# Patient Record
Sex: MALE | Race: WHITE | Employment: OTHER | URBAN - METROPOLITAN AREA
[De-identification: names, ages, dates, MRNs, and addresses within clinical notes are randomized per-mention and may not be internally consistent; named-entity substitution may affect disease eponyms.]

---

## 2017-01-15 PROBLEM — J96.02 ACUTE RESPIRATORY FAILURE WITH HYPOXIA AND HYPERCAPNIA (HCC): Status: ACTIVE | Noted: 2017-01-15

## 2017-01-15 PROBLEM — I50.33 ACUTE ON CHRONIC DIASTOLIC HEART FAILURE (HCC): Status: ACTIVE | Noted: 2017-01-15

## 2017-01-15 PROBLEM — I50.1 PULMONARY EDEMA CARDIAC CAUSE (HCC): Status: ACTIVE | Noted: 2017-01-15

## 2017-01-15 PROBLEM — J96.01 ACUTE RESPIRATORY FAILURE WITH HYPOXIA AND HYPERCAPNIA (HCC): Status: ACTIVE | Noted: 2017-01-15

## 2017-01-15 PROBLEM — N18.30 CHRONIC KIDNEY DISEASE, STAGE III (MODERATE) (HCC): Status: ACTIVE | Noted: 2017-01-15

## 2017-01-25 ENCOUNTER — CARE COORDINATION (OUTPATIENT)
Dept: INTERNAL MEDICINE | Age: 72
End: 2017-01-25

## 2017-01-26 ENCOUNTER — TELEPHONE (OUTPATIENT)
Dept: INTERNAL MEDICINE | Age: 72
End: 2017-01-26

## 2017-01-27 ENCOUNTER — TELEPHONE (OUTPATIENT)
Dept: INTERNAL MEDICINE | Age: 72
End: 2017-01-27

## 2017-01-31 ENCOUNTER — TELEPHONE (OUTPATIENT)
Dept: INTERNAL MEDICINE | Age: 72
End: 2017-01-31

## 2017-01-31 ENCOUNTER — OFFICE VISIT (OUTPATIENT)
Dept: CARDIOLOGY CLINIC | Age: 72
End: 2017-01-31

## 2017-01-31 ENCOUNTER — CARE COORDINATION (OUTPATIENT)
Dept: INTERNAL MEDICINE | Age: 72
End: 2017-01-31

## 2017-01-31 ENCOUNTER — OFFICE VISIT (OUTPATIENT)
Dept: ENDOCRINOLOGY | Age: 72
End: 2017-01-31

## 2017-01-31 ENCOUNTER — OFFICE VISIT (OUTPATIENT)
Dept: INTERNAL MEDICINE | Age: 72
End: 2017-01-31

## 2017-01-31 VITALS
RESPIRATION RATE: 16 BRPM | DIASTOLIC BLOOD PRESSURE: 67 MMHG | HEIGHT: 70 IN | OXYGEN SATURATION: 96 % | WEIGHT: 224 LBS | SYSTOLIC BLOOD PRESSURE: 118 MMHG | HEART RATE: 92 BPM | BODY MASS INDEX: 32.07 KG/M2

## 2017-01-31 VITALS — BODY MASS INDEX: 31.71 KG/M2 | WEIGHT: 221 LBS | DIASTOLIC BLOOD PRESSURE: 60 MMHG | SYSTOLIC BLOOD PRESSURE: 102 MMHG

## 2017-01-31 VITALS
BODY MASS INDEX: 31.8 KG/M2 | SYSTOLIC BLOOD PRESSURE: 98 MMHG | HEART RATE: 74 BPM | DIASTOLIC BLOOD PRESSURE: 58 MMHG | WEIGHT: 221.6 LBS

## 2017-01-31 DIAGNOSIS — I50.33 ACUTE ON CHRONIC DIASTOLIC CHF (CONGESTIVE HEART FAILURE), NYHA CLASS 1 (HCC): Primary | ICD-10-CM

## 2017-01-31 DIAGNOSIS — N18.30 CHRONIC KIDNEY DISEASE, STAGE III (MODERATE) (HCC): ICD-10-CM

## 2017-01-31 DIAGNOSIS — I25.10 ATHEROSCLEROSIS OF NATIVE CORONARY ARTERY OF NATIVE HEART WITHOUT ANGINA PECTORIS: ICD-10-CM

## 2017-01-31 DIAGNOSIS — I50.33 ACUTE ON CHRONIC DIASTOLIC HEART FAILURE (HCC): ICD-10-CM

## 2017-01-31 DIAGNOSIS — I10 ESSENTIAL HYPERTENSION: ICD-10-CM

## 2017-01-31 DIAGNOSIS — E78.2 MIXED HYPERLIPIDEMIA: ICD-10-CM

## 2017-01-31 DIAGNOSIS — Z95.1 S/P CABG X 4: ICD-10-CM

## 2017-01-31 LAB
ANION GAP SERPL CALCULATED.3IONS-SCNC: 17 MMOL/L (ref 3–16)
BUN BLDV-MCNC: 67 MG/DL (ref 7–20)
CALCIUM SERPL-MCNC: 9.7 MG/DL (ref 8.3–10.6)
CHLORIDE BLD-SCNC: 97 MMOL/L (ref 99–110)
CO2: 26 MMOL/L (ref 21–32)
CREAT SERPL-MCNC: 2.9 MG/DL (ref 0.8–1.3)
GFR AFRICAN AMERICAN: 26
GFR NON-AFRICAN AMERICAN: 22
GLUCOSE BLD-MCNC: 232 MG/DL (ref 70–99)
POTASSIUM SERPL-SCNC: 6.1 MMOL/L (ref 3.5–5.1)
SODIUM BLD-SCNC: 140 MMOL/L (ref 136–145)

## 2017-01-31 PROCEDURE — 99214 OFFICE O/P EST MOD 30 MIN: CPT | Performed by: INTERNAL MEDICINE

## 2017-01-31 PROCEDURE — 99214 OFFICE O/P EST MOD 30 MIN: CPT | Performed by: NURSE PRACTITIONER

## 2017-01-31 RX ORDER — INSULIN GLARGINE 100 [IU]/ML
INJECTION, SOLUTION SUBCUTANEOUS
COMMUNITY
Start: 2017-01-25 | End: 2017-01-31 | Stop reason: SDUPTHER

## 2017-01-31 ASSESSMENT — ENCOUNTER SYMPTOMS
CHEST TIGHTNESS: 0
COUGH: 0
SINUS PRESSURE: 0
ABDOMINAL PAIN: 0
SHORTNESS OF BREATH: 0
WHEEZING: 0

## 2017-02-09 ENCOUNTER — CARE COORDINATION (OUTPATIENT)
Dept: INTERNAL MEDICINE | Age: 72
End: 2017-02-09

## 2017-02-14 ENCOUNTER — CARE COORDINATION (OUTPATIENT)
Dept: INTERNAL MEDICINE | Age: 72
End: 2017-02-14

## 2017-02-15 ENCOUNTER — CARE COORDINATION (OUTPATIENT)
Dept: INTERNAL MEDICINE | Age: 72
End: 2017-02-15

## 2017-02-21 ENCOUNTER — OFFICE VISIT (OUTPATIENT)
Dept: PULMONOLOGY | Age: 72
End: 2017-02-21

## 2017-02-21 VITALS
RESPIRATION RATE: 16 BRPM | DIASTOLIC BLOOD PRESSURE: 62 MMHG | HEIGHT: 70 IN | WEIGHT: 214 LBS | OXYGEN SATURATION: 92 % | BODY MASS INDEX: 30.64 KG/M2 | HEART RATE: 89 BPM | SYSTOLIC BLOOD PRESSURE: 122 MMHG

## 2017-02-21 DIAGNOSIS — G47.33 OSA (OBSTRUCTIVE SLEEP APNEA): Primary | ICD-10-CM

## 2017-02-21 DIAGNOSIS — I50.32 CHRONIC DIASTOLIC CONGESTIVE HEART FAILURE (HCC): ICD-10-CM

## 2017-02-21 DIAGNOSIS — J45.991 COUGH VARIANT ASTHMA: ICD-10-CM

## 2017-02-21 DIAGNOSIS — Z99.81 HYPOXEMIA REQUIRING SUPPLEMENTAL OXYGEN: ICD-10-CM

## 2017-02-21 DIAGNOSIS — R09.02 HYPOXEMIA REQUIRING SUPPLEMENTAL OXYGEN: ICD-10-CM

## 2017-02-21 PROCEDURE — 99214 OFFICE O/P EST MOD 30 MIN: CPT | Performed by: INTERNAL MEDICINE

## 2017-02-21 ASSESSMENT — SLEEP AND FATIGUE QUESTIONNAIRES
HOW LIKELY ARE YOU TO NOD OFF OR FALL ASLEEP WHILE SITTING QUIETLY AFTER LUNCH WITHOUT ALCOHOL: 2
HOW LIKELY ARE YOU TO NOD OFF OR FALL ASLEEP WHILE LYING DOWN TO REST IN THE AFTERNOON WHEN CIRCUMSTANCES PERMIT: 2
HOW LIKELY ARE YOU TO NOD OFF OR FALL ASLEEP WHEN YOU ARE A PASSENGER IN A CAR FOR AN HOUR WITHOUT A BREAK: 1
HOW LIKELY ARE YOU TO NOD OFF OR FALL ASLEEP WHILE SITTING INACTIVE IN A PUBLIC PLACE: 0
HOW LIKELY ARE YOU TO NOD OFF OR FALL ASLEEP WHILE SITTING AND READING: 2
HOW LIKELY ARE YOU TO NOD OFF OR FALL ASLEEP WHILE SITTING AND TALKING TO SOMEONE: 0
HOW LIKELY ARE YOU TO NOD OFF OR FALL ASLEEP WHILE WATCHING TV: 3
HOW LIKELY ARE YOU TO NOD OFF OR FALL ASLEEP IN A CAR, WHILE STOPPED FOR A FEW MINUTES IN TRAFFIC: 0
ESS TOTAL SCORE: 10

## 2017-03-01 ENCOUNTER — TELEPHONE (OUTPATIENT)
Dept: PULMONOLOGY | Age: 72
End: 2017-03-01

## 2017-03-01 ENCOUNTER — TELEPHONE (OUTPATIENT)
Dept: INTERNAL MEDICINE | Age: 72
End: 2017-03-01

## 2017-03-06 ENCOUNTER — OFFICE VISIT (OUTPATIENT)
Dept: CARDIOLOGY CLINIC | Age: 72
End: 2017-03-06

## 2017-03-06 VITALS
DIASTOLIC BLOOD PRESSURE: 60 MMHG | BODY MASS INDEX: 30.86 KG/M2 | HEIGHT: 70 IN | HEART RATE: 89 BPM | OXYGEN SATURATION: 90 % | SYSTOLIC BLOOD PRESSURE: 110 MMHG | WEIGHT: 215.6 LBS

## 2017-03-06 DIAGNOSIS — I25.10 ATHEROSCLEROSIS OF NATIVE CORONARY ARTERY OF NATIVE HEART WITHOUT ANGINA PECTORIS: ICD-10-CM

## 2017-03-06 DIAGNOSIS — I50.9 ACUTE CONGESTIVE HEART FAILURE, UNSPECIFIED CONGESTIVE HEART FAILURE TYPE: ICD-10-CM

## 2017-03-06 DIAGNOSIS — I50.9 ACUTE CONGESTIVE HEART FAILURE, UNSPECIFIED CONGESTIVE HEART FAILURE TYPE: Primary | ICD-10-CM

## 2017-03-06 DIAGNOSIS — I10 ESSENTIAL HYPERTENSION: ICD-10-CM

## 2017-03-06 DIAGNOSIS — N18.30 CHRONIC KIDNEY DISEASE, STAGE III (MODERATE) (HCC): ICD-10-CM

## 2017-03-06 LAB
ALBUMIN SERPL-MCNC: 4.2 G/DL (ref 3.4–5)
ANION GAP SERPL CALCULATED.3IONS-SCNC: 16 MMOL/L (ref 3–16)
BUN BLDV-MCNC: 44 MG/DL (ref 7–20)
CALCIUM SERPL-MCNC: 9.7 MG/DL (ref 8.3–10.6)
CHLORIDE BLD-SCNC: 99 MMOL/L (ref 99–110)
CO2: 27 MMOL/L (ref 21–32)
CREAT SERPL-MCNC: 2.4 MG/DL (ref 0.8–1.3)
GFR AFRICAN AMERICAN: 32
GFR NON-AFRICAN AMERICAN: 27
GLUCOSE BLD-MCNC: 239 MG/DL (ref 70–99)
PHOSPHORUS: 3.3 MG/DL (ref 2.5–4.9)
POTASSIUM SERPL-SCNC: 5.2 MMOL/L (ref 3.5–5.1)
SODIUM BLD-SCNC: 142 MMOL/L (ref 136–145)

## 2017-03-06 PROCEDURE — 99212 OFFICE O/P EST SF 10 MIN: CPT | Performed by: INTERNAL MEDICINE

## 2017-03-08 RX ORDER — METOPROLOL SUCCINATE 50 MG/1
TABLET, EXTENDED RELEASE ORAL
Qty: 90 TABLET | Refills: 3 | Status: SHIPPED | OUTPATIENT
Start: 2017-03-08 | End: 2018-01-11 | Stop reason: SDUPTHER

## 2017-03-12 ENCOUNTER — HOSPITAL ENCOUNTER (OUTPATIENT)
Dept: SLEEP MEDICINE | Age: 72
Discharge: HOME | End: 2017-03-12
Attending: INTERNAL MEDICINE | Admitting: INTERNAL MEDICINE

## 2017-03-13 ENCOUNTER — TELEPHONE (OUTPATIENT)
Dept: PULMONOLOGY | Age: 72
End: 2017-03-13

## 2017-03-13 DIAGNOSIS — G47.33 OSA (OBSTRUCTIVE SLEEP APNEA): Primary | ICD-10-CM

## 2017-03-18 RX ORDER — ROPINIROLE 1 MG/1
TABLET, FILM COATED ORAL
Qty: 90 TABLET | Refills: 0 | Status: SHIPPED | OUTPATIENT
Start: 2017-03-18 | End: 2017-06-14 | Stop reason: SDUPTHER

## 2017-03-27 ENCOUNTER — CARE COORDINATION (OUTPATIENT)
Dept: INTERNAL MEDICINE | Age: 72
End: 2017-03-27

## 2017-03-27 DIAGNOSIS — E11.42 TYPE 2 DIABETES MELLITUS WITH DIABETIC POLYNEUROPATHY, WITH LONG-TERM CURRENT USE OF INSULIN (HCC): Primary | Chronic | ICD-10-CM

## 2017-03-27 DIAGNOSIS — Z79.4 TYPE 2 DIABETES MELLITUS WITH DIABETIC POLYNEUROPATHY, WITH LONG-TERM CURRENT USE OF INSULIN (HCC): Primary | Chronic | ICD-10-CM

## 2017-03-28 ENCOUNTER — TELEPHONE (OUTPATIENT)
Dept: PULMONOLOGY | Age: 72
End: 2017-03-28

## 2017-03-29 ENCOUNTER — HOSPITAL ENCOUNTER (OUTPATIENT)
Dept: SLEEP MEDICINE | Age: 72
Discharge: HOME | End: 2017-03-29
Attending: INTERNAL MEDICINE | Admitting: INTERNAL MEDICINE

## 2017-04-12 ENCOUNTER — CARE COORDINATION (OUTPATIENT)
Dept: INTERNAL MEDICINE | Age: 72
End: 2017-04-12

## 2017-05-01 ENCOUNTER — OFFICE VISIT (OUTPATIENT)
Dept: PULMONOLOGY | Age: 72
End: 2017-05-01

## 2017-05-01 ENCOUNTER — TELEPHONE (OUTPATIENT)
Dept: PULMONOLOGY | Age: 72
End: 2017-05-01

## 2017-05-01 ENCOUNTER — OFFICE VISIT (OUTPATIENT)
Dept: INTERNAL MEDICINE | Age: 72
End: 2017-05-01

## 2017-05-01 ENCOUNTER — OFFICE VISIT (OUTPATIENT)
Dept: ENDOCRINOLOGY | Age: 72
End: 2017-05-01

## 2017-05-01 ENCOUNTER — CARE COORDINATOR VISIT (OUTPATIENT)
Dept: INTERNAL MEDICINE | Age: 72
End: 2017-05-01

## 2017-05-01 VITALS
WEIGHT: 209.4 LBS | DIASTOLIC BLOOD PRESSURE: 70 MMHG | HEIGHT: 70 IN | BODY MASS INDEX: 29.98 KG/M2 | SYSTOLIC BLOOD PRESSURE: 118 MMHG

## 2017-05-01 VITALS
WEIGHT: 209 LBS | HEART RATE: 81 BPM | SYSTOLIC BLOOD PRESSURE: 118 MMHG | HEIGHT: 70 IN | OXYGEN SATURATION: 95 % | RESPIRATION RATE: 18 BRPM | DIASTOLIC BLOOD PRESSURE: 76 MMHG | BODY MASS INDEX: 29.92 KG/M2

## 2017-05-01 VITALS
RESPIRATION RATE: 16 BRPM | HEIGHT: 70 IN | BODY MASS INDEX: 30.12 KG/M2 | SYSTOLIC BLOOD PRESSURE: 96 MMHG | HEART RATE: 80 BPM | OXYGEN SATURATION: 93 % | WEIGHT: 210.4 LBS | DIASTOLIC BLOOD PRESSURE: 66 MMHG

## 2017-05-01 DIAGNOSIS — N18.30 CHRONIC KIDNEY DISEASE, STAGE III (MODERATE) (HCC): Primary | ICD-10-CM

## 2017-05-01 DIAGNOSIS — I10 ESSENTIAL HYPERTENSION: ICD-10-CM

## 2017-05-01 DIAGNOSIS — R09.02 HYPOXEMIA REQUIRING SUPPLEMENTAL OXYGEN: ICD-10-CM

## 2017-05-01 DIAGNOSIS — Z79.4 INSULIN DEPENDENT TYPE 2 DIABETES MELLITUS, CONTROLLED (HCC): ICD-10-CM

## 2017-05-01 DIAGNOSIS — N18.4 CHRONIC KIDNEY DISEASE (CKD), STAGE IV (SEVERE) (HCC): ICD-10-CM

## 2017-05-01 DIAGNOSIS — N18.30 CHRONIC KIDNEY DISEASE, STAGE III (MODERATE) (HCC): ICD-10-CM

## 2017-05-01 DIAGNOSIS — G47.30 SLEEP APNEA, UNSPECIFIED TYPE: ICD-10-CM

## 2017-05-01 DIAGNOSIS — J84.9 ILD (INTERSTITIAL LUNG DISEASE) (HCC): ICD-10-CM

## 2017-05-01 DIAGNOSIS — I50.32 CHRONIC DIASTOLIC CONGESTIVE HEART FAILURE (HCC): ICD-10-CM

## 2017-05-01 DIAGNOSIS — G47.33 OSA (OBSTRUCTIVE SLEEP APNEA): Primary | ICD-10-CM

## 2017-05-01 DIAGNOSIS — Z99.81 HYPOXEMIA REQUIRING SUPPLEMENTAL OXYGEN: ICD-10-CM

## 2017-05-01 DIAGNOSIS — I10 ESSENTIAL HYPERTENSION: Primary | ICD-10-CM

## 2017-05-01 DIAGNOSIS — E11.9 INSULIN DEPENDENT TYPE 2 DIABETES MELLITUS, CONTROLLED (HCC): ICD-10-CM

## 2017-05-01 DIAGNOSIS — J45.991 COUGH VARIANT ASTHMA: ICD-10-CM

## 2017-05-01 DIAGNOSIS — I21.4 NON-ST ELEVATION MI (NSTEMI) (HCC): ICD-10-CM

## 2017-05-01 LAB
ANION GAP SERPL CALCULATED.3IONS-SCNC: 18 MMOL/L (ref 3–16)
BASOPHILS ABSOLUTE: 0.1 K/UL (ref 0–0.2)
BASOPHILS RELATIVE PERCENT: 0.7 %
BUN BLDV-MCNC: 60 MG/DL (ref 7–20)
CALCIUM SERPL-MCNC: 9.6 MG/DL (ref 8.3–10.6)
CHLORIDE BLD-SCNC: 97 MMOL/L (ref 99–110)
CO2: 25 MMOL/L (ref 21–32)
CREAT SERPL-MCNC: 2.6 MG/DL (ref 0.8–1.3)
EOSINOPHILS ABSOLUTE: 0.5 K/UL (ref 0–0.6)
EOSINOPHILS RELATIVE PERCENT: 5.4 %
GFR AFRICAN AMERICAN: 30
GFR NON-AFRICAN AMERICAN: 24
GLUCOSE BLD-MCNC: 110 MG/DL (ref 70–99)
HBA1C MFR BLD: 6.9 %
HCT VFR BLD CALC: 36.9 % (ref 40.5–52.5)
HEMOGLOBIN: 11.9 G/DL (ref 13.5–17.5)
LYMPHOCYTES ABSOLUTE: 1.6 K/UL (ref 1–5.1)
LYMPHOCYTES RELATIVE PERCENT: 16.2 %
MCH RBC QN AUTO: 31.1 PG (ref 26–34)
MCHC RBC AUTO-ENTMCNC: 32.2 G/DL (ref 31–36)
MCV RBC AUTO: 96.6 FL (ref 80–100)
MONOCYTES ABSOLUTE: 0.8 K/UL (ref 0–1.3)
MONOCYTES RELATIVE PERCENT: 8.5 %
NEUTROPHILS ABSOLUTE: 6.9 K/UL (ref 1.7–7.7)
NEUTROPHILS RELATIVE PERCENT: 69.2 %
PDW BLD-RTO: 14.2 % (ref 12.4–15.4)
PLATELET # BLD: 335 K/UL (ref 135–450)
PMV BLD AUTO: 8.9 FL (ref 5–10.5)
POTASSIUM SERPL-SCNC: 5.6 MMOL/L (ref 3.5–5.1)
RBC # BLD: 3.82 M/UL (ref 4.2–5.9)
SODIUM BLD-SCNC: 140 MMOL/L (ref 136–145)
WBC # BLD: 9.9 K/UL (ref 4–11)

## 2017-05-01 PROCEDURE — 99213 OFFICE O/P EST LOW 20 MIN: CPT | Performed by: INTERNAL MEDICINE

## 2017-05-01 PROCEDURE — 36415 COLL VENOUS BLD VENIPUNCTURE: CPT | Performed by: INTERNAL MEDICINE

## 2017-05-01 PROCEDURE — 83036 HEMOGLOBIN GLYCOSYLATED A1C: CPT | Performed by: INTERNAL MEDICINE

## 2017-05-01 PROCEDURE — 99214 OFFICE O/P EST MOD 30 MIN: CPT | Performed by: INTERNAL MEDICINE

## 2017-05-01 ASSESSMENT — ENCOUNTER SYMPTOMS
ABDOMINAL PAIN: 0
WHEEZING: 0
SINUS PRESSURE: 0
COUGH: 1
CHEST TIGHTNESS: 0
SHORTNESS OF BREATH: 0

## 2017-05-01 ASSESSMENT — PATIENT HEALTH QUESTIONNAIRE - PHQ9
1. LITTLE INTEREST OR PLEASURE IN DOING THINGS: 0
SUM OF ALL RESPONSES TO PHQ9 QUESTIONS 1 & 2: 0
SUM OF ALL RESPONSES TO PHQ QUESTIONS 1-9: 0
2. FEELING DOWN, DEPRESSED OR HOPELESS: 0

## 2017-05-08 ENCOUNTER — OFFICE VISIT (OUTPATIENT)
Dept: CARDIOLOGY CLINIC | Age: 72
End: 2017-05-08

## 2017-05-08 VITALS
BODY MASS INDEX: 30.13 KG/M2 | SYSTOLIC BLOOD PRESSURE: 120 MMHG | DIASTOLIC BLOOD PRESSURE: 64 MMHG | HEART RATE: 80 BPM | WEIGHT: 210 LBS

## 2017-05-08 DIAGNOSIS — I50.33 ACUTE ON CHRONIC DIASTOLIC HEART FAILURE (HCC): ICD-10-CM

## 2017-05-08 DIAGNOSIS — I25.10 ATHEROSCLEROSIS OF NATIVE CORONARY ARTERY OF NATIVE HEART WITHOUT ANGINA PECTORIS: Primary | ICD-10-CM

## 2017-05-08 PROCEDURE — 99213 OFFICE O/P EST LOW 20 MIN: CPT | Performed by: INTERNAL MEDICINE

## 2017-05-19 ENCOUNTER — TELEPHONE (OUTPATIENT)
Dept: INTERNAL MEDICINE | Age: 72
End: 2017-05-19

## 2017-05-19 ENCOUNTER — OFFICE VISIT (OUTPATIENT)
Dept: INTERNAL MEDICINE | Age: 72
End: 2017-05-19

## 2017-05-19 VITALS
WEIGHT: 203.2 LBS | SYSTOLIC BLOOD PRESSURE: 120 MMHG | DIASTOLIC BLOOD PRESSURE: 70 MMHG | HEIGHT: 70 IN | BODY MASS INDEX: 29.09 KG/M2

## 2017-05-19 DIAGNOSIS — I10 ESSENTIAL HYPERTENSION: ICD-10-CM

## 2017-05-19 DIAGNOSIS — H26.9 CATARACT, BILATERAL: ICD-10-CM

## 2017-05-19 DIAGNOSIS — Z01.818 PRE-OP EXAMINATION: Primary | ICD-10-CM

## 2017-05-19 DIAGNOSIS — I25.10 ATHEROSCLEROSIS OF NATIVE CORONARY ARTERY OF NATIVE HEART WITHOUT ANGINA PECTORIS: ICD-10-CM

## 2017-05-19 DIAGNOSIS — N18.30 CHRONIC KIDNEY DISEASE, STAGE III (MODERATE) (HCC): ICD-10-CM

## 2017-05-19 DIAGNOSIS — Z01.818 PRE-OP EXAMINATION: ICD-10-CM

## 2017-05-19 LAB
A/G RATIO: 1.7 (ref 1.1–2.2)
ALBUMIN SERPL-MCNC: 4.3 G/DL (ref 3.4–5)
ALP BLD-CCNC: 37 U/L (ref 40–129)
ALT SERPL-CCNC: 23 U/L (ref 10–40)
ANION GAP SERPL CALCULATED.3IONS-SCNC: 16 MMOL/L (ref 3–16)
AST SERPL-CCNC: 29 U/L (ref 15–37)
BASOPHILS ABSOLUTE: 0.1 K/UL (ref 0–0.2)
BASOPHILS RELATIVE PERCENT: 0.9 %
BILIRUB SERPL-MCNC: 0.4 MG/DL (ref 0–1)
BUN BLDV-MCNC: 70 MG/DL (ref 7–20)
CALCIUM SERPL-MCNC: 9.7 MG/DL (ref 8.3–10.6)
CHLORIDE BLD-SCNC: 95 MMOL/L (ref 99–110)
CO2: 27 MMOL/L (ref 21–32)
CREAT SERPL-MCNC: 2.8 MG/DL (ref 0.8–1.3)
EOSINOPHILS ABSOLUTE: 0.5 K/UL (ref 0–0.6)
EOSINOPHILS RELATIVE PERCENT: 6 %
GFR AFRICAN AMERICAN: 27
GFR NON-AFRICAN AMERICAN: 22
GLOBULIN: 2.6 G/DL
GLUCOSE BLD-MCNC: 157 MG/DL (ref 70–99)
HCT VFR BLD CALC: 37.6 % (ref 40.5–52.5)
HEMOGLOBIN: 12 G/DL (ref 13.5–17.5)
LYMPHOCYTES ABSOLUTE: 1.6 K/UL (ref 1–5.1)
LYMPHOCYTES RELATIVE PERCENT: 20.8 %
MCH RBC QN AUTO: 31.6 PG (ref 26–34)
MCHC RBC AUTO-ENTMCNC: 32 G/DL (ref 31–36)
MCV RBC AUTO: 99 FL (ref 80–100)
MONOCYTES ABSOLUTE: 0.6 K/UL (ref 0–1.3)
MONOCYTES RELATIVE PERCENT: 7.2 %
NEUTROPHILS ABSOLUTE: 5.1 K/UL (ref 1.7–7.7)
NEUTROPHILS RELATIVE PERCENT: 65.1 %
PDW BLD-RTO: 14.8 % (ref 12.4–15.4)
PLATELET # BLD: 323 K/UL (ref 135–450)
PMV BLD AUTO: 8.9 FL (ref 5–10.5)
POTASSIUM SERPL-SCNC: 5.2 MMOL/L (ref 3.5–5.1)
RBC # BLD: 3.8 M/UL (ref 4.2–5.9)
SODIUM BLD-SCNC: 138 MMOL/L (ref 136–145)
TOTAL PROTEIN: 6.9 G/DL (ref 6.4–8.2)
WBC # BLD: 7.8 K/UL (ref 4–11)

## 2017-05-19 PROCEDURE — 99214 OFFICE O/P EST MOD 30 MIN: CPT | Performed by: INTERNAL MEDICINE

## 2017-05-19 PROCEDURE — 93000 ELECTROCARDIOGRAM COMPLETE: CPT | Performed by: INTERNAL MEDICINE

## 2017-05-22 RX ORDER — ATORVASTATIN CALCIUM 40 MG/1
TABLET, FILM COATED ORAL
Qty: 90 TABLET | Refills: 0 | Status: SHIPPED | OUTPATIENT
Start: 2017-05-22 | End: 2017-08-21 | Stop reason: SDUPTHER

## 2017-05-31 RX ORDER — BLOOD SUGAR DIAGNOSTIC
STRIP MISCELLANEOUS
Qty: 150 STRIP | Refills: 3 | Status: SHIPPED | OUTPATIENT
Start: 2017-05-31 | End: 2018-03-15 | Stop reason: SDUPTHER

## 2017-06-13 ENCOUNTER — TELEPHONE (OUTPATIENT)
Dept: INTERNAL MEDICINE | Age: 72
End: 2017-06-13

## 2017-06-14 RX ORDER — ROPINIROLE 1 MG/1
TABLET, FILM COATED ORAL
Qty: 90 TABLET | Refills: 0 | Status: SHIPPED | OUTPATIENT
Start: 2017-06-14 | End: 2017-08-18 | Stop reason: SDUPTHER

## 2017-06-19 ENCOUNTER — CARE COORDINATION (OUTPATIENT)
Dept: INTERNAL MEDICINE | Age: 72
End: 2017-06-19

## 2017-07-08 RX ORDER — FENOFIBRIC ACID 135 MG/1
CAPSULE, DELAYED RELEASE ORAL
Qty: 90 CAPSULE | Refills: 0 | Status: SHIPPED | OUTPATIENT
Start: 2017-07-08 | End: 2017-07-18

## 2017-07-12 RX ORDER — FENOFIBRIC ACID 135 MG/1
CAPSULE, DELAYED RELEASE ORAL
Qty: 90 CAPSULE | Refills: 0 | Status: SHIPPED | OUTPATIENT
Start: 2017-07-12 | End: 2017-07-18

## 2017-07-17 RX ORDER — PEN NEEDLE, DIABETIC 31 GX5/16"
NEEDLE, DISPOSABLE MISCELLANEOUS
Qty: 100 EACH | Refills: 2 | Status: SHIPPED | OUTPATIENT
Start: 2017-07-17 | End: 2017-10-12 | Stop reason: SDUPTHER

## 2017-07-18 RX ORDER — FENOFIBRIC ACID 135 MG/1
CAPSULE, DELAYED RELEASE ORAL
Qty: 90 CAPSULE | Refills: 0 | Status: SHIPPED | OUTPATIENT
Start: 2017-07-18 | End: 2017-10-31

## 2017-07-21 RX ORDER — FENOFIBRIC ACID 135 MG/1
CAPSULE, DELAYED RELEASE ORAL
Qty: 90 CAPSULE | Refills: 0 | Status: SHIPPED | OUTPATIENT
Start: 2017-07-21 | End: 2017-08-01 | Stop reason: SDUPTHER

## 2017-07-25 ENCOUNTER — OFFICE VISIT (OUTPATIENT)
Dept: INTERNAL MEDICINE | Age: 72
End: 2017-07-25

## 2017-07-25 VITALS
DIASTOLIC BLOOD PRESSURE: 70 MMHG | BODY MASS INDEX: 28.92 KG/M2 | HEIGHT: 70 IN | SYSTOLIC BLOOD PRESSURE: 120 MMHG | WEIGHT: 202 LBS

## 2017-07-25 DIAGNOSIS — E11.9 INSULIN DEPENDENT TYPE 2 DIABETES MELLITUS, CONTROLLED (HCC): ICD-10-CM

## 2017-07-25 DIAGNOSIS — Z79.4 INSULIN DEPENDENT TYPE 2 DIABETES MELLITUS, CONTROLLED (HCC): ICD-10-CM

## 2017-07-25 DIAGNOSIS — L30.9 ACUTE DERMATITIS: Primary | ICD-10-CM

## 2017-07-25 DIAGNOSIS — L40.9 PSORIASIS, UNSPECIFIED: ICD-10-CM

## 2017-07-25 DIAGNOSIS — N18.30 CHRONIC KIDNEY DISEASE, STAGE III (MODERATE) (HCC): ICD-10-CM

## 2017-07-25 PROCEDURE — 99213 OFFICE O/P EST LOW 20 MIN: CPT | Performed by: INTERNAL MEDICINE

## 2017-07-25 RX ORDER — INSULIN LISPRO 100 [IU]/ML
INJECTION, SOLUTION INTRAVENOUS; SUBCUTANEOUS
Qty: 100 ML | Refills: 3 | Status: SHIPPED | OUTPATIENT
Start: 2017-07-25 | End: 2018-08-16 | Stop reason: SDUPTHER

## 2017-07-26 ENCOUNTER — CARE COORDINATION (OUTPATIENT)
Dept: INTERNAL MEDICINE | Age: 72
End: 2017-07-26

## 2017-08-01 ENCOUNTER — OFFICE VISIT (OUTPATIENT)
Dept: ENDOCRINOLOGY | Age: 72
End: 2017-08-01

## 2017-08-01 VITALS
OXYGEN SATURATION: 96 % | HEART RATE: 83 BPM | WEIGHT: 200.2 LBS | HEIGHT: 70 IN | SYSTOLIC BLOOD PRESSURE: 128 MMHG | DIASTOLIC BLOOD PRESSURE: 74 MMHG | BODY MASS INDEX: 28.66 KG/M2

## 2017-08-01 DIAGNOSIS — I10 ESSENTIAL HYPERTENSION: ICD-10-CM

## 2017-08-01 LAB — HBA1C MFR BLD: 7.2 %

## 2017-08-01 PROCEDURE — 99214 OFFICE O/P EST MOD 30 MIN: CPT | Performed by: INTERNAL MEDICINE

## 2017-08-01 PROCEDURE — 83036 HEMOGLOBIN GLYCOSYLATED A1C: CPT | Performed by: INTERNAL MEDICINE

## 2017-08-01 ASSESSMENT — PATIENT HEALTH QUESTIONNAIRE - PHQ9
SUM OF ALL RESPONSES TO PHQ9 QUESTIONS 1 & 2: 0
1. LITTLE INTEREST OR PLEASURE IN DOING THINGS: 0
SUM OF ALL RESPONSES TO PHQ QUESTIONS 1-9: 0
2. FEELING DOWN, DEPRESSED OR HOPELESS: 0

## 2017-08-04 ENCOUNTER — CARE COORDINATION (OUTPATIENT)
Dept: INTERNAL MEDICINE | Age: 72
End: 2017-08-04

## 2017-08-14 ENCOUNTER — OFFICE VISIT (OUTPATIENT)
Dept: CARDIOLOGY CLINIC | Age: 72
End: 2017-08-14

## 2017-08-14 VITALS
SYSTOLIC BLOOD PRESSURE: 136 MMHG | WEIGHT: 199 LBS | BODY MASS INDEX: 28.55 KG/M2 | DIASTOLIC BLOOD PRESSURE: 80 MMHG | HEART RATE: 75 BPM

## 2017-08-14 DIAGNOSIS — I10 ESSENTIAL HYPERTENSION: ICD-10-CM

## 2017-08-14 DIAGNOSIS — N18.30 CHRONIC KIDNEY DISEASE, STAGE III (MODERATE) (HCC): ICD-10-CM

## 2017-08-14 DIAGNOSIS — I25.10 ATHEROSCLEROSIS OF NATIVE CORONARY ARTERY OF NATIVE HEART WITHOUT ANGINA PECTORIS: Primary | ICD-10-CM

## 2017-08-14 PROCEDURE — 99213 OFFICE O/P EST LOW 20 MIN: CPT | Performed by: INTERNAL MEDICINE

## 2017-08-18 ENCOUNTER — OFFICE VISIT (OUTPATIENT)
Dept: PULMONOLOGY | Age: 72
End: 2017-08-18

## 2017-08-18 VITALS
WEIGHT: 202 LBS | SYSTOLIC BLOOD PRESSURE: 112 MMHG | BODY MASS INDEX: 28.92 KG/M2 | OXYGEN SATURATION: 94 % | HEIGHT: 70 IN | RESPIRATION RATE: 18 BRPM | DIASTOLIC BLOOD PRESSURE: 64 MMHG | HEART RATE: 84 BPM

## 2017-08-18 DIAGNOSIS — J45.991 COUGH VARIANT ASTHMA: Primary | ICD-10-CM

## 2017-08-18 DIAGNOSIS — G47.33 OSA TREATED WITH BIPAP: ICD-10-CM

## 2017-08-18 DIAGNOSIS — J84.9 ILD (INTERSTITIAL LUNG DISEASE) (HCC): ICD-10-CM

## 2017-08-18 DIAGNOSIS — G25.81 RESTLESS LEGS SYNDROME: ICD-10-CM

## 2017-08-18 PROCEDURE — 99214 OFFICE O/P EST MOD 30 MIN: CPT | Performed by: INTERNAL MEDICINE

## 2017-08-18 RX ORDER — ROPINIROLE 2 MG/1
TABLET, FILM COATED ORAL
Qty: 90 TABLET | Refills: 3 | Status: SHIPPED | OUTPATIENT
Start: 2017-08-18 | End: 2017-09-11

## 2017-08-21 ENCOUNTER — TELEPHONE (OUTPATIENT)
Dept: PULMONOLOGY | Age: 72
End: 2017-08-21

## 2017-08-22 RX ORDER — ATORVASTATIN CALCIUM 40 MG/1
TABLET, FILM COATED ORAL
Qty: 90 TABLET | Refills: 2 | Status: SHIPPED | OUTPATIENT
Start: 2017-08-22 | End: 2018-06-21 | Stop reason: SDUPTHER

## 2017-08-29 ENCOUNTER — CARE COORDINATION (OUTPATIENT)
Dept: INTERNAL MEDICINE | Age: 72
End: 2017-08-29

## 2017-09-01 ENCOUNTER — OFFICE VISIT (OUTPATIENT)
Dept: INTERNAL MEDICINE | Age: 72
End: 2017-09-01

## 2017-09-01 VITALS
DIASTOLIC BLOOD PRESSURE: 60 MMHG | SYSTOLIC BLOOD PRESSURE: 120 MMHG | HEIGHT: 70 IN | WEIGHT: 201.8 LBS | BODY MASS INDEX: 28.89 KG/M2

## 2017-09-01 DIAGNOSIS — I10 ESSENTIAL HYPERTENSION: Primary | ICD-10-CM

## 2017-09-01 DIAGNOSIS — G47.30 SLEEP APNEA, UNSPECIFIED TYPE: ICD-10-CM

## 2017-09-01 DIAGNOSIS — I25.10 ATHEROSCLEROSIS OF NATIVE CORONARY ARTERY OF NATIVE HEART WITHOUT ANGINA PECTORIS: ICD-10-CM

## 2017-09-01 DIAGNOSIS — Z23 NEED FOR INFLUENZA VACCINATION: ICD-10-CM

## 2017-09-01 DIAGNOSIS — N18.30 CHRONIC KIDNEY DISEASE, STAGE III (MODERATE) (HCC): ICD-10-CM

## 2017-09-01 PROCEDURE — 99214 OFFICE O/P EST MOD 30 MIN: CPT | Performed by: INTERNAL MEDICINE

## 2017-09-01 PROCEDURE — G0008 ADMIN INFLUENZA VIRUS VAC: HCPCS | Performed by: INTERNAL MEDICINE

## 2017-09-01 PROCEDURE — 90662 IIV NO PRSV INCREASED AG IM: CPT | Performed by: INTERNAL MEDICINE

## 2017-09-11 RX ORDER — ROPINIROLE 1 MG/1
TABLET, FILM COATED ORAL
Qty: 90 TABLET | Refills: 3 | Status: SHIPPED | OUTPATIENT
Start: 2017-09-11 | End: 2018-08-28 | Stop reason: SDUPTHER

## 2017-09-15 ENCOUNTER — CARE COORDINATION (OUTPATIENT)
Dept: INTERNAL MEDICINE | Age: 72
End: 2017-09-15

## 2017-09-22 ENCOUNTER — TELEPHONE (OUTPATIENT)
Dept: INTERNAL MEDICINE | Age: 72
End: 2017-09-22

## 2017-10-12 RX ORDER — PEN NEEDLE, DIABETIC 31 GX5/16"
1 NEEDLE, DISPOSABLE MISCELLANEOUS
Qty: 360 EACH | Refills: 1 | Status: SHIPPED | OUTPATIENT
Start: 2017-10-12 | End: 2017-11-21 | Stop reason: SDUPTHER

## 2017-10-17 ENCOUNTER — CARE COORDINATION (OUTPATIENT)
Dept: INTERNAL MEDICINE | Age: 72
End: 2017-10-17

## 2017-10-17 NOTE — CARE COORDINATION
Ambulatory Care Coordination Note  10/17/2017  CM Risk Score: 8  Trey Mortality Risk Score: 22.33    ACC: Tricia Saini RN     Hx: Asthma, CKD, DM, HTN, CAD, Psoriasis, Osteoarthritis, NSTEMI, CABG, Glaucoma, BJ, CHF, Pneumonia    Summary Note: The pt stated he still has a lot fatigue and and his energy is low. The pt stated he only sleeps about 4 hours a night with his CPAP. The pt stated he has ordered a new mask. The pt stated when he used the CPAP in the past he was taking Lunesta to help him sleep through out the night. The pt stated he does take a nap every day in his recliner for 1-2 hours. The pt stated his blood sugars have been doing pretty good. FBS's average 123-187, Before Lunch 120-185 and Before Dinner 131-207. The pt stated he takes Humalog 14 Units plus sliding scale. The pt stated he does not always add the sliding scale in. The pt stated he takes Lantus 40 Units at night. Plan:  The [facility] suggested the pt discuss the Lunesta with his PCP and Pulmonologist.  The [facility] encouraged the pt to continue to monitor his blood sugars and use his sliding scale with his base dose to help lower his blood sugars. The pt has a follow up appointment with the PCP on 11/03/17. Care Coordination Interventions    Program Enrollment:  Complex Care  Referral from Primary Care Provider:  Yes  Suggested Interventions and Community Resources  Cardiac Rehab: In Process  Diabetes Education: In Process  Fall Risk Prevention:  Not Started  Registered Dietician:  Completed  Specialty Services Referral:  In Process  Zone Management Tools: In Process  Other Services or Interventions:  Endo, Nephro, Pulm         Goals Addressed             Most Recent     Conditions and Symptoms   On track (10/17/2017)             I will follow my Zone Management tool to seek urgent or emergent care. I will notify my provider of any symptoms that indicate a worsening of my condition.     Barriers: none  Plan for overcoming my barriers: N/A  Confidence: 8/10  Anticipated Goal Completion Date: 9/1/17         Nutrition Plan   On track (10/17/2017)             I will try to follow a low sodium, low potassium, low CHO diet. Barriers: overwhelmed by complexity of regimen and stress  Plan for overcoming my barriers: Pt education, support and resources  Confidence: 7/10  Anticipated Goal Completion Date: 11/15/17            Prior to Admission medications    Medication Sig Start Date End Date Taking? Authorizing Provider   Insulin Pen Needle (PEN NEEDLES 31GX5/16\") 31G X 8 MM MISC Inject 1 Dose into the skin 4 times daily (before meals and nightly) 10/12/17 1/10/18  Radha Myers MD   rOPINIRole (REQUIP) 1 MG tablet TAKE ONE TABLET BY MOUTH EVERY EVENING 9/11/17   Radha Myers MD   atorvastatin (LIPITOR) 40 MG tablet TAKE ONE TABLET BY MOUTH ONCE NIGHTLY 8/22/17   Jossie Green MD   HUMALOG KWIKPEN 100 UNIT/ML pen INJECT 32 UNITS INTO THE SKIN 3 TIMES A DAY BEFORE MEALS 7/25/17   Gearld Schlatter, MD   insulin glargine (LANTUS SOLOSTAR) 100 UNIT/ML injection pen 40 units HS 7/25/17   Gearld Schlatter, MD   fenofibric acid (FIBRICOR) 135 MG CPDR capsule TAKE ONE CAPSULE BY MOUTH DAILY 7/18/17   Radha Myers MD   ACCU-CHEK JOHNATHAN PLUS strip TEST FOUR TIMES A DAY AS NEEDED 5/31/17   Gearld Schlatter, MD   furosemide (LASIX) 40 MG tablet Take 0.5 tablets by mouth daily 4/4/17   Awa Crook MD   metoprolol succinate (TOPROL XL) 50 MG extended release tablet TAKE ONE TABLET BY MOUTH DAILY 3/8/17   Katie Avila MD   glucose blood VI test strips (ACCU-CHEK JOHNATHAN) strip 4 times a day As needed.  11/21/16   Gearld Schlatter, MD   Blood Glucose Monitoring Suppl (ACCU-CHEK JOHNATHAN) RUMA Use as needed 11/21/16   Gearld Schlatter, MD   SOFT TOUCH LANCETS MISC 4 times a day 11/21/16   Gearld Schlatter, MD   leflunomide (ARAVA) 10 MG tablet Take 10 mg by mouth daily    Historical Provider, MD   sulfaSALAzine (AZULFIDINE) 500 MG tablet Take 500 mg by mouth 3 times daily    Historical Provider, MD   aspirin 81 MG chewable tablet Take 1 tablet by mouth daily. 3/28/12   Linda Pastrana MD   Lancets MISC 2 times daily. Lancets for a Anna meter. 7/16/10   Bakari Chen MD       Future Appointments  Date Time Provider Svitlana Royalisti   11/3/2017 10:00 AM Bakari Chen MD [facility]   11/7/2017 9:10 AM Jessi Lugo MD [facility]   11/7/2017 12:00 PM Grupo Bear MD [facility]   11/21/2017 9:00 AM MD James Lux P/CC [facility]   12/4/2017 10:00 AM Thomas Sexton MD [facility]     General Assessment    Do you have any symptoms that are causing concern?:  Yes  Progression since Onset:  Unchanged  Reported Symptoms:  Fatigue      and   Congestive Heart Failure Assessment    Are you currently restricting fluids?:  No Restriction  Do you understand a low sodium diet?:  Yes  Do you understand how to read food labels?:  Yes  Do you salt your food before tasting it?:  No         Symptoms:         ,   Diabetes Assessment    Meal Planning:  Carb counting, Plate Method   How often do you test your blood sugar?:  Meals   Do you have barriers with adherence to non-pharmacologic self-management interventions?  (Nutrition/Exercise/Self-Monitoring):  No   Have you ever had to go to the ED for symptoms of low blood sugar?:  No          ,

## 2017-10-31 RX ORDER — FENOFIBRIC ACID 135 MG/1
CAPSULE, DELAYED RELEASE ORAL
Qty: 90 CAPSULE | Refills: 0 | Status: SHIPPED | OUTPATIENT
Start: 2017-10-31 | End: 2018-01-31 | Stop reason: SDUPTHER

## 2017-11-01 ENCOUNTER — HOSPITAL ENCOUNTER (OUTPATIENT)
Dept: OTHER | Age: 72
Discharge: HOME | End: 2017-11-30
Attending: INTERNAL MEDICINE | Admitting: INTERNAL MEDICINE

## 2017-11-07 ENCOUNTER — OFFICE VISIT (OUTPATIENT)
Dept: ENDOCRINOLOGY | Age: 72
End: 2017-11-07

## 2017-11-07 VITALS
HEART RATE: 94 BPM | DIASTOLIC BLOOD PRESSURE: 74 MMHG | OXYGEN SATURATION: 96 % | WEIGHT: 204.2 LBS | HEIGHT: 70 IN | SYSTOLIC BLOOD PRESSURE: 119 MMHG | RESPIRATION RATE: 16 BRPM | BODY MASS INDEX: 29.23 KG/M2

## 2017-11-07 DIAGNOSIS — E11.9 INSULIN DEPENDENT TYPE 2 DIABETES MELLITUS, CONTROLLED (HCC): Primary | ICD-10-CM

## 2017-11-07 DIAGNOSIS — Z79.4 INSULIN DEPENDENT TYPE 2 DIABETES MELLITUS, CONTROLLED (HCC): Primary | ICD-10-CM

## 2017-11-07 DIAGNOSIS — I10 ESSENTIAL HYPERTENSION: ICD-10-CM

## 2017-11-07 LAB — HBA1C MFR BLD: 6.3 %

## 2017-11-07 PROCEDURE — G8598 ASA/ANTIPLAT THER USED: HCPCS | Performed by: INTERNAL MEDICINE

## 2017-11-07 PROCEDURE — G8427 DOCREV CUR MEDS BY ELIG CLIN: HCPCS | Performed by: INTERNAL MEDICINE

## 2017-11-07 PROCEDURE — 4040F PNEUMOC VAC/ADMIN/RCVD: CPT | Performed by: INTERNAL MEDICINE

## 2017-11-07 PROCEDURE — G8417 CALC BMI ABV UP PARAM F/U: HCPCS | Performed by: INTERNAL MEDICINE

## 2017-11-07 PROCEDURE — 83036 HEMOGLOBIN GLYCOSYLATED A1C: CPT | Performed by: INTERNAL MEDICINE

## 2017-11-07 PROCEDURE — 3045F PR MOST RECENT HEMOGLOBIN A1C LEVEL 7.0-9.0%: CPT | Performed by: INTERNAL MEDICINE

## 2017-11-07 PROCEDURE — 3017F COLORECTAL CA SCREEN DOC REV: CPT | Performed by: INTERNAL MEDICINE

## 2017-11-07 PROCEDURE — 1036F TOBACCO NON-USER: CPT | Performed by: INTERNAL MEDICINE

## 2017-11-07 PROCEDURE — G8484 FLU IMMUNIZE NO ADMIN: HCPCS | Performed by: INTERNAL MEDICINE

## 2017-11-07 PROCEDURE — 1123F ACP DISCUSS/DSCN MKR DOCD: CPT | Performed by: INTERNAL MEDICINE

## 2017-11-07 PROCEDURE — 99213 OFFICE O/P EST LOW 20 MIN: CPT | Performed by: INTERNAL MEDICINE

## 2017-11-07 NOTE — PROGRESS NOTES
Seen as f/u patient for diabetes    Interim:none     Admitted on 1/17 for MORIS on CKD, Acute exacerbation of CHF. Resp failure  Hypoglycemia on admission BG 28? Diagnosed with Type 2 diabetes mellitus in  1995  Known diabetic complications: Nephropathy, Neuropathy, mild retinopathy    Current diabetic medications   Lantus 40 units  Humalog 10 unit TID + SSI       On insulin since 1998    He has been on janumet and glipizide    Last A1c 6.6%<----- 7.2% <---- 6.9 on 5/17<-----6.6 on 1/17<----7.3 on 10/16<-----8.9 on 7/16<--- 7.4 on 4/16<------7.9 on 1/16<----  10.4 on 10/15 <---- 8.5 <--- 8.2 <--- 8.6    Prior visit with dietician: no  Current diet: on average, 3 meals per day does not count CHO  Current exercise: walking   Current monitoring regimen: home blood tests - 3 times daily     Has brought blood glucose log/meter: yes  Home blood sugar records:121-214  Any episodes of hypoglycemia?  Occ am     H/O CABG in 06    Hyperlipidemia: LDL 84 on 10/15  Last eye exam: 10/17  Last foot exam: 1/17  Last microalbumin to creatinine ratio: 2.3 <---- Cr 1.7 on 10/15<--- 1.8  1478 on 11/15  Seen Dr. Paul Rodriguez  ACE-I or ARB: Losartan 25mg ( stopped by nephrologist) Metoprolol XL 50mg     Past Medical History:   Diagnosis Date    Allergic rhinitis due to other allergen 7/12/2010    Coronary atherosclerosis of unspecified type of vessel, native or graft 7/12/2010    Diabetic eye exam ([facility] [address] 04/29/2015    Diabetic eye exam ([facility] [address] 5/5/2016    [place] eye    Generalized osteoarthrosis, involving multiple sites 7/12/2010    Other psoriasis 7/12/2010    Pneumonia     Psoriatic arthropathy ([facility] [address] 7/12/2010    Type II or unspecified type diabetes mellitus without mention of complication, not stated as uncontrolled 7/12/2010    Unspecified essential hypertension 7/12/2010    Unspecified sleep apnea 7/12/2010     Past Surgical History:   Procedure Laterality Date    CARPAL TUNNEL RELEASE      s/p    CATARACT REMOVAL      CORONARY ARTERY BYPASS GRAFT      JOINT REPLACEMENT         Review of Systems  Constitutional: - weight loss and + malaise/fatigue. Negative for fever and chills. On oxygen  Eyes: Negative for blurred vision. Negative for double vision, photophobia and pain. Respiratory: + for cough and - sputum production. seeing pulmonary  Cardiovascular: Negative for chest pain, palpitations and leg swelling. Gastrointestinal: Negative for vomiting and abdominal pain. Genitourinary: Negative for dysuria, urgency and frequency. Musculoskeletal: Negative for back pain. + joint pain  Skin: Negative for itching and +rash. Neurological: Negative for dizziness. Negative for tingling, tremors  Endo/Heme/Allergies: see HPI  Psychiatric/Behavioral: Negative for depression and substance abuse.     scanned     Objective:        /74 (Site: Right Arm, Position: Sitting, Cuff Size: Medium Adult)   Pulse 94   Resp 16   Ht 5' 10\" (1.778 m)   Wt 204 lb 3.2 oz (92.6 kg)   SpO2 96%   BMI 29.30 kg/m²   Wt Readings from Last 3 Encounters:   11/07/17 204 lb 3.2 oz (92.6 kg)   09/01/17 201 lb 12.8 oz (91.5 kg)   08/18/17 202 lb (91.6 kg)     Constitutional: Well-developed, alert, appears stated age, cooperative, in no acute distress  H/E/N/M/T:atraumatic, normocephalic, external ears, nose, lips normal without lesions  Eyes:  extraocular muscles are intact  Skeletal muscular: no kyphosis, no gross abnormalities  Skin: Xanthoma/Xanthelasmas are  not present  Psychiatric: Judgement and Insight:  judgement and insight appear normal  Neuro: Normal without focal findings, speech is spontaneous, and movements are coordinated, alert and oriented x3     1/17  Skeletal foot exam is normal, no skin lesions, toenails are normal, pulses are normal, 10 g monofilament is 10/10 on the right and 10/10 on the left , 128 Hz vibration sense is nl bilaterally.      Lab Reviewed   No components found for: CHLPL  Lab Results   Component Value Date

## 2017-11-09 ENCOUNTER — OFFICE VISIT (OUTPATIENT)
Dept: INTERNAL MEDICINE | Age: 72
End: 2017-11-09

## 2017-11-09 VITALS
SYSTOLIC BLOOD PRESSURE: 120 MMHG | HEIGHT: 70 IN | DIASTOLIC BLOOD PRESSURE: 70 MMHG | WEIGHT: 201.8 LBS | BODY MASS INDEX: 28.89 KG/M2

## 2017-11-09 DIAGNOSIS — Z00.00 MEDICARE ANNUAL WELLNESS VISIT, SUBSEQUENT: Primary | ICD-10-CM

## 2017-11-09 DIAGNOSIS — Z00.00 MEDICARE ANNUAL WELLNESS VISIT, SUBSEQUENT: ICD-10-CM

## 2017-11-09 LAB
A/G RATIO: 1.8 (ref 1.1–2.2)
ALBUMIN SERPL-MCNC: 4.2 G/DL (ref 3.4–5)
ALP BLD-CCNC: 52 U/L (ref 40–129)
ALT SERPL-CCNC: 40 U/L (ref 10–40)
ANION GAP SERPL CALCULATED.3IONS-SCNC: 16 MMOL/L (ref 3–16)
AST SERPL-CCNC: 32 U/L (ref 15–37)
BASOPHILS ABSOLUTE: 0.1 K/UL (ref 0–0.2)
BASOPHILS RELATIVE PERCENT: 0.7 %
BILIRUB SERPL-MCNC: <0.2 MG/DL (ref 0–1)
BUN BLDV-MCNC: 54 MG/DL (ref 7–20)
CALCIUM SERPL-MCNC: 9.7 MG/DL (ref 8.3–10.6)
CHLORIDE BLD-SCNC: 104 MMOL/L (ref 99–110)
CHOLESTEROL, TOTAL: 130 MG/DL (ref 0–199)
CO2: 23 MMOL/L (ref 21–32)
CREAT SERPL-MCNC: 2 MG/DL (ref 0.8–1.3)
EOSINOPHILS ABSOLUTE: 0.4 K/UL (ref 0–0.6)
EOSINOPHILS RELATIVE PERCENT: 5 %
GFR AFRICAN AMERICAN: 40
GFR NON-AFRICAN AMERICAN: 33
GLOBULIN: 2.3 G/DL
GLUCOSE BLD-MCNC: 146 MG/DL (ref 70–99)
HCT VFR BLD CALC: 37.9 % (ref 40.5–52.5)
HDLC SERPL-MCNC: 46 MG/DL (ref 40–60)
HEMOGLOBIN: 12.3 G/DL (ref 13.5–17.5)
LDL CHOLESTEROL CALCULATED: 44 MG/DL
LYMPHOCYTES ABSOLUTE: 1.8 K/UL (ref 1–5.1)
LYMPHOCYTES RELATIVE PERCENT: 22.7 %
MCH RBC QN AUTO: 31.9 PG (ref 26–34)
MCHC RBC AUTO-ENTMCNC: 32.4 G/DL (ref 31–36)
MCV RBC AUTO: 98.6 FL (ref 80–100)
MONOCYTES ABSOLUTE: 0.6 K/UL (ref 0–1.3)
MONOCYTES RELATIVE PERCENT: 7.9 %
NEUTROPHILS ABSOLUTE: 5 K/UL (ref 1.7–7.7)
NEUTROPHILS RELATIVE PERCENT: 63.7 %
PDW BLD-RTO: 14 % (ref 12.4–15.4)
PLATELET # BLD: 277 K/UL (ref 135–450)
PMV BLD AUTO: 8.4 FL (ref 5–10.5)
POTASSIUM SERPL-SCNC: 5.1 MMOL/L (ref 3.5–5.1)
RBC # BLD: 3.85 M/UL (ref 4.2–5.9)
SODIUM BLD-SCNC: 143 MMOL/L (ref 136–145)
TOTAL PROTEIN: 6.5 G/DL (ref 6.4–8.2)
TRIGL SERPL-MCNC: 198 MG/DL (ref 0–150)
VLDLC SERPL CALC-MCNC: 40 MG/DL
WBC # BLD: 7.8 K/UL (ref 4–11)

## 2017-11-09 PROCEDURE — G0439 PPPS, SUBSEQ VISIT: HCPCS | Performed by: INTERNAL MEDICINE

## 2017-11-09 PROCEDURE — G8598 ASA/ANTIPLAT THER USED: HCPCS | Performed by: INTERNAL MEDICINE

## 2017-11-09 ASSESSMENT — ENCOUNTER SYMPTOMS
WHEEZING: 0
DIARRHEA: 0
BACK PAIN: 1
CONSTIPATION: 0
ABDOMINAL PAIN: 0
SHORTNESS OF BREATH: 1
FACIAL SWELLING: 0
SINUS PRESSURE: 0
NAUSEA: 0
SHORTNESS OF BREATH: 0
VOMITING: 0
COUGH: 0

## 2017-11-09 NOTE — PROGRESS NOTES
Subjective:      Patient ID: George Veloz is a 71 y.o. male  George Veloz is a 71 y.o. patient who is here for a Medicare Annual Wellness Visit, including Personalized Prevention Plan Services (FBDJ)( - Subsequent Annual Wellness Visit). Patient Care Team:  Silvano Heart MD as PCP - Yosef Milner MD as PCP - [facility] Attributed Provider  Nya Carlin as Physician (Rheumatology)  Bibi Delgadillo MD as Consulting Physician (Pulmonology)  Franklyn Hooker RN as Care Coordinator        Vitals:    11/09/17 1032   BP: 120/70   Weight: 201 lb 12.8 oz (91.5 kg)   Height: 5' 10\" (1.778 m)           Body mass index is 28.96 kg/m².         Wt Readings from Last 5 Encounters:   11/09/17 201 lb 12.8 oz (91.5 kg)   11/07/17 201 lb 3.2 oz (91.3 kg)   11/07/17 204 lb 3.2 oz (92.6 kg)   09/01/17 201 lb 12.8 oz (91.5 kg)   08/18/17 202 lb (91.6 kg)           BP Readings from Last 5 Encounters:   11/09/17 120/70   11/07/17 (!) 142/84   11/07/17 119/74   09/01/17 120/60   08/18/17 112/64           Current Outpatient Prescriptions on File Prior to Visit   Medication Sig Dispense Refill    fenofibric acid (FIBRICOR) 135 MG CPDR capsule TAKE ONE CAPSULE BY MOUTH DAILY 90 capsule 0    Insulin Pen Needle (PEN NEEDLES 31GX5/16\") 31G X 8 MM MISC Inject 1 Dose into the skin 4 times daily (before meals and nightly) 360 each 1    rOPINIRole (REQUIP) 1 MG tablet TAKE ONE TABLET BY MOUTH EVERY EVENING 90 tablet 3    atorvastatin (LIPITOR) 40 MG tablet TAKE ONE TABLET BY MOUTH ONCE NIGHTLY 90 tablet 2    HUMALOG KWIKPEN 100 UNIT/ML pen INJECT 32 UNITS INTO THE SKIN 3 TIMES A DAY BEFORE MEALS (Patient taking differently: INJECT 14 UNITS INTO THE SKIN 3 TIMES A DAY BEFORE MEALS) 100 mL 3    insulin glargine (LANTUS SOLOSTAR) 100 UNIT/ML injection pen 40 units HS 5 Pen 3    ACCU-CHEK JOHNATHAN PLUS strip TEST FOUR TIMES A DAY AS NEEDED 150 strip 3    furosemide (LASIX) 40 MG tablet Take 0.5 tablets by mouth daily 60 tablet 3    metoprolol succinate (TOPROL XL) 50 MG extended release tablet TAKE ONE TABLET BY MOUTH DAILY 90 tablet 3    glucose blood VI test strips (ACCU-CHEK JOHNATHAN) strip 4 times a day As needed. 150 each 3    Blood Glucose Monitoring Suppl (ACCU-CHEK JOHNATHAN) RUMA Use as needed 1 Device 0    SOFT TOUCH LANCETS MISC 4 times a day 150 each 2    leflunomide (ARAVA) 10 MG tablet Take 10 mg by mouth daily      sulfaSALAzine (AZULFIDINE) 500 MG tablet Take 500 mg by mouth 3 times daily      aspirin 81 MG chewable tablet Take 1 tablet by mouth daily. 30 tablet 5    Lancets MISC 2 times daily. Lancets for a Anna meter. 50 each 11     No current facility-administered medications on file prior to visit.             No outpatient prescriptions have been marked as taking for the 11/9/17 encounter (Office Visit) with Matt Soler MD.            Allergies   Allergen Reactions    Clindamycin     Clindamycin/Lincomycin     Penicillins     Tazorac [Tazarotene]      Cream - rash           Past Medical History:   Diagnosis Date    Allergic rhinitis due to other allergen 7/12/2010    Coronary atherosclerosis of unspecified type of vessel, native or graft 7/12/2010    Diabetic eye exam ([facility] [address] 04/29/2015    Diabetic eye exam ([address] 5/5/2016    [place] eye    Generalized osteoarthrosis, involving multiple sites 7/12/2010    Other psoriasis 7/12/2010    Pneumonia     Psoriatic arthropathy ([facility] [address] 7/12/2010    Type II or unspecified type diabetes mellitus without mention of complication, not stated as uncontrolled 7/12/2010    Unspecified essential hypertension 7/12/2010    Unspecified sleep apnea 7/12/2010           Patient Active Problem List   Diagnosis    Essential hypertension    Generalized osteoarthrosis, involving multiple sites    Arthritis with psoriasis ([address]    Sleep apnea    Psoriatic arthropathy ([address]    Allergic rhinitis due to other allergen    Coronary atherosclerosis    Vitamin D deficiency    Type II or unspecified type diabetes mellitus without mention of complication, not stated as uncontrolled    Cough    Glaucoma    Diabetes mellitus, insulin dependent (IDDM), uncontrolled (HCC)    Nephropathy    S/P CABG x 4    ILD (interstitial lung disease) (HCC)    Abdominal tenderness, RLQ (right lower quadrant)    Acute renal failure (HCC)    Chronic kidney disease, stage III (moderate)    Acute respiratory failure with hypoxia and hypercapnia (HCC)    Acute on chronic diastolic heart failure (HCC)    Pulmonary edema cardiac cause ([facility])    Elevated troponin    Atherosclerosis of native coronary artery of native heart without angina pectoris    Uncontrolled type 1 diabetes mellitus without complication (HCC)    BJ (obstructive sleep apnea)    Cough variant asthma    Acute congestive heart failure (HCC)    Non-ST elevation MI (NSTEMI) (HCC)    Chronic kidney disease, stage III (moderate)    Insulin dependent type 2 diabetes mellitus, controlled ([facility] [address]           Past Surgical History:   Procedure Laterality Date    CARPAL TUNNEL RELEASE      s/p    CATARACT REMOVAL      CORONARY ARTERY BYPASS GRAFT      JOINT REPLACEMENT             Social History     Social History    Marital status:      Spouse name: N/A    Number of children: N/A    Years of education: N/A     Social History Main Topics    Smoking status: Former Smoker     Packs/day: 1.00     Years: 18.00     Start date: 6/1/1962     Quit date: 11/1/1980    Smokeless tobacco: Never Used    Alcohol use No    Drug use: No    Sexual activity: Not Asked     Other Topics Concern    None     Social History Narrative    None           Family History   Problem Relation Age of Onset    Diabetes Mother     Heart Failure Mother     Coronary Art Dis Father            Immunization History   Administered Date(s) Administered    Influenza, High Dose 10/16/2014, 10/19/2015, 10/21/2016, 09/01/2017    Pneumococcal 13-valent Conjugate Deana Mccartney) 10/19/2015    Pneumococcal Polysaccharide ([id number]) 10/16/2014    Tdap (Boostrix, Adacel) 08/12/2016               Preventive Care:  Last PAP:   Hx abnormal PAP:      Breast self-exams: advised  Last mammogram: see chart     Last PSA: see chart    Last DEXA scan: see chart  Last colonoscopy: see chart  AAA screening: No   Last eye exam:2016  Exercise: Moderate  Seatbelt use: yes          Mood Disorders Screen:  Risk factors: none  Symptoms endorsed: none  Symptoms denied: all     Safety Assessment:  Functional ability/ADLs: independent   Needs assistance with bathing - no  Needs assistance with grooming - no  Needs assistance with meals - no   Urinary incontinence - no    Fecal incontinence - no   Still driving - yes    End of Life Planning:    Advanced Directive (Living Will) - Y  Durable Power of  for [address] [place] [address] [email or web address] - Y      Visual Acuity:   Intact with access to corrective lenses                        Review of Systems:         Review of Systems   Constitutional: Negative. HENT: Negative for ear discharge and facial swelling. Eyes: Negative for visual disturbance. Respiratory: Negative for cough, shortness of breath and wheezing. Cardiovascular: Negative for chest pain, palpitations and leg swelling. Gastrointestinal: Negative for abdominal pain, constipation, nausea and vomiting. Endocrine: Negative for polyuria. Genitourinary: Negative for dysuria, frequency and urgency. Musculoskeletal: Positive for arthralgias and myalgias. Right  Leg  Pain   Skin: Negative for rash. Neurological: Negative for dizziness, weakness and light-headedness. Hematological: Negative for adenopathy. Psychiatric/Behavioral: Negative for behavioral problems and dysphoric mood. The patient is not nervous/anxious. All other systems reviewed and are negative.             Physical Examination:   Physical Exam   Constitutional: He is BY MOUTH EVERY EVENING 90 tablet 3    atorvastatin (LIPITOR) 40 MG tablet TAKE ONE TABLET BY MOUTH ONCE NIGHTLY 90 tablet 2    HUMALOG KWIKPEN 100 UNIT/ML pen INJECT 32 UNITS INTO THE SKIN 3 TIMES A DAY BEFORE MEALS (Patient taking differently: INJECT 14 UNITS INTO THE SKIN 3 TIMES A DAY BEFORE MEALS) 100 mL 3    insulin glargine (LANTUS SOLOSTAR) 100 UNIT/ML injection pen 40 units HS 5 Pen 3    ACCU-CHEK JOHNATHAN PLUS strip TEST FOUR TIMES A DAY AS NEEDED 150 strip 3    furosemide (LASIX) 40 MG tablet Take 0.5 tablets by mouth daily 60 tablet 3    metoprolol succinate (TOPROL XL) 50 MG extended release tablet TAKE ONE TABLET BY MOUTH DAILY 90 tablet 3    glucose blood VI test strips (ACCU-CHEK JOHNATHAN) strip 4 times a day As needed. 150 each 3    Blood Glucose Monitoring Suppl (ACCU-CHEK JOHNATHAN) RUMA Use as needed 1 Device 0    SOFT TOUCH LANCETS MISC 4 times a day 150 each 2    leflunomide (ARAVA) 10 MG tablet Take 10 mg by mouth daily      sulfaSALAzine (AZULFIDINE) 500 MG tablet Take 500 mg by mouth 3 times daily      aspirin 81 MG chewable tablet Take 1 tablet by mouth daily. 30 tablet 5    Lancets MISC 2 times daily. Lancets for a Anna meter. 50 each 11     No current facility-administered medications for this visit. Patient Care Team:  Axel Estrada MD as PCP - Janice Roman MD as PCP - [facility] Attributed Provider  Magaly Matthew as Physician (Rheumatology)  Roldan Mcclendon MD as Consulting Physician (Pulmonology)  Zulay Rolle RN as Care Coordinator          Return to the office as previously scheduled, as necessary, as desired or as recommended. No Follow-up on file. No diagnosis found.

## 2017-11-09 NOTE — PROGRESS NOTES
Subjective:      Patient ID: Candance Bunk is a 71 y.o. male    Candance Bunk is a 71 y.o. patient who is here for a Medicare Annual Wellness Visit, including Personalized Prevention Plan Services (PPPS)  ( - Subsequent Annual Wellness Visit). He has no new complaints but multiple ongoing medical problems      Patient Care Team:  Radha Myers MD as PCP - Lila Ludwig MD as PCP - [facility] Attributed Provider  Mustapha Guillory as Physician (Rheumatology)  Rey Palmer MD as Consulting Physician (Pulmonology)  Gricelda Massey RN as Care Coordinator        Vitals:    11/09/17 1032   BP: 120/70   Weight: 201 lb 12.8 oz (91.5 kg)   Height: 5' 10\" (1.778 m)           Body mass index is 28.96 kg/m².         Wt Readings from Last 5 Encounters:   11/09/17 201 lb 12.8 oz (91.5 kg)   11/07/17 201 lb 3.2 oz (91.3 kg)   11/07/17 204 lb 3.2 oz (92.6 kg)   09/01/17 201 lb 12.8 oz (91.5 kg)   08/18/17 202 lb (91.6 kg)           BP Readings from Last 5 Encounters:   11/09/17 120/70   11/07/17 (!) 142/84   11/07/17 119/74   09/01/17 120/60   08/18/17 112/64           Current Outpatient Prescriptions on File Prior to Visit   Medication Sig Dispense Refill    fenofibric acid (FIBRICOR) 135 MG CPDR capsule TAKE ONE CAPSULE BY MOUTH DAILY 90 capsule 0    Insulin Pen Needle (PEN NEEDLES 31GX5/16\") 31G X 8 MM MISC Inject 1 Dose into the skin 4 times daily (before meals and nightly) 360 each 1    rOPINIRole (REQUIP) 1 MG tablet TAKE ONE TABLET BY MOUTH EVERY EVENING 90 tablet 3    atorvastatin (LIPITOR) 40 MG tablet TAKE ONE TABLET BY MOUTH ONCE NIGHTLY 90 tablet 2    HUMALOG KWIKPEN 100 UNIT/ML pen INJECT 32 UNITS INTO THE SKIN 3 TIMES A DAY BEFORE MEALS (Patient taking differently: INJECT 14 UNITS INTO THE SKIN 3 TIMES A DAY BEFORE MEALS) 100 mL 3    insulin glargine (LANTUS SOLOSTAR) 100 UNIT/ML injection pen 40 units HS 5 Pen 3    ACCU-CHEK JOHNATHAN PLUS strip TEST FOUR TIMES A DAY AS NEEDED 150 strip 3    furosemide (LASIX) 40 MG tablet Take 0.5 tablets by mouth daily 60 tablet 3    metoprolol succinate (TOPROL XL) 50 MG extended release tablet TAKE ONE TABLET BY MOUTH DAILY 90 tablet 3    glucose blood VI test strips (ACCU-CHEK JOHNATHAN) strip 4 times a day As needed. 150 each 3    Blood Glucose Monitoring Suppl (ACCU-CHEK JOHNATHAN) RUMA Use as needed 1 Device 0    SOFT TOUCH LANCETS MISC 4 times a day 150 each 2    leflunomide (ARAVA) 10 MG tablet Take 10 mg by mouth daily      sulfaSALAzine (AZULFIDINE) 500 MG tablet Take 500 mg by mouth 3 times daily      aspirin 81 MG chewable tablet Take 1 tablet by mouth daily. 30 tablet 5    Lancets MISC 2 times daily. Lancets for a [place] meter. 50 each 11     No current facility-administered medications on file prior to visit.             No outpatient prescriptions have been marked as taking for the 11/9/17 encounter (Office Visit) with Denilson Calix MD.            Allergies   Allergen Reactions    Clindamycin     Clindamycin/Lincomycin     Penicillins     Tazorac [Tazarotene]      Cream - rash           Past Medical History:   Diagnosis Date    Allergic rhinitis due to other allergen 7/12/2010    Coronary atherosclerosis of unspecified type of vessel, native or graft 7/12/2010    Diabetic eye exam ([facility] [address] 04/29/2015    Diabetic eye exam ([facility] [address] 5/5/2016    [place] [facility]    Generalized osteoarthrosis, involving multiple sites 7/12/2010    Other psoriasis 7/12/2010    Pneumonia     Psoriatic arthropathy ([address] 7/12/2010    Type II or unspecified type diabetes mellitus without mention of complication, not stated as uncontrolled 7/12/2010    Unspecified essential hypertension 7/12/2010    Unspecified sleep apnea 7/12/2010           Patient Active Problem List   Diagnosis    Essential hypertension    Generalized osteoarthrosis, involving multiple sites    Arthritis with psoriasis ([address]    Sleep apnea    Psoriatic arthropathy ([address]    Allergic rhinitis due to other allergen    Coronary atherosclerosis    Vitamin D deficiency    Type II or unspecified type diabetes mellitus without mention of complication, not stated as uncontrolled    Cough    Glaucoma    Diabetes mellitus, insulin dependent (IDDM), uncontrolled (HCC)    Nephropathy    S/P CABG x 4    ILD (interstitial lung disease) (HCC)    Abdominal tenderness, RLQ (right lower quadrant)    Acute renal failure (HCC)    Chronic kidney disease, stage III (moderate)    Acute respiratory failure with hypoxia and hypercapnia (HCC)    Acute on chronic diastolic heart failure (HCC)    Pulmonary edema cardiac cause (HCC)    Elevated troponin    Atherosclerosis of native coronary artery of native heart without angina pectoris    Uncontrolled type 1 diabetes mellitus without complication (HCC)    BJ (obstructive sleep apnea)    Cough variant asthma    Acute congestive heart failure (HCC)    Non-ST elevation [facility] ([facility]STEMI) [facility]    Chronic kidney disease, stage III (moderate)    Insulin dependent type 2 diabetes mellitus, controlled ([facility] [address]           Past Surgical History:   Procedure Laterality Date    CARPAL TUNNEL RELEASE      s/p    CATARACT REMOVAL      CORONARY ARTERY BYPASS GRAFT      JOINT REPLACEMENT             Social History     Social History    Marital status:      Spouse name: N/A    Number of children: N/A    Years of education: N/A     Social History Main Topics    Smoking status: Former Smoker     Packs/day: 1.00     Years: 18.00     Start date: 6/1/1962     Quit date: 11/1/1980    Smokeless tobacco: Never Used    Alcohol use No    Drug use: No    Sexual activity: Not Asked     Other Topics Concern    None     Social History Narrative    None           Family History   Problem Relation Age of Onset    Diabetes Mother     Heart Failure Mother     Coronary Art Dis Father            Immunization History   Administered Date(s) Administered    Influenza, High Dose 10/16/2014, 10/19/2015, 10/21/2016, 09/01/2017    Pneumococcal 13-valent Conjugate ([id number]) 10/19/2015    Pneumococcal Polysaccharide ([id number]) 10/16/2014    Tdap (Boostrix, Adacel) 08/12/2016               Preventive Care:     Last PSA: see chart    Last DEXA scan: see chart  Last colonoscopy: see chart  AAA screening:     Last eye exam: 2016  Exercise:  Yes  Seatbelt use: yes          Mood Disorders Screen:  Risk factors: none  Symptoms endorsed: none  Symptoms denied: all     Safety Assessment:  Functional ability/ADLs: independent   Needs assistance with bathing - no  Needs assistance with grooming - no  Needs assistance with meals - no   Urinary incontinence - no    Fecal incontinence - no   Still driving - yes    Home safety:  Lives alone or with others -Wife   First floor bedroom - Yes  First floor bathroom -Yes   Risk factors for falls - Yes  Home environment modifications - Yes    End of Life Planning:    Advanced Directive (Living Will) -N    Durable Power of  for [address] [place] [address] of  for [employer]:   Intact with access to corrective lenses               Review of Systems:         Review of Systems   Constitutional: Negative. HENT: Negative for ear discharge, hearing loss and sinus pressure. Eyes: Negative for visual disturbance. Respiratory: Positive for shortness of breath (on exertion). Negative for wheezing. Cardiovascular: Negative for chest pain, palpitations and leg swelling. Gastrointestinal: Negative for abdominal pain, constipation and diarrhea. Endocrine: Negative for polyuria. Genitourinary: Negative for frequency and urgency. Musculoskeletal: Positive for arthralgias, back pain, gait problem and myalgias. Skin: Positive for rash (new seeing dermatologist). Neurological: Positive for numbness (right lower leg). Hematological: Negative.     Psychiatric/Behavioral: Negative for behavioral problems and decreased concentration. The patient is not nervous/anxious. Physical Examination:   Physical Exam   Constitutional: He is oriented to person, place, and time. He appears well-developed and well-nourished. HENT:   Right Ear: External ear normal.   Left Ear: External ear normal.   Mouth/Throat: Oropharynx is clear and moist.   Eyes: Conjunctivae and EOM are normal. Left eye exhibits no discharge. Neck: No JVD present. No thyromegaly present. Cardiovascular: Normal rate and regular rhythm. No murmur heard. Pulmonary/Chest: Effort normal. He has rales (mild). Abdominal: Soft. There is no tenderness. There is no guarding. Musculoskeletal: Normal range of motion. He exhibits no edema. Lymphadenopathy:     He has no cervical adenopathy. Neurological: He is alert and oriented to person, place, and time. Skin: No erythema. Psychiatric: His behavior is normal. Judgment and thought content normal.             Assessment and Plan:       Encounter Diagnoses   Name Primary?  Medicare annual wellness visit, subsequent Yes     . The conditions, diseases and symptoms above (see encounter diagnosis list) were reviewed and assessed. Chart was updated. Labs reviewed as necessary. New labs ordered as clinically indicated. Imaging studies reviewed as necessary. Medications reviewed. New medications ordered as clinically indicated. New orders written as clinically indicated (see below). Referrals made to specialists as necessary. Patient education provided verbally and in writing as necessary.         Orders Placed This Encounter   Procedures    CBC Auto Differential     Standing Status:   Future     Standing Expiration Date:   11/9/2018    Comprehensive Metabolic Panel    Lipid Panel     Order Specific Question:   Is Patient Fasting?/# of Hours     Answer:   yes    Testosterone, free, total     Standing Status:   Future     Standing Expiration Date:   11/9/2018           Current Outpatient Prescriptions on File Prior to Visit   Medication Sig Dispense Refill    fenofibric acid (FIBRICOR) 135 MG CPDR capsule TAKE ONE CAPSULE BY MOUTH DAILY 90 capsule 0    Insulin Pen Needle (PEN NEEDLES 31GX5/16\") 31G X 8 MM MISC Inject 1 Dose into the skin 4 times daily (before meals and nightly) 360 each 1    rOPINIRole (REQUIP) 1 MG tablet TAKE ONE TABLET BY MOUTH EVERY EVENING 90 tablet 3    atorvastatin (LIPITOR) 40 MG tablet TAKE ONE TABLET BY MOUTH ONCE NIGHTLY 90 tablet 2    HUMALOG KWIKPEN 100 UNIT/ML pen INJECT 32 UNITS INTO THE SKIN 3 TIMES A DAY BEFORE MEALS (Patient taking differently: INJECT 14 UNITS INTO THE SKIN 3 TIMES A DAY BEFORE MEALS) 100 mL 3    insulin glargine (LANTUS SOLOSTAR) 100 UNIT/ML injection pen 40 units HS 5 Pen 3    ACCU-CHEK JOHNATHAN PLUS strip TEST FOUR TIMES A DAY AS NEEDED 150 strip 3    furosemide (LASIX) 40 MG tablet Take 0.5 tablets by mouth daily 60 tablet 3    metoprolol succinate (TOPROL XL) 50 MG extended release tablet TAKE ONE TABLET BY MOUTH DAILY 90 tablet 3    glucose blood VI test strips (ACCU-CHEK JOHNATHAN) strip 4 times a day As needed. 150 each 3    Blood Glucose Monitoring Suppl (ACCU-CHEK JOHNATHAN) RUMA Use as needed 1 Device 0    SOFT TOUCH LANCETS MISC 4 times a day 150 each 2    leflunomide (ARAVA) 10 MG tablet Take 10 mg by mouth daily      sulfaSALAzine (AZULFIDINE) 500 MG tablet Take 500 mg by mouth 3 times daily      aspirin 81 MG chewable tablet Take 1 tablet by mouth daily. 30 tablet 5    Lancets MISC 2 times daily. Lancets for a [place] meter. 50 each 11     No current facility-administered medications on file prior to visit. No orders of the defined types were placed in this encounter. There are no discontinued medications.         Current Outpatient Prescriptions   Medication Sig Dispense Refill    fenofibric acid (FIBRICOR) 135 MG CPDR capsule TAKE ONE CAPSULE BY MOUTH DAILY 90 capsule 0    Insulin Pen Needle (PEN

## 2017-11-09 NOTE — PROGRESS NOTES
Outpatient Follow Up Note    Subjective:   CHIEF COMPLAINT / HPI:    [employer]  presents today  for Medicare care physical  He has no new problems or complaints but many ongoing medical problems  Including CAD,Diabetes,Hypertension Psoriatic arthritis  and Sleep apnea.        Past Medical History:    Past Medical History:   Diagnosis Date    Allergic rhinitis due to other allergen 7/12/2010    Coronary atherosclerosis of unspecified type of vessel, native or graft 7/12/2010    Diabetic eye exam ([facility] [address] 04/29/2015    Diabetic eye exam ([facility] [address] 5/5/2016    [place] [facility]    Generalized osteoarthrosis, involving multiple sites 7/12/2010    Other psoriasis 7/12/2010    Pneumonia     Psoriatic arthropathy ([facility] [address] 7/12/2010    Type II or unspecified type diabetes mellitus without mention of complication, not stated as uncontrolled 7/12/2010    Unspecified essential hypertension 7/12/2010    Unspecified sleep apnea 7/12/2010       Social History:    History   Smoking Status    Former Smoker    Packs/day: 1.00    Years: 18.00    Start date: 6/1/1962    Quit date: 11/1/1980   Smokeless Tobacco    Never Used         Family History   Problem Relation Age of Onset    Diabetes Mother     Heart Failure Mother     Coronary Art Dis Father        Current Medications:  Current Outpatient Prescriptions on File Prior to Visit   Medication Sig Dispense Refill    fenofibric acid (FIBRICOR) 135 MG CPDR capsule TAKE ONE CAPSULE BY MOUTH DAILY 90 capsule 0    Insulin Pen Needle (PEN NEEDLES 31GX5/16\") 31G X 8 MM MISC Inject 1 Dose into the skin 4 times daily (before meals and nightly) 360 each 1    rOPINIRole (REQUIP) 1 MG tablet TAKE ONE TABLET BY MOUTH EVERY EVENING 90 tablet 3    atorvastatin (LIPITOR) 40 MG tablet TAKE ONE TABLET BY MOUTH ONCE NIGHTLY 90 tablet 2    HUMALOG KWIKPEN 100 UNIT/ML pen INJECT 32 UNITS INTO THE SKIN 3 TIMES A DAY BEFORE MEALS (Patient taking differently: INJECT 14 UNITS INTO THE SKIN 3 TIMES A DAY BEFORE MEALS) 100 mL 3    insulin glargine (LANTUS SOLOSTAR) 100 UNIT/ML injection pen 40 units HS 5 Pen 3    ACCU-CHEK JOHNATHAN PLUS strip TEST FOUR TIMES A DAY AS NEEDED 150 strip 3    furosemide (LASIX) 40 MG tablet Take 0.5 tablets by mouth daily 60 tablet 3    metoprolol succinate (TOPROL XL) 50 MG extended release tablet TAKE ONE TABLET BY MOUTH DAILY 90 tablet 3    glucose blood VI test strips (ACCU-CHEK JOHNATHAN) strip 4 times a day As needed. 150 each 3    Blood Glucose Monitoring Suppl (ACCU-CHEK JOHNATHAN) RUMA Use as needed 1 Device 0    SOFT TOUCH LANCETS MISC 4 times a day 150 each 2    leflunomide (ARAVA) 10 MG tablet Take 10 mg by mouth daily      sulfaSALAzine (AZULFIDINE) 500 MG tablet Take 500 mg by mouth 3 times daily      aspirin 81 MG chewable tablet Take 1 tablet by mouth daily. 30 tablet 5    Lancets MISC 2 times daily. Lancets for a [place] meter. 50 each 11     No current facility-administered medications on file prior to visit. Allergies   Allergen Reactions    Clindamycin     Clindamycin/Lincomycin     Penicillins     Tazorac [Tazarotene]      Cream - rash       REVIEW OF SYSTEMS:      CONSTITUTIONAL: No major weight gain or loss, fatigue, weakness, night sweats or fever. HEENT: No new vision difficulties or ringing in the ears. No congestion or sore throat. RESPIRATORY: No new SOB, PND, orthopnea or cough. CARDIOVASCULAR: No chest pain or palpitations. GI: No nausea, vomiting, diarrhea, constipation, abdominal pain or changes in bowel habits. : No urinary frequency, urgency, incontinence hematuria or dysuria. SKIN: No cyanosis or skin lesions. MUSCULOSKELETAL: No new muscle or joint pain. NEUROLOGICAL: No syncope or TIA-like symptoms. No change in sensation or strength.   PSYCHIATRIC: No anxiety, pain, insomnia or depression    Objective:   PHYSICAL EXAM:        VITALS:  /70   Ht 5' 10\" (1.778 m)   Wt 201 lb 12.8 oz (91.5 kg)   BMI 28.96 kg/m² Coronary Artery Disease    No current complaints of angina. Remains on medical therapy. Medications reviewed and refilled. Encouraged to eat a healthy diet and exercise at least 3 times per week. Essential Hypertension    Blood pressure control has been optimal.  No complaints of headaches. No end organ damage. Encouraged to limit sodium intake and control weight. Diabetes Type 2    Blood sugar control has been optimal.  Check sugars at home  No complaints of polyuria or polydipsia. Weight control has been stable. Encouraged to check sugar at home, watch weight and exercise. Medications reviewed and refilled  Labs ordered: Renal panel, Hemoglobin A1C. Psoriatic arthritis     Arthritis under good control at this time.   No new recommendations    Thelbert Seip MD  I attest that this note was completed entirely by me

## 2017-11-11 LAB
SEX HORMONE BINDING GLOBULIN: 86 NMOL/L (ref 11–80)
TESTOSTERONE FREE-NONMALE: 44.7 PG/ML (ref 47–244)
TESTOSTERONE TOTAL: 438 NG/DL (ref 220–1000)

## 2017-11-17 ENCOUNTER — CARE COORDINATION (OUTPATIENT)
Dept: CARE COORDINATION | Age: 72
End: 2017-11-17

## 2017-11-17 NOTE — CARE COORDINATION
The Nurse Care Coordinator called and left a voice message asking the pt to return the nurse's call at the pt's convenience.

## 2017-11-21 ENCOUNTER — OFFICE VISIT (OUTPATIENT)
Dept: PULMONOLOGY | Age: 72
End: 2017-11-21

## 2017-11-21 VITALS
OXYGEN SATURATION: 95 % | SYSTOLIC BLOOD PRESSURE: 116 MMHG | WEIGHT: 197.2 LBS | HEART RATE: 95 BPM | HEIGHT: 70 IN | BODY MASS INDEX: 28.23 KG/M2 | RESPIRATION RATE: 18 BRPM | DIASTOLIC BLOOD PRESSURE: 60 MMHG

## 2017-11-21 DIAGNOSIS — J84.9 ILD (INTERSTITIAL LUNG DISEASE) (HCC): ICD-10-CM

## 2017-11-21 DIAGNOSIS — R06.09 DOE (DYSPNEA ON EXERTION): Primary | ICD-10-CM

## 2017-11-21 DIAGNOSIS — G47.33 OSA TREATED WITH BIPAP: ICD-10-CM

## 2017-11-21 DIAGNOSIS — J45.991 COUGH VARIANT ASTHMA: ICD-10-CM

## 2017-11-21 PROCEDURE — G8598 ASA/ANTIPLAT THER USED: HCPCS | Performed by: INTERNAL MEDICINE

## 2017-11-21 PROCEDURE — 1123F ACP DISCUSS/DSCN MKR DOCD: CPT | Performed by: INTERNAL MEDICINE

## 2017-11-21 PROCEDURE — 4040F PNEUMOC VAC/ADMIN/RCVD: CPT | Performed by: INTERNAL MEDICINE

## 2017-11-21 PROCEDURE — 3017F COLORECTAL CA SCREEN DOC REV: CPT | Performed by: INTERNAL MEDICINE

## 2017-11-21 PROCEDURE — G8417 CALC BMI ABV UP PARAM F/U: HCPCS | Performed by: INTERNAL MEDICINE

## 2017-11-21 PROCEDURE — 1036F TOBACCO NON-USER: CPT | Performed by: INTERNAL MEDICINE

## 2017-11-21 PROCEDURE — 99214 OFFICE O/P EST MOD 30 MIN: CPT | Performed by: INTERNAL MEDICINE

## 2017-11-21 PROCEDURE — G8427 DOCREV CUR MEDS BY ELIG CLIN: HCPCS | Performed by: INTERNAL MEDICINE

## 2017-11-21 PROCEDURE — G8484 FLU IMMUNIZE NO ADMIN: HCPCS | Performed by: INTERNAL MEDICINE

## 2017-11-21 RX ORDER — PEN NEEDLE, DIABETIC 31 GX5/16"
NEEDLE, DISPOSABLE MISCELLANEOUS
Qty: 360 EACH | Refills: 2 | Status: SHIPPED | OUTPATIENT
Start: 2017-11-21 | End: 2019-01-16 | Stop reason: SDUPTHER

## 2017-11-21 RX ORDER — LANCETS
EACH MISCELLANEOUS
Qty: 200 EACH | Refills: 3 | Status: ON HOLD | OUTPATIENT
Start: 2017-11-21 | End: 2022-09-07

## 2017-11-21 NOTE — PROGRESS NOTES
INJECT 32 UNITS INTO THE SKIN 3 TIMES A DAY BEFORE MEALS (Patient taking differently: INJECT 14 UNITS INTO THE SKIN 3 TIMES A DAY BEFORE MEALS) 100 mL 3    insulin glargine (LANTUS SOLOSTAR) 100 UNIT/ML injection pen 40 units HS 5 Pen 3    ACCU-CHEK JOHNATHAN PLUS strip TEST FOUR TIMES A DAY AS NEEDED 150 strip 3    furosemide (LASIX) 40 MG tablet Take 0.5 tablets by mouth daily 60 tablet 3    metoprolol succinate (TOPROL XL) 50 MG extended release tablet TAKE ONE TABLET BY MOUTH DAILY 90 tablet 3    glucose blood VI test strips (ACCU-CHEK JOHNATHAN) strip 4 times a day As needed. 150 each 3    Blood Glucose Monitoring Suppl (ACCU-CHEK JOHNATHAN) RUMA Use as needed 1 Device 0    SOFT TOUCH LANCETS MISC 4 times a day 150 each 2    leflunomide (ARAVA) 10 MG tablet Take 10 mg by mouth daily      sulfaSALAzine (AZULFIDINE) 500 MG tablet Take 500 mg by mouth 3 times daily      aspirin 81 MG chewable tablet Take 1 tablet by mouth daily. 30 tablet 5    Lancets MISC 2 times daily. Lancets for a [place] meter. 50 each 11     No current facility-administered medications on file prior to visit.         REVIEW OF SYSTEMS:    CONSTITUTIONAL: Negative for fevers and chills  HEENT: Negative for oropharyngeal exudate, post nasal drip, sinus pain / pressure, nasal congestion, ear pain  RESPIRATORY:  See HPI  CARDIOVASCULAR: Negative for chest pain, palpitations, edema  GASTROINTESTINAL: Negative for nausea, vomiting, diarrhea, constipation and abdominal pain  HEMATOLOGICAL: Negative for adenopathy  SKIN: Negative for clubbing, cyanosis, skin lesions  EXTREMITIES: Negative for weakness, decreased ROM  NEUROLOGICAL: Negative for unilateral weakness, speech or gait abnormalities    Objective:   PHYSICAL EXAM:        VITALS:    /60 (Site: Left Arm, Position: Sitting, Cuff Size: Medium Adult)   Pulse 95   Resp 18   Ht 5' 10\" (1.778 m)   Wt 197 lb 3.2 oz (89.4 kg)   SpO2 95%   BMI 28.30 kg/m²     CONSTITUTIONAL:  Awake, 4/2016  Impression   Impression:       Stable, mild, nonspecific fibrosis in the lung bases.       Gallstones.       No significant change since 12/16/2013. Assessment:   1. Cough Variant Asthma  2. ILD - NOS. Mild on last CT in 2016  3. BJ on Bipap  4. Chronic Diastolic CHF  5. Restless Legs Syndrome  6. EDWARDS  Plan:   1. Continue Dulera 200 mcg MDI and prn albuterol  2. CT Chest/PFTs/6 MW  3. Continue BiPAP - try chin strap with large face mask  4. Continue requip 2 mg QHS  5.  RTC after above tests

## 2017-11-29 ENCOUNTER — HOSPITAL ENCOUNTER (OUTPATIENT)
Dept: PULMONOLOGY | Age: 72
Discharge: HOME | End: 2017-11-29
Attending: INTERNAL MEDICINE | Admitting: INTERNAL MEDICINE

## 2017-11-29 DIAGNOSIS — R06.09 DOE (DYSPNEA ON EXERTION): ICD-10-CM

## 2017-11-29 DIAGNOSIS — R06.09 OTHER FORMS OF DYSPNEA: ICD-10-CM

## 2017-11-29 RX ORDER — ALBUTEROL SULFATE 2.5 MG/3ML
2.5 SOLUTION RESPIRATORY (INHALATION) ONCE
Status: COMPLETED | OUTPATIENT
Start: 2017-11-29 | End: 2017-11-29

## 2017-11-29 RX ADMIN — ALBUTEROL SULFATE 2.5 MG: 2.5 SOLUTION RESPIRATORY (INHALATION) at 14:31

## 2017-11-29 NOTE — PROGRESS NOTES
Six Minute Walk     []  Desaturation Screening [x]  Endurance Test       Name:  [address] Record Number:  [id number]  Age: 71 y.o. Gender: male  : 1945  Today's Date:  2017  Pulmonary History:No history  Home Oxygen Therapy:  room air  Home Respiratory Therapy:None                    6 MINUTE WALK DATA    Modality Time (Minutes) SPO2 RR B/P Heart Rate O2 SOB Pain Level   Rest 2 93 12 125/71 103 R/A 0 2   Walk 1 95   111 R/A 0 3   Walk 2 92   115 R/A 2 2   Walk 3 91   118 R/A 4 3   Walk 4 91   120 R/A 5 2   Walk 5 91   122 R/A 6 2   Walk 6 91 18 125/71 126 R/A 7 3   Recovery 2 95 16 126/76 109 R/A 4 2     Dinesh SOB Scale:  0=Nothing at all; 0.5=Very, very slight; 1=Very slight; 2=Slight; 3=Moderate; 4=Somewhat severe; 5=Severe; 7=Very Severe; 10=Very, very Severe    Exercise Summary:  Distance Walked (feet): 960 Lowest SpO2 During Walk:  91  (FIO2)  R/A  Walking Pace (Steps per Minute)  74  Stopped or Paused during Walk:  no    Comments / Reason:  Pox remained in the 90s during walk on Room air, Patient did state he was Short of breath, He did state he was having pain in his feet which is always there walking or not.      Electronically signed by Racheal Brown RCP on 2017 at 3:56 PM

## 2017-12-01 ENCOUNTER — HOSPITAL ENCOUNTER (OUTPATIENT)
Dept: OTHER | Age: 72
Discharge: HOME | End: 2017-12-31
Attending: INTERNAL MEDICINE | Admitting: INTERNAL MEDICINE

## 2017-12-04 ENCOUNTER — OFFICE VISIT (OUTPATIENT)
Dept: CARDIOLOGY CLINIC | Age: 72
End: 2017-12-04

## 2017-12-04 VITALS
SYSTOLIC BLOOD PRESSURE: 116 MMHG | HEART RATE: 84 BPM | WEIGHT: 195 LBS | BODY MASS INDEX: 27.98 KG/M2 | DIASTOLIC BLOOD PRESSURE: 66 MMHG

## 2017-12-04 DIAGNOSIS — I50.33 ACUTE ON CHRONIC DIASTOLIC HEART FAILURE (HCC): ICD-10-CM

## 2017-12-04 DIAGNOSIS — N18.30 CHRONIC KIDNEY DISEASE, STAGE III (MODERATE) (HCC): ICD-10-CM

## 2017-12-04 DIAGNOSIS — I25.10 ATHEROSCLEROSIS OF NATIVE CORONARY ARTERY OF NATIVE HEART WITHOUT ANGINA PECTORIS: Primary | ICD-10-CM

## 2017-12-04 PROCEDURE — 1123F ACP DISCUSS/DSCN MKR DOCD: CPT | Performed by: INTERNAL MEDICINE

## 2017-12-04 PROCEDURE — 4040F PNEUMOC VAC/ADMIN/RCVD: CPT | Performed by: INTERNAL MEDICINE

## 2017-12-04 PROCEDURE — 1036F TOBACCO NON-USER: CPT | Performed by: INTERNAL MEDICINE

## 2017-12-04 PROCEDURE — G8417 CALC BMI ABV UP PARAM F/U: HCPCS | Performed by: INTERNAL MEDICINE

## 2017-12-04 PROCEDURE — 99213 OFFICE O/P EST LOW 20 MIN: CPT | Performed by: INTERNAL MEDICINE

## 2017-12-04 PROCEDURE — G8427 DOCREV CUR MEDS BY ELIG CLIN: HCPCS | Performed by: INTERNAL MEDICINE

## 2017-12-04 PROCEDURE — G8598 ASA/ANTIPLAT THER USED: HCPCS | Performed by: INTERNAL MEDICINE

## 2017-12-04 PROCEDURE — 3017F COLORECTAL CA SCREEN DOC REV: CPT | Performed by: INTERNAL MEDICINE

## 2017-12-04 PROCEDURE — G8484 FLU IMMUNIZE NO ADMIN: HCPCS | Performed by: INTERNAL MEDICINE

## 2017-12-04 NOTE — PROCEDURES
SIX-MINUTE WALK:      A six-minute walk was performed on room air. The patientwalked a total of 960 feet. She did not stop or pause during the walk. Baseline  oxygen saturation 93%. The lowest oxygen saturation during the walk was 91% between min 3-6. Recovered to 95%  He was SOB during the walk. Borderline desaturation with walking not severe enough to require O2 therapy.

## 2017-12-04 NOTE — PROCEDURES
Pulmonary Function Test:     Indication: Shortness of breath, weakness, non productive cough    Test comment:     Spirometry data is acceptable and reproducible. Pulse ox is 91% on room air    Estimated body mass index is 28.3 kg/m² as calculated from the following:    Height as of 11/21/17: 5' 10\" (1.778 m). Weight as of 11/21/17: 197 lb 3.2 oz (89.4 kg). Spirometry data:    FEV1/FVC: 89. Predicted ratio 73    Pre-Bronchodilator FEV1 2.07L, which is 63% predicted    Post-Bronchodilator FEV1 2.02L, which is 61% predicted    There is -2% reversibility     FVC is 2.33L, which is 51% predicted    Lung Volumes:    TLC is 4.85L, which is 67% predicted    RV is 2.51L which is 99% predicted    Diffusion Capacity:    DLCO is 13.77 which is 42% predicted    Impression:    1. There is no obstruction present    2. There is no response to bronchodilator therapy         [Increase in FEV1 => 12% of control and => 200 ml]    3. There is moderate restriction     4. There is moderate reduction in diffusion capacity    Comment: Moderate restrictive disease with moderate decrease in diffusion capacity consistent with the diagnosis of interstitial lung disease.

## 2017-12-04 NOTE — PROGRESS NOTES
[address]     Outpatient Follow Up Note    Subjective:   CHIEF COMPLAINT / HPI:  Follow Up secondary to coronary artery disease CHF     Elba Harrell is 71 y.o. male who presents today for a  follow up. He continues to do well. His breathing remains stable. His weight is stable. He is still having some degree of dyspnea and has been seeing pulmonary. He denies any c/o chest pain or pressure.       He Past Medical History:    Past Medical History:   Diagnosis Date    Allergic rhinitis due to other allergen 7/12/2010    Coronary atherosclerosis of unspecified type of vessel, native or graft 7/12/2010    Diabetic eye exam ([facility] 04/29/2015    Diabetic eye exam ([facility] 5/5/2016    [place] [facility]    Generalized osteoarthrosis, involving multiple sites 7/12/2010    Other psoriasis 7/12/2010    Pneumonia     Psoriatic arthropathy ([facility] 7/12/2010    Type II or unspecified type diabetes mellitus without mention of complication, not stated as uncontrolled 7/12/2010    Unspecified essential hypertension 7/12/2010    Unspecified sleep apnea 7/12/2010     Social History:    History   Smoking Status    Former Smoker    Packs/day: 1.00    Years: 18.00    Start date: 6/1/1962    Quit date: 11/1/1980   Smokeless Tobacco    Never Used     Current Medications: Patient did not bring med list, states no changes  Current Outpatient Prescriptions on File Prior to Visit   Medication Sig Dispense Refill    Insulin Pen Needle (PEN NEEDLES 31GX5/16\") 31G X 8 MM MISC USE WITH INSULIN FOUR TIMES A  each 2    ACCU-CHEK SOFTCLIX LANCETS MISC USE FOUR TIMES A  each 3    fenofibric acid (FIBRICOR) 135 MG CPDR capsule TAKE ONE CAPSULE BY MOUTH DAILY 90 capsule 0    rOPINIRole (REQUIP) 1 MG tablet TAKE ONE TABLET BY MOUTH EVERY EVENING 90 tablet 3    atorvastatin (LIPITOR) 40 MG tablet TAKE ONE TABLET BY MOUTH ONCE NIGHTLY 90 tablet 2    HUMALOG KWIKPEN 100 UNIT/ML pen INJECT 32 UNITS INTO THE SKIN 3 TIMES A non-icteric, normocephalic, neck supple, no elevation of JVP, normal carotid pulses with no bruits and thyroid normal size. LUNGS:  No increased work of breathing . Bibasilar fine crackles  CARDIOVASCULAR:  Regular rate and rhythm with no murmurs, gallops, rubs, or abnormal heart sounds. The apical impulses not displaced  The carotid upstroke is normal in amplitude and contour without delay or bruit  JVP is not elevated  ABDOMEN:  Normal bowel sounds, non-distended and non-tender to palpation  EXT: Mild edema, no calf tenderness. Pulses arediminishedt bilaterally.  His feet are cold to touch    DATA:    Lab Results   Component Value Date    ALT 40 11/09/2017    AST 32 11/09/2017    ALKPHOS 52 11/09/2017    BILITOT <0.2 11/09/2017     Lab Results   Component Value Date    CREATININE 2.0 (H) 11/09/2017    BUN 54 (H) 11/09/2017     11/09/2017    K 5.1 11/09/2017     11/09/2017    CO2 23 11/09/2017     No results found for: TSH  Lab Results   Component Value Date    WBC 7.8 11/09/2017    HGB 12.3 (L) 11/09/2017    HCT 37.9 (L) 11/09/2017    MCV 98.6 11/09/2017     11/09/2017     No components found for: CHLPL  Lab Results   Component Value Date    TRIG 198 (H) 11/09/2017    TRIG 339 (H) 10/21/2016    TRIG 264 (H) 10/19/2015     Lab Results   Component Value Date    HDL 46 11/09/2017    HDL 47 10/21/2016    HDL 49 10/19/2015     Lab Results   Component Value Date    LDLCALC 44 11/09/2017    [address] see below 10/21/2016    LDLCALC 84 10/19/2015     Lab Results   Component Value Date    LABVLDL 40 11/09/2017    LABVLDL see below 10/21/2016    LABVLDL 53 10/19/2015         Assessment:     Patient Active Problem List   Diagnosis    Essential hypertension    Generalized osteoarthrosis, involving multiple sites    Arthritis with psoriasis ([address]    Sleep apnea    [facility]    Allergic rhinitis due to other allergen    Coronary atherosclerosis    Vitamin D deficiency    Type II or unspecified type diabetes mellitus without mention of complication, not stated as uncontrolled    Cough    Glaucoma    Diabetes mellitus, insulin dependent (IDDM), uncontrolled (HCC)    Nephropathy    S/P CABG x 4    ILD (interstitial lung disease) (HCC)    Abdominal tenderness, RLQ (right lower quadrant)    Acute renal failure (HCC)    Chronic kidney disease, stage III (moderate)    Acute respiratory failure with hypoxia and hypercapnia (HCC)    Acute on chronic diastolic heart failure (HCC)    Pulmonary edema [facility]    Elevated troponin    Atherosclerosis of native coronary artery of native heart without angina pectoris    Uncontrolled type 1 diabetes mellitus without complication (HCC)    BJ (obstructive sleep apnea)    Cough variant asthma    Acute congestive heart failure (HCC)    Non-ST elevation MI (NSTEMI) (HCC)    Chronic kidney disease, stage III (moderate)    Insulin dependent type 2 diabetes mellitus, controlled ([address]   Coronary artery disease native heart with prior coronary bypass surgery. Left internal mammary to left anterior descending radial graft to circumflex and distal right coronary. This is due to hypertension hyperlipidemia and diabetes.   Remains stable    Diabetes  Unchanged    Hypertension essential  At target on current medical therapy    Interstitial lung disease    CKD 3   Unchanged  Plan:   Meds reviewed  No changes made  All questions were answered

## 2017-12-12 ENCOUNTER — OFFICE VISIT (OUTPATIENT)
Dept: PULMONOLOGY | Age: 72
End: 2017-12-12

## 2017-12-12 ENCOUNTER — TELEPHONE (OUTPATIENT)
Dept: PULMONOLOGY | Age: 72
End: 2017-12-12

## 2017-12-12 VITALS
SYSTOLIC BLOOD PRESSURE: 116 MMHG | DIASTOLIC BLOOD PRESSURE: 66 MMHG | BODY MASS INDEX: 27.63 KG/M2 | HEART RATE: 81 BPM | HEIGHT: 70 IN | OXYGEN SATURATION: 95 % | RESPIRATION RATE: 16 BRPM | WEIGHT: 193 LBS

## 2017-12-12 DIAGNOSIS — G47.33 OSA (OBSTRUCTIVE SLEEP APNEA): ICD-10-CM

## 2017-12-12 DIAGNOSIS — R06.09 DOE (DYSPNEA ON EXERTION): Primary | ICD-10-CM

## 2017-12-12 DIAGNOSIS — J84.9 ILD (INTERSTITIAL LUNG DISEASE) (HCC): ICD-10-CM

## 2017-12-12 DIAGNOSIS — J45.991 COUGH VARIANT ASTHMA: ICD-10-CM

## 2017-12-12 PROCEDURE — 1036F TOBACCO NON-USER: CPT | Performed by: INTERNAL MEDICINE

## 2017-12-12 PROCEDURE — 99214 OFFICE O/P EST MOD 30 MIN: CPT | Performed by: INTERNAL MEDICINE

## 2017-12-12 PROCEDURE — 4040F PNEUMOC VAC/ADMIN/RCVD: CPT | Performed by: INTERNAL MEDICINE

## 2017-12-12 PROCEDURE — G8427 DOCREV CUR MEDS BY ELIG CLIN: HCPCS | Performed by: INTERNAL MEDICINE

## 2017-12-12 PROCEDURE — 3017F COLORECTAL CA SCREEN DOC REV: CPT | Performed by: INTERNAL MEDICINE

## 2017-12-12 PROCEDURE — G8417 CALC BMI ABV UP PARAM F/U: HCPCS | Performed by: INTERNAL MEDICINE

## 2017-12-12 PROCEDURE — G8484 FLU IMMUNIZE NO ADMIN: HCPCS | Performed by: INTERNAL MEDICINE

## 2017-12-12 PROCEDURE — 1123F ACP DISCUSS/DSCN MKR DOCD: CPT | Performed by: INTERNAL MEDICINE

## 2017-12-12 PROCEDURE — G8598 ASA/ANTIPLAT THER USED: HCPCS | Performed by: INTERNAL MEDICINE

## 2017-12-12 RX ORDER — PREDNISONE 20 MG/1
40 TABLET ORAL DAILY
Qty: 14 TABLET | Refills: 0 | Status: SHIPPED | OUTPATIENT
Start: 2017-12-12 | End: 2018-02-01 | Stop reason: CLARIF

## 2017-12-12 NOTE — PROGRESS NOTES
[facility]    Outpatient Follow Up Note    Subjective:   CHIEF COMPLAINT / HPI:     The patient is 71 y.o. male who presents today for follow up of Worsening dyspnea on exertion over the last 2-3 months. He has no associated chest pressure, nausea, diaphoresis, left arm pain, cough, wheezing, or peripheral edema. Since I saw him several weeks ago he had a CT chest and PFTs. PFTs do show a restrictive defect but his total lung capacity is actually better now than it was back in 2016. CT chest does show some nonspecific interstitial lung disease in the basilar region which is unchanged compared to 2016.   Patient had a CABG in 2006 and it has been a while since he's had an ischemic evaluation    Past Medical History:    Past Medical History:   Diagnosis Date    Allergic rhinitis due to other allergen 7/12/2010    Coronary atherosclerosis of unspecified type of vessel, native or graft 7/12/2010    Diabetic eye exam ([facility] [address] 04/29/2015    Diabetic eye exam ([facility] [address] 5/5/2016    [place] [facility]    Generalized osteoarthrosis, involving multiple sites 7/12/2010    Other psoriasis 7/12/2010    Pneumonia     Psoriatic arthropathy ([facility] [address] 7/12/2010    Type II or unspecified type diabetes mellitus without mention of complication, not stated as uncontrolled 7/12/2010    Unspecified essential hypertension 7/12/2010    Unspecified sleep apnea 7/12/2010       Social History:    History   Smoking Status    Former Smoker    Packs/day: 1.00    Years: 18.00    Start date: 6/1/1962    Quit date: 11/1/1980   Smokeless Tobacco    Never Used       Current Medications:  Current Outpatient Prescriptions on File Prior to Visit   Medication Sig Dispense Refill    Insulin Pen Needle (PEN NEEDLES 31GX5/16\") 31G X 8 MM MISC USE WITH INSULIN FOUR TIMES A  each 2    ACCU-CHEK SOFTCLIX LANCETS MISC USE FOUR TIMES A  each 3    fenofibric acid (FIBRICOR) 135 MG CPDR capsule TAKE ONE CAPSULE BY MOUTH DAILY 90 capsule 0    rOPINIRole (REQUIP) 1 MG tablet TAKE ONE TABLET BY MOUTH EVERY EVENING 90 tablet 3    atorvastatin (LIPITOR) 40 MG tablet TAKE ONE TABLET BY MOUTH ONCE NIGHTLY 90 tablet 2    HUMALOG KWIKPEN 100 UNIT/ML pen INJECT 32 UNITS INTO THE SKIN 3 TIMES A DAY BEFORE MEALS (Patient taking differently: INJECT 14 UNITS INTO THE SKIN 3 TIMES A DAY BEFORE MEALS) 100 mL 3    insulin glargine (LANTUS SOLOSTAR) 100 UNIT/ML injection pen 40 units HS 5 Pen 3    ACCU-CHEK JOHNATHAN PLUS strip TEST FOUR TIMES A DAY AS NEEDED 150 strip 3    furosemide (LASIX) 40 MG tablet Take 0.5 tablets by mouth daily 60 tablet 3    metoprolol succinate (TOPROL XL) 50 MG extended release tablet TAKE ONE TABLET BY MOUTH DAILY 90 tablet 3    glucose blood VI test strips (ACCU-CHEK JOHNATHAN) strip 4 times a day As needed. 150 each 3    Blood Glucose Monitoring Suppl (ACCU-CHEK JOHNATHAN) RUMA Use as needed 1 Device 0    leflunomide (ARAVA) 10 MG tablet Take 10 mg by mouth daily      sulfaSALAzine (AZULFIDINE) 500 MG tablet Take 500 mg by mouth 3 times daily      aspirin 81 MG chewable tablet Take 1 tablet by mouth daily. 30 tablet 5    Lancets MISC 2 times daily. Lancets for a [place] meter. 50 each 11     No current facility-administered medications on file prior to visit.         REVIEW OF SYSTEMS:    CONSTITUTIONAL: Negative for fevers and chills  HEENT: Negative for oropharyngeal exudate, post nasal drip, sinus pain / pressure, nasal congestion, ear pain  RESPIRATORY:  See HPI  CARDIOVASCULAR: Negative for chest pain, palpitations, edema  GASTROINTESTINAL: Negative for nausea, vomiting, diarrhea, constipation and abdominal pain  HEMATOLOGICAL: Negative for adenopathy  SKIN: Negative for clubbing, cyanosis, skin lesions  EXTREMITIES: Negative for weakness, decreased ROM  NEUROLOGICAL: Negative for unilateral weakness, speech or gait abnormalities    Objective:   PHYSICAL EXAM:        VITALS:    /66 (Site:

## 2017-12-12 NOTE — TELEPHONE ENCOUNTER
Once you have called cardiologist please call patient.   He is scheduled to see you the week of 1/22/18

## 2017-12-20 ENCOUNTER — HOSPITAL ENCOUNTER (OUTPATIENT)
Dept: NON INVASIVE DIAGNOSTICS | Age: 72
Discharge: HOME | End: 2017-12-20
Attending: INTERNAL MEDICINE | Admitting: INTERNAL MEDICINE

## 2017-12-20 DIAGNOSIS — R06.09 DOE (DYSPNEA ON EXERTION): ICD-10-CM

## 2017-12-20 DIAGNOSIS — R06.09 OTHER FORMS OF DYSPNEA: ICD-10-CM

## 2017-12-20 LAB
LV EF: 62 %
LVEF MODALITY: NORMAL

## 2017-12-20 RX ORDER — SODIUM CHLORIDE 0.9 % (FLUSH) 0.9 %
10 SYRINGE (ML) INJECTION PRN
Status: DISCONTINUED | OUTPATIENT
Start: 2017-12-20 | End: 2017-12-25 | Stop reason: HOSPADM

## 2017-12-20 RX ADMIN — Medication 10 ML: at 10:44

## 2017-12-21 ENCOUNTER — CARE COORDINATION (OUTPATIENT)
Dept: CARE COORDINATION | Age: 72
End: 2017-12-21

## 2017-12-24 DIAGNOSIS — Z79.4 INSULIN DEPENDENT TYPE 2 DIABETES MELLITUS, CONTROLLED (HCC): ICD-10-CM

## 2017-12-24 DIAGNOSIS — E11.9 INSULIN DEPENDENT TYPE 2 DIABETES MELLITUS, CONTROLLED (HCC): ICD-10-CM

## 2017-12-26 RX ORDER — INSULIN GLARGINE 100 [IU]/ML
INJECTION, SOLUTION SUBCUTANEOUS
Qty: 15 ML | Refills: 2 | Status: SHIPPED | OUTPATIENT
Start: 2017-12-26 | End: 2018-04-16 | Stop reason: SDUPTHER

## 2018-01-01 ENCOUNTER — HOSPITAL ENCOUNTER (OUTPATIENT)
Dept: OTHER | Age: 73
Discharge: HOME | End: 2018-01-31
Attending: INTERNAL MEDICINE | Admitting: INTERNAL MEDICINE

## 2018-01-11 RX ORDER — METOPROLOL SUCCINATE 50 MG/1
TABLET, EXTENDED RELEASE ORAL
Qty: 30 TABLET | Refills: 5 | Status: SHIPPED | OUTPATIENT
Start: 2018-01-11 | End: 2018-05-24 | Stop reason: SDUPTHER

## 2018-01-23 ENCOUNTER — OFFICE VISIT (OUTPATIENT)
Dept: PULMONOLOGY | Age: 73
End: 2018-01-23

## 2018-01-23 ENCOUNTER — OFFICE VISIT (OUTPATIENT)
Dept: CARDIOLOGY CLINIC | Age: 73
End: 2018-01-23

## 2018-01-23 VITALS
BODY MASS INDEX: 27.84 KG/M2 | SYSTOLIC BLOOD PRESSURE: 124 MMHG | DIASTOLIC BLOOD PRESSURE: 68 MMHG | HEART RATE: 91 BPM | WEIGHT: 194 LBS

## 2018-01-23 VITALS
HEIGHT: 70 IN | WEIGHT: 196 LBS | HEART RATE: 88 BPM | DIASTOLIC BLOOD PRESSURE: 68 MMHG | SYSTOLIC BLOOD PRESSURE: 122 MMHG | RESPIRATION RATE: 16 BRPM | BODY MASS INDEX: 28.06 KG/M2 | OXYGEN SATURATION: 94 %

## 2018-01-23 DIAGNOSIS — J84.9 ILD (INTERSTITIAL LUNG DISEASE) (HCC): ICD-10-CM

## 2018-01-23 DIAGNOSIS — R53.83 FATIGUE, UNSPECIFIED TYPE: Primary | ICD-10-CM

## 2018-01-23 DIAGNOSIS — J45.991 COUGH VARIANT ASTHMA: ICD-10-CM

## 2018-01-23 DIAGNOSIS — I10 ESSENTIAL HYPERTENSION: ICD-10-CM

## 2018-01-23 DIAGNOSIS — I50.32 CHRONIC DIASTOLIC HEART FAILURE (HCC): ICD-10-CM

## 2018-01-23 DIAGNOSIS — G47.33 OSA (OBSTRUCTIVE SLEEP APNEA): ICD-10-CM

## 2018-01-23 DIAGNOSIS — R53.82 CHRONIC FATIGUE: Primary | ICD-10-CM

## 2018-01-23 PROBLEM — J96.01 ACUTE RESPIRATORY FAILURE WITH HYPOXIA AND HYPERCAPNIA (HCC): Status: RESOLVED | Noted: 2017-01-15 | Resolved: 2018-01-23

## 2018-01-23 PROBLEM — J96.02 ACUTE RESPIRATORY FAILURE WITH HYPOXIA AND HYPERCAPNIA (HCC): Status: RESOLVED | Noted: 2017-01-15 | Resolved: 2018-01-23

## 2018-01-23 PROCEDURE — 3017F COLORECTAL CA SCREEN DOC REV: CPT | Performed by: NURSE PRACTITIONER

## 2018-01-23 PROCEDURE — 99214 OFFICE O/P EST MOD 30 MIN: CPT | Performed by: NURSE PRACTITIONER

## 2018-01-23 PROCEDURE — G8484 FLU IMMUNIZE NO ADMIN: HCPCS | Performed by: NURSE PRACTITIONER

## 2018-01-23 PROCEDURE — 1123F ACP DISCUSS/DSCN MKR DOCD: CPT | Performed by: NURSE PRACTITIONER

## 2018-01-23 PROCEDURE — G8484 FLU IMMUNIZE NO ADMIN: HCPCS | Performed by: INTERNAL MEDICINE

## 2018-01-23 PROCEDURE — G8427 DOCREV CUR MEDS BY ELIG CLIN: HCPCS | Performed by: NURSE PRACTITIONER

## 2018-01-23 PROCEDURE — 1123F ACP DISCUSS/DSCN MKR DOCD: CPT | Performed by: INTERNAL MEDICINE

## 2018-01-23 PROCEDURE — 1036F TOBACCO NON-USER: CPT | Performed by: NURSE PRACTITIONER

## 2018-01-23 PROCEDURE — 3017F COLORECTAL CA SCREEN DOC REV: CPT | Performed by: INTERNAL MEDICINE

## 2018-01-23 PROCEDURE — G8599 NO ASA/ANTIPLAT THER USE RNG: HCPCS | Performed by: INTERNAL MEDICINE

## 2018-01-23 PROCEDURE — 99214 OFFICE O/P EST MOD 30 MIN: CPT | Performed by: INTERNAL MEDICINE

## 2018-01-23 PROCEDURE — G8417 CALC BMI ABV UP PARAM F/U: HCPCS | Performed by: INTERNAL MEDICINE

## 2018-01-23 PROCEDURE — G8599 NO ASA/ANTIPLAT THER USE RNG: HCPCS | Performed by: NURSE PRACTITIONER

## 2018-01-23 PROCEDURE — 4040F PNEUMOC VAC/ADMIN/RCVD: CPT | Performed by: INTERNAL MEDICINE

## 2018-01-23 PROCEDURE — 4040F PNEUMOC VAC/ADMIN/RCVD: CPT | Performed by: NURSE PRACTITIONER

## 2018-01-23 PROCEDURE — G8427 DOCREV CUR MEDS BY ELIG CLIN: HCPCS | Performed by: INTERNAL MEDICINE

## 2018-01-23 PROCEDURE — 1036F TOBACCO NON-USER: CPT | Performed by: INTERNAL MEDICINE

## 2018-01-23 PROCEDURE — G8417 CALC BMI ABV UP PARAM F/U: HCPCS | Performed by: NURSE PRACTITIONER

## 2018-01-23 ASSESSMENT — ENCOUNTER SYMPTOMS
WHEEZING: 0
SHORTNESS OF BREATH: 0
GASTROINTESTINAL NEGATIVE: 1
COUGH: 1

## 2018-01-23 NOTE — PROGRESS NOTES
predicted    Impression:    1. There is no obstruction present    2. There is no response to bronchodilator therapy         [Increase in FEV1 => 12% of control and => 200 ml]    3. There is moderate restriction     4. There is moderate reduction in diffusion capacity    Comment: Moderate restrictive disease with moderate decrease in   diffusion capacity consistent with the diagnosis of interstitial   lung disease.      Radiology Review:  Pertinent images / reports were reviewed as a part of this visit. CT Chest reveals the following:  Impression       Pulmonary fibrosis bilateral lower lobe subpleural regions as   described similar to previous examination.       Marked coronary artery calcification which is a risk factor for   acute coronary syndrome.       No lobar consolidation.       Ascending thoracic aorta upper limits of normal.     Assessment:   1. Cough Variant Asthma  2. ILD - NOS. Unchanged from 2016  3. BJ on Bipap  4. Chronic Diastolic CHF  5. Restless Legs Syndrome  6. Fatigue  Plan:   1. Continue Dulera 200 mcg MDI and prn albuterol  2. Will Check TSH  3. Continue BiPAP - try chin strap with large face mask  4. Continue requip 2 mg QHS  5. I wonder fatigue can be a side effect of the Toprol-XL he is on - I will send a note to his cardiologist Dr. [address] [place] and the patient stated that he would go across the [place] and schedule an appointment  6.  RTC after above tests

## 2018-01-25 ENCOUNTER — CARE COORDINATION (OUTPATIENT)
Dept: CARE COORDINATION | Age: 73
End: 2018-01-25

## 2018-01-30 LAB — TSH SERPL DL<=0.05 MIU/L-ACNC: 5.2 UIU/ML (ref 0.27–4.2)

## 2018-01-31 RX ORDER — FENOFIBRIC ACID 135 MG/1
CAPSULE, DELAYED RELEASE ORAL
Qty: 90 CAPSULE | Refills: 0 | Status: SHIPPED | OUTPATIENT
Start: 2018-01-31 | End: 2018-04-28 | Stop reason: SDUPTHER

## 2018-02-01 ENCOUNTER — HOSPITAL ENCOUNTER (OUTPATIENT)
Dept: OTHER | Age: 73
Discharge: HOME | End: 2018-02-28
Attending: INTERNAL MEDICINE | Admitting: INTERNAL MEDICINE

## 2018-02-20 ENCOUNTER — OFFICE VISIT (OUTPATIENT)
Dept: ENDOCRINOLOGY | Age: 73
End: 2018-02-20

## 2018-02-20 VITALS
SYSTOLIC BLOOD PRESSURE: 115 MMHG | WEIGHT: 191.8 LBS | HEART RATE: 98 BPM | HEIGHT: 70 IN | BODY MASS INDEX: 27.46 KG/M2 | RESPIRATION RATE: 16 BRPM | DIASTOLIC BLOOD PRESSURE: 71 MMHG

## 2018-02-20 DIAGNOSIS — Z79.4 INSULIN DEPENDENT TYPE 2 DIABETES MELLITUS, CONTROLLED (HCC): Primary | ICD-10-CM

## 2018-02-20 DIAGNOSIS — I10 ESSENTIAL HYPERTENSION: ICD-10-CM

## 2018-02-20 DIAGNOSIS — E11.9 INSULIN DEPENDENT TYPE 2 DIABETES MELLITUS, CONTROLLED (HCC): Primary | ICD-10-CM

## 2018-02-20 LAB — HBA1C MFR BLD: 5.5 %

## 2018-02-20 PROCEDURE — 3044F HG A1C LEVEL LT 7.0%: CPT | Performed by: INTERNAL MEDICINE

## 2018-02-20 PROCEDURE — G8427 DOCREV CUR MEDS BY ELIG CLIN: HCPCS | Performed by: INTERNAL MEDICINE

## 2018-02-20 PROCEDURE — 99214 OFFICE O/P EST MOD 30 MIN: CPT | Performed by: INTERNAL MEDICINE

## 2018-02-20 PROCEDURE — 3017F COLORECTAL CA SCREEN DOC REV: CPT | Performed by: INTERNAL MEDICINE

## 2018-02-20 PROCEDURE — G8484 FLU IMMUNIZE NO ADMIN: HCPCS | Performed by: INTERNAL MEDICINE

## 2018-02-20 PROCEDURE — 1123F ACP DISCUSS/DSCN MKR DOCD: CPT | Performed by: INTERNAL MEDICINE

## 2018-02-20 PROCEDURE — G8599 NO ASA/ANTIPLAT THER USE RNG: HCPCS | Performed by: INTERNAL MEDICINE

## 2018-02-20 PROCEDURE — 83036 HEMOGLOBIN GLYCOSYLATED A1C: CPT | Performed by: INTERNAL MEDICINE

## 2018-02-20 PROCEDURE — 4040F PNEUMOC VAC/ADMIN/RCVD: CPT | Performed by: INTERNAL MEDICINE

## 2018-02-20 PROCEDURE — 1036F TOBACCO NON-USER: CPT | Performed by: INTERNAL MEDICINE

## 2018-02-20 PROCEDURE — G8417 CALC BMI ABV UP PARAM F/U: HCPCS | Performed by: INTERNAL MEDICINE

## 2018-02-20 RX ORDER — LEVOTHYROXINE SODIUM 0.05 MG/1
50 TABLET ORAL DAILY
Qty: 30 TABLET | Refills: 11 | Status: SHIPPED | OUTPATIENT
Start: 2018-02-20 | End: 2019-02-28 | Stop reason: SDUPTHER

## 2018-02-20 NOTE — PROGRESS NOTES
Seen as f/u patient for diabetes    Interim:  1/18 TSH 5.2     Admitted on 1/17 for MORIS on CKD, Acute exacerbation of CHF. Resp failure  Hypoglycemia on admission BG 28? Diagnosed with Type 2 diabetes mellitus in  1995  Known diabetic complications: Nephropathy, Neuropathy, mild retinopathy    Current diabetic medications   Lantus 40 units  Humalog 14 unit TID + SSI       On insulin since 1998    He has been on janumet and glipizide    Last A1c 5.5%<----6.6%<----- 7.2% <---- 6.9 on 5/17<-----6.6 on 1/17<----7.3 on 10/16<-----8.9 on 7/16<--- 7.4 on 4/16<------7.9 on 1/16<----  10.4 on 10/15 <---- 8.5 <--- 8.2 <--- 8.6    Prior visit with dietician: no  Current diet: on average, 3 meals per day does not count CHO  Current exercise: walking   Current monitoring regimen: home blood tests - 3 times daily     Has brought blood glucose log/meter: yes  Home blood sugar records:  Any episodes of hypoglycemia?  Occ am     H/O CABG in 06    Hyperlipidemia: LDL 84 on 10/15  Last eye exam: 10/17  Last foot exam: 2/18  Last microalbumin to creatinine ratio: 2.3 <---- Cr 1.7 on 10/15<--- 1.8  1478 on 11/15  Seen Dr. Carlyle Rodriguez  ACE-I or ARB: Losartan 25mg ( stopped by nephrologist) Metoprolol XL 50mg     Past Medical History:   Diagnosis Date    Allergic rhinitis due to other allergen 7/12/2010    Coronary atherosclerosis of unspecified type of vessel, native or graft 7/12/2010    Diabetic eye exam ([facility] [address] 04/29/2015    Diabetic eye exam ([facility] [address] 5/5/2016    [place] [facility]    Generalized osteoarthrosis, involving multiple sites 7/12/2010    Other psoriasis 7/12/2010    Pneumonia     Psoriatic arthropathy ([facility] [address] 7/12/2010    Type II or unspecified type diabetes mellitus without mention of complication, not stated as uncontrolled 7/12/2010    Unspecified essential hypertension 7/12/2010    Unspecified sleep apnea 7/12/2010     Past Surgical History:   Procedure Laterality Date    CARPAL TUNNEL RELEASE      s/p    CATARACT 264 (H) 10/19/2015     Lab Results   Component Value Date    HDL 46 11/09/2017    HDL 47 10/21/2016    HDL 49 10/19/2015     Lab Results   Component Value Date    LDLCALC 44 11/09/2017    LDLCALC see below 10/21/2016    LDLCALC 84 10/19/2015     Lab Results   Component Value Date    LABVLDL 40 11/09/2017    LABVLDL see below 10/21/2016    LABVLDL 53 10/19/2015     Lab Results   Component Value Date    LABA1C 6.3 11/07/2017       Assessment:     Stacy Angelo is a 71 y.o. male with :    1.T2DM: Longstanding,uncontrolled, insulin resistant, goal A1c 7-8%. He has occasional low, will decrease dose  2. HTN: BP at goal  3. HLD: LDL near goal  4. Obesity  5. CAD  6. CKD III  7. Psoriatic arthritis: Reports flare in psoriatic plaque, will see dermatology  8. Neuropathy: Has been on lyrica, prescribed topical , has helped  9. Fatigue:  TSH high with normal FT4, subclinical hypothyroid, advise to try replacement. He was inquiring about Co-Q 10 , advised can take 200mg  His total testosterone was normal with free level low normal. Advised given total is in a good range, h/o CAD, Age, recommend no replacement at this point. Plan:       Change  lantus  36 units    Humalog 12 units TID  SSI 2 for 50>150   Advise to check blood sugar 4 times a day   Patient to send blood sugar log for titration. Advise to low simple carbohydrate and protein with each  meal diet. Diabetes Care: recommend yearly eye exam, foot exam and urine microalbumin to  creatinine ratio.     -Hyperlipidemia, LDL goal is <70 mg/dl   -Hypertension: Continue same  -Daily ASA:81mg  -Smoking status: Non smoker

## 2018-03-01 ENCOUNTER — HOSPITAL ENCOUNTER (OUTPATIENT)
Dept: OTHER | Age: 73
Discharge: HOME | End: 2018-03-31
Attending: INTERNAL MEDICINE | Admitting: INTERNAL MEDICINE

## 2018-03-08 ENCOUNTER — CARE COORDINATION (OUTPATIENT)
Dept: CARE COORDINATION | Age: 73
End: 2018-03-08

## 2018-03-09 NOTE — CARE COORDINATION
Ambulatory Care Coordination Note  3/8/2018  CM Risk Score: 8  Trey Mortality Risk Score: 17.56    ACC: Mikki Marrufo RN     Hx: Asthma, CKD, DM, HTN, CAD, Psoriasis, Osteoarthritis, NSTEMI, CABG, Glaucoma, BJ, CHF, Pneumonia    Summary Note: The pt stated he is still fatigued and has a lack of energy. The pt denied any SOB, chest pain or swelling. The pt stated his thyroid was low and Dr. Pee Cisneros started him on Levothyroxine. The pt stated he still feel tired and his skin has been very dry. The pt does have sleep apnea but states he is compliant with BiPAP. The pt stated his blood sugars have been good. The pt stated his last A1C was 5.5 and FBS's are averaging 100. The pt stated this is the best his A1C has been. The pt stated he takes Humalog 12 Units 3 times a day with his meals. The pt stated he takes Lantus 36 Units at night. Plan:  The pt asked if the [address] would ask the PCP if he would complete a form for the pt to obtain a handicap placard. The [address] instructed the pt to e-mail the form to the [address]. RNCC reviewed the pt's new medication Levothyroxine and how it may help with the pt's fatigue and lack of energy. The [address] reviewed the pt's low K+, low salt and low CHO diet. Care Coordination Interventions    Program Enrollment:  Complex Care  Referral from Primary Care Provider:  Yes  Suggested Interventions and Community Resources  Cardiac Rehab: In Process  Diabetes Education: In Process  Fall Risk Prevention:  Not Started  Registered Dietician:  Completed  Specialty Services Referral:  In Process  Zone Management Tools: In Process  Other Services or Interventions:  Endo, Nephro, Pulm         Goals Addressed             Most Recent     Conditions and Symptoms   On track (3/8/2018)             I will follow my Zone Management tool to seek urgent or emergent care. I will notify my provider of any symptoms that indicate a worsening of my condition.     Barriers: none  Plan for overcoming JOHNATHAN) strip 4 times a day As needed. 11/21/16   Sabrina Gonzalez MD   Blood Glucose Monitoring Suppl (ACCU-CHEK JOHNATHAN) RUMA Use as needed 11/21/16   Sabrina Gonzalez MD   leflunomide (ARAVA) 10 MG tablet Take 10 mg by mouth daily    Historical Provider, MD   sulfaSALAzine (AZULFIDINE) 500 MG tablet Take 500 mg by mouth 3 times daily    Historical Provider, MD   aspirin 81 MG chewable tablet Take 1 tablet by mouth daily. 3/28/12   Dayo Merritt MD   Lancets MISC 2 times daily. Lancets for a [place] meter. 7/16/10   Evangelina Anders MD       Future Appointments  Date Time Provider Svitlana Burrows   4/16/2018 9:30 AM Vick Huynh MD [facility] Card MMA   5/8/2018 9:40 AM Syl Jarvis MD [facility] P/CC MMA   5/31/2018 12:45 PM Eli Menezes MD [facility] Nephrolo   6/26/2018 9:50 AM MD Leela Kemp [facility]     ,   Diabetes Assessment    Medic Alert ID:  No  Meal Planning:  Carb counting, Plate Method   How often do you test your blood sugar?:  Meals   Do you have barriers with adherence to non-pharmacologic self-management interventions?  (Nutrition/Exercise/Self-Monitoring):  No   Have you ever had to go to the ED for symptoms of low blood sugar?:  No       No patient-reported symptoms      ,   Congestive Heart Failure Assessment    Are you currently restricting fluids?:  No Restriction  Do you understand a low sodium diet?:  Yes  Do you understand how to read food labels?:  Yes  Do you salt your food before tasting it?:  No     No patient-reported symptoms      Symptoms:   CHF associated fatigue: Pos, CHF associated weakness: Pos      Symptom course:  stable  Weight trend:  stable  Salt intake watch compared to last visit:  stable      and   General Assessment    Do you have any symptoms that are causing concern?:  Yes  Progression since Onset:  Unchanged  Reported Symptoms:  Fatigue, Other (Comment: Lack of energy)

## 2018-03-15 RX ORDER — BLOOD SUGAR DIAGNOSTIC
STRIP MISCELLANEOUS
Qty: 150 STRIP | Refills: 3 | Status: SHIPPED | OUTPATIENT
Start: 2018-03-15 | End: 2018-11-07 | Stop reason: SDUPTHER

## 2018-04-01 ENCOUNTER — HOSPITAL ENCOUNTER (OUTPATIENT)
Dept: OTHER | Age: 73
Discharge: HOME | End: 2018-04-30
Attending: INTERNAL MEDICINE | Admitting: INTERNAL MEDICINE

## 2018-04-16 ENCOUNTER — CARE COORDINATION (OUTPATIENT)
Dept: CARE COORDINATION | Age: 73
End: 2018-04-16

## 2018-04-16 ENCOUNTER — TELEPHONE (OUTPATIENT)
Dept: PULMONOLOGY | Age: 73
End: 2018-04-16

## 2018-04-16 ENCOUNTER — OFFICE VISIT (OUTPATIENT)
Dept: CARDIOLOGY CLINIC | Age: 73
End: 2018-04-16

## 2018-04-16 VITALS
WEIGHT: 188.8 LBS | BODY MASS INDEX: 27.09 KG/M2 | SYSTOLIC BLOOD PRESSURE: 130 MMHG | HEART RATE: 82 BPM | DIASTOLIC BLOOD PRESSURE: 78 MMHG

## 2018-04-16 DIAGNOSIS — R55 SYNCOPE, UNSPECIFIED SYNCOPE TYPE: ICD-10-CM

## 2018-04-16 DIAGNOSIS — I25.10 CORONARY ARTERY DISEASE INVOLVING NATIVE CORONARY ARTERY OF NATIVE HEART WITHOUT ANGINA PECTORIS: Primary | ICD-10-CM

## 2018-04-16 DIAGNOSIS — Z79.4 INSULIN DEPENDENT TYPE 2 DIABETES MELLITUS, CONTROLLED (HCC): ICD-10-CM

## 2018-04-16 DIAGNOSIS — J84.9 ILD (INTERSTITIAL LUNG DISEASE) (HCC): ICD-10-CM

## 2018-04-16 DIAGNOSIS — N18.30 CHRONIC KIDNEY DISEASE, STAGE III (MODERATE) (HCC): ICD-10-CM

## 2018-04-16 DIAGNOSIS — E11.9 INSULIN DEPENDENT TYPE 2 DIABETES MELLITUS, CONTROLLED (HCC): ICD-10-CM

## 2018-04-16 DIAGNOSIS — I50.32 CHRONIC DIASTOLIC HEART FAILURE (HCC): ICD-10-CM

## 2018-04-16 DIAGNOSIS — Z95.1 S/P CABG X 4: ICD-10-CM

## 2018-04-16 PROCEDURE — 4040F PNEUMOC VAC/ADMIN/RCVD: CPT | Performed by: INTERNAL MEDICINE

## 2018-04-16 PROCEDURE — 3044F HG A1C LEVEL LT 7.0%: CPT | Performed by: INTERNAL MEDICINE

## 2018-04-16 PROCEDURE — 1036F TOBACCO NON-USER: CPT | Performed by: INTERNAL MEDICINE

## 2018-04-16 PROCEDURE — G8417 CALC BMI ABV UP PARAM F/U: HCPCS | Performed by: INTERNAL MEDICINE

## 2018-04-16 PROCEDURE — 99215 OFFICE O/P EST HI 40 MIN: CPT | Performed by: INTERNAL MEDICINE

## 2018-04-16 PROCEDURE — 2022F DILAT RTA XM EVC RTNOPTHY: CPT | Performed by: INTERNAL MEDICINE

## 2018-04-16 PROCEDURE — G8427 DOCREV CUR MEDS BY ELIG CLIN: HCPCS | Performed by: INTERNAL MEDICINE

## 2018-04-16 PROCEDURE — 1123F ACP DISCUSS/DSCN MKR DOCD: CPT | Performed by: INTERNAL MEDICINE

## 2018-04-16 PROCEDURE — 3017F COLORECTAL CA SCREEN DOC REV: CPT | Performed by: INTERNAL MEDICINE

## 2018-04-16 PROCEDURE — 93000 ELECTROCARDIOGRAM COMPLETE: CPT | Performed by: INTERNAL MEDICINE

## 2018-04-16 PROCEDURE — G8599 NO ASA/ANTIPLAT THER USE RNG: HCPCS | Performed by: INTERNAL MEDICINE

## 2018-04-16 RX ORDER — INSULIN GLARGINE 100 [IU]/ML
INJECTION, SOLUTION SUBCUTANEOUS
Qty: 15 ML | Refills: 2 | Status: SHIPPED | OUTPATIENT
Start: 2018-04-16 | End: 2018-08-09 | Stop reason: SDUPTHER

## 2018-04-19 ENCOUNTER — TELEPHONE (OUTPATIENT)
Dept: CARDIOLOGY CLINIC | Age: 73
End: 2018-04-19

## 2018-04-19 DIAGNOSIS — R07.9 CHEST PAIN, UNSPECIFIED TYPE: ICD-10-CM

## 2018-04-19 DIAGNOSIS — R55 SYNCOPE, UNSPECIFIED SYNCOPE TYPE: Primary | ICD-10-CM

## 2018-04-20 ENCOUNTER — HOSPITAL ENCOUNTER (OUTPATIENT)
Dept: NON INVASIVE DIAGNOSTICS | Age: 73
Discharge: HOME | End: 2018-04-20
Attending: INTERNAL MEDICINE | Admitting: INTERNAL MEDICINE

## 2018-04-20 DIAGNOSIS — I25.10 ATHEROSCLEROTIC HEART DISEASE OF NATIVE CORONARY ARTERY WITHOUT ANGINA PECTORIS: ICD-10-CM

## 2018-04-20 LAB
LV EF: 48 %
LVEF MODALITY: NORMAL

## 2018-04-26 ENCOUNTER — OFFICE VISIT (OUTPATIENT)
Dept: CARDIOLOGY CLINIC | Age: 73
End: 2018-04-26

## 2018-04-26 VITALS
DIASTOLIC BLOOD PRESSURE: 70 MMHG | BODY MASS INDEX: 26.83 KG/M2 | HEART RATE: 70 BPM | SYSTOLIC BLOOD PRESSURE: 130 MMHG | WEIGHT: 187 LBS

## 2018-04-26 DIAGNOSIS — R53.83 FATIGUE, UNSPECIFIED TYPE: ICD-10-CM

## 2018-04-26 DIAGNOSIS — R55 SYNCOPE, UNSPECIFIED SYNCOPE TYPE: Primary | ICD-10-CM

## 2018-04-26 DIAGNOSIS — R07.89 ATYPICAL CHEST PAIN: ICD-10-CM

## 2018-04-26 PROCEDURE — 4040F PNEUMOC VAC/ADMIN/RCVD: CPT | Performed by: INTERNAL MEDICINE

## 2018-04-26 PROCEDURE — G8427 DOCREV CUR MEDS BY ELIG CLIN: HCPCS | Performed by: INTERNAL MEDICINE

## 2018-04-26 PROCEDURE — G8417 CALC BMI ABV UP PARAM F/U: HCPCS | Performed by: INTERNAL MEDICINE

## 2018-04-26 PROCEDURE — 1123F ACP DISCUSS/DSCN MKR DOCD: CPT | Performed by: INTERNAL MEDICINE

## 2018-04-26 PROCEDURE — 99204 OFFICE O/P NEW MOD 45 MIN: CPT | Performed by: INTERNAL MEDICINE

## 2018-04-26 PROCEDURE — 1036F TOBACCO NON-USER: CPT | Performed by: INTERNAL MEDICINE

## 2018-04-26 PROCEDURE — 3017F COLORECTAL CA SCREEN DOC REV: CPT | Performed by: INTERNAL MEDICINE

## 2018-04-26 PROCEDURE — G8599 NO ASA/ANTIPLAT THER USE RNG: HCPCS | Performed by: INTERNAL MEDICINE

## 2018-04-30 RX ORDER — FENOFIBRIC ACID 135 MG/1
CAPSULE, DELAYED RELEASE ORAL
Qty: 90 CAPSULE | Refills: 0 | Status: SHIPPED | OUTPATIENT
Start: 2018-04-30 | End: 2018-07-27 | Stop reason: SDUPTHER

## 2018-05-01 ENCOUNTER — HOSPITAL ENCOUNTER (OUTPATIENT)
Dept: OTHER | Age: 73
Discharge: HOME | End: 2018-05-31
Attending: INTERNAL MEDICINE | Admitting: INTERNAL MEDICINE

## 2018-05-01 PROCEDURE — 93228 REMOTE 30 DAY ECG REV/REPORT: CPT | Performed by: INTERNAL MEDICINE

## 2018-05-02 ENCOUNTER — HOSPITAL ENCOUNTER (OUTPATIENT)
Dept: NON INVASIVE DIAGNOSTICS | Age: 73
Discharge: HOME | End: 2018-05-02
Attending: INTERNAL MEDICINE | Admitting: INTERNAL MEDICINE

## 2018-05-02 DIAGNOSIS — R55 SYNCOPE AND COLLAPSE: ICD-10-CM

## 2018-05-02 DIAGNOSIS — R53.83 FATIGUE, UNSPECIFIED TYPE: ICD-10-CM

## 2018-05-02 DIAGNOSIS — R55 SYNCOPE, UNSPECIFIED SYNCOPE TYPE: ICD-10-CM

## 2018-05-02 DIAGNOSIS — R07.89 ATYPICAL CHEST PAIN: ICD-10-CM

## 2018-05-02 RX ORDER — 0.9 % SODIUM CHLORIDE 0.9 %
10 VIAL (ML) INJECTION PRN
Status: DISCONTINUED | OUTPATIENT
Start: 2018-05-02 | End: 2018-05-03 | Stop reason: HOSPADM

## 2018-05-02 RX ORDER — SODIUM CHLORIDE 0.9 % (FLUSH) 0.9 %
10 SYRINGE (ML) INJECTION 2 TIMES DAILY
Status: DISCONTINUED | OUTPATIENT
Start: 2018-05-02 | End: 2018-05-03 | Stop reason: HOSPADM

## 2018-05-02 RX ADMIN — Medication 10 ML: at 11:12

## 2018-05-02 RX ADMIN — Medication 10 ML: at 09:54

## 2018-05-08 ENCOUNTER — OFFICE VISIT (OUTPATIENT)
Dept: PULMONOLOGY | Age: 73
End: 2018-05-08

## 2018-05-08 VITALS
RESPIRATION RATE: 16 BRPM | HEART RATE: 73 BPM | OXYGEN SATURATION: 97 % | WEIGHT: 187 LBS | HEIGHT: 70 IN | SYSTOLIC BLOOD PRESSURE: 120 MMHG | DIASTOLIC BLOOD PRESSURE: 70 MMHG | BODY MASS INDEX: 26.77 KG/M2

## 2018-05-08 DIAGNOSIS — G47.33 OSA TREATED WITH BIPAP: ICD-10-CM

## 2018-05-08 DIAGNOSIS — J84.9 ILD (INTERSTITIAL LUNG DISEASE) (HCC): ICD-10-CM

## 2018-05-08 DIAGNOSIS — G25.81 RESTLESS LEGS SYNDROME: ICD-10-CM

## 2018-05-08 DIAGNOSIS — J45.991 COUGH VARIANT ASTHMA: ICD-10-CM

## 2018-05-08 DIAGNOSIS — R53.82 CHRONIC FATIGUE: Primary | ICD-10-CM

## 2018-05-08 PROCEDURE — 1123F ACP DISCUSS/DSCN MKR DOCD: CPT | Performed by: INTERNAL MEDICINE

## 2018-05-08 PROCEDURE — 99214 OFFICE O/P EST MOD 30 MIN: CPT | Performed by: INTERNAL MEDICINE

## 2018-05-08 PROCEDURE — 3017F COLORECTAL CA SCREEN DOC REV: CPT | Performed by: INTERNAL MEDICINE

## 2018-05-08 PROCEDURE — G8427 DOCREV CUR MEDS BY ELIG CLIN: HCPCS | Performed by: INTERNAL MEDICINE

## 2018-05-08 PROCEDURE — 1036F TOBACCO NON-USER: CPT | Performed by: INTERNAL MEDICINE

## 2018-05-08 PROCEDURE — 4040F PNEUMOC VAC/ADMIN/RCVD: CPT | Performed by: INTERNAL MEDICINE

## 2018-05-08 PROCEDURE — G8599 NO ASA/ANTIPLAT THER USE RNG: HCPCS | Performed by: INTERNAL MEDICINE

## 2018-05-08 PROCEDURE — G8417 CALC BMI ABV UP PARAM F/U: HCPCS | Performed by: INTERNAL MEDICINE

## 2018-05-16 ENCOUNTER — CARE COORDINATION (OUTPATIENT)
Dept: CARE COORDINATION | Age: 73
End: 2018-05-16

## 2018-05-24 ENCOUNTER — OFFICE VISIT (OUTPATIENT)
Dept: CARDIOLOGY CLINIC | Age: 73
End: 2018-05-24

## 2018-05-24 VITALS
WEIGHT: 184.6 LBS | DIASTOLIC BLOOD PRESSURE: 64 MMHG | SYSTOLIC BLOOD PRESSURE: 126 MMHG | HEART RATE: 71 BPM | BODY MASS INDEX: 26.49 KG/M2

## 2018-05-24 DIAGNOSIS — Z95.1 S/P CABG X 4: ICD-10-CM

## 2018-05-24 DIAGNOSIS — I10 ESSENTIAL HYPERTENSION: ICD-10-CM

## 2018-05-24 DIAGNOSIS — E11.9 INSULIN DEPENDENT TYPE 2 DIABETES MELLITUS, CONTROLLED (HCC): ICD-10-CM

## 2018-05-24 DIAGNOSIS — R53.83 FATIGUE, UNSPECIFIED TYPE: ICD-10-CM

## 2018-05-24 DIAGNOSIS — R55 SYNCOPE AND COLLAPSE: Primary | ICD-10-CM

## 2018-05-24 DIAGNOSIS — I25.10 CORONARY ARTERY DISEASE INVOLVING NATIVE CORONARY ARTERY OF NATIVE HEART WITHOUT ANGINA PECTORIS: ICD-10-CM

## 2018-05-24 DIAGNOSIS — I50.32 CHRONIC DIASTOLIC HEART FAILURE (HCC): ICD-10-CM

## 2018-05-24 DIAGNOSIS — E78.2 MIXED HYPERLIPIDEMIA: ICD-10-CM

## 2018-05-24 DIAGNOSIS — Z79.4 INSULIN DEPENDENT TYPE 2 DIABETES MELLITUS, CONTROLLED (HCC): ICD-10-CM

## 2018-05-24 PROCEDURE — G8599 NO ASA/ANTIPLAT THER USE RNG: HCPCS | Performed by: INTERNAL MEDICINE

## 2018-05-24 PROCEDURE — 1123F ACP DISCUSS/DSCN MKR DOCD: CPT | Performed by: INTERNAL MEDICINE

## 2018-05-24 PROCEDURE — G8427 DOCREV CUR MEDS BY ELIG CLIN: HCPCS | Performed by: INTERNAL MEDICINE

## 2018-05-24 PROCEDURE — 99214 OFFICE O/P EST MOD 30 MIN: CPT | Performed by: INTERNAL MEDICINE

## 2018-05-24 PROCEDURE — 3017F COLORECTAL CA SCREEN DOC REV: CPT | Performed by: INTERNAL MEDICINE

## 2018-05-24 PROCEDURE — G8417 CALC BMI ABV UP PARAM F/U: HCPCS | Performed by: INTERNAL MEDICINE

## 2018-05-24 PROCEDURE — 1036F TOBACCO NON-USER: CPT | Performed by: INTERNAL MEDICINE

## 2018-05-24 PROCEDURE — 4040F PNEUMOC VAC/ADMIN/RCVD: CPT | Performed by: INTERNAL MEDICINE

## 2018-05-24 RX ORDER — METOPROLOL SUCCINATE 25 MG/1
25 TABLET, EXTENDED RELEASE ORAL DAILY
Qty: 30 TABLET | Refills: 5 | Status: SHIPPED | OUTPATIENT
Start: 2018-05-24 | End: 2018-10-01 | Stop reason: SDUPTHER

## 2018-05-25 LAB — TSH REFLEX: 3.09 UIU/ML (ref 0.27–4.2)

## 2018-06-14 ENCOUNTER — OFFICE VISIT (OUTPATIENT)
Dept: INTERNAL MEDICINE | Age: 73
End: 2018-06-14

## 2018-06-14 VITALS
SYSTOLIC BLOOD PRESSURE: 138 MMHG | BODY MASS INDEX: 26.23 KG/M2 | DIASTOLIC BLOOD PRESSURE: 64 MMHG | HEIGHT: 70 IN | WEIGHT: 183.2 LBS

## 2018-06-14 DIAGNOSIS — N17.9 ACUTE RENAL FAILURE, UNSPECIFIED ACUTE RENAL FAILURE TYPE (HCC): ICD-10-CM

## 2018-06-14 DIAGNOSIS — I50.1 PULMONARY EDEMA CARDIAC CAUSE (HCC): ICD-10-CM

## 2018-06-14 DIAGNOSIS — I50.32 CHRONIC DIASTOLIC HEART FAILURE (HCC): Primary | ICD-10-CM

## 2018-06-14 PROCEDURE — G8427 DOCREV CUR MEDS BY ELIG CLIN: HCPCS | Performed by: INTERNAL MEDICINE

## 2018-06-14 PROCEDURE — 3017F COLORECTAL CA SCREEN DOC REV: CPT | Performed by: INTERNAL MEDICINE

## 2018-06-14 PROCEDURE — G8417 CALC BMI ABV UP PARAM F/U: HCPCS | Performed by: INTERNAL MEDICINE

## 2018-06-14 PROCEDURE — 1036F TOBACCO NON-USER: CPT | Performed by: INTERNAL MEDICINE

## 2018-06-14 PROCEDURE — 3044F HG A1C LEVEL LT 7.0%: CPT | Performed by: INTERNAL MEDICINE

## 2018-06-14 PROCEDURE — G8599 NO ASA/ANTIPLAT THER USE RNG: HCPCS | Performed by: INTERNAL MEDICINE

## 2018-06-14 PROCEDURE — 1123F ACP DISCUSS/DSCN MKR DOCD: CPT | Performed by: INTERNAL MEDICINE

## 2018-06-14 PROCEDURE — 4040F PNEUMOC VAC/ADMIN/RCVD: CPT | Performed by: INTERNAL MEDICINE

## 2018-06-14 PROCEDURE — 99214 OFFICE O/P EST MOD 30 MIN: CPT | Performed by: INTERNAL MEDICINE

## 2018-06-14 PROCEDURE — 2022F DILAT RTA XM EVC RTNOPTHY: CPT | Performed by: INTERNAL MEDICINE

## 2018-06-14 ASSESSMENT — ENCOUNTER SYMPTOMS
DIARRHEA: 1
COUGH: 1
CHEST TIGHTNESS: 1
SHORTNESS OF BREATH: 1
ABDOMINAL PAIN: 1
WHEEZING: 0

## 2018-06-26 ENCOUNTER — CARE COORDINATION (OUTPATIENT)
Dept: CARE COORDINATION | Age: 73
End: 2018-06-26

## 2018-06-26 ENCOUNTER — OFFICE VISIT (OUTPATIENT)
Dept: ENDOCRINOLOGY | Age: 73
End: 2018-06-26

## 2018-06-26 VITALS
HEART RATE: 83 BPM | WEIGHT: 179.4 LBS | OXYGEN SATURATION: 97 % | HEIGHT: 70 IN | SYSTOLIC BLOOD PRESSURE: 95 MMHG | DIASTOLIC BLOOD PRESSURE: 61 MMHG | BODY MASS INDEX: 25.68 KG/M2 | RESPIRATION RATE: 16 BRPM

## 2018-06-26 DIAGNOSIS — Z79.4 INSULIN DEPENDENT TYPE 2 DIABETES MELLITUS, CONTROLLED (HCC): Primary | ICD-10-CM

## 2018-06-26 DIAGNOSIS — R55 SYNCOPE AND COLLAPSE: Primary | ICD-10-CM

## 2018-06-26 DIAGNOSIS — E03.8 SUBCLINICAL HYPOTHYROIDISM: ICD-10-CM

## 2018-06-26 DIAGNOSIS — I10 ESSENTIAL HYPERTENSION: ICD-10-CM

## 2018-06-26 DIAGNOSIS — E11.9 INSULIN DEPENDENT TYPE 2 DIABETES MELLITUS, CONTROLLED (HCC): Primary | ICD-10-CM

## 2018-06-26 LAB — HBA1C MFR BLD: 6.6 %

## 2018-06-26 PROCEDURE — G8427 DOCREV CUR MEDS BY ELIG CLIN: HCPCS | Performed by: INTERNAL MEDICINE

## 2018-06-26 PROCEDURE — 83036 HEMOGLOBIN GLYCOSYLATED A1C: CPT | Performed by: INTERNAL MEDICINE

## 2018-06-26 PROCEDURE — G8417 CALC BMI ABV UP PARAM F/U: HCPCS | Performed by: INTERNAL MEDICINE

## 2018-06-26 PROCEDURE — 3017F COLORECTAL CA SCREEN DOC REV: CPT | Performed by: INTERNAL MEDICINE

## 2018-06-26 PROCEDURE — 2022F DILAT RTA XM EVC RTNOPTHY: CPT | Performed by: INTERNAL MEDICINE

## 2018-06-26 PROCEDURE — G8599 NO ASA/ANTIPLAT THER USE RNG: HCPCS | Performed by: INTERNAL MEDICINE

## 2018-06-26 PROCEDURE — 1123F ACP DISCUSS/DSCN MKR DOCD: CPT | Performed by: INTERNAL MEDICINE

## 2018-06-26 PROCEDURE — 99214 OFFICE O/P EST MOD 30 MIN: CPT | Performed by: INTERNAL MEDICINE

## 2018-06-26 PROCEDURE — 1036F TOBACCO NON-USER: CPT | Performed by: INTERNAL MEDICINE

## 2018-06-26 PROCEDURE — 4040F PNEUMOC VAC/ADMIN/RCVD: CPT | Performed by: INTERNAL MEDICINE

## 2018-06-26 PROCEDURE — 3044F HG A1C LEVEL LT 7.0%: CPT | Performed by: INTERNAL MEDICINE

## 2018-06-26 RX ORDER — GABAPENTIN 300 MG/1
CAPSULE ORAL
Qty: 60 CAPSULE | Refills: 3 | Status: SHIPPED | OUTPATIENT
Start: 2018-06-26 | End: 2018-09-26

## 2018-06-26 RX ORDER — ATORVASTATIN CALCIUM 40 MG/1
TABLET, FILM COATED ORAL
Qty: 90 TABLET | Refills: 3 | Status: SHIPPED | OUTPATIENT
Start: 2018-06-26 | End: 2019-06-21 | Stop reason: SDUPTHER

## 2018-06-26 NOTE — CARE COORDINATION
Ambulatory Care Coordination Note  6/26/2018  CM Risk Score: 8  Trey Mortality Risk Score: 11.29    ACC: Valentin Robles RN     Hx: Asthma, CKD, DM, HTN, CAD, Psoriasis, Osteoarthritis, NSTEMI, CABG, Glaucoma, BJ, CHF, Pneumonia    Summary Note: The pt and his wife came in to meet with the [address]. The pt stated he has had 3 episodes of syncope and collapse with one most recently last week. The pt's wife stated the pt was in the shower and his eyes rolled back and he collapsed in the shower. The wife stated the pt did not hit his head and denied any injury. The wife stated it is difficult for her to get him up when he falls. The pt stated he wore a holter monitor for 4 days but it did not show anything significant but he did not have any episodes while he was wearing the holter monitor. The pt had a nuclear stress test which showed no reversible ischemia. The pt stated his pulmonologist feels the collapses may be d/t his BJ. The pt stated he does have sleep apnea but states he is compliant with BiPAP. The pulmonologist order another sleep study for BIPAP titration but the pt has not gone b/c the testing site is too far for his wife to drive and he is unable to drive. The pt stated he is easily fatigued and does not have any stamina. The pt stated he has not been able to go to Cardiac Rehab. The chart shows a 50 lb weight loss since 1/31/17. The pt stated he has not been actively trying to lose weight. The pt stated this last collapse he felt a wave in his body and his body just gave out. The pt has seen his Cardiologist, EP Specialist, Pulmonologist, Nephrologist and Endocrinologist. The pt asked if it is possible it could be neurological.    Plan:  RNCC discussed pt symptoms and when to report the symptoms to the PCP. The [address] instructed the wife to check the pt's BS if he collapses to see if he is hypoglycemic.   The pt and RNCC discussed the pt seeing a neurologist and the pt felt this might be complexity of regimen and stress  Plan for overcoming my barriers: Pt education, support and resources  Confidence: 7/10  Anticipated Goal Completion Date: 09/16/18  Pt continues to work on his diet. (5/16/18/)       Reduce Falls    No change (6/26/2018)             I will reduce my risk of falls by the following: Use walking aids like cane or walker  Follow through on orders for PT    Barriers: impairment:  physical: generalized weakness  Plan for overcoming my barriers: Discover source of weakness and PT  Confidence: 7/10  Anticipated Goal Completion Date: 10/01/18            Prior to Admission medications    Medication Sig Start Date End Date Taking? Authorizing Provider   atorvastatin (LIPITOR) 40 MG tablet TAKE ONE TABLET BY MOUTH ONCE NIGHTLY 6/26/18   Susana Salazar MD   gabapentin (NEURONTIN) 300 MG capsule One tab twice a day.  6/26/18 6/26/19  Mary Lou Ramirez MD   metoprolol succinate (TOPROL XL) 25 MG extended release tablet Take 1 tablet by mouth daily 5/24/18   Jessy Quiroga MD   mometasone-formoterol [facility]) 200-5 MCG/ACT inhaler Inhale 2 puffs into the lungs every 12 hours 5/8/18   Luciana Patrick MD   furosemide (LASIX) 40 MG tablet TAKE ONE-HALF TABLET BY MOUTH DAILY 4/30/18   Ami Merritt MD   fenofibric acid (FIBRICOR) 135 MG CPDR capsule TAKE ONE CAPSULE BY MOUTH DAILY 4/30/18   Shauna Marquez MD   Handicap Placard MISC by Does not apply route Duration: 5 years 4/18/18   Luciana Patirck MD   LANTUS SOLOSTAR 100 UNIT/ML injection pen INJECT 40 UNITS UNDER THE SKIN EVERY NIGHT AT BEDTIME  Patient taking differently: INJECT 36 UNITS UNDER THE SKIN EVERY NIGHT AT BEDTIME 4/16/18   Mary Lou Ramirez MD   ACCU-CHEK JOHNATHAN PLUS strip TEST FOUR TIMES A DAY AS NEEDED 3/15/18   Mary Lou Ramirez MD   levothyroxine (SYNTHROID) 50 MCG tablet Take 1 tablet by mouth daily 2/20/18 2/15/19  Mary Lou Ramirez MD   Insulin Pen Needle (PEN NEEDLES 31GX5/16\") 31G X 8 MM MISC USE WITH INSULIN FOUR TIMES A DAY CHF associated fatigue: Pos, CHF associated weakness: Pos      Symptom course:  no change  Patient-reported weight (lb):  171  Weight trend:  stable  Salt intake watch compared to last visit:  stable      and   General Assessment    Do you have any symptoms that are causing concern?:  Yes  Progression since Onset:  Unchanged  Reported Symptoms:  Fatigue, Weakness, Other (Comment: c/o syncope and collapse)

## 2018-07-02 ENCOUNTER — OFFICE VISIT (OUTPATIENT)
Dept: PULMONOLOGY | Age: 73
End: 2018-07-02

## 2018-07-02 VITALS
WEIGHT: 185 LBS | OXYGEN SATURATION: 98 % | HEIGHT: 70 IN | BODY MASS INDEX: 26.48 KG/M2 | RESPIRATION RATE: 18 BRPM | SYSTOLIC BLOOD PRESSURE: 102 MMHG | DIASTOLIC BLOOD PRESSURE: 60 MMHG | HEART RATE: 77 BPM

## 2018-07-02 DIAGNOSIS — R53.82 CHRONIC FATIGUE: ICD-10-CM

## 2018-07-02 DIAGNOSIS — G25.81 RESTLESS LEGS SYNDROME: ICD-10-CM

## 2018-07-02 DIAGNOSIS — J45.991 COUGH VARIANT ASTHMA: Primary | ICD-10-CM

## 2018-07-02 DIAGNOSIS — J84.9 ILD (INTERSTITIAL LUNG DISEASE) (HCC): ICD-10-CM

## 2018-07-02 DIAGNOSIS — G47.33 OSA TREATED WITH BIPAP: ICD-10-CM

## 2018-07-02 DIAGNOSIS — R53.83 FATIGUE, UNSPECIFIED TYPE: ICD-10-CM

## 2018-07-02 PROCEDURE — 4040F PNEUMOC VAC/ADMIN/RCVD: CPT | Performed by: INTERNAL MEDICINE

## 2018-07-02 PROCEDURE — 1036F TOBACCO NON-USER: CPT | Performed by: INTERNAL MEDICINE

## 2018-07-02 PROCEDURE — G8417 CALC BMI ABV UP PARAM F/U: HCPCS | Performed by: INTERNAL MEDICINE

## 2018-07-02 PROCEDURE — 1123F ACP DISCUSS/DSCN MKR DOCD: CPT | Performed by: INTERNAL MEDICINE

## 2018-07-02 PROCEDURE — G8427 DOCREV CUR MEDS BY ELIG CLIN: HCPCS | Performed by: INTERNAL MEDICINE

## 2018-07-02 PROCEDURE — G8599 NO ASA/ANTIPLAT THER USE RNG: HCPCS | Performed by: INTERNAL MEDICINE

## 2018-07-02 PROCEDURE — 3017F COLORECTAL CA SCREEN DOC REV: CPT | Performed by: INTERNAL MEDICINE

## 2018-07-02 PROCEDURE — 99214 OFFICE O/P EST MOD 30 MIN: CPT | Performed by: INTERNAL MEDICINE

## 2018-07-02 RX ORDER — ALBUTEROL SULFATE 90 UG/1
2 AEROSOL, METERED RESPIRATORY (INHALATION) EVERY 4 HOURS PRN
Qty: 1 INHALER | Refills: 5 | Status: SHIPPED | OUTPATIENT
Start: 2018-07-02 | End: 2020-08-25 | Stop reason: CLARIF

## 2018-07-02 NOTE — PROGRESS NOTES
[facility] Pulmonary and Critical Care    Outpatient Follow Up Note    Subjective:   CHIEF COMPLAINT / HPI:     The patient is 68 y.o. male who presents today for follow up of ILD, BJ, cough variant asthma and fatigue. Complaints continue to be fatigue and poor energy level and not especially shortness of breath. He has no chest pain, cough, or wheezing. Since last visit he did have a syncopal episode. He states that he felt himself getting weak prior to the fall and hit the shower. No chest pain, dyspnea, nausea prior to the fall but he did say he had some lightheadedness. He did not lose consciousness and his wife was right there and noticed no seizure activity.      He's had a pretty extensive evaluation end of 2017 including  CT chest, PFTs, CBC, CMP and above-mentioned stress test.  He does have sleep apnea but states he is compliant with BiPAP    Past Medical History:    Past Medical History:   Diagnosis Date    Allergic rhinitis due to other allergen 7/12/2010    Coronary atherosclerosis of unspecified type of vessel, native or graft 7/12/2010    Diabetic eye exam ([facility] [address] 04/29/2015    Diabetic eye exam ([facility] [address] 5/5/2016    [place] [facility]    Generalized osteoarthrosis, involving multiple sites 7/12/2010    Other psoriasis 7/12/2010    Pneumonia     Psoriatic arthropathy ([facility] [address] 7/12/2010    Type II or unspecified type diabetes mellitus without mention of complication, not stated as uncontrolled 7/12/2010    Unspecified essential hypertension 7/12/2010    Unspecified sleep apnea 7/12/2010       Social History:    History   Smoking Status    Former Smoker    Packs/day: 1.00    Years: 18.00    Start date: 6/1/1962    Quit date: 11/1/1980   Smokeless Tobacco    Never Used       Current Medications:  Current Outpatient Prescriptions on File Prior to Visit   Medication Sig Dispense Refill    atorvastatin (LIPITOR) 40 MG tablet TAKE ONE TABLET BY MOUTH ONCE NIGHTLY 90 tablet 3    gabapentin 4.85L, which is 67% predicted    RV is 2.51L which is 99% predicted    Diffusion Capacity:    DLCO is 13.77 which is 42% predicted    Impression:    1. There is no obstruction present    2. There is no response to bronchodilator therapy         [Increase in FEV1 => 12% of control and => 200 ml]    3. There is moderate restriction     4. There is moderate reduction in diffusion capacity    Comment: Moderate restrictive disease with moderate decrease in   diffusion capacity consistent with the diagnosis of interstitial   lung disease.      Radiology Review:  Pertinent images / reports were reviewed as a part of this visit. CT Chest reveals the following:  Impression       Pulmonary fibrosis bilateral lower lobe subpleural regions as   described similar to previous examination.       Marked coronary artery calcification which is a risk factor for   acute coronary syndrome.       No lobar consolidation.       Ascending thoracic aorta upper limits of normal.     Assessment:   1. Cough Variant Asthma  2. ILD - NOS. Unchanged from 2016  3. BJ on Bipap  4. Chronic Diastolic CHF  5. Restless Legs Syndrome  6. Fatigue  Plan:   1. Continue Dulera 200 mcg MDI and prn albuterol  2. TSH low earlier this spring - now on Levothyroxine. Followed by Endocrinology  3. Continue BiPAP - given fatigue will repeat Bipap titration to ensure settings optimal. He did not get this done to a syncopal episode. He will re-schedule with [facility]. Neurology eval for syncopal episode. He may need follow-up with cardiology soon   4. Continue requip 2 mg QHS  5.   RTC 8 weeks

## 2018-07-20 ENCOUNTER — CARE COORDINATION (OUTPATIENT)
Dept: CARE COORDINATION | Age: 73
End: 2018-07-20

## 2018-07-21 NOTE — CARE COORDINATION
Meals   Do you have barriers with adherence to non-pharmacologic self-management interventions?  (Nutrition/Exercise/Self-Monitoring):  No   Have you ever had to go to the ED for symptoms of low blood sugar?:  No       No patient-reported symptoms      ,   Congestive Heart Failure Assessment    Are you currently restricting fluids?:  No Restriction  Do you understand a low sodium diet?:  Yes  Do you understand how to read food labels?:  Yes  Do you salt your food before tasting it?:  No     No patient-reported symptoms      Symptoms:   None:  Yes      Symptom course:  stable  Weight trend:  stable  Salt intake watch compared to last visit:  stable      and   General Assessment    Do you have any symptoms that are causing concern?:  No

## 2018-07-24 ENCOUNTER — OFFICE VISIT (OUTPATIENT)
Dept: CARDIOLOGY CLINIC | Age: 73
End: 2018-07-24

## 2018-07-24 VITALS
DIASTOLIC BLOOD PRESSURE: 68 MMHG | OXYGEN SATURATION: 97 % | BODY MASS INDEX: 26.98 KG/M2 | WEIGHT: 188 LBS | SYSTOLIC BLOOD PRESSURE: 114 MMHG | HEART RATE: 87 BPM

## 2018-07-24 DIAGNOSIS — R55 SYNCOPE AND COLLAPSE: Primary | ICD-10-CM

## 2018-07-24 DIAGNOSIS — R53.82 CHRONIC FATIGUE: ICD-10-CM

## 2018-07-24 DIAGNOSIS — I50.32 CHRONIC DIASTOLIC HEART FAILURE (HCC): ICD-10-CM

## 2018-07-24 DIAGNOSIS — I25.10 CORONARY ARTERY DISEASE INVOLVING NATIVE CORONARY ARTERY OF NATIVE HEART WITHOUT ANGINA PECTORIS: ICD-10-CM

## 2018-07-24 PROCEDURE — 1123F ACP DISCUSS/DSCN MKR DOCD: CPT | Performed by: NURSE PRACTITIONER

## 2018-07-24 PROCEDURE — 1036F TOBACCO NON-USER: CPT | Performed by: NURSE PRACTITIONER

## 2018-07-24 PROCEDURE — G8427 DOCREV CUR MEDS BY ELIG CLIN: HCPCS | Performed by: NURSE PRACTITIONER

## 2018-07-24 PROCEDURE — 3017F COLORECTAL CA SCREEN DOC REV: CPT | Performed by: NURSE PRACTITIONER

## 2018-07-24 PROCEDURE — G8417 CALC BMI ABV UP PARAM F/U: HCPCS | Performed by: NURSE PRACTITIONER

## 2018-07-24 PROCEDURE — 1101F PT FALLS ASSESS-DOCD LE1/YR: CPT | Performed by: NURSE PRACTITIONER

## 2018-07-24 PROCEDURE — 4040F PNEUMOC VAC/ADMIN/RCVD: CPT | Performed by: NURSE PRACTITIONER

## 2018-07-24 PROCEDURE — G8599 NO ASA/ANTIPLAT THER USE RNG: HCPCS | Performed by: NURSE PRACTITIONER

## 2018-07-24 PROCEDURE — 99214 OFFICE O/P EST MOD 30 MIN: CPT | Performed by: NURSE PRACTITIONER

## 2018-07-24 NOTE — PROGRESS NOTES
CC/HPI:  68 y.o. patient of Dr. Diez Devoid with syncope, CAD, CABG, HTN, HLD, DM, and CKD who is here for follow up. He overall feels \"crappy\" but no specific complaints. He has had two episodes of LH/dizziness, weakness and then collapse. No LOC/syncope. Symptoms occur after exertion. No cp, sob, palpitations, LE edema, orthopnea or GI/ bleeding. Past Medical History:   Diagnosis Date    Allergic rhinitis due to other allergen 7/12/2010    Coronary atherosclerosis of unspecified type of vessel, native or graft 7/12/2010    Diabetic eye exam ([facility] [address] 04/29/2015    Diabetic eye exam ([facility] [address] 5/5/2016    [place] [facility]    Generalized osteoarthrosis, involving multiple sites 7/12/2010    Other psoriasis 7/12/2010    Pneumonia     Psoriatic arthropathy ([facility] [address] 7/12/2010    Type II or unspecified type diabetes mellitus without mention of complication, not stated as uncontrolled 7/12/2010    Unspecified essential hypertension 7/12/2010    Unspecified sleep apnea 7/12/2010     Past Surgical History:   Procedure Laterality Date    CARPAL TUNNEL RELEASE      s/p    CATARACT REMOVAL      CORONARY ARTERY BYPASS GRAFT      JOINT REPLACEMENT       Family History   Problem Relation Age of Onset    Diabetes Mother     Heart Failure Mother     Coronary Art Dis Father      Social History   Substance Use Topics    Smoking status: Former Smoker     Packs/day: 1.00     Years: 18.00     Start date: 6/1/1962     Quit date: 11/1/1980    Smokeless tobacco: Never Used    Alcohol use No     Allergies:Clindamycin; Clindamycin/lincomycin; Penicillins; and Tazorac [tazarotene]    Review of Systems  General: No changes in weight, or night sweats. + fatigue   HEENT: No blurry or decreased vision. No changes in hearing, nasal discharge or sore throat. Cardiovascular:  See HPI.    Respiratory: No cough, hemoptysis, or wheezing.  + BJ  Gastrointestinal:  No abdominal pain, hematochezia, melana, constipation, diarrhea, or history of GI Take 1 tablet by mouth daily 30 tablet 5    mometasone-formoterol (DULERA) 200-5 MCG/ACT inhaler Inhale 2 puffs into the lungs every 12 hours 1 Inhaler 11    furosemide (LASIX) 40 MG tablet TAKE ONE-HALF TABLET BY MOUTH DAILY 45 tablet 2    fenofibric acid (FIBRICOR) 135 MG CPDR capsule TAKE ONE CAPSULE BY MOUTH DAILY 90 capsule 0    Handicap Placard MISC by Does not apply route Duration: 5 years 1 each 0    LANTUS SOLOSTAR 100 UNIT/ML injection pen INJECT 40 UNITS UNDER THE SKIN EVERY NIGHT AT BEDTIME (Patient taking differently: INJECT 36 UNITS UNDER THE SKIN EVERY NIGHT AT BEDTIME) 15 mL 2    ACCU-CHEK JOHNATHAN PLUS strip TEST FOUR TIMES A DAY AS NEEDED 150 strip 3    levothyroxine (SYNTHROID) 50 MCG tablet Take 1 tablet by mouth daily 30 tablet 11    Insulin Pen Needle (PEN NEEDLES 31GX5/16\") 31G X 8 MM MISC USE WITH INSULIN FOUR TIMES A  each 2    ACCU-CHEK SOFTCLIX LANCETS MISC USE FOUR TIMES A  each 3    rOPINIRole (REQUIP) 1 MG tablet TAKE ONE TABLET BY MOUTH EVERY EVENING 90 tablet 3    HUMALOG KWIKPEN 100 UNIT/ML pen INJECT 32 UNITS INTO THE SKIN 3 TIMES A DAY BEFORE MEALS (Patient taking differently: INJECT 14 UNITS INTO THE SKIN 3 TIMES A DAY BEFORE MEALS) 100 mL 3    glucose blood VI test strips (ACCU-CHEK JOHNATHAN) strip 4 times a day As needed. 150 each 3    Blood Glucose Monitoring Suppl (ACCU-CHEK JOHNATHAN) RUMA Use as needed 1 Device 0    leflunomide (ARAVA) 10 MG tablet Take 10 mg by mouth daily      aspirin 81 MG chewable tablet Take 1 tablet by mouth daily. 30 tablet 5    Lancets MISC 2 times daily. Lancets for a Anna meter. 50 each 11     No current facility-administered medications for this visit. Assessment:  1. Syncope and collapse    2. Chronic fatigue    3. Coronary artery disease involving native coronary artery of native heart without angina pectoris    4.  Chronic diastolic heart failure (HCC)        Plan:  -Cont asa, statin, toprol, lasix, and

## 2018-07-27 RX ORDER — FENOFIBRIC ACID 135 MG/1
CAPSULE, DELAYED RELEASE ORAL
Qty: 30 CAPSULE | Refills: 2 | Status: SHIPPED | OUTPATIENT
Start: 2018-07-27 | End: 2018-10-22 | Stop reason: SDUPTHER

## 2018-08-09 DIAGNOSIS — Z79.4 INSULIN DEPENDENT TYPE 2 DIABETES MELLITUS, CONTROLLED (HCC): ICD-10-CM

## 2018-08-09 DIAGNOSIS — E11.9 INSULIN DEPENDENT TYPE 2 DIABETES MELLITUS, CONTROLLED (HCC): ICD-10-CM

## 2018-08-09 RX ORDER — INSULIN GLARGINE 100 [IU]/ML
INJECTION, SOLUTION SUBCUTANEOUS
Qty: 15 ML | Refills: 3 | Status: SHIPPED | OUTPATIENT
Start: 2018-08-09 | End: 2019-08-13 | Stop reason: SDUPTHER

## 2018-08-16 RX ORDER — INSULIN LISPRO 100 [IU]/ML
INJECTION, SOLUTION INTRAVENOUS; SUBCUTANEOUS
Qty: 30 ML | Refills: 2 | Status: SHIPPED | OUTPATIENT
Start: 2018-08-16 | End: 2019-05-24 | Stop reason: SDUPTHER

## 2018-08-25 DIAGNOSIS — N18.4 CKD (CHRONIC KIDNEY DISEASE) STAGE 4, GFR 15-29 ML/MIN (HCC): ICD-10-CM

## 2018-08-25 LAB
ALBUMIN SERPL-MCNC: 4.5 G/DL (ref 3.4–5)
ANION GAP SERPL CALCULATED.3IONS-SCNC: 14 MMOL/L (ref 3–16)
BASOPHILS ABSOLUTE: 0.1 K/UL (ref 0–0.2)
BASOPHILS RELATIVE PERCENT: 1.3 %
BUN BLDV-MCNC: 55 MG/DL (ref 7–20)
CALCIUM SERPL-MCNC: 9.8 MG/DL (ref 8.3–10.6)
CHLORIDE BLD-SCNC: 103 MMOL/L (ref 99–110)
CO2: 25 MMOL/L (ref 21–32)
CREAT SERPL-MCNC: 2.9 MG/DL (ref 0.8–1.3)
EOSINOPHILS ABSOLUTE: 0.5 K/UL (ref 0–0.6)
EOSINOPHILS RELATIVE PERCENT: 6.3 %
GFR AFRICAN AMERICAN: 26
GFR NON-AFRICAN AMERICAN: 21
GLUCOSE BLD-MCNC: 230 MG/DL (ref 70–99)
HCT VFR BLD CALC: 39.9 % (ref 40.5–52.5)
HEMOGLOBIN: 12.9 G/DL (ref 13.5–17.5)
LYMPHOCYTES ABSOLUTE: 1.6 K/UL (ref 1–5.1)
LYMPHOCYTES RELATIVE PERCENT: 22.7 %
MCH RBC QN AUTO: 30.7 PG (ref 26–34)
MCHC RBC AUTO-ENTMCNC: 32.3 G/DL (ref 31–36)
MCV RBC AUTO: 95.3 FL (ref 80–100)
MONOCYTES ABSOLUTE: 0.4 K/UL (ref 0–1.3)
MONOCYTES RELATIVE PERCENT: 6.1 %
NEUTROPHILS ABSOLUTE: 4.6 K/UL (ref 1.7–7.7)
NEUTROPHILS RELATIVE PERCENT: 63.6 %
PDW BLD-RTO: 15.2 % (ref 12.4–15.4)
PHOSPHORUS: 4.5 MG/DL (ref 2.5–4.9)
PLATELET # BLD: 336 K/UL (ref 135–450)
PMV BLD AUTO: 9.3 FL (ref 5–10.5)
POTASSIUM SERPL-SCNC: 5.7 MMOL/L (ref 3.5–5.1)
RBC # BLD: 4.19 M/UL (ref 4.2–5.9)
REASON FOR REJECTION: NORMAL
REJECTED TEST: NORMAL
SODIUM BLD-SCNC: 142 MMOL/L (ref 136–145)
WBC # BLD: 7.3 K/UL (ref 4–11)

## 2018-08-28 ENCOUNTER — OFFICE VISIT (OUTPATIENT)
Dept: PULMONOLOGY | Age: 73
End: 2018-08-28

## 2018-08-28 VITALS
SYSTOLIC BLOOD PRESSURE: 98 MMHG | HEART RATE: 73 BPM | DIASTOLIC BLOOD PRESSURE: 60 MMHG | HEIGHT: 70 IN | BODY MASS INDEX: 27.23 KG/M2 | WEIGHT: 190.2 LBS | OXYGEN SATURATION: 96 %

## 2018-08-28 DIAGNOSIS — G47.33 OSA TREATED WITH BIPAP: ICD-10-CM

## 2018-08-28 DIAGNOSIS — F32.A DEPRESSION, UNSPECIFIED DEPRESSION TYPE: ICD-10-CM

## 2018-08-28 DIAGNOSIS — R53.83 FATIGUE, UNSPECIFIED TYPE: ICD-10-CM

## 2018-08-28 DIAGNOSIS — G25.81 RLS (RESTLESS LEGS SYNDROME): Primary | ICD-10-CM

## 2018-08-28 DIAGNOSIS — J84.9 ILD (INTERSTITIAL LUNG DISEASE) (HCC): ICD-10-CM

## 2018-08-28 LAB — FERRITIN: 442.9 NG/ML (ref 30–400)

## 2018-08-28 PROCEDURE — 4040F PNEUMOC VAC/ADMIN/RCVD: CPT | Performed by: INTERNAL MEDICINE

## 2018-08-28 PROCEDURE — G8417 CALC BMI ABV UP PARAM F/U: HCPCS | Performed by: INTERNAL MEDICINE

## 2018-08-28 PROCEDURE — 99214 OFFICE O/P EST MOD 30 MIN: CPT | Performed by: INTERNAL MEDICINE

## 2018-08-28 PROCEDURE — 1101F PT FALLS ASSESS-DOCD LE1/YR: CPT | Performed by: INTERNAL MEDICINE

## 2018-08-28 PROCEDURE — 1036F TOBACCO NON-USER: CPT | Performed by: INTERNAL MEDICINE

## 2018-08-28 PROCEDURE — 3017F COLORECTAL CA SCREEN DOC REV: CPT | Performed by: INTERNAL MEDICINE

## 2018-08-28 PROCEDURE — G8427 DOCREV CUR MEDS BY ELIG CLIN: HCPCS | Performed by: INTERNAL MEDICINE

## 2018-08-28 PROCEDURE — G8599 NO ASA/ANTIPLAT THER USE RNG: HCPCS | Performed by: INTERNAL MEDICINE

## 2018-08-28 PROCEDURE — 1123F ACP DISCUSS/DSCN MKR DOCD: CPT | Performed by: INTERNAL MEDICINE

## 2018-08-28 RX ORDER — ESCITALOPRAM OXALATE 5 MG/1
5 TABLET ORAL DAILY
Qty: 30 TABLET | Refills: 5 | Status: SHIPPED | OUTPATIENT
Start: 2018-08-28 | End: 2018-09-25 | Stop reason: ALTCHOICE

## 2018-08-28 RX ORDER — ROPINIROLE 3 MG/1
3 TABLET, FILM COATED ORAL NIGHTLY
Qty: 30 TABLET | Refills: 11 | Status: SHIPPED | OUTPATIENT
Start: 2018-08-28 | End: 2018-09-25 | Stop reason: SDUPTHER

## 2018-08-29 NOTE — PROGRESS NOTES
[facility] Pulmonary and Critical Care    Outpatient Follow Up Note    Subjective:   CHIEF COMPLAINT / HPI:     The patient is 68 y.o. male who presents today for follow up of ILD, BJ, cough variant asthma and fatigue. Complaints continue to be fatigue and poor energy level and not especially shortness of breath. He has no chest pain, cough, or wheezing. While the patient doesn't feel depressed his wife thinks he is. It doesn't seem that he is engaged in his previous interests.       He's had a pretty extensive evaluation end of 2017 including  CT chest, PFTs, CBC, CMP and above-mentioned stress test.  He does have sleep apnea but states he is compliant with BiPAP    Past Medical History:    Past Medical History:   Diagnosis Date    Allergic rhinitis due to other allergen 7/12/2010    Coronary atherosclerosis of unspecified type of vessel, native or graft 7/12/2010    Diabetic eye exam ([facility] [address] 04/29/2015    Diabetic eye exam ([facility] [address] 5/5/2016    [place] [facility]    Generalized osteoarthrosis, involving multiple sites 7/12/2010    Other psoriasis 7/12/2010    Pneumonia     Psoriatic arthropathy ([facility] [address] 7/12/2010    Type II or unspecified type diabetes mellitus without mention of complication, not stated as uncontrolled 7/12/2010    Unspecified essential hypertension 7/12/2010    Unspecified sleep apnea 7/12/2010       Social History:    History   Smoking Status    Former Smoker    Packs/day: 1.00    Years: 18.00    Start date: 6/1/1962    Quit date: 11/1/1980   Smokeless Tobacco    Never Used       Current Medications:  Current Outpatient Prescriptions on File Prior to Visit   Medication Sig Dispense Refill    HUMALOG KWIKPEN 100 UNIT/ML pen INJECT 32 UNITS UNDER THE SKIN THREE TIMES A DAY BEFORE MEALS 30 mL 2    LANTUS SOLOSTAR 100 UNIT/ML injection pen INJECT 40 UNITS UNDER THE SKIN EVERY NIGHT AT BEDTIME 15 mL 3    fenofibric acid (FIBRICOR) 135 MG CPDR capsule TAKE ONE CAPSULE BY MOUTH DAILY 30 cyanosis, skin lesions  EXTREMITIES: Negative for weakness, decreased ROM  NEUROLOGICAL: Negative for unilateral weakness, speech or gait abnormalities    Objective:   PHYSICAL EXAM:        VITALS:    BP 98/60 (Site: Left Arm, Position: Sitting, Cuff Size: Small Adult)   Pulse 73   Ht 5' 10\" (1.778 m)   Wt 190 lb 3.2 oz (86.3 kg)   SpO2 96%   BMI 27.29 kg/m²     CONSTITUTIONAL:  Awake, alert, cooperative, no apparent distress, and appears stated age  HEENT: No oropharyngeal exudate, PERRL, no cervical adenopathy, no tracheal deviation, thyroid size normal  LUNGS:  No increased work of breathing. + crackles bilateral bases. No wheezing  CARDIOVASCULAR:  normal S1 and S2 and no JVD  ABDOMEN:  Normal bowel sounds, non-distended and non-tender to palpation  EXT: No edema, no calf tenderness. Pulses are present bilaterally. NEUROLOGIC:  Mental Status Exam:  Level of Alertness:   awake  Orientation:   person, place, time. SKIN:  normal skin color, texture, turgor, no redness, warmth, or swelling     DATA:    PFTs  Indication: Shortness of breath, weakness, non productive cough    Test comment:     Spirometry data is acceptable and reproducible.     Pulse ox is 91% on room air    Estimated body mass index is 28.3 kg/m² as calculated from the   following:    Height as of 11/21/17: 5' 10\" (1.778 m).    Weight as of 11/21/17: 197 lb 3.2 oz (89.4 kg).     Spirometry data:    FEV1/FVC: 89. Predicted ratio 73    Pre-Bronchodilator FEV1 2.07L, which is 63% predicted    Post-Bronchodilator FEV1 2.02L, which is 61% predicted    There is -2% reversibility     FVC is 2.33L, which is 51% predicted    Lung Volumes:    TLC is 4.85L, which is 67% predicted    RV is 2.51L which is 99% predicted    Diffusion Capacity:    DLCO is 13.77 which is 42% predicted    Impression:    1. There is no obstruction present    2. There is no response to bronchodilator therapy         [Increase in FEV1 => 12% of control and => 200 ml]    3. There is moderate restriction     4. There is moderate reduction in diffusion capacity    Comment: Moderate restrictive disease with moderate decrease in   diffusion capacity consistent with the diagnosis of interstitial   lung disease.      Radiology Review:  Pertinent images / reports were reviewed as a part of this visit. CT Chest reveals the following:  Impression       Pulmonary fibrosis bilateral lower lobe subpleural regions as   described similar to previous examination.       Marked coronary artery calcification which is a risk factor for   acute coronary syndrome.       No lobar consolidation.       Ascending thoracic aorta upper limits of normal.     Assessment:   1. Cough Variant Asthma  2. ILD - NOS. Unchanged from 2016  3. BJ on Bipap  4. Chronic Diastolic CHF  5. Restless Legs Syndrome  6. Fatigue  7. Possible depression  Plan:   1. Continue Dulera 200 mcg MDI and prn albuterol  2. Check ferritin level   3. Continue BiPAP - [facility] Evaluated patient and states he is on adequate settings  4. Increase requip to 3 mg QHS  5. I wonder if depression is playing into his fatigue- Will start Lexapro 5 mg daily  6.  RTC 4 weeks

## 2018-08-31 ENCOUNTER — CARE COORDINATION (OUTPATIENT)
Dept: CARE COORDINATION | Age: 73
End: 2018-08-31

## 2018-09-01 NOTE — CARE COORDINATION
Ambulatory Care Coordination Note  8/31/2018  CM Risk Score: 8  Trey Mortality Risk Score: 11.29    ACC: Eduardo Brown RN     Hx: Asthma, CKD, DM, HTN, CAD, Psoriasis, Osteoarthritis, NSTEMI, CABG, Glaucoma, BJ, CHF, Pneumonia     Summary Note: The pt stated he saw the neurologist and he was told his CT of the head just showed some decreased blood flow d/t age and his EEG only showed normal aging. The pt stated he hasn't had any recent dizziness or falls. The pt stated he still has trouble with his balance and gait. The pt stated he had his BIPAP checked and Dr Juliette Betts told the  his BIPAP was fine. The pt stated he still only gets 4 hours of sleep at night. The pt stated he also has RLS and Dr. Evelia Godfrey increased the pt's Requip to 3 mg at night. The pt stated he does not feel like he is depressed but Dr. Evelia Godfrey suggested depression may be playing into his fatigue. The pt was started on Lexapro 5 mg daily. The pt stated he had a low BS of 68 this morning and he felt a little weak. The pt stated he drank orange juice and then ate his breakfast.  The pt was told his Ferritin Level is elevated but he is not sure what that means. The pt stated he does not take an iron supplement. The pt stated he does eat a lot of red meat d/t the pt's low K+, low salt and low CHO diet. Plan:  [facility] the pt's lab results with the pt. [facility] reviewed the pt's new medication, Leapro and it's indications. The pt still has difficulty with his balance and gait. [facility] discussed [facility] with the pt and the pt has an appointment scheduled for 9/7/18. The pt is anxious to find out why he has the balance issues. The pt's pulmonologist ordered the Ferritin level so the pt should hear back from the physician in r/t the results. [facility] instructed the pt to call the nurse with any questions or concerns.       Care Coordination Interventions    Program Enrollment:  Complex Care  Referral from Symptom course:  stable  Weight trend:  stable  Salt intake watch compared to last visit:  stable      and   General Assessment    Do you have any symptoms that are causing concern?:  Yes  Progression since Onset:  Unchanged  Reported Symptoms:  Other (Comment: Gait and balance issues)

## 2018-09-25 ENCOUNTER — OFFICE VISIT (OUTPATIENT)
Dept: PULMONOLOGY | Age: 73
End: 2018-09-25
Payer: MEDICARE

## 2018-09-25 VITALS
WEIGHT: 190 LBS | BODY MASS INDEX: 27.2 KG/M2 | HEART RATE: 74 BPM | SYSTOLIC BLOOD PRESSURE: 120 MMHG | OXYGEN SATURATION: 97 % | DIASTOLIC BLOOD PRESSURE: 60 MMHG | HEIGHT: 70 IN

## 2018-09-25 DIAGNOSIS — J84.9 ILD (INTERSTITIAL LUNG DISEASE) (HCC): ICD-10-CM

## 2018-09-25 DIAGNOSIS — R53.83 FATIGUE, UNSPECIFIED TYPE: ICD-10-CM

## 2018-09-25 DIAGNOSIS — R06.09 DOE (DYSPNEA ON EXERTION): Primary | ICD-10-CM

## 2018-09-25 DIAGNOSIS — G25.81 RLS (RESTLESS LEGS SYNDROME): ICD-10-CM

## 2018-09-25 PROCEDURE — 1101F PT FALLS ASSESS-DOCD LE1/YR: CPT | Performed by: INTERNAL MEDICINE

## 2018-09-25 PROCEDURE — G8417 CALC BMI ABV UP PARAM F/U: HCPCS | Performed by: INTERNAL MEDICINE

## 2018-09-25 PROCEDURE — G8427 DOCREV CUR MEDS BY ELIG CLIN: HCPCS | Performed by: INTERNAL MEDICINE

## 2018-09-25 PROCEDURE — 4040F PNEUMOC VAC/ADMIN/RCVD: CPT | Performed by: INTERNAL MEDICINE

## 2018-09-25 PROCEDURE — 1036F TOBACCO NON-USER: CPT | Performed by: INTERNAL MEDICINE

## 2018-09-25 PROCEDURE — 99214 OFFICE O/P EST MOD 30 MIN: CPT | Performed by: INTERNAL MEDICINE

## 2018-09-25 PROCEDURE — 1123F ACP DISCUSS/DSCN MKR DOCD: CPT | Performed by: INTERNAL MEDICINE

## 2018-09-25 PROCEDURE — G8599 NO ASA/ANTIPLAT THER USE RNG: HCPCS | Performed by: INTERNAL MEDICINE

## 2018-09-25 PROCEDURE — 3017F COLORECTAL CA SCREEN DOC REV: CPT | Performed by: INTERNAL MEDICINE

## 2018-09-25 RX ORDER — ROPINIROLE 4 MG/1
4 TABLET, FILM COATED ORAL NIGHTLY
Qty: 30 TABLET | Refills: 5 | Status: SHIPPED | OUTPATIENT
Start: 2018-09-25 | End: 2018-10-04 | Stop reason: SDUPTHER

## 2018-09-25 RX ORDER — ESCITALOPRAM OXALATE 10 MG/1
10 TABLET ORAL DAILY
Qty: 30 TABLET | Refills: 3 | Status: SHIPPED | OUTPATIENT
Start: 2018-09-25 | End: 2019-01-18 | Stop reason: ALTCHOICE

## 2018-09-26 ENCOUNTER — OFFICE VISIT (OUTPATIENT)
Dept: ENDOCRINOLOGY | Age: 73
End: 2018-09-26
Payer: MEDICARE

## 2018-09-26 VITALS
WEIGHT: 190.6 LBS | RESPIRATION RATE: 16 BRPM | DIASTOLIC BLOOD PRESSURE: 70 MMHG | BODY MASS INDEX: 27.29 KG/M2 | SYSTOLIC BLOOD PRESSURE: 113 MMHG | HEIGHT: 70 IN | OXYGEN SATURATION: 97 % | HEART RATE: 84 BPM

## 2018-09-26 DIAGNOSIS — I10 ESSENTIAL HYPERTENSION: ICD-10-CM

## 2018-09-26 DIAGNOSIS — E03.9 ACQUIRED HYPOTHYROIDISM: ICD-10-CM

## 2018-09-26 LAB — HBA1C MFR BLD: 6.1 %

## 2018-09-26 PROCEDURE — 99214 OFFICE O/P EST MOD 30 MIN: CPT | Performed by: INTERNAL MEDICINE

## 2018-09-26 PROCEDURE — G8599 NO ASA/ANTIPLAT THER USE RNG: HCPCS | Performed by: INTERNAL MEDICINE

## 2018-09-26 PROCEDURE — 2022F DILAT RTA XM EVC RTNOPTHY: CPT | Performed by: INTERNAL MEDICINE

## 2018-09-26 PROCEDURE — 4040F PNEUMOC VAC/ADMIN/RCVD: CPT | Performed by: INTERNAL MEDICINE

## 2018-09-26 PROCEDURE — 1036F TOBACCO NON-USER: CPT | Performed by: INTERNAL MEDICINE

## 2018-09-26 PROCEDURE — 83036 HEMOGLOBIN GLYCOSYLATED A1C: CPT | Performed by: INTERNAL MEDICINE

## 2018-09-26 PROCEDURE — G8427 DOCREV CUR MEDS BY ELIG CLIN: HCPCS | Performed by: INTERNAL MEDICINE

## 2018-09-26 PROCEDURE — 1123F ACP DISCUSS/DSCN MKR DOCD: CPT | Performed by: INTERNAL MEDICINE

## 2018-09-26 PROCEDURE — G8417 CALC BMI ABV UP PARAM F/U: HCPCS | Performed by: INTERNAL MEDICINE

## 2018-09-26 PROCEDURE — 3044F HG A1C LEVEL LT 7.0%: CPT | Performed by: INTERNAL MEDICINE

## 2018-09-26 PROCEDURE — 1101F PT FALLS ASSESS-DOCD LE1/YR: CPT | Performed by: INTERNAL MEDICINE

## 2018-09-26 PROCEDURE — 3017F COLORECTAL CA SCREEN DOC REV: CPT | Performed by: INTERNAL MEDICINE

## 2018-09-26 RX ORDER — GABAPENTIN 600 MG/1
TABLET ORAL
Qty: 90 TABLET | Refills: 3 | Status: SHIPPED | OUTPATIENT
Start: 2018-09-26 | End: 2019-05-03

## 2018-09-26 NOTE — PROGRESS NOTES
[facility] Pulmonary and Critical Care    Outpatient Follow Up Note    Subjective:   CHIEF COMPLAINT / HPI:     The patient is 68 y.o. male who presents today for follow up of ILD, BJ, cough variant asthma and fatigue. Complaints continue to be fatigue and poor energy level and not especially shortness of breath. He has no chest pain, cough, or wheezing. She visit I started him on Lexapro 5 mg daily and increased his Requip to 3 mg nightly for poorly controlled restless leg syndrome. Neither of those interventions really helped the patient.   Ferritin level was checked which was normal    He's had a pretty extensive evaluation end of 2017 including  CT chest, PFTs, CBC, CMP and above-mentioned stress test.  He does have sleep apnea but states he is compliant with BiPAP    Past Medical History:    Past Medical History:   Diagnosis Date    Allergic rhinitis due to other allergen 7/12/2010    Coronary atherosclerosis of unspecified type of vessel, native or graft 7/12/2010    Diabetic eye exam ([facility] [address] 04/29/2015    Diabetic eye exam ([facility] [address] 5/5/2016    [place] [facility]    Generalized osteoarthrosis, involving multiple sites 7/12/2010    Other psoriasis 7/12/2010    Pneumonia     Psoriatic arthropathy ([facility] [address] 7/12/2010    Type II or unspecified type diabetes mellitus without mention of complication, not stated as uncontrolled 7/12/2010    Unspecified essential hypertension 7/12/2010    Unspecified sleep apnea 7/12/2010       Social History:    History   Smoking Status    Former Smoker    Packs/day: 1.00    Years: 18.00    Start date: 6/1/1962    Quit date: 11/1/1980   Smokeless Tobacco    Never Used       Current Medications:  Current Outpatient Prescriptions on File Prior to Visit   Medication Sig Dispense Refill    Coenzyme Q10 (CO Q 10) 100 MG CAPS Take 1 tablet by mouth daily      HUMALOG KWIKPEN 100 UNIT/ML pen INJECT 32 UNITS UNDER THE SKIN THREE TIMES A DAY BEFORE MEALS 30 mL 2    LANTUS vomiting, diarrhea, constipation and abdominal pain  HEMATOLOGICAL: Negative for adenopathy  SKIN: Negative for clubbing, cyanosis, skin lesions  EXTREMITIES: Negative for weakness, decreased ROM  NEUROLOGICAL: Negative for unilateral weakness, speech or gait abnormalities    Objective:   PHYSICAL EXAM:        VITALS:    /60 (Site: Right Upper Arm, Position: Sitting, Cuff Size: Small Adult)   Pulse 74   Ht 5' 10\" (1.778 m)   Wt 190 lb (86.2 kg)   SpO2 97%   BMI 27.26 kg/m²     CONSTITUTIONAL:  Awake, alert, cooperative, no apparent distress, and appears stated age  HEENT: No oropharyngeal exudate, PERRL, no cervical adenopathy, no tracheal deviation, thyroid size normal  LUNGS:  No increased work of breathing. + crackles bilateral bases. No wheezing  CARDIOVASCULAR:  normal S1 and S2 and no JVD  ABDOMEN:  Normal bowel sounds, non-distended and non-tender to palpation  EXT: No edema, no calf tenderness. Pulses are present bilaterally. NEUROLOGIC:  Mental Status Exam:  Level of Alertness:   awake  Orientation:   person, place, time. SKIN:  normal skin color, texture, turgor, no redness, warmth, or swelling     DATA:    PFTs  Indication: Shortness of breath, weakness, non productive cough    Test comment:     Spirometry data is acceptable and reproducible.     Pulse ox is 91% on room air    Estimated body mass index is 28.3 kg/m² as calculated from the   following:    Height as of 11/21/17: 5' 10\" (1.778 m).    Weight as of 11/21/17: 197 lb 3.2 oz (89.4 kg).     Spirometry data:    FEV1/FVC: 89. Predicted ratio 73    Pre-Bronchodilator FEV1 2.07L, which is 63% predicted    Post-Bronchodilator FEV1 2.02L, which is 61% predicted    There is -2% reversibility     FVC is 2.33L, which is 51% predicted    Lung Volumes:    TLC is 4.85L, which is 67% predicted    RV is 2.51L which is 99% predicted    Diffusion Capacity:    DLCO is 13.77 which is 42% predicted    Impression:    1. There is no obstruction

## 2018-10-01 ENCOUNTER — OFFICE VISIT (OUTPATIENT)
Dept: CARDIOLOGY CLINIC | Age: 73
End: 2018-10-01
Payer: MEDICARE

## 2018-10-01 ENCOUNTER — TELEPHONE (OUTPATIENT)
Dept: PULMONOLOGY | Age: 73
End: 2018-10-01

## 2018-10-01 VITALS
HEART RATE: 60 BPM | BODY MASS INDEX: 27.03 KG/M2 | WEIGHT: 188.4 LBS | SYSTOLIC BLOOD PRESSURE: 118 MMHG | DIASTOLIC BLOOD PRESSURE: 60 MMHG

## 2018-10-01 DIAGNOSIS — I10 ESSENTIAL HYPERTENSION: ICD-10-CM

## 2018-10-01 DIAGNOSIS — I25.10 ATHEROSCLEROSIS OF NATIVE CORONARY ARTERY OF NATIVE HEART WITHOUT ANGINA PECTORIS: Primary | ICD-10-CM

## 2018-10-01 DIAGNOSIS — J84.9 ILD (INTERSTITIAL LUNG DISEASE) (HCC): ICD-10-CM

## 2018-10-01 PROCEDURE — G8484 FLU IMMUNIZE NO ADMIN: HCPCS | Performed by: INTERNAL MEDICINE

## 2018-10-01 PROCEDURE — G8599 NO ASA/ANTIPLAT THER USE RNG: HCPCS | Performed by: INTERNAL MEDICINE

## 2018-10-01 PROCEDURE — 1123F ACP DISCUSS/DSCN MKR DOCD: CPT | Performed by: INTERNAL MEDICINE

## 2018-10-01 PROCEDURE — 4040F PNEUMOC VAC/ADMIN/RCVD: CPT | Performed by: INTERNAL MEDICINE

## 2018-10-01 PROCEDURE — 1036F TOBACCO NON-USER: CPT | Performed by: INTERNAL MEDICINE

## 2018-10-01 PROCEDURE — 99213 OFFICE O/P EST LOW 20 MIN: CPT | Performed by: INTERNAL MEDICINE

## 2018-10-01 PROCEDURE — G8427 DOCREV CUR MEDS BY ELIG CLIN: HCPCS | Performed by: INTERNAL MEDICINE

## 2018-10-01 PROCEDURE — G8417 CALC BMI ABV UP PARAM F/U: HCPCS | Performed by: INTERNAL MEDICINE

## 2018-10-01 PROCEDURE — 3017F COLORECTAL CA SCREEN DOC REV: CPT | Performed by: INTERNAL MEDICINE

## 2018-10-01 PROCEDURE — 1101F PT FALLS ASSESS-DOCD LE1/YR: CPT | Performed by: INTERNAL MEDICINE

## 2018-10-01 RX ORDER — METOPROLOL SUCCINATE 25 MG/1
25 TABLET, EXTENDED RELEASE ORAL DAILY
Qty: 90 TABLET | Refills: 3 | Status: SHIPPED | OUTPATIENT
Start: 2018-10-01 | End: 2019-12-20

## 2018-10-01 NOTE — PROGRESS NOTES
68 y.o. here to [facility]. 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 Here for f/u on CAD. Has h/o CAD and CABG 2006, HTN, hyperlipidemia, DM, and CKD ([place]). Had increased nausea/vomiting on a recently elevated dose of Lexapro. No cp. No sob. No n/v ex from after increasing the lexapro. No orthopnea or PND. Has had episodes of collapsing, couldn't move well. No blacking out. Feels these episodes are more weakness than losing conssciousness. Past Medical History:   Diagnosis Date    Allergic rhinitis due to other allergen 7/12/2010    Coronary atherosclerosis of unspecified type of vessel, native or graft 7/12/2010    Diabetic eye exam ([facility] [address] 04/29/2015    Diabetic eye exam ([facility] [address] 5/5/2016    [facility] eye    Generalized osteoarthrosis, involving multiple sites 7/12/2010    Other psoriasis 7/12/2010    Pneumonia     Psoriatic arthropathy ([facility] [address] 7/12/2010    Type II or unspecified type diabetes mellitus without mention of complication, not stated as uncontrolled 7/12/2010    Unspecified essential hypertension 7/12/2010    Unspecified sleep apnea 7/12/2010     Past Surgical History:   Procedure Laterality Date    CARPAL TUNNEL RELEASE      s/p    CATARACT REMOVAL      CORONARY ARTERY BYPASS GRAFT      JOINT REPLACEMENT       Social History   Substance Use Topics    Smoking status: Former Smoker     Packs/day: 1.00     Years: 18.00     Start date: 6/1/1962     Quit date: 11/1/1980    Smokeless tobacco: Never Used    Alcohol use No     Allergies   Allergen Reactions    Clindamycin     Clindamycin/Lincomycin     Penicillins     Tazorac [Tazarotene]      Cream - rash     Family History   Problem Relation Age of Onset    Diabetes Mother     Heart Failure Mother     Coronary Art Dis Father      Review of Systems   General: No fevers, chills, fatigue, or night sweats. No abnormal changes in weight. HEENT: No blurry or decreased vision. No changes in hearing, nasal discharge or sore throat.    Cardiovascular: See HPI. No cramping in legs or buttocks when walking. Respiratory: No cough, hemoptysis, or wheezing. No history of asthma. Gastrointestinal: No abdominal pain, hematochezia, melana, or history of GI ulcers. Genito-Urinary: No dysuria or hematuria. No urgency or polyuria. Musculoskeletal: No complaints of joint pain, joint swelling or muscular weakness/soreness. Neurological: No dizziness or headaches. No numbness/tingling, speech problems or weakness. No history of a stroke or TIA. Psychological: No anxiety or depression  Hematological and Lymphatic: No abnormal bleeding or bruising, blood clots, jaundice. Endocrine: No malaise/lethargy, palpitations, polydipsia/polyuria, temperature intolerance or unexpected weight changes. Skin: No rashes or non-healing ulcers. PE:  Blood pressure 118/60, pulse 60, weight 188 lb 6.4 oz (85.5 kg). General (appearance): Well devel. No distress  Eyes: Anicteric, eomi  Neck: Supple. Ears/Nose/Mouth/Thorat: No cyanosis  CV: RRR. Soft AMADOR   Respiratory:  Bibasilar rales, normal respiratory effort  GI: Abd soft. No peritoneal signs  Skin: Warm, dry. No rashes  Neuro/Psych: Alert and oriented x 3. Appropriate behavior  Ext:  No c/c.  No edema  Pulses:  2+ carotid and radial      Lab Results   Component Value Date    CHOL 130 11/09/2017    CHOL 182 10/21/2016    CHOL 186 10/19/2015     Lab Results   Component Value Date    TRIG 198 (H) 11/09/2017    TRIG 339 (H) 10/21/2016    TRIG 264 (H) 10/19/2015     Lab Results   Component Value Date    HDL 46 11/09/2017    HDL 47 10/21/2016    HDL 49 10/19/2015     Lab Results   Component Value Date    LDLCALC 44 11/09/2017    LDLCALC see below 10/21/2016    LDLCALC 84 10/19/2015     Lab Results   Component Value Date    LABVLDL 40 11/09/2017    LABVLDL see below 10/21/2016    LABVLDL 53 10/19/2015     No results found for: [facility]    Lab Results   Component Value Date    WBC 7.3 08/25/2018    HGB 12.9 (L) 08/25/2018    HCT 39.9 (L) 2018    MCV 95.3 2018     2018       Chemistry        Component Value Date/Time     2018 0847    K 5.7 (H) 2018 0847     2018 0847    CO2 25 2018 0847    BUN 55 (H) 2018 0847    CREATININE 2.9 (H) 2018 0847        Component Value Date/Time    CALCIUM 9.8 2018 0847    ALKPHOS 52 2017 1119    AST 32 2017 1119    ALT 40 2017 1119    BILITOT <0.2 2017 1119        Lab Results   Component Value Date    INR 0.89 2017    INR 0.91 2017    INR 0.96 2017    PROTIME 10.1 2017    PROTIME 10.4 2017    PROTIME 10.9 2017 Cath: Angiographic Findings:  Right dominant system  Left Main:  Diffuse, mild disease. ~20-30% diffuse disease. Left Anterior Descendin% proximal occlusion. After the LIMA is injected, the LAD was seen to have a mid 80-90% stenosis just distal to a significant diagonal branch. The diagonal bifurcates; one branch has a severe (~80-90%) stenosis. The other limb also has diffuse disease. There was a 90% distal LAD stenosis. Circumflex:  Proximal 70% stenosis at bifurcation of an OM and the AV LCx. The OM is subtotally occluded. The AV LCx is diffusely diseased. Right Coronary:  Proximal 30% stenosis. Mid 60-70% stenosis. Right posterolateral is occluded. Right PDA has an ostial/proximal 90% stenosis. It is diffusely diseased after the ostial lesion. Bypass Grafts:  LIMA-LAD with radial grafts to an OM and right PDA that is widely patent. Left Ventriculogram:  Not performed due to elevated Cr  Hemodynamics (mm Hg):  Left Ventricular Pressure:  131/0, 14  Central Aortic Pressure:  133/66 (93)    2017 Nuc Stress:  1. Left ventricular ejection fraction of 62 %. 2. Fixed perfusion defect in the myocardial apex suggestive of   prior infarct. No evidence of reversible ischemia.      2017 Echo:   Left ventricular size is normal . Left

## 2018-10-10 ENCOUNTER — CARE COORDINATION (OUTPATIENT)
Dept: CARE COORDINATION | Age: 73
End: 2018-10-10

## 2018-10-11 NOTE — CARE COORDINATION
Ambulatory Care Coordination Note  10/10/2018  CM Risk Score: 8  Trey Mortality Risk Score: 11.29    ACC: Garfield Sanchez RN     Hx: Asthma, CKD, DM, HTN, CAD, Psoriasis, Osteoarthritis, NSTEMI, CABG, Glaucoma, BJ, CHF, Pneumonia, Neuropathy    Summary Note: The pt stated he has seen his cardiologist, pulmonologist, endocrinologist and neurologist and no one has figured out why he has the syncope and falls. The pt stated the pulmonologist thought he was depressed and increased his Lexapro and that made him very nauseated so the pt discontinued the Lexapro. The pt stated the endocrinologist felt the pt's blood sugars were dropping too low so he decreased the pt's Lantus from 40 Units to 20 units and decreased his Humalog to 8 units with his meals. The pt stated the Gabapentin has not been helping with his neuropathy so the endocrinologist increased it to 600 mg 1 tab three times a day. The pt stated the endocrinologist told him to go off the Gabapentin for a few days and then restart the medication. The pt stated he feels he is on too much medication and that is causing his dizziness so he has decided not to restart the Gabapentin. The pt stated his EEG, MRI and MRA were all normal for his age. The pt stated he has been having liquid stools but it seems to be decreasing since he stopped the Lexapro. The pt stated he has started using his BIPAP again. The pt stated he wasn't using it for awhile. The pt stated if he rolls to his left side in bed he gets nauseated and has vertigo. Plan:  The [facility] instructed the pt to call if any increased syncope or balance issues. Pt is going to the [address] [place] [address] and feels he is making some progress. The pt was instructed to sleep on his right side. The [facility] encouraged the pt to continue to use his BIPAP at night. The [facility] reviewed the pt's current medications and the discussed the medications the pt stopped d/t side effects. The [facility] will continue to monitor the pt.   The [facility] instructed the pt to call the nurse with any questions or concerns. Handouts mailed to pt. Diabetes zones: Know your zone. Manage your disease. Heart Failure zones: Know your zone. Manage your disease. Care Coordination Interventions    Program Enrollment:  Complex Care  Referral from Primary Care Provider:  Yes  Suggested Interventions and Community Resources  Cardiac Rehab: In Process  Diabetes Education: In Process  Fall Risk Prevention:  Not Started  Home Health Services: In Process  Physical Therapy: In Process  Registered Dietician:  Completed  Specialty Services Referral:  In Process  Other Services: In Process (Comment: Sleep Study)  Zone Management Tools: In Process (Comment: Diabetes and Heart Failure zones mailed to pt)  Other Services or Interventions:  Endo, Nephro, Pulm, Cardio, Holter Monitor, Neurology         Goals Addressed             Most Recent     Conditions and Symptoms   On track (10/10/2018)             I will follow my Zone Management tool to seek urgent or emergent care. I will notify my provider of any symptoms that indicate a worsening of my condition. Barriers: none  Plan for overcoming my barriers: N/A  Confidence: 8/10  Anticipated Goal Completion Date: 9/1/18 Extended Goal Date: 01/10/18  Pt has had syncopal episodes in 4/2018 Stress Test and Holter Monitor recently. (5/16/18)  Pt still having syncope and dizziness (10/101/18       COMPLETED: Nutrition Plan   On track (10/10/2018)             I will try to follow a low sodium, low potassium, low CHO diet. Barriers: overwhelmed by complexity of regimen and stress  Plan for overcoming my barriers: Pt education, support and resources  Confidence: 7/10  Anticipated Goal Completion Date: 09/16/18  Pt continues to work on his diet.  (5/16/18/)       Reduce Falls    Improving (10/10/2018)             I will reduce my risk of falls by the following: Use walking aids like cane or walker  Follow through on orders for PT    Barriers: impairment:  physical: generalized weakness  Plan for overcoming my barriers: Discover source of weakness and PT  Confidence: 7/10  Anticipated Goal Completion Date: 10/01/18 Extended Date: 01/10/18  10/10/18: Source of Syncope still unknown. Pt going to [address] [place] [address]            Prior to Admission medications    Medication Sig Start Date End Date Taking?  Authorizing Provider   rOPINIRole (REQUIP) 4 MG tablet Take 1 tablet by mouth nightly TAKE ONE TABLET BY MOUTH EVERY EVENING 10/4/18   Johanne Razo MD   metoprolol succinate (TOPROL XL) 25 MG extended release tablet Take 1 tablet by mouth daily 10/1/18   Denis Davis MD   gabapentin (NEURONTIN) 600 MG tablet One tab PO TID. 9/26/18 9/26/19  Blanca Heard MD   escitalopram (LEXAPRO) 10 MG tablet Take 1 tablet by mouth daily 9/25/18   Johanne Razo MD   Coenzyme Q10 (CO Q 10) 100 MG CAPS Take 1 tablet by mouth daily    Historical Provider, MD   HUMALOG KWIKPEN 100 UNIT/ML pen INJECT [address] TIMES A DAY BEFORE MEALS  Patient taking differently: INJECT 12 UNITS UNDER THE SKIN THREE TIMES A DAY BEFORE MEALS 8/16/18   Blanca Heard MD   LANTUS SOLOSTAR 100 UNIT/ML injection pen INJECT 40 UNITS UNDER THE SKIN EVERY NIGHT AT BEDTIME 8/9/18   Blanca Heard MD   fenofibric acid (FIBRICOR) 135 MG CPDR capsule TAKE ONE CAPSULE BY MOUTH DAILY 7/27/18   Maria D Plummer MD   albuterol sulfate HFA (PROVENTIL HFA) 108 (90 Base) MCG/ACT inhaler Inhale 2 puffs into the lungs every 4 hours as needed for Wheezing 7/2/18 7/2/19  Johanne Razo MD   atorvastatin (LIPITOR) 40 MG tablet TAKE ONE TABLET BY MOUTH ONCE NIGHTLY 6/26/18   Hai Dobbs MD   mometasone-formoterol [facility]) 200-5 MCG/ACT inhaler Inhale 2 puffs into the lungs every 12 hours 5/8/18   Johanne Razo MD   furosemide (LASIX) 40 MG tablet TAKE ONE-HALF TABLET BY MOUTH DAILY 4/30/18   Jennifer Conway MD   Handicap Placard MISC by Does not apply route Duration: 5

## 2018-10-22 RX ORDER — FENOFIBRIC ACID 135 MG/1
CAPSULE, DELAYED RELEASE ORAL
Qty: 30 CAPSULE | Refills: 1 | Status: SHIPPED | OUTPATIENT
Start: 2018-10-22 | End: 2018-12-27 | Stop reason: SDUPTHER

## 2018-10-30 ENCOUNTER — OFFICE VISIT (OUTPATIENT)
Dept: PULMONOLOGY | Age: 73
End: 2018-10-30
Payer: MEDICARE

## 2018-10-30 VITALS
BODY MASS INDEX: 25.91 KG/M2 | HEIGHT: 70 IN | HEART RATE: 91 BPM | OXYGEN SATURATION: 96 % | DIASTOLIC BLOOD PRESSURE: 70 MMHG | SYSTOLIC BLOOD PRESSURE: 130 MMHG | WEIGHT: 181 LBS

## 2018-10-30 DIAGNOSIS — F32.A DEPRESSION, UNSPECIFIED DEPRESSION TYPE: ICD-10-CM

## 2018-10-30 DIAGNOSIS — J45.991 COUGH VARIANT ASTHMA: ICD-10-CM

## 2018-10-30 DIAGNOSIS — R06.09 DOE (DYSPNEA ON EXERTION): ICD-10-CM

## 2018-10-30 DIAGNOSIS — R53.83 FATIGUE, UNSPECIFIED TYPE: Primary | ICD-10-CM

## 2018-10-30 DIAGNOSIS — G25.81 RLS (RESTLESS LEGS SYNDROME): ICD-10-CM

## 2018-10-30 DIAGNOSIS — G47.33 OSA TREATED WITH BIPAP: ICD-10-CM

## 2018-10-30 DIAGNOSIS — J84.9 ILD (INTERSTITIAL LUNG DISEASE) (HCC): ICD-10-CM

## 2018-10-30 PROCEDURE — G8417 CALC BMI ABV UP PARAM F/U: HCPCS | Performed by: INTERNAL MEDICINE

## 2018-10-30 PROCEDURE — 4040F PNEUMOC VAC/ADMIN/RCVD: CPT | Performed by: INTERNAL MEDICINE

## 2018-10-30 PROCEDURE — G8599 NO ASA/ANTIPLAT THER USE RNG: HCPCS | Performed by: INTERNAL MEDICINE

## 2018-10-30 PROCEDURE — 1101F PT FALLS ASSESS-DOCD LE1/YR: CPT | Performed by: INTERNAL MEDICINE

## 2018-10-30 PROCEDURE — 3017F COLORECTAL CA SCREEN DOC REV: CPT | Performed by: INTERNAL MEDICINE

## 2018-10-30 PROCEDURE — 99214 OFFICE O/P EST MOD 30 MIN: CPT | Performed by: INTERNAL MEDICINE

## 2018-10-30 PROCEDURE — 1123F ACP DISCUSS/DSCN MKR DOCD: CPT | Performed by: INTERNAL MEDICINE

## 2018-10-30 PROCEDURE — G8484 FLU IMMUNIZE NO ADMIN: HCPCS | Performed by: INTERNAL MEDICINE

## 2018-10-30 PROCEDURE — 1036F TOBACCO NON-USER: CPT | Performed by: INTERNAL MEDICINE

## 2018-10-30 PROCEDURE — G8427 DOCREV CUR MEDS BY ELIG CLIN: HCPCS | Performed by: INTERNAL MEDICINE

## 2018-10-30 NOTE — PROGRESS NOTES
gabapentin (NEURONTIN) 600 MG tablet One tab PO TID. 90 tablet 3    Coenzyme Q10 (CO Q 10) 100 MG CAPS Take 1 tablet by mouth daily      HUMALOG KWIKPEN 100 UNIT/ML pen INJECT 32 UNITS UNDER THE SKIN THREE TIMES A DAY BEFORE MEALS (Patient taking differently: INJECT 12 UNITS UNDER THE SKIN THREE TIMES A DAY BEFORE MEALS) 30 mL 2    LANTUS SOLOSTAR 100 UNIT/ML injection pen INJECT 40 UNITS UNDER THE SKIN EVERY NIGHT AT BEDTIME 15 mL 3    albuterol sulfate HFA (PROVENTIL HFA) 108 (90 Base) MCG/ACT inhaler Inhale 2 puffs into the lungs every 4 hours as needed for Wheezing 1 Inhaler 5    atorvastatin (LIPITOR) 40 MG tablet TAKE ONE TABLET BY MOUTH ONCE NIGHTLY 90 tablet 3    mometasone-formoterol (DULERA) 200-5 MCG/ACT inhaler Inhale 2 puffs into the lungs every 12 hours 1 Inhaler 11    furosemide (LASIX) 40 MG tablet TAKE ONE-HALF TABLET BY MOUTH DAILY 45 tablet 2    Handicap Placard MISC by Does not apply route Duration: 5 years 1 each 0    ACCU-CHEK JOHNATHAN PLUS strip TEST FOUR TIMES A DAY AS NEEDED 150 strip 3    levothyroxine (SYNTHROID) 50 MCG tablet Take 1 tablet by mouth daily 30 tablet 11    Insulin Pen Needle (PEN NEEDLES 31GX5/16\") 31G X 8 MM MISC USE WITH INSULIN FOUR TIMES A  each 2    ACCU-CHEK SOFTCLIX LANCETS MISC USE FOUR TIMES A  each 3    Blood Glucose Monitoring Suppl (ACCU-CHEK JOHNATHAN) RUMA Use as needed 1 Device 0    aspirin 81 MG chewable tablet Take 1 tablet by mouth daily. 30 tablet 5    Lancets MISC 2 times daily. Lancets for a Anna meter. 50 each 11    escitalopram (LEXAPRO) 10 MG tablet Take 1 tablet by mouth daily 30 tablet 3    glucose blood VI test strips (ACCU-CHEK JOHNATHAN) strip 4 times a day As needed. 150 each 3     No current facility-administered medications on file prior to visit.         REVIEW OF SYSTEMS:    CONSTITUTIONAL: Negative for fevers and chills  HEENT: Negative for oropharyngeal exudate, post nasal drip, sinus pain / pressure, nasal

## 2018-11-07 RX ORDER — BLOOD SUGAR DIAGNOSTIC
STRIP MISCELLANEOUS
Qty: 150 STRIP | Refills: 3 | Status: SHIPPED | OUTPATIENT
Start: 2018-11-07 | End: 2019-06-11 | Stop reason: SDUPTHER

## 2018-11-14 ENCOUNTER — CARE COORDINATION (OUTPATIENT)
Dept: CARE COORDINATION | Age: 73
End: 2018-11-14

## 2018-11-14 NOTE — CARE COORDINATION
Ambulatory Care Coordination Note  11/14/2018  CM Risk Score: 8  Trey Mortality Risk Score: 15    ACC: Hloly Anthony RN     Hx: Asthma, CKD, DM, HTN, CAD, Psoriasis, Osteoarthritis, NSTEMI, CABG, Glaucoma, BJ, CHF, Pneumonia, Neuropathy    Summary Note: The pt stated he has graduated from the [facility] with a score 50/52. The pt denied any chest pain, no SOB but does have EDWARDS and no swelling. The pt stated his breathing sometimes gets more labored with activity but he is never gasping for air. The pt stated he sometimes has a dry cough. The pt stated he is easily fatigued, has no stamina or energy. The pt denied any falls. The pt stated he was told by his Pulmonologist that he has Interstitial Lung Disease. The pt stated he has been trying to use his BIPAP more but he still only gets 3-4 hours of sleep. The pt stated he goes to bed around 7-8:00 PM and is awake by 1:30 AM. The pt stated he does gets some sleep during the day in his recliner. The pt stated the Pulmonologist started the pt on Dulera in May and it helps but he does not use it every day. The pt stated after dinner each night he feels a little nauseated but he goes to bed and it goes away. Plan:  The [address] reviewed the pt's CT of chest with the pt. The [address] discussed  Interstitial Lung Disease with the pt. The [address] encouraged the pt to use his BIPAP nightly and to use his inhaler regularly. The pt plans to sign up for Pulm Rehab. Handout mailed to pt: Interstitial Lung Disease: Care Instructions    Care Coordination Interventions    Program Enrollment:  Complex Care  Referral from Primary Care Provider:  Yes  Suggested Interventions and Community Resources  Cardiac Rehab: In Process  Diabetes Education: In Process  Fall Risk Prevention:  Not Started  Home Health Services: In Process  Physical Therapy: In Process  Registered Dietician:  Completed  Specialty Services Referral:  In Process  Other Services:   In Process (Comment: Sleep Magdy Chapman MD   Coenzyme Q10 (CO Q 10) 100 MG CAPS Take 1 tablet by mouth daily    Historical Provider, MD   HUMALOG KWIKPEN 100 UNIT/ML pen INJECT [address] TIMES A DAY BEFORE MEALS  Patient taking differently: INJECT 12 UNITS UNDER THE SKIN THREE TIMES A DAY BEFORE MEALS 8/16/18   Dilia Cummings MD   LANTUS SOLOSTAR 100 UNIT/ML injection pen INJECT 40 UNITS UNDER THE SKIN EVERY NIGHT AT BEDTIME 8/9/18   Dilia Cummings MD   albuterol sulfate HFA (PROVENTIL HFA) 108 (90 Base) MCG/ACT inhaler Inhale 2 puffs into the lungs every 4 hours as needed for Wheezing 7/2/18 7/2/19  Magdy Chapman MD   atorvastatin (LIPITOR) 40 MG tablet TAKE ONE TABLET BY MOUTH ONCE NIGHTLY 6/26/18   Taina Doshi MD   mometasone-formoterol [facility]) 200-5 MCG/ACT inhaler Inhale 2 puffs into the lungs every 12 hours 5/8/18   Magdy Chapman MD   furosemide (LASIX) 40 MG tablet TAKE ONE-HALF TABLET BY MOUTH DAILY 4/30/18   Anuradha Xavier MD   Handicap Placard MISC by Does not apply route Duration: 5 years 4/18/18   Magdy Chapman MD   levothyroxine (SYNTHROID) 50 MCG tablet Take 1 tablet by mouth daily 2/20/18 2/15/19  Dilia Cummings MD   Insulin Pen Needle (PEN NEEDLES 31GX5/16\") 31G X 8 MM MISC USE WITH INSULIN FOUR TIMES A DAY 11/21/17   Surjit Peguero MD   ACCU-CHEK SOFTCLIX LANCETS MISC USE FOUR TIMES A DAY 11/21/17   Dilia Cummings MD   glucose blood VI test strips (ACCU-CHEK JOHNATHAN) strip 4 times a day As needed. 11/21/16   Dilia Cummings MD   Blood Glucose Monitoring Suppl (ACCU-CHEK JOHNATHAN) RUMA Use as needed 11/21/16   Dilia Cummings MD   aspirin 81 MG chewable tablet Take 1 tablet by mouth daily. 3/28/12   Jessica Patton MD   Lancets MISC 2 times daily. Lancets for a [place] meter.  7/16/10   Surjit Peguero MD       Future Appointments  Date Time Provider Svitlana Burrows   11/16/2018 8:30 AM uSrjit Peguero MD [facility]   12/20/2018 12:30 PM Anuradha Xavier MD [facility]

## 2018-11-16 ENCOUNTER — OFFICE VISIT (OUTPATIENT)
Dept: INTERNAL MEDICINE CLINIC | Age: 73
End: 2018-11-16
Payer: MEDICARE

## 2018-11-16 VITALS
HEIGHT: 70 IN | DIASTOLIC BLOOD PRESSURE: 80 MMHG | WEIGHT: 181.9 LBS | BODY MASS INDEX: 26.04 KG/M2 | SYSTOLIC BLOOD PRESSURE: 132 MMHG

## 2018-11-16 DIAGNOSIS — Z00.00 MEDICARE ANNUAL WELLNESS VISIT, SUBSEQUENT: ICD-10-CM

## 2018-11-16 DIAGNOSIS — N17.9 ACUTE RENAL FAILURE, UNSPECIFIED ACUTE RENAL FAILURE TYPE (HCC): ICD-10-CM

## 2018-11-16 DIAGNOSIS — Z00.00 ROUTINE GENERAL MEDICAL EXAMINATION AT A HEALTH CARE FACILITY: ICD-10-CM

## 2018-11-16 DIAGNOSIS — L40.50 PSORIATIC ARTHROPATHY (HCC): ICD-10-CM

## 2018-11-16 DIAGNOSIS — G47.33 OSA (OBSTRUCTIVE SLEEP APNEA): ICD-10-CM

## 2018-11-16 DIAGNOSIS — Z79.4 INSULIN DEPENDENT TYPE 2 DIABETES MELLITUS, CONTROLLED (HCC): ICD-10-CM

## 2018-11-16 DIAGNOSIS — E11.9 INSULIN DEPENDENT TYPE 2 DIABETES MELLITUS, CONTROLLED (HCC): ICD-10-CM

## 2018-11-16 DIAGNOSIS — Z00.00 MEDICARE ANNUAL WELLNESS VISIT, SUBSEQUENT: Primary | ICD-10-CM

## 2018-11-16 DIAGNOSIS — L40.50 ARTHRITIS WITH PSORIASIS (HCC): ICD-10-CM

## 2018-11-16 DIAGNOSIS — Z23 NEED FOR INFLUENZA VACCINATION: ICD-10-CM

## 2018-11-16 DIAGNOSIS — R53.82 CHRONIC FATIGUE: ICD-10-CM

## 2018-11-16 LAB
A/G RATIO: 2.1 (ref 1.1–2.2)
ALBUMIN SERPL-MCNC: 4.6 G/DL (ref 3.4–5)
ALP BLD-CCNC: 36 U/L (ref 40–129)
ALT SERPL-CCNC: 39 U/L (ref 10–40)
ANION GAP SERPL CALCULATED.3IONS-SCNC: 15 MMOL/L (ref 3–16)
AST SERPL-CCNC: 45 U/L (ref 15–37)
BASOPHILS ABSOLUTE: 0.1 K/UL (ref 0–0.2)
BASOPHILS RELATIVE PERCENT: 1.1 %
BILIRUB SERPL-MCNC: 0.4 MG/DL (ref 0–1)
BUN BLDV-MCNC: 62 MG/DL (ref 7–20)
CALCIUM SERPL-MCNC: 10 MG/DL (ref 8.3–10.6)
CHLORIDE BLD-SCNC: 100 MMOL/L (ref 99–110)
CHOLESTEROL, TOTAL: 146 MG/DL (ref 0–199)
CO2: 23 MMOL/L (ref 21–32)
CREAT SERPL-MCNC: 2.3 MG/DL (ref 0.8–1.3)
EOSINOPHILS ABSOLUTE: 0.4 K/UL (ref 0–0.6)
EOSINOPHILS RELATIVE PERCENT: 4.3 %
GFR AFRICAN AMERICAN: 34
GFR NON-AFRICAN AMERICAN: 28
GLOBULIN: 2.2 G/DL
GLUCOSE BLD-MCNC: 203 MG/DL (ref 70–99)
HCT VFR BLD CALC: 40.2 % (ref 40.5–52.5)
HDLC SERPL-MCNC: 47 MG/DL (ref 40–60)
HEMOGLOBIN: 12.9 G/DL (ref 13.5–17.5)
LDL CHOLESTEROL CALCULATED: 58 MG/DL
LYMPHOCYTES ABSOLUTE: 1.6 K/UL (ref 1–5.1)
LYMPHOCYTES RELATIVE PERCENT: 20.1 %
MCH RBC QN AUTO: 30.7 PG (ref 26–34)
MCHC RBC AUTO-ENTMCNC: 32 G/DL (ref 31–36)
MCV RBC AUTO: 95.7 FL (ref 80–100)
MONOCYTES ABSOLUTE: 0.6 K/UL (ref 0–1.3)
MONOCYTES RELATIVE PERCENT: 7 %
NEUTROPHILS ABSOLUTE: 5.5 K/UL (ref 1.7–7.7)
NEUTROPHILS RELATIVE PERCENT: 67.5 %
PDW BLD-RTO: 15 % (ref 12.4–15.4)
PLATELET # BLD: 299 K/UL (ref 135–450)
PMV BLD AUTO: 9.3 FL (ref 5–10.5)
POTASSIUM SERPL-SCNC: 4.7 MMOL/L (ref 3.5–5.1)
RBC # BLD: 4.2 M/UL (ref 4.2–5.9)
SODIUM BLD-SCNC: 138 MMOL/L (ref 136–145)
TOTAL PROTEIN: 6.8 G/DL (ref 6.4–8.2)
TRIGL SERPL-MCNC: 205 MG/DL (ref 0–150)
VLDLC SERPL CALC-MCNC: 41 MG/DL
WBC # BLD: 8.2 K/UL (ref 4–11)

## 2018-11-16 PROCEDURE — G8599 NO ASA/ANTIPLAT THER USE RNG: HCPCS | Performed by: INTERNAL MEDICINE

## 2018-11-16 PROCEDURE — G0008 ADMIN INFLUENZA VIRUS VAC: HCPCS | Performed by: INTERNAL MEDICINE

## 2018-11-16 PROCEDURE — 3044F HG A1C LEVEL LT 7.0%: CPT | Performed by: INTERNAL MEDICINE

## 2018-11-16 PROCEDURE — 90662 IIV NO PRSV INCREASED AG IM: CPT | Performed by: INTERNAL MEDICINE

## 2018-11-16 PROCEDURE — G0439 PPPS, SUBSEQ VISIT: HCPCS | Performed by: INTERNAL MEDICINE

## 2018-11-16 PROCEDURE — G8482 FLU IMMUNIZE ORDER/ADMIN: HCPCS | Performed by: INTERNAL MEDICINE

## 2018-11-16 PROCEDURE — 4040F PNEUMOC VAC/ADMIN/RCVD: CPT | Performed by: INTERNAL MEDICINE

## 2018-11-16 ASSESSMENT — PATIENT HEALTH QUESTIONNAIRE - PHQ9
2. FEELING DOWN, DEPRESSED OR HOPELESS: 0
SUM OF ALL RESPONSES TO PHQ QUESTIONS 1-9: 0
SUM OF ALL RESPONSES TO PHQ9 QUESTIONS 1 & 2: 0
SUM OF ALL RESPONSES TO PHQ QUESTIONS 1-9: 0
1. LITTLE INTEREST OR PLEASURE IN DOING THINGS: 0

## 2018-11-16 ASSESSMENT — LIFESTYLE VARIABLES: HOW OFTEN DO YOU HAVE A DRINK CONTAINING ALCOHOL: 0

## 2018-11-16 NOTE — PROGRESS NOTES
(TOPROL XL) 25 MG extended release tablet Take 1 tablet by mouth daily Yes Armando Green MD   Coenzyme Q10 (CO Q 10) 100 MG CAPS Take 1 tablet by mouth daily Yes Historical Provider, MD   HUMALOG KWIKPEN 100 UNIT/ML pen INJECT 32 UNITS UNDER THE SKIN THREE TIMES A DAY BEFORE MEALS  Patient taking differently: INJECT 12 UNITS UNDER THE SKIN THREE TIMES A DAY BEFORE MEALS Yes Mayco Velez MD   LANTUS SOLOSTAR 100 UNIT/ML injection pen INJECT 40 UNITS UNDER THE SKIN EVERY NIGHT AT BEDTIME Yes Mayco Velez MD   albuterol sulfate HFA (PROVENTIL HFA) 108 (90 Base) MCG/ACT inhaler Inhale 2 puffs into the lungs every 4 hours as needed for Wheezing Yes Carlyle Alicea MD   atorvastatin (LIPITOR) 40 MG tablet TAKE ONE TABLET BY MOUTH ONCE NIGHTLY Yes Guillermo Zazueta MD   mometasone-formoterol (DULERA) 200-5 MCG/ACT inhaler Inhale 2 puffs into the lungs every 12 hours Yes Carlyle Alicea MD   furosemide (LASIX) 40 MG tablet TAKE ONE-HALF TABLET BY MOUTH DAILY Yes Pebbles Liao MD   [facility] MISC by Does not apply route Duration: 5 years Yes Carlyle Alicea MD   levothyroxine (SYNTHROID) 50 MCG tablet Take 1 tablet by mouth daily Yes Mayco Velez MD   Insulin Pen Needle (PEN NEEDLES 31GX5/16\") 31G X 8 MM MISC USE WITH INSULIN FOUR TIMES A DAY Yes Jorge Donald MD   [facility] Yes Mayco Velez MD   glucose blood VI test strips (ACCU-CHEK JOHNATHAN) strip 4 times a day As needed. Yes Mayco Velez MD   Blood Glucose Monitoring Suppl (ACCU-CHEK JOHNATHAN) RUMA Use as needed Yes Mayco Velez MD   aspirin 81 MG chewable tablet Take 1 tablet by mouth daily. Yes Richa Traylor MD   Lancets MISC 2 times daily. Lancets for a Anna meter. Yes Jorge Donald MD   gabapentin (NEURONTIN) 600 MG tablet One tab PO TID.   Mayco Velez MD   escitalopram (LEXAPRO) 10 MG tablet Take 1 tablet by mouth daily  Carlyle Alicea MD       Past Medical History: Diagnosis Date    Allergic rhinitis due to other allergen 7/12/2010    Coronary atherosclerosis of unspecified type of vessel, native or graft 7/12/2010    Diabetic eye exam ([facility] [address] 04/29/2015    Diabetic eye exam ([facility] [address] 5/5/2016    [place] [facility]    Generalized osteoarthrosis, involving multiple sites 7/12/2010    Other psoriasis 7/12/2010    Pneumonia     Psoriatic arthropathy ([facility] [address] 7/12/2010    Type II or unspecified type diabetes mellitus without mention of complication, not stated as uncontrolled 7/12/2010    Unspecified essential hypertension 7/12/2010    Unspecified sleep apnea 7/12/2010     Past Surgical History:   Procedure Laterality Date    CARPAL TUNNEL RELEASE      s/p    CATARACT REMOVAL      CORONARY ARTERY BYPASS GRAFT      JOINT REPLACEMENT         Family History   Problem Relation Age of Onset    Diabetes Mother     Heart Failure Mother     Coronary Art Dis Father        CareTeam (Including outside providers/suppliers regularly involved in providing care):   Patient Care Team:  Olga Allen MD as PCP - Fili De Anda MD as PCP - [facility] Attributed Provider  Reyna Smart as Physician (Rheumatology)  Lady Derek MD as Consulting Physician (Pulmonology)  Cely Brown RN as Care Coordinator    Wt Readings from Last 3 Encounters:   11/16/18 181 lb 14.4 oz (82.5 kg)   10/30/18 181 lb (82.1 kg)   10/01/18 188 lb 6.4 oz (85.5 kg)     Vitals:    11/16/18 0836 11/16/18 0838   BP: 132/80 132/80   Weight: 181 lb 14.4 oz (82.5 kg)    Height: 5' 10\" (1.778 m)      Body mass index is 26.1 kg/m². In normal range    Patient's complete Health Risk Assessment and screening values have been reviewed and are found in Flowsheets. The following problems were reviewed today and where indicated follow up appointments were made and/or referrals ordered.     Positive Risk Factor Screenings with Interventions:     General Health:  General  In general, how would you say your health is?: Good  In the past 7 days, have you experienced any of the following?: None of These  Do you get the social and emotional support that you need?: Yes  Do you have a Living Will?: (!) No  General Health Risk Interventions:   {ADL Interventions:  · {patient needs no help with bathing activity           Living will recommended    Personalized Preventive Plan   Current Health Maintenance Status  Immunization History   Administered Date(s) Administered    Influenza, High Dose (Fluzone 65 yrs and older) 10/16/2014, 10/19/2015, 10/21/2016, 09/01/2017, 11/16/2018    PPD Test 02/02/2016    Pneumococcal 13-valent Conjugate ([id number]) 10/19/2015    Pneumococcal Polysaccharide ([id number]) 10/16/2014    Tdap (Boostrix, Adacel) 08/12/2016        Health Maintenance   Topic Date Due    Hepatitis C screen  1945    Shingles Vaccine (1 of 2 - 2 Dose Series) 06/06/2019 (Originally 3/19/1996)    Diabetic foot exam  02/20/2019    Diabetic retinal exam  05/09/2019    A1C test (Diabetic or Prediabetic)  09/26/2019    Lipid screen  11/16/2019    Potassium monitoring  11/16/2019    Creatinine monitoring  11/16/2019    Colon cancer screen colonoscopy  01/31/2023    DTaP/Tdap/Td vaccine (2 - Td) 08/12/2026    Flu vaccine  Completed    Pneumococcal high/highest risk  Completed    AAA screen  Completed     Recommendations for Preventive Services Due: see orders and patient instructions/AVS.  .   Recommended screening schedule for the next 5-10 years is provided to the patient in written form: see Patient Instructions/AVS.

## 2018-12-17 DIAGNOSIS — N18.4 CKD (CHRONIC KIDNEY DISEASE) STAGE 4, GFR 15-29 ML/MIN (HCC): ICD-10-CM

## 2018-12-18 LAB
ALBUMIN SERPL-MCNC: 4.1 G/DL (ref 3.4–5)
ANION GAP SERPL CALCULATED.3IONS-SCNC: 17 MMOL/L (ref 3–16)
BUN BLDV-MCNC: 59 MG/DL (ref 7–20)
CALCIUM SERPL-MCNC: 9.4 MG/DL (ref 8.3–10.6)
CHLORIDE BLD-SCNC: 98 MMOL/L (ref 99–110)
CO2: 25 MMOL/L (ref 21–32)
CREAT SERPL-MCNC: 2.5 MG/DL (ref 0.8–1.3)
CREATININE URINE: 46.3 MG/DL (ref 39–259)
GFR AFRICAN AMERICAN: 31
GFR NON-AFRICAN AMERICAN: 25
GLUCOSE BLD-MCNC: 260 MG/DL (ref 70–99)
MICROALBUMIN UR-MCNC: 65.6 MG/DL
MICROALBUMIN/CREAT UR-RTO: 1416.8 MG/G (ref 0–30)
PHOSPHORUS: 3.1 MG/DL (ref 2.5–4.9)
POTASSIUM SERPL-SCNC: 4.5 MMOL/L (ref 3.5–5.1)
SODIUM BLD-SCNC: 140 MMOL/L (ref 136–145)

## 2018-12-27 RX ORDER — FENOFIBRIC ACID 135 MG/1
CAPSULE, DELAYED RELEASE ORAL
Qty: 30 CAPSULE | Refills: 0 | Status: SHIPPED | OUTPATIENT
Start: 2018-12-27 | End: 2019-04-30 | Stop reason: SDUPTHER

## 2019-01-08 ENCOUNTER — TELEPHONE (OUTPATIENT)
Dept: PULMONOLOGY | Age: 74
End: 2019-01-08

## 2019-01-08 ENCOUNTER — CARE COORDINATION (OUTPATIENT)
Dept: CARE COORDINATION | Age: 74
End: 2019-01-08

## 2019-01-08 DIAGNOSIS — J84.9 INTERSTITIAL LUNG DISEASE (HCC): ICD-10-CM

## 2019-01-08 DIAGNOSIS — R06.09 DOE (DYSPNEA ON EXERTION): Primary | ICD-10-CM

## 2019-01-16 ENCOUNTER — HOSPITAL ENCOUNTER (OUTPATIENT)
Dept: PULMONOLOGY | Age: 74
Setting detail: THERAPIES SERIES
Discharge: HOME | End: 2019-01-16
Payer: MEDICARE

## 2019-01-16 PROCEDURE — 99211 OFF/OP EST MAY X REQ PHY/QHP: CPT

## 2019-01-16 RX ORDER — PEN NEEDLE, DIABETIC 31 GX5/16"
NEEDLE, DISPOSABLE MISCELLANEOUS
Qty: 360 EACH | Refills: 1 | Status: SHIPPED | OUTPATIENT
Start: 2019-01-16 | End: 2019-11-11 | Stop reason: SDUPTHER

## 2019-01-18 ENCOUNTER — OFFICE VISIT (OUTPATIENT)
Dept: ENDOCRINOLOGY | Age: 74
End: 2019-01-18
Payer: MEDICARE

## 2019-01-18 VITALS
HEIGHT: 70 IN | WEIGHT: 186 LBS | BODY MASS INDEX: 26.63 KG/M2 | RESPIRATION RATE: 16 BRPM | OXYGEN SATURATION: 99 % | DIASTOLIC BLOOD PRESSURE: 65 MMHG | SYSTOLIC BLOOD PRESSURE: 104 MMHG | HEART RATE: 78 BPM

## 2019-01-18 DIAGNOSIS — E11.9 INSULIN DEPENDENT TYPE 2 DIABETES MELLITUS, CONTROLLED (HCC): Primary | ICD-10-CM

## 2019-01-18 DIAGNOSIS — Z79.4 INSULIN DEPENDENT TYPE 2 DIABETES MELLITUS, CONTROLLED (HCC): Primary | ICD-10-CM

## 2019-01-18 DIAGNOSIS — I10 ESSENTIAL HYPERTENSION: ICD-10-CM

## 2019-01-18 DIAGNOSIS — E03.9 ACQUIRED HYPOTHYROIDISM: ICD-10-CM

## 2019-01-18 DIAGNOSIS — E78.5 HYPERLIPIDEMIA, UNSPECIFIED HYPERLIPIDEMIA TYPE: ICD-10-CM

## 2019-01-18 LAB — HBA1C MFR BLD: 6.9 %

## 2019-01-18 PROCEDURE — 4040F PNEUMOC VAC/ADMIN/RCVD: CPT | Performed by: INTERNAL MEDICINE

## 2019-01-18 PROCEDURE — 3044F HG A1C LEVEL LT 7.0%: CPT | Performed by: INTERNAL MEDICINE

## 2019-01-18 PROCEDURE — G8599 NO ASA/ANTIPLAT THER USE RNG: HCPCS | Performed by: INTERNAL MEDICINE

## 2019-01-18 PROCEDURE — 2022F DILAT RTA XM EVC RTNOPTHY: CPT | Performed by: INTERNAL MEDICINE

## 2019-01-18 PROCEDURE — 1036F TOBACCO NON-USER: CPT | Performed by: INTERNAL MEDICINE

## 2019-01-18 PROCEDURE — 99214 OFFICE O/P EST MOD 30 MIN: CPT | Performed by: INTERNAL MEDICINE

## 2019-01-18 PROCEDURE — G8482 FLU IMMUNIZE ORDER/ADMIN: HCPCS | Performed by: INTERNAL MEDICINE

## 2019-01-18 PROCEDURE — 3017F COLORECTAL CA SCREEN DOC REV: CPT | Performed by: INTERNAL MEDICINE

## 2019-01-18 PROCEDURE — 83036 HEMOGLOBIN GLYCOSYLATED A1C: CPT | Performed by: INTERNAL MEDICINE

## 2019-01-18 PROCEDURE — 1123F ACP DISCUSS/DSCN MKR DOCD: CPT | Performed by: INTERNAL MEDICINE

## 2019-01-18 PROCEDURE — G8427 DOCREV CUR MEDS BY ELIG CLIN: HCPCS | Performed by: INTERNAL MEDICINE

## 2019-01-18 PROCEDURE — 1101F PT FALLS ASSESS-DOCD LE1/YR: CPT | Performed by: INTERNAL MEDICINE

## 2019-01-18 PROCEDURE — G8417 CALC BMI ABV UP PARAM F/U: HCPCS | Performed by: INTERNAL MEDICINE

## 2019-01-21 ENCOUNTER — HOSPITAL ENCOUNTER (OUTPATIENT)
Dept: PULMONOLOGY | Age: 74
Setting detail: THERAPIES SERIES
Discharge: HOME | End: 2019-01-21
Payer: MEDICARE

## 2019-01-21 PROCEDURE — G0238 OTH RESP PROC, INDIV: HCPCS | Performed by: CLINICAL NURSE SPECIALIST

## 2019-01-21 PROCEDURE — 94618 PULMONARY STRESS TESTING: CPT | Performed by: CLINICAL NURSE SPECIALIST

## 2019-01-23 ENCOUNTER — HOSPITAL ENCOUNTER (OUTPATIENT)
Dept: PULMONOLOGY | Age: 74
Setting detail: THERAPIES SERIES
Discharge: HOME | End: 2019-01-23
Payer: MEDICARE

## 2019-01-23 PROCEDURE — G0237 THERAPEUTIC PROCD STRG ENDUR: HCPCS | Performed by: CLINICAL NURSE SPECIALIST

## 2019-01-25 ENCOUNTER — APPOINTMENT (OUTPATIENT)
Dept: PULMONOLOGY | Age: 74
End: 2019-01-25
Payer: MEDICARE

## 2019-01-28 ENCOUNTER — HOSPITAL ENCOUNTER (OUTPATIENT)
Dept: PULMONOLOGY | Age: 74
Setting detail: THERAPIES SERIES
Discharge: HOME | End: 2019-01-28
Payer: MEDICARE

## 2019-01-28 PROCEDURE — G0237 THERAPEUTIC PROCD STRG ENDUR: HCPCS | Performed by: CLINICAL NURSE SPECIALIST

## 2019-01-29 RX ORDER — FENOFIBRIC ACID 135 MG/1
CAPSULE, DELAYED RELEASE ORAL
Qty: 90 CAPSULE | Refills: 0 | Status: SHIPPED | OUTPATIENT
Start: 2019-01-29 | End: 2019-04-08 | Stop reason: SDUPTHER

## 2019-02-01 ENCOUNTER — CARE COORDINATION (OUTPATIENT)
Dept: INTERNAL MEDICINE CLINIC | Age: 74
End: 2019-02-01

## 2019-02-01 ENCOUNTER — APPOINTMENT (OUTPATIENT)
Dept: PULMONOLOGY | Age: 74
End: 2019-02-01
Payer: MEDICARE

## 2019-02-06 ENCOUNTER — APPOINTMENT (OUTPATIENT)
Dept: PULMONOLOGY | Age: 74
End: 2019-02-06
Payer: MEDICARE

## 2019-02-06 ENCOUNTER — OFFICE VISIT (OUTPATIENT)
Dept: PULMONOLOGY | Age: 74
End: 2019-02-06
Payer: MEDICARE

## 2019-02-06 VITALS
BODY MASS INDEX: 26.63 KG/M2 | WEIGHT: 186 LBS | OXYGEN SATURATION: 97 % | DIASTOLIC BLOOD PRESSURE: 60 MMHG | SYSTOLIC BLOOD PRESSURE: 100 MMHG | HEART RATE: 79 BPM | HEIGHT: 70 IN

## 2019-02-06 DIAGNOSIS — G47.33 OSA TREATED WITH BIPAP: ICD-10-CM

## 2019-02-06 DIAGNOSIS — B34.9 VIRAL SYNDROME: Primary | ICD-10-CM

## 2019-02-06 DIAGNOSIS — R06.09 DOE (DYSPNEA ON EXERTION): ICD-10-CM

## 2019-02-06 DIAGNOSIS — J84.9 INTERSTITIAL LUNG DISEASE (HCC): ICD-10-CM

## 2019-02-06 LAB
INFLUENZA A ANTIGEN, POC: NORMAL
INFLUENZA B ANTIGEN, POC: NORMAL

## 2019-02-06 PROCEDURE — 3017F COLORECTAL CA SCREEN DOC REV: CPT | Performed by: INTERNAL MEDICINE

## 2019-02-06 PROCEDURE — 99214 OFFICE O/P EST MOD 30 MIN: CPT | Performed by: INTERNAL MEDICINE

## 2019-02-06 PROCEDURE — 1036F TOBACCO NON-USER: CPT | Performed by: INTERNAL MEDICINE

## 2019-02-06 PROCEDURE — G8482 FLU IMMUNIZE ORDER/ADMIN: HCPCS | Performed by: INTERNAL MEDICINE

## 2019-02-06 PROCEDURE — 1123F ACP DISCUSS/DSCN MKR DOCD: CPT | Performed by: INTERNAL MEDICINE

## 2019-02-06 PROCEDURE — 4040F PNEUMOC VAC/ADMIN/RCVD: CPT | Performed by: INTERNAL MEDICINE

## 2019-02-06 PROCEDURE — G8417 CALC BMI ABV UP PARAM F/U: HCPCS | Performed by: INTERNAL MEDICINE

## 2019-02-06 PROCEDURE — 1101F PT FALLS ASSESS-DOCD LE1/YR: CPT | Performed by: INTERNAL MEDICINE

## 2019-02-06 PROCEDURE — G8599 NO ASA/ANTIPLAT THER USE RNG: HCPCS | Performed by: INTERNAL MEDICINE

## 2019-02-06 PROCEDURE — G8427 DOCREV CUR MEDS BY ELIG CLIN: HCPCS | Performed by: INTERNAL MEDICINE

## 2019-02-08 ENCOUNTER — APPOINTMENT (OUTPATIENT)
Dept: PULMONOLOGY | Age: 74
End: 2019-02-08
Payer: MEDICARE

## 2019-02-13 ENCOUNTER — HOSPITAL ENCOUNTER (OUTPATIENT)
Dept: PULMONOLOGY | Age: 74
Setting detail: THERAPIES SERIES
Discharge: HOME | End: 2019-02-13
Payer: MEDICARE

## 2019-02-13 PROCEDURE — G0237 THERAPEUTIC PROCD STRG ENDUR: HCPCS

## 2019-02-15 ENCOUNTER — HOSPITAL ENCOUNTER (OUTPATIENT)
Dept: PULMONOLOGY | Age: 74
Setting detail: THERAPIES SERIES
Discharge: HOME | End: 2019-02-15
Payer: MEDICARE

## 2019-02-15 PROCEDURE — G0239 OTH RESP PROC, GROUP: HCPCS

## 2019-02-18 ENCOUNTER — HOSPITAL ENCOUNTER (OUTPATIENT)
Dept: PULMONOLOGY | Age: 74
Setting detail: THERAPIES SERIES
Discharge: HOME | End: 2019-02-18
Payer: MEDICARE

## 2019-02-18 PROCEDURE — G0237 THERAPEUTIC PROCD STRG ENDUR: HCPCS

## 2019-02-20 ENCOUNTER — APPOINTMENT (OUTPATIENT)
Dept: PULMONOLOGY | Age: 74
End: 2019-02-20
Payer: MEDICARE

## 2019-02-22 ENCOUNTER — HOSPITAL ENCOUNTER (OUTPATIENT)
Dept: PULMONOLOGY | Age: 74
Setting detail: THERAPIES SERIES
Discharge: HOME | End: 2019-02-22
Payer: MEDICARE

## 2019-02-22 PROCEDURE — G0237 THERAPEUTIC PROCD STRG ENDUR: HCPCS

## 2019-02-25 ENCOUNTER — APPOINTMENT (OUTPATIENT)
Dept: PULMONOLOGY | Age: 74
End: 2019-02-25
Payer: MEDICARE

## 2019-02-27 ENCOUNTER — HOSPITAL ENCOUNTER (OUTPATIENT)
Dept: PULMONOLOGY | Age: 74
Setting detail: THERAPIES SERIES
Discharge: HOME | End: 2019-02-27
Payer: MEDICARE

## 2019-02-27 PROCEDURE — G0237 THERAPEUTIC PROCD STRG ENDUR: HCPCS

## 2019-02-28 RX ORDER — LEVOTHYROXINE SODIUM 0.05 MG/1
TABLET ORAL
Qty: 30 TABLET | Refills: 10 | Status: SHIPPED | OUTPATIENT
Start: 2019-02-28 | End: 2020-01-07

## 2019-03-01 ENCOUNTER — APPOINTMENT (OUTPATIENT)
Dept: PULMONOLOGY | Age: 74
End: 2019-03-01
Payer: MEDICARE

## 2019-03-04 ENCOUNTER — APPOINTMENT (OUTPATIENT)
Dept: PULMONOLOGY | Age: 74
End: 2019-03-04
Payer: MEDICARE

## 2019-03-06 ENCOUNTER — OFFICE VISIT (OUTPATIENT)
Dept: INTERNAL MEDICINE CLINIC | Age: 74
End: 2019-03-06
Payer: MEDICARE

## 2019-03-06 ENCOUNTER — HOSPITAL ENCOUNTER (OUTPATIENT)
Dept: PULMONOLOGY | Age: 74
Setting detail: THERAPIES SERIES
Discharge: HOME | End: 2019-03-06
Payer: MEDICARE

## 2019-03-06 ENCOUNTER — CARE COORDINATION (OUTPATIENT)
Dept: CARE COORDINATION | Age: 74
End: 2019-03-06

## 2019-03-06 VITALS
HEIGHT: 70 IN | WEIGHT: 188 LBS | DIASTOLIC BLOOD PRESSURE: 70 MMHG | SYSTOLIC BLOOD PRESSURE: 110 MMHG | BODY MASS INDEX: 26.92 KG/M2

## 2019-03-06 DIAGNOSIS — Z79.4 INSULIN DEPENDENT TYPE 2 DIABETES MELLITUS, CONTROLLED (HCC): ICD-10-CM

## 2019-03-06 DIAGNOSIS — L40.50 ARTHRITIS WITH PSORIASIS (HCC): ICD-10-CM

## 2019-03-06 DIAGNOSIS — J84.9 ILD (INTERSTITIAL LUNG DISEASE) (HCC): ICD-10-CM

## 2019-03-06 DIAGNOSIS — E11.9 INSULIN DEPENDENT TYPE 2 DIABETES MELLITUS, CONTROLLED (HCC): ICD-10-CM

## 2019-03-06 DIAGNOSIS — N18.30 CHRONIC KIDNEY DISEASE, STAGE III (MODERATE) (HCC): ICD-10-CM

## 2019-03-06 DIAGNOSIS — G47.00 INSOMNIA, UNSPECIFIED TYPE: Primary | ICD-10-CM

## 2019-03-06 DIAGNOSIS — G47.33 OSA (OBSTRUCTIVE SLEEP APNEA): ICD-10-CM

## 2019-03-06 PROCEDURE — 1123F ACP DISCUSS/DSCN MKR DOCD: CPT | Performed by: INTERNAL MEDICINE

## 2019-03-06 PROCEDURE — 3017F COLORECTAL CA SCREEN DOC REV: CPT | Performed by: INTERNAL MEDICINE

## 2019-03-06 PROCEDURE — 4040F PNEUMOC VAC/ADMIN/RCVD: CPT | Performed by: INTERNAL MEDICINE

## 2019-03-06 PROCEDURE — G8427 DOCREV CUR MEDS BY ELIG CLIN: HCPCS | Performed by: INTERNAL MEDICINE

## 2019-03-06 PROCEDURE — 1036F TOBACCO NON-USER: CPT | Performed by: INTERNAL MEDICINE

## 2019-03-06 PROCEDURE — G0239 OTH RESP PROC, GROUP: HCPCS

## 2019-03-06 PROCEDURE — G8482 FLU IMMUNIZE ORDER/ADMIN: HCPCS | Performed by: INTERNAL MEDICINE

## 2019-03-06 PROCEDURE — 3044F HG A1C LEVEL LT 7.0%: CPT | Performed by: INTERNAL MEDICINE

## 2019-03-06 PROCEDURE — G8599 NO ASA/ANTIPLAT THER USE RNG: HCPCS | Performed by: INTERNAL MEDICINE

## 2019-03-06 PROCEDURE — 1101F PT FALLS ASSESS-DOCD LE1/YR: CPT | Performed by: INTERNAL MEDICINE

## 2019-03-06 PROCEDURE — 2022F DILAT RTA XM EVC RTNOPTHY: CPT | Performed by: INTERNAL MEDICINE

## 2019-03-06 PROCEDURE — G8417 CALC BMI ABV UP PARAM F/U: HCPCS | Performed by: INTERNAL MEDICINE

## 2019-03-06 PROCEDURE — 99214 OFFICE O/P EST MOD 30 MIN: CPT | Performed by: INTERNAL MEDICINE

## 2019-03-06 ASSESSMENT — PATIENT HEALTH QUESTIONNAIRE - PHQ9
SUM OF ALL RESPONSES TO PHQ QUESTIONS 1-9: 0
1. LITTLE INTEREST OR PLEASURE IN DOING THINGS: 0
SUM OF ALL RESPONSES TO PHQ QUESTIONS 1-9: 0
2. FEELING DOWN, DEPRESSED OR HOPELESS: 0
SUM OF ALL RESPONSES TO PHQ9 QUESTIONS 1 & 2: 0

## 2019-03-07 PROBLEM — N18.30 CHRONIC KIDNEY DISEASE, STAGE III (MODERATE) (HCC): Status: RESOLVED | Noted: 2017-01-15 | Resolved: 2019-03-07

## 2019-03-08 ENCOUNTER — HOSPITAL ENCOUNTER (OUTPATIENT)
Dept: PULMONOLOGY | Age: 74
Setting detail: THERAPIES SERIES
Discharge: HOME | End: 2019-03-08
Payer: MEDICARE

## 2019-03-08 PROCEDURE — G0239 OTH RESP PROC, GROUP: HCPCS

## 2019-03-11 ENCOUNTER — APPOINTMENT (OUTPATIENT)
Dept: PULMONOLOGY | Age: 74
End: 2019-03-11
Payer: MEDICARE

## 2019-03-12 ENCOUNTER — CARE COORDINATION (OUTPATIENT)
Dept: CARE COORDINATION | Age: 74
End: 2019-03-12

## 2019-03-12 NOTE — CARE COORDINATION
ACC Note:  The pt called to melissa for the DM Ed Class but thought it was on 3/13/19. The [facility] informed the pt that the class is not until 3/21/19 at 2:00 PM. The pt stated he will have to check with his wife to see if they can attend. The [facility] gave the pt her office number so the pt can call her direct.

## 2019-03-13 ENCOUNTER — APPOINTMENT (OUTPATIENT)
Dept: PULMONOLOGY | Age: 74
End: 2019-03-13
Payer: MEDICARE

## 2019-03-15 ENCOUNTER — HOSPITAL ENCOUNTER (OUTPATIENT)
Dept: PULMONOLOGY | Age: 74
Setting detail: THERAPIES SERIES
Discharge: HOME | End: 2019-03-15
Payer: MEDICARE

## 2019-03-15 PROCEDURE — G0239 OTH RESP PROC, GROUP: HCPCS

## 2019-03-18 ENCOUNTER — HOSPITAL ENCOUNTER (OUTPATIENT)
Dept: PULMONOLOGY | Age: 74
Setting detail: THERAPIES SERIES
Discharge: HOME | End: 2019-03-18
Payer: MEDICARE

## 2019-03-18 PROCEDURE — G0239 OTH RESP PROC, GROUP: HCPCS

## 2019-03-20 ENCOUNTER — APPOINTMENT (OUTPATIENT)
Dept: PULMONOLOGY | Age: 74
End: 2019-03-20
Payer: MEDICARE

## 2019-03-22 ENCOUNTER — HOSPITAL ENCOUNTER (OUTPATIENT)
Dept: PULMONOLOGY | Age: 74
Setting detail: THERAPIES SERIES
Discharge: HOME | End: 2019-03-22
Payer: MEDICARE

## 2019-03-22 PROCEDURE — G0239 OTH RESP PROC, GROUP: HCPCS

## 2019-03-25 ENCOUNTER — HOSPITAL ENCOUNTER (OUTPATIENT)
Dept: PULMONOLOGY | Age: 74
Setting detail: THERAPIES SERIES
Discharge: HOME | End: 2019-03-25
Payer: MEDICARE

## 2019-03-25 PROCEDURE — G0239 OTH RESP PROC, GROUP: HCPCS

## 2019-03-27 ENCOUNTER — HOSPITAL ENCOUNTER (OUTPATIENT)
Dept: PULMONOLOGY | Age: 74
Setting detail: THERAPIES SERIES
Discharge: HOME | End: 2019-03-27
Payer: MEDICARE

## 2019-03-27 PROCEDURE — G0239 OTH RESP PROC, GROUP: HCPCS

## 2019-03-29 ENCOUNTER — HOSPITAL ENCOUNTER (OUTPATIENT)
Dept: PULMONOLOGY | Age: 74
Setting detail: THERAPIES SERIES
Discharge: HOME | End: 2019-03-29
Payer: MEDICARE

## 2019-03-29 PROCEDURE — G0239 OTH RESP PROC, GROUP: HCPCS

## 2019-04-01 ENCOUNTER — HOSPITAL ENCOUNTER (OUTPATIENT)
Dept: PULMONOLOGY | Age: 74
Setting detail: THERAPIES SERIES
Discharge: HOME | End: 2019-04-01
Payer: MEDICARE

## 2019-04-01 PROCEDURE — G0239 OTH RESP PROC, GROUP: HCPCS

## 2019-04-03 ENCOUNTER — HOSPITAL ENCOUNTER (OUTPATIENT)
Dept: PULMONOLOGY | Age: 74
Setting detail: THERAPIES SERIES
Discharge: HOME | End: 2019-04-03
Payer: MEDICARE

## 2019-04-03 PROCEDURE — G0239 OTH RESP PROC, GROUP: HCPCS

## 2019-04-05 ENCOUNTER — HOSPITAL ENCOUNTER (OUTPATIENT)
Dept: PULMONOLOGY | Age: 74
Setting detail: THERAPIES SERIES
Discharge: HOME | End: 2019-04-05
Payer: MEDICARE

## 2019-04-05 PROCEDURE — G0239 OTH RESP PROC, GROUP: HCPCS

## 2019-04-08 ENCOUNTER — APPOINTMENT (OUTPATIENT)
Dept: PULMONOLOGY | Age: 74
End: 2019-04-08
Payer: MEDICARE

## 2019-04-08 ENCOUNTER — OFFICE VISIT (OUTPATIENT)
Dept: CARDIOLOGY CLINIC | Age: 74
End: 2019-04-08
Payer: MEDICARE

## 2019-04-08 VITALS
DIASTOLIC BLOOD PRESSURE: 66 MMHG | SYSTOLIC BLOOD PRESSURE: 118 MMHG | HEART RATE: 77 BPM | BODY MASS INDEX: 27.41 KG/M2 | WEIGHT: 191 LBS

## 2019-04-08 DIAGNOSIS — I25.10 CORONARY ARTERY DISEASE INVOLVING NATIVE CORONARY ARTERY OF NATIVE HEART WITHOUT ANGINA PECTORIS: ICD-10-CM

## 2019-04-08 DIAGNOSIS — E78.2 MIXED HYPERLIPIDEMIA: ICD-10-CM

## 2019-04-08 DIAGNOSIS — J84.9 ILD (INTERSTITIAL LUNG DISEASE) (HCC): ICD-10-CM

## 2019-04-08 DIAGNOSIS — I10 ESSENTIAL HYPERTENSION: ICD-10-CM

## 2019-04-08 DIAGNOSIS — I50.32 CHRONIC DIASTOLIC HEART FAILURE (HCC): ICD-10-CM

## 2019-04-08 DIAGNOSIS — Z95.1 S/P CABG X 4: Primary | ICD-10-CM

## 2019-04-08 PROCEDURE — 1036F TOBACCO NON-USER: CPT | Performed by: INTERNAL MEDICINE

## 2019-04-08 PROCEDURE — G8599 NO ASA/ANTIPLAT THER USE RNG: HCPCS | Performed by: INTERNAL MEDICINE

## 2019-04-08 PROCEDURE — 4040F PNEUMOC VAC/ADMIN/RCVD: CPT | Performed by: INTERNAL MEDICINE

## 2019-04-08 PROCEDURE — 3017F COLORECTAL CA SCREEN DOC REV: CPT | Performed by: INTERNAL MEDICINE

## 2019-04-08 PROCEDURE — 1123F ACP DISCUSS/DSCN MKR DOCD: CPT | Performed by: INTERNAL MEDICINE

## 2019-04-08 PROCEDURE — G8427 DOCREV CUR MEDS BY ELIG CLIN: HCPCS | Performed by: INTERNAL MEDICINE

## 2019-04-08 PROCEDURE — 99213 OFFICE O/P EST LOW 20 MIN: CPT | Performed by: INTERNAL MEDICINE

## 2019-04-08 PROCEDURE — G8417 CALC BMI ABV UP PARAM F/U: HCPCS | Performed by: INTERNAL MEDICINE

## 2019-04-08 NOTE — PROGRESS NOTES
69 y.o. here to [facility]. 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 Here for f/u on CAD. Has h/o CAD and CABG 2006, HTN, hyperlipidemia, DM, and CKD ([place]). No angina. No sob. No n/v/LH/dizziness. No syncope. No orthopnea or PND. Past Medical History:   Diagnosis Date    Allergic rhinitis due to other allergen 2010    Coronary atherosclerosis of unspecified type of vessel, native or graft 2010    Diabetic eye exam [facility] [address] 2015    Diabetic eye exam ([facility] [address] 2016    [place] [facility]    Generalized osteoarthrosis, involving multiple sites 2010    Other psoriasis 2010    Pneumonia     [facility]iatic [facility]ropathy [facility] 2010    Type II or unspecified type diabetes mellitus without mention of complication, not stated as uncontrolled 2010    Unspecified essential hypertension 2010    Unspecified sleep apnea 2010     Past Surgical History:   Procedure Laterality Date    CARPAL TUNNEL RELEASE      s/p    CATARACT REMOVAL      CORONARY ARTERY BYPASS GRAFT      JOINT REPLACEMENT       Social History     Tobacco Use    Smoking status: Former Smoker     Packs/day: 1.00     Years: 18.00     Pack years: 18.00     Start date: 1962     Last attempt to quit: 1980     Years since quittin.4    Smokeless tobacco: Never Used   Substance Use Topics    Alcohol use: No    Drug use: No     Allergies   Allergen Reactions    Clindamycin     Clindamycin/Lincomycin     Penicillins     Tazorac [Tazarotene]      Cream - rash     Family History   Problem Relation Age of Onset    Diabetes Mother     Heart Failure Mother     Coronary Art Dis Father      Review of Systems   General: No fevers, chills, fatigue, or night sweats. No abnormal changes in weight. HEENT: No blurry or decreased vision. No changes in hearing, nasal discharge or sore throat. Cardiovascular: See HPI. No cramping in legs or buttocks when walking. Respiratory: No cough, hemoptysis, or wheezing.  No history of asthma. Gastrointestinal: No abdominal pain, hematochezia, melana. Genito-Urinary: No dysuria or hematuria. No urgency or polyuria. Musculoskeletal: No complaints of joint pain, joint swelling or muscular weakness/soreness. Neurological: No dizziness or headaches. No numbness/tingling, speech problems or weakness. No history of a stroke or TIA. Psychological: No anxiety or depression  Hematological and Lymphatic: No abnormal bleeding or bruising, blood clots, jaundice. Endocrine: No malaise/lethargy, palpitations, polydipsia/polyuria, temperature intolerance or unexpected weight changes. Skin: No rashes or non-healing ulcers. PE:  Blood pressure 118/66, pulse 77, weight 191 lb (86.6 kg). General (appearance): Well devel. No distress  Eyes: Anicteric, eomi  Neck: Supple. Ears/Nose/Mouth/Thorat: No cyanosis  CV: RRR. Soft AMADOR   Respiratory:  Bibasilar rales, normal respiratory effort  GI: Abd soft. No peritoneal signs  Skin: Warm, dry. No rashes  Neuro/Psych: Alert and oriented x 3. Appropriate behavior  Ext:  No c/c.  No edema  Pulses:  2+ carotid and radial      Lab Results   Component Value Date    CHOL 146 11/16/2018    CHOL 130 11/09/2017    CHOL 182 10/21/2016     Lab Results   Component Value Date    TRIG 205 (H) 11/16/2018    TRIG 198 (H) 11/09/2017    TRIG 339 (H) 10/21/2016     Lab Results   Component Value Date    HDL 47 11/16/2018    HDL 46 11/09/2017    HDL 47 10/21/2016     Lab Results   Component Value Date    LDLCALC 58 11/16/2018    LDLCALC 44 11/09/2017    LDLCALC see below 10/21/2016     Lab Results   Component Value Date    LABVLDL 41 11/16/2018    LABVLDL 40 11/09/2017    LABVLDL see below 10/21/2016     No results found for: [facility]    Lab Results   Component Value Date    WBC 8.2 11/16/2018    HGB 12.9 (L) 11/16/2018    HCT 40.2 (L) 11/16/2018    MCV 95.7 11/16/2018     11/16/2018       Chemistry        Component Value Date/Time     04/05/2019 1248    K 5.4 (H) 2019 1248     2019 1248    CO2 24 2019 1248    BUN 69 (H) 2019 1248    CREATININE 2.9 (H) 2019 1248        Component Value Date/Time    CALCIUM 9.7 2019 1248    ALKPHOS 36 (L) 2018 1006    AST 45 (H) 2018 1006    ALT 39 2018 1006    BILITOT 0.4 2018 1006        Lab Results   Component Value Date    INR 0.89 2017    INR 0.91 2017    INR 0.96 2017    PROTIME 10.1 2017    PROTIME 10.4 2017    PROTIME 10.9 2017 Cath: Angiographic Findings:  Right dominant system  Left Main:  Diffuse, mild disease. ~20-30% diffuse disease. Left Anterior Descendin% proximal occlusion. After the LIMA is injected, the LAD was seen to have a mid 80-90% stenosis just distal to a significant diagonal branch. The diagonal bifurcates; one branch has a severe (~80-90%) stenosis. The other limb also has diffuse disease. There was a 90% distal LAD stenosis. Circumflex:  Proximal 70% stenosis at bifurcation of an OM and the AV LCx. The OM is subtotally occluded. The AV LCx is diffusely diseased. Right Coronary:  Proximal 30% stenosis. Mid 60-70% stenosis. Right posterolateral is occluded. Right PDA has an ostial/proximal 90% stenosis. It is diffusely diseased after the ostial lesion. Bypass Grafts:  LIMA-LAD with radial grafts to an OM and right PDA that is widely patent. Left Ventriculogram:  Not performed due to elevated Cr  Hemodynamics (mm Hg):  Left Ventricular Pressure:  131/0, 14  Central Aortic Pressure:  133/66 (93)    2017 Nuc Stress:  1. Left ventricular ejection fraction of 62 %. 2. Fixed perfusion defect in the myocardial apex suggestive of   prior infarct. No evidence of reversible ischemia. 2017 Echo:   Left ventricular size is normal . Left ventricular wall thickness is normal.   Left ventricular function is normal with ejection fraction estimated at 55   %.  No regional wall motion abnormalities are noted. Mild mitral   regurgitation is present. Aortic valve appears thickened/calcified but opens   adequately. Mild tricuspid regurgitation.       Current Outpatient Medications:     levothyroxine (SYNTHROID) 50 MCG tablet, TAKE ONE TABLET BY MOUTH DAILY, Disp: 30 tablet, Rfl: 10    Insulin Pen Needle (PEN NEEDLES 31GX5/16\") 31G X 8 MM MISC, USE WITH INSULIN FOUR TIMES A DAY, Disp: 360 each, Rfl: 1    fenofibric acid (FIBRICOR) 135 MG CPDR capsule, TAKE ONE CAPSULE BY MOUTH DAILY, Disp: 30 capsule, Rfl: 0    ACCU-CHEK JOHNATHAN PLUS strip, USE TO TEST FOUR TIMES A DAY AS NEEDED, Disp: 150 strip, Rfl: 3    rOPINIRole (REQUIP) 4 MG tablet, Take 1 tablet by mouth nightly TAKE ONE TABLET BY MOUTH EVERY EVENING, Disp: 90 tablet, Rfl: 3    metoprolol succinate (TOPROL XL) 25 MG extended release tablet, Take 1 tablet by mouth daily, Disp: 90 tablet, Rfl: 3    gabapentin (NEURONTIN) 600 MG tablet, One tab PO TID., Disp: 90 tablet, Rfl: 3    HUMALOG KWIKPEN 100 UNIT/ML pen, INJECT 32 UNITS UNDER THE SKIN THREE TIMES A DAY BEFORE MEALS (Patient taking differently: INJECT 12 UNITS UNDER THE SKIN THREE TIMES A DAY BEFORE MEALS), Disp: 30 mL, Rfl: 2    LANTUS SOLOSTAR 100 UNIT/ML injection pen, INJECT 40 UNITS UNDER THE SKIN EVERY NIGHT AT BEDTIME (Patient taking differently: INJECT 20 UNITS UNDER THE SKIN EVERY NIGHT AT BEDTIME), Disp: 15 mL, Rfl: 3    albuterol sulfate HFA (PROVENTIL HFA) 108 (90 Base) MCG/ACT inhaler, Inhale 2 puffs into the lungs every 4 hours as needed for Wheezing, Disp: 1 Inhaler, Rfl: 5    atorvastatin (LIPITOR) 40 MG tablet, TAKE ONE TABLET BY MOUTH ONCE NIGHTLY, Disp: 90 tablet, Rfl: 3    mometasone-formoterol (DULERA) 200-5 MCG/ACT inhaler, Inhale 2 puffs into the lungs every 12 hours, Disp: 1 Inhaler, Rfl: 11    furosemide (LASIX) 40 MG tablet, TAKE ONE-HALF TABLET BY MOUTH DAILY, Disp: 45 tablet, Rfl: 2    Handicap Placard [facility], by Does not apply route Duration: 5 years, Disp: 1 each, Rfl: 0    ACCU-CHEK SOFTCLIX LANCETS MISC, USE FOUR TIMES A DAY, Disp: 200 each, Rfl: 3    glucose blood VI test strips (ACCU-CHEK JOHNATHAN) strip, 4 times a day As needed. , Disp: 150 each, Rfl: 3    Blood Glucose Monitoring Suppl (ACCU-CHEK JOHNATHAN) RUMA, Use as needed, Disp: 1 Device, Rfl: 0    aspirin 81 MG chewable tablet, Take 1 tablet by mouth daily. , Disp: 30 tablet, Rfl: 5    Lancets MISC, 2 times daily. Lancets for a [place] meter. , Disp: 50 each, Rfl: 11    Coenzyme Q10 (CO Q 10) 100 MG CAPS, Take 1 tablet by mouth daily, Disp: , Rfl:     A/P:  69 y.o. here for f/u. Has h/o cad and cabg, HTN, HL, cabg in 2006, and ILD [facility]). 1. S/P CABG x 4    2. Coronary artery disease involving native coronary artery of native heart without angina pectoris    3. Chronic diastolic heart failure ([facility] [address]    4. ILD (interstitial lung disease) ([facility] [address]    5. Essential hypertension    6. Mixed hyperlipidemia      Recs:  -Cont present meds. -F/U 6 mo    Darek Mckeon MD, [facility], [facility]

## 2019-04-10 ENCOUNTER — HOSPITAL ENCOUNTER (OUTPATIENT)
Dept: PULMONOLOGY | Age: 74
Setting detail: THERAPIES SERIES
Discharge: HOME | End: 2019-04-10
Payer: MEDICARE

## 2019-04-10 PROCEDURE — G0239 OTH RESP PROC, GROUP: HCPCS | Performed by: CLINICAL NURSE SPECIALIST

## 2019-04-12 ENCOUNTER — HOSPITAL ENCOUNTER (OUTPATIENT)
Dept: PULMONOLOGY | Age: 74
Setting detail: THERAPIES SERIES
Discharge: HOME | End: 2019-04-12
Payer: MEDICARE

## 2019-04-12 PROCEDURE — G0239 OTH RESP PROC, GROUP: HCPCS | Performed by: CLINICAL NURSE SPECIALIST

## 2019-04-15 ENCOUNTER — HOSPITAL ENCOUNTER (OUTPATIENT)
Dept: PULMONOLOGY | Age: 74
Setting detail: THERAPIES SERIES
Discharge: HOME | End: 2019-04-15
Payer: MEDICARE

## 2019-04-15 PROCEDURE — G0239 OTH RESP PROC, GROUP: HCPCS

## 2019-04-17 ENCOUNTER — HOSPITAL ENCOUNTER (OUTPATIENT)
Dept: PULMONOLOGY | Age: 74
Setting detail: THERAPIES SERIES
Discharge: HOME | End: 2019-04-17
Payer: MEDICARE

## 2019-04-17 PROCEDURE — G0239 OTH RESP PROC, GROUP: HCPCS

## 2019-04-19 ENCOUNTER — APPOINTMENT (OUTPATIENT)
Dept: PULMONOLOGY | Age: 74
End: 2019-04-19
Payer: MEDICARE

## 2019-04-22 ENCOUNTER — CARE COORDINATION (OUTPATIENT)
Dept: CARE COORDINATION | Age: 74
End: 2019-04-22

## 2019-04-22 ENCOUNTER — HOSPITAL ENCOUNTER (OUTPATIENT)
Dept: PULMONOLOGY | Age: 74
Setting detail: THERAPIES SERIES
Discharge: HOME | End: 2019-04-22
Payer: MEDICARE

## 2019-04-22 PROCEDURE — G0239 OTH RESP PROC, GROUP: HCPCS

## 2019-04-22 NOTE — CARE COORDINATION
in March         Goals Addressed                 This Visit's Progress     Self Monitoring   Improving     Self-Monitored Blood Glucose - I will check my blood sugar blood sugars ac  I will notify my provider of any trends of increasing or decreasing blood sugars over a 1 month period. I will notify my provider if I have any blood sugar readings less than 70 more than 2 times a month. Daily Weights - I will weight myself as directed - Daily and write down weights  I will notify my provider of any increase in weight by 3 or more pounds in 2 days OR 5 or more pounds in a week. Blood Pressure - I will take my blood pressure as directed - Daily  I will notify my provider of any changes in blood pressure associated with symptoms of dizziness, falls, passing out, headache, confusion/change in mental status. Barriers: stress  Plan for overcoming my barriers: Provide education and resources  Confidence: 8/10  Anticipated Goal Completion Date: 05/31/19              Prior to Admission medications    Medication Sig Start Date End Date Taking?  Authorizing Provider   levothyroxine (SYNTHROID) 50 MCG tablet TAKE ONE TABLET BY MOUTH DAILY 2/28/19   Lanre Burnham MD   Insulin Pen Needle (PEN NEEDLES 31GX5/16\") 31G X 8 MM MISC USE WITH INSULIN FOUR TIMES A DAY 1/16/19   Maria Teresa Crouch MD   fenofibric acid (FIBRICOR) 135 MG CPDR capsule TAKE ONE CAPSULE BY MOUTH DAILY 12/27/18   Maria Teresa Crouch MD   ACCU-CHEK JOHNATHAN PLUS strip USE TO TEST FOUR TIMES A DAY AS NEEDED 11/7/18   Lanre Burnham MD   rOPINIRole (REQUIP) 4 MG tablet Take 1 tablet by mouth nightly TAKE ONE TABLET BY MOUTH EVERY EVENING 10/4/18   Marvin Deras MD   metoprolol succinate (TOPROL XL) 25 MG extended release tablet Take 1 tablet by mouth daily 10/1/18   Cass Mcmahon MD   gabapentin (NEURONTIN) 600 MG tablet One tab PO TID. 9/26/18 9/26/19  Lanre Burnham MD   Coenzyme Q10 (CO Q 10) 100 MG CAPS Take 1 tablet by mouth daily    Historical Provider, MD   HUMALOG KWIKPEN 100 UNIT/ML pen INJECT 32 UNITS UNDER THE SKIN THREE TIMES A DAY BEFORE MEALS  Patient taking differently: INJECT 12 UNITS UNDER THE SKIN THREE TIMES A DAY BEFORE MEALS 8/16/18   MD MASHA CapellanOSTAR 100 UNIT/ML injection pen INJECT 40 UNITS UNDER THE SKIN EVERY NIGHT AT BEDTIME  Patient taking differently: INJECT 20 UNITS UNDER THE SKIN EVERY NIGHT AT BEDTIME 8/9/18   Rosa Baugh MD   albuterol sulfate HFA (PROVENTIL HFA) 108 (90 Base) MCG/ACT inhaler Inhale 2 puffs into the lungs every 4 hours as needed for Wheezing 7/2/18 7/2/19  Avila Gr MD   atorvastatin (LIPITOR) 40 MG tablet TAKE ONE TABLET BY MOUTH ONCE NIGHTLY 6/26/18   Katharine Hood MD   mometasone-formoterol [facility]) 200-5 MCG/ACT inhaler Inhale 2 puffs into the lungs every 12 hours 5/8/18   Avila Gr MD   furosemide (LASIX) 40 MG tablet TAKE ONE-HALF TABLET BY MOUTH DAILY 4/30/18   Dawood Hernandez MD   Handicap Placard MISC by Does not apply route Duration: 5 years 4/18/18   Avila Gr MD   ACCU-CHEK SOFTCLIX LANCETS MISC USE FOUR TIMES A DAY 11/21/17   Rosa Baugh MD   glucose blood VI test strips (ACCU-CHEK JOHNATHAN) strip 4 times a day As needed. 11/21/16   Rosa Baugh MD   Blood Glucose Monitoring Suppl (ACCU-CHEK JOHNATHAN) RUMA Use as needed 11/21/16   Rosa Baugh MD   aspirin 81 MG chewable tablet Take 1 tablet by mouth daily. 3/28/12   Juan Taylor MD   Lancets MISC 2 times daily. Lancets for a [place] meter.  7/16/10   Keenan Ren MD       Future Appointments   Date Time Provider Svitlana Burrows   4/24/2019 11:15 AM [facility] PULMONARY PHASE II ROOM [facility] PULM None   4/26/2019 11:15 AM [facility] PULMONARY PHASE II ROOM [facility] PULM None   5/2/2019  9:40 AM MD James Campo P/CC MMA   5/3/2019  9:30 AM MD Hong Capellano Endo MMA   8/1/2019  1:30 PM MD ELAINE Rajan NEPH ASHLEY AFL [facility]   11/4/2019  9:00 AM Belkis Parry MD [facility] 11/20/2019  9:00 AM Jennifer Jaramillo MD [facility] IM MMA     ,   Diabetes Assessment    Medic Alert ID:  No  Meal Planning:  Carb counting, Plate Method   How often do you test your blood sugar?:  Meals   Do you have barriers with adherence to non-pharmacologic self-management interventions?  (Nutrition/Exercise/Self-Monitoring):  No   Have you ever had to go to the ED for symptoms of low blood sugar?:  No       No patient-reported symptoms      ,   Congestive Heart Failure Assessment    Are you currently restricting fluids?:  No Restriction  Do you understand a low sodium diet?:  Yes  Do you understand how to read food labels?:  Yes  Do you salt your food before tasting it?:  No     No patient-reported symptoms      Symptoms:   None:  Yes      Symptom course:  stable  Salt intake watch compared to last visit:  stable      and   General Assessment    Do you have any symptoms that are causing concern?:  Yes  Progression since Onset:  Unchanged  Reported Symptoms:  Fatigue

## 2019-04-24 ENCOUNTER — HOSPITAL ENCOUNTER (OUTPATIENT)
Dept: PULMONOLOGY | Age: 74
Setting detail: THERAPIES SERIES
Discharge: HOME | End: 2019-04-24
Payer: MEDICARE

## 2019-04-24 PROCEDURE — G0239 OTH RESP PROC, GROUP: HCPCS | Performed by: CLINICAL NURSE SPECIALIST

## 2019-04-26 ENCOUNTER — HOSPITAL ENCOUNTER (OUTPATIENT)
Dept: PULMONOLOGY | Age: 74
Setting detail: THERAPIES SERIES
Discharge: HOME | End: 2019-04-26
Payer: MEDICARE

## 2019-04-26 PROCEDURE — G0239 OTH RESP PROC, GROUP: HCPCS | Performed by: CLINICAL NURSE SPECIALIST

## 2019-04-29 ENCOUNTER — HOSPITAL ENCOUNTER (OUTPATIENT)
Dept: PULMONOLOGY | Age: 74
Setting detail: THERAPIES SERIES
Discharge: HOME | End: 2019-04-29
Payer: MEDICARE

## 2019-04-29 PROCEDURE — G0239 OTH RESP PROC, GROUP: HCPCS | Performed by: CLINICAL NURSE SPECIALIST

## 2019-04-30 RX ORDER — FENOFIBRIC ACID 135 MG/1
CAPSULE, DELAYED RELEASE ORAL
Qty: 90 CAPSULE | Refills: 1 | Status: SHIPPED | OUTPATIENT
Start: 2019-04-30 | End: 2019-05-03

## 2019-04-30 NOTE — TELEPHONE ENCOUNTER
Requested Prescriptions     Pending Prescriptions Disp Refills    fenofibric acid (FIBRICOR) 135 MG CPDR capsule 90 capsule 1     Sig: TAKE ONE CAPSULE BY MOUTH DAILY         Last ov:4/8/19    Next ov:11/4/19    Last fill:12/27/18    Last labs:4/5/19

## 2019-05-01 ENCOUNTER — HOSPITAL ENCOUNTER (OUTPATIENT)
Dept: PULMONOLOGY | Age: 74
Setting detail: THERAPIES SERIES
Discharge: HOME | End: 2019-05-01
Payer: MEDICARE

## 2019-05-01 PROCEDURE — G0239 OTH RESP PROC, GROUP: HCPCS

## 2019-05-02 ENCOUNTER — OFFICE VISIT (OUTPATIENT)
Dept: PULMONOLOGY | Age: 74
End: 2019-05-02
Payer: MEDICARE

## 2019-05-02 VITALS
BODY MASS INDEX: 27.77 KG/M2 | OXYGEN SATURATION: 96 % | SYSTOLIC BLOOD PRESSURE: 108 MMHG | RESPIRATION RATE: 16 BRPM | HEIGHT: 70 IN | WEIGHT: 194 LBS | DIASTOLIC BLOOD PRESSURE: 60 MMHG | HEART RATE: 75 BPM

## 2019-05-02 DIAGNOSIS — G47.33 OSA TREATED WITH BIPAP: Primary | ICD-10-CM

## 2019-05-02 DIAGNOSIS — G25.81 RESTLESS LEGS SYNDROME: ICD-10-CM

## 2019-05-02 DIAGNOSIS — J84.9 INTERSTITIAL LUNG DISEASE (HCC): ICD-10-CM

## 2019-05-02 DIAGNOSIS — J45.991 COUGH VARIANT ASTHMA: ICD-10-CM

## 2019-05-02 DIAGNOSIS — R53.82 CHRONIC FATIGUE: ICD-10-CM

## 2019-05-02 PROCEDURE — G8417 CALC BMI ABV UP PARAM F/U: HCPCS | Performed by: INTERNAL MEDICINE

## 2019-05-02 PROCEDURE — G8427 DOCREV CUR MEDS BY ELIG CLIN: HCPCS | Performed by: INTERNAL MEDICINE

## 2019-05-02 PROCEDURE — 4040F PNEUMOC VAC/ADMIN/RCVD: CPT | Performed by: INTERNAL MEDICINE

## 2019-05-02 PROCEDURE — 1123F ACP DISCUSS/DSCN MKR DOCD: CPT | Performed by: INTERNAL MEDICINE

## 2019-05-02 PROCEDURE — 99214 OFFICE O/P EST MOD 30 MIN: CPT | Performed by: INTERNAL MEDICINE

## 2019-05-02 PROCEDURE — G8599 NO ASA/ANTIPLAT THER USE RNG: HCPCS | Performed by: INTERNAL MEDICINE

## 2019-05-02 PROCEDURE — 3017F COLORECTAL CA SCREEN DOC REV: CPT | Performed by: INTERNAL MEDICINE

## 2019-05-02 PROCEDURE — 1036F TOBACCO NON-USER: CPT | Performed by: INTERNAL MEDICINE

## 2019-05-02 NOTE — PROGRESS NOTES
[facility] Pulmonary and Critical Care    Outpatient Follow Up Note    Subjective:   CHIEF COMPLAINT / HPI:     The patient is 69 y.o. male who presents today for follow up of ILD, BJ, cough variant asthma and fatigue. His fatigue is unchanged. His dyspnea on exertion is a little bit better which he attributes to pulmonary rehab in learning how to breathe more effectively. He is not using BiPAP as he doesn't thinks that helps, although he's planning giving it another try. He also asked if he needs to have oxygen run through it as a friend felt much better with oxygen through his machine. He also wants to stop his Requip as he does not think that is making a difference in his restless legs.   He denies any fevers, chills, anorexia, weight loss, cough, chest pain, or peripheral edema    He's had a pretty extensive evaluation end of  including  CT chest, PFTs, CBC, CMP and above-mentioned stress test.  He does have sleep apnea but states he is compliant with BiPAP    Past Medical History:    Past Medical History:   Diagnosis Date    Allergic rhinitis due to other allergen 2010    Coronary atherosclerosis of unspecified type of vessel, native or graft 2010    Diabetic [facility] exam ([facility] [address] 2015    Diabetic eye exam ([facility] [address] 2016    [place] [facility]    Generalized osteoarthrosis, involving multiple sites 2010    Other psoriasis 2010    Pneumonia     Psoriatic arthropathy ([facility] [address] 2010    Type II or unspecified type diabetes mellitus without mention of complication, not stated as uncontrolled 2010    Unspecified essential hypertension 2010    Unspecified sleep apnea 2010       Social History:    Social History     Tobacco Use   Smoking Status Former Smoker    Packs/day: 1.00    Years: 18.00    Pack years: 18.00    Start date: 1962    Last attempt to quit: 1980    Years since quittin.5   Smokeless Tobacco Never Used       Current Medications:  Current Outpatient Medications on File Prior to Visit   Medication Sig Dispense Refill    furosemide (LASIX) 40 MG tablet TAKE ONE-HALF TABLET BY MOUTH DAILY 45 tablet 1    fenofibric acid (FIBRICOR) 135 MG CPDR capsule TAKE ONE CAPSULE BY MOUTH DAILY 90 capsule 1    levothyroxine (SYNTHROID) 50 MCG tablet TAKE ONE TABLET BY MOUTH DAILY 30 tablet 10    Insulin Pen Needle (PEN NEEDLES 31GX5/16\") 31G X 8 MM MISC USE WITH INSULIN FOUR TIMES A  each 1    ACCU-CHEK JOHNATHAN PLUS strip USE TO TEST FOUR TIMES A DAY AS NEEDED 150 strip 3    rOPINIRole (REQUIP) 4 MG tablet Take 1 tablet by mouth nightly TAKE ONE TABLET BY MOUTH EVERY EVENING 90 tablet 3    metoprolol succinate (TOPROL XL) 25 MG extended release tablet Take 1 tablet by mouth daily 90 tablet 3    gabapentin (NEURONTIN) 600 MG tablet One tab PO TID. 90 tablet 3    HUMALOG KWIKPEN 100 UNIT/ML pen INJECT 32 UNITS UNDER THE SKIN THREE TIMES A DAY BEFORE MEALS (Patient taking differently: INJECT 12 UNITS UNDER THE SKIN THREE TIMES A DAY BEFORE MEALS) 30 mL 2    LANTUS SOLOSTAR 100 UNIT/ML injection pen INJECT 40 UNITS UNDER THE SKIN EVERY NIGHT AT BEDTIME (Patient taking differently: INJECT 20 UNITS UNDER THE SKIN EVERY NIGHT AT BEDTIME) 15 mL 3    albuterol sulfate HFA (PROVENTIL HFA) 108 (90 Base) MCG/ACT inhaler Inhale 2 puffs into the lungs every 4 hours as needed for Wheezing 1 Inhaler 5    atorvastatin (LIPITOR) 40 MG tablet TAKE ONE TABLET BY MOUTH ONCE NIGHTLY 90 tablet 3    mometasone-formoterol (DULERA) 200-5 MCG/ACT inhaler Inhale 2 puffs into the lungs every 12 hours 1 Inhaler 11    Handicap Placard MISC by Does not apply route Duration: 5 years 1 each 0    ACCU-CHEK SOFTCLIX LANCETS MISC USE FOUR TIMES A  each 3    glucose blood VI test strips (ACCU-CHEK JOHNATHAN) strip 4 times a day As needed.  150 each 3    Blood Glucose Monitoring Suppl (ACCU-CHEK JOHNATHAN) RUMA Use as needed 1 Device 0    aspirin 81 MG chewable tablet Take 1 tablet by mouth daily. 30 tablet 5    Lancets MISC 2 times daily. Lancets for a [place] meter. 50 each 11    Coenzyme Q10 (CO Q 10) 100 MG CAPS Take 1 tablet by mouth daily       No current facility-administered medications on file prior to visit. REVIEW OF SYSTEMS:    CONSTITUTIONAL: Negative for fevers and chills  HEENT: Negative for oropharyngeal exudate, post nasal drip, sinus pain / pressure, nasal congestion, ear pain  RESPIRATORY:  See HPI  CARDIOVASCULAR: Negative for chest pain, palpitations, edema  GASTROINTESTINAL: Negative for nausea, vomiting, diarrhea, constipation and abdominal pain  HEMATOLOGICAL: Negative for adenopathy  SKIN: Negative for clubbing, cyanosis, skin lesions  EXTREMITIES: Negative for weakness, decreased ROM  NEUROLOGICAL: Negative for unilateral weakness, speech or gait abnormalities    Objective:   PHYSICAL EXAM:        VITALS:    /60 (Site: Left Upper Arm, Position: Sitting, Cuff Size: Medium Adult)   Pulse 75   Resp 16   Ht 5' 10\" (1.778 m)   Wt 194 lb (88 kg)   SpO2 96%   BMI 27.84 kg/m²   On RA  CONSTITUTIONAL:  Awake, alert, cooperative, no apparent distress, and appears stated age  HEENT: No oropharyngeal exudate, PERRL, no cervical adenopathy, no tracheal deviation, thyroid size normal  LUNGS:  No increased work of breathing. + crackles bilateral bases. No wheezing  CARDIOVASCULAR:  normal S1 and S2 and no JVD  ABDOMEN:  Normal bowel sounds, non-distended and non-tender to palpation  EXT: No edema, no calf tenderness. Pulses are present bilaterally. NEUROLOGIC:  Mental Status Exam:  Level of Alertness:   awake  Orientation:   person, place, time.   SKIN:  normal skin color, texture, turgor, no redness, warmth, or swelling     DATA:    PFTs  Indication: Shortness of breath, weakness, non productive cough    Test comment:     Spirometry data is acceptable and reproducible.     Pulse ox is 91% on room air    Estimated body mass index is 28.3 kg/m² as calculated from the   following:    Height as of 11/21/17: 5' 10\" (1.778 m).    Weight as of 11/21/17: 197 lb 3.2 oz (89.4 kg). Spirometry data:    FEV1/FVC: 89. Predicted ratio 73    Pre-Bronchodilator FEV1 2.07L, which is 63% predicted    Post-Bronchodilator FEV1 2.02L, which is 61% predicted    There is -2% reversibility     FVC is 2.33L, which is 51% predicted    Lung Volumes:    TLC is 4.85L, which is 67% predicted    RV is 2.51L which is 99% predicted    Diffusion Capacity:    DLCO is 13.77 which is 42% predicted    Impression:    1. There is no obstruction present    2. There is no response to bronchodilator therapy         [Increase in FEV1 => 12% of control and => 200 ml]    3. There is moderate restriction     4. There is moderate reduction in diffusion capacity    Comment: Moderate restrictive disease with moderate decrease in   diffusion capacity consistent with the diagnosis of interstitial   lung disease.      Radiology Review:  Pertinent images / reports were reviewed as a part of this visit. CT Chest reveals the following:  Impression       Pulmonary fibrosis bilateral lower lobe subpleural regions as   described similar to previous examination.       Marked coronary artery calcification which is a risk factor for   acute coronary syndrome.       No lobar consolidation.       Ascending thoracic aorta upper limits of normal.     Assessment:   1. Cough Variant Asthma  2. ILD - NOS. Unchanged from 2013  3. BJ on Bipap  4. Chronic Diastolic CHF  5. Restless Legs Syndrome    Plan:   1. Continue Dulera 200 mcg MDI and prn albuterol  2. Cont pulmonary rehab   3. Continue BiPAP - KIMBERLEY LEAHY [facility] Evaluated patient and states he is on adequate settings. Will get nocturnal pulse oximetry to assess for hypoxemia and need for oxygen bled into Bipap  4. He is stopping requip as he thinks it does not help  5.  RTC 3 months

## 2019-05-03 ENCOUNTER — OFFICE VISIT (OUTPATIENT)
Dept: ENDOCRINOLOGY | Age: 74
End: 2019-05-03
Payer: MEDICARE

## 2019-05-03 VITALS
DIASTOLIC BLOOD PRESSURE: 63 MMHG | WEIGHT: 193.4 LBS | BODY MASS INDEX: 27.69 KG/M2 | SYSTOLIC BLOOD PRESSURE: 110 MMHG | OXYGEN SATURATION: 95 % | HEIGHT: 70 IN | HEART RATE: 77 BPM | RESPIRATION RATE: 16 BRPM

## 2019-05-03 DIAGNOSIS — Z79.4 INSULIN DEPENDENT TYPE 2 DIABETES MELLITUS, CONTROLLED (HCC): Primary | ICD-10-CM

## 2019-05-03 DIAGNOSIS — E78.49 OTHER HYPERLIPIDEMIA: ICD-10-CM

## 2019-05-03 DIAGNOSIS — E11.9 INSULIN DEPENDENT TYPE 2 DIABETES MELLITUS, CONTROLLED (HCC): Primary | ICD-10-CM

## 2019-05-03 DIAGNOSIS — I10 ESSENTIAL HYPERTENSION: ICD-10-CM

## 2019-05-03 LAB — HBA1C MFR BLD: 6.4 %

## 2019-05-03 PROCEDURE — G8599 NO ASA/ANTIPLAT THER USE RNG: HCPCS | Performed by: INTERNAL MEDICINE

## 2019-05-03 PROCEDURE — 1036F TOBACCO NON-USER: CPT | Performed by: INTERNAL MEDICINE

## 2019-05-03 PROCEDURE — 2022F DILAT RTA XM EVC RTNOPTHY: CPT | Performed by: INTERNAL MEDICINE

## 2019-05-03 PROCEDURE — 83036 HEMOGLOBIN GLYCOSYLATED A1C: CPT | Performed by: INTERNAL MEDICINE

## 2019-05-03 PROCEDURE — 99214 OFFICE O/P EST MOD 30 MIN: CPT | Performed by: INTERNAL MEDICINE

## 2019-05-03 PROCEDURE — 3017F COLORECTAL CA SCREEN DOC REV: CPT | Performed by: INTERNAL MEDICINE

## 2019-05-03 PROCEDURE — 1123F ACP DISCUSS/DSCN MKR DOCD: CPT | Performed by: INTERNAL MEDICINE

## 2019-05-03 PROCEDURE — G8427 DOCREV CUR MEDS BY ELIG CLIN: HCPCS | Performed by: INTERNAL MEDICINE

## 2019-05-03 PROCEDURE — G8417 CALC BMI ABV UP PARAM F/U: HCPCS | Performed by: INTERNAL MEDICINE

## 2019-05-03 PROCEDURE — 4040F PNEUMOC VAC/ADMIN/RCVD: CPT | Performed by: INTERNAL MEDICINE

## 2019-05-03 PROCEDURE — 3044F HG A1C LEVEL LT 7.0%: CPT | Performed by: INTERNAL MEDICINE

## 2019-05-03 RX ORDER — FENOFIBRIC ACID 135 MG/1
CAPSULE, DELAYED RELEASE ORAL
Qty: 90 CAPSULE | Refills: 1 | Status: SHIPPED | OUTPATIENT
Start: 2019-05-03 | End: 2019-08-07

## 2019-05-03 NOTE — PROGRESS NOTES
uncontrolled 7/12/2010    Unspecified essential hypertension 7/12/2010    Unspecified sleep apnea 7/12/2010     Past Surgical History:   Procedure Laterality Date    CARPAL TUNNEL RELEASE      s/p    CATARACT REMOVAL      CORONARY ARTERY BYPASS GRAFT      JOINT REPLACEMENT         Review of Systems  Scanned, reviewed     Objective:        /63 (Site: Right Upper Arm, Position: Sitting, Cuff Size: Medium Adult)   Pulse 77   Resp 16   Ht 5' 10\" (1.778 m)   Wt 193 lb 6.4 oz (87.7 kg)   SpO2 95%   BMI 27.75 kg/m²   Wt Readings from Last 3 Encounters:   05/03/19 193 lb 6.4 oz (87.7 kg)   05/02/19 194 lb (88 kg)   04/11/19 192 lb 12.8 oz (87.5 kg)     Constitutional: Well-developed, alert, appears stated age, cooperative, in no acute distress  H/E/N/M/T:atraumatic, normocephalic, external ears, nose, lips normal without lesions  Eyes:  extraocular muscles are intact  Skeletal muscular: no kyphosis, no gross abnormalities  Skin: Xanthoma/Xanthelasmas are  not present  Psychiatric: Judgement and Insight:  judgement and insight appear normal  Neuro: Normal without focal findings, speech is spontaneous, and movements are coordinated, alert and oriented x3     5/19  Skeletal foot exam is normal, no skin lesions, toenails are normal, DP not palpable,  10 g monofilament is 10/10 on the right and 10/10 on the left    Lab Reviewed   No components found for: CHLPL  Lab Results   Component Value Date    TRIG 205 (H) 11/16/2018    TRIG 198 (H) 11/09/2017    TRIG 339 (H) 10/21/2016     Lab Results   Component Value Date    HDL 47 11/16/2018    HDL 46 11/09/2017    HDL 47 10/21/2016     Lab Results   Component Value Date    LDLCALC 58 11/16/2018    LDLCALC 44 11/09/2017    LDLCALC see below 10/21/2016     Lab Results   Component Value Date    LABVLDL 41 11/16/2018    LABVLDL 40 11/09/2017    LABVLDL see below 10/21/2016     Lab Results   Component Value Date    LABA1C 6.9 01/18/2019       Assessment:     Scotty Rai Anthony Duffy is a 69 y.o. male with :    1.T2DM: Longstanding,controlled, insulin resistant, goal A1c 7-8%. Reviewed log, glucose controlled, continue same  2. HTN: BP at goal. Continue same  3. HLD: LDL at goal, he is also on fenofibrate with a statin, Advised to stop it given low GFR  4.Obesity  5. CAD  6. CKD III  7. Psoriatic arthritis: Reports flare in psoriatic plaque, will see dermatology  8. Neuropathy: Has been on lyrica, prescribed topical , has helped. Started neurontin, did not help, increase dose to 600mg TID. He does have some drowsiness  9. Fatigue:  TSH high with normal FT4, subclinical hypothyroid, advise to try replacement. He was inquiring about Co-Q 10 , advised can take 200mg  His total testosterone was normal with free level low normal. Advised given total is in a good range, h/o CAD, Age, recommend no replacement at this point. 10. Subclinical hypothyroid: On replacement,  Last level normal, continue levothyroxine    Plan:       lantus  20 units    Humalog 10 units TID  SSI 2 for 50>150   Advise to check blood sugar 4 times a day   Patient to send blood sugar log for titration. Advise to low simple carbohydrate and protein with each  meal diet. Diabetes Care: recommend yearly eye exam, foot exam and urine microalbumin to  creatinine ratio.     -Hyperlipidemia, LDL goal is <70 mg/dl   -Hypertension: Continue same  -Daily ASA:81mg  -Smoking status: Non smoker    He is inquiring about CGM, check coverage with insurance

## 2019-05-06 ENCOUNTER — APPOINTMENT (OUTPATIENT)
Dept: PULMONOLOGY | Age: 74
End: 2019-05-06
Payer: MEDICARE

## 2019-05-10 ENCOUNTER — HOSPITAL ENCOUNTER (OUTPATIENT)
Dept: PULMONOLOGY | Age: 74
Setting detail: THERAPIES SERIES
Discharge: HOME | End: 2019-05-10
Payer: MEDICARE

## 2019-05-10 PROCEDURE — G0239 OTH RESP PROC, GROUP: HCPCS

## 2019-05-13 ENCOUNTER — HOSPITAL ENCOUNTER (OUTPATIENT)
Dept: PULMONOLOGY | Age: 74
Setting detail: THERAPIES SERIES
Discharge: HOME | End: 2019-05-13
Payer: MEDICARE

## 2019-05-13 PROCEDURE — G0239 OTH RESP PROC, GROUP: HCPCS

## 2019-05-13 PROCEDURE — G0238 OTH RESP PROC, INDIV: HCPCS

## 2019-05-13 PROCEDURE — 94618 PULMONARY STRESS TESTING: CPT

## 2019-05-15 ENCOUNTER — HOSPITAL ENCOUNTER (OUTPATIENT)
Dept: PULMONOLOGY | Age: 74
Setting detail: THERAPIES SERIES
Discharge: HOME | End: 2019-05-15
Payer: MEDICARE

## 2019-05-15 PROCEDURE — G0239 OTH RESP PROC, GROUP: HCPCS

## 2019-05-17 ENCOUNTER — TELEPHONE (OUTPATIENT)
Dept: PULMONOLOGY | Age: 74
End: 2019-05-17

## 2019-05-17 DIAGNOSIS — G47.33 OSA (OBSTRUCTIVE SLEEP APNEA): Primary | ICD-10-CM

## 2019-05-17 NOTE — TELEPHONE ENCOUNTER
[facility] Overnight Pulse Ox. Patient said that he was told by the office on 5/2/2019 it would be sent to [facility], I show no record of this. Adriel Olivia can set him up with testing next week if ok'd by Dr Harmeet Colin. Please advise if you want pulse ox testing on room air only or with use of Bipap on room air. I will notify Adriel Olivia.  Thank you

## 2019-05-20 ENCOUNTER — APPOINTMENT (OUTPATIENT)
Dept: PULMONOLOGY | Age: 74
End: 2019-05-20
Payer: MEDICARE

## 2019-05-21 ENCOUNTER — APPOINTMENT (OUTPATIENT)
Dept: PULMONOLOGY | Age: 74
End: 2019-05-21
Payer: MEDICARE

## 2019-05-22 ENCOUNTER — APPOINTMENT (OUTPATIENT)
Dept: PULMONOLOGY | Age: 74
End: 2019-05-22
Payer: MEDICARE

## 2019-05-23 ENCOUNTER — APPOINTMENT (OUTPATIENT)
Dept: PULMONOLOGY | Age: 74
End: 2019-05-23
Payer: MEDICARE

## 2019-05-24 RX ORDER — INSULIN LISPRO 100 [IU]/ML
INJECTION, SOLUTION INTRAVENOUS; SUBCUTANEOUS
Qty: 30 ML | Refills: 3 | Status: SHIPPED | OUTPATIENT
Start: 2019-05-24 | End: 2020-02-03

## 2019-06-05 ENCOUNTER — CARE COORDINATION (OUTPATIENT)
Dept: CARE COORDINATION | Age: 74
End: 2019-06-05

## 2019-06-05 NOTE — PATIENT INSTRUCTIONS
Instructions. \"     If you do not have an account, please click on the \"Sign Up Now\" link. Current as of: November 7, 2018  Content Version: 12.0  © 7877-2863 [employer], [employer]orporated. Care instructions adapted under license by [facility] ([facility]). If you have questions about a medical condition or this instruction, always ask your healthcare professional. [address] any warranty or liability for your use of this information.

## 2019-06-05 NOTE — CARE COORDINATION
Ambulatory Care Coordination Note  6/5/2019  CM Risk Score: 8  Trey Mortality Risk Score: 26    ACC: Jenny Knutson, MAGO     Hx: Asthma, CKD, DM, HTN, CAD, Psoriasis, Osteoarthritis, NSTEMI, CABG, Glaucoma, BJ, CHF, Pneumonia, Neuropathy, Interstitial Lung Disease, Chronic Fatigue    Summary Note:The pt stated his breathing is better with Pulmonary Rehab. The pt stated he practices breathing from his diaphragm. The pt stated he uses his BIPAP but he only sleeps 3-4 hours at night with or without the BIPAP. The pt stated his balance and weakness is better but if he turns too quickly he is unsteady. The pt stated he is steadier standing from a chair or getting up from a bed. The pt stated his stamina is better but after rehab he gets really fatigued. The pt stated he has a toe fungus but he saw Dr. Carmelita Neff this week. The [facility] reviewed a low salt and low Potassium diet with the pt. Plan:  The pt has scheduled appointments with his nephrologist, endocrinologist and pulmonologist in August.  The pt will follow up with his cardiologist in November. The pt has his physical scheduled for 11/20/19 with the PCP. The [facility] instructed the pt to call the nurse with any changes in his condition, questions or concerns. Handout mailed to the pt: Potassium Restricted Diet: Care Instructions    Care Coordination Interventions    Program Enrollment:  Complex Care  Referral from Primary Care Provider:  Yes  Suggested Interventions and Community Resources  Cardiac Rehab:  Completed  Diabetes Education: In Process  Fall Risk Prevention:  Not Started  Home Health Services:  Completed  Physical Therapy:  Completed  Pulmonary Rehab: In Process  Registered Dietician:  Completed  Specialty Services Referral:  Completed (Comment: Pulmonary Rehabe Jan 2019)  Other Services:  Completed (Comment: Sleep Study)  Zone Management Tools:   In Process (Comment: Diabetes and Heart Failure zones mailed to pt)  Other Services or Interventions:  Endo, Nephro, Pulm, Cardio, Balance Center, Monitor, Neurology, In Pulm Rehab         Goals Addressed                 This Visit's Progress     Patient Stated (pt-stated)   Improving     The pt is working on improving his stamina and breathing. Barriers: IDL and BJ  Plan for overcoming my barriers: Pulmonary Rehab, Work with Pulmonologist and ACC  Confidence: 8/10  Anticipated Goal Completion Date: 09/05/19       COMPLETED: Self Monitoring        Self-Monitored Blood Glucose - I will check my blood sugar blood sugars ac  I will notify my provider of any trends of increasing or decreasing blood sugars over a 1 month period. I will notify my provider if I have any blood sugar readings less than 70 more than 2 times a month. Daily Weights - I will weight myself as directed - Daily and write down weights  I will notify my provider of any increase in weight by 3 or more pounds in 2 days OR 5 or more pounds in a week. Blood Pressure - I will take my blood pressure as directed - Daily  I will notify my provider of any changes in blood pressure associated with symptoms of dizziness, falls, passing out, headache, confusion/change in mental status. Barriers: stress  Plan for overcoming my barriers: Provide education and resources  Confidence: 8/10  Anticipated Goal Completion Date: 05/31/19              Prior to Admission medications    Medication Sig Start Date End Date Taking?  Authorizing Provider   HUMALOG KWIKPEN 100 UNIT/ML pen INJECT 32 UNITS UNDER THE SKIN THREE TIMES A DAY BEFORE MEALS 5/24/19   Gail Cary MD   fenofibric acid (FIBRICOR) 135 MG CPDR capsule TAKE ONE CAPSULE BY MOUTH DAILY 5/3/19   Gail Cary MD   furosemide (LASIX) 40 MG tablet TAKE ONE-HALF TABLET BY MOUTH DAILY 4/30/19   Palomo Conroy MD   levothyroxine (SYNTHROID) 50 MCG tablet TAKE ONE TABLET BY MOUTH DAILY 2/28/19   Gail Cary MD   Insulin Pen Needle (PEN NEEDLES 31GX5/16\") 31G X 8 MM MISC

## 2019-06-08 ENCOUNTER — HOSPITAL ENCOUNTER (EMERGENCY)
Age: 74
Discharge: HOME | End: 2019-06-08
Attending: EMERGENCY MEDICINE
Payer: MEDICARE

## 2019-06-08 ENCOUNTER — APPOINTMENT (OUTPATIENT)
Dept: GENERAL RADIOLOGY | Age: 74
End: 2019-06-08
Payer: MEDICARE

## 2019-06-08 VITALS
TEMPERATURE: 97.8 F | RESPIRATION RATE: 20 BRPM | HEART RATE: 76 BPM | OXYGEN SATURATION: 95 % | DIASTOLIC BLOOD PRESSURE: 82 MMHG | SYSTOLIC BLOOD PRESSURE: 111 MMHG

## 2019-06-08 DIAGNOSIS — R11.0 NAUSEA: Primary | ICD-10-CM

## 2019-06-08 LAB
A/G RATIO: 1.4 (ref 1.1–2.2)
ALBUMIN SERPL-MCNC: 3.7 G/DL (ref 3.4–5)
ALP BLD-CCNC: 35 U/L (ref 40–129)
ALT SERPL-CCNC: 25 U/L (ref 10–40)
ANION GAP SERPL CALCULATED.3IONS-SCNC: 10 MMOL/L (ref 3–16)
AST SERPL-CCNC: 37 U/L (ref 15–37)
BASOPHILS ABSOLUTE: 0.2 K/UL (ref 0–0.2)
BASOPHILS RELATIVE PERCENT: 1.3 %
BILIRUB SERPL-MCNC: 0.3 MG/DL (ref 0–1)
BILIRUBIN URINE: NEGATIVE
BLOOD, URINE: NEGATIVE
BUN BLDV-MCNC: 56 MG/DL (ref 7–20)
CALCIUM SERPL-MCNC: 9.2 MG/DL (ref 8.3–10.6)
CHLORIDE BLD-SCNC: 101 MMOL/L (ref 99–110)
CLARITY: CLEAR
CO2: 24 MMOL/L (ref 21–32)
COLOR: YELLOW
CREAT SERPL-MCNC: 2.5 MG/DL (ref 0.8–1.3)
EOSINOPHILS ABSOLUTE: 0.5 K/UL (ref 0–0.6)
EOSINOPHILS RELATIVE PERCENT: 3.2 %
EPITHELIAL CELLS, UA: NORMAL /HPF
GFR AFRICAN AMERICAN: 31
GFR NON-AFRICAN AMERICAN: 25
GLOBULIN: 2.6 G/DL
GLUCOSE BLD-MCNC: 162 MG/DL (ref 70–99)
GLUCOSE URINE: 250 MG/DL
HCT VFR BLD CALC: 36.3 % (ref 40.5–52.5)
HEMOGLOBIN: 11.7 G/DL (ref 13.5–17.5)
KETONES, URINE: NEGATIVE MG/DL
LEUKOCYTE ESTERASE, URINE: NEGATIVE
LYMPHOCYTES ABSOLUTE: 1.1 K/UL (ref 1–5.1)
LYMPHOCYTES RELATIVE PERCENT: 7.4 %
MCH RBC QN AUTO: 31.3 PG (ref 26–34)
MCHC RBC AUTO-ENTMCNC: 32.3 G/DL (ref 31–36)
MCV RBC AUTO: 96.9 FL (ref 80–100)
MICROSCOPIC EXAMINATION: YES
MONOCYTES ABSOLUTE: 0.6 K/UL (ref 0–1.3)
MONOCYTES RELATIVE PERCENT: 4.3 %
NEUTROPHILS ABSOLUTE: 12.1 K/UL (ref 1.7–7.7)
NEUTROPHILS RELATIVE PERCENT: 83.8 %
NITRITE, URINE: NEGATIVE
PDW BLD-RTO: 13.8 % (ref 12.4–15.4)
PH UA: 6.5 (ref 5–8)
PLATELET # BLD: 304 K/UL (ref 135–450)
PMV BLD AUTO: 8.5 FL (ref 5–10.5)
POTASSIUM REFLEX MAGNESIUM: 6.6 MMOL/L (ref 3.5–5.1)
POTASSIUM SERPL-SCNC: 5.1 MMOL/L (ref 3.5–5.1)
PROTEIN UA: 100 MG/DL
RBC # BLD: 3.75 M/UL (ref 4.2–5.9)
RBC UA: NORMAL /HPF (ref 0–2)
SODIUM BLD-SCNC: 135 MMOL/L (ref 136–145)
SPECIFIC GRAVITY UA: 1.01 (ref 1–1.03)
TOTAL PROTEIN: 6.3 G/DL (ref 6.4–8.2)
TROPONIN: 0.01 NG/ML
TROPONIN: 0.01 NG/ML
URINE REFLEX TO CULTURE: ABNORMAL
URINE TYPE: ABNORMAL
UROBILINOGEN, URINE: 0.2 E.U./DL
WBC # BLD: 14.5 K/UL (ref 4–11)
WBC UA: NORMAL /HPF (ref 0–5)

## 2019-06-08 PROCEDURE — 81001 URINALYSIS AUTO W/SCOPE: CPT

## 2019-06-08 PROCEDURE — 93005 ELECTROCARDIOGRAM TRACING: CPT | Performed by: EMERGENCY MEDICINE

## 2019-06-08 PROCEDURE — 6370000000 HC RX 637 (ALT 250 FOR IP): Performed by: EMERGENCY MEDICINE

## 2019-06-08 PROCEDURE — 99285 EMERGENCY DEPT VISIT HI MDM: CPT

## 2019-06-08 PROCEDURE — 85025 COMPLETE CBC W/AUTO DIFF WBC: CPT

## 2019-06-08 PROCEDURE — 84132 ASSAY OF SERUM POTASSIUM: CPT

## 2019-06-08 PROCEDURE — 84484 ASSAY OF TROPONIN QUANT: CPT

## 2019-06-08 PROCEDURE — 71046 X-RAY EXAM CHEST 2 VIEWS: CPT

## 2019-06-08 PROCEDURE — 80053 COMPREHEN METABOLIC PANEL: CPT

## 2019-06-08 RX ORDER — ASPIRIN 81 MG/1
324 TABLET, CHEWABLE ORAL ONCE
Status: COMPLETED | OUTPATIENT
Start: 2019-06-08 | End: 2019-06-08

## 2019-06-08 RX ADMIN — ASPIRIN 81 MG 243 MG: 81 TABLET ORAL at 19:51

## 2019-06-08 ASSESSMENT — ENCOUNTER SYMPTOMS
NAUSEA: 1
SHORTNESS OF BREATH: 0
VOMITING: 0
ABDOMINAL PAIN: 0

## 2019-06-08 NOTE — ED NOTES
Patient reports he was out at a restaurant this evening, acutely became nauseas and diaphoretic. Reports episode lasted approx 20-30 mins. Denies chest pain, SOB or dizziness at time of episode or on arrival. Symptoms resolved on arrival to ED, EKG completed at this time.       Kathleen Vora RN  06/08/19 0115

## 2019-06-09 LAB
EKG ATRIAL RATE: 74 BPM
EKG ATRIAL RATE: 79 BPM
EKG DIAGNOSIS: NORMAL
EKG DIAGNOSIS: NORMAL
EKG P AXIS: 40 DEGREES
EKG P AXIS: 40 DEGREES
EKG P-R INTERVAL: 230 MS
EKG P-R INTERVAL: 254 MS
EKG Q-T INTERVAL: 382 MS
EKG Q-T INTERVAL: 386 MS
EKG QRS DURATION: 80 MS
EKG QRS DURATION: 80 MS
EKG QTC CALCULATION (BAZETT): 424 MS
EKG QTC CALCULATION (BAZETT): 442 MS
EKG R AXIS: 13 DEGREES
EKG R AXIS: 41 DEGREES
EKG T AXIS: 61 DEGREES
EKG T AXIS: 84 DEGREES
EKG VENTRICULAR RATE: 74 BPM
EKG VENTRICULAR RATE: 79 BPM

## 2019-06-09 NOTE — ED PROVIDER NOTES
[address] [facility]          ATTENDING PHYSICIAN NOTE       Date of evaluation: 6/8/2019    Chief Complaint     Nausea and Sweats      History of Present Illness     Jose Alberto Potts is a 69 y.o. male who presents  for evaluation of nausea and pallor or after attempting to eat food at [employer] just prior to arrival.  Patient reportedly was sitting down just about to eat dinner at [employer] when he developed significant nausea and some sweats with associated pallor or and decreased responsiveness which last for approximate 5 minutes. EMS was called to the scene bringing patient to the emergency room area reported prehospital blood sugar was in the 150s. Patient denies any chest pain, arm pain, difficult breathing, leg swelling, passing out during the episode. Patient reports a stress test across it one year ago which was unremarkable. Review of Systems     Review of Systems   Constitutional: Negative for chills and fever. Respiratory: Negative for shortness of breath. Cardiovascular: Negative for chest pain, palpitations and leg swelling. Gastrointestinal: Positive for nausea. Negative for abdominal pain and vomiting. Genitourinary: Negative for dysuria. Musculoskeletal: Negative for neck pain. Neurological: Negative for syncope. Past Medical, Surgical, Family, and Social History     He has a past medical history of Allergic rhinitis due to other allergen, Coronary atherosclerosis of unspecified type of vessel, native or graft, Diabetic eye exam ([address] Diabetic eye exam ([address] Generalized osteoarthrosis, involving multiple sites, Other psoriasis, Pneumonia, Psoriatic arthropathy ([address] Type II or unspecified type diabetes mellitus without mention of complication, not stated as uncontrolled, Unspecified essential hypertension, and Unspecified sleep apnea. He has a past surgical history that includes Coronary artery bypass graft;  Carpal tunnel release; joint replacement; and Cataract removal.  His family history includes Coronary Art Dis in his father; Diabetes in his mother; Heart Failure in his mother. He reports that he quit smoking about 38 years ago. He started smoking about 57 years ago. He has a 18.00 pack-year smoking history. He has never used smokeless tobacco. He reports that he does not drink alcohol or use drugs. Medications     Discharge Medication List as of 6/8/2019 10:29 PM      CONTINUE these medications which have NOT CHANGED    Details   aspirin 81 MG chewable tablet Take 1 tablet by mouth daily. , Disp-30 tablet, R-5      HUMALOG KWIKPEN 100 UNIT/ML pen INJECT 32 UNITS UNDER THE SKIN THREE TIMES A DAY BEFORE MEALS, Disp-30 mL, R-3Normal      fenofibric acid (FIBRICOR) 135 MG CPDR capsule TAKE ONE CAPSULE BY MOUTH DAILY, Disp-90 capsule, R-1Print      furosemide (LASIX) 40 MG tablet TAKE ONE-HALF TABLET BY MOUTH DAILY, Disp-45 tablet, R-1Normal      levothyroxine (SYNTHROID) 50 MCG tablet TAKE ONE TABLET BY MOUTH DAILY, Disp-30 tablet, R-10Normal      Insulin Pen Needle (PEN NEEDLES 31GX5/16\") 31G X 8 MM MISC Disp-360 each, R-1, Normal      !! ACCU-CHEK JOHNATHAN PLUS strip USE TO TEST FOUR TIMES A DAY AS NEEDED, Disp-150 strip, R-3Normal      rOPINIRole (REQUIP) 4 MG tablet Take 1 tablet by mouth nightly TAKE ONE TABLET BY MOUTH EVERY EVENING, Disp-90 tablet, R-3Normal      metoprolol succinate (TOPROL XL) 25 MG extended release tablet Take 1 tablet by mouth daily, Disp-90 tablet, R-3Normal      LANTUS SOLOSTAR 100 UNIT/ML injection pen INJECT 40 UNITS UNDER THE SKIN EVERY NIGHT AT BEDTIME, Disp-15 mL, R-3Normal      albuterol sulfate HFA (PROVENTIL HFA) 108 (90 Base) MCG/ACT inhaler Inhale 2 puffs into the lungs every 4 hours as needed for Wheezing, Disp-1 Inhaler, R-5Normal      atorvastatin (LIPITOR) 40 MG tablet TAKE ONE TABLET BY MOUTH ONCE NIGHTLY, Disp-90 tablet, R-3Normal      mometasone-formoterol (DULERA) 200-5 MCG/ACT Glucose, Ur 250 (A) Negative mg/dL    Bilirubin Urine Negative Negative    Ketones, Urine Negative Negative mg/dL    Specific Gravity, UA 1.015 1.005 - 1.030    Blood, Urine Negative Negative    pH, UA 6.5 5.0 - 8.0    Protein,  (A) Negative mg/dL    Urobilinogen, Urine 0.2 <2.0 E.U./dL    Nitrite, Urine Negative Negative    Leukocyte Esterase, Urine Negative Negative    Microscopic Examination YES     Urine Reflex to Culture Not Indicated     Urine Type Other    Microscopic Urinalysis   Result Value Ref Range    WBC, UA 0-2 0 - 5 /HPF    RBC, UA 0-2 0 - 2 /HPF    Epi Cells 0-2 /HPF   Potassium (Lab)   Result Value Ref Range    Potassium 5.1 3.5 - 5.1 mmol/L   Troponin   Result Value Ref Range    Troponin 0.01 <0.01 ng/mL         RECENT VITALS:  BP: 111/82, Temp: 97.8 °F (36.6 °C), Pulse: 76, Resp: 20,SpO2: 95 %       ED Course     Nursing Notes, Past Medical Hx, Past Surgical Hx, Social Hx, Allergies, and Family Hx were reviewed. The patient was given the following medications:  Orders Placed This Encounter   Medications    aspirin chewable tablet 324 mg       CONSULTS:  None    [facility] / ASSESSMENT / Nany Nunes is a 69 y.o. male who presents for evaluation of nausea, sweats, pallor or which occurred just after taking a bite of broccoli at [employer]. Patient does not report any chest pain, difficulty breathing, leg swelling, syncope. Patient does have a history of coronary artery disease with four-vessel CABG in 2006. There is somewhat of a concern for possible atypical ACS however I believe this diagnosis is somewhat unlikely. EKG without any acute ST changes. We'll evaluate with troponin. Low clinical suspicion for pulmonary embolism, pneumothorax, esophageal rupture, aortic dissection, pneumonia, or myocarditis.   We'll evaluate additionally with other laboratory studies and for possible metabolic derangement and will obtain x-ray to evaluate lung fields. Reevaluation 2235  Initial and repeat troponin stable 0.01 with no increase  This value is still with a less than previous and could be elevated due to patient's chronic kidney disease which is at baseline  Patient does have nonspecific leukocytosis however has no indication for urinary tract infection or pneumonia or skin infection and low clinical suspicion for CNS infection  Patient has been emergency room for over 4 hours remained hemodynamically stable with appropriate mentation without any symptoms  I believe it is unlikely patient is having atypical ACS at this point however patient understands to follow-up with his cardiologist Dr. Javier Ferris to discuss having a stress test as an outpatient  We will discharge patient home with strict return precautions    Clinical Impression     1. Nausea        Disposition     PATIENT REFERREDTO:  Klaus Segura MD  [address]  [address]  [phone]    Schedule an appointment as soon as possible for a visit in 2 days      [facility] Emergency Department  [address]  [phone]    Immediately for any concerning symptoms    Parminder Casiano MD  [address] [address]  [phone]    In 2 days  Discuss about obtaining a stress test      DISCHARGE MEDICATIONS:  Discharge Medication List as of 6/8/2019 10:29 PM          DISPOSITION  DISPOSITION Decision To Discharge 06/08/2019 10:28:27 PM          (Please note that portions of this note were completed with a voice recognition program.  Efforts weremade to edit the dictations but occasionally words are mis-transcribed.)       Usha Diamond MD  06/08/19 8120

## 2019-06-09 NOTE — ED NOTES
Patient prepared for and ready to be discharged. Patient discharged at this time in no acute distress after verbalizing understanding of discharge instructions. Patient left after receiving After Visit Summary instructions.       Michael Flowers, MAGO  06/08/19 [id number]

## 2019-06-10 ENCOUNTER — CARE COORDINATION (OUTPATIENT)
Dept: CARE COORDINATION | Age: 74
End: 2019-06-10

## 2019-06-10 DIAGNOSIS — D72.829 LEUKOCYTOSIS, UNSPECIFIED TYPE: ICD-10-CM

## 2019-06-10 DIAGNOSIS — N18.30 CHRONIC KIDNEY DISEASE, STAGE III (MODERATE) (HCC): Primary | ICD-10-CM

## 2019-06-10 NOTE — CARE COORDINATION
.  Ambulatory Care Coordination  ED Follow up Call    Reason for ED visit:  Nausea, Diaphoresis, Pallor, Lightheadness  Status:     significantly improved    Did you call your PCP prior to going to the ED? Not Applicable      Did you receive a discharge instructions from the Emergency Room? Yes  Review of Instructions:     Understands what to report/when to return?:  Yes   Understands discharge instructions?:  Yes   Following discharge instructions?:  Yes       Are there any new complaints of pain? No  New Pain Meds? N/A    Constipation prophylaxis needed? N/A    If you have a wound is the dressing clean, dry, and intact? N/A  Understands wound care regimen? N/A    Are there any other complaints/concerns that you wish to tell your provider? Pt stated symptoms have all subsided and he feels fine. The [facility] encouraged the pt to have the repeat lab work drawn and to call for an appointment with Dr. Rae Giles by Wednesday if symptoms return. Repeat CBC with Diff and BMP ordered by PCP. FU appts/Provider:    Future Appointments   Date Time Provider Svitlana Burrows   8/1/2019  1:30 PM Douglas Aldana MD [facility]   8/7/2019  9:20 AM Abhinav Cruz MD [place] [facility]   8/20/2019  9:40 AM MD James Silver P/CC [facility]   11/4/2019  9:00 AM MD James Tucker Card [facility]   11/20/2019  9:00 AM Roxi Weston MD [facility]           New Medications?:   No      Medication Reconciliation by phone - Unchanged per pt. Understands Medications? Yes  Taking Medications? Yes  Can you swallow your pills? Yes    Any further needs in the home i.e. Equipment?   Not Applicable    Link to services in community?:  N/A   Which services:  NA

## 2019-06-11 DIAGNOSIS — D72.829 LEUKOCYTOSIS, UNSPECIFIED TYPE: ICD-10-CM

## 2019-06-11 DIAGNOSIS — N18.30 CHRONIC KIDNEY DISEASE, STAGE III (MODERATE) (HCC): ICD-10-CM

## 2019-06-11 LAB
BASOPHILS ABSOLUTE: 0.1 K/UL (ref 0–0.2)
BASOPHILS RELATIVE PERCENT: 0.8 %
EOSINOPHILS ABSOLUTE: 0.5 K/UL (ref 0–0.6)
EOSINOPHILS RELATIVE PERCENT: 4.9 %
HCT VFR BLD CALC: 38.1 % (ref 40.5–52.5)
HEMOGLOBIN: 12.6 G/DL (ref 13.5–17.5)
LYMPHOCYTES ABSOLUTE: 1.7 K/UL (ref 1–5.1)
LYMPHOCYTES RELATIVE PERCENT: 17.9 %
MCH RBC QN AUTO: 32.8 PG (ref 26–34)
MCHC RBC AUTO-ENTMCNC: 33.1 G/DL (ref 31–36)
MCV RBC AUTO: 98.9 FL (ref 80–100)
MONOCYTES ABSOLUTE: 0.5 K/UL (ref 0–1.3)
MONOCYTES RELATIVE PERCENT: 5.1 %
NEUTROPHILS ABSOLUTE: 6.9 K/UL (ref 1.7–7.7)
NEUTROPHILS RELATIVE PERCENT: 71.3 %
PDW BLD-RTO: 13.7 % (ref 12.4–15.4)
PLATELET # BLD: 321 K/UL (ref 135–450)
PMV BLD AUTO: 8.8 FL (ref 5–10.5)
RBC # BLD: 3.85 M/UL (ref 4.2–5.9)
WBC # BLD: 9.6 K/UL (ref 4–11)

## 2019-06-12 LAB
ANION GAP SERPL CALCULATED.3IONS-SCNC: 16 MMOL/L (ref 3–16)
BUN BLDV-MCNC: 64 MG/DL (ref 7–20)
CALCIUM SERPL-MCNC: 9.4 MG/DL (ref 8.3–10.6)
CHLORIDE BLD-SCNC: 102 MMOL/L (ref 99–110)
CO2: 23 MMOL/L (ref 21–32)
CREAT SERPL-MCNC: 3.2 MG/DL (ref 0.8–1.3)
GFR AFRICAN AMERICAN: 23
GFR NON-AFRICAN AMERICAN: 19
GLUCOSE BLD-MCNC: 222 MG/DL (ref 70–99)
POTASSIUM SERPL-SCNC: 4.9 MMOL/L (ref 3.5–5.1)
SODIUM BLD-SCNC: 141 MMOL/L (ref 136–145)

## 2019-06-12 RX ORDER — BLOOD SUGAR DIAGNOSTIC
STRIP MISCELLANEOUS
Qty: 150 STRIP | Refills: 3 | Status: SHIPPED | OUTPATIENT
Start: 2019-06-12 | End: 2019-10-07 | Stop reason: SDUPTHER

## 2019-06-21 RX ORDER — ATORVASTATIN CALCIUM 40 MG/1
TABLET, FILM COATED ORAL
Qty: 90 TABLET | Refills: 1 | Status: SHIPPED | OUTPATIENT
Start: 2019-06-21 | End: 2019-12-20

## 2019-06-21 NOTE — TELEPHONE ENCOUNTER
Medication:   Requested Prescriptions     Pending Prescriptions Disp Refills    atorvastatin (LIPITOR) 40 MG tablet [Pharmacy Med Name: ATORVASTATIN 40 MG TABLET] 90 tablet 1     Sig: TAKE ONE TABLET BY MOUTH ONCE NIGHTLY     Last OV: 10/01/2018  Next OV: 11/04/2019  Last LAB:06/11/2019

## 2019-07-18 ENCOUNTER — CARE COORDINATION (OUTPATIENT)
Dept: CARE COORDINATION | Age: 74
End: 2019-07-18

## 2019-07-18 NOTE — CARE COORDINATION
Interventions and Community Resources  Cardiac Rehab:  Completed  Diabetes Education: In Process  Fall Risk Prevention:  Not Started  Home Health Services:  Completed  Physical Therapy:  Completed  Pulmonary Rehab: In Process  Registered Dietician:  Completed  Specialty Services Referral:  Completed (Comment: Pulmonary Rehabe Jan 2019)  Other Services:  Completed (Comment: Sleep Study)  Zone Management Tools: In Process (Comment: Diabetes and Heart Failure zones mailed to pt)  Other Services or Interventions:  Endo, Nephro, Pulm, Cardio. Pt still adjusting to BIPAP         Goals Addressed                 This Visit's Progress     Conditions and Symptoms   Improving     I will follow my Zone Management tool to seek urgent or emergent care. I will notify my provider of any symptoms that indicate a worsening of my condition. Barriers: none  Plan for overcoming my barriers: N/A  Confidence: 8/10  Anticipated Goal Completion Date: 9/1/18 Extended Goal Date: 09/10/19  Pt has had syncopal episodes in 4/2018 Stress Test and Holter Monitor recently. (5/16/18)  Pt still having syncope and dizziness (10/101/18  6/10/19: ED visit: Nausea, diaphoresis, lightheadedness            Prior to Admission medications    Medication Sig Start Date End Date Taking?  Authorizing Provider   atorvastatin (LIPITOR) 40 MG tablet TAKE ONE TABLET BY MOUTH ONCE NIGHTLY 6/21/19   Stuart Dopp, APRN - CNP   ACCU-CHEK JOHNATHAN PLUS strip USE TO TEST FOUR TIMES A DAY AS NEEDED 6/12/19   Romel Lagos MD   HUMALOG KWIKPEN 100 UNIT/ML pen INJECT 32 UNITS UNDER THE SKIN THREE TIMES A DAY BEFORE MEALS 5/24/19   Romel Lagos MD   fenofibric acid (FIBRICOR) 135 MG CPDR capsule TAKE ONE CAPSULE BY MOUTH DAILY 5/3/19   Romel Lagos MD   furosemide (LASIX) 40 MG tablet TAKE ONE-HALF TABLET BY MOUTH DAILY 4/30/19   Perla Campuzano MD   levothyroxine (SYNTHROID) 50 MCG tablet TAKE ONE TABLET BY MOUTH DAILY 2/28/19   Romel Lagos MD Insulin Pen Needle (PEN NEEDLES 31GX5/16\") 31G X 8 MM MISC USE WITH INSULIN FOUR TIMES A DAY 1/16/19   Angelic Ballesteros MD   rOPINIRole (REQUIP) 4 MG tablet Take 1 tablet by mouth nightly TAKE ONE TABLET BY MOUTH EVERY EVENING 10/4/18   Ezra Fermin MD   metoprolol succinate (TOPROL XL) 25 MG extended release tablet Take 1 tablet by mouth daily 10/1/18   Roslyn Morataya MD   LANTUS SOLOSTAR 100 UNIT/ML injection pen INJECT 40 UNITS UNDER THE SKIN EVERY NIGHT AT BEDTIME  Patient taking differently: INJECT 20 UNITS UNDER THE SKIN EVERY NIGHT AT BEDTIME 8/9/18   Antonette Eng MD   albuterol sulfate HFA (PROVENTIL HFA) 108 (90 Base) MCG/ACT inhaler Inhale 2 puffs into the lungs every 4 hours as needed for Wheezing 7/2/18 7/2/19  Ezra Fermin MD   mometasone-formoterol [facility]) 200-5 MCG/ACT inhaler Inhale 2 puffs into the lungs every 12 hours 5/8/18   Ezra Fermin MD   Handarlene Placard MISC by Does not apply route Duration: 5 years 4/18/18   Ezra Fermin MD   ACCU-CHEK SOFTCLIX LANCETS MISC USE FOUR TIMES A DAY 11/21/17   Antonette Eng MD   glucose blood VI test strips (ACCU-CHEK JOHNATHAN) strip 4 times a day As needed. 11/21/16   Antonette Eng MD   Blood Glucose Monitoring Suppl (ACCU-CHEK JOHNATHAN) RUMA Use as needed 11/21/16   Antonette Eng MD   aspirin 81 MG chewable tablet Take 1 tablet by mouth daily. 3/28/12   Nanda Gary MD   Lancets MISC 2 times daily. Lancets for a Anna meter.  7/16/10   Angelic Ballesteros MD       Future Appointments   Date Time Provider Svitlana Burrows   8/1/2019  1:30 PM Ruth Clark MD [facility]   8/7/2019  9:20 AM Antonette Eng MD [place] [facility]   8/20/2019  9:40 AM MD James Daniel P/CC [facility]   11/4/2019  9:00 AM MD James Villanueva [facility]   11/20/2019  9:00 AM Tameka Ordaz MD [facility] [address] [facility]     ,   Diabetes Assessment    Medic Alert ID:  No  Meal Planning:  Carb counting, Plate Method   How often do you test your

## 2019-07-26 PROCEDURE — 9900000038 HC CARDIAC REHAB PHASE 3 - MONTHLY: Performed by: CLINICAL NURSE SPECIALIST

## 2019-07-29 ENCOUNTER — HOSPITAL ENCOUNTER (OUTPATIENT)
Dept: CARDIAC REHAB | Age: 74
Discharge: HOME | End: 2019-07-29

## 2019-07-29 DIAGNOSIS — N18.4 CKD (CHRONIC KIDNEY DISEASE) STAGE 4, GFR 15-29 ML/MIN (HCC): ICD-10-CM

## 2019-07-29 LAB
ALBUMIN SERPL-MCNC: 4.5 G/DL (ref 3.4–5)
ANION GAP SERPL CALCULATED.3IONS-SCNC: 13 MMOL/L (ref 3–16)
BUN BLDV-MCNC: 58 MG/DL (ref 7–20)
CALCIUM SERPL-MCNC: 10 MG/DL (ref 8.3–10.6)
CHLORIDE BLD-SCNC: 104 MMOL/L (ref 99–110)
CO2: 27 MMOL/L (ref 21–32)
CREAT SERPL-MCNC: 2.7 MG/DL (ref 0.8–1.3)
GFR AFRICAN AMERICAN: 28
GFR NON-AFRICAN AMERICAN: 23
GLUCOSE BLD-MCNC: 144 MG/DL (ref 70–99)
PHOSPHORUS: 4 MG/DL (ref 2.5–4.9)
POTASSIUM SERPL-SCNC: 5.5 MMOL/L (ref 3.5–5.1)
SODIUM BLD-SCNC: 144 MMOL/L (ref 136–145)

## 2019-08-07 ENCOUNTER — OFFICE VISIT (OUTPATIENT)
Dept: ENDOCRINOLOGY | Age: 74
End: 2019-08-07
Payer: MEDICARE

## 2019-08-07 VITALS
HEART RATE: 75 BPM | OXYGEN SATURATION: 97 % | RESPIRATION RATE: 22 BRPM | SYSTOLIC BLOOD PRESSURE: 99 MMHG | BODY MASS INDEX: 28.35 KG/M2 | WEIGHT: 198 LBS | HEIGHT: 70 IN | DIASTOLIC BLOOD PRESSURE: 63 MMHG

## 2019-08-07 DIAGNOSIS — Z79.4 INSULIN DEPENDENT TYPE 2 DIABETES MELLITUS, CONTROLLED (HCC): Primary | ICD-10-CM

## 2019-08-07 DIAGNOSIS — I10 ESSENTIAL HYPERTENSION: ICD-10-CM

## 2019-08-07 DIAGNOSIS — E11.9 INSULIN DEPENDENT TYPE 2 DIABETES MELLITUS, CONTROLLED (HCC): Primary | ICD-10-CM

## 2019-08-07 LAB — HBA1C MFR BLD: 6.9 %

## 2019-08-07 PROCEDURE — G8417 CALC BMI ABV UP PARAM F/U: HCPCS | Performed by: INTERNAL MEDICINE

## 2019-08-07 PROCEDURE — 83036 HEMOGLOBIN GLYCOSYLATED A1C: CPT | Performed by: INTERNAL MEDICINE

## 2019-08-07 PROCEDURE — G8598 ASA/ANTIPLAT THER USED: HCPCS | Performed by: INTERNAL MEDICINE

## 2019-08-07 PROCEDURE — 1123F ACP DISCUSS/DSCN MKR DOCD: CPT | Performed by: INTERNAL MEDICINE

## 2019-08-07 PROCEDURE — 3017F COLORECTAL CA SCREEN DOC REV: CPT | Performed by: INTERNAL MEDICINE

## 2019-08-07 PROCEDURE — 2022F DILAT RTA XM EVC RTNOPTHY: CPT | Performed by: INTERNAL MEDICINE

## 2019-08-07 PROCEDURE — 3044F HG A1C LEVEL LT 7.0%: CPT | Performed by: INTERNAL MEDICINE

## 2019-08-07 PROCEDURE — 4040F PNEUMOC VAC/ADMIN/RCVD: CPT | Performed by: INTERNAL MEDICINE

## 2019-08-07 PROCEDURE — G8427 DOCREV CUR MEDS BY ELIG CLIN: HCPCS | Performed by: INTERNAL MEDICINE

## 2019-08-07 PROCEDURE — 99214 OFFICE O/P EST MOD 30 MIN: CPT | Performed by: INTERNAL MEDICINE

## 2019-08-07 PROCEDURE — 1036F TOBACCO NON-USER: CPT | Performed by: INTERNAL MEDICINE

## 2019-08-07 RX ORDER — FLASH GLUCOSE SENSOR
KIT MISCELLANEOUS
Qty: 2 EACH | Refills: 0 | Status: SHIPPED | OUTPATIENT
Start: 2019-08-07 | End: 2019-08-10 | Stop reason: SDUPTHER

## 2019-08-07 RX ORDER — FLASH GLUCOSE SCANNING READER
EACH MISCELLANEOUS
Qty: 1 DEVICE | Refills: 0 | Status: SHIPPED | OUTPATIENT
Start: 2019-08-07 | End: 2019-08-10 | Stop reason: SDUPTHER

## 2019-08-08 ENCOUNTER — CARE COORDINATION (OUTPATIENT)
Dept: CARE COORDINATION | Age: 74
End: 2019-08-08

## 2019-08-09 ENCOUNTER — TELEPHONE (OUTPATIENT)
Dept: ENDOCRINOLOGY | Age: 74
End: 2019-08-09

## 2019-08-09 DIAGNOSIS — E11.9 INSULIN DEPENDENT TYPE 2 DIABETES MELLITUS, CONTROLLED (HCC): Primary | ICD-10-CM

## 2019-08-09 DIAGNOSIS — Z79.4 INSULIN DEPENDENT TYPE 2 DIABETES MELLITUS, CONTROLLED (HCC): Primary | ICD-10-CM

## 2019-08-10 RX ORDER — FLASH GLUCOSE SENSOR
KIT MISCELLANEOUS
Qty: 2 EACH | Refills: 0 | Status: SHIPPED | OUTPATIENT
Start: 2019-08-10 | End: 2020-02-12

## 2019-08-10 RX ORDER — FLASH GLUCOSE SCANNING READER
EACH MISCELLANEOUS
Qty: 1 DEVICE | Refills: 0 | Status: SHIPPED | OUTPATIENT
Start: 2019-08-10 | End: 2020-02-12

## 2019-08-13 DIAGNOSIS — E11.9 INSULIN DEPENDENT TYPE 2 DIABETES MELLITUS, CONTROLLED (HCC): ICD-10-CM

## 2019-08-13 DIAGNOSIS — Z79.4 INSULIN DEPENDENT TYPE 2 DIABETES MELLITUS, CONTROLLED (HCC): ICD-10-CM

## 2019-08-13 RX ORDER — INSULIN GLARGINE 100 [IU]/ML
INJECTION, SOLUTION SUBCUTANEOUS
Qty: 14 ML | Refills: 3 | Status: SHIPPED | OUTPATIENT
Start: 2019-08-13 | End: 2020-04-02 | Stop reason: SDUPTHER

## 2019-08-15 ENCOUNTER — TELEPHONE (OUTPATIENT)
Dept: PHARMACY | Facility: CLINIC | Age: 74
End: 2019-08-15

## 2019-08-15 PROCEDURE — 9900000038 HC CARDIAC REHAB PHASE 3 - MONTHLY: Performed by: CLINICAL NURSE SPECIALIST

## 2019-08-26 ENCOUNTER — HOSPITAL ENCOUNTER (OUTPATIENT)
Dept: CARDIAC REHAB | Age: 74
Discharge: HOME | End: 2019-08-26

## 2019-08-28 ENCOUNTER — OFFICE VISIT (OUTPATIENT)
Dept: PULMONOLOGY | Age: 74
End: 2019-08-28
Payer: MEDICARE

## 2019-08-28 VITALS
HEART RATE: 71 BPM | WEIGHT: 198 LBS | HEIGHT: 70 IN | RESPIRATION RATE: 16 BRPM | DIASTOLIC BLOOD PRESSURE: 62 MMHG | BODY MASS INDEX: 28.35 KG/M2 | SYSTOLIC BLOOD PRESSURE: 112 MMHG | OXYGEN SATURATION: 97 %

## 2019-08-28 DIAGNOSIS — J84.9 ILD (INTERSTITIAL LUNG DISEASE) (HCC): Primary | ICD-10-CM

## 2019-08-28 DIAGNOSIS — R06.09 DOE (DYSPNEA ON EXERTION): ICD-10-CM

## 2019-08-28 DIAGNOSIS — G47.33 OSA TREATED WITH BIPAP: ICD-10-CM

## 2019-08-28 DIAGNOSIS — I50.32 CHRONIC DIASTOLIC HEART FAILURE (HCC): ICD-10-CM

## 2019-08-28 DIAGNOSIS — J45.991 COUGH VARIANT ASTHMA: ICD-10-CM

## 2019-08-28 PROCEDURE — 4040F PNEUMOC VAC/ADMIN/RCVD: CPT | Performed by: INTERNAL MEDICINE

## 2019-08-28 PROCEDURE — G8427 DOCREV CUR MEDS BY ELIG CLIN: HCPCS | Performed by: INTERNAL MEDICINE

## 2019-08-28 PROCEDURE — 1036F TOBACCO NON-USER: CPT | Performed by: INTERNAL MEDICINE

## 2019-08-28 PROCEDURE — G8417 CALC BMI ABV UP PARAM F/U: HCPCS | Performed by: INTERNAL MEDICINE

## 2019-08-28 PROCEDURE — 1123F ACP DISCUSS/DSCN MKR DOCD: CPT | Performed by: INTERNAL MEDICINE

## 2019-08-28 PROCEDURE — 3017F COLORECTAL CA SCREEN DOC REV: CPT | Performed by: INTERNAL MEDICINE

## 2019-08-28 PROCEDURE — 99214 OFFICE O/P EST MOD 30 MIN: CPT | Performed by: INTERNAL MEDICINE

## 2019-08-28 PROCEDURE — G8598 ASA/ANTIPLAT THER USED: HCPCS | Performed by: INTERNAL MEDICINE

## 2019-08-28 NOTE — PROGRESS NOTES
SKIN EVERY NIGHT AT BEDTIME 14 mL 3    Continuous Blood Gluc  (FREESTYLE MAGGIE 14 DAY READER) RUMA As needed 1 Device 0    Continuous Blood Gluc Sensor (FREESTYLE MAGGIE 14 DAY SENSOR) MISC Every 2 weeks 2 each 0    atorvastatin (LIPITOR) 40 MG tablet TAKE ONE TABLET BY MOUTH ONCE NIGHTLY 90 tablet 1    ACCU-CHEK JOHNATHAN PLUS strip USE TO TEST FOUR TIMES A DAY AS NEEDED 150 strip 3    HUMALOG KWIKPEN 100 UNIT/ML pen INJECT 32 UNITS UNDER THE SKIN THREE TIMES A DAY BEFORE MEALS 30 mL 3    furosemide (LASIX) 40 MG tablet TAKE ONE-HALF TABLET BY MOUTH DAILY 45 tablet 1    levothyroxine (SYNTHROID) 50 MCG tablet TAKE ONE TABLET BY MOUTH DAILY 30 tablet 10    Insulin Pen Needle (PEN NEEDLES 31GX5/16\") 31G X 8 MM MISC USE WITH INSULIN FOUR TIMES A  each 1    metoprolol succinate (TOPROL XL) 25 MG extended release tablet Take 1 tablet by mouth daily 90 tablet 3    mometasone-formoterol (DULERA) 200-5 MCG/ACT inhaler Inhale 2 puffs into the lungs every 12 hours 1 Inhaler 11    Handicap Placard MISC by Does not apply route Duration: 5 years 1 each 0    ACCU-CHEK SOFTCLIX LANCETS MISC USE FOUR TIMES A  each 3    glucose blood VI test strips (ACCU-CHEK JOHNATHAN) strip 4 times a day As needed. 150 each 3    Blood Glucose Monitoring Suppl (ACCU-CHEK JOHNATHAN) RUMA Use as needed 1 Device 0    aspirin 81 MG chewable tablet Take 1 tablet by mouth daily. 30 tablet 5    Lancets MISC 2 times daily. Lancets for a Anna meter. 50 each 11    albuterol sulfate HFA (PROVENTIL HFA) 108 (90 Base) MCG/ACT inhaler Inhale 2 puffs into the lungs every 4 hours as needed for Wheezing 1 Inhaler 5     No current facility-administered medications on file prior to visit.         REVIEW OF SYSTEMS:    CONSTITUTIONAL: Negative for fevers and chills  HEENT: Negative for oropharyngeal exudate, post nasal drip, sinus pain / pressure, nasal congestion, ear pain  RESPIRATORY:  See HPI  CARDIOVASCULAR: Negative for chest

## 2019-09-11 ENCOUNTER — CARE COORDINATION (OUTPATIENT)
Dept: CARE COORDINATION | Age: 74
End: 2019-09-11

## 2019-09-11 NOTE — CARE COORDINATION
Shalonda Jalloh MD   Handicap Placard MISC by Does not apply route Duration: 5 years 4/18/18   Keeley Short MD   ACCU-CHEK SOFTCLIX LANCETS MISC USE FOUR TIMES A DAY 11/21/17   Richard Mcdermott MD   glucose blood VI test strips (ACCU-CHEK JOHNATHAN) strip 4 times a day As needed. 11/21/16   Richard Mcdermott MD   Blood Glucose Monitoring Suppl (ACCU-CHEK JOHNATHAN) RUMA Use as needed 11/21/16   Richard Mcdermott MD   aspirin 81 MG chewable tablet Take 1 tablet by mouth daily. 3/28/12   Malik Beckham MD   Lancets MISC 2 times daily. Lancets for a [place] meter. 7/16/10   Sen Turk MD       Future Appointments   Date Time Provider Svitlana Burnsi   11/4/2019  9:00 AM Yaa Petit MD [address] [facility]   11/5/2019  2:00 PM Cintia Morelos MD [facility]   11/6/2019  9:20 AM Richard Mcdermott MD [facility]   11/13/2019  9:20 AM MD James Menon P/CC [facility]   11/14/2019  1:20 PM MD James Menon P/CC [facility]   11/20/2019  9:00 AM Ashley Asif MD [address] [facility]     ,   Diabetes Assessment    Medic Alert ID:  No  Meal Planning:  Carb counting, Plate Method   How often do you test your blood sugar?:  Meals   Do you have barriers with adherence to non-pharmacologic self-management interventions?  (Nutrition/Exercise/Self-Monitoring):  No   Have you ever had to go to the ED for symptoms of low blood sugar?:  No       No patient-reported symptoms      ,   Congestive Heart Failure Assessment    Are you currently restricting fluids?:  No Restriction  Do you understand a low sodium diet?:  Yes  Do you understand how to read food labels?:  Yes  Do you salt your food before tasting it?:  No     No patient-reported symptoms      Symptoms:   None:  Yes      Symptom course:  stable  Weight trend:  stable  Salt intake watch compared to last visit:  stable      and   General Assessment    Do you have any symptoms that are causing concern?:  No

## 2019-09-18 PROCEDURE — 9900000038 HC CARDIAC REHAB PHASE 3 - MONTHLY: Performed by: CLINICAL NURSE SPECIALIST

## 2019-09-30 ENCOUNTER — HOSPITAL ENCOUNTER (OUTPATIENT)
Dept: CARDIAC REHAB | Age: 74
Discharge: HOME | End: 2019-09-30

## 2019-10-08 RX ORDER — BLOOD SUGAR DIAGNOSTIC
STRIP MISCELLANEOUS
Qty: 100 STRIP | Refills: 5 | Status: SHIPPED | OUTPATIENT
Start: 2019-10-08 | End: 2020-04-28

## 2019-10-28 ENCOUNTER — HOSPITAL ENCOUNTER (OUTPATIENT)
Dept: CARDIAC REHAB | Age: 74
Discharge: HOME | End: 2019-10-28

## 2019-10-28 PROCEDURE — 9900000038 HC CARDIAC REHAB PHASE 3 - MONTHLY: Performed by: CLINICAL NURSE SPECIALIST

## 2019-10-30 RX ORDER — FENOFIBRIC ACID 135 MG/1
CAPSULE, DELAYED RELEASE ORAL
Qty: 90 CAPSULE | Refills: 0 | Status: SHIPPED | OUTPATIENT
Start: 2019-10-30 | End: 2019-11-04

## 2019-11-04 ENCOUNTER — OFFICE VISIT (OUTPATIENT)
Dept: CARDIOLOGY CLINIC | Age: 74
End: 2019-11-04
Payer: MEDICARE

## 2019-11-04 VITALS
SYSTOLIC BLOOD PRESSURE: 124 MMHG | WEIGHT: 196 LBS | BODY MASS INDEX: 28.12 KG/M2 | HEART RATE: 82 BPM | DIASTOLIC BLOOD PRESSURE: 72 MMHG | RESPIRATION RATE: 20 BRPM | OXYGEN SATURATION: 98 %

## 2019-11-04 DIAGNOSIS — I25.10 ATHEROSCLEROSIS OF NATIVE CORONARY ARTERY OF NATIVE HEART WITHOUT ANGINA PECTORIS: ICD-10-CM

## 2019-11-04 DIAGNOSIS — I10 ESSENTIAL HYPERTENSION: ICD-10-CM

## 2019-11-04 DIAGNOSIS — J84.9 ILD (INTERSTITIAL LUNG DISEASE) (HCC): ICD-10-CM

## 2019-11-04 DIAGNOSIS — I25.10 CORONARY ARTERY DISEASE INVOLVING NATIVE CORONARY ARTERY OF NATIVE HEART WITHOUT ANGINA PECTORIS: ICD-10-CM

## 2019-11-04 DIAGNOSIS — E87.0 HYPERNATREMIA: ICD-10-CM

## 2019-11-04 DIAGNOSIS — E87.5 HYPERKALEMIA: ICD-10-CM

## 2019-11-04 DIAGNOSIS — Z95.1 S/P CABG X 4: Primary | ICD-10-CM

## 2019-11-04 DIAGNOSIS — E78.2 MIXED HYPERLIPIDEMIA: ICD-10-CM

## 2019-11-04 LAB
A/G RATIO: 1.9 (ref 1.1–2.2)
ALBUMIN SERPL-MCNC: 4.3 G/DL (ref 3.4–5)
ALP BLD-CCNC: 83 U/L (ref 40–129)
ALT SERPL-CCNC: 40 U/L (ref 10–40)
ANION GAP SERPL CALCULATED.3IONS-SCNC: 13 MMOL/L (ref 3–16)
AST SERPL-CCNC: 32 U/L (ref 15–37)
BILIRUB SERPL-MCNC: 0.4 MG/DL (ref 0–1)
BUN BLDV-MCNC: 53 MG/DL (ref 7–20)
CALCIUM SERPL-MCNC: 9.5 MG/DL (ref 8.3–10.6)
CHLORIDE BLD-SCNC: 104 MMOL/L (ref 99–110)
CHOLESTEROL, TOTAL: 106 MG/DL (ref 0–199)
CO2: 23 MMOL/L (ref 21–32)
CREAT SERPL-MCNC: 2.2 MG/DL (ref 0.8–1.3)
GFR AFRICAN AMERICAN: 36
GFR NON-AFRICAN AMERICAN: 29
GLOBULIN: 2.3 G/DL
GLUCOSE BLD-MCNC: 143 MG/DL (ref 70–99)
HDLC SERPL-MCNC: 39 MG/DL (ref 40–60)
LDL CHOLESTEROL CALCULATED: 14 MG/DL
POTASSIUM SERPL-SCNC: 5.1 MMOL/L (ref 3.5–5.1)
SODIUM BLD-SCNC: 140 MMOL/L (ref 136–145)
TOTAL PROTEIN: 6.6 G/DL (ref 6.4–8.2)
TRIGL SERPL-MCNC: 265 MG/DL (ref 0–150)
VLDLC SERPL CALC-MCNC: 53 MG/DL

## 2019-11-04 PROCEDURE — 99214 OFFICE O/P EST MOD 30 MIN: CPT | Performed by: INTERNAL MEDICINE

## 2019-11-04 PROCEDURE — 3017F COLORECTAL CA SCREEN DOC REV: CPT | Performed by: INTERNAL MEDICINE

## 2019-11-04 PROCEDURE — 4040F PNEUMOC VAC/ADMIN/RCVD: CPT | Performed by: INTERNAL MEDICINE

## 2019-11-04 PROCEDURE — G8417 CALC BMI ABV UP PARAM F/U: HCPCS | Performed by: INTERNAL MEDICINE

## 2019-11-04 PROCEDURE — G8427 DOCREV CUR MEDS BY ELIG CLIN: HCPCS | Performed by: INTERNAL MEDICINE

## 2019-11-04 PROCEDURE — G8598 ASA/ANTIPLAT THER USED: HCPCS | Performed by: INTERNAL MEDICINE

## 2019-11-04 PROCEDURE — 93000 ELECTROCARDIOGRAM COMPLETE: CPT | Performed by: INTERNAL MEDICINE

## 2019-11-04 PROCEDURE — 1123F ACP DISCUSS/DSCN MKR DOCD: CPT | Performed by: INTERNAL MEDICINE

## 2019-11-04 PROCEDURE — G8484 FLU IMMUNIZE NO ADMIN: HCPCS | Performed by: INTERNAL MEDICINE

## 2019-11-04 PROCEDURE — 1036F TOBACCO NON-USER: CPT | Performed by: INTERNAL MEDICINE

## 2019-11-04 RX ORDER — M-VIT,TX,IRON,MINS/CALC/FOLIC 27MG-0.4MG
1 TABLET ORAL DAILY
COMMUNITY
End: 2021-06-28 | Stop reason: ALTCHOICE

## 2019-11-06 ENCOUNTER — OFFICE VISIT (OUTPATIENT)
Dept: ENDOCRINOLOGY | Age: 74
End: 2019-11-06
Payer: MEDICARE

## 2019-11-06 VITALS
HEART RATE: 73 BPM | SYSTOLIC BLOOD PRESSURE: 122 MMHG | WEIGHT: 197.2 LBS | DIASTOLIC BLOOD PRESSURE: 69 MMHG | OXYGEN SATURATION: 97 % | HEIGHT: 70 IN | BODY MASS INDEX: 28.23 KG/M2

## 2019-11-06 DIAGNOSIS — Z79.4 INSULIN DEPENDENT TYPE 2 DIABETES MELLITUS, CONTROLLED (HCC): Primary | ICD-10-CM

## 2019-11-06 DIAGNOSIS — I10 ESSENTIAL HYPERTENSION: ICD-10-CM

## 2019-11-06 DIAGNOSIS — E11.9 INSULIN DEPENDENT TYPE 2 DIABETES MELLITUS, CONTROLLED (HCC): Primary | ICD-10-CM

## 2019-11-06 DIAGNOSIS — E78.49 OTHER HYPERLIPIDEMIA: ICD-10-CM

## 2019-11-06 LAB — HBA1C MFR BLD: 6.1 %

## 2019-11-06 PROCEDURE — 3017F COLORECTAL CA SCREEN DOC REV: CPT | Performed by: INTERNAL MEDICINE

## 2019-11-06 PROCEDURE — 99214 OFFICE O/P EST MOD 30 MIN: CPT | Performed by: INTERNAL MEDICINE

## 2019-11-06 PROCEDURE — G8484 FLU IMMUNIZE NO ADMIN: HCPCS | Performed by: INTERNAL MEDICINE

## 2019-11-06 PROCEDURE — 3044F HG A1C LEVEL LT 7.0%: CPT | Performed by: INTERNAL MEDICINE

## 2019-11-06 PROCEDURE — 2022F DILAT RTA XM EVC RTNOPTHY: CPT | Performed by: INTERNAL MEDICINE

## 2019-11-06 PROCEDURE — 1036F TOBACCO NON-USER: CPT | Performed by: INTERNAL MEDICINE

## 2019-11-06 PROCEDURE — 83036 HEMOGLOBIN GLYCOSYLATED A1C: CPT | Performed by: INTERNAL MEDICINE

## 2019-11-06 PROCEDURE — 4040F PNEUMOC VAC/ADMIN/RCVD: CPT | Performed by: INTERNAL MEDICINE

## 2019-11-06 PROCEDURE — 1123F ACP DISCUSS/DSCN MKR DOCD: CPT | Performed by: INTERNAL MEDICINE

## 2019-11-06 PROCEDURE — G8427 DOCREV CUR MEDS BY ELIG CLIN: HCPCS | Performed by: INTERNAL MEDICINE

## 2019-11-06 PROCEDURE — G8598 ASA/ANTIPLAT THER USED: HCPCS | Performed by: INTERNAL MEDICINE

## 2019-11-06 PROCEDURE — G8417 CALC BMI ABV UP PARAM F/U: HCPCS | Performed by: INTERNAL MEDICINE

## 2019-11-06 RX ORDER — BLOOD-GLUCOSE,RECEIVER,CONT
EACH MISCELLANEOUS
Qty: 1 DEVICE | Refills: 0 | Status: SHIPPED | OUTPATIENT
Start: 2019-11-06 | End: 2019-12-04 | Stop reason: SDUPTHER

## 2019-11-06 RX ORDER — BLOOD-GLUCOSE SENSOR
EACH MISCELLANEOUS
Qty: 4 EACH | Refills: 2 | Status: SHIPPED | OUTPATIENT
Start: 2019-11-06 | End: 2019-12-04 | Stop reason: SDUPTHER

## 2019-11-06 RX ORDER — BLOOD-GLUCOSE TRANSMITTER
EACH MISCELLANEOUS
Qty: 1 EACH | Refills: 0 | Status: SHIPPED | OUTPATIENT
Start: 2019-11-06 | End: 2019-12-04 | Stop reason: SDUPTHER

## 2019-11-08 PROCEDURE — 9900000038 HC CARDIAC REHAB PHASE 3 - MONTHLY: Performed by: CLINICAL NURSE SPECIALIST

## 2019-11-12 RX ORDER — PEN NEEDLE, DIABETIC 31 GX5/16"
NEEDLE, DISPOSABLE MISCELLANEOUS
Qty: 360 EACH | Refills: 1 | Status: SHIPPED | OUTPATIENT
Start: 2019-11-12 | End: 2020-04-28

## 2019-11-13 ENCOUNTER — OFFICE VISIT (OUTPATIENT)
Dept: PULMONOLOGY | Age: 74
End: 2019-11-13
Payer: MEDICARE

## 2019-11-13 VITALS
BODY MASS INDEX: 28.35 KG/M2 | HEART RATE: 72 BPM | DIASTOLIC BLOOD PRESSURE: 72 MMHG | WEIGHT: 198 LBS | OXYGEN SATURATION: 96 % | SYSTOLIC BLOOD PRESSURE: 128 MMHG | RESPIRATION RATE: 16 BRPM | HEIGHT: 70 IN

## 2019-11-13 DIAGNOSIS — J84.9 ILD (INTERSTITIAL LUNG DISEASE) (HCC): Primary | ICD-10-CM

## 2019-11-13 DIAGNOSIS — J45.991 COUGH VARIANT ASTHMA: ICD-10-CM

## 2019-11-13 DIAGNOSIS — I50.32 CHRONIC DIASTOLIC HEART FAILURE (HCC): ICD-10-CM

## 2019-11-13 DIAGNOSIS — G47.33 OSA (OBSTRUCTIVE SLEEP APNEA): ICD-10-CM

## 2019-11-13 PROCEDURE — 99214 OFFICE O/P EST MOD 30 MIN: CPT | Performed by: INTERNAL MEDICINE

## 2019-11-13 PROCEDURE — 1123F ACP DISCUSS/DSCN MKR DOCD: CPT | Performed by: INTERNAL MEDICINE

## 2019-11-13 PROCEDURE — G8484 FLU IMMUNIZE NO ADMIN: HCPCS | Performed by: INTERNAL MEDICINE

## 2019-11-13 PROCEDURE — G8417 CALC BMI ABV UP PARAM F/U: HCPCS | Performed by: INTERNAL MEDICINE

## 2019-11-13 PROCEDURE — 1036F TOBACCO NON-USER: CPT | Performed by: INTERNAL MEDICINE

## 2019-11-13 PROCEDURE — G8427 DOCREV CUR MEDS BY ELIG CLIN: HCPCS | Performed by: INTERNAL MEDICINE

## 2019-11-13 PROCEDURE — G8598 ASA/ANTIPLAT THER USED: HCPCS | Performed by: INTERNAL MEDICINE

## 2019-11-13 PROCEDURE — 3017F COLORECTAL CA SCREEN DOC REV: CPT | Performed by: INTERNAL MEDICINE

## 2019-11-13 PROCEDURE — 4040F PNEUMOC VAC/ADMIN/RCVD: CPT | Performed by: INTERNAL MEDICINE

## 2019-11-20 ENCOUNTER — OFFICE VISIT (OUTPATIENT)
Dept: INTERNAL MEDICINE CLINIC | Age: 74
End: 2019-11-20
Payer: MEDICARE

## 2019-11-20 VITALS
WEIGHT: 196.6 LBS | DIASTOLIC BLOOD PRESSURE: 60 MMHG | SYSTOLIC BLOOD PRESSURE: 120 MMHG | HEIGHT: 70 IN | BODY MASS INDEX: 28.15 KG/M2

## 2019-11-20 DIAGNOSIS — Z23 NEED FOR INFLUENZA VACCINATION: ICD-10-CM

## 2019-11-20 DIAGNOSIS — J84.9 ILD (INTERSTITIAL LUNG DISEASE) (HCC): ICD-10-CM

## 2019-11-20 DIAGNOSIS — I50.32 CHRONIC DIASTOLIC HEART FAILURE (HCC): ICD-10-CM

## 2019-11-20 DIAGNOSIS — Z79.4 INSULIN DEPENDENT TYPE 2 DIABETES MELLITUS, CONTROLLED (HCC): ICD-10-CM

## 2019-11-20 DIAGNOSIS — E11.9 INSULIN DEPENDENT TYPE 2 DIABETES MELLITUS, CONTROLLED (HCC): ICD-10-CM

## 2019-11-20 DIAGNOSIS — N18.30 CHRONIC KIDNEY DISEASE, STAGE III (MODERATE) (HCC): ICD-10-CM

## 2019-11-20 DIAGNOSIS — I25.10 CORONARY ARTERY DISEASE INVOLVING NATIVE CORONARY ARTERY OF NATIVE HEART WITHOUT ANGINA PECTORIS: ICD-10-CM

## 2019-11-20 DIAGNOSIS — Z00.00 ROUTINE GENERAL MEDICAL EXAMINATION AT A HEALTH CARE FACILITY: Primary | ICD-10-CM

## 2019-11-20 PROCEDURE — G8482 FLU IMMUNIZE ORDER/ADMIN: HCPCS | Performed by: INTERNAL MEDICINE

## 2019-11-20 PROCEDURE — 3044F HG A1C LEVEL LT 7.0%: CPT | Performed by: INTERNAL MEDICINE

## 2019-11-20 PROCEDURE — 90653 IIV ADJUVANT VACCINE IM: CPT | Performed by: INTERNAL MEDICINE

## 2019-11-20 PROCEDURE — 1123F ACP DISCUSS/DSCN MKR DOCD: CPT | Performed by: INTERNAL MEDICINE

## 2019-11-20 PROCEDURE — G8598 ASA/ANTIPLAT THER USED: HCPCS | Performed by: INTERNAL MEDICINE

## 2019-11-20 PROCEDURE — 3017F COLORECTAL CA SCREEN DOC REV: CPT | Performed by: INTERNAL MEDICINE

## 2019-11-20 PROCEDURE — G0439 PPPS, SUBSEQ VISIT: HCPCS | Performed by: INTERNAL MEDICINE

## 2019-11-20 PROCEDURE — 4040F PNEUMOC VAC/ADMIN/RCVD: CPT | Performed by: INTERNAL MEDICINE

## 2019-11-20 PROCEDURE — G0008 ADMIN INFLUENZA VIRUS VAC: HCPCS | Performed by: INTERNAL MEDICINE

## 2019-11-20 ASSESSMENT — PATIENT HEALTH QUESTIONNAIRE - PHQ9
SUM OF ALL RESPONSES TO PHQ9 QUESTIONS 1 & 2: 0
2. FEELING DOWN, DEPRESSED OR HOPELESS: 0
SUM OF ALL RESPONSES TO PHQ QUESTIONS 1-9: 0
1. LITTLE INTEREST OR PLEASURE IN DOING THINGS: 0
SUM OF ALL RESPONSES TO PHQ QUESTIONS 1-9: 0

## 2019-11-21 ASSESSMENT — ENCOUNTER SYMPTOMS: SHORTNESS OF BREATH: 1

## 2019-11-25 ENCOUNTER — HOSPITAL ENCOUNTER (OUTPATIENT)
Dept: CARDIAC REHAB | Age: 74
Discharge: HOME | End: 2019-11-25

## 2019-12-04 RX ORDER — BLOOD-GLUCOSE SENSOR
EACH MISCELLANEOUS
Qty: 4 EACH | Refills: 2 | Status: SHIPPED | OUTPATIENT
Start: 2019-12-04 | End: 2020-02-12

## 2019-12-04 RX ORDER — BLOOD-GLUCOSE TRANSMITTER
EACH MISCELLANEOUS
Qty: 1 EACH | Refills: 0 | Status: SHIPPED | OUTPATIENT
Start: 2019-12-04 | End: 2020-02-12

## 2019-12-04 RX ORDER — BLOOD-GLUCOSE,RECEIVER,CONT
EACH MISCELLANEOUS
Qty: 1 DEVICE | Refills: 0 | Status: SHIPPED | OUTPATIENT
Start: 2019-12-04 | End: 2020-02-12

## 2019-12-16 PROCEDURE — 9900000038 HC CARDIAC REHAB PHASE 3 - MONTHLY: Performed by: CLINICAL NURSE SPECIALIST

## 2019-12-20 RX ORDER — METOPROLOL SUCCINATE 25 MG/1
TABLET, EXTENDED RELEASE ORAL
Qty: 90 TABLET | Refills: 3 | Status: ON HOLD
Start: 2019-12-20 | End: 2020-08-28 | Stop reason: HOSPADM

## 2019-12-30 ENCOUNTER — HOSPITAL ENCOUNTER (OUTPATIENT)
Dept: CARDIAC REHAB | Age: 74
Discharge: HOME | End: 2019-12-30

## 2020-01-07 RX ORDER — LEVOTHYROXINE SODIUM 0.05 MG/1
TABLET ORAL
Qty: 90 TABLET | Refills: 9 | Status: SHIPPED | OUTPATIENT
Start: 2020-01-07 | End: 2021-04-05

## 2020-01-13 PROCEDURE — 9900000065 HC CARDIAC REHAB PHASE 3 - 1 VISIT: Performed by: PHYSICAL THERAPIST

## 2020-01-15 ENCOUNTER — HOSPITAL ENCOUNTER (OUTPATIENT)
Dept: CARDIAC REHAB | Age: 75
Discharge: HOME | End: 2020-01-15

## 2020-01-30 DIAGNOSIS — E11.9 INSULIN DEPENDENT TYPE 2 DIABETES MELLITUS, CONTROLLED (HCC): ICD-10-CM

## 2020-01-30 DIAGNOSIS — Z79.4 INSULIN DEPENDENT TYPE 2 DIABETES MELLITUS, CONTROLLED (HCC): ICD-10-CM

## 2020-01-30 DIAGNOSIS — N18.4 CKD (CHRONIC KIDNEY DISEASE) STAGE 4, GFR 15-29 ML/MIN (HCC): ICD-10-CM

## 2020-01-30 LAB
ALBUMIN SERPL-MCNC: 3.9 G/DL (ref 3.4–5)
ANION GAP SERPL CALCULATED.3IONS-SCNC: 13 MMOL/L (ref 3–16)
BASOPHILS ABSOLUTE: 0.1 K/UL (ref 0–0.2)
BASOPHILS RELATIVE PERCENT: 0.8 %
BUN BLDV-MCNC: 62 MG/DL (ref 7–20)
CALCIUM SERPL-MCNC: 9.6 MG/DL (ref 8.3–10.6)
CHLORIDE BLD-SCNC: 100 MMOL/L (ref 99–110)
CO2: 27 MMOL/L (ref 21–32)
CREAT SERPL-MCNC: 3.6 MG/DL (ref 0.8–1.3)
CREATININE URINE: 112.7 MG/DL (ref 39–259)
EOSINOPHILS ABSOLUTE: 0.5 K/UL (ref 0–0.6)
EOSINOPHILS RELATIVE PERCENT: 4.2 %
GFR AFRICAN AMERICAN: 20
GFR NON-AFRICAN AMERICAN: 17
GLUCOSE BLD-MCNC: 226 MG/DL (ref 70–99)
HCT VFR BLD CALC: 37.4 % (ref 40.5–52.5)
HEMOGLOBIN: 12.4 G/DL (ref 13.5–17.5)
LYMPHOCYTES ABSOLUTE: 1.8 K/UL (ref 1–5.1)
LYMPHOCYTES RELATIVE PERCENT: 16.5 %
MCH RBC QN AUTO: 32.7 PG (ref 26–34)
MCHC RBC AUTO-ENTMCNC: 33.1 G/DL (ref 31–36)
MCV RBC AUTO: 98.9 FL (ref 80–100)
MICROALBUMIN UR-MCNC: 404.8 MG/DL
MICROALBUMIN/CREAT UR-RTO: 3591.8 MG/G (ref 0–30)
MONOCYTES ABSOLUTE: 0.6 K/UL (ref 0–1.3)
MONOCYTES RELATIVE PERCENT: 5.6 %
NEUTROPHILS ABSOLUTE: 7.9 K/UL (ref 1.7–7.7)
NEUTROPHILS RELATIVE PERCENT: 72.9 %
PDW BLD-RTO: 13.1 % (ref 12.4–15.4)
PHOSPHORUS: 4.9 MG/DL (ref 2.5–4.9)
PLATELET # BLD: 236 K/UL (ref 135–450)
PMV BLD AUTO: 9 FL (ref 5–10.5)
POTASSIUM SERPL-SCNC: 5.2 MMOL/L (ref 3.5–5.1)
RBC # BLD: 3.79 M/UL (ref 4.2–5.9)
SODIUM BLD-SCNC: 140 MMOL/L (ref 136–145)
WBC # BLD: 10.8 K/UL (ref 4–11)

## 2020-02-03 RX ORDER — INSULIN LISPRO 100 [IU]/ML
INJECTION, SOLUTION INTRAVENOUS; SUBCUTANEOUS
Qty: 15 PEN | Refills: 2 | Status: SHIPPED | OUTPATIENT
Start: 2020-02-03 | End: 2020-05-15

## 2020-02-12 ENCOUNTER — OFFICE VISIT (OUTPATIENT)
Dept: ENDOCRINOLOGY | Age: 75
End: 2020-02-12
Payer: MEDICARE

## 2020-02-12 VITALS
HEART RATE: 61 BPM | HEIGHT: 70 IN | DIASTOLIC BLOOD PRESSURE: 79 MMHG | BODY MASS INDEX: 28.23 KG/M2 | SYSTOLIC BLOOD PRESSURE: 138 MMHG | OXYGEN SATURATION: 98 % | WEIGHT: 197.2 LBS

## 2020-02-12 LAB — HBA1C MFR BLD: 7.2 %

## 2020-02-12 PROCEDURE — 4040F PNEUMOC VAC/ADMIN/RCVD: CPT | Performed by: INTERNAL MEDICINE

## 2020-02-12 PROCEDURE — 3017F COLORECTAL CA SCREEN DOC REV: CPT | Performed by: INTERNAL MEDICINE

## 2020-02-12 PROCEDURE — 83036 HEMOGLOBIN GLYCOSYLATED A1C: CPT | Performed by: INTERNAL MEDICINE

## 2020-02-12 PROCEDURE — G8417 CALC BMI ABV UP PARAM F/U: HCPCS | Performed by: INTERNAL MEDICINE

## 2020-02-12 PROCEDURE — 2022F DILAT RTA XM EVC RTNOPTHY: CPT | Performed by: INTERNAL MEDICINE

## 2020-02-12 PROCEDURE — G8427 DOCREV CUR MEDS BY ELIG CLIN: HCPCS | Performed by: INTERNAL MEDICINE

## 2020-02-12 PROCEDURE — 3051F HG A1C>EQUAL 7.0%<8.0%: CPT | Performed by: INTERNAL MEDICINE

## 2020-02-12 PROCEDURE — 99214 OFFICE O/P EST MOD 30 MIN: CPT | Performed by: INTERNAL MEDICINE

## 2020-02-12 PROCEDURE — 1123F ACP DISCUSS/DSCN MKR DOCD: CPT | Performed by: INTERNAL MEDICINE

## 2020-02-12 PROCEDURE — G8482 FLU IMMUNIZE ORDER/ADMIN: HCPCS | Performed by: INTERNAL MEDICINE

## 2020-02-12 PROCEDURE — 1036F TOBACCO NON-USER: CPT | Performed by: INTERNAL MEDICINE

## 2020-02-12 RX ORDER — BLOOD-GLUCOSE SENSOR
EACH MISCELLANEOUS
Qty: 4 EACH | Refills: 2 | Status: SHIPPED | OUTPATIENT
Start: 2020-02-12 | End: 2020-03-19

## 2020-02-12 RX ORDER — BLOOD-GLUCOSE,RECEIVER,CONT
EACH MISCELLANEOUS
Qty: 1 DEVICE | Refills: 0 | Status: SHIPPED | OUTPATIENT
Start: 2020-02-12 | End: 2020-03-19

## 2020-02-12 RX ORDER — BLOOD-GLUCOSE TRANSMITTER
EACH MISCELLANEOUS
Qty: 1 EACH | Refills: 0 | Status: SHIPPED | OUTPATIENT
Start: 2020-02-12 | End: 2020-03-19

## 2020-02-12 NOTE — PROGRESS NOTES
Seen as f/u patient for diabetes    Interim:    Did not get CGM yet     Admitted on 1/17 for MORIS on CKD, Acute exacerbation of CHF. Resp failure  Hypoglycemia on admission BG 28? Diagnosed with Type 2 diabetes mellitus in  1995  Known diabetic complications: Nephropathy, Neuropathy, mild retinopathy    Current diabetic medications   Lantus 20 units  Humalog 10 unit TID + SSI      On insulin since 1998    He has been on janumet and glipizide    Last A1c 6.1%<-----6.9%<----- 6.4%<----6.9%<---- 6.1%<-----6.6%<------5.5%<----6.6%<----- 7.2% <-----8.9 on 7/16<--- 7.4 on 4/16<------7.9 on 1/16<----  10.4 on 10/15 <---- 8.5 <--- 8.2 <--- 8.6    Prior visit with dietician: no  Current diet: on average, 3 meals per day does not count CHO  Current exercise: walking   Current monitoring regimen: home blood tests - 3-4 times daily     Has brought blood glucose log/meter: yes  Home blood sugar records:120-233  Any episodes of hypoglycemia?  Occ am     H/O CABG in 06    Hyperlipidemia:for years, stable  LDL 44 on 11/17  lipitor 40mg  LDL 58 on 11/18    LDL 14 on 11/19  Last eye exam: 4/18  Last foot exam: 5/19  Last microalbumin to creatinine ratio: 3.6<-----2.9<----2.3 <---- Cr 1.7 on 10/15<--- 1.8  1478 on 11/15  Seen Dr. Madina Mena    HTN: stable, for years, takes Losartan 25mg ( stopped by nephrologist) Metoprolol XL 25 mg     H/o subclinical hypothyroidism,stable    TSH 3.09 on 5/18  Levothyroxine 50mcg    Past Medical History:   Diagnosis Date    Allergic rhinitis due to other allergen 7/12/2010    Coronary atherosclerosis of unspecified type of vessel, native or graft 7/12/2010    [facility]    Diabetic eye exam ([facility] 5/5/2016    [place] [facility]    Generalized osteoarthrosis, involving multiple sites 7/12/2010    Other psoriasis 7/12/2010    Pneumonia     Psoriatic arthropathy ([facility] 7/12/2010    Type II or unspecified type diabetes mellitus without mention of complication, not stated as

## 2020-03-05 ENCOUNTER — TELEPHONE (OUTPATIENT)
Dept: ENDOCRINOLOGY | Age: 75
End: 2020-03-05

## 2020-03-05 NOTE — TELEPHONE ENCOUNTER
[facility] called asking if pt has received his CGM since it was ordered on Feb. 12th?

## 2020-03-11 ENCOUNTER — OFFICE VISIT (OUTPATIENT)
Dept: PULMONOLOGY | Age: 75
End: 2020-03-11
Payer: MEDICARE

## 2020-03-11 VITALS
BODY MASS INDEX: 28.49 KG/M2 | SYSTOLIC BLOOD PRESSURE: 124 MMHG | HEART RATE: 70 BPM | HEIGHT: 70 IN | WEIGHT: 199 LBS | RESPIRATION RATE: 16 BRPM | OXYGEN SATURATION: 96 % | DIASTOLIC BLOOD PRESSURE: 78 MMHG

## 2020-03-11 PROCEDURE — G8427 DOCREV CUR MEDS BY ELIG CLIN: HCPCS | Performed by: INTERNAL MEDICINE

## 2020-03-11 PROCEDURE — 4040F PNEUMOC VAC/ADMIN/RCVD: CPT | Performed by: INTERNAL MEDICINE

## 2020-03-11 PROCEDURE — 1036F TOBACCO NON-USER: CPT | Performed by: INTERNAL MEDICINE

## 2020-03-11 PROCEDURE — 99214 OFFICE O/P EST MOD 30 MIN: CPT | Performed by: INTERNAL MEDICINE

## 2020-03-11 PROCEDURE — 1123F ACP DISCUSS/DSCN MKR DOCD: CPT | Performed by: INTERNAL MEDICINE

## 2020-03-11 PROCEDURE — G8417 CALC BMI ABV UP PARAM F/U: HCPCS | Performed by: INTERNAL MEDICINE

## 2020-03-11 PROCEDURE — G8482 FLU IMMUNIZE ORDER/ADMIN: HCPCS | Performed by: INTERNAL MEDICINE

## 2020-03-11 PROCEDURE — 3017F COLORECTAL CA SCREEN DOC REV: CPT | Performed by: INTERNAL MEDICINE

## 2020-03-11 NOTE — PROGRESS NOTES
[facility] Pulmonary and Critical Care    Outpatient Follow Up Note    Subjective:   CHIEF COMPLAINT / HPI:     The patient is 69 y.o. male who presents today for follow up of ILD, BJ, cough variant asthma and fatigue. Main complaint is still of chest congestion. He did not try Mucinex after last visit . EDWARDS does not seem to be an issue. He is not using BiPAP as due to the drying effect of it. He denies any fevers, chills, anorexia, weight loss, cough, chest pain, or peripheral edema. He has not repeated his nocturnal pulse ox study yet stating no one had contacted him to deliver the equipment.  He continues Pulm rehab, now at [facility] since our phase 3 closed    He's had a pretty extensive evaluation end of 2017 including  CT chest, PFTs, CBC, CMP and above-mentioned stress test.  He does have sleep apnea but states he is compliant with BiPAP    Past Medical History:    Past Medical History:   Diagnosis Date    Allergic rhinitis due to other allergen 2010    Coronary atherosclerosis of unspecified type of vessel, native or graft 2010    [facility] 2015    Diabetic eye exam ([facility] [facility] 2016    [place] [facility]    Generalized osteoarthrosis, involving multiple sites 2010    Other psoriasis 2010    Pneumonia     Psoriatic arthropathy ([facility] [facility] 2010    Type II or unspecified type diabetes mellitus without mention of complication, not stated as uncontrolled 2010    Unspecified essential hypertension 2010    Unspecified sleep apnea 2010       Social History:    Social History     Tobacco Use   Smoking Status Former Smoker    Packs/day: 1.00    Years: 18.00    Pack years: 18.00    Start date: 1962    Last attempt to quit: 1980    Years since quittin.3   Smokeless Tobacco Never Used       Current Medications:  Current Outpatient Medications on File Prior to Visit   Medication Sig Dispense Refill    HUMALOG KWIKPEN 100 UNIT/ML SOPN INJECT 32 UNITS UNDER THE SKIN THREE TIMES A DAY BEFORE MEALS 15 pen 2    levothyroxine (SYNTHROID) 50 MCG tablet TAKE ONE TABLET BY MOUTH DAILY 90 tablet 9    atorvastatin (LIPITOR) 40 MG tablet TAKE ONE TABLET BY MOUTH ONCE NIGHTLY 90 tablet 3    metoprolol succinate (TOPROL XL) 25 MG extended release tablet TAKE ONE TABLET BY MOUTH DAILY 90 tablet 3    Insulin Pen Needle (PEN NEEDLES 31GX5/16\") 31G X 8 MM MISC USE WITH INSULIN FOUR TIMES A  each 1    Multiple Vitamins-Minerals (THERAPEUTIC MULTIVITAMIN-MINERALS) tablet Take 1 tablet by mouth daily      ACCU-CHEK JOHNATHAN PLUS strip USE TO TEST FOUR TIMES DAILY AS NEEDED 100 strip 5    LANTUS SOLOSTAR 100 UNIT/ML injection pen INJECT 40 UNITS UNDER THE SKIN EVERY NIGHT AT BEDTIME 14 mL 3    mometasone-formoterol (DULERA) 200-5 MCG/ACT inhaler Inhale 2 puffs into the lungs every 12 hours 1 Inhaler 11    Handicap Placard MISC by Does not apply route Duration: 5 years 1 each 0    ACCU-CHEK SOFTCLIX LANCETS MISC USE FOUR TIMES A  each 3    aspirin 81 MG chewable tablet Take 1 tablet by mouth daily. 30 tablet 5    Lancets MISC 2 times daily. Lancets for a Anna meter. 50 each 11    Continuous Blood Gluc Sensor (DEXCOM G6 SENSOR) MISC Every week (Patient not taking: Reported on 3/11/2020) 4 each 2    Continuous Blood Gluc  (DEXCOM G6 ) RUMA As needed (Patient not taking: Reported on 3/11/2020) 1 Device 0    Continuous Blood Gluc Transmit (DEXCOM G6 TRANSMITTER) MISC As needed (Patient not taking: Reported on 3/11/2020) 1 each 0    albuterol sulfate HFA (PROVENTIL HFA) 108 (90 Base) MCG/ACT inhaler Inhale 2 puffs into the lungs every 4 hours as needed for Wheezing 1 Inhaler 5    glucose blood VI test strips (ACCU-CHEK JOHNATHAN) strip 4 times a day As needed.  (Patient not taking: Reported on 3/11/2020) 150 each 3    Blood Glucose Monitoring Suppl (ACCU-CHEK JOHNATHAN) RUMA Use as needed (Patient not taking: Reported on 3/11/2020) 1 Device 0     No current facility-administered medications on file prior to visit. REVIEW OF SYSTEMS:    CONSTITUTIONAL: Negative for fevers and chills  HEENT: Negative for oropharyngeal exudate, post nasal drip, sinus pain / pressure, nasal congestion, ear pain  RESPIRATORY:  See HPI  CARDIOVASCULAR: Negative for chest pain, palpitations, edema  GASTROINTESTINAL: Negative for nausea, vomiting, diarrhea, constipation and abdominal pain  HEMATOLOGICAL: Negative for adenopathy  SKIN: Negative for clubbing, cyanosis, skin lesions  EXTREMITIES: Negative for weakness, decreased ROM  NEUROLOGICAL: Negative for unilateral weakness, speech or gait abnormalities    Objective:   PHYSICAL EXAM:        VITALS:    /78 (Site: Right Upper Arm, Position: Sitting, Cuff Size: Medium Adult)   Pulse 70   Resp 16   Ht 5' 10\" (1.778 m)   Wt 199 lb (90.3 kg)   SpO2 96%   BMI 28.55 kg/m²   On RA  CONSTITUTIONAL:  Awake, alert, cooperative, no apparent distress, and appears stated age  HEENT: No oropharyngeal exudate, PERRL, no cervical adenopathy, no tracheal deviation, thyroid size normal  LUNGS:  No increased work of breathing. + crackles bilateral bases. No wheezing  CARDIOVASCULAR:  normal S1 and S2 and no JVD  ABDOMEN:  Normal bowel sounds, non-distended and non-tender to palpation  EXT: No edema, no calf tenderness. Pulses are present bilaterally. NEUROLOGIC:  Mental Status Exam:  Level of Alertness:   awake  Orientation:   person, place, time. SKIN:  normal skin color, texture, turgor, no redness, warmth, or swelling     DATA:    PFTs  Indication: Shortness of breath, weakness, non productive cough    Test comment:     Spirometry data is acceptable and reproducible.     Pulse ox is 91% on room air    Estimated body mass index is 28.3 kg/m² as calculated from the   following:    Height as of 11/21/17: 5' 10\" (1.778 m).    Weight as of 11/21/17: 197 lb 3.2 oz (89.4 kg).     Spirometry data:    FEV1/FVC: 89. Predicted ratio 73    Pre-Bronchodilator FEV1 2.07L, which is 63% predicted    Post-Bronchodilator FEV1 2.02L, which is 61% predicted    There is -2% reversibility     FVC is 2.33L, which is 51% predicted    Lung Volumes:    TLC is 4.85L, which is 67% predicted    RV is 2.51L which is 99% predicted    Diffusion Capacity:    DLCO is 13.77 which is 42% predicted    Impression:    1. There is no obstruction present    2. There is no response to bronchodilator therapy         [Increase in FEV1 => 12% of control and => 200 ml]    3. There is moderate restriction     4. There is moderate reduction in diffusion capacity    Comment: Moderate restrictive disease with moderate decrease in   diffusion capacity consistent with the diagnosis of interstitial   lung disease.      Radiology Review:  Pertinent images / reports were reviewed as a part of this visit. CT Chest reveals the following:  Impression       Pulmonary fibrosis bilateral lower lobe subpleural regions as   described similar to previous examination.       Marked coronary artery calcification which is a risk factor for   acute coronary syndrome.       No lobar consolidation.       Ascending thoracic aorta upper limits of normal.     Assessment:   1. Cough Variant Asthma  2. ILD - NOS. Unchanged from 2013  3. BJ on Bipap  4. Chronic Diastolic CHF  5. Restless Legs Syndrome    Plan:   1. Continue Dulera 200 mcg MDI and prn albuterol  2. [facility] [facility]. Try mucinex prn chest congestion  3. Check nocturnal pulse ox study (again - not sure why was not done last time) to see if qualifies for oxygen QHs as not using Bipap due to drying effect despite humidification    4.   RTC 6 months

## 2020-03-19 RX ORDER — FLASH GLUCOSE SENSOR
KIT MISCELLANEOUS
Qty: 2 EACH | Refills: 3 | Status: SHIPPED | OUTPATIENT
Start: 2020-03-19 | End: 2021-02-01

## 2020-03-19 RX ORDER — FLASH GLUCOSE SCANNING READER
EACH MISCELLANEOUS
Qty: 1 DEVICE | Refills: 3 | Status: SHIPPED | OUTPATIENT
Start: 2020-03-19 | End: 2020-03-19 | Stop reason: SDUPTHER

## 2020-03-19 RX ORDER — FLASH GLUCOSE SCANNING READER
EACH MISCELLANEOUS
Qty: 1 DEVICE | Refills: 3 | Status: SHIPPED | OUTPATIENT
Start: 2020-03-19 | End: 2021-02-01 | Stop reason: ALTCHOICE

## 2020-03-19 RX ORDER — FLASH GLUCOSE SENSOR
KIT MISCELLANEOUS
Qty: 2 EACH | Refills: 3 | Status: SHIPPED | OUTPATIENT
Start: 2020-03-19 | End: 2020-03-19 | Stop reason: SDUPTHER

## 2020-03-20 NOTE — TELEPHONE ENCOUNTER
Tried to pend but it wont allow me to the [facility]

## 2020-03-23 ENCOUNTER — TELEPHONE (OUTPATIENT)
Dept: ENDOCRINOLOGY | Age: 75
End: 2020-03-23

## 2020-03-24 NOTE — TELEPHONE ENCOUNTER
Nany Going with [facility] [phone], requests a script for a CGM for the Patient to be sent to the supplier Not the pharmacy. She stated that her colleague told her that he was told by our office that the order was placed 2/12/20. The patient has not  Received the product. [facility] wants to know where was the order sent to?  You can reach Nany Forrest at [phone] [facility] [address]

## 2020-03-24 NOTE — TELEPHONE ENCOUNTER
[facility] from [facility] has made a second call to discuss patient's request for CGM please return phone call at [phone] ext.  [phone]

## 2020-04-03 RX ORDER — INSULIN GLARGINE 100 [IU]/ML
INJECTION, SOLUTION SUBCUTANEOUS
Qty: 14 ML | Refills: 3 | Status: SHIPPED | OUTPATIENT
Start: 2020-04-03 | End: 2020-05-20 | Stop reason: SDUPTHER

## 2020-04-13 ENCOUNTER — TELEPHONE (OUTPATIENT)
Dept: PULMONOLOGY | Age: 75
End: 2020-04-13

## 2020-04-28 RX ORDER — BLOOD SUGAR DIAGNOSTIC
STRIP MISCELLANEOUS
Qty: 100 STRIP | Refills: 4 | Status: SHIPPED | OUTPATIENT
Start: 2020-04-28 | End: 2021-08-02 | Stop reason: SDUPTHER

## 2020-05-04 ENCOUNTER — TELEPHONE (OUTPATIENT)
Dept: ENDOCRINOLOGY | Age: 75
End: 2020-05-04

## 2020-05-04 NOTE — TELEPHONE ENCOUNTER
[facility] called stating more additional information needs to be faxed to [facility]. The clinical notes states patient is checking 3 times a day and for the [facility] to cover needs to be 4 times daily. Please advise and fax back to [facility].

## 2020-05-15 RX ORDER — INSULIN LISPRO 100 [IU]/ML
INJECTION, SOLUTION INTRAVENOUS; SUBCUTANEOUS
Qty: 30 ML | Refills: 1 | Status: SHIPPED | OUTPATIENT
Start: 2020-05-15 | End: 2020-05-20 | Stop reason: SDUPTHER

## 2020-05-20 ENCOUNTER — VIRTUAL VISIT (OUTPATIENT)
Dept: ENDOCRINOLOGY | Age: 75
End: 2020-05-20
Payer: MEDICARE

## 2020-05-20 PROCEDURE — 3017F COLORECTAL CA SCREEN DOC REV: CPT | Performed by: INTERNAL MEDICINE

## 2020-05-20 PROCEDURE — 3051F HG A1C>EQUAL 7.0%<8.0%: CPT | Performed by: INTERNAL MEDICINE

## 2020-05-20 PROCEDURE — G8427 DOCREV CUR MEDS BY ELIG CLIN: HCPCS | Performed by: INTERNAL MEDICINE

## 2020-05-20 PROCEDURE — 1123F ACP DISCUSS/DSCN MKR DOCD: CPT | Performed by: INTERNAL MEDICINE

## 2020-05-20 PROCEDURE — 4040F PNEUMOC VAC/ADMIN/RCVD: CPT | Performed by: INTERNAL MEDICINE

## 2020-05-20 PROCEDURE — 99214 OFFICE O/P EST MOD 30 MIN: CPT | Performed by: INTERNAL MEDICINE

## 2020-05-20 PROCEDURE — 2022F DILAT RTA XM EVC RTNOPTHY: CPT | Performed by: INTERNAL MEDICINE

## 2020-05-20 RX ORDER — INSULIN LISPRO 100 [IU]/ML
INJECTION, SOLUTION INTRAVENOUS; SUBCUTANEOUS
Qty: 30 ML | Refills: 3 | Status: SHIPPED | OUTPATIENT
Start: 2020-05-20 | End: 2021-06-11

## 2020-05-20 RX ORDER — INSULIN GLARGINE 100 [IU]/ML
INJECTION, SOLUTION SUBCUTANEOUS
Qty: 15 ML | Refills: 3 | Status: SHIPPED | OUTPATIENT
Start: 2020-05-20 | End: 2021-02-01 | Stop reason: SDUPTHER

## 2020-05-20 RX ORDER — INSULIN LISPRO 100 [IU]/ML
INJECTION, SOLUTION INTRAVENOUS; SUBCUTANEOUS
Qty: 30 ML | Refills: 1 | Status: SHIPPED | OUTPATIENT
Start: 2020-05-20 | End: 2020-05-20 | Stop reason: SDUPTHER

## 2020-05-20 NOTE — PROGRESS NOTES
hypothyroidism,stable    TSH 3.09 on 5/18  Levothyroxine 50mcg    He has neuropathy,was on neurontin    Past Medical History:   Diagnosis Date    Allergic rhinitis due to other allergen 7/12/2010    Coronary atherosclerosis of unspecified type of vessel, native or graft 7/12/2010    [facility] 04/29/2015    Diabetic eye exam ([facility] 5/5/2016    [place] [facility]    Generalized osteoarthrosis, involving multiple sites 7/12/2010    Other psoriasis 7/12/2010    Pneumonia     Psoriatic arthropathy ([facility] 7/12/2010    Type II or unspecified type diabetes mellitus without mention of complication, not stated as uncontrolled 7/12/2010    Unspecified essential hypertension 7/12/2010    Unspecified sleep apnea 7/12/2010     Past Surgical History:   Procedure Laterality Date    CARPAL TUNNEL RELEASE      s/p    CATARACT REMOVAL      CORONARY ARTERY BYPASS GRAFT      JOINT REPLACEMENT         Review of Systems    Constitutional: Negative for weight loss and malaise/fatigue. Negative for fever      Eyes: Negative for blurred vision. Negative for double vision, photophobia and pain. Respiratory: Negative for cough and sputum production. Cardiovascular: Negative for chest pain, palpitations and leg swelling. Gastrointestinal: Negative for nausea, vomiting and abdominal pain. Genitourinary: Negative for dysuria, urgency and frequency. Skin: Negative for itching and rash. Neurological: Negative for dizziness. Negative for tingling, tremors, focal weakness and headaches.    Endo/Heme/Allergies: see HPI        PHYSICAL EXAMINATION:  [ INSTRUCTIONS:  \"[x]\" Indicates a positive item  \"[]\" Indicates a negative item  -- DELETE ALL ITEMS NOT EXAMINED]  Vital Signs: (As obtained by patient/caregiver or practitioner observation)    Blood pressure-  Heart rate-    Respiratory rate-    Temperature-  Pulse oximetry-     Constitutional Appears well-developed and well-nourished No apparent distress        Mental

## 2020-05-26 DIAGNOSIS — I10 ESSENTIAL HYPERTENSION: ICD-10-CM

## 2020-05-26 DIAGNOSIS — E78.49 OTHER HYPERLIPIDEMIA: ICD-10-CM

## 2020-05-26 DIAGNOSIS — E11.9 INSULIN DEPENDENT TYPE 2 DIABETES MELLITUS, CONTROLLED (HCC): ICD-10-CM

## 2020-05-26 DIAGNOSIS — G62.9 NEUROPATHY: ICD-10-CM

## 2020-05-26 DIAGNOSIS — Z79.4 INSULIN DEPENDENT TYPE 2 DIABETES MELLITUS, CONTROLLED (HCC): ICD-10-CM

## 2020-05-26 LAB — TSH REFLEX: 1.58 UIU/ML (ref 0.27–4.2)

## 2020-05-27 LAB
ESTIMATED AVERAGE GLUCOSE: 139.9 MG/DL
HBA1C MFR BLD: 6.5 %

## 2020-07-18 DIAGNOSIS — N18.30 CKD (CHRONIC KIDNEY DISEASE), STAGE III (HCC): ICD-10-CM

## 2020-07-18 DIAGNOSIS — N18.4 CKD (CHRONIC KIDNEY DISEASE) STAGE 4, GFR 15-29 ML/MIN (HCC): ICD-10-CM

## 2020-07-18 LAB
ALBUMIN SERPL-MCNC: 3.5 G/DL (ref 3.4–5)
ANION GAP SERPL CALCULATED.3IONS-SCNC: 11 MMOL/L (ref 3–16)
BUN BLDV-MCNC: 65 MG/DL (ref 7–20)
CALCIUM SERPL-MCNC: 8.9 MG/DL (ref 8.3–10.6)
CHLORIDE BLD-SCNC: 105 MMOL/L (ref 99–110)
CO2: 26 MMOL/L (ref 21–32)
CREAT SERPL-MCNC: 3.9 MG/DL (ref 0.8–1.3)
GFR AFRICAN AMERICAN: 18
GFR NON-AFRICAN AMERICAN: 15
GLUCOSE BLD-MCNC: 181 MG/DL (ref 70–99)
HCT VFR BLD CALC: 33 % (ref 40.5–52.5)
HEMOGLOBIN: 10.9 G/DL (ref 13.5–17.5)
MCH RBC QN AUTO: 32.7 PG (ref 26–34)
MCHC RBC AUTO-ENTMCNC: 33 G/DL (ref 31–36)
MCV RBC AUTO: 99.3 FL (ref 80–100)
PDW BLD-RTO: 12.9 % (ref 12.4–15.4)
PHOSPHORUS: 3.9 MG/DL (ref 2.5–4.9)
PLATELET # BLD: 212 K/UL (ref 135–450)
PMV BLD AUTO: 9.2 FL (ref 5–10.5)
POTASSIUM SERPL-SCNC: 5.2 MMOL/L (ref 3.5–5.1)
RBC # BLD: 3.32 M/UL (ref 4.2–5.9)
SODIUM BLD-SCNC: 142 MMOL/L (ref 136–145)
WBC # BLD: 9.3 K/UL (ref 4–11)

## 2020-07-22 ENCOUNTER — TELEPHONE (OUTPATIENT)
Dept: ENDOCRINOLOGY | Age: 75
End: 2020-07-22

## 2020-07-22 NOTE — TELEPHONE ENCOUNTER
carole called and needs the physician written order and out of station of the clinical notes from 5- that they faxed with dr. Moni Orourke

## 2020-07-27 ENCOUNTER — OFFICE VISIT (OUTPATIENT)
Dept: CARDIOLOGY CLINIC | Age: 75
End: 2020-07-27
Payer: MEDICARE

## 2020-07-27 VITALS
WEIGHT: 196.6 LBS | SYSTOLIC BLOOD PRESSURE: 120 MMHG | DIASTOLIC BLOOD PRESSURE: 70 MMHG | BODY MASS INDEX: 28.21 KG/M2 | TEMPERATURE: 97.3 F | HEART RATE: 72 BPM

## 2020-07-27 PROCEDURE — G8417 CALC BMI ABV UP PARAM F/U: HCPCS | Performed by: NURSE PRACTITIONER

## 2020-07-27 PROCEDURE — 1036F TOBACCO NON-USER: CPT | Performed by: NURSE PRACTITIONER

## 2020-07-27 PROCEDURE — G8427 DOCREV CUR MEDS BY ELIG CLIN: HCPCS | Performed by: NURSE PRACTITIONER

## 2020-07-27 PROCEDURE — 99214 OFFICE O/P EST MOD 30 MIN: CPT | Performed by: NURSE PRACTITIONER

## 2020-07-27 PROCEDURE — 1123F ACP DISCUSS/DSCN MKR DOCD: CPT | Performed by: NURSE PRACTITIONER

## 2020-07-27 PROCEDURE — 3017F COLORECTAL CA SCREEN DOC REV: CPT | Performed by: NURSE PRACTITIONER

## 2020-07-27 PROCEDURE — 4040F PNEUMOC VAC/ADMIN/RCVD: CPT | Performed by: NURSE PRACTITIONER

## 2020-07-27 NOTE — PROGRESS NOTES
BILITOT 0.4 2019     PT/INR:   Lab Results   Component Value Date    INR 0.89 2017    INR 0.91 2017    INR 0.96 2017    PROTIME 10.1 2017    PROTIME 10.4 2017    PROTIME 10.9 2017     Pro-BNP   Lab Results   Component Value Date    PROBNP 4,716 2017     LIPIDS:  No components found for: CHLPL  Lab Results   Component Value Date    TRIG 265 (H) 2019    TRIG 205 (H) 2018    TRIG 198 (H) 2017     Lab Results   Component Value Date    HDL 39 (L) 2019    HDL 47 2018    HDL 46 2017     Lab Results   Component Value Date    LDLCALC 14 2019    LDLCALC 58 2018    LDLCALC 44 2017     Lab Results   Component Value Date    LABVLDL 53 2019    LABVLDL 41 2018    LABVLDL 40 2017     TSH:  Lab Results   Component Value Date    TSH 5.20 (H) 2018       IMAGIN Nuc stress:     Fixed apical perfusion defect may relate to previous infarct or    apical thinning.         No reversible ischemia. 2018 Echo:   Technically difficult examination. Normal left ventricle size. There is left ventricular hypertrophy with more   prominent hypertrophy of the basal septum. Overall left ventricular systolic   function appears mildly reduced with an ejection fraction of 45-50%. The   inferoseptum appears mildly hypokinetic. Diastolic function probably normal.   Mitral annular calcification is present. Mild mitral regurgitation is present. The aortic valve appears tricuspid. Aortic valve leaflets appear thickened   but open adequately. Mild tricuspid regurgitation. Estimated pulmonary artery systolic pressure   is elevated at 33 mmHg assuming a right atrial pressure of 8 mmHg. Mild pulmonic regurgitation present.  Cath: Angiographic Findings:  Right dominant system  Left Main:  Diffuse, mild disease.  ~20-30% diffuse disease.   Left Anterior Descendin% proximal occlusion.  After the LIMA is injected, the LAD was seen to have a mid 80-90% stenosis just distal to a significant diagonal branch.  The diagonal bifurcates; one branch has a severe (~80-90%) stenosis.  The other limb also has diffuse disease. Marbella Dawson was a 90% distal LAD stenosis. Circumflex:  Proximal 70% stenosis at bifurcation of an OM and the AV LCx.  The OM is subtotally occluded.  The AV LCx is diffusely diseased. Right Coronary:  Proximal 30% stenosis.  Mid 60-70% stenosis.  Right posterolateral is occluded.  Right PDA has an ostial/proximal 90% stenosis.  It is diffusely diseased after the ostial lesion. Bypass Grafts:  LIMA-LAD with radial grafts to an OM and right PDA that is widely patent.   Left Ventriculogram:  Not performed due to elevated Cr  Hemodynamics (mm Hg):  Left Ventricular Pressure:  131/0, 14  Central Aortic Pressure:  133/66 (93)  Medications:   Current Outpatient Medications   Medication Sig Dispense Refill    cyanocobalamin 1000 MCG tablet Take 1,000 mcg by mouth daily      insulin glargine (LANTUS SOLOSTAR) 100 UNIT/ML injection pen INJECT 24 UNITS UNDER THE SKIN EVERY NIGHT AT BEDTIME 15 mL 3    insulin lispro, 1 Unit Dial, 100 UNIT/ML SOPN INJECT 12-18 UNITS UNDER THE SKIN THREE TIMES DAILY BEFORE MEALS 30 mL 3    ACCU-CHEK JOHNATHAN PLUS strip USE TO TEST FOUR TIMES DAILY AS NEEDED 100 strip 4    Insulin Pen Needle (PEN NEEDLES 31GX5/16\") 31G X 8 MM MISC USE WITH INSULIN FOUR TIMES A  each 1    Continuous Blood Gluc  (FREESTYLE MAGGIE 14 DAY READER) RUMA As needed 1 Device 3    Continuous Blood Gluc Sensor (FREESTYLE MAGGIE 14 DAY SENSOR) MISC Every 2 weeks 2 each 3    levothyroxine (SYNTHROID) 50 MCG tablet TAKE ONE TABLET BY MOUTH DAILY 90 tablet 9    atorvastatin (LIPITOR) 40 MG tablet TAKE ONE TABLET BY MOUTH ONCE NIGHTLY 90 tablet 3    metoprolol succinate (TOPROL XL) 25 MG extended release tablet TAKE ONE TABLET BY MOUTH DAILY 90 tablet 3    Multiple Vitamins-Minerals (THERAPEUTIC MULTIVITAMIN-MINERALS) tablet Take 1 tablet by mouth daily      mometasone-formoterol (DULERA) 200-5 MCG/ACT inhaler Inhale 2 puffs into the lungs every 12 hours 1 Inhaler 11    Handicap Placard MISC by Does not apply route Duration: 5 years 1 each 0    ACCU-CHEK SOFTCLIX LANCETS MISC USE FOUR TIMES A  each 3    glucose blood VI test strips (ACCU-CHEK JOHNATHAN) strip 4 times a day As needed. 150 each 3    Blood Glucose Monitoring Suppl (ACCU-CHEK JOHNATHAN) RUMA Use as needed 1 Device 0    aspirin 81 MG chewable tablet Take 1 tablet by mouth daily. 30 tablet 5    Lancets MISC 2 times daily. Lancets for a [place] meter. 50 each 11    albuterol sulfate HFA (PROVENTIL HFA) 108 (90 Base) MCG/ACT inhaler Inhale 2 puffs into the lungs every 4 hours as needed for Wheezing 1 Inhaler 5     No current facility-administered medications for this visit. Assessment:  1. SOB (shortness of breath)    2. Chronic fatigue    3. Coronary artery disease involving native coronary artery of native heart without angina pectoris    4. S/P CABG x 4    5. Essential hypertension    6.  Mixed hyperlipidemia        Plan:  SOB/fatigue   Echo  CAD/CABG   ASA, statin   toprol   No ACE d/t CKD and hyperK  HTN   /64   Cont Toprol  HLD   LDL 14   Cont lipitor     Follow up with 6 month, sooner if Echo abnl/changed

## 2020-07-31 RX ORDER — PEN NEEDLE, DIABETIC 31 GX5/16"
NEEDLE, DISPOSABLE MISCELLANEOUS
Qty: 100 EACH | Refills: 2 | Status: SHIPPED | OUTPATIENT
Start: 2020-07-31 | End: 2020-12-21

## 2020-08-03 ENCOUNTER — HOSPITAL ENCOUNTER (OUTPATIENT)
Dept: NON INVASIVE DIAGNOSTICS | Age: 75
Discharge: HOME | End: 2020-08-03
Payer: MEDICARE

## 2020-08-03 LAB
LV EF: 50 %
LVEF MODALITY: NORMAL

## 2020-08-03 PROCEDURE — 6360000004 HC RX CONTRAST MEDICATION: Performed by: INTERNAL MEDICINE

## 2020-08-03 PROCEDURE — C8929 TTE W OR WO FOL WCON,DOPPLER: HCPCS

## 2020-08-03 RX ADMIN — PERFLUTREN 2.2 MG: 6.52 INJECTION, SUSPENSION INTRAVENOUS at 09:45

## 2020-08-25 ENCOUNTER — OFFICE VISIT (OUTPATIENT)
Dept: CARDIOLOGY CLINIC | Age: 75
End: 2020-08-25
Payer: MEDICARE

## 2020-08-25 ENCOUNTER — HOSPITAL ENCOUNTER (INPATIENT)
Age: 75
LOS: 3 days | Discharge: HOME | DRG: 074 | End: 2020-08-28
Attending: EMERGENCY MEDICINE
Payer: MEDICARE

## 2020-08-25 ENCOUNTER — TELEPHONE (OUTPATIENT)
Dept: CARDIOLOGY CLINIC | Age: 75
End: 2020-08-25

## 2020-08-25 ENCOUNTER — APPOINTMENT (OUTPATIENT)
Dept: GENERAL RADIOLOGY | Age: 75
DRG: 074 | End: 2020-08-25
Payer: MEDICARE

## 2020-08-25 VITALS
DIASTOLIC BLOOD PRESSURE: 88 MMHG | HEIGHT: 70 IN | HEART RATE: 70 BPM | SYSTOLIC BLOOD PRESSURE: 150 MMHG | TEMPERATURE: 98 F | WEIGHT: 197.6 LBS | BODY MASS INDEX: 28.29 KG/M2

## 2020-08-25 LAB
ANION GAP SERPL CALCULATED.3IONS-SCNC: 12 MMOL/L (ref 3–16)
BASOPHILS ABSOLUTE: 0.1 K/UL (ref 0–0.2)
BASOPHILS RELATIVE PERCENT: 0.8 %
BUN BLDV-MCNC: 97 MG/DL (ref 7–20)
CALCIUM SERPL-MCNC: 9.3 MG/DL (ref 8.3–10.6)
CHLORIDE BLD-SCNC: 104 MMOL/L (ref 99–110)
CO2: 22 MMOL/L (ref 21–32)
CREAT SERPL-MCNC: 4.8 MG/DL (ref 0.8–1.3)
EKG ATRIAL RATE: 71 BPM
EKG ATRIAL RATE: 74 BPM
EKG DIAGNOSIS: NORMAL
EKG DIAGNOSIS: NORMAL
EKG P AXIS: 100 DEGREES
EKG P AXIS: 39 DEGREES
EKG P-R INTERVAL: 236 MS
EKG P-R INTERVAL: 262 MS
EKG Q-T INTERVAL: 384 MS
EKG Q-T INTERVAL: 400 MS
EKG QRS DURATION: 78 MS
EKG QRS DURATION: 82 MS
EKG QTC CALCULATION (BAZETT): 426 MS
EKG QTC CALCULATION (BAZETT): 434 MS
EKG R AXIS: 0 DEGREES
EKG R AXIS: 3 DEGREES
EKG T AXIS: 69 DEGREES
EKG T AXIS: 75 DEGREES
EKG VENTRICULAR RATE: 71 BPM
EKG VENTRICULAR RATE: 74 BPM
EOSINOPHILS ABSOLUTE: 0.2 K/UL (ref 0–0.6)
EOSINOPHILS RELATIVE PERCENT: 1.2 %
GFR AFRICAN AMERICAN: 14
GFR NON-AFRICAN AMERICAN: 12
GLUCOSE BLD-MCNC: 119 MG/DL (ref 70–99)
GLUCOSE BLD-MCNC: 164 MG/DL (ref 70–99)
GLUCOSE BLD-MCNC: 208 MG/DL (ref 70–99)
HCT VFR BLD CALC: 32.7 % (ref 40.5–52.5)
HEMOGLOBIN: 11.1 G/DL (ref 13.5–17.5)
LYMPHOCYTES ABSOLUTE: 1.6 K/UL (ref 1–5.1)
LYMPHOCYTES RELATIVE PERCENT: 11.8 %
MCH RBC QN AUTO: 33 PG (ref 26–34)
MCHC RBC AUTO-ENTMCNC: 34.1 G/DL (ref 31–36)
MCV RBC AUTO: 96.9 FL (ref 80–100)
MONOCYTES ABSOLUTE: 0.6 K/UL (ref 0–1.3)
MONOCYTES RELATIVE PERCENT: 4.6 %
NEUTROPHILS ABSOLUTE: 11 K/UL (ref 1.7–7.7)
NEUTROPHILS RELATIVE PERCENT: 81.6 %
PDW BLD-RTO: 12.8 % (ref 12.4–15.4)
PERFORMED ON: ABNORMAL
PERFORMED ON: ABNORMAL
PLATELET # BLD: 258 K/UL (ref 135–450)
PMV BLD AUTO: 8.2 FL (ref 5–10.5)
POTASSIUM REFLEX MAGNESIUM: 5.4 MMOL/L (ref 3.5–5.1)
PRO-BNP: 451 PG/ML (ref 0–449)
RBC # BLD: 3.38 M/UL (ref 4.2–5.9)
SODIUM BLD-SCNC: 138 MMOL/L (ref 136–145)
TROPONIN: 0.04 NG/ML
TROPONIN: 0.05 NG/ML
TROPONIN: 0.05 NG/ML
WBC # BLD: 13.4 K/UL (ref 4–11)

## 2020-08-25 PROCEDURE — 99285 EMERGENCY DEPT VISIT HI MDM: CPT

## 2020-08-25 PROCEDURE — 3017F COLORECTAL CA SCREEN DOC REV: CPT | Performed by: NURSE PRACTITIONER

## 2020-08-25 PROCEDURE — 1123F ACP DISCUSS/DSCN MKR DOCD: CPT | Performed by: NURSE PRACTITIONER

## 2020-08-25 PROCEDURE — 1200000000 HC SEMI PRIVATE

## 2020-08-25 PROCEDURE — 93005 ELECTROCARDIOGRAM TRACING: CPT | Performed by: STUDENT IN AN ORGANIZED HEALTH CARE EDUCATION/TRAINING PROGRAM

## 2020-08-25 PROCEDURE — 6370000000 HC RX 637 (ALT 250 FOR IP): Performed by: STUDENT IN AN ORGANIZED HEALTH CARE EDUCATION/TRAINING PROGRAM

## 2020-08-25 PROCEDURE — 1036F TOBACCO NON-USER: CPT | Performed by: NURSE PRACTITIONER

## 2020-08-25 PROCEDURE — 93000 ELECTROCARDIOGRAM COMPLETE: CPT | Performed by: NURSE PRACTITIONER

## 2020-08-25 PROCEDURE — G8427 DOCREV CUR MEDS BY ELIG CLIN: HCPCS | Performed by: NURSE PRACTITIONER

## 2020-08-25 PROCEDURE — 99222 1ST HOSP IP/OBS MODERATE 55: CPT | Performed by: INTERNAL MEDICINE

## 2020-08-25 PROCEDURE — 93005 ELECTROCARDIOGRAM TRACING: CPT | Performed by: PHYSICIAN ASSISTANT

## 2020-08-25 PROCEDURE — 6360000002 HC RX W HCPCS: Performed by: STUDENT IN AN ORGANIZED HEALTH CARE EDUCATION/TRAINING PROGRAM

## 2020-08-25 PROCEDURE — 71046 X-RAY EXAM CHEST 2 VIEWS: CPT

## 2020-08-25 PROCEDURE — 2580000003 HC RX 258: Performed by: STUDENT IN AN ORGANIZED HEALTH CARE EDUCATION/TRAINING PROGRAM

## 2020-08-25 PROCEDURE — 84484 ASSAY OF TROPONIN QUANT: CPT

## 2020-08-25 PROCEDURE — 4040F PNEUMOC VAC/ADMIN/RCVD: CPT | Performed by: NURSE PRACTITIONER

## 2020-08-25 PROCEDURE — 85025 COMPLETE CBC W/AUTO DIFF WBC: CPT

## 2020-08-25 PROCEDURE — 80048 BASIC METABOLIC PNL TOTAL CA: CPT

## 2020-08-25 PROCEDURE — 99214 OFFICE O/P EST MOD 30 MIN: CPT | Performed by: NURSE PRACTITIONER

## 2020-08-25 PROCEDURE — G8417 CALC BMI ABV UP PARAM F/U: HCPCS | Performed by: NURSE PRACTITIONER

## 2020-08-25 PROCEDURE — 83880 ASSAY OF NATRIURETIC PEPTIDE: CPT

## 2020-08-25 RX ORDER — LIDOCAINE 4 G/G
1 PATCH TOPICAL DAILY
Status: DISCONTINUED | OUTPATIENT
Start: 2020-08-25 | End: 2020-08-28 | Stop reason: HOSPADM

## 2020-08-25 RX ORDER — INSULIN LISPRO 100 [IU]/ML
12 INJECTION, SOLUTION INTRAVENOUS; SUBCUTANEOUS
Status: DISCONTINUED | OUTPATIENT
Start: 2020-08-25 | End: 2020-08-25

## 2020-08-25 RX ORDER — SODIUM CHLORIDE 0.9 % (FLUSH) 0.9 %
10 SYRINGE (ML) INJECTION EVERY 12 HOURS SCHEDULED
Status: DISCONTINUED | OUTPATIENT
Start: 2020-08-25 | End: 2020-08-25 | Stop reason: SDUPTHER

## 2020-08-25 RX ORDER — INSULIN LISPRO 100 [IU]/ML
0-6 INJECTION, SOLUTION INTRAVENOUS; SUBCUTANEOUS NIGHTLY
Status: DISCONTINUED | OUTPATIENT
Start: 2020-08-25 | End: 2020-08-28 | Stop reason: HOSPADM

## 2020-08-25 RX ORDER — ASPIRIN 81 MG/1
81 TABLET, CHEWABLE ORAL DAILY
Status: DISCONTINUED | OUTPATIENT
Start: 2020-08-26 | End: 2020-08-28 | Stop reason: HOSPADM

## 2020-08-25 RX ORDER — HEPARIN SODIUM 5000 [USP'U]/ML
2000 INJECTION, SOLUTION INTRAVENOUS; SUBCUTANEOUS PRN
Status: DISCONTINUED | OUTPATIENT
Start: 2020-08-25 | End: 2020-08-28 | Stop reason: HOSPADM

## 2020-08-25 RX ORDER — ONDANSETRON 2 MG/ML
4 INJECTION INTRAMUSCULAR; INTRAVENOUS EVERY 6 HOURS PRN
Status: DISCONTINUED | OUTPATIENT
Start: 2020-08-25 | End: 2020-08-25 | Stop reason: SDUPTHER

## 2020-08-25 RX ORDER — ATORVASTATIN CALCIUM 40 MG/1
80 TABLET, FILM COATED ORAL NIGHTLY
Status: DISCONTINUED | OUTPATIENT
Start: 2020-08-25 | End: 2020-08-28 | Stop reason: HOSPADM

## 2020-08-25 RX ORDER — METOPROLOL SUCCINATE 25 MG/1
25 TABLET, EXTENDED RELEASE ORAL DAILY
Status: DISCONTINUED | OUTPATIENT
Start: 2020-08-26 | End: 2020-08-26

## 2020-08-25 RX ORDER — LEVOTHYROXINE SODIUM 0.05 MG/1
50 TABLET ORAL
Status: DISCONTINUED | OUTPATIENT
Start: 2020-08-26 | End: 2020-08-28 | Stop reason: HOSPADM

## 2020-08-25 RX ORDER — POLYETHYLENE GLYCOL 3350 17 G/17G
17 POWDER, FOR SOLUTION ORAL DAILY PRN
Status: DISCONTINUED | OUTPATIENT
Start: 2020-08-25 | End: 2020-08-28 | Stop reason: HOSPADM

## 2020-08-25 RX ORDER — INSULIN LISPRO 100 [IU]/ML
0-12 INJECTION, SOLUTION INTRAVENOUS; SUBCUTANEOUS
Status: DISCONTINUED | OUTPATIENT
Start: 2020-08-25 | End: 2020-08-28 | Stop reason: HOSPADM

## 2020-08-25 RX ORDER — SODIUM CHLORIDE 0.9 % (FLUSH) 0.9 %
10 SYRINGE (ML) INJECTION PRN
Status: DISCONTINUED | OUTPATIENT
Start: 2020-08-25 | End: 2020-08-28 | Stop reason: HOSPADM

## 2020-08-25 RX ORDER — NICOTINE POLACRILEX 4 MG
15 LOZENGE BUCCAL PRN
Status: DISCONTINUED | OUTPATIENT
Start: 2020-08-25 | End: 2020-08-28 | Stop reason: HOSPADM

## 2020-08-25 RX ORDER — ATORVASTATIN CALCIUM 40 MG/1
40 TABLET, FILM COATED ORAL NIGHTLY
Status: DISCONTINUED | OUTPATIENT
Start: 2020-08-25 | End: 2020-08-25

## 2020-08-25 RX ORDER — LABETALOL HYDROCHLORIDE 5 MG/ML
5 INJECTION, SOLUTION INTRAVENOUS EVERY 4 HOURS PRN
Status: DISCONTINUED | OUTPATIENT
Start: 2020-08-25 | End: 2020-08-26

## 2020-08-25 RX ORDER — ACETAMINOPHEN 650 MG/1
650 SUPPOSITORY RECTAL EVERY 6 HOURS PRN
Status: DISCONTINUED | OUTPATIENT
Start: 2020-08-25 | End: 2020-08-25 | Stop reason: SDUPTHER

## 2020-08-25 RX ORDER — HEPARIN SODIUM 5000 [USP'U]/ML
4000 INJECTION, SOLUTION INTRAVENOUS; SUBCUTANEOUS ONCE
Status: COMPLETED | OUTPATIENT
Start: 2020-08-25 | End: 2020-08-25

## 2020-08-25 RX ORDER — ACETAMINOPHEN 650 MG/1
650 SUPPOSITORY RECTAL EVERY 6 HOURS PRN
Status: DISCONTINUED | OUTPATIENT
Start: 2020-08-25 | End: 2020-08-28 | Stop reason: HOSPADM

## 2020-08-25 RX ORDER — DEXTROSE MONOHYDRATE 25 G/50ML
12.5 INJECTION, SOLUTION INTRAVENOUS PRN
Status: DISCONTINUED | OUTPATIENT
Start: 2020-08-25 | End: 2020-08-28 | Stop reason: HOSPADM

## 2020-08-25 RX ORDER — ONDANSETRON 2 MG/ML
4 INJECTION INTRAMUSCULAR; INTRAVENOUS EVERY 6 HOURS PRN
Status: DISCONTINUED | OUTPATIENT
Start: 2020-08-25 | End: 2020-08-28 | Stop reason: HOSPADM

## 2020-08-25 RX ORDER — PROMETHAZINE HYDROCHLORIDE 25 MG/1
12.5 TABLET ORAL EVERY 6 HOURS PRN
Status: DISCONTINUED | OUTPATIENT
Start: 2020-08-25 | End: 2020-08-25 | Stop reason: SDUPTHER

## 2020-08-25 RX ORDER — SODIUM CHLORIDE 0.9 % (FLUSH) 0.9 %
10 SYRINGE (ML) INJECTION EVERY 12 HOURS SCHEDULED
Status: DISCONTINUED | OUTPATIENT
Start: 2020-08-25 | End: 2020-08-28 | Stop reason: HOSPADM

## 2020-08-25 RX ORDER — POLYETHYLENE GLYCOL 3350 17 G/17G
17 POWDER, FOR SOLUTION ORAL DAILY PRN
Status: DISCONTINUED | OUTPATIENT
Start: 2020-08-25 | End: 2020-08-25 | Stop reason: SDUPTHER

## 2020-08-25 RX ORDER — SODIUM CHLORIDE 0.9 % (FLUSH) 0.9 %
10 SYRINGE (ML) INJECTION PRN
Status: DISCONTINUED | OUTPATIENT
Start: 2020-08-25 | End: 2020-08-25 | Stop reason: SDUPTHER

## 2020-08-25 RX ORDER — ACETAMINOPHEN 325 MG/1
650 TABLET ORAL EVERY 6 HOURS PRN
Status: DISCONTINUED | OUTPATIENT
Start: 2020-08-25 | End: 2020-08-28 | Stop reason: HOSPADM

## 2020-08-25 RX ORDER — HEPARIN SODIUM 5000 [USP'U]/ML
4000 INJECTION, SOLUTION INTRAVENOUS; SUBCUTANEOUS PRN
Status: DISCONTINUED | OUTPATIENT
Start: 2020-08-25 | End: 2020-08-28 | Stop reason: HOSPADM

## 2020-08-25 RX ORDER — PROMETHAZINE HYDROCHLORIDE 25 MG/1
12.5 TABLET ORAL EVERY 6 HOURS PRN
Status: DISCONTINUED | OUTPATIENT
Start: 2020-08-25 | End: 2020-08-28 | Stop reason: HOSPADM

## 2020-08-25 RX ORDER — DEXTROSE MONOHYDRATE 50 MG/ML
100 INJECTION, SOLUTION INTRAVENOUS PRN
Status: DISCONTINUED | OUTPATIENT
Start: 2020-08-25 | End: 2020-08-28 | Stop reason: HOSPADM

## 2020-08-25 RX ORDER — M-VIT,TX,IRON,MINS/CALC/FOLIC 27MG-0.4MG
1 TABLET ORAL DAILY
Status: DISCONTINUED | OUTPATIENT
Start: 2020-08-26 | End: 2020-08-28 | Stop reason: HOSPADM

## 2020-08-25 RX ORDER — HEPARIN SODIUM 10000 [USP'U]/100ML
9 INJECTION, SOLUTION INTRAVENOUS CONTINUOUS
Status: DISCONTINUED | OUTPATIENT
Start: 2020-08-25 | End: 2020-08-27

## 2020-08-25 RX ORDER — ACETAMINOPHEN 325 MG/1
650 TABLET ORAL EVERY 6 HOURS PRN
Status: DISCONTINUED | OUTPATIENT
Start: 2020-08-25 | End: 2020-08-25 | Stop reason: SDUPTHER

## 2020-08-25 RX ORDER — ASPIRIN 81 MG/1
81 TABLET, CHEWABLE ORAL ONCE
Status: COMPLETED | OUTPATIENT
Start: 2020-08-25 | End: 2020-08-25

## 2020-08-25 RX ADMIN — ASPIRIN 81 MG: 81 TABLET, CHEWABLE ORAL at 16:56

## 2020-08-25 RX ADMIN — HEPARIN SODIUM 11 UNITS/KG/HR: 10000 INJECTION, SOLUTION INTRAVENOUS at 18:44

## 2020-08-25 RX ADMIN — INSULIN LISPRO 2 UNITS: 100 INJECTION, SOLUTION INTRAVENOUS; SUBCUTANEOUS at 22:57

## 2020-08-25 RX ADMIN — INSULIN GLARGINE 24 UNITS: 100 INJECTION, SOLUTION SUBCUTANEOUS at 22:55

## 2020-08-25 RX ADMIN — ATORVASTATIN CALCIUM 80 MG: 40 TABLET, FILM COATED ORAL at 22:54

## 2020-08-25 RX ADMIN — Medication 10 ML: at 21:15

## 2020-08-25 RX ADMIN — HEPARIN SODIUM 4000 UNITS: 5000 INJECTION INTRAVENOUS; SUBCUTANEOUS at 18:44

## 2020-08-25 ASSESSMENT — ENCOUNTER SYMPTOMS
SHORTNESS OF BREATH: 0
CHEST TIGHTNESS: 0
WHEEZING: 0
DIARRHEA: 0
RESPIRATORY NEGATIVE: 1
COUGH: 0
ALLERGIC/IMMUNOLOGIC NEGATIVE: 1
VOMITING: 0
NAUSEA: 0
ABDOMINAL PAIN: 0
EYES NEGATIVE: 1
CONSTIPATION: 1

## 2020-08-25 ASSESSMENT — PAIN DESCRIPTION - FREQUENCY: FREQUENCY: CONTINUOUS

## 2020-08-25 ASSESSMENT — PAIN DESCRIPTION - ONSET: ONSET: PROGRESSIVE

## 2020-08-25 ASSESSMENT — PAIN DESCRIPTION - PROGRESSION: CLINICAL_PROGRESSION: GRADUALLY WORSENING

## 2020-08-25 ASSESSMENT — PAIN SCALES - GENERAL: PAINLEVEL_OUTOF10: 10

## 2020-08-25 ASSESSMENT — PAIN DESCRIPTION - DESCRIPTORS: DESCRIPTORS: SHARP

## 2020-08-25 ASSESSMENT — PAIN DESCRIPTION - PAIN TYPE: TYPE: ACUTE PAIN

## 2020-08-25 ASSESSMENT — PAIN - FUNCTIONAL ASSESSMENT: PAIN_FUNCTIONAL_ASSESSMENT: PREVENTS OR INTERFERES SOME ACTIVE ACTIVITIES AND ADLS

## 2020-08-25 ASSESSMENT — PAIN DESCRIPTION - LOCATION: LOCATION: CHEST

## 2020-08-25 NOTE — PROGRESS NOTES
CC/HPI:    77 y.o. patient of Dr Portillo Bunn with hx  CAD/CABG/HTN/HLD who presented after having a syncopal episode after cardiac rehab. Earlier this morning he was standing at the desk and felt LH/dizziness/shaky and he doesn't remember losing consciousness but he found himself on the floor. Now in office he c/o left sided chest pain/tightness. Pain is worse with deep breathing. He denies sob, palpitations, LE edema, orthopnea, fever, chills n/v/d or GI/ bleeding. He sees Dr Adeola Powell for ILD (has chronic crackles)  He sees Dr Sonia Cardenas for CKD    Past Medical History:   Diagnosis Date    Allergic rhinitis due to other allergen 2010    Coronary atherosclerosis of unspecified type of vessel, native or graft 2010    Diabetic eye exam ([facility] [address] 2015    Diabetic eye exam ([facility] [address] 2016    [place] [facility]    Generalized osteoarthrosis, involving multiple sites 2010    Other psoriasis 2010    Pneumonia     Psoriatic arthropathy ([facility] [address] 2010    Type II or unspecified type diabetes mellitus without mention of complication, not stated as uncontrolled 2010    Unspecified essential hypertension 2010    Unspecified sleep apnea 2010     Past Surgical History:   Procedure Laterality Date    CARPAL TUNNEL RELEASE      s/p    CATARACT REMOVAL      CORONARY ARTERY BYPASS GRAFT      JOINT REPLACEMENT       Family History   Problem Relation Age of Onset    Diabetes Mother     Heart Failure Mother     Coronary Art Dis Father      Social History     Tobacco Use    Smoking status: Former Smoker     Packs/day: 1.00     Years: 18.00     Pack years: 18.00     Start date: 1962     Last attempt to quit: 1980     Years since quittin.8    Smokeless tobacco: Never Used   Substance Use Topics    Alcohol use: No    Drug use: No     Allergies:Clindamycin; Clindamycin/lincomycin;  Penicillins; and Tazorac [tazarotene]    Review of Systems  General: No changes in weight, fatigue, or night LIVER PROFILE:   Lab Results   Component Value Date    ALT 40 2019    AST 32 2019    ALKPHOS 83 2019    BILITOT 0.4 2019     PT/INR:   Lab Results   Component Value Date    INR 0.89 2017    INR 0.91 2017    INR 0.96 2017    PROTIME 10.1 2017    PROTIME 10.4 2017    PROTIME 10.9 2017     Pro-BNP   Lab Results   Component Value Date    PROBNP 4,716 2017     LIPIDS:  No components found for: CHLPL  Lab Results   Component Value Date    TRIG 265 (H) 2019    TRIG 205 (H) 2018    TRIG 198 (H) 2017     Lab Results   Component Value Date    HDL 39 (L) 2019    HDL 47 2018    HDL 46 2017     Lab Results   Component Value Date    LDLCALC 14 2019    LDLCALC 58 2018    LDLCALC 44 2017     Lab Results   Component Value Date    LABVLDL 53 2019    LABVLDL 41 2018    LABVLDL 40 2017     TSH:  Lab Results   Component Value Date    TSH 5.20 (H) 2018       IMAGIN2020 ECG: Sinus 1st AVB, no acute ischemic changes. 8/3/2020 Echo:   Summary   Left ventricular cavity size is normal. There is mild concentric left   ventricular hypertrophy. Overall left ventricular systolic function appears   low normal with an ejection fraction of 50%. No regional wall motion   abnormalities are noted. Normal diastolic function. Mild mitral and tricuspid regurgitation are present. 2018 Nuc stress:     Fixed apical perfusion defect may relate to previous infarct or    apical thinning.         No reversible ischemia. 2018 Echo:   Technically difficult examination. Normal left ventricle size. There is left ventricular hypertrophy with more   prominent hypertrophy of the basal septum. Overall left ventricular systolic   function appears mildly reduced with an ejection fraction of 45-50%. The   inferoseptum appears mildly hypokinetic.  Diastolic function probably normal.   Mitral annular calcification is present. Mild mitral regurgitation is present. The aortic valve appears tricuspid. Aortic valve leaflets appear thickened   but open adequately. Mild tricuspid regurgitation. Estimated pulmonary artery systolic pressure   is elevated at 33 mmHg assuming a right atrial pressure of 8 mmHg. Mild pulmonic regurgitation present.  Cath: Angiographic Findings:  Right dominant system  Left Main:  Diffuse, mild disease.  ~20-30% diffuse disease. Left Anterior Descendin% proximal occlusion.  After the LIMA is injected, the LAD was seen to have a mid 80-90% stenosis just distal to a significant diagonal branch.  The diagonal bifurcates; one branch has a severe (~80-90%) stenosis.  The other limb also has diffuse disease.  There was a 90% distal LAD stenosis. Circumflex:  Proximal 70% stenosis at bifurcation of an OM and the AV LCx.  The OM is subtotally occluded.  The AV LCx is diffusely diseased. Right Coronary:  Proximal 30% stenosis.  Mid 60-70% stenosis.  Right posterolateral is occluded.  Right PDA has an ostial/proximal 90% stenosis.  It is diffusely diseased after the ostial lesion. Bypass Grafts:  LIMA-LAD with radial grafts to an OM and right PDA that is widely patent.   Left Ventriculogram:  Not performed due to elevated Cr  Hemodynamics (mm Hg):  Left Ventricular Pressure:  131/0, 14  Central Aortic Pressure:  133/66 (93)    Medications:   Current Outpatient Medications   Medication Sig Dispense Refill    Insulin Pen Needle ([employer] PEN NEEDLES 31G) 31G X 8 MM MISC USE WITH INSULIN FOUR TIMES A  each 2    cyanocobalamin 1000 MCG tablet Take 1,000 mcg by mouth daily      insulin glargine (LANTUS SOLOSTAR) 100 UNIT/ML injection pen INJECT 24 UNITS UNDER THE SKIN EVERY NIGHT AT BEDTIME 15 mL 3    insulin lispro, 1 Unit Dial, 100 UNIT/ML SOPN INJECT 12-18 UNITS UNDER THE SKIN THREE TIMES DAILY BEFORE MEALS 30 mL 3    ACCU-CHEK JOHNATHAN PLUS strip USE TO TEST FOUR TIMES DAILY AS NEEDED 100 strip 4    Continuous Blood Gluc  (FREESTYLE MAGGIE 14 DAY READER) RUMA As needed 1 Device 3    Continuous Blood Gluc Sensor (FREESTYLE MAGGIE 14 DAY SENSOR) MISC Every 2 weeks 2 each 3    levothyroxine (SYNTHROID) 50 MCG tablet TAKE ONE TABLET BY MOUTH DAILY 90 tablet 9    atorvastatin (LIPITOR) 40 MG tablet TAKE ONE TABLET BY MOUTH ONCE NIGHTLY 90 tablet 3    metoprolol succinate (TOPROL XL) 25 MG extended release tablet TAKE ONE TABLET BY MOUTH DAILY 90 tablet 3    Multiple Vitamins-Minerals (THERAPEUTIC MULTIVITAMIN-MINERALS) tablet Take 1 tablet by mouth daily      mometasone-formoterol (DULERA) 200-5 MCG/ACT inhaler Inhale 2 puffs into the lungs every 12 hours 1 Inhaler 11    Handicap Placard MISC by Does not apply route Duration: 5 years 1 each 0    ACCU-CHEK SOFTCLIX LANCETS MISC USE FOUR TIMES A  each 3    glucose blood VI test strips (ACCU-CHEK JOHNATHAN) strip 4 times a day As needed. 150 each 3    Blood Glucose Monitoring Suppl (ACCU-CHEK JOHNATHAN) RUMA Use as needed 1 Device 0    aspirin 81 MG chewable tablet Take 1 tablet by mouth daily. 30 tablet 5    Lancets MISC 2 times daily. Lancets for a Anna meter. 50 each 11    albuterol sulfate HFA (PROVENTIL HFA) 108 (90 Base) MCG/ACT inhaler Inhale 2 puffs into the lungs every 4 hours as needed for Wheezing 1 Inhaler 5     No current facility-administered medications for this visit. Assessment:  1. Syncope, unspecified syncope type    2. Chest pain, unspecified type    3. Shortness of breath    4. S/P CABG x 4    5. Essential hypertension    6.  Mixed hyperlipidemia        Plan:  CP/sob/syncope   Will send PT to ER for further evaluation (Tn, stress test)    CAD/CABG   ASA, statin   toprol   No ACE d/t CKD and hyperK  HTN   /90 but typically 120's   Cont Toprol  HLD   LDL 14   Cont lipitor

## 2020-08-25 NOTE — PROGRESS NOTES
Pharmacy  Note  - Admission Medication History    List of [id number] medications is complete. I reviewed Rx fill history via \"Complete Dispense Report\" in Epic, and spoke to patient on phone. Please call with questions:  [phone] ([facility])    Teodoro REID   8/25/2020 2:14 PM

## 2020-08-25 NOTE — H&P
Internal Medicine  PGY 1  History & Physical      CC : syncope     History Obtained From:  patient    HISTORY OF PRESENT ILLNESS:   Minnie Low is a 77 y.o. male with past medical history ,CABG,HTN,HLD,presenting with an episode of syncope. Of note patient had CABG x4 (2006), left heart cath with stent placement (2013), stress test in 2017 with fixed defect , and echo on 8/3/2020 (EF 50%) and follows closely with Jonathan Balderas (cardiology). Patient has been regularly going to pulmonary rehab (interstitial lung disease)  and completed a session today. States that it was a normal rehab session, no significant increase in strenuous activity. He was standing at the desk and began to feel lightheaded with dizziness and shaking, next thing he remembers is being on the floor. Staff there reported a syncopal episode, did not strike his head reportedly, refused emergency department at that time and was seen by Jonathan Balderas in the office. Had EKG performed which was unremarkable, although was transferred to the emergency department for further evaluation.       At this time, patient is complaining of left-sided chest wall pain that has been increasing ever since his episode of syncope. He is unaware if this is the side that he landed on. Patient states that it hurts with movement and is reproducible upon palpation. He rates the left sided, constant ,chest pain at a 7/10. CXR does not show any rib fractures or abnormalities. Denies any headache, vision changes, weakness to the extremities, sensation changes. No neck pain.        Past Medical History:        Diagnosis Date    Allergic rhinitis due to other allergen 7/12/2010    Coronary atherosclerosis of unspecified type of vessel, native or graft 7/12/2010    Diabetic eye exam ([facility] [address] 04/29/2015    Diabetic eye exam ([facility] [address] 5/5/2016    [place] [facility]    Generalized osteoarthrosis, involving multiple sites 7/12/2010    Other psoriasis 7/12/2010    Pneumonia     Psoriatic arthropathy ([facility] [address] 7/12/2010    Type II or unspecified type diabetes mellitus without mention of complication, not stated as uncontrolled 7/12/2010    Unspecified essential hypertension 7/12/2010    Unspecified sleep apnea 7/12/2010   ·     Past Surgical History:        Procedure Laterality Date    CARPAL TUNNEL RELEASE      s/p    CATARACT REMOVAL      CORONARY ARTERY BYPASS GRAFT      JOINT REPLACEMENT     ·     Medications Priorto Admission:    · Not in a hospital admission. Allergies:  Clindamycin; Clindamycin/lincomycin; Penicillins; and Tazorac [tazarotene]    Social History:   · TOBACCO:   reports that he quit smoking about 39 years ago. He started smoking about 58 years ago. He has a 18.00 pack-year smoking history. He has never used smokeless tobacco.  · ETOH:   reports no history of alcohol use. · DRUGS : None  · Patient currently lives with wife at home  ·   Family History:       Problem Relation Age of Onset    Diabetes Mother     Heart Failure Mother     Coronary Art Dis Father    ·     Review of Systems   Constitutional: Negative. HENT: Negative. Eyes: Negative. Respiratory: Negative. Cardiovascular: Positive for chest pain. Gastrointestinal: Positive for constipation. Endocrine: Negative. Genitourinary: Negative. Musculoskeletal: Negative. Skin: Negative. Allergic/Immunologic: Negative. Neurological: Negative. Hematological: Negative. Psychiatric/Behavioral: Negative. ROS: A 10 point review of systems was conducted, significant findings as noted in HPI. Physical Exam  Constitutional:       Comments: In discomfort and pain when patient moves   HENT:      Head: Normocephalic and atraumatic. Nose: Nose normal.   Eyes:      Extraocular Movements: Extraocular movements intact. Pupils: Pupils are equal, round, and reactive to light. Neck:      Musculoskeletal: Normal range of motion and neck supple.    Cardiovascular:      Rate and Rhythm: Normal rate and regular rhythm. Pulmonary:      Effort: Pulmonary effort is normal.      Breath sounds: Normal breath sounds. Comments: Crackles at base BL  Chest:      Chest wall: Tenderness present. Abdominal:      Palpations: Abdomen is soft. Musculoskeletal: Normal range of motion. General: Tenderness present. Comments: Reproducible chest pain + CP when he moves   Skin:     General: Skin is warm and dry. Neurological:      Mental Status: He is alert and oriented to person, place, and time. Physical exam:       Vitals:    08/25/20 1255   BP: (!) 185/83   Pulse: 68   Resp: 18   Temp: 97.4 °F (36.3 °C)   SpO2: 100%       DATA:    Labs:  CBC:   Recent Labs     08/25/20  1310   WBC 13.4*   HGB 11.1*   HCT 32.7*          BMP:   Recent Labs     08/25/20  1310      K 5.4*      CO2 22   BUN 97*   CREATININE 4.8*   GLUCOSE 119*     LFT's: No results for input(s): AST, ALT, ALB, BILITOT, ALKPHOS in the last 72 hours. Troponin:   Recent Labs     08/25/20  1310   TROPONINI 0.05*     BNP:No results for input(s): BNP in the last 72 hours. ABGs: No results for input(s): PHART, ZBO7CUP, PO2ART in the last 72 hours. INR: No results for input(s): INR in the last 72 hours. U/A:No results for input(s): NITRITE, COLORU, PHUR, LABCAST, WBCUA, RBCUA, MUCUS, TRICHOMONAS, YEAST, BACTERIA, CLARITYU, SPECGRAV, LEUKOCYTESUR, UROBILINOGEN, BILIRUBINUR, BLOODU, GLUCOSEU, AMORPHOUS in the last 72 hours. Invalid input(s): KETONESU    XR CHEST (2 VW)   Final Result      No acute disease. ASSESSMENT AND PLAN:  Tj Leo is a 77 y.o. male with past medical history of CABG x4, left heart cath with stent placement,presenting with an episode of syncope.     NSTEMI  -Repeat EKG   -Low dose heparin  -Aspirin  -Statin  -metoprolol  -Repeat lexiscan myoview  -Cardiology consulted   -lidocaine patch for Chest pain  -trend troponin     Diabetes Mellitus   -home regimen (12 U 3x daily + 24 lantus U nightly)    Hypothyroidism  -Continue home levothyroxine  -TSH with reflex    HLD  -Continue home statin    HTN  -Continue home metoprolol     Renal  Patient sees  outpatient  CKD 4 , Hyperkalemia , Cr 4.8     Code Status:Full code  FEN: cardiac diet   PPX: Heparin drip  DISPO: Oneal Chew MD  8/25/2020,  3:42 PM

## 2020-08-25 NOTE — ED TRIAGE NOTES
Patient is a 77year old male who presents to the ED from outpatient cardiology with c/o chest pain that began this morning. Patient was in pulmonary rehab this am and had a possible syncopal episode. Patient is alert and oriented with even and unlabored respirations. EKG obtained and patient placed on cardiac monitor.

## 2020-08-25 NOTE — ED PROVIDER NOTES
ED Attending Attestation Note     Date of evaluation: 8/25/2020    This patient was seen by the advance practice provider. I have seen and examined the patient, agree with the workup, evaluation, management and diagnosis. The care plan has been discussed. I have reviewed the ECG and concur with the PATTIE's interpretation. My assessment reveals a 4year-old male presenting to the emergency department after a syncopal episode. On examination patient has 2+ radial pulses bilaterally has some tenderness to palpation across the left anterior chest wall. Mary Kohli MD  08/25/20 [id number]

## 2020-08-25 NOTE — CONSULTS
Provider, MD   insulin glargine (LANTUS SOLOSTAR) 100 UNIT/ML injection pen INJECT 24 UNITS UNDER THE SKIN EVERY NIGHT AT BEDTIME 5/20/20  Yes Mango Blanco MD   insulin lispro, 1 Unit Dial, 100 UNIT/ML SOPN INJECT 12-18 UNITS UNDER THE SKIN THREE TIMES DAILY BEFORE MEALS 5/20/20  Yes Mango Blanco MD   levothyroxine (SYNTHROID) 50 MCG tablet TAKE ONE TABLET BY MOUTH DAILY 1/7/20  Yes Mango Blanco MD   atorvastatin (LIPITOR) 40 MG tablet TAKE ONE TABLET BY MOUTH ONCE NIGHTLY 12/20/19  Yes TRAVON Ireland CNP   metoprolol succinate (TOPROL XL) 25 MG extended release tablet TAKE ONE TABLET BY MOUTH DAILY 12/20/19  Yes TRAVON Ireland CNP   Multiple Vitamins-Minerals (THERAPEUTIC MULTIVITAMIN-MINERALS) tablet Take 1 tablet by mouth daily   Yes Historical Provider, MD   aspirin 81 MG chewable tablet Take 1 tablet by mouth daily. 3/28/12  Yes Obi Claudio MD   Insulin Pen Needle ([employer] PEN NEEDLES 31G) 31G X 8 MM MISC USE WITH INSULIN FOUR TIMES A DAY 7/31/20   Wayne Garcia MD   ACCU-CHEK JOHNATHAN PLUS strip USE TO TEST FOUR TIMES DAILY AS NEEDED 4/28/20   Mango Blanco MD   Continuous Blood Gluc  (FREESTYLE MAGGIE 14 DAY READER) Channing Shaker As needed 3/19/20   Mango Blanco MD   Continuous Blood Gluc Sensor (FREESTYLE MAGGIE 14 DAY SENSOR) MISC Every 2 weeks 3/19/20   Mango Blanco MD   Handicap Placard MISC by Does not apply route Duration: 5 years 4/18/18   Ortiz Altman MD   ACCU-CHEK SOFTCLIX LANCETS MISC USE FOUR TIMES A DAY 11/21/17   Mango Blanco MD   glucose blood VI test strips (ACCU-CHEK JOHNATHAN) strip 4 times a day As needed. 11/21/16   Mango Blanco MD   Blood Glucose Monitoring Suppl (ACCU-CHEK JOHNATHAN) RUMA Use as needed 11/21/16   Mango Blanco MD   Lancets MISC 2 times daily. Lancets for a [place] meter.  7/16/10   Amanda Schumacher MD        [facility] Medications:  Current Facility-Administered Medications   Medication Dose Route Frequency Provider Last Rate Last Dose    aspirin chewable tablet 81 mg  81 mg Oral Once Donnella Duane, MD         Current Outpatient Medications   Medication Sig Dispense Refill    cyanocobalamin 1000 MCG tablet Take 1,000 mcg by mouth daily      insulin glargine (LANTUS SOLOSTAR) 100 UNIT/ML injection pen INJECT 24 UNITS UNDER THE SKIN EVERY NIGHT AT BEDTIME 15 mL 3    insulin lispro, 1 Unit Dial, 100 UNIT/ML SOPN INJECT 12-18 UNITS UNDER THE SKIN THREE TIMES DAILY BEFORE MEALS 30 mL 3    levothyroxine (SYNTHROID) 50 MCG tablet TAKE ONE TABLET BY MOUTH DAILY 90 tablet 9    atorvastatin (LIPITOR) 40 MG tablet TAKE ONE TABLET BY MOUTH ONCE NIGHTLY 90 tablet 3    metoprolol succinate (TOPROL XL) 25 MG extended release tablet TAKE ONE TABLET BY MOUTH DAILY 90 tablet 3    Multiple Vitamins-Minerals (THERAPEUTIC MULTIVITAMIN-MINERALS) tablet Take 1 tablet by mouth daily      aspirin 81 MG chewable tablet Take 1 tablet by mouth daily. 30 tablet 5    Insulin Pen Needle ([employer] PEN NEEDLES 31G) 31G X 8 MM MISC USE WITH INSULIN FOUR TIMES A  each 2    ACCU-CHEK JOHNATHAN PLUS strip USE TO TEST FOUR TIMES DAILY AS NEEDED 100 strip 4    Continuous Blood Gluc  (FREESTYLE MAGGIE 14 DAY READER) RUMA As needed 1 Device 3    Continuous Blood Gluc Sensor (FREESTYLE MAGGIE 14 DAY SENSOR) MISC Every 2 weeks 2 each 3    Handicap Placard MISC by Does not apply route Duration: 5 years 1 each 0    ACCU-CHEK SOFTCLIX LANCETS MISC USE FOUR TIMES A  each 3    glucose blood VI test strips (ACCU-CHEK JOHNATHAN) strip 4 times a day As needed. 150 each 3    Blood Glucose Monitoring Suppl (ACCU-CHEK JOHNATHAN) RUMA Use as needed 1 Device 0    Lancets MISC 2 times daily. Lancets for a [place] meter. 50 each 11       Allergies:  Clindamycin; Clindamycin/lincomycin; Penicillins; and Tazorac [tazarotene]     Review of Systems:   All 12 point review of symptoms completed.  Pertinent positives identified in the HPI, all other review of symptoms negative as below. · Constitutional: He endorses weakness and fatigue x 3-4 years. There has been no unanticipated weight loss. · Eyes: No visual changes or diplopia. · ENT: No Headaches, hearing loss or vertigo. · Cardiovascular: History of syncopal events described above. · Respiratory: No cough or wheezing. · Gastrointestinal: No abdominal pain,  · Genitourinary: No dysuria. · Musculoskeletal:  Uses a cane to ambulate. · Integumentary: No rash. · Neurological: No obvious FND. · Psychiatric: No anxiety  · Endocrine: No malaise  · Hematologic/Lymphatic: No abnormal bruising        Physical Examination:    Vitals:    08/25/20 1300   BP: (!) 174/92   Pulse: 73   Resp: 19   Temp:    SpO2: 100%    Weight: 192 lb (87.1 kg)         General Appearance:  Alert, cooperative, no distress, appears stated age   Head:  Normocephalic, without obvious abnormality, atraumatic   Eyes:  No scleral icteus   Nose: Nares normal,   Neck: Supple, JVP normal   Lungs:   Crackles in lung bases bilaterally, respirations unlabored   Chest Wall:  Left chest tender to palpation. No deformity   Heart:  Regular rate and rhythm, S1, S2 normal, no murmur, rub or gallop   Abdomen:   Soft, non-tender, +bowel sounds   Extremities: no cyanosis, no clubbing, no edema   Pulses: symmetric   Skin:  no gross lesions   Pysch: Normal mood and affect   Neurologic: No gross deficits.         Labs  CBC:   Lab Results   Component Value Date    WBC 13.4 08/25/2020    RBC 3.38 08/25/2020    HGB 11.1 08/25/2020    HCT 32.7 08/25/2020    MCV 96.9 08/25/2020    RDW 12.8 08/25/2020     08/25/2020     CMP:    Lab Results   Component Value Date     08/25/2020    K 5.4 08/25/2020     08/25/2020    CO2 22 08/25/2020    BUN 97 08/25/2020    CREATININE 4.8 08/25/2020    GFRAA 14 08/25/2020    GFRAA 60 05/21/2013    AGRATIO 1.9 11/04/2019    LABGLOM 12 08/25/2020    GLUCOSE 119 08/25/2020    GLUCOSE 163 07/22/2011    PROT 6.6 11/04/2019    PROT 7.0 08/15/2012    CALCIUM 9.3 08/25/2020    BILITOT 0.4 11/04/2019    ALKPHOS 83 11/04/2019    AST 32 11/04/2019    ALT 40 11/04/2019     PT/INR:  No results found for: PTINR  Recent Labs     08/25/20  1310 08/25/20  1500   TROPONINI 0.05* 0.05*       EKG 8/25 1258:  Normal Sinus. Normal Axis. Possible 1st Degree AV Block. No significant ST Changes. CXR 8/25:      No acute disease. Echo 8/2/2020   Left ventricular cavity size is normal. There is mild concentric left   ventricular hypertrophy. Overall left ventricular systolic function appears   low normal with an ejection fraction of 50%. No regional wall motion   abnormalities are noted. Normal diastolic function. Mild mitral and tricuspid regurgitation are present. NM MYOCARDIAL SPECT REST EXERCISE OR RX   Status: Final result    Order Providers     Authorizing  Encounter  Billing    MD Delroy Pulido MD            Signed by     Signed  Date/Time   Phone  Pager    Clint Lerma  5/02/2018  14:24  [phone]     Reading Providers     Read  Date  Phone  Pager    Saumya Bass 26, [place]  May 2, 2018  [phone]     All Reviewers List     Jenny Davila RN on 5/10/2018 13:45    Oscar Duggan MD on 5/2/2018 22:04    Radiation Dose Estimates     No radiation information found for this patient    Narrative    SPECT stress rest myocardial perfusion imaging and wall motion    study         HISTORY: Atypical chest pain         10 mCi technetium. 31 mCi technetium.         Chemical stress.         There is decreased activity at the apex fixed on the stress and    rest images may relate to infarct or apical thinning.         No reversible perfusion defects noted to suggest ischemia         Digital fraction 57% which is normal greater than 50%.         Summed stress score is 7. Summed rest score is 3.  Summed    difference score is 5.              Impression         Fixed apical perfusion defect may relate to previous infarct or    apical thinning.         No reversible ischemia. Assessment  Patient Active Problem List   Diagnosis    Essential hypertension    Generalized osteoarthrosis, involving multiple sites    [facility]    Sleep apnea    Psoriatic arthropathy ([facility]    Allergic rhinitis due to other allergen    Coronary atherosclerosis    Vitamin D deficiency    Glaucoma    Nephropathy    S/P CABG x 4    ILD (interstitial lung disease) ([facility]    Abdominal tenderness, RLQ (right lower quadrant)    Chronic diastolic heart failure (HCC)    Pulmonary edema cardiac cause ([facility]    Coronary artery disease involving native coronary artery of native heart without angina pectoris    BJ (obstructive sleep apnea)    Cough variant asthma    Non-ST elevation MI (NSTEMI) ([facility]    Chronic [facility] disease, stage III (moderate) [facility]    Insulin dependent type 2 diabetes mellitus, controlled ([facility])    Chronic fatigue    Syncope and collapse        Impression:   Significant chest pain s/p syncopal episode in patient with previous 4-vessel CABG. Left chest wall tender to palpation. Crackles at b/l lung bases. Elevated troponin (0.05) and BNP (451). Unremarkable EKG. CXR not read as fracture. Recent Echo without systolic, diastolic dysfunction, or significant aortic valve disesease. Cannot r/o NSTEMI. Recommendations:    I had the opportunity to review the clinical symptoms and presentation of Tj Leo. I recommend that the patient undergo further cardiac evaluation with troponin trending. I recommend that the patient be treatments including oxygen, nitroglycerin, morphine, beta blocker, statin, anticoagulation, and antiplatelet. Stress test may be considered after resolution of acute illness. Thank you for allowing to us to participate in the care or Tj Leo. All questions and concerns were addressed to the patient/family.  Alternatives to

## 2020-08-25 NOTE — ED PROVIDER NOTES
pain. Negative for palpitations and leg swelling. Gastrointestinal: Negative for abdominal pain, diarrhea, nausea and vomiting. Musculoskeletal: Negative. Skin: Negative for rash and wound. Neurological: Positive for syncope and light-headedness. Negative for dizziness, weakness and headaches. Past Medical, Surgical, Family, and Social History     He has a past medical history of Allergic rhinitis due to other allergen, Coronary atherosclerosis of unspecified type of vessel, native or graft, Diabetic eye exam ([facility] [address] Diabetic eye exam ([facility] [address] [facility] Generalized osteoarthrosis, involving multiple sites, Other psoriasis, Pneumonia, Psoriatic arthropathy ([facility] [address] Type II or unspecified type diabetes mellitus without mention of complication, not stated as uncontrolled, Unspecified essential hypertension, and Unspecified sleep apnea. He has a past surgical history that includes Coronary artery bypass graft; Carpal tunnel release; joint replacement; and Cataract removal.  His family history includes Coronary Art Dis in his father; Diabetes in his mother; Heart Failure in his mother. He reports that he quit smoking about 39 years ago. He started smoking about 58 years ago. He has a 18.00 pack-year smoking history. He has never used smokeless tobacco. He reports that he does not drink alcohol or use drugs. Medications     Previous Medications    ACCU-CHEK JOHNATHAN PLUS STRIP    USE TO TEST FOUR TIMES DAILY AS NEEDED    ACCU-CHEK SOFTCLIX LANCETS MISC    USE FOUR TIMES A DAY    ASPIRIN 81 MG CHEWABLE TABLET    Take 1 tablet by mouth daily.     ATORVASTATIN (LIPITOR) 40 MG TABLET    TAKE ONE TABLET BY MOUTH ONCE NIGHTLY    BLOOD GLUCOSE MONITORING SUPPL (ACCU-CHEK JOHNATHAN) RUMA    Use as needed    CONTINUOUS BLOOD GLUC  (FREESTYLE MAGGIE 14 DAY READER) RUMA    As needed    CONTINUOUS BLOOD GLUC SENSOR (FREESTYLE MAGGIE 14 DAY SENSOR) MISC    Every 2 weeks    CYANOCOBALAMIN 1000 MCG TABLET    Take 1,000 mcg by mouth daily    GLUCOSE BLOOD VI TEST STRIPS (ACCU-CHEK JOHNATHAN) STRIP    4 times a day As needed. HANDICAP PLACARD MISC    by Does not apply route Duration: 5 years    INSULIN GLARGINE (LANTUS SOLOSTAR) 100 UNIT/ML INJECTION PEN    INJECT 24 UNITS UNDER THE SKIN EVERY NIGHT AT BEDTIME    INSULIN LISPRO, 1 UNIT DIAL, 100 UNIT/ML SOPN    INJECT 12-18 UNITS UNDER THE SKIN THREE TIMES DAILY BEFORE MEALS    INSULIN PEN NEEDLE ([employer] PEN NEEDLES 31G) 31G X 8 MM MISC    USE WITH INSULIN FOUR TIMES A DAY    LANCETS MISC    2 times daily. Lancets for a [place] meter. LEVOTHYROXINE (SYNTHROID) 50 MCG TABLET    TAKE ONE TABLET BY MOUTH DAILY    METOPROLOL SUCCINATE (TOPROL XL) 25 MG EXTENDED RELEASE TABLET    TAKE ONE TABLET BY MOUTH DAILY    MULTIPLE VITAMINS-MINERALS (THERAPEUTIC MULTIVITAMIN-MINERALS) TABLET    Take 1 tablet by mouth daily       Allergies     He is allergic to clindamycin; clindamycin/lincomycin; penicillins; and tazorac [tazarotene]. Physical Exam     INITIAL VITALS: BP: (!) 185/83, Temp: 97.4 °F (36.3 °C), Pulse: 68, Resp: 18, SpO2: 100 %  Physical Exam  Vitals signs and nursing note reviewed. Constitutional:       General: He is not in acute distress. Appearance: He is well-developed. He is not diaphoretic. HENT:      Head: Normocephalic and atraumatic. Eyes:      Conjunctiva/sclera: Conjunctivae normal.      Pupils: Pupils are equal, round, and reactive to light. Neck:      Musculoskeletal: Normal range of motion. Cardiovascular:      Rate and Rhythm: Normal rate and regular rhythm. Heart sounds: Normal heart sounds. No murmur. No friction rub. Pulmonary:      Effort: Pulmonary effort is normal. No respiratory distress. Breath sounds: Normal breath sounds. No wheezing or rales. Comments: Crackles throughout  Chest:      Chest wall: Tenderness (Left-sided, no crepitus) present. Abdominal:      General: There is no distension. Palpations: Abdomen is soft. Tenderness: There is no abdominal tenderness. There is no guarding. Musculoskeletal: Normal range of motion. Skin:     General: Skin is warm and dry. Neurological:      Mental Status: He is alert and oriented to person, place, and time. Psychiatric:         Behavior: Behavior normal.         Thought Content: Thought content normal.         Judgment: Judgment normal.       Diagnostic Results     EKG   Interpreted in conjunction with emergency department physician No att. providers found  Rhythm: normal sinus  and 1 degree AV block  Rate: 71  Axis: normal  Conduction: 1st degree AV block -   ST Segments: no acute change  T Waves: no acute change  Q Waves:none  Clinical Impression: no acute changes  Comparison:  Earlier today    RADIOLOGY:  XR CHEST (2 VW)   Final Result      No acute disease.              LABS:   Results for orders placed or performed during the hospital encounter of 30/17/80   Basic Metabolic Panel w/ Reflex to MG   Result Value Ref Range    Sodium 138 136 - 145 mmol/L    Potassium reflex Magnesium 5.4 (H) 3.5 - 5.1 mmol/L    Chloride 104 99 - 110 mmol/L    CO2 22 21 - 32 mmol/L    Anion Gap 12 3 - 16    Glucose 119 (H) 70 - 99 mg/dL    BUN 97 (HH) 7 - 20 mg/dL    CREATININE 4.8 (H) 0.8 - 1.3 mg/dL    GFR Non- 12 (A) >60    GFR  14 (A) >60    Calcium 9.3 8.3 - 10.6 mg/dL   CBC Auto Differential   Result Value Ref Range    WBC 13.4 (H) 4.0 - 11.0 K/uL    RBC 3.38 (L) 4.20 - 5.90 M/uL    Hemoglobin 11.1 (L) 13.5 - 17.5 g/dL    Hematocrit 32.7 (L) 40.5 - 52.5 %    MCV 96.9 80.0 - 100.0 fL    MCH 33.0 26.0 - 34.0 pg    MCHC 34.1 31.0 - 36.0 g/dL    RDW 12.8 12.4 - 15.4 %    Platelets 953 489 - 771 K/uL    MPV 8.2 5.0 - 10.5 fL    Neutrophils % 81.6 %    Lymphocytes % 11.8 %    Monocytes % 4.6 %    Eosinophils % 1.2 %    Basophils % 0.8 %    Neutrophils Absolute 11.0 (H) 1.7 - 7.7 K/uL    Lymphocytes Absolute 1.6 1.0 - 5.1 K/uL    Monocytes Absolute 0.6 0.0 - 1.3 K/uL    Eosinophils Absolute 0.2 0.0 - 0.6 K/uL    Basophils Absolute 0.1 0.0 - 0.2 K/uL   Troponin   Result Value Ref Range    Troponin 0.05 (H) <0.01 ng/mL   Brain Natriuretic Peptide   Result Value Ref Range    Pro- (H) 0 - 449 pg/mL   Troponin   Result Value Ref Range    Troponin 0.05 (H) <0.01 ng/mL   EKG 12 Lead   Result Value Ref Range    Ventricular Rate 71 BPM    Atrial Rate 71 BPM    P-R Interval 236 ms    QRS Duration 82 ms    Q-T Interval 400 ms    QTc Calculation (Bazett) 434 ms    P Axis 100 degrees    R Axis 0 degrees    T Axis 75 degrees    Diagnosis       EKG performed in ER and to be interpreted by ER physician. Confirmed by MD, ER ([id number]),  Jarvis Jacobo ([address] [facility]), SAHRA ([id number]) on 8/25/2020 12:58:35 PM       ED BEDSIDE ULTRASOUND:  none    RECENT VITALS:  BP: (!) 174/92, Temp: 97.4 °F (36.3 °C), Pulse: 73, Resp: 19, SpO2: 100 %     Procedures     none    ED Course     Nursing Notes, Past Medical Hx,Past Surgical Hx, Social Hx, Allergies, and Family Hx were reviewed. The patient was given the following medications:  No orders of the defined types were placed in this encounter. CONSULTS:  [address] TO HOSPITALIST  IP CONSULT TO PRIMARY CARE Adia Conroy / FENG / Dylan Dilip is a 77 y.o. male presenting with a syncopal episode after cardiac rehab. Patient is hypertensive on arrival, vitals are otherwise stable. EKG was obtained which revealed normal sinus rhythm with a first-degree AV block, no acute ischemic abnormalities. Patient's physical examination reveals chest wall tenderness to the left side, patient reports that this is the chest pain that he is experiencing. It hurts more with deep inspiration, although he does not overtly feel short of breath. Cardiopulmonary evaluation is otherwise unremarkable. No significant peripheral edema present. IV access established and basic labs were initiated.   CBC with mild leukocytosis at 13.4, patient does not endorse any infectious type symptoms. Hemoglobin stable at 11.1. BMP does reveal a potassium of 5.4, no peak T waves present on his EKG. Also has significantly elevated BUN at 97, creatinine of 4.8, consistent with severe worsening of his chronic kidney disease. BNP mildly elevated at 451, troponin is also mildly elevated at 0.05. Repeat troponin is 0.05 as well. Patient's care was discussed and with cardiology, Dr. Tunde Seras. With only minimal elevation in troponin, hold heparinization at this time. Patient was also discussed with his primary care physician who is agreeable for admission for further cardiac monitoring and further work-up as indicated. This patient was also evaluated by the attending physician. All care plans were discussed and agreed upon. Clinical Impression     1. Syncope and collapse    2. NSTEMI (non-ST elevated myocardial infarction) ([facility] [address]        Disposition     PATIENT REFERRED TO:  Esdras Chao MD  [address] [place] [address]  [phone]            DISCHARGE MEDICATIONS:  New Prescriptions    No medications on file       [address], [place]  08/25/20 [id number]

## 2020-08-26 LAB
ANION GAP SERPL CALCULATED.3IONS-SCNC: 12 MMOL/L (ref 3–16)
ANTI-XA UNFRAC HEPARIN: 0.22 IU/ML (ref 0.3–0.7)
ANTI-XA UNFRAC HEPARIN: 0.48 IU/ML (ref 0.3–0.7)
ANTI-XA UNFRAC HEPARIN: 0.74 IU/ML (ref 0.3–0.7)
BUN BLDV-MCNC: 85 MG/DL (ref 7–20)
CALCIUM SERPL-MCNC: 8.9 MG/DL (ref 8.3–10.6)
CHLORIDE BLD-SCNC: 105 MMOL/L (ref 99–110)
CO2: 20 MMOL/L (ref 21–32)
CREAT SERPL-MCNC: 5.2 MG/DL (ref 0.8–1.3)
CREATININE URINE: 58.7 MG/DL (ref 39–259)
EKG ATRIAL RATE: 71 BPM
EKG DIAGNOSIS: NORMAL
EKG P AXIS: 49 DEGREES
EKG P-R INTERVAL: 288 MS
EKG Q-T INTERVAL: 416 MS
EKG QRS DURATION: 80 MS
EKG QTC CALCULATION (BAZETT): 452 MS
EKG R AXIS: 1 DEGREES
EKG T AXIS: 69 DEGREES
EKG VENTRICULAR RATE: 71 BPM
GFR AFRICAN AMERICAN: 13
GFR NON-AFRICAN AMERICAN: 11
GLUCOSE BLD-MCNC: 106 MG/DL (ref 70–99)
GLUCOSE BLD-MCNC: 132 MG/DL (ref 70–99)
GLUCOSE BLD-MCNC: 177 MG/DL (ref 70–99)
GLUCOSE BLD-MCNC: 215 MG/DL (ref 70–99)
HCT VFR BLD CALC: 32.9 % (ref 40.5–52.5)
HEMOGLOBIN: 11.2 G/DL (ref 13.5–17.5)
LV EF: 67 %
LVEF MODALITY: NORMAL
MCH RBC QN AUTO: 32.9 PG (ref 26–34)
MCHC RBC AUTO-ENTMCNC: 34 G/DL (ref 31–36)
MCV RBC AUTO: 96.7 FL (ref 80–100)
PDW BLD-RTO: 12.6 % (ref 12.4–15.4)
PERFORMED ON: ABNORMAL
PHOSPHORUS: 5.4 MG/DL (ref 2.5–4.9)
PLATELET # BLD: 210 K/UL (ref 135–450)
PMV BLD AUTO: 8.1 FL (ref 5–10.5)
POTASSIUM REFLEX MAGNESIUM: 5.6 MMOL/L (ref 3.5–5.1)
PROTEIN PROTEIN: 459.4 MG/DL
RBC # BLD: 3.4 M/UL (ref 4.2–5.9)
SODIUM BLD-SCNC: 137 MMOL/L (ref 136–145)
SODIUM URINE: 68 MMOL/L
TROPONIN: 0.04 NG/ML
TSH REFLEX: 2.63 UIU/ML (ref 0.27–4.2)
WBC # BLD: 12.5 K/UL (ref 4–11)

## 2020-08-26 PROCEDURE — 6370000000 HC RX 637 (ALT 250 FOR IP): Performed by: STUDENT IN AN ORGANIZED HEALTH CARE EDUCATION/TRAINING PROGRAM

## 2020-08-26 PROCEDURE — 99222 1ST HOSP IP/OBS MODERATE 55: CPT | Performed by: HOSPITALIST

## 2020-08-26 PROCEDURE — 36415 COLL VENOUS BLD VENIPUNCTURE: CPT

## 2020-08-26 PROCEDURE — 6360000002 HC RX W HCPCS: Performed by: STUDENT IN AN ORGANIZED HEALTH CARE EDUCATION/TRAINING PROGRAM

## 2020-08-26 PROCEDURE — 3430000000 HC RX DIAGNOSTIC RADIOPHARMACEUTICAL: Performed by: STUDENT IN AN ORGANIZED HEALTH CARE EDUCATION/TRAINING PROGRAM

## 2020-08-26 PROCEDURE — 99233 SBSQ HOSP IP/OBS HIGH 50: CPT | Performed by: INTERNAL MEDICINE

## 2020-08-26 PROCEDURE — 85520 HEPARIN ASSAY: CPT

## 2020-08-26 PROCEDURE — 6370000000 HC RX 637 (ALT 250 FOR IP): Performed by: INTERNAL MEDICINE

## 2020-08-26 PROCEDURE — 84156 ASSAY OF PROTEIN URINE: CPT

## 2020-08-26 PROCEDURE — 93005 ELECTROCARDIOGRAM TRACING: CPT | Performed by: STUDENT IN AN ORGANIZED HEALTH CARE EDUCATION/TRAINING PROGRAM

## 2020-08-26 PROCEDURE — 80048 BASIC METABOLIC PNL TOTAL CA: CPT

## 2020-08-26 PROCEDURE — 1200000000 HC SEMI PRIVATE

## 2020-08-26 PROCEDURE — 93017 CV STRESS TEST TRACING ONLY: CPT

## 2020-08-26 PROCEDURE — 84443 ASSAY THYROID STIM HORMONE: CPT

## 2020-08-26 PROCEDURE — 2580000003 HC RX 258: Performed by: INTERNAL MEDICINE

## 2020-08-26 PROCEDURE — 78452 HT MUSCLE IMAGE SPECT MULT: CPT

## 2020-08-26 PROCEDURE — 85027 COMPLETE CBC AUTOMATED: CPT

## 2020-08-26 PROCEDURE — 2580000003 HC RX 258: Performed by: STUDENT IN AN ORGANIZED HEALTH CARE EDUCATION/TRAINING PROGRAM

## 2020-08-26 PROCEDURE — 84100 ASSAY OF PHOSPHORUS: CPT

## 2020-08-26 PROCEDURE — 93010 ELECTROCARDIOGRAM REPORT: CPT | Performed by: INTERNAL MEDICINE

## 2020-08-26 PROCEDURE — 82570 ASSAY OF URINE CREATININE: CPT

## 2020-08-26 PROCEDURE — A9502 TC99M TETROFOSMIN: HCPCS | Performed by: STUDENT IN AN ORGANIZED HEALTH CARE EDUCATION/TRAINING PROGRAM

## 2020-08-26 PROCEDURE — 2500000003 HC RX 250 WO HCPCS: Performed by: STUDENT IN AN ORGANIZED HEALTH CARE EDUCATION/TRAINING PROGRAM

## 2020-08-26 PROCEDURE — 84484 ASSAY OF TROPONIN QUANT: CPT

## 2020-08-26 PROCEDURE — 84300 ASSAY OF URINE SODIUM: CPT

## 2020-08-26 RX ORDER — LABETALOL 20 MG/4 ML (5 MG/ML) INTRAVENOUS SYRINGE
10 EVERY 4 HOURS PRN
Status: DISCONTINUED | OUTPATIENT
Start: 2020-08-26 | End: 2020-08-26

## 2020-08-26 RX ORDER — SODIUM CHLORIDE 0.9 % (FLUSH) 0.9 %
10 SYRINGE (ML) INJECTION 2 TIMES DAILY
Status: DISCONTINUED | OUTPATIENT
Start: 2020-08-26 | End: 2020-08-28 | Stop reason: HOSPADM

## 2020-08-26 RX ORDER — NITROGLYCERIN 40 MG/1
1 PATCH TRANSDERMAL DAILY
Status: DISCONTINUED | OUTPATIENT
Start: 2020-08-26 | End: 2020-08-27

## 2020-08-26 RX ORDER — CARVEDILOL 6.25 MG/1
6.25 TABLET ORAL 2 TIMES DAILY WITH MEALS
Status: DISCONTINUED | OUTPATIENT
Start: 2020-08-26 | End: 2020-08-28 | Stop reason: HOSPADM

## 2020-08-26 RX ORDER — ACETYLCYSTEINE 200 MG/ML
600 SOLUTION ORAL; RESPIRATORY (INHALATION) 2 TIMES DAILY
Status: DISCONTINUED | OUTPATIENT
Start: 2020-08-27 | End: 2020-08-27

## 2020-08-26 RX ORDER — HYDRALAZINE HYDROCHLORIDE 25 MG/1
25 TABLET, FILM COATED ORAL EVERY 8 HOURS SCHEDULED
Status: DISCONTINUED | OUTPATIENT
Start: 2020-08-26 | End: 2020-08-28 | Stop reason: HOSPADM

## 2020-08-26 RX ORDER — CLOPIDOGREL BISULFATE 75 MG/1
75 TABLET ORAL DAILY
Status: DISCONTINUED | OUTPATIENT
Start: 2020-08-27 | End: 2020-08-28 | Stop reason: HOSPADM

## 2020-08-26 RX ORDER — HYDRALAZINE HYDROCHLORIDE 20 MG/ML
10 INJECTION INTRAMUSCULAR; INTRAVENOUS EVERY 6 HOURS PRN
Status: DISCONTINUED | OUTPATIENT
Start: 2020-08-26 | End: 2020-08-28 | Stop reason: HOSPADM

## 2020-08-26 RX ORDER — CLOPIDOGREL BISULFATE 75 MG/1
300 TABLET ORAL ONCE
Status: COMPLETED | OUTPATIENT
Start: 2020-08-26 | End: 2020-08-26

## 2020-08-26 RX ORDER — LABETALOL HYDROCHLORIDE 5 MG/ML
10 INJECTION, SOLUTION INTRAVENOUS EVERY 4 HOURS PRN
Status: DISCONTINUED | OUTPATIENT
Start: 2020-08-26 | End: 2020-08-28 | Stop reason: HOSPADM

## 2020-08-26 RX ORDER — AMLODIPINE BESYLATE 5 MG/1
5 TABLET ORAL DAILY
Status: DISCONTINUED | OUTPATIENT
Start: 2020-08-26 | End: 2020-08-28

## 2020-08-26 RX ORDER — SODIUM CHLORIDE, SODIUM LACTATE, POTASSIUM CHLORIDE, CALCIUM CHLORIDE 600; 310; 30; 20 MG/100ML; MG/100ML; MG/100ML; MG/100ML
INJECTION, SOLUTION INTRAVENOUS CONTINUOUS
Status: DISCONTINUED | OUTPATIENT
Start: 2020-08-26 | End: 2020-08-28

## 2020-08-26 RX ADMIN — REGADENOSON 0.4 MG: 0.08 INJECTION, SOLUTION INTRAVENOUS at 09:22

## 2020-08-26 RX ADMIN — CARVEDILOL 6.25 MG: 6.25 TABLET, FILM COATED ORAL at 18:32

## 2020-08-26 RX ADMIN — ATORVASTATIN CALCIUM 80 MG: 40 TABLET, FILM COATED ORAL at 21:35

## 2020-08-26 RX ADMIN — HEPARIN SODIUM 2000 UNITS: 5000 INJECTION, SOLUTION INTRAVENOUS; SUBCUTANEOUS at 18:45

## 2020-08-26 RX ADMIN — HYDRALAZINE HYDROCHLORIDE 10 MG: 20 INJECTION INTRAMUSCULAR; INTRAVENOUS at 19:26

## 2020-08-26 RX ADMIN — Medication 10 ML: at 21:36

## 2020-08-26 RX ADMIN — SODIUM CHLORIDE, POTASSIUM CHLORIDE, SODIUM LACTATE AND CALCIUM CHLORIDE: 600; 310; 30; 20 INJECTION, SOLUTION INTRAVENOUS at 23:40

## 2020-08-26 RX ADMIN — TETROFOSMIN 32.9 MILLICURIE: 1.38 INJECTION, POWDER, LYOPHILIZED, FOR SOLUTION INTRAVENOUS at 09:22

## 2020-08-26 RX ADMIN — INSULIN LISPRO 1 UNITS: 100 INJECTION, SOLUTION INTRAVENOUS; SUBCUTANEOUS at 22:57

## 2020-08-26 RX ADMIN — METOPROLOL SUCCINATE 25 MG: 25 TABLET, EXTENDED RELEASE ORAL at 16:44

## 2020-08-26 RX ADMIN — HYDRALAZINE HYDROCHLORIDE 25 MG: 25 TABLET, FILM COATED ORAL at 23:40

## 2020-08-26 RX ADMIN — Medication 10 ML: at 07:25

## 2020-08-26 RX ADMIN — LABETALOL HYDROCHLORIDE 5 MG: 5 INJECTION INTRAVENOUS at 03:21

## 2020-08-26 RX ADMIN — AMLODIPINE BESYLATE 5 MG: 5 TABLET ORAL at 18:32

## 2020-08-26 RX ADMIN — TETROFOSMIN 10.8 MILLICURIE: 1.38 INJECTION, POWDER, LYOPHILIZED, FOR SOLUTION INTRAVENOUS at 07:25

## 2020-08-26 RX ADMIN — INSULIN GLARGINE 24 UNITS: 100 INJECTION, SOLUTION SUBCUTANEOUS at 22:55

## 2020-08-26 RX ADMIN — CLOPIDOGREL BISULFATE 300 MG: 75 TABLET ORAL at 18:32

## 2020-08-26 RX ADMIN — Medication 10 ML: at 09:22

## 2020-08-26 ASSESSMENT — PAIN SCALES - GENERAL
PAINLEVEL_OUTOF10: 0
PAINLEVEL_OUTOF10: 0
PAINLEVEL_OUTOF10: 7
PAINLEVEL_OUTOF10: 0
PAINLEVEL_OUTOF10: 0

## 2020-08-26 ASSESSMENT — PAIN DESCRIPTION - FREQUENCY: FREQUENCY: CONTINUOUS

## 2020-08-26 ASSESSMENT — ENCOUNTER SYMPTOMS
COUGH: 0
SHORTNESS OF BREATH: 0
CHOKING: 0
CHEST TIGHTNESS: 0
ABDOMINAL PAIN: 0
WHEEZING: 0
VOMITING: 0
NAUSEA: 0
ABDOMINAL DISTENTION: 0

## 2020-08-26 ASSESSMENT — PAIN DESCRIPTION - ONSET: ONSET: ON-GOING

## 2020-08-26 ASSESSMENT — PAIN DESCRIPTION - ORIENTATION: ORIENTATION: LEFT

## 2020-08-26 ASSESSMENT — PAIN DESCRIPTION - PAIN TYPE: TYPE: ACUTE PAIN

## 2020-08-26 ASSESSMENT — PAIN DESCRIPTION - PROGRESSION: CLINICAL_PROGRESSION: NOT CHANGED

## 2020-08-26 ASSESSMENT — PAIN DESCRIPTION - DESCRIPTORS: DESCRIPTORS: ACHING;SHARP

## 2020-08-26 ASSESSMENT — PAIN DESCRIPTION - LOCATION: LOCATION: CHEST

## 2020-08-26 NOTE — CONSULTS
Nephrology Consult    Patient's PCP: Rupal Wilson MD     Referred by:     Referral reason:    CC: syncope    HISTORY OF PRESENT ILLNESS:    Latoya Russell a 77 y. o. male with past medical history ,CABG,HTN,HLD,presenting with an episode of syncope. Of note patient had CABG x4 (2006), left heart cath with stent placement (2013), stress test in 2017 with fixed defect , and echo on 8/3/2020 (EF 50%) and follows closely with Raz Mariscal (cardiology). Patient has been regularly going to pulmonary rehab (interstitial lung disease)  and completed a session today.  States that it was a normal rehab session, no significant increase in strenuous activity.  He was standing at the desk and began to feel lightheaded with dizziness and shaking, next thing he remembers is being on the floor.  Staff there reported a syncopal episode, did not strike his head reportedly, refused emergency department at that time and was seen by Raz Mariscal in the office. [facility] EKG performed which was unremarkable, although was transferred to the emergency department for further evaluation.       At this time, patient is complaining of left-sided chest wall pain that has been increasing ever since his episode of syncope. Valente Sha is unaware if this is the side that he landed on. Patient states that it hurts with movement and is reproducible upon palpation. He rates the left sided, constant ,chest pain at a 7/10. CXR does not show any rib fractures or abnormalities. Denies any headache, vision changes, weakness to the extremities, sensation changes.  No neck pain. Latoya butler 77 y. o. male with past medical history ,CABG,HTN,HLD,presenting with an episode of syncope. Of note patient had CABG x4 (2006), left heart cath with stent placement (2013), stress test in 2017 with fixed defect , and echo on 8/3/2020 (EF 50%) and follows closely with Raz Mariscal (cardiology).  Patient has been regularly going to pulmonary rehab (interstitial lung disease)  and completed a session today.  States that it was a normal rehab session, no significant increase in strenuous activity.  He was standing at the desk and began to feel lightheaded with dizziness and shaking, next thing he remembers is being on the floor.  Staff there reported a syncopal episode, did not strike his head reportedly, refused emergency department at that time and was seen by Rio Yu in the office. [facility] EKG performed which was unremarkable, although was transferred to the emergency department for further evaluation.       At this time, patient is complaining of left-sided chest wall pain that has been increasing ever since his episode of syncope. [facility] is unaware if this is the side that he landed on. Patient states that it hurts with movement and is reproducible upon palpation. He rates the left sided, constant ,chest pain at a 7/10. CXR does not show any rib fractures or abnormalities. Denies any headache, vision changes, weakness to the extremities, sensation changes.  No neck pain.        Past Medical / Surgical History:        CKD 4    Past Medical History:   Diagnosis Date    Allergic rhinitis due to other allergen 7/12/2010    Coronary atherosclerosis of unspecified type of vessel, native or graft 7/12/2010    Diabetic eye exam ([address] 04/29/2015    Diabetic eye exam ([address] 5/5/2016    [place] [facility]    Generalized osteoarthrosis, involving multiple sites 7/12/2010    Other psoriasis 7/12/2010    Pneumonia     Psoriatic arthropathy ([address] 7/12/2010    Type II or unspecified type diabetes mellitus without mention of complication, not stated as uncontrolled 7/12/2010    Unspecified essential hypertension 7/12/2010    Unspecified sleep apnea 7/12/2010     Past Surgical History:   Procedure Laterality Date    CARPAL TUNNEL RELEASE      s/p    CATARACT REMOVAL      CORONARY ARTERY BYPASS GRAFT      JOINT REPLACEMENT         Medications Prior to Admission:    No current Tenderness: There is no right CVA tenderness or left CVA tenderness. Musculoskeletal:         General: No swelling or tenderness. Right lower leg: No edema. Left lower leg: No edema. Skin:     General: Skin is warm. Neurological:      Mental Status: He is alert and oriented to person, place, and time. Sensory: No sensory deficit. Motor: No weakness. Psychiatric:         Mood and Affect: Mood normal.         Behavior: Behavior normal.         Thought Content: Thought content normal.         LABS:  Recent Labs     08/25/20  1310 08/26/20  0541   WBC 13.4* 12.5*   HGB 11.1* 11.2*   HCT 32.7* 32.9*    210                                                                  Recent Labs     08/25/20  1310 08/26/20  0541    137   K 5.4* 5.6*    105   CO2 22 20*   BUN 97* 85*   CREATININE 4.8* 5.2*   GLUCOSE 119* 132*         Recent Labs     08/25/20  1500 08/25/20  1817 08/26/20  0054   TROPONINI 0.05* 0.04* 0.04*     No results for input(s): BNP in the last 72 hours. Lab Results   Component Value Date    PHART 7.428 01/16/2017    MUW6RNW 47 01/16/2017    PO2ART 101 01/16/2017         Assessment &Plan:        Assessment/Plan       1. MORIS on CKD 4   Presented with Cret at 4.8 with baseline around 3. GFR 11. The pt's GFR seems to be worsening gradually with it being 15 in July. 2. Electrolyte abnormality  Hyperkalemia 5.4 on admission.       3. Chronic Anemia  Hg around 11 likely 2/2 to CKD     4. HTN  Continue home metoprolol     Plan     -Patient seems to have persistently elevated potassium. However on my assessment he does not have pericarditis, pleuritis or encephalopathy. In additon he does not seem to be volume overloaded, has no dyspnea, no cognitive impairment and no acidosis   Hence, I do not think he needs dialysis at this point.   Currently I would recommend conservative measures to lower potassium with Low potassium diet    -Check for phosphorous and serum electrolytes daily. I would also recommend to have him  follow up with nephrology as an outpatient. Due to his presenting complain of syncope and his chronic history of Diabetes the patient needs to have his orthostatic vitas checked.     Will discuss with attending Dr. Carmine Sanchez MD  PGY-1       Pt seen and examined     Pt has avd Kidney disease   Has heavy protienuria   High risk for ADELIA   Pt is at last stages of kidney disease and will need RRT soon     Pt wanted to do home dialysis - PD     ADELIA prevention   - start low rate IVF   - BP control   - will check LVEDP with cath   - minimize use of contrast     Fabby Rojas MD

## 2020-08-26 NOTE — PROGRESS NOTES
Pt received from ED. VSS, bed alarm set, Assessment per documentation. Heparin gtt verified. Telemetry box 5670 applied. Call light is within reach. Pt denies any needs at this time.

## 2020-08-26 NOTE — PROGRESS NOTES
CC: Chest pain, syncope    HPI/Update: Still has discomfort. No n/v.   States that on admission he *might* have had syncope. [place] like he might pass out, doesn't think he did, but doesn't remember everything either. PE:  Blood pressure (!) 226/93, pulse 73, temperature 96.9 °F (36.1 °C), temperature source Oral, resp. rate 18, height 5' 10\" (1.778 m), weight 192 lb (87.1 kg), SpO2 100 %. PHYSICAL EXAMINATION:  [ INSTRUCTIONS:  \"[x]\" Indicates a positive item  \"[]\" Indicates a negative item  -- DELETE ALL ITEMS NOT EXAMINED]    Constitutional: [x] Appears well-developed and well-nourished [x] No apparent distress      [] Abnormal-   Mental status  [x] Alert and awake  [] Oriented to person/place/time []Able to follow commands      Eyes:  EOM    [x]  Normal  [] Abnormal-  Sclera  [x]  Normal  [] Abnormal -         Discharge [x]  None visible  [] Abnormal -    HENT:   [x] Normocephalic, atraumatic.   [] Abnormal   [x] Mouth/Throat: Mucous membranes are moist.     External Ears [x] Normal  [] Abnormal-     Neck: [x] No visualized mass     Pulmonary/Chest: [x] Respiratory effort normal.  [x] No visualized signs of difficulty breathing or respiratory distress        [] Abnormal-      Musculoskeletal:   [x] Normal gait with no signs of ataxia         [x] Normal range of motion of neck        [] Abnormal-       Neurological:        [x] No Facial Asymmetry (Cranial nerve 7 motor function) (limited exam to video visit)          [x] No gaze palsy        [] Abnormal-         Skin:        [x] No significant exanthematous lesions or discoloration noted on facial skin         [] Abnormal-            Psychiatric:       [x] Normal Affect [] No Hallucinations        [] Abnormal-     Other pertinent observable physical exam findings:    8/2020 Nuc Stress:  Overall findings represent a high risk scan.     Normal LV size and systolic function.     There is a medium to large size reversible defect involving the apex, apical

## 2020-08-26 NOTE — ED NOTES
Report received from Tanya Burroughs, [address] [place] [address]. Patient resting in bed, no acute distress. Patient pending repeat bloodwork from lab at this time (Heparin anti-XA and troponin follow up). Lab aware and to come to draw. Patient remains on CMU, VSS, no needs verbalized at this time.  Will continue to monitor and will update patient on plan of care as information becomes available      Katerina Mullen RN  08/26/20 0031

## 2020-08-26 NOTE — ED NOTES
Spoke to lab, lab states the will be down around 0115 to draw all bloodwork      Christine Shine RN  08/26/20 9278

## 2020-08-26 NOTE — ED NOTES
Patient sleeping in bed, no acute distress. Lab called about 0115 troponin. No answer.  Will attempt to call back     Tawnya Bravo RN  08/26/20 [id number]

## 2020-08-26 NOTE — PROGRESS NOTES
Progress Note    Admit Date: 8/25/2020  Diet: Diet NPO Effective Now    CC: syncope    Interval history: No acute events overnight     HPI:  Ceci Ta a 77 y. o. male with past medical history ,CABG,HTN,HLD,presenting with an episode of syncope. Of note patient had CABG x4 (2006), left heart cath with stent placement (2013), stress test in 2017 with fixed defect , and echo on 8/3/2020 (EF 50%) and follows closely with Geovanny Cohen (cardiology). Patient has been regularly going to pulmonary rehab (interstitial lung disease)  and completed a session today.  States that it was a normal rehab session, no significant increase in strenuous activity.  He was standing at the desk and began to feel lightheaded with dizziness and shaking, next thing he remembers is being on the floor.  Staff there reported a syncopal episode, did not strike his head reportedly, refused emergency department at that time and was seen by Geovanny Cohen in the office. [facility] EKG performed which was unremarkable, although was transferred to the emergency department for further evaluation.       At this time, patient is complaining of left-sided chest wall pain that has been increasing ever since his episode of syncope. [facility] is unaware if this is the side that he landed on. Patient states that it hurts with movement and is reproducible upon palpation. He rates the left sided, constant ,chest pain at a 7/10. CXR does not show any rib fractures or abnormalities. Denies any headache, vision changes, weakness to the extremities, sensation changes.  No neck pain.      Medications:     Scheduled Meds:   sodium chloride flush  10 mL Intravenous BID    aspirin  81 mg Oral Daily    insulin glargine  24 Units Subcutaneous Nightly    levothyroxine  50 mcg Oral QAM AC    metoprolol succinate  25 mg Oral Daily    therapeutic multivitamin-minerals  1 tablet Oral Daily    sodium chloride flush  10 mL Intravenous 2 times per day    atorvastatin  80 LABS:    CBC:   Recent Labs     08/25/20  1310 08/26/20  0541   WBC 13.4* 12.5*   HGB 11.1* 11.2*   HCT 32.7* 32.9*    210   MCV 96.9 96.7     Renal:    Recent Labs     08/25/20  1310 08/26/20  0541    137   K 5.4* 5.6*    105   CO2 22 20*   BUN 97* 85*   CREATININE 4.8* 5.2*   GLUCOSE 119* 132*   CALCIUM 9.3 8.9   ANIONGAP 12 12     Hepatic: No results for input(s): AST, ALT, BILITOT, BILIDIR, PROT, LABALBU, ALKPHOS in the last 72 hours. Troponin:   Recent Labs     08/25/20  1500 08/25/20  1817 08/26/20  0054   TROPONINI 0.05* 0.04* 0.04*     BNP: No results for input(s): BNP in the last 72 hours. Lipids: No results for input(s): CHOL, HDL in the last 72 hours. Invalid input(s): LDLCALCU, TRIGLYCERIDE  ABGs:  No results for input(s): PHART, JAN2VGM, PO2ART, NYF7FIH, BEART, THGBART, G2KZMACW, ENV6YZN in the last 72 hours. INR: No results for input(s): INR in the last 72 hours. Lactate: No results for input(s): LACTATE in the last 72 hours. Cultures:  -----------------------------------------------------------------  RAD:   XR CHEST (2 VW)   Final Result      No acute disease. NM MYOCARDIAL SPECT REST EXERCISE OR RX    (Results Pending)       Assessment/Plan:   John Day a 77 y. o. male with past medical history of CABG x4, left heart cath with stent placement,presenting with an episode of syncope.     NSTEMI  -Repeat EKG show NSR w/  1st degree AV block  -Low dose heparin gtt ( will d/c once CP resolves)  -Aspirin  -Statin  -metoprolol  -Repeat lexiscan myoview pending  -Cardiology consulted   -lidocaine patch for Chest pain   -trend troponin @ 0.04 from 0.05       Diabetes Mellitus   -home regimen (12 U 3x daily + 24 lantus U nightly)     Hypothyroidism  -Continue home levothyroxine  -TSH with reflex     HLD  -Continue home statin     HTN  -Continue home metoprolol   -added hydralazine prn + labetalol prn      Renal  -Patient sees  outpatient  -CKD 4 ,

## 2020-08-26 NOTE — CARE COORDINATION
Patient denies any needs at discharge. CM will continue to follow patient until discharge.   Electronically signed by Danyel Campos RN on 8/26/2020 at 4:05 PM
Prescription(s) must be in pharmacy by 3pm to be filled same day  3. Copy of patient's insurance/ prescription drug card and patient face sheet must be sent along with the prescription(s)  4. Cost of Rx cannot be added to hospital bill. If financial assistance is needed, please contact unit  or ;  or  CANNOT provide pharmacy voucher for patients co-pays  5. Patients can then  the prescription on their way out of the hospital at discharge, or pharmacy can deliver to the bedside if staff is available. (payment due at time of pick-up or delivery - cash, check, or card accepted)     Able to afford home medications/ co-pay costs: Yes    ADLS  Support Systems:      PT AM-PAC:   /24  OT AM-PAC:   /24    [place]: home with wife  Steps:     Plans to RETURN to current housing: Yes  Barriers to RETURNING to current housing: medical complicatdions    [address]  Currently ACTIVE with [employer] [address]: No  Home Care Agency: Not Applicable    Currently ACTIVE with [place] [facility]: No      Durable Medical Equipment  DME Provider:   Equipment: cane    Home Oxygen and [address] [place] prior to admission: No  [address]: Not Applicable      DISCHARGE PLAN:  Disposition: Home- No Services Needed    Transportation PLAN for discharge: family     Factors facilitating achievement of predicted outcomes: Family support, Motivated, Cooperative and Pleasant    Barriers to discharge: Medical complications    Additional Case Management Notes: see above    The Plan for Transition of Care is related to the following treatment goals of No admission diagnoses are documented for this encounter.     The Patient and/or patient representative Eva Das and his family were provided with a choice of provider and agrees with the discharge plan Not Indicated    Freedom of choice list was provided with basic dialogue that supports the patient's

## 2020-08-27 ENCOUNTER — APPOINTMENT (OUTPATIENT)
Dept: CARDIAC CATH/INVASIVE PROCEDURES | Age: 75
DRG: 074 | End: 2020-08-27
Payer: MEDICARE

## 2020-08-27 LAB
ALBUMIN SERPL-MCNC: 3.5 G/DL (ref 3.4–5)
ANION GAP SERPL CALCULATED.3IONS-SCNC: 12 MMOL/L (ref 3–16)
ANTI-XA UNFRAC HEPARIN: 0.37 IU/ML (ref 0.3–0.7)
ANTI-XA UNFRAC HEPARIN: 0.68 IU/ML (ref 0.3–0.7)
BUN BLDV-MCNC: 79 MG/DL (ref 7–20)
CALCIUM SERPL-MCNC: 8.4 MG/DL (ref 8.3–10.6)
CHLORIDE BLD-SCNC: 104 MMOL/L (ref 99–110)
CO2: 20 MMOL/L (ref 21–32)
CREAT SERPL-MCNC: 4.9 MG/DL (ref 0.8–1.3)
GFR AFRICAN AMERICAN: 14
GFR NON-AFRICAN AMERICAN: 12
GLUCOSE BLD-MCNC: 117 MG/DL (ref 70–99)
GLUCOSE BLD-MCNC: 118 MG/DL (ref 70–99)
GLUCOSE BLD-MCNC: 190 MG/DL (ref 70–99)
GLUCOSE BLD-MCNC: 192 MG/DL (ref 70–99)
GLUCOSE BLD-MCNC: 201 MG/DL (ref 70–99)
HCT VFR BLD CALC: 30.5 % (ref 40.5–52.5)
HEMOGLOBIN: 10.4 G/DL (ref 13.5–17.5)
MCH RBC QN AUTO: 33.2 PG (ref 26–34)
MCHC RBC AUTO-ENTMCNC: 34.3 G/DL (ref 31–36)
MCV RBC AUTO: 96.9 FL (ref 80–100)
PDW BLD-RTO: 12.9 % (ref 12.4–15.4)
PERFORMED ON: ABNORMAL
PHOSPHORUS: 5.3 MG/DL (ref 2.5–4.9)
PLATELET # BLD: 212 K/UL (ref 135–450)
PMV BLD AUTO: 8.3 FL (ref 5–10.5)
POTASSIUM REFLEX MAGNESIUM: 5.5 MMOL/L (ref 3.5–5.1)
RBC # BLD: 3.14 M/UL (ref 4.2–5.9)
SODIUM BLD-SCNC: 136 MMOL/L (ref 136–145)
WBC # BLD: 11.3 K/UL (ref 4–11)

## 2020-08-27 PROCEDURE — 99232 SBSQ HOSP IP/OBS MODERATE 35: CPT | Performed by: HOSPITALIST

## 2020-08-27 PROCEDURE — 2500000003 HC RX 250 WO HCPCS: Performed by: STUDENT IN AN ORGANIZED HEALTH CARE EDUCATION/TRAINING PROGRAM

## 2020-08-27 PROCEDURE — 82040 ASSAY OF SERUM ALBUMIN: CPT

## 2020-08-27 PROCEDURE — 6370000000 HC RX 637 (ALT 250 FOR IP): Performed by: INTERNAL MEDICINE

## 2020-08-27 PROCEDURE — 2580000003 HC RX 258: Performed by: INTERNAL MEDICINE

## 2020-08-27 PROCEDURE — 6370000000 HC RX 637 (ALT 250 FOR IP): Performed by: STUDENT IN AN ORGANIZED HEALTH CARE EDUCATION/TRAINING PROGRAM

## 2020-08-27 PROCEDURE — 85520 HEPARIN ASSAY: CPT

## 2020-08-27 PROCEDURE — 99233 SBSQ HOSP IP/OBS HIGH 50: CPT | Performed by: INTERNAL MEDICINE

## 2020-08-27 PROCEDURE — 85027 COMPLETE CBC AUTOMATED: CPT

## 2020-08-27 PROCEDURE — 84100 ASSAY OF PHOSPHORUS: CPT

## 2020-08-27 PROCEDURE — 2060000000 HC ICU INTERMEDIATE R&B

## 2020-08-27 PROCEDURE — 80048 BASIC METABOLIC PNL TOTAL CA: CPT

## 2020-08-27 PROCEDURE — 36415 COLL VENOUS BLD VENIPUNCTURE: CPT

## 2020-08-27 PROCEDURE — 2580000003 HC RX 258: Performed by: STUDENT IN AN ORGANIZED HEALTH CARE EDUCATION/TRAINING PROGRAM

## 2020-08-27 RX ORDER — NITROGLYCERIN 0.4 MG/1
0.4 TABLET SUBLINGUAL EVERY 5 MIN PRN
Status: DISCONTINUED | OUTPATIENT
Start: 2020-08-27 | End: 2020-08-28 | Stop reason: HOSPADM

## 2020-08-27 RX ORDER — NITROGLYCERIN 40 MG/1
1 PATCH TRANSDERMAL DAILY PRN
Status: DISCONTINUED | OUTPATIENT
Start: 2020-08-27 | End: 2020-08-27 | Stop reason: ALTCHOICE

## 2020-08-27 RX ADMIN — INSULIN LISPRO 2 UNITS: 100 INJECTION, SOLUTION INTRAVENOUS; SUBCUTANEOUS at 13:04

## 2020-08-27 RX ADMIN — AMLODIPINE BESYLATE 5 MG: 5 TABLET ORAL at 08:59

## 2020-08-27 RX ADMIN — ACETAMINOPHEN 650 MG: 325 TABLET ORAL at 01:53

## 2020-08-27 RX ADMIN — INSULIN GLARGINE 24 UNITS: 100 INJECTION, SOLUTION SUBCUTANEOUS at 21:21

## 2020-08-27 RX ADMIN — Medication 10 ML: at 08:34

## 2020-08-27 RX ADMIN — LABETALOL HYDROCHLORIDE 10 MG: 5 INJECTION, SOLUTION INTRAVENOUS at 22:46

## 2020-08-27 RX ADMIN — SODIUM CHLORIDE, POTASSIUM CHLORIDE, SODIUM LACTATE AND CALCIUM CHLORIDE: 600; 310; 30; 20 INJECTION, SOLUTION INTRAVENOUS at 19:31

## 2020-08-27 RX ADMIN — CARVEDILOL 6.25 MG: 6.25 TABLET, FILM COATED ORAL at 08:59

## 2020-08-27 RX ADMIN — ATORVASTATIN CALCIUM 80 MG: 40 TABLET, FILM COATED ORAL at 20:22

## 2020-08-27 RX ADMIN — INSULIN LISPRO 2 UNITS: 100 INJECTION, SOLUTION INTRAVENOUS; SUBCUTANEOUS at 17:05

## 2020-08-27 RX ADMIN — ASPIRIN 81 MG: 81 TABLET, CHEWABLE ORAL at 08:33

## 2020-08-27 RX ADMIN — INSULIN LISPRO 2 UNITS: 100 INJECTION, SOLUTION INTRAVENOUS; SUBCUTANEOUS at 21:21

## 2020-08-27 RX ADMIN — HYDRALAZINE HYDROCHLORIDE 25 MG: 25 TABLET, FILM COATED ORAL at 14:01

## 2020-08-27 RX ADMIN — CLOPIDOGREL BISULFATE 75 MG: 75 TABLET ORAL at 08:33

## 2020-08-27 RX ADMIN — MULTIPLE VITAMINS W/ MINERALS TAB 1 TABLET: TAB at 08:33

## 2020-08-27 RX ADMIN — CARVEDILOL 6.25 MG: 6.25 TABLET, FILM COATED ORAL at 17:05

## 2020-08-27 RX ADMIN — HYDRALAZINE HYDROCHLORIDE 25 MG: 25 TABLET, FILM COATED ORAL at 21:20

## 2020-08-27 ASSESSMENT — ENCOUNTER SYMPTOMS
NAUSEA: 0
CHOKING: 0
VOMITING: 0
COUGH: 0
CHEST TIGHTNESS: 0
WHEEZING: 0
ABDOMINAL DISTENTION: 0
SHORTNESS OF BREATH: 0
ABDOMINAL PAIN: 0

## 2020-08-27 ASSESSMENT — PAIN DESCRIPTION - ONSET: ONSET: ON-GOING

## 2020-08-27 ASSESSMENT — PAIN DESCRIPTION - PROGRESSION: CLINICAL_PROGRESSION: GRADUALLY WORSENING

## 2020-08-27 ASSESSMENT — PAIN SCALES - GENERAL
PAINLEVEL_OUTOF10: 0
PAINLEVEL_OUTOF10: 0
PAINLEVEL_OUTOF10: 3

## 2020-08-27 ASSESSMENT — PAIN DESCRIPTION - LOCATION: LOCATION: CHEST

## 2020-08-27 ASSESSMENT — PAIN DESCRIPTION - FREQUENCY: FREQUENCY: CONTINUOUS

## 2020-08-27 ASSESSMENT — PAIN DESCRIPTION - DESCRIPTORS: DESCRIPTORS: ACHING

## 2020-08-27 ASSESSMENT — PAIN DESCRIPTION - ORIENTATION: ORIENTATION: LEFT

## 2020-08-27 ASSESSMENT — PAIN DESCRIPTION - PAIN TYPE: TYPE: ACUTE PAIN

## 2020-08-27 NOTE — PLAN OF CARE
Problem: Falls - Risk of:  Goal: Will remain free from falls  Description: Will remain free from falls  Outcome: Ongoing  Note: Pt is free from falls at this time. Bed is in lowest position, wheels are locked and bed alarm is set. Call light and belongings are within reach. Visual fall sign/blanket are posted. Will continue to monitor. Problem: Falls - Risk of:  Goal: Absence of physical injury  Description: Absence of physical injury  Outcome: Ongoing  Note: Pt is free from accidental physical injury at this time. Pt is AxOx4. Fall Risk assessed per shift. ID band in place. Bed in lowest position, wheels locked and alarm is set. Call light and belongings are within reach. Maintaining a safe environment. Will continue to assess and monitor. Problem: Pain:  Goal: Control of acute pain  Description: Control of acute pain  Outcome: Ongoing  Note: Patient complains of pain at level 3/10. Patient describes pain as aching in L Chest. Patient requests pain medication. Patient medicated with prn pain medication per MAR . Will continue to monitor.

## 2020-08-27 NOTE — PROGRESS NOTES
Progress Note    Admit Date: 8/25/2020  Diet: DIET CARDIAC; Carb Control: 4 carb choices (60 gms)/meal    CC: syncope    Interval history: No acute events overnight. HPI:  Anyi Macias a 77 y. o. male with past medical history ,CABG,HTN,HLD,presenting with an episode of syncope. Of note patient had CABG x4 (2006), left heart cath with stent placement (2013), stress test in 2017 with fixed defect , and echo on 8/3/2020 (EF 50%) and follows closely with Cecily Howard (cardiology). Patient has been regularly going to pulmonary rehab (interstitial lung disease)  and completed a session today.  States that it was a normal rehab session, no significant increase in strenuous activity.  He was standing at the desk and began to feel lightheaded with dizziness and shaking, next thing he remembers is being on the floor.  Staff there reported a syncopal episode, did not strike his head reportedly, refused emergency department at that time and was seen by Cecily Howard in the office. [facility] EKG performed which was unremarkable, although was transferred to the emergency department for further evaluation.       At this time, patient is complaining of left-sided chest wall pain that has been increasing ever since his episode of syncope. [facility] is unaware if this is the side that he landed on. Patient states that it hurts with movement and is reproducible upon palpation. He rates the left sided, constant ,chest pain at a 7/10. CXR does not show any rib fractures or abnormalities. Denies any headache, vision changes, weakness to the extremities, sensation changes.  No neck pain.      Medications:     Scheduled Meds:   sodium chloride flush  10 mL Intravenous BID    nitroGLYCERIN  1 patch Transdermal Daily    clopidogrel  75 mg Oral Daily    carvedilol  6.25 mg Oral BID WC    amLODIPine  5 mg Oral Daily    acetylcysteine  600 mg Inhalation BID    hydrALAZINE  25 mg Oral 3 times per day    aspirin  81 mg Oral Daily    insulin glargine  24 Units Subcutaneous Nightly    levothyroxine  50 mcg Oral QAM AC    therapeutic multivitamin-minerals  1 tablet Oral Daily    sodium chloride flush  10 mL Intravenous 2 times per day    atorvastatin  80 mg Oral Nightly    insulin lispro  0-12 Units Subcutaneous TID WC    insulin lispro  0-6 Units Subcutaneous Nightly    lidocaine  1 patch Transdermal Daily     Continuous Infusions:   lactated ringers 50 mL/hr at 08/26/20 2340    heparin (porcine) 9 Units/kg/hr (08/27/20 0147)    dextrose       PRN Meds:hydrALAZINE, labetalol, sodium chloride flush, acetaminophen **OR** acetaminophen, polyethylene glycol, promethazine **OR** ondansetron, heparin (porcine), heparin (porcine), glucose, dextrose, glucagon (rDNA), dextrose    Objective:   Vitals:   T-max:  Patient Vitals for the past 8 hrs:   BP Temp Temp src Pulse Resp SpO2   08/27/20 0824 -- 97.7 °F (36.5 °C) Oral -- 18 99 %   08/27/20 0628 135/64 97 °F (36.1 °C) Oral 59 18 100 %       Intake/Output Summary (Last 24 hours) at 8/27/2020 1016  Last data filed at 8/27/2020 9953  Gross per 24 hour   Intake 240 ml   Output 1450 ml   Net -1210 ml        ROS: A 10 point review of systems was conducted, significant findings as noted in HPI.         Physical Exam  Constitutional:       Comments: In discomfort and pain when patient moves   HENT:      Head: Normocephalic and atraumatic. Nose: Nose normal.   Eyes:      Extraocular Movements: Extraocular movements intact. Pupils: Pupils are equal, round, and reactive to light. Neck:      Musculoskeletal: Normal range of motion and neck supple. Cardiovascular:      Rate and Rhythm: Normal rate and regular rhythm. Pulmonary:      Effort: Pulmonary effort is normal.      Breath sounds: Normal breath sounds. Comments: Crackles at base BL  Chest:      Chest wall: Tenderness present. Abdominal:      Palpations: Abdomen is soft. Musculoskeletal: Normal range of motion. General: Tenderness present. Comments: Reproducible chest tenderness + CP when he moves   Skin:     General: Skin is warm and dry. Neurological:      Mental Status: He is alert and oriented to person, place, and time. LABS:    CBC:   Recent Labs     08/25/20  1310 08/26/20  0541 08/27/20  0824   WBC 13.4* 12.5* 11.3*   HGB 11.1* 11.2* 10.4*   HCT 32.7* 32.9* 30.5*    210 212   MCV 96.9 96.7 96.9     Renal:    Recent Labs     08/25/20  1310 08/26/20  0541 08/26/20  1116 08/27/20  0824    137  --  136   K 5.4* 5.6*  --  5.5*    105  --  104   CO2 22 20*  --  20*   BUN 97* 85*  --  79*   CREATININE 4.8* 5.2*  --  4.9*   GLUCOSE 119* 132*  --  117*   CALCIUM 9.3 8.9  --  8.4   PHOS  --   --  5.4* 5.3*   ANIONGAP 12 12  --  12     Hepatic:   Recent Labs     08/27/20  0824   LABALBU 3.5     Troponin:   Recent Labs     08/25/20  1500 08/25/20  1817 08/26/20  0054   TROPONINI 0.05* 0.04* 0.04*     BNP: No results for input(s): BNP in the last 72 hours. Lipids: No results for input(s): CHOL, HDL in the last 72 hours. Invalid input(s): LDLCALCU, TRIGLYCERIDE  ABGs:  No results for input(s): PHART, AFN5JBJ, PO2ART, TCC3GJZ, BEART, THGBART, E8HKFZQP, OGH8JYO in the last 72 hours. INR: No results for input(s): INR in the last 72 hours. Lactate: No results for input(s): LACTATE in the last 72 hours. Cultures:  -----------------------------------------------------------------  RAD:   NM MYOCARDIAL SPECT REST EXERCISE OR RX   Final Result      XR CHEST (2 VW)   Final Result      No acute disease. Assessment/Plan:   Yulia Carole a 77 y. o. male with past medical history of CABG x4, left heart cath with stent placement,presenting with an episode of syncope.     NSTEMI  -Repeat EKG show NSR w/ 1st degree AV block  -Low dose heparin gtt ( will d/c once CP resolves)  -continue Aspirin and plavix   -Statin  -amlodipine and coreg   -Pharmacologic nuclear cardiac stress test:  Overall findings represent a high risk scan  -Cardiology:  Aborting cath plan due orthostatics probably causing syncope   Chest pain seems classically muscoskeletal in nature.   -lidocaine patch for Chest pain   -trend troponin @ 0.04 from 0.05   -PTOT f/u        Diabetes Mellitus   -home regimen (12 U 3x daily + 24 lantus U nightly)     Hypothyroidism  -Continue home levothyroxine     HLD  -Continue home statin     HTN  -almodipine + coreg   -hydralazine prn      Renal  -Patient sees Radha Torres outpatient  -CKD 4 , Hyperkalemia , Cr 4.9 --> 5.2   -Potassium up to 5.5  -Cont IVF  -Nephrology follwoing, possible PD    -will revaluate orthostats + MORIS and reassess d/c tomorrow - per nephrology     Code Status:Full code   FEN: cardiac diet   PPX: Heparin drip  DISPO: Alvaro Donahue MD, PGY-1  08/27/20  10:16 AM    This patient has been staffed and discussed with Todd Duran MD.     Addendum to Resident H& P/Progress note:  I have personally seen,examined and evaluated the patient. I have reviewed the current history, physical findings, labs and assessment and plan and agree with note as documented by resident MD ( David Rodriges)  The patient was orthostatic today in the morning. Was evaluated by Dr Noman Vergara, nephrology. IV fluids were given; Diuretics on hold.     Todd Duran MD, FACP

## 2020-08-27 NOTE — PLAN OF CARE
Problem: Falls - Risk of:  Goal: Will remain free from falls  Description: Will remain free from falls  8/27/2020 0947 by Zuly Pollock RN  Outcome: Ongoing  Note: Patient at risk for falls. Patient resting quietly in bed. Side rails up x 2/4. Bed locked in lowest position. Bed alarm on. Bedside table and call light within reach. Patient instructed to call for assistance. Patient verbalized understanding. Will continue to monitor. Problem: Pain:  Goal: Pain level will decrease  Description: Pain level will decrease  Outcome: Ongoing  Note: Pt c/o L chest pain but only with movement. Lidoderm patch applied to L chest. Will monitor. Problem: Tissue Perfusion - Cardiopulmonary, Altered:  Goal: Absence of angina  Description: Absence of angina  Outcome: Ongoing  Note: Pt c/o muscular chest pain. Lidoderm patch applied. Problem: Tissue Perfusion - Cardiopulmonary, Altered:  Goal: Hemodynamic stability will improve  Description: Hemodynamic stability will improve  Outcome: Ongoing  Note: Pt has positive orthostats. Pt denies c/o dizziness or lightheadedness. IVF infusing. Nitro patch held.

## 2020-08-27 NOTE — PROGRESS NOTES
CC: Syncope    HPI/Update: Had long discussion with patient today. His syncope came within 30 seconds or so after standing up from a chair after exercising. Got LH, then fell to the ground. He DID lose memory of what happened. His left sided chest pain started after his fall. He cont to have some pain on left side/axillary area after he moves his left arm. Doesn't hurt when still, hurts when he moves it in certain ways. PE:  Blood pressure 135/64, pulse 59, temperature 97.7 °F (36.5 °C), temperature source Oral, resp. rate 18, height 5' 10\" (1.778 m), weight 192 lb (87.1 kg), SpO2 99 %. PHYSICAL EXAMINATION:  [ INSTRUCTIONS:  \"[x]\" Indicates a positive item  \"[]\" Indicates a negative item  -- DELETE ALL ITEMS NOT EXAMINED]    Constitutional: [x] Appears well-developed and well-nourished [x] No apparent distress      [] Abnormal-   Mental status  [x] Alert and awake  [] Oriented to person/place/time []Able to follow commands      Eyes:  EOM    [x]  Normal  [] Abnormal-  Sclera  [x]  Normal  [] Abnormal -         Discharge [x]  None visible  [] Abnormal -    HENT:   [x] Normocephalic, atraumatic.   [] Abnormal   [x] Mouth/Throat: Mucous membranes are moist.     External Ears [x] Normal  [] Abnormal-     Neck: [x] No visualized mass     Pulmonary/Chest: [x] Respiratory effort normal.  [x] No visualized signs of difficulty breathing or respiratory distress        [] Abnormal-      Musculoskeletal:   [x] Normal gait with no signs of ataxia         [x] Normal range of motion of neck        [] Abnormal-       Neurological:        [x] No Facial Asymmetry (Cranial nerve 7 motor function) (limited exam to video visit)          [x] No gaze palsy        [] Abnormal-         Skin:        [x] No significant exanthematous lesions or discoloration noted on facial skin         [] Abnormal-            Psychiatric:       [x] Normal Affect [] No Hallucinations        [] Abnormal-     Other pertinent observable physical exam findings:    2020 Nuc Stress:  Overall findings represent a high risk scan.     Normal LV size and systolic function.     There is a medium to large size reversible defect involving the apex, apical     anterior, mid anterior and apical lateral cardiac segments.     Finding are concerning for ischemia.     ECG: Non-diagnostic EKG response due to failure to reach target heart rate.  Cath: Angiographic Findings:  Right dominant system  Left Main:  Diffuse, mild disease.  ~20-30% diffuse disease. Left Anterior Descendin% proximal occlusion.  After the LIMA is injected, the LAD was seen to have a mid 80-90% stenosis just distal to a significant diagonal branch.  The diagonal bifurcates; one branch has a severe (~80-90%) stenosis.  The other limb also has diffuse disease.  There was a 90% distal LAD stenosis. Circumflex:  Proximal 70% stenosis at bifurcation of an OM and the AV LCx.  The OM is subtotally occluded.  The AV LCx is diffusely diseased. Right Coronary:  Proximal 30% stenosis.  Mid 60-70% stenosis.  Right posterolateral is occluded.  Right PDA has an ostial/proximal 90% stenosis.  It is diffusely diseased after the ostial lesion. Bypass Grafts:  LIMA-LAD with radial grafts to an OM and right PDA that is widely patent.   Left Ventriculogram:  Not performed due to elevated Cr  Hemodynamics (mm Hg):  Left Ventricular Pressure:  131/0, 14  Central Aortic Pressure:  133/66 (93)      Current Facility-Administered Medications:     sodium chloride flush 0.9 % injection 10 mL, 10 mL, Intravenous, BID, Dimitry Alanis MD, 10 mL at 20 7224    hydrALAZINE (APRESOLINE) injection 10 mg, 10 mg, Intravenous, Q6H PRN, Александр Ahn MD, 10 mg at 20 1926    labetalol (NORMODYNE;TRANDATE) injection 10 mg, 10 mg, Intravenous, Q4H PRN, Александр Ahn MD    nitroGLYCERIN (NITRODUR) 0.2 MG/HR 1 patch, 1 patch, Transdermal, Daily, Александр Ahn MD, 1 patch at 20 4297   clopidogrel (PLAVIX) tablet 75 mg, 75 mg, Oral, Daily, Sadia Norman MD, 75 mg at 08/27/20 5879    carvedilol (COREG) tablet 6.25 mg, 6.25 mg, Oral, BID WC, Sadia Norman MD, 6.25 mg at 08/26/20 1832    amLODIPine (NORVASC) tablet 5 mg, 5 mg, Oral, Daily, Sadia Norman MD, 5 mg at 08/26/20 1832    lactated ringers infusion, , Intravenous, Continuous, Chna Carreon MD, Last Rate: 50 mL/hr at 08/26/20 2340    acetylcysteine (MUCOMYST) 20 % solution 600 mg, 600 mg, Inhalation, BID, Chan Carreon MD    hydrALAZINE (APRESOLINE) tablet 25 mg, 25 mg, Oral, 3 times per day, Judie Sumner MD, 25 mg at 08/26/20 2340    aspirin chewable tablet 81 mg, 81 mg, Oral, Daily, Esther Heller MD, 81 mg at 08/27/20 0546    insulin glargine (LANTUS;BASAGLAR) injection pen 24 Units, 24 Units, Subcutaneous, Nightly, Esther Heller MD, 24 Units at 08/26/20 2255    levothyroxine (SYNTHROID) tablet 50 mcg, 50 mcg, Oral, QAM AC, Esther Heller MD    therapeutic multivitamin-minerals 1 tablet, 1 tablet, Oral, Daily, Esther Heller MD, 1 tablet at 08/27/20 3951    sodium chloride flush 0.9 % injection 10 mL, 10 mL, Intravenous, 2 times per day, Esther Heller MD, 10 mL at 08/27/20 0834    sodium chloride flush 0.9 % injection 10 mL, 10 mL, Intravenous, PRN, Esther Heller MD    acetaminophen (TYLENOL) tablet 650 mg, 650 mg, Oral, Q6H PRN, 650 mg at 08/27/20 0153 **OR** acetaminophen (TYLENOL) suppository 650 mg, 650 mg, Rectal, Q6H PRN, Esther Heller MD    polyethylene glycol (GLYCOLAX) packet 17 g, 17 g, Oral, Daily PRN, Esther Heller MD    promethazine (PHENERGAN) tablet 12.5 mg, 12.5 mg, Oral, Q6H PRN **OR** ondansetron (ZOFRAN) injection 4 mg, 4 mg, Intravenous, Q6H PRN, Esther Heller MD    heparin (porcine) injection 4,000 Units, 4,000 Units, Intravenous, PRN, Estehr Heller MD    heparin (porcine) injection 2,000 Units, 2,000 Units, Intravenous, PRN, Esther Heller MD, 2,000 Units at 08/26/20 1845    heparin 25,000 units in dextrose 5% 250 mL infusion, 9 Units/kg/hr, Intravenous, Continuous, Louise Rosales MD, Last Rate: 7.8 mL/hr at 08/27/20 0147, 9 Units/kg/hr at 08/27/20 0147    atorvastatin (LIPITOR) tablet 80 mg, 80 mg, Oral, Nightly, No Diehl MD, 80 mg at 08/26/20 2135    insulin lispro (1 Unit Dial) 0-12 Units, 0-12 Units, Subcutaneous, TID WC, No Diehl MD    insulin lispro (1 Unit Dial) 0-6 Units, 0-6 Units, Subcutaneous, Nightly, No Diehl MD, 1 Units at 08/26/20 2257    glucose (GLUTOSE) 40 % oral gel 15 g, 15 g, Oral, PRN, No Diehl MD    dextrose 50 % IV solution, 12.5 g, Intravenous, PRN, No Diehl MD    glucagon (rDNA) injection 1 mg, 1 mg, Intramuscular, PRN, No Diehl MD    dextrose 5 % solution, 100 mL/hr, Intravenous, PRN, No Diehl MD    lidocaine 4 % external patch 1 patch, 1 patch, Transdermal, Daily, No Diehl MD, 1 patch at 08/27/20 3340      A/P:  77 y.o. here for cp.  -Syncope  -CAD s/p CABG  -Hypertensive urgency  -ARF/CRF    Recs:  -Continue ASA and plavix  -Amlodpine  -Statin  -Coreg    Long discussion about the situation. The syncope is orthostasis by history; it is a classic history. His chest pain is related to movement and seems classically musculoskel in nature. Given that he has none of his prior anginal symtpoms, no angina now, no sob, and his syncope is orthostatic, it is hard to push for a cath with this creatinine. Additionally, his stress test is abnormal but is very consistent with what we know his coronary anatomy to be based on a cath several years ago. I discussed aborting the plan for cath, and he was agreeable to the plan. F/U Lanny next week. -D/W Dr. Suzette Patton. 35 minutes with patient/chart/encounter. Michaela Carolina MD, [facility] [place], [place]

## 2020-08-27 NOTE — FLOWSHEET NOTE
08/27/20 0824   Vitals   Blood Pressure Lying 153/76   Pulse Lying 65 PER MINUTE   Blood Pressure Sitting 108/62   Pulse Sitting 66 PER MINUTE   Blood Pressure Standing 92/56   Pulse Standing 68 PER MINUTE   Pt with positive orthostatics this morning. Messaged Dr. Lux Salazar regarding scheduled BP medications as pt has scheduled amlodipine, coreg, and nitro patch. Held nitro patch per Dr. Lux Salazar. Will monitor.

## 2020-08-27 NOTE — PROGRESS NOTES
,CABG,HTN,HLD,presenting with an episode of syncope. Of note patient had CABG x4 (2006), left heart cath with stent placement (2013), stress test in 2017 with fixed defect , and echo on 8/3/2020 (EF 50%) and follows closely with Jonathan Balderas (cardiology). Patient has been regularly going to pulmonary rehab (interstitial lung disease)  and completed a session today.  States that it was a normal rehab session, no significant increase in strenuous activity.  He was standing at the desk and began to feel lightheaded with dizziness and shaking, next thing he remembers is being on the floor.  Staff there reported a syncopal episode, did not strike his head reportedly, refused emergency department at that time and was seen by Jonathan Balderas in the office. [facility] [place] EKG performed which was unremarkable, although was transferred to the emergency department for further evaluation.       At this time, patient is complaining of left-sided chest wall pain that has been increasing ever since his episode of syncope. Joshua Ríos is unaware if this is the side that he landed on. Patient states that it hurts with movement and is reproducible upon palpation. He rates the left sided, constant ,chest pain at a 7/10. CXR does not show any rib fractures or abnormalities. Denies any headache, vision changes, weakness to the extremities, sensation changes.  No neck pain.        Past Medical / Surgical History:        CKD 4    Past Medical History:   Diagnosis Date    Allergic rhinitis due to other allergen 7/12/2010    Coronary atherosclerosis of unspecified type of vessel, native or graft 7/12/2010    Diabetic eye exam ([facility] [address] 04/29/2015    Diabetic eye exam ([facility] [address] 5/5/2016    [facility]    Generalized osteoarthrosis, involving multiple sites 7/12/2010    Other psoriasis 7/12/2010    Pneumonia     Psoriatic arthropathy ([address] 7/12/2010    Type II or unspecified type diabetes mellitus without mention of complication, not stated as uncontrolled 7/12/2010    Unspecified essential hypertension 7/12/2010    Unspecified sleep apnea 7/12/2010     Past Surgical History:   Procedure Laterality Date    CARPAL TUNNEL RELEASE      s/p    CATARACT REMOVAL      CORONARY ARTERY BYPASS GRAFT      JOINT REPLACEMENT         Medications Prior to Admission:    No current facility-administered medications on file prior to encounter. Current Outpatient Medications on File Prior to Encounter   Medication Sig Dispense Refill    cyanocobalamin 1000 MCG tablet Take 1,000 mcg by mouth daily      insulin glargine (LANTUS SOLOSTAR) 100 UNIT/ML injection pen INJECT 24 UNITS UNDER THE SKIN EVERY NIGHT AT BEDTIME 15 mL 3    insulin lispro, 1 Unit Dial, 100 UNIT/ML SOPN INJECT 12-18 UNITS UNDER THE SKIN THREE TIMES DAILY BEFORE MEALS 30 mL 3    levothyroxine (SYNTHROID) 50 MCG tablet TAKE ONE TABLET BY MOUTH DAILY 90 tablet 9    atorvastatin (LIPITOR) 40 MG tablet TAKE ONE TABLET BY MOUTH ONCE NIGHTLY 90 tablet 3    metoprolol succinate (TOPROL XL) 25 MG extended release tablet TAKE ONE TABLET BY MOUTH DAILY 90 tablet 3    Multiple Vitamins-Minerals (THERAPEUTIC MULTIVITAMIN-MINERALS) tablet Take 1 tablet by mouth daily      aspirin 81 MG chewable tablet Take 1 tablet by mouth daily. 30 tablet 5    Insulin Pen Needle ([employer] PEN NEEDLES 31G) 31G X 8 MM MISC USE WITH INSULIN FOUR TIMES A  each 2    ACCU-CHEK JOHNATHAN PLUS strip USE TO TEST FOUR TIMES DAILY AS NEEDED 100 strip 4    Continuous Blood Gluc  (FREESTYLE MAGGIE 14 DAY READER) RUMA As needed 1 Device 3    Continuous Blood Gluc Sensor (FREESTYLE MAGGIE 14 DAY SENSOR) MISC Every 2 weeks 2 each 3    Handicap Placard MISC by Does not apply route Duration: 5 years 1 each 0    ACCU-CHEK SOFTCLIX LANCETS MISC USE FOUR TIMES A  each 3    glucose blood VI test strips (ACCU-CHEK JOHNATHAN) strip 4 times a day As needed.  150 each 3    Blood Glucose Monitoring Suppl (ACCU-CHEK JOHNATHAN) RUMA Use as needed 1 Device 0    Lancets MISC 2 times daily. Lancets for a Anna meter. 50 each 11       Allergies:  Clindamycin; Clindamycin/lincomycin; Penicillins; and Tazorac [tazarotene]    Social History:   TOBACCO:   reports that he quit smoking about 39 years ago. He started smoking about 58 years ago. He has a 18.00 pack-year smoking history. He has never used smokeless tobacco.     ETOH:   reports no history of alcohol use. Family History:       Problem Relation Age of Onset    Diabetes Mother     Heart Failure Mother     Coronary Art Dis Father          Review of Systems   Constitutional: Positive for fatigue. Negative for activity change and appetite change. Respiratory: Negative for cough, choking, chest tightness, shortness of breath and wheezing. Cardiovascular: Positive for chest pain. Negative for palpitations and leg swelling. Left sided chest pain reproducible on palpation and not associated with activity   Gastrointestinal: Negative for abdominal distention, abdominal pain, nausea and vomiting. Genitourinary: Negative for flank pain. Musculoskeletal: Negative for neck pain and neck stiffness. Neurological: Negative for dizziness and light-headedness. Psychiatric/Behavioral: Negative for agitation, behavioral problems, confusion, decreased concentration and dysphoric mood. PHYSICAL EXAM:  /61   Pulse 64   Temp 96.7 °F (35.9 °C) (Oral)   Resp 16   Ht 5' 10\" (1.778 m)   Wt 192 lb (87.1 kg)   SpO2 96%   BMI 27.55 kg/m²     Physical Exam  Constitutional:       General: He is not in acute distress. Appearance: Normal appearance. He is not ill-appearing. HENT:      Head: Normocephalic and atraumatic. Mouth/Throat:      Mouth: Mucous membranes are moist.   Neck:      Musculoskeletal: No neck rigidity or muscular tenderness. Cardiovascular:      Rate and Rhythm: Normal rate and regular rhythm. Heart sounds: Normal heart sounds. No murmur.  No friction rub. No gallop. Pulmonary:      Effort: No respiratory distress. Breath sounds: No stridor. No wheezing or rales. Comments: Left side reproducible on palpation  Chest:      Chest wall: Tenderness present. Abdominal:      General: There is no distension. Palpations: Abdomen is soft. Tenderness: There is no right CVA tenderness or left CVA tenderness. Musculoskeletal:         General: No swelling or tenderness. Right lower leg: No edema. Left lower leg: No edema. Skin:     General: Skin is warm. Neurological:      Mental Status: He is alert and oriented to person, place, and time. Sensory: No sensory deficit. Motor: No weakness. Psychiatric:         Mood and Affect: Mood normal.         Behavior: Behavior normal.         Thought Content: Thought content normal.         LABS:  Recent Labs     08/25/20  1310 08/26/20  0541 08/27/20  0824   WBC 13.4* 12.5* 11.3*   HGB 11.1* 11.2* 10.4*   HCT 32.7* 32.9* 30.5*    210 212                                                                  Recent Labs     08/25/20  1310 08/26/20  0541 08/27/20  0824    137 136   K 5.4* 5.6* 5.5*    105 104   CO2 22 20* 20*   BUN 97* 85* 79*   CREATININE 4.8* 5.2* 4.9*   GLUCOSE 119* 132* 117*         Recent Labs     08/25/20  1500 08/25/20  1817 08/26/20  0054   TROPONINI 0.05* 0.04* 0.04*     No results for input(s): BNP in the last 72 hours. Lab Results   Component Value Date    PHART 7.428 01/16/2017    BTT5EBY 47 01/16/2017    PO2ART 101 01/16/2017         Assessment &Plan:        Assessment/Plan       1. MORIS on CKD 4   Presented with Cret at 4.8 with baseline around 3. GFR 11. The pt's GFR seems to be worsening gradually with it being 15 in July. 2. Electrolyte abnormality  Hyperkalemia 5.4 on admission.       3. Chronic Anemia  Hg around 11 likely 2/2 to CKD     4.  HTN  Continue home metoprolol     Plan     -Pt is at last stages of kidney disease and

## 2020-08-28 VITALS
WEIGHT: 195.55 LBS | DIASTOLIC BLOOD PRESSURE: 73 MMHG | HEART RATE: 78 BPM | TEMPERATURE: 97.2 F | OXYGEN SATURATION: 96 % | HEIGHT: 70 IN | RESPIRATION RATE: 18 BRPM | BODY MASS INDEX: 28 KG/M2 | SYSTOLIC BLOOD PRESSURE: 166 MMHG

## 2020-08-28 LAB
ALBUMIN SERPL-MCNC: 3.3 G/DL (ref 3.4–5)
ANION GAP SERPL CALCULATED.3IONS-SCNC: 10 MMOL/L (ref 3–16)
BUN BLDV-MCNC: 86 MG/DL (ref 7–20)
CALCIUM SERPL-MCNC: 8.4 MG/DL (ref 8.3–10.6)
CHLORIDE BLD-SCNC: 106 MMOL/L (ref 99–110)
CO2: 22 MMOL/L (ref 21–32)
CREAT SERPL-MCNC: 4.9 MG/DL (ref 0.8–1.3)
GFR AFRICAN AMERICAN: 14
GFR NON-AFRICAN AMERICAN: 12
GLUCOSE BLD-MCNC: 150 MG/DL (ref 70–99)
GLUCOSE BLD-MCNC: 208 MG/DL (ref 70–99)
GLUCOSE BLD-MCNC: 215 MG/DL (ref 70–99)
GLUCOSE BLD-MCNC: 222 MG/DL (ref 70–99)
HCT VFR BLD CALC: 28.5 % (ref 40.5–52.5)
HEMOGLOBIN: 9.8 G/DL (ref 13.5–17.5)
MCH RBC QN AUTO: 33.4 PG (ref 26–34)
MCHC RBC AUTO-ENTMCNC: 34.3 G/DL (ref 31–36)
MCV RBC AUTO: 97.3 FL (ref 80–100)
PDW BLD-RTO: 12.9 % (ref 12.4–15.4)
PERFORMED ON: ABNORMAL
PHOSPHORUS: 5.5 MG/DL (ref 2.5–4.9)
PLATELET # BLD: 187 K/UL (ref 135–450)
PMV BLD AUTO: 8.3 FL (ref 5–10.5)
POTASSIUM REFLEX MAGNESIUM: 5.6 MMOL/L (ref 3.5–5.1)
RBC # BLD: 2.93 M/UL (ref 4.2–5.9)
SODIUM BLD-SCNC: 138 MMOL/L (ref 136–145)
WBC # BLD: 10.3 K/UL (ref 4–11)

## 2020-08-28 PROCEDURE — 2580000003 HC RX 258: Performed by: STUDENT IN AN ORGANIZED HEALTH CARE EDUCATION/TRAINING PROGRAM

## 2020-08-28 PROCEDURE — 6370000000 HC RX 637 (ALT 250 FOR IP): Performed by: STUDENT IN AN ORGANIZED HEALTH CARE EDUCATION/TRAINING PROGRAM

## 2020-08-28 PROCEDURE — 99238 HOSP IP/OBS DSCHRG MGMT 30/<: CPT | Performed by: HOSPITALIST

## 2020-08-28 PROCEDURE — 36415 COLL VENOUS BLD VENIPUNCTURE: CPT

## 2020-08-28 PROCEDURE — 97162 PT EVAL MOD COMPLEX 30 MIN: CPT

## 2020-08-28 PROCEDURE — 80048 BASIC METABOLIC PNL TOTAL CA: CPT

## 2020-08-28 PROCEDURE — 6370000000 HC RX 637 (ALT 250 FOR IP): Performed by: INTERNAL MEDICINE

## 2020-08-28 PROCEDURE — 6360000002 HC RX W HCPCS: Performed by: STUDENT IN AN ORGANIZED HEALTH CARE EDUCATION/TRAINING PROGRAM

## 2020-08-28 PROCEDURE — 97165 OT EVAL LOW COMPLEX 30 MIN: CPT

## 2020-08-28 PROCEDURE — 82040 ASSAY OF SERUM ALBUMIN: CPT

## 2020-08-28 PROCEDURE — 84100 ASSAY OF PHOSPHORUS: CPT

## 2020-08-28 PROCEDURE — 99232 SBSQ HOSP IP/OBS MODERATE 35: CPT | Performed by: NURSE PRACTITIONER

## 2020-08-28 PROCEDURE — 6360000002 HC RX W HCPCS: Performed by: INTERNAL MEDICINE

## 2020-08-28 PROCEDURE — 97530 THERAPEUTIC ACTIVITIES: CPT

## 2020-08-28 PROCEDURE — 85027 COMPLETE CBC AUTOMATED: CPT

## 2020-08-28 RX ORDER — LIDOCAINE 4 G/G
1 PATCH TOPICAL DAILY
Qty: 1 BOX | Refills: 1 | Status: ON HOLD | OUTPATIENT
Start: 2020-08-29 | End: 2022-09-07

## 2020-08-28 RX ORDER — ATORVASTATIN CALCIUM 80 MG/1
80 TABLET, FILM COATED ORAL NIGHTLY
Qty: 30 TABLET | Refills: 3 | Status: SHIPPED | OUTPATIENT
Start: 2020-08-28 | End: 2021-01-22 | Stop reason: SDUPTHER

## 2020-08-28 RX ORDER — CLOPIDOGREL BISULFATE 75 MG/1
75 TABLET ORAL DAILY
Qty: 30 TABLET | Refills: 3 | Status: SHIPPED | OUTPATIENT
Start: 2020-08-29 | End: 2021-01-12 | Stop reason: SDUPTHER

## 2020-08-28 RX ORDER — SODIUM POLYSTYRENE SULFONATE 15 G/60ML
15 SUSPENSION ORAL; RECTAL ONCE
Status: COMPLETED | OUTPATIENT
Start: 2020-08-28 | End: 2020-08-28

## 2020-08-28 RX ORDER — HYDRALAZINE HYDROCHLORIDE 25 MG/1
25 TABLET, FILM COATED ORAL EVERY 8 HOURS SCHEDULED
Qty: 90 TABLET | Refills: 3 | Status: SHIPPED | OUTPATIENT
Start: 2020-08-28 | End: 2020-09-24 | Stop reason: ALTCHOICE

## 2020-08-28 RX ORDER — CARVEDILOL 6.25 MG/1
6.25 TABLET ORAL 2 TIMES DAILY WITH MEALS
Qty: 60 TABLET | Refills: 3 | Status: SHIPPED | OUTPATIENT
Start: 2020-08-28 | End: 2020-09-24 | Stop reason: ALTCHOICE

## 2020-08-28 RX ADMIN — HYDRALAZINE HYDROCHLORIDE 25 MG: 25 TABLET, FILM COATED ORAL at 15:32

## 2020-08-28 RX ADMIN — AMLODIPINE BESYLATE 5 MG: 5 TABLET ORAL at 08:59

## 2020-08-28 RX ADMIN — SODIUM POLYSTYRENE SULFONATE 15 G: 15 SUSPENSION ORAL; RECTAL at 11:00

## 2020-08-28 RX ADMIN — DARBEPOETIN ALFA 60 MCG: 60 INJECTION, SOLUTION INTRAVENOUS; SUBCUTANEOUS at 11:48

## 2020-08-28 RX ADMIN — INSULIN LISPRO 2 UNITS: 100 INJECTION, SOLUTION INTRAVENOUS; SUBCUTANEOUS at 09:04

## 2020-08-28 RX ADMIN — LEVOTHYROXINE SODIUM 50 MCG: 50 TABLET ORAL at 05:26

## 2020-08-28 RX ADMIN — HYDRALAZINE HYDROCHLORIDE 25 MG: 25 TABLET, FILM COATED ORAL at 05:26

## 2020-08-28 RX ADMIN — ACETAMINOPHEN 650 MG: 325 TABLET ORAL at 08:59

## 2020-08-28 RX ADMIN — Medication 10 ML: at 08:53

## 2020-08-28 RX ADMIN — MULTIPLE VITAMINS W/ MINERALS TAB 1 TABLET: TAB at 08:59

## 2020-08-28 RX ADMIN — CLOPIDOGREL BISULFATE 75 MG: 75 TABLET ORAL at 08:59

## 2020-08-28 RX ADMIN — CARVEDILOL 6.25 MG: 6.25 TABLET, FILM COATED ORAL at 08:59

## 2020-08-28 RX ADMIN — HYDRALAZINE HYDROCHLORIDE 10 MG: 20 INJECTION INTRAMUSCULAR; INTRAVENOUS at 11:48

## 2020-08-28 RX ADMIN — ASPIRIN 81 MG: 81 TABLET, CHEWABLE ORAL at 08:59

## 2020-08-28 RX ADMIN — INSULIN LISPRO 4 UNITS: 100 INJECTION, SOLUTION INTRAVENOUS; SUBCUTANEOUS at 11:58

## 2020-08-28 ASSESSMENT — ENCOUNTER SYMPTOMS
ABDOMINAL DISTENTION: 0
ABDOMINAL PAIN: 0
NAUSEA: 0
VOMITING: 0
COUGH: 0
WHEEZING: 0
CHOKING: 0
SHORTNESS OF BREATH: 0
CHEST TIGHTNESS: 0

## 2020-08-28 ASSESSMENT — PAIN DESCRIPTION - PROGRESSION
CLINICAL_PROGRESSION: NOT CHANGED
CLINICAL_PROGRESSION: NOT CHANGED

## 2020-08-28 ASSESSMENT — PAIN DESCRIPTION - FREQUENCY
FREQUENCY: INTERMITTENT
FREQUENCY: INTERMITTENT

## 2020-08-28 ASSESSMENT — PAIN DESCRIPTION - LOCATION
LOCATION: CHEST
LOCATION: RIB CAGE

## 2020-08-28 ASSESSMENT — PAIN - FUNCTIONAL ASSESSMENT
PAIN_FUNCTIONAL_ASSESSMENT: PREVENTS OR INTERFERES SOME ACTIVE ACTIVITIES AND ADLS
PAIN_FUNCTIONAL_ASSESSMENT: PREVENTS OR INTERFERES SOME ACTIVE ACTIVITIES AND ADLS

## 2020-08-28 ASSESSMENT — PAIN SCALES - GENERAL
PAINLEVEL_OUTOF10: 6
PAINLEVEL_OUTOF10: 8
PAINLEVEL_OUTOF10: 0

## 2020-08-28 ASSESSMENT — PAIN DESCRIPTION - DESCRIPTORS
DESCRIPTORS: SHARP;SORE
DESCRIPTORS: SORE;SHARP

## 2020-08-28 ASSESSMENT — PAIN DESCRIPTION - ONSET
ONSET: ON-GOING
ONSET: ON-GOING

## 2020-08-28 ASSESSMENT — PAIN DESCRIPTION - ORIENTATION
ORIENTATION: LEFT
ORIENTATION: LEFT

## 2020-08-28 ASSESSMENT — PAIN DESCRIPTION - PAIN TYPE
TYPE: ACUTE PAIN
TYPE: ACUTE PAIN

## 2020-08-28 NOTE — PROGRESS NOTES
Patient discharging home at this time. IV and Telemetry box removed. Discharge instructions and prescriptions reviewed, all questions answered. Patient stated has no further questions at this time. All personal belongings packed and sent with patient. Patient leaving the unit via wheelchair with nursing staff to front entrance where wife will drive home.

## 2020-08-28 NOTE — PROGRESS NOTES
encounter diagnosis was Syncope and collapse. A diagnosis of NSTEMI (non-ST elevated myocardial infarction) [facility]) was also pertinent to this visit. has a past medical history of Allergic rhinitis due to other allergen, Coronary atherosclerosis of unspecified type of vessel, native or graft, Diabetic eye exam ([facility] [address] Diabetic eye exam ([facility] [address] Generalized osteoarthrosis, involving multiple sites, Other psoriasis, Pneumonia, Psoriatic arthropathy ([facility] [address] Type II or unspecified type diabetes mellitus without mention of complication, not stated as uncontrolled, Unspecified essential hypertension, and Unspecified sleep apnea. has a past surgical history that includes Coronary artery bypass graft; Carpal tunnel release; joint replacement; and Cataract removal.    Treatment Diagnosis: impaired ADLs and transfers      Restrictions  Position Activity Restriction  Other position/activity restrictions: up with assist    Subjective   General  Chart Reviewed: Yes  Additional Pertinent Hx: PMH:  essential HTN, DM, OA, sleep apnea, CABGx4  Family / Caregiver Present: No  Referring Practitioner: Dr. Guardado Mode  Diagnosis: Pt admitted after being at cardiac rehab and having episode of syncope and collapse, chest pain, dx of syncope and collapse, NSTEMI-CXR=neg, stress test=EF=67%  Subjective  Subjective: Pt in bed, agreeable to work with OT.   Patient Currently in Pain: Yes(L side chest pain, level 8, Rn informed)  Vital Signs  Patient Currently in Pain: Yes(L side chest pain, level 8, Rn informed)  Social/Functional History  Social/Functional History  Lives With: Spouse  Type of Home: Apartment  Home Layout: One level  Home Access: Level entry  Bathroom Shower/Tub: Tub/Shower unit, Walk-in shower  Bathroom Toilet: Standard  Home Equipment: Rolling walker, Cane  ADL Assistance: Independent  Homemaking Assistance: (wife does all)  Homemaking Responsibilities: No  Ambulation Assistance: Independent(uses cane outside of apt)  Transfer Assistance: Independent  Active :  Yes  Additional Comments: was attending cardiac rehab 2x/week       Objective   Vision: Within Functional Limits  Hearing: Within functional limits          Toilet Transfers  Toilet - Technique: Stand step  Equipment Used: (siimulated 3 in 1 commode)  Toilet Transfer: Contact guard assistance  ADL  UE Dressing: Independent(doffing/donning B socks)        Bed mobility  Supine to Sit: Modified independent(head of bed elevated)  Transfers  Sit to stand: Contact guard assistance  Stand to sit: Contact guard assistance(cues for safe positioning and hand placement)     Cognition  Overall Cognitive Status: [facility]  Cognition Comment: except decreased insight into deficits                 LUE AROM (degrees)  LUE AROM : WNL  Left Hand AROM (degrees)  Left Hand AROM: WNL  RUE AROM (degrees)  RUE AROM : WNL  Right Hand AROM (degrees)  Right Hand AROM: WNL  LUE Strength  Gross LUE Strength: WFL  RUE Strength  Gross RUE Strength: WFL     Hand Dominance  Hand Dominance: Right     Patient participated in OT eval and tx including ADLs and transfer        Plan   Plan  Times per week: 2-5  Times per day: Daily  Current Treatment Recommendations: Balance Training, Functional Mobility Training, Endurance Training, Self-Care / ADL    G-Code     OutComes Score                                                  AM-PAC Score        [facility] Inpatient Daily Activity Raw Score: 22 (08/28/20 1050)  AM-PAC Inpatient ADL T-Scale Score : 47.1 (08/28/20 1050)  ADL Inpatient CMS 0-100% Score: 25.8 (08/28/20 1050)  ADL Inpatient CMS G-Code Modifier : Ole Dubois (08/28/20 1050)    Goals  Short term goals  Time Frame for Short term goals: discharge  Short term goal 1: pt to participate in commode transfer assessment  Short term goal 2: pt to stand for 5 minutes with SBA while doing functional task  Short term goal 3: pt to tolerate 5-10 resp B UE AROM exerc. to increase activity tolerance  Patient Goals   Patient goals : to

## 2020-08-28 NOTE — PROGRESS NOTES
uncontrolled 7/12/2010    Unspecified essential hypertension 7/12/2010    Unspecified sleep apnea 7/12/2010     Past Surgical History:   Procedure Laterality Date    CARPAL TUNNEL RELEASE      s/p    CATARACT REMOVAL      CORONARY ARTERY BYPASS GRAFT      JOINT REPLACEMENT         Medications Prior to Admission:    No current facility-administered medications on file prior to encounter. Current Outpatient Medications on File Prior to Encounter   Medication Sig Dispense Refill    cyanocobalamin 1000 MCG tablet Take 1,000 mcg by mouth daily      insulin glargine (LANTUS SOLOSTAR) 100 UNIT/ML injection pen INJECT 24 UNITS UNDER THE SKIN EVERY NIGHT AT BEDTIME 15 mL 3    insulin lispro, 1 Unit Dial, 100 UNIT/ML SOPN INJECT 12-18 UNITS UNDER THE SKIN THREE TIMES DAILY BEFORE MEALS 30 mL 3    levothyroxine (SYNTHROID) 50 MCG tablet TAKE ONE TABLET BY MOUTH DAILY 90 tablet 9    atorvastatin (LIPITOR) 40 MG tablet TAKE ONE TABLET BY MOUTH ONCE NIGHTLY 90 tablet 3    metoprolol succinate (TOPROL XL) 25 MG extended release tablet TAKE ONE TABLET BY MOUTH DAILY 90 tablet 3    Multiple Vitamins-Minerals (THERAPEUTIC MULTIVITAMIN-MINERALS) tablet Take 1 tablet by mouth daily      aspirin 81 MG chewable tablet Take 1 tablet by mouth daily. 30 tablet 5    Insulin Pen Needle ([employer] PEN NEEDLES 31G) 31G X 8 MM MISC USE WITH INSULIN FOUR TIMES A  each 2    ACCU-CHEK JOHNATHAN PLUS strip USE TO TEST FOUR TIMES DAILY AS NEEDED 100 strip 4    Continuous Blood Gluc  (FREESTYLE MAGGIE 14 DAY READER) RUMA As needed 1 Device 3    Continuous Blood Gluc Sensor (FREESTYLE MAGGIE 14 DAY SENSOR) MISC Every 2 weeks 2 each 3    Handicap Placard MISC by Does not apply route Duration: 5 years 1 each 0    ACCU-CHEK SOFTCLIX LANCETS MISC USE FOUR TIMES A  each 3    glucose blood VI test strips (ACCU-CHEK JOHNATHAN) strip 4 times a day As needed.  150 each 3    Blood Glucose Monitoring Suppl (ACCU-CHEK JOHNATHAN) RUMA Use as needed 1 Device 0    Lancets MISC 2 times daily. Lancets for a [place] meter. 50 each 11       Allergies:  Clindamycin; Clindamycin/lincomycin; Penicillins; and Tazorac [tazarotene]    Social History:   TOBACCO:   reports that he quit smoking about 39 years ago. He started smoking about 58 years ago. He has a 18.00 pack-year smoking history. He has never used smokeless tobacco.     ETOH:   reports no history of alcohol use. Family History:       Problem Relation Age of Onset    Diabetes Mother     Heart Failure Mother     Coronary Art Dis Father          Review of Systems   Constitutional: Positive for fatigue. Negative for activity change and appetite change. Respiratory: Negative for cough, choking, chest tightness, shortness of breath and wheezing. Cardiovascular: Positive for chest pain. Negative for palpitations and leg swelling. Left sided chest pain reproducible on palpation and not associated with activity   Gastrointestinal: Negative for abdominal distention, abdominal pain, nausea and vomiting. Genitourinary: Negative for flank pain. Musculoskeletal: Negative for neck pain and neck stiffness. Neurological: Negative for dizziness and light-headedness. Psychiatric/Behavioral: Negative for agitation, behavioral problems, confusion, decreased concentration and dysphoric mood. PHYSICAL EXAM:  /70   Pulse 70   Temp 96.8 °F (36 °C) (Oral)   Resp 18   Ht 5' 10\" (1.778 m)   Wt 195 lb 8.8 oz (88.7 kg)   SpO2 95%   BMI 28.06 kg/m²     Physical Exam  Constitutional:       General: He is not in acute distress. Appearance: Normal appearance. He is not ill-appearing. HENT:      Head: Normocephalic and atraumatic. Mouth/Throat:      Mouth: Mucous membranes are moist.   Neck:      Musculoskeletal: No neck rigidity or muscular tenderness. Cardiovascular:      Rate and Rhythm: Normal rate and regular rhythm. Heart sounds: Normal heart sounds.  No murmur. No friction rub. No gallop. Pulmonary:      Effort: No respiratory distress. Breath sounds: No stridor. No wheezing or rales. Comments: Left side reproducible on palpation  Chest:      Chest wall: Tenderness present. Abdominal:      General: There is no distension. Palpations: Abdomen is soft. Tenderness: There is no right CVA tenderness or left CVA tenderness. Musculoskeletal:         General: No swelling or tenderness. Right lower leg: No edema. Left lower leg: No edema. Skin:     General: Skin is warm. Neurological:      Mental Status: He is alert and oriented to person, place, and time. Sensory: No sensory deficit. Motor: No weakness. Psychiatric:         Mood and Affect: Mood normal.         Behavior: Behavior normal.         Thought Content: Thought content normal.         LABS:  Recent Labs     08/26/20  0541 08/27/20  0824 08/28/20  0530   WBC 12.5* 11.3* 10.3   HGB 11.2* 10.4* 9.8*   HCT 32.9* 30.5* 28.5*    212 187                                                                  Recent Labs     08/26/20  0541 08/27/20  0824 08/28/20  0530    136 138   K 5.6* 5.5* 5.6*    104 106   CO2 20* 20* 22   BUN 85* 79* 86*   CREATININE 5.2* 4.9* 4.9*   GLUCOSE 132* 117* 150*         Recent Labs     08/25/20  1500 08/25/20  1817 08/26/20  0054   TROPONINI 0.05* 0.04* 0.04*     No results for input(s): BNP in the last 72 hours. Lab Results   Component Value Date    PHART 7.428 01/16/2017    WAS5GBB 47 01/16/2017    PO2ART 101 01/16/2017         Assessment &Plan:        Assessment/Plan       1. MORIS on CKD 4    Cret at 4.9 this AM with baseline around 3. GFR 11. The pt's GFR seems to be worsening gradually with it being 15 in July. 2. Electrolyte abnormality  Hyperkalemia 5.4 on admission. This AM potassium at 5.6       3. Chronic Anemia  Hg around 11 likely 2/2 to CKD  9.8 this AM.   -Pt given Anaserp 60 mcg SQ     4.  HTN  Continue home metoprolol  -D/c amlodipine, start hydralazine 25 mg q8hrs     Plan     -Pt is at last stages of kidney disease and will need RRT soon   -Pt wanted to do home dialysis - PD     -Patient seems to have persistently elevated potassium. His phosphorous is also increasing. Currently I would recommend conservative measures to lower potassium and  phosphorous with with modified diet. He does not need phosphate binders at this time. However based on his proteinurea trajectory he will need dialysis soon.   -He isalso positive for orthostatic hypotension likely 2/2 to diabetic neuropathy.  -Pt can be discharged from nephrology's perspective but will need follow up with nephrology as an outpatient.     Will discuss with attending Dr. Long Sauceda MD  PGY-1    Agree with plan above     Enoch Lacy MD

## 2020-08-28 NOTE — PROGRESS NOTES
LV size and systolic function.     There is a medium to large size reversible defect involving the apex, apical     anterior, mid anterior and apical lateral cardiac segments.     Finding are concerning for ischemia.     ECG: Non-diagnostic EKG response due to failure to reach target heart rate.        8/3/2020 Echo:   Left ventricular cavity size is normal. There is mild concentric left   ventricular hypertrophy. Overall left ventricular systolic function appears   low normal with an ejection fraction of 50%. No regional wall motion   abnormalities are noted. Normal diastolic function. Mild mitral and tricuspid regurgitation are present. 2013 Cath: Angiographic Findings:  Right dominant system  Left Main:  Diffuse, mild disease.  ~20-30% diffuse disease. Left Anterior Descendin% proximal occlusion.  After the LIMA is injected, the LAD was seen to have a mid 80-90% stenosis just distal to a significant diagonal branch.  The diagonal bifurcates; one branch has a severe (~80-90%) stenosis.  The other limb also has diffuse disease.  There was a 90% distal LAD stenosis. Circumflex:  Proximal 70% stenosis at bifurcation of an OM and the AV LCx.  The OM is subtotally occluded.  The AV LCx is diffusely diseased. Right Coronary:  Proximal 30% stenosis.  Mid 60-70% stenosis.  Right posterolateral is occluded.  Right PDA has an ostial/proximal 90% stenosis.  It is diffusely diseased after the ostial lesion. Bypass Grafts:  LIMA-LAD with radial grafts to an OM and right PDA that is widely patent. Left Ventriculogram:  Not performed due to elevated Cr  Hemodynamics (mm Hg):  Left Ventricular Pressure:  131/0, 14  Central Aortic Pressure:  133/66 (93)    Assessment:  77 y. o. with chest pain and syncope  Issues:  -Syncope  -Chest pain, appears musculoskeletal   -CAD s/p CABG  -HTN urgency  -ARF/CRF  -Orthostatic hypotension     Plan:  -Keep K>4, Mg>2.  -Cont asa and plavix  -Hydralazine  -Statin   -Coreg

## 2020-08-28 NOTE — PROGRESS NOTES
Physician Progress Note      PATIENT:               Aide Patrick  CSN #:                  [id number]  :                       1945  ADMIT DATE:       2020 12:42 PM  [address] [place] DATE:  RESPONDING  PROVIDER #:        Natalie Li MD          QUERY TEXT:    Pt admitted with Syncope. If possible, please document in progress notes and   discharge summary the relationship, if any, between the following. The medical record reflects the following:  Risk Factors: 76 y/o with CKD IV and Diabetic neuropathy  Clinical Indicators: H&P: \"He was standing at the desk and began to feel   lightheaded with dizziness and shaking, next thing he remembers is being on   the floor. Staff there reported a syncopal episode. Had EKG performed which   was unremarkable. \"  Per nephrology  PN: \"based on his proteinurea   trajectory he will need dialysis soon. -He is also positive for orthostatic   hypotension likely 2/2 to diabetic neuropathy. \"  Treatment: Nephrology consult, glucose monitoring, SSI, labs  Options provided:  -- Syncope due to diabetic neuropathy  -- Syncope unrelated to diabetic neuropathy  -- Syncope due to other, please specify to diabetic neuropathy. -- Other - I will add my own diagnosis  -- Disagree - Not applicable / Not valid  -- Disagree - Clinically unable to determine / Unknown  -- Refer to Clinical Documentation Reviewer    PROVIDER RESPONSE TEXT:    This patient has Syncope due to diabetic neuropathy.     Query created by: Amy Basilio on 2020 3:43 PM      Electronically signed by:  Natalie Li MD 2020 3:55 PM

## 2020-08-28 NOTE — PROGRESS NOTES
Physical Therapy    Facility/Department: [address]  Initial Assessment and treatment    NAME: Dorcas Kussmaul  : 1945  MRN: [id number]    Date of Service: 2020    Discharge Recommendations:Michael Brody scored a 20/24 on the AM-PAC short mobility form. Current research shows that an AM-PAC score of 18 or greater is typically associated with a discharge to the patient's home setting. Based on the patient's AM-PAC score and their current functional mobility deficits, it is recommended that the patient have 2-3 sessions per week of Physical Therapy at d/c to increase the patient's independence. At this time, this patient demonstrates the endurance and safety to discharge home without PT. Please see assessment section for further patient specific details. If patient discharges prior to next session this note will serve as a discharge summary. Please see below for the latest assessment towards goals. If patient discharges prior to next session this note will serve as a discharge summary. Please see below for the latest assessment towards goals. Home independently   PT Equipment Recommendations  Equipment Needed: No    Assessment   Body structures, Functions, Activity limitations: Decreased functional mobility ; Decreased safe awareness  Assessment: Pt presents due to syncopal episode at cardiac rehab. He is irritated that he cannot go for a walk though he was orthostatic with standing EOB. BP sitting was 136/77mmHg and in standing dropped to 88/54mmHg and patient noted that he felt strange and requested to sit. Pt insisting after this episode that he can walk but because he was symptomatic therapists explained that medically this would not be safe. Medical symptoms limiting eval but pt likely safe to return home upon discharge and resolution of symptoms.   Treatment Diagnosis: Impaired mobility secondary to orthostatic hypotension  Prognosis: Good  Decision Making: Low Complexity  PT Education: PT Role;Plan of Care;Transfer Training;Precautions; Functional Mobility Training  Patient Education: Pt educated on orthostatic hypotension and the implications on mobility  REQUIRES PT FOLLOW UP: Yes  Activity Tolerance  Activity Tolerance: Patient limited by medical symptoms [facility])       Patient Diagnosis(es): The primary encounter diagnosis was Syncope and collapse. A diagnosis of NSTEMI (non-ST elevated myocardial infarction) [facility]) was also pertinent to this visit. has a past medical history of Allergic rhinitis due to other allergen, Coronary atherosclerosis of unspecified type of vessel, native or graft, Diabetic eye exam ([facility] [address] Diabetic eye exam ([facility] [address] Generalized osteoarthrosis, involving multiple sites, Other psoriasis, Pneumonia, Psoriatic arthropathy ([facility] [address] Type II or unspecified type diabetes mellitus without mention of complication, not stated as uncontrolled, Unspecified essential hypertension, and Unspecified sleep apnea. has a past surgical history that includes Coronary artery bypass graft; Carpal tunnel release; joint replacement; and Cataract removal.    Restrictions  Position Activity Restriction  Other position/activity restrictions: up with assist  Vision/Hearing  Vision: Within Functional Limits  Hearing: Within functional limits     Subjective  General  Chart Reviewed: Yes  Patient assessed for rehabilitation services?: Yes  Additional Pertinent Hx: Pt presents following syncopal episode at cardiac rehab and was admitted with syncope and collapse.   Family / Caregiver Present: No  Referring Practitioner: Óscar Hamemr MD  Diagnosis: Syncope and collapse  Subjective  Subjective: I don't need therapy  Pain Screening  Patient Currently in Pain: Yes(None at rest but states 8/10 in L chest that increases with activity)  Vital Signs  Patient Currently in Pain: Yes(None at rest but states 8/10 in L chest that increases with activity) Orientation  Orientation  Overall Orientation Status: Within Normal Limits  Social/Functional History  Social/Functional History  Lives With: Spouse  Type of Home: Apartment  Home Layout: One level  Home Access: Level entry  Bathroom Shower/Tub: Tub/Shower unit, Walk-in shower  Bathroom Toilet: Standard  Home Equipment: Rolling walker, Cane(uses cane out of home; no AD in home)  ADL Assistance: [address]: (wife does all)  Homemaking Responsibilities: No  Ambulation Assistance: Independent  Transfer Assistance: Independent  Active : Yes  Additional Comments: was attending cardiac rehab 2x/week. States he has had a few syncopal episodes this year causing falls. Denies balance as reason for falls  Cognition        Objective             Strength RLE  Strength RLE: WFL  Strength LLE  Strength LLE: WFL        Bed mobility  Supine to Sit: Modified independent(HOB elevated)  Sit to Supine: Modified independent(flat bed)  Transfers  Sit to Stand: Contact guard assistance(poor safety awareness; moves very quickly though he knows he gets orthostatic)  Stand to sit: Contact guard assistance  Stand Pivot Transfers: Contact guard assistance  Comment: stood from bed x 2, once with SPC and once without. Transferred to chair without AD. Impulsive with mobility. After standing for a minute and having BP taken which showed orthostasis, pt noted he felt strange and like he needed to sit.   Ambulation  Ambulation?: No(secondary to orthostatic BP and symptoms)     Balance  Sitting - Static: Good  Sitting - Dynamic: Good  Standing - Static: Fair;+  Standing - Dynamic: Fair;-    Treatment:  Functional mobility training and pt education      Plan   Plan  Times per day: Daily  Current Treatment Recommendations: Transfer Training, Gait Training, Functional Mobility Training, Patient/Caregiver Education & Training  Safety Devices  Type of devices: Left in chair, Chair alarm in place, Call light within reach, Nurse notified    G-Code       OutComes Score                                                  AM-PAC Score  AM-PAC Inpatient Mobility Raw Score : 20 (08/28/20 1525)  AM-PAC Inpatient T-Scale Score : 47.67 (08/28/20 1525)  Mobility Inpatient CMS 0-100% Score: 35.83 (08/28/20 1525)  Mobility Inpatient CMS G-Code Modifier : Haroon Lawton (08/28/20 1525)          Goals  Short term goals  Time Frame for Short term goals: By discharge  Short term goal 1: Pt will transfer with SPC mod I  Short term goal 2: Pt will ambulate with SPC x 150 ft with mod I  Patient Goals   Patient goals :  To go home       Therapy Time   Individual Concurrent Group Co-treatment   Time In 0900         Time Out 0930         Minutes 30         Timed Code Treatment Minutes: 30 Minutes    Timed Code Treatment Minutes:  15 Minutes    Total Treatment Minutes:  30 minutes      Sergio Lopez, PT

## 2020-08-28 NOTE — PROGRESS NOTES
Physician Progress Note      PATIENT:               Terrie Sandhoff  CSN #:                  [id number]  :                       1945  ADMIT DATE:       2020 12:42 PM  [facility] DATE:  RESPONDING  PROVIDER #:        Hafsa Urias MD          QUERY TEXT:    Patient admitted with chest pain. If possible, please document in the   progress notes and discharge summary if you are evaluating and/or treating any   of the following: The medical record reflects the following:  Risk Factors: 78 y/o male four-vessel bypass in 2006  Clinical Indicators: Card  Consult note: \"ECG with NSR and 1st degree AVB   with no acute ST/T wave changes. Troponin elevation seems to be related to   CRI/ARF. \"  Per d/c summary: \"beta-blocker, heparin gtt, lidocaine   patch,oxygen,and had repeat EKGs. Additionally, patient had a stress test that   was abnormal but was consistent with what we know of his coronary anatomy   based on a cath from several years ago. \"  Treatment: beta-blocker, heparin gtt, lidocaine patch,oxygen,and had repeat   EKGs, Nephrology and Cardiology consult  Options provided:  -- Demand Ischemia only, no MI due to  -- Demand Ischemia with MI  -- Unstable Angina  -- Other - I will add my own diagnosis  -- Disagree - Not applicable / Not valid  -- Disagree - Clinically unable to determine / Unknown  -- Refer to Clinical Documentation Reviewer    PROVIDER RESPONSE TEXT:    This patient has demand ischemia only, no MI.     Query created by: Daniel Rodriguez on 2020 3:34 PM      Electronically signed by:  Hafsa Urias MD 2020 3:39 PM

## 2020-08-29 ENCOUNTER — CARE COORDINATION (OUTPATIENT)
Dept: CASE MANAGEMENT | Age: 75
End: 2020-08-29

## 2020-08-29 PROCEDURE — 1111F DSCHRG MED/CURRENT MED MERGE: CPT | Performed by: INTERNAL MEDICINE

## 2020-08-29 NOTE — CARE COORDINATION
[facility] Transitions Initial Follow Up Call    Call within 2 business days of discharge: Yes    Patient: Marianne David Patient : 1945   MRN: <[id number]>  Reason for Admission: Syncope, NSTEMI, Diabetes  Discharge Date: 20 RARS: Readmission Risk Score: 20      Last Discharge [facility]       Complaint Diagnosis Description Type Department Provider    20 Loss of Consciousness; Chest Pain Syncope and collapse . .. ED to Hosp-Admission (Discharged) (ADMITTED) [facility] Tracy Walker MD; Brijesh Clement,... Spoke with: [address] [facility] [address] Pena,[address]: [facility]    Non-face-to-face services provided:  Obtained and reviewed discharge summary and/or continuity of care documents    Care Transitions 24 Hour Call    Do you have any ongoing symptoms?:  No  Do you have a copy of your discharge instructions?:  Yes  Do you have all of your prescriptions and are they filled?:  Yes  Have you been contacted by a [id number] [employer] Pharmacist?:  No  Have you scheduled your follow up appointment?:  No  Were you discharged with any Home Care or Post Acute Services:  No  Patient Home Equipment:  CPAP  Do you have support at home?:  Partner/Spouse/SO  Do you feel like you have everything you need to keep you well at home?:  Yes  Are you an active caregiver in your home?:  No  Care Transitions Interventions         Follow Up: Patient reports that he is doing well, denies any CP, SOB, syncope, fever, cough, light headedness. He does report low BP this am of 90/50, he checked with another cuff and it was 148/75. CTN encouraged him to discuss these fluctuations with his PCP, he verbalized that he would do so. Reviewed medications, 1111F completed. He will call his MD's on Monday and get his follow up appointment scheduled. He denies any questions and concerns at this time. CTN will transition to [facility] for follow up.     Future Appointments   Date Time Provider Svitlana Burrows   2020  3:20 PM Lori Bean MD [facility]   9/24/2020 11:45 AM Janet Mason MD [facility]   10/14/2020  9:00 AM Alicia Waite MD [facility] P[facility]CC [facility]   11/23/2020  8:00 AM MD AMARJIT Ford [address] [facility]   2/1/2021  8:30 AM MD James Leon [facility]       Charles Orellana, RN

## 2020-08-31 ENCOUNTER — VIRTUAL VISIT (OUTPATIENT)
Dept: ENDOCRINOLOGY | Age: 75
End: 2020-08-31
Payer: MEDICARE

## 2020-08-31 PROCEDURE — 4040F PNEUMOC VAC/ADMIN/RCVD: CPT | Performed by: INTERNAL MEDICINE

## 2020-08-31 PROCEDURE — 1123F ACP DISCUSS/DSCN MKR DOCD: CPT | Performed by: INTERNAL MEDICINE

## 2020-08-31 PROCEDURE — 1111F DSCHRG MED/CURRENT MED MERGE: CPT | Performed by: INTERNAL MEDICINE

## 2020-08-31 PROCEDURE — G8427 DOCREV CUR MEDS BY ELIG CLIN: HCPCS | Performed by: INTERNAL MEDICINE

## 2020-08-31 PROCEDURE — 99213 OFFICE O/P EST LOW 20 MIN: CPT | Performed by: INTERNAL MEDICINE

## 2020-08-31 PROCEDURE — 3017F COLORECTAL CA SCREEN DOC REV: CPT | Performed by: INTERNAL MEDICINE

## 2020-08-31 PROCEDURE — 3044F HG A1C LEVEL LT 7.0%: CPT | Performed by: INTERNAL MEDICINE

## 2020-08-31 PROCEDURE — 2022F DILAT RTA XM EVC RTNOPTHY: CPT | Performed by: INTERNAL MEDICINE

## 2020-08-31 RX ORDER — BLOOD-GLUCOSE METER
EACH MISCELLANEOUS
Qty: 1 KIT | Refills: 2 | Status: ON HOLD | OUTPATIENT
Start: 2020-08-31 | End: 2022-09-07

## 2020-08-31 RX ORDER — BLOOD SUGAR DIAGNOSTIC
1 STRIP MISCELLANEOUS 2 TIMES DAILY
Qty: 100 EACH | Refills: 3 | Status: SHIPPED | OUTPATIENT
Start: 2020-08-31 | End: 2021-02-01 | Stop reason: SDUPTHER

## 2020-08-31 NOTE — PROGRESS NOTES
Pursuant to the emergency declaration under the Formerly named [facility], [facility] waiver authority and the [employer] Act, this Virtual  Visit was conducted, with patient's consent, to reduce the patient's risk of exposure to COVID-19 and provide continuity of care for an established patient. Patient was at home. Provider was at home. No one else was involved  Services were provided through a video synchronous discussion virtually to substitute for in-person clinic visit. Seen as f/u patient for diabetes    Interim:    Hospitalized for Syncope  Stable glucose  Got Dexcom  Number off from meter-will try new meter     Admitted on 1/17 for MORIS on CKD, Acute exacerbation of CHF. Resp failure  Hypoglycemia on admission BG 28? Diagnosed with Type 2 diabetes mellitus in  1995  Known diabetic complications: Nephropathy, Neuropathy, mild retinopathy    Current diabetic medications   Lantus 24 units  Humalog 12 unit TID + SSI      On insulin since 1998    He has been on janumet and glipizide    Last A1c 6.1%<-----6.9%<----- 6.4%<----6.9%<---- 6.1%<-----6.6%<------5.5%<----6.6%<----- 7.2% <-----8.9 on 7/16<--- 7.4 on 4/16<------7.9 on 1/16<----  10.4 on 10/15 <---- 8.5 <--- 8.2 <--- 8.6    Prior visit with dietician: no  Current diet: on average, 3 meals per day does not count CHO  Current exercise: walking   Current monitoring regimen: home blood tests - 4 times daily     Has brought blood glucose log/meter: yes  Home blood sugar records:  Any episodes of hypoglycemia?  Occ am     H/O CABG in 06    Hyperlipidemia:for years, stable  LDL 44 on 11/17  lipitor 40mg  LDL 58 on 11/18    LDL 14 on 11/19  Last eye exam: 4/18  Last foot exam: 5/19  Last microalbumin to creatinine ratio: 4.9<---3.6<-----2.9<----2.3 <---- Cr 1.7 on 10/15<--- 1.8  1478 on 11/15  Seen Dr. Mireya Matthews    HTN: stable, for years, takes Losartan 25mg ( stopped by nephrologist) coreg 6.25mg BID    H/o subclinical hypothyroidism,stable    TSH 3.09 on 5/18  Levothyroxine 50mcg    He has neuropathy,was on neurontin    Past Medical History:   Diagnosis Date    Allergic rhinitis due to other allergen 7/12/2010    Coronary atherosclerosis of unspecified type of vessel, native or graft 7/12/2010    Diabetic eye exam ([facility] 04/29/2015    Diabetic eye exam ([facility] 5/5/2016    [place] [facility]    Generalized osteoarthrosis, involving multiple sites 7/12/2010    Other psoriasis 7/12/2010    Pneumonia     Psoriatic arthropathy ([facility] 7/12/2010    Type II or unspecified type diabetes mellitus without mention of complication, not stated as uncontrolled 7/12/2010    Unspecified essential hypertension 7/12/2010    Unspecified sleep apnea 7/12/2010     Past Surgical History:   Procedure Laterality Date    CARPAL TUNNEL RELEASE      s/p    CATARACT REMOVAL      CORONARY ARTERY BYPASS GRAFT      JOINT REPLACEMENT         Review of Systems    Constitutional: Negative for weight loss and malaise/fatigue. Negative for fever      Eyes: Negative for blurred vision. Negative for double vision, photophobia and pain. Respiratory: Negative for cough and sputum production. Cardiovascular: Negative for chest pain, palpitations and leg swelling. Gastrointestinal: Negative for nausea, vomiting and abdominal pain. Genitourinary: Negative for dysuria, urgency and frequency. Skin: Negative for itching and rash. Neurological: Negative for dizziness. Negative for tingling, tremors, focal weakness and headaches.    Endo/Heme/Allergies: see HPI        PHYSICAL EXAMINATION:  [ INSTRUCTIONS:  \"[x]\" Indicates a positive item  \"[]\" Indicates a negative item  -- DELETE ALL ITEMS NOT EXAMINED]  Vital Signs: (As obtained by patient/caregiver or practitioner observation)    Blood pressure-  Heart rate-    Respiratory rate-    Temperature-  Pulse oximetry-     Constitutional Appears well-developed and well-nourished No apparent distress        Mental status  Alert and awake  Oriented to person/place/time  Able to follow commands      Eyes:  EOM    [x]  Normal    Sclera  [x]  Normal           Discharge [x]  None visible      HENT:   [x] Normocephalic, atraumatic. [x] Mouth/Throat: Mucous membranes are moist.     External Ears [x] Normal      Neck: [x] No visualized mass     Pulmonary/Chest: [x] Respiratory effort normal.  [x] No visualized signs of difficulty breathing or respiratory distress             Musculoskeletal:   [x] Normal gait with no signs of ataxia         [x] Normal range of motion of neck              Neurological:        [x] No Facial Asymmetry (Cranial nerve 7 motor function) (limited exam to video visit)          [x] No gaze palsy                Skin:        [x] No significant exanthematous lesions or discoloration noted on facial skin                 Psychiatric:       [x] Normal Affect [x] No Hallucinations            Other pertinent observable physical exam findings-     Due to this being a TeleHealth encounter, evaluation of the following organ systems is limited: Vitals/Constitutional/EENT/Resp/CV/GI//MS/Neuro/Skin/Heme-Lymph-Imm. Services were provided through a video synchronous discussion virtually to substitute for in-person clinic visit.      5/19  Skeletal foot exam is normal, no skin lesions, toenails are normal, DP not palpable,  10 g monofilament is 10/10 on the right and 10/10 on the left    Lab Reviewed   No components found for: CHLPL  Lab Results   Component Value Date    TRIG 265 (H) 11/04/2019    TRIG 205 (H) 11/16/2018    TRIG 198 (H) 11/09/2017     Lab Results   Component Value Date    HDL 39 (L) 11/04/2019    HDL 47 11/16/2018    HDL 46 11/09/2017     Lab Results   Component Value Date    LDLCALC 14 11/04/2019    LDLCALC 58 11/16/2018    LDLCALC 44 11/09/2017     Lab Results   Component Value Date    LABVLDL 53 11/04/2019    LABVLDL 41 11/16/2018    LABVLDL 40 11/09/2017     Lab Results   Component Value Date    LABA1C 6.5 05/26/2020       Assessment:     Jose Guadalupe Ayala is a 77 y.o. male with :    1.T2DM: Longstanding,controlled, insulin resistant, goal A1c 7-8%. Reviewed log,fair control. Continue same. Send new meter given variation with Dexcom  2. HTN: BP at goal. Continue same, recent medication change  3. HLD: LDL at goal, he is  On statin  4. Obesity  5. CAD  6. CKD III  7. Psoriatic arthritis: Reports flare in psoriatic plaque, will see dermatology  8. Neuropathy: Had been on lyrica, prescribed topical , has helped. Started neurontin, did not help, increase dose to 600mg TID. He does have some drowsiness, off of it. Advise B12  9. Fatigue:  TSH high with normal FT4, subclinical hypothyroid, advise to try replacement. He was inquiring about Co-Q 10 , advised can take 200mg  His total testosterone was normal with free level low normal. Advised given total is in a good range, h/o CAD, Age, recommend no replacement at this point. 10. Subclinical hypothyroid: On replacement,  Last level normal, continue levothyroxine    Plan:       lantus  24 units    Humalog 12 units TID  SSI 2 for 50>150   Advise to check blood sugar 4 times a day   Patient to send blood sugar log for titration. Advise to low simple carbohydrate and protein with each  meal diet. Diabetes Care: recommend yearly eye exam, foot exam and urine microalbumin to  creatinine ratio.     -Hyperlipidemia, LDL goal is <70 mg/dl   -Hypertension: Continue same  -Daily ASA:81mg  -Smoking status: Non smoker

## 2020-09-08 ENCOUNTER — OFFICE VISIT (OUTPATIENT)
Dept: INTERNAL MEDICINE CLINIC | Age: 75
End: 2020-09-08
Payer: MEDICARE

## 2020-09-08 VITALS
TEMPERATURE: 97.7 F | SYSTOLIC BLOOD PRESSURE: 150 MMHG | WEIGHT: 199 LBS | DIASTOLIC BLOOD PRESSURE: 84 MMHG | HEIGHT: 70 IN | BODY MASS INDEX: 28.49 KG/M2

## 2020-09-08 DIAGNOSIS — D64.9 ANEMIA, UNSPECIFIED TYPE: ICD-10-CM

## 2020-09-08 DIAGNOSIS — Z79.4 CONTROLLED TYPE 2 DIABETES MELLITUS WITH COMPLICATION, WITH LONG-TERM CURRENT USE OF INSULIN (HCC): ICD-10-CM

## 2020-09-08 DIAGNOSIS — E11.8 CONTROLLED TYPE 2 DIABETES MELLITUS WITH COMPLICATION, WITH LONG-TERM CURRENT USE OF INSULIN (HCC): ICD-10-CM

## 2020-09-08 PROBLEM — N18.30 CHRONIC KIDNEY DISEASE, STAGE III (MODERATE) (HCC): Status: RESOLVED | Noted: 2017-03-06 | Resolved: 2020-09-08

## 2020-09-08 LAB
FERRITIN: 376.5 NG/ML (ref 30–400)
FOLATE: 15.78 NG/ML (ref 4.78–24.2)
VITAMIN B-12: 1971 PG/ML (ref 211–911)

## 2020-09-08 PROCEDURE — 90694 VACC AIIV4 NO PRSRV 0.5ML IM: CPT | Performed by: INTERNAL MEDICINE

## 2020-09-08 PROCEDURE — 99495 TRANSJ CARE MGMT MOD F2F 14D: CPT | Performed by: INTERNAL MEDICINE

## 2020-09-08 PROCEDURE — G8510 SCR DEP NEG, NO PLAN REQD: HCPCS | Performed by: INTERNAL MEDICINE

## 2020-09-08 PROCEDURE — G0008 ADMIN INFLUENZA VIRUS VAC: HCPCS | Performed by: INTERNAL MEDICINE

## 2020-09-08 SDOH — ECONOMIC STABILITY: TRANSPORTATION INSECURITY
IN THE PAST 12 MONTHS, HAS THE LACK OF TRANSPORTATION KEPT YOU FROM MEDICAL APPOINTMENTS OR FROM GETTING MEDICATIONS?: NO

## 2020-09-08 SDOH — ECONOMIC STABILITY: FOOD INSECURITY: WITHIN THE PAST 12 MONTHS, YOU WORRIED THAT YOUR FOOD WOULD RUN OUT BEFORE YOU GOT MONEY TO BUY MORE.: NEVER TRUE

## 2020-09-08 SDOH — ECONOMIC STABILITY: FOOD INSECURITY: WITHIN THE PAST 12 MONTHS, THE FOOD YOU BOUGHT JUST DIDN'T LAST AND YOU DIDN'T HAVE MONEY TO GET MORE.: NEVER TRUE

## 2020-09-08 SDOH — ECONOMIC STABILITY: TRANSPORTATION INSECURITY
IN THE PAST 12 MONTHS, HAS LACK OF TRANSPORTATION KEPT YOU FROM MEETINGS, WORK, OR FROM GETTING THINGS NEEDED FOR DAILY LIVING?: NO

## 2020-09-08 ASSESSMENT — PATIENT HEALTH QUESTIONNAIRE - PHQ9
SUM OF ALL RESPONSES TO PHQ QUESTIONS 1-9: 0
SUM OF ALL RESPONSES TO PHQ9 QUESTIONS 1 & 2: 0
SUM OF ALL RESPONSES TO PHQ QUESTIONS 1-9: 0
1. LITTLE INTEREST OR PLEASURE IN DOING THINGS: 0
2. FEELING DOWN, DEPRESSED OR HOPELESS: 0

## 2020-09-08 ASSESSMENT — ENCOUNTER SYMPTOMS
SHORTNESS OF BREATH: 0
NAUSEA: 0

## 2020-09-08 NOTE — PROGRESS NOTES
Post-Discharge Transitional Care Management Services or Hospital Follow Up      Radha Phipps   YOB: 1945    Date of Office Visit:  9/8/2020  Date of Hospital Admission: 8/25/20  Date of Hospital Discharge: 8/28/20  Readmission Risk Score(high >=14%.  Medium >=10%):Readmission Risk Score: 20      Care management risk score Rising risk (score 2-5) and Complex Care (Scores >=6): 8     Non face to face  following discharge, date last encounter closed (first attempt may have been earlier): 8/29/2020  3:17 PM 8/29/2020  3:17 PM    Call initiated 2 business days of discharge: Yes     Patient Active Problem List   Diagnosis    Essential hypertension    Generalized osteoarthrosis, involving multiple sites    Arthritis with psoriasis ([address]    Sleep apnea    Psoriatic arthropathy ([address]    Allergic rhinitis due to other allergen    Coronary atherosclerosis    Vitamin D deficiency    Glaucoma    Nephropathy    S/P CABG x 4    ILD (interstitial lung disease) ([address]    Abdominal tenderness, RLQ (right lower quadrant)    Chronic diastolic heart failure ([address]    Pulmonary edema cardiac cause ([address]    Coronary artery disease involving native coronary artery of native heart with unstable angina pectoris ([address]    BJ (obstructive sleep apnea)    Cough variant asthma    NSTEMI (non-ST elevated myocardial infarction) ([address]    Insulin dependent type 2 diabetes mellitus, controlled (HCC)    Chronic fatigue    Syncope and collapse    Diabetes mellitus type 2 in nonobese ([address]    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 ([address]    Acquired hypothyroidism    Unstable angina (HCC)    Orthostatic hypotension       Allergies   Allergen Reactions    Clindamycin     Clindamycin/Lincomycin     Penicillins     Tazorac [Tazarotene]      Cream - rash       Medications listed as ordered at the time of discharge from hospital   [facility]   Home Medication Instructions ARCELIA:    Printed on:09/08/20 6661   Medication Information                      ACCU-CHEK JOHNATHAN PLUS strip  USE TO TEST FOUR TIMES DAILY AS NEEDED             ACCU-CHEK SOFTCLIX LANCETS MISC  USE FOUR TIMES A DAY             aspirin 81 MG chewable tablet  Take 1 tablet by mouth daily. atorvastatin (LIPITOR) 80 MG tablet  Take 1 tablet by mouth nightly             Blood Glucose Monitoring Suppl (ACCU-CHEK JOHNATHAN PLUS) w/Device KIT  As needed             Blood Glucose Monitoring Suppl (ACCU-CHEK JOHNATHAN) RUMA  Use as needed             blood glucose test strips (ACCU-CHEK JOHNATHAN PLUS) strip  1 each by In Vitro route 2 times daily As needed. carvedilol (COREG) 6.25 MG tablet  Take 1 tablet by mouth 2 times daily (with meals)             clopidogrel (PLAVIX) 75 MG tablet  Take 1 tablet by mouth daily             Continuous Blood Gluc  (FREESTYLE MAGGIE 14 DAY READER) RUMA  As needed             Continuous Blood Gluc Sensor (FREESTYLE MAGGIE 14 DAY SENSOR) MISC  Every 2 weeks             cyanocobalamin 1000 MCG tablet  Take 1,000 mcg by mouth daily             glucose blood VI test strips (ACCU-CHEK JOHNATHAN) strip  4 times a day As needed. Handicap Placard MISC  by Does not apply route Duration: 5 years             hydrALAZINE (APRESOLINE) 25 MG tablet  Take 1 tablet by mouth every 8 hours             insulin glargine (LANTUS SOLOSTAR) 100 UNIT/ML injection pen  INJECT 24 UNITS UNDER THE SKIN EVERY NIGHT AT BEDTIME             insulin lispro, 1 Unit Dial, 100 UNIT/ML SOPN  INJECT 12-18 UNITS UNDER THE SKIN THREE TIMES DAILY BEFORE MEALS             Insulin Pen Needle ([employer] PEN NEEDLES 31G) 31G X 8 MM MISC  USE WITH INSULIN FOUR TIMES A DAY             Lancets MISC  2 times daily. Lancets for a Anna meter.              levothyroxine (SYNTHROID) 50 MCG tablet  TAKE ONE TABLET BY MOUTH DAILY             lidocaine 4 % external patch  Place 1 patch onto the skin daily             Multiple Vitamins-Minerals (THERAPEUTIC MULTIVITAMIN-MINERALS) tablet  Take 1 tablet by mouth daily                   Medications marked \"taking\" at this time  Outpatient Medications Marked as Taking for the 9/8/20 encounter (Office Visit) with Monica Go MD   Medication Sig Dispense Refill    Blood Glucose Monitoring Suppl (ACCU-CHEK JOHNATHAN PLUS) w/Device KIT As needed 1 kit 2    blood glucose test strips (ACCU-CHEK JOHNATHAN PLUS) strip 1 each by In Vitro route 2 times daily As needed.  100 each 3    atorvastatin (LIPITOR) 80 MG tablet Take 1 tablet by mouth nightly 30 tablet 3    hydrALAZINE (APRESOLINE) 25 MG tablet Take 1 tablet by mouth every 8 hours 90 tablet 3    carvedilol (COREG) 6.25 MG tablet Take 1 tablet by mouth 2 times daily (with meals) 60 tablet 3    lidocaine 4 % external patch Place 1 patch onto the skin daily 1 box 1    clopidogrel (PLAVIX) 75 MG tablet Take 1 tablet by mouth daily 30 tablet 3    Insulin Pen Needle ([employer] PEN NEEDLES 31G) 31G X 8 MM MISC USE WITH INSULIN FOUR TIMES A  each 2    cyanocobalamin 1000 MCG tablet Take 1,000 mcg by mouth daily      insulin glargine (LANTUS SOLOSTAR) 100 UNIT/ML injection pen INJECT 24 UNITS UNDER THE SKIN EVERY NIGHT AT BEDTIME 15 mL 3    insulin lispro, 1 Unit Dial, 100 UNIT/ML SOPN INJECT 12-18 UNITS UNDER THE SKIN THREE TIMES DAILY BEFORE MEALS 30 mL 3    ACCU-CHEK JOHNATHAN PLUS strip USE TO TEST FOUR TIMES DAILY AS NEEDED 100 strip 4    Continuous Blood Gluc  (FREESTYLE MAGGIE 14 DAY READER) RUMA As needed 1 Device 3    Continuous Blood Gluc Sensor (FREESTYLE MAGGIE 14 DAY SENSOR) MISC Every 2 weeks 2 each 3    levothyroxine (SYNTHROID) 50 MCG tablet TAKE ONE TABLET BY MOUTH DAILY 90 tablet 9    Multiple Vitamins-Minerals (THERAPEUTIC MULTIVITAMIN-MINERALS) tablet Take 1 tablet by mouth daily      Handicap Placard MISC by Does not apply route Duration: 5 years 1 each 0    ACCU-CHEK SOFTCLIX LANCETS MISC USE FOUR TIMES A  each 3    glucose blood VI test strips (ACCU-CHEK JOHNATHAN) strip 4 times a day As needed. 150 each 3    Blood Glucose Monitoring Suppl (ACCU-CHEK JOHNATHAN) RUMA Use as needed 1 Device 0    aspirin 81 MG chewable tablet Take 1 tablet by mouth daily. 30 tablet 5    Lancets MISC 2 times daily. Lancets for a [place] meter. 50 each 11        Medications patient taking as of now reconciled against medications ordered at time of hospital discharge: Yes    Chief Complaint   Patient presents with    Follow-Up from Hospital     discharged 8/25/20       HPI    Inpatient course: Discharge summary reviewed- see chart. Interval history/Current status: Since discharge he has not had any significant episodes of lightheadedness. Occasionally has mild and brief lightheadedness but none significant. He was going to have an angiogram in the hospital but it was postponed due to the renal function. Renal function has been worsening, he will be seeing Dr. Dinah Sanders later in the month and will be discussing dialysis. Biggest complaint is fatigue. He has no energy. Not back in pulmonary rehab as yet. Seeing cardiology tomorrow. Review of Systems   Constitutional: Positive for fatigue. Respiratory: Negative for shortness of breath. Cardiovascular: Negative for chest pain and leg swelling. Gastrointestinal: Negative for nausea. Vitals:    09/08/20 1321 09/08/20 1327   BP: (!) 150/84 (!) 150/84   Temp: 97.7 °F (36.5 °C)    Weight: 199 lb (90.3 kg)    Height: 5' 10\" (1.778 m)      Body mass index is 28.55 kg/m². Wt Readings from Last 3 Encounters:   09/08/20 199 lb (90.3 kg)   08/28/20 195 lb 8.8 oz (88.7 kg)   08/25/20 197 lb 9.6 oz (89.6 kg)     BP Readings from Last 3 Encounters:   09/08/20 (!) 150/84   08/28/20 (!) 166/73   08/25/20 (!) 150/88       Physical Exam  Vitals signs reviewed. Constitutional:       General: He is not in acute distress. Appearance: He is well-developed.    HENT:      Head: Normocephalic and atraumatic. Cardiovascular:      Rate and Rhythm: Normal rate and regular rhythm. Heart sounds: Normal heart sounds. Pulmonary:      Effort: Pulmonary effort is normal. No respiratory distress. Breath sounds: Normal breath sounds. Musculoskeletal:      Right lower leg: No edema. Left lower leg: No edema. Skin:     General: Skin is warm and dry. Coloration: Skin is pale. Neurological:      Mental Status: He is alert. Psychiatric:         Mood and Affect: Mood normal.         Behavior: Behavior normal.         Thought Content: Thought content normal.         Judgment: Judgment normal.             Assessment/Plan:  1. Coronary artery disease involving native coronary artery of native heart with unstable angina pectoris ([address]  - following up with cardiology  - BP is above goal but given lightheadedness/syncope do not want to aggressively adjust medications  - continue carvedilol 6.25mg BID, hydralazine 25mg TID    2. 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 Anemia, unspecified type  - possibly related to renal disease, check iron studies (ordered by nephrologist), B12, folate  - Vitamin B12; Future  - Ferritin; Future  - Folate; Future    4. Diabetes mellitus type 2 in nonobese (HCC)  - stable, seeing endo, continue current medications    5.  Need for influenza vaccination  - INFLUENZA, QUADV, ADJUVANTED, 65 YRS =, IM, PF, PREFILL SYR, 0.5ML (FLUAD)        Medical Decision Making: moderate complexity

## 2020-09-09 ENCOUNTER — OFFICE VISIT (OUTPATIENT)
Dept: CARDIOLOGY CLINIC | Age: 75
End: 2020-09-09
Payer: MEDICARE

## 2020-09-09 ENCOUNTER — CARE COORDINATION (OUTPATIENT)
Dept: CASE MANAGEMENT | Age: 75
End: 2020-09-09

## 2020-09-09 VITALS
HEART RATE: 82 BPM | BODY MASS INDEX: 28.64 KG/M2 | TEMPERATURE: 97.3 F | SYSTOLIC BLOOD PRESSURE: 140 MMHG | OXYGEN SATURATION: 94 % | DIASTOLIC BLOOD PRESSURE: 70 MMHG | WEIGHT: 199.6 LBS

## 2020-09-09 LAB
ESTIMATED AVERAGE GLUCOSE: 131.2 MG/DL
HBA1C MFR BLD: 6.2 %

## 2020-09-09 PROCEDURE — 3017F COLORECTAL CA SCREEN DOC REV: CPT | Performed by: NURSE PRACTITIONER

## 2020-09-09 PROCEDURE — G8427 DOCREV CUR MEDS BY ELIG CLIN: HCPCS | Performed by: NURSE PRACTITIONER

## 2020-09-09 PROCEDURE — 1111F DSCHRG MED/CURRENT MED MERGE: CPT | Performed by: NURSE PRACTITIONER

## 2020-09-09 PROCEDURE — 99214 OFFICE O/P EST MOD 30 MIN: CPT | Performed by: NURSE PRACTITIONER

## 2020-09-09 PROCEDURE — G8417 CALC BMI ABV UP PARAM F/U: HCPCS | Performed by: NURSE PRACTITIONER

## 2020-09-09 PROCEDURE — 4040F PNEUMOC VAC/ADMIN/RCVD: CPT | Performed by: NURSE PRACTITIONER

## 2020-09-09 PROCEDURE — 1036F TOBACCO NON-USER: CPT | Performed by: NURSE PRACTITIONER

## 2020-09-09 PROCEDURE — 1123F ACP DISCUSS/DSCN MKR DOCD: CPT | Performed by: NURSE PRACTITIONER

## 2020-09-09 NOTE — PROGRESS NOTES
CC/HPI:    77 y.o. patient of Dr Portillo Bunn with hx  CAD/CABG/HTN/HLD/orthostatic hypotension and syncope. He was recently hospitalized with syncope and orthostatic hypotension. Chest pain appeared musculoskeletal and stress test was abnormal in the region of known CAD. Pt had ARF so the decision was made to avoid coronary angiogram.     States his left chest and arm pain/ache is better. Denies SOB, palpitations or syncope. He has orthostatic LH/dizziness. No LE edema, orthopnea or PND. No fever, chills, cough or GI/ bleeding. He has LE neuropathy. He sees Dr Lawson Napier for ILD (has chronic crackles)  He sees Dr Sonia Cardenas for CKD    Past Medical History:   Diagnosis Date    Allergic rhinitis due to other allergen 2010    Coronary atherosclerosis of unspecified type of vessel, native or graft 2010    Diabetic [facility] exam ([facility] 2015    Diabetic eye exam ([facility] 2016    [place] [facility]    Generalized osteoarthrosis, involving multiple sites 2010    Other psoriasis 2010    Pneumonia     Psoriatic arthropathy ([facility] 2010    Type II or unspecified type diabetes mellitus without mention of complication, not stated as uncontrolled 2010    Unspecified essential hypertension 2010    Unspecified sleep apnea 2010     Past Surgical History:   Procedure Laterality Date    CARPAL TUNNEL RELEASE      s/p    CATARACT REMOVAL      CORONARY ARTERY BYPASS GRAFT      JOINT REPLACEMENT       Family History   Problem Relation Age of Onset    Diabetes Mother     Heart Failure Mother     Coronary Art Dis Father      Social History     Tobacco Use    Smoking status: Former Smoker     Packs/day: 1.00     Years: 18.00     Pack years: 18.00     Start date: 1962     Last attempt to quit: 1980     Years since quittin.8    Smokeless tobacco: Never Used   Substance Use Topics    Alcohol use: No    Drug use: No     Allergies:Clindamycin; Clindamycin/lincomycin;  Penicillins; and 22 2020     Mag:   Lab Results   Component Value Date    MG 2.10 2017     LIVER PROFILE:   Lab Results   Component Value Date    ALT 40 2019    AST 32 2019    ALKPHOS 83 2019    BILITOT 0.4 2019     PT/INR:   Lab Results   Component Value Date    INR 0.89 2017    INR 0.91 2017    INR 0.96 2017    PROTIME 10.1 2017    PROTIME 10.4 2017    PROTIME 10.9 2017     Pro-BNP   Lab Results   Component Value Date    PROBNP 451 2020    PROBNP 4,716 2017     LIPIDS:  No components found for: CHLPL  Lab Results   Component Value Date    TRIG 265 (H) 2019    TRIG 205 (H) 2018    TRIG 198 (H) 2017     Lab Results   Component Value Date    HDL 39 (L) 2019    HDL 47 2018    HDL 46 2017     Lab Results   Component Value Date    LDLCALC 14 2019    LDLCALC 58 2018    LDLCALC 44 2017     Lab Results   Component Value Date    LABVLDL 53 2019    LABVLDL 41 2018    LABVLDL 40 2017     TSH:  Lab Results   Component Value Date    TSH 5.20 (H) 2018       IMAGIN/2020 Nuc Stress:  Overall findings represent a high risk scan.     Normal LV size and systolic function.     There is a medium to large size reversible defect involving the apex, apical     anterior, mid anterior and apical lateral cardiac segments.     Finding are concerning for ischemia.     ECG: Non-diagnostic EKG response due to failure to reach target heart rate. 2020 ECG: Sinus 1st AVB, no acute ischemic changes. 8/3/2020 Echo:   Summary   Left ventricular cavity size is normal. There is mild concentric left   ventricular hypertrophy. Overall left ventricular systolic function appears   low normal with an ejection fraction of 50%. No regional wall motion   abnormalities are noted. Normal diastolic function. Mild mitral and tricuspid regurgitation are present.      Nuc stress:     Fixed apical perfusion defect may relate to previous infarct or    apical thinning.         No reversible ischemia. 2018 Echo:   Technically difficult examination. Normal left ventricle size. There is left ventricular hypertrophy with more   prominent hypertrophy of the basal septum. Overall left ventricular systolic   function appears mildly reduced with an ejection fraction of 45-50%. The   inferoseptum appears mildly hypokinetic. Diastolic function probably normal.   Mitral annular calcification is present. Mild mitral regurgitation is present. The aortic valve appears tricuspid. Aortic valve leaflets appear thickened   but open adequately. Mild tricuspid regurgitation. Estimated pulmonary artery systolic pressure   is elevated at 33 mmHg assuming a right atrial pressure of 8 mmHg. Mild pulmonic regurgitation present.  Cath: Angiographic Findings:  Right dominant system  Left Main:  Diffuse, mild disease.  ~20-30% diffuse disease. Left Anterior Descendin% proximal occlusion.  After the LIMA is injected, the LAD was seen to have a mid 80-90% stenosis just distal to a significant diagonal branch.  The diagonal bifurcates; one branch has a severe (~80-90%) stenosis.  The other limb also has diffuse disease.  There was a 90% distal LAD stenosis. Circumflex:  Proximal 70% stenosis at bifurcation of an OM and the AV LCx.  The OM is subtotally occluded.  The AV LCx is diffusely diseased. Right Coronary:  Proximal 30% stenosis.  Mid 60-70% stenosis.  Right posterolateral is occluded.  Right PDA has an ostial/proximal 90% stenosis.  It is diffusely diseased after the ostial lesion. Bypass Grafts:  LIMA-LAD with radial grafts to an OM and right PDA that is widely patent.   Left Ventriculogram:  Not performed due to elevated Cr  Hemodynamics (mm Hg):  Left Ventricular Pressure:  131/0, 14  Central Aortic Pressure:  133/66 (93)    Medications:   Current Outpatient Medications   Medication Sig Dispense Refill    Blood Glucose Monitoring Suppl (ACCU-CHEK JOHNATHAN PLUS) w/Device KIT As needed 1 kit 2    blood glucose test strips (ACCU-CHEK JOHNATHAN PLUS) strip 1 each by In Vitro route 2 times daily As needed. 100 each 3    atorvastatin (LIPITOR) 80 MG tablet Take 1 tablet by mouth nightly 30 tablet 3    hydrALAZINE (APRESOLINE) 25 MG tablet Take 1 tablet by mouth every 8 hours 90 tablet 3    carvedilol (COREG) 6.25 MG tablet Take 1 tablet by mouth 2 times daily (with meals) 60 tablet 3    lidocaine 4 % external patch Place 1 patch onto the skin daily 1 box 1    clopidogrel (PLAVIX) 75 MG tablet Take 1 tablet by mouth daily 30 tablet 3    Insulin Pen Needle (THEMAOGER PEN NEEDLES 31G) 31G X 8 MM MISC USE WITH INSULIN FOUR TIMES A  each 2    cyanocobalamin 1000 MCG tablet Take 1,000 mcg by mouth daily      insulin glargine (LANTUS SOLOSTAR) 100 UNIT/ML injection pen INJECT 24 UNITS UNDER THE SKIN EVERY NIGHT AT BEDTIME 15 mL 3    insulin lispro, 1 Unit Dial, 100 UNIT/ML SOPN INJECT 12-18 UNITS UNDER THE SKIN THREE TIMES DAILY BEFORE MEALS 30 mL 3    ACCU-CHEK JOHNATHAN PLUS strip USE TO TEST FOUR TIMES DAILY AS NEEDED 100 strip 4    Continuous Blood Gluc  (FREESTYLE MAGGIE 14 DAY READER) RUMA As needed 1 Device 3    Continuous Blood Gluc Sensor (FREESTYLE MAGGIE 14 DAY SENSOR) MISC Every 2 weeks 2 each 3    levothyroxine (SYNTHROID) 50 MCG tablet TAKE ONE TABLET BY MOUTH DAILY 90 tablet 9    Multiple Vitamins-Minerals (THERAPEUTIC MULTIVITAMIN-MINERALS) tablet Take 1 tablet by mouth daily      Handicap Placard MISC by Does not apply route Duration: 5 years 1 each 0    ACCU-CHEK SOFTCLIX LANCETS MISC USE FOUR TIMES A  each 3    glucose blood VI test strips (ACCU-CHEK JOHNATHAN) strip 4 times a day As needed. 150 each 3    Blood Glucose Monitoring Suppl (ACCU-CHEK JOHNATHAN) RUMA Use as needed 1 Device 0    aspirin 81 MG chewable tablet Take 1 tablet by mouth daily.  30 tablet 5    Lancets MISC 2 times daily. Lancets for a [place] meter. 50 each 11     No current facility-administered medications for this visit. Assessment:  1. Syncope, unspecified syncope type    2. Chest pain, unspecified type    3. Essential hypertension    4. Orthostatic hypotension    5. Mixed hyperlipidemia    6. CAD in native artery    7. S/P CABG x 4        Plan:  Syncope   /80 but has orthostatic LH/dizziness   Cont current medications   Chest pain    Likely musculoskeletal and improving   Stress test abnormal but in region on known CAD.   CAD/CABG   ASA, statin   Coreg   No ACE d/t CKD and hyperK  HTN   /80   Cont coreg and hydralazine   Has orthostatic hypotension and hx syncope.    HLD   LDL 14   Cont lipitor     Follow up with Dr Kim Nettles for CKD management

## 2020-09-14 ENCOUNTER — TELEPHONE (OUTPATIENT)
Dept: CARDIOLOGY CLINIC | Age: 75
End: 2020-09-14

## 2020-09-14 NOTE — TELEPHONE ENCOUNTER
Nurse Braxton Noon called in stated pt has been having low BP. Pt BP for the last 3 days  Friday 11th 92/57 Saturday 12th 136/70 and Sunday 141/78. Braxton Carson can be reached at [phone] ext [phone] to address this matter.  Thanks

## 2020-09-14 NOTE — TELEPHONE ENCOUNTER
Spoke with pt. His BP's in the mornings have been 90's/50's. This is before he takes his morning meds (Coreg and hydralazine). When BP is low, he feels fatigued and lack of energy. Denies dizziness/LHD. He then takes his morning meds with breakfast and by noon, his BP has been actually higher (-145). Later in the day, -150. He takes Coreg 6.25 mg BID (8 am, 5 pm) and hydralazine 25 mg q 8 hrs (8 am, 2 pm, 9 pm). He has also recently resumed taking lasix 40 mg for LE edema. Denies SOB and orthopnea. He states that in the past, Dr. Monica Dumont has managed this and told him to resume it if he develops edema. Advised that he call Dr. Gasca Aver office to let him know he has started taking the lasix. Anything to be done about the BP?

## 2020-09-15 ENCOUNTER — CARE COORDINATION (OUTPATIENT)
Dept: CASE MANAGEMENT | Age: 75
End: 2020-09-15

## 2020-09-15 NOTE — CARE COORDINATION
[facility] Transitions Follow Up Call    9/15/2020    Patient: Candis Meyer  Patient : 1945   MRN: [id number]   Reason for Admission: Syncope, MI, DM, CHF  Discharge Date: 20 RARS: Readmission Risk Score: 21         Spoke with: [id number] [facility],[address] Transitions Subsequent and Final Call    Schedule Follow Up Appointment with PCP:  Declined  Subsequent and Final Calls  Do you have any ongoing symptoms?:  No  Have your medications changed?:  No  Do you have any questions related to your medications?:  No  Do you currently have any active services?:  No  Do you have any needs or concerns that I can assist you with?:  No  Identified Barriers:  None  Care Transitions Interventions  No Identified Needs  Other Interventions:          Summary  CTN spoke with patient this afternoon for follow up CTN call. Patient states he is doing well, reports no complaints of any nausea, vomiting, fevers, chills, SOB or Cough. No difficulty with urination, BM's or appetite. CTN encouraged patient to continue to monitor himself for any of the above s/s, reporting any that may present to MD immediately, rest as needed, and take all medications as prescribed. No new or changed medications, no other issues or concerns. CTN provided education on s/s that require medical attention and when to seek medical attention. CTN advised Pt of use of urgent care or physicians 24 hr access line if assistance is needed after hours or weekend. Pt denies any needs or concerns and is agreeable with additional calls.     Follow Up  Future Appointments   Date Time Provider Svitlana Burrows   2020 11:45 AM Belle Clark MD [facility]   10/14/2020  9:00 AM Lu Gee MD [facility] P/CC [facility]   2020 10:20 AM MD AMARJIT Botello 111 IM [facility]   2020  8:50 AM MD Vin Christy [facility]   2021  8:30 AM MD James Hernadez RN

## 2020-09-21 ENCOUNTER — CARE COORDINATION (OUTPATIENT)
Dept: CASE MANAGEMENT | Age: 75
End: 2020-09-21

## 2020-09-21 DIAGNOSIS — N18.4 CKD (CHRONIC KIDNEY DISEASE) STAGE 4, GFR 15-29 ML/MIN (HCC): ICD-10-CM

## 2020-09-21 LAB
ALBUMIN SERPL-MCNC: 3.6 G/DL (ref 3.4–5)
ANION GAP SERPL CALCULATED.3IONS-SCNC: 13 MMOL/L (ref 3–16)
BASOPHILS ABSOLUTE: 0.1 K/UL (ref 0–0.2)
BASOPHILS RELATIVE PERCENT: 1 %
BUN BLDV-MCNC: 97 MG/DL (ref 7–20)
CALCIUM SERPL-MCNC: 9 MG/DL (ref 8.3–10.6)
CHLORIDE BLD-SCNC: 111 MMOL/L (ref 99–110)
CO2: 19 MMOL/L (ref 21–32)
CREAT SERPL-MCNC: 5.5 MG/DL (ref 0.8–1.3)
CREATININE URINE: 77.8 MG/DL (ref 39–259)
EOSINOPHILS ABSOLUTE: 0.6 K/UL (ref 0–0.6)
EOSINOPHILS RELATIVE PERCENT: 10 %
GFR AFRICAN AMERICAN: 12
GFR NON-AFRICAN AMERICAN: 10
GLUCOSE BLD-MCNC: 173 MG/DL (ref 70–99)
HCT VFR BLD CALC: 28.5 % (ref 40.5–52.5)
HEMOGLOBIN: 9.4 G/DL (ref 13.5–17.5)
IRON SATURATION: 31 % (ref 20–50)
IRON: 89 UG/DL (ref 59–158)
LYMPHOCYTES ABSOLUTE: 0.8 K/UL (ref 1–5.1)
LYMPHOCYTES RELATIVE PERCENT: 14.8 %
MCH RBC QN AUTO: 33 PG (ref 26–34)
MCHC RBC AUTO-ENTMCNC: 33 G/DL (ref 31–36)
MCV RBC AUTO: 100 FL (ref 80–100)
MICROALBUMIN UR-MCNC: 181.8 MG/DL
MICROALBUMIN/CREAT UR-RTO: 2336.8 MG/G (ref 0–30)
MONOCYTES ABSOLUTE: 0.7 K/UL (ref 0–1.3)
MONOCYTES RELATIVE PERCENT: 11.8 %
NEUTROPHILS ABSOLUTE: 3.6 K/UL (ref 1.7–7.7)
NEUTROPHILS RELATIVE PERCENT: 62.4 %
PDW BLD-RTO: 14.1 % (ref 12.4–15.4)
PHOSPHORUS: 5.7 MG/DL (ref 2.5–4.9)
PLATELET # BLD: 197 K/UL (ref 135–450)
PMV BLD AUTO: 8.7 FL (ref 5–10.5)
POTASSIUM SERPL-SCNC: 5.4 MMOL/L (ref 3.5–5.1)
RBC # BLD: 2.85 M/UL (ref 4.2–5.9)
SODIUM BLD-SCNC: 143 MMOL/L (ref 136–145)
TOTAL IRON BINDING CAPACITY: 286 UG/DL (ref 260–445)
WBC # BLD: 5.7 K/UL (ref 4–11)

## 2020-09-21 NOTE — CARE COORDINATION
[facility] Transitions Follow Up Call    2020    Patient: Burgess Marcus  Patient : 1945   MRN: [id number]   Reason for Admission: CHF, MI, Syncope, DM  Discharge Date: 20 RARS: Readmission Risk Score: 21         Spoke with: [id number] [facility] Transitions Subsequent and Final Call    Schedule Follow Up Appointment with PCP:  Declined  Subsequent and Final Calls  Do you have any ongoing symptoms?:  Yes  Onset of Patient-reported symptoms:  Today  Patient-reported symptoms:  Other  Have your medications changed?:  No  Do you have any questions related to your medications?:  No  Do you currently have any active services?:  No  Do you have any needs or concerns that I can assist you with?:  No  Identified Barriers:  None  Care Transitions Interventions  No Identified Needs  Other Interventions:          Summary  CTN spoke with patient this afternoon for follow up CTN call. Patient states he is doing okay, has some swelling in Bilateral LE's. CTN encouraged patient to elevate legs on pillows, above heart level, while sitting or lying. Patient states he normally weights himself daily, did not get weight this am, reports he had to get up early to have labs drawn. Patient states weight yesterday was 193 lbs, no complaints of any nausea, vomiting, fevers, chills, SOB or Cough. No difficulty with urination, BM's or appetite. Patient instructed to weigh himself daily, reporting a 3 lb weight gain overnight, 5 lb in a week, to MD immediately. No new or changed medications, no other issues or concerns at this time. CTN provided education on s/s that require medical attention and when to seek medical attention. CTN advised Pt of use of urgent care or physicians 24 hr access line if assistance is needed after hours or weekend. Pt denies any needs or concerns and is agreeable with additional calls.     Follow Up  Future Appointments   Date Time Provider Svitlana Burrows   2020 11:45 AM Enoch Lacy MD [facility] Nephrolo   10/14/2020  9:00 AM Ortiz Altman MD [facility] P/CC [facility]   11/23/2020 10:20 AM Wayne Garcia MD [facility] 111 IM [facility]   12/1/2020  8:50 AM MD Michael Perea Endo [facility]   [facility]2021  [facility]30 [facility]       Hansel Lopez RN

## 2020-09-24 ENCOUNTER — CARE COORDINATION (OUTPATIENT)
Dept: CASE MANAGEMENT | Age: 75
End: 2020-09-24

## 2020-09-24 NOTE — CARE COORDINATION
[facility] 45 Transitions Follow Up Call    2020    Patient: Livia Victoria  Patient : 1945   MRN: [id number]   Reason for Admission: Syncope and Collapse, MI, DM, CHF  Discharge Date: 20 RARS: Readmission Risk Score: 21         Spoke with: [id number] [facility],[address] Transitions Subsequent and Final Call    Schedule Follow Up Appointment with PCP:  Declined  Subsequent and Final Calls  Do you have any ongoing symptoms?:  No  Have your medications changed?:  No  Do you have any questions related to your medications?:  No  Do you currently have any active services?:  No  Do you have any needs or concerns that I can assist you with?:  No  Identified Barriers:  None  Care Transitions Interventions  No Identified Needs  Other Interventions:          Summary  CTN spoke with patient this am for final follow up CTN call. Patient states he has not had anymore syncope episodes, no lightheadedness, or dizziness. Patient denies any nausea, vomiting, fevers, chills, SOB or Cough. No difficulty with urination, BM's or appetite, states he is taking all medications as prescribed. CTN encouraged patient to continue to monitor himself for any of the above s/s, reporting any that may present to MD immediately, rest as needed, continue to weigh himself daily, reporting any 3 lb weight gain in a week, or a 5 lb weight gain overnight. No new or changed medications, no other issues or concerns at this time. Patient has follow up appointment with MD today. CTN provided education on s/s that require medical attention and when to seek medical attention. CTN advised Pt of use of urgent care or physicians 24 hr access line if assistance is needed after hours or weekend. Pt denies any needs or concerns and is agreeable with additional calls.     Follow Up  Future Appointments   Date Time Provider Svitlana Burrows   2020 11:45 AM Dany Santiago MD [facility] Nephrolo   10/14/2020  9:00 AM Jono Lopez MD [facility] P/CC [facility]   11/23/2020 10:20 AM MD AMARJIT Benitez 111 IM [facility]   12/1/2020  8:50 AM MD Hamlet Granados [facility]   2/1/2021  8:30 AM MD James Jeong [facility]       Nakul Saucedo RN

## 2020-09-30 ENCOUNTER — INITIAL CONSULT (OUTPATIENT)
Dept: BARIATRICS/WEIGHT MGMT | Age: 75
End: 2020-09-30
Payer: MEDICARE

## 2020-09-30 VITALS
DIASTOLIC BLOOD PRESSURE: 64 MMHG | WEIGHT: 200.4 LBS | TEMPERATURE: 97.2 F | SYSTOLIC BLOOD PRESSURE: 117 MMHG | BODY MASS INDEX: 28.75 KG/M2 | HEART RATE: 78 BPM

## 2020-09-30 PROCEDURE — 3017F COLORECTAL CA SCREEN DOC REV: CPT | Performed by: SURGERY

## 2020-09-30 PROCEDURE — G8417 CALC BMI ABV UP PARAM F/U: HCPCS | Performed by: SURGERY

## 2020-09-30 PROCEDURE — 4040F PNEUMOC VAC/ADMIN/RCVD: CPT | Performed by: SURGERY

## 2020-09-30 PROCEDURE — 1123F ACP DISCUSS/DSCN MKR DOCD: CPT | Performed by: SURGERY

## 2020-09-30 PROCEDURE — 1036F TOBACCO NON-USER: CPT | Performed by: SURGERY

## 2020-09-30 PROCEDURE — 99204 OFFICE O/P NEW MOD 45 MIN: CPT | Performed by: SURGERY

## 2020-09-30 PROCEDURE — G8427 DOCREV CUR MEDS BY ELIG CLIN: HCPCS | Performed by: SURGERY

## 2020-09-30 NOTE — Clinical Note
Had abnormal stress test in August  Will need clearance from Dr. Micheline Méndez and off blood thinners for 7 days before PD catheter can be placed late October    Thanks,    Trish Ferrera

## 2020-09-30 NOTE — PROGRESS NOTES
[facility]) Physicians   General & Laparoscopic Surgery  [employer]       HPI:  Elysia Guan is a very pleasant 77 y.o. male who is referred by Dr. Chanel Quick nephrologist for consultation with regards PD catheter placement    Patient has CKD stage 5 requiring dialysis and prefers PD over HD          Past Medical History:   Diagnosis Date    Allergic rhinitis due to other allergen 2010    Coronary atherosclerosis of unspecified type of vessel, native or graft 2010    Diabetic eye exam ([facility] 2015    Diabetic eye exam ([address] 2016    [place] [facility]    Generalized osteoarthrosis, involving multiple sites 2010    Other psoriasis 2010    Pneumonia     Psoriatic arthropathy ([facility] 2010    Type II or unspecified type diabetes mellitus without mention of complication, not stated as uncontrolled 2010    Unspecified essential hypertension 2010    Unspecified sleep apnea 2010     Past Surgical History:   Procedure Laterality Date    CARPAL TUNNEL RELEASE      s/p    CATARACT REMOVAL      CORONARY ARTERY BYPASS GRAFT      JOINT REPLACEMENT       Family History   Problem Relation Age of Onset    Diabetes Mother     Heart Failure Mother     Coronary Art Dis Father      Social History     Tobacco Use    Smoking status: Former Smoker     Packs/day: 1.00     Years: 18.00     Pack years: 18.00     Start date: 1962     Last attempt to quit: 1980     Years since quittin.9    Smokeless tobacco: Never Used   Substance Use Topics    Alcohol use: No     I counseled the patient on the importance of not smoking and risks of ETOH. Allergies   Allergen Reactions    Clindamycin     Clindamycin/Lincomycin     Penicillins     Tazorac [Tazarotene]      Cream - rash     Vitals:    20 0828   BP: 117/64   Pulse: 78   Temp: 97.2 °F (36.2 °C)   Weight: 200 lb 6.4 oz (90.9 kg)       Body mass index is 28.75 kg/m².       Current Outpatient Medications: Multiple Vitamins-Minerals (THERAPEUTIC MULTIVITAMIN-MINERALS) tablet, Take 1 tablet by mouth daily, Disp: , Rfl:     Handicap Placard MISC, by Does not apply route Duration: 5 years, Disp: 1 each, Rfl: 0    ACCU-CHEK SOFTCLIX LANCETS MISC, USE FOUR TIMES A DAY, Disp: 200 each, Rfl: 3    glucose blood VI test strips (ACCU-CHEK JOHNATHAN) strip, 4 times a day As needed. , Disp: 150 each, Rfl: 3    Blood Glucose Monitoring Suppl (ACCU-CHEK JOHNATHAN) RUMA, Use as needed, Disp: 1 Device, Rfl: 0    aspirin 81 MG chewable tablet, Take 1 tablet by mouth daily. , Disp: 30 tablet, Rfl: 5    Lancets MISC, 2 times daily. Lancets for a [place] meter. , Disp: 50 each, Rfl: 11    ROS  Review of Systems - History obtained from the patient  General ROS: negative  Psychological ROS: negative  Ophthalmic ROS: negative  Neurological ROS: negative  ENT ROS: negative  Allergy and Immunology ROS: negative  Hematological and Lymphatic ROS: negative  Endocrine ROS: negative  Respiratory ROS: negative  Cardiovascular ROS: negative  Gastrointestinal ROS:negative  Genito-Urinary ROS: negative  Musculoskeletal ROS: negative   Skin ROS:negative        Physical Exam   Constitutional: Patient is oriented to person, place, and time. Vital signs are normal. Patient  appears well-developed and well-nourished. Patient  is active and cooperative. Non-toxic appearance. No distress. HENT:   Head: Normocephalic and atraumatic. Head is without laceration. Right Ear: External ear normal. No lacerations. No drainage, swelling or tenderness. Left Ear: External ear normal. No lacerations. No drainage, swelling or tenderness. Nose: Nose normal. No nose lacerations or nasal deformity. Mouth/Throat: Uvula is midline, oropharynx is clear and moist and mucous membranes are normal. No oropharyngeal exudate. Eyes: Conjunctivae, EOM and lids are normal. Pupils are equal, round, and reactive to light. Right eye exhibits no discharge.  No foreign body present in the right eye. Left eye exhibits no discharge. No foreign body present in the left eye. No scleral icterus. Neck: Trachea normal and normal range of motion. Neck supple. No JVD present. No tracheal tenderness present. Carotid bruit is not present. No rigidity. No tracheal deviation and no edema present. No thyromegaly present. Cardiovascular: Normal rate, regular rhythm, normal heart sounds, intact distal pulses and normal pulses. Pulmonary/Chest: Effort normal and breath sounds normal. No stridor. No respiratory distress. Patient  has no wheezes. Patient has no rales. Patient exhibits no tenderness and no crepitus. Abdominal: Soft. Normal appearance and bowel sounds are normal. Patient exhibits no distension, no abdominal bruit, no ascites and no mass. There is no hepatosplenomegaly. There is no tenderness. There is no rigidity, no rebound, no guarding and no CVA tenderness. No hernia. Hernia confirmed negative in the ventral area. Musculoskeletal: Normal range of motion. Patient exhibits no edema or tenderness. Lymphadenopathy:        Head (right side): No submental, no submandibular, no preauricular, no posterior auricular and no occipital adenopathy present. Head (left side): No submental, no submandibular, no preauricular, no posterior auricular and no occipital adenopathy present. Patient  has no cervical adenopathy. Right: No supraclavicular adenopathy present. Left: No supraclavicular adenopathy present. Neurological: Patient is alert and oriented to person, place, and time. Patient has normal strength. Coordination and gait normal. GCS eye subscore is 4. GCS verbal subscore is 5. GCS motor subscore is 6. Skin: Skin is warm and dry. No abrasion and no rash noted. Patient  is not diaphoretic. No cyanosis or erythema. Psychiatric: Patient has a normal mood and affect.   speech is normal and behavior is normal. Cognition and memory are normal. Radiology:  I have personally reviewed the radiological studies and discussed it with the patient. A/P:  Michael Friday is a very pleasant 77 y.o. male with symptomatic CKD stage 5 requiring dialysis    Patient prefers Lap PD cath placement    Risks and benefits explained and informed consent obtained. Risks include but not limited to; The possibilities of reaction to medication, pain, infection, bleeding, heart attack, death, injury to internal organs, seroma, chronic pain, nerve injury, open wound , scarring or need for further surgery. 1- Pre-op work up ordered. 2- Laparoscopic PD catheter placement  3- F/U with PCP for pre-op physical and Medical and Cardiac Clearance.    4- Will need to be off blood thinners for surgery for at least one week,  please discuss with your cardiologist.      Alex Davenport

## 2020-10-14 ENCOUNTER — OFFICE VISIT (OUTPATIENT)
Dept: PULMONOLOGY | Age: 75
End: 2020-10-14
Payer: MEDICARE

## 2020-10-14 VITALS — TEMPERATURE: 97.1 F

## 2020-10-14 PROCEDURE — 1036F TOBACCO NON-USER: CPT | Performed by: INTERNAL MEDICINE

## 2020-10-14 PROCEDURE — 99214 OFFICE O/P EST MOD 30 MIN: CPT | Performed by: INTERNAL MEDICINE

## 2020-10-14 PROCEDURE — G8427 DOCREV CUR MEDS BY ELIG CLIN: HCPCS | Performed by: INTERNAL MEDICINE

## 2020-10-14 PROCEDURE — G8417 CALC BMI ABV UP PARAM F/U: HCPCS | Performed by: INTERNAL MEDICINE

## 2020-10-14 PROCEDURE — 4040F PNEUMOC VAC/ADMIN/RCVD: CPT | Performed by: INTERNAL MEDICINE

## 2020-10-14 PROCEDURE — G8484 FLU IMMUNIZE NO ADMIN: HCPCS | Performed by: INTERNAL MEDICINE

## 2020-10-14 PROCEDURE — 3017F COLORECTAL CA SCREEN DOC REV: CPT | Performed by: INTERNAL MEDICINE

## 2020-10-14 PROCEDURE — 1123F ACP DISCUSS/DSCN MKR DOCD: CPT | Performed by: INTERNAL MEDICINE

## 2020-10-14 NOTE — PROGRESS NOTES
[facility]    Outpatient Follow Up Note    Subjective:   CHIEF COMPLAINT / HPI:     The patient is 77 y.o. male who is here for 6-month follow-up of ILD/BJ/and cough variant asthma. He was admitted to the hospital end of August for syncope. Etiology was thought to be orthostatic hypotension. He is getting a PD catheter placed for initiation of hemodialysis for ESRD. He had a overnight pulse oximetry study on room air back in March that showed no significant oxygen desaturation he does not qualify for oxygen at night. He has no significant dyspnea on exertion, cough, wheezing, or chest tightness. He still has some chest congestion but it is not significant enough to warrant Mucinex. He does report that he uses his respiratory inhalers only intermittently    3/11/2020  Malvina Gosselin presents today for follow up of ILD, BJ, cough variant asthma and fatigue. Main complaint is still of chest congestion. He did not try Mucinex after last visit . EDWARDS does not seem to be an issue. He is not using BiPAP as due to the drying effect of it. He denies any fevers, chills, anorexia, weight loss, cough, chest pain, or peripheral edema. He has not repeated his nocturnal pulse ox study yet stating no one had contacted him to deliver the equipment.  He continues Pulm rehab, now at [facility] since our phase 3 closed    He's had a pretty extensive evaluation end of 2017 including  CT chest, PFTs, CBC, CMP and above-mentioned stress test.  He does have sleep apnea but states he is compliant with BiPAP    Past Medical History:    Past Medical History:   Diagnosis Date    Allergic rhinitis due to other allergen 7/12/2010    Coronary atherosclerosis of unspecified type of vessel, native or graft 7/12/2010    Diabetic eye exam ([facility] [address] 04/29/2015    Diabetic eye exam ([facility] [address] 5/5/2016    [place] [facility]    Generalized osteoarthrosis, involving multiple sites 7/12/2010    Other psoriasis 7/12/2010    Pneumonia     Psoriatic arthropathy ([facility] [address] 2010    Type II or unspecified type diabetes mellitus without mention of complication, not stated as uncontrolled 2010    Unspecified essential hypertension 2010    Unspecified sleep apnea 2010       Social History:    Social History     Tobacco Use   Smoking Status Former Smoker    Packs/day: 1.00    Years: 18.00    Pack years: 18.00    Start date: 1962    Last attempt to quit: 1980    Years since quittin.9   Smokeless Tobacco Never Used       Current Medications:  Current Outpatient Medications on File Prior to Visit   Medication Sig Dispense Refill    Cholecalciferol (VITAMIN D) 50 MCG (2000) CAPS capsule Take 1 capsule by mouth daily      furosemide (LASIX) 20 MG tablet Take 1 tablet by mouth daily 30 tablet 5    carvedilol (COREG) 3.125 MG tablet Take 1 tablet by mouth 2 times daily 60 tablet 3    sodium zirconium cyclosilicate (LOKELMA) 10 g PACK oral suspension Take 10 g by mouth three times a week 15 packet 2    sodium bicarbonate 650 MG tablet Take 1 tablet by mouth 2 times daily 60 tablet 11    Blood Glucose Monitoring Suppl (ACCU-CHEK JOHNATHAN PLUS) w/Device KIT As needed 1 kit 2    blood glucose test strips (ACCU-CHEK JOHNATHAN PLUS) strip 1 each by In Vitro route 2 times daily As needed.  100 each 3    atorvastatin (LIPITOR) 80 MG tablet Take 1 tablet by mouth nightly 30 tablet 3    clopidogrel (PLAVIX) 75 MG tablet Take 1 tablet by mouth daily 30 tablet 3    Insulin Pen Needle ([employer] PEN NEEDLES 31G) 31G X 8 MM MISC USE WITH INSULIN FOUR TIMES A  each 2    cyanocobalamin 1000 MCG tablet Take 1,000 mcg by mouth daily      insulin glargine (LANTUS SOLOSTAR) 100 UNIT/ML injection pen INJECT 24 UNITS UNDER THE SKIN EVERY NIGHT AT BEDTIME 15 mL 3    insulin lispro, 1 Unit Dial, 100 UNIT/ML SOPN INJECT 12-18 UNITS UNDER THE SKIN THREE TIMES DAILY BEFORE MEALS 30 mL 3    ACCU-CHEK JOHNATHAN PLUS strip USE TO TEST FOUR TIMES DAILY AS NEEDED 100 strip 4    Continuous Blood Gluc  (FREESTYLE MAGGIE 14 DAY READER) RUMA As needed 1 Device 3    Continuous Blood Gluc Sensor (FREESTYLE MAGGIE 14 DAY SENSOR) MISC Every 2 weeks 2 each 3    levothyroxine (SYNTHROID) 50 MCG tablet TAKE ONE TABLET BY MOUTH DAILY 90 tablet 9    Multiple Vitamins-Minerals (THERAPEUTIC MULTIVITAMIN-MINERALS) tablet Take 1 tablet by mouth daily      Handicap Placard MISC by Does not apply route Duration: 5 years 1 each 0    ACCU-CHEK SOFTCLIX LANCETS MISC USE FOUR TIMES A  each 3    glucose blood VI test strips (ACCU-CHEK JOHNATHAN) strip 4 times a day As needed. 150 each 3    Blood Glucose Monitoring Suppl (ACCU-CHEK JOHNATHAN) RUMA Use as needed 1 Device 0    aspirin 81 MG chewable tablet Take 1 tablet by mouth daily. 30 tablet 5    Lancets MISC 2 times daily. Lancets for a Morris meter. 50 each 11    lidocaine 4 % external patch Place 1 patch onto the skin daily (Patient not taking: Reported on 10/14/2020) 1 box 1     No current facility-administered medications on file prior to visit.         REVIEW OF SYSTEMS:    CONSTITUTIONAL: Negative for fevers and chills  HEENT: Negative for oropharyngeal exudate, post nasal drip, sinus pain / pressure, nasal congestion, ear pain  RESPIRATORY:  See HPI  CARDIOVASCULAR: Negative for chest pain, palpitations, edema  GASTROINTESTINAL: Negative for nausea, vomiting, diarrhea, constipation and abdominal pain  HEMATOLOGICAL: Negative for adenopathy  SKIN: Negative for clubbing, cyanosis, skin lesions  EXTREMITIES: Negative for weakness, decreased ROM  NEUROLOGICAL: Negative for unilateral weakness, speech or gait abnormalities    Objective:   PHYSICAL EXAM:        VITALS:    Temp 97.1 °F (36.2 °C) (Infrared)   On RA  CONSTITUTIONAL:  Awake, alert, cooperative, no apparent distress, and appears stated age  HEENT: No oropharyngeal exudate, PERRL, no cervical adenopathy, no tracheal deviation, thyroid size normal  LUNGS:  No increased work of breathing. + crackles bilateral bases. No wheezing  CARDIOVASCULAR:  normal S1 and S2 and no JVD  ABDOMEN:  Normal bowel sounds, non-distended and non-tender to palpation  EXT: No edema, no calf tenderness. Pulses are present bilaterally. NEUROLOGIC:  Mental Status Exam:  Level of Alertness:   awake  Orientation:   person, place, time. SKIN:  normal skin color, texture, turgor, no redness, warmth, or swelling     DATA:    PFTs  Indication: Shortness of breath, weakness, non productive cough    Test comment:     Spirometry data is acceptable and reproducible.     Pulse ox is 91% on room air    Estimated body mass index is 28.3 kg/m² as calculated from the   following:    Height as of 11/21/17: 5' 10\" (1.778 m).    Weight as of 11/21/17: 197 lb 3.2 oz (89.4 kg). Spirometry data:    FEV1/FVC: 89. Predicted ratio 73    Pre-Bronchodilator FEV1 2.07L, which is 63% predicted    Post-Bronchodilator FEV1 2.02L, which is 61% predicted    There is -2% reversibility     FVC is 2.33L, which is 51% predicted    Lung Volumes:    TLC is 4.85L, which is 67% predicted    RV is 2.51L which is 99% predicted    Diffusion Capacity:    DLCO is 13.77 which is 42% predicted    Impression:    1. There is no obstruction present    2. There is no response to bronchodilator therapy         [Increase in FEV1 => 12% of control and => 200 ml]    3. There is moderate restriction     4. There is moderate reduction in diffusion capacity    Comment: Moderate restrictive disease with moderate decrease in   diffusion capacity consistent with the diagnosis of interstitial   lung disease.      Radiology Review:  Pertinent images / reports were reviewed as a part of this visit. CXR 8/2020  FINDINGS:         The heart size is within normal limits.         The lungs appear clear of acute parenchymal infiltrate.                        Impression         No acute disease.           CT Chest reveals the

## 2020-10-14 NOTE — PROGRESS NOTES
order may or may not be in effect during this procedure. I give my doctor permission to give me blood or blood products. I understand that there are risks with receiving blood such as hepatitis, AIDS, fever, or allergic reaction. I acknowledge that the risks, benefits, and alternatives of this treatment have been explained to me and that no express or implied warranty has been given by the hospital, any blood bank, or any person or entity as to the blood or blood components transfused. At the discretion of my doctor, I agree to allow observers, equipment/product representatives and allow photographing, and/or televising of the procedure, provided my name or identity is maintained confidentially. I agree the hospital may dispose of or use for scientific or educational purposes any tissue, fluid, or body parts which may be removed.     ________________________________Date________Time______ am/pm  ([employer])  Patient or Signature of Closest Relative or Legal Guardian    ________________________________Date________Time______am/pm      Page 1 of  1  Witness

## 2020-10-15 ENCOUNTER — HOSPITAL ENCOUNTER (EMERGENCY)
Age: 75
Discharge: HOME | End: 2020-10-15
Attending: EMERGENCY MEDICINE
Payer: MEDICARE

## 2020-10-15 ENCOUNTER — OFFICE VISIT (OUTPATIENT)
Dept: INTERNAL MEDICINE CLINIC | Age: 75
End: 2020-10-15
Payer: MEDICARE

## 2020-10-15 ENCOUNTER — APPOINTMENT (OUTPATIENT)
Dept: GENERAL RADIOLOGY | Age: 75
End: 2020-10-15
Payer: MEDICARE

## 2020-10-15 VITALS
HEIGHT: 70 IN | WEIGHT: 193.6 LBS | DIASTOLIC BLOOD PRESSURE: 70 MMHG | BODY MASS INDEX: 27.72 KG/M2 | SYSTOLIC BLOOD PRESSURE: 130 MMHG | TEMPERATURE: 97.5 F

## 2020-10-15 VITALS
TEMPERATURE: 97.4 F | OXYGEN SATURATION: 95 % | DIASTOLIC BLOOD PRESSURE: 71 MMHG | RESPIRATION RATE: 16 BRPM | SYSTOLIC BLOOD PRESSURE: 168 MMHG | HEART RATE: 72 BPM

## 2020-10-15 LAB
AMORPHOUS: NORMAL /HPF
ANION GAP SERPL CALCULATED.3IONS-SCNC: 14 MMOL/L (ref 3–16)
BASE EXCESS VENOUS: -0.1 MMOL/L (ref -2–3)
BASOPHILS ABSOLUTE: 0.1 K/UL (ref 0–0.2)
BASOPHILS RELATIVE PERCENT: 1 %
BILIRUBIN URINE: NEGATIVE
BLOOD, URINE: ABNORMAL
BUN BLDV-MCNC: 95 MG/DL (ref 7–20)
CALCIUM SERPL-MCNC: 8.9 MG/DL (ref 8.3–10.6)
CARBOXYHEMOGLOBIN: 1.5 % (ref 0–1.5)
CHLORIDE BLD-SCNC: 101 MMOL/L (ref 99–110)
CLARITY: CLEAR
CO2: 25 MMOL/L (ref 21–32)
COLOR: YELLOW
CREAT SERPL-MCNC: 4.9 MG/DL (ref 0.8–1.3)
EKG ATRIAL RATE: 65 BPM
EKG DIAGNOSIS: NORMAL
EKG P AXIS: -27 DEGREES
EKG P-R INTERVAL: 208 MS
EKG Q-T INTERVAL: 466 MS
EKG QRS DURATION: 96 MS
EKG QTC CALCULATION (BAZETT): 484 MS
EKG R AXIS: 18 DEGREES
EKG T AXIS: 83 DEGREES
EKG VENTRICULAR RATE: 65 BPM
EOSINOPHILS ABSOLUTE: 0.8 K/UL (ref 0–0.6)
EOSINOPHILS RELATIVE PERCENT: 8.4 %
EPITHELIAL CELLS, UA: NORMAL /HPF (ref 0–5)
GFR AFRICAN AMERICAN: 14
GFR NON-AFRICAN AMERICAN: 12
GLUCOSE BLD-MCNC: 204 MG/DL (ref 70–99)
GLUCOSE BLD-MCNC: 210 MG/DL (ref 70–99)
GLUCOSE URINE: 500 MG/DL
HCO3 VENOUS: 26.1 MMOL/L (ref 24–28)
HCT VFR BLD CALC: 29.9 % (ref 40.5–52.5)
HEMOGLOBIN, VEN, REDUCED: 52.3 %
HEMOGLOBIN: 10.1 G/DL (ref 13.5–17.5)
KETONES, URINE: NEGATIVE MG/DL
LEUKOCYTE ESTERASE, URINE: NEGATIVE
LYMPHOCYTES ABSOLUTE: 1.5 K/UL (ref 1–5.1)
LYMPHOCYTES RELATIVE PERCENT: 16.5 %
MCH RBC QN AUTO: 33 PG (ref 26–34)
MCHC RBC AUTO-ENTMCNC: 33.8 G/DL (ref 31–36)
MCV RBC AUTO: 97.6 FL (ref 80–100)
METHEMOGLOBIN VENOUS: 0.7 % (ref 0–1.5)
MICROSCOPIC EXAMINATION: YES
MONOCYTES ABSOLUTE: 0.6 K/UL (ref 0–1.3)
MONOCYTES RELATIVE PERCENT: 6.5 %
NEUTROPHILS ABSOLUTE: 6.2 K/UL (ref 1.7–7.7)
NEUTROPHILS RELATIVE PERCENT: 67.6 %
NITRITE, URINE: NEGATIVE
O2 SAT, VEN: 47 %
PCO2, VEN: 53.8 MMHG (ref 41–51)
PDW BLD-RTO: 12.5 % (ref 12.4–15.4)
PERFORMED ON: ABNORMAL
PH UA: 6.5 (ref 5–8)
PH VENOUS: 7.31 (ref 7.35–7.45)
PHOSPHORUS: 5.6 MG/DL (ref 2.5–4.9)
PLATELET # BLD: 186 K/UL (ref 135–450)
PMV BLD AUTO: 8.4 FL (ref 5–10.5)
PO2, VEN: 28.2 MMHG (ref 25–40)
POTASSIUM REFLEX MAGNESIUM: 4.4 MMOL/L (ref 3.5–5.1)
PROTEIN UA: >=300 MG/DL
RBC # BLD: 3.07 M/UL (ref 4.2–5.9)
RBC UA: NORMAL /HPF (ref 0–4)
SODIUM BLD-SCNC: 140 MMOL/L (ref 136–145)
SPECIFIC GRAVITY UA: 1.02 (ref 1–1.03)
TCO2 CALC VENOUS: 28 MMOL/L
TROPONIN: 0.04 NG/ML
TROPONIN: 0.05 NG/ML
URINE TYPE: ABNORMAL
UROBILINOGEN, URINE: 0.2 E.U./DL
WBC # BLD: 9.2 K/UL (ref 4–11)
WBC UA: NORMAL /HPF (ref 0–5)

## 2020-10-15 PROCEDURE — 71045 X-RAY EXAM CHEST 1 VIEW: CPT

## 2020-10-15 PROCEDURE — G8417 CALC BMI ABV UP PARAM F/U: HCPCS | Performed by: INTERNAL MEDICINE

## 2020-10-15 PROCEDURE — 85025 COMPLETE CBC W/AUTO DIFF WBC: CPT

## 2020-10-15 PROCEDURE — 84100 ASSAY OF PHOSPHORUS: CPT

## 2020-10-15 PROCEDURE — 80048 BASIC METABOLIC PNL TOTAL CA: CPT

## 2020-10-15 PROCEDURE — 93005 ELECTROCARDIOGRAM TRACING: CPT | Performed by: EMERGENCY MEDICINE

## 2020-10-15 PROCEDURE — 84484 ASSAY OF TROPONIN QUANT: CPT

## 2020-10-15 PROCEDURE — 1036F TOBACCO NON-USER: CPT | Performed by: INTERNAL MEDICINE

## 2020-10-15 PROCEDURE — 2022F DILAT RTA XM EVC RTNOPTHY: CPT | Performed by: INTERNAL MEDICINE

## 2020-10-15 PROCEDURE — 93000 ELECTROCARDIOGRAM COMPLETE: CPT | Performed by: INTERNAL MEDICINE

## 2020-10-15 PROCEDURE — 4040F PNEUMOC VAC/ADMIN/RCVD: CPT | Performed by: INTERNAL MEDICINE

## 2020-10-15 PROCEDURE — 81001 URINALYSIS AUTO W/SCOPE: CPT

## 2020-10-15 PROCEDURE — 1123F ACP DISCUSS/DSCN MKR DOCD: CPT | Performed by: INTERNAL MEDICINE

## 2020-10-15 PROCEDURE — 3044F HG A1C LEVEL LT 7.0%: CPT | Performed by: INTERNAL MEDICINE

## 2020-10-15 PROCEDURE — 99284 EMERGENCY DEPT VISIT MOD MDM: CPT

## 2020-10-15 PROCEDURE — G8484 FLU IMMUNIZE NO ADMIN: HCPCS | Performed by: INTERNAL MEDICINE

## 2020-10-15 PROCEDURE — 82803 BLOOD GASES ANY COMBINATION: CPT

## 2020-10-15 PROCEDURE — G8427 DOCREV CUR MEDS BY ELIG CLIN: HCPCS | Performed by: INTERNAL MEDICINE

## 2020-10-15 PROCEDURE — 3017F COLORECTAL CA SCREEN DOC REV: CPT | Performed by: INTERNAL MEDICINE

## 2020-10-15 PROCEDURE — 99214 OFFICE O/P EST MOD 30 MIN: CPT | Performed by: INTERNAL MEDICINE

## 2020-10-15 ASSESSMENT — ENCOUNTER SYMPTOMS
EYE REDNESS: 0
SHORTNESS OF BREATH: 0
EYE DISCHARGE: 0
DIARRHEA: 0
COUGH: 0
NAUSEA: 0
ABDOMINAL PAIN: 0
VOMITING: 0
BACK PAIN: 0
SORE THROAT: 0

## 2020-10-15 NOTE — PATIENT INSTRUCTIONS
Take the carvedilol the morning of surgery  Hold all other medications the morning of surgery  Your cardiologist will need to clear you to hold the blood thinners for a week prior to surgery

## 2020-10-15 NOTE — ED TRIAGE NOTES
Pt comes from MD office after pre-op appt. Per EMS, pt was \"starting to slump over as if he was going to pass out\". Pt diabetic, with BS per EMS in the 300's. Will re-check. EKG to be completed. VSS.

## 2020-10-15 NOTE — ED NOTES
Patient prepared for and ready to be discharged. Dressed in clothes and given belongings. IV removed, pt tolerated well, no complications. Patient discharged at this time in no acute distress after he verbalized understanding of discharge instructions. Reviewed medications, and when to return to the ED with patient. Encouraged follow up with PCP  Patient walked to lobby, Family to take patient home.        Allanesha Smith-Narcisse, RN  10/15/20 [id number]

## 2020-10-15 NOTE — ED PROVIDER NOTES
[address] [facility] RESIDENT NOTE       Date of evaluation: 10/15/2020    Chief Complaint     Dizziness    History of Present Illness     Petey Bell is a 77 y.o. male with PMHx of DM, CAD, and CKD nearly dialysis dependent who presents to the ED from primary care doctor's office for near syncopal episode just prior to arrival.  Patient states that he was standing at the doctor's office trying to schedule a COVID test in preparation for his dialysis graft placement when he started to suddenly feel like he was going to pass out. He was able to sit down and did not actually lose consciousness, but is still feeling generally weak. Blood sugar at the office was in the 300s. No sensation of room spinning, HA, or focal weakness. On review systems, he notes having a chronic intermittent cough that is not different than previous but otherwise denies fever, chills, abdominal pain, vomiting, diarrhea, urinary complaints. Review of Systems     Review of Systems   Constitutional: Positive for fatigue. Negative for chills. HENT: Negative for congestion and sore throat. Eyes: Negative for discharge and redness. Respiratory: Negative for cough and shortness of breath. Cardiovascular: Negative for chest pain and leg swelling. Gastrointestinal: Negative for abdominal pain, diarrhea, nausea and vomiting. Genitourinary: Negative for dysuria and hematuria. Musculoskeletal: Negative for back pain and neck pain. Neurological: Positive for dizziness. Negative for syncope and headaches. Psychiatric/Behavioral: Negative for self-injury and suicidal ideas.      Past Medical, Surgical, Family, and Social History     He has a past medical history of Allergic rhinitis due to other allergen, Coronary atherosclerosis of unspecified type of vessel, native or graft, Diabetic eye exam ([address] Diabetic eye exam ([address] Generalized osteoarthrosis, involving multiple sites, Other psoriasis, Pneumonia, Psoriatic arthropathy ([facility] [address] Type II or unspecified type diabetes mellitus without mention of complication, not stated as uncontrolled, Unspecified essential hypertension, and Unspecified sleep apnea. He has a past surgical history that includes Coronary artery bypass graft; Carpal tunnel release; joint replacement; and Cataract removal.  His family history includes Coronary Art Dis in his father; Diabetes in his mother; Heart Failure in his mother. He reports that he quit smoking about 39 years ago. He started smoking about 58 years ago. He has a 18.00 pack-year smoking history. He has never used smokeless tobacco. He reports that he does not drink alcohol or use drugs. Medications     Previous Medications    ACCU-CHEK JOHNATHAN PLUS STRIP    USE TO TEST FOUR TIMES DAILY AS NEEDED    ACCU-CHEK SOFTCLIX LANCETS MISC    USE FOUR TIMES A DAY    ASPIRIN 81 MG CHEWABLE TABLET    Take 1 tablet by mouth daily. ATORVASTATIN (LIPITOR) 80 MG TABLET    Take 1 tablet by mouth nightly    BLOOD GLUCOSE MONITORING SUPPL (ACCU-CHEK JOHNATHAN PLUS) W/DEVICE KIT    As needed    BLOOD GLUCOSE MONITORING SUPPL (ACCU-CHEK JOHNATHAN) RUMA    Use as needed    BLOOD GLUCOSE TEST STRIPS (ACCU-CHEK JOHNATHAN PLUS) STRIP    1 each by In Vitro route 2 times daily As needed. CARVEDILOL (COREG) 3.125 MG TABLET    Take 1 tablet by mouth 2 times daily    CHOLECALCIFEROL (VITAMIN D) 50 MCG (2000 UT) CAPS CAPSULE    Take 1 capsule by mouth daily    CLOPIDOGREL (PLAVIX) 75 MG TABLET    Take 1 tablet by mouth daily    CONTINUOUS BLOOD GLUC  (FREESTYLE MAGGIE 14 DAY READER) RUMA    As needed    CONTINUOUS BLOOD GLUC SENSOR (FREESTYLE MAGGIE 14 DAY SENSOR) MISC    Every 2 weeks    CYANOCOBALAMIN 1000 MCG TABLET    Take 1,000 mcg by mouth daily    FUROSEMIDE (LASIX) 20 MG TABLET    Take 1 tablet by mouth daily    GLUCOSE BLOOD VI TEST STRIPS (ACCU-CHEK JOHNATHAN) STRIP    4 times a day As needed.     HANDICAP PLACARD MISC    by Does not apply route Duration: 5 years    INSULIN GLARGINE (LANTUS SOLOSTAR) 100 UNIT/ML INJECTION PEN    INJECT 24 UNITS UNDER THE SKIN EVERY NIGHT AT BEDTIME    INSULIN LISPRO, 1 UNIT DIAL, 100 UNIT/ML SOPN    INJECT 12-18 UNITS UNDER THE SKIN THREE TIMES DAILY BEFORE MEALS    INSULIN PEN NEEDLE ([employer] PEN NEEDLES 31G) 31G X 8 MM MISC    USE WITH INSULIN FOUR TIMES A DAY    LANCETS MISC    2 times daily. Lancets for a Anna meter. LEVOTHYROXINE (SYNTHROID) 50 MCG TABLET    TAKE ONE TABLET BY MOUTH DAILY    LIDOCAINE 4 % EXTERNAL PATCH    Place 1 patch onto the skin daily    MULTIPLE VITAMINS-MINERALS (THERAPEUTIC MULTIVITAMIN-MINERALS) TABLET    Take 1 tablet by mouth daily    SODIUM BICARBONATE 650 MG TABLET    Take 1 tablet by mouth 2 times daily    SODIUM ZIRCONIUM CYCLOSILICATE (LOKELMA) 10 G PACK ORAL SUSPENSION    Take 10 g by mouth three times a week       Allergies     He is allergic to clindamycin; clindamycin/lincomycin; penicillins; and tazorac [tazarotene]. Physical Exam     INITIAL VITALS: BP: (!) 159/70, Temp: 97.4 °F (36.3 °C), Pulse: 63, Resp: 16, SpO2: 94 %   Physical Exam  Vitals signs and nursing note reviewed. Constitutional:       General: He is not in acute distress. Appearance: Normal appearance. He is not ill-appearing, toxic-appearing or diaphoretic. Comments: Appears generally fatigued but otherwise in no acute distress   HENT:      Head: Normocephalic and atraumatic. Mouth/Throat:      Mouth: Mucous membranes are moist.   Eyes:      General: No scleral icterus. Conjunctiva/sclera: Conjunctivae normal.   Neck:      Musculoskeletal: Neck supple. Cardiovascular:      Rate and Rhythm: Normal rate and regular rhythm. Pulses: Normal pulses. Pulmonary:      Effort: Pulmonary effort is normal. No respiratory distress. Breath sounds: Normal breath sounds. Abdominal:      General: There is no distension.    Musculoskeletal:         General: No >60    GFR  14 (A) >60    Calcium 8.9 8.3 - 10.6 mg/dL   Troponin   Result Value Ref Range    Troponin 0.05 (H) <0.01 ng/mL   Blood Gas, Venous   Result Value Ref Range    pH, Cj 7.307 (L) 7.350 - 7.450    pCO2, Cj 53.8 (H) 41.0 - 51.0 mmHg    pO2, Cj 28.2 25.0 - 40.0 mmHg    HCO3, Venous 26.1 24.0 - 28.0 mmol/L    Base Excess, Cj -0.1 -2.0 - 3.0 mmol/L    O2 Sat, Cj 47 Not established %    Carboxyhemoglobin 1.5 0.0 - 1.5 %    MetHgb, Cj 0.7 0.0 - 1.5 %    TC02 (Calc), Cj 28 mmol/L    Hemoglobin, Cj, Reduced 52.30 %   Phosphorus   Result Value Ref Range    Phosphorus 5.6 (H) 2.5 - 4.9 mg/dL   Urinalysis, reflex to microscopic   Result Value Ref Range    Color, UA Yellow Straw/Yellow    Clarity, UA Clear Clear    Glucose, Ur 500 (A) Negative mg/dL    Bilirubin Urine Negative Negative    Ketones, Urine Negative Negative mg/dL    Specific Gravity, UA 1.025 1.005 - 1.030    Blood, Urine TRACE-INTACT (A) Negative    pH, UA 6.5 5.0 - 8.0    Protein, UA >=300 (A) Negative mg/dL    Urobilinogen, Urine 0.2 <2.0 E.U./dL    Nitrite, Urine Negative Negative    Leukocyte Esterase, Urine Negative Negative    Microscopic Examination YES     Urine Type Other    POCT Glucose   Result Value Ref Range    POC Glucose 210 (H) 70 - 99 mg/dl    Performed on ACCU-CHEK        ED BEDSIDE ULTRASOUND:  None    RECENT VITALS:  BP: (!) 161/68, Temp: 97.4 °F (36.3 °C), Pulse: 63,Resp: 16, SpO2: 98 %     Procedures     None    ED Course     Nursing Notes, Past Medical Hx, Past Surgical Hx, Social Hx, Allergies, and Family Hx were reviewed. The patient was given the followingmedications:  No orders of the defined types were placed in this encounter.       CONSULTS:  [address] / FENG / Marko Madeleine is a 77 y.o. male with multiple comorbidities presenting with near syncopal episode while at his nephrologist's office just prior to arrival.  Just had admission for the same at the end of August and was felt to be from orthostatic hypotension secondary to autonomic dysfunction. Vital signs normal.  Exam without focal findings. EKG with first-degree AV block but otherwise unchanged compared to previous. CBC shows baseline anemia. BMP shows baseline BUN/creatinine. Troponin mildly elevated at 0.05, which is his baseline but will be repeated given timing of symptoms. Presenting that his repeat troponin is unchanged and his primary doctor is comfortable with plan, the patient will be discharged with PCP follow-up and strict return precautions which he is agreeable to. This patient was also evaluated by the attending physician. All care plans werediscussed and agreed upon. Clinical Impression     1. Near syncope      Disposition     PATIENT REFERRED TO:  Nicci Palacios MD  [id number] [id number] [facility] [id number] [phone]    Schedule an appointment as soon as possible for a visit         DISCHARGE MEDICATIONS:  New Prescriptions    No medications on file       DISPOSITION Decision To Discharge 10/15/2020 12:50:16 Pita Godinez MD  Resident  10/15/20 [id number]

## 2020-10-15 NOTE — ED PROVIDER NOTES
ED Attending Attestation Note     Date of evaluation: 10/15/2020    This patient was seen by the resident. I have seen and examined the patient, agree with the workup, evaluation, management and diagnosis. The care plan has been discussed. I have reviewed the ECG and concur with the resident's interpretation. My assessment reveals patient resting comfortably. Patient history of renal disease is being followed by nephrology. He went to doctor's office for preop testing in preparation I believe for dialysis who had a near syncopal event. Patient states he has had several of these with most recent 1 last month where he was admitted and worked up and diagnosed with orthostatic hypotension secondary to autonomic neuropathy with worsening kidney function. Respiratory effort is symmetrical without any distress. Sinus rhythm without any ST elevation.      Corinna Ren MD  10/15/20 [id number]

## 2020-10-15 NOTE — PROGRESS NOTES
Preoperative Consultation      Valerie Ridley  YOB: 1945    Date of Service:  10/15/2020    Vitals:    10/15/20 0858   BP: 130/70   Temp: 97.5 °F (36.4 °C)   TempSrc: Oral   Weight: 193 lb 9.6 oz (87.8 kg)   Height: 5' 10\" (1.778 m)      Wt Readings from Last 2 Encounters:   10/22/20 193 lb (87.5 kg)   10/15/20 193 lb 9.6 oz (87.8 kg)     BP Readings from Last 3 Encounters:   10/22/20 (!) 147/86   10/15/20 (!) 168/71   10/15/20 130/70        Chief Complaint   Patient presents with   [facility] Pre-op Exam     Lapatoscopic Peritoneal Dailysis catheter placement     Allergies   Allergen Reactions    Clindamycin     Clindamycin/Lincomycin     Penicillins     Tazorac [Tazarotene]      Cream - rash     Outpatient Medications Marked as Taking for the 10/15/20 encounter (Office Visit) with Say Harris MD   Medication Sig Dispense Refill    Cholecalciferol (VITAMIN D) 50 MCG (2000 UT) CAPS capsule Take 1 capsule by mouth daily      furosemide (LASIX) 20 MG tablet Take 1 tablet by mouth daily 30 tablet 5    carvedilol (COREG) 3.125 MG tablet Take 1 tablet by mouth 2 times daily 60 tablet 3    sodium zirconium cyclosilicate (LOKELMA) 10 g PACK oral suspension Take 10 g by mouth three times a week 15 packet 2    sodium bicarbonate 650 MG tablet Take 1 tablet by mouth 2 times daily 60 tablet 11    Blood Glucose Monitoring Suppl (ACCU-CHEK JOHNATHAN PLUS) w/Device KIT As needed 1 kit 2    blood glucose test strips (ACCU-CHEK JOHNATHAN PLUS) strip 1 each by In Vitro route 2 times daily As needed.  100 each 3    atorvastatin (LIPITOR) 80 MG tablet Take 1 tablet by mouth nightly 30 tablet 3    lidocaine 4 % external patch Place 1 patch onto the skin daily 1 box 1    clopidogrel (PLAVIX) 75 MG tablet Take 1 tablet by mouth daily 30 tablet 3    Insulin Pen Needle ([employer] PEN NEEDLES 31G) 31G X 8 MM MISC USE WITH INSULIN FOUR TIMES A  each 2    cyanocobalamin 1000 MCG tablet Take 1,000 mcg by mouth daily      insulin glargine (LANTUS SOLOSTAR) 100 UNIT/ML injection pen INJECT 24 UNITS UNDER THE SKIN EVERY NIGHT AT BEDTIME 15 mL 3    insulin lispro, 1 Unit Dial, 100 UNIT/ML SOPN INJECT 12-18 UNITS UNDER THE SKIN THREE TIMES DAILY BEFORE MEALS 30 mL 3    ACCU-CHEK JOHNATHAN PLUS strip USE TO TEST FOUR TIMES DAILY AS NEEDED 100 strip 4    Continuous Blood Gluc  (FREESTYLE MAGGIE 14 DAY READER) RUMA As needed 1 Device 3    Continuous Blood Gluc Sensor (FREESTYLE MAGGIE 14 DAY SENSOR) MISC Every 2 weeks 2 each 3    levothyroxine (SYNTHROID) 50 MCG tablet TAKE ONE TABLET BY MOUTH DAILY 90 tablet 9    Multiple Vitamins-Minerals (THERAPEUTIC MULTIVITAMIN-MINERALS) tablet Take 1 tablet by mouth daily      Handicap Placard MISC by Does not apply route Duration: 5 years 1 each 0    ACCU-CHEK SOFTCLIX LANCETS MISC USE FOUR TIMES A  each 3    glucose blood VI test strips (ACCU-CHEK JOHNATHAN) strip 4 times a day As needed. 150 each 3    Blood Glucose Monitoring Suppl (ACCU-CHEK JOHNATHAN) RUMA Use as needed 1 Device 0    aspirin 81 MG chewable tablet Take 1 tablet by mouth daily. 30 tablet 5    Lancets MISC 2 times daily. Lancets for a [place] meter. 50 each 11       This patient presents to the office today for a preoperative consultation at the request of surgeon, Dr. Summer Johnson, who plans on performing PD catheter placement on October 26 at [place] [address].  PD catheter placement is required due to CKD 5, he will need to start dialysis in the near future. He was hospitalized last month for an episode of lightheadedness and had cardiac workup. He had an abnormal stress test but at that point invasive intervention was not recommended. Since that time, he sometimes has shortness of breath with exertion, no chest pain.  He continues to experience a lot of fatigue    Planned anesthesia: General   Known anesthesia problems: Takes a long time to wake up   Bleeding risk: No recent or remote history of to light. Neck: Trachea normal and normal range of motion. Neck supple. No JVD present. Carotid bruit is not present. No mass and no thyromegaly present. Cardiovascular: Normal rate, regular rhythm, normal heart sounds and intact distal pulses. Exam reveals no gallop and no friction rub. No murmur heard. Pulmonary/Chest: Effort normal. No respiratory distress. Few bilateral crackles He has no wheezes. Abdominal: Soft. Normal aorta and bowel sounds are normal. He exhibits no distension and no mass. There is no hepatosplenomegaly. No tenderness. Musculoskeletal: He exhibits no edema and no tenderness. Neurological: He is alert and oriented to person, place, and time. He has normal strength. No cranial nerve deficit or sensory deficit. Coordination and gait normal.   Skin: Skin is warm and dry. No rash noted. No erythema. Psychiatric: He has a normal mood and affect. His behavior is normal.     EKG Interpretation:  normal EKG, normal sinus rhythm, 1st degree AV block, unchanged from previous tracings.     Lab Review   Orders Only on 09/21/2020   Component Date Value    WBC 09/21/2020 5.7     RBC 09/21/2020 2.85*    Hemoglobin 09/21/2020 9.4*    Hematocrit 09/21/2020 28.5*    MCV 09/21/2020 100.0     MCH 09/21/2020 33.0     MCHC 09/21/2020 33.0     RDW 09/21/2020 14.1     Platelets 82/02/9525 197     MPV 09/21/2020 8.7     Neutrophils % 09/21/2020 62.4     Lymphocytes % 09/21/2020 14.8     Monocytes % 09/21/2020 11.8     Eosinophils % 09/21/2020 10.0     Basophils % 09/21/2020 1.0     Neutrophils Absolute 09/21/2020 3.6     Lymphocytes Absolute 09/21/2020 0.8*    Monocytes Absolute 09/21/2020 0.7     Eosinophils Absolute 09/21/2020 0.6     Basophils Absolute 09/21/2020 0.1     Iron 09/21/2020 89     TIBC 09/21/2020 286     Iron Saturation 09/21/2020 31     Sodium 09/21/2020 143     Potassium 09/21/2020 5.4*    Chloride 09/21/2020 111*    CO2 09/21/2020 19*    Anion Gap 09/21/2020 13     Glucose 09/21/2020 173*    BUN 09/21/2020 97*    CREATININE 09/21/2020 5.5*    GFR Non- 09/21/2020 10*    GFR  09/21/2020 12*    Calcium 09/21/2020 9.0     Phosphorus 09/21/2020 5.7*    Alb 09/21/2020 3.6     Microalbumin, Random Uri* 09/21/2020 181.80*    Creatinine, Ur 09/21/2020 77.8     Microalbumin Creatinine * 09/21/2020 2336.8*   Orders Only on 09/08/2020   Component Date Value    Folate 09/08/2020 15.78     Vitamin B-12 09/08/2020 1971*    Ferritin 09/08/2020 376.5     Hemoglobin A1C 09/08/2020 6.2     eAG 09/08/2020 131.2    Admission on 08/25/2020, Discharged on 08/28/2020   Component Date Value    Ventricular Rate 08/25/2020 71     Atrial Rate 08/25/2020 71     P-R Interval 08/25/2020 236     QRS Duration 08/25/2020 82     Q-T Interval 08/25/2020 400     QTc Calculation (Bazett) 08/25/2020 434     P Axis 08/25/2020 100     R Axis 08/25/2020 0     T Axis 08/25/2020 75     Diagnosis 08/25/2020 EKG performed in ER and to be interpreted by ER physician. Confirmed by MD, ER ([id number]),  Robbie Isabel (FLORENTIN), SAHRA ([id number]) on 8/25/2020 12:58:35 PM     Sodium 08/25/2020 138     Potassium reflex Magnesi* 08/25/2020 5.4*    Chloride 08/25/2020 104     CO2 08/25/2020 22     Anion Gap 08/25/2020 12     Glucose 08/25/2020 119*    BUN 08/25/2020 97*    CREATININE 08/25/2020 4.8*    GFR Non- 08/25/2020 12*    GFR  08/25/2020 14*    Calcium 08/25/2020 9.3     WBC 08/25/2020 13.4*    RBC 08/25/2020 3.38*    Hemoglobin 08/25/2020 11.1*    Hematocrit 08/25/2020 32.7*    MCV 08/25/2020 96.9     MCH 08/25/2020 33.0     MCHC 08/25/2020 34.1     RDW 08/25/2020 12.8     Platelets 31/98/5865 258     MPV 08/25/2020 8.2     Neutrophils % 08/25/2020 81.6     Lymphocytes % 08/25/2020 11.8     Monocytes % 08/25/2020 4.6     Eosinophils % 08/25/2020 1.2     Basophils % 08/25/2020 0.8     Neutrophils Absolute 08/25/2020 11.0*    Lymphocytes Absolute 08/25/2020 1.6     Monocytes Absolute 08/25/2020 0.6     Eosinophils Absolute 08/25/2020 0.2     Basophils Absolute 08/25/2020 0.1     Troponin 08/25/2020 0.05*    Pro-BNP 08/25/2020 451*    Troponin 08/25/2020 0.05*    Troponin 08/25/2020 0.04*    Troponin 08/26/2020 0.04*    Ventricular Rate 08/25/2020 74     Atrial Rate 08/25/2020 74     P-R Interval 08/25/2020 262     QRS Duration 08/25/2020 78     Q-T Interval 08/25/2020 384     QTc Calculation (Bazett) 08/25/2020 426     P Axis 08/25/2020 39     R Axis 08/25/2020 3     T Axis 08/25/2020 69     Diagnosis 08/25/2020 EKG performed in ER and to be interpreted by ER physician. Confirmed by MD, ER ([id number]),  Deannie Lesch ([phone]) on 8/25/2020 7:17:02 PM     POC Glucose 08/25/2020 164*    Performed on 08/25/2020 ACCU-CHEK     Left Ventricular Ejectio* 08/26/2020 67     LVEF MODALITY 08/26/2020 Nuclear     WBC 08/26/2020 12.5*    RBC 08/26/2020 3.40*    Hemoglobin 08/26/2020 11.2*    Hematocrit 08/26/2020 32.9*    MCV 08/26/2020 96.7     MCH 08/26/2020 32.9     MCHC 08/26/2020 34.0     RDW 08/26/2020 12.6     Platelets 67/30/5121 210     MPV 08/26/2020 8.1     Sodium 08/26/2020 137     Potassium reflex Magnesi* 08/26/2020 5.6*    Chloride 08/26/2020 105     CO2 08/26/2020 20*    Anion Gap 08/26/2020 12     Glucose 08/26/2020 132*    BUN 08/26/2020 85*    CREATININE 08/26/2020 5.2*    GFR Non- 08/26/2020 11*    GFR  08/26/2020 13*    Calcium 08/26/2020 8.9     TSH 08/26/2020 2.63     POC Glucose 08/25/2020 208*    Performed on 08/25/2020 ACCU-CHEK     Ventricular Rate 08/26/2020 71     Atrial Rate 08/26/2020 71     P-R Interval 08/26/2020 288     QRS Duration 08/26/2020 80     Q-T Interval 08/26/2020 416     QTc Calculation (Bazett) 08/26/2020 452     P Axis 08/26/2020 49     R Axis 08/26/2020 1     T Axis 08/26/2020 69     Diagnosis 08/26/2020 Sinus rhythm with 1st degree A-V blockOtherwise normal ECGConfirmed by Celena Cobb ([id number]) on 8/26/2020 9:08:20 AM     Anti-XA Unfrac Heparin 08/26/2020 0.74*    Anti-XA Unfrac Heparin 08/26/2020 0.48     Protein, Ur 08/26/2020 459.40*    Creatinine, Ur 08/26/2020 58.7     Sodium, Ur 08/26/2020 68     POC Glucose 08/26/2020 215*    Performed on 08/26/2020 ACCU-CHEK     Phosphorus 08/26/2020 5.4*    Anti-XA Unfrac Heparin 08/26/2020 0.22*    POC Glucose 08/26/2020 106*    Performed on 08/26/2020 ACCU-CHEK     WBC 08/27/2020 11.3*    RBC 08/27/2020 3.14*    Hemoglobin 08/27/2020 10.4*    Hematocrit 08/27/2020 30.5*    MCV 08/27/2020 96.9     MCH 08/27/2020 33.2     MCHC 08/27/2020 34.3     RDW 08/27/2020 12.9     Platelets 91/12/9520 212     MPV 08/27/2020 8.3     Sodium 08/27/2020 136     Potassium reflex Magnesi* 08/27/2020 5.5*    Chloride 08/27/2020 104     CO2 08/27/2020 20*    Anion Gap 08/27/2020 12     Glucose 08/27/2020 117*    BUN 08/27/2020 79*    CREATININE 08/27/2020 4.9*    GFR Non- 08/27/2020 12*    GFR  08/27/2020 14*    Calcium 08/27/2020 8.4     Anti-XA Unfrac Heparin 08/27/2020 0.68     POC Glucose 08/26/2020 177*    Performed on 08/26/2020 ACCU-CHEK     Anti-XA Unfrac Heparin 08/27/2020 0.37     POC Glucose 08/27/2020 118*    Performed on 08/27/2020 ACCU-CHEK     Alb 08/27/2020 3.5     Phosphorus 08/27/2020 5.3*    POC Glucose 08/27/2020 190*    Performed on 08/27/2020 ACCU-CHEK     POC Glucose 08/27/2020 192*    Performed on 08/27/2020 ACCU-CHEK     WBC 08/28/2020 10.3     RBC 08/28/2020 2.93*    Hemoglobin 08/28/2020 9.8*    Hematocrit 08/28/2020 28.5*    MCV 08/28/2020 97.3     MCH 08/28/2020 33.4     MCHC 08/28/2020 34.3     RDW 08/28/2020 12.9     Platelets 20/47/1560 187     MPV 08/28/2020 8.3     Sodium 08/28/2020 138     Potassium reflex Magnesi* 08/28/2020 5.6*    Chloride 08/28/2020 106     CO2 08/28/2020 22     Anion Gap 08/28/2020 10     Glucose 08/28/2020 150*    BUN 08/28/2020 86*    CREATININE 08/28/2020 4.9*    GFR Non- 08/28/2020 12*    GFR  08/28/2020 14*    Calcium 08/28/2020 8.4     POC Glucose 08/27/2020 201*    Performed on 08/27/2020 ACCU-CHEK     Alb 08/28/2020 3.3*    Phosphorus 08/28/2020 5.5*    POC Glucose 08/28/2020 215*    Performed on 08/28/2020 ACCU-CHEK     POC Glucose 08/28/2020 222*    Performed on 08/28/2020 ACCU-CHEK     POC Glucose 08/28/2020 208*    Performed on 08/28/2020 ACCU-CHEK            Assessment:       77 y.o. patient with planned surgery as above. Known risk factors for perioperative complications: Coronary artery disease, Diabetes mellitus, Renal dysfunction, ILD  Current medications which may produce withdrawal symptoms if withheld perioperatively: cavedilol      Plan:     1. Pre-op examination  - Preoperative workup as follows: ECG, electrolytes  - patient is high risk for any procedure, given multiple medical comorbidities. Surgery is lower risk. However he is stable and had cardiac testing just in September, which did not require intervention. At this point I would repeat BMP week prior to surgery to ensure that the potassium remains in the normal range, but no other evaluation is needed. - he will need the cardiologist to determine whether/how long to hold ASA and clopidogrel  - Change in medication regimen before surgery: Take carvedilol the morning of surgery, hold other medications. - Prophylaxis for cardiac events with perioperative beta-blockers: Currently taking  carvedilol  - No contraindications to planned surgery  - EKG 12 Lead    2. 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  Coronary artery disease involving native coronary artery of native heart with unstable angina pectoris [facility])  - he has an abnormal stress test in September, has had evaluation by cardiologist, medications are optimized  - he should continue carvedilol through the surgery, needs to have guidance from cardiologist re DAPT    4. ILD (interstitial lung disease) (HCC)  - stable, symptoms controlled, does not require supplemental oxygen     He did have an episode of lightheadedness at the end of the office visit while checking out, and was sent to the ED for hypotension. ED evaluation was unremarkable, likely was an episode of orthostatic hypotension. Since he had a recent thorough workup, no further evaluation of this is required prior to surgery.       Sia Rowell Lowe

## 2020-10-16 ENCOUNTER — TELEPHONE (OUTPATIENT)
Dept: CARDIOLOGY CLINIC | Age: 75
End: 2020-10-16

## 2020-10-16 ENCOUNTER — TELEPHONE (OUTPATIENT)
Dept: BARIATRICS/WEIGHT MGMT | Age: 75
End: 2020-10-16

## 2020-10-16 NOTE — TELEPHONE ENCOUNTER
Thanks Dr. Griselda Rosario      We will proceed with PD catheter placement as scheduled    Please communicate to patient hold plavix 7 days before but do NOT stop aspirin

## 2020-10-16 NOTE — TELEPHONE ENCOUNTER
Caleb Oden calling - Dr. Shaw Shrestha and office said that they would like to talk about a clearance because someone \"PAT\"? Possibly is calling asking for this clearance. Patient may be looking for it, office was unsure. Dr. Shaw Shrestha said that if Dr. Farzaneh Stanton needs to talk w him call directly if Dr. Farzaneh Stanton is ok doing this as emergent with out the full clearance. This is for a pd cath.

## 2020-10-16 NOTE — TELEPHONE ENCOUNTER
Called him. Explained that he's higher risk or surgery. No way to say whether he would do fine or not. Odds are he could do fine but margin for mistakes/complications (hypotension, hypoxia, bleeding, etc) is low and could be costly. I told him I wouldn't say DON'T do it, but understand the risk is higher for him and that it is a decision that you and he, along with anesthesia, can make together. I would NOT stop ASA 81 mg daily given the grafts and degree of native disease. He asked that you or your offie call him to clarify the plavix/asa. I said he can stop the plavix but he should NOT stop the ASA 81 mg daily. He does NOT want hemo, 3x weekly.      Krystin Jolly

## 2020-10-16 NOTE — TELEPHONE ENCOUNTER
Spoke to Dr. Edward Machuca  Patient is high risk for a low risk procedure    Spoke to patient to explain this and discuss PD under general anesthesia versus HD under sedation     He would like to discuss details of his cardiac status with Dr. Edward Machuca before deciding

## 2020-10-16 NOTE — TELEPHONE ENCOUNTER
Thanks. I talked to Rashid Lawrence. I think he's aware of the situation. Has bad and diffuse CAD.     Jose Martin Marino

## 2020-10-16 NOTE — TELEPHONE ENCOUNTER
Renato Finch, 1945    Cardiac Risk Assessment    What type of procedure are you having?: Laparoscopic peritoneal dialysis catheter placement    When is your procedure scheduled for?: 10/26/20    What physician is performing your procedure?: Dr. Swetha Lizama     Phone Number: [phone]    Fax number to send the letter: [phone]    Cardiologist: Dr. Sanjuanita Salter     Last Appointment: 09/09/20    Next Appointment: 02/01/20    Are you on any blood thinners?: Plavix & Aspirin  (Needs to hold 7 days prior)    History of Coronary Artery Disease:    Last stress test: 08/25/20    Last echo: 08/03/20    Device Type and :

## 2020-10-16 NOTE — TELEPHONE ENCOUNTER
Dr. Stephanie Byers has spoken to Dr. Brandon Mercado regarding this pt. Per Dr. Stephanie Byers if Brandon Mercado needs any further information he can be reached directly. Dr. Noel Gee office made aware as well.

## 2020-10-19 NOTE — TELEPHONE ENCOUNTER
Spoke with pt to instruct him to hold his Plavix, but continue ASA.  Pt confirmed he is getting Covid test tomorrow and will arrive with a  on [birth year] at 5:30 am.

## 2020-10-20 ENCOUNTER — OFFICE VISIT (OUTPATIENT)
Dept: PRIMARY CARE CLINIC | Age: 75
End: 2020-10-20
Payer: MEDICARE

## 2020-10-20 DIAGNOSIS — N18.5 CKD STAGE 5 DUE TO TYPE 2 DIABETES MELLITUS (HCC): ICD-10-CM

## 2020-10-20 DIAGNOSIS — E11.22 CKD STAGE 5 DUE TO TYPE 2 DIABETES MELLITUS (HCC): ICD-10-CM

## 2020-10-20 LAB
ANION GAP SERPL CALCULATED.3IONS-SCNC: 14 MMOL/L (ref 3–16)
BASOPHILS ABSOLUTE: 0.1 K/UL (ref 0–0.2)
BASOPHILS RELATIVE PERCENT: 0.7 %
BUN BLDV-MCNC: 92 MG/DL (ref 7–20)
CALCIUM SERPL-MCNC: 8.7 MG/DL (ref 8.3–10.6)
CHLORIDE BLD-SCNC: 103 MMOL/L (ref 99–110)
CO2: 24 MMOL/L (ref 21–32)
CREAT SERPL-MCNC: 4.9 MG/DL (ref 0.8–1.3)
EOSINOPHILS ABSOLUTE: 0.7 K/UL (ref 0–0.6)
EOSINOPHILS RELATIVE PERCENT: 6.6 %
GFR AFRICAN AMERICAN: 14
GFR NON-AFRICAN AMERICAN: 12
GLUCOSE BLD-MCNC: 197 MG/DL (ref 70–99)
HCT VFR BLD CALC: 29.6 % (ref 40.5–52.5)
HEMOGLOBIN: 10 G/DL (ref 13.5–17.5)
LYMPHOCYTES ABSOLUTE: 2.5 K/UL (ref 1–5.1)
LYMPHOCYTES RELATIVE PERCENT: 24.5 %
MCH RBC QN AUTO: 32.8 PG (ref 26–34)
MCHC RBC AUTO-ENTMCNC: 33.7 G/DL (ref 31–36)
MCV RBC AUTO: 97.3 FL (ref 80–100)
MONOCYTES ABSOLUTE: 0.5 K/UL (ref 0–1.3)
MONOCYTES RELATIVE PERCENT: 5 %
NEUTROPHILS ABSOLUTE: 6.3 K/UL (ref 1.7–7.7)
NEUTROPHILS RELATIVE PERCENT: 63.2 %
PDW BLD-RTO: 12.4 % (ref 12.4–15.4)
PLATELET # BLD: 202 K/UL (ref 135–450)
PMV BLD AUTO: 8.8 FL (ref 5–10.5)
POTASSIUM SERPL-SCNC: 4.8 MMOL/L (ref 3.5–5.1)
RBC # BLD: 3.05 M/UL (ref 4.2–5.9)
SARS-COV-2, PCR: NOT DETECTED
SODIUM BLD-SCNC: 141 MMOL/L (ref 136–145)
WBC # BLD: 10 K/UL (ref 4–11)

## 2020-10-20 PROCEDURE — G8417 CALC BMI ABV UP PARAM F/U: HCPCS | Performed by: NURSE PRACTITIONER

## 2020-10-20 PROCEDURE — G8428 CUR MEDS NOT DOCUMENT: HCPCS | Performed by: NURSE PRACTITIONER

## 2020-10-20 PROCEDURE — 99211 OFF/OP EST MAY X REQ PHY/QHP: CPT | Performed by: NURSE PRACTITIONER

## 2020-10-21 NOTE — RESULT ENCOUNTER NOTE

## 2020-10-22 ENCOUNTER — TELEPHONE (OUTPATIENT)
Dept: INTERNAL MEDICINE CLINIC | Age: 75
End: 2020-10-22

## 2020-10-22 NOTE — PROGRESS NOTES
PRE-OP INSTRUCTIONS FOR THE SURGICAL PATIENT YOU ARE UNABLE TO MAKE CONTACT FOR AN INTERVIEW:        1. Follow instructions for your ARRIVAL TIME as DIRECTED BY YOUR SURGEON. 2. Enter the MAIN entrance located on [address] and report to the desk. 3. Bring your insurance & prescription card and photo ID with you. You may also be asked to pay a co-pay, as you may want to bring a check or credit card with you. 4. Leave all other valuables at home. 5. Arrange for someone to drive you home and be with you for the first 24 hours after discharge. 6. Bring your medication list with you day of surgery with doses and frequency listed  7. You must contact your surgeon for Instructions regarding:              - ALL medication instructions, especially if taking blood thinners, aspirin, or diabetic medication.  - IF  there is a change in your physical condition such as a cold, fever, rash, cuts, sores or any other infection, especially near your surgical site. 8. A Pre-op History and Physical for surgery MUST be completed by your Physician or an Urgent Care within 30 days of your procedure date. Please bring a copy with you on the day of your procedure and along with any other testing performed. 9. DO NOT EAT ANYTHING eight hours prior to surgery. May have up to 8 ounces of water 4 hours prior to surgery. 10. No gum, candy, mints, or ice chips day of procedure. 11. Please refrain from drinking alcohol the day before or day of your procedure. 12. Please do not smoke the day of your procedure. 13. Dress in loose, comfortable clothing appropriate for redressing after your procedure. Do not wear jewelry (including body piercings), make-up, fingernail polish, lotion, powders or metal hairclips. 15. Contacts will need to be removed prior to surgery. You may want to bring your eye glasses to wear immediately before and after surgery.   14. Dentures will need to be removed before your procedure. 13. Bring cases for your glasses, contacts, dentures, or hearing aids to protect them while you are in surgery. 16. If you use a CPAP, please bring it with you on the day of your procedure. 17. Do not shave or wax for 72 hours prior to procedure near your operative site  18. FOR WOMAN OF CHILDBEARING AGE ONLY- please bring a urine sample with you on day of surgery or make sure we can collect on arrival.     If you have further questions, you may contact your surgeon's office or us at [phone]     Left instructions on patient's voicemail. Gordon Matamoros. 10/22/2020 .4:04 PM

## 2020-10-22 NOTE — PROGRESS NOTES
PCP office called in regards to unsigned Preop Exam. MA to give Dr. Bry Agarwal message to complete ASAP. Pt had Nephrology visit 10/22, printed and put in H&P slot for possible use. ED visit noted on same day as preop exam due to pt having near syncope episode. Re-called PCP office and talked to Uday Cardona. Message sent to Dr. Bry Agarwal to complete and sign H&P or to return call to PAT if pt not cleared. Epic notes with telephone conversations between Dr. Sarwat Hoover and pt Cardiologist Dr. Alberto Mancera noted, see print out in H&P section.

## 2020-10-23 ENCOUNTER — TELEPHONE (OUTPATIENT)
Dept: INTERNAL MEDICINE CLINIC | Age: 75
End: 2020-10-23

## 2020-10-23 ENCOUNTER — TELEPHONE (OUTPATIENT)
Dept: BARIATRICS/WEIGHT MGMT | Age: 75
End: 2020-10-23

## 2020-10-23 ENCOUNTER — ANESTHESIA EVENT (OUTPATIENT)
Dept: OPERATING ROOM | Age: 75
End: 2020-10-23
Payer: MEDICARE

## 2020-10-23 NOTE — TELEPHONE ENCOUNTER
Spoke with pt to confirm his 5:30 am arrival for his 7:15 am surgery on 10/26/20. Pt to have a , NPO after midnight. Cardiologist plan in place-see previous notes. Call placed to Dr. Candy Jaimes office requesting the pre op H&P note to be completed for the PCP clearance. Staff advised Dr. Degn Blunt was notified and will complete shortly.

## 2020-10-23 NOTE — TELEPHONE ENCOUNTER
THIS IS AN URGENT MESSAGE  Patient is requesting pre-op to be signed. He is scheduled for surgery on Monday morning at 5:30 AM. Please advise.

## 2020-10-23 NOTE — TELEPHONE ENCOUNTER
Leonarda with [facility] called needing to know if the patient is cleared for surgery? He is having surgery on Monday and your note was not finished. Please call to confirm ASAP.

## 2020-10-23 NOTE — PROGRESS NOTES
Spoke with Hal Zelaya at Dr. Debbie Gonzalez office. Requested that H&P from 10-15 be completed as surgery is scheduled for Monday, 10-26.

## 2020-10-26 ENCOUNTER — HOSPITAL ENCOUNTER (OUTPATIENT)
Age: 75
Setting detail: OUTPATIENT SURGERY
Discharge: HOME | End: 2020-10-26
Attending: SURGERY | Admitting: SURGERY
Payer: MEDICARE

## 2020-10-26 ENCOUNTER — ANESTHESIA (OUTPATIENT)
Dept: OPERATING ROOM | Age: 75
End: 2020-10-26
Payer: MEDICARE

## 2020-10-26 VITALS
WEIGHT: 189.06 LBS | HEART RATE: 64 BPM | TEMPERATURE: 97.2 F | BODY MASS INDEX: 27.07 KG/M2 | RESPIRATION RATE: 18 BRPM | OXYGEN SATURATION: 98 % | SYSTOLIC BLOOD PRESSURE: 155 MMHG | DIASTOLIC BLOOD PRESSURE: 64 MMHG | HEIGHT: 70 IN

## 2020-10-26 VITALS
TEMPERATURE: 95.5 F | RESPIRATION RATE: 13 BRPM | SYSTOLIC BLOOD PRESSURE: 188 MMHG | OXYGEN SATURATION: 100 % | DIASTOLIC BLOOD PRESSURE: 86 MMHG

## 2020-10-26 LAB
ANION GAP SERPL CALCULATED.3IONS-SCNC: 12 MMOL/L (ref 3–16)
BUN BLDV-MCNC: 90 MG/DL (ref 7–20)
CALCIUM SERPL-MCNC: 9.3 MG/DL (ref 8.3–10.6)
CHLORIDE BLD-SCNC: 105 MMOL/L (ref 99–110)
CO2: 26 MMOL/L (ref 21–32)
CREAT SERPL-MCNC: 5.1 MG/DL (ref 0.8–1.3)
GFR AFRICAN AMERICAN: 13
GFR NON-AFRICAN AMERICAN: 11
GLUCOSE BLD-MCNC: 137 MG/DL (ref 70–99)
GLUCOSE BLD-MCNC: 151 MG/DL (ref 70–99)
GLUCOSE BLD-MCNC: 177 MG/DL (ref 70–99)
PERFORMED ON: ABNORMAL
PERFORMED ON: ABNORMAL
POTASSIUM SERPL-SCNC: 5.1 MMOL/L (ref 3.5–5.1)
SODIUM BLD-SCNC: 143 MMOL/L (ref 136–145)

## 2020-10-26 PROCEDURE — 80048 BASIC METABOLIC PNL TOTAL CA: CPT

## 2020-10-26 PROCEDURE — 7100000001 HC PACU RECOVERY - ADDTL 15 MIN: Performed by: SURGERY

## 2020-10-26 PROCEDURE — 6360000002 HC RX W HCPCS: Performed by: SURGERY

## 2020-10-26 PROCEDURE — 2580000003 HC RX 258: Performed by: SURGERY

## 2020-10-26 PROCEDURE — 3700000001 HC ADD 15 MINUTES (ANESTHESIA): Performed by: SURGERY

## 2020-10-26 PROCEDURE — 2500000003 HC RX 250 WO HCPCS: Performed by: NURSE ANESTHETIST, CERTIFIED REGISTERED

## 2020-10-26 PROCEDURE — 3600000004 HC SURGERY LEVEL 4 BASE: Performed by: SURGERY

## 2020-10-26 PROCEDURE — 3600000014 HC SURGERY LEVEL 4 ADDTL 15MIN: Performed by: SURGERY

## 2020-10-26 PROCEDURE — 6360000002 HC RX W HCPCS: Performed by: ANESTHESIOLOGY

## 2020-10-26 PROCEDURE — 6370000000 HC RX 637 (ALT 250 FOR IP): Performed by: SURGERY

## 2020-10-26 PROCEDURE — 2720000010 HC SURG SUPPLY STERILE: Performed by: SURGERY

## 2020-10-26 PROCEDURE — C1750 CATH, HEMODIALYSIS,LONG-TERM: HCPCS | Performed by: SURGERY

## 2020-10-26 PROCEDURE — 49326 LAP W/OMENTOPEXY ADD-ON: CPT | Performed by: SURGERY

## 2020-10-26 PROCEDURE — 6360000002 HC RX W HCPCS: Performed by: NURSE ANESTHETIST, CERTIFIED REGISTERED

## 2020-10-26 PROCEDURE — L0450 TLSO FLEX TRUNK/THOR PRE OTS: HCPCS | Performed by: SURGERY

## 2020-10-26 PROCEDURE — 49324 LAP INSERT TUNNEL IP CATH: CPT | Performed by: SURGERY

## 2020-10-26 PROCEDURE — 2500000003 HC RX 250 WO HCPCS: Performed by: SURGERY

## 2020-10-26 PROCEDURE — 2709999900 HC NON-CHARGEABLE SUPPLY: Performed by: SURGERY

## 2020-10-26 PROCEDURE — 3700000000 HC ANESTHESIA ATTENDED CARE: Performed by: SURGERY

## 2020-10-26 PROCEDURE — 7100000000 HC PACU RECOVERY - FIRST 15 MIN: Performed by: SURGERY

## 2020-10-26 PROCEDURE — C1713 ANCHOR/SCREW BN/BN,TIS/BN: HCPCS | Performed by: SURGERY

## 2020-10-26 PROCEDURE — 2580000003 HC RX 258: Performed by: ANESTHESIOLOGY

## 2020-10-26 PROCEDURE — 7100000011 HC PHASE II RECOVERY - ADDTL 15 MIN: Performed by: SURGERY

## 2020-10-26 PROCEDURE — 7100000010 HC PHASE II RECOVERY - FIRST 15 MIN: Performed by: SURGERY

## 2020-10-26 RX ORDER — SODIUM CHLORIDE 0.9 % (FLUSH) 0.9 %
10 SYRINGE (ML) INJECTION PRN
Status: DISCONTINUED | OUTPATIENT
Start: 2020-10-26 | End: 2020-10-26 | Stop reason: HOSPADM

## 2020-10-26 RX ORDER — CARVEDILOL 3.12 MG/1
3.12 TABLET ORAL ONCE
Status: COMPLETED | OUTPATIENT
Start: 2020-10-26 | End: 2020-10-26

## 2020-10-26 RX ORDER — HEPARIN SODIUM 5000 [USP'U]/ML
5000 INJECTION, SOLUTION INTRAVENOUS; SUBCUTANEOUS ONCE
Status: COMPLETED | OUTPATIENT
Start: 2020-10-26 | End: 2020-10-26

## 2020-10-26 RX ORDER — LIDOCAINE HYDROCHLORIDE 10 MG/ML
1 INJECTION, SOLUTION EPIDURAL; INFILTRATION; INTRACAUDAL; PERINEURAL
Status: DISCONTINUED | OUTPATIENT
Start: 2020-10-26 | End: 2020-10-26 | Stop reason: HOSPADM

## 2020-10-26 RX ORDER — BUPIVACAINE HYDROCHLORIDE 5 MG/ML
INJECTION, SOLUTION EPIDURAL; INTRACAUDAL PRN
Status: DISCONTINUED | OUTPATIENT
Start: 2020-10-26 | End: 2020-10-26 | Stop reason: ALTCHOICE

## 2020-10-26 RX ORDER — ONDANSETRON 2 MG/ML
4 INJECTION INTRAMUSCULAR; INTRAVENOUS
Status: DISCONTINUED | OUTPATIENT
Start: 2020-10-26 | End: 2020-10-26 | Stop reason: HOSPADM

## 2020-10-26 RX ORDER — FENTANYL CITRATE 50 UG/ML
50 INJECTION, SOLUTION INTRAMUSCULAR; INTRAVENOUS EVERY 5 MIN PRN
Status: DISCONTINUED | OUTPATIENT
Start: 2020-10-26 | End: 2020-10-26 | Stop reason: HOSPADM

## 2020-10-26 RX ORDER — SODIUM CHLORIDE 9 MG/ML
INJECTION, SOLUTION INTRAVENOUS CONTINUOUS
Status: DISCONTINUED | OUTPATIENT
Start: 2020-10-26 | End: 2020-10-26 | Stop reason: HOSPADM

## 2020-10-26 RX ORDER — OXYCODONE HYDROCHLORIDE 5 MG/1
5 TABLET ORAL EVERY 6 HOURS PRN
Qty: 28 TABLET | Refills: 0 | Status: SHIPPED | OUTPATIENT
Start: 2020-10-26 | End: 2020-11-02

## 2020-10-26 RX ORDER — SUCCINYLCHOLINE/SOD CL,ISO/PF 200MG/10ML
SYRINGE (ML) INTRAVENOUS PRN
Status: DISCONTINUED | OUTPATIENT
Start: 2020-10-26 | End: 2020-10-26 | Stop reason: SDUPTHER

## 2020-10-26 RX ORDER — SODIUM CHLORIDE, SODIUM LACTATE, POTASSIUM CHLORIDE, CALCIUM CHLORIDE 600; 310; 30; 20 MG/100ML; MG/100ML; MG/100ML; MG/100ML
INJECTION, SOLUTION INTRAVENOUS CONTINUOUS
Status: DISCONTINUED | OUTPATIENT
Start: 2020-10-26 | End: 2020-10-26 | Stop reason: HOSPADM

## 2020-10-26 RX ORDER — HEPARIN SODIUM (PORCINE) LOCK FLUSH IV SOLN 100 UNIT/ML 100 UNIT/ML
SOLUTION INTRAVENOUS PRN
Status: DISCONTINUED | OUTPATIENT
Start: 2020-10-26 | End: 2020-10-26 | Stop reason: ALTCHOICE

## 2020-10-26 RX ORDER — ONDANSETRON 2 MG/ML
INJECTION INTRAMUSCULAR; INTRAVENOUS PRN
Status: DISCONTINUED | OUTPATIENT
Start: 2020-10-26 | End: 2020-10-26 | Stop reason: SDUPTHER

## 2020-10-26 RX ORDER — EPHEDRINE SULFATE 50 MG/ML
INJECTION, SOLUTION INTRAVENOUS PRN
Status: DISCONTINUED | OUTPATIENT
Start: 2020-10-26 | End: 2020-10-26 | Stop reason: SDUPTHER

## 2020-10-26 RX ORDER — ENALAPRILAT 2.5 MG/2ML
1.25 INJECTION INTRAVENOUS
Status: DISCONTINUED | OUTPATIENT
Start: 2020-10-26 | End: 2020-10-26 | Stop reason: HOSPADM

## 2020-10-26 RX ORDER — MAGNESIUM HYDROXIDE 1200 MG/15ML
LIQUID ORAL CONTINUOUS PRN
Status: COMPLETED | OUTPATIENT
Start: 2020-10-26 | End: 2020-10-26

## 2020-10-26 RX ORDER — HYDRALAZINE HYDROCHLORIDE 20 MG/ML
5 INJECTION INTRAMUSCULAR; INTRAVENOUS EVERY 5 MIN PRN
Status: DISCONTINUED | OUTPATIENT
Start: 2020-10-26 | End: 2020-10-26 | Stop reason: HOSPADM

## 2020-10-26 RX ORDER — LIDOCAINE HYDROCHLORIDE 20 MG/ML
INJECTION, SOLUTION INTRAVENOUS PRN
Status: DISCONTINUED | OUTPATIENT
Start: 2020-10-26 | End: 2020-10-26 | Stop reason: SDUPTHER

## 2020-10-26 RX ORDER — FENTANYL CITRATE 50 UG/ML
25 INJECTION, SOLUTION INTRAMUSCULAR; INTRAVENOUS EVERY 5 MIN PRN
Status: DISCONTINUED | OUTPATIENT
Start: 2020-10-26 | End: 2020-10-26 | Stop reason: HOSPADM

## 2020-10-26 RX ORDER — MAGNESIUM CARB/ALUMINUM HYDROX 105-160MG
TABLET,CHEWABLE ORAL PRN
Status: DISCONTINUED | OUTPATIENT
Start: 2020-10-26 | End: 2020-10-26 | Stop reason: ALTCHOICE

## 2020-10-26 RX ORDER — DOCUSATE SODIUM 100 MG/1
100 CAPSULE, LIQUID FILLED ORAL 2 TIMES DAILY
Qty: 20 CAPSULE | Refills: 0 | Status: SHIPPED | OUTPATIENT
Start: 2020-10-26 | End: 2020-11-05

## 2020-10-26 RX ORDER — FENTANYL CITRATE 50 UG/ML
INJECTION, SOLUTION INTRAMUSCULAR; INTRAVENOUS PRN
Status: DISCONTINUED | OUTPATIENT
Start: 2020-10-26 | End: 2020-10-26 | Stop reason: SDUPTHER

## 2020-10-26 RX ORDER — SODIUM CHLORIDE 0.9 % (FLUSH) 0.9 %
10 SYRINGE (ML) INJECTION EVERY 12 HOURS SCHEDULED
Status: DISCONTINUED | OUTPATIENT
Start: 2020-10-26 | End: 2020-10-26 | Stop reason: HOSPADM

## 2020-10-26 RX ORDER — PROPOFOL 10 MG/ML
INJECTION, EMULSION INTRAVENOUS PRN
Status: DISCONTINUED | OUTPATIENT
Start: 2020-10-26 | End: 2020-10-26 | Stop reason: SDUPTHER

## 2020-10-26 RX ORDER — LABETALOL 20 MG/4 ML (5 MG/ML) INTRAVENOUS SYRINGE
5 EVERY 10 MIN PRN
Status: DISCONTINUED | OUTPATIENT
Start: 2020-10-26 | End: 2020-10-26 | Stop reason: HOSPADM

## 2020-10-26 RX ADMIN — FENTANYL CITRATE 50 MCG: 50 INJECTION INTRAMUSCULAR; INTRAVENOUS at 07:46

## 2020-10-26 RX ADMIN — LIDOCAINE HYDROCHLORIDE 100 MG: 20 INJECTION, SOLUTION INTRAVENOUS at 07:17

## 2020-10-26 RX ADMIN — HYDROMORPHONE HYDROCHLORIDE 0.25 MG: 1 INJECTION, SOLUTION INTRAMUSCULAR; INTRAVENOUS; SUBCUTANEOUS at 08:55

## 2020-10-26 RX ADMIN — HEPARIN SODIUM 5000 UNITS: 5000 INJECTION INTRAVENOUS; SUBCUTANEOUS at 06:49

## 2020-10-26 RX ADMIN — VANCOMYCIN HYDROCHLORIDE 1000 MG: 10 INJECTION, POWDER, LYOPHILIZED, FOR SOLUTION INTRAVENOUS at 06:45

## 2020-10-26 RX ADMIN — HYDROMORPHONE HYDROCHLORIDE 0.25 MG: 1 INJECTION, SOLUTION INTRAMUSCULAR; INTRAVENOUS; SUBCUTANEOUS at 09:12

## 2020-10-26 RX ADMIN — CARVEDILOL 3.12 MG: 3.12 TABLET, FILM COATED ORAL at 06:26

## 2020-10-26 RX ADMIN — FAMOTIDINE 20 MG: 10 INJECTION, SOLUTION INTRAVENOUS at 07:17

## 2020-10-26 RX ADMIN — Medication 140 MG: at 07:17

## 2020-10-26 RX ADMIN — ONDANSETRON 4 MG: 2 INJECTION INTRAMUSCULAR; INTRAVENOUS at 07:17

## 2020-10-26 RX ADMIN — PROPOFOL 150 MG: 10 INJECTION, EMULSION INTRAVENOUS at 07:17

## 2020-10-26 RX ADMIN — FENTANYL CITRATE 50 MCG: 50 INJECTION INTRAMUSCULAR; INTRAVENOUS at 07:17

## 2020-10-26 RX ADMIN — SODIUM CHLORIDE: 9 INJECTION, SOLUTION INTRAVENOUS at 06:00

## 2020-10-26 RX ADMIN — EPHEDRINE SULFATE 5 MG: 50 INJECTION, SOLUTION INTRAMUSCULAR; INTRAVENOUS; SUBCUTANEOUS at 07:22

## 2020-10-26 ASSESSMENT — PULMONARY FUNCTION TESTS
PIF_VALUE: 22
PIF_VALUE: 20
PIF_VALUE: 27
PIF_VALUE: 5
PIF_VALUE: 20
PIF_VALUE: 26
PIF_VALUE: 20
PIF_VALUE: 24
PIF_VALUE: 20
PIF_VALUE: 0
PIF_VALUE: 20
PIF_VALUE: 2
PIF_VALUE: 26
PIF_VALUE: 17
PIF_VALUE: 20
PIF_VALUE: 28
PIF_VALUE: 17
PIF_VALUE: 19
PIF_VALUE: 20
PIF_VALUE: 17
PIF_VALUE: 6
PIF_VALUE: 1
PIF_VALUE: 2
PIF_VALUE: 27
PIF_VALUE: 17
PIF_VALUE: 24
PIF_VALUE: 17
PIF_VALUE: 19
PIF_VALUE: 19
PIF_VALUE: 28
PIF_VALUE: 17
PIF_VALUE: 18
PIF_VALUE: 2
PIF_VALUE: 28
PIF_VALUE: 18
PIF_VALUE: 23
PIF_VALUE: 17
PIF_VALUE: 19
PIF_VALUE: 0
PIF_VALUE: 19
PIF_VALUE: 12
PIF_VALUE: 0
PIF_VALUE: 28
PIF_VALUE: 26
PIF_VALUE: 28
PIF_VALUE: 21
PIF_VALUE: 19
PIF_VALUE: 38
PIF_VALUE: 18
PIF_VALUE: 17
PIF_VALUE: 17

## 2020-10-26 ASSESSMENT — PAIN SCALES - GENERAL
PAINLEVEL_OUTOF10: 5
PAINLEVEL_OUTOF10: 5
PAINLEVEL_OUTOF10: 0

## 2020-10-26 ASSESSMENT — PAIN - FUNCTIONAL ASSESSMENT: PAIN_FUNCTIONAL_ASSESSMENT: 0-10

## 2020-10-26 ASSESSMENT — PAIN SCALES - WONG BAKER: WONGBAKER_NUMERICALRESPONSE: 0

## 2020-10-26 NOTE — PROGRESS NOTES
Per wife Michelle who is at bedside, it takes a long time for this patient to awaken after anesthesia. O2 remains on 2 LPM via NC. Patient moans when calling his name to arouse him, does not open eyes when asked to do so.

## 2020-10-26 NOTE — H&P
date: 1961     Last attempt to quit: 1980     Years since quittin.0    Smokeless tobacco: Never Used   Substance and Sexual Activity    Alcohol use: No    Drug use: No    Sexual activity: Not on file   Lifestyle    Physical activity     Days per week: Not on file     Minutes per session: Not on file    Stress: Not on file   Relationships    Social connections     Talks on phone: Not on file     Gets together: Not on file     Attends Scientologist service: Not on file     Active member of club or organization: Not on file     Attends meetings of clubs or organizations: Not on file     Relationship status: Not on file    Intimate partner violence     Fear of current or ex partner: Not on file     Emotionally abused: Not on file     Physically abused: Not on file     Forced sexual activity: Not on file   Other Topics Concern    Not on file   Social History Narrative    Not on file         Medications Prior to Admission:      Prior to Admission medications    Medication Sig Start Date End Date Taking?  Authorizing Provider   Cholecalciferol (VITAMIN D) 50 MCG (2000) CAPS capsule Take 1 capsule by mouth daily   Yes Historical Provider, MD   furosemide (LASIX) 20 MG tablet Take 1 tablet by mouth daily 20  Yes Britt Swenson MD   carvedilol (COREG) 3.125 MG tablet Take 1 tablet by mouth 2 times daily 20  Yes Britt Swenson MD   sodium bicarbonate 650 MG tablet Take 1 tablet by mouth 2 times daily 20 Yes Britt Swenson MD   atorvastatin (LIPITOR) 80 MG tablet Take 1 tablet by mouth nightly 20  Yes Joshua Adams MD   Insulin Pen Needle ([employer] PEN NEEDLES 31G) 31G X 8 MM MISC USE WITH INSULIN FOUR TIMES A DAY 20  Yes Kat Modi MD   insulin glargine (LANTUS SOLOSTAR) 100 UNIT/ML injection pen INJECT 24 UNITS UNDER THE SKIN EVERY NIGHT AT BEDTIME 20  Yes Shaan Ortega MD   insulin lispro, 1 Unit Dial, 100 UNIT/ML SOPN INJECT 12-18 UNITS UNDER THE SKIN Tazorac [tazarotene]    PHYSICAL EXAM:      BP (!) 210/81 Comment: coreg given  per protocol  Pulse 68   Temp 97.6 °F (36.4 °C) (Oral)   Resp 16   Ht 5' 10\" (1.778 m)   Wt 189 lb 1 oz (85.8 kg)   SpO2 96%   BMI 27.13 kg/m²      Airway:  Airway patent with no audible stridor    Heart:  regular rate and rhythm, No murmur noted    Lungs:  No increased work of breathing, good air exchange, clear to auscultation bilaterally, no crackles or wheezing    Abdomen:  soft, non-distended, non-tender, no rebound tenderness or guarding, normal active bowel sounds and no masses palpated    ASSESSMENT AND PLAN     Patient is a 77 y.o. male with above specified procedure planned. 1.  Patient seen and focused exam done today- no new changes since last physical exam on 10/23/20    2. Access to ancillary services are available per request of the provider.     TRAVON Woods - ZORAN     10/26/2020

## 2020-10-26 NOTE — PROGRESS NOTES
Pt CO of pain in L upper Humerus localized Dr Melquiades Bender and CRNA notified will continue to monitor

## 2020-10-26 NOTE — ANESTHESIA PRE PROCEDURE
Department of Anesthesiology  Preprocedure Note       Name:  Saurabh Shirley   Age:  77 y.o.  :  1945                                          MRN:  [id number]         Date:  10/26/2020      Surgeon: Anayeli Arteaga): Vasquez Mckeon DO    Procedure: Procedure(s):  LAPAROSCOPIC PERITONEAL DIALYSIS CATHETER PLACEMENT    Medications prior to admission:   Prior to Admission medications    Medication Sig Start Date End Date Taking? Authorizing Provider   Cholecalciferol (VITAMIN D) 50 MCG (2000) CAPS capsule Take 1 capsule by mouth daily   Yes Historical Provider, MD   furosemide (LASIX) 20 MG tablet Take 1 tablet by mouth daily 20  Yes Constantine Abreu MD   carvedilol (COREG) 3.125 MG tablet Take 1 tablet by mouth 2 times daily 20  Yes Constantine Abreu MD   sodium bicarbonate 650 MG tablet Take 1 tablet by mouth 2 times daily 20 Yes Constantine Abreu MD   atorvastatin (LIPITOR) 80 MG tablet Take 1 tablet by mouth nightly 20  Yes Sharee Addison MD   Insulin Pen Needle ([employer]ER PEN NEEDLES 31G) 31G X 8 MM MISC USE WITH INSULIN FOUR TIMES A DAY 20  Yes Ruth Wade MD   insulin glargine (LANTUS SOLOSTAR) 100 UNIT/ML injection pen INJECT 24 UNITS UNDER THE SKIN EVERY NIGHT AT BEDTIME 20  Yes Michelet Pederson MD   insulin lispro, 1 Unit Dial, 100 UNIT/ML SOPN INJECT 12-18 UNITS UNDER THE SKIN THREE TIMES DAILY BEFORE MEALS 20  Yes Michelet Pederson MD   levothyroxine (SYNTHROID) 50 MCG tablet TAKE ONE TABLET BY MOUTH DAILY 20  Yes Michelet Pederson MD   Multiple Vitamins-Minerals (THERAPEUTIC MULTIVITAMIN-MINERALS) tablet Take 1 tablet by mouth daily   Yes Historical Provider, MD   aspirin 81 MG chewable tablet Take 1 tablet by mouth daily.  3/28/12  Yes Beata Guillaume MD   sodium zirconium cyclosilicate (LOKELMA) 10 g PACK oral suspension Take 10 g by mouth three times a week 20   Constantine Abreu MD   Blood Glucose Monitoring Suppl (ACCU-CHEK JOHNATHAN PLUS) w/Device KIT As needed 8/31/20   Minerva Chin MD   blood glucose test strips (ACCU-CHEK JOHNATHAN PLUS) strip 1 each by In Vitro route 2 times daily As needed. 8/31/20   Minerva Chin MD   lidocaine 4 % external patch Place 1 patch onto the skin daily 8/29/20   Lilibeth Hardy MD   clopidogrel (PLAVIX) 75 MG tablet Take 1 tablet by mouth daily 8/29/20   Lilibeth Hardy MD   cyanocobalamin 1000 MCG tablet Take 1,000 mcg by mouth daily    Historical Provider, MD   ACCU-CHEK JOHNATHAN PLUS strip USE TO TEST FOUR TIMES DAILY AS NEEDED 4/28/20   Minerva Chin MD   Continuous Blood Gluc  (FREESTYLE MAGGIE 14 DAY READER) [address] As needed 3/19/20   Minerva Chin MD   Continuous Blood Gluc Sensor (FREESTYLE MAGGIE 14 DAY SENSOR) MISC Every 2 weeks 3/19/20   Minerva Chin MD   Handicap Placard MISC by Does not apply route Duration: 5 years 4/18/18   Marybeth Suarez MD   ACCU-CHEK SOFTCLIX LANCETS MISC USE FOUR TIMES A DAY 11/21/17   Minerva Chin MD   glucose blood VI test strips (ACCU-CHEK JOHNATHAN) strip 4 times a day As needed. 11/21/16   Minerva Chin MD   Blood Glucose Monitoring Suppl (ACCU-CHEK JOHNATHAN) RUMA Use as needed 11/21/16   Minerva Chin MD   Lancets MISC 2 times daily. Lancets for a Anna meter.  7/16/10   Chang Walker MD       Current medications:    Current Facility-Administered Medications   Medication Dose Route Frequency Provider Last Rate Last Dose    vancomycin (VANCOCIN) 1,000 mg in dextrose 5 % 250 mL IVPB  1,000 mg Intravenous Once Estela Shade,  mL/hr at 10/26/20 0645 1,000 mg at 10/26/20 0645    0.9 % sodium chloride infusion   Intravenous Continuous Prakash Book,  mL/hr at 10/26/20 0600      lactated ringers infusion   Intravenous Continuous Pratik Cruz MD        sodium chloride flush 0.9 % injection 10 mL  10 mL Intravenous 2 times per day Pratik Cruz MD        sodium chloride flush 0.9 % injection 10 mL  10 mL Intravenous PRN Pancho Mccormick Sohail Stevenson MD        lidocaine PF 1 % injection 1 mL  1 mL Intradermal Once PRN Alma Hernandez MD           Allergies:     Allergies   Allergen Reactions    Clindamycin     Clindamycin/Lincomycin     Penicillins     Tazorac [Tazarotene]      Cream - rash       Problem List:    Patient Active Problem List   Diagnosis Code    Essential hypertension I10    Generalized osteoarthrosis, involving multiple sites M15.9    Arthritis with psoriasis ([facility] L40.50    Sleep apnea G47.30    Psoriatic arthropathy ([facility] L40.50    Allergic rhinitis due to other allergen J30.89    Coronary atherosclerosis I25.10    Vitamin D deficiency E55.9    Glaucoma H40.9    Nephropathy N28.9    S/P CABG x 4 Z95.1    ILD (interstitial lung disease) ([facility]) J84.9    Abdominal tenderness, RLQ (right lower quadrant) R10.813    Chronic diastolic heart failure ([facility]) I50.32    Pulmonary edema cardiac cause ([facility]) I50.1    Coronary artery disease involving native coronary artery of native heart with unstable angina pectoris ([facility]) I25.110    BJ (obstructive sleep apnea) G47.33    Cough variant asthma J45.991    NSTEMI (non-ST elevated myocardial infarction) ([facility]) I21.4    Insulin dependent type 2 diabetes mellitus, controlled ([facility]) E11.9, Z79.4    Chronic fatigue R53.82    Syncope and collapse R55    Diabetes mellitus type 2 in nonobese ([facility]) E11.9    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 04/29/2015    Diabetic eye exam ([facility] 5/5/2016    [place] [facility]    Generalized osteoarthrosis, involving multiple sites 7/12/2010    Other psoriasis 7/12/2010    Pneumonia     Prolonged emergence from general anesthesia     Psoriatic arthropathy ([facility]) 2010    Type II or unspecified type diabetes mellitus without mention of complication, not stated as uncontrolled 2010    Unspecified essential hypertension 2010    Unspecified sleep apnea 2010       Past Surgical History:        Procedure Laterality Date    CARPAL TUNNEL RELEASE      s/p    CATARACT REMOVAL      CORONARY ARTERY BYPASS GRAFT      JOINT REPLACEMENT         Social History:    Social History     Tobacco Use    Smoking status: Former Smoker     Packs/day: 1.00     Years: 18.00     Pack years: 18.00     Start date: 1961     Last attempt to quit: 1980     Years since quittin.0    Smokeless tobacco: Never Used   Substance Use Topics    Alcohol use: No                                Counseling given: Not Answered      Vital Signs (Current):   Vitals:    10/26/20 0548 10/26/20 0550 10/26/20 0552   BP: (!) 200/88 (!) 210    Pulse: 72 68    Resp: 16     Temp: 97.6 °F (36.4 °C)     TempSrc: Oral     SpO2: 96%     Weight:   189 lb 1 oz (85.8 kg)   Height: 5' 10\" (1.778 m)                                                BP Readings from Last 3 Encounters:   10/26/20 (!) 210/81   10/22/20 (!) 147/86   10/15/20 (!) 168/71       NPO Status: Time of last liquid consumption: 0300                        Time of last solid consumption: 1830                        Date of last liquid consumption: 10/26/20                        Date of last solid food consumption: 10/25/20    BMI:   Wt Readings from Last 3 Encounters:   10/26/20 189 lb 1 oz (85.8 kg)   10/22/20 193 lb (87.5 kg)   10/15/20 193 lb 9.6 oz (87.8 kg)     Body mass index is 27.13 kg/m².     CBC:   Lab Results   Component Value Date    WBC 10.0 10/20/2020    RBC 3.05 10/20/2020    HGB 10.0 10/20/2020    HCT 29.6 10/20/2020    MCV 97.3 10/20/2020    RDW 12.4 10/20/2020     10/20/2020       CMP:   Lab Results   Component Value Date     10/20/2020    K 4.8 10/20/2020    K 4.4 10/15/2020     10/20/2020    CO2 24 10/20/2020    BUN 92 10/20/2020    CREATININE 4.9 10/20/2020    GFRAA 14 10/20/2020    GFRAA 60 05/21/2013    AGRATIO 1.9 11/04/2019    LABGLOM 12 10/20/2020    GLUCOSE 197 10/20/2020    GLUCOSE 163 07/22/2011    PROT 6.6 11/04/2019    PROT 7.0 08/15/2012    CALCIUM 8.7 10/20/2020    BILITOT 0.4 11/04/2019    ALKPHOS 83 11/04/2019    AST 32 11/04/2019    ALT 40 11/04/2019       POC Tests:   Recent Labs     10/26/20  0628   POCGLU 137*       Coags:   Lab Results   Component Value Date    PROTIME 10.1 01/23/2017    INR 0.89 01/23/2017    APTT 29.5 01/15/2017       HCG (If Applicable): No results found for: PREGTESTUR, PREGSERUM, HCG, HCGQUANT     ABGs:   Lab Results   Component Value Date    PHART 7.428 01/16/2017    PO2ART 101 01/16/2017    WMV0ZHR 47 01/16/2017    DVU8MBA 30.8 01/16/2017    BEART 7 01/16/2017    I2DFHPXU 98 01/16/2017        Type & Screen (If Applicable):  No results found for: LABABO, [address]    Drug/Infectious Status (If Applicable):  No results found for: HIV, HEPCAB    COVID-19 Screening (If Applicable):   Lab Results   Component Value Date    COVID19 Not Detected 10/20/2020         Anesthesia Evaluation  Patient summary reviewed history of anesthetic complications (prolonged emergence ):   Airway: Mallampati: I  TM distance: >3 FB   Neck ROM: full  Mouth opening: > = 3 FB Dental: normal exam         Pulmonary:   (+) sleep apnea:                            ROS comment: [facility] ([id number]) - 77 y.o. Male     Procedure Summary     Case: [id number]  Date/Time: 10/26/20 0715   Procedure: LAPAROSCOPIC PERITONEAL DIALYSIS CATHETER PLACEMENT (N/A )   Anesthesia type: General   Diagnosis: Chronic kidney disease, stage V ([address] (N18.5)   Pre-op diagnosis: Chronic kidney disease, stage V ([address] (N18.5)   Location: [facility] OR 03 / [facility]   Surgeon:  Britt Foreman, DO   Pre Vitals      Current as of 10/26/20 0701   BP: 210/81 Pulse: 68   Resp: 16  SpO2: 96   Temp: 97.6 °F (36.4 °C)   Height: 5' 10\" (1.778 m)  (10/26/20)  Weight: 189 lb 1 oz (85.8 kg)  (10/26/20)   BMI: 27.13  IBW: 160 lb 15 oz (73 kg)   Last edited 10/26/20 0550 by DL   Currently displaying vitals information from multiple entries within 120 minutes of most recent vitals.    Allergies     Clindamycin   Clindamycin/lincomycin   Penicillins   Tazorac (Tazarotene)   Prescription Medications      Last Taken  Last Updated   Cholecalciferol (VITAMIN D) 50 MCG (2000 UT) CAPS capsule    10/25/2020  10/26/20 0546   furosemide (LASIX) 20 MG tablet    10/25/2020  10/26/20 0546   carvedilol (COREG) 3.125 MG tablet    10/26/2020  10/26/20 0546   sodium zirconium cyclosilicate (LOKELMA) 10 g PACK oral suspension    10/23/2020  10/26/20 0546   sodium bicarbonate 650 MG tablet    10/25/2020  10/26/20 0546   Blood Glucose Monitoring Suppl (ACCU-CHEK JOHNATHAN PLUS) w/Device KIT    Taking  10/15/20 0856   blood glucose test strips (ACCU-CHEK JOHNATHAN PLUS) strip    Taking  10/15/20 0856   atorvastatin (LIPITOR) 80 MG tablet    10/25/2020  10/26/20 0546   lidocaine 4 % external patch    Taking  10/15/20 0856   clopidogrel (PLAVIX) 75 MG tablet    10/18/2020  10/26/20 0546   Insulin Pen Needle ([employer] PEN NEEDLES 31G) 31G X 8 MM MISC    10/25/2020  10/26/20 0546   cyanocobalamin 1000 MCG tablet    Taking  10/15/20 0856   insulin glargine (LANTUS SOLOSTAR) 100 UNIT/ML injection pen    10/25/2020  10/26/20 0546   insulin lispro, 1 Unit Dial, 100 UNIT/ML SOPN    10/25/2020  10/26/20 0546   ACCU-CHEK JOHNATHAN PLUS strip    Taking  10/15/20 0856   Continuous Blood Gluc  (FREESTYLE MAGGIE 14 DAY READER) RUMA    Taking  10/15/20 0856   Continuous Blood Gluc Sensor (FREESTYLE MAGGIE 14 DAY SENSOR) MISC    Taking  10/15/20 0856   levothyroxine (SYNTHROID) 50 MCG tablet    10/25/2020  10/26/20 0546   Multiple Vitamins-Minerals (THERAPEUTIC MULTIVITAMIN-MINERALS) tablet    10/25/2020  10/26/20 0546 Handicap Placard MISC    Taking  10/15/20 0856   ACCU-CHEK SOFTCLIX LANCETS MISC    Taking  10/15/20 0856   glucose blood VI test strips (ACCU-CHEK JOHNATHAN) strip    Taking  10/15/20 0856   Blood Glucose Monitoring Suppl (ACCU-CHEK JOHNATHAN) RUMA    Taking  10/15/20 0856   aspirin 81 MG chewable tablet    10/25/2020  10/26/20 0546   Lancets MISC    Taking  10/15/20 0856   Facility Administered Medications     vancomycin (VANCOCIN) 1,000 mg in dextrose 5 % 250 mL IVPB      0.9 % sodium chloride infusion     lactated ringers infusion      sodium chloride flush 0.9 % injection 10 mL     sodium chloride flush 0.9 % injection 10 mL      lidocaine PF 1 % injection 1 mL     Problem List      Current as of 10/26/20 0701   Essential hypertension   Generalized osteoarthrosis, involving multiple sites   Arthritis with psoriasis ([facility])   Sleep apnea   Psoriatic arthropathy ([facility])   Allergic rhinitis due to other allergen   Coronary atherosclerosis   Vitamin D deficiency   Glaucoma   Nephropathy   S/P CABG x 4   ILD (interstitial lung disease) ([facility])   Abdominal tenderness, RLQ (right lower quadrant)   Chronic diastolic heart failure ([facility])   Pulmonary edema cardiac cause ([address]   Coronary artery disease involving native coronary artery of native heart with unstable angina pectoris ([facility])   BJ (obstructive sleep apnea)   Cough variant asthma   NSTEMI (non-ST elevated myocardial infarction) ([facility])   Insulin dependent type 2 diabetes mellitus, controlled ([facility])   Chronic fatigue   Syncope and collapse   Diabetes mellitus type 2 in nonobese ([address]   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 ([address]   Acquired hypothyroidism   Unstable angina ([address]   Orthostatic hypotension   Medical History for Anesthesia      Current as of 10/26/20 0701   History  Comments  History  Comments   Unspecified essential hypertension   Type II or unspecified type diabetes mellitus without mention of complication, not stated as uncontrolled    Generalized (Interpreting   physician) on 04/20/2018 at 12:10 PM   ------------------------------------------------------------------        Neuro/Psych:                ROS comment: Recent syncope GI/Hepatic/Renal:   (+) renal disease: ESRD and dialysis,           Endo/Other:    (+) Diabetes, : arthritis: OA., .                 Abdominal:           Vascular:                                        Anesthesia Plan      general     ASA 3       Induction: intravenous. Anesthetic plan and risks discussed with patient.                       Manuel More MD   10/26/2020

## 2020-10-26 NOTE — PROGRESS NOTES
Patient resting with eyes closed, upon calling his name patient did open his eyes. Wife remains at the bedside stating it does take him a long time to wake up.

## 2020-10-26 NOTE — PROGRESS NOTES
PACU Transfer to [facility]    Vitals:    10/26/20 1045   BP: (!) 166/76   Pulse: 90   Resp: 16   Temp: 97.3 °F (36.3 °C)   SpO2: 94%     Bp wnl FOR THIS PT     Intake/Output Summary (Last 24 hours) at 10/26/2020 1103  Last data filed at 10/26/2020 1045  Gross per 24 hour   Intake 805 ml   Output 510 ml   Net 295 ml       Pain assessment:  receiving treatment  Pain Level: 5    Patient transferred to care of MAIDA RN.    10/26/2020 11:03 AM

## 2020-10-26 NOTE — PROGRESS NOTES
0670 Dr Rodrigo Donohue and Demond Garcia CRNA AT [facility] 27 SORENESS THAT IT IS NEW POST SURGERY

## 2020-10-26 NOTE — ANESTHESIA POSTPROCEDURE EVALUATION
Department of Anesthesiology  Postprocedure Note    Patient: Solomon Antony  MRN: [id number]  YOB: 1945  Date of evaluation: 10/26/2020  Time:  12:10 PM     Procedure Summary     Date:  10/26/20 Room / Location:  [address] [place] [address] 03 / [facility]    Anesthesia Start:  6301 Anesthesia Stop:  2276    Procedure:  LAPAROSCOPIC PERITONEAL DIALYSIS CATHETER PLACEMENT; LAPAROSCOPIC OMENTOPEXY (N/A Abdomen) Diagnosis:       Chronic kidney disease, stage V ([address]      (Chronic kidney disease, stage V ([address] [N18.5])    Surgeon: Gilberto Perry DO Responsible Provider:  Danna Quezada MD    Anesthesia Type:  general ASA Status:  3          Anesthesia Type: general    Marti Phase I: Marti Score: 8    Marti Phase II:      Last vitals: Reviewed and per EMR flowsheets.        Anesthesia Post Evaluation    Patient location during evaluation: PACU  Patient participation: complete - patient participated  Level of consciousness: awake  Airway patency: patent  Nausea & Vomiting: no nausea and no vomiting  Complications: no  Cardiovascular status: hemodynamically stable  Respiratory status: acceptable  Hydration status: stable

## 2020-10-28 NOTE — OP NOTE
DATE OF PROCEDURE:  10/28/20     PREOPERATIVE DIAGNOSIS: CKD stage 5 requiring dialysis     POSTOPERATIVE DIAGNOSIS: Same as pre-op     PROCEDURES PERFORMED:  1. Laparoscopic peritoneal dialysis catheter placement. 2. Laparoscopic omentopexy. SURGEON:  Johann Davison DO     ASSISTANT: Marko Galdamez     ANESTHESIA:  General endotracheal.     COMPLICATIONS:  None. CONDITION:  Stable. EBL: 10 cc     INDICATIONS FOR PROCEDURE:  The patient is a 77year old  male consented for PD catheter placement     DESCRIPTION OF PROCEDURE:  The patient was brought to the operating suite, laid in supine position with arms outstretched, and after induction of general endotracheal anesthesia; tube was confirmed to be in place with end-tidal CO2, and secured. Sharp catheter was placed with clear return of urine. The patient was prepped and draped in usual sterile manner with Ioban placed on top of the skin. A small incision was made at the left midclavicular line using the Veress needle. Abdomen was entered and pneumoperitoneum established with 12 mm of CO2. A 5-mm 30-degree camera housed in a 5-mm Optiview trocar was used to enter the abdomen under direct visualization in the left upper quadrant. Once inside the abdomen, an additional 5-mm trocar was placed on the right side. Attention was paid to the omentum which was seen to be long and draping down into the pelvis. This was grasped with a laparoscopic grasper and brought up to the upper left abdomen above just above umbilicus. A suture passer device with #0 Vicryl suture was then used to create an omentopexy and picture was taken after this was accomplished. Hemostasis was maintained. After this, a 57 cm dual-cuff coil-tipped catheter was then  introduced into the left rectus muscle under direct visualization. Deep cuff was positioned in the rectus muscle. At that point, 2 liters of fluid was instilled into the abdomen and removed without difficulty.   One last look laparoscopically was performed after catheter was clipped, clamped, and heparin locked. The catheter was seen to be appropriately going down in the pelvis and omentopexy was seen to be hemostatic. Pneumoperitoneum was evacuated. The patient tolerated the procedure well and was brought to the postanesthesia care unit in stable condition. All sponge, needle, and instrument counts were correct x2. I personally spoke to the patient's family at the end of the procedure.

## 2020-11-23 ENCOUNTER — VIRTUAL VISIT (OUTPATIENT)
Dept: INTERNAL MEDICINE CLINIC | Age: 75
End: 2020-11-23
Payer: MEDICARE

## 2020-11-23 VITALS
BODY MASS INDEX: 28.35 KG/M2 | WEIGHT: 198 LBS | DIASTOLIC BLOOD PRESSURE: 67 MMHG | OXYGEN SATURATION: 97 % | TEMPERATURE: 97.3 F | HEIGHT: 70 IN | HEART RATE: 73 BPM | SYSTOLIC BLOOD PRESSURE: 141 MMHG

## 2020-11-23 PROCEDURE — 3044F HG A1C LEVEL LT 7.0%: CPT | Performed by: INTERNAL MEDICINE

## 2020-11-23 PROCEDURE — G8484 FLU IMMUNIZE NO ADMIN: HCPCS | Performed by: INTERNAL MEDICINE

## 2020-11-23 PROCEDURE — G0439 PPPS, SUBSEQ VISIT: HCPCS | Performed by: INTERNAL MEDICINE

## 2020-11-23 PROCEDURE — 4040F PNEUMOC VAC/ADMIN/RCVD: CPT | Performed by: INTERNAL MEDICINE

## 2020-11-23 PROCEDURE — 1123F ACP DISCUSS/DSCN MKR DOCD: CPT | Performed by: INTERNAL MEDICINE

## 2020-11-23 PROCEDURE — 3017F COLORECTAL CA SCREEN DOC REV: CPT | Performed by: INTERNAL MEDICINE

## 2020-11-23 ASSESSMENT — PATIENT HEALTH QUESTIONNAIRE - PHQ9
SUM OF ALL RESPONSES TO PHQ QUESTIONS 1-9: 0
2. FEELING DOWN, DEPRESSED OR HOPELESS: 0
1. LITTLE INTEREST OR PLEASURE IN DOING THINGS: 0
SUM OF ALL RESPONSES TO PHQ QUESTIONS 1-9: 0
SUM OF ALL RESPONSES TO PHQ QUESTIONS 1-9: 0
SUM OF ALL RESPONSES TO PHQ9 QUESTIONS 1 & 2: 0

## 2020-11-23 ASSESSMENT — LIFESTYLE VARIABLES: HOW OFTEN DO YOU HAVE A DRINK CONTAINING ALCOHOL: 0

## 2020-11-23 NOTE — PATIENT INSTRUCTIONS
Personalized Preventive Plan for Fazal Christine - 11/23/2020  Medicare offers a range of preventive health benefits. Some of the tests and screenings are paid in full while other may be subject to a deductible, co-insurance, and/or copay. Some of these benefits include a comprehensive review of your medical history including lifestyle, illnesses that may run in your family, and various assessments and screenings as appropriate. After reviewing your medical record and screening and assessments performed today your provider may have ordered immunizations, labs, imaging, and/or referrals for you. A list of these orders (if applicable) as well as your Preventive Care list are included within your After Visit Summary for your review. Other Preventive Recommendations:    · A preventive eye exam performed by an eye specialist is recommended every 1-2 years to screen for glaucoma; cataracts, macular degeneration, and other eye disorders. · A preventive dental visit is recommended every 6 months. · Try to get at least 150 minutes of exercise per week or 10,000 steps per day on a pedometer . · Order or download the FREE \"Exercise & Physical Activity: Your Everyday Guide\" from [employer]. Call 4-620.649.4389 or search [employer] online. · You need 5099-2800 mg of calcium and 4746-3024 IU of vitamin D per day. It is possible to meet your calcium requirement with diet alone, but a vitamin D supplement is usually necessary to meet this goal.  · When exposed to the sun, use a sunscreen that protects against both UVA and UVB radiation with an SPF of 30 or greater. Reapply every 2 to 3 hours or after sweating, drying off with a towel, or swimming. · Always wear a seat belt when traveling in a car. Always wear a helmet when riding a bicycle or motorcycle.

## 2020-11-23 NOTE — PROGRESS NOTES
Medicare Annual Wellness Visit  Name: Jud Moya Date: 2020   MRN: <[id number]> Sex: Male   Age: 77 y.o. Ethnicity: Non-/Non    : 1945 Race: Zuly Mccarthy is here for No chief complaint on file. Screenings for behavioral, psychosocial and functional/safety risks, and cognitive dysfunction are all negative except as indicated below. These results, as well as other patient data from the [address] [facility] [address] form, are documented in Flowsheets linked to this Encounter. Allergies   Allergen Reactions    Clindamycin     Clindamycin/Lincomycin     Penicillins     Tazorac [Tazarotene]      Cream - rash         Prior to Visit Medications    Medication Sig Taking? Authorizing Provider   bisacodyl (BISACODYL) 5 MG EC tablet Take 2 tablets by mouth daily as needed for Constipation Yes Constantino Mejía MD   furosemide (LASIX) 20 MG tablet Take 1 tablet by mouth daily Yes Constantino Mejía MD   carvedilol (COREG) 3.125 MG tablet Take 1 tablet by mouth 2 times daily Yes Constantino Mejía MD   sodium zirconium cyclosilicate (LOKELMA) 10 g PACK oral suspension Take 10 g by mouth three times a week Yes Constantino Mejía MD   sodium bicarbonate 650 MG tablet Take 1 tablet by mouth 2 times daily Yes Constantino Mejía MD   Blood Glucose Monitoring Suppl (ACCU-CHEK JOHNATHAN PLUS) w/Device KIT As needed Yes Sal Garcia MD   blood glucose test strips (ACCU-CHEK JOHNATHAN PLUS) strip 1 each by In Vitro route 2 times daily As needed.  Yes Sal Garcia MD   atorvastatin (LIPITOR) 80 MG tablet Take 1 tablet by mouth nightly Yes Homero Nguyen MD   lidocaine 4 % external patch Place 1 patch onto the skin daily Yes Homero Nguyen MD   clopidogrel (PLAVIX) 75 MG tablet Take 1 tablet by mouth daily Yes Homero Nguyen MD   Insulin Pen Needle ([employer] PEN NEEDLES 31G) 31G X 8 MM MISC USE WITH INSULIN FOUR TIMES A DAY Yes Jessenia Vyas MD   insulin glargine (LANTUS SOLOSTAR) 100 anesthesia     Psoriatic arthropathy ([facility] [address] 7/12/2010    Type II or unspecified type diabetes mellitus without mention of complication, not stated as uncontrolled 7/12/2010    Unspecified essential hypertension 7/12/2010    Unspecified sleep apnea 7/12/2010       Past Surgical History:   Procedure Laterality Date    CARPAL TUNNEL RELEASE      s/p    CATARACT REMOVAL      CORONARY ARTERY BYPASS GRAFT      JOINT REPLACEMENT      LAPAROSCOPY INSERTION PERITONEAL CATHETER N/A 10/26/2020    LAPAROSCOPIC PERITONEAL DIALYSIS CATHETER PLACEMENT; LAPAROSCOPIC OMENTOPEXY performed by Cornel Rae DO at [facility] OR         Family History   Problem Relation Age of Onset    Diabetes Mother     Heart Failure Mother     Coronary Art Dis Father        CareTeam (Including outside providers/suppliers regularly involved in providing care):   Patient Care Team:  Oscar Woo MD as PCP - General (Internal Medicine)  Oscar Woo MD as PCP - [facility] Empaneled Provider  Phyllis Geronimo as Physician (Rheumatology)  Josue Bryant MD as Consulting Physician (Pulmonology)    Wt Readings from Last 3 Encounters:   11/23/20 198 lb (89.8 kg)   11/03/20 198 lb (89.8 kg)   10/26/20 189 lb 1 oz (85.8 kg)     No flowsheet data found. Body mass index is 28.41 kg/m². Based upon direct observation of the patient, evaluation of cognition reveals recent and remote memory intact. General Appearance: alert and oriented to person, place and time, well-developed and well-nourished, in no acute distress  Skin: warm and dry, no rash or erythema  Head: normocephalic and atraumatic  Eyes: conjunctivae normal  Pulmonary/Chest: normal work of breathing    Patient's complete Health Risk Assessment and screening values have been reviewed and are found in Flowsheets. The following problems were reviewed today and where indicated follow up appointments were made and/or referrals ordered.     Positive Risk Factor Screenings with Interventions:     No Positive Risk Factors identified today. Personalized Preventive Plan   Current Health Maintenance Status  Immunization History   Administered Date(s) Administered    Influenza Vaccine, unspecified formulation 10/16/2014, 10/19/2015, 10/21/2016    Influenza, High Dose (Fluzone 65 yrs and older) 10/16/2014, 10/19/2015, 10/21/2016, 09/01/2017, 11/16/2018    Influenza, Quadv, adjuvanted, 65 yrs +, IM, PF (Fluad) 09/08/2020    Influenza, Triv, inactivated, subunit, adjuvanted, IM (Fluad 65 yrs and older) 11/20/2019    PPD Test 02/02/2016    Pneumococcal Conjugate 13-valent ([id number]) 10/19/2015    Pneumococcal Polysaccharide ([id number]) 10/16/2014    Tdap (Boostrix, Adacel) 08/12/2016        Health Maintenance   Topic Date Due    Hepatitis C screen  1945    Shingles Vaccine (1 of 2) 03/19/1996    Diabetic foot exam  02/20/2019    Annual Wellness Visit (AWV)  05/29/2019    Lipid screen  11/04/2020    Diabetic retinal exam  10/15/2021 (Originally 5/16/2020)    TSH testing  08/26/2021    A1C test (Diabetic or Prediabetic)  09/08/2021    Potassium monitoring  10/26/2021    Creatinine monitoring  10/26/2021    Colon cancer screen colonoscopy  01/31/2023    DTaP/Tdap/Td vaccine (2 - Td) 08/12/2026    Flu vaccine  Completed    Pneumococcal 65+ yrs at Risk Vaccine  Completed    AAA screen  Completed    Hepatitis A vaccine  Aged Out    Hib vaccine  Aged Out    Meningococcal (ACWY) vaccine  Aged Out     Recommendations for [facility] Due: see orders and patient instructions/AVS.  .   Recommended screening schedule for the next 5-10 years is provided to the patient in written form: see Patient Instructions/AVS.    Diagnoses and all orders for this visit:    Routine general medical examination at a health care facility  - he did get another prevnar through dialysis    Diabetes mellitus type 2 in nonobese [facility])  - seeing endocrinologist, stable, continue current management    Arthritis with

## 2020-12-01 ENCOUNTER — VIRTUAL VISIT (OUTPATIENT)
Dept: ENDOCRINOLOGY | Age: 75
End: 2020-12-01
Payer: MEDICARE

## 2020-12-01 PROCEDURE — 3044F HG A1C LEVEL LT 7.0%: CPT | Performed by: INTERNAL MEDICINE

## 2020-12-01 PROCEDURE — G8427 DOCREV CUR MEDS BY ELIG CLIN: HCPCS | Performed by: INTERNAL MEDICINE

## 2020-12-01 PROCEDURE — 4040F PNEUMOC VAC/ADMIN/RCVD: CPT | Performed by: INTERNAL MEDICINE

## 2020-12-01 PROCEDURE — 1123F ACP DISCUSS/DSCN MKR DOCD: CPT | Performed by: INTERNAL MEDICINE

## 2020-12-01 PROCEDURE — 3017F COLORECTAL CA SCREEN DOC REV: CPT | Performed by: INTERNAL MEDICINE

## 2020-12-01 PROCEDURE — 2022F DILAT RTA XM EVC RTNOPTHY: CPT | Performed by: INTERNAL MEDICINE

## 2020-12-01 PROCEDURE — 99213 OFFICE O/P EST LOW 20 MIN: CPT | Performed by: INTERNAL MEDICINE

## 2020-12-01 NOTE — PROGRESS NOTES
years, takes Losartan 25mg ( stopped by nephrologist) coreg 6.25mg BID    H/o subclinical hypothyroidism,stable    TSH 3.09 on 5/18  Levothyroxine 50mcg    He has neuropathy,was on neurontin    Past Medical History:   Diagnosis Date    Allergic rhinitis due to other allergen 7/12/2010    Coronary atherosclerosis of unspecified type of vessel, native or graft 7/12/2010    Diabetic eye exam ([facility] [address] 04/29/2015    Diabetic eye exam ([facility] [address] 5/5/2016    [facility]    Generalized osteoarthrosis, involving multiple sites 7/12/2010    Other psoriasis 7/12/2010    Pneumonia     Prolonged emergence from general anesthesia     Psoriatic arthropathy ([facility] [address] 7/12/2010    Type II or unspecified type diabetes mellitus without mention of complication, not stated as uncontrolled 7/12/2010    Unspecified essential hypertension 7/12/2010    Unspecified sleep apnea 7/12/2010     Past Surgical History:   Procedure Laterality Date    CARPAL TUNNEL RELEASE      s/p    CATARACT REMOVAL      CORONARY ARTERY BYPASS GRAFT      JOINT REPLACEMENT      LAPAROSCOPY INSERTION PERITONEAL CATHETER N/A 10/26/2020    LAPAROSCOPIC PERITONEAL DIALYSIS CATHETER PLACEMENT; LAPAROSCOPIC OMENTOPEXY performed by Pratik Yoo DO at [facility]    Constitutional: Negative for weight loss and malaise/fatigue. Negative for fever      Eyes: Negative for blurred vision. Negative for double vision, photophobia and pain. Respiratory: Negative for cough and sputum production. Cardiovascular: Negative for chest pain, palpitations and leg swelling. Gastrointestinal: Negative for nausea, vomiting and abdominal pain. Genitourinary: Negative for dysuria, urgency and frequency. Skin: Negative for itching and rash. Neurological: Negative for dizziness. Negative for tingling, tremors, focal weakness and headaches.    Endo/Heme/Allergies: see HPI    PHYSICAL EXAMINATION:  [ INSTRUCTIONS:  \"[x]\" Indicates a positive item  \"[]\" Indicates a negative item  -- DELETE ALL ITEMS NOT EXAMINED]  Vital Signs: (As obtained by patient/caregiver or practitioner observation)    Blood pressure-  Heart rate-    Respiratory rate-    Temperature-  Pulse oximetry-     Constitutional Appears well-developed and well-nourished No apparent distress        Mental status  Alert and awake  Oriented to person/place/time  Able to follow commands      Eyes:  EOM    [x]  Normal    Sclera  [x]  Normal           Discharge [x]  None visible      HENT:   [x] Normocephalic, atraumatic. [x] Mouth/Throat: Mucous membranes are moist.     External Ears [x] Normal  no discharge    Neck: [x] No visualized mass  no swelling    Pulmonary/Chest: [x] Respiratory effort normal.  [x] No visualized signs of difficulty breathing or respiratory distress             Musculoskeletal:   [x] Normal gait with no signs of ataxia         [x] Normal range of motion of neck          Head and neck stable, appears normal ROM, strength good    Neurological:        [x] No Facial Asymmetry (Cranial nerve 7 motor function) (limited exam to video visit)          [x] No gaze palsy                Skin:        [x] No significant exanthematous lesions or discoloration noted on facial skin                 Psychiatric:       [x] Normal Affect [x] No Hallucinations            Other pertinent observable physical exam findings-     Due to this being a TeleHealth encounter, evaluation of the following organ systems is limited: Vitals/Constitutional/EENT/Resp/CV/GI//MS/Neuro/Skin/Heme-Lymph-Imm. Services were provided through a video synchronous discussion virtually to substitute for in-person clinic visit.          5/19  Skeletal foot exam is normal, no skin lesions, toenails are normal, DP not palpable,  10 g monofilament is 10/10 on the right and 10/10 on the left    Lab Reviewed   No components found for: CHLPL  Lab Results   Component Value Date    TRIG 265 (H) 11/04/2019    TRIG 205 (H) 11/16/2018    TRIG 198 (H) 11/09/2017     Lab Results   Component Value Date    HDL 39 (L) 11/04/2019    HDL 47 11/16/2018    HDL 46 11/09/2017     Lab Results   Component Value Date    LDLCALC 14 11/04/2019    LDLCALC 58 11/16/2018    LDLCALC 44 11/09/2017     Lab Results   Component Value Date    LABVLDL 53 11/04/2019    LABVLDL 41 11/16/2018    LABVLDL 40 11/09/2017     Lab Results   Component Value Date    LABA1C 6.2 09/08/2020       Assessment:     Edmar Ascencio is a 77 y.o. male with :    1.T2DM: Longstanding,controlled, insulin resistant, goal A1c 7-8%. Reviewed log,he has post supper highs, adjust dose. Advised Dexcom not approved in dialysis patient, use glucose meter for self monitoring. 2.HTN: BP at goal. Continue same, per patient  3. HLD: LDL at goal, he is  On statin  4. Obesity  5. CAD  6. CKD III  7. Psoriatic arthritis: Reports flare in psoriatic plaque, will see dermatology  8. Neuropathy: Had been on lyrica, prescribed topical , has helped. Started neurontin, did not help, increase dose to 600mg TID. He does have some drowsiness, off of it. Advise B12  9. Fatigue:  TSH high with normal FT4, subclinical hypothyroid, advise to try replacement. He was inquiring about Co-Q 10 , advised can take 200mg  His total testosterone was normal with free level low normal. Advised given total is in a good range, h/o CAD, Age, recommend no replacement at this point. 10. Subclinical hypothyroid: On replacement,  Last level normal, continue levothyroxine    Plan:       lantus  24 units    Humalog 12/12/14 units TID  SSI 2 for 50>150   Advise to check blood sugar 4 times a day   Patient to send blood sugar log for titration. Advise to low simple carbohydrate and protein with each  meal diet. Diabetes Care: recommend yearly eye exam, foot exam and urine microalbumin to  creatinine ratio.     -Hyperlipidemia, LDL goal is <70 mg/dl   -Hypertension: Continue same  -Daily ASA:81mg  -Smoking status: Non smoker

## 2020-12-21 RX ORDER — PEN NEEDLE, DIABETIC 31 GX5/16"
NEEDLE, DISPOSABLE MISCELLANEOUS
Qty: 100 EACH | Refills: 1 | Status: SHIPPED | OUTPATIENT
Start: 2020-12-21 | End: 2021-04-29

## 2020-12-28 ENCOUNTER — OFFICE VISIT (OUTPATIENT)
Dept: PRIMARY CARE CLINIC | Age: 75
End: 2020-12-28
Payer: MEDICARE

## 2020-12-28 LAB — SARS-COV-2, NAA: DETECTED

## 2020-12-28 PROCEDURE — G8417 CALC BMI ABV UP PARAM F/U: HCPCS | Performed by: NURSE PRACTITIONER

## 2020-12-28 PROCEDURE — G8428 CUR MEDS NOT DOCUMENT: HCPCS | Performed by: NURSE PRACTITIONER

## 2020-12-28 PROCEDURE — 99211 OFF/OP EST MAY X REQ PHY/QHP: CPT | Performed by: NURSE PRACTITIONER

## 2020-12-28 NOTE — PROGRESS NOTES
Petey Bell received a viral test for COVID-19. They were educated on isolation and quarantine as appropriate. For any symptoms, they were directed to seek care from their PCP, given contact information to establish with a doctor, directed to an urgent care or the emergency room.

## 2020-12-29 NOTE — RESULT ENCOUNTER NOTE
Patient notified. Your test came back detected/positive for Covid-19. What happens if I have a positive test?  If you have symptoms:  Isolate until all three of these things are true: 1) your symptoms are better, 2) it has been 10 days since you first felt sick, and 3) you have had no fever for at least 24 hours without using medicine that lowers fever. Drink plenty of fluids and eat when you can. You may take medicine for pain or fever if you need to. Rest as much as you can. If you do not have symptoms:  Stay home for 10 days after the date you were tested. If you develop symptoms during those 10 days, stay home until all three of these things are true: 1) your symptoms are better, 2) it has been 10 days since you first felt sick, and 3) you have had no fever for at least 24 hours without using medicine that lowers fever. Follow care instructions from your doctor or other healthcare provider. Seek emergency medical care immediately if you have trouble breathing, persistent pain or pressure in the chest, new confusion, inability to wake or stay awake, or bluish lips or face. Someone from the health department (case investigator or contact tracer) may reach out to you to check on your health and ask about other people you have been around or where you've spent time while you may have been able to spread COVID-19 to others. This person's role is strictly to map the virus to help identify people who may have been exposed to the virus and prevent its spread. The local health department will also provide guidance on how to stay safely at home to avoid spreading illness. Detected results can happen for months and you are not considered contagious. No retest is necessary.

## 2021-01-12 ENCOUNTER — PATIENT MESSAGE (OUTPATIENT)
Dept: CARDIOLOGY CLINIC | Age: 76
End: 2021-01-12

## 2021-01-12 RX ORDER — CLOPIDOGREL BISULFATE 75 MG/1
75 TABLET ORAL DAILY
Qty: 90 TABLET | Refills: 3 | Status: SHIPPED | OUTPATIENT
Start: 2021-01-12 | End: 2022-01-10

## 2021-01-22 RX ORDER — ATORVASTATIN CALCIUM 80 MG/1
80 TABLET, FILM COATED ORAL NIGHTLY
Qty: 30 TABLET | Refills: 3 | Status: SHIPPED | OUTPATIENT
Start: 2021-01-22 | End: 2021-05-19

## 2021-01-22 NOTE — TELEPHONE ENCOUNTER
Medication:   Requested Prescriptions      No prescriptions requested or ordered in this encounter       Last Filled:      Patient Phone Number: [phone] (home)     Last appt: 12/1/2020   Next appt: 2/1/2021    Last Lipid:   Lab Results   Component Value Date    CHOL 106 11/04/2019    TRIG 265 11/04/2019    HDL 39 11/04/2019    HDL 41 03/28/2012    LDLCALC 14 11/04/2019

## 2021-02-01 ENCOUNTER — OFFICE VISIT (OUTPATIENT)
Dept: CARDIOLOGY CLINIC | Age: 76
End: 2021-02-01
Payer: MEDICARE

## 2021-02-01 ENCOUNTER — TELEPHONE (OUTPATIENT)
Dept: CARDIOLOGY CLINIC | Age: 76
End: 2021-02-01

## 2021-02-01 ENCOUNTER — OFFICE VISIT (OUTPATIENT)
Dept: ENDOCRINOLOGY | Age: 76
End: 2021-02-01
Payer: MEDICARE

## 2021-02-01 VITALS
OXYGEN SATURATION: 95 % | BODY MASS INDEX: 27.09 KG/M2 | WEIGHT: 189.2 LBS | HEIGHT: 70 IN | DIASTOLIC BLOOD PRESSURE: 69 MMHG | RESPIRATION RATE: 18 BRPM | SYSTOLIC BLOOD PRESSURE: 123 MMHG | HEART RATE: 73 BPM

## 2021-02-01 VITALS
BODY MASS INDEX: 27.12 KG/M2 | HEART RATE: 80 BPM | DIASTOLIC BLOOD PRESSURE: 58 MMHG | SYSTOLIC BLOOD PRESSURE: 110 MMHG | WEIGHT: 189 LBS

## 2021-02-01 DIAGNOSIS — Z79.4 INSULIN DEPENDENT TYPE 2 DIABETES MELLITUS, CONTROLLED (HCC): ICD-10-CM

## 2021-02-01 DIAGNOSIS — R53.82 CHRONIC FATIGUE: ICD-10-CM

## 2021-02-01 DIAGNOSIS — I25.10 CAD IN NATIVE ARTERY: Primary | ICD-10-CM

## 2021-02-01 DIAGNOSIS — I10 ESSENTIAL HYPERTENSION: ICD-10-CM

## 2021-02-01 DIAGNOSIS — R42 LIGHT HEADED: ICD-10-CM

## 2021-02-01 DIAGNOSIS — E11.9 INSULIN DEPENDENT TYPE 2 DIABETES MELLITUS, CONTROLLED (HCC): ICD-10-CM

## 2021-02-01 DIAGNOSIS — Z95.1 S/P CABG X 4: ICD-10-CM

## 2021-02-01 DIAGNOSIS — I95.1 ORTHOSTATIC HYPOTENSION: ICD-10-CM

## 2021-02-01 DIAGNOSIS — E78.2 MIXED HYPERLIPIDEMIA: ICD-10-CM

## 2021-02-01 LAB — HBA1C MFR BLD: 6.6 %

## 2021-02-01 PROCEDURE — 3044F HG A1C LEVEL LT 7.0%: CPT | Performed by: INTERNAL MEDICINE

## 2021-02-01 PROCEDURE — 93000 ELECTROCARDIOGRAM COMPLETE: CPT | Performed by: INTERNAL MEDICINE

## 2021-02-01 PROCEDURE — 2022F DILAT RTA XM EVC RTNOPTHY: CPT | Performed by: INTERNAL MEDICINE

## 2021-02-01 PROCEDURE — 4040F PNEUMOC VAC/ADMIN/RCVD: CPT | Performed by: INTERNAL MEDICINE

## 2021-02-01 PROCEDURE — G8417 CALC BMI ABV UP PARAM F/U: HCPCS | Performed by: INTERNAL MEDICINE

## 2021-02-01 PROCEDURE — 93246 EXT ECG>7D<15D RECORDING: CPT | Performed by: INTERNAL MEDICINE

## 2021-02-01 PROCEDURE — 99214 OFFICE O/P EST MOD 30 MIN: CPT | Performed by: INTERNAL MEDICINE

## 2021-02-01 PROCEDURE — 1036F TOBACCO NON-USER: CPT | Performed by: INTERNAL MEDICINE

## 2021-02-01 PROCEDURE — G8427 DOCREV CUR MEDS BY ELIG CLIN: HCPCS | Performed by: INTERNAL MEDICINE

## 2021-02-01 PROCEDURE — 1123F ACP DISCUSS/DSCN MKR DOCD: CPT | Performed by: INTERNAL MEDICINE

## 2021-02-01 PROCEDURE — G8484 FLU IMMUNIZE NO ADMIN: HCPCS | Performed by: INTERNAL MEDICINE

## 2021-02-01 PROCEDURE — 99213 OFFICE O/P EST LOW 20 MIN: CPT | Performed by: INTERNAL MEDICINE

## 2021-02-01 PROCEDURE — 3017F COLORECTAL CA SCREEN DOC REV: CPT | Performed by: INTERNAL MEDICINE

## 2021-02-01 PROCEDURE — 83036 HEMOGLOBIN GLYCOSYLATED A1C: CPT | Performed by: INTERNAL MEDICINE

## 2021-02-01 RX ORDER — METOPROLOL SUCCINATE 25 MG/1
25 TABLET, EXTENDED RELEASE ORAL DAILY
Qty: 90 TABLET | Refills: 3 | Status: ON HOLD | OUTPATIENT
Start: 2021-02-01 | End: 2022-09-07

## 2021-02-01 RX ORDER — CARVEDILOL 3.12 MG/1
3.12 TABLET ORAL 2 TIMES DAILY
Qty: 60 TABLET | Refills: 3 | Status: CANCELLED | OUTPATIENT
Start: 2021-02-01

## 2021-02-01 RX ORDER — INSULIN GLARGINE 100 [IU]/ML
INJECTION, SOLUTION SUBCUTANEOUS
Qty: 15 ML | Refills: 3 | Status: SHIPPED | OUTPATIENT
Start: 2021-02-01 | End: 2021-12-07 | Stop reason: SDUPTHER

## 2021-02-01 RX ORDER — BLOOD SUGAR DIAGNOSTIC
STRIP MISCELLANEOUS
Qty: 150 EACH | Refills: 3 | Status: SHIPPED | OUTPATIENT
Start: 2021-02-01 | End: 2022-02-04 | Stop reason: SDUPTHER

## 2021-02-01 NOTE — PROGRESS NOTES
 Prolonged emergence from general anesthesia     Psoriatic arthropathy ([facility] [address] 7/12/2010    Type II or unspecified type diabetes mellitus without mention of complication, not stated as uncontrolled 7/12/2010    Unspecified essential hypertension 7/12/2010    Unspecified sleep apnea 7/12/2010     Past Surgical History:   Procedure Laterality Date    CARPAL TUNNEL RELEASE      s/p    CATARACT REMOVAL      CORONARY ARTERY BYPASS GRAFT      JOINT REPLACEMENT      LAPAROSCOPY INSERTION PERITONEAL CATHETER N/A 10/26/2020    LAPAROSCOPIC PERITONEAL DIALYSIS CATHETER PLACEMENT; LAPAROSCOPIC OMENTOPEXY performed by Khanh Lance DO at [address] [place] [address], reviewed    Constitutional: Well-developed, appears stated age, cooperative, in no acute distress  H/E/N/M/T:atraumatic, normocephalic, external ears, nose, lips normal without lesions  Eyes: Lids, lashes, conjunctivae and sclerae normal, No proptosis, no redness  Neck: supple, symmetrical, no swelling  Skin: No obvious rashes or lesions present.   Skin and hair texture normal  Psychiatric: Judgement and Insight:  judgement and insight appear normal  Neuro: Normal without focal findings, speech is normal normal, speech is spontaneous  Chest: No labored breathing, no chest deformity, no stridor  Musculoskeletal: No joint deformity, swelling    Vitals:    02/01/21 0943   BP: 123/69   Pulse: 73   Resp: 18   SpO2: 95%           5/19  Skeletal foot exam is normal, no skin lesions, toenails are normal, DP not palpable,  10 g monofilament is 10/10 on the right and 10/10 on the left    Lab Reviewed   No components found for: CHLPL  Lab Results   Component Value Date    TRIG 265 (H) 11/04/2019    TRIG 205 (H) 11/16/2018    TRIG 198 (H) 11/09/2017     Lab Results   Component Value Date    HDL 39 (L) 11/04/2019    HDL 47 11/16/2018    HDL 46 11/09/2017     Lab Results   Component Value Date    LDLCALC 14 11/04/2019    LDLCALC 58 11/16/2018    LDLCALC 44 11/09/2017     Lab Results   Component Value Date    LABVLDL 53 11/04/2019    LABVLDL 41 11/16/2018    LABVLDL 40 11/09/2017     Lab Results   Component Value Date    LABA1C 6.2 09/08/2020       Assessment:     Carlota Street is a 77 y.o. male with :    1.T2DM: Longstanding,controlled, insulin resistant, goal A1c 7-8%. Advised Dexcom not approved in dialysis patient, use glucose meter for self monitoring. Fasting high, adjust dose  2. HTN: BP at goal.   3.HLD: LDL at goal, he is  On statin  4. Obesity  5. CAD  6. CKD III  7. Psoriatic arthritis: Reports flare in psoriatic plaque, will see dermatology  8. Neuropathy: Had been on lyrica, prescribed topical ,off of it. Started neurontin, did not help, . He does have some drowsiness, off of it. Advise B12  9. Fatigue:  TSH high with normal FT4, subclinical hypothyroid, advise to try replacement. He was inquiring about Co-Q 10 , advised can take 200mg  His total testosterone was normal with free level low normal. Advised given total is in a good range, h/o CAD, Age, recommend no replacement at this point. 10. Subclinical hypothyroid: On replacement,  Last level normal, continue levothyroxine    Plan:       lantus  24--->26  units    Humalog 12/12/14 units TID  SSI 2 for 50>150   Advise to check blood sugar 4 times a day   Patient to send blood sugar log for titration. Advise to low simple carbohydrate and protein with each  meal diet. Diabetes Care: recommend yearly eye exam, foot exam and urine microalbumin to  creatinine ratio.     -Hyperlipidemia, LDL goal is <70 mg/dl   -Hypertension: Continue same  -Daily ASA:81mg  -Smoking status: Non smoker

## 2021-02-01 NOTE — PROGRESS NOTES
Diabetes Mother     Heart Failure Mother     Coronary Art Dis Father      Review of Systems   General: No fevers, chills, fatigue, or night sweats. No abnormal changes in weight. HEENT: No blurry or decreased vision. No changes in hearing, nasal discharge or sore throat. Cardiovascular: See HPI. No cramping in legs or buttocks when walking. Respiratory: No cough, hemoptysis, or wheezing. No history of asthma. Gastrointestinal: No abdominal pain, hematochezia, melana. Genito-Urinary: No dysuria or hematuria. No urgency or polyuria. Musculoskeletal: No complaints of joint pain, joint swelling or muscular weakness/soreness. Neurological: No dizziness or headaches. No numbness/tingling, speech problems or weakness. No history of a stroke or TIA. Psychological: No anxiety or depression  Hematological and Lymphatic: No abnormal bleeding or bruising, blood clots, jaundice. Endocrine: No malaise/lethargy, palpitations, polydipsia/polyuria, temperature intolerance or unexpected weight changes. Skin: No rashes or non-healing ulcers. PE:  Blood pressure (!) 110/58, pulse 80, weight 189 lb (85.7 kg). General (appearance): Well devel. No distress  Eyes: Anicteric, eomi  Neck: Supple. Ears/Nose/Mouth/Thorat: No cyanosis  CV: RRR. Soft sounds. Respiratory: Clear but diminished. GI: Abd soft. No peritoneal signs  Skin: Warm, dry. No rashes  Neuro/Psych: Alert and oriented x 3. Appropriate behavior  Ext:  No c/c.  No edema  Pulses:  2+ carotid and radial      Lab Results   Component Value Date    CHOL 106 11/04/2019    CHOL 146 11/16/2018    CHOL 130 11/09/2017     Lab Results   Component Value Date    TRIG 265 (H) 11/04/2019    TRIG 205 (H) 11/16/2018    TRIG 198 (H) 11/09/2017     Lab Results   Component Value Date    HDL 39 (L) 11/04/2019    HDL 47 11/16/2018    HDL 46 11/09/2017     Lab Results   Component Value Date    LDLCALC 14 11/04/2019    LDLCALC 58 11/16/2018    LDLCALC 44 11/09/2017     Lab Results   Component Value Date    LABVLDL 53 2019    LABVLDL 41 2018    LABVLDL 40 2017     No results found for: [facility]    Lab Results   Component Value Date    WBC 10.0 10/20/2020    HGB 10.0 (L) 10/20/2020    HCT 29.6 (L) 10/20/2020    MCV 97.3 10/20/2020     10/20/2020       Chemistry        Component Value Date/Time     10/26/2020 0600    K 5.1 10/26/2020 0600    K 4.4 10/15/2020 1112     10/26/2020 0600    CO2 26 10/26/2020 0600    BUN 90 (HH) 10/26/2020 0600    CREATININE 5.1 (HH) 10/26/2020 0600        Component Value Date/Time    CALCIUM 9.3 10/26/2020 0600    ALKPHOS 83 2019 1050    AST 32 2019 1050    ALT 40 2019 1050    BILITOT 0.4 2019 1050        Lab Results   Component Value Date    INR 0.89 2017    INR 0.91 2017    INR 0.96 2017    PROTIME 10.1 2017    PROTIME 10.4 2017    PROTIME 10.9 2017     Lab Results   Component Value Date    CHOL 106 2019    CHOL 146 2018    CHOL 130 2017     Lab Results   Component Value Date    TRIG 265 (H) 2019    TRIG 205 (H) 2018    TRIG 198 (H) 2017     Lab Results   Component Value Date    HDL 39 (L) 2019    HDL 47 2018    HDL 46 2017     Lab Results   Component Value Date    LDLCALC 14 2019    LDLCALC 58 2018    LDLCALC 44 2017     Lab Results   Component Value Date    LABVLDL 53 2019    LABVLDL 41 2018    LABVLDL 40 2017     No results found for: CHOLHDLRATIO    2020 nuc stress: Reversible defect at the apex, apical anterior, mid anterior, and apical lateral walls. Normal lvef     Cath: Angiographic Findings:  Right dominant system  Left Main:  Diffuse, mild disease. ~20-30% diffuse disease. Left Anterior Descendin% proximal occlusion. After the LIMA is injected, the LAD was seen to have a mid 80-90% stenosis just distal to a significant diagonal branch.   The diagonal bifurcates; one branch has a severe (~80-90%) stenosis. The other limb also has diffuse disease. There was a 90% distal LAD stenosis. Circumflex:  Proximal 70% stenosis at bifurcation of an OM and the AV LCx. The OM is subtotally occluded. The AV LCx is diffusely diseased. Right Coronary:  Proximal 30% stenosis. Mid 60-70% stenosis. Right posterolateral is occluded. Right PDA has an ostial/proximal 90% stenosis. It is diffusely diseased after the ostial lesion. Bypass Grafts:  LIMA-LAD with radial grafts to an OM and right PDA that is widely patent. Left Ventriculogram:  Not performed due to elevated Cr  Hemodynamics (mm Hg):  Left Ventricular Pressure:  131/0, 14  Central Aortic Pressure:  133/66 (93)    12/2017 Nuc Stress:  1. Left ventricular ejection fraction of 62 %. 2. Fixed perfusion defect in the myocardial apex suggestive of   prior infarct. No evidence of reversible ischemia. 1/2017 Echo:   Left ventricular size is normal . Left ventricular wall thickness is normal.   Left ventricular function is normal with ejection fraction estimated at 55   %. No regional wall motion abnormalities are noted. Mild mitral   regurgitation is present. Aortic valve appears thickened/calcified but opens   adequately. Mild tricuspid regurgitation.       Current Outpatient Medications:     atorvastatin (LIPITOR) 80 MG tablet, Take 1 tablet by mouth nightly, Disp: 30 tablet, Rfl: 3    clopidogrel (PLAVIX) 75 MG tablet, Take 1 tablet by mouth daily, Disp: 90 tablet, Rfl: 3    [employer] PEN NEEDLES 31G 31G X 8 MM MISC, USE WITH INSULIN FOUR TIMES A DAY, Disp: 100 each, Rfl: 1    Cholecalciferol (VITAMIN D) 50 MCG (2000 UT) CAPS capsule, Take 1 capsule by mouth daily, Disp: , Rfl:     furosemide (LASIX) 20 MG tablet, Take 1 tablet by mouth daily, Disp: 30 tablet, Rfl: 5    carvedilol (COREG) 3.125 MG tablet, Take 1 tablet by mouth 2 times daily, Disp: 60 tablet, Rfl: 3    sodium zirconium cyclosilicate meter., Disp: 50 each, Rfl: 11    A/P:  77 y.o. here for f/u. Has h/o cad and cabg, HTN, HL, cabg in 2006, and ILD Luciana Rail). 1. CAD in native artery    2. S/P CABG x 4    3. Essential hypertension    4. Mixed hyperlipidemia    5. Orthostatic hypotension    6. Chronic fatigue      Recs:  -Change coreg to toprol  -Get Zio  -Get echo  -ECG today    Norvel Lowell.  Gardenia Shah MD, [facility] - [place], [place]

## 2021-02-11 ENCOUNTER — HOSPITAL ENCOUNTER (OUTPATIENT)
Dept: NON INVASIVE DIAGNOSTICS | Age: 76
Discharge: HOME | End: 2021-02-11
Payer: MEDICARE

## 2021-02-11 DIAGNOSIS — I25.10 CAD IN NATIVE ARTERY: ICD-10-CM

## 2021-02-11 DIAGNOSIS — I10 ESSENTIAL HYPERTENSION: ICD-10-CM

## 2021-02-11 LAB
LV EF: 58 %
LVEF MODALITY: NORMAL

## 2021-02-11 PROCEDURE — 6360000004 HC RX CONTRAST MEDICATION: Performed by: INTERNAL MEDICINE

## 2021-02-11 PROCEDURE — C8929 TTE W OR WO FOL WCON,DOPPLER: HCPCS

## 2021-02-11 RX ADMIN — PERFLUTREN 2.2 MG: 6.52 INJECTION, SUSPENSION INTRAVENOUS at 09:17

## 2021-03-04 PROCEDURE — 93248 EXT ECG>7D<15D REV&INTERPJ: CPT | Performed by: INTERNAL MEDICINE

## 2021-03-09 DIAGNOSIS — R55 SYNCOPE AND COLLAPSE: ICD-10-CM

## 2021-03-24 ENCOUNTER — HOSPITAL ENCOUNTER (OUTPATIENT)
Dept: PHYSICAL THERAPY | Age: 76
Setting detail: THERAPIES SERIES
Discharge: HOME | End: 2021-03-24
Payer: MEDICARE

## 2021-03-24 PROCEDURE — 97163 PT EVAL HIGH COMPLEX 45 MIN: CPT

## 2021-03-24 PROCEDURE — 97110 THERAPEUTIC EXERCISES: CPT

## 2021-03-24 NOTE — PLAN OF CARE
and more assist from wife with self care meals and IADLs. Pt has not had PT for this issue, is currently a fall risk with fall hx, pt would benefit from skilled PT to max safety and indep in home and community. Treatment Diagnosis: balance problems gait distrubance  Prognosis: Fair, Guarded  Decision Making: High Complexity  REQUIRES PT FOLLOW UP: Yes  Treatment Initiated : YES    Goals:  Short term goals  Time Frame for Short term goals: 4 weeks  Short term goal 1: Pt to reduce fall risk and impairment per TInetti by inc score from 8 to >/=16  Short term goal 2: Pt to improve TUG to reduce fall risk and improve gait speed from 17s to less than 12 seconds  Short term goal 3: Pt to illustrate strength gains by bia 45-60 min PT treatment sessions and ability to perform sit to stand with min use of UE safely 75% or greater of the time  Short term goal 4: Pt to improve hamstring lengh exhibited with forward reach to less than 3 inches from toes for improving flexiblity in LEs  Short term goal 5: indep to supervision with progressive HEP to max rehab potential  Long term goals  Time Frame for Long term goals : 8 weeks  Long term goal 1: Pt to reduce impairment and fall risk by inc score per Tinetti to >/=22  Long term goal 2: Pt to ambulate household distances and situations modified indep to indep      Rehab Potential: [] Excellent [] Good [x] Fair/GUARDED  [] Poor   Electronically signed by:  Geovanni Jordan PT DPT      If you have any questions or concerns, please don't hesitate to call.   Thank you for your referral.      Physician Signature:________________________________Date:__________________  By signing above, therapists plan is approved by physician

## 2021-03-24 NOTE — PROGRESS NOTES
Physical Therapy  Initial Assessment  Date: 3/24/2021  Patient Name: Rosaura Quick  MRN: [id number]  : 1945     Subjective  \"I'm weak all over\" Pt has growing fear of falling. Pt reports several falls in the past year related to unsteadiness and unexplained LOC, which has lead to hospital ER visits. General  Chart Reviewed: Yes  Patient assessed for rehabilitation services?: Yes  Additional Pertinent Hx: Complex Medical history, multiple comorbidities which complicate current condition includes: Diagnosed with Type 2 diabetes mellitus in   diabetic complications: Nephropathy, Neuropathy, mild retinopathy, daily home dialysis,MORIS on CKD, CHF,  Response To Previous Treatment: Not applicable  Family / Caregiver Present: No  Referring Practitioner: Violeta Gibbs MD  Referral Date : 21  Diagnosis: R53.81 (ICD-10-CM) - Other malaise, Debility and Deconditioning R53.81  Follows Commands: Within Functional Limits  PT Visit Information  PT Insurance Information: [facility] Medicare  Subjective  Subjective: c/c weakness decline in indep with self care and mobility sedentary lifestyle requiring more assist from spouse Myra Rolle who comes to therapy today. Pain Screening  Patient Currently in Pain: Yes  Pain Assessment  Pain Assessment: 0-10  Pain Level: 0  Vital Signs  Patient Currently in Pain: Yes    Vision/Hearing wears glasses      Orientation- cognitive decline and deficits observed and reported. Pt reported memory deficits- declining, reports at times forgets meds- wife has to remind him, limited ability to return demo or recall despite blocked practice, which requires PT demo ongoing throughout treatment and tactical cues for reminders- making for lengthy treatment session. Pt is alert and oriented to self. Social/Functional History  Social/Functional History  Lives With: Spouse  Type of Home: House  Home Layout: Performs ADL's on one level; Able to Live on Main level with bedroom/bathroom;Multi-level  Home Access: Stairs to enter with rails  Bathroom Shower/Tub: Shower chair without back; Walk-in shower  Bathroom Equipment: Grab bars in shower  Home Equipment: Rolling walker;Cane(CPAP- Reports he hasn't been during it \"I dont think it helps\")  ADL Assistance: Independent(reports decline in indep, inc time to gather clothes and perform ADLs, at times spouse provides supervision during ADLs. Pt reports able to complete simple meals, relies on wife for IADLS. Prior to 1 year ago was more indep in home and community)  Homemaking Assistance: Independent  Prior level of functioning - mobility indep in home, less supervision need or use of walker, indep with ADLS and IADLs. Objective  Sensation  Overall Sensation Status: (neuropathy in jaevd feet)     Transfers  Sit to Stand: Modified independent  Stand to sit: Modified independent  Bed to Chair: Modified independent  Comment: Pt has poor control descending into chair, requires safety cues for position walker and self prior to sitting down, and cues for safety with handplacement during transfers frequently, to illstrate impaired judgement and safety awareness       Ambulation  Ambulation?: Yes  More Ambulation?: Yes  Ambulation 1  Surface: level tile  Device: Rolling Walker  Assistance: Modified Independent  Quality of Gait: narrow base of support at times scissor like gait  Gait Deviations: Slow Ashlee  Distance: 150ft x 2  Balance  Posture: Fair  Sitting - Static: Good  Sitting - Dynamic: Good  Standing - Static: Fair  Standing - Dynamic: Fair;-   Functional outcome measurements: TUG WITH WALKER 17 s, without walker 15 seconds ( x 1 LOB, Garry to CGA of PT required)- FALL RISK   FUNCTIONAL SIT TO STAND STRENGTH: Unable to complete sit to stand without mod use of UE or Garry of PT.    Balance Score: 3(03/24/21 1114) Gait Score: 5(03/24/21 1114)Tinetti Total Score: 8 (03/24/21 1114)- HIGH FALL RISK  Flexibility: forward reach javed hamstring

## 2021-03-24 NOTE — FLOWSHEET NOTE
See EVAL for treatment  Physical Therapy Daily Treatment Note  Date:  3/24/2021  Patient Name:  Lacho Lundberg      :  1945    MRN: [id number]  Restrictions/Precautions:    Medical/Treatment Diagnosis Information:  · Diagnosis: R53.81 (ICD-10-CM) - Other malaise, Debility and Deconditioning R53.81  · Treatment Diagnosis: balance problems gait distrubance  Insurance/Certification information:  PT Insurance Information: [facility] Medicare  Insurance Allowable Visits:    Physician Information:  Referring Practitioner: Dora Santos MD MD Follow-up Visit:   Plan of care signed (Y/N):    Visit# / total visits:   Pain level: /10     Progress Note Due (10 visits or 30 days, whichever is less):    Recertification Note Due (End of POC or 90 days, whichever is less):      Subjective:  c/c weakness decline in indep with self care and mobility sedentary lifestyle requiring more assist from spouse Iker Rincon who comes to therapy today. Pain Screening, Patient Currently in Pain: Yes  Pain Assessment  Pain Assessment: 0-10  Pain Level: 0     Objective:   Observation:    Test measurements:      Exercises, Neuro Facilitation & Gait Training (70920, [id number], [id number]): Activity Resistance/Repetitions Other comments                                                                           Therapeutic Activities (05001):      Home Exercise Program:   Exercise 1: Access Code: [email or web address]: [email or web address] com/Date: repared by: Madeline RayhExercisesSitting Knee Extension with Resistance - 1 x daily - 3-5 x weekly - 1-3 sets - 10 repsSeated Knee Extension Stretch with Chair - 1 x daily - 7 x weekly - 1 sets - 30 holdSeated Hamstring Curls with Resistance - 1 x daily - 3-5 x weekly - 1-3 sets - 10 repsSeated Hip Abduction - 1 x daily - 3-5 x weekly - 1-3 sets - 10 repsSeated March - 1 x daily - 3-5 x weekly - 1-3 sets - 10 repsSit to Stand with Counter Support - 1 x daily - 7 x weekly - 1-2 sets - 10 reps.     Manual Treatments (96131):      Modalities:      Timed Code Treatment Minutes:       Total Treatment Minutes:       Treatment/Activity Tolerance:  [] Patient tolerated treatment well [] Patient limited by fatigue  [] Patient limited by pain  [] Patient limited by other medical complications  [] Other:     Assessment:     Prognosis: [] Good [] Fair  [] Poor    Patient Requires Follow-up: [x] Yes  [] No    Goals:  Short term goals  Time Frame for Short term goals: 4 weeks  Short term goal 1: Pt to reduce fall risk and impairment per TInetti by inc score from 8 to >/=16  Short term goal 2: Pt to improve TUG to reduce fall risk and improve gait speed from 17s to less than 12 seconds  Short term goal 3: Pt to illustrate strength gains by bia 45-60 min PT treatment sessions and ability to perform sit to stand with min use of UE safely 75% or greater of the time  Short term goal 4: Pt to improve hamstring lengh exhibited with forward reach to less than 3 inches from toes for improving flexiblity in LEs  Short term goal 5: indep to supervision with progressive HEP to max rehab potential  Long term goals  Time Frame for Long term goals : 8 weeks  Long term goal 1: Pt to reduce impairment and fall risk by inc score per Tinetti to >/=22  Long term goal 2: Pt to ambulate household distances and situations modified indep to indep    Plan:   Times per week: 1-2 days a week for 8 weeks, 16 visits as bia and medically appropriate  Current Treatment Recommendations: Strengthening, Balance Training, Transfer Training, IADL Training, Endurance Training, Gait Training, Neuromuscular Re-education, Home Exercise Program, Patient/Caregiver Education & Training, Modalities, Positioning, Equipment Evaluation, Education, & procurement, Safety Education & Training, Functional Mobility Training, ADL/Self-care Training, ROM   [x] Continue per plan of care [] Alter current plan (see comments)  [x] Plan of care initiated [] Hold pending MD visit [] Discharge    Plan for Next Session:    Electronically signed by:  Jesus Byers DPT

## 2021-03-31 ENCOUNTER — HOSPITAL ENCOUNTER (OUTPATIENT)
Dept: PHYSICAL THERAPY | Age: 76
Setting detail: THERAPIES SERIES
Discharge: HOME | End: 2021-03-31
Payer: MEDICARE

## 2021-03-31 PROCEDURE — 97110 THERAPEUTIC EXERCISES: CPT

## 2021-03-31 NOTE — FLOWSHEET NOTE
[email or web address]/Date: 03/24/2021Prepared by: Madeline CoryhExercisesSitting Knee Extension with Resistance - 1 x daily - 3-5 x weekly - 1-3 sets - 10 repsSeated Knee Extension Stretch with Chair - 1 x daily - 7 x weekly - 1 sets - 30 holdSeated Hamstring Curls with Resistance - 1 x daily - 3-5 x weekly - 1-3 sets - 10 repsSeated Hip Abduction - 1 x daily - 3-5 x weekly - 1-3 sets - 10 repsSeated March - 1 x daily - 3-5 x weekly - 1-3 sets - 10 repsSit to Stand with Counter Support - 1 x daily - 7 x weekly - 1-2 sets - 10 reps. Manual Treatments (21378):      Modalities:      Timed Code Treatment Minutes:  53 mins therapeutic ex    Total Treatment Minutes:  48  Start time 4941-  Treatment/Activity Tolerance:  [x] Patient tolerated treatment well [x] Patient limited by fatigue  [] Patient limited by pain  [] Patient limited by other medical complications  [] Other:     Assessment:  Bia mat exs well for generalized strengthening, BLE weakness and balance deficits are ongoing and c/c, will require further skilled PT to progress HEP and assist to max indep and safety with mobility and fall risk.  COnt with POC    Prognosis: [] Good [] Fair  [] Poor    Patient Requires Follow-up: [x] Yes  [] No    Goals:  Short term goals  Time Frame for Short term goals: 4 weeks  Short term goal 1: Pt to reduce fall risk and impairment per TInetti by inc score from 8 to >/=16  Short term goal 2: Pt to improve TUG to reduce fall risk and improve gait speed from 17s to less than 12 seconds  Short term goal 3: Pt to illustrate strength gains by bia 45-60 min PT treatment sessions and ability to perform sit to stand with min use of UE safely 75% or greater of the time  Short term goal 4: Pt to improve hamstring lengh exhibited with forward reach to less than 3 inches from toes for improving flexiblity in LEs  Short term goal 5: indep to supervision with progressive HEP to max rehab potential  Long term goals  Time Frame for Long term goals : 8 weeks  Long term goal 1: Pt to reduce impairment and fall risk by inc score per Tinetti to >/=22  Long term goal 2: Pt to ambulate household distances and situations modified indep to indep    Plan:   Times per week: 1-2 days a week for 8 weeks, 16 visits as bia and medically appropriate  Current Treatment Recommendations: Strengthening, Balance Training, Transfer Training, IADL Training, Endurance Training, Gait Training, Neuromuscular Re-education, Home Exercise Program, Patient/Caregiver Education & Training, Modalities, Positioning, Equipment Evaluation, Education, & procurement, Safety Education & Training, Functional Mobility Training, ADL/Self-care Training, ROM   [x] Continue per plan of care [] Alter current plan (see comments)  [x] Plan of care initiated [] Hold pending MD visit [] Discharge    Plan for Next Session:    Electronically signed by:  Migel Lee DPT

## 2021-04-05 ENCOUNTER — HOSPITAL ENCOUNTER (OUTPATIENT)
Dept: PHYSICAL THERAPY | Age: 76
Setting detail: THERAPIES SERIES
Discharge: HOME | End: 2021-04-05
Payer: MEDICARE

## 2021-04-05 PROCEDURE — 97110 THERAPEUTIC EXERCISES: CPT

## 2021-04-05 RX ORDER — LEVOTHYROXINE SODIUM 0.05 MG/1
TABLET ORAL
Qty: 90 TABLET | Refills: 8 | Status: SHIPPED | OUTPATIENT
Start: 2021-04-05 | End: 2022-06-30

## 2021-04-05 NOTE — FLOWSHEET NOTE
Physical Therapy Daily Treatment Note  Date:  2021  Patient Name:  Fredonia Hammans      :  1945    MRN: [id number]  Restrictions/Precautions:    Medical/Treatment Diagnosis Information:  · Diagnosis: R53.81 (ICD-10-CM) - Other malaise, Debility and Deconditioning R53.81  · Treatment Diagnosis: balance problems gait distrubance  Insurance/Certification information:  PT Insurance Information: [place] [employer]  Insurance Allowable Visits:    Physician Information:  Referring Practitioner: Castro Tinoco MD MD Follow-up Visit:   Plan of care signed (Y/N):    Visit# / total visits: 3/16  Pain level: 0/10     Progress Note Due (10 visits or 30 days, whichever is less):    Recertification Note Due (End of POC or 90 days, whichever is less):      Subjective:  c/c weakness decline in indep with self care and mobility, no new complaints, reports felt sick all weekend and did not do exercises, but feels 100% better today. Gerri Joon present but waits in waiting room, denies questions ,pt reports did not complete night or morning dialysis \"I just felt blah over the weekend\"     Objective:   Observation: ambulates Rachele with RW,nearing end of session, required trunk support in supportive chair, inc safety cues, and CGA with transfers.  Test measurements:  Initial standing balance concerns, tends to rely on stabilization of chair , retropulsion noted with fatigue, cues for body mechanics and posture 50-75% of time. Exercises, Neuro Facilitation & Gait Training (04957, [id number]):   Activity Resistance/Repetitions Other comments   Held for progression in sitting, pt reports he does exercises but not everyday, denies questions over bed exs/chair exProgression seated exs   Included  DF/PF 15 reps javed x2 sets    LAQ 15 reps javed x2 sets    Adductor squeeze with TA 10s x 10reps x2 sets Postural cues required for upright posture, tends to slump   Sit to/from stand  X 5 x2 sets  Fatigues easily and starts to heavily rely on back of stabilized chair   Glut set  10s x 10 reps x 2 sets    Hamstring stretching  30s x 3  javed alt   Piriformis stretching  30s x 3 alt javed                                     Therapeutic Activities (66184):      Home Exercise Program:   Exercise 1: Access Code: [email or web address]: [email or web address]. com/Date: 03/24/2021Prepared by: Madeline CoryhExercisesSitting Knee Extension with Resistance - 1 x daily - 3-5 x weekly - 1-3 sets - 10 repsSeated Knee Extension Stretch with Chair - 1 x daily - 7 x weekly - 1 sets - 30 holdSeated Hamstring Curls with Resistance - 1 x daily - 3-5 x weekly - 1-3 sets - 10 repsSeated Hip Abduction - 1 x daily - 3-5 x weekly - 1-3 sets - 10 repsSeated March - 1 x daily - 3-5 x weekly - 1-3 sets - 10 repsSit to Stand with Counter Support - 1 x daily - 7 x weekly - 1-2 sets - 10 reps. Manual Treatments (32028):      Modalities:      Timed Code Treatment Minutes:  47 mins therapeutic ex    Total Treatment Minutes:  47   Treatment/Activity Tolerance:  [x] Patient tolerated treatment well [x] Patient limited by fatigue  [] Patient limited by pain  [] Patient limited by other medical complications  [] Other:     Assessment:  Pt with fair bia for treatment today, more than half session completed in unsupported seated, which most likely contributed to early fatigue and limited to shorter than typical session today, however did well with rest breaks frequently and progression. Pt reports did not do dialysis yesterday or this morning which may have lead to early fatigue as well and inc assist with transfers near end of session and inc safety cues as well. Pt continues to benefit from skilled PT to max indep, improve functional strength.  Cont with POC    Prognosis: [] Good [] Fair  [] Poor    Patient Requires Follow-up: [x] Yes  [] No    Goals:  Short term goals  Time Frame for Short term goals: 4 weeks  Short term goal 1: Pt to reduce fall risk and impairment per Dionna by inc score from 8 to >/=16  Short term goal 2: Pt to improve TUG to reduce fall risk and improve gait speed from 17s to less than 12 seconds  Short term goal 3: Pt to illustrate strength gains by bia 45-60 min PT treatment sessions and ability to perform sit to stand with min use of UE safely 75% or greater of the time  Short term goal 4: Pt to improve hamstring lengh exhibited with forward reach to less than 3 inches from toes for improving flexiblity in LEs  Short term goal 5: indep to supervision with progressive HEP to max rehab potential  Long term goals  Time Frame for Long term goals : 8 weeks  Long term goal 1: Pt to reduce impairment and fall risk by inc score per Tinetti to >/=22  Long term goal 2: Pt to ambulate household distances and situations modified indep to indep    Plan:   Times per week: 1-2 days a week for 8 weeks, 16 visits as bia and medically appropriate  Current Treatment Recommendations: Strengthening, Balance Training, Transfer Training, IADL Training, Endurance Training, Gait Training, Neuromuscular Re-education, Home Exercise Program, Patient/Caregiver Education & Training, Modalities, Positioning, Equipment Evaluation, Education, & procurement, Safety Education & Training, Functional Mobility Training, ADL/Self-care Training, ROM   [x] Continue per plan of care [] Alter current plan (see comments)  [x] Plan of care initiated [] Hold pending MD visit [] Discharge    Plan for Next Session:    Electronically signed by:  Magdalene Ding DPT

## 2021-04-07 ENCOUNTER — HOSPITAL ENCOUNTER (OUTPATIENT)
Dept: PHYSICAL THERAPY | Age: 76
Setting detail: THERAPIES SERIES
Discharge: HOME | End: 2021-04-07
Payer: MEDICARE

## 2021-04-07 PROCEDURE — 97110 THERAPEUTIC EXERCISES: CPT

## 2021-04-07 NOTE — FLOWSHEET NOTE
Physical Therapy Daily Treatment Note  Date:  2021  Patient Name:  Tasha Velasquez      :  1945    MRN: [id number]  Restrictions/Precautions:    Medical/Treatment Diagnosis Information:  · Diagnosis: R53.81 (ICD-10-CM) - Other malaise, Debility and Deconditioning R53.81  · Treatment Diagnosis: balance problems gait distrubance  Insurance/Certification information:  PT Insurance Information: [facility] Medicare  Insurance Allowable Visits:    Physician Information:  Referring Practitioner: Kendra Arrieta MD MD Follow-up Visit:   Plan of care signed (Y/N):    Visit# / total visits:   Pain level: 0/10     Progress Note Due (10 visits or 30 days, whichever is less):    Recertification Note Due (End of POC or 90 days, whichever is less):      Subjective:  c/c weakness decline in indep with self care and mobility, no new complaints, reports tiredness after last session, he took a 2 hr nap. \"just getting dressed to get here is very hard for me to do. I'm worn out when I get here\"     Objective:   Observation: ambulates Rachele with RW,nearing end of session, required trunk support in supportive chair, inc safety cues, and CGA with transfers.  Test measurements:  Initial standing balance concerns, tends to rely on stabilization of chair , retropulsion noted with fatigue, cues for body mechanics and posture 50-75% of time. Exercises, Neuro Facilitation & Gait Training (25639, [id number]):   Activity Resistance/Repetitions Other comments   Held for progression in sitting, pt reports he does exercises but not everyday, denies questions over bed exs/chair exProgression seated exs   Included  DF/PF 15 reps javed x2 sets    LAQ 15 reps javed x2 sets    Adductor squeeze with TA 10s x 10reps x2 sets    Sit to/from stand  X 5 x2 sets  Fatigues easily and starts to heavily rely on back of stabilized chair   Glut set  10s x 10 reps x 2 sets    Hamstring stretching  30s x 3  javed alt   Piriformis stretching  30s x 3 alt javed     Hip abduction seated  javed 10 reps x 2 sets                 Seated exs require PT demo for recall and intermittent instruction cues, diminished immediate recall of exs despite blocked practice, bia 2 sets well with rest breaks between               Therapeutic Activities ():      Home Exercise Program:   Exercise 1: Access Code: [email or web address]: [email or web address]. com/Date: 03/24/2021Prepared by: Madeline RayhExercisesSitting Knee Extension with Resistance - 1 x daily - 3-5 x weekly - 1-3 sets - 10 repsSeated Knee Extension Stretch with Chair - 1 x daily - 7 x weekly - 1 sets - 30 holdSeated Hamstring Curls with Resistance - 1 x daily - 3-5 x weekly - 1-3 sets - 10 repsSeated Hip Abduction - 1 x daily - 3-5 x weekly - 1-3 sets - 10 repsSeated March - 1 x daily - 3-5 x weekly - 1-3 sets - 10 repsSit to Stand with Counter Support - 1 x daily - 7 x weekly - 1-2 sets - 10 reps. Manual Treatments (58122):      Modalities:      Timed Code Treatment Minutes:  38 mins therapeutic ex  Total Treatment Minutes:  38    Treatment/Activity Tolerance:  [x] Patient tolerated treatment well [x] Patient limited by fatigue  [] Patient limited by pain  [] Patient limited by other medical complications  [] Other:     Assessment:  Pt with fair bia for treatment today, limited by generalized fatigue. Completed seated exs in sitting supported to improve tolerance today. Pt has ongoing generalized weakness and early fatigue, reported even with self care tasks in home, requiring an inc assist of wife and decline in indep with ADLs. Requires frequent rest breaks during exs as well. Discussed option for possible home PT if not bia coming to therapy in outpatient setting. Pt to think it over, has had in past will talk to wife. Pt continues to benefit from skilled PT to address and promote improvement with functional strength needed to improve indep in home with ADLS, activity bia and functional mobility.  Cont with POC    Prognosis: [] Good [] Fair  [] Poor    Patient Requires Follow-up: [x] Yes  [] No    Goals:  Short term goals  Time Frame for Short term goals: 4 weeks  Short term goal 1: Pt to reduce fall risk and impairment per TInetti by inc score from 8 to >/=16  Short term goal 2: Pt to improve TUG to reduce fall risk and improve gait speed from 17s to less than 12 seconds  Short term goal 3: Pt to illustrate strength gains by bia 45-60 min PT treatment sessions and ability to perform sit to stand with min use of UE safely 75% or greater of the time  Short term goal 4: Pt to improve hamstring lengh exhibited with forward reach to less than 3 inches from toes for improving flexiblity in LEs  Short term goal 5: indep to supervision with progressive HEP to max rehab potential  Long term goals  Time Frame for Long term goals : 8 weeks  Long term goal 1: Pt to reduce impairment and fall risk by inc score per Tinetti to >/=22  Long term goal 2: Pt to ambulate household distances and situations modified indep to indep    Plan:   Times per week: 1-2 days a week for 8 weeks, 16 visits as bia and medically appropriate  Current Treatment Recommendations: Strengthening, Balance Training, Transfer Training, IADL Training, Endurance Training, Gait Training, Neuromuscular Re-education, Home Exercise Program, Patient/Caregiver Education & Training, Modalities, Positioning, Equipment Evaluation, Education, & procurement, Safety Education & Training, Functional Mobility Training, ADL/Self-care Training, ROM   [x] Continue per plan of care [] Alter current plan (see comments)  [x] Plan of care initiated [] Hold pending MD visit [] Discharge    Plan for Next Session:    Electronically signed by:  Ramon Roy DPT

## 2021-04-12 ENCOUNTER — HOSPITAL ENCOUNTER (OUTPATIENT)
Dept: PHYSICAL THERAPY | Age: 76
Setting detail: THERAPIES SERIES
Discharge: HOME | End: 2021-04-12
Payer: MEDICARE

## 2021-04-12 PROCEDURE — 97110 THERAPEUTIC EXERCISES: CPT

## 2021-04-12 NOTE — FLOWSHEET NOTE
Physical Therapy Daily Treatment Note  Date:  2021  Patient Name:  Josefina Slade      :  1945    MRN: [id number]  Restrictions/Precautions:    Medical/Treatment Diagnosis Information:  · Diagnosis: R53.81 (ICD-10-CM) - Other malaise, Debility and Deconditioning R53.81  · Treatment Diagnosis: balance problems gait distrubance  Insurance/Certification information:  PT Insurance Information: [facility] Medicare  Insurance Allowable Visits:    Physician Information:  Referring Practitioner: Nathalia Anders MD MD Follow-up Visit:   Plan of care signed (Y/N):  Y  Visit# / total visits:   Pain level: 0/10     Progress Note Due (10 visits or 30 days, whichever is less):    Recertification Note Due (End of POC or 90 days, whichever is less):      Subjective:  C/c weakness decline in indep with self care and mobility. Pt reports had inc nausea following PT last week. PT reports he also goes back to bed as well due to inc fatigue following sessions. Pt comes with wife Ryder Kumar to PT. \"If the therapy will help me feel stronger, I want to continue. \" Pt reports MD took him off the water pill and he has noted more BLE swelling in the foot and ankles. Objective:   Observation: Ambulates Rachele with RW balance deficits in standing specifically dynamically    Test measurements:     Exercises, Neuro Facilitation & Gait Training (38993, [id number], [id number]):   Activity Resistance/Repetitions Other comments   Held for progression in sitting, pt reports he does exercises but not everyday, denies questions over bed exs/chair exProgression seated exs   Included  DF/PF 15 reps javed x2 sets    LAQ Progressed with light resistance   10 reps javed x2 sets  2021   Adductor squeeze with TA 10s x 10reps x2 sets    Abduction of hip seated light resistance  10 reps x 2 sets 2021   Sit to/from stand  X 5 x2 sets  Pt with impaired balance- heavily rely on back of stabilized chair   Glut set  10s x 10 reps     Hamstring stretching 30s x 3  javed alt   Piriformis stretching  30s x 3 alt javed     Hip abduction seated  javed 10 reps x 2 sets                 Seated exs require PT demo for recall and intermittent instruction cues, diminished immediate recall of exs despite blocked practice, bia 2 sets well with rest breaks between        TUG with RW  ( RW)16s ( Modified Indep)   13s ( without RW, CGA to SBA) Unsteadiness with directional changes without RW and misjudged chair following TUG without RW          Therapeutic Activities (16242):      Home Exercise Program:     Exercise 1: Access Code: [email or web address]: [email or web address]. com/Date: 03/24/2021Prepared by: Madeline RayhExercisesSitting Knee Extension with Resistance - 1 x daily - 3-5 x weekly - 1-3 sets - 10 repsSeated Knee Extension Stretch with Chair - 1 x daily - 7 x weekly - 1 sets - 30 holdSeated Hamstring Curls with Resistance - 1 x daily - 3-5 x weekly - 1-3 sets - 10 repsSeated Hip Abduction - 1 x daily - 3-5 x weekly - 1-3 sets - 10 repsSeated March - 1 x daily - 3-5 x weekly - 1-3 sets - 10 repsSit to Stand with Counter Support - 1 x daily - 7 x weekly - 1-2 sets - 10 reps. Access Code: [email or web address]: [email or web address]. com/Date: 04/12/2021  Prepared by: Darlene Sullivan RushExercises   Sitting Knee Extension with Resistance - 1 x daily - 3 x weekly - 2-3 sets - 10-20 reps   Seated Hamstring Curls with Resistance - 1 x daily - 3 x weekly - 2-3 sets - 10-20 reps   Seated March with Resistance - 1 x daily - 3 x weekly - 2-3 sets - 10-20 reps   Seated Hip Abduction with Resistance - 1 x daily - 3 x weekly - 2-3 sets - 10-20 reps    Manual Treatments (50837):      Modalities:      Timed Code Treatment Minutes:  45 mins therapeutic ex  Total Treatment Minutes:  45    Treatment/Activity Tolerance:  [x] Patient tolerated treatment well [x] Patient limited by fatigue  [] Patient limited by pain  [] Patient limited by other medical complications  [] Other:     Assessment:  Pt with fair bia for treatment today, limited by generalized fatigue. Pt has more noticeable balance deficits today with transfers and TUG without RW, required CGA with directional changes and initial standing. Progressed with light resistance for challenge and to illustrate strength gains HEP. Pt has ongoing generalized weakness and early fatigue, reported even with self care tasks in home, requiring an inc assist of wife and decline in indep with ADLs. Discussed option for possible home PT if not bia coming to therapy in outpatient setting. Pt to think it over, has had in past will talk to wife. Pt continues to benefit from skilled PT to address and promote improvement with functional strength needed to improve indep in home with ADLS, activity bia and functional mobility.  Cont with POC    Prognosis: [] Good [] Fair  [] Poor    Patient Requires Follow-up: [x] Yes  [] No    Goals:  Short term goals  Time Frame for Short term goals: 4 weeks  Short term goal 1: Pt to reduce fall risk and impairment per TInetti by inc score from 8 to >/=16  Short term goal 2: Pt to improve TUG to reduce fall risk and improve gait speed from 17s to less than 12 seconds  Short term goal 3: Pt to illustrate strength gains by bia 45-60 min PT treatment sessions and ability to perform sit to stand with min use of UE safely 75% or greater of the time  Short term goal 4: Pt to improve hamstring lengh exhibited with forward reach to less than 3 inches from toes for improving flexiblity in LEs  Short term goal 5: indep to supervision with progressive HEP to max rehab potential- MET  Long term goals  Time Frame for Long term goals : 8 weeks  Long term goal 1: Pt to reduce impairment and fall risk by inc score per Tinetti to >/=22  Long term goal 2: Pt to ambulate household distances and situations modified indep to indep    Plan:   Times per week: 1-2 days a week for 8 weeks, 16 visits as bia and medically appropriate  Current Treatment Recommendations: Strengthening, Balance Training, Transfer Training, IADL Training, Endurance Training, Gait Training, Neuromuscular Re-education, Home Exercise Program, Patient/Caregiver Education & Training, Modalities, Positioning, Equipment Evaluation, Education, & procurement, Safety Education & Training, Functional Mobility Training, ADL/Self-care Training, ROM   [x] Continue per plan of care [] Alter current plan (see comments)  [x] Plan of care initiated [] Hold pending MD visit [] Discharge    Plan for Next Session:    Electronically signed by:  Alexus Westfall DPT

## 2021-04-14 ENCOUNTER — HOSPITAL ENCOUNTER (OUTPATIENT)
Dept: PHYSICAL THERAPY | Age: 76
Setting detail: THERAPIES SERIES
Discharge: HOME | End: 2021-04-14
Payer: MEDICARE

## 2021-04-14 PROCEDURE — 97110 THERAPEUTIC EXERCISES: CPT

## 2021-04-14 NOTE — FLOWSHEET NOTE
Physical Therapy Daily Treatment Note  Date:  2021  Patient Name:  Gideon Valenzuela      :  1945    MRN: [id number]  Restrictions/Precautions:    Medical/Treatment Diagnosis Information:  · Diagnosis: R53.81 (ICD-10-CM) - Other malaise, Debility and Deconditioning R53.81  · Treatment Diagnosis: balance problems gait distrubance  Insurance/Certification information:  PT Insurance Information: [facility] Medicare  Insurance Allowable Visits:    Physician Information:  Referring Practitioner: Miah Aranda MD MD Follow-up Visit:   Plan of care signed (Y/N):  Y  Visit# / total visits:   Pain level: 0/10     Progress Note Due (10 visits or 30 days, whichever is less):    Recertification Note Due (End of POC or 90 days, whichever is less):      Subjective:  C/c weakness decline in indep with self care and mobility. Pt reports inc nausea following last session, but was short lived. Objective:   Observation: Ambulates Rachele with RW balance deficits in standing specifically dynamically   Port in abdominals draining red and swollen reports is fighting infection.  Test measurements:     Exercises, Neuro Facilitation & Gait Training (18895, [id number]):   Activity Resistance/Repetitions Other comments   Held for progression in sitting, pt reports he does exercises but not everyday, denies questions over bed exs/chair exProgression seated exs   Included  DF/PF 15 reps javed x2 sets    LAQ Progressed with light resistance   10 reps javed x2 sets  2021   Adductor squeeze with TA 10s x 10reps x2 sets    Abduction of hip seated light resistance  10 reps x 2 sets 2021   Sit to/from stand  X 5 x2 sets  Pt with impaired balance- heavily rely on back of stabilized chair   Glut set  10s x 10 reps     Hamstring stretching  30s x 3  javed alt   Piriformis stretching  30s x 3 alt javed            Seated exs require PT demo for recall and intermittent instruction cues, diminished immediate recall of exs despite blocked practice, bia 2 sets well with rest breaks between               Therapeutic Activities ():      Home Exercise Program:     Exercise 1: Access Code: [email or web address]: [email or web address] com/Date: 03/24/2021Prepared by: Madeline RayhExercisesSitting Knee Extension with Resistance - 1 x daily - 3-5 x weekly - 1-3 sets - 10 repsSeated Knee Extension Stretch with Chair - 1 x daily - 7 x weekly - 1 sets - 30 holdSeated Hamstring Curls with Resistance - 1 x daily - 3-5 x weekly - 1-3 sets - 10 repsSeated Hip Abduction - 1 x daily - 3-5 x weekly - 1-3 sets - 10 repsSeated March - 1 x daily - 3-5 x weekly - 1-3 sets - 10 repsSit to Stand with Counter Support - 1 x daily - 7 x weekly - 1-2 sets - 10 reps. Access Code: [email or web address]: [email or web address]. [employer]/Date: 04/12/2021  Prepared by: Samantha RayhExercises   Sitting Knee Extension with Resistance - 1 x daily - 3 x weekly - 2-3 sets - 10-20 reps   Seated Hamstring Curls with Resistance - 1 x daily - 3 x weekly - 2-3 sets - 10-20 reps   Seated March with Resistance - 1 x daily - 3 x weekly - 2-3 sets - 10-20 reps   Seated Hip Abduction with Resistance - 1 x daily - 3 x weekly - 2-3 sets - 10-20 reps    Manual Treatments (69230):      Modalities:      Timed Code Treatment Minutes:  38 mins therapeutic ex  Total Treatment Minutes:  38    Treatment/Activity Tolerance:  [x] Patient tolerated treatment well [x] Patient limited by fatigue  [] Patient limited by pain  [] Patient limited by other medical complications  [] Other:     Assessment:  Good bia for session today, but easily fatigues. Pt has ongoing generalized weakness and early fatigue, reported even with self care tasks in home, requiring an inc assist of wife and decline in indep with ADLs. Discussed option for possible home PT if not bia coming to therapy in outpatient setting. Pt to think it over, has had in past will talk to wife.  Pt continues to benefit from skilled PT to address and promote improvement with functional strength needed to improve indep in home with ADLS, activity bia and functional mobility. Cont with POC.     Prognosis: [x] Good [x] Fair  [] Poor  Patient Requires Follow-up: [x] Yes  [] No    Goals:  Short term goals  Time Frame for Short term goals: 4 weeks  Short term goal 1: Pt to reduce fall risk and impairment per TInetti by inc score from 8 to >/=16  Short term goal 2: Pt to improve TUG to reduce fall risk and improve gait speed from 17s to less than 12 seconds  Short term goal 3: Pt to illustrate strength gains by bia 45-60 min PT treatment sessions and ability to perform sit to stand with min use of UE safely 75% or greater of the time  Short term goal 4: Pt to improve hamstring lengh exhibited with forward reach to less than 3 inches from toes for improving flexiblity in LEs  Short term goal 5: indep to supervision with progressive HEP to max rehab potential- MET  Long term goals  Time Frame for Long term goals : 8 weeks  Long term goal 1: Pt to reduce impairment and fall risk by inc score per Tinetti to >/=22  Long term goal 2: Pt to ambulate household distances and situations modified indep to indep    Plan:   Times per week: 1-2 days a week for 8 weeks, 16 visits as bia and medically appropriate  Current Treatment Recommendations: Strengthening, Balance Training, Transfer Training, IADL Training, Endurance Training, Gait Training, Neuromuscular Re-education, Home Exercise Program, Patient/Caregiver Education & Training, Modalities, Positioning, Equipment Evaluation, Education, & procurement, Safety Education & Training, Functional Mobility Training, ADL/Self-care Training, ROM   [x] Continue per plan of care [] Alter current plan (see comments)  [x] Plan of care initiated [] Hold pending MD visit [] Discharge    Plan for Next Session:    Electronically signed by:  Yong Michele DPT

## 2021-04-19 ENCOUNTER — HOSPITAL ENCOUNTER (OUTPATIENT)
Dept: PHYSICAL THERAPY | Age: 76
Setting detail: THERAPIES SERIES
Discharge: HOME | End: 2021-04-19
Payer: MEDICARE

## 2021-04-19 PROCEDURE — 97110 THERAPEUTIC EXERCISES: CPT

## 2021-04-19 NOTE — FLOWSHEET NOTE
Physical Therapy Daily Treatment Note  Date:  2021  Patient Name:  Karyn Gr      :  1945    MRN: [id number]  Restrictions/Precautions:    Medical/Treatment Diagnosis Information:  · Diagnosis: R53.81 (ICD-10-CM) - Other malaise, Debility and Deconditioning R53.81  · Treatment Diagnosis: balance problems gait distrubance  Insurance/Certification information:  PT Insurance Information: [facility] Medicare  Insurance Allowable Visits:    Physician Information:  Referring Practitioner: Magaly Gerardo MD MD Follow-up Visit:   Plan of care signed (Y/N):  Y  Visit# / total visits:   Pain level: 0/10     Progress Note Due (10 visits or 30 days, whichever is less):    Recertification Note Due (End of POC or 90 days, whichever is less):      Subjective:  C/c weakness decline in indep with self care and mobility. Objective:   Observation: Ambulates Rachele with RW balance deficits in standing specifically dynamically   Port in abdominals draining red and swollen reports is fighting infection.  Test measurements:     Exercises, Neuro Facilitation & Gait Training (23648, .70.26.99):   Activity Resistance/Repetitions Other comments   Held for progression in sitting, pt reports he does exercises but not everyday, denies questions over bed exs/chair exProgression seated exs   Included  DF/PF 15 reps javed x2 sets    LAQ Progressed with red, mod resistance   10 reps javed x2 sets  2021   Adductor squeeze with TA 10s x 10reps x2 sets    Abduction of hip seated light resistance  10 reps x 2 sets 2021   Sit to/from stand  X 5 x2 sets  Pt with impaired balance- heavily rely on back of stabilized chair   Glut set  10s x 10 reps     Hamstring stretching  30s x 3  javed alt   Piriformis stretching  30s x 3 alt javed            Seated exs require PT demo for recall and intermittent instruction cues, diminished immediate recall of exs despite blocked practice, bia 2 sets well with rest breaks between Standing // bars walking backwards  5 laps  Side stepping x 5 laps            Therapeutic Activities (39168):      Home Exercise Program:     Exercise 1: Access Code: [email or web address]: [employer]/Date: 03/24/2021Prepared by: Madeline RayhExercisesSitting Knee Extension with Resistance - 1 x daily - 3-5 x weekly - 1-3 sets - 10 repsSeated Knee Extension Stretch with Chair - 1 x daily - 7 x weekly - 1 sets - 30 holdSeated Hamstring Curls with Resistance - 1 x daily - 3-5 x weekly - 1-3 sets - 10 repsSeated Hip Abduction - 1 x daily - 3-5 x weekly - 1-3 sets - 10 repsSeated March - 1 x daily - 3-5 x weekly - 1-3 sets - 10 repsSit to Stand with Counter Support - 1 x daily - 7 x weekly - 1-2 sets - 10 reps. Access Code: [email or web address]: [email or web address]. [employer]/Date: 04/12/2021  Prepared by: Mainor Hunt RushExercises   Sitting Knee Extension with Resistance - 1 x daily - 3 x weekly - 2-3 sets - 10-20 reps   Seated Hamstring Curls with Resistance - 1 x daily - 3 x weekly - 2-3 sets - 10-20 reps   Seated March with Resistance - 1 x daily - 3 x weekly - 2-3 sets - 10-20 reps   Seated Hip Abduction with Resistance - 1 x daily - 3 x weekly - 2-3 sets - 10-20 reps    Manual Treatments (10801):      Modalities:      Timed Code Treatment Minutes:  45 mins therapeutic ex  Total Treatment Minutes:  45  Treatment/Activity Tolerance:  [x] Patient tolerated treatment well [x] Patient limited by fatigue  [] Patient limited by pain  [] Patient limited by other medical complications  [] Other:     Assessment:  Good bia for session today, but easily fatigues, bia inc resistance with seated exs today with rest breaks. Pt has ongoing generalized weakness and early fatigue, reported even with self care tasks in home, requiring an inc assist of wife and decline in indep with ADLs. Discussed option for possible home PT if not bia coming to therapy in outpatient setting. Pt to think it over, has had in past will talk to wife.  Pt

## 2021-04-21 ENCOUNTER — HOSPITAL ENCOUNTER (OUTPATIENT)
Dept: PHYSICAL THERAPY | Age: 76
Setting detail: THERAPIES SERIES
Discharge: HOME | End: 2021-04-21
Payer: MEDICARE

## 2021-04-21 PROCEDURE — 97110 THERAPEUTIC EXERCISES: CPT

## 2021-04-21 NOTE — FLOWSHEET NOTE
score 16. Exercises, Neuro Facilitation & Gait Training (00501, [id number]): Activity Resistance/Repetitions Other comments   DF/PF javed  20 reps  Adductor squeeze  20reps javed  Seated unsupported inc from 10 reps to 20    Glut set  20 reps javed  Seated unsupported inc from 10 reps to 20 reps    Held for progression in sitting, pt reports he does exercises but not everyday, denies questions over bed exs/chair ex     LAQ Progressed with red, mod resistance   10 reps javed x2 sets  4/12/2021        Abduction of hip seated light resistance  10 reps x 2 sets 4/12/2021   Sit to/from stand  10 reps  Moderately relies on the stability of the back of chair for balance, UE used  Inc from 5 reps x2 sets to 10 for challenge   Sit to/from stand without UE lowered mat x5 reps  Close SBA to CGA after 3rep   Hamstring stretching  30s x 3  javed alt  LLE 7 inches from 3rd DIP to toes, RLE 5 inches     Findings from eval to show progression  3/24/2021  Flexibility: forward reach javed hamstring tightness, RLE 6 inches from toes, LLE 8 inches. Piriformis stretching  30s x 3 alt javed            Seated exs require PT demo for recall and intermittent instruction cues, diminished immediate recall of exs despite blocked practice, bia 2 sets well with rest breaks between   Standing supported BUE   hip ext BLE alt 10 reps  Added 4/21/2021     Standing supported at mat BUE support  Hip abduction javed alt 10 reps  Added 4/21/2021   TUG with RW  4/21/2021  Reassessment of TUG without walker  13 seconds  11seconds        Therapeutic Activities (84547):      Home Exercise Program:     Exercise 1: Access Code: [email or web address]: [email or web address]/Date: 03/24/2021Prepared by: Madeline RayhExercisesSitting Knee Extension with Resistance - 1 x daily - 3-5 x weekly - 1-3 sets - 10 repsSeated Knee Extension Stretch with Chair - 1 x daily - 7 x weekly - 1 sets - 30 holdSeated Hamstring Curls with Resistance - 1 x daily - 3-5 x weekly - 1-3 sets - term goals: 4 weeks  Short term goal 1: Pt to reduce fall risk and impairment per Tinetti by inc score from 8 to >/=16  - MET  Short term goal 2: Pt to improve TUG to reduce fall risk and improve gait speed from 17s to less than 12 seconds-MET  Short term goal 3: Pt to illustrate strength gains by bia 45-60 min PT treatment sessions and ability to perform sit to stand with min use of UE safely 75% or greater of the time-MET  Short term goal 4: Pt to improve hamstring lengh exhibited with forward reach to less than 3 inches from toes for improving flexiblity in LEs- NOT MET PROGRESSING/IMPROVING  Short term goal 5: indep to supervision with progressive HEP to max rehab potential- MET  Long term goals  Time Frame for Long term goals : 8 weeks  Long term goal 1: Pt to reduce impairment and fall risk by inc score per Tinetti to >/=22  Long term goal 2: Pt to ambulate household distances and situations modified indep to indep    Plan:   Times per week: 1-2 days a week for 8 weeks, 16 visits as bia and medically appropriate  Current Treatment Recommendations: Strengthening, Balance Training, Transfer Training, IADL Training, Endurance Training, Gait Training, Neuromuscular Re-education, Home Exercise Program, Patient/Caregiver Education & Training, Modalities, Positioning, Equipment Evaluation, Education, & procurement, Safety Education & Training, Functional Mobility Training, ADL/Self-care Training, ROM   [x] Continue per plan of care [] Alter current plan (see comments)  [x] Plan of care initiated [] Hold pending MD visit [] Discharge    Plan for Next Session:    Electronically signed by:  Abigail Hagan DPT

## 2021-04-21 NOTE — PROGRESS NOTES
Outpatient Physical Therapy  Phone: [phone] Fax: [phone]    To: Dr. Jono Arcos MD       From: Kimani Mott, PTDPT    Date: 2021  Patient: Lisa Baum     : 1945 MRN: [id number]  Medical Diagnosis:  R53.81 (ICD-10-CM) - Other malaise, Debility and Deconditioning R53.81  Treatment Diagnosis:    balance problems gait distrubance    Physical Therapy Progress Note    Time Period for Report:  Evaluation date on 3/24/2021-2021    Total Visits to date:    Cancels/No-shows to date:  0    Plan of Care/Treatment to date:  Current Treatment Recommendations: Strengthening, Balance Training, Transfer Training, IADL Training, Endurance Training, Gait Training, Neuromuscular Re-education, Home Exercise Program, Patient/Caregiver Education & Training, Modalities, Positioning, Equipment Evaluation, Education, & procurement, Safety Education & Training, Functional Mobility Training, ADL/Self-care Training, ROM     Significant Findings At Last Visit/Comments:    Subjective:  C/c weakness decline in indep with self care and mobility. \"I am feeling better about moving about. My legs still feel weak. It still takes me so long to get ready and I have to rest a lot\" \"I just want to get my legs back and feel better about my balance\" Pt satisfied with care and progress thus far, wishing for more PT. Observation: Ambulates Rachele with RW  Test measurements:   2021 Forward reach reassessment of hamstring length: improved see comments below. LLE 7 inches from 3rd DIP to toes, RLE 5 inches   Findings from eval to show progression  3/24/2021  Flexibility: forward reach javed hamstring tightness, RLE 6 inches from toes, LLE 8 inches.   TUG- lower fall risk improved gait speed reduced time and completed without RW.   2021  Reassessment of TUG without walker  13 seconds, 11seconds  Balance  Pt feet apart modified narrow base of support holds with min inc sway to normal, eyes closed mod to min inc sway ( 15 s, close SBA, inc ankle/hip strategies). Pt able to maintain balance with min perturbations with modified narrow base of  Support  Tinetti under FOM tab in Epic, improved score less fall risk, gait score 7, balance 9, score 16. Progress towards goals:    Assessment:  Good bia for session today and progressing well towards goals. Pt has met 4/5 STG and rest are improving progressing as expected. Pt has inc functional strength noted by the ability to sit/stand without use of UE and progressive HEP using resistance therabands. Pt exhibits lower fall risk per TUG and Tinetti. Pt does continue to report reduced indep and inc time for self care in home and ongoing balance and diminished bia for functional gait distances. Pt will continue to benefit from skilled PT services to improve functional strength and balance needed to improve indep in home and community. Pt continues to benefit from skilled PT to address and promote improvement with functional strength needed to improve indep in home with ADLS, activity bia and functional mobility. Cont with POC.     Frequency/Duration set at Eval:Pt has 8 more sessions in POC  # Days per week: [] 1 day # Weeks: [] 1 week [] 4 weeks      [x] 2 days   [] 2 weeks [] 5 weeks      [] 3 days   [] 3 weeks [x] 8 weeks     Rehab Potential: [] Excellent [x] Good [] Fair  [] Poor     Goal Status:  [] Achieved [x] Partially Achieved  [] Not Achieved   Goals:  Short term goals  Time Frame for Short term goals: 4 weeks  Short term goal 1: Pt to reduce fall risk and impairment per Tinetti by inc score from 8 to >/=16  - MET  Short term goal 2: Pt to improve TUG to reduce fall risk and improve gait speed from 17s to less than 12 seconds-MET  Short term goal 3: Pt to illustrate strength gains by bia 45-60 min PT treatment sessions and ability to perform sit to stand with min use of UE safely 75% or greater of the time-MET  Short term goal 4: Pt to improve hamstring lengh exhibited

## 2021-04-22 ENCOUNTER — OFFICE VISIT (OUTPATIENT)
Dept: PULMONOLOGY | Age: 76
End: 2021-04-22
Payer: MEDICARE

## 2021-04-22 VITALS
SYSTOLIC BLOOD PRESSURE: 130 MMHG | HEART RATE: 82 BPM | HEIGHT: 70 IN | TEMPERATURE: 97 F | BODY MASS INDEX: 27.92 KG/M2 | WEIGHT: 195 LBS | OXYGEN SATURATION: 92 % | DIASTOLIC BLOOD PRESSURE: 78 MMHG

## 2021-04-22 DIAGNOSIS — J45.991 COUGH VARIANT ASTHMA: Primary | ICD-10-CM

## 2021-04-22 DIAGNOSIS — G47.33 OSA (OBSTRUCTIVE SLEEP APNEA): ICD-10-CM

## 2021-04-22 DIAGNOSIS — J84.9 ILD (INTERSTITIAL LUNG DISEASE) (HCC): ICD-10-CM

## 2021-04-22 DIAGNOSIS — I50.32 CHRONIC DIASTOLIC HEART FAILURE (HCC): ICD-10-CM

## 2021-04-22 PROCEDURE — 99214 OFFICE O/P EST MOD 30 MIN: CPT | Performed by: INTERNAL MEDICINE

## 2021-04-22 PROCEDURE — 3017F COLORECTAL CA SCREEN DOC REV: CPT | Performed by: INTERNAL MEDICINE

## 2021-04-22 PROCEDURE — 1036F TOBACCO NON-USER: CPT | Performed by: INTERNAL MEDICINE

## 2021-04-22 PROCEDURE — 1123F ACP DISCUSS/DSCN MKR DOCD: CPT | Performed by: INTERNAL MEDICINE

## 2021-04-22 PROCEDURE — G8417 CALC BMI ABV UP PARAM F/U: HCPCS | Performed by: INTERNAL MEDICINE

## 2021-04-22 PROCEDURE — 4040F PNEUMOC VAC/ADMIN/RCVD: CPT | Performed by: INTERNAL MEDICINE

## 2021-04-22 PROCEDURE — G8427 DOCREV CUR MEDS BY ELIG CLIN: HCPCS | Performed by: INTERNAL MEDICINE

## 2021-04-22 RX ORDER — BENZONATATE 100 MG/1
100 CAPSULE ORAL 3 TIMES DAILY PRN
Qty: 50 CAPSULE | Refills: 3 | Status: SHIPPED | OUTPATIENT
Start: 2021-04-22 | End: 2021-04-29

## 2021-04-22 RX ORDER — GLUCOSAMINE/D3/BOSWELLIA SERRA 1500MG-400
TABLET ORAL
Status: ON HOLD | COMMUNITY
End: 2022-09-07

## 2021-04-22 NOTE — PROGRESS NOTES
tablet by mouth daily (Patient taking differently: Take 40 mg by mouth daily ) 30 tablet 5    Blood Glucose Monitoring Suppl (ACCU-CHEK JOHNATHAN PLUS) w/Device KIT As needed 1 kit 2    insulin lispro, 1 Unit Dial, 100 UNIT/ML SOPN INJECT 12-18 UNITS UNDER THE SKIN THREE TIMES DAILY BEFORE MEALS 30 mL 3    ACCU-CHEK JOHNATHAN PLUS strip USE TO TEST FOUR TIMES DAILY AS NEEDED 100 strip 4    Multiple Vitamins-Minerals (THERAPEUTIC MULTIVITAMIN-MINERALS) tablet Take 1 tablet by mouth daily      Handicap Placard MISC by Does not apply route Duration: 5 years 1 each 0    ACCU-CHEK SOFTCLIX LANCETS MISC USE FOUR TIMES A  each 3    glucose blood VI test strips (ACCU-CHEK JOHNATHAN) strip 4 times a day As needed. 150 each 3    Blood Glucose Monitoring Suppl (ACCU-CHEK JOHNATHAN) RUMA Use as needed 1 Device 0    aspirin 81 MG chewable tablet Take 1 tablet by mouth daily. 30 tablet 5    Lancets MISC 2 times daily. Lancets for a Anna meter. 50 each 11    carvedilol (COREG) 3.125 MG tablet TAKE ONE TABLET BY MOUTH TWICE A DAY (Patient not taking: Reported on 4/22/2021) 60 tablet 2    lidocaine 4 % external patch Place 1 patch onto the skin daily (Patient not taking: Reported on 4/22/2021) 1 box 1     No current facility-administered medications on file prior to visit.         REVIEW OF SYSTEMS:    CONSTITUTIONAL: Negative for fevers and chills  HEENT: Negative for oropharyngeal exudate, post nasal drip, sinus pain / pressure, nasal congestion, ear pain  RESPIRATORY:  See HPI  CARDIOVASCULAR: Negative for chest pain, palpitations, edema  GASTROINTESTINAL: Negative for nausea, vomiting, diarrhea, constipation and abdominal pain  HEMATOLOGICAL: Negative for adenopathy  SKIN: Negative for clubbing, cyanosis, skin lesions  EXTREMITIES: Negative for weakness, decreased ROM  NEUROLOGICAL: Negative for unilateral weakness, speech or gait abnormalities    Objective:   PHYSICAL EXAM:        VITALS:    /78 (Site: Right Upper Arm, Position: Sitting, Cuff Size: Small Adult)   Pulse 82   Temp 97 °F (36.1 °C) (Infrared)   Ht 5' 10\" (1.778 m)   Wt 195 lb (88.5 kg)   SpO2 92% Comment: RA  BMI 27.98 kg/m²   On RA  CONSTITUTIONAL:  Awake, alert, cooperative, no apparent distress, and appears stated age  HEENT: No oropharyngeal exudate, PERRL, no cervical adenopathy, no tracheal deviation, thyroid size normal  LUNGS:  No increased work of breathing. + crackles bilateral bases. No wheezing  CARDIOVASCULAR:  normal S1 and S2 and no JVD  ABDOMEN:  Normal bowel sounds, non-distended and non-tender to palpation  EXT: No edema, no calf tenderness. Pulses are present bilaterally. NEUROLOGIC:  Mental Status Exam:  Level of Alertness:   awake  Orientation:   person, place, time. SKIN:  normal skin color, texture, turgor, no redness, warmth, or swelling     DATA:    PFTs  Indication: Shortness of breath, weakness, non productive cough    Test comment:     Spirometry data is acceptable and reproducible.     Pulse ox is 91% on room air    Estimated body mass index is 28.3 kg/m² as calculated from the   following:    Height as of 11/21/17: 5' 10\" (1.778 m).    Weight as of 11/21/17: 197 lb 3.2 oz (89.4 kg).     Spirometry data:    FEV1/FVC: 89. Predicted ratio 73    Pre-Bronchodilator FEV1 2.07L, which is 63% predicted    Post-Bronchodilator FEV1 2.02L, which is 61% predicted    There is -2% reversibility     FVC is 2.33L, which is 51% predicted    Lung Volumes:    TLC is 4.85L, which is 67% predicted    RV is 2.51L which is 99% predicted    Diffusion Capacity:    DLCO is 13.77 which is 42% predicted    Impression:    1. There is no obstruction present    2. There is no response to bronchodilator therapy         [Increase in FEV1 => 12% of control and => 200 ml]    3. There is moderate restriction     4. There is moderate reduction in diffusion capacity    Comment: Moderate restrictive disease with moderate decrease in   diffusion capacity consistent with the diagnosis of interstitial   lung disease.      Radiology Review:  Pertinent images / reports were reviewed as a part of this visit. CXR 8/2020  FINDINGS:         The heart size is within normal limits.         The lungs appear clear of acute parenchymal infiltrate.                        Impression         No acute disease. CT Chest reveals the following:  Impression       Pulmonary fibrosis bilateral lower lobe subpleural regions as   described similar to previous examination.       Marked coronary artery calcification which is a risk factor for   acute coronary syndrome.       No lobar consolidation.       Ascending thoracic aorta upper limits of normal.     Assessment:   1. Cough Variant Asthma  2. ILD - NOS. Unchanged from 2013  3. BJ -not using BiPAP  4. Chronic Diastolic CHF  5. Restless Legs Syndrome    Plan:   1. Continue Dulera 200 mcg MDI and prn albuterol  2. He is up-to-date with his flu, Prevnar 13, and Pneumovax vaccinations  3. Will treat cough with Tessalon Perles on an as-needed basis  4.   RTC 6 months

## 2021-04-26 ENCOUNTER — APPOINTMENT (OUTPATIENT)
Dept: PHYSICAL THERAPY | Age: 76
End: 2021-04-26
Payer: MEDICARE

## 2021-04-28 ENCOUNTER — HOSPITAL ENCOUNTER (OUTPATIENT)
Dept: PHYSICAL THERAPY | Age: 76
Setting detail: THERAPIES SERIES
Discharge: HOME | End: 2021-04-28
Payer: MEDICARE

## 2021-04-28 PROCEDURE — 97110 THERAPEUTIC EXERCISES: CPT | Performed by: PHYSICAL THERAPIST

## 2021-04-28 PROCEDURE — 97112 NEUROMUSCULAR REEDUCATION: CPT | Performed by: PHYSICAL THERAPIST

## 2021-04-28 NOTE — FLOWSHEET NOTE
[facility] Outpatient Therapy  [address] [place] [address], [address] [place] [address]  Phone: [phone]   Fax: [phone]    Physical Therapy Treatment Note/ Progress Report:     Date:  2021    Patient Name:  Jose Guadalupe Ayala  \"Ilya\"  :  1945  MRN: [id number]    Medical/Treatment Diagnosis Information:  · Diagnosis: R53.81 (ICD-10-CM) - Other malaise, Debility and Deconditioning R53.81  · Treatment Diagnosis: balance problems gait distrubance  Insurance/Certification information:  PT Insurance Information: [facility] Medicare  Physician Information:  Referring Practitioner: Vance Bassett MD  Plan of care signed:    [x] Yes  [] No    Date of Patient follow up with Physician:  2021     Progress Report: []  Yes  [x]  No     Date Range for reporting period:  Beginning:  3/24/2021  Ending:      Progress report due (10 Rx/or 30 days whichever is less):      Recertification due (POC duration/ or 90 days whichever is less): 2021     Visit # Insurance Allowable Auth Needed   9 16 []Yes   []No     RESTRICTIONS/PRECAUTIONS:   Latex Allergy:  [x]NO      []YES  Preferred Language for Healthcare:   [x]English       []other:    Pain level:  0/10     SUBJECTIVE:   Pt c/o weakness and fatigue, and feels more tired the past couple days. Pt reports doing alright with HEP. OBJECTIVE:     Observation: Pt arrives with RW, but reports walking without AD around his home, and uses RW when he is out in community.  Test measurements:     TUG (without walker) 21:    13 seconds attempt 1    11 seconds attempt 2   Functional Scale:     LEFS:   (70% impaired)      Date assessed:  3/24/2021    Exercises/Interventions: Exercises in bold performed in department today. Items not bolded are carried forward from prior visits for continuity of the record. Exercise/Equipment Resistance/Repetitions HEP Other comments   NuStep 8 min warm up.  Resist 6, Seat 13, Arms 11 (SPM started ~75, dropped to 60 towards end) [] PT cues for patient to watch SPM to maintain/increase pace as tolerated    Seated Glute Squeeze 20 x  []    Seated Hip Add Squeeze (ball between knees) 20 x  []    LAQ 20 x each leg, 2.5# ankle weight [x]    Sit-to-Stand 15 x with minimal UE support [x] PT cues to only assist with arms if needed. Fatigues at 15 reps   Hamstring Stretch (seated off side of table, leg outstretched) 3 x 30\" hold [x]    Piriformis Stretch (seated, figure 4 position) 3 x 30\" hold []      []    Standing Hip Abd 20 x bilat [] PT cues for posture, slow eccentric return to start position   Heel Raises 20 x  []    Mini-squats 20 x  [] PT cues for technique, keeping hips back and having toes point forwards     []      []      []      []      []      []      []      Home Exercise Program:     Access Code: [email or web address]: [facility]. com/  Date: 03/24/2021  Prepared by: Arelia Rusk  Exercises   Sitting Knee Extension with Resistance - 1 x daily - 3-5 x weekly - 1-3 sets - 10 reps   Seated Knee Extension Stretch with Chair - 1 x daily - 7 x weekly - 1 sets - 30 hold   Seated Hamstring Curls with Resistance - 1 x daily - 3-5 x weekly - 1-3 sets - 10 reps   Seated Hip Abduction - 1 x daily - 3-5 x weekly - 1-3 sets - 10 reps   Seated March - 1 x daily - 3-5 x weekly - 1-3 sets - 10 reps   Sit to Stand with Counter Support - 1 x daily - 7 x weekly - 1-2 sets - 10 reps. Access Code: [email or web address]: [facility]. com/  Date: 04/12/2021  Prepared by: Arelia Rusk  Exercises   Sitting Knee Extension with Resistance - 1 x daily - 3 x weekly - 2-3 sets - 10-20 reps   Seated Hamstring Curls with Resistance - 1 x daily - 3 x weekly - 2-3 sets - 10-20 reps   Seated March with Resistance - 1 x daily - 3 x weekly - 2-3 sets - 10-20 reps   Seated Hip Abduction with Resistance - 1 x daily - 3 x weekly - 2-3 sets - 10-20 reps      Therapeutic Exercise and NMR:  [x] (29735) Provided verbal/tactile cueing for activities related to strengthening, flexibility, endurance, ROM for improvements in LE, proximal hip, and core control with self-care, mobility, lifting, ambulation. [x] (46723) Provided verbal/tactile cueing for activities related to improving balance, coordination, kinesthetic sense, posture, motor skill, proprioception to assist with LE, proximal hip, and core control in self-care, mobility, lifting, ambulation and eccentric single leg control.      Gait Training and Therapeutic Activities:    [] (60134) Provided verbal/tactile cueing for activities related to improving balance, coordination, kinesthetic sense, posture, motor skill, proprioception and motor activation to allow for proper function of core, proximal hip and LE with self-care and ADLs and functional mobility.   [] (25492) Provided training and instruction to the patient for proper LE, core and proximal hip recruitment and positioning and eccentric body weight control with ambulation re-education including up and down stairs     Home Exercise Program:    [x] (02236) Reviewed/Progressed HEP activities related to strengthening, flexibility, endurance, ROM of core, proximal hip and LE for functional self-care, mobility, lifting and ambulation/stair navigation   [] (95370) Reviewed/Progressed HEP activities related to improving balance, coordination, kinesthetic sense, posture, motor skill, proprioception of core, proximal hip and LE for self-care, mobility, lifting, and ambulation/stair navigation      Manual Treatments:  PROM / STM / Oscillations-Mobs:  G-I, II, III, IV (PA's, Inf., Post.)  [] (01791) Provided manual therapy to mobilize LE, proximal hip and/or LS spine soft tissue/joints for the purpose of modulating pain, promoting relaxation,  increasing ROM, reducing/eliminating soft tissue swelling/inflammation/restriction, improving soft tissue extensibility and allowing for proper ROM for normal function with self-care, mobility, lifting and ambulation. Modalities:    [] Electric Stimulation:   [] Ultrasound:   [] Other:       Charges:  Timed Code Treatment Minutes: 45   Total Treatment Minutes: 45      [] RE-EVAL     [x] HY(44153) x  2     [x] NMR (49195) x 1      [] Manual (49310) x       [] TA (70267) x       [] Gait Training ([id number]) x       [] ES(attended) (06214)  [] ES (un) (22 [id number])   [] DRY NEEDLE 1 OR 2 MUSCLES  [] DRY NEEDLE 3+ MUSCLES  [] Mech Traction (28651)  [] Ultrasound (37606)  [] Other:    GOALS:  Short term goals  Time Frame for Short term goals: 4 weeks  Short term goal 1: Pt to reduce fall risk and impairment per Tinetti by inc score from 8 to >/=16  - MET  Short term goal 2: Pt to improve TUG to reduce fall risk and improve gait speed from 17s to less than 12 seconds-MET  Short term goal 3: Pt to illustrate strength gains by bia 45-60 min PT treatment sessions and ability to perform sit to stand with min use of UE safely 75% or greater of the time-MET  Short term goal 4: Pt to improve hamstring lengh exhibited with forward reach to less than 3 inches from toes for improving flexiblity in LEs- NOT MET PROGRESSING/IMPROVING  Short term goal 5: indep to supervision with progressive HEP to max rehab potential- MET  Long term goals  Time Frame for Long term goals : 8 weeks  Long term goal 1: Pt to reduce impairment and fall risk by inc score per Tinetti to >/=22  Long term goal 2: Pt to ambulate household distances and situations modified indep to indep      Assessment:  Good bia for session today and progressing well towards goals. Pt has met 4/5 STG and rest are improving progressing as expected. Pt has inc functional strength noted by the ability to sit/stand without use of UE and progressive HEP using resistance therabands. Pt exhibits lower fall risk per TUG and Tinetti. Pt does continue to report reduced indep and inc time for self care in home and ongoing balance and diminished bia for functional gait distances.  Pt will continue to benefit from skilled PT services to improve functional strength and balance needed to improve indep in home and community. Pt continues to benefit from skilled PT to address and promote improvement with functional strength needed to improve indep in home with ADLS, activity bia and functional mobility. Cont with POC. Treatment/Activity Tolerance:  [] Patient tolerated treatment well [x] Patient limited by fatique  [] Patient limited by pain  [] Patient limited by other medical complications  [] Other:     Overall Progression Towards Functional goals/ Treatment Progress Update:  [] Patient is progressing as expected towards functional goals listed. [x] Progression is slowed due to complexities/Impairments listed. [] Progression has been slowed due to co-morbidities.   [] Plan just implemented, too soon to assess goals progression <30days   [] Goals require adjustment due to lack of progress  [] Patient is not progressing as expected and requires additional follow up with physician  [] Other    Prognosis for POC: [x] Good [] Fair  [] Poor    Patient requires continued skilled intervention: [x] Yes  [] No        PLAN: Times per week: 1-2 days a week for 8 weeks, 16 visits as bia and medically appropriate  Current Treatment Recommendations: Strengthening, Balance Training, Transfer Training, IADL Training, Endurance Training, Gait Training, Neuromuscular Re-education, Home Exercise Program, Patient/Caregiver Education & Training, Modalities, Positioning, Equipment Evaluation, Education, & procurement, Safety Education & Training, Functional Mobility Training, ADL/Self-care Training, ROM   [x] Continue per plan of care [] Alter current plan (see comments)  [] Plan of care initiated [] Hold pending MD visit [] Discharge    Electronically signed by: , PT [id number]      Yanira Reed, PT    Note: If patient does not return for scheduled/recommended follow up visits, this note will serve as a discharge from care along with the most recent update on progress.

## 2021-04-29 ENCOUNTER — HOSPITAL ENCOUNTER (OUTPATIENT)
Dept: PHYSICAL THERAPY | Age: 76
Setting detail: THERAPIES SERIES
Discharge: HOME | End: 2021-04-29
Payer: MEDICARE

## 2021-04-29 PROCEDURE — 97110 THERAPEUTIC EXERCISES: CPT

## 2021-04-29 NOTE — FLOWSHEET NOTE
[facility] Outpatient [facility]  [address] [place] [address], [address], [address]  Phone: [phone]   Fax: [phone]    Physical Therapy Treatment Note/ Progress Report:     Date:  2021    Patient Name:  Rebecca Reese  \"Ilya\"  :  1945  MRN: [id number]    Medical/Treatment Diagnosis Information:  · Diagnosis: R53.81 (ICD-10-CM) - Other malaise, Debility and Deconditioning R53.81  · Treatment Diagnosis: balance problems gait distrubance  Insurance/Certification information:  PT Insurance Information: [facility] Medicare  Physician Information:  Referring Practitioner: Juan Mcnamara MD  Plan of care signed:    [x] Yes  [] No    Date of Patient follow up with Physician:  2021     Progress Report: []  Yes  [x]  No     Date Range for reporting period:  Beginning:  3/24/2021  Ending:      Progress report due (10 Rx/or 30 days whichever is less):      Recertification due (POC duration/ or 90 days whichever is less): 2021     Visit # Insurance Allowable Auth Needed   10 16 []Yes   []No     RESTRICTIONS/PRECAUTIONS:   Latex Allergy:  [x]NO      []YES  Preferred Language for Healthcare:   [x]English       []other:    Pain level:  0/10     SUBJECTIVE:   Pt reports ongoing fatigue, especially when dressing in the morning. He continues to use his RW in the community, and states that he rarely uses it while at home. He has had no issues with his HEP. He has nothing new to report since his last session and has no questions or concerns today. OBJECTIVE:     Observation: Pt arrives with RW. He reports that he does not use an AD at home, only when he is in the community. Pt requiring occasional extended seated rest breaks after repeated sit to/from stand and standing therex this date.      Test measurements:     TUG (without walker) 21:    13 seconds attempt 1    11 seconds attempt 2   Tinetti (without RW) 21:    15/16 Balance    11/12 Gait     Total   Functional Scale:     LEFS:  24/80 (70% impaired)      Date assessed:  3/24/2021    Exercises/Interventions: Exercises in bold performed in department today. Items not bolded are carried forward from prior visits for continuity of the record. Exercise/Equipment Resistance/Repetitions HEP Other comments   NuStep 5 min at end of session for endurance. Resist 6, Seat 13, Arms 11 (SPM started ~76, dropped to 67 towards end) [] Pt fatiguing after 5 min. Pt encouraged to maintain consistent SPM throughout bout. SPM slowly decrease with fatigue. Seated Glute Squeeze 20 x  [] Verbal cues for upright posture, pt fatigues into slumped posture quickly   Seated Hip Add Squeeze (ball between knees) 20 x  [] Verbal cues for upright posture, pt fatigues into slumped posture quickly   LAQ 20 x each leg, 2.5# ankle weight [x]    Sit-to-Stand 8x  Attempted to have pt perform with no UE support, however pt was only able to complete 1x rep with no UE before requiring minimal UE assist. [x] Cues to decrease UE support and for anterior weight shifting for improved technique   Hamstring Stretch (seated off side of table, leg outstretched) 3 x 30\" hold [x]    Piriformis Stretch (seated, figure 4 position) 3 x 30\" hold []      []    Standing Hip Abd 20 x bilat [] Cues for upright posture and to slow speed for improved contraction   Heel Raises 20 x  [] Cues for upright posture   Mini-squats 20 x  [] Cues for upright posture and to slow speed for improved contraction   High Marches 20 x [] Cues for upright posture     []      []      []      []      []      []      Home Exercise Program:     Access Code: [email or web address]: [email or web address]/  Date: 03/24/2021  Prepared by: Lauryn Guzman  Exercises   Sitting Knee Extension with Resistance - 1 x daily - 3-5 x weekly - 1-3 sets - 10 reps   Seated Knee Extension Stretch with Chair - 1 x daily - 7 x weekly - 1 sets - 30 hold   Seated Hamstring Curls with Resistance - 1 x daily - 3-5 x weekly - 1-3 sets - 10 reps   Seated Hip Abduction - 1 x daily - 3-5 x weekly - 1-3 sets - 10 reps   Seated March - 1 x daily - 3-5 x weekly - 1-3 sets - 10 reps   Sit to Stand with Counter Support - 1 x daily - 7 x weekly - 1-2 sets - 10 reps. Access Code: [email or web address]: [email or web address]/  Date: 04/12/2021  Prepared by: Monserrat Valles  Exercises   Sitting Knee Extension with Resistance - 1 x daily - 3 x weekly - 2-3 sets - 10-20 reps   Seated Hamstring Curls with Resistance - 1 x daily - 3 x weekly - 2-3 sets - 10-20 reps   Seated March with Resistance - 1 x daily - 3 x weekly - 2-3 sets - 10-20 reps   Seated Hip Abduction with Resistance - 1 x daily - 3 x weekly - 2-3 sets - 10-20 reps      Therapeutic Exercise and NMR:  [x] (14228) Provided verbal/tactile cueing for activities related to strengthening, flexibility, endurance, ROM for improvements in LE, proximal hip, and core control with self-care, mobility, lifting, ambulation. [x] (12754) Provided verbal/tactile cueing for activities related to improving balance, coordination, kinesthetic sense, posture, motor skill, proprioception to assist with LE, proximal hip, and core control in self-care, mobility, lifting, ambulation and eccentric single leg control.      Gait Training and Therapeutic Activities:    [x] (37195) Provided verbal/tactile cueing for activities related to improving balance, coordination, kinesthetic sense, posture, motor skill, proprioception and motor activation to allow for proper function of core, proximal hip and LE with self-care and ADLs and functional mobility.   [] (61995) Provided training and instruction to the patient for proper LE, core and proximal hip recruitment and positioning and eccentric body weight control with ambulation re-education including up and down stairs     Home Exercise Program:    [] (49941) Reviewed/Progressed HEP activities related to strengthening, flexibility, endurance, ROM of core, PROGRESSING/IMPROVING  Short term goal 5: indep to supervision with progressive HEP to max rehab potential- MET  Long term goals  Time Frame for Long term goals : 8 weeks  Long term goal 1: Pt to reduce impairment and fall risk by inc score per Tinetti to >/=22 -met 4/29  Long term goal 2: Pt to ambulate household distances and situations modified indep to indep -progressing      Assessment: Good tolerance for session this date, however pt does continue to require occasional seated rest breaks after repeated sit to/from stand and standing therex due to reported fatigue. Pt reporting that overall he feels his gait and balance are sufficient, however he still feels like his endurance and stamina need to be improved. He reports that he feels like he is on track to meeting all of his goals. Pt with noted improvement in Tinetti score, 26/28, demonstrating low fall risk. Pt will continue to benefit from skilled PT services to improve functional strength and balance needed to improve indep in home and community. Pt continues to benefit from skilled PT to address and promote improvement with functional strength needed to improve indep in home with ADLS, activity bia and functional mobility. Cont with POC. Treatment/Activity Tolerance:  [] Patient tolerated treatment well [x] Patient limited by fatique  [] Patient limited by pain  [] Patient limited by other medical complications  [] Other:     Overall Progression Towards Functional goals/ Treatment Progress Update:  [] Patient is progressing as expected towards functional goals listed. [x] Progression is slowed due to complexities/Impairments listed. [] Progression has been slowed due to co-morbidities.   [] Plan just implemented, too soon to assess goals progression <30days   [] Goals require adjustment due to lack of progress  [] Patient is not progressing as expected and requires additional follow up with physician  [] Other    Prognosis for POC: [x] Good [] Fair  [] Poor    Patient requires continued skilled intervention: [x] Yes  [] No        PLAN: Times per week: 1-2 days a week for 8 weeks, 16 visits as bia and medically appropriate  Current Treatment Recommendations: Strengthening, Balance Training, Transfer Training, IADL Training, Endurance Training, Gait Training, Neuromuscular Re-education, Home Exercise Program, Patient/Caregiver Education & Training, Modalities, Positioning, Equipment Evaluation, Education, & procurement, Safety Education & Training, Functional Mobility Training, ADL/Self-care Training, ROM   [x] Continue per plan of care [] Alter current plan (see comments)  [] Plan of care initiated [] Hold pending MD visit [] Discharge    Electronically signed by:      Lobo Morris PT    Note: If patient does not return for scheduled/recommended follow up visits, this note will serve as a discharge from care along with the most recent update on progress.

## 2021-04-29 NOTE — PROGRESS NOTES
TINETTI BALANCE ASSESSMENT TOOL    Patient name:  Karyn Gr     Date:  4/29/2021  Completed by:  [email or web address]    Patient is seated in hard, armless chair;  Sitting Balance     Score: [] 0 [x] 1  Leans or slides in chair = 0     Steady, safe = 1    Rises from chair     Score: [] 0 [] 1 [x] 2  Unable to without help = 0  Able, uses arms to help = 1  Able without use of arms = 2    Attempts to rise     Score: [] 0 [] 1 [x] 2  Unable to without help = 0  Able, requires > 1 attempt = 1  Able to rise, 1 attempt = 2    Immediate standing Balance (first 5 seconds) Score: [] 0 [] 1 [x] 2  Unsteady (staggers, moves feet, trunk sway) = 0  Steady but uses walker or other support = 1  Steady without walker or other support = 2    Standing balance     Score: [] 0 [x] 1 [] 2  Unsteady = 0  Steady but wide stance and uses support = 1  Narrow stance without support = 2    Nudged (sternal nudge)    Score: [] 0 [] 1 [x]   2  Begins to fall = 0  Staggers, grabs, catches self = 1  Steady = 2    Eyes closed      Score: [] 0 [x] 1  Unsteady = 0  Steady = 1    Turning 360 degrees    Score: [] 0 [x] 1  (A)  (A) Discontinuous steps = 0      (A) Continuous = 1  (B) Unsteady= 0     Score: [] 0 [x] 1  (B)  (B) Steady = 1    Sitting down     Score: [] 0 [] 1 [x] 2  Unsafe (misjudged distance, falls into chair) = 0  Uses arms or not a smooth motion = 1  Safe, smooth motion = 2  Balance Total Score    Score:        15 /16            GAIT SECTION    Patient stands with therapist, walks across room (+/- aids), first at usual pace, then at rapid pace.   Indication of gait (Immediately after told to [facility].) Score: [] 0 [x] 1  Any hesitancy or multiple attempts = 0  No hesitancy = 1    Step height (Right)      Score: [] 0 [x] 1  R foot does not clear floor = 0  R foot completely clears floor = 1    Step length (Right)      Score: [] 0 [x] 1  Step to = 0  Step through R = 1    Step height (Left)      Score: [] 0 [x] 1  R foot does not clear floor = 0  R foot completely clears floor = 1    Step length (Left)      Score: [] 0 [x] 1  Step to = 0  Step through L = 1    Step symmetry     Score: [] 0 [x] 1  Right and left step length not equal = 0  Right and left step length appear equal = 1    Step continuity     Score: [] 0 [x] 1  Stopping or discontinuity between steps = 0  Steps appear continuous = 1    Path (Excursion)     Score: [] 0 [] 1 [x] 2  Marked deviation = 0  Mild/moderate deviation or uses w. aid = 1  Straight without w. aid = 2    Trunk       Score: [] 0 [] 1 [x] 2  Marked sway or uses w. aid = 0  No sway but flex. knees or back or  uses arms for stability = 1  No sway, flex. , use of arms or w. aid = 2    Walking stance     Score: [x] 0 [] 1  Heels apart = 0  Heels almost touching while walking = 1    Gait Total Score     Score:       11 /12              TOTAL SCORE = BALANCE + GAIT  Score:       26   /28       Risk Indicators:   Score 18 or less = High risk of falls  Score 19-23 = Moderate risk of falls  Score 24 or more = Low risk of falls    Shamiretti ME, Johnathan TF, Damián R, Fall Risk Index for elderly patients based on number of chronic disabilities.   Am J Med [id number]

## 2021-05-06 ENCOUNTER — HOSPITAL ENCOUNTER (OUTPATIENT)
Dept: PHYSICAL THERAPY | Age: 76
Setting detail: THERAPIES SERIES
Discharge: HOME | End: 2021-05-06
Payer: MEDICARE

## 2021-05-06 PROCEDURE — 97110 THERAPEUTIC EXERCISES: CPT

## 2021-05-06 NOTE — FLOWSHEET NOTE
[facility] Outpatient Therapy  [address] [place] [address], AKIL [address], [address]  Phone: [phone]   Fax: [phone]    Physical Therapy Treatment Note/ Progress Report:     Date:  2021    Patient Name:  Olga Masters  \"Ilya\"  :  1945  MRN: [id number]    Medical/Treatment Diagnosis Information:  · Diagnosis: R53.81 (ICD-10-CM) - Other malaise, Debility and Deconditioning R53.81  · Treatment Diagnosis: balance problems gait distrubance  Insurance/Certification information:  PT Insurance Information: [facility] Medicare  Physician Information:  Referring Practitioner: Manuel Macdonald MD  Plan of care signed:    [x] Yes  [] No    Date of Patient follow up with Physician:  2021     Progress Report: []  Yes  [x]  No     Date Range for reporting period:  Beginning:  3/24/2021  Ending:      Progress report due (10 Rx/or 30 days whichever is less):      Recertification due (POC duration/ or 90 days whichever is less): 2021     Visit # Insurance Allowable Auth Needed   11 16 []Yes   []No     RESTRICTIONS/PRECAUTIONS:   Latex Allergy:  [x]NO      []YES  Preferred Language for Healthcare:   [x]English       []other:    Pain level:  0/10     SUBJECTIVE:   Pt reports feeling ok this morning, does feel like therapy is helping, knows it will take awhile to get better. OBJECTIVE:     Observation: Pt arrives with RW. He reports that he does not use an AD at home, only when he is in the community. Pt requiring occasional extended seated rest breaks after repeated sit to/from stand and standing therex this date.  Test measurements:     TUG (without walker) 21:    13 seconds attempt 1    11 seconds attempt 2   Tinetti (without RW) 21:    15/16 Balance     Gait     Total   Functional Scale:     LEFS:  24/80 (70% impaired)      Date assessed:  3/24/2021    Exercises/Interventions: Exercises in bold performed in department today.   Items not bolded are carried forward from prior visits for continuity of the record. Exercise/Equipment Resistance/Repetitions HEP Other comments   NuStep 5 min warm up Resist 6, Seat 13, Arms 11  [] Pt fatiguing after 5 min. Pt encouraged to maintain consistent SPM throughout bout. SPM slowly decrease with fatigue. Seated Glute Squeeze 20 x  [] Verbal cues for upright posture, pt fatigues into slumped posture quickly   Seated Hip Add Squeeze (ball between knees) 20 x  [] Verbal cues for upright posture, pt fatigues into slumped posture quickly   LAQ 20 x each leg, 2.5# ankle weight [x] Verbal cues for posture   Sit-to-Stand 10x  UE support on thighs [x] Cues to decrease UE support and for anterior weight shifting for improved technique   Hamstring Stretch (seated off side of table, leg outstretched) 3 x 30\" hold [x]    Piriformis Stretch (seated, figure 4 position) 3 x 30\" hold []      []    Standing Hip Abd 20 x bilat [] Cues for upright posture and to slow speed for improved contraction   Heel Raises 20 x  [] Cues for upright posture   Mini-squats 20 x  [] Cues for upright posture and to slow speed for improved contraction   High Marches 20 x [x] Cues for upright posture, added 2.5# ankle weights     []      []      []      []      []      []      Home Exercise Program:     Access Code: [email or web address]: [email or web address]/  Date: 03/24/2021  Prepared by: Sonja Barbosa  Exercises   Sitting Knee Extension with Resistance - 1 x daily - 3-5 x weekly - 1-3 sets - 10 reps   Seated Knee Extension Stretch with Chair - 1 x daily - 7 x weekly - 1 sets - 30 hold   Seated Hamstring Curls with Resistance - 1 x daily - 3-5 x weekly - 1-3 sets - 10 reps   Seated Hip Abduction - 1 x daily - 3-5 x weekly - 1-3 sets - 10 reps   Seated March - 1 x daily - 3-5 x weekly - 1-3 sets - 10 reps   Sit to Stand with Counter Support - 1 x daily - 7 x weekly - 1-2 sets - 10 reps. Access Code: [email or web address]: [employer]/  Date: 04/12/2021  Prepared by: Lilibeth Alfonso  Exercises   Sitting Knee Extension with Resistance - 1 x daily - 3 x weekly - 2-3 sets - 10-20 reps   Seated Hamstring Curls with Resistance - 1 x daily - 3 x weekly - 2-3 sets - 10-20 reps   Seated March with Resistance - 1 x daily - 3 x weekly - 2-3 sets - 10-20 reps   Seated Hip Abduction with Resistance - 1 x daily - 3 x weekly - 2-3 sets - 10-20 reps      Therapeutic Exercise and NMR:  [x] (54342) Provided verbal/tactile cueing for activities related to strengthening, flexibility, endurance, ROM for improvements in LE, proximal hip, and core control with self-care, mobility, lifting, ambulation. [x] (64321) Provided verbal/tactile cueing for activities related to improving balance, coordination, kinesthetic sense, posture, motor skill, proprioception to assist with LE, proximal hip, and core control in self-care, mobility, lifting, ambulation and eccentric single leg control.      Gait Training and Therapeutic Activities:    [x] (84297) Provided verbal/tactile cueing for activities related to improving balance, coordination, kinesthetic sense, posture, motor skill, proprioception and motor activation to allow for proper function of core, proximal hip and LE with self-care and ADLs and functional mobility.   [] (06932) Provided training and instruction to the patient for proper LE, core and proximal hip recruitment and positioning and eccentric body weight control with ambulation re-education including up and down stairs     Home Exercise Program:    [] (69034) Reviewed/Progressed HEP activities related to strengthening, flexibility, endurance, ROM of core, proximal hip and LE for functional self-care, mobility, lifting and ambulation/stair navigation   [] (89409) Reviewed/Progressed HEP activities related to improving balance, coordination, kinesthetic sense, posture, motor skill, proprioception of core, proximal hip and LE for self-care, mobility, lifting, and ambulation/stair navigation      Manual Treatments:  PROM / STM / Oscillations-Mobs:  G-I, II, III, IV (PA's, Inf., Post.)  [] (60613) Provided manual therapy to mobilize LE, proximal hip and/or LS spine soft tissue/joints for the purpose of modulating pain, promoting relaxation,  increasing ROM, reducing/eliminating soft tissue swelling/inflammation/restriction, improving soft tissue extensibility and allowing for proper ROM for normal function with self-care, mobility, lifting and ambulation.      Modalities:    [] Electric Stimulation:   [] Ultrasound:   [] Other:       Charges:  Timed Code Treatment Minutes: 45 TE   Total Treatment Minutes: 45      [] RE-EVAL     [x] XW(95398) x  3     [] NMR (98834) x       [] Manual (14950) x       [] TA (67270) x       [] Gait Training [phone] x       [] ES(attended) (58436)  [] ES (un) (74374  [facility])   [] DRY NEEDLE 1 OR 2 MUSCLES  [] DRY NEEDLE 3+ MUSCLES  [] [facility] T[facility] (44838)  [] Ultrasound (24026)  [] Other:    GOALS:  Short term goals  Time Frame for Short term goals: 4 weeks  Short term goal 1: Pt to reduce fall risk and impairment per Tinetti by inc score from 8 to >/=16  - MET  Short term goal 2: Pt to improve TUG to reduce fall risk and improve gait speed from 17s to less than 12 seconds-MET  Short term goal 3: Pt to illustrate strength gains by bia 45-60 min PT treatment sessions and ability to perform sit to stand with min use of UE safely 75% or greater of the time-MET  Short term goal 4: Pt to improve hamstring lengh exhibited with forward reach to less than 3 inches from toes for improving flexiblity in LEs- NOT MET PROGRESSING/IMPROVING  Short term goal 5: indep to supervision with progressive HEP to max rehab potential- MET  Long term goals  Time Frame for Long term goals : 8 weeks  Long term goal 1: Pt to reduce impairment and fall risk by inc score per Tinetti to >/=22 -met 4/29  Long term goal 2: Pt to ambulate household distances and situations modified indep pending MD visit [] Discharge    Electronically signed by:      Nitin Renee, PT, DPT    Note: If patient does not return for scheduled/recommended follow up visits, this note will serve as a discharge from care along with the most recent update on progress.

## 2021-05-10 ENCOUNTER — HOSPITAL ENCOUNTER (OUTPATIENT)
Dept: PHYSICAL THERAPY | Age: 76
Setting detail: THERAPIES SERIES
Discharge: HOME | End: 2021-05-10
Payer: MEDICARE

## 2021-05-10 PROCEDURE — 97110 THERAPEUTIC EXERCISES: CPT

## 2021-05-10 NOTE — FLOWSHEET NOTE
Total   Functional Scale:     LEFS:  24/80 (70% impaired)      Date assessed:  3/24/2021    Exercises/Interventions: Exercises in bold performed in department today. Items not bolded are carried forward from prior visits for continuity of the record. Exercise/Equipment Resistance/Repetitions HEP Other comments   NuStep 5 min warm up Resist 6, Seat 13, Arms 11  [] Pt fatiguing after 4 min. Needed to rest left arm today. Seated Glute Squeeze 20 x  [] Verbal cues for upright posture, pt fatigues into slumped posture quickly   Seated Hip Add Squeeze (ball between knees) 20 x  [] Verbal cues for upright posture, pt fatigues into slumped posture quickly   LAQ 20 x each leg, 2.5# ankle weight [x] Verbal cues for posture, reporting this feeling heavier today   Sit-to-Stand 10x  UE support on thighs [x] Cues to decrease UE support and for anterior weight shifting for improved technique   Hamstring Stretch (seated off side of table, leg outstretched) 3 x 30\" hold [x]    Piriformis Stretch (seated, figure 4 position) 3 x 30\" hold []      []    Standing Hip Abd 20 x bilat [] Cues for upright posture and to slow speed for improved contraction   Heel Raises 20 x  [] Cues for upright posture   Mini-squats 20 x  [] Cues for upright posture and to slow speed for improved contraction   High Marches 20 x [x] Cues for upright posture, held ankle weights     []      []      []      []      []      []      Home Exercise Program:     Access Code: [email or web address]: [email or web address]/  Date: 03/24/2021  Prepared by: Giovany Ladd  Exercises   Sitting Knee Extension with Resistance - 1 x daily - 3-5 x weekly - 1-3 sets - 10 reps   Seated Knee Extension Stretch with Chair - 1 x daily - 7 x weekly - 1 sets - 30 hold   Seated Hamstring Curls with Resistance - 1 x daily - 3-5 x weekly - 1-3 sets - 10 reps   Seated Hip Abduction - 1 x daily - 3-5 x weekly - 1-3 sets - 10 reps   Seated March - 1 x daily - 3-5 x weekly - 1-3 sets - 10 reps   Sit to Stand with Counter Support - 1 x daily - 7 x weekly - 1-2 sets - 10 reps. Access Code: [email or web address]: [email or web address]/  Date: 04/12/2021  Prepared by: Bárbara Burgess  Exercises   Sitting Knee Extension with Resistance - 1 x daily - 3 x weekly - 2-3 sets - 10-20 reps   Seated Hamstring Curls with Resistance - 1 x daily - 3 x weekly - 2-3 sets - 10-20 reps   Seated March with Resistance - 1 x daily - 3 x weekly - 2-3 sets - 10-20 reps   Seated Hip Abduction with Resistance - 1 x daily - 3 x weekly - 2-3 sets - 10-20 reps      Therapeutic Exercise and NMR:  [x] (41028) Provided verbal/tactile cueing for activities related to strengthening, flexibility, endurance, ROM for improvements in LE, proximal hip, and core control with self-care, mobility, lifting, ambulation. [x] (86664) Provided verbal/tactile cueing for activities related to improving balance, coordination, kinesthetic sense, posture, motor skill, proprioception to assist with LE, proximal hip, and core control in self-care, mobility, lifting, ambulation and eccentric single leg control.      Gait Training and Therapeutic Activities:    [x] (94594) Provided verbal/tactile cueing for activities related to improving balance, coordination, kinesthetic sense, posture, motor skill, proprioception and motor activation to allow for proper function of core, proximal hip and LE with self-care and ADLs and functional mobility.   [] (17930) Provided training and instruction to the patient for proper LE, core and proximal hip recruitment and positioning and eccentric body weight control with ambulation re-education including up and down stairs     Home Exercise Program:    [] (18499) Reviewed/Progressed HEP activities related to strengthening, flexibility, endurance, ROM of core, proximal hip and LE for functional self-care, mobility, lifting and ambulation/stair navigation   [] (78969) Reviewed/Progressed HEP activities related to improving balance, coordination, kinesthetic sense, posture, motor skill, proprioception of core, proximal hip and LE for self-care, mobility, lifting, and ambulation/stair navigation      Manual Treatments:  PROM / STM / Oscillations-Mobs:  G-I, II, III, IV (PA's, Inf., Post.)  [] (47767) Provided manual therapy to mobilize LE, proximal hip and/or LS spine soft tissue/joints for the purpose of modulating pain, promoting relaxation,  increasing ROM, reducing/eliminating soft tissue swelling/inflammation/restriction, improving soft tissue extensibility and allowing for proper ROM for normal function with self-care, mobility, lifting and ambulation.      Modalities:    [] Electric Stimulation:   [] Ultrasound:   [] Other:       Charges:  Timed Code Treatment Minutes: 40 TE   Total Treatment Minutes: 40      [] RE-EVAL     [x] KT(92631) x  3     [] NMR (74255) x       [] Manual (29869) x       [] TA (08727) x       [] Gait Training [phone] x       [] ES(attended) (69875)  [] ES (un) ([id number])   [] DRY NEEDLE 1 OR 2 MUSCLES  [] DRY NEEDLE 3+ MUSCLES  [] Mech Traction (88700)  [] Ultrasound (94120)  [] Other:    GOALS:  Short term goals  Time Frame for Short term goals: 4 weeks  Short term goal 1: Pt to reduce fall risk and impairment per Tinetti by inc score from 8 to >/=16  - MET  Short term goal 2: Pt to improve TUG to reduce fall risk and improve gait speed from 17s to less than 12 seconds-MET  Short term goal 3: Pt to illustrate strength gains by bia 45-60 min PT treatment sessions and ability to perform sit to stand with min use of UE safely 75% or greater of the time-MET  Short term goal 4: Pt to improve hamstring lengh exhibited with forward reach to less than 3 inches from toes for improving flexiblity in LEs- NOT MET PROGRESSING/IMPROVING  Short term goal 5: indep to supervision with progressive HEP to max rehab potential- MET  Long term goals  Time Frame for Long term goals : 8 weeks  Long term goal 1: Pt to reduce impairment and fall risk by inc score per Tinetti to >/=22 -met 4/29  Long term goal 2: Pt to ambulate household distances and situations modified indep to indep -progressing       Assessment: Pt sustained a fall since last session, reporting mild pain in left side since. He had some pain with shoulder AROM and stiffness in lower body stretching but gait unaffected and he generally tolerated exercises well today. He was much more fatigued. No progressions to exercises today d/t this. Pt will continue to benefit from skilled PT services to improve functional strength and balance needed to improve indep in home and community. Pt continues to benefit from skilled PT to address and promote improvement with functional strength needed to improve indep in home with ADLS, activity bia and functional mobility. Cont with POC. Treatment/Activity Tolerance:  [] Patient tolerated treatment well [x] Patient limited by fatique  [x] Patient limited by pain  [] Patient limited by other medical complications  [] Other:     Overall Progression Towards Functional goals/ Treatment Progress Update:  [] Patient is progressing as expected towards functional goals listed. [x] Progression is slowed due to complexities/Impairments listed. [] Progression has been slowed due to co-morbidities.   [] Plan just implemented, too soon to assess goals progression <30days   [] Goals require adjustment due to lack of progress  [] Patient is not progressing as expected and requires additional follow up with physician  [] Other    Prognosis for POC: [] Good [x] Fair  [] Poor    Patient requires continued skilled intervention: [x] Yes  [] No        PLAN: Times per week: 1-2 days a week for 8 weeks, 16 visits as bia and medically appropriate  Current Treatment Recommendations: Strengthening, Balance Training, Transfer Training, IADL Training, Endurance Training, Gait Training, Neuromuscular Re-education, Home Exercise Program, Patient/Caregiver Education & Training, Modalities, Positioning, Equipment Evaluation, Education, & procurement, Safety Education & Training, Functional Mobility Training, ADL/Self-care Training, ROM   [x] Continue per plan of care [] Alter current plan (see comments)  [] Plan of care initiated [] Hold pending MD visit [] Discharge    Electronically signed by:      Eleonora Dooley PT, DPT    Note: If patient does not return for scheduled/recommended follow up visits, this note will serve as a discharge from care along with the most recent update on progress.

## 2021-05-12 ENCOUNTER — OFFICE VISIT (OUTPATIENT)
Dept: ENDOCRINOLOGY | Age: 76
End: 2021-05-12
Payer: MEDICARE

## 2021-05-12 VITALS
BODY MASS INDEX: 27.32 KG/M2 | WEIGHT: 190.8 LBS | OXYGEN SATURATION: 96 % | HEART RATE: 72 BPM | DIASTOLIC BLOOD PRESSURE: 67 MMHG | RESPIRATION RATE: 18 BRPM | SYSTOLIC BLOOD PRESSURE: 138 MMHG | HEIGHT: 70 IN

## 2021-05-12 DIAGNOSIS — I10 ESSENTIAL HYPERTENSION: ICD-10-CM

## 2021-05-12 DIAGNOSIS — E03.9 ACQUIRED HYPOTHYROIDISM: ICD-10-CM

## 2021-05-12 LAB — HBA1C MFR BLD: 5.8 %

## 2021-05-12 PROCEDURE — 1123F ACP DISCUSS/DSCN MKR DOCD: CPT | Performed by: INTERNAL MEDICINE

## 2021-05-12 PROCEDURE — 2022F DILAT RTA XM EVC RTNOPTHY: CPT | Performed by: INTERNAL MEDICINE

## 2021-05-12 PROCEDURE — G8417 CALC BMI ABV UP PARAM F/U: HCPCS | Performed by: INTERNAL MEDICINE

## 2021-05-12 PROCEDURE — 4040F PNEUMOC VAC/ADMIN/RCVD: CPT | Performed by: INTERNAL MEDICINE

## 2021-05-12 PROCEDURE — 83036 HEMOGLOBIN GLYCOSYLATED A1C: CPT | Performed by: INTERNAL MEDICINE

## 2021-05-12 PROCEDURE — 3017F COLORECTAL CA SCREEN DOC REV: CPT | Performed by: INTERNAL MEDICINE

## 2021-05-12 PROCEDURE — 1036F TOBACCO NON-USER: CPT | Performed by: INTERNAL MEDICINE

## 2021-05-12 PROCEDURE — 99214 OFFICE O/P EST MOD 30 MIN: CPT | Performed by: INTERNAL MEDICINE

## 2021-05-12 PROCEDURE — 3044F HG A1C LEVEL LT 7.0%: CPT | Performed by: INTERNAL MEDICINE

## 2021-05-12 PROCEDURE — G8427 DOCREV CUR MEDS BY ELIG CLIN: HCPCS | Performed by: INTERNAL MEDICINE

## 2021-05-12 RX ORDER — TORSEMIDE 100 MG/1
100 TABLET ORAL DAILY
COMMUNITY
End: 2021-08-20

## 2021-05-12 RX ORDER — FERRIC CITRATE 210 MG/1
TABLET, COATED ORAL
Status: ON HOLD | COMMUNITY
End: 2022-09-07

## 2021-05-12 NOTE — PROGRESS NOTES
Seen as f/u patient for diabetes    Interim:  Doing PD   Fair control  Forget to take humalog  Dexcom readings different from meter- not using now  Uses PD at [facility] and 3 pm  Solution at [facility] does not have dextrose per patient    Hospitalized for Syncope       Admitted on 1/17 for MORIS on CKD, Acute exacerbation of CHF. Resp failure  Hypoglycemia on admission BG 28? Diagnosed with Type 2 diabetes mellitus in  1995  Known diabetic complications: Nephropathy, Neuropathy, mild retinopathy    Current diabetic medications   Lantus 40 units  Humalog 12/12/14  unit TID + SSI      On insulin since 1998    He has been on janumet and glipizide    Last A1c 6.6%<----6.1%<-----6.9%<----- 6.4%<----6.9%<---- 6.1%<-----6.6%<------5.5%<----6.6%<----- 7.2% <-----8.9 on 7/16<--- 7.4 on 4/16<------7.9 on 1/16<----  10.4 on 10/15 <---- 8.5 <--- 8.2 <--- 8.6    Prior visit with dietician: no  Current diet: on average, 3 meals per day does not count CHO  Current exercise: walking   Current monitoring regimen: home blood tests - 4 times daily     Has brought blood glucose log/meter: yes  Home blood sugar records:100-200  Any episodes of hypoglycemia?  Occ am     H/O CABG in 06    Hyperlipidemia:for years, stable  LDL 44 on 11/17  lipitor 40mg  LDL 58 on 11/18    LDL 14 on 11/19  Last eye exam: 11/20  Last foot exam: 5/19 sees podiatry  Last microalbumin to creatinine ratio: ESRD  On PD    HTN: stable, for years, takes Losartan 25mg ( stopped by nephrologist) coreg 6.25mg BID    H/o subclinical hypothyroidism,stable    TSH 3.09 on 5/18  Levothyroxine 50mcg    He has neuropathy,was on neurontin    Past Medical History:   Diagnosis Date    Allergic rhinitis due to other allergen 7/12/2010    Coronary atherosclerosis of unspecified type of vessel, native or graft 7/12/2010    Diabetic eye exam ([facility] [address] 04/29/2015    Diabetic eye exam ([facility] [address] 5/5/2016    [place] [facility]    Generalized osteoarthrosis, involving multiple sites 7/12/2010    Other psoriasis 7/12/2010    Pneumonia     Prolonged emergence from general anesthesia     Psoriatic arthropathy ([facility] [address] 7/12/2010    Type II or unspecified type diabetes mellitus without mention of complication, not stated as uncontrolled 7/12/2010    Unspecified essential hypertension 7/12/2010    Unspecified sleep apnea 7/12/2010     Past Surgical History:   Procedure Laterality Date    CARPAL TUNNEL RELEASE      s/p    CATARACT REMOVAL      CORONARY ARTERY BYPASS GRAFT      JOINT REPLACEMENT      LAPAROSCOPY INSERTION PERITONEAL CATHETER N/A 10/26/2020    LAPAROSCOPIC PERITONEAL DIALYSIS CATHETER PLACEMENT; LAPAROSCOPIC OMENTOPEXY performed by Nasreen Roman DO at [address] [place] [address], reviewed    Constitutional: Well-developed, appears stated age, cooperative, in no acute distress  H/E/N/M/T:atraumatic, normocephalic, external ears, nose, lips normal without lesions  Eyes: Lids, lashes, conjunctivae and sclerae normal, No proptosis, no redness  Neck: supple, symmetrical, no swelling  Skin: No obvious rashes or lesions present.   Skin and hair texture normal  Psychiatric: Judgement and Insight:  judgement and insight appear normal  Neuro: Normal without focal findings, speech is normal normal, speech is spontaneous  Chest: No labored breathing, no chest deformity, no stridor  Musculoskeletal: No joint deformity, swelling    Vitals:    05/12/21 1106   BP: 138/67   Pulse: 72   Resp: 18   SpO2: 96%         5/19  Skeletal foot exam is normal, no skin lesions, toenails are normal, DP not palpable,  10 g monofilament is 10/10 on the right and 10/10 on the left    Lab Reviewed   No components found for: CHLPL  Lab Results   Component Value Date    TRIG 265 (H) 11/04/2019    TRIG 205 (H) 11/16/2018    TRIG 198 (H) 11/09/2017     Lab Results   Component Value Date    HDL 39 (L) 11/04/2019    HDL 47 11/16/2018    HDL 46 11/09/2017     Lab Results   Component Value Date    LDLCALC 14 11/04/2019 LDLCALC 58 11/16/2018    LDLCALC 44 11/09/2017     Lab Results   Component Value Date    LABVLDL 53 11/04/2019    LABVLDL 41 11/16/2018    LABVLDL 40 11/09/2017     Lab Results   Component Value Date    LABA1C 6.6 02/01/2021       Assessment:     Es Rizo is a 77 y.o. male with :    1.T2DM: Longstanding,controlled, insulin resistant, goal A1c 7-8%. Advised Dexcom not approved in dialysis patient, use glucose meter for self monitoring. Fasting high, adjust dose  Advise to take humalog regularly  2. HTN: BP at goal.   3.HLD: LDL at goal, he is  On statin  4. Obesity  5. CAD  6. CKD III  7. Psoriatic arthritis: Reports flare in psoriatic plaque, will see dermatology  8. Neuropathy: Had been on lyrica, prescribed topical ,off of it. Started neurontin, did not help, . He does have some drowsiness, off of it. Advise B12  9. Fatigue:  TSH high with normal FT4, subclinical hypothyroid, advise to try replacement. He was inquiring about Co-Q 10 , advised can take 200mg  His total testosterone was normal with free level low normal. Advised given total is in a good range, h/o CAD, Age, recommend no replacement at this point. 10. Subclinical hypothyroid: On replacement,  Last level normal, continue levothyroxine, check TSH    Plan:       lantus  40---> 44  units    Humalog 12/12/14 units TID  SSI 2 for 50>150   Advise to check blood sugar 4 times a day   Patient to send blood sugar log for titration. Advise to low simple carbohydrate and protein with each  meal diet. Diabetes Care: recommend yearly eye exam, foot exam and urine microalbumin to  creatinine ratio.     -Hyperlipidemia, LDL goal is <70 mg/dl   -Hypertension: Continue same  -Daily ASA:81mg  -Smoking status: Non smoker

## 2021-05-13 ENCOUNTER — HOSPITAL ENCOUNTER (OUTPATIENT)
Dept: PHYSICAL THERAPY | Age: 76
Setting detail: THERAPIES SERIES
Discharge: HOME | End: 2021-05-13
Payer: MEDICARE

## 2021-05-13 PROCEDURE — 97110 THERAPEUTIC EXERCISES: CPT

## 2021-05-13 NOTE — FLOWSHEET NOTE
[facility] Outpatient Therapy  [address] [place] [address], AK[address], [address]  Phone: [phone]   Fax: [phone]    Physical Therapy Treatment Note/ Progress Report:     Date:  2021    Patient Name:  Mich Zaragoza  \"Ilya\"  :  1945  MRN: [id number]    Medical/Treatment Diagnosis Information:  · Diagnosis: R53.81 (ICD-10-CM) - Other malaise, Debility and Deconditioning R53.81  · Treatment Diagnosis: balance problems gait distrubance  Insurance/Certification information:  PT Insurance Information: [facility] Medicare  Physician Information:  Referring Practitioner: Barbie Almonte MD  Plan of care signed:    [x] Yes  [] No    Date of Patient follow up with Physician:  2021     Progress Report: []  Yes  [x]  No     Date Range for reporting period:  Beginning:  3/24/2021  Ending:      Progress report due (10 Rx/or 30 days whichever is less): 9584     Recertification due (POC duration/ or 90 days whichever is less): 2021     Visit # Insurance Allowable Auth Needed   13 16 []Yes   []No     RESTRICTIONS/PRECAUTIONS:   Latex Allergy:  [x]NO      []YES  Preferred Language for Healthcare:   [x]English       []other:    Pain level:  4/10 left shoulder and lateral thigh    SUBJECTIVE:   Pt reports shoulder and leg are still sore from fall but seems to be getting better. Had second COVID vaccine yesterday, arm is sore from that. OBJECTIVE:     Observation: Pt arrives with RW. He reports that he does not use an AD at home, only when he is in the community. Pt requiring occasional extended seated rest breaks after repeated sit to/from stand and standing therex this date.      Test measurements:     TUG (without walker) 21:    13 seconds attempt 1    11 seconds attempt 2   Tinetti (without RW) 21:    15/16 Balance    /12 Gait     Total   Functional Scale:     LEFS:   (70% impaired)      Date assessed:  3/24/2021    Exercises/Interventions: Exercises in bold performed in department today. Items not bolded are carried forward from prior visits for continuity of the record. Exercise/Equipment Resistance/Repetitions HEP Other comments   NuStep 5 min warm up Resist 6, Seat 13, Arms 11  [] Was able to tolerate full 5 min today, did not need to rest L arm, needed 3 minutes to catch breath before standing up   Seated Glute Squeeze 20 x  [] Verbal cues for upright posture, pt fatigues into slumped posture quickly   Seated Hip Add Squeeze (ball between knees) 20 x  [] Verbal cues for upright posture, pt fatigues into slumped posture quickly   LAQ 20 x each leg, 2.5# ankle weight [x] Verbal cues for posture, reporting this feeling heavier today   Sit-to-Stand 10x  UE support on thighs [x] Cues to decrease UE support and for anterior weight shifting for improved technique   Hamstring Stretch (seated off side of table, leg outstretched) 3 x 30\" hold [x]    Piriformis Stretch (seated, figure 4 position) 3 x 30\" hold []      []    Standing Hip Abd 20 x bilat [] Cues for upright posture and to slow speed for improved contraction   Heel Raises 20 x  [] Cues for upright posture   Mini-squats 20 x  [] Cues for upright posture and to slow speed for improved contraction   High Marches 20 x [x] Cues for upright posture, resumed 2.5# ankle weights     []      []      []      []      []      []      Home Exercise Program:     Access Code: [email or web address]: [email or web address]  Date: 03/24/2021  Prepared by: Lauryn Guzman  Exercises   Sitting Knee Extension with Resistance - 1 x daily - 3-5 x weekly - 1-3 sets - 10 reps   Seated Knee Extension Stretch with Chair - 1 x daily - 7 x weekly - 1 sets - 30 hold   Seated Hamstring Curls with Resistance - 1 x daily - 3-5 x weekly - 1-3 sets - 10 reps   Seated Hip Abduction - 1 x daily - 3-5 x weekly - 1-3 sets - 10 reps   Seated March - 1 x daily - 3-5 x weekly - 1-3 sets - 10 reps   Sit to Stand with Counter Support - 1 x daily - 7 x weekly - 1-2 sets - 10 reps. Access Code: [email or web address]  Date: 04/12/2021  Prepared by: Cheryl Bustos  Exercises   Sitting Knee Extension with Resistance - 1 x daily - 3 x weekly - 2-3 sets - 10-20 reps   Seated Hamstring Curls with Resistance - 1 x daily - 3 x weekly - 2-3 sets - 10-20 reps   Seated March with Resistance - 1 x daily - 3 x weekly - 2-3 sets - 10-20 reps   Seated Hip Abduction with Resistance - 1 x daily - 3 x weekly - 2-3 sets - 10-20 reps      Therapeutic Exercise and NMR:  [x] (63699) Provided verbal/tactile cueing for activities related to strengthening, flexibility, endurance, ROM for improvements in LE, proximal hip, and core control with self-care, mobility, lifting, ambulation. [x] (23514) Provided verbal/tactile cueing for activities related to improving balance, coordination, kinesthetic sense, posture, motor skill, proprioception to assist with LE, proximal hip, and core control in self-care, mobility, lifting, ambulation and eccentric single leg control.      Gait Training and Therapeutic Activities:    [x] (86918) Provided verbal/tactile cueing for activities related to improving balance, coordination, kinesthetic sense, posture, motor skill, proprioception and motor activation to allow for proper function of core, proximal hip and LE with self-care and ADLs and functional mobility.   [] (63927) Provided training and instruction to the patient for proper LE, core and proximal hip recruitment and positioning and eccentric body weight control with ambulation re-education including up and down stairs     Home Exercise Program:    [] (03362) Reviewed/Progressed HEP activities related to strengthening, flexibility, endurance, ROM of core, proximal hip and LE for functional self-care, mobility, lifting and ambulation/stair navigation   [] (89577) Reviewed/Progressed HEP activities related to improving balance, coordination, kinesthetic sense, posture, motor skill, proprioception of core, proximal hip and LE for self-care, mobility, lifting, and ambulation/stair navigation      Manual Treatments:  PROM / STM / Oscillations-Mobs:  G-I, II, III, IV (PA's, Inf., Post.)  [] (07283) Provided manual therapy to mobilize LE, proximal hip and/or LS spine soft tissue/joints for the purpose of modulating pain, promoting relaxation,  increasing ROM, reducing/eliminating soft tissue swelling/inflammation/restriction, improving soft tissue extensibility and allowing for proper ROM for normal function with self-care, mobility, lifting and ambulation.      Modalities:    [] Electric Stimulation:   [] Ultrasound:   [] Other:       Charges:  Timed Code Treatment Minutes: 3 TE 40   Total Treatment Minutes: 40      [] RE-EVAL     [x] JQ(34536) x  3     [] NMR (83898) x       [] Manual (91728) x       [] TA (61656) x       [] Gait Training [phone] x       [] ES(attended) (68065)  [] ES (un) ([phone])   [] DRY NEEDLE 1 OR 2 MUSCLES  [] DRY NEEDLE 3+ MUSCLES  [] Mech Traction (39670)  [] Ultrasound (69599)  [] Other:    GOALS:  Short term goals  Time Frame for Short term goals: 4 weeks  Short term goal 1: Pt to reduce fall risk and impairment per Tinetti by inc score from 8 to >/=16  - MET  Short term goal 2: Pt to improve TUG to reduce fall risk and improve gait speed from 17s to less than 12 seconds-MET  Short term goal 3: Pt to illustrate strength gains by bia 45-60 min PT treatment sessions and ability to perform sit to stand with min use of UE safely 75% or greater of the time-MET  Short term goal 4: Pt to improve hamstring lengh exhibited with forward reach to less than 3 inches from toes for improving flexiblity in LEs- NOT MET PROGRESSING/IMPROVING  Short term goal 5: indep to supervision with progressive HEP to max rehab potential- MET  Long term goals  Time Frame for Long term goals : 8 weeks  Long term goal 1: Pt to reduce impairment and fall risk by inc score per Tinetti to >/=22 -met 4/29  Long term goal 2: Pt to ambulate household distances and situations modified indep to indep -progressing       Assessment: Exercise better tolerated today than earlier this week. Pain in shoulder and leg improving from fall 5 days ago. Pt requested to stop a few minutes early d/t fatigue. Pt will continue to benefit from skilled PT services to improve functional strength and balance needed to improve indep in home and community. Pt continues to benefit from skilled PT to address and promote improvement with functional strength needed to improve indep in home with ADLS, activity bia and functional mobility. Cont with POC. Treatment/Activity Tolerance:  [] Patient tolerated treatment well [x] Patient limited by fatique  [x] Patient limited by pain  [] Patient limited by other medical complications  [] Other:     Overall Progression Towards Functional goals/ Treatment Progress Update:  [] Patient is progressing as expected towards functional goals listed. [x] Progression is slowed due to complexities/Impairments listed. [] Progression has been slowed due to co-morbidities.   [] Plan just implemented, too soon to assess goals progression <30days   [] Goals require adjustment due to lack of progress  [] Patient is not progressing as expected and requires additional follow up with physician  [] Other    Prognosis for POC: [] Good [x] Fair  [] Poor    Patient requires continued skilled intervention: [x] Yes  [] No        PLAN: Times per week: 1-2 days a week for 8 weeks, 16 visits as bia and medically appropriate  Current Treatment Recommendations: Strengthening, Balance Training, Transfer Training, IADL Training, Endurance Training, Gait Training, Neuromuscular Re-education, Home Exercise Program, Patient/Caregiver Education & Training, Modalities, Positioning, Equipment Evaluation, Education, & procurement, Safety Education & Training, Functional Mobility Training, ADL/Self-care Training, ROM   [x] Continue per plan of care [] Alter current plan (see comments)  [] Plan of care initiated [] Hold pending MD visit [] Discharge    Electronically signed by:      Silvina Freeman, PT, DPT    Note: If patient does not return for scheduled/recommended follow up visits, this note will serve as a discharge from care along with the most recent update on progress.

## 2021-05-17 ENCOUNTER — HOSPITAL ENCOUNTER (OUTPATIENT)
Dept: PHYSICAL THERAPY | Age: 76
Setting detail: THERAPIES SERIES
Discharge: HOME | End: 2021-05-17
Payer: MEDICARE

## 2021-05-17 PROCEDURE — 97110 THERAPEUTIC EXERCISES: CPT

## 2021-05-17 NOTE — FLOWSHEET NOTE
[facility] Outpatient Therapy  [address] [facility], [facility] [place] [address], [address] [place] [address] Av  Phone: [phone]   Fax: [phone]    Physical Therapy Treatment Note/ Progress Report:     Date:  2021    Patient Name:  Fidel Fierro  \"Ilya\"  :  1945  MRN: [id number]    Medical/Treatment Diagnosis Information:  · Diagnosis: R53.81 (ICD-10-CM) - Other malaise, Debility and Deconditioning R53.81  · Treatment Diagnosis: balance problems gait distrubance  Insurance/Certification information:  PT Insurance Information: [facility] Medicare  Physician Information:  Referring Practitioner: Jessica Pickett MD  Plan of care signed:    [x] Yes  [] No    Date of Patient follow up with Physician:  2021     Progress Report: []  Yes  [x]  No     Date Range for reporting period:  Beginning:  3/24/2021  Ending:      Progress report due (10 Rx/or 30 days whichever is less):      Recertification due (POC duration/ or 90 days whichever is less): 2021     Visit # Insurance Allowable Auth Needed   14 16 []Yes   [x]No     RESTRICTIONS/PRECAUTIONS:   Latex Allergy:  [x]NO      []YES  Preferred Language for Healthcare:   [x]English       []other:    Pain level:  0/10    SUBJECTIVE:   Pt reports he gets really tired after taking a shower, has trouble walking back to his chair. Shoulder is feeling back to normal.    OBJECTIVE:     Observation: Pt arrives with RW. He reports that he does not use an AD at home, only when he is in the community. Pt requiring occasional extended seated rest breaks after repeated sit to/from stand and standing therex this date.  Test measurements:     TUG (without walker) 21:    13 seconds attempt 1    11 seconds attempt 2   Tinetti (without RW) 21:    15/16 Balance    11/12 Gait     Total   Functional Scale:     LEFS:  24/80 (70% impaired)      Date assessed:  3/24/2021    Exercises/Interventions: Exercises in bold performed in department today. Maddy Stanford  Exercises   Sitting Knee Extension with Resistance - 1 x daily - 3 x weekly - 2-3 sets - 10-20 reps   Seated Hamstring Curls with Resistance - 1 x daily - 3 x weekly - 2-3 sets - 10-20 reps   Seated March with Resistance - 1 x daily - 3 x weekly - 2-3 sets - 10-20 reps   Seated Hip Abduction with Resistance - 1 x daily - 3 x weekly - 2-3 sets - 10-20 reps      Therapeutic Exercise and NMR:  [x] (37586) Provided verbal/tactile cueing for activities related to strengthening, flexibility, endurance, ROM for improvements in LE, proximal hip, and core control with self-care, mobility, lifting, ambulation. [x] (41019) Provided verbal/tactile cueing for activities related to improving balance, coordination, kinesthetic sense, posture, motor skill, proprioception to assist with LE, proximal hip, and core control in self-care, mobility, lifting, ambulation and eccentric single leg control.      Gait Training and Therapeutic Activities:    [x] (39722) Provided verbal/tactile cueing for activities related to improving balance, coordination, kinesthetic sense, posture, motor skill, proprioception and motor activation to allow for proper function of core, proximal hip and LE with self-care and ADLs and functional mobility.   [] (32025) Provided training and instruction to the patient for proper LE, core and proximal hip recruitment and positioning and eccentric body weight control with ambulation re-education including up and down stairs     Home Exercise Program:    [] (33398) Reviewed/Progressed HEP activities related to strengthening, flexibility, endurance, ROM of core, proximal hip and LE for functional self-care, mobility, lifting and ambulation/stair navigation   [] (81138) Reviewed/Progressed HEP activities related to improving balance, coordination, kinesthetic sense, posture, motor skill, proprioception of core, proximal hip and LE for self-care, mobility, lifting, and ambulation/stair navigation Manual Treatments:  PROM / STM / Oscillations-Mobs:  G-I, II, III, IV (PA's, Inf., Post.)  [] (57083) Provided manual therapy to mobilize LE, proximal hip and/or LS spine soft tissue/joints for the purpose of modulating pain, promoting relaxation,  increasing ROM, reducing/eliminating soft tissue swelling/inflammation/restriction, improving soft tissue extensibility and allowing for proper ROM for normal function with self-care, mobility, lifting and ambulation.      Modalities:    [] Electric Stimulation:   [] Ultrasound:   [] Other:       Charges:  Timed Code Treatment Minutes: TE 40   Total Treatment Minutes: 40      [] RE-EVAL     [x] MX(61556) x  3     [] NMR (37119) x       [] Manual (70620) x       [] TA (10115) x       [] Gait Training ([id number]) x       [] ES(attended) (89813)  [] ES (un) (22 515472)   [] DRY NEEDLE 1 OR 2 MUSCLES  [] DRY NEEDLE 3+ MUSCLES  [] Mech Traction (90941)  [] Ultrasound (68424)  [] Other:    GOALS:  Short term goals  Time Frame for Short term goals: 4 weeks  Short term goal 1: Pt to reduce fall risk and impairment per Tinetti by inc score from 8 to >/=16  - MET  Short term goal 2: Pt to improve TUG to reduce fall risk and improve gait speed from 17s to less than 12 seconds-MET  Short term goal 3: Pt to illustrate strength gains by bia 45-60 min PT treatment sessions and ability to perform sit to stand with min use of UE safely 75% or greater of the time-MET  Short term goal 4: Pt to improve hamstring lengh exhibited with forward reach to less than 3 inches from toes for improving flexiblity in LEs- NOT MET PROGRESSING/IMPROVING  Short term goal 5: indep to supervision with progressive HEP to max rehab potential- MET  Long term goals  Time Frame for Long term goals : 8 weeks  Long term goal 1: Pt to reduce impairment and fall risk by inc score per Tinetti to >/=22 -met 4/29  Long term goal 2: Pt to ambulate household distances and situations modified indep to indep -progressing return for scheduled/recommended follow up visits, this note will serve as a discharge from care along with the most recent update on progress.

## 2021-05-19 RX ORDER — ATORVASTATIN CALCIUM 80 MG/1
TABLET, FILM COATED ORAL
Qty: 30 TABLET | Refills: 2 | Status: SHIPPED | OUTPATIENT
Start: 2021-05-19 | End: 2021-08-16

## 2021-05-19 NOTE — TELEPHONE ENCOUNTER
Medication:   Requested Prescriptions     Pending Prescriptions Disp Refills    atorvastatin (LIPITOR) 80 MG tablet [Pharmacy Med Name: ATORVASTATIN 80 MG TABLET] 30 tablet 2     Sig: TAKE ONE TABLET BY MOUTH ONCE NIGHTLY       Last Filled:      Patient Phone Number: [phone] (home)     Last appt: 5/12/2021   Next appt: 8/16/2021    Last Labs DM:   Lab Results   Component Value Date    LABA1C 5.8 05/12/2021     Last Lipid:   Lab Results   Component Value Date    CHOL 106 11/04/2019    TRIG 265 11/04/2019    HDL 39 11/04/2019    HDL 41 03/28/2012    LDLCALC 14 11/04/2019     Last PSA:   Lab Results   Component Value Date    PSA 1.45 09/10/2013     Last Thyroid:   Lab Results   Component Value Date    TSH 5.20 01/30/2018

## 2021-05-20 ENCOUNTER — HOSPITAL ENCOUNTER (OUTPATIENT)
Dept: PHYSICAL THERAPY | Age: 76
Setting detail: THERAPIES SERIES
Discharge: HOME | End: 2021-05-20
Payer: MEDICARE

## 2021-05-20 PROCEDURE — 97110 THERAPEUTIC EXERCISES: CPT

## 2021-05-20 NOTE — FLOWSHEET NOTE
[facility] Outpatient Therapy  [address], [address] [facility], [address] [place] [address] Av  Phone: [phone]   Fax: [phone]    Physical Therapy Treatment Note/ Progress Report:     Date:  2021    Patient Name:  Mirian Vora  \"Ilya\"  :  1945  MRN: [id number]    Medical/Treatment Diagnosis Information:  · Diagnosis: R53.81 (ICD-10-CM) - Other malaise, Debility and Deconditioning R53.81  · Treatment Diagnosis: balance problems gait distrubance  Insurance/Certification information:  PT Insurance Information: [facility] Medicare  Physician Information:  Referring Practitioner: Dutch Davidson MD  Plan of care signed:    [x] Yes  [] No    Date of Patient follow up with Physician:  2021     Progress Report: []  Yes  [x]  No     Date Range for reporting period:  Beginning:  3/24/2021  Ending:      Progress report due (10 Rx/or 30 days whichever is less): 3/74/7324     Recertification due (POC duration/ or 90 days whichever is less): 2021     Visit # Insurance Allowable Auth Needed   15 16 []Yes   [x]No     RESTRICTIONS/PRECAUTIONS:   Latex Allergy:  [x]NO      []YES  Preferred Language for Healthcare:   [x]English       []other:    Pain level:  0/10    SUBJECTIVE:   Pt reports he is having more trouble breathing this morning, not sure why. He checks BP and O2 sats every morning and this was normal. Tried to do a squat the other day without holding onto anything for balance and this was way too hard. OBJECTIVE:     Observation: Pt arrives with RW. He reports that he does not use an AD at home, only when he is in the community. Pt requiring occasional extended seated rest breaks after repeated sit to/from stand and standing therex this date.      Test measurements:  Sitting O2 Saturation 97%, HR 76 bpm  Standing O2 Saturation 99%, HR 77 bpm   TUG (without walker) 21:    13 seconds attempt 1    11 seconds attempt 2   Tinetti (without RW) 21:    15/16 Balance    11/12 Gait    26/28 Total   Functional Scale:     LEFS:  24/80 (70% impaired)      Date assessed:  3/24/2021    Exercises/Interventions: Exercises in bold performed in department today. Items not bolded are carried forward from prior visits for continuity of the record. Exercise/Equipment Resistance/Repetitions HEP Other comments   NuStep 5 min warm up Resist 7, Seat 13, Arms 11  [] Was able to tolerate full 5 min today, increased resistance by 1 needed 2 minutes to catch breath before standing up   Seated Glute Squeeze 20 x  [] Verbal cues for upright posture, pt fatigues into slumped posture quickly   Seated Hip Add Squeeze (ball between knees) 20 x  [] Verbal cues for upright posture, pt fatigues into slumped posture quickly   LAQ 20 x each leg, 2.5# ankle weight [x] Verbal cues for posture, reporting this feeling heavier today   Sit-to-Stand 10x 2   [x] UE support on thighs   Hamstring Stretch (seated off side of table, leg outstretched) 3 x 30\" hold [x]    Piriformis Stretch (seated, figure 4 position) 3 x 30\" hold []      []    Standing Hip Abd 20 x bilat [] Modified to sitting today   Heel Raises 20 x  [] Cues for upright posture   Mini-squats 20 x  [] Cues for upright posture and to slow speed for improved contraction   High Marches 20 x [x] Cues for upright posture, resumed 2.5# ankle weights, sitting today     []      []      []      []      []      []      Home Exercise Program:     Access Code: [email or web address]: [email or web address] [employer]/  Date: 03/24/2021  Prepared by: Isidoro Patricia  Exercises   Sitting Knee Extension with Resistance - 1 x daily - 3-5 x weekly - 1-3 sets - 10 reps   Seated Knee Extension Stretch with Chair - 1 x daily - 7 x weekly - 1 sets - 30 hold   Seated Hamstring Curls with Resistance - 1 x daily - 3-5 x weekly - 1-3 sets - 10 reps   Seated Hip Abduction - 1 x daily - 3-5 x weekly - 1-3 sets - 10 reps   Seated March - 1 x daily - 3-5 x weekly - 1-3 sets - 10 reps   Sit to Stand with Counter Support - 1 x daily - 7 x weekly - 1-2 sets - 10 reps. Access Code: [email or web address]: [email or web address]/  Date: 04/12/2021  Prepared by: Bárbara Burgess  Exercises   Sitting Knee Extension with Resistance - 1 x daily - 3 x weekly - 2-3 sets - 10-20 reps   Seated Hamstring Curls with Resistance - 1 x daily - 3 x weekly - 2-3 sets - 10-20 reps   Seated March with Resistance - 1 x daily - 3 x weekly - 2-3 sets - 10-20 reps   Seated Hip Abduction with Resistance - 1 x daily - 3 x weekly - 2-3 sets - 10-20 reps       Therapeutic Exercise and NMR:  [x] (42438) Provided verbal/tactile cueing for activities related to strengthening, flexibility, endurance, ROM for improvements in LE, proximal hip, and core control with self-care, mobility, lifting, ambulation. [x] (49864) Provided verbal/tactile cueing for activities related to improving balance, coordination, kinesthetic sense, posture, motor skill, proprioception to assist with LE, proximal hip, and core control in self-care, mobility, lifting, ambulation and eccentric single leg control.      Gait Training and Therapeutic Activities:    [x] (03707) Provided verbal/tactile cueing for activities related to improving balance, coordination, kinesthetic sense, posture, motor skill, proprioception and motor activation to allow for proper function of core, proximal hip and LE with self-care and ADLs and functional mobility.   [] (43868) Provided training and instruction to the patient for proper LE, core and proximal hip recruitment and positioning and eccentric body weight control with ambulation re-education including up and down stairs     Home Exercise Program:    [] (88263) Reviewed/Progressed HEP activities related to strengthening, flexibility, endurance, ROM of core, proximal hip and LE for functional self-care, mobility, lifting and ambulation/stair navigation   [] (93849) Reviewed/Progressed HEP activities related to improving balance, coordination, kinesthetic sense, posture, motor skill, proprioception of core, proximal hip and LE for self-care, mobility, lifting, and ambulation/stair navigation      Manual Treatments:  PROM / STM / Oscillations-Mobs:  G-I, II, III, IV (PA's, Inf., Post.)  [] (03316) Provided manual therapy to mobilize LE, proximal hip and/or LS spine soft tissue/joints for the purpose of modulating pain, promoting relaxation,  increasing ROM, reducing/eliminating soft tissue swelling/inflammation/restriction, improving soft tissue extensibility and allowing for proper ROM for normal function with self-care, mobility, lifting and ambulation.      Modalities:    [] Electric Stimulation:   [] Ultrasound:   [] Other:       Charges:  Timed Code Treatment Minutes: 2 TE 35   Total Treatment Minutes: 35      [] RE-EVAL     [x] GH(95249) x  2     [] NMR (00669) x       [] Manual (48215) x       [] TA (52402) x       [] Gait Training [phone]) x       [] ES(attended) (68442)  [] ES (un) ([phone])   [] DRY NEEDLE 1 OR 2 MUSCLES  [] DRY NEEDLE 3+ MUSCLES  [] Mech Traction (04929)  [] Ultrasound (03914)  [] Other:    GOALS:  Short term goals  Time Frame for Short term goals: 4 weeks  Short term goal 1: Pt to reduce fall risk and impairment per Tinetti by inc score from 8 to >/=16  - MET  Short term goal 2: Pt to improve TUG to reduce fall risk and improve gait speed from 17s to less than 12 seconds-MET  Short term goal 3: Pt to illustrate strength gains by bia 45-60 min PT treatment sessions and ability to perform sit to stand with min use of UE safely 75% or greater of the time-MET  Short term goal 4: Pt to improve hamstring lengh exhibited with forward reach to less than 3 inches from toes for improving flexiblity in LEs- NOT MET PROGRESSING/IMPROVING  Short term goal 5: indep to supervision with progressive HEP to max rehab potential- MET  Long term goals  Time Frame for Long term goals : 8 weeks  Long term goal 1: Pt to reduce impairment and fall risk by procurement, Safety Education & Training, Functional Mobility Training, ADL/Self-care Training, ROM   [x] Continue per plan of care [] Alter current plan (see comments)  [] Plan of care initiated [] Hold pending MD visit [] Discharge    Electronically signed by:      Ramez Vazquez, PT, DPT    Note: If patient does not return for scheduled/recommended follow up visits, this note will serve as a discharge from care along with the most recent update on progress.

## 2021-05-24 ENCOUNTER — APPOINTMENT (OUTPATIENT)
Dept: PHYSICAL THERAPY | Age: 76
End: 2021-05-24
Payer: MEDICARE

## 2021-05-27 ENCOUNTER — HOSPITAL ENCOUNTER (OUTPATIENT)
Dept: PHYSICAL THERAPY | Age: 76
Setting detail: THERAPIES SERIES
Discharge: HOME | End: 2021-05-27
Payer: MEDICARE

## 2021-05-27 PROCEDURE — 97530 THERAPEUTIC ACTIVITIES: CPT

## 2021-05-27 PROCEDURE — 97110 THERAPEUTIC EXERCISES: CPT

## 2021-05-27 NOTE — PROGRESS NOTES
5/27/2021  Alarcon 41/56   Date assessed 5/27/21  TUG 12.5 sec Date assessed 5/27/21    Exercises/Interventions: Exercises in bold performed in department today. Items not bolded are carried forward from prior visits for continuity of the record. Exercise/Equipment Resistance/Repetitions HEP Other comments   NuStep 5 min  Resist 6, Seat 13, Arms 11  [] He still requires 2-3 minutes rest before getting up from bike   Seated Glute Squeeze 20 x  [] Verbal cues for upright posture, pt fatigues into slumped posture quickly   Seated Hip Add Squeeze (ball between knees) 20 x  [] Verbal cues for upright posture, pt fatigues into slumped posture quickly   LAQ 20 x each leg, 2.5# ankle weight [x] Verbal cues for posture, reporting this feeling heavier today   Sit-to-Stand 10x 2   [x] UE support on thighs   Hamstring Stretch (seated off side of table, leg outstretched) 3 x 30\" hold [x]    Piriformis Stretch (seated, figure 4 position) 3 x 30\" hold []      []    Standing Hip Abd 20 x bilat [] Modified to sitting today   Heel Raises 20 x  [] Cues for upright posture   Mini-squats 20 x  [] Cues for upright posture and to slow speed for improved contraction   High Marches 20 x [x] Cues for upright posture, resumed 2.5# ankle weights, sitting today     []      []      []      []      []    Therapeutic activities: Agnes REGALADO, TUG 30 minutes []      Home Exercise Program:     Access Code: [email or web address]: [email or web address]/  Date: 03/24/2021  Prepared by: Chacorta Cortez  Exercises   Sitting Knee Extension with Resistance - 1 x daily - 3-5 x weekly - 1-3 sets - 10 reps   Seated Knee Extension Stretch with Chair - 1 x daily - 7 x weekly - 1 sets - 30 hold   Seated Hamstring Curls with Resistance - 1 x daily - 3-5 x weekly - 1-3 sets - 10 reps   Seated Hip Abduction - 1 x daily - 3-5 x weekly - 1-3 sets - 10 reps   Seated March - 1 x daily - 3-5 x weekly - 1-3 sets - 10 reps   Sit to Stand with Counter Support - 1 x daily - 7 x weekly - 1-2 sets - 10 reps. Access Code: [email or web address]: [email or web address] com/  Date: 04/12/2021  Prepared by: Lindy Bamberger  Exercises   Sitting Knee Extension with Resistance - 1 x daily - 3 x weekly - 2-3 sets - 10-20 reps   Seated Hamstring Curls with Resistance - 1 x daily - 3 x weekly - 2-3 sets - 10-20 reps   Seated March with Resistance - 1 x daily - 3 x weekly - 2-3 sets - 10-20 reps   Seated Hip Abduction with Resistance - 1 x daily - 3 x weekly - 2-3 sets - 10-20 reps       Therapeutic Exercise and NMR:  [x] (39608) Provided verbal/tactile cueing for activities related to strengthening, flexibility, endurance, ROM for improvements in LE, proximal hip, and core control with self-care, mobility, lifting, ambulation. [x] (37644) Provided verbal/tactile cueing for activities related to improving balance, coordination, kinesthetic sense, posture, motor skill, proprioception to assist with LE, proximal hip, and core control in self-care, mobility, lifting, ambulation and eccentric single leg control.      Gait Training and Therapeutic Activities:    [x] (39437) Provided verbal/tactile cueing for activities related to improving balance, coordination, kinesthetic sense, posture, motor skill, proprioception and motor activation to allow for proper function of core, proximal hip and LE with self-care and ADLs and functional mobility.   [] (25795) Provided training and instruction to the patient for proper LE, core and proximal hip recruitment and positioning and eccentric body weight control with ambulation re-education including up and down stairs     Home Exercise Program:    [] (11186) Reviewed/Progressed HEP activities related to strengthening, flexibility, endurance, ROM of core, proximal hip and LE for functional self-care, mobility, lifting and ambulation/stair navigation   [] (21235) Reviewed/Progressed HEP activities related to improving balance, coordination, kinesthetic sense, posture, motor skill, 2: Pt to ambulate household distances and situations modified indep to indep -progressing  Long term goal 3: Pt to reduce impairment and fall risk by inc score per Daya John to 45/56 to demonstrate less risk of falls. Established       Assessment: Pt has made slow but consistent progress through course of PT. Co-morbidities have limited his tolerance to exercise and continue to make him dependent on walker for community ambulation. His PRATT balance score indicates he is still at a fall risk. Turning tasks and narrow base of support are most difficult for him. He continues to report several near falls and balance deficits over the last few months. He would benefit from continued skilled PT to improve upon these impairments. Treatment/Activity Tolerance:  [] Patient tolerated treatment well [x] Patient limited by fatique  [] Patient limited by pain  [] Patient limited by other medical complications  [] Other:     Overall Progression Towards Functional goals/ Treatment Progress Update:  [] Patient is progressing as expected towards functional goals listed. [x] Progression is slowed due to complexities/Impairments listed. [] Progression has been slowed due to co-morbidities. [] Plan just implemented, too soon to assess goals progression <30days   [] Goals require adjustment due to lack of progress  [] Patient is not progressing as expected and requires additional follow up with physician  [] Other    Prognosis for POC: [] Good [x] Fair  [] Poor    Patient requires continued skilled intervention: [x] Yes  [] No        PLAN: Recommending an additional 1-2 visits per week for 4 weeks to further improve upon his balance, overall endurance,  and prevent falls.   Current Treatment Recommendations: Strengthening, Balance Training, Transfer Training, IADL Training, Endurance Training, Gait Training, Neuromuscular Re-education, Home Exercise Program, Patient/Caregiver Education & Training, Modalities, Positioning, Equipment Evaluation, Education, & procurement, Safety Education & Training, Functional Mobility Training, ADL/Self-care Training, ROM   [] Continue per plan of care [x] Alter current plan (see comments)  [] Plan of care initiated [] Hold pending MD visit [] Discharge    Electronically signed by:      Kaylene Bo PT, DPT    Note: If patient does not return for scheduled/recommended follow up visits, this note will serve as a discharge from care along with the most recent update on progress.

## 2021-06-03 ENCOUNTER — HOSPITAL ENCOUNTER (OUTPATIENT)
Dept: PHYSICAL THERAPY | Age: 76
Setting detail: THERAPIES SERIES
Discharge: HOME | End: 2021-06-03
Payer: MEDICARE

## 2021-06-03 PROCEDURE — 97112 NEUROMUSCULAR REEDUCATION: CPT

## 2021-06-03 PROCEDURE — 97110 THERAPEUTIC EXERCISES: CPT

## 2021-06-03 NOTE — FLOWSHEET NOTE
[facility]  [address], [address] [facility], [address] [place] [address]  Phone: [phone]   Fax: [phone]    Physical Therapy Treatment Note/ Progress Report:     Date:  6/3/2021    Patient Name:  Mirian Vora  \"Ilya\"  :  1945  MRN: [id number]    Medical/Treatment Diagnosis Information:  · Diagnosis: R53.81 (ICD-10-CM) - Other malaise, Debility and Deconditioning R53.81  · Treatment Diagnosis: balance problems gait distrubance  Insurance/Certification information:  PT Insurance Information: [facility] Medicare  Physician Information:  Referring Practitioner: Dutch Davidson MD  Plan of care signed:    [x] Yes  [] No    Date of Patient follow up with Physician:       Progress Report: []  Yes  [x]  No     Date Range for reporting period:  Beginning:  3/24/2021  Ending:      Progress report due (10 Rx/or 30 days whichever is less): 925     Recertification due (POC duration/ or 90 days whichever is less): 2021     Visit # Insurance Allowable Auth Needed   17 24 []Yes   [x]No     RESTRICTIONS/PRECAUTIONS:   Latex Allergy:  [x]NO      []YES  Preferred Language for Healthcare:   [x]English       []other:    Pain level:  0/10     SUBJECTIVE:   Pt reports he still feels like his endurance is bad. Legs are getting stronger     OBJECTIVE:     Observation: Pt arrives with RW. He reports that he does not use an AD at home, only when he is in the community. Pt requiring occasional extended seated rest breaks after repeated sit to/from stand and standing therex this date. Test measurements:     TUG (without walker) 21:    13 seconds attempt 1    11 seconds attempt 2     Functional Scale:     LEFS:   (70% impaired)      Date assessed:  3/24/2021  LEFS:   (74% impaired)      Date assessed:  2021  Angelic Shutters 41/56   Date assessed 21  TUG 12.5 sec Date assessed 21    Exercises/Interventions: Exercises in bold performed in department today.   Items not bolded are carried forward from prior visits for continuity of the record. Exercise/Equipment Resistance/Repetitions HEP Other comments   NuStep 5 min  Resist 6, Seat 13, Arms 11  [] He still requires 2-3 minutes rest before getting up from bike   Seated Glute Squeeze 20 x  [] Verbal cues for upright posture, pt fatigues into slumped posture quickly   Seated Hip Add Squeeze (ball between knees) 20 x  [] Verbal cues for upright posture, pt fatigues into slumped posture quickly   LAQ 20 x each leg, 3# ankle weight [x]    Sit-to-Stand 10x 2   [x] UE support on thighs   Hamstring Stretch (seated off side of table, leg outstretched) 3 x 30\" hold [x]    Piriformis Stretch (seated, figure 4 position) 3 x 30\" hold []      []    Standing Hip Abd 20 x bilat [] Modified to sitting today   Heel Raises 20 x  [] Cues for upright posture   Mini-squats 20 x  [] Cues for upright posture and to slow speed for improved contraction   High Marches 20 x [x] Cues for upright posture, resumed 2.5# ankle weights, sitting today   Balance: tandem without UE support, SLS with fingertip support 3 x 30 sec ea [] SPV needed throughout. LLE more challenging     []      []      []      []    Therapeutic activities: Agnes REGALADO, ANÍBAL 30 minutes []      Home Exercise Program:     Access Code: [email or web address]: [employer] [place] [email or web address]/  Date: 03/24/2021  Prepared by: Vinod Gaitan  Exercises   Sitting Knee Extension with Resistance - 1 x daily - 3-5 x weekly - 1-3 sets - 10 reps   Seated Knee Extension Stretch with Chair - 1 x daily - 7 x weekly - 1 sets - 30 hold   Seated Hamstring Curls with Resistance - 1 x daily - 3-5 x weekly - 1-3 sets - 10 reps   Seated Hip Abduction - 1 x daily - 3-5 x weekly - 1-3 sets - 10 reps   Seated March - 1 x daily - 3-5 x weekly - 1-3 sets - 10 reps   Sit to Stand with Counter Support - 1 x daily - 7 x weekly - 1-2 sets - 10 reps. Access Code: [email or web address]: [employer]/  Date: 04/12/2021  Prepared by: Dawit Damon  Exercises   Sitting Knee Extension with Resistance - 1 x daily - 3 x weekly - 2-3 sets - 10-20 reps   Seated Hamstring Curls with Resistance - 1 x daily - 3 x weekly - 2-3 sets - 10-20 reps   Seated March with Resistance - 1 x daily - 3 x weekly - 2-3 sets - 10-20 reps   Seated Hip Abduction with Resistance - 1 x daily - 3 x weekly - 2-3 sets - 10-20 reps       Therapeutic Exercise and NMR:  [x] (72903) Provided verbal/tactile cueing for activities related to strengthening, flexibility, endurance, ROM for improvements in LE, proximal hip, and core control with self-care, mobility, lifting, ambulation. [x] (93786) Provided verbal/tactile cueing for activities related to improving balance, coordination, kinesthetic sense, posture, motor skill, proprioception to assist with LE, proximal hip, and core control in self-care, mobility, lifting, ambulation and eccentric single leg control.      Gait Training and Therapeutic Activities:    [x] (29850) Provided verbal/tactile cueing for activities related to improving balance, coordination, kinesthetic sense, posture, motor skill, proprioception and motor activation to allow for proper function of core, proximal hip and LE with self-care and ADLs and functional mobility.   [] (26111) Provided training and instruction to the patient for proper LE, core and proximal hip recruitment and positioning and eccentric body weight control with ambulation re-education including up and down stairs     Home Exercise Program:    [] (01361) Reviewed/Progressed HEP activities related to strengthening, flexibility, endurance, ROM of core, proximal hip and LE for functional self-care, mobility, lifting and ambulation/stair navigation   [] (90679) Reviewed/Progressed HEP activities related to improving balance, coordination, kinesthetic sense, posture, motor skill, proprioception of core, proximal hip and LE for self-care, mobility, lifting, and ambulation/stair navigation      Manual Treatments:  PROM / STM / Oscillations-Mobs:  G-I, II, III, IV (PA's, Inf., Post.)  [] (70440) Provided manual therapy to mobilize LE, proximal hip and/or LS spine soft tissue/joints for the purpose of modulating pain, promoting relaxation,  increasing ROM, reducing/eliminating soft tissue swelling/inflammation/restriction, improving soft tissue extensibility and allowing for proper ROM for normal function with self-care, mobility, lifting and ambulation.      Modalities:    [] Electric Stimulation:   [] Ultrasound:   [] Other:       Charges:  Timed Code Treatment Minutes: 2 TE 25, NMR 15   Total Treatment Minutes: 40      [] RE-EVAL     [x] FD(42235) x 2     [x] NMR (30160) x  1     [] Manual (37187) x       [] TA (09630) x 2      [] Gait Training (70261) x       [] ES(attended) (65465)  [] ES (un) ([id number])   [] DRY NEEDLE 1 OR 2 MUSCLES  [] DRY NEEDLE 3+ MUSCLES  [] Mech Traction (70234)  [] Ultrasound (23198)  [] Other:    GOALS:  Short term goals  Time Frame for Short term goals: 4 weeks  Short term goal 1: Pt to reduce fall risk and impairment per Tinetti by inc score from 8 to >/=16  - MET  Short term goal 2: Pt to improve TUG to reduce fall risk and improve gait speed from 17s to less than 12 seconds-MET  Short term goal 3: Pt to illustrate strength gains by bia 45-60 min PT treatment sessions and ability to perform sit to stand with min use of UE safely 75% or greater of the time-MET  Short term goal 4: Pt to improve hamstring lengh exhibited with forward reach to less than 3 inches from toes for improving flexiblity in LEs- MET  Short term goal 5: indep to supervision with progressive HEP to max rehab potential- MET  Long term goals  Time Frame for Long term goals : 8 weeks  Long term goal 1: Pt to reduce impairment and fall risk by inc score per Tinetti to >/=22 -met 4/29  Long term goal 2: Pt to ambulate household distances and situations modified indep to indep -progressing  Long term goal 3: Pt to reduce impairment and fall risk by inc score per Jewell Mondragon to 45/56 to demonstrate less risk of falls. Established       Assessment: Focused on balance today. LLE was more challenging with all balance tasks. He still requires frequent seated rest breaks and needed to finish with seated tasks d/t fatigue. He would benefit from continued skilled PT to improve upon these impairments. Treatment/Activity Tolerance:  [] Patient tolerated treatment well [x] Patient limited by fatique  [] Patient limited by pain  [] Patient limited by other medical complications  [] Other:     Overall Progression Towards Functional goals/ Treatment Progress Update:  [] Patient is progressing as expected towards functional goals listed. [x] Progression is slowed due to complexities/Impairments listed. [] Progression has been slowed due to co-morbidities.   [] Plan just implemented, too soon to assess goals progression <30days   [] Goals require adjustment due to lack of progress  [] Patient is not progressing as expected and requires additional follow up with physician  [] Other    Prognosis for POC: [] Good [x] Fair  [] Poor    Patient requires continued skilled intervention: [x] Yes  [] No        PLAN:  Current Treatment Recommendations: Strengthening, Balance Training, Transfer Training, IADL Training, Endurance Training, Gait Training, Neuromuscular Re-education, Home Exercise Program, Patient/Caregiver Education & Training, Modalities, Positioning, Equipment Evaluation, Education, & procurement, Safety Education & Training, Functional Mobility Training, ADL/Self-care Training, ROM   [x] Continue per plan of care [] Alter current plan (see comments)  [] Plan of care initiated [] Hold pending MD visit [] Discharge    Electronically signed by:      Say Yanez, PT, DPT    Note: If patient does not return for scheduled/recommended follow up visits, this note will serve as a discharge from care along with the most recent update on progress.

## 2021-06-10 ENCOUNTER — HOSPITAL ENCOUNTER (OUTPATIENT)
Dept: PHYSICAL THERAPY | Age: 76
Setting detail: THERAPIES SERIES
Discharge: HOME | End: 2021-06-10
Payer: MEDICARE

## 2021-06-10 PROCEDURE — 97112 NEUROMUSCULAR REEDUCATION: CPT

## 2021-06-10 PROCEDURE — 97110 THERAPEUTIC EXERCISES: CPT

## 2021-06-10 NOTE — FLOWSHEET NOTE
[facility] Outpatient [facility]  [address] [place] [address], AK[address], [address]  Phone: [phone]   Fax: [phone]    Physical Therapy Treatment Note/ Progress Report:     Date:  6/10/2021    Patient Name:  Josefina Slade  \"Ilya\"  :  1945  MRN: [id number]    Medical/Treatment Diagnosis Information:  · Diagnosis: R53.81 (ICD-10-CM) - Other malaise, Debility and Deconditioning R53.81  · Treatment Diagnosis: balance problems gait distrubance  Insurance/Certification information:  PT Insurance Information: [facility] Medicare  Physician Information:  Referring Practitioner: Nathalia Anders MD  Plan of care signed:    [x] Yes  [] No    Date of Patient follow up with Physician:       Progress Report: []  Yes  [x]  No     Date Range for reporting period:  Beginning:  3/24/2021  Ending:      Progress report due (10 Rx/or 30 days whichever is less): 3681     Recertification due (POC duration/ or 90 days whichever is less): 2021     Visit # Insurance Allowable Auth Needed   18 24 []Yes   [x]No     RESTRICTIONS/PRECAUTIONS:   Latex Allergy:  [x]NO      []YES  Preferred Language for Healthcare:   [x]English       []other:    Pain level:  0/10     SUBJECTIVE:   Pt reports he is not used to starting PT this early in the morning. The rest of his appointments are later in the morning. Felt pretty tired after last session. Has a follow up with nephrologist this afternoon. OBJECTIVE:     Observation: Pt arrives with RW. He reports that he does not use an AD at home, only when he is in the community. Pt requiring occasional extended seated rest breaks after repeated sit to/from stand and standing therex this date.     Test measurements:     TUG (without walker) 21:    13 seconds attempt 1    11 seconds attempt 2     Functional Scale:     LEFS:   (70% impaired)      Date assessed:  3/24/2021  LEFS:   (74% impaired)      Date assessed:  2021  Kiana Guerrier 41/56   Date weekly - 1-3 sets - 10 reps   Sit to Stand with Counter Support - 1 x daily - 7 x weekly - 1-2 sets - 10 reps. Access Code: [email or web address]: [email or web address]/  Date: 04/12/2021  Prepared by: Zuleyma Marina  Exercises   Sitting Knee Extension with Resistance - 1 x daily - 3 x weekly - 2-3 sets - 10-20 reps   Seated Hamstring Curls with Resistance - 1 x daily - 3 x weekly - 2-3 sets - 10-20 reps   Seated March with Resistance - 1 x daily - 3 x weekly - 2-3 sets - 10-20 reps   Seated Hip Abduction with Resistance - 1 x daily - 3 x weekly - 2-3 sets - 10-20 reps       Therapeutic Exercise and NMR:  [x] (01106) Provided verbal/tactile cueing for activities related to strengthening, flexibility, endurance, ROM for improvements in LE, proximal hip, and core control with self-care, mobility, lifting, ambulation. [x] (02916) Provided verbal/tactile cueing for activities related to improving balance, coordination, kinesthetic sense, posture, motor skill, proprioception to assist with LE, proximal hip, and core control in self-care, mobility, lifting, ambulation and eccentric single leg control.      Gait Training and Therapeutic Activities:    [x] (94786) Provided verbal/tactile cueing for activities related to improving balance, coordination, kinesthetic sense, posture, motor skill, proprioception and motor activation to allow for proper function of core, proximal hip and LE with self-care and ADLs and functional mobility.   [] (93968) Provided training and instruction to the patient for proper LE, core and proximal hip recruitment and positioning and eccentric body weight control with ambulation re-education including up and down stairs     Home Exercise Program:    [] (47026) Reviewed/Progressed HEP activities related to strengthening, flexibility, endurance, ROM of core, proximal hip and LE for functional self-care, mobility, lifting and ambulation/stair navigation   [] (36777) Reviewed/Progressed HEP activities related to improving balance, coordination, kinesthetic sense, posture, motor skill, proprioception of core, proximal hip and LE for self-care, mobility, lifting, and ambulation/stair navigation      Manual Treatments:  PROM / STM / Oscillations-Mobs:  G-I, II, III, IV (PA's, Inf., Post.)  [] (54322) Provided manual therapy to mobilize LE, proximal hip and/or LS spine soft tissue/joints for the purpose of modulating pain, promoting relaxation,  increasing ROM, reducing/eliminating soft tissue swelling/inflammation/restriction, improving soft tissue extensibility and allowing for proper ROM for normal function with self-care, mobility, lifting and ambulation.      Modalities:    [] Electric Stimulation:   [] Ultrasound:   [] Other:       Charges:  Timed Code Treatment Minutes: TE 20, NMR 10   Total Treatment Minutes: 30      [] RE-EVAL     [x] VJ(22401) x 2     [x] NMR (40359) x  1     [] Manual (56416) x       [] TA (32126) x 2      [] Gait Training (93590) x       [] ES(attended) (07552)  [] ES (un) (22 [id number])   [] DRY NEEDLE 1 OR 2 MUSCLES  [] DRY NEEDLE 3+ MUSCLES  [] Mech Traction (06739)  [] Ultrasound (95241)  [] Other:    GOALS:  Short term goals  Time Frame for Short term goals: 4 weeks  Short term goal 1: Pt to reduce fall risk and impairment per Tinetti by inc score from 8 to >/=16  - MET  Short term goal 2: Pt to improve TUG to reduce fall risk and improve gait speed from 17s to less than 12 seconds-MET  Short term goal 3: Pt to illustrate strength gains by bia 45-60 min PT treatment sessions and ability to perform sit to stand with min use of UE safely 75% or greater of the time-MET  Short term goal 4: Pt to improve hamstring lengh exhibited with forward reach to less than 3 inches from toes for improving flexiblity in LEs- MET  Short term goal 5: indep to supervision with progressive HEP to max rehab potential- MET  Long term goals  Time Frame for Long term goals : 8 weeks  Long term goal 1: Pt to reduce impairment and fall risk by inc score per Tinetti to >/=22 -met 4/29  Long term goal 2: Pt to ambulate household distances and situations modified indep to indep -progressing  Long term goal 3: Pt to reduce impairment and fall risk by inc score per [employer] to 45/56 to demonstrate less risk of falls. Established       Assessment: Poor tolerance to activity today. After nustep and standing balance exercises, he needed to sit for about 10 minutes and reported feeling nauseous. BP and oxygen levels checked at that time and were normal. He does have a follow up with his nephrologist this afternoon, encouraged him to mention increased fatigue recently. He would benefit from continued skilled PT to improve upon these impairments. Treatment/Activity Tolerance:  [] Patient tolerated treatment well [x] Patient limited by fatique  [] Patient limited by pain  [] Patient limited by other medical complications  [] Other:     Overall Progression Towards Functional goals/ Treatment Progress Update:  [] Patient is progressing as expected towards functional goals listed. [x] Progression is slowed due to complexities/Impairments listed. [] Progression has been slowed due to co-morbidities.   [] Plan just implemented, too soon to assess goals progression <30days   [] Goals require adjustment due to lack of progress  [] Patient is not progressing as expected and requires additional follow up with physician  [] Other    Prognosis for POC: [] Good [x] Fair  [] Poor    Patient requires continued skilled intervention: [x] Yes  [] No        PLAN:  Current Treatment Recommendations: Strengthening, Balance Training, Transfer Training, IADL Training, Endurance Training, Gait Training, Neuromuscular Re-education, Home Exercise Program, Patient/Caregiver Education & Training, Modalities, Positioning, Equipment Evaluation, Education, & procurement, Safety Education & Training, Functional Mobility Training, ADL/Self-care Training, ROM   [x] Continue per plan of care [] Alter current plan (see comments)  [] Plan of care initiated [] Hold pending MD visit [] Discharge    Electronically signed by:      Aneta Andrea PT, DPT    Note: If patient does not return for scheduled/recommended follow up visits, this note will serve as a discharge from care along with the most recent update on progress.

## 2021-06-11 RX ORDER — INSULIN LISPRO 100 [IU]/ML
INJECTION, SOLUTION INTRAVENOUS; SUBCUTANEOUS
Qty: 30 ML | Refills: 2 | Status: SHIPPED | OUTPATIENT
Start: 2021-06-11 | End: 2021-08-16 | Stop reason: SDUPTHER

## 2021-06-11 NOTE — TELEPHONE ENCOUNTER
Medication:   Requested Prescriptions     Pending Prescriptions Disp Refills    insulin lispro, 1 Unit Dial, 100 UNIT/ML SOPN [Pharmacy Med Name: INSULIN LISPRO 100 UNIT/ML PEN]  2     Sig: INJECT 12 TO 18 UNITS UNDER THE SKIN THREE TIMES A DAY BEFORE MEALS       Last Filled:      Patient Phone Number: [phone] (home)     Last appt: 5/12/2021   Next appt: 8/16/2021    Last Labs DM:   Lab Results   Component Value Date    LABA1C 5.8 05/12/2021

## 2021-06-17 ENCOUNTER — HOSPITAL ENCOUNTER (OUTPATIENT)
Dept: PHYSICAL THERAPY | Age: 76
Setting detail: THERAPIES SERIES
Discharge: HOME | End: 2021-06-17
Payer: MEDICARE

## 2021-06-17 PROCEDURE — 97110 THERAPEUTIC EXERCISES: CPT

## 2021-06-17 NOTE — FLOWSHEET NOTE
[facility] Outpatient Therapy  [address], [place] [address], [address]  Phone: [phone]   Fax: [phone]    Physical Therapy Treatment Note/ Progress Report:     Date:  2021    Patient Name:  Lisa Baum  \"Ilya\"  :  1945  MRN: [id number]    Medical/Treatment Diagnosis Information:  · Diagnosis: R53.81 (ICD-10-CM) - Other malaise, Debility and Deconditioning R53.81  · Treatment Diagnosis: balance problems gait distrubance  Insurance/Certification information:  PT Insurance Information: [facility] Medicare  Physician Information:  Referring Practitioner: Jono Arcos MD  Plan of care signed:    [x] Yes  [] No    Date of Patient follow up with Physician:       Progress Report: []  Yes  [x]  No     Date Range for reporting period:  Beginning:  3/24/2021  Ending:      Progress report due (10 Rx/or 30 days whichever is less):      Recertification due (POC duration/ or 90 days whichever is less): 2021     Visit # Insurance Allowable Auth Needed   19 24 []Yes   [x]No     RESTRICTIONS/PRECAUTIONS:   Latex Allergy:  [x]NO      []YES  Preferred Language for Healthcare:   [x]English       []other:    Pain level:  0/10     SUBJECTIVE:   Pt reports he is endurance is poor and reports he was completely worn out after last session. He checks his oxygen and blood pressure at home daily and it has been normal.     OBJECTIVE:     Observation: Pt arrives with RW. He reports that he does not use an AD at home, only when he is in the community. Pt requiring occasional extended seated rest breaks after repeated sit to/from stand and standing therex this date.     Test measurements:     TUG (without walker) 21:    13 seconds attempt 1    11 seconds attempt 2     Functional Scale:     LEFS:   (70% impaired)      Date assessed:  3/24/2021  LEFS:   (74% impaired)      Date assessed:  2021  Herlene Kiowa 41/56   Date assessed 21  TUG 12.5 sec Date assessed 5/27/21    /75 SpO2 97% HR 84 bpm in sitting 6/10/21    Exercises/Interventions: Exercises in bold performed in department today. Items not bolded are carried forward from prior visits for continuity of the record. Exercise/Equipment Resistance/Repetitions HEP Other comments   NuStep 5 min  Resist 6, Seat 13, Arms 11  [] He still requires 2-3 minutes rest before getting up from bike   Seated Glute Squeeze 20 x  [] Verbal cues for upright posture, pt fatigues into slumped posture quickly   Seated Hip Add Squeeze (ball between knees) 20 x  [] Verbal cues for upright posture, pt fatigues into slumped posture quickly   LAQ 20 x each leg, 2# ankle weight [x]    Sit-to-Stand 10x 2   [x] UE support on thighs   Hamstring Stretch (seated off side of table, leg outstretched) 3 x 30\" hold [x]    Piriformis Stretch (seated, figure 4 position) 3 x 30\" hold []      []    Standing Hip Abd 20 x bilat [] Modified to sitting today   Heel-toe Raises 20 x  [] In sitting    Mini-squats 20 x  [] Cues for upright posture and to slow speed for improved contraction   High Marches 20 x [x] Cues for upright posture, resumed 2.5# ankle weights, sitting today   Balance: tandem without UE support, SLS with fingertip support 3 x 30 sec ea [] SPV needed throughout. LLE more challenging     []      []      []      []    Therapeutic activities: Agnes REGALADO, TUG 30 minutes []      Home Exercise Program:     Access Code: [email or web address]: [email or web address]  Date: 03/24/2021  Prepared by: Lindy Bamberger  Exercises   Sitting Knee Extension with Resistance - 1 x daily - 3-5 x weekly - 1-3 sets - 10 reps   Seated Knee Extension Stretch with Chair - 1 x daily - 7 x weekly - 1 sets - 30 hold   Seated Hamstring Curls with Resistance - 1 x daily - 3-5 x weekly - 1-3 sets - 10 reps   Seated Hip Abduction - 1 x daily - 3-5 x weekly - 1-3 sets - 10 reps   Seated March - 1 x daily - 3-5 x weekly - 1-3 sets - 10 reps   Sit to Stand with Counter Support - 1 x daily - 7 x weekly - 1-2 sets - 10 reps. Access Code: [email or web address]: [email or web address]  Date: 04/12/2021  Prepared by: Anusha Garcia  Exercises   Sitting Knee Extension with Resistance - 1 x daily - 3 x weekly - 2-3 sets - 10-20 reps   Seated Hamstring Curls with Resistance - 1 x daily - 3 x weekly - 2-3 sets - 10-20 reps   Seated March with Resistance - 1 x daily - 3 x weekly - 2-3 sets - 10-20 reps   Seated Hip Abduction with Resistance - 1 x daily - 3 x weekly - 2-3 sets - 10-20 reps       Therapeutic Exercise and NMR:  [x] (46101) Provided verbal/tactile cueing for activities related to strengthening, flexibility, endurance, ROM for improvements in LE, proximal hip, and core control with self-care, mobility, lifting, ambulation.  [] (30648) Provided verbal/tactile cueing for activities related to improving balance, coordination, kinesthetic sense, posture, motor skill, proprioception to assist with LE, proximal hip, and core control in self-care, mobility, lifting, ambulation and eccentric single leg control.      Gait Training and Therapeutic Activities:    [] (08819) Provided verbal/tactile cueing for activities related to improving balance, coordination, kinesthetic sense, posture, motor skill, proprioception and motor activation to allow for proper function of core, proximal hip and LE with self-care and ADLs and functional mobility.   [] (13824) Provided training and instruction to the patient for proper LE, core and proximal hip recruitment and positioning and eccentric body weight control with ambulation re-education including up and down stairs     Home Exercise Program:    [] (83015) Reviewed/Progressed HEP activities related to strengthening, flexibility, endurance, ROM of core, proximal hip and LE for functional self-care, mobility, lifting and ambulation/stair navigation   [] (46680) Reviewed/Progressed HEP activities related to improving balance, coordination, kinesthetic 4/29  Long term goal 2: Pt to ambulate household distances and situations modified indep to indep -progressing  Long term goal 3: Pt to reduce impairment and fall risk by inc score per Malia Ren to 45/56 to demonstrate less risk of falls. Established       Assessment: He continue to report shortness of breath as limitation for activity. Better tolerance to low intensity exercise today but held on standing balance exercises as these have been very strenuous for him. He would benefit from continued skilled PT to improve upon these impairments. Treatment/Activity Tolerance:  [] Patient tolerated treatment well [x] Patient limited by fatique  [] Patient limited by pain  [] Patient limited by other medical complications  [] Other:     Overall Progression Towards Functional goals/ Treatment Progress Update:  [] Patient is progressing as expected towards functional goals listed. [x] Progression is slowed due to complexities/Impairments listed. [] Progression has been slowed due to co-morbidities.   [] Plan just implemented, too soon to assess goals progression <30days   [] Goals require adjustment due to lack of progress  [] Patient is not progressing as expected and requires additional follow up with physician  [] Other    Prognosis for POC: [] Good [x] Fair  [] Poor    Patient requires continued skilled intervention: [x] Yes  [] No        PLAN:  Current Treatment Recommendations: Strengthening, Balance Training, Transfer Training, IADL Training, Endurance Training, Gait Training, Neuromuscular Re-education, Home Exercise Program, Patient/Caregiver Education & Training, Modalities, Positioning, Equipment Evaluation, Education, & procurement, Safety Education & Training, Functional Mobility Training, ADL/Self-care Training, ROM   [x] Continue per plan of care [] Alter current plan (see comments)  [] Plan of care initiated [] Hold pending MD visit [] Discharge    Electronically signed by:      Mango Alcantar, PT, DPT    Note: If patient does not return for scheduled/recommended follow up visits, this note will serve as a discharge from care along with the most recent update on progress.

## 2021-06-24 ENCOUNTER — HOSPITAL ENCOUNTER (OUTPATIENT)
Dept: PHYSICAL THERAPY | Age: 76
Setting detail: THERAPIES SERIES
Discharge: HOME | End: 2021-06-24
Payer: MEDICARE

## 2021-06-28 ENCOUNTER — APPOINTMENT (OUTPATIENT)
Dept: PHYSICAL THERAPY | Age: 76
End: 2021-06-28
Payer: MEDICARE

## 2021-06-28 ENCOUNTER — OFFICE VISIT (OUTPATIENT)
Dept: BARIATRICS/WEIGHT MGMT | Age: 76
End: 2021-06-28
Payer: MEDICARE

## 2021-06-28 VITALS — BODY MASS INDEX: 27.2 KG/M2 | WEIGHT: 190 LBS | HEIGHT: 70 IN

## 2021-06-28 DIAGNOSIS — T85.71XA INFECTION ASSOCIATED WITH PERITONEAL DIALYSIS CATHETER, INITIAL ENCOUNTER (HCC): Primary | ICD-10-CM

## 2021-06-28 PROCEDURE — G8417 CALC BMI ABV UP PARAM F/U: HCPCS | Performed by: SURGERY

## 2021-06-28 PROCEDURE — 3017F COLORECTAL CA SCREEN DOC REV: CPT | Performed by: SURGERY

## 2021-06-28 PROCEDURE — G8427 DOCREV CUR MEDS BY ELIG CLIN: HCPCS | Performed by: SURGERY

## 2021-06-28 PROCEDURE — 1036F TOBACCO NON-USER: CPT | Performed by: SURGERY

## 2021-06-28 PROCEDURE — 1123F ACP DISCUSS/DSCN MKR DOCD: CPT | Performed by: SURGERY

## 2021-06-28 PROCEDURE — 4040F PNEUMOC VAC/ADMIN/RCVD: CPT | Performed by: SURGERY

## 2021-06-28 PROCEDURE — 99214 OFFICE O/P EST MOD 30 MIN: CPT | Performed by: SURGERY

## 2021-06-28 NOTE — PROGRESS NOTES
[facility]) Physicians   General & Laparoscopic Surgery  [employer]       HPI:  Jose Guadalupe Ayala is a very pleasant 77 y.o. male who is referred by Dr. France Casey nephrologist for superficial cuff exposure and infection          Past Medical History:   Diagnosis Date    Allergic rhinitis due to other allergen 2010    Coronary atherosclerosis of unspecified type of vessel, native or graft 2010    Diabetic eye exam ([facility] [address] [facility] 2015    Diabetic eye exam ([facility] [address] 2016    [place] eye    Generalized osteoarthrosis, involving multiple sites 2010    Other psoriasis 2010    Pneumonia     Prolonged emergence from general anesthesia     Psoriatic arthropathy ([facility] [address] 2010    Type II or unspecified type diabetes mellitus without mention of complication, not stated as uncontrolled 2010    Unspecified essential hypertension 2010    Unspecified sleep apnea 2010     Past Surgical History:   Procedure Laterality Date    CARPAL TUNNEL RELEASE      s/p    CATARACT REMOVAL      CORONARY ARTERY BYPASS GRAFT      JOINT REPLACEMENT      LAPAROSCOPY INSERTION PERITONEAL CATHETER N/A 10/26/2020    LAPAROSCOPIC PERITONEAL DIALYSIS CATHETER PLACEMENT; LAPAROSCOPIC OMENTOPEXY performed by Missy Barkley DO at [address] OR     Family History   Problem Relation Age of Onset    Diabetes Mother     Heart Failure Mother     Coronary Art Dis Father      Social History     Tobacco Use    Smoking status: Former Smoker     Packs/day: 1.00     Years: 18.00     Pack years: 18.00     Start date: 1962     Quit date: 1980     Years since quittin.6    Smokeless tobacco: Never Used   Substance Use Topics    Alcohol use: No     I counseled the patient on the importance of not smoking and risks of ETOH.    Allergies   Allergen Reactions    Clindamycin     Clindamycin/Lincomycin     Penicillins     Tazorac [Tazarotene]      Cream - rash     Vitals:    21 1100   Weight: 190 lb (86.2 kg)   Height: 5' 10\" (1.778 m)       Body mass index is 27.26 kg/m². Current Outpatient Medications:     insulin lispro, 1 Unit Dial, 100 UNIT/ML SOPN, INJECT 12 TO 18 UNITS UNDER THE SKIN THREE TIMES A DAY BEFORE MEALS, Disp: 30 mL, Rfl: 2    atorvastatin (LIPITOR) 80 MG tablet, TAKE ONE TABLET BY MOUTH ONCE NIGHTLY, Disp: 30 tablet, Rfl: 2    Ferric Citrate (AURYXIA) 1  MG(Fe) TABS, Take by mouth, Disp: , Rfl:     torsemide (DEMADEX) 100 MG tablet, Take 100 mg by mouth daily, Disp: , Rfl:     Insulin Pen Needle (B-D ULTRAFINE III SHORT PEN) 31G X 8 MM MISC, USE WITH INSULIN FOUR TIMES A DAY, Disp: 200 each, Rfl: 5    Biotin 65768 MCG TABS, Take by mouth, Disp: , Rfl:     levothyroxine (SYNTHROID) 50 MCG tablet, TAKE ONE TABLET BY MOUTH DAILY, Disp: 90 tablet, Rfl: 8    metoprolol succinate (TOPROL XL) 25 MG extended release tablet, Take 1 tablet by mouth daily, Disp: 90 tablet, Rfl: 3    blood glucose test strips (ACCU-CHEK JOHNATHAN PLUS) strip, 4 times a day As needed. , Disp: 150 each, Rfl: 3    insulin glargine (LANTUS SOLOSTAR) 100 UNIT/ML injection pen, INJECT 26 UNITS UNDER THE SKIN EVERY NIGHT AT BEDTIME, Disp: 15 mL, Rfl: 3    clopidogrel (PLAVIX) 75 MG tablet, Take 1 tablet by mouth daily, Disp: 90 tablet, Rfl: 3    furosemide (LASIX) 20 MG tablet, Take 1 tablet by mouth daily, Disp: 30 tablet, Rfl: 5    Blood Glucose Monitoring Suppl (ACCU-CHEK JOHNATHAN PLUS) w/Device KIT, As needed, Disp: 1 kit, Rfl: 2    lidocaine 4 % external patch, Place 1 patch onto the skin daily, Disp: 1 box, Rfl: 1    ACCU-CHEK JOHNATHAN PLUS strip, USE TO TEST FOUR TIMES DAILY AS NEEDED, Disp: 100 strip, Rfl: 4    Handicap Placard MISC, by Does not apply route Duration: 5 years, Disp: 1 each, Rfl: 0    ACCU-CHEK SOFTCLIX LANCETS MISC, USE FOUR TIMES A DAY, Disp: 200 each, Rfl: 3    glucose blood VI test strips (ACCU-CHEK JOHNATHAN) strip, 4 times a day As needed. , Disp: 150 each, Rfl: 3    Blood Glucose Monitoring Suppl ([facility]) RUMA, Use as needed, Disp: 1 Device, Rfl: 0    aspirin 81 MG chewable tablet, Take 1 tablet by mouth daily. , Disp: 30 tablet, Rfl: 5    Lancets MISC, 2 times daily. Lancets for a Anna meter. , Disp: 50 each, Rfl: 11    triamcinolone (KENALOG) 0.1 % ointment, Apply topically 2 times daily. , Disp: 1 Tube, Rfl: 2    ROS  Review of Systems - History obtained from the patient  General ROS: negative  Psychological ROS: negative  Allergy and Immunology ROS: negative  Hematological and Lymphatic ROS: negative  Endocrine ROS: negative  Respiratory ROS: negative  Cardiovascular ROS: negative  Gastrointestinal ROS:negative  Genito-Urinary ROS: negative  Musculoskeletal ROS: negative   Skin ROS:Cuff exposure        Physical Exam   Constitutional: Patient is oriented to person, place, and time. Vital signs are normal. Patient  appears well-developed and well-nourished. Patient  is active and cooperative. Non-toxic appearance. No distress. HNeck: Trachea normal and normal range of motion. Neck supple. No JVD present. No tracheal tenderness present. Carotid bruit is not present. No rigidity. No tracheal deviation and no edema present. No thyromegaly present. Cardiovascular: Normal rate, regular rhythm, normal heart sounds, intact distal pulses and normal pulses. Pulmonary/Chest: Effort normal and breath sounds normal. No stridor. No respiratory distress. Patient  has no wheezes. Patient has no rales. Patient exhibits no tenderness and no crepitus. Abdominal: Soft. Normal appearance and bowel sounds are normal. Patient exhibits no distension, no abdominal bruit, no ascites and no mass. There is no hepatosplenomegaly. There is no tenderness. There is no rigidity, no rebound, no guarding and no CVA tenderness. +Superficial ciff partially exposed, no drainage, erythema   Musculoskeletal: Normal range of motion. Patient exhibits no edema or tenderness.    Neurological: Patient is

## 2021-07-01 ENCOUNTER — HOSPITAL ENCOUNTER (OUTPATIENT)
Dept: PHYSICAL THERAPY | Age: 76
Setting detail: THERAPIES SERIES
Discharge: HOME | End: 2021-07-01
Payer: MEDICARE

## 2021-07-01 PROCEDURE — 97110 THERAPEUTIC EXERCISES: CPT

## 2021-07-01 NOTE — PROGRESS NOTES
"Pt has attended [facility] sessions. He engaged quickly in group. Pt processed high cravings for ETOH and was then prescribed Campral for cravings by Dr. Jonn MORSE psychiatrist managing MAT. Pt has AA sponsor and home group Pt has \"no\" SI.  " Paige Martinez MD   atorvastatin (LIPITOR) 40 MG tablet TAKE ONE TABLET BY MOUTH ONCE NIGHTLY 8/22/17  Yes Derek Madera MD   HUMALOG KWIKPEN 100 UNIT/ML pen INJECT 32 UNITS INTO THE SKIN 3 TIMES A DAY BEFORE MEALS  Patient taking differently: INJECT 14 UNITS INTO THE SKIN 3 TIMES A DAY BEFORE MEALS 7/25/17  Yes Martha Chung MD   ACCU-CHEK MARTHA PLUS strip TEST FOUR TIMES A DAY AS NEEDED 5/31/17  Yes Martha Chung MD   furosemide (LASIX) 40 MG tablet Take 0.5 tablets by mouth daily 4/4/17  Yes Brad Ku MD   glucose blood VI test strips (ACCU-CHEK MARTHA) strip 4 times a day As needed. 11/21/16  Yes Martha Chung MD   Blood Glucose Monitoring Suppl (ACCU-CHEK MARTHA) RUMA Use as needed 11/21/16  Yes Martha Chung MD   leflunomide (ARAVA) 10 MG tablet Take 10 mg by mouth daily   Yes Historical Provider, MD   sulfaSALAzine (AZULFIDINE) 500 MG tablet Take 500 mg by mouth 3 times daily   Yes Historical Provider, MD   aspirin 81 MG chewable tablet Take 1 tablet by mouth daily. 3/28/12  Yes Blane Ross MD   Lancets MISC 2 times daily. Lancets for a [place] meter. 7/16/10  Yes Paige Martinez MD        Allergies:  Clindamycin; Clindamycin/lincomycin; Penicillins; and Tazorac [tazarotene]     ROS:   Review of Systems   Constitutional: Negative. HENT: Negative. Respiratory: Positive for cough. Negative for shortness of breath and wheezing. Cardiovascular: Negative. Gastrointestinal: Negative. Genitourinary: Negative. Musculoskeletal: Negative. Skin: Negative. Neurological: Negative. Hematological: Negative. Psychiatric/Behavioral: Negative. Physical Examination:    Vitals:    01/23/18 1036   BP: 124/68   Pulse: 91   Weight: 194 lb (88 kg)           Physical Exam   Constitutional: He is oriented to person, place, and time. He appears well-developed and well-nourished. HENT:   Head: Normocephalic and atraumatic.    Eyes: Conjunctivae are normal. Pupils are

## 2021-07-01 NOTE — PROGRESS NOTES
assessed:  3/24/2021  LEFS:  21/80 (74% impaired)      Date assessed:  5/27/2021  Ninette Lesch 41/56   Date assessed 5/27/21  TUG 12.5 sec Date assessed 5/27/21    /75 SpO2 97% HR 84 bpm in sitting 6/10/21    6/24/21  BP: 110/75 SpO2 95% in sitting after walking back to gym   /70 SpO2 96% after sitting for 5 minutes  /70 in standing     Exercises/Interventions: Exercises in bold performed in department today. Items not bolded are carried forward from prior visits for continuity of the record. Exercise/Equipment Resistance/Repetitions HEP Other comments   NuStep 6 min  Resist 6, Seat 13, Arms 11  [] He still requires 2-3 minutes rest before getting up from bike   Seated Glute Squeeze 20 x  [] Verbal cues for upright posture, pt fatigues into slumped posture quickly   Seated Hip Add Squeeze (ball between knees) 20 x  [] Verbal cues for upright posture, pt fatigues into slumped posture quickly   LAQ 20 x each leg, 2# ankle weight [x]    Sit-to-Stand 10x 2   [x] UE support on thighs   Hamstring Stretch (seated off side of table, leg outstretched) 3 x 30\" hold [x]    Piriformis Stretch (seated, figure 4 position) 3 x 30\" hold []      []    Standing Hip Abd 20 x bilat [] Modified to sitting today   Heel-toe Raises 20 x  [] In sitting with ankle weights   Mini-squats 20 x  [] Cues for upright posture and to slow speed for improved contraction   High Marches 20 x [x] Cues for upright posture, resumed 2.5# ankle weights,    Balance: tandem without UE support, SLS with fingertip support 3 x 30 sec ea [] SPV needed throughout. LLE more challenging     []      []      []      []    Therapeutic activities: Agnes REGALADO, TUG 30 minutes []      Home Exercise Program:     Access Code: [email or web address]: [email or web address]  Date: 03/24/2021  Prepared by: Nic Hooker  Exercises   Sitting Knee Extension with Resistance - 1 x daily - 3-5 x weekly - 1-3 sets - 10 reps   Seated Knee Extension Stretch with Chair - 1 x daily - 7 x weekly - 1 sets - 30 hold   Seated Hamstring Curls with Resistance - 1 x daily - 3-5 x weekly - 1-3 sets - 10 reps   Seated Hip Abduction - 1 x daily - 3-5 x weekly - 1-3 sets - 10 reps   Seated March - 1 x daily - 3-5 x weekly - 1-3 sets - 10 reps   Sit to Stand with Counter Support - 1 x daily - 7 x weekly - 1-2 sets - 10 reps. Access Code: [email or web address]: [email or web address]  Date: 04/12/2021  Prepared by: Joao Pereyra  Exercises   Sitting Knee Extension with Resistance - 1 x daily - 3 x weekly - 2-3 sets - 10-20 reps   Seated Hamstring Curls with Resistance - 1 x daily - 3 x weekly - 2-3 sets - 10-20 reps   Seated March with Resistance - 1 x daily - 3 x weekly - 2-3 sets - 10-20 reps   Seated Hip Abduction with Resistance - 1 x daily - 3 x weekly - 2-3 sets - 10-20 reps       Therapeutic Exercise and NMR:  [x] (15597) Provided verbal/tactile cueing for activities related to strengthening, flexibility, endurance, ROM for improvements in LE, proximal hip, and core control with self-care, mobility, lifting, ambulation.  [] (49789) Provided verbal/tactile cueing for activities related to improving balance, coordination, kinesthetic sense, posture, motor skill, proprioception to assist with LE, proximal hip, and core control in self-care, mobility, lifting, ambulation and eccentric single leg control.      Gait Training and Therapeutic Activities:    [] (98003) Provided verbal/tactile cueing for activities related to improving balance, coordination, kinesthetic sense, posture, motor skill, proprioception and motor activation to allow for proper function of core, proximal hip and LE with self-care and ADLs and functional mobility.   [] (51447) Provided training and instruction to the patient for proper LE, core and proximal hip recruitment and positioning and eccentric body weight control with ambulation re-education including up and down stairs     Home Exercise Program:    [] (37076) Reviewed/Progressed HEP activities related to strengthening, flexibility, endurance, ROM of core, proximal hip and LE for functional self-care, mobility, lifting and ambulation/stair navigation   [] (96792) Reviewed/Progressed HEP activities related to improving balance, coordination, kinesthetic sense, posture, motor skill, proprioception of core, proximal hip and LE for self-care, mobility, lifting, and ambulation/stair navigation      Manual Treatments:  PROM / STM / Oscillations-Mobs:  G-I, II, III, IV (PA's, Inf., Post.)  [] (19002) Provided manual therapy to mobilize LE, proximal hip and/or LS spine soft tissue/joints for the purpose of modulating pain, promoting relaxation,  increasing ROM, reducing/eliminating soft tissue swelling/inflammation/restriction, improving soft tissue extensibility and allowing for proper ROM for normal function with self-care, mobility, lifting and ambulation.      Modalities:    [] Electric Stimulation:   [] Ultrasound:   [] Other:   Charges:  Timed Code Treatment Minutes: 3 TE 40   Total Treatment Minutes: 40      [] RE-EVAL     [x] CC(34097) x  3    [] NMR (18424) x       [] Manual (49993) x       [] TA (00000) x 2      [] Gait Training (77593) x       [] ES(attended) (17623)  [] ES (un) (22 716596)   [] DRY NEEDLE 1 OR 2 MUSCLES  [] DRY NEEDLE 3+ MUSCLES  [] Mech Traction (21981)  [] Ultrasound (09855)  [] Other:    GOALS:  Short term goals  Time Frame for Short term goals: 4 weeks  Short term goal 1: Pt to reduce fall risk and impairment per Tinetti by inc score from 8 to >/=16  - MET  Short term goal 2: Pt to improve TUG to reduce fall risk and improve gait speed from 17s to less than 12 seconds-MET  Short term goal 3: Pt to illustrate strength gains by bia 45-60 min PT treatment sessions and ability to perform sit to stand with min use of UE safely 75% or greater of the time-MET  Short term goal 4: Pt to improve hamstring lengh exhibited with forward reach to less than 3 inches from toes for improving flexiblity in LEs- MET  Short term goal 5: indep to supervision with progressive HEP to max rehab potential- MET  Long term goals  Time Frame for Long term goals : 8 weeks  Long term goal 1: Pt to reduce impairment and fall risk by inc score per Tinetti to >/=22 -met 4/29  Long term goal 2: Pt to ambulate household distances and situations modified indep to indep -progressing  Long term goal 3: Pt to reduce impairment and fall risk by inc score per Ki Bhandari to 45/56 to demonstrate less risk of falls. Not met       Assessment: He presented with more energy than last 2-3 previous visits. Gradually resumed exercises with seated rest breaks as needed today. He was sore from a procedure earlier this week. No significant progress since last progress note d/t other health concerns and excess fatigue. He still needs to work on endurance and functional balance. He would benefit from continued skilled PT to improve upon these impairments. Treatment/Activity Tolerance:  [] Patient tolerated treatment well [x] Patient limited by fatique  [] Patient limited by pain  [] Patient limited by other medical complications  [] Other:     Overall Progression Towards Functional goals/ Treatment Progress Update:  [] Patient is progressing as expected towards functional goals listed. [x] Progression is slowed due to complexities/Impairments listed. [] Progression has been slowed due to co-morbidities.   [] Plan just implemented, too soon to assess goals progression <30days   [] Goals require adjustment due to lack of progress  [] Patient is not progressing as expected and requires additional follow up with physician  [] Other    Prognosis for POC: [] Good [x] Fair  [] Poor    Patient requires continued skilled intervention: [x] Yes  [] No        PLAN:  Current Treatment Recommendations: Strengthening, Balance Training, Transfer Training, IADL Training, Endurance Training, Gait Training, Neuromuscular Re-education, Home Exercise Program, Patient/Caregiver Education & Training, Modalities, Positioning, Equipment Evaluation, Education, & procurement, Safety Education & Training, Functional Mobility Training, ADL/Self-care Training, ROM   [x] Continue per plan of care [] Alter current plan (see comments)  [] Plan of care initiated [] Hold pending MD visit [] Discharge    Electronically signed by:      Leonel Dela Cruz, PT, DPT    Note: If patient does not return for scheduled/recommended follow up visits, this note will serve as a discharge from care along with the most recent update on progress.

## 2021-07-08 ENCOUNTER — OFFICE VISIT (OUTPATIENT)
Dept: BARIATRICS/WEIGHT MGMT | Age: 76
End: 2021-07-08
Payer: MEDICARE

## 2021-07-08 ENCOUNTER — HOSPITAL ENCOUNTER (OUTPATIENT)
Dept: PHYSICAL THERAPY | Age: 76
Setting detail: THERAPIES SERIES
Discharge: HOME | End: 2021-07-08
Payer: MEDICARE

## 2021-07-08 VITALS — WEIGHT: 192.6 LBS | DIASTOLIC BLOOD PRESSURE: 60 MMHG | SYSTOLIC BLOOD PRESSURE: 143 MMHG | BODY MASS INDEX: 27.64 KG/M2

## 2021-07-08 DIAGNOSIS — T85.611S: Primary | ICD-10-CM

## 2021-07-08 PROCEDURE — 99213 OFFICE O/P EST LOW 20 MIN: CPT | Performed by: SURGERY

## 2021-07-08 PROCEDURE — 3017F COLORECTAL CA SCREEN DOC REV: CPT | Performed by: SURGERY

## 2021-07-08 PROCEDURE — 1123F ACP DISCUSS/DSCN MKR DOCD: CPT | Performed by: SURGERY

## 2021-07-08 PROCEDURE — 4040F PNEUMOC VAC/ADMIN/RCVD: CPT | Performed by: SURGERY

## 2021-07-08 PROCEDURE — G8427 DOCREV CUR MEDS BY ELIG CLIN: HCPCS | Performed by: SURGERY

## 2021-07-08 PROCEDURE — G8417 CALC BMI ABV UP PARAM F/U: HCPCS | Performed by: SURGERY

## 2021-07-08 PROCEDURE — 1036F TOBACCO NON-USER: CPT | Performed by: SURGERY

## 2021-07-08 NOTE — CARE COORDINATION
Physical Therapy  Cancellation/No-show Note  Patient Name:  Saurabh Shirley  :  1945   Date:  2021  MRN: [id number]  Cancelled visits to date: 2  No-shows to date: 0    For today's appointment patient:  [x]  Cancelled  []  Rescheduled appointment  []  No-show     Reason given by patient:  []  Patient ill  []  Conflicting appointment  []  No transportation    []  Conflict with work  []  No reason given  [x]  Other:     Comments:  Pt had a procedure earlier today.     Electronically signed by:  Nash Camejo PT, DPT

## 2021-07-08 NOTE — PROGRESS NOTES
[facility]) Physicians   General & Laparoscopic Surgery  [employer]       HPI:  Valerie Ridley is a very pleasant 77 y.o. male who is here for f/u for exposed PD cuff    No new symptoms or drainage    No fevers        Past Medical History:   Diagnosis Date    Allergic rhinitis due to other allergen 2010    Coronary atherosclerosis of unspecified type of vessel, native or graft 2010    Diabetic eye exam ([facility] [address] 2015    Diabetic eye exam ([facility] [address] 2016    [place] [facility]    Generalized osteoarthrosis, involving multiple sites 2010    Other psoriasis 2010    Pneumonia     Prolonged emergence from general anesthesia     Psoriatic arthropathy ([facility] [address] 2010    Type II or unspecified type diabetes mellitus without mention of complication, not stated as uncontrolled 2010    Unspecified essential hypertension 2010    Unspecified sleep apnea 2010     Past Surgical History:   Procedure Laterality Date    CARPAL TUNNEL RELEASE      s/p    CATARACT REMOVAL      CORONARY ARTERY BYPASS GRAFT      JOINT REPLACEMENT      LAPAROSCOPY INSERTION PERITONEAL CATHETER N/A 10/26/2020    LAPAROSCOPIC PERITONEAL DIALYSIS CATHETER PLACEMENT; LAPAROSCOPIC OMENTOPEXY performed by Karolina Sanchez DO at [facility] OR     Family History   Problem Relation Age of Onset    Diabetes Mother     Heart Failure Mother     Coronary Art Dis Father      Social History     Tobacco Use    Smoking status: Former Smoker     Packs/day: 1.00     Years: 18.00     Pack years: 18.00     Start date: 1962     Quit date: 1980     Years since quittin.7    Smokeless tobacco: Never Used   Substance Use Topics    Alcohol use: No     I counseled the patient on the importance of not smoking and risks of ETOH.    Allergies   Allergen Reactions    Clindamycin     Clindamycin/Lincomycin     Penicillins     Tazorac [Tazarotene]      Cream - rash     Vitals:    21 0855   BP: (!) 143/60 Site: Left Wrist   Position: Sitting   Weight: 192 lb 9.6 oz (87.4 kg)       Body mass index is 27.64 kg/m². Current Outpatient Medications:     insulin lispro, 1 Unit Dial, 100 UNIT/ML SOPN, INJECT 12 TO 18 UNITS UNDER THE SKIN THREE TIMES A DAY BEFORE MEALS, Disp: 30 mL, Rfl: 2    atorvastatin (LIPITOR) 80 MG tablet, TAKE ONE TABLET BY MOUTH ONCE NIGHTLY, Disp: 30 tablet, Rfl: 2    Ferric Citrate (AURYXIA) 1  MG(Fe) TABS, Take by mouth, Disp: , Rfl:     torsemide (DEMADEX) 100 MG tablet, Take 100 mg by mouth daily, Disp: , Rfl:     Insulin Pen Needle (B-D ULTRAFINE III SHORT PEN) 31G X 8 MM MISC, USE WITH INSULIN FOUR TIMES A DAY, Disp: 200 each, Rfl: 5    Biotin 40611 MCG TABS, Take by mouth, Disp: , Rfl:     levothyroxine (SYNTHROID) 50 MCG tablet, TAKE ONE TABLET BY MOUTH DAILY, Disp: 90 tablet, Rfl: 8    metoprolol succinate (TOPROL XL) 25 MG extended release tablet, Take 1 tablet by mouth daily, Disp: 90 tablet, Rfl: 3    blood glucose test strips (ACCU-CHEK JOHNATHAN PLUS) strip, 4 times a day As needed. , Disp: 150 each, Rfl: 3    insulin glargine (LANTUS SOLOSTAR) 100 UNIT/ML injection pen, INJECT 26 UNITS UNDER THE SKIN EVERY NIGHT AT BEDTIME, Disp: 15 mL, Rfl: 3    clopidogrel (PLAVIX) 75 MG tablet, Take 1 tablet by mouth daily, Disp: 90 tablet, Rfl: 3    furosemide (LASIX) 20 MG tablet, Take 1 tablet by mouth daily, Disp: 30 tablet, Rfl: 5    Blood Glucose Monitoring Suppl (ACCU-CHEK JOHNATHAN PLUS) w/Device KIT, As needed, Disp: 1 kit, Rfl: 2    lidocaine 4 % external patch, Place 1 patch onto the skin daily, Disp: 1 box, Rfl: 1    ACCU-CHEK JOHNATHAN PLUS strip, USE TO TEST FOUR TIMES DAILY AS NEEDED, Disp: 100 strip, Rfl: 4    Handicap Placard MISC, by Does not apply route Duration: 5 years, Disp: 1 each, Rfl: 0    ACCU-CHEK SOFTCLIX LANCETS MISC, USE FOUR TIMES A DAY, Disp: 200 each, Rfl: 3    glucose blood VI test strips (ACCU-CHEK JOHNATHAN) strip, 4 times a day As needed. , Disp: 150 each, Rfl: 3    Blood Glucose Monitoring Suppl (ACCU-CHEK JOHNATHAN) RUMA, Use as needed, Disp: 1 Device, Rfl: 0    aspirin 81 MG chewable tablet, Take 1 tablet by mouth daily. , Disp: 30 tablet, Rfl: 5    Lancets MISC, 2 times daily. Lancets for a Anna meter. , Disp: 50 each, Rfl: 11    triamcinolone (KENALOG) 0.1 % ointment, Apply topically 2 times daily. , Disp: 1 Tube, Rfl: 2    ROS  Review of Systems - History obtained from the patient  General ROS: negative  Respiratory ROS: negative  Cardiovascular ROS: negative  Gastrointestinal ROS:negative  Genito-Urinary ROS: negative  Musculoskeletal ROS: negative   Skin ROS:erythema        Physical Exam   Constitutional: Patient is oriented to person, place, and time. Vital signs are normal. Patient  appears well-developed and well-nourished. Patient  is active and cooperative. Non-toxic appearance. No distress. Pulmonary/Chest: Effort normal and breath sounds normal. No stridor. No respiratory distress. Patient  has no wheezes. Patient has no rales. Patient exhibits no tenderness and no crepitus. Abdominal: Soft. Normal appearance and bowel sounds are normal. Patient exhibits no distension, no abdominal bruit, no ascites and no mass. There is no hepatosplenomegaly. There is no tenderness. There is no rigidity, no rebound, no guarding and no CVA tenderness. No hernia. Hernia confirmed negative in the ventral area. Musculoskeletal: Normal range of motion. Patient exhibits no edema or tenderness. Neurological: Patient is alert and oriented to person, place, and time. Patient has normal strength. Coordination and gait normal. GCS eye subscore is 4. GCS verbal subscore is 5. GCS motor subscore is 6. Skin: Exposed cuff  Psychiatric: Patient has a normal mood and affect.   speech is normal and behavior is normal. Cognition and memory are normal.       Further cuff shaving performed under sterile technique    Will re-eval in a few weeks     Instructed to be off

## 2021-07-12 ENCOUNTER — HOSPITAL ENCOUNTER (OUTPATIENT)
Dept: PHYSICAL THERAPY | Age: 76
Setting detail: THERAPIES SERIES
Discharge: HOME | End: 2021-07-12
Payer: MEDICARE

## 2021-07-12 PROCEDURE — 97110 THERAPEUTIC EXERCISES: CPT

## 2021-07-12 NOTE — FLOWSHEET NOTE
[facility]  [address] [place] [address], AK[address], [address]  Phone: [phone]   Fax: [phone]    Physical Therapy Treatment Note/ Progress Report:     Date:  2021    Patient Name:  Fanny Caldwell  \"Ilya\"  :  1945  MRN: [id number]    Medical/Treatment Diagnosis Information:  · Diagnosis: R53.81 (ICD-10-CM) - Other malaise, Debility and Deconditioning R53.81  · Treatment Diagnosis: balance problems gait distrubance  Insurance/Certification information:  PT Insurance Information: [facility] Medicare  Physician Information:  Referring Practitioner: Britt Swenson MD  Plan of care signed:    [x] Yes  [] No    Date of Patient follow up with Physician:       Progress Report: [x]  Yes  []  No     Date Range for reporting period:  Beginning:  3/24/2021  Ending:    Progress report due (10 Rx/or 30 days whichever is less):      Recertification due (POC duration/ or 90 days whichever is less):    Visit # Insurance Allowable Auth Needed    []Yes   [x]No     RESTRICTIONS/PRECAUTIONS:   Latex Allergy:  [x]NO      []YES  Preferred Language for Healthcare:   [x]English       []other:    Pain level:  0/10     SUBJECTIVE:   Pt reports he feels tired all the time, no energy. Had to cancel last week d/t another procedure with his dialysis port. He overall feels stronger, especially in his legs, but his endurance is still the biggest problem. OBJECTIVE:     Observation: Pt arrives with RW. He reports that he does not use an AD at home, only when he is in the community. Pt requiring occasional extended seated rest breaks after repeated sit to/from stand and standing therex this date.     Test measurements:     TUG (without walker) 21:    13 seconds attempt 1    11 seconds attempt 2     Functional Scale:     LEFS:   (70% impaired)      Date assessed:  3/24/2021  LEFS:   (74% impaired)      Date assessed:  2021  Kami Poles 41/56   Date assessed 5/27/21  TUG 12.5 sec Date assessed 5/27/21    /75 SpO2 97% HR 84 bpm in sitting 6/10/21    6/24/21  BP: 110/75 SpO2 95% in sitting after walking back to gym   /70 SpO2 96% after sitting for 5 minutes  /70 in standing     Exercises/Interventions: Exercises in bold performed in department today. Items not bolded are carried forward from prior visits for continuity of the record. Exercise/Equipment Resistance/Repetitions HEP Other comments   NuStep 4 min x 2  Resist 6, Seat 13, Arms 11  [] Rest 1 minute in between, rest 2 minutes before getting up   Seated Glute Squeeze 20 x  [] Verbal cues for upright posture, pt fatigues into slumped posture quickly   Seated Hip Add Squeeze (ball between knees) 20 x  [] Verbal cues for upright posture, pt fatigues into slumped posture quickly   LAQ 20 x each leg, 2# ankle weight [x]    Sit-to-Stand 10x 2   [x] UE support on thighs   Hamstring Stretch (seated off side of table, leg outstretched) 3 x 30\" hold [x]    Piriformis Stretch (seated, figure 4 position) 3 x 30\" hold []      []    Standing Hip Abd 20 x bilat [] Modified to sitting today   Heel-toe Raises 20 x  [] In sitting with ankle weights   Mini-squats 20 x  [] Cues for upright posture and to slow speed for improved contraction   High Marches 20 x [x] Cues for upright posture, resumed 2.5# ankle weights,    Balance: tandem without UE support, SLS with fingertip support 3 x 30 sec ea [] SPV needed throughout. LLE more challenging     []      []      []      []    Therapeutic activities: Agnes REGALADO, TUG 30 minutes []      Home Exercise Program:     Access Code: [email or web address]: [email or web address]  Date: 03/24/2021  Prepared by: Malena Weiss  Exercises   Sitting Knee Extension with Resistance - 1 x daily - 3-5 x weekly - 1-3 sets - 10 reps   Seated Knee Extension Stretch with Chair - 1 x daily - 7 x weekly - 1 sets - 30 hold   Seated Hamstring Curls with Resistance - 1 x daily - 3-5 x weekly - 1-3 functional self-care, mobility, lifting and ambulation/stair navigation   [] (61293) Reviewed/Progressed HEP activities related to improving balance, coordination, kinesthetic sense, posture, motor skill, proprioception of core, proximal hip and LE for self-care, mobility, lifting, and ambulation/stair navigation      Manual Treatments:  PROM / STM / Oscillations-Mobs:  G-I, II, III, IV (PA's, Inf., Post.)  [] (86815) Provided manual therapy to mobilize LE, proximal hip and/or LS spine soft tissue/joints for the purpose of modulating pain, promoting relaxation,  increasing ROM, reducing/eliminating soft tissue swelling/inflammation/restriction, improving soft tissue extensibility and allowing for proper ROM for normal function with self-care, mobility, lifting and ambulation.      Modalities:    [] Electric Stimulation:   [] Ultrasound:   [] Other:   Charges:  Timed Code Treatment Minutes: 3 TE 42   Total Treatment Minutes: 42      [] RE-EVAL     [x] VZ(99204) x 3    [] NMR (34671) x       [] Manual (67785) x       [] TA (04811) x 2      [] Gait Training (08036) x       [] ES(attended) (74828)  [] ES (un) (22 366880)   [] DRY NEEDLE 1 OR 2 MUSCLES  [] DRY NEEDLE 3+ MUSCLES  [] Mech Traction (10243)  [] Ultrasound (83849)  [] Other:    GOALS:  Short term goals  Time Frame for Short term goals: 4 weeks  Short term goal 1: Pt to reduce fall risk and impairment per Tinetti by inc score from 8 to >/=16  - MET  Short term goal 2: Pt to improve TUG to reduce fall risk and improve gait speed from 17s to less than 12 seconds-MET  Short term goal 3: Pt to illustrate strength gains by bia 45-60 min PT treatment sessions and ability to perform sit to stand with min use of UE safely 75% or greater of the time-MET  Short term goal 4: Pt to improve hamstring lengh exhibited with forward reach to less than 3 inches from toes for improving flexiblity in LEs- MET  Short term goal 5: indep to supervision with progressive HEP to max rehab potential- MET  Long term goals  Time Frame for Long term goals : 8 weeks  Long term goal 1: Pt to reduce impairment and fall risk by inc score per Tinetti to >/=22 -met 4/29  Long term goal 2: Pt to ambulate household distances and situations modified indep to indep -progressing  Long term goal 3: Pt to reduce impairment and fall risk by inc score per Nacho Clamp to 45/56 to demonstrate less risk of falls. Not met       Assessment: Focused on endurance today. On nustep, performed 2 sessions of 4 minutes each to increase time on it. He had some shortness of breath and needed time to recover. He still needs to work on endurance and functional balance. He would benefit from continued skilled PT to improve upon these impairments. Treatment/Activity Tolerance:  [x] Patient tolerated treatment well [] Patient limited by fatique  [] Patient limited by pain  [] Patient limited by other medical complications  [] Other:     Overall Progression Towards Functional goals/ Treatment Progress Update:  [] Patient is progressing as expected towards functional goals listed. [x] Progression is slowed due to complexities/Impairments listed. [] Progression has been slowed due to co-morbidities.   [] Plan just implemented, too soon to assess goals progression <30days   [] Goals require adjustment due to lack of progress  [] Patient is not progressing as expected and requires additional follow up with physician  [] Other    Prognosis for POC: [] Good [x] Fair  [] Poor    Patient requires continued skilled intervention: [x] Yes  [] No        PLAN:  Current Treatment Recommendations: Strengthening, Balance Training, Transfer Training, IADL Training, Endurance Training, Gait Training, Neuromuscular Re-education, Home Exercise Program, Patient/Caregiver Education & Training, Modalities, Positioning, Equipment Evaluation, Education, & procurement, Safety Education & Training, Functional Mobility Training, ADL/Self-care Training, ROM   [x] Continue per plan of care [] Alter current plan (see comments)  [] Plan of care initiated [] Hold pending MD visit [] Discharge    Electronically signed by:      Leonel Dela Cruz, PT, DPT    Note: If patient does not return for scheduled/recommended follow up visits, this note will serve as a discharge from care along with the most recent update on progress.

## 2021-07-15 ENCOUNTER — HOSPITAL ENCOUNTER (OUTPATIENT)
Dept: PHYSICAL THERAPY | Age: 76
Setting detail: THERAPIES SERIES
Discharge: HOME | End: 2021-07-15
Payer: MEDICARE

## 2021-07-15 PROCEDURE — 97110 THERAPEUTIC EXERCISES: CPT

## 2021-07-19 ENCOUNTER — APPOINTMENT (OUTPATIENT)
Dept: PHYSICAL THERAPY | Age: 76
End: 2021-07-19
Payer: MEDICARE

## 2021-07-22 ENCOUNTER — HOSPITAL ENCOUNTER (OUTPATIENT)
Dept: PHYSICAL THERAPY | Age: 76
Setting detail: THERAPIES SERIES
Discharge: HOME | End: 2021-07-22
Payer: MEDICARE

## 2021-07-22 PROCEDURE — 97110 THERAPEUTIC EXERCISES: CPT

## 2021-07-22 PROCEDURE — 97116 GAIT TRAINING THERAPY: CPT

## 2021-07-22 NOTE — FLOWSHEET NOTE
[facility]  [address], [address] [facility], [address] [place] [address] Av  Phone: [phone]   Fax: [phone]    Physical Therapy Treatment/Discharge note  Date:  2021    Patient Name:  Michael Dupree  \"Ilya\"  :  1945  MRN: [id number]    Medical/Treatment Diagnosis Information:  · Diagnosis: R53.81 (ICD-10-CM) - Other malaise, Debility and Deconditioning R53.81  · Treatment Diagnosis: balance problems gait distrubance  Insurance/Certification information:  PT Insurance Information: [facility]  Physician Information:  Referring Practitioner: Britni Wallace MD  Plan of care signed:    [x] Yes  [] No    Date of Patient follow up with Physician:       Progress Report: [x]  Yes  []  No     Date Range for reporting period:  Beginning:  3/24/2021  Ending:    Progress report due (10 Rx/or 30 days whichever is less):  05    Recertification due (POC duration/ or 90 days whichever is less):    Visit # Insurance Allowable Auth Needed   24 24 []Yes   [x]No     RESTRICTIONS/PRECAUTIONS:   Latex Allergy:  [x]NO      []YES  Preferred Language for Healthcare:   [x]English       []other:    Pain level:  0/10     SUBJECTIVE:   Pt comes to therapy with wife using the RW. Wife states in the past month she has noticed gradual decline pt's motivation and decline in indep, and worsening balance. Pt agrees. Pt also admits he has not been compliant with exs like instructed, overall feels he is not getting any better at this point. Pt reports he feels stronger but still has significant lose of endurance which impact him with daily routines and feels this has not changed much since starting PT in 2021. Pt and wife agreeable to D/C to day with HEP     OBJECTIVE:     Observation: Pt arrives with RW. He reports that he does not use an AD at home, only when he is in the community. Pt requiring frequent extended seated rest breaks after gait training transfers.  Ongoing safety cues and posture and positional cues throughout session does not seem to have had much retention of taught skills. Tends to push up from RW with sit to stands, misjudges seat and requires cues for safety and positioning, noses over toes mechanics/posturing with transfers also required. Test measurements:     TUG (without walker) 4/21/21:    13 seconds attempt 1    11 seconds attempt 2     Functional Scale:     LEFS:  24/80 (70% impaired)      Date assessed:  3/24/2021  LEFS:  21/80 (74% impaired)      Date assessed:  5/27/2021  Doris Bear 41/56   Date assessed 5/27/21  TUG 12.5 sec Date assessed 5/27/21  TUG 15s, 13s without walker, 1 LOB deviates from straight path , with walker 15s indep   PRATT score 18 inc impairment    Exercises/Interventions: Exercises in bold performed in department today. Items not bolded are carried forward from prior visits for continuity of the record. Exercise/Equipment Resistance/Repetitions HEP Other comments   NuStep 4 min x 3  Resist 6, Seat 13, Arms 11  [] Rest 1 minute in between, rest 4 minutes before getting up   Seated Glute Squeeze 20 x  [] Verbal cues for upright posture, pt fatigues into slumped posture quickly   Seated Hip Add Squeeze (ball between knees) 20 x  [] Verbal cues for upright posture, pt fatigues into slumped posture quickly   LAQ 20 x each leg, 2# ankle weight [x]    Sit-to-Stand 10x 2   [x] Unable to do without UE- Unable to do 10 reps today.  HELD   EX:Hamstring Stretch (seated off side of table, leg outstretched) 3 x 30\" hold [x]    EX: Piriformis Stretch (seated, figure 4 position) 3 x 30\" hold []      []    Standing Hip Abd 20 x bilat [] Modified to sitting today   Heel-toe Raises 20 x  [] In sitting with ankle weights   Mini-squats 20 x  [] Cues for upright posture and to slow speed for improved contraction   High Marches 20 x [x] Cues for upright posture, resumed 2.5# ankle weights,    EX:   Balance: tandem without UE support, SLS with fingertip support 3 x 30 sec ea []    Gait    Sit to stand 24 s        Functional transfers with and without walker, latearl transfers                TUG    Stairs                                   Gait RW lateral gazing, directional changes for improving balance and endurance   5, CGA to SBA            CGA to Jacques                15s, 13 s,     4 steps x 3 steps to simulate home enviroment- pt uses step to pattern, cues for foot placement- first set pt walking up with reciprocal pattern then walked backwards down the stairs- to illustrate poor safety awareness  PT demo'd while pt rest and then completed remaining sets with SBA step to pattern more safely but with mod use of UE  150-200ftx 2, 75ft x 3             []           Varied assist level needed due to initial standing balance, tends to posterior fall back, heavily relies on the back of stabilized chair, pt also deviates from straight path. Required frequent cues to position self safely in front of chair, mis judging positioning prior to sit down requiring inc CGA to SBA for safety  Pt more indep with RW during TUG retest, 1 LOB during TUG without walker-   rec walker all the time due to safety and balance concerns- wife and pt aware of this recommendation.            []      []      []      []    Home Exercise Program:     Access Code: [email or web address]  Date: 03/24/2021  Prepared by: Felicia Schaffer  Exercises   Sitting Knee Extension with Resistance - 1 x daily - 3-5 x weekly - 1-3 sets - 10 reps   Seated Knee Extension Stretch with Chair - 1 x daily - 7 x weekly - 1 sets - 30 hold   Seated Hamstring Curls with Resistance - 1 x daily - 3-5 x weekly - 1-3 sets - 10 reps   Seated Hip Abduction - 1 x daily - 3-5 x weekly - 1-3 sets - 10 reps   Seated March - 1 x daily - 3-5 x weekly - 1-3 sets - 10 reps   Sit to Stand with Counter Support - 1 x daily - 7 x weekly - 1-2 sets - 10 reps. Access Code: [email or web address]: [email or web address]/  Date: 04/12/2021  Prepared by: Selma Garcia  Exercises   Sitting Knee Extension with Resistance - 1 x daily - 3 x weekly - 2-3 sets - 10-20 reps   Seated Hamstring Curls with Resistance - 1 x daily - 3 x weekly - 2-3 sets - 10-20 reps   Seated March with Resistance - 1 x daily - 3 x weekly - 2-3 sets - 10-20 reps   Seated Hip Abduction with Resistance - 1 x daily - 3 x weekly - 2-3 sets - 10-20 reps       Therapeutic Exercise and NMR:  [x] (89281) Provided verbal/tactile cueing for activities related to strengthening, flexibility, endurance, ROM for improvements in LE, proximal hip, and core control with self-care, mobility, lifting, ambulation.  [] (69519) Provided verbal/tactile cueing for activities related to improving balance, coordination, kinesthetic sense, posture, motor skill, proprioception to assist with LE, proximal hip, and core control in self-care, mobility, lifting, ambulation and eccentric single leg control. Therapeutic Activities:    [] (54294) Provided verbal/tactile cueing for activities related to improving balance, coordination, kinesthetic sense, posture, motor skill, proprioception and motor activation to allow for proper function of core, proximal hip and LE with self-care and ADLs and functional mobility.      Gait Training  [x] (91634) Provided training and instruction to the patient for proper LE, core and proximal hip recruitment and positioning and eccentric body weight control with ambulation re-education including up and down stairs     Home Exercise Program:    [x] (55234) Reviewed/Progressed HEP activities related to strengthening, flexibility, endurance, ROM of core, proximal hip and LE for functional self-care, mobility, lifting and ambulation/stair navigation - Reeducated over importance of exs to maintain or regain strength and inc endurance rec floor peddler 5-10 mins 2-3 times daily if bia   [] (49299) Reviewed/Progressed HEP activities related to improving balance, coordination, kinesthetic sense, posture, motor skill, proprioception of core, proximal hip and LE for self-care, mobility, lifting, and ambulation/stair navigation      Manual Treatments:  PROM / STM / Oscillations-Mobs:  G-I, II, III, IV (PA's, Inf., Post.)  [] (09737) Provided manual therapy to mobilize LE, proximal hip and/or LS spine soft tissue/joints for the purpose of modulating pain, promoting relaxation,  increasing ROM, reducing/eliminating soft tissue swelling/inflammation/restriction, improving soft tissue extensibility and allowing for proper ROM for normal function with self-care, mobility, lifting and ambulation.      Modalities:    [] Electric Stimulation:   [] Ultrasound:   [] Other:   Charges:  Timed Code Treatment Minutes: See below   Total Treatment Minutes: 38      [] RE-EVAL   [x] UB(78398) x 1 8 mins     [] NMR (74676) x       [] Manual (75527) x       [] TA (44904) x 2      [x] Gait Training (94580) x    2 units 30 mins   [] ES(attended) (70791)  [] ES (un) (73663)   [] DRY NEEDLE 1 OR 2 MUSCLES  [] DRY NEEDLE 3+ MUSCLES  [] Mech Traction (85943)  [] Ultrasound (10298)  [] Other:    GOALS:  Short term goals  Time Frame for Short term goals: 4 weeks  Short term goal 1: Pt to reduce fall risk and impairment per Tinetti by inc score from 8 to >/=16  - NOT MET, Met in prior progress note not met today   Short term goal 2: Pt to improve TUG to reduce fall risk and improve gait speed from 17s to less than 12 seconds-MET in prior sessions, however not met today   Short term goal 3: Pt to illustrate strength gains by bia 45-60 min PT treatment sessions and ability to perform sit to stand with min use of UE safely 75% or greater of the time-MET  Short term goal 4: Pt to improve hamstring lengh exhibited with forward reach to less than 3 inches from toes for improving flexiblity in LEs- MET  Short term goal 5: indep to supervision with progressive HEP to max rehab potential- MET    Long term goals  Time Frame for Long term goals : 8 weeks  Long term goal 1: Pt to reduce impairment and fall risk by inc score per Tinetti to >/=22 NOT MET  Long term goal 2: Pt to ambulate household distances and situations modified indep to indep -NOT MET  Long term goal 3: Pt to reduce impairment and fall risk by inc score per Ninette Lesch to 45/56 to demonstrate less risk of falls. NOT MET       Assessment: Pt seen from 3/24/2021-7/22/2021, This PT initially evaluated the pat in March and seen up until April. Pt was transferred to PRN PT who requested one extension of POC for further PT as pt was making gains and meeting therapy goals. PT was transferred back onto my schedule and reassessed today for progress. Pt has attended 24 visits of PT to work on [employer] with mobility . Pt is now regressing, goals met in past progress notes not met today, significant balance deficit and reduced safety awareness noted today. Pt would benefit from [address] including skilled OT/PT nursing in home to address home indep and to improve indep for pt in home. Pt requires inc assist with ADLS/ and unable to assist with IADLS in home. Rec to see if pt qualifies for home health services for dec caregiver burden. Prior goals met, are not met now upon reassessment. Pt not progress most likely due worsening medical status lack of compliance and motivation to complete HEP in home. Rec to use RW all times and supervision in and out of home. Pt d/c'd with HEP. Treatment/Activity Tolerance:  [x] Patient tolerated treatment well [] Patient limited by fatique  [] Patient limited by pain  [] Patient limited by other medical complications  [] Other:     Overall Progression Towards Functional goals/ Treatment Progress Update:  [] Patient is progressing as expected towards functional goals listed. [] Progression is slowed due to complexities/Impairments listed. [] Progression has been slowed due to co-morbidities.   [] Plan just implemented, too soon to assess goals progression <30days   [] Goals require adjustment due to lack of progress  [x] Patient is not progressing as expected and requires additional follow up with physician  [] Other    Prognosis for POC: [] Good [x] Fair /guarded [] Poor    Patient requires continued skilled intervention: [x] Yes Rec home health services due to decline in indep in home environment and for safety. PLAN: [x] Discharge  Electronically signed by: Tasha Anders PT, DPT  Note: If patient does not return for scheduled/recommended follow up visits, this note will serve as a discharge from care along with the most recent update on progress.

## 2021-07-29 ENCOUNTER — OFFICE VISIT (OUTPATIENT)
Dept: BARIATRICS/WEIGHT MGMT | Age: 76
End: 2021-07-29
Payer: MEDICARE

## 2021-07-29 VITALS — WEIGHT: 190 LBS | BODY MASS INDEX: 27.2 KG/M2 | HEIGHT: 70 IN

## 2021-07-29 DIAGNOSIS — T85.71XS: Primary | ICD-10-CM

## 2021-07-29 PROCEDURE — 3017F COLORECTAL CA SCREEN DOC REV: CPT | Performed by: SURGERY

## 2021-07-29 PROCEDURE — G8427 DOCREV CUR MEDS BY ELIG CLIN: HCPCS | Performed by: SURGERY

## 2021-07-29 PROCEDURE — 1036F TOBACCO NON-USER: CPT | Performed by: SURGERY

## 2021-07-29 PROCEDURE — 1123F ACP DISCUSS/DSCN MKR DOCD: CPT | Performed by: SURGERY

## 2021-07-29 PROCEDURE — 99213 OFFICE O/P EST LOW 20 MIN: CPT | Performed by: SURGERY

## 2021-07-29 PROCEDURE — G8417 CALC BMI ABV UP PARAM F/U: HCPCS | Performed by: SURGERY

## 2021-07-29 PROCEDURE — 4040F PNEUMOC VAC/ADMIN/RCVD: CPT | Performed by: SURGERY

## 2021-08-02 ENCOUNTER — OFFICE VISIT (OUTPATIENT)
Dept: CARDIOLOGY CLINIC | Age: 76
End: 2021-08-02
Payer: MEDICARE

## 2021-08-02 VITALS
SYSTOLIC BLOOD PRESSURE: 148 MMHG | BODY MASS INDEX: 27.06 KG/M2 | HEART RATE: 80 BPM | DIASTOLIC BLOOD PRESSURE: 70 MMHG | HEIGHT: 70 IN | WEIGHT: 189 LBS

## 2021-08-02 DIAGNOSIS — R06.02 SHORTNESS OF BREATH: ICD-10-CM

## 2021-08-02 DIAGNOSIS — Z95.1 S/P CABG X 4: ICD-10-CM

## 2021-08-02 DIAGNOSIS — E11.9 INSULIN DEPENDENT TYPE 2 DIABETES MELLITUS, CONTROLLED (HCC): ICD-10-CM

## 2021-08-02 DIAGNOSIS — N18.6 TYPE 2 DIABETES MELLITUS WITH CHRONIC KIDNEY DISEASE ON CHRONIC DIALYSIS, WITH LONG-TERM CURRENT USE OF INSULIN (HCC): ICD-10-CM

## 2021-08-02 DIAGNOSIS — Z79.4 TYPE 2 DIABETES MELLITUS WITH CHRONIC KIDNEY DISEASE ON CHRONIC DIALYSIS, WITH LONG-TERM CURRENT USE OF INSULIN (HCC): ICD-10-CM

## 2021-08-02 DIAGNOSIS — I25.10 CAD IN NATIVE ARTERY: Primary | ICD-10-CM

## 2021-08-02 DIAGNOSIS — I10 ESSENTIAL HYPERTENSION: ICD-10-CM

## 2021-08-02 DIAGNOSIS — Z79.4 INSULIN DEPENDENT TYPE 2 DIABETES MELLITUS, CONTROLLED (HCC): ICD-10-CM

## 2021-08-02 DIAGNOSIS — Z99.2 TYPE 2 DIABETES MELLITUS WITH CHRONIC KIDNEY DISEASE ON CHRONIC DIALYSIS, WITH LONG-TERM CURRENT USE OF INSULIN (HCC): ICD-10-CM

## 2021-08-02 DIAGNOSIS — E11.22 TYPE 2 DIABETES MELLITUS WITH CHRONIC KIDNEY DISEASE ON CHRONIC DIALYSIS, WITH LONG-TERM CURRENT USE OF INSULIN (HCC): ICD-10-CM

## 2021-08-02 PROCEDURE — 4040F PNEUMOC VAC/ADMIN/RCVD: CPT | Performed by: INTERNAL MEDICINE

## 2021-08-02 PROCEDURE — 1036F TOBACCO NON-USER: CPT | Performed by: INTERNAL MEDICINE

## 2021-08-02 PROCEDURE — 3017F COLORECTAL CA SCREEN DOC REV: CPT | Performed by: INTERNAL MEDICINE

## 2021-08-02 PROCEDURE — G8427 DOCREV CUR MEDS BY ELIG CLIN: HCPCS | Performed by: INTERNAL MEDICINE

## 2021-08-02 PROCEDURE — 99214 OFFICE O/P EST MOD 30 MIN: CPT | Performed by: INTERNAL MEDICINE

## 2021-08-02 PROCEDURE — 2022F DILAT RTA XM EVC RTNOPTHY: CPT | Performed by: INTERNAL MEDICINE

## 2021-08-02 PROCEDURE — G8417 CALC BMI ABV UP PARAM F/U: HCPCS | Performed by: INTERNAL MEDICINE

## 2021-08-02 PROCEDURE — 3044F HG A1C LEVEL LT 7.0%: CPT | Performed by: INTERNAL MEDICINE

## 2021-08-02 PROCEDURE — 1123F ACP DISCUSS/DSCN MKR DOCD: CPT | Performed by: INTERNAL MEDICINE

## 2021-08-02 RX ORDER — BLOOD SUGAR DIAGNOSTIC
STRIP MISCELLANEOUS
Qty: 100 STRIP | Refills: 4 | Status: SHIPPED | OUTPATIENT
Start: 2021-08-02 | End: 2022-02-15

## 2021-08-02 NOTE — PROGRESS NOTES
77 y.o. here to est care. Former Dr. Dubon Search pt. Here for f/u on CAD. No cp. Has some EDWARDS; nothing new. No n/v/syncope. Doing PD. It's going ok. Has an infection, apparently, and that's \"clearing up. \"  No LE edema. No orthopnea or PND. Hurts back/shoulders to lay flat. Occ lethargy in am. Better with eating.     Past Medical History:   Diagnosis Date    Allergic rhinitis due to other allergen 2010    Coronary atherosclerosis of unspecified type of vessel, native or graft 2010    Diabetic eye exam ([facility] [address] 2015    Diabetic eye exam ([facility] [address] 2016    [place] [facility]    Generalized osteoarthrosis, involving multiple sites 2010    Other psoriasis 2010    Pneumonia     Prolonged emergence from general anesthesia     Psoriatic arthropathy ([facility] [address] 2010    Type II or unspecified type diabetes mellitus without mention of complication, not stated as uncontrolled 2010    Unspecified essential hypertension 2010    Unspecified sleep apnea 2010     Past Surgical History:   Procedure Laterality Date    CARPAL TUNNEL RELEASE      s/p    CATARACT REMOVAL      CORONARY ARTERY BYPASS GRAFT      JOINT REPLACEMENT      LAPAROSCOPY INSERTION PERITONEAL CATHETER N/A 10/26/2020    LAPAROSCOPIC PERITONEAL DIALYSIS CATHETER PLACEMENT; LAPAROSCOPIC OMENTOPEXY performed by Monae Parker DO at [address] [facility] History     Tobacco Use    Smoking status: Former Smoker     Packs/day: 1.00     Years: 18.00     Pack years: 18.00     Start date: 1962     Quit date: 1980     Years since quittin.7    Smokeless tobacco: Never Used   Vaping Use    Vaping Use: Never used   Substance Use Topics    Alcohol use: No    Drug use: No     Allergies   Allergen Reactions    Clindamycin     Clindamycin/Lincomycin     Penicillins     Tazorac [Tazarotene]      Cream - rash     Family History   Problem Relation Age of Onset    Diabetes Mother     Heart Failure Mother     Coronary Art Dis Father      Review of Systems   General: No fevers, chills, fatigue, or night sweats. No abnormal changes in weight. HEENT: No blurry or decreased vision. No changes in hearing, nasal discharge or sore throat. Cardiovascular: See HPI. No cramping in legs or buttocks when walking. Respiratory: No cough, hemoptysis, or wheezing. No history of asthma. Gastrointestinal: No abdominal pain, hematochezia, melana. Genito-Urinary: No dysuria or hematuria. No urgency or polyuria. Musculoskeletal: No complaints of joint pain, joint swelling or muscular weakness/soreness. Neurological: No dizziness or headaches. No numbness/tingling, speech problems or weakness. No history of a stroke or TIA. Psychological: No anxiety or depression  Hematological and Lymphatic: No abnormal bleeding or bruising, blood clots, jaundice. Endocrine: No malaise/lethargy, palpitations, polydipsia/polyuria, temperature intolerance or unexpected weight changes. Skin: No rashes or non-healing ulcers. PE:  Blood pressure (!) 148/70, pulse 80, height 5' 10\" (1.778 m), weight 189 lb (85.7 kg). General (appearance): Well devel. No distress  Eyes: Anicteric, eomi  Neck: Supple. Ears/Nose/Mouth/Thorat: No cyanosis  CV: RRR. Soft sounds. Respiratory: Clear but diminished. GI: Abd soft. No peritoneal signs  Skin: Warm, dry. No rashes  Neuro/Psych: Alert and oriented x 3. Appropriate behavior  Ext:  No c/c.  No edema  Pulses:  2+ carotid and radial      Lab Results   Component Value Date    CHOL 106 11/04/2019    CHOL 146 11/16/2018    CHOL 130 11/09/2017     Lab Results   Component Value Date    TRIG 265 (H) 11/04/2019    TRIG 205 (H) 11/16/2018    TRIG 198 (H) 11/09/2017     Lab Results   Component Value Date    HDL 39 (L) 11/04/2019    HDL 47 11/16/2018    HDL 46 11/09/2017     Lab Results   Component Value Date    LDLCALC 14 11/04/2019    LDLCALC 58 11/16/2018    LDLCALC 44 11/09/2017     Lab Results   Component Value Date LABVLDL 53 11/04/2019    LABVLDL 41 11/16/2018    LABVLDL 40 11/09/2017     No results found for: [facility]    Lab Results   Component Value Date    WBC 10.0 10/20/2020    HGB 10.0 (L) 10/20/2020    HCT 29.6 (L) 10/20/2020    MCV 97.3 10/20/2020     10/20/2020       Chemistry        Component Value Date/Time     10/26/2020 0600    K 5.1 10/26/2020 0600    K 4.4 10/15/2020 1112     10/26/2020 0600    CO2 26 10/26/2020 0600    BUN 90 (HH) 10/26/2020 0600    CREATININE 5.1 (HH) 10/26/2020 0600        Component Value Date/Time    CALCIUM 9.3 10/26/2020 0600    ALKPHOS 83 11/04/2019 1050    AST 32 11/04/2019 1050    ALT 40 11/04/2019 1050    BILITOT 0.4 11/04/2019 1050        Lab Results   Component Value Date    INR 0.89 01/23/2017    INR 0.91 01/21/2017    INR 0.96 01/20/2017    PROTIME 10.1 01/23/2017    PROTIME 10.4 01/21/2017    PROTIME 10.9 01/20/2017     Lab Results   Component Value Date    CHOL 106 11/04/2019    CHOL 146 11/16/2018    CHOL 130 11/09/2017     Lab Results   Component Value Date    TRIG 265 (H) 11/04/2019    TRIG 205 (H) 11/16/2018    TRIG 198 (H) 11/09/2017     Lab Results   Component Value Date    HDL 39 (L) 11/04/2019    HDL 47 11/16/2018    HDL 46 11/09/2017     Lab Results   Component Value Date    LDLCALC 14 11/04/2019    LDLCALC 58 11/16/2018    LDLCALC 44 11/09/2017     Lab Results   Component Value Date    LABVLDL 53 11/04/2019    LABVLDL 41 11/16/2018    LABVLDL 40 11/09/2017     No results found for: CHOLHDLRATIO    2/2021 TTE:  Definity contrast administered. Left ventricular size and wall thickness   appears within normal limits. Overall left ventricular systolic function   appears normal with an estimated ejection fraction of 55-60%. No regional   wall motion abnormalities are noted. Diastolic filling parameters suggests normal diastolic function. The right ventricle appears normal in size and function.    Estimated pulmonary artery systolic pressure is at 34 mmHg assuming a right   atrial pressure of 8 mmHg. 3/2021 Zio: PAC, PVC, Mobitz 1    2020 nuc stress: Reversible defect at the apex, apical anterior, mid anterior, and apical lateral walls. Normal lvef     Cath: Angiographic Findings:  Right dominant system  Left Main:  Diffuse, mild disease. ~20-30% diffuse disease. Left Anterior Descendin% proximal occlusion. After the LIMA is injected, the LAD was seen to have a mid 80-90% stenosis just distal to a significant diagonal branch. The diagonal bifurcates; one branch has a severe (~80-90%) stenosis. The other limb also has diffuse disease. There was a 90% distal LAD stenosis. Circumflex:  Proximal 70% stenosis at bifurcation of an OM and the AV LCx. The OM is subtotally occluded. The AV LCx is diffusely diseased. Right Coronary:  Proximal 30% stenosis. Mid 60-70% stenosis. Right posterolateral is occluded. Right PDA has an ostial/proximal 90% stenosis. It is diffusely diseased after the ostial lesion. Bypass Grafts:  LIMA-LAD with radial grafts to an OM and right PDA that is widely patent. Left Ventriculogram:  Not performed due to elevated Cr  Hemodynamics (mm Hg):  Left Ventricular Pressure:  131/0, 14  Central Aortic Pressure:  133/66 (93)    2017 Nuc Stress:  1. Left ventricular ejection fraction of 62 %. 2. Fixed perfusion defect in the myocardial apex suggestive of   prior infarct. No evidence of reversible ischemia. 2017 Echo:   Left ventricular size is normal . Left ventricular wall thickness is normal.   Left ventricular function is normal with ejection fraction estimated at 55   %. No regional wall motion abnormalities are noted. Mild mitral   regurgitation is present. Aortic valve appears thickened/calcified but opens   adequately. Mild tricuspid regurgitation.       Current Outpatient Medications:     insulin lispro, 1 Unit Dial, 100 UNIT/ML SOPN, INJECT 12 TO 18 UNITS UNDER THE SKIN THREE TIMES A DAY BEFORE MEALS, Disp: 30 mL, Rfl: 2    atorvastatin (LIPITOR) 80 MG tablet, TAKE ONE TABLET BY MOUTH ONCE NIGHTLY, Disp: 30 tablet, Rfl: 2    Ferric Citrate (AURYXIA) 1  MG(Fe) TABS, Take by mouth, Disp: , Rfl:     torsemide (DEMADEX) 100 MG tablet, Take 100 mg by mouth daily, Disp: , Rfl:     Insulin Pen Needle (B-D ULTRAFINE III SHORT PEN) 31G X 8 MM MISC, USE WITH INSULIN FOUR TIMES A DAY, Disp: 200 each, Rfl: 5    Biotin 06972 MCG TABS, Take by mouth, Disp: , Rfl:     levothyroxine (SYNTHROID) 50 MCG tablet, TAKE ONE TABLET BY MOUTH DAILY, Disp: 90 tablet, Rfl: 8    metoprolol succinate (TOPROL XL) 25 MG extended release tablet, Take 1 tablet by mouth daily, Disp: 90 tablet, Rfl: 3    blood glucose test strips (ACCU-CHEK JOHNATHAN PLUS) strip, 4 times a day As needed. , Disp: 150 each, Rfl: 3    insulin glargine (LANTUS SOLOSTAR) 100 UNIT/ML injection pen, INJECT 26 UNITS UNDER THE SKIN EVERY NIGHT AT BEDTIME, Disp: 15 mL, Rfl: 3    clopidogrel (PLAVIX) 75 MG tablet, Take 1 tablet by mouth daily, Disp: 90 tablet, Rfl: 3    Blood Glucose Monitoring Suppl (ACCU-CHEK JOHNATHAN PLUS) w/Device KIT, As needed, Disp: 1 kit, Rfl: 2    lidocaine 4 % external patch, Place 1 patch onto the skin daily, Disp: 1 box, Rfl: 1    ACCU-CHEK JOHNATHAN PLUS strip, USE TO TEST FOUR TIMES DAILY AS NEEDED, Disp: 100 strip, Rfl: 4    Handicap Placard MISC, by Does not apply route Duration: 5 years, Disp: 1 each, Rfl: 0    ACCU-CHEK SOFTCLIX LANCETS MISC, USE FOUR TIMES A DAY, Disp: 200 each, Rfl: 3    glucose blood VI test strips (ACCU-CHEK JOHNATHAN) strip, 4 times a day As needed. , Disp: 150 each, Rfl: 3    Blood Glucose Monitoring Suppl (ACCU-CHEK JOHNATHAN) RUMA, Use as needed, Disp: 1 Device, Rfl: 0    aspirin 81 MG chewable tablet, Take 1 tablet by mouth daily. , Disp: 30 tablet, Rfl: 5    Lancets MISC, 2 times daily. Lancets for a Anna meter. , Disp: 50 each, Rfl: 11    triamcinolone (KENALOG) 0.1 % ointment, Apply topically 2 times daily. , Disp: 1 Tube, Rfl: 2    A/P:  77 y.o. here for f/u. Has h/o cad and cabg, HTN, HL, cabg in 2006, and ILD Shelda Cousins). 1. CAD in native artery    2. Insulin dependent type 2 diabetes mellitus, controlled ([facility] [address]    3. Type 2 diabetes mellitus with chronic kidney disease on chronic dialysis, with long-term current use of insulin ([facility] [address]    4. S/P CABG x 4    5. Essential hypertension    6. Shortness of breath      Recs:  -Cont asa and plavix  -Cont statin  -Cont Toprol  -BPs occ dip below 100's, will leave bp meds alone. Sees Dr. Janna Du. -F/U 6 mo     Darek Rothman MD, [facility]

## 2021-08-16 ENCOUNTER — OFFICE VISIT (OUTPATIENT)
Dept: ENDOCRINOLOGY | Age: 76
End: 2021-08-16
Payer: MEDICARE

## 2021-08-16 VITALS
HEART RATE: 73 BPM | HEIGHT: 70 IN | DIASTOLIC BLOOD PRESSURE: 69 MMHG | OXYGEN SATURATION: 92 % | WEIGHT: 191.4 LBS | SYSTOLIC BLOOD PRESSURE: 106 MMHG | BODY MASS INDEX: 27.4 KG/M2

## 2021-08-16 DIAGNOSIS — E03.9 ACQUIRED HYPOTHYROIDISM: ICD-10-CM

## 2021-08-16 LAB — HBA1C MFR BLD: 5.2 %

## 2021-08-16 PROCEDURE — 99214 OFFICE O/P EST MOD 30 MIN: CPT | Performed by: INTERNAL MEDICINE

## 2021-08-16 PROCEDURE — G8417 CALC BMI ABV UP PARAM F/U: HCPCS | Performed by: INTERNAL MEDICINE

## 2021-08-16 PROCEDURE — 2022F DILAT RTA XM EVC RTNOPTHY: CPT | Performed by: INTERNAL MEDICINE

## 2021-08-16 PROCEDURE — 1036F TOBACCO NON-USER: CPT | Performed by: INTERNAL MEDICINE

## 2021-08-16 PROCEDURE — 3044F HG A1C LEVEL LT 7.0%: CPT | Performed by: INTERNAL MEDICINE

## 2021-08-16 PROCEDURE — 4040F PNEUMOC VAC/ADMIN/RCVD: CPT | Performed by: INTERNAL MEDICINE

## 2021-08-16 PROCEDURE — G8427 DOCREV CUR MEDS BY ELIG CLIN: HCPCS | Performed by: INTERNAL MEDICINE

## 2021-08-16 PROCEDURE — 1123F ACP DISCUSS/DSCN MKR DOCD: CPT | Performed by: INTERNAL MEDICINE

## 2021-08-16 PROCEDURE — 3017F COLORECTAL CA SCREEN DOC REV: CPT | Performed by: INTERNAL MEDICINE

## 2021-08-16 PROCEDURE — 83036 HEMOGLOBIN GLYCOSYLATED A1C: CPT | Performed by: INTERNAL MEDICINE

## 2021-08-16 RX ORDER — ERGOCALCIFEROL 1.25 MG/1
50000 CAPSULE ORAL
COMMUNITY
Start: 2021-07-14

## 2021-08-16 RX ORDER — ATORVASTATIN CALCIUM 80 MG/1
TABLET, FILM COATED ORAL
Qty: 90 TABLET | Refills: 3 | Status: SHIPPED | OUTPATIENT
Start: 2021-08-16 | End: 2022-09-02

## 2021-08-16 RX ORDER — INSULIN LISPRO 100 [IU]/ML
INJECTION, SOLUTION INTRAVENOUS; SUBCUTANEOUS
Qty: 30 ML | Refills: 2 | Status: SHIPPED | OUTPATIENT
Start: 2021-08-16 | End: 2021-12-07 | Stop reason: SDUPTHER

## 2021-08-16 NOTE — TELEPHONE ENCOUNTER
Medication:   Requested Prescriptions     Pending Prescriptions Disp Refills    atorvastatin (LIPITOR) 80 MG tablet [Pharmacy Med Name: ATORVASTATIN 80 MG TABLET] 90 tablet      Sig: TAKE ONE TABLET BY MOUTH ONCE NIGHTLY       Last Filled:      Patient Phone Number: [phone] (home)     Last appt: 5/12/2021   Next appt: 8/16/2021    Last Labs DM:   Lab Results   Component Value Date    LABA1C 5.8 05/12/2021     Last Lipid:   Lab Results   Component Value Date    CHOL 106 11/04/2019    TRIG 265 11/04/2019    HDL 39 11/04/2019    HDL 41 03/28/2012    LDLCALC 14 11/04/2019     Last PSA:   Lab Results   Component Value Date    PSA 1.45 09/10/2013     Last Thyroid:   Lab Results   Component Value Date    TSH 5.20 01/30/2018

## 2021-08-16 NOTE — PROGRESS NOTES
Seen as f/u patient for diabetes    Interim:  Doing PD   Fair control  Takes humalog after meals  Has boost with breakfast      Dexcom readings different from meter- not using now  Uses PD at [place] and 3 pm  Solution at [facility] does not have dextrose per patient    Hospitalized for Syncope       Admitted on 1/17 for MORIS on CKD, Acute exacerbation of CHF. Resp failure  Hypoglycemia on admission BG 28? Diagnosed with Type 2 diabetes mellitus in  1995  Known diabetic complications: Nephropathy, Neuropathy, mild retinopathy    Current diabetic medications   Lantus 44 units  Humalog 12/12/14  unit TID + SSI  But taking 14    On insulin since 1998    He has been on janumet and glipizide    Last A1c 5.2%,----6.6%<----6.1%<-----6.9%<----- 6.4%<----6.9%<---- 6.1%<-----6.6%<------5.5%<----6.6%<----- 7.2% <-----8.9 on 7/16<--- 7.4 on 4/16<------7.9 on 1/16<----  10.4 on 10/15 <---- 8.5 <--- 8.2 <--- 8.6    Prior visit with dietician: no  Current diet: on average, 3 meals per day does not count CHO  Current exercise: walking   Current monitoring regimen: home blood tests - 4 times daily     Has brought blood glucose log/meter: yes  Home blood sugar records:140-294  Any episodes of hypoglycemia?  Occ am     H/O CABG in 06    Hyperlipidemia:for years, stable  LDL 44 on 11/17  lipitor 40mg  LDL 58 on 11/18    LDL 14 on 11/19  Last eye exam: 11/20  Last foot exam: 5/19 sees podiatry  Last microalbumin to creatinine ratio: ESRD  On PD    HTN: stable, for years, takes Losartan 25mg ( stopped by nephrologist) coreg 6.25mg BID    H/o subclinical hypothyroidism,stable    TSH 3.09 on 5/18  Levothyroxine 50mcg    He has neuropathy,was on neurontin    Past Medical History:   Diagnosis Date    Allergic rhinitis due to other allergen 7/12/2010    Coronary atherosclerosis of unspecified type of vessel, native or graft 7/12/2010    Diabetic eye exam ([facility] [address] 04/29/2015    Diabetic eye exam ([facility] [address] 5/5/2016    [place] [facility]    Generalized osteoarthrosis, involving multiple sites 7/12/2010    Other psoriasis 7/12/2010    Pneumonia     Prolonged emergence from general anesthesia     Psoriatic arthropathy ([facility] [address] 7/12/2010    Type II or unspecified type diabetes mellitus without mention of complication, not stated as uncontrolled 7/12/2010    Unspecified essential hypertension 7/12/2010    Unspecified sleep apnea 7/12/2010     Past Surgical History:   Procedure Laterality Date    CARPAL TUNNEL RELEASE      s/p    CATARACT REMOVAL      CORONARY ARTERY BYPASS GRAFT      JOINT REPLACEMENT      LAPAROSCOPY INSERTION PERITONEAL CATHETER N/A 10/26/2020    LAPAROSCOPIC PERITONEAL DIALYSIS CATHETER PLACEMENT; LAPAROSCOPIC OMENTOPEXY performed by Kasandra Bettencourt DO at [address] [place] [address], reviewed    There were no vitals filed for this visit. Constitutional: Well-developed, appears stated age, cooperative, in no acute distress  H/E/N/M/T:atraumatic, normocephalic, external ears, nose, lips normal without lesions  Eyes: Lids, lashes, conjunctivae and sclerae normal, No proptosis, no redness  Neck: supple, symmetrical, no swelling  Skin: No obvious rashes or lesions present.   Skin and hair texture normal  Psychiatric: Judgement and Insight:  judgement and insight appear normal  Neuro: Normal without focal findings, speech is normal normal, speech is spontaneous  Chest: No labored breathing, no chest deformity, no stridor  Musculoskeletal: No joint deformity, swelling        5/19  Skeletal foot exam is normal, no skin lesions, toenails are normal, DP not palpable,  10 g monofilament is 10/10 on the right and 10/10 on the left    Lab Reviewed   No components found for: CHLPL  Lab Results   Component Value Date    TRIG 265 (H) 11/04/2019    TRIG 205 (H) 11/16/2018    TRIG 198 (H) 11/09/2017     Lab Results   Component Value Date    HDL 39 (L) 11/04/2019    HDL 47 11/16/2018    HDL 46 11/09/2017     Lab Results   Component Value Date LDLCALC 14 11/04/2019    LDLCALC 58 11/16/2018    LDLCALC 44 11/09/2017     Lab Results   Component Value Date    LABVLDL 53 11/04/2019    LABVLDL 41 11/16/2018    LABVLDL 40 11/09/2017     Lab Results   Component Value Date    LABA1C 5.8 05/12/2021       Assessment:     Akua Pennington is a 77 y.o. male with :    1.T2DM: Longstanding,controlled, insulin resistant, goal A1c 7-8%. Advised Dexcom not approved in dialysis patient, use glucose meter for self monitoring. Highs during the day , adjust humalog, take before meals   A1c falsely low  Advise to take humalog regularly  2. HTN: BP at goal.   3.HLD: LDL at goal, he is  On statin  4. Obesity  5. CAD  6. CKD III  7. Psoriatic arthritis: Reports flare in psoriatic plaque, will see dermatology  8. Neuropathy: Had been on lyrica, prescribed topical ,off of it. Started neurontin, did not help, . He does have some drowsiness, off of it. Advise B12  9. Fatigue:  TSH high with normal FT4, subclinical hypothyroid, advise to try replacement. He was inquiring about Co-Q 10 , advised can take 200mg  His total testosterone was normal with free level low normal. Advised given total is in a good range, h/o CAD, Age, recommend no replacement at this point. 10. Subclinical hypothyroid: On replacement,  Last level normal, continue levothyroxine, check TSH    Plan:       lantus   44  units    Humalog 18 units TID  SSI 2 for 50>150   Advise to check blood sugar 4 times a day   Patient to send blood sugar log for titration. Advise to low simple carbohydrate and protein with each  meal diet. Diabetes Care: recommend yearly eye exam, foot exam and urine microalbumin to  creatinine ratio.     -Hyperlipidemia, LDL goal is <70 mg/dl   -Hypertension: Continue same  -Daily ASA:81mg  -Smoking status: Non smoker

## 2021-08-27 NOTE — PROGRESS NOTES
[facility]) Physicians   General & Laparoscopic Surgery  [employer]       HPI:  Ran Ambrose is a very pleasant 77 y.o. male who is here for PD catheter cuff shaving    Has been off blood thinners for 1 week        Past Medical History:   Diagnosis Date    Allergic rhinitis due to other allergen 2010    Coronary atherosclerosis of unspecified type of vessel, native or graft 2010    Diabetic [facility] exam [facility] [address] 2015    Diabetic eye exam ([facility] [address] 2016    [place] [facility]    Generalized osteoarthrosis, involving multiple sites 2010    Other psoriasis 2010    Pneumonia     Prolonged emergence from general anesthesia     Psoriatic arthropathy ([facility] [address] 2010    Type II or unspecified type diabetes mellitus without mention of complication, not stated as uncontrolled 2010    Unspecified essential hypertension 2010    Unspecified sleep apnea 2010     Past Surgical History:   Procedure Laterality Date    CARPAL TUNNEL RELEASE      s/p    CATARACT REMOVAL      CORONARY ARTERY BYPASS GRAFT      JOINT REPLACEMENT      LAPAROSCOPY INSERTION PERITONEAL CATHETER N/A 10/26/2020    LAPAROSCOPIC PERITONEAL DIALYSIS CATHETER PLACEMENT; LAPAROSCOPIC OMENTOPEXY performed by Korin Watkins DO at [facility] OR     Family History   Problem Relation Age of Onset    Diabetes Mother     Heart Failure Mother     Coronary Art Dis Father      Social History     Tobacco Use    Smoking status: Former Smoker     Packs/day: 1.00     Years: 18.00     Pack years: 18.00     Start date: 1962     Quit date: 1980     Years since quittin.8    Smokeless tobacco: Never Used   Substance Use Topics    Alcohol use: No     I counseled the patient on the importance of not smoking and risks of ETOH.    Allergies   Allergen Reactions    Clindamycin     Clindamycin/Lincomycin     Penicillins     Tazorac [Tazarotene]      Cream - rash     Vitals:    21 0941   Weight: 190 lb (86.2 kg)   Height: 5' 10\" (1.778 m)       Body mass index is 27.26 kg/m². Current Outpatient Medications:     Ferric Citrate (AURYXIA) 1  MG(Fe) TABS, Take by mouth, Disp: , Rfl:     Insulin Pen Needle (B-D ULTRAFINE III SHORT PEN) 31G X 8 MM MISC, USE WITH INSULIN FOUR TIMES A DAY, Disp: 200 each, Rfl: 5    Biotin 24017 MCG TABS, Take by mouth, Disp: , Rfl:     levothyroxine (SYNTHROID) 50 MCG tablet, TAKE ONE TABLET BY MOUTH DAILY, Disp: 90 tablet, Rfl: 8    metoprolol succinate (TOPROL XL) 25 MG extended release tablet, Take 1 tablet by mouth daily, Disp: 90 tablet, Rfl: 3    blood glucose test strips (ACCU-CHEK JOHNATHAN PLUS) strip, 4 times a day As needed. , Disp: 150 each, Rfl: 3    insulin glargine (LANTUS SOLOSTAR) 100 UNIT/ML injection pen, INJECT 26 UNITS UNDER THE SKIN EVERY NIGHT AT BEDTIME, Disp: 15 mL, Rfl: 3    clopidogrel (PLAVIX) 75 MG tablet, Take 1 tablet by mouth daily, Disp: 90 tablet, Rfl: 3    Blood Glucose Monitoring Suppl (ACCU-CHEK JOHNATHAN PLUS) w/Device KIT, As needed, Disp: 1 kit, Rfl: 2    lidocaine 4 % external patch, Place 1 patch onto the skin daily, Disp: 1 box, Rfl: 1    Handicap Placard MISC, by Does not apply route Duration: 5 years, Disp: 1 each, Rfl: 0    ACCU-CHEK SOFTCLIX LANCETS MISC, USE FOUR TIMES A DAY, Disp: 200 each, Rfl: 3    glucose blood VI test strips (ACCU-CHEK JOHNATHAN) strip, 4 times a day As needed. , Disp: 150 each, Rfl: 3    Blood Glucose Monitoring Suppl (ACCU-CHEK JOHNATHAN) RUMA, Use as needed, Disp: 1 Device, Rfl: 0    aspirin 81 MG chewable tablet, Take 1 tablet by mouth daily. , Disp: 30 tablet, Rfl: 5    Lancets MISC, 2 times daily. Lancets for a Anna meter. , Disp: 50 each, Rfl: 11    torsemide (DEMADEX) 100 MG tablet, TAKE ONE TABLET BY MOUTH DAILY, Disp: 30 tablet, Rfl: 5    atorvastatin (LIPITOR) 80 MG tablet, TAKE ONE TABLET BY MOUTH ONCE NIGHTLY, Disp: 90 tablet, Rfl: 3    vitamin D (ERGOCALCIFEROL) 1.25 MG (65992 UT) CAPS capsule, , Disp: , Rfl:     insulin lispro, 1 Unit Dial, 100 UNIT/ML SOPN, INJECT 18-22 UNITS UNDER THE SKIN THREE TIMES A DAY BEFORE MEALS, Disp: 30 mL, Rfl: 2    blood glucose test strips (ACCU-CHEK JOHNATHAN PLUS) strip, USE TO TEST FOUR TIMES DAILY AS NEEDED, Disp: 100 strip, Rfl: 4    triamcinolone (KENALOG) 0.1 % ointment, Apply topically 2 times daily. , Disp: 1 Tube, Rfl: 2    ROS  Review of Systems - History obtained from the patient  General ROS: negative  Psychological ROS: negative  Cardiovascular ROS: negative  Gastrointestinal ROS:negative  Genito-Urinary ROS: negative  Musculoskeletal ROS: negative   Skin ROS negative        Physical Exam   Constitutional: Patient is oriented to person, place, and time. Vital signs are normal. Patient  appears well-developed and well-nourished. Patient  is active and cooperative. Non-toxic appearance. No distress. Pulmonary/Chest: Effort normal and breath sounds normal. No stridor. No respiratory distress. Patient  has no wheezes. Patient has no rales. Patient exhibits no tenderness and no crepitus. Abdominal: +PD catheter cuff exposed  Musculoskeletal: Normal range of motion. Patient exhibits no edema or tenderness. Neurological: Patient is alert and oriented to person, place, and time. Patient has normal strength. Coordination and gait normal. GCS eye subscore is 4. GCS verbal subscore is 5. GCS motor subscore is 6. Skin: Skin is warm and dry. No abrasion and no rash noted. Patient  is not diaphoretic. No cyanosis or erythema. Psychiatric: Patient has a normal mood and affect.   speech is normal and behavior is normal. Cognition and memory are normal.         A/P:  Quyen Booth is a very pleasant 77 y.o. male with PD catheter cuff exposed here for shaving    PD catheter cuff shaved under sterile technique    Tolerated well    F/U PRN    Swati Ivy    Total encounter time:  20 min, including any number of the following: review of labs, imaging, provider notes, outside hospital records; performing examination/evaluation; counseling patient and family; ordering medications/tests; placing referrals and communication with referring physicians; coordination of care, and documentation in the EHR.

## 2021-10-14 ENCOUNTER — OFFICE VISIT (OUTPATIENT)
Dept: PULMONOLOGY | Age: 76
End: 2021-10-14
Payer: MEDICARE

## 2021-10-14 VITALS
OXYGEN SATURATION: 96 % | HEIGHT: 70 IN | HEART RATE: 75 BPM | TEMPERATURE: 96.6 F | SYSTOLIC BLOOD PRESSURE: 124 MMHG | BODY MASS INDEX: 27.77 KG/M2 | WEIGHT: 194 LBS | DIASTOLIC BLOOD PRESSURE: 66 MMHG

## 2021-10-14 DIAGNOSIS — J45.991 COUGH VARIANT ASTHMA: Primary | ICD-10-CM

## 2021-10-14 DIAGNOSIS — G47.33 OSA (OBSTRUCTIVE SLEEP APNEA): ICD-10-CM

## 2021-10-14 DIAGNOSIS — J84.9 ILD (INTERSTITIAL LUNG DISEASE) (HCC): ICD-10-CM

## 2021-10-14 PROCEDURE — G8417 CALC BMI ABV UP PARAM F/U: HCPCS | Performed by: INTERNAL MEDICINE

## 2021-10-14 PROCEDURE — 4040F PNEUMOC VAC/ADMIN/RCVD: CPT | Performed by: INTERNAL MEDICINE

## 2021-10-14 PROCEDURE — G8427 DOCREV CUR MEDS BY ELIG CLIN: HCPCS | Performed by: INTERNAL MEDICINE

## 2021-10-14 PROCEDURE — 1123F ACP DISCUSS/DSCN MKR DOCD: CPT | Performed by: INTERNAL MEDICINE

## 2021-10-14 PROCEDURE — 1036F TOBACCO NON-USER: CPT | Performed by: INTERNAL MEDICINE

## 2021-10-14 PROCEDURE — 99214 OFFICE O/P EST MOD 30 MIN: CPT | Performed by: INTERNAL MEDICINE

## 2021-10-14 PROCEDURE — G8484 FLU IMMUNIZE NO ADMIN: HCPCS | Performed by: INTERNAL MEDICINE

## 2021-10-14 PROCEDURE — 3017F COLORECTAL CA SCREEN DOC REV: CPT | Performed by: INTERNAL MEDICINE

## 2021-10-14 NOTE — PROGRESS NOTES
[facility]    Outpatient Follow Up Note    Subjective:   CHIEF COMPLAINT / HPI:     The patient is 77 y.o. male who is here for 6-month follow-up of cough variant asthma, ILD, and BJ. His main complaint is of fatigue and at times coughing up clear phlegm. He is only using his Dulera several times a week. He thinks he has a ProAir inhaler at home but is not for sure. His breathing is at his baseline. No wheezing or chest pain. Gloria Barkerdle His PD is going well.    4/22/2021  Robert Holguin  is here for 6-month follow-up of ILD/BJ/and cough variant asthma. Overall he states that he is doing mildly better since initiation of hemodialysis by PD catheter. He has not had any recent syncope. No dyspnea on exertion, wheezing, or chest tightness. He does have a cough that he states has been going on for a while. No GERD or postnasal drip. Not productive and no fevers or chills. He has not tried anything to suppress it. He is using his [facility] but rarely needs his rescue albuterol    10/14/2020  Robert Holguin is here for 6-month follow-up of ILD/BJ/and cough variant asthma. He was admitted to the hospital end of August for syncope. Etiology was thought to be orthostatic hypotension. He is getting a PD catheter placed for initiation of hemodialysis for ESRD. He had a overnight pulse oximetry study on room air back in March that showed no significant oxygen desaturation he does not qualify for oxygen at night. He has no significant dyspnea on exertion, cough, wheezing, or chest tightness. He still has some chest congestion but it is not significant enough to warrant Mucinex. He does report that he uses his respiratory inhalers only intermittently    3/11/2020  Robert Holguin presents today for follow up of ILD, BJ, cough variant asthma and fatigue. Main complaint is still of chest congestion. He did not try Mucinex after last visit . EDWARDS does not seem to be an issue.  He is not using BiPAP as due to the drying effect of it. He denies any fevers, chills, anorexia, weight loss, cough, chest pain, or peripheral edema. He has not repeated his nocturnal pulse ox study yet stating no one had contacted him to deliver the equipment.  He continues Pulm rehab, now at [facility] since our phase 3 closed    He's had a pretty extensive evaluation end of 2017 including  CT chest, PFTs, CBC, CMP and above-mentioned stress test.  He does have sleep apnea but states he is compliant with BiPAP    Past Medical History:    Past Medical History:   Diagnosis Date    Allergic rhinitis due to other allergen 2010    Coronary atherosclerosis of unspecified type of vessel, native or graft 2010    Diabetic eye exam ([facility] [address] 2015    Diabetic eye exam ([facility] [address] 2016    [place] [facility]    Generalized osteoarthrosis, involving multiple sites 2010    Other psoriasis 2010    Pneumonia     Prolonged emergence from general anesthesia     Psoriatic arthropathy ([facility] [address] 2010    Type II or unspecified type diabetes mellitus without mention of complication, not stated as uncontrolled 2010    Unspecified essential hypertension 2010    Unspecified sleep apnea 2010       Social History:    Social History     Tobacco Use   Smoking Status Former Smoker    Packs/day: 1.00    Years: 18.00    Pack years: 18.00    Start date: 1962   [facility] Quit date: 1980    Years since quittin.9   Smokeless Tobacco Never Used       Current Medications:  Current Outpatient Medications on File Prior to Visit   Medication Sig Dispense Refill    torsemide (DEMADEX) 100 MG tablet TAKE ONE TABLET BY MOUTH DAILY 30 tablet 5    atorvastatin (LIPITOR) 80 MG tablet TAKE ONE TABLET BY MOUTH ONCE NIGHTLY 90 tablet 3    vitamin D (ERGOCALCIFEROL) 1.25 MG (54718 UT) CAPS capsule       insulin lispro, 1 Unit Dial, 100 UNIT/ML SOPN INJECT 18-22 UNITS UNDER THE SKIN THREE TIMES A DAY BEFORE MEALS 30 mL 2    blood glucose test strips (ACCU-CHEK JOHNATHAN PLUS) strip USE TO TEST FOUR TIMES DAILY AS NEEDED 100 strip 4    Ferric Citrate (AURYXIA) 1  MG(Fe) TABS Take by mouth      Insulin Pen Needle (B-D ULTRAFINE III SHORT PEN) 31G X 8 MM MISC USE WITH INSULIN FOUR TIMES A  each 5    Biotin 57647 MCG TABS Take by mouth      levothyroxine (SYNTHROID) 50 MCG tablet TAKE ONE TABLET BY MOUTH DAILY 90 tablet 8    metoprolol succinate (TOPROL XL) 25 MG extended release tablet Take 1 tablet by mouth daily 90 tablet 3    blood glucose test strips (ACCU-CHEK JOHNATHAN PLUS) strip 4 times a day As needed. 150 each 3    insulin glargine (LANTUS SOLOSTAR) 100 UNIT/ML injection pen INJECT 26 UNITS UNDER THE SKIN EVERY NIGHT AT BEDTIME 15 mL 3    clopidogrel (PLAVIX) 75 MG tablet Take 1 tablet by mouth daily 90 tablet 3    Blood Glucose Monitoring Suppl (ACCU-CHEK JOHNATHAN PLUS) w/Device KIT As needed 1 kit 2    lidocaine 4 % external patch Place 1 patch onto the skin daily 1 box 1    Handicap Placard MISC by Does not apply route Duration: 5 years 1 each 0    ACCU-CHEK SOFTCLIX LANCETS MISC USE FOUR TIMES A  each 3    glucose blood VI test strips (ACCU-CHEK JOHNATHAN) strip 4 times a day As needed. 150 each 3    Blood Glucose Monitoring Suppl (ACCU-CHEK JOHNATHAN) RUMA Use as needed 1 Device 0    aspirin 81 MG chewable tablet Take 1 tablet by mouth daily. 30 tablet 5    Lancets MISC 2 times daily. Lancets for a Anna meter. 50 each 11    triamcinolone (KENALOG) 0.1 % ointment Apply topically 2 times daily. 1 Tube 2     No current facility-administered medications on file prior to visit.        REVIEW OF SYSTEMS:    CONSTITUTIONAL: Negative for fevers and chills  HEENT: Negative for oropharyngeal exudate, post nasal drip, sinus pain / pressure, nasal congestion, ear pain  RESPIRATORY:  See HPI  CARDIOVASCULAR: Negative for chest pain, palpitations, edema  GASTROINTESTINAL: Negative for nausea, vomiting, diarrhea, constipation and abdominal pain  HEMATOLOGICAL: Negative for adenopathy  SKIN: Negative for clubbing, cyanosis, skin lesions  EXTREMITIES: Negative for weakness, decreased ROM  NEUROLOGICAL: Negative for unilateral weakness, speech or gait abnormalities    Objective:   PHYSICAL EXAM:        VITALS:    /66 (Site: Left Upper Arm, Position: Sitting, Cuff Size: Medium Adult)   Pulse 75   Temp 96.6 °F (35.9 °C) (Infrared)   Ht 5' 10\" (1.778 m)   Wt 194 lb (88 kg)   SpO2 96%   BMI 27.84 kg/m²   On RA  CONSTITUTIONAL:  Awake, alert, cooperative, no apparent distress, and appears stated age  HEENT: No oropharyngeal exudate, PERRL, no cervical adenopathy, no tracheal deviation, thyroid size normal  LUNGS:  No increased work of breathing. + crackles bilateral bases. No wheezing  CARDIOVASCULAR:  normal S1 and S2 and no JVD  ABDOMEN:  Normal bowel sounds, non-distended and non-tender to palpation  EXT: No edema, no calf tenderness. Pulses are present bilaterally. NEUROLOGIC:  Mental Status Exam:  Level of Alertness:   awake  Orientation:   person, place, time. SKIN:  normal skin color, texture, turgor, no redness, warmth, or swelling     DATA:    PFTs  Indication: Shortness of breath, weakness, non productive cough    Test comment:     Spirometry data is acceptable and reproducible.     Pulse ox is 91% on room air    Estimated body mass index is 28.3 kg/m² as calculated from the   following:    Height as of 11/21/17: 5' 10\" (1.778 m).    Weight as of 11/21/17: 197 lb 3.2 oz (89.4 kg).     Spirometry data:    FEV1/FVC: 89. Predicted ratio 73    Pre-Bronchodilator FEV1 2.07L, which is 63% predicted    Post-Bronchodilator FEV1 2.02L, which is 61% predicted    There is -2% reversibility     FVC is 2.33L, which is 51% predicted    Lung Volumes:    TLC is 4.85L, which is 67% predicted    RV is 2.51L which is 99% predicted    Diffusion Capacity:    DLCO is 13.77 which is 42% predicted    Impression:    1. There is no obstruction present    2. There is no response to bronchodilator therapy         [Increase in FEV1 => 12% of control and => 200 ml]    3. There is moderate restriction     4. There is moderate reduction in diffusion capacity    Comment: Moderate restrictive disease with moderate decrease in   diffusion capacity consistent with the diagnosis of interstitial   lung disease.      Radiology Review:  Pertinent images / reports were reviewed as a part of this visit. CXR 10/2020  FINDINGS: Portable AP radiograph of the chest was obtained. The patient is status post a prior sternotomy. The heart and mediastinum are within normal limits for size and configuration. No infiltrate, effusion or pneumothorax is identified.           Impression   1.  No evidence of acute cardiopulmonary disease. CT Chest 11/29/2017 reveals the following:  Impression       Pulmonary fibrosis bilateral lower lobe subpleural regions as   described similar to previous examination.       Marked coronary artery calcification which is a risk factor for   acute coronary syndrome.       No lobar consolidation.       Ascending thoracic aorta upper limits of normal.     Assessment:   1. Cough Variant Asthma  2. ILD - NOS. Unchanged from 2013  3. BJ -not using BiPAP  4. Chronic Diastolic CHF  5. Restless Legs Syndrome    Plan:   1. Continue Dulera 200 mcg MDI and prn albuterol. I stressed more consistent use of [place] Force may help with his cough and phlegm production  2. He is up-to-date with Prevnar-13 2015, COVID 19 5/2021 and Pneumovax 2014 vaccinations  3.   RTC 6 months

## 2021-11-19 ENCOUNTER — OFFICE VISIT (OUTPATIENT)
Dept: INTERNAL MEDICINE CLINIC | Age: 76
End: 2021-11-19
Payer: MEDICARE

## 2021-11-19 VITALS
SYSTOLIC BLOOD PRESSURE: 128 MMHG | DIASTOLIC BLOOD PRESSURE: 70 MMHG | WEIGHT: 194 LBS | BODY MASS INDEX: 27.77 KG/M2 | HEIGHT: 70 IN

## 2021-11-19 DIAGNOSIS — L40.9 PSORIASIS: ICD-10-CM

## 2021-11-19 DIAGNOSIS — E03.9 ACQUIRED HYPOTHYROIDISM: ICD-10-CM

## 2021-11-19 DIAGNOSIS — E11.9 DIABETES MELLITUS TYPE 2 IN NONOBESE (HCC): ICD-10-CM

## 2021-11-19 DIAGNOSIS — L40.50 ARTHRITIS WITH PSORIASIS (HCC): ICD-10-CM

## 2021-11-19 DIAGNOSIS — N18.5 CKD STAGE 5 DUE TO TYPE 2 DIABETES MELLITUS (HCC): ICD-10-CM

## 2021-11-19 DIAGNOSIS — Z00.00 ROUTINE GENERAL MEDICAL EXAMINATION AT A HEALTH CARE FACILITY: Primary | ICD-10-CM

## 2021-11-19 DIAGNOSIS — E11.22 CKD STAGE 5 DUE TO TYPE 2 DIABETES MELLITUS (HCC): ICD-10-CM

## 2021-11-19 DIAGNOSIS — I25.110 CORONARY ARTERY DISEASE INVOLVING NATIVE CORONARY ARTERY OF NATIVE HEART WITH UNSTABLE ANGINA PECTORIS (HCC): ICD-10-CM

## 2021-11-19 DIAGNOSIS — J84.9 ILD (INTERSTITIAL LUNG DISEASE) (HCC): ICD-10-CM

## 2021-11-19 PROCEDURE — 3017F COLORECTAL CA SCREEN DOC REV: CPT | Performed by: INTERNAL MEDICINE

## 2021-11-19 PROCEDURE — 99214 OFFICE O/P EST MOD 30 MIN: CPT | Performed by: INTERNAL MEDICINE

## 2021-11-19 PROCEDURE — G8417 CALC BMI ABV UP PARAM F/U: HCPCS | Performed by: INTERNAL MEDICINE

## 2021-11-19 PROCEDURE — 1123F ACP DISCUSS/DSCN MKR DOCD: CPT | Performed by: INTERNAL MEDICINE

## 2021-11-19 PROCEDURE — 3044F HG A1C LEVEL LT 7.0%: CPT | Performed by: INTERNAL MEDICINE

## 2021-11-19 PROCEDURE — 4040F PNEUMOC VAC/ADMIN/RCVD: CPT | Performed by: INTERNAL MEDICINE

## 2021-11-19 PROCEDURE — G8427 DOCREV CUR MEDS BY ELIG CLIN: HCPCS | Performed by: INTERNAL MEDICINE

## 2021-11-19 PROCEDURE — 1036F TOBACCO NON-USER: CPT | Performed by: INTERNAL MEDICINE

## 2021-11-19 PROCEDURE — G8484 FLU IMMUNIZE NO ADMIN: HCPCS | Performed by: INTERNAL MEDICINE

## 2021-11-19 PROCEDURE — 2022F DILAT RTA XM EVC RTNOPTHY: CPT | Performed by: INTERNAL MEDICINE

## 2021-11-19 RX ORDER — ASCORBIC ACID, TOCOPHERYL ACID SUCCINATE, THIAMINE, RIBOFLAVIN, NIACINAMIDE, PYRIDOXINE, FOLIC ACID, COBALAMIN, BIOTIN, PANTOTHENIC ACID, ZINC, SELENIUM 100; 1.5; 1.7; 20; 25; 3; 1; 300; 10; 15; 30; 7 MG/1; MG/1; MG/1; MG/1; MG/1; MG/1; MG/1; UG/1; MG/1; MG/1; [IU]/1; UG/1
1 TABLET, COATED ORAL DAILY
COMMUNITY

## 2021-11-19 ASSESSMENT — PATIENT HEALTH QUESTIONNAIRE - PHQ9
SUM OF ALL RESPONSES TO PHQ9 QUESTIONS 1 & 2: 1
1. LITTLE INTEREST OR PLEASURE IN DOING THINGS: 1
SUM OF ALL RESPONSES TO PHQ QUESTIONS 1-9: 1
SUM OF ALL RESPONSES TO PHQ QUESTIONS 1-9: 1
2. FEELING DOWN, DEPRESSED OR HOPELESS: 0
SUM OF ALL RESPONSES TO PHQ QUESTIONS 1-9: 1

## 2021-11-19 ASSESSMENT — LIFESTYLE VARIABLES: HOW OFTEN DO YOU HAVE A DRINK CONTAINING ALCOHOL: 0

## 2021-11-19 NOTE — PROGRESS NOTES
Medicare Annual Wellness Visit  Name: Elio Mcghee Date: 2021   MRN: <[id number]> Sex: Male   Age: 77 y.o. Ethnicity: Non- / Non    : 1945 Race: White (non-)      Breanna Barnett is here for Medicare AWV    Screenings for behavioral, psychosocial and functional/safety risks, and cognitive dysfunction are all negative except as indicated below. These results, as well as other patient data from the [address] form, are documented in Flowsheets linked to this Encounter. Allergies   Allergen Reactions    Clindamycin     Clindamycin/Lincomycin     Penicillins     Tazorac [Tazarotene]      Cream - rash         Prior to Visit Medications    Medication Sig Taking? Authorizing Provider   B Complex-C-Biotin-E-Min-FA (DIALYVITE 3000 PO) Take by mouth Yes Historical Provider, MD   mometasone-formoterol (DULERA) 200-5 MCG/ACT inhaler Inhale 2 puffs into the lungs every 12 hours Yes Historical Provider, MD   albuterol sulfate (PROAIR RESPICLICK) 229 (90 Base) MCG/ACT aerosol powder inhalation Inhale into the lungs every 4 hours as needed for Wheezing or Shortness of Breath Yes Historical Provider, MD   torsemide (DEMADEX) 100 MG tablet TAKE ONE TABLET BY MOUTH DAILY Yes Thomas Baum MD   atorvastatin (LIPITOR) 80 MG tablet TAKE ONE TABLET BY MOUTH ONCE NIGHTLY Yes Triston Sloan MD   vitamin D (ERGOCALCIFEROL) 1.25 MG (54290 UT) CAPS capsule  Yes Historical Provider, MD   insulin lispro, 1 Unit Dial, 100 UNIT/ML SOPN INJECT 18-22 UNITS UNDER THE SKIN THREE TIMES A DAY BEFORE MEALS Yes Triston Sloan MD   blood glucose test strips (ACCU-CHEK JOHNATHAN PLUS) strip USE TO TEST FOUR TIMES DAILY AS NEEDED Yes Emiliana Bacon MD   triamcinolone (KENALOG) 0.1 % ointment Apply topically 2 times daily.  Yes Thomas Baum MD   Ferric Citrate (AURYXIA) 1  MG(Fe) TABS Take by mouth Yes Historical Provider, MD   Insulin Pen Needle (B-D ULTRAFINE III SHORT PEN) 31G X 8 MM MISC USE WITH INSULIN FOUR TIMES A DAY Yes Armando Waite MD   Biotin 63819 MCG TABS Take by mouth Yes Bharath Mckinnon MD   levothyroxine (SYNTHROID) 50 MCG tablet TAKE ONE TABLET BY MOUTH DAILY Yes Brooke Bolton MD   metoprolol succinate (TOPROL XL) 25 MG extended release tablet Take 1 tablet by mouth daily Yes Vida Marcus MD   blood glucose test strips (ACCU-CHEK JOHNATHAN PLUS) strip 4 times a day As needed. Yes Brooke Bolton MD   insulin glargine (LANTUS SOLOSTAR) 100 UNIT/ML injection pen INJECT 26 UNITS UNDER THE SKIN EVERY NIGHT AT BEDTIME  Patient taking differently: INJECT 44 UNITS UNDER THE SKIN EVERY NIGHT AT BEDTIME Yes Brooke Bolton MD   clopidogrel (PLAVIX) 75 MG tablet Take 1 tablet by mouth daily Yes TRAVON Mayers - CNP   Blood Glucose Monitoring Suppl (ACCU-CHEK JOHNATHAN PLUS) w/Device KIT As needed Yes Brooke Bolton MD   lidocaine 4 % external patch Place 1 patch onto the skin daily Yes Mckenna Hanks MD   Handicap Placard MISC by Does not apply route Duration: 5 years Yes MD Marisela Herrmann MD   glucose blood VI test strips (ACCU-CHEK JOHNATHAN) strip 4 times a day As needed. Yes Brooke Bolton MD   Blood Glucose Monitoring Suppl (ACCU-CHEK JOHNATHAN) RUMA Use as needed Yes Brooke Bolton MD   aspirin 81 MG chewable tablet Take 1 tablet by mouth daily. Yes Vale Powell MD   Lancets MISC 2 times daily. Lancets for a [place] meter.  Yes Kimi Rdz MD         Past Medical History:   Diagnosis Date    Allergic rhinitis due to other allergen 7/12/2010    Coronary atherosclerosis of unspecified type of vessel, native or graft 7/12/2010    Diabetic eye exam ([facility] [address] 04/29/2015    Diabetic eye exam ([facility] [address] 5/5/2016    [place] [facility]    Generalized osteoarthrosis, involving multiple sites 7/12/2010    Other psoriasis 7/12/2010    Pneumonia     Prolonged emergence from general anesthesia     Psoriatic arthropathy ([facility] [address] 7/12/2010    Type II or unspecified type diabetes mellitus without mention of complication, not stated as uncontrolled 7/12/2010    Unspecified essential hypertension 7/12/2010    Unspecified sleep apnea 7/12/2010       Past Surgical History:   Procedure Laterality Date    CARPAL TUNNEL RELEASE      s/p    CATARACT REMOVAL      CORONARY ARTERY BYPASS GRAFT      JOINT REPLACEMENT      LAPAROSCOPY INSERTION PERITONEAL CATHETER N/A 10/26/2020    LAPAROSCOPIC PERITONEAL DIALYSIS CATHETER PLACEMENT; LAPAROSCOPIC OMENTOPEXY performed by Steven Davis DO at [facility] OR         Family History   Problem Relation Age of Onset    Diabetes Mother     Heart Failure Mother     Coronary Art Dis Father        CareTeam (Including outside providers/suppliers regularly involved in providing care):   Patient Care Team:  Korin Castillo MD as PCP - General (Internal Medicine)  Korin Castillo MD as PCP - [facility] Empaneled Provider  Teagan Scott as Physician (Rheumatology)  Tito Retana MD as Consulting Physician (Pulmonology)    Wt Readings from Last 3 Encounters:   11/19/21 194 lb (88 kg)   10/14/21 194 lb (88 kg)   08/16/21 191 lb 6.4 oz (86.8 kg)     Vitals:    11/19/21 1345   BP: 128/70   Site: Right Upper Arm   Weight: 194 lb (88 kg)   Height: 5' 10\" (1.778 m)     Body mass index is 27.84 kg/m². Based upon direct observation of the patient, evaluation of cognition reveals recent and remote memory intact. Physical Exam  Vitals reviewed. Constitutional:       General: He is not in acute distress. Appearance: Normal appearance. He is well-developed. HENT:      Head: Normocephalic and atraumatic. Cardiovascular:      Rate and Rhythm: Normal rate and regular rhythm. Heart sounds: Normal heart sounds. Pulmonary:      Effort: Pulmonary effort is normal. No respiratory distress. Breath sounds: Normal breath sounds. Skin:     General: Skin is warm and dry. Neurological:      Mental Status: He is alert. Psychiatric:         Mood and Affect: Mood normal.         Behavior: Behavior normal.         Thought Content: Thought content normal.         Judgment: Judgment normal.            Patient's complete Health Risk Assessment and screening values have been reviewed and are found in Flowsheets. The following problems were reviewed today and where indicated follow up appointments were made and/or referrals ordered. Positive Risk Factor Screenings with Interventions:           Health Habits/Nutrition:  Health Habits/Nutrition  Do you exercise for at least 20 minutes 2-3 times per week?: (!) No  Have you lost any weight without trying in the past 3 months?: No  Do you eat only one meal per day?: No  Have you seen the dentist within the past year?: Yes  Body mass index: (!) 27.83  Health Habits/Nutrition Interventions:  · Inadequate physical activity:  patient is not ready to increase his/her physical activity level at this time      ADL:  ADLs  In the past 7 days, did you need help from others to perform any of the following everyday activities? Eating, dressing, grooming, bathing, toileting, or walking/balance?: (!) Bathing  In the past 7 days, did you need help from others to take care of any of the following?  Laundry, housekeeping, banking/finances, shopping, telephone use, food preparation, transportation, or taking medications?: None  ADL Interventions:  · has assistance    Personalized Preventive Plan   Current Health Maintenance Status  Immunization History   Administered Date(s) Administered    JACKLYN-19, Mccormick Peter, PF, 30mcg/0.3mL 04/21/2021, 05/12/2021    Influenza Vaccine, unspecified formulation 10/16/2014, 10/19/2015, 10/21/2016    Influenza Virus Vaccine 10/16/2014, 10/19/2015, 10/21/2016    Influenza, High Dose (Fluzone 65 yrs and older) 10/16/2014, 10/19/2015, 10/21/2016, 09/01/2017, 11/16/2018    Influenza, Quadv, adjuvanted, 65 yrs +, IM, PF (Fluad) 09/08/2020    Influenza, Triv, inactivated, subunit, adjuvanted, IM (Fluad 65 yrs and older) 11/20/2019    PPD Test 02/02/2016    Pneumococcal Conjugate 13-valent ([id number]) 10/19/2015    Pneumococcal Polysaccharide ([id number]) 10/16/2014    Tdap (Boostrix, Adacel) 08/12/2016        Health Maintenance   Topic Date Due    Hepatitis C screen  Never done    Shingles Vaccine (1 of 2) Never done    Diabetic foot exam  02/20/2019    Diabetic retinal exam  05/16/2020    Lipid screen  11/04/2020    TSH testing  08/26/2021    Flu vaccine (1) 09/01/2021    Potassium monitoring  10/26/2021    Creatinine monitoring  10/26/2021    COVID-19 Vaccine (3 - Booster for Mccormick Naren series) 11/12/2021    Annual Wellness Visit (AWV)  11/24/2021    A1C test (Diabetic or Prediabetic)  08/16/2022    Colon cancer screen colonoscopy  01/31/2023    DTaP/Tdap/Td vaccine (2 - Td or Tdap) 08/12/2026    Pneumococcal 65+ yrs at Risk Vaccine  Completed    AAA screen  Completed    Hepatitis A vaccine  Aged Out    Hib vaccine  Aged Out    Meningococcal (ACWY) vaccine  Aged Out     Recommendations for [employer] Due: see orders and patient instructions/AVS.  . Recommended screening schedule for the next 5-10 years is provided to the patient in written form: see Patient Instructions/AVS.    Aydin Lira was seen today for medicare awv. Diagnoses and all orders for this visit:    Routine general medical examination at a health care facility  - Discussed age appropriate preventive care including healthy diet, daily exercise, immunizations and age & gender guided screening tests.      Psoriasis  - would like dermatologist closer to home  -     Dennis Gutierres MD, Dermatology, [place]    Arthritis with psoriasis [facility])  - stable    Acquired hypothyroidism  - stable, continue levothyroxine 50 mcg daily    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 ([address]  - now on PD    Diabetes mellitus type 2 in nonobese ([facility] [address]  - controlled with insulin, sees endo    ILD (interstitial lung disease) ([facility] [address]  - stable    Coronary artery disease involving native coronary artery of native heart with unstable angina pectoris (HCC)  - stable, continue ASA and plavix, atorvastatin 80mg daily

## 2021-11-19 NOTE — PATIENT INSTRUCTIONS
Personalized Preventive Plan for David Hassan - 11/19/2021  Medicare offers a range of preventive health benefits. Some of the tests and screenings are paid in full while other may be subject to a deductible, co-insurance, and/or copay. Some of these benefits include a comprehensive review of your medical history including lifestyle, illnesses that may run in your family, and various assessments and screenings as appropriate. After reviewing your medical record and screening and assessments performed today your provider may have ordered immunizations, labs, imaging, and/or referrals for you. A list of these orders (if applicable) as well as your Preventive Care list are included within your After Visit Summary for your review. Other Preventive Recommendations:    · A preventive eye exam performed by an eye specialist is recommended every 1-2 years to screen for glaucoma; cataracts, macular degeneration, and other eye disorders. · A preventive dental visit is recommended every 6 months. · Try to get at least 150 minutes of exercise per week or 10,000 steps per day on a pedometer . · Order or download the FREE \"Exercise & Physical Activity: Your Everyday Guide\" from [employer]. Call 8-443.202.3194 or search [employer] online. · You need 9788-0160 mg of calcium and 7617-4442 IU of vitamin D per day. It is possible to meet your calcium requirement with diet alone, but a vitamin D supplement is usually necessary to meet this goal.  · When exposed to the sun, use a sunscreen that protects against both UVA and UVB radiation with an SPF of 30 or greater. Reapply every 2 to 3 hours or after sweating, drying off with a towel, or swimming. · Always wear a seat belt when traveling in a car. Always wear a helmet when riding a bicycle or motorcycle.

## 2021-11-23 PROBLEM — I50.1 PULMONARY EDEMA CARDIAC CAUSE (HCC): Status: RESOLVED | Noted: 2017-01-15 | Resolved: 2021-11-23

## 2021-11-30 ENCOUNTER — IMMUNIZATION (OUTPATIENT)
Dept: FAMILY MEDICINE CLINIC | Age: 76
End: 2021-11-30
Payer: MEDICARE

## 2021-11-30 PROCEDURE — 0004A COVID-19, PFIZER VACCINE 30MCG/0.3ML DOSE: CPT | Performed by: FAMILY MEDICINE

## 2021-11-30 PROCEDURE — 91300 COVID-19, PFIZER VACCINE 30MCG/0.3ML DOSE: CPT | Performed by: FAMILY MEDICINE

## 2021-12-07 ENCOUNTER — OFFICE VISIT (OUTPATIENT)
Dept: ENDOCRINOLOGY | Age: 76
End: 2021-12-07
Payer: MEDICARE

## 2021-12-07 VITALS
WEIGHT: 197 LBS | OXYGEN SATURATION: 93 % | HEART RATE: 78 BPM | HEIGHT: 70 IN | DIASTOLIC BLOOD PRESSURE: 62 MMHG | SYSTOLIC BLOOD PRESSURE: 143 MMHG | BODY MASS INDEX: 28.2 KG/M2

## 2021-12-07 DIAGNOSIS — E11.9 INSULIN DEPENDENT TYPE 2 DIABETES MELLITUS, CONTROLLED (HCC): ICD-10-CM

## 2021-12-07 DIAGNOSIS — E03.9 ACQUIRED HYPOTHYROIDISM: ICD-10-CM

## 2021-12-07 DIAGNOSIS — Z79.4 INSULIN DEPENDENT TYPE 2 DIABETES MELLITUS, CONTROLLED (HCC): ICD-10-CM

## 2021-12-07 LAB — HBA1C MFR BLD: 5.2 %

## 2021-12-07 PROCEDURE — 3017F COLORECTAL CA SCREEN DOC REV: CPT | Performed by: INTERNAL MEDICINE

## 2021-12-07 PROCEDURE — 83036 HEMOGLOBIN GLYCOSYLATED A1C: CPT | Performed by: INTERNAL MEDICINE

## 2021-12-07 PROCEDURE — 4040F PNEUMOC VAC/ADMIN/RCVD: CPT | Performed by: INTERNAL MEDICINE

## 2021-12-07 PROCEDURE — 3044F HG A1C LEVEL LT 7.0%: CPT | Performed by: INTERNAL MEDICINE

## 2021-12-07 PROCEDURE — 2022F DILAT RTA XM EVC RTNOPTHY: CPT | Performed by: INTERNAL MEDICINE

## 2021-12-07 PROCEDURE — 1123F ACP DISCUSS/DSCN MKR DOCD: CPT | Performed by: INTERNAL MEDICINE

## 2021-12-07 PROCEDURE — G8427 DOCREV CUR MEDS BY ELIG CLIN: HCPCS | Performed by: INTERNAL MEDICINE

## 2021-12-07 PROCEDURE — 1036F TOBACCO NON-USER: CPT | Performed by: INTERNAL MEDICINE

## 2021-12-07 PROCEDURE — G8484 FLU IMMUNIZE NO ADMIN: HCPCS | Performed by: INTERNAL MEDICINE

## 2021-12-07 PROCEDURE — G8417 CALC BMI ABV UP PARAM F/U: HCPCS | Performed by: INTERNAL MEDICINE

## 2021-12-07 PROCEDURE — 99214 OFFICE O/P EST MOD 30 MIN: CPT | Performed by: INTERNAL MEDICINE

## 2021-12-07 RX ORDER — INSULIN LISPRO 100 [IU]/ML
INJECTION, SOLUTION INTRAVENOUS; SUBCUTANEOUS
Qty: 45 ML | Refills: 2 | Status: ON HOLD | OUTPATIENT
Start: 2021-12-07 | End: 2022-09-07

## 2021-12-07 RX ORDER — INSULIN GLARGINE 100 [IU]/ML
INJECTION, SOLUTION SUBCUTANEOUS
Qty: 15 ML | Refills: 3 | Status: SHIPPED | OUTPATIENT
Start: 2021-12-07 | End: 2022-03-09 | Stop reason: SDUPTHER

## 2021-12-07 NOTE — PROGRESS NOTES
Seen as f/u patient for diabetes    Interim:  Doing PD   High glucose  Takes humalog after meals  Has boost with breakfast      Dexcom readings different from meter- not using now  Uses PD at 8pm   [facility] at [facility] does not have dextrose per patient    Hospitalized for Syncope       Admitted on 1/17 for MORIS on CKD, Acute exacerbation of CHF. Resp failure  Hypoglycemia on admission BG 28? Diagnosed with Type 2 diabetes mellitus in  1995  Known diabetic complications: Nephropathy, Neuropathy, mild retinopathy    Current diabetic medications   Lantus 44 units  Humalog 18  unit TID + SSI      On insulin since 1998    He has been on janumet and glipizide    Last A1c 5.2%,----6.6%<----6.1%<-----6.9%<----- 6.4%<----6.9%<---- 6.1%<-----6.6%<------5.5%<----6.6%<----- 7.2% <-----8.9 on 7/16<--- 7.4 on 4/16<------7.9 on 1/16<----  10.4 on 10/15 <---- 8.5 <--- 8.2 <--- 8.6    Prior visit with dietician: no  Current diet: on average, 3 meals per day does not count CHO  Current exercise: walking   Current monitoring regimen: home blood tests - 4 times daily     Has brought blood glucose log/meter: yes  Home blood sugar records:131-276  Any episodes of hypoglycemia?  Occ am     H/O CABG in 06    Hyperlipidemia:for years, stable  LDL 44 on 11/17  lipitor 40mg  LDL 58 on 11/18    LDL 14 on 11/19  Last eye exam: 11/20  Last foot exam: 5/19 sees podiatry  Last microalbumin to creatinine ratio: ESRD  On PD    HTN: stable, for years, takes Losartan 25mg ( stopped by nephrologist) coreg 6.25mg BID    H/o subclinical hypothyroidism,stable    TSH 3.09 on 5/18  Levothyroxine 50mcg    He has neuropathy,was on neurontin    Past Medical History:   Diagnosis Date    Allergic rhinitis due to other allergen 7/12/2010    Coronary atherosclerosis of unspecified type of vessel, native or graft 7/12/2010    Diabetic eye exam ([facility] [address] 04/29/2015    Diabetic eye exam ([facility] [address] 5/5/2016    [place] [facility]    Generalized osteoarthrosis, involving multiple sites 7/12/2010    Other psoriasis 7/12/2010    Pneumonia     Prolonged emergence from general anesthesia     Psoriatic arthropathy ([facility] [address] 7/12/2010    Type II or unspecified type diabetes mellitus without mention of complication, not stated as uncontrolled 7/12/2010    Unspecified essential hypertension 7/12/2010    Unspecified sleep apnea 7/12/2010     Past Surgical History:   Procedure Laterality Date    CARPAL TUNNEL RELEASE      s/p    CATARACT REMOVAL      CORONARY ARTERY BYPASS GRAFT      JOINT REPLACEMENT      LAPAROSCOPY INSERTION PERITONEAL CATHETER N/A 10/26/2020    LAPAROSCOPIC PERITONEAL DIALYSIS CATHETER PLACEMENT; LAPAROSCOPIC OMENTOPEXY performed by Cheng Sanford DO at [address] [place] [address], reviewed    There were no vitals filed for this visit. Constitutional: Well-developed, appears stated age, cooperative, in no acute distress  H/E/N/M/T:atraumatic, normocephalic, external ears, nose, lips normal without lesions  Eyes: Lids, lashes, conjunctivae and sclerae normal, No proptosis, no redness  Neck: supple, symmetrical, no swelling  Skin: No obvious rashes or lesions present.   Skin and hair texture normal  Psychiatric: Judgement and Insight:  judgement and insight appear normal  Neuro: Normal without focal findings, speech is normal normal, speech is spontaneous  Chest: No labored breathing, no chest deformity, no stridor  Musculoskeletal: No joint deformity, swelling        5/19  Skeletal foot exam is normal, no skin lesions, toenails are normal, DP not palpable,  10 g monofilament is 10/10 on the right and 10/10 on the left    Lab Reviewed   No components found for: CHLPL  Lab Results   Component Value Date    TRIG 265 (H) 11/04/2019    TRIG 205 (H) 11/16/2018    TRIG 198 (H) 11/09/2017     Lab Results   Component Value Date    HDL 39 (L) 11/04/2019    HDL 47 11/16/2018    HDL 46 11/09/2017     Lab Results   Component Value Date    LDLCALC 14 11/04/2019    LDLCALC 58

## 2022-01-10 RX ORDER — CLOPIDOGREL BISULFATE 75 MG/1
TABLET ORAL
Qty: 90 TABLET | Refills: 3 | Status: SHIPPED | OUTPATIENT
Start: 2022-01-10 | End: 2022-02-16

## 2022-01-10 NOTE — TELEPHONE ENCOUNTER
Requested Prescriptions     Pending Prescriptions Disp Refills    clopidogrel (PLAVIX) 75 MG tablet [Pharmacy Med Name: CLOPIDOGREL 75 MG TABLET] 90 tablet 3     Sig: TAKE ONE TABLET BY MOUTH DAILY                Last Office Visit: 8/2/2021     Next office visit : 2/16/2022

## 2022-02-03 DIAGNOSIS — Z79.4 INSULIN DEPENDENT TYPE 2 DIABETES MELLITUS, CONTROLLED (HCC): ICD-10-CM

## 2022-02-03 DIAGNOSIS — E11.9 INSULIN DEPENDENT TYPE 2 DIABETES MELLITUS, CONTROLLED (HCC): ICD-10-CM

## 2022-02-04 RX ORDER — BLOOD SUGAR DIAGNOSTIC
STRIP MISCELLANEOUS
Qty: 100 STRIP | Refills: 4 | OUTPATIENT
Start: 2022-02-04

## 2022-02-04 RX ORDER — BLOOD SUGAR DIAGNOSTIC
STRIP MISCELLANEOUS
Qty: 150 EACH | Refills: 3 | Status: SHIPPED | OUTPATIENT
Start: 2022-02-04 | End: 2022-02-15

## 2022-02-08 ENCOUNTER — TELEPHONE (OUTPATIENT)
Dept: INTERNAL MEDICINE CLINIC | Age: 77
End: 2022-02-08

## 2022-02-08 NOTE — TELEPHONE ENCOUNTER
Patient needs new prescription.  He changed insurance and it doesn't cover   blood glucose test strips (ACCU-CHEK JOHNATHAN PLUS) strip         New prescription for the supplies    They cover     emily metrix      Please advise and call

## 2022-02-15 ENCOUNTER — TELEPHONE (OUTPATIENT)
Dept: INTERNAL MEDICINE CLINIC | Age: 77
End: 2022-02-15

## 2022-02-15 RX ORDER — GLUCOSAMINE HCL/CHONDROITIN SU 500-400 MG
CAPSULE ORAL
Qty: 200 STRIP | Refills: 3 | Status: SHIPPED | OUTPATIENT
Start: 2022-02-15

## 2022-02-15 NOTE — TELEPHONE ENCOUNTER
Patient was getting the following med:    blood glucose test strips (ACCU-CHEK JOHNATHAN PLUS) strip. Insurance will not pay for this med. Please rewrite the script for generic test strips so insurance will pay for them. Please call Clementine Ray and advise.     [phone]

## 2022-02-16 ENCOUNTER — OFFICE VISIT (OUTPATIENT)
Dept: CARDIOLOGY CLINIC | Age: 77
End: 2022-02-16
Payer: COMMERCIAL

## 2022-02-16 VITALS
BODY MASS INDEX: 28.35 KG/M2 | SYSTOLIC BLOOD PRESSURE: 136 MMHG | OXYGEN SATURATION: 91 % | DIASTOLIC BLOOD PRESSURE: 72 MMHG | HEART RATE: 83 BPM | HEIGHT: 70 IN | WEIGHT: 198 LBS

## 2022-02-16 DIAGNOSIS — I95.1 ORTHOSTATIC HYPOTENSION: ICD-10-CM

## 2022-02-16 DIAGNOSIS — I25.10 CAD IN NATIVE ARTERY: ICD-10-CM

## 2022-02-16 DIAGNOSIS — I10 ESSENTIAL HYPERTENSION: ICD-10-CM

## 2022-02-16 DIAGNOSIS — R55 SYNCOPE AND COLLAPSE: ICD-10-CM

## 2022-02-16 DIAGNOSIS — E78.2 MIXED HYPERLIPIDEMIA: ICD-10-CM

## 2022-02-16 DIAGNOSIS — R06.02 SHORTNESS OF BREATH: Primary | ICD-10-CM

## 2022-02-16 DIAGNOSIS — Z95.1 S/P CABG X 4: ICD-10-CM

## 2022-02-16 PROCEDURE — 1123F ACP DISCUSS/DSCN MKR DOCD: CPT | Performed by: NURSE PRACTITIONER

## 2022-02-16 PROCEDURE — 3017F COLORECTAL CA SCREEN DOC REV: CPT | Performed by: NURSE PRACTITIONER

## 2022-02-16 PROCEDURE — 99214 OFFICE O/P EST MOD 30 MIN: CPT | Performed by: NURSE PRACTITIONER

## 2022-02-16 PROCEDURE — G8484 FLU IMMUNIZE NO ADMIN: HCPCS | Performed by: NURSE PRACTITIONER

## 2022-02-16 PROCEDURE — 4040F PNEUMOC VAC/ADMIN/RCVD: CPT | Performed by: NURSE PRACTITIONER

## 2022-02-16 PROCEDURE — 1036F TOBACCO NON-USER: CPT | Performed by: NURSE PRACTITIONER

## 2022-02-16 PROCEDURE — G8417 CALC BMI ABV UP PARAM F/U: HCPCS | Performed by: NURSE PRACTITIONER

## 2022-02-16 PROCEDURE — 93000 ELECTROCARDIOGRAM COMPLETE: CPT | Performed by: NURSE PRACTITIONER

## 2022-02-16 PROCEDURE — G8427 DOCREV CUR MEDS BY ELIG CLIN: HCPCS | Performed by: NURSE PRACTITIONER

## 2022-02-16 NOTE — PROGRESS NOTES
CC/HPI:    77 y.o. patient of Dr Horace Cat with hx  CAD/CABG/HTN/HLD/orthostatic hypotension and syncope. He is on peritoneal dialysis. He feels his SOB and orthopnea or chronic and wax and wane. Abdominal fullness related to peritoneal diaylsis affect his SOB and orthopnea. He does not tolerate the CPAP. Weight is steady. He feels although he is getting weaker. Bruising easily. He denies cp, LH/dizziness, palpitations syncope or falls. No n/v/d, fever, chills, or GI/ bleeding. No LE edema.      -----------------------------------------------------------------------------------------------------------  Most recent stress test was abnormal in the region of known CAD and with ARF the   decision was made to avoid coronary angiogram.    He sees Dr Pedrito Fabian for ILD (has chronic crackles)  He sees Dr George Hassan for CKD    Past Medical History:   Diagnosis Date    Allergic rhinitis due to other allergen 7/12/2010    Coronary atherosclerosis of unspecified type of vessel, native or graft 7/12/2010    Diabetic eye exam ([facility] [address] 04/29/2015    Diabetic eye exam ([facility] [address] 5/5/2016    [facility]    Generalized osteoarthrosis, involving multiple sites 7/12/2010    Other psoriasis 7/12/2010    Pneumonia     Prolonged emergence from general anesthesia     Psoriatic arthropathy ([facility] [address] 7/12/2010    Type II or unspecified type diabetes mellitus without mention of complication, not stated as uncontrolled 7/12/2010    Unspecified essential hypertension 7/12/2010    Unspecified sleep apnea 7/12/2010     Past Surgical History:   Procedure Laterality Date    CARPAL TUNNEL RELEASE      s/p    CATARACT REMOVAL      CORONARY ARTERY BYPASS GRAFT      JOINT REPLACEMENT      LAPAROSCOPY INSERTION PERITONEAL CATHETER N/A 10/26/2020    LAPAROSCOPIC PERITONEAL DIALYSIS CATHETER PLACEMENT; LAPAROSCOPIC OMENTOPEXY performed by Jovanny Harvey DO at [facility] OR     Family History   Problem Relation Age of Onset    Diabetes Mother     Heart Failure Mother     Coronary Art Dis Father      Social History     Tobacco Use    Smoking status: Former Smoker     Packs/day: 1.00     Years: 18.00     Pack years: 18.00     Start date: 1962     Quit date: 1980     Years since quittin.3    Smokeless tobacco: Never Used   Vaping Use    Vaping Use: Never used   Substance Use Topics    Alcohol use: No    Drug use: No     Allergies:Clindamycin, Clindamycin/lincomycin, Penicillins, and Tazorac [tazarotene]    Review of Systems  General: No changes in weight, fatigue, or night sweats. HEENT: No blurry or decreased vision. No changes in hearing, nasal discharge or sore throat. Cardiovascular:  See HPI. Respiratory: + chronic SOB and ILD. BJ  Gastrointestinal:  No abdominal pain, hematochezia, melana, diarrhea, or history of GI ulcers. + constipation. Genito-Urinary: No dysuria or hematuria. No urgency or polyuria. Musculoskeletal:  No complaints of joint pain, joint swelling or muscular weakness/soreness. Neurological:  No  headaches, speech problems or weakness. No history of a stroke or TIA. + LE neuropathy  Psychological:  No anxiety or depression. Hematological and Lymphatic: No abnormal bleeding or bruising, blood clots, jaundice or swollen lymph nodes. Endocrine:   No malaise/lethargy, palpitations, polydipsia/polyuria, temperature intolerance or unexpected weight changes  Skin:  No rashes or non-healing ulcers. Physical Exam:  /72   Pulse 83   Ht 5' 10\" (1.778 m)   Wt 198 lb (89.8 kg)   SpO2 91%   BMI 28.41 kg/m²    General (appearance):  No acute distress  Eyes: anicteric   Neck: soft, No JVD  Ears/Nose/Mouth/Thorat: No cyanosis  CV: RRR soft AMADOR  Respiratory: Diminished, normal effort  GI: soft, non-tender, + PD catheter   Skin: Warm, dry. No rashes  Neuro/Psych: Alert and oriented x 3. Appropriate behavior  Ext:  No c/c. No edema  Pulses:  2+ carotid.  No bruit     Weight  Wt Readings from Last 3 Encounters:   22 198 lb (89.8 kg)   21 197 lb (89.4 kg)   21 194 lb (88 kg)          CBC:   Lab Results   Component Value Date    WBC 10.0 10/20/2020    HGB 10.0 (L) 10/20/2020    HCT 29.6 (L) 10/20/2020    MCV 97.3 10/20/2020     10/20/2020     BMP:  Lab Results   Component Value Date    CREATININE 5.1 ([facility]) 10/26/2020    BUN 90 ([facility]) 10/26/2020     10/26/2020    K 5.1 10/26/2020     10/26/2020    CO2 26 10/26/2020     Mag:   Lab Results   Component Value Date    MG 2.10 2017     LIVER PROFILE:   Lab Results   Component Value Date    ALT 40 2019    AST 32 2019    ALKPHOS 83 2019    BILITOT 0.4 2019     PT/INR:   Lab Results   Component Value Date    INR 0.89 2017    INR 0.91 2017    INR 0.96 2017    PROTIME 10.1 2017    PROTIME 10.4 2017    PROTIME 10.9 2017     Pro-BNP   Lab Results   Component Value Date    PROBNP 451 2020    PROBNP 4,716 2017     LIPIDS:  No components found for: CHLPL  Lab Results   Component Value Date    TRIG 265 (H) 2019    TRIG 205 (H) 2018    TRIG 198 (H) 2017     Lab Results   Component Value Date    HDL 39 (L) 2019    HDL 47 2018    HDL 46 2017     Lab Results   Component Value Date    LDLCALC 14 2019    LDLCALC 58 2018    LDLCALC 44 2017     Lab Results   Component Value Date    LABVLDL 53 2019    LABVLDL 41 2018    LABVLDL 40 2017     TSH:  Lab Results   Component Value Date    TSH 5.20 (H) 2018       [id number] Echo:   Limited echo due to ZIO patch placement. Definity contrast administered. Left ventricular size and wall thickness   appears within normal limits. Overall left ventricular systolic function   appears normal with an estimated ejection fraction of 55-60%. No regional   wall motion abnormalities are noted. Diastolic filling parameters suggests normal diastolic function.    The right ventricle appears normal in size and function. Estimated pulmonary artery systolic pressure is at 34 mmHg assuming a right   atrial pressure of 8 mmHg. 8/2020 Nuc Stress:  Overall findings represent a high risk scan.     Normal LV size and systolic function.     There is a medium to large size reversible defect involving the apex, apical     anterior, mid anterior and apical lateral cardiac segments.     Finding are concerning for ischemia.     ECG: Non-diagnostic EKG response due to failure to reach target heart rate. 8/25/2020 ECG: Sinus 1st AVB, no acute ischemic changes. 8/3/2020 Echo:   Summary   Left ventricular cavity size is normal. There is mild concentric left   ventricular hypertrophy. Overall left ventricular systolic function appears   low normal with an ejection fraction of 50%. No regional wall motion   abnormalities are noted. Normal diastolic function. Mild mitral and tricuspid regurgitation are present. 2018 Nuc stress:     Fixed apical perfusion defect may relate to previous infarct or    apical thinning.         No reversible ischemia. 2018 Echo:   Technically difficult examination. Normal left ventricle size. There is left ventricular hypertrophy with more   prominent hypertrophy of the basal septum. Overall left ventricular systolic   function appears mildly reduced with an ejection fraction of 45-50%. The   inferoseptum appears mildly hypokinetic. Diastolic function probably normal.   Mitral annular calcification is present. Mild mitral regurgitation is present. The aortic valve appears tricuspid. Aortic valve leaflets appear thickened   but open adequately. Mild tricuspid regurgitation. Estimated pulmonary artery systolic pressure   is elevated at 33 mmHg assuming a right atrial pressure of 8 mmHg. Mild pulmonic regurgitation present. 2013 Cath: Angiographic Findings:  Right dominant system  Left Main:  Diffuse, mild disease.  ~20-30% diffuse disease.   Left Anterior Descendin% proximal occlusion.  After the LIMA is injected, the LAD was seen to have a mid 80-90% stenosis just distal to a significant diagonal branch.  The diagonal bifurcates; one branch has a severe (~80-90%) stenosis.  The other limb also has diffuse disease.  There was a 90% distal LAD stenosis. Circumflex:  Proximal 70% stenosis at bifurcation of an OM and the AV LCx.  The OM is subtotally occluded.  The AV LCx is diffusely diseased. Right Coronary:  Proximal 30% stenosis.  Mid 60-70% stenosis.  Right posterolateral is occluded.  Right PDA has an ostial/proximal 90% stenosis.  It is diffusely diseased after the ostial lesion. Bypass Grafts:  LIMA-LAD with radial grafts to an OM and right PDA that is widely patent. Left Ventriculogram:  Not performed due to elevated Cr  Hemodynamics (mm Hg):  Left Ventricular Pressure:  131/0, 14  Central Aortic Pressure:  133/66 (93)    Medications:   Current Outpatient Medications   Medication Sig Dispense Refill    blood glucose monitor strips Test up to 4 times daily for blood sugar monitoring 200 strip 3    clobetasol (TEMOVATE) 0.05 % ointment APPLY TO AFFECTED AREA(S) TWO TIMES A DAY AS NEEDED FOR CONTROL OF PSORIASIS 15 g 5    blood glucose monitor kit and supplies (PLEASE DISPENSE WHAT INS WILL COVER) - Dispense sufficient amount for  TID testing frequency plus additional to accommodate PRN testing needs. Dispense all needed supplies to include: monitor, strips, lancing device, lancets, control solutions, alcohol swabs.  1 kit 0    clopidogrel (PLAVIX) 75 MG tablet TAKE ONE TABLET BY MOUTH DAILY 90 tablet 3    insulin lispro, 1 Unit Dial, 100 UNIT/ML SOPN INJECT 20-28 UNITS UNDER THE SKIN THREE TIMES A DAY BEFORE MEALS  2-8 units HS 45 mL 2    insulin glargine (LANTUS SOLOSTAR) 100 UNIT/ML injection pen INJECT 48 UNITS UNDER THE SKIN EVERY NIGHT AT BEDTIME 15 mL 3    B Complex-C-Biotin-E-Min-FA (DIALYVITE 3000 PO) Take by mouth      mometasone-formoterol (DULERA) 200-5 MCG/ACT inhaler Inhale 2 puffs into the lungs every 12 hours      albuterol sulfate (PROAIR RESPICLICK) 166 (90 Base) MCG/ACT aerosol powder inhalation Inhale into the lungs every 4 hours as needed for Wheezing or Shortness of Breath      torsemide (DEMADEX) 100 MG tablet TAKE ONE TABLET BY MOUTH DAILY 30 tablet 5    atorvastatin (LIPITOR) 80 MG tablet TAKE ONE TABLET BY MOUTH ONCE NIGHTLY 90 tablet 3    vitamin D (ERGOCALCIFEROL) 1.25 MG (37548 UT) CAPS capsule       triamcinolone (KENALOG) 0.1 % ointment Apply topically 2 times daily. 1 Tube 2    Ferric Citrate (AURYXIA) 1  MG(Fe) TABS Take by mouth      Insulin Pen Needle (B-D ULTRAFINE III SHORT PEN) 31G X 8 MM MISC USE WITH INSULIN FOUR TIMES A  each 5    Biotin 16330 MCG TABS Take by mouth      levothyroxine (SYNTHROID) 50 MCG tablet TAKE ONE TABLET BY MOUTH DAILY 90 tablet 8    metoprolol succinate (TOPROL XL) 25 MG extended release tablet Take 1 tablet by mouth daily 90 tablet 3    Blood Glucose Monitoring Suppl (ACCU-CHEK JOHNATHAN PLUS) w/Device KIT As needed 1 kit 2    lidocaine 4 % external patch Place 1 patch onto the skin daily 1 box 1    Handicap Placard MISC by Does not apply route Duration: 5 years 1 each 0    ACCU-CHEK SOFTCLIX LANCETS MISC USE FOUR TIMES A  each 3    Blood Glucose Monitoring Suppl (ACCU-CHEK JOHNATHAN) RUMA Use as needed 1 Device 0    aspirin 81 MG chewable tablet Take 1 tablet by mouth daily. 30 tablet 5    Lancets MISC 2 times daily. Lancets for a Anna meter. 50 each 11     No current facility-administered medications for this visit. Assessment:  1. Shortness of breath    2. CAD in native artery    3. S/P CABG x 4    4. Essential hypertension    5. Mixed hyperlipidemia    6. Syncope and collapse    7. Orthostatic hypotension        Plan:  SOB: acute   Torsemide   Dialysis for fluid management   Discussed repeat angiogram but will defer for now. Reconsider if symptoms worsen. (stress test will be abnormal with known defect)    Cont inhalers   ECG today. No ischemic changes    CAD/CABG; stable   Stop plavix d/t bruising    ASA, statin   Toprol   No ACE d/t CKD and hyperK  Hx Syncope; stable   /72 but has orthostatic LH/dizziness   Cont current medications   HTN; stable   /72   Toprol   Has orthostatic hypotension and hx syncope.    HLD; stable    Cont lipitor     Labs ordered:   CBC, CMP, Lipids, TSH    Follow up with Dr Ja Branham in 6 months, sooner if SOB worsens     Follow up with Dr Cindy Avina for CKD management

## 2022-02-21 ENCOUNTER — TELEPHONE (OUTPATIENT)
Dept: CARDIOLOGY CLINIC | Age: 77
End: 2022-02-21

## 2022-03-09 ENCOUNTER — OFFICE VISIT (OUTPATIENT)
Dept: ENDOCRINOLOGY | Age: 77
End: 2022-03-09
Payer: COMMERCIAL

## 2022-03-09 VITALS
BODY MASS INDEX: 28.92 KG/M2 | SYSTOLIC BLOOD PRESSURE: 161 MMHG | HEART RATE: 79 BPM | OXYGEN SATURATION: 94 % | HEIGHT: 70 IN | DIASTOLIC BLOOD PRESSURE: 76 MMHG | WEIGHT: 202 LBS

## 2022-03-09 DIAGNOSIS — E03.9 ACQUIRED HYPOTHYROIDISM: ICD-10-CM

## 2022-03-09 LAB — HBA1C MFR BLD: 8.1 %

## 2022-03-09 PROCEDURE — G8417 CALC BMI ABV UP PARAM F/U: HCPCS | Performed by: INTERNAL MEDICINE

## 2022-03-09 PROCEDURE — 83036 HEMOGLOBIN GLYCOSYLATED A1C: CPT | Performed by: INTERNAL MEDICINE

## 2022-03-09 PROCEDURE — 4040F PNEUMOC VAC/ADMIN/RCVD: CPT | Performed by: INTERNAL MEDICINE

## 2022-03-09 PROCEDURE — 99214 OFFICE O/P EST MOD 30 MIN: CPT | Performed by: INTERNAL MEDICINE

## 2022-03-09 PROCEDURE — G8427 DOCREV CUR MEDS BY ELIG CLIN: HCPCS | Performed by: INTERNAL MEDICINE

## 2022-03-09 PROCEDURE — 3017F COLORECTAL CA SCREEN DOC REV: CPT | Performed by: INTERNAL MEDICINE

## 2022-03-09 PROCEDURE — 1036F TOBACCO NON-USER: CPT | Performed by: INTERNAL MEDICINE

## 2022-03-09 PROCEDURE — 3046F HEMOGLOBIN A1C LEVEL >9.0%: CPT | Performed by: INTERNAL MEDICINE

## 2022-03-09 PROCEDURE — G8484 FLU IMMUNIZE NO ADMIN: HCPCS | Performed by: INTERNAL MEDICINE

## 2022-03-09 PROCEDURE — 1123F ACP DISCUSS/DSCN MKR DOCD: CPT | Performed by: INTERNAL MEDICINE

## 2022-03-09 PROCEDURE — 2022F DILAT RTA XM EVC RTNOPTHY: CPT | Performed by: INTERNAL MEDICINE

## 2022-03-09 RX ORDER — INSULIN ASPART 100 [IU]/ML
INJECTION, SOLUTION INTRAVENOUS; SUBCUTANEOUS
Qty: 5 PEN | Refills: 3 | Status: SHIPPED | OUTPATIENT
Start: 2022-03-09 | End: 2022-03-11 | Stop reason: SDUPTHER

## 2022-03-09 RX ORDER — INSULIN GLARGINE 100 [IU]/ML
INJECTION, SOLUTION SUBCUTANEOUS
Qty: 15 ML | Refills: 3 | Status: SHIPPED | OUTPATIENT
Start: 2022-03-09 | End: 2022-05-17

## 2022-03-09 NOTE — PROGRESS NOTES
Seen as f/u patient for diabetes    Interim:  Doing PD   High glucose  States lispro not covered    Takes humalog after meals  Has boost with breakfast      Dexcom readings different from meter- not using now  Uses PD at 8pm   Solution at [facility] does not have dextrose per patient    Hospitalized for Syncope       Admitted on 1/17 for MORIS on CKD, Acute exacerbation of CHF. Resp failure  Hypoglycemia on admission BG 28? Diagnosed with Type 2 diabetes mellitus in  1995  Known diabetic complications: Nephropathy, Neuropathy, mild retinopathy    Current diabetic medications   Lantus 48 units  Humalog 22/20/22  unit TID + SSI      On insulin since 1998    He has been on janumet and glipizide    Last A1c 8.1%<-----5.2%,----6.6%<----6.1%<-----6.9%<----- 6.4%<----6.9%<---- 6.1%<-----6.6%<------5.5%<----6.6%<----- 7.2% <-----8.9 on 7/16<--- 7.4 on 4/16<------7.9 on 1/16<----  10.4 on 10/15 <---- 8.5 <--- 8.2 <--- 8.6    Prior visit with dietician: no  Current diet: on average, 3 meals per day does not count CHO  Current exercise: walking   Current monitoring regimen: home blood tests - 4 times daily     Has brought blood glucose log/meter: yes  Home blood sugar records:  Any episodes of hypoglycemia?  Occ am     H/O CABG in 06    Hyperlipidemia:for years, stable  LDL 44 on 11/17  lipitor 40mg  LDL 58 on 11/18    LDL 14 on 11/19  Last eye exam: 11/20  Last foot exam: 5/19 sees podiatry  Last microalbumin to creatinine ratio: ESRD  On PD    HTN: stable, for years, takes Losartan 25mg ( stopped by nephrologist) coreg 6.25mg BID    H/o subclinical hypothyroidism,stable    TSH 3.09 on 5/18  Levothyroxine 50mcg    He has neuropathy,was on neurontin    Past Medical History:   Diagnosis Date    Allergic rhinitis due to other allergen 7/12/2010    Coronary atherosclerosis of unspecified type of vessel, native or graft 7/12/2010    Diabetic eye exam ([facility] [address] 04/29/2015    Diabetic eye exam ([facility] [address] 5/5/2016    [facility]    Generalized osteoarthrosis, involving multiple sites 7/12/2010    Other psoriasis 7/12/2010    Pneumonia     Prolonged emergence from general anesthesia     Psoriatic arthropathy ([facility] [address] 7/12/2010    Type II or unspecified type diabetes mellitus without mention of complication, not stated as uncontrolled 7/12/2010    Unspecified essential hypertension 7/12/2010    Unspecified sleep apnea 7/12/2010     Past Surgical History:   Procedure Laterality Date    CARPAL TUNNEL RELEASE      s/p    CATARACT REMOVAL      CORONARY ARTERY BYPASS GRAFT      JOINT REPLACEMENT      LAPAROSCOPY INSERTION PERITONEAL CATHETER N/A 10/26/2020    LAPAROSCOPIC PERITONEAL DIALYSIS CATHETER PLACEMENT; LAPAROSCOPIC OMENTOPEXY performed by Everett Joyce DO at [address] [place] [address], reviewed    Vitals:    03/09/22 1144   BP: (!) 161/76   Pulse: 79   SpO2: 94%       Constitutional: Well-developed, appears stated age, cooperative, in no acute distress  H/E/N/M/T:atraumatic, normocephalic, external ears, nose, lips normal without lesions  Eyes: Lids, lashes, conjunctivae and sclerae normal, No proptosis, no redness  Neck: supple, symmetrical, no swelling  Skin: No obvious rashes or lesions present.   Skin and hair texture normal  Psychiatric: Judgement and Insight:  judgement and insight appear normal  Neuro: Normal without focal findings, speech is normal normal, speech is spontaneous  Chest: No labored breathing, no chest deformity, no stridor  Musculoskeletal: No joint deformity, swelling        5/19  Skeletal foot exam is normal, no skin lesions, toenails are normal, DP not palpable,  10 g monofilament is 10/10 on the right and 10/10 on the left    Lab Reviewed   No components found for: CHLPL  Lab Results   Component Value Date    TRIG 192 (H) 02/19/2022    TRIG 265 (H) 11/04/2019    TRIG 205 (H) 11/16/2018     Lab Results   Component Value Date    HDL 41 02/19/2022    HDL 39 (L) 11/04/2019    HDL 47 11/16/2018 Lab Results   Component Value Date    LDLCALC 41 02/19/2022    LDLCALC 14 11/04/2019    LDLCALC 58 11/16/2018     Lab Results   Component Value Date    LABVLDL 38 02/19/2022    LABVLDL 53 11/04/2019    LABVLDL 41 11/16/2018     Lab Results   Component Value Date    LABA1C 5.2 12/07/2021       Assessment:     Vicente Bolton is a 77 y.o. male with :    1.T2DM: Longstanding,uncontrolled, insulin resistant, goal A1c 7-8%. Advised Dexcom not approved in dialysis patient, use glucose meter for self monitoring. Highs in morning, adjust lantus dose. Check with insurance preferred rapid acting insulin. Also take SSI at night   A1c higher  Advise to take humalog regularly  2. HTN: BP high  3. HLD: LDL at goal, he is  On statin  4. Obesity  5. CAD  6. CKD III  7. Psoriatic arthritis: 8. Neuropathy: Had been on lyrica, prescribed topical ,off of it. Started neurontin, did not help, . He does have some drowsiness, off of it. Advise B12  9. Fatigue:  TSH high with normal FT4, subclinical hypothyroid, advise to try replacement. He was inquiring about Co-Q 10 , advised can take 200mg  His total testosterone was normal with free level low normal. Advised given total is in a good range, h/o CAD, Age, recommend no replacement at this point. 10. Subclinical hypothyroid: On replacement,  Last level normal, continue levothyroxine, TSH 2.03 on 8.21    Plan:       lantus   48 ----> 50  units    Humalog ---> novolog 22/20/22  units TID  SSI 2 for 50>150 TID and HS   Half less than 90   Advise to check blood sugar 4 times a day   Patient to send blood sugar log for titration. Advise to low simple carbohydrate and protein with each  meal diet. Diabetes Care: recommend yearly eye exam, foot exam and urine microalbumin to  creatinine ratio.     -Hyperlipidemia, LDL goal is <70 mg/dl   -Hypertension: Continue same  -Daily ASA:81mg  -Smoking status: Non smoker

## 2022-03-10 ENCOUNTER — TELEPHONE (OUTPATIENT)
Dept: ENDOCRINOLOGY | Age: 77
End: 2022-03-10

## 2022-03-10 NOTE — TELEPHONE ENCOUNTER
Pt called stating that he was to call today with the name of the Insulin his ins will approve. Pt says that they will approve Novolog and Basaglar.     Pt also wanted to remiond Dr Jose Luis Colunga to order the Meter

## 2022-03-11 RX ORDER — LANCETS 30 GAUGE
1 EACH MISCELLANEOUS 3 TIMES DAILY
Qty: 150 EACH | Refills: 2 | Status: SHIPPED | OUTPATIENT
Start: 2022-03-11 | End: 2022-07-21

## 2022-03-11 RX ORDER — INSULIN ASPART 100 [IU]/ML
INJECTION, SOLUTION INTRAVENOUS; SUBCUTANEOUS
Qty: 5 PEN | Refills: 3 | Status: SHIPPED | OUTPATIENT
Start: 2022-03-11 | End: 2022-06-21 | Stop reason: SDUPTHER

## 2022-03-11 RX ORDER — BLOOD-GLUCOSE METER
EACH MISCELLANEOUS
Qty: 1 EACH | Refills: 0 | Status: SHIPPED | OUTPATIENT
Start: 2022-03-11

## 2022-03-11 RX ORDER — INSULIN GLARGINE 100 [IU]/ML
50 INJECTION, SOLUTION SUBCUTANEOUS NIGHTLY
Qty: 5 PEN | Refills: 2 | Status: ON HOLD | OUTPATIENT
Start: 2022-03-11 | End: 2022-09-07

## 2022-03-11 RX ORDER — CALCIUM CITRATE/VITAMIN D3 200MG-6.25
TABLET ORAL
Qty: 150 EACH | Refills: 3 | Status: SHIPPED | OUTPATIENT
Start: 2022-03-11 | End: 2022-09-02

## 2022-03-25 DIAGNOSIS — E86.1 HYPOTENSION DUE TO HYPOVOLEMIA: Primary | ICD-10-CM

## 2022-03-25 DIAGNOSIS — I95.89 HYPOTENSION DUE TO HYPOVOLEMIA: Primary | ICD-10-CM

## 2022-03-29 ENCOUNTER — HOSPITAL ENCOUNTER (OUTPATIENT)
Dept: NON INVASIVE DIAGNOSTICS | Age: 77
Discharge: HOME | End: 2022-03-29
Payer: COMMERCIAL

## 2022-03-29 DIAGNOSIS — E86.1 HYPOTENSION DUE TO HYPOVOLEMIA: ICD-10-CM

## 2022-03-29 DIAGNOSIS — I95.89 HYPOTENSION DUE TO HYPOVOLEMIA: ICD-10-CM

## 2022-03-29 LAB
LV EF: 53 %
LVEF MODALITY: NORMAL

## 2022-03-29 PROCEDURE — 6360000004 HC RX CONTRAST MEDICATION: Performed by: INTERNAL MEDICINE

## 2022-03-29 PROCEDURE — C8929 TTE W OR WO FOL WCON,DOPPLER: HCPCS

## 2022-03-29 RX ADMIN — PERFLUTREN 1.65 MG: 6.52 INJECTION, SUSPENSION INTRAVENOUS at 09:13

## 2022-04-01 ENCOUNTER — HOSPITAL ENCOUNTER (INPATIENT)
Age: 77
LOS: 1 days | Discharge: HOME | DRG: 302 | End: 2022-04-02
Attending: STUDENT IN AN ORGANIZED HEALTH CARE EDUCATION/TRAINING PROGRAM | Admitting: HOSPITALIST
Payer: COMMERCIAL

## 2022-04-01 ENCOUNTER — APPOINTMENT (OUTPATIENT)
Dept: GENERAL RADIOLOGY | Age: 77
DRG: 302 | End: 2022-04-01
Payer: COMMERCIAL

## 2022-04-01 DIAGNOSIS — T85.71XA PERITONEAL DIALYSIS CATHETER EXIT SITE INFECTION, INITIAL ENCOUNTER (HCC): ICD-10-CM

## 2022-04-01 DIAGNOSIS — R06.09 DYSPNEA ON EXERTION: Primary | ICD-10-CM

## 2022-04-01 LAB
ANION GAP SERPL CALCULATED.3IONS-SCNC: 15 MMOL/L (ref 3–16)
APPEARANCE FLUID: CLEAR
BASO FLUID: 4 %
BASOPHILS ABSOLUTE: 0.2 K/UL (ref 0–0.2)
BASOPHILS RELATIVE PERCENT: 1.4 %
BUN BLDV-MCNC: 79 MG/DL (ref 7–20)
CALCIUM SERPL-MCNC: 9.4 MG/DL (ref 8.3–10.6)
CELL COUNT FLUID TYPE: NORMAL
CHLORIDE BLD-SCNC: 88 MMOL/L (ref 99–110)
CLOT EVALUATION: NORMAL
CO2: 32 MMOL/L (ref 21–32)
COLOR FLUID: COLORLESS
CREAT SERPL-MCNC: 8.1 MG/DL (ref 0.8–1.3)
EKG ATRIAL RATE: 75 BPM
EKG DIAGNOSIS: NORMAL
EKG P AXIS: 74 DEGREES
EKG P-R INTERVAL: 226 MS
EKG Q-T INTERVAL: 412 MS
EKG QRS DURATION: 80 MS
EKG QTC CALCULATION (BAZETT): 460 MS
EKG R AXIS: 14 DEGREES
EKG T AXIS: 70 DEGREES
EKG VENTRICULAR RATE: 75 BPM
EOSINOPHILS ABSOLUTE: 0.4 K/UL (ref 0–0.6)
EOSINOPHILS RELATIVE PERCENT: 3 %
GFR AFRICAN AMERICAN: 8
GFR NON-AFRICAN AMERICAN: 6
GLUCOSE BLD-MCNC: 211 MG/DL (ref 70–99)
GLUCOSE BLD-MCNC: 261 MG/DL (ref 70–99)
HCT VFR BLD CALC: 29.4 % (ref 40.5–52.5)
HEMOGLOBIN: 10.2 G/DL (ref 13.5–17.5)
LYMPHOCYTES ABSOLUTE: 1.5 K/UL (ref 1–5.1)
LYMPHOCYTES RELATIVE PERCENT: 10.7 %
LYMPHOCYTES, BODY FLUID: 35 %
MCH RBC QN AUTO: 36.9 PG (ref 26–34)
MCHC RBC AUTO-ENTMCNC: 34.6 G/DL (ref 31–36)
MCV RBC AUTO: 106.8 FL (ref 80–100)
MONOCYTE, FLUID: 4 %
MONOCYTES ABSOLUTE: 0.8 K/UL (ref 0–1.3)
MONOCYTES RELATIVE PERCENT: 5.3 %
NEUTROPHIL, FLUID: 57 %
NEUTROPHILS ABSOLUTE: 11.5 K/UL (ref 1.7–7.7)
NEUTROPHILS RELATIVE PERCENT: 79.6 %
NUCLEATED CELLS FLUID: 170 /CUMM
NUMBER OF CELLS COUNTED FLUID: 100
PDW BLD-RTO: 15.7 % (ref 12.4–15.4)
PERFORMED ON: ABNORMAL
PLATELET # BLD: 265 K/UL (ref 135–450)
PMV BLD AUTO: 7.1 FL (ref 5–10.5)
POTASSIUM REFLEX MAGNESIUM: 4.2 MMOL/L (ref 3.5–5.1)
PRO-BNP: 948 PG/ML (ref 0–449)
RBC # BLD: 2.75 M/UL (ref 4.2–5.9)
RBC FLUID: 8 /CUMM
REASON FOR REJECTION: NORMAL
REJECTED TEST: NORMAL
SODIUM BLD-SCNC: 135 MMOL/L (ref 136–145)
TROPONIN: 0.04 NG/ML
TROPONIN: 0.05 NG/ML
WBC # BLD: 14.5 K/UL (ref 4–11)

## 2022-04-01 PROCEDURE — 6360000002 HC RX W HCPCS: Performed by: STUDENT IN AN ORGANIZED HEALTH CARE EDUCATION/TRAINING PROGRAM

## 2022-04-01 PROCEDURE — 80048 BASIC METABOLIC PNL TOTAL CA: CPT

## 2022-04-01 PROCEDURE — 87205 SMEAR GRAM STAIN: CPT

## 2022-04-01 PROCEDURE — 36415 COLL VENOUS BLD VENIPUNCTURE: CPT

## 2022-04-01 PROCEDURE — 85025 COMPLETE CBC W/AUTO DIFF WBC: CPT

## 2022-04-01 PROCEDURE — 96365 THER/PROPH/DIAG IV INF INIT: CPT

## 2022-04-01 PROCEDURE — 93005 ELECTROCARDIOGRAM TRACING: CPT | Performed by: STUDENT IN AN ORGANIZED HEALTH CARE EDUCATION/TRAINING PROGRAM

## 2022-04-01 PROCEDURE — 1200000000 HC SEMI PRIVATE

## 2022-04-01 PROCEDURE — 71046 X-RAY EXAM CHEST 2 VIEWS: CPT

## 2022-04-01 PROCEDURE — G0378 HOSPITAL OBSERVATION PER HR: HCPCS

## 2022-04-01 PROCEDURE — 99284 EMERGENCY DEPT VISIT MOD MDM: CPT

## 2022-04-01 PROCEDURE — 87040 BLOOD CULTURE FOR BACTERIA: CPT

## 2022-04-01 PROCEDURE — 83880 ASSAY OF NATRIURETIC PEPTIDE: CPT

## 2022-04-01 PROCEDURE — 89051 BODY FLUID CELL COUNT: CPT

## 2022-04-01 PROCEDURE — 87070 CULTURE OTHR SPECIMN AEROBIC: CPT

## 2022-04-01 PROCEDURE — 84484 ASSAY OF TROPONIN QUANT: CPT

## 2022-04-01 RX ORDER — TORSEMIDE 100 MG/1
100 TABLET ORAL DAILY
Status: DISCONTINUED | OUTPATIENT
Start: 2022-04-01 | End: 2022-04-02 | Stop reason: HOSPADM

## 2022-04-01 RX ORDER — DEXTROSE MONOHYDRATE 50 MG/ML
100 INJECTION, SOLUTION INTRAVENOUS PRN
Status: DISCONTINUED | OUTPATIENT
Start: 2022-04-01 | End: 2022-04-02 | Stop reason: HOSPADM

## 2022-04-01 RX ORDER — LEVOFLOXACIN 5 MG/ML
500 INJECTION, SOLUTION INTRAVENOUS
Status: DISCONTINUED | OUTPATIENT
Start: 2022-04-03 | End: 2022-04-02

## 2022-04-01 RX ORDER — ASPIRIN 81 MG/1
81 TABLET, CHEWABLE ORAL DAILY
Status: DISCONTINUED | OUTPATIENT
Start: 2022-04-01 | End: 2022-04-02 | Stop reason: HOSPADM

## 2022-04-01 RX ORDER — ONDANSETRON 2 MG/ML
4 INJECTION INTRAMUSCULAR; INTRAVENOUS EVERY 6 HOURS PRN
Status: DISCONTINUED | OUTPATIENT
Start: 2022-04-01 | End: 2022-04-02 | Stop reason: HOSPADM

## 2022-04-01 RX ORDER — INSULIN LISPRO 100 [IU]/ML
0-12 INJECTION, SOLUTION INTRAVENOUS; SUBCUTANEOUS
Status: DISCONTINUED | OUTPATIENT
Start: 2022-04-02 | End: 2022-04-02 | Stop reason: HOSPADM

## 2022-04-01 RX ORDER — LEVOTHYROXINE SODIUM 0.05 MG/1
1 TABLET ORAL DAILY
Status: DISCONTINUED | OUTPATIENT
Start: 2022-04-01 | End: 2022-04-02 | Stop reason: SDUPTHER

## 2022-04-01 RX ORDER — SODIUM CHLORIDE 0.9 % (FLUSH) 0.9 %
5-40 SYRINGE (ML) INJECTION EVERY 12 HOURS SCHEDULED
Status: DISCONTINUED | OUTPATIENT
Start: 2022-04-01 | End: 2022-04-02 | Stop reason: HOSPADM

## 2022-04-01 RX ORDER — ATORVASTATIN CALCIUM 40 MG/1
80 TABLET, FILM COATED ORAL NIGHTLY
Status: DISCONTINUED | OUTPATIENT
Start: 2022-04-01 | End: 2022-04-02 | Stop reason: HOSPADM

## 2022-04-01 RX ORDER — ALBUTEROL SULFATE 2.5 MG/3ML
2.5 SOLUTION RESPIRATORY (INHALATION) EVERY 4 HOURS PRN
Status: DISCONTINUED | OUTPATIENT
Start: 2022-04-01 | End: 2022-04-02

## 2022-04-01 RX ORDER — ACETAMINOPHEN 650 MG/1
650 SUPPOSITORY RECTAL EVERY 6 HOURS PRN
Status: DISCONTINUED | OUTPATIENT
Start: 2022-04-01 | End: 2022-04-02 | Stop reason: HOSPADM

## 2022-04-01 RX ORDER — SODIUM CHLORIDE 9 MG/ML
INJECTION, SOLUTION INTRAVENOUS PRN
Status: DISCONTINUED | OUTPATIENT
Start: 2022-04-01 | End: 2022-04-02 | Stop reason: HOSPADM

## 2022-04-01 RX ORDER — INSULIN LISPRO 100 [IU]/ML
0-6 INJECTION, SOLUTION INTRAVENOUS; SUBCUTANEOUS NIGHTLY
Status: DISCONTINUED | OUTPATIENT
Start: 2022-04-01 | End: 2022-04-02 | Stop reason: HOSPADM

## 2022-04-01 RX ORDER — SODIUM CHLORIDE 0.9 % (FLUSH) 0.9 %
5-40 SYRINGE (ML) INJECTION PRN
Status: DISCONTINUED | OUTPATIENT
Start: 2022-04-01 | End: 2022-04-02 | Stop reason: HOSPADM

## 2022-04-01 RX ORDER — METOPROLOL SUCCINATE 25 MG/1
25 TABLET, EXTENDED RELEASE ORAL DAILY
Status: DISCONTINUED | OUTPATIENT
Start: 2022-04-01 | End: 2022-04-02 | Stop reason: HOSPADM

## 2022-04-01 RX ORDER — ONDANSETRON 4 MG/1
4 TABLET, ORALLY DISINTEGRATING ORAL EVERY 8 HOURS PRN
Status: DISCONTINUED | OUTPATIENT
Start: 2022-04-01 | End: 2022-04-02 | Stop reason: HOSPADM

## 2022-04-01 RX ORDER — HEPARIN SODIUM 5000 [USP'U]/ML
5000 INJECTION, SOLUTION INTRAVENOUS; SUBCUTANEOUS EVERY 8 HOURS SCHEDULED
Status: DISCONTINUED | OUTPATIENT
Start: 2022-04-01 | End: 2022-04-02 | Stop reason: HOSPADM

## 2022-04-01 RX ORDER — POLYETHYLENE GLYCOL 3350 17 G/17G
17 POWDER, FOR SOLUTION ORAL DAILY PRN
Status: DISCONTINUED | OUTPATIENT
Start: 2022-04-01 | End: 2022-04-02 | Stop reason: HOSPADM

## 2022-04-01 RX ORDER — MIDODRINE HYDROCHLORIDE 5 MG/1
5 TABLET ORAL 2 TIMES DAILY
Status: DISCONTINUED | OUTPATIENT
Start: 2022-04-01 | End: 2022-04-02 | Stop reason: HOSPADM

## 2022-04-01 RX ORDER — DEXTROSE MONOHYDRATE 100 MG/ML
12.5 INJECTION, SOLUTION INTRAVENOUS PRN
Status: DISCONTINUED | OUTPATIENT
Start: 2022-04-01 | End: 2022-04-02 | Stop reason: HOSPADM

## 2022-04-01 RX ORDER — ACETAMINOPHEN 325 MG/1
650 TABLET ORAL EVERY 6 HOURS PRN
Status: DISCONTINUED | OUTPATIENT
Start: 2022-04-01 | End: 2022-04-02 | Stop reason: HOSPADM

## 2022-04-01 RX ORDER — LEVOFLOXACIN 5 MG/ML
500 INJECTION, SOLUTION INTRAVENOUS ONCE
Status: COMPLETED | OUTPATIENT
Start: 2022-04-01 | End: 2022-04-01

## 2022-04-01 RX ADMIN — LEVOFLOXACIN 500 MG: 5 INJECTION, SOLUTION INTRAVENOUS at 19:47

## 2022-04-01 ASSESSMENT — ENCOUNTER SYMPTOMS
ABDOMINAL PAIN: 0
CONSTIPATION: 1
NAUSEA: 0
COUGH: 1
RHINORRHEA: 0
SHORTNESS OF BREATH: 1
WHEEZING: 0
DIARRHEA: 0
CHOKING: 0
SORE THROAT: 0
EYES NEGATIVE: 1
ALLERGIC/IMMUNOLOGIC NEGATIVE: 1
ABDOMINAL DISTENTION: 0
BLOOD IN STOOL: 0
ABDOMINAL PAIN: 1
VOMITING: 0

## 2022-04-01 NOTE — Clinical Note
Discharge Plan[de-identified] Other/[facility])   Telemetry/Cardiac Monitoring Required?: Yes

## 2022-04-01 NOTE — FLOWSHEET NOTE
04/01/22 1658   Encounter Summary   Services provided to: Patient and family together   Referral/Consult From: Rounding   Support System Spouse   Continue Visiting   (4/1/22, RENATO. )   Complexity of Encounter Moderate   Length of Encounter 15 minutes   Routine   Type Initial   Assessment Calm; Approachable   Intervention Nurtured hope   Outcome Expressed gratitude

## 2022-04-01 NOTE — ED NOTES
Pt ambulated down hallway with portable pulse oximeter. o2 sat remained above 94%, pt states he feels SOB.  Pt back to bed resting     Azalea Wood, MAGO  04/01/22 0256

## 2022-04-01 NOTE — ED PROVIDER NOTES
[id number] [facility] RESIDENT NOTE       Date of evaluation: 4/1/2022    Chief Complaint     Shortness of Breath and Other (sent by Dr Odell Goltz)    History of Present Illness     Henna Sharma is a 69 y.o. male with a history of CAD status post CABG, diabetes, ESRD on peritoneal dialysis, worsening exertional dyspnea of unclear etiology (? ILD versus volume overload) who presents to the emergency department due to worsening shortness of breath with referral from his nephrologist Dr. Cyn Malhotra. Patient states that he presented to his nephrologist office appointment earlier today for routine follow-up and was sent here. He states he does not know why he is here. He endorses chronic, worsening exertional dyspnea for several months that is being worked up in the outpatient setting. He denies any fever, chills, chest pain, belly pain, nausea, vomiting, dysuria, hematuria, melena, hematochezia. He does still make urine. He is unsure why he has ESRD but thinks it is from his diabetes. He states he thinks he was referred here due to his worsening exertional dyspnea, poorly controlled diabetes, and labile blood pressures. He states that he does not feel short of breath at rest but feels short of breath with using small amounts of exertion, and that this is getting worse. He also endorses worsening bilateral lower extremity edema. States he takes torsemide and has not missed any doses. I called and spoke to Dr. Cyn Malhotra, who states that the patient has had worsening exertional dyspnea that he was attempting to work-up in the outpatient setting. They attempted to dialyze more fluid off, but patient would become hypotensive and did not tolerate these fluid shifts well. He also underwent a stress test that was abnormal.  He had an echo that was ultimately reassuring against any heart failure as cause of his symptoms.   In the office today, the patient endorsed severe symptoms of exertional dyspnea and had bilateral crackles raising concern for possible pulmonary edema, and so he was referred to the emergency department for further evaluation and recommendation for low threshold for admission due to concerns he has failed outpatient management of his symptoms as well as to expedite diagnostic evaluation. He also expresses concern that patient is exposed his peritoneal dialysis catheter inappropriately. He was supposed to take prophylactic antibiotics but has not, and patient now and does endorse generalized abdominal pain that is concerning for possible SBP. Denies systemic signs, including fevers, as above    Review of Systems     Review of Systems   Constitutional: Positive for fatigue. Negative for chills, diaphoresis and fever. HENT: Negative. Negative for rhinorrhea, sneezing and sore throat. Eyes: Negative. Respiratory: Positive for cough (Chronic, nonproductive) and shortness of breath. Negative for choking and wheezing. Cardiovascular: Positive for leg swelling. Negative for chest pain and palpitations. Gastrointestinal: Negative for abdominal distention, abdominal pain, blood in stool, nausea and vomiting. Endocrine: Negative. Genitourinary: Negative. Negative for difficulty urinating, dysuria, flank pain, frequency and hematuria. Musculoskeletal: Negative. Skin: Negative. Negative for rash. Allergic/Immunologic: Negative. Neurological: Negative. Negative for dizziness, syncope and light-headedness. Hematological: Negative. Psychiatric/Behavioral: Negative.         Past Medical, Surgical, Family, and Social History     He has a past medical history of Allergic rhinitis due to other allergen, Coronary atherosclerosis of unspecified type of vessel, native or graft, Diabetic eye exam ([facility] [address] Diabetic eye exam ([facility] [address] Generalized osteoarthrosis, involving multiple sites, Other psoriasis, Pneumonia, Prolonged emergence from general anesthesia, Psoriatic arthropathy ([facility] [address] Type II or unspecified type diabetes mellitus without mention of complication, not stated as uncontrolled, Unspecified essential hypertension, and Unspecified sleep apnea. He has a past surgical history that includes Coronary artery bypass graft; Carpal tunnel release; joint replacement; Cataract removal; and LAPAROSCOPY INSERTION PERITONEAL CATHETER (N/A, 10/26/2020). His family history includes Coronary Art Dis in his father; Diabetes in his mother; Heart Failure in his mother. He reports that he quit smoking about 41 years ago. He started smoking about 59 years ago. He has a 18.00 pack-year smoking history. He has never used smokeless tobacco. He reports that he does not drink alcohol and does not use drugs. Medications     Previous Medications    ACCU-CHEK SOFTCLIX LANCETS MISC    USE FOUR TIMES A DAY    ALBUTEROL SULFATE (PROAIR RESPICLICK) 194 (90 BASE) MCG/ACT AEROSOL POWDER INHALATION    Inhale into the lungs every 4 hours as needed for Wheezing or Shortness of Breath    ASPIRIN 81 MG CHEWABLE TABLET    Take 1 tablet by mouth daily. ATORVASTATIN (LIPITOR) 80 MG TABLET    TAKE ONE TABLET BY MOUTH ONCE NIGHTLY    B COMPLEX-C-BIOTIN-E-MIN-FA (DIALYVITE 3000 PO)    Take by mouth    BIOTIN 38843 MCG TABS    Take by mouth    BLOOD GLUCOSE MONITOR KIT AND SUPPLIES    (PLEASE DISPENSE WHAT INS WILL COVER) - Dispense sufficient amount for  TID testing frequency plus additional to accommodate PRN testing needs. Dispense all needed supplies to include: monitor, strips, lancing device, lancets, control solutions, alcohol swabs.     BLOOD GLUCOSE MONITOR STRIPS    Test up to 4 times daily for blood sugar monitoring    BLOOD GLUCOSE MONITORING SUPPL (ACCU-CHEK JOHNATHAN PLUS) W/DEVICE KIT    As needed    BLOOD GLUCOSE MONITORING SUPPL (ACCU-CHEK JOHNATHAN) RUMA    Use as needed    BLOOD GLUCOSE MONITORING SUPPL (TRUE METRIX METER) RUMA    As needed    BLOOD GLUCOSE TEST STRIPS (TRUE METRIX BLOOD GLUCOSE TEST) STRIP    4 times a day As needed. CLOBETASOL (TEMOVATE) 0.05 % OINTMENT    APPLY TO AFFECTED AREA(S) TWO TIMES A DAY AS NEEDED FOR CONTROL OF PSORIASIS    FERRIC CITRATE (AURYXIA) 1  MG(FE) TABS    Take by mouth    HANDICAP PLACARD MISC    by Does not apply route Duration: 5 years    INSULIN ASPART (NOVOLOG FLEXPEN) 100 UNIT/ML INJECTION PEN    20-28 units AC TID    INSULIN GLARGINE (BASAGLAR KWIKPEN) 100 UNIT/ML INJECTION PEN    Inject 50 Units into the skin nightly    INSULIN GLARGINE (LANTUS SOLOSTAR) 100 UNIT/ML INJECTION PEN    INJECT 50 UNITS UNDER THE SKIN EVERY NIGHT AT BEDTIME    INSULIN LISPRO, 1 UNIT DIAL, 100 UNIT/ML SOPN    INJECT 20-28 UNITS UNDER THE SKIN THREE TIMES A DAY BEFORE MEALS  2-8 units HS    INSULIN PEN NEEDLE (B-D ULTRAFINE III SHORT PEN) 31G X 8 MM MISC    USE WITH INSULIN FOUR TIMES A DAY    LANCETS MISC    2 times daily. Lancets for a Anna meter. LANCETS MISC    1 each by Does not apply route 3 times daily 4 times a day    LEVOTHYROXINE (SYNTHROID) 50 MCG TABLET    TAKE ONE TABLET BY MOUTH DAILY    LIDOCAINE 4 % EXTERNAL PATCH    Place 1 patch onto the skin daily    METOPROLOL SUCCINATE (TOPROL XL) 25 MG EXTENDED RELEASE TABLET    Take 1 tablet by mouth daily    MIDODRINE (PROAMATINE) 5 MG TABLET    Take 1 tablet by mouth in the morning and at bedtime    MOMETASONE-FORMOTEROL (DULERA) 200-5 MCG/ACT INHALER    Inhale 2 puffs into the lungs every 12 hours    TORSEMIDE (DEMADEX) 100 MG TABLET    TAKE ONE TABLET BY MOUTH DAILY    TRIAMCINOLONE (KENALOG) 0.1 % OINTMENT    Apply topically 2 times daily. VITAMIN D (ERGOCALCIFEROL) 1.25 MG (32465 UT) CAPS CAPSULE           Allergies     He is allergic to clindamycin, clindamycin/lincomycin, penicillins, and tazorac [tazarotene].     Physical Exam     INITIAL VITALS: BP: (!) 161/81, Temp: 97.6 °F (36.4 °C), Pulse: 86, Resp: 20, SpO2: 96 %   Physical Exam  Constitutional:       General: He is not in acute distress. Appearance: He is not ill-appearing or diaphoretic. HENT:      Head: Normocephalic and atraumatic. Mouth/Throat:      Pharynx: Oropharynx is clear. Eyes:      Extraocular Movements: Extraocular movements intact. Cardiovascular:      Rate and Rhythm: Normal rate and regular rhythm. Pulmonary:      Effort: Pulmonary effort is normal. No tachypnea, accessory muscle usage or respiratory distress. Breath sounds: No stridor. Examination of the right-middle field reveals rales. Examination of the left-middle field reveals rales. Examination of the right-lower field reveals rales. Examination of the left-lower field reveals rales. Rales (Fine, inspiratory crackles) present. No decreased breath sounds, wheezing or rhonchi. Abdominal:      Palpations: Abdomen is soft. Tenderness: There is abdominal tenderness (generalized). Musculoskeletal:         General: Normal range of motion. Cervical back: Normal range of motion. Right lower leg: Edema (Bilateral 1+ pedal edema) present. Left lower leg: Edema present. Skin:     General: Skin is warm and dry. Capillary Refill: Capillary refill takes less than 2 seconds. Neurological:      General: No focal deficit present. Mental Status: He is alert. DiagnosticResults     EKG   Interpreted in conjunction with emergencydepartment physician Michele Pennington MD  Rhythm: normal sinus   Rate: normal  Axis: normal  Ectopy: none  Conduction: normal  ST Segments: no acute change  T Waves:no acute change  Q Waves: none  Clinical Impression: no acute changes  Comparison: No significant change from 2/16/2022    RADIOLOGY:  XR CHEST (2 VW)   Final Result      1. No airspace disease.                          LABS:   Results for orders placed or performed during the hospital encounter of 04/01/22   CBC with Auto Differential   Result Value Ref Range    WBC 14.5 (H) 4.0 - 11.0 K/uL    RBC 2.75 (L) 4.20 - 5.90 M/uL    Hemoglobin 10.2 (L) 13.5 - 17.5 g/dL    Hematocrit 29.4 (L) 40.5 - 52.5 %    .8 (H) 80.0 - 100.0 fL    MCH 36.9 (H) 26.0 - 34.0 pg    MCHC 34.6 31.0 - 36.0 g/dL    RDW 15.7 (H) 12.4 - 15.4 %    Platelets 776 939 - 984 K/uL    MPV 7.1 5.0 - 10.5 fL    Neutrophils % 79.6 %    Lymphocytes % 10.7 %    Monocytes % 5.3 %    Eosinophils % 3.0 %    Basophils % 1.4 %    Neutrophils Absolute 11.5 (H) 1.7 - 7.7 K/uL    Lymphocytes Absolute 1.5 1.0 - 5.1 K/uL    Monocytes Absolute 0.8 0.0 - 1.3 K/uL    Eosinophils Absolute 0.4 0.0 - 0.6 K/uL    Basophils Absolute 0.2 0.0 - 0.2 K/uL   Basic Metabolic Panel w/ Reflex to MG   Result Value Ref Range    Sodium 135 (L) 136 - 145 mmol/L    Potassium reflex Magnesium 4.2 3.5 - 5.1 mmol/L    Chloride 88 (L) 99 - 110 mmol/L    CO2 32 21 - 32 mmol/L    Anion Gap 15 3 - 16    Glucose 261 (H) 70 - 99 mg/dL    BUN 79 (H) 7 - 20 mg/dL    CREATININE 8.1 (HH) 0.8 - 1.3 mg/dL    GFR Non- 6 (A) >60    GFR  8 (A) >60    Calcium 9.4 8.3 - 10.6 mg/dL   Brain Natriuretic Peptide   Result Value Ref Range    Pro- (H) 0 - 449 pg/mL   Troponin   Result Value Ref Range    Troponin 0.05 (H) <0.01 ng/mL   Troponin   Result Value Ref Range    Troponin 0.04 (H) <0.01 ng/mL   POCT Glucose   Result Value Ref Range    POC Glucose 211 (H) 70 - 99 mg/dl    Performed on [facility]    EKG 12 Lead   Result Value Ref Range    Ventricular Rate 75 BPM    Atrial Rate 75 BPM    P-R Interval 226 ms    QRS Duration 80 ms    Q-T Interval 412 ms    QTc Calculation (Bazett) 460 ms    P Axis 74 degrees    R Axis 14 degrees    T Axis 70 degrees    Diagnosis       EKG performed in ER and to be interpreted by ER physician. Confirmed by MD, ER ([id number]),  Tania Brock ([phone]) on 4/1/2022 5:17:46 PM       ED BEDSIDE ULTRASOUND:      RECENT VITALS:  BP: (!) 161/69, Temp: 97.6 °F (36.4 °C), Pulse: 81,Resp: 17, SpO2: 95 %     Procedures         ED Course     Nursing Notes, Past Medical Hx, Past Surgical Hx, Social Hx, Allergies, and Family Hx were reviewed. The patient was given the followingmedications:  Orders Placed This Encounter   Medications    levoFLOXacin (LEVAQUIN) 500 MG/100ML infusion 500 mg     Order Specific Question:   Antimicrobial Indications     Answer:   Intra-Abdominal Infection       CONSULTS:  IP CONSULT TO NEPHROLOGY  IP CONSULT TO HOSPITALIST    [address] [facility] [place] /  Jacobo is a 69 y.o. male who presents to the emergency department with worsening shortness of breath as well as generalized abdominal tenderness with some concern for compromise of peritoneal dialysis catheter. Troponin 0 0.05, down trended to 0.04. , decreased from previous. CBC has leukocytosis and stable mild anemia. BMP without significant electrolyte abnormality or MORIS, creatinine appears at baseline. Chest x-ray without any acute cardiopulmonary abnormality, no signs of pulmonary edema, only mild pedal edema on exam, not clinically overly volume overloaded. Based on discussions with nephrologist, will elect to admit patient to the hospital due to concerns for possible SBP. Initially plan to give Rocephin but has documented allergies, and in discussion with hospitalist, order levofloxacin. Peritoneal dialysis nurse to help coordinate peritoneal dialysis cultures. This patient was also evaluated by the attending physician. All care plans werediscussed and agreed upon. Clinical Impression     1. Dyspnea on exertion    2. Peritoneal dialysis catheter exit site infection, initial encounter [facility])        Disposition     PATIENT REFERRED TO:  No follow-up provider specified.     DISCHARGE MEDICATIONS:  New Prescriptions    No medications on file       DISPOSITION Decision To Admit 04/01/2022 07:19:34 PM       Isabela Thompson MD  Resident  04/01/22 [id number]

## 2022-04-02 ENCOUNTER — APPOINTMENT (OUTPATIENT)
Dept: CT IMAGING | Age: 77
DRG: 302 | End: 2022-04-02
Payer: COMMERCIAL

## 2022-04-02 VITALS
BODY MASS INDEX: 28.25 KG/M2 | HEART RATE: 103 BPM | TEMPERATURE: 97.7 F | HEIGHT: 70 IN | WEIGHT: 197.31 LBS | RESPIRATION RATE: 18 BRPM | SYSTOLIC BLOOD PRESSURE: 145 MMHG | DIASTOLIC BLOOD PRESSURE: 61 MMHG | OXYGEN SATURATION: 96 %

## 2022-04-02 LAB
A/G RATIO: 1.5 (ref 1.1–2.2)
ALBUMIN SERPL-MCNC: 3.4 G/DL (ref 3.4–5)
ALP BLD-CCNC: 87 U/L (ref 40–129)
ALT SERPL-CCNC: 42 U/L (ref 10–40)
ANION GAP SERPL CALCULATED.3IONS-SCNC: 13 MMOL/L (ref 3–16)
AST SERPL-CCNC: 31 U/L (ref 15–37)
BILIRUB SERPL-MCNC: 0.3 MG/DL (ref 0–1)
BUN BLDV-MCNC: 87 MG/DL (ref 7–20)
CALCIUM SERPL-MCNC: 9.3 MG/DL (ref 8.3–10.6)
CHLORIDE BLD-SCNC: 91 MMOL/L (ref 99–110)
CO2: 35 MMOL/L (ref 21–32)
CREAT SERPL-MCNC: 8.3 MG/DL (ref 0.8–1.3)
GFR AFRICAN AMERICAN: 8
GFR NON-AFRICAN AMERICAN: 6
GLUCOSE BLD-MCNC: 139 MG/DL (ref 70–99)
GLUCOSE BLD-MCNC: 140 MG/DL (ref 70–99)
GLUCOSE BLD-MCNC: 307 MG/DL (ref 70–99)
GLUCOSE BLD-MCNC: 311 MG/DL (ref 70–99)
GLUCOSE BLD-MCNC: 331 MG/DL (ref 70–99)
HCT VFR BLD CALC: 27.2 % (ref 40.5–52.5)
HEMOGLOBIN: 9.4 G/DL (ref 13.5–17.5)
MCH RBC QN AUTO: 36.8 PG (ref 26–34)
MCHC RBC AUTO-ENTMCNC: 34.6 G/DL (ref 31–36)
MCV RBC AUTO: 106.5 FL (ref 80–100)
PARATHYROID HORMONE INTACT: 158.7 PG/ML (ref 14–72)
PDW BLD-RTO: 15 % (ref 12.4–15.4)
PERFORMED ON: ABNORMAL
PHOSPHORUS: 5.6 MG/DL (ref 2.5–4.9)
PLATELET # BLD: 229 K/UL (ref 135–450)
PMV BLD AUTO: 7.2 FL (ref 5–10.5)
POTASSIUM REFLEX MAGNESIUM: 4.1 MMOL/L (ref 3.5–5.1)
RBC # BLD: 2.56 M/UL (ref 4.2–5.9)
SODIUM BLD-SCNC: 139 MMOL/L (ref 136–145)
TOTAL PROTEIN: 5.6 G/DL (ref 6.4–8.2)
TROPONIN: 0.04 NG/ML
TROPONIN: 0.04 NG/ML
WBC # BLD: 12.3 K/UL (ref 4–11)

## 2022-04-02 PROCEDURE — 2580000003 HC RX 258: Performed by: STUDENT IN AN ORGANIZED HEALTH CARE EDUCATION/TRAINING PROGRAM

## 2022-04-02 PROCEDURE — 83970 ASSAY OF PARATHORMONE: CPT

## 2022-04-02 PROCEDURE — 2580000003 HC RX 258: Performed by: INTERNAL MEDICINE

## 2022-04-02 PROCEDURE — 84100 ASSAY OF PHOSPHORUS: CPT

## 2022-04-02 PROCEDURE — 96367 TX/PROPH/DG ADDL SEQ IV INF: CPT

## 2022-04-02 PROCEDURE — G0378 HOSPITAL OBSERVATION PER HR: HCPCS

## 2022-04-02 PROCEDURE — 99223 1ST HOSP IP/OBS HIGH 75: CPT | Performed by: INTERNAL MEDICINE

## 2022-04-02 PROCEDURE — 6360000002 HC RX W HCPCS: Performed by: INTERNAL MEDICINE

## 2022-04-02 PROCEDURE — 87040 BLOOD CULTURE FOR BACTERIA: CPT

## 2022-04-02 PROCEDURE — 6370000000 HC RX 637 (ALT 250 FOR IP): Performed by: STUDENT IN AN ORGANIZED HEALTH CARE EDUCATION/TRAINING PROGRAM

## 2022-04-02 PROCEDURE — 6360000002 HC RX W HCPCS: Performed by: STUDENT IN AN ORGANIZED HEALTH CARE EDUCATION/TRAINING PROGRAM

## 2022-04-02 PROCEDURE — 85027 COMPLETE CBC AUTOMATED: CPT

## 2022-04-02 PROCEDURE — 99235 HOSP IP/OBS SAME DATE MOD 70: CPT | Performed by: HOSPITALIST

## 2022-04-02 PROCEDURE — 96366 THER/PROPH/DIAG IV INF ADDON: CPT

## 2022-04-02 PROCEDURE — 80053 COMPREHEN METABOLIC PANEL: CPT

## 2022-04-02 PROCEDURE — 84484 ASSAY OF TROPONIN QUANT: CPT

## 2022-04-02 PROCEDURE — 71250 CT THORAX DX C-: CPT

## 2022-04-02 PROCEDURE — 99222 1ST HOSP IP/OBS MODERATE 55: CPT | Performed by: INTERNAL MEDICINE

## 2022-04-02 PROCEDURE — 36415 COLL VENOUS BLD VENIPUNCTURE: CPT

## 2022-04-02 PROCEDURE — 6360000002 HC RX W HCPCS: Performed by: HOSPITALIST

## 2022-04-02 PROCEDURE — 93005 ELECTROCARDIOGRAM TRACING: CPT | Performed by: STUDENT IN AN ORGANIZED HEALTH CARE EDUCATION/TRAINING PROGRAM

## 2022-04-02 PROCEDURE — 94640 AIRWAY INHALATION TREATMENT: CPT

## 2022-04-02 RX ORDER — ARFORMOTEROL TARTRATE 15 UG/2ML
15 SOLUTION RESPIRATORY (INHALATION) 2 TIMES DAILY
Status: DISCONTINUED | OUTPATIENT
Start: 2022-04-02 | End: 2022-04-02 | Stop reason: HOSPADM

## 2022-04-02 RX ORDER — LEVOTHYROXINE SODIUM 0.05 MG/1
50 TABLET ORAL
Status: DISCONTINUED | OUTPATIENT
Start: 2022-04-02 | End: 2022-04-02 | Stop reason: HOSPADM

## 2022-04-02 RX ORDER — BUDESONIDE 0.5 MG/2ML
0.5 INHALANT ORAL 2 TIMES DAILY
Status: DISCONTINUED | OUTPATIENT
Start: 2022-04-02 | End: 2022-04-02 | Stop reason: HOSPADM

## 2022-04-02 RX ORDER — ALBUTEROL SULFATE 2.5 MG/3ML
2.5 SOLUTION RESPIRATORY (INHALATION) 2 TIMES DAILY
Status: DISCONTINUED | OUTPATIENT
Start: 2022-04-02 | End: 2022-04-02 | Stop reason: HOSPADM

## 2022-04-02 RX ADMIN — METOPROLOL SUCCINATE 25 MG: 25 TABLET, EXTENDED RELEASE ORAL at 08:57

## 2022-04-02 RX ADMIN — ALBUTEROL SULFATE 2.5 MG: 2.5 SOLUTION RESPIRATORY (INHALATION) at 09:30

## 2022-04-02 RX ADMIN — VANCOMYCIN HYDROCHLORIDE 2000 MG: 10 INJECTION, POWDER, LYOPHILIZED, FOR SOLUTION INTRAVENOUS at 12:32

## 2022-04-02 RX ADMIN — TORSEMIDE 100 MG: 100 TABLET ORAL at 02:05

## 2022-04-02 RX ADMIN — INSULIN GLARGINE 25 UNITS: 100 INJECTION, SOLUTION SUBCUTANEOUS at 02:05

## 2022-04-02 RX ADMIN — ATORVASTATIN CALCIUM 80 MG: 40 TABLET, FILM COATED ORAL at 02:05

## 2022-04-02 RX ADMIN — BUDESONIDE 500 MCG: 0.5 SUSPENSION RESPIRATORY (INHALATION) at 09:30

## 2022-04-02 RX ADMIN — LEVOTHYROXINE SODIUM 50 MCG: 0.05 TABLET ORAL at 09:00

## 2022-04-02 RX ADMIN — INSULIN LISPRO 8 UNITS: 100 INJECTION, SOLUTION INTRAVENOUS; SUBCUTANEOUS at 13:29

## 2022-04-02 RX ADMIN — ASPIRIN 81 MG: 81 TABLET, CHEWABLE ORAL at 08:57

## 2022-04-02 RX ADMIN — SODIUM CHLORIDE, PRESERVATIVE FREE 10 ML: 5 INJECTION INTRAVENOUS at 09:01

## 2022-04-02 RX ADMIN — ARFORMOTEROL TARTRATE 15 MCG: 15 SOLUTION RESPIRATORY (INHALATION) at 09:30

## 2022-04-02 RX ADMIN — TORSEMIDE 100 MG: 100 TABLET ORAL at 08:57

## 2022-04-02 ASSESSMENT — PAIN SCALES - GENERAL
PAINLEVEL_OUTOF10: 0
PAINLEVEL_OUTOF10: 0

## 2022-04-02 NOTE — PROGRESS NOTES
4 Eyes Admission Assessment     I agree as the admission nurse that 2 RN's have performed a thorough Head to Toe Skin Assessment on the patient. ALL assessment sites listed below have been assessed on admission. Areas assessed by both nurses:  [x]   Head, Face, and Ears   [x]   Shoulders, Back, and Chest  [x]   Arms, Elbows, and Hands   [x]   Coccyx, Sacrum, and Ischium  [x]   Legs, Feet, and Heels        Does the Patient have Skin Breakdown? Yes a wound was noted on the Admission Assessment and an LDA was Initiated documentation include the Tiny-wound, Wound Assessment, Measurements, Dressing Treatment, Drainage, and Color\", Redness and excoriation to coccyx and tiny area noted. Pt has a stage 2 noted to right buttock.           Jorge Luis Prevention initiated:  Yes   Wound Care Orders initiated:  No      WOC nurse consulted for Pressure Injury (Stage 3,4, Unstageable, DTI, NWPT, and Complex wounds) or Jorge Luis score 18 or lower:  NA      Nurse 1 eSignature: Electronically signed by Sadie Zamudio RN on 4/2/22 at 2:24 AM EDT      Nurse 2 eSignature: Electronically signed by Ruth Ho RN on 4/2/22 at 2:37 AM EDT

## 2022-04-02 NOTE — CONSULTS
[facility]  Cardiology Inpatient Consult Service                                                                                          Pt Name: Andrea Medrano  Age: 69 y.o. Sex: male  : 1945  Location: [facility]/6310-01    Referring Physician: Susanna Bonilla MD      Reason for Consult:       Reason for Consultation/Chief Complaint:   CAD/CABG/pressure/edema/end-stage renal disease      HPI:      Andrea Medrano is a 69 y.o. male with a past medical history of CAD/CABG/ESRD who presented to the hospital with shortness of breath. So far from a cardiovascular perspective he has had a normal echocardiogram.  She did have an abnormal stress test a few years back but it was decided not to perform coronary angiography given CKD. Now that he is on HD we should proceed with coronary angiography. Histories     Past Medical History:   has a past medical history of Allergic rhinitis due to other allergen, Coronary atherosclerosis of unspecified type of vessel, native or graft, Diabetic eye exam ([address] Diabetic eye exam ([address] Generalized osteoarthrosis, involving multiple sites, Other psoriasis, Pneumonia, Prolonged emergence from general anesthesia, Psoriatic arthropathy ([address] Type II or unspecified type diabetes mellitus without mention of complication, not stated as uncontrolled, Unspecified essential hypertension, and Unspecified sleep apnea. Surgical History:   has a past surgical history that includes Coronary artery bypass graft; Carpal tunnel release; joint replacement; Cataract removal; and LAPAROSCOPY INSERTION PERITONEAL CATHETER (N/A, 10/26/2020). Social History:   reports that he quit smoking about 41 years ago. He started smoking about 59 years ago. He has a 18.00 pack-year smoking history. He has never used smokeless tobacco. He reports that he does not drink alcohol and does not use drugs.      Family History:  No evidence for sudden cardiac death or premature CAD      Medications:       Home Medications  Were reviewed and are listed in nursing record. and/or listed below  Prior to Admission medications    Medication Sig Start Date End Date Taking? Authorizing Provider   midodrine (PROAMATINE) 5 MG tablet Take 1 tablet by mouth in the morning and at bedtime 3/25/22   Libia Ding MD   insulin aspart (NOVOLOG FLEXPEN) 100 UNIT/ML injection pen 20-28 units AC TID 3/11/22   Carin Guerrier MD   insulin glargine [facility]) 100 UNIT/ML injection pen Inject 50 Units into the skin nightly 3/11/22   Carin Guerrier MD   blood glucose test strips (TRUE METRIX BLOOD GLUCOSE TEST) strip 4 times a day As needed. 3/11/22   Carin Guerrier MD   Blood Glucose Monitoring Suppl (TRUE METRIX METER) RUMA As needed 3/11/22   Carin Guerrier MD   Lancets MISC 1 each by Does not apply route 3 times daily 4 times a day 3/11/22   Carin Guerrier MD   insulin glargine (LANTUS SOLOSTAR) 100 UNIT/ML injection pen INJECT 50 UNITS UNDER THE SKIN EVERY NIGHT AT BEDTIME 3/9/22   Carin Guerrier MD   torsemide (DEMADEX) 100 MG tablet TAKE ONE TABLET BY MOUTH DAILY 2/21/22   Libia Ding MD   blood glucose monitor strips Test up to 4 times daily for blood sugar monitoring 2/15/22   Samy Wood MD   clobetasol (TEMOVATE) 0.05 % ointment APPLY TO AFFECTED AREA(S) TWO TIMES A DAY AS NEEDED FOR CONTROL OF PSORIASIS 2/8/22   Libia Ding MD   blood glucose monitor kit and supplies (PLEASE DISPENSE WHAT INS WILL COVER) - Dispense sufficient amount for  TID testing frequency plus additional to accommodate PRN testing needs. Dispense all needed supplies to include: monitor, strips, lancing device, lancets, control solutions, alcohol swabs.  2/8/22   Padmini Lowe MD   insulin lispro, 1 Unit Dial, 100 UNIT/ML SOPN INJECT 20-28 UNITS UNDER THE SKIN THREE TIMES A DAY BEFORE MEALS  2-8 units HS 12/7/21   Carin Guerrier MD   B Complex-C-Biotin-E-Min-FA (Sunday Tiny 3000 PO) Take by mouth    Historical Provider, MD   mometasone-formoterol (DULERA) 200-5 MCG/ACT inhaler Inhale 2 puffs into the lungs every 12 hours    Historical Provider, MD   albuterol sulfate (PROAIR RESPICLICK) 039 (90 Base) MCG/ACT aerosol powder inhalation Inhale into the lungs every 4 hours as needed for Wheezing or Shortness of Breath    Historical Provider, MD   atorvastatin (LIPITOR) 80 MG tablet TAKE ONE TABLET BY MOUTH ONCE NIGHTLY 8/16/21   Gurpreet Lizama MD   vitamin D (ERGOCALCIFEROL) 1.25 MG (38802 UT) CAPS capsule  7/14/21   Historical Provider, MD   triamcinolone (KENALOG) 0.1 % ointment Apply topically 2 times daily. 5/25/21 2/16/22  Mark Pérez MD   Ferric Citrate (AURYXIA) 1  MG(Fe) TABS Take by mouth    Historical Provider, MD   Insulin Pen Needle (B-D ULTRAFINE III SHORT PEN) 31G X 8 MM MISC USE WITH INSULIN FOUR TIMES A DAY 4/29/21   Alicja Gonzalez MD   Biotin 66706 MCG TABS Take by mouth    Historical Provider, MD   levothyroxine (SYNTHROID) 50 MCG tablet TAKE ONE TABLET BY MOUTH DAILY 4/5/21   Gurpreet Lizama MD   metoprolol succinate (TOPROL XL) 25 MG extended release tablet Take 1 tablet by mouth daily 2/1/21   Elliott Encarnacion MD   Blood Glucose Monitoring Suppl (ACCU-CHEK JOHNATHAN PLUS) w/Device KIT As needed 8/31/20   Gurpreet Lizama MD   lidocaine 4 % external patch Place 1 patch onto the skin daily 8/29/20   Trina Donnelly MD   Handicap Placard MISC by Does not apply route Duration: 5 years 4/18/18   Wiliam Valencia MD   ACCU-CHEK SOFTCLIX LANCETS MISC USE FOUR TIMES A DAY 11/21/17   Gurpreet Lizama MD   Blood Glucose Monitoring Suppl (ACCU-CHEK JOHNATHAN) RUMA Use as needed 11/21/16   Gurpreet Lizama MD   aspirin 81 MG chewable tablet Take 1 tablet by mouth daily. 3/28/12   Bharati Byrne MD   Lancets MISC 2 times daily. Lancets for a [place] meter.  7/16/10   Sharron Sandhoff, MD          Inpatient Medications:   levothyroxine  50 mcg Oral QA AC    budesonide  0.5 mg Nebulization BID    And    Arformoterol Tartrate  15 mcg Nebulization BID    albuterol  2.5 mg Nebulization BID    vancomycin  2,000 mg IntraVENous Once    insulin glargine  25 Units SubCUTAneous Once    aspirin  81 mg Oral Daily    atorvastatin  80 mg Oral Nightly    insulin glargine  25 Units SubCUTAneous Nightly    insulin lispro  0-12 Units SubCUTAneous TID WC    insulin lispro  0-6 Units SubCUTAneous Nightly    metoprolol succinate  25 mg Oral Daily    [Held by provider] midodrine  5 mg Oral BID    torsemide  100 mg Oral Daily    sodium chloride flush  5-40 mL IntraVENous 2 times per day    heparin (porcine)  5,000 Units SubCUTAneous 3 times per day       IV drips:   dextrose      dextrose      sodium chloride         PRN:  glucose, dextrose, glucagon (rDNA), dextrose, sodium chloride flush, sodium chloride, ondansetron **OR** ondansetron, polyethylene glycol, acetaminophen **OR** acetaminophen    Allergy:     Clindamycin, Clindamycin/lincomycin, Penicillins, and Tazorac [tazarotene]       Review of Systems:     All 12 point review of symptoms completed. Pertinent positives identified in the HPI, all other review of symptoms negative as below. CONSTITUTIONAL: Nounanticipated weight loss. No change in energy level, sleep pattern, or activity level. SKIN: No rash or pruritis. EYES: No visual changes or diplopia. No scleral icterus. ENT: No Headaches, hearing loss or vertigo. No mouth sores or sore throat. CARDIOVASCULAR: No chest pain/chest pressure/chest discomfort. No palpitations. RESPIRATORY: No cough or wheezing, no sputum production. No hematemesis. GASTROINTESTINAL: No N/V/D. No abdominal pain, appetite loss, blood in stools. GENITOURINARY: No dysuria, trouble voiding, or hematuria. MUSCULOSKELETAL:  No gait disturbance, weakness or joint complaints. NEUROLOGICAL: No headache, diplopia, change in muscle strength, numbness or tingling.  No change in gait, balance, coordination, mood, affect, memory, mentation, behavior. PSHYCH: No anxiety, loss of interest, change in sexual behavior, feelings of self-harm, or confusion. ENDOCRINE: No malaise, fatigue or temperature intolerance. No excessive thirst, fluid intake, or urination. No tremor. HEMATOLOGIC: No abnormal bruising or bleeding. ALLERGY: No nasal congestion or hives.       Physical Examination:     Vitals:    04/02/22 0215 04/02/22 0300 04/02/22 0930 04/02/22 1019   BP:  (!) 145/94  (!) 145/61   Pulse:  75  103   Resp:  18     Temp:  97.7 °F (36.5 °C)     TempSrc:  Oral     SpO2:  95% 94% 96%   Weight: 197 lb 5 oz (89.5 kg)      Height:           Wt Readings from Last 3 Encounters:   04/02/22 197 lb 5 oz (89.5 kg)   03/09/22 202 lb (91.6 kg)   02/16/22 198 lb (89.8 kg)       Objective      General Appearance:  Alert, cooperative, no distress, appears stated age Appropriate weight   Head:  Normocephalic, without obvious abnormality, atraumatic   Eyes:  PERRL, conjunctiva/corneas clear EOM intact  Ears normal   Throat no lesions       Nose: Nares normal, no drainage or sinus tenderness   Throat: Lips, mucosa, and tongue normal   Neck: Supple, symmetrical, trachea midline, no adenopathy, thyroid: not enlarged, symmetric, no tenderness/mass/nodules, no carotid bruit or JVD       Lungs:   Clear to auscultation bilaterally, respirations unlabored   Chest Wall:  No tenderness or deformity   Heart:  Regular rate and rhythm, S1, S2 normal, no murmur, rub or gallop PMI intact   Abdomen:   Soft, non-tender, bowel sounds active all four quadrants,  no masses, no organomegaly       Extremities: Extremities normal, atraumatic, no cyanosis or edema   Pulses: 2+ and symmetric   Skin: Skin color, texture, turgor normal, no rashes or lesions   Pysch: Normal mood and affect   Neurologic: Normal gross motor and sensory exam.  Cranial nerves intact        Labs:     Recent Labs     04/01/22  1507 04/02/22  0806   * 139   K 4.2 4.1   BUN 79* 87*   CREATININE 8.1* 8.3*   CL 88* 91*   CO2 32 35*   GLUCOSE 261* 140*   CALCIUM 9.4 9.3     Recent Labs     04/01/22  1507 04/01/22  1507 04/02/22  0806   WBC 14.5*  --  12.3*   HGB 10.2*  --  9.4*   HCT 29.4*  --  27.2*     --  229   .8*   < > 106.5*    < > = values in this interval not displayed. No results for input(s): CHOLTOT, TRIG, HDL, LDL in the last 72 hours. Invalid input(s): LIPIDCOMM, CHOLHDL, VLDCHOL  No results for input(s): PTT, INR in the last 72 hours. Invalid input(s): PT  Recent Labs     04/01/22  1507 04/01/22  1659 04/02/22  0806   TROPONINI 0.05* 0.04* 0.04*     No results for input(s): BNP in the last 72 hours. No results for input(s): TSH in the last 72 hours. No results for input(s): CHOL, HDL, LDLCALC, TRIG in the last 72 hours.]    Lab Results   Component Value Date    CKTOTAL 196 04/29/2015    TROPONINI 0.04 (H) 04/02/2022         Imaging:     I personally reviewed imaging studies including CXR, Stress test, TTE/SUDHEER. Last ECG (if available) -personally interpreted EKG:  I have reviewed EKG with the following interpretation  Normal sinus rhythm  Telemetry: Personally interpreted  Normal sinus rhythm    Last Stress (if available):    Last TTE/SUDHEER(if available):    Last Cath (if available):  Overall findings represent a high risk scan.    Normal LV size and systolic function.    There is a medium to large size reversible defect involving the apex, apical    anterior, mid anterior and apical lateral cardiac segments.    Finding are concerning for ischemia.    ECG: Non-diagnostic EKG response due to failure to reach target heart rate.           Last CMR  (if available):      Assessment / Plan:     CAD/CABG/pressure/edema/end-stage renal disease  -Given symptoms and abnormal stress test plan for coronary angiogram as an outpatient.  -No need for further cardiac work-up as an inpatient.  -Continue current medications.     Tobacco use was discussed with the patient and educated on the negative effects. I have asked the patient to not utilize these agents. Thank you for allowing to us to participate in the hardy or [facility]. Further evaluation will be based upon the patient's clinical course and testing results. I have spent 40 minutes of face to face time with the patient with more than 50% spent counseling and coordinating care. All questions and concerns were addressed to the patient/family. Alternatives to my treatment were discussed. The note was completed using EMR. Every effort was made to ensure accuracy; however, inadvertent computerized transcription errors may be present. I have personally reviewed the reports and images of labs, radiological studies, cardiac studies including ECG's and telemetry, current and old medical records. Roosevelt Tinsley MD, [facility] [place], [facility] [address]

## 2022-04-02 NOTE — ED NOTES
Sterile procedure used to obtain PD fluid, was only able to get . 25 ml. Fluid sent up to lab in sterile container and purple top tube.  Pt c/o stinging in abdomen during procedure     Temo Jay RN  04/01/22 8807

## 2022-04-02 NOTE — DISCHARGE SUMMARY
INTERNAL MEDICINE DEPARTMENT AT [address] [place] [address]  DISCHARGE SUMMARY    Patient ID: Festus Green                                             Discharge Date: 4/2/2022   Patient's PCP: Hulon Callow, MD                                          Discharge Physician: Neil Sosa MD MD  Admit Date: 4/1/2022   Admitting Physician: Dana Mccurdy MD    PROBLEMS DURING HOSPITALIZATION:  Present on Admission:   Dyspnea on exertion      DISCHARGE DIAGNOSES:  Chronic dyspnea on exertion 2/2 CAD, does not have ILD on CT chest wo contrast  ESRD on peritoneal dialysis  Insulin dependent T2DM    The following issues were addressed during hospitalization:  Patient was admitted after he had been reporting dyspnea on exertion for several weeks and that did not improve despite optimization of fluid status with PD. On admission, EKG did not show ischemic changes and his troponin remained stable. He denied chest pain, palpitations, abdominal pain, n/v, lightheadedness, fevers or chills. He denied worsening lower extremity edema or abdominal distension. He was evaluated by cardiology because he had a positive stress test with reversible perfusion defects in 2020 with no subsequent ischemic evaluation. It was recommended that he follow up with Dr. Iker Cohen and have a coronary angiogram. Patient was informed of all discharge instructions, he expressed understanding. All questions answered. Patient is being discharged home in stable condition. There was some concern for abdominal pain on admission but patient denied ever having abdominal pain and his peritoneal fluid studies did not suggest infection. Physical Exam:  BP (!) 145/61   Pulse 103   Temp 97.7 °F (36.5 °C) (Oral)   Resp 18   Ht 5' 10\" (1.778 m)   Wt 197 lb 5 oz (89.5 kg)   SpO2 96%   BMI 28.31 kg/m²   Physical Exam  Constitutional:       General: He is not in acute distress. Appearance: He is not ill-appearing.    Cardiovascular:      Rate and Rhythm: Normal rate and regular rhythm. Heart sounds: No murmur heard. No friction rub. No gallop. Pulmonary:      Effort: Pulmonary effort is normal. No respiratory distress. Breath sounds: No wheezing, rhonchi or rales. Abdominal:      General: Bowel sounds are normal. There is no distension. Palpations: Abdomen is soft. Tenderness: There is no abdominal tenderness. There is no guarding or rebound. Musculoskeletal:      Right lower leg: Edema present. Left lower leg: Edema present. Consults: cardiology  Significant Diagnostic Studies:  CT chest without contrast  Treatments: antibiotics: levofloxacin, one dose of iv vancomycin before discharge as patient left his pd cap open for some time  Disposition: home  Discharged Condition: Stable  Follow Up: Primary Care Physician in one week    DISCHARGE MEDICATION:     Medication List      CONTINUE taking these medications    * Accu-Chek Martha Lorin  Use as needed     * Accu-Chek Martha Plus w/Device Kit  As needed     * blood glucose monitor kit and supplies  (PLEASE DISPENSE WHAT INS WILL COVER) - Dispense sufficient amount for  TID testing frequency plus additional to accommodate PRN testing needs. Dispense all needed supplies to include: monitor, strips, lancing device, lancets, control solutions, alcohol swabs. * True Metrix Meter Lorin  As needed     albuterol sulfate 108 (90 Base) MCG/ACT aerosol powder inhalation  Commonly known as: PROAIR RESPICLICK     aspirin 81 MG chewable tablet  Take 1 tablet by mouth daily.      atorvastatin 80 MG tablet  Commonly known as: LIPITOR  TAKE ONE TABLET BY MOUTH ONCE NIGHTLY     Auryxia 1  MG(Fe) Tabs  Generic drug: Ferric Citrate     B-D ULTRAFINE III SHORT PEN 31G X 8 MM Misc  Generic drug: Insulin Pen Needle  USE WITH INSULIN FOUR TIMES A DAY     Biotin 78368 MCG Tabs     * blood glucose test strips  Test up to 4 times daily for blood sugar monitoring     * True Metrix Blood Glucose Test strip  Generic drug: blood glucose test strips  4 times a day As needed. clobetasol 0.05 % ointment  Commonly known as: TEMOVATE  APPLY TO AFFECTED AREA(S) TWO TIMES A DAY AS NEEDED FOR CONTROL OF PSORIASIS     DIALYVITE 3000 PO     Dulera 200-5 MCG/ACT inhaler  Generic drug: mometasone-formoterol     Handicap Placard Misc  by Does not apply route Duration: 5 years     insulin lispro (1 Unit Dial) 100 UNIT/ML Sopn  INJECT 20-28 UNITS UNDER THE SKIN THREE TIMES A DAY BEFORE MEALS  2-8 units HS     * Lancets Misc  2 times daily. Lancets for a [place] meter. * Accu-Chek Softclix Lancets Misc  USE FOUR TIMES A DAY     * Lancets Misc  1 each by Does not apply route 3 times daily 4 times a day     * Lantus SoloStar 100 UNIT/ML injection pen  Generic drug: insulin glargine  INJECT 50 UNITS UNDER THE SKIN EVERY NIGHT AT BEDTIME     * Basaglar KwikPen 100 UNIT/ML injection pen  Generic drug: insulin glargine  Inject 50 Units into the skin nightly     levothyroxine 50 MCG tablet  Commonly known as: SYNTHROID  TAKE ONE TABLET BY MOUTH DAILY     lidocaine 4 % external patch  Place 1 patch onto the skin daily     metoprolol succinate 25 MG extended release tablet  Commonly known as: Toprol XL  Take 1 tablet by mouth daily     midodrine 5 MG tablet  Commonly known as: PROAMATINE  Take 1 tablet by mouth in the morning and at bedtime     NovoLOG FlexPen 100 UNIT/ML injection pen  Generic drug: insulin aspart  20-28 units AC TID     torsemide 100 MG tablet  Commonly known as: DEMADEX  TAKE ONE TABLET BY MOUTH DAILY     triamcinolone 0.1 % ointment  Commonly known as: KENALOG  Apply topically 2 times daily. vitamin D 1.25 MG (44057 UT) Caps capsule  Commonly known as: ERGOCALCIFEROL         * This list has 11 medication(s) that are the same as other medications prescribed for you. Read the directions carefully, and ask your doctor or other care provider to review them with you.               Activity: activity as tolerated  Diet: diabetic diet  Wound Care: as directed    Time Spent on discharge is more than 45 minutes    Signed:  Dillon Amaro MD,  PGY-3  4/2/2022     Addendum to Resident H& P/Progress note:  I have personally seen,examined and evaluated the patient.  I have reviewed the current history, physical findings, labs and assessment and plan and agree with note as documented by resident MD ( Reji Rodriguez)      Teresa Melton MD, [address]

## 2022-04-02 NOTE — PROGRESS NOTES
Progress Note    Admit Date: 4/1/2022  Day: 2  Diet: ADULT DIET; Regular; Low Sodium (2 gm); Low Potassium (Less than 3000 mg/day); Low Phosphorus (Less than 1000 mg)    CC: Shortness of breath    Interval history: Patient denies abdominal pain, he reports sob on exertion that is unchanged from his baseline. Denies abd distension, reports le edema is at baseline.  He is unable to clearly state whether he has orthopnea or pnd    Medications:     Scheduled Meds:   levothyroxine  50 mcg Oral QAM AC    budesonide  0.5 mg Nebulization BID    And    Arformoterol Tartrate  15 mcg Nebulization BID    albuterol  2.5 mg Nebulization BID    [START ON 4/3/2022] levofloxacin  500 mg IntraVENous Q48H    aspirin  81 mg Oral Daily    atorvastatin  80 mg Oral Nightly    insulin glargine  25 Units SubCUTAneous Nightly    insulin lispro  0-12 Units SubCUTAneous TID WC    insulin lispro  0-6 Units SubCUTAneous Nightly    metoprolol succinate  25 mg Oral Daily    [Held by provider] midodrine  5 mg Oral BID    torsemide  100 mg Oral Daily    sodium chloride flush  5-40 mL IntraVENous 2 times per day    heparin (porcine)  5,000 Units SubCUTAneous 3 times per day     Continuous Infusions:   dextrose      dextrose      sodium chloride       PRN Meds:glucose, dextrose, glucagon (rDNA), dextrose, sodium chloride flush, sodium chloride, ondansetron **OR** ondansetron, polyethylene glycol, acetaminophen **OR** acetaminophen    Objective:   Vitals:   T-max:  Patient Vitals for the past 8 hrs:   BP Temp Temp src Pulse Resp SpO2 Weight   04/02/22 0300 (!) 145/94 97.7 °F (36.5 °C) Oral 75 18 95 % --   04/02/22 0215 -- -- -- -- -- -- 197 lb 5 oz (89.5 kg)   04/02/22 0030 (!) 182/91 98.1 °F (36.7 °C) Oral 83 18 93 % --       Intake/Output Summary (Last 24 hours) at 4/2/2022 0802  Last data filed at 4/1/2022 2047  Gross per 24 hour   Intake 100 ml   Output --   Net 100 ml       Review of Systems - as above    Physical Exam  Constitutional:       General: He is not in acute distress. Appearance: He is not ill-appearing. Cardiovascular:      Rate and Rhythm: Normal rate and regular rhythm. Heart sounds: No murmur heard. No friction rub. No gallop. Abdominal:      General: There is no distension. Tenderness: There is no abdominal tenderness. There is no guarding or rebound. Musculoskeletal:      Right lower leg: Edema present. Left lower leg: Edema present. LABS:    CBC:   Recent Labs     04/01/22  1507   WBC 14.5*   HGB 10.2*   HCT 29.4*      .8*     Renal:    Recent Labs     04/01/22  1507   *   K 4.2   CL 88*   CO2 32   BUN 79*   CREATININE 8.1*   GLUCOSE 261*   CALCIUM 9.4   ANIONGAP 15     Hepatic: No results for input(s): AST, ALT, BILITOT, BILIDIR, PROT, LABALBU, ALKPHOS in the last 72 hours. Troponin:   Recent Labs     04/01/22  1507 04/01/22  1659   TROPONINI 0.05* 0.04*     -----------------------------------------------------------------  RAD:   CT CHEST WO CONTRAST   Final Result     Diminished lung volumes. Curvilinear atelectatic changes    involving the posterior medial right lower lobe, the inferior left    lingular segments and left lower lobe. No focal consolidation. No pleural effusion or pneumothorax. XR CHEST (2 VW)   Final Result      1. No airspace disease. Assessment/Plan:     69 M PMH ESRD on PD, CAD s/p CABG (2006), positive stress test in 2020, insulin dependent T2DM presents with worsening dyspnea to Dr. Palacio Pi office for which he was sent to the ED for further evaluation. He is currently afebrile and hemodynamically stable. Dyspnea secondary to possible ischemic heart disease  He had a positive stress test in 2020 but no ischemic workup following that. He states he used to see Dr. Frantz Bowser, then Dr. Radha Curiel and since both of them have left, he has not seen a cardiologist in a while.   EKG on admission shows T wave flattening in V1/V2, EKG this am similar to admission EKG  Troponin stable and at baseline  - F/u cardiology recommendations on ischemic evaluation  - Continue aspirin 81 mg and atorvastatin 80 mg nightly  - Continue budesonide and arformoterol, unclear what his underlying lung pathology is but he will need pulmonology follow up    CAD s/p CABG  - Continue aspirin and statin as above  - Continue metoprolol XL 25 mg daily    ESRD on PD  PD catheter related infection ruled out though reported abdominal pain on admission  - Stop levofloxacin  - Nephrology (Dr. Kt Delaney) consulted for PD    Insulin dependent T2DM  Sugars uncontrolled this morning, 300's  - Increase lantus tonight back to home dose of 50 U nightly  - Continue POCT glucose checks, SSI, hypoglycemia protocol    Code Status: Full code  FEN: Carb control diet  PPX: Hep sq  DISPO: GMF pending cardiac evaluation    Yanci Augustin MD, PGY-3  04/02/22  8:02 AM    This patient has been staffed and discussed with Rita Argueta MD.     Addendum to Resident H& P/Progress note:  I have personally seen,examined and evaluated the patient.  I have reviewed the current history, physical findings, labs and assessment and plan and agree with note as documented by resident MD ( Felton Jack)      Rita Argueta MD, [address]

## 2022-04-02 NOTE — H&P
Internal Medicine  PGY 1  History & Physical      CC worsening SOB and LLQ pain     History Obtained From:  patient, electronic medical record    HISTORY OF PRESENT ILLNESS:  Eusebia Barahona is a 69 YOM  PMHx: T2DM, ESRD on PD, HTN, CAD s/p CABG, cough variant asthma, ILD    Presented to the ED at the request of Dr. Kevin Tripathi for worsening SOB and possible intraperitoneal infection. He had an office visit with Dr. Pillo Wharton today and expressed that he was feeling more SOB than usual. On my questioning, he could not tell me for sure how long the SOB was worse (\"maybe 1 or 2 months. .. \"). I did witness him come back from the  and he was pretty winded from his walk down the hallway. He was satting mid to high 80s for a few minutes. But once he had been at rest for a few minutes he was back to satting high 90s on room air. He denied CP, F/C, diarrhea, dysuria. He did admit to constipation. Dr. Kevin Tripathi was also concerned for potential compromise of peritoneal dialysis catheter. On my exam, patient had LLQ pain on deep palpation. Hence the concern is a potential intraperitoneal infection. In the ED:   Pro   Trop .05 to . 04  WBC 14.5  CXR: No airspace disease  EKG: NSR, no acute ST changes, 1st degree AV block     Past Medical History:        Diagnosis Date    Allergic rhinitis due to other allergen 7/12/2010    Coronary atherosclerosis of unspecified type of vessel, native or graft 7/12/2010    Diabetic eye exam ([facility] [address] 04/29/2015    Diabetic eye exam ([facility] [address] 5/5/2016    [facility]    Generalized osteoarthrosis, involving multiple sites 7/12/2010    Other psoriasis 7/12/2010    Pneumonia     Prolonged emergence from general anesthesia     Psoriatic arthropathy ([address] 7/12/2010    Type II or unspecified type diabetes mellitus without mention of complication, not stated as uncontrolled 7/12/2010    Unspecified essential hypertension 7/12/2010    Unspecified sleep apnea 7/12/2010       Past Surgical History: Procedure Laterality Date    CARPAL TUNNEL RELEASE      s/p    CATARACT REMOVAL      CORONARY ARTERY BYPASS GRAFT      JOINT REPLACEMENT      LAPAROSCOPY INSERTION PERITONEAL CATHETER N/A 10/26/2020    LAPAROSCOPIC PERITONEAL DIALYSIS CATHETER PLACEMENT; LAPAROSCOPIC OMENTOPEXY performed by Francoise Cerrato DO at [address]       No current facility-administered medications on file prior to encounter. Current Outpatient Medications on File Prior to Encounter   Medication Sig Dispense Refill    midodrine (PROAMATINE) 5 MG tablet Take 1 tablet by mouth in the morning and at bedtime 90 tablet 3    insulin aspart (NOVOLOG FLEXPEN) 100 UNIT/ML injection pen 20-28 units AC TID 5 pen 3    insulin glargine (BASAGLAR KWIKPEN) 100 UNIT/ML injection pen Inject 50 Units into the skin nightly 5 pen 2    blood glucose test strips (TRUE METRIX BLOOD GLUCOSE TEST) strip 4 times a day As needed. 150 each 3    Blood Glucose Monitoring Suppl (TRUE METRIX METER) RUMA As needed 1 each 0    Lancets MISC 1 each by Does not apply route 3 times daily 4 times a day 150 each 2    insulin glargine (LANTUS SOLOSTAR) 100 UNIT/ML injection pen INJECT 50 UNITS UNDER THE SKIN EVERY NIGHT AT BEDTIME 15 mL 3    torsemide (DEMADEX) 100 MG tablet TAKE ONE TABLET BY MOUTH DAILY 90 tablet 5    blood glucose monitor strips Test up to 4 times daily for blood sugar monitoring 200 strip 3    clobetasol (TEMOVATE) 0.05 % ointment APPLY TO AFFECTED AREA(S) TWO TIMES A DAY AS NEEDED FOR CONTROL OF PSORIASIS 15 g 5    blood glucose monitor kit and supplies (PLEASE DISPENSE WHAT INS WILL COVER) - Dispense sufficient amount for  TID testing frequency plus additional to accommodate PRN testing needs. Dispense all needed supplies to include: monitor, strips, lancing device, lancets, control solutions, alcohol swabs.  1 kit 0    insulin lispro, 1 Unit Dial, 100 UNIT/ML SOPN INJECT 20-28 UNITS UNDER THE SKIN THREE TIMES A DAY BEFORE MEALS  2-8 units HS 45 mL 2    B Complex-C-Biotin-E-Min-FA (DIALYVITE 3000 PO) Take by mouth      mometasone-formoterol (DULERA) 200-5 MCG/ACT inhaler Inhale 2 puffs into the lungs every 12 hours      albuterol sulfate (PROAIR RESPICLICK) 443 (90 Base) MCG/ACT aerosol powder inhalation Inhale into the lungs every 4 hours as needed for Wheezing or Shortness of Breath      atorvastatin (LIPITOR) 80 MG tablet TAKE ONE TABLET BY MOUTH ONCE NIGHTLY 90 tablet 3    vitamin D (ERGOCALCIFEROL) 1.25 MG (53853 UT) CAPS capsule       triamcinolone (KENALOG) 0.1 % ointment Apply topically 2 times daily. 1 Tube 2    Ferric Citrate (AURYXIA) 1  MG(Fe) TABS Take by mouth      Insulin Pen Needle (B-D ULTRAFINE III SHORT PEN) 31G X 8 MM MISC USE WITH INSULIN FOUR TIMES A  each 5    Biotin 71325 MCG TABS Take by mouth      levothyroxine (SYNTHROID) 50 MCG tablet TAKE ONE TABLET BY MOUTH DAILY 90 tablet 8    metoprolol succinate (TOPROL XL) 25 MG extended release tablet Take 1 tablet by mouth daily 90 tablet 3    Blood Glucose Monitoring Suppl (ACCU-CHEK JOHNATHAN PLUS) w/Device KIT As needed 1 kit 2    lidocaine 4 % external patch Place 1 patch onto the skin daily 1 box 1    Handicap Placard MISC by Does not apply route Duration: 5 years 1 each 0    ACCU-CHEK SOFTCLIX LANCETS MISC USE FOUR TIMES A  each 3    Blood Glucose Monitoring Suppl (ACCU-CHEK JOHNATHAN) RUMA Use as needed 1 Device 0    aspirin 81 MG chewable tablet Take 1 tablet by mouth daily. 30 tablet 5    Lancets MISC 2 times daily. Lancets for a Anna meter. 50 each 11       Allergies:  Clindamycin, Clindamycin/lincomycin, Penicillins, and Tazorac [tazarotene]    Social History:   · TOBACCO:   reports that he quit smoking about 41 years ago. He started smoking about 59 years ago. He has a 18.00 pack-year smoking history. He has never used smokeless tobacco.  · ETOH:   reports no history of alcohol use.   · DRUGS : no use   · Patient currently lives with spouse at home   ·   Family History:       Problem Relation Age of Onset    Diabetes Mother     Heart Failure Mother     Coronary Art Dis Father        Review of Systems   Constitutional: Positive for activity change, chills, diaphoresis and fatigue. Negative for appetite change, fever and unexpected weight change. HENT: Negative. Eyes: Negative. Respiratory: Positive for cough and shortness of breath. Cardiovascular: Negative for chest pain and leg swelling. Gastrointestinal: Positive for abdominal pain and constipation. Negative for diarrhea, nausea and vomiting. Endocrine: Negative. Genitourinary: Negative for difficulty urinating. Musculoskeletal: Negative. Skin: Negative. Allergic/Immunologic: Negative. Neurological: Negative. Hematological: Negative. Psychiatric/Behavioral: Negative. ROS: A 10 point review of systems was conducted, significant findings as noted in HPI. Physical Exam  Constitutional:       Appearance: He is obese. HENT:      Head: Normocephalic and atraumatic. Eyes:      Extraocular Movements: Extraocular movements intact. Conjunctiva/sclera: Conjunctivae normal.      Pupils: Pupils are equal, round, and reactive to light. Cardiovascular:      Rate and Rhythm: Normal rate and regular rhythm. Heart sounds: Normal heart sounds. Pulmonary:      Comments: Crackles B/L lower lung bases  Abdominal:      Palpations: Abdomen is soft. Tenderness: There is abdominal tenderness. Musculoskeletal:      Right lower leg: Edema present. Left lower leg: Edema present. Neurological:      Mental Status: He is alert and oriented to person, place, and time.    Psychiatric:         Mood and Affect: Mood normal.         Behavior: Behavior normal.       Physical exam:       Vitals:    04/01/22 2130   BP: (!) 146/53   Pulse: 65   Resp: 17   Temp:    SpO2: 94%       DATA:    Labs:  CBC:   Recent Labs     04/01/22  1507   WBC 14.5*   HGB 10.2* HCT 29.4*          BMP:   Recent Labs     04/01/22  1507   *   K 4.2   CL 88*   CO2 32   BUN 79*   CREATININE 8.1*   GLUCOSE 261*     LFT's: No results for input(s): AST, ALT, ALB, BILITOT, ALKPHOS in the last 72 hours. Troponin:   Recent Labs     04/01/22  1507 04/01/22  1659   TROPONINI 0.05* 0.04*         XR CHEST (2 VW)   Final Result      1. No airspace disease. ASSESSMENT AND PLAN:    LLQ pain 2/2 possible intraperitoneal infection:  - LLQ pain on deep palpation  - concern for compromise of peritoneal dialysis catheter  - WBC elevated to 14.5  - f/u peritoneal dialysis cultures  - f/u blood cx 1 and cx 2   - allergy to rocephin so opted for FQ (IV levofloxacin 500 QD)     Worsening SOB:   Etiology: unsure. Patient has a recent ECHO which shows pEF. O/E did not appear to be very fluid overloaded. While he had LE edema, hardly pitting and the edema itself was not significant. He did have mild crackles in lower lung bases. CXR, however, was clear. He also had a reasuuring EKG which was NSR with no acute changes of the ST segments. He has a hx of cough-variant asthma as well as ILD. He follows with Dr. Terri Miller. Patient did note that he was out of his [facility] so had not been taking that as he should. Did not appear to be in ILD exacerbation. He was satting fine on room air. He does get winded when he walks/exerts himself, but once he sits down, he is back to baseline at rest. The patient did mention that he has an appointment with Dr. Terri Miller later this month. For now, I restarted his home Dulera and albuterol PRN. I did not put in a consult to pulm based on his clinical picture. However, that may be an option if his SOB worsens while he is here being treated for his possible intraperitoneal infection. Elevated Troponin:   - ML 2/2 to ESRD  - already down-trending from . 05 to . 04  - will trend - d/c if still down trending     Chronic Issues:   T2DM - lantus and MDSSI (takes lantus, lispro, and aspart at home)   ESRD - still makes urine; on PD, follows with Dr. Verna Gao  HTN - metoprolol  CAD - lipitor, ASA; s/p CABG 2006  dCHF - pEF (50-55%), torsemide, metoprolol   cough variant asthma - Dulera 200 mcg MDI and prn albuterol; follows with Dr. Ching Puckett   ILD - follows with Dr. Ching Puckett       Will discuss with attending physician Dr. Nahun Chavez Status:Full code  FEN: Adult regular  PPX: SQ Heparin   DISPO: IP    Karma Saha MD  4/1/2022,  10:38 PM    Addendum to Resident H& P/Progress note:  I have personally seen,examined and evaluated the patient.  I have reviewed the current history, physical findings, labs and assessment and plan and agree with note as documented by resident MD ( Master Thompson)      Lanre Barriga MD, [address]

## 2022-04-02 NOTE — RT PROTOCOL NOTE
RT Inhaler-Nebulizer Bronchodilator Protocol Note    There is a bronchodilator order in the chart from a provider indicating to follow the RT Bronchodilator Protocol and there is an Initiate RT Inhaler-Nebulizer Bronchodilator Protocol order as well (see protocol at bottom of note). CXR Findings:  No results found. The findings from the last RT Protocol Assessment were as follows:   History Pulmonary Disease: Smoker 15 pack years or more  Respiratory Pattern: Dyspnea on exertion or RR 21-25 bpm  Breath Sounds: Slightly diminished and/or crackles  Cough: Strong, spontaneous, non-productive  Indication for Bronchodilator Therapy: Decreased or absent breath sounds  Bronchodilator Assessment Score: 5    Aerosolized bronchodilator medication orders have been revised according to the RT Inhaler-Nebulizer Bronchodilator Protocol below. Respiratory Therapist to perform RT Therapy Protocol Assessment initially then follow the protocol. Repeat RT Therapy Protocol Assessment PRN for score 0-3 or on second treatment, BID, and PRN for scores above 3. No Indications - adjust the frequency to every 6 hours PRN wheezing or bronchospasm, if no treatments needed after 48 hours then discontinue using Per Protocol order mode. If indication present, adjust the RT bronchodilator orders based on the Bronchodilator Assessment Score as indicated below. Use Inhaler orders unless patient has one or more of the following: on home nebulizer, not able to hold breath for 10 seconds, is not alert and oriented, cannot activate and use MDI correctly, or respiratory rate 25 breaths per minute or more, then use the equivalent nebulizer order(s) with same Frequency and PRN reasons based on the score. If a patient is on this medication at home then do not decrease Frequency below that used at home.     0-3 - enter or revise RT bronchodilator order(s) to equivalent RT Bronchodilator order with Frequency of every 4 hours PRN for wheezing or increased work of breathing using Per Protocol order mode. 4-6 - enter or revise RT Bronchodilator order(s) to two equivalent RT bronchodilator orders with one order with BID Frequency and one order with Frequency of every 4 hours PRN wheezing or increased work of breathing using Per Protocol order mode. 7-10 - enter or revise RT Bronchodilator order(s) to two equivalent RT bronchodilator orders with one order with TID Frequency and one order with Frequency of every 4 hours PRN wheezing or increased work of breathing using Per Protocol order mode. 11-13 - enter or revise RT Bronchodilator order(s) to one equivalent RT bronchodilator order with QID Frequency and an Albuterol order with Frequency of every 4 hours PRN wheezing or increased work of breathing using Per Protocol order mode. Greater than 13 - enter or revise RT Bronchodilator order(s) to one equivalent RT bronchodilator order with every 4 hours Frequency and an Albuterol order with Frequency of every 2 hours PRN wheezing or increased work of breathing using Per Protocol order mode. RT to enter RT Home Evaluation for COPD & MDI Assessment order using Per Protocol order mode.     Electronically signed by Rahul Hale RCP on 4/2/2022 at 1:38 AM

## 2022-04-02 NOTE — CONSULTS
Nephrology Consult Note                                                                                                                                                                                                                                                                                                                                                               Office : [phone]     Fax :[phone]              Patient's Name: Caty Lentz  4/2/2022    Reason for Consult:  ESRD   Requesting Physician:  Karen Posey MD      Chief Complaint:  SOB    History of Present Ilness:    Caty Lentz is a 69 y.o. male with ESRD   Came with SOB   SOB worsening  Crackles +  Pt is at his dry wt   ECHO - nl EF  Stress test abn - need cath   [de-identified] Pulm for ILD   Oxyen sat normal     SOb worse with exertion     Past Medical History:   Diagnosis Date    Allergic rhinitis due to other allergen 7/12/2010    Coronary atherosclerosis of unspecified type of vessel, native or graft 7/12/2010    Diabetic eye exam ([facility] [address] 04/29/2015    Diabetic eye exam ([address] 5/5/2016    [place] [facility]    Generalized osteoarthrosis, involving multiple sites 7/12/2010    Other psoriasis 7/12/2010    Pneumonia     Prolonged emergence from general anesthesia     Psoriatic arthropathy ([address] 7/12/2010    Type II or unspecified type diabetes mellitus without mention of complication, not stated as uncontrolled 7/12/2010    Unspecified essential hypertension 7/12/2010    Unspecified sleep apnea 7/12/2010       Past Surgical History:   Procedure Laterality Date    CARPAL TUNNEL RELEASE      s/p    CATARACT REMOVAL      CORONARY ARTERY BYPASS GRAFT      JOINT REPLACEMENT      LAPAROSCOPY INSERTION PERITONEAL CATHETER N/A 10/26/2020    LAPAROSCOPIC PERITONEAL DIALYSIS CATHETER PLACEMENT; LAPAROSCOPIC OMENTOPEXY performed by Mango Soriano DO at [facility] OR       Family History   Problem Relation Age of Onset    Diabetes Mother     Heart Failure Mother     Coronary Art Dis Father         reports that he quit smoking about 41 years ago. He started smoking about 59 years ago. He has a 18.00 pack-year smoking history. He has never used smokeless tobacco. He reports that he does not drink alcohol and does not use drugs.     Allergies:  Clindamycin, Clindamycin/lincomycin, Penicillins, and Tazorac [tazarotene]    Current Medications:    levothyroxine (SYNTHROID) tablet 50 mcg, QAM AC  budesonide (PULMICORT) nebulizer suspension 500 mcg, BID   And  Arformoterol Tartrate (BROVANA) nebulizer solution 15 mcg, BID  albuterol (PROVENTIL) nebulizer solution 2.5 mg, BID  vancomycin (VANCOCIN) 2,000 mg in dextrose 5 % 500 mL IVPB, Once  aspirin chewable tablet 81 mg, Daily  atorvastatin (LIPITOR) tablet 80 mg, Nightly  insulin glargine (LANTUS;BASAGLAR) injection pen 25 Units, Nightly  insulin lispro (1 Unit Dial) 0-12 Units, TID WC  insulin lispro (1 Unit Dial) 0-6 Units, Nightly  glucose chewable tablet 4 each, PRN  dextrose 10 % infusion, PRN  glucagon (rDNA) injection 1 mg, PRN  dextrose 5 % solution, PRN  metoprolol succinate (TOPROL XL) extended release tablet 25 mg, Daily  [Held by provider] midodrine (PROAMATINE) tablet 5 mg, BID  torsemide (DEMADEX) tablet 100 mg, Daily  sodium chloride flush 0.9 % injection 5-40 mL, 2 times per day  sodium chloride flush 0.9 % injection 5-40 mL, PRN  0.9 % sodium chloride infusion, PRN  heparin (porcine) injection 5,000 Units, 3 times per day  ondansetron (ZOFRAN-ODT) disintegrating tablet 4 mg, Q8H PRN   Or  ondansetron (ZOFRAN) injection 4 mg, Q6H PRN  polyethylene glycol (GLYCOLAX) packet 17 g, Daily PRN  acetaminophen (TYLENOL) tablet 650 mg, Q6H PRN   Or  acetaminophen (TYLENOL) suppository 650 mg, Q6H PRN        Review of Systems:   14 point ROS obtained but were negative except mentioned in HPI      Physical exam:     Vitals:  BP (!) 145/61   Pulse 103   Temp 97.7 °F (36.5 °C) (Oral)   Resp 18   Ht 5' 10\" (1.778 m)   Wt 197 lb 5 oz (89.5 kg)   SpO2 96%   BMI 28.31 kg/m²   Constitutional:  OAA X3 NAD  Skin: no rash, turgor wnl  Heent:  eomi, mmm  Neck: no bruits or jvd noted  Cardiovascular:  S1, S2 without m/r/g  Respiratory: Crackles   Abdomen:  +bs, soft, nt, nd  Ext: + lower extremity edema  Psychiatric: mood and affect appropriate  Musculoskeletal:  Rom, muscular strength intact    Data:   Labs:  CBC:   Recent Labs     04/01/22  1507 04/02/22  0806   WBC 14.5* 12.3*   HGB 10.2* 9.4*    229     BMP:    Recent Labs     04/01/22  1507 04/02/22  0806   * 139   K 4.2 4.1   CL 88* 91*   CO2 32 35*   BUN 79* 87*   CREATININE 8.1* 8.3*   GLUCOSE 261* 140*     Ca/Mg/Phos:   Recent Labs     04/01/22  1507 04/02/22  0806   CALCIUM 9.4 9.3   PHOS  --  5.6*     Hepatic:   Recent Labs     04/02/22  0806   AST 31   ALT 42*   BILITOT 0.3   ALKPHOS 87     Troponin:   Recent Labs     04/01/22  1507 04/01/22  1659 04/02/22  0806   TROPONINI 0.05* 0.04* 0.04*     BNP: No results for input(s): BNP in the last 72 hours. Lipids: No results for input(s): CHOL, TRIG, HDL, LDLCALC, LABVLDL in the last 72 hours. ABGs: No results for input(s): PHART, PO2ART, ZXN6YDO in the last 72 hours. INR: No results for input(s): INR in the last 72 hours. UA:No results for input(s): Carmen Harrier, GLUCOSEU, BILIRUBINUR, Johanna Crow, BLOODU, PHUR, PROTEINU, UROBILINOGEN, NITRU, LEUKOCYTESUR, LABMICR, URINETYPE in the last 72 hours. Urine Microscopic: No results for input(s): LABCAST, BACTERIA, COMU, HYALCAST, WBCUA, RBCUA, EPIU in the last 72 hours. Urine Culture: No results for input(s): LABURIN in the last 72 hours. Urine Chemistry: No results for input(s): Jacquetta Bath, PROTEINUR, NAUR in the last 72 hours. IMAGING:  CT CHEST WO CONTRAST   Final Result     Diminished lung volumes.   Curvilinear atelectatic changes    involving the posterior medial right lower lobe, the inferior left    lingular segments and left lower lobe. No focal consolidation. No pleural effusion or pneumothorax. XR CHEST (2 VW)   Final Result      1. No airspace disease. Assessment/Plan   1. ESRD     2. HTN    3. Anemia    4. Acid- base/ Electrolyte imbalance     5.  SOB     Plan   - SOB - Cardiac vs Pulm cause   - Cardiac cath as out pt   - Cont CCPD   - vanc x 1 dose for cath contamination   - labs ordered  - d/w Cards, Primary team   - ok to d/c Home                 Thank you for allowing us to participate in care of Raya Churchill MD  Feel free to contact me   [facility] [address]  Office : [phone]  Fax :[phone]

## 2022-04-02 NOTE — CARE COORDINATION
Case Management Assessment            Discharge Note                    Date / Time of Note: 4/2/2022 3:34 PM                  Discharge Note Completed by: JOE Carrillo, KEITHW    Patient Name: Krystal Doherty   YOB: 1945  Diagnosis: Dyspnea on exertion [R06.00]  Peritoneal dialysis catheter exit site infection, initial encounter [facility]) Stanley Foster   Date / Time: 4/1/2022  1:31 PM    Current PCP: Billie Victor MD  Clinic patient: No    Hospitalization in the last 30 days: No    Advance Directives:  Code Status: Full Code  [place] DNR form completed and on chart: No    Financial:  Payor: Salena Eagn / Plan: Edison Wilks / Product Type: *No Product type* /      Pharmacy:    [facility] [id number] [address]  [address] [place] [address] [address] [facility]  Phone: [phone] Fax: [phone] [address] - [address] [facility] [address] [phone] Lita Platts [phone]  [address] [address]  Phone: [phone] Fax: [phone]    [id number] - Wyatt Chua [address] [place] [address] [phone] Lita Platts [phone]  [address] [place] [address]  [address] [place] [address]  Phone: [phone] Fax: [phone]      Assistance purchasing medications?:    Assistance provided by Case Management: None at this time    Does patient want to participate in local refill/ meds to beds program?:      Meds To Beds General Rules:  1. Can ONLY be done Monday- Friday between 8:30am-5pm  2. Prescription(s) must be in pharmacy by 3pm to be filled same day  3. Copy of patient's insurance/ prescription drug card and patient face sheet must be sent along with the prescription(s)  4. Cost of Rx cannot be added to hospital bill. If financial assistance is needed, please contact unit  or ;  or  CANNOT provide pharmacy voucher for patients co-pays  5.  Patients can then  the prescription on their way out of the hospital at discharge, or pharmacy can deliver to the bedside if staff is available. (payment due at time of pick-up or delivery - cash, check, or card accepted)     Able to afford home medications/ co-pay costs: Yes    ADLS:  Current PT AM-PAC Score:   /24  Current OT AM-PAC Score:   /24      DISCHARGE Disposition: Home- No Services Needed    LOC at discharge: Not Applicable  AYAH Completed: Not Indicated    Notification completed in HENS/PAS?:  Not Applicable    IMM Completed:   Not Indicated    Transportation:  Transportation PLAN for discharge: family   Mode of Transport: [address]:  [address] ordered at discharge: No      [address]:  DME Provider: none  Equipment obtained during hospitalization: none    Home Oxygen and Respiratory Equipment:  Oxygen needed at discharge?: No      Dialysis:  Dialysis patient: Yes    Dialysis Center:  Not Applicable    Referrals made at [facility] for outpatient continued care:  Not Applicable    Additional CM Notes:     SW met w/PT at bedside. Pt is from home w/his wife and will DC home w/his wife. Pt does PD at home. Pt's wife will transport him home. Pt denies [facility] and DME needs. Pt is expected to leave after IV abx is completed. Pt has no other needs at this time. The Plan for Transition of Care is related to the following treatment goals of Dyspnea on exertion [R06.00]  [facility]    The Patient and/or patient representative Xin Garcia and his family were provided with a choice of provider and agrees with the discharge plan Yes    Freedom of choice list was provided with basic dialogue that supports the patient's individualized plan of care/goals and shares the quality data associated with the providers.  Not Indicated    Care Transitions patient: No    Misty Taylor, MSW, KEITHW  [facility]  Case Management Department  Ph: [phone]

## 2022-04-02 NOTE — ED NOTES
Lab called to inform this RN that there was insufficient amount of fluid to run PD chemistry, but they were able to run the rest of the ordered labs for PD fluid     Candis Zimmer RN  04/01/22 [id number]

## 2022-04-04 LAB
BODY FLUID CULTURE, STERILE: NORMAL
EKG ATRIAL RATE: 90 BPM
EKG DIAGNOSIS: NORMAL
EKG P AXIS: 36 DEGREES
EKG P-R INTERVAL: 230 MS
EKG Q-T INTERVAL: 384 MS
EKG QRS DURATION: 78 MS
EKG QTC CALCULATION (BAZETT): 469 MS
EKG R AXIS: 9 DEGREES
EKG T AXIS: 84 DEGREES
EKG VENTRICULAR RATE: 90 BPM
GRAM STAIN RESULT: NORMAL

## 2022-04-04 PROCEDURE — 93010 ELECTROCARDIOGRAM REPORT: CPT | Performed by: INTERNAL MEDICINE

## 2022-04-04 NOTE — ED PROVIDER NOTES
ED Attending Attestation Note     Date of evaluation: 4/1/2022    I supervised the care provided by the resident physician, Della Richardson MD.     I have seen and examined the patient, agree with the workup, evaluation, management and diagnosis. The care plan has been discussed. My assessment reveals a 69 y.o. male with past medical history of CAD status post CABG, diabetes, ESRD on peritoneal dialysis who presents to the ED with a chief complaint of Shortness of Breath (sent by Dr Shellia Duverney). Mr. Doug Pineda is accompanied by his wife who provides some of the history. The patient reports progressive dyspnea on exertion over the last few months, but much worse over the last few weeks. He appears generally well at rest and does not appear to be in any obvious acute distress. His laboratory and imaging work-up is also somewhat somewhat reassuring. Additional pertinent history, with some taking, reveals that Mr. Doug Pienda actually may have inadvertently compromised his peritoneal dialysis catheter, he reports he unhooked it and set it down on a dresser after some tubing got caught on something in his home, he then hooked it back up and has been using it the last few days. He now does have abdominal tenderness diffusely without overt rebound. He will need to have PD cultures taken after a dwelling period of peritoneal fluid and will need empiric antibiotics for SBP while cultures await. Admit    ED data including laboratory and imaging studies were reviewed by me and considered in medical decision making, and I agree with the interpretations as documented, with the following exceptions: None    I have reviewed the ECG and concur with the resident's interpretation with the following exception;  Marked 1st degree AVB, though not new    Chris Brown MD  [facility]       James Nava MD  04/04/22 2283

## 2022-04-05 LAB — BLOOD CULTURE, ROUTINE: NORMAL

## 2022-04-06 ENCOUNTER — OFFICE VISIT (OUTPATIENT)
Dept: CARDIOLOGY CLINIC | Age: 77
End: 2022-04-06
Payer: COMMERCIAL

## 2022-04-06 VITALS
DIASTOLIC BLOOD PRESSURE: 62 MMHG | HEIGHT: 70 IN | SYSTOLIC BLOOD PRESSURE: 106 MMHG | OXYGEN SATURATION: 96 % | BODY MASS INDEX: 29.23 KG/M2 | WEIGHT: 204.2 LBS | HEART RATE: 100 BPM

## 2022-04-06 DIAGNOSIS — I95.1 ORTHOSTATIC HYPOTENSION: ICD-10-CM

## 2022-04-06 DIAGNOSIS — E78.2 MIXED HYPERLIPIDEMIA: ICD-10-CM

## 2022-04-06 DIAGNOSIS — I25.10 CAD IN NATIVE ARTERY: ICD-10-CM

## 2022-04-06 DIAGNOSIS — R06.02 SHORTNESS OF BREATH: Primary | ICD-10-CM

## 2022-04-06 DIAGNOSIS — R55 SYNCOPE AND COLLAPSE: ICD-10-CM

## 2022-04-06 DIAGNOSIS — Z95.1 S/P CABG X 4: ICD-10-CM

## 2022-04-06 DIAGNOSIS — I10 ESSENTIAL HYPERTENSION: ICD-10-CM

## 2022-04-06 LAB — CULTURE, BLOOD 2: NORMAL

## 2022-04-06 PROCEDURE — G8417 CALC BMI ABV UP PARAM F/U: HCPCS | Performed by: NURSE PRACTITIONER

## 2022-04-06 PROCEDURE — G8427 DOCREV CUR MEDS BY ELIG CLIN: HCPCS | Performed by: NURSE PRACTITIONER

## 2022-04-06 PROCEDURE — 1123F ACP DISCUSS/DSCN MKR DOCD: CPT | Performed by: NURSE PRACTITIONER

## 2022-04-06 PROCEDURE — 1036F TOBACCO NON-USER: CPT | Performed by: NURSE PRACTITIONER

## 2022-04-06 PROCEDURE — 1111F DSCHRG MED/CURRENT MED MERGE: CPT | Performed by: NURSE PRACTITIONER

## 2022-04-06 PROCEDURE — 4040F PNEUMOC VAC/ADMIN/RCVD: CPT | Performed by: NURSE PRACTITIONER

## 2022-04-06 PROCEDURE — 99214 OFFICE O/P EST MOD 30 MIN: CPT | Performed by: NURSE PRACTITIONER

## 2022-04-06 NOTE — PROGRESS NOTES
CC/HPI:    69 y.o. patient of Dr Alfie Briggs with hx  CAD/CABG/HTN/HLD/orthostatic hypotension and syncope. He is on peritoneal dialysis. He continues to have EDWARDS and fatigue. Sunday he had dull left arm discomfort. C/o chronic cough. Denies cp, LH/dizziness, palpitations, syncope or falls. Uses walker d/t his unsteady gait. No LE edema, orthopnea or PND. No fever, chills, n/v/d or GI/ bleeding. Bhupinder Eder -----------------------------------------------------------------------------------------------------------  Most recent stress test was abnormal in the region of known CAD and with ARF the   decision was made to avoid coronary angiogram. Now pt on PD and and his SOB (? Angina equivalent) is worsening.  Pt agreeable to coronary angiogram     He sees Dr Ellis Lee for ILD (has chronic crackles)  He sees Dr Salena Montgomery for CKD    Past Medical History:   Diagnosis Date    Allergic rhinitis due to other allergen 7/12/2010    Coronary atherosclerosis of unspecified type of vessel, native or graft 7/12/2010    Diabetic eye exam ([facility] [address] 04/29/2015    Diabetic eye exam ([facility] [address] 5/5/2016    [facility]    Generalized osteoarthrosis, involving multiple sites 7/12/2010    Other psoriasis 7/12/2010    Pneumonia     Prolonged emergence from general anesthesia     Psoriatic arthropathy ([facility] [address] 7/12/2010    Type II or unspecified type diabetes mellitus without mention of complication, not stated as uncontrolled 7/12/2010    Unspecified essential hypertension 7/12/2010    Unspecified sleep apnea 7/12/2010     Past Surgical History:   Procedure Laterality Date    CARPAL TUNNEL RELEASE      s/p    CATARACT REMOVAL      CORONARY ARTERY BYPASS GRAFT      JOINT REPLACEMENT      LAPAROSCOPY INSERTION PERITONEAL CATHETER N/A 10/26/2020    LAPAROSCOPIC PERITONEAL DIALYSIS CATHETER PLACEMENT; LAPAROSCOPIC OMENTOPEXY performed by Johann Davison DO at [facility] OR     Family History   Problem Relation Age of Onset    Diabetes Mother     Heart Failure Mother     Coronary Art Dis Father      Social History     Tobacco Use    Smoking status: Former Smoker     Packs/day: 1.00     Years: 18.00     Pack years: 18.00     Start date: 1962     Quit date: 1980     Years since quittin.4    Smokeless tobacco: Never Used   Vaping Use    Vaping Use: Never used   Substance Use Topics    Alcohol use: No    Drug use: No     Allergies:Clindamycin, Clindamycin/lincomycin, Penicillins, and Tazorac [tazarotene]    Review of Systems  General: No changes in weight, fatigue, or night sweats. HEENT: No blurry or decreased vision. No changes in hearing, nasal discharge or sore throat. Cardiovascular:  See HPI. Respiratory: + chronic SOB and ILD. BJ  Gastrointestinal:  No abdominal pain, hematochezia, melana, diarrhea, or history of GI ulcers. + constipation. Genito-Urinary: No dysuria or hematuria. No urgency or polyuria. Musculoskeletal:  No complaints of joint pain, joint swelling or muscular weakness/soreness. Neurological:  No  headaches, speech problems or weakness. No history of a stroke or TIA. + LE neuropathy  Psychological:  No anxiety or depression. Hematological and Lymphatic: No abnormal bleeding or bruising, blood clots, jaundice or swollen lymph nodes. Endocrine:   No malaise/lethargy, palpitations, polydipsia/polyuria, temperature intolerance or unexpected weight changes  Skin:  No rashes or non-healing ulcers. Physical Exam:  /62   Pulse 100   Ht 5' 10\" (1.778 m)   Wt 204 lb 3.2 oz (92.6 kg)   SpO2 96%   BMI 29.30 kg/m²    General (appearance):  No acute distress  Eyes: anicteric   Neck: soft, No JVD  Ears/Nose/Mouth/Thorat: No cyanosis  CV: RRR soft AMADOR  Respiratory: Diminished, normal effort  GI: soft, non-tender, + PD catheter   Skin: Warm, dry. No rashes  Neuro/Psych: Alert and oriented x 3. Appropriate behavior  Ext:  No c/c. No edema  Pulses:  2+ carotid.  No bruit     Weight  Wt Readings from Last 3 Encounters: 22 204 lb 3.2 oz (92.6 kg)   22 197 lb 5 oz (89.5 kg)   22 202 lb (91.6 kg)          CBC:   Lab Results   Component Value Date    WBC 12.3 (H) 2022    HGB 9.4 (L) 2022    HCT 27.2 (L) 2022    .5 (H) 2022     2022     BMP:  Lab Results   Component Value Date    CREATININE 8.3 (HH) 2022    BUN 87 (HH) 2022     2022    K 4.1 2022    CL 91 (L) 2022    CO2 35 (H) 2022     Mag:   Lab Results   Component Value Date    MG 2.10 2017     LIVER PROFILE:   Lab Results   Component Value Date    ALT 42 (H) 2022    AST 31 2022    ALKPHOS 87 2022    BILITOT 0.3 2022     PT/INR:   Lab Results   Component Value Date    INR 0.89 2017    INR 0.91 2017    INR 0.96 2017    PROTIME 10.1 2017    PROTIME 10.4 2017    PROTIME 10.9 2017     Pro-BNP   Lab Results   Component Value Date    PROBNP 948 2022    PROBNP 451 2020    PROBNP 4,716 2017     LIPIDS:  No components found for: CHLPL  Lab Results   Component Value Date    TRIG 192 (H) 2022    TRIG 265 (H) 2019    TRIG 205 (H) 2018     Lab Results   Component Value Date    HDL 41 2022    HDL 39 (L) 2019    HDL 47 2018     Lab Results   Component Value Date    LDLCALC 41 2022    LDLCALC 14 2019    LDLCALC 58 2018     Lab Results   Component Value Date    LABVLDL 38 2022    LABVLDL 53 2019    LABVLDL 41 2018     TSH:  Lab Results   Component Value Date    TSH 5.20 (H) 2018       IMAGIN/2/2022 CT chest  Diminished lung volumes.  Curvilinear atelectatic changes    involving the posterior medial right lower lobe, the inferior left    lingular segments and left lower lobe.  No focal consolidation.     No pleural effusion or pneumothorax.      3/29/2022 Echo:     Left ventricular cavity size is normal. There is mild concentric left   ventricular hypertrophy. Overall left ventricular systolic function appears   low normal with an ejection fraction of 50-55%. No regional wall motion   abnormalities are noted. Normal diastolic function. The aortic valve   leaflets are slightly thickened /calcified but the valve opens adequately. Mild tricuspid regurgitation. Estimated pulmonary artery systolic pressure   is at 36 mmHg assuming a right atrial pressure of 8 mmHg. Normal right   ventricular size and function. TAPSE measures 1.31 cm and the RVS velocity   measures 12.1 cm/s.    2/11/2021 Echo:   Limited echo due to ZIO patch placement. Definity contrast administered. Left ventricular size and wall thickness   appears within normal limits. Overall left ventricular systolic function   appears normal with an estimated ejection fraction of 55-60%. No regional   wall motion abnormalities are noted. Diastolic filling parameters suggests normal diastolic function. The right ventricle appears normal in size and function. Estimated pulmonary artery systolic pressure is at 34 mmHg assuming a right   atrial pressure of 8 mmHg. 8/2020 Nuc Stress:  Overall findings represent a high risk scan.     Normal LV size and systolic function.     There is a medium to large size reversible defect involving the apex, apical     anterior, mid anterior and apical lateral cardiac segments.     Finding are concerning for ischemia.     ECG: Non-diagnostic EKG response due to failure to reach target heart rate. 8/25/2020 ECG: Sinus 1st AVB, no acute ischemic changes. 8/3/2020 Echo:   Summary   Left ventricular cavity size is normal. There is mild concentric left   ventricular hypertrophy. Overall left ventricular systolic function appears   low normal with an ejection fraction of 50%. No regional wall motion   abnormalities are noted. Normal diastolic function. Mild mitral and tricuspid regurgitation are present.     2018 Nuc stress:     Fixed apical perfusion defect may relate to previous infarct or    apical thinning.         No reversible ischemia. 2018 Echo:   Technically difficult examination. Normal left ventricle size. There is left ventricular hypertrophy with more   prominent hypertrophy of the basal septum. Overall left ventricular systolic   function appears mildly reduced with an ejection fraction of 45-50%. The   inferoseptum appears mildly hypokinetic. Diastolic function probably normal.   Mitral annular calcification is present. Mild mitral regurgitation is present. The aortic valve appears tricuspid. Aortic valve leaflets appear thickened   but open adequately. Mild tricuspid regurgitation. Estimated pulmonary artery systolic pressure   is elevated at 33 mmHg assuming a right atrial pressure of 8 mmHg. Mild pulmonic regurgitation present.  Cath: Angiographic Findings:  Right dominant system  Left Main:  Diffuse, mild disease.  ~20-30% diffuse disease. Left Anterior Descendin% proximal occlusion.  After the LIMA is injected, the LAD was seen to have a mid 80-90% stenosis just distal to a significant diagonal branch.  The diagonal bifurcates; one branch has a severe (~80-90%) stenosis.  The other limb also has diffuse disease.  There was a 90% distal LAD stenosis. Circumflex:  Proximal 70% stenosis at bifurcation of an OM and the AV LCx.  The OM is subtotally occluded.  The AV LCx is diffusely diseased. Right Coronary:  Proximal 30% stenosis.  Mid 60-70% stenosis.  Right posterolateral is occluded.  Right PDA has an ostial/proximal 90% stenosis.  It is diffusely diseased after the ostial lesion. Bypass Grafts:  LIMA-LAD with radial grafts to an OM and right PDA that is widely patent.   Left Ventriculogram:  Not performed due to elevated Cr  Hemodynamics (mm Hg):  Left Ventricular Pressure:  131/0, 14  Central Aortic Pressure:  133/66 (93)    Medications:   Current Outpatient Medications   Medication Sig Dispense Refill    midodrine (PROAMATINE) 5 MG tablet Take 1 tablet by mouth in the morning and at bedtime 90 tablet 3    insulin aspart (NOVOLOG FLEXPEN) 100 UNIT/ML injection pen 20-28 units AC TID 5 pen 3    insulin glargine (BASAGLAR KWIKPEN) 100 UNIT/ML injection pen Inject 50 Units into the skin nightly 5 pen 2    blood glucose test strips (TRUE METRIX BLOOD GLUCOSE TEST) strip 4 times a day As needed. 150 each 3    Blood Glucose Monitoring Suppl (TRUE METRIX METER) RUMA As needed 1 each 0    Lancets MISC 1 each by Does not apply route 3 times daily 4 times a day 150 each 2    insulin glargine (LANTUS SOLOSTAR) 100 UNIT/ML injection pen INJECT 50 UNITS UNDER THE SKIN EVERY NIGHT AT BEDTIME 15 mL 3    torsemide (DEMADEX) 100 MG tablet TAKE ONE TABLET BY MOUTH DAILY 90 tablet 5    blood glucose monitor strips Test up to 4 times daily for blood sugar monitoring 200 strip 3    clobetasol (TEMOVATE) 0.05 % ointment APPLY TO AFFECTED AREA(S) TWO TIMES A DAY AS NEEDED FOR CONTROL OF PSORIASIS 15 g 5    blood glucose monitor kit and supplies (PLEASE DISPENSE WHAT INS WILL COVER) - Dispense sufficient amount for  TID testing frequency plus additional to accommodate PRN testing needs. Dispense all needed supplies to include: monitor, strips, lancing device, lancets, control solutions, alcohol swabs.  1 kit 0    insulin lispro, 1 Unit Dial, 100 UNIT/ML SOPN INJECT 20-28 UNITS UNDER THE SKIN THREE TIMES A DAY BEFORE MEALS  2-8 units HS 45 mL 2    B Complex-C-Biotin-E-Min-FA (DIALYVITE 3000 PO) Take by mouth      mometasone-formoterol (DULERA) 200-5 MCG/ACT inhaler Inhale 2 puffs into the lungs every 12 hours      albuterol sulfate (PROAIR RESPICLICK) 533 (90 Base) MCG/ACT aerosol powder inhalation Inhale into the lungs every 4 hours as needed for Wheezing or Shortness of Breath      atorvastatin (LIPITOR) 80 MG tablet TAKE ONE TABLET BY MOUTH ONCE NIGHTLY 90 tablet 3    vitamin D (ERGOCALCIFEROL) 1.25 MG (57967 UT) CAPS capsule       Ferric Citrate (AURYXIA) 1  MG(Fe) TABS Take by mouth      Insulin Pen Needle (B-D ULTRAFINE III SHORT PEN) 31G X 8 MM MISC USE WITH INSULIN FOUR TIMES A  each 5    Biotin 14774 MCG TABS Take by mouth      levothyroxine (SYNTHROID) 50 MCG tablet TAKE ONE TABLET BY MOUTH DAILY 90 tablet 8    metoprolol succinate (TOPROL XL) 25 MG extended release tablet Take 1 tablet by mouth daily (Patient taking differently: Take 25 mg by mouth daily Only when bp pressure is high) 90 tablet 3    Blood Glucose Monitoring Suppl (ACCU-CHEK JOHNATHAN PLUS) w/Device KIT As needed 1 kit 2    lidocaine 4 % external patch Place 1 patch onto the skin daily 1 box 1    Handicap Placard MISC by Does not apply route Duration: 5 years 1 each 0    ACCU-CHEK SOFTCLIX LANCETS MISC USE FOUR TIMES A  each 3    Blood Glucose Monitoring Suppl (ACCU-CHEK JOHNATHAN) RUMA Use as needed 1 Device 0    aspirin 81 MG chewable tablet Take 1 tablet by mouth daily. 30 tablet 5    Lancets MISC 2 times daily. Lancets for a Anna meter. 50 each 11    triamcinolone (KENALOG) 0.1 % ointment Apply topically 2 times daily. 1 Tube 2     No current facility-administered medications for this visit. Assessment:  1. Shortness of breath    2. CAD in native artery    3. S/P CABG x 4    4. Essential hypertension    5. Mixed hyperlipidemia    6. Syncope and collapse    7. Orthostatic hypotension        Plan:  SOB: (acute)   Coronary angiogram   Does not appear to have fluid overload or heart failure     CAD/CABG; stable   ASA, statin   Toprol   No ACE d/t CKD and hyperK  Hx Syncope; stable   /62 and pt takes midodrine and for  orthostatic LH/dizziness   Cont current medications   HTN; stable   /72   Toprol   Has orthostatic hypotension and hx syncope. HLD; stable    Cont lipitor     Schedule coronary angiogram with Dr Sidney Grimaldo; cath, echo, nuc, CBC, CMP.  Lipids

## 2022-04-07 ENCOUNTER — PREP FOR PROCEDURE (OUTPATIENT)
Dept: CARDIOLOGY CLINIC | Age: 77
End: 2022-04-07

## 2022-04-07 DIAGNOSIS — R94.39 ABNORMAL STRESS TEST: ICD-10-CM

## 2022-04-07 DIAGNOSIS — R06.02 SHORTNESS OF BREATH: Primary | ICD-10-CM

## 2022-04-07 RX ORDER — SODIUM CHLORIDE 9 MG/ML
INJECTION, SOLUTION INTRAVENOUS CONTINUOUS
Status: CANCELLED | OUTPATIENT
Start: 2022-04-07

## 2022-04-07 RX ORDER — SODIUM CHLORIDE 0.9 % (FLUSH) 0.9 %
5-40 SYRINGE (ML) INJECTION EVERY 12 HOURS SCHEDULED
Status: CANCELLED | OUTPATIENT
Start: 2022-04-07

## 2022-04-07 RX ORDER — ASPIRIN 325 MG
325 TABLET ORAL ONCE
Status: CANCELLED | OUTPATIENT
Start: 2022-04-15

## 2022-04-07 RX ORDER — SODIUM CHLORIDE 0.9 % (FLUSH) 0.9 %
5-40 SYRINGE (ML) INJECTION PRN
Status: CANCELLED | OUTPATIENT
Start: 2022-04-07

## 2022-04-07 RX ORDER — SODIUM CHLORIDE 9 MG/ML
25 INJECTION, SOLUTION INTRAVENOUS PRN
Status: CANCELLED | OUTPATIENT
Start: 2022-04-07

## 2022-04-07 NOTE — PROGRESS NOTES
Left Heart Catheterization    A left heart catheterization is a procedure that provides your cardiologist with detailed information regarding how your heart functions. A small catheter (long, fine tube) is inserted into an artery (a vessel that carries blood and oxygen) that leads to your heart. While watching with x-ray equipment, small amounts of dye are injected which enables visualization of the heart arteries and chambers. The pictures that your cardiologist receives from the cardiac catheterization enable him or her to decide on the best treatment for you. Date of the procedure:  4/15/22    Time of arrival: 0930    Cardiologist performing the procedure: ___Tramuta_______________________________    Instructions for your left heart catheterization:    1. Bring a list of your medications to the hospital.    2.  Please notify us before the procedure if you are allergic to anything; especially x-ray contrast dye, iodine, nickel, or any type of jewelry. This is very important! 3. Do not eat or drink anything at all after midnight (or 8 hours) prior to the procedure. 4.  Take all morning medications EXCEPT any diuretics (water pills) the day of the procedure with a small sip of water. 5.  If you are on Coumadin, Warfarin, or Rosalva Artemas, please notify us so that we can make adjustments to your medication. 6.  If you are taking Xarelto, Eliquis, or Pradaxa, please stop staking these medications two days prior to the procedure (including the day of the procedure). 7.  If you are diabetic, check your blood sugar in the morning. If your blood sugar is 120 or less, do not take insulin. If your blood sugar is more than 120, take half the dose of your normal insulin. Do not take Metformin the night before your procedure or morning of the procedure. 8.  You MUST have someone to drive you home--no driving for 24 hours after your procedure.   If an intervention is performed, you might stay overnight in the hospital.    9.  Discharge instructions will be given to you at the time of your procedure. 10.  For any questions or if you cannot keep this appointment for any reason, please call [phone]. Spoke with pt regarding procedure. Pre-op instructions, time and location of arrival discussed. Pt verbalized understanding and all questions answered at this time.

## 2022-04-14 ENCOUNTER — OFFICE VISIT (OUTPATIENT)
Dept: PULMONOLOGY | Age: 77
End: 2022-04-14
Payer: COMMERCIAL

## 2022-04-14 VITALS
HEIGHT: 70 IN | WEIGHT: 202.6 LBS | SYSTOLIC BLOOD PRESSURE: 136 MMHG | BODY MASS INDEX: 29.01 KG/M2 | TEMPERATURE: 96.5 F | HEART RATE: 81 BPM | DIASTOLIC BLOOD PRESSURE: 67 MMHG | OXYGEN SATURATION: 97 %

## 2022-04-14 DIAGNOSIS — J45.991 COUGH VARIANT ASTHMA: Primary | ICD-10-CM

## 2022-04-14 DIAGNOSIS — R06.09 DOE (DYSPNEA ON EXERTION): ICD-10-CM

## 2022-04-14 DIAGNOSIS — J84.9 ILD (INTERSTITIAL LUNG DISEASE) (HCC): ICD-10-CM

## 2022-04-14 DIAGNOSIS — G47.33 OSA (OBSTRUCTIVE SLEEP APNEA): ICD-10-CM

## 2022-04-14 PROCEDURE — 1036F TOBACCO NON-USER: CPT | Performed by: INTERNAL MEDICINE

## 2022-04-14 PROCEDURE — 99214 OFFICE O/P EST MOD 30 MIN: CPT | Performed by: INTERNAL MEDICINE

## 2022-04-14 PROCEDURE — G8427 DOCREV CUR MEDS BY ELIG CLIN: HCPCS | Performed by: INTERNAL MEDICINE

## 2022-04-14 PROCEDURE — 1123F ACP DISCUSS/DSCN MKR DOCD: CPT | Performed by: INTERNAL MEDICINE

## 2022-04-14 PROCEDURE — 4040F PNEUMOC VAC/ADMIN/RCVD: CPT | Performed by: INTERNAL MEDICINE

## 2022-04-14 PROCEDURE — G8417 CALC BMI ABV UP PARAM F/U: HCPCS | Performed by: INTERNAL MEDICINE

## 2022-04-14 PROCEDURE — 1111F DSCHRG MED/CURRENT MED MERGE: CPT | Performed by: INTERNAL MEDICINE

## 2022-04-14 NOTE — PROGRESS NOTES
[facility]    Outpatient Follow Up Note    Subjective:   CHIEF COMPLAINT / HPI:     The patient is 69 y.o. male who  is here for 6-month follow-up of cough variant asthma, ILD, and BJ. He was recently admitted to [facility], [facility]. for evaluation of dyspnea on exertion. He has a history of a abnormal stress test in 2020. At that time there was talk of a left heart catheterization but due to his CKD and the risk for progression to renal failure from dye it was not done. However he is now on PD for ESRD. He has a cardiac catheterization scheduled for tomorrow. While in the hospital he had a CT chest April 2 to evaluate for causes of dyspnea on exertion given his history of ILD on CT chest in 2017. He has no chest pain, nausea, diaphoresis, arm pain, or wheezing. He does have a history of cough variant asthma but only takes his Dulera 1 or 2 times a week. He does have a daily cough at times productive of clear phlegm    10/14/2021  Misael Salinas is here for 6-month follow-up of cough variant asthma, ILD, and BJ. His main complaint is of fatigue and at times coughing up clear phlegm. He is only using his Dulera several times a week. He thinks he has a ProAir inhaler at home but is not for sure. His breathing is at his baseline. No wheezing or chest pain. Beverly Graven His PD is going well.    4/22/2021  Misael Salinas  is here for 6-month follow-up of ILD/BJ/and cough variant asthma. Overall he states that he is doing mildly better since initiation of hemodialysis by PD catheter. He has not had any recent syncope. No dyspnea on exertion, wheezing, or chest tightness. He does have a cough that he states has been going on for a while. No GERD or postnasal drip. Not productive and no fevers or chills. He has not tried anything to suppress it.   He is using his Kouts Anisha but rarely needs his rescue albuterol    10/14/2020  Misael Salinas is here for 6-month follow-up of ILD/BJ/and cough variant  Unspecified essential hypertension 2010    Unspecified sleep apnea 2010       Social History:    Social History     Tobacco Use   Smoking Status Former Smoker    Packs/day: 1.00    Years: 18.00    Pack years: 18.00    Start date: 1962   Arthur Whitman Quit date: 1980    Years since quittin.4   Smokeless Tobacco Never Used       Current Medications:  Current Outpatient Medications on File Prior to Visit   Medication Sig Dispense Refill    insulin aspart (NOVOLOG FLEXPEN) 100 UNIT/ML injection pen 20-28 units AC TID 5 pen 3    blood glucose test strips (TRUE METRIX BLOOD GLUCOSE TEST) strip 4 times a day As needed. 150 each 3    Blood Glucose Monitoring Suppl (TRUE METRIX METER) RUMA As needed 1 each 0    Lancets MISC 1 each by Does not apply route 3 times daily 4 times a day 150 each 2    insulin glargine (LANTUS SOLOSTAR) 100 UNIT/ML injection pen INJECT 50 UNITS UNDER THE SKIN EVERY NIGHT AT BEDTIME 15 mL 3    blood glucose monitor strips Test up to 4 times daily for blood sugar monitoring 200 strip 3    clobetasol (TEMOVATE) 0.05 % ointment APPLY TO AFFECTED AREA(S) TWO TIMES A DAY AS NEEDED FOR CONTROL OF PSORIASIS 15 g 5    blood glucose monitor kit and supplies (PLEASE DISPENSE WHAT INS WILL COVER) - Dispense sufficient amount for  TID testing frequency plus additional to accommodate PRN testing needs. Dispense all needed supplies to include: monitor, strips, lancing device, lancets, control solutions, alcohol swabs.  1 kit 0    albuterol sulfate (PROAIR RESPICLICK) 526 (90 Base) MCG/ACT aerosol powder inhalation Inhale into the lungs every 4 hours as needed for Wheezing or Shortness of Breath      atorvastatin (LIPITOR) 80 MG tablet TAKE ONE TABLET BY MOUTH ONCE NIGHTLY 90 tablet 3    Insulin Pen Needle (B-D ULTRAFINE III SHORT PEN) 31G X 8 MM MISC USE WITH INSULIN FOUR TIMES A  each 5    levothyroxine (SYNTHROID) 50 MCG tablet TAKE ONE TABLET BY MOUTH DAILY 90 tablet 8  metoprolol succinate (TOPROL XL) 25 MG extended release tablet Take 1 tablet by mouth daily (Patient taking differently: Take 25 mg by mouth daily Only when bp pressure is high) 90 tablet 3    Blood Glucose Monitoring Suppl (ACCU-CHEK JOHNATHAN PLUS) w/Device KIT As needed 1 kit 2    lidocaine 4 % external patch Place 1 patch onto the skin daily 1 box 1    Handicap Placard MISC by Does not apply route Duration: 5 years 1 each 0    ACCU-CHEK SOFTCLIX LANCETS MISC USE FOUR TIMES A  each 3    Blood Glucose Monitoring Suppl (ACCU-CHEK JOHNATHAN) RUMA Use as needed 1 Device 0    aspirin 81 MG chewable tablet Take 1 tablet by mouth daily. 30 tablet 5    Lancets MISC 2 times daily. Lancets for a [place] meter. 50 each 11    midodrine (PROAMATINE) 5 MG tablet Take 1 tablet by mouth in the morning and at bedtime 90 tablet 3    insulin glargine (BASAGLAR KWIKPEN) 100 UNIT/ML injection pen Inject 50 Units into the skin nightly (Patient not taking: Reported on 4/14/2022) 5 pen 2    torsemide (DEMADEX) 100 MG tablet TAKE ONE TABLET BY MOUTH DAILY 90 tablet 5    insulin lispro, 1 Unit Dial, 100 UNIT/ML SOPN INJECT 20-28 UNITS UNDER THE SKIN THREE TIMES A DAY BEFORE MEALS  2-8 units HS (Patient not taking: Reported on 4/14/2022) 45 mL 2    B Complex-C-Biotin-E-Min-FA (DIALYVITE 3000 PO) Take by mouth (Patient not taking: Reported on 4/14/2022)      mometasone-formoterol (DULERA) 200-5 MCG/ACT inhaler Inhale 2 puffs into the lungs every 12 hours      vitamin D (ERGOCALCIFEROL) 1.25 MG (78685 UT) CAPS capsule       triamcinolone (KENALOG) 0.1 % ointment Apply topically 2 times daily. 1 Tube 2    Ferric Citrate (AURYXIA) 1  MG(Fe) TABS Take by mouth      Biotin 56898 MCG TABS Take by mouth (Patient not taking: Reported on 4/14/2022)       No current facility-administered medications on file prior to visit.        REVIEW OF SYSTEMS:    CONSTITUTIONAL: Negative for fevers and chills  HEENT: Negative for oropharyngeal exudate, post nasal drip, sinus pain / pressure, nasal congestion, ear pain  RESPIRATORY:  See HPI  CARDIOVASCULAR: Negative for chest pain, palpitations, edema  GASTROINTESTINAL: Negative for nausea, vomiting, diarrhea, constipation and abdominal pain  HEMATOLOGICAL: Negative for adenopathy  SKIN: Negative for clubbing, cyanosis, skin lesions  EXTREMITIES: Negative for weakness, decreased ROM  NEUROLOGICAL: Negative for unilateral weakness, speech or gait abnormalities    Objective:   PHYSICAL EXAM:        VITALS:    Ht 5' 10\" (1.778 m)   Wt 202 lb 9.6 oz (91.9 kg)   BMI 29.07 kg/m²   On RA  CONSTITUTIONAL:  Awake, alert, cooperative, no apparent distress, and appears stated age  HEENT: No oropharyngeal exudate, PERRL, no cervical adenopathy, no tracheal deviation, thyroid size normal  LUNGS:  No increased work of breathing. + crackles bilateral bases. No wheezing  CARDIOVASCULAR:  normal S1 and S2 and no JVD  ABDOMEN:  Normal bowel sounds, non-distended and non-tender to palpation  EXT: No edema, no calf tenderness. Pulses are present bilaterally. NEUROLOGIC:  Mental Status Exam:  Level of Alertness:   awake  Orientation:   person, place, time. SKIN:  normal skin color, texture, turgor, no redness, warmth, or swelling     DATA:    PFTs  Indication: Shortness of breath, weakness, non productive cough    Test comment:     Spirometry data is acceptable and reproducible.     Pulse ox is 91% on room air    Estimated body mass index is 28.3 kg/m² as calculated from the   following:    Height as of 11/21/17: 5' 10\" (1.778 m).    Weight as of 11/21/17: 197 lb 3.2 oz (89.4 kg).     Spirometry data:    FEV1/FVC: 89. Predicted ratio 73    Pre-Bronchodilator FEV1 2.07L, which is 63% predicted    Post-Bronchodilator FEV1 2.02L, which is 61% predicted    There is -2% reversibility     FVC is 2.33L, which is 51% predicted    Lung Volumes:    TLC is 4.85L, which is 67% predicted    RV is 2.51L which is 99% predicted    Diffusion Capacity:    DLCO is 13.77 which is 42% predicted    Impression:    1. There is no obstruction present    2. There is no response to bronchodilator therapy         [Increase in FEV1 => 12% of control and => 200 ml]    3. There is moderate restriction     4. There is moderate reduction in diffusion capacity    Comment: Moderate restrictive disease with moderate decrease in   diffusion capacity consistent with the diagnosis of interstitial   lung disease.      Radiology Review:  Pertinent images / reports were reviewed as a part of this visit. CXR 10/2020  FINDINGS: Portable AP radiograph of the chest was obtained. The patient is status post a prior sternotomy. The heart and mediastinum are within normal limits for size and configuration. No infiltrate, effusion or pneumothorax is identified.           Impression   1.  No evidence of acute cardiopulmonary disease. CT Chest 11/29/2017 reveals the following:  Impression       Pulmonary fibrosis bilateral lower lobe subpleural regions as   described similar to previous examination.       Marked coronary artery calcification which is a risk factor for   acute coronary syndrome.       No lobar consolidation.       Ascending thoracic aorta upper limits of normal.     Assessment:   1. Cough Variant Asthma  2. ILD - NOS. mild progression from 2017  3. BJ -not using BiPAP  4. Chronic Diastolic CHF  5. Restless Legs Syndrome    Plan:   1. Continue Dulera 200 mcg MDI and I stressed the need for him to use it schedule II puffs twice daily. I would like him to do this consistently for 4 weeks and reevaluate if it helps with his dyspnea on exertion and cough. He has albuterol to use as as needed. 2.  Cardiac catheterization tomorrow -his dyspnea on exertion may be an anginal equivalent so it will be interesting to see the results of this  3. If cardiac catheterization is unrevealing then he may need PFTs -last were 2017.   I reviewed his CT chest from earlier in April and I do think he has mild bibasilar ILD given reticular opacities. I do not think CT abnormalities represent atelectasis  4. He is up-to-date with Prevnar-13 2015, COVID 19 5/2021 and Pneumovax 2014 vaccinations  5.   RTC 1 months

## 2022-04-15 ENCOUNTER — HOSPITAL ENCOUNTER (OUTPATIENT)
Dept: CARDIAC CATH/INVASIVE PROCEDURES | Age: 77
Discharge: HOME | End: 2022-04-17
Payer: COMMERCIAL

## 2022-04-15 VITALS
BODY MASS INDEX: 27.92 KG/M2 | HEIGHT: 70 IN | WEIGHT: 195 LBS | HEART RATE: 76 BPM | DIASTOLIC BLOOD PRESSURE: 66 MMHG | RESPIRATION RATE: 18 BRPM | OXYGEN SATURATION: 95 % | SYSTOLIC BLOOD PRESSURE: 160 MMHG | TEMPERATURE: 97.7 F

## 2022-04-15 LAB
A/G RATIO: 1.6 (ref 1.1–2.2)
ALBUMIN SERPL-MCNC: 3.9 G/DL (ref 3.4–5)
ALP BLD-CCNC: 99 U/L (ref 40–129)
ALT SERPL-CCNC: 44 U/L (ref 10–40)
ANION GAP SERPL CALCULATED.3IONS-SCNC: 13 MMOL/L (ref 3–16)
AST SERPL-CCNC: 28 U/L (ref 15–37)
BILIRUB SERPL-MCNC: 0.3 MG/DL (ref 0–1)
BUN BLDV-MCNC: 57 MG/DL (ref 7–20)
CALCIUM SERPL-MCNC: 9.7 MG/DL (ref 8.3–10.6)
CHLORIDE BLD-SCNC: 90 MMOL/L (ref 99–110)
CO2: 35 MMOL/L (ref 21–32)
CREAT SERPL-MCNC: 7.3 MG/DL (ref 0.8–1.3)
EKG ATRIAL RATE: 288 BPM
EKG DIAGNOSIS: NORMAL
EKG Q-T INTERVAL: 434 MS
EKG QRS DURATION: 86 MS
EKG QTC CALCULATION (BAZETT): 461 MS
EKG R AXIS: 57 DEGREES
EKG T AXIS: 79 DEGREES
EKG VENTRICULAR RATE: 68 BPM
GFR AFRICAN AMERICAN: 9
GFR NON-AFRICAN AMERICAN: 7
GLUCOSE BLD-MCNC: 188 MG/DL (ref 70–99)
HCT VFR BLD CALC: 30.5 % (ref 40.5–52.5)
HEMOGLOBIN: 10.2 G/DL (ref 13.5–17.5)
INR BLD: 0.99 (ref 0.88–1.12)
LEFT VENTRICULAR EJECTION FRACTION MODE: NORMAL
LV EF: 60 %
MCH RBC QN AUTO: 36.4 PG (ref 26–34)
MCHC RBC AUTO-ENTMCNC: 33.3 G/DL (ref 31–36)
MCV RBC AUTO: 109.2 FL (ref 80–100)
PDW BLD-RTO: 16.3 % (ref 12.4–15.4)
PLATELET # BLD: 263 K/UL (ref 135–450)
PMV BLD AUTO: 7.4 FL (ref 5–10.5)
POTASSIUM SERPL-SCNC: 3.8 MMOL/L (ref 3.5–5.1)
PROTHROMBIN TIME: 11.2 SEC (ref 9.9–12.7)
RBC # BLD: 2.79 M/UL (ref 4.2–5.9)
SODIUM BLD-SCNC: 138 MMOL/L (ref 136–145)
TOTAL PROTEIN: 6.3 G/DL (ref 6.4–8.2)
WBC # BLD: 9.5 K/UL (ref 4–11)

## 2022-04-15 PROCEDURE — 93005 ELECTROCARDIOGRAM TRACING: CPT | Performed by: INTERNAL MEDICINE

## 2022-04-15 PROCEDURE — C1769 GUIDE WIRE: HCPCS

## 2022-04-15 PROCEDURE — 93459 L HRT ART/GRFT ANGIO: CPT

## 2022-04-15 PROCEDURE — 36415 COLL VENOUS BLD VENIPUNCTURE: CPT

## 2022-04-15 PROCEDURE — C1894 INTRO/SHEATH, NON-LASER: HCPCS

## 2022-04-15 PROCEDURE — 85027 COMPLETE CBC AUTOMATED: CPT

## 2022-04-15 PROCEDURE — 80053 COMPREHEN METABOLIC PANEL: CPT

## 2022-04-15 PROCEDURE — 93010 ELECTROCARDIOGRAM REPORT: CPT | Performed by: INTERNAL MEDICINE

## 2022-04-15 PROCEDURE — 80061 LIPID PANEL: CPT

## 2022-04-15 PROCEDURE — 2709999900 HC NON-CHARGEABLE SUPPLY

## 2022-04-15 PROCEDURE — 6360000002 HC RX W HCPCS

## 2022-04-15 PROCEDURE — C1760 CLOSURE DEV, VASC: HCPCS

## 2022-04-15 PROCEDURE — 85610 PROTHROMBIN TIME: CPT

## 2022-04-15 PROCEDURE — 2500000003 HC RX 250 WO HCPCS

## 2022-04-15 PROCEDURE — 99152 MOD SED SAME PHYS/QHP 5/>YRS: CPT

## 2022-04-15 PROCEDURE — 6360000004 HC RX CONTRAST MEDICATION: Performed by: INTERNAL MEDICINE

## 2022-04-15 RX ORDER — SODIUM CHLORIDE 0.9 % (FLUSH) 0.9 %
5-40 SYRINGE (ML) INJECTION EVERY 12 HOURS SCHEDULED
Status: DISCONTINUED | OUTPATIENT
Start: 2022-04-15 | End: 2022-04-18 | Stop reason: HOSPADM

## 2022-04-15 RX ORDER — ONDANSETRON 2 MG/ML
4 INJECTION INTRAMUSCULAR; INTRAVENOUS EVERY 6 HOURS PRN
Status: DISCONTINUED | OUTPATIENT
Start: 2022-04-15 | End: 2022-04-18 | Stop reason: HOSPADM

## 2022-04-15 RX ORDER — SODIUM CHLORIDE 0.9 % (FLUSH) 0.9 %
5-40 SYRINGE (ML) INJECTION PRN
Status: DISCONTINUED | OUTPATIENT
Start: 2022-04-15 | End: 2022-04-18 | Stop reason: HOSPADM

## 2022-04-15 RX ORDER — ATROPINE SULFATE 0.4 MG/ML
0.5 AMPUL (ML) INJECTION
Status: ACTIVE | OUTPATIENT
Start: 2022-04-15 | End: 2022-04-15

## 2022-04-15 RX ORDER — SODIUM CHLORIDE 9 MG/ML
25 INJECTION, SOLUTION INTRAVENOUS PRN
Status: DISCONTINUED | OUTPATIENT
Start: 2022-04-15 | End: 2022-04-18 | Stop reason: HOSPADM

## 2022-04-15 RX ORDER — SODIUM CHLORIDE 9 MG/ML
INJECTION, SOLUTION INTRAVENOUS CONTINUOUS
Status: DISCONTINUED | OUTPATIENT
Start: 2022-04-15 | End: 2022-04-18 | Stop reason: HOSPADM

## 2022-04-15 RX ORDER — AMLODIPINE BESYLATE 5 MG/1
5 TABLET ORAL DAILY
Qty: 30 TABLET | Refills: 3 | Status: ON HOLD | OUTPATIENT
Start: 2022-04-15 | End: 2022-09-07

## 2022-04-15 RX ORDER — ACETAMINOPHEN 325 MG/1
650 TABLET ORAL EVERY 4 HOURS PRN
Status: DISCONTINUED | OUTPATIENT
Start: 2022-04-15 | End: 2022-04-18 | Stop reason: HOSPADM

## 2022-04-15 RX ORDER — ASPIRIN 325 MG
325 TABLET ORAL ONCE
Status: DISCONTINUED | OUTPATIENT
Start: 2022-04-15 | End: 2022-04-18 | Stop reason: HOSPADM

## 2022-04-15 RX ADMIN — IOHEXOL 150 ML: 350 INJECTION, SOLUTION INTRAVENOUS at 12:06

## 2022-04-15 NOTE — H&P
Pt Name: Fazal Christine  Age: 69 y.o. Sex: male  : 1945  Location: 6310/6310-01     Referring Physician: Tri Lujan MD        Reason for Consult:         Reason for Consultation/Chief Complaint:   CAD/CABG/pressure/edema/end-stage renal disease        HPI:       Fazal Christine is a 69 y.o. male with a past medical history of CAD/CABG/ESRD who presented to the hospital with shortness of breath. So far from a cardiovascular perspective he has had a normal echocardiogram.  She did have an abnormal stress test a few years back but it was decided not to perform coronary angiography given CKD. Now that he is on HD we should proceed with coronary angiography.        Histories      Past Medical History:   has a past medical history of Allergic rhinitis due to other allergen, Coronary atherosclerosis of unspecified type of vessel, native or graft, Diabetic eye exam ([facility] [address] Diabetic eye exam ([address] Generalized osteoarthrosis, involving multiple sites, Other psoriasis, Pneumonia, Prolonged emergence from general anesthesia, Psoriatic arthropathy ([address] Type II or unspecified type diabetes mellitus without mention of complication, not stated as uncontrolled, Unspecified essential hypertension, and Unspecified sleep apnea.     Surgical History:   has a past surgical history that includes Coronary artery bypass graft; Carpal tunnel release; joint replacement; Cataract removal; and LAPAROSCOPY INSERTION PERITONEAL CATHETER (N/A, 10/26/2020).    Social History:   reports that he quit smoking about 41 years ago. He started smoking about 59 years ago. He has a 18.00 pack-year smoking history. He has never used smokeless tobacco. He reports that he does not drink alcohol and does not use drugs.      Family History:  No evidence for sudden cardiac death or premature CAD        Medications:         Home Medications  Were reviewed and are listed in nursing record.  and/or listed below  Home Medications   Prior to Admission medications    Medication Sig Start Date End Date Taking? Authorizing Provider   midodrine (PROAMATINE) 5 MG tablet Take 1 tablet by mouth in the morning and at bedtime 3/25/22     Andrea Ness MD   insulin aspart (NOVOLOG FLEXPEN) 100 UNIT/ML injection pen 20-28 units AC TID 3/11/22     Harriett Choe MD   insulin glargine (BASAGLAR KWIKPEN) 100 UNIT/ML injection pen Inject 50 Units into the skin nightly 3/11/22     Harriett Choe MD   blood glucose test strips (TRUE METRIX BLOOD GLUCOSE TEST) strip 4 times a day As needed. 3/11/22     Harriett Choe MD   Blood Glucose Monitoring Suppl (TRUE METRIX METER) RUMA As needed 3/11/22     Harriett Choe MD   Lancets MISC 1 each by Does not apply route 3 times daily 4 times a day 3/11/22     Harriett Choe MD   insulin glargine (LANTUS SOLOSTAR) 100 UNIT/ML injection pen INJECT 50 UNITS UNDER THE SKIN EVERY NIGHT AT BEDTIME 3/9/22     Harriett Choe MD   torsemide (DEMADEX) 100 MG tablet TAKE ONE TABLET BY MOUTH DAILY 2/21/22     Andrea Ness MD   blood glucose monitor strips Test up to 4 times daily for blood sugar monitoring 2/15/22     Alexus Zeng MD   clobetasol (TEMOVATE) 0.05 % ointment APPLY TO AFFECTED AREA(S) TWO TIMES A DAY AS NEEDED FOR CONTROL OF PSORIASIS 2/8/22     Andrea Ness MD   blood glucose monitor kit and supplies (PLEASE DISPENSE WHAT INS WILL COVER) - Dispense sufficient amount for  TID testing frequency plus additional to accommodate PRN testing needs. Dispense all needed supplies to include: monitor, strips, lancing device, lancets, control solutions, alcohol swabs.  2/8/22     Alexus Zeng MD   insulin lispro, 1 Unit Dial, 100 UNIT/ML SOPN INJECT 20-28 UNITS UNDER THE SKIN THREE TIMES A DAY BEFORE MEALS  2-8 units HS 12/7/21     Harriett Choe MD   B Complex-C-Biotin-E-Min-FA (DIALYVITE 3000 PO) Take by mouth       Historical Provider, MD   mometasone-formoterol [facility]) 200-5 MCG/ACT inhaler Inhale 2 puffs into the lungs every 12 hours       Historical Provider, MD   albuterol sulfate (PROAIR RESPICLICK) 454 (90 Base) MCG/ACT aerosol powder inhalation Inhale into the lungs every 4 hours as needed for Wheezing or Shortness of Breath       Historical Provider, MD   atorvastatin (LIPITOR) 80 MG tablet TAKE ONE TABLET BY MOUTH ONCE NIGHTLY 8/16/21     Aileen Freeman MD   vitamin D (ERGOCALCIFEROL) 1.25 MG (38257 UT) CAPS capsule   7/14/21     Historical Provider, MD   triamcinolone (KENALOG) 0.1 % ointment Apply topically 2 times daily. 5/25/21 2/16/22   Rekha Ann MD   Ferric Citrate (AURYXIA) 1  MG(Fe) TABS Take by mouth       Historical Provider, MD   Insulin Pen Needle (B-D ULTRAFINE III SHORT PEN) 31G X 8 MM MISC USE WITH INSULIN FOUR TIMES A DAY 4/29/21     Anna Marie Herron MD   Biotin 92493 MCG TABS Take by mouth       Historical Provider, MD   levothyroxine (SYNTHROID) 50 MCG tablet TAKE ONE TABLET BY MOUTH DAILY 4/5/21     Aileen Freeman MD   metoprolol succinate (TOPROL XL) 25 MG extended release tablet Take 1 tablet by mouth daily 2/1/21     Joe Mena MD   Blood Glucose Monitoring Suppl (ACCU-CHEK JOHNATHAN PLUS) w/Device KIT As needed 8/31/20     Aileen Freeman MD   lidocaine 4 % external patch Place 1 patch onto the skin daily 8/29/20     Jordan Napier MD   Handicap Placard MISC by Does not apply route Duration: 5 years 4/18/18     Mo Thomas MD   ACCU-CHEK KATHARINE [facility] CTR LANCETS MISC USE FOUR TIMES A DAY 11/21/17     Aileen Freeman MD   Blood Glucose Monitoring Suppl (ACCU-CHEK JOHNATHAN) RUMA Use as needed 11/21/16     Aileen Freeman MD   aspirin 81 MG chewable tablet Take 1 tablet by mouth daily. 3/28/12     Guanakito Arellano MD   Lancets MISC 2 times daily. Lancets for a [place] meter.  7/16/10     Elyse Le MD               Inpatient Medications:  Scheduled Medications    levothyroxine  50 mcg Oral QAM AC    budesonide  0.5 mg Nebulization BID     And  Arformoterol Tartrate  15 mcg Nebulization BID    albuterol  2.5 mg Nebulization BID    vancomycin  2,000 mg IntraVENous Once    insulin glargine  25 Units SubCUTAneous Once    aspirin  81 mg Oral Daily    atorvastatin  80 mg Oral Nightly    insulin glargine  25 Units SubCUTAneous Nightly    insulin lispro  0-12 Units SubCUTAneous TID WC    insulin lispro  0-6 Units SubCUTAneous Nightly    metoprolol succinate  25 mg Oral Daily    [Held by provider] midodrine  5 mg Oral BID    torsemide  100 mg Oral Daily    sodium chloride flush  5-40 mL IntraVENous 2 times per day    heparin (porcine)  5,000 Units SubCUTAneous 3 times per day            IV drips:  Infusions Meds    dextrose      dextrose      sodium chloride              PRN:  PRN Medications   glucose, dextrose, glucagon (rDNA), dextrose, sodium chloride flush, sodium chloride, ondansetron **OR** ondansetron, polyethylene glycol, acetaminophen **OR** acetaminophen        Allergy:      Clindamycin, Clindamycin/lincomycin, Penicillins, and Tazorac [tazarotene]         Review of Systems:      All 12 point review of symptoms completed. Pertinent positives identified in the HPI, all other review of symptoms negative as below.     CONSTITUTIONAL: Nounanticipated weight loss. No change in energy level, sleep pattern, or activity level. SKIN: No rash or pruritis. EYES: No visual changes or diplopia. No scleral icterus. ENT: No Headaches, hearing loss or vertigo. No mouth sores or sore throat. CARDIOVASCULAR: No chest pain/chest pressure/chest discomfort. No palpitations. RESPIRATORY: No cough or wheezing, no sputum production. No hematemesis. GASTROINTESTINAL: No N/V/D. No abdominal pain, appetite loss, blood in stools. GENITOURINARY: No dysuria, trouble voiding, or hematuria. MUSCULOSKELETAL:  No gait disturbance, weakness or joint complaints. NEUROLOGICAL: No headache, diplopia, change in muscle strength, numbness or tingling.  No change in gait, balance, coordination, mood, affect, memory, mentation, behavior. PSHYCH: No anxiety, loss of interest, change in sexual behavior, feelings of self-harm, or confusion. ENDOCRINE: No malaise, fatigue or temperature intolerance. No excessive thirst, fluid intake, or urination. No tremor. HEMATOLOGIC: No abnormal bruising or bleeding.   ALLERGY: No nasal congestion or hives.        Physical Examination:      Vitals          Vitals:     04/02/22 0215 04/02/22 0300 04/02/22 0930 04/02/22 1019   BP:   (!) 145/94   (!) 145/61   Pulse:   75   103   Resp:   18       Temp:   97.7 °F (36.5 °C)       TempSrc:   Oral       SpO2:   95% 94% 96%   Weight: 197 lb 5 oz (89.5 kg)         Height:                        Wt Readings from Last 3 Encounters:   04/02/22 197 lb 5 oz (89.5 kg)   03/09/22 202 lb (91.6 kg)   02/16/22 198 lb (89.8 kg)         Objective        General Appearance:  Alert, cooperative, no distress, appears stated age Appropriate weight   Head:  Normocephalic, without obvious abnormality, atraumatic   Eyes:  PERRL, conjunctiva/corneas clear EOM intact  Ears normal   Throat no lesions         Nose: Nares normal, no drainage or sinus tenderness   Throat: Lips, mucosa, and tongue normal   Neck: Supple, symmetrical, trachea midline, no adenopathy, thyroid: not enlarged, symmetric, no tenderness/mass/nodules, no carotid bruit or JVD         Lungs:   Clear to auscultation bilaterally, respirations unlabored   Chest Wall:  No tenderness or deformity   Heart:  Regular rate and rhythm, S1, S2 normal, no murmur, rub or gallop PMI intact   Abdomen:   Soft, non-tender, bowel sounds active all four quadrants,  no masses, no organomegaly         Extremities: Extremities normal, atraumatic, no cyanosis or edema   Pulses: 2+ and symmetric   Skin: Skin color, texture, turgor normal, no rashes or lesions   Pysch: Normal mood and affect   Neurologic: Normal gross motor and sensory exam.  Cranial nerves intact    Labs:           Recent Labs     04/01/22  1507 04/02/22  0806   * 139   K 4.2 4.1   BUN 79* 87*   CREATININE 8.1* 8.3*   CL 88* 91*   CO2 32 35*   GLUCOSE 261* 140*   CALCIUM 9.4 9.3            Recent Labs     04/01/22  1507 04/01/22  1507 04/02/22  0806   WBC 14.5*  --  12.3*   HGB 10.2*  --  9.4*   HCT 29.4*  --  27.2*     --  229   .8*   < > 106.5*    < > = values in this interval not displayed.      No results for input(s): CHOLTOT, TRIG, HDL, LDL in the last 72 hours.     Invalid input(s): LIPIDCOMM, CHOLHDL, VLDCHOL  No results for input(s): PTT, INR in the last 72 hours.     Invalid input(s): PT        Recent Labs     04/01/22  1507 04/01/22  1659 04/02/22  0806   TROPONINI 0.05* 0.04* 0.04*      No results for input(s): BNP in the last 72 hours. No results for input(s): TSH in the last 72 hours. No results for input(s): CHOL, HDL, LDLCALC, TRIG in the last 72 hours. ]     Lab Results   Component Value Date     CKTOTAL 196 04/29/2015     TROPONINI 0.04 (H) 04/02/2022            Imaging:      I personally reviewed imaging studies including CXR, Stress test, TTE/SUDHEER.     Last ECG (if available) -personally interpreted EKG:  I have reviewed EKG with the following interpretation  Normal sinus rhythm  Telemetry: Personally interpreted  Normal sinus rhythm     Last Stress (if available):     Last TTE/SUDHEER(if available):     Last Cath (if available):  Overall findings represent a high risk scan.    Normal LV size and systolic function.    There is a medium to large size reversible defect involving the apex, apical    anterior, mid anterior and apical lateral cardiac segments.    Finding are concerning for ischemia.    ECG: Non-diagnostic EKG response due to failure to reach target heart rate.            Assessment / Plan:      CAD/CABG/pressure/edema/end-stage renal disease  -Given symptoms and abnormal stress test plan for coronary angiogram as an outpatient.     Staff Addendum:    Patient interviewed and examined and chart reviewed. Patient had four-vessel bypass in 2006 he has been progressively short of breath of late and was started on peritoneal dialysis. He is demonstrating some arrhythmia on the monitor today and he had a nuclear scan that showed possible anterior ischemia. Patient presents today for invasive or stratification with coronary and graft angiography for shortness of breath which appears to be a class III anginal equivalent with positive nuclear scan for ischemia in the anterior wall. I reviewed the history and physical and examined the patient and agree with the above.   Patient wishes to proceed and consents have been signed.

## 2022-04-16 LAB
CHOLESTEROL, TOTAL: 120 MG/DL (ref 0–199)
HDLC SERPL-MCNC: 44 MG/DL (ref 40–60)
LDL CHOLESTEROL CALCULATED: 43 MG/DL
TRIGL SERPL-MCNC: 166 MG/DL (ref 0–150)
VLDLC SERPL CALC-MCNC: 33 MG/DL

## 2022-05-12 ENCOUNTER — TELEPHONE (OUTPATIENT)
Dept: PULMONOLOGY | Age: 77
End: 2022-05-12

## 2022-05-12 ENCOUNTER — OFFICE VISIT (OUTPATIENT)
Dept: PULMONOLOGY | Age: 77
End: 2022-05-12
Payer: COMMERCIAL

## 2022-05-12 VITALS
OXYGEN SATURATION: 96 % | DIASTOLIC BLOOD PRESSURE: 77 MMHG | TEMPERATURE: 96.5 F | BODY MASS INDEX: 29.6 KG/M2 | WEIGHT: 206.8 LBS | SYSTOLIC BLOOD PRESSURE: 118 MMHG | HEIGHT: 70 IN | HEART RATE: 84 BPM

## 2022-05-12 DIAGNOSIS — J84.9 ILD (INTERSTITIAL LUNG DISEASE) (HCC): ICD-10-CM

## 2022-05-12 DIAGNOSIS — G47.33 OSA (OBSTRUCTIVE SLEEP APNEA): ICD-10-CM

## 2022-05-12 DIAGNOSIS — J20.9 ACUTE BRONCHITIS, UNSPECIFIED ORGANISM: Primary | ICD-10-CM

## 2022-05-12 PROCEDURE — 1036F TOBACCO NON-USER: CPT | Performed by: INTERNAL MEDICINE

## 2022-05-12 PROCEDURE — G8428 CUR MEDS NOT DOCUMENT: HCPCS | Performed by: INTERNAL MEDICINE

## 2022-05-12 PROCEDURE — G8417 CALC BMI ABV UP PARAM F/U: HCPCS | Performed by: INTERNAL MEDICINE

## 2022-05-12 PROCEDURE — 99214 OFFICE O/P EST MOD 30 MIN: CPT | Performed by: INTERNAL MEDICINE

## 2022-05-12 PROCEDURE — 1123F ACP DISCUSS/DSCN MKR DOCD: CPT | Performed by: INTERNAL MEDICINE

## 2022-05-12 PROCEDURE — 4040F PNEUMOC VAC/ADMIN/RCVD: CPT | Performed by: INTERNAL MEDICINE

## 2022-05-12 RX ORDER — AZITHROMYCIN 250 MG/1
TABLET, FILM COATED ORAL
Qty: 6 TABLET | Refills: 0 | Status: ON HOLD | OUTPATIENT
Start: 2022-05-12 | End: 2022-09-07

## 2022-05-12 RX ORDER — FLUTICASONE FUROATE AND VILANTEROL 200; 25 UG/1; UG/1
1 POWDER RESPIRATORY (INHALATION) DAILY
Qty: 1 EACH | Refills: 11 | Status: SHIPPED | OUTPATIENT
Start: 2022-05-12 | End: 2022-05-12 | Stop reason: ALTCHOICE

## 2022-05-12 NOTE — TELEPHONE ENCOUNTER
Patient name Wilfredo Gonzales  Date of birth 1945  Medication Dulera 200-5MCG/ACT aerosol  Rejection code [id number] - NDC NOT COVERED MR - PRODUCT NOT ON FORMULARY   . Insurance prefers [id number] Lorraine Closs ELLIPTA -25, [id number] - ADVAIR DISKU AER 5    Please change if appropriate.  Thank you

## 2022-05-12 NOTE — PROGRESS NOTES
[facility]    Outpatient Follow Up Note    Subjective:   CHIEF COMPLAINT / HPI:     The patient is 69 y.o. male who is here for 4-week follow-up of dyspnea on exertion. Since last visit he had a cardiac catheterization with an EF of 60% that was reported as normal.  He used Dulera 2 puffs twice daily without improvement in dyspnea on exertion. He has no cough or wheezing. He states he feels deconditioned. He does have a cough with clear phlegm    4/14/2022  Caleb Cary is here for 6-month follow-up of cough variant asthma, ILD, and BJ. He was recently admitted to [facility]. for evaluation of dyspnea on exertion. He has a history of a abnormal stress test in 2020. At that time there was talk of a left heart catheterization but due to his CKD and the risk for progression to renal failure from dye it was not done. However he is now on PD for ESRD. He has a cardiac catheterization scheduled for tomorrow. While in the hospital he had a CT chest April 2 to evaluate for causes of dyspnea on exertion given his history of ILD on CT chest in 2017. He has no chest pain, nausea, diaphoresis, arm pain, or wheezing. He does have a history of cough variant asthma but only takes his Dulera 1 or 2 times a week. He does have a daily cough at times productive of clear phlegm    10/14/2021  Caleb Cary is here for 6-month follow-up of cough variant asthma, ILD, and BJ. His main complaint is of fatigue and at times coughing up clear phlegm. He is only using his Dulera several times a week. He thinks he has a ProAir inhaler at home but is not for sure. His breathing is at his baseline. No wheezing or chest pain. Donald Ranger His PD is going well.    4/22/2021  Caleb Cary  is here for 6-month follow-up of ILD/BJ/and cough variant asthma. Overall he states that he is doing mildly better since initiation of hemodialysis by PD catheter. He has not had any recent syncope.   No dyspnea on exertion, wheezing, or chest tightness. He does have a cough that he states has been going on for a while. No GERD or postnasal drip. Not productive and no fevers or chills. He has not tried anything to suppress it. He is using his Michael Seats but rarely needs his rescue albuterol    10/14/2020  Zuly Fuchs is here for 6-month follow-up of ILD/BJ/and cough variant asthma. He was admitted to the hospital end of August for syncope. Etiology was thought to be orthostatic hypotension. He is getting a PD catheter placed for initiation of hemodialysis for ESRD. He had a overnight pulse oximetry study on room air back in March that showed no significant oxygen desaturation he does not qualify for oxygen at night. He has no significant dyspnea on exertion, cough, wheezing, or chest tightness. He still has some chest congestion but it is not significant enough to warrant Mucinex. He does report that he uses his respiratory inhalers only intermittently    3/11/2020  Zuly Fuchs presents today for follow up of ILD, BJ, cough variant asthma and fatigue. Main complaint is still of chest congestion. He did not try Mucinex after last visit . EDWARDS does not seem to be an issue. He is not using BiPAP as due to the drying effect of it. He denies any fevers, chills, anorexia, weight loss, cough, chest pain, or peripheral edema. He has not repeated his nocturnal pulse ox study yet stating no one had contacted him to deliver the equipment.  He continues Pulm rehab, now at [facility] since our phase 3 closed    He's had a pretty extensive evaluation end of 2017 including  CT chest, PFTs, CBC, CMP and above-mentioned stress test.  He does have sleep apnea but states he is compliant with BiPAP    Past Medical History:    Past Medical History:   Diagnosis Date    Allergic rhinitis due to other allergen 7/12/2010    Coronary atherosclerosis of unspecified type of vessel, native or graft 7/12/2010    Diabetic eye exam ([facility] [address] 04/29/2015    Diabetic eye exam ([facility] 2016    [place] [facility]    Generalized osteoarthrosis, involving multiple sites 2010    Other psoriasis 2010    Pneumonia     Prolonged emergence from general anesthesia     Psoriatic arthropathy ([facility] 2010    Type II or unspecified type diabetes mellitus without mention of complication, not stated as uncontrolled 2010    Unspecified essential hypertension 2010    Unspecified sleep apnea 2010       Social History:    Social History     Tobacco Use   Smoking Status Former Smoker    Packs/day: 1.00    Years: 18.00    Pack years: 18.00    Start date: 1962   [facility] Quit date: 1980    Years since quittin.5   Smokeless Tobacco Never Used       Current Medications:  Current Outpatient Medications on File Prior to Visit   Medication Sig Dispense Refill    amLODIPine (NORVASC) 5 MG tablet Take 1 tablet by mouth daily 30 tablet 3    mometasone-formoterol (DULERA) 200-5 MCG/ACT inhaler Inhale 2 puffs into the lungs every 12 hours 1 each 5    albuterol sulfate (PROAIR RESPICLICK) 767 (90 Base) MCG/ACT aerosol powder inhalation Inhale 2 puffs into the lungs every 4 hours as needed for Wheezing or Shortness of Breath 1 each 5    midodrine (PROAMATINE) 5 MG tablet Take 1 tablet by mouth in the morning and at bedtime 90 tablet 3    insulin aspart (NOVOLOG FLEXPEN) 100 UNIT/ML injection pen 20-28 units AC TID 5 pen 3    blood glucose test strips (TRUE METRIX BLOOD GLUCOSE TEST) strip 4 times a day As needed.  150 each 3    Blood Glucose Monitoring Suppl (TRUE METRIX METER) RUMA As needed 1 each 0    Lancets MISC 1 each by Does not apply route 3 times daily 4 times a day 150 each 2    insulin glargine (LANTUS SOLOSTAR) 100 UNIT/ML injection pen INJECT 50 UNITS UNDER THE SKIN EVERY NIGHT AT BEDTIME 15 mL 3    torsemide (DEMADEX) 100 MG tablet TAKE ONE TABLET BY MOUTH DAILY 90 tablet 5    blood glucose monitor strips Test up to 4 times daily for blood sugar monitoring 200 strip 3    blood glucose monitor kit and supplies (PLEASE DISPENSE WHAT INS WILL COVER) - Dispense sufficient amount for  TID testing frequency plus additional to accommodate PRN testing needs. Dispense all needed supplies to include: monitor, strips, lancing device, lancets, control solutions, alcohol swabs. 1 kit 0    B Complex-C-Biotin-E-Min-FA (DIALYVITE 3000 PO) Take by mouth       atorvastatin (LIPITOR) 80 MG tablet TAKE ONE TABLET BY MOUTH ONCE NIGHTLY 90 tablet 3    vitamin D (ERGOCALCIFEROL) 1.25 MG (19830 UT) CAPS capsule       Ferric Citrate (AURYXIA) 1  MG(Fe) TABS Take by mouth      Insulin Pen Needle (B-D ULTRAFINE III SHORT PEN) 31G X 8 MM MISC USE WITH INSULIN FOUR TIMES A  each 5    levothyroxine (SYNTHROID) 50 MCG tablet TAKE ONE TABLET BY MOUTH DAILY 90 tablet 8    Blood Glucose Monitoring Suppl (ACCU-CHEK JOHNATHAN PLUS) w/Device KIT As needed 1 kit 2    lidocaine 4 % external patch Place 1 patch onto the skin daily 1 box 1    Handicap Placard MISC by Does not apply route Duration: 5 years 1 each 0    ACCU-CHEK SOFTCLIX LANCETS MISC USE FOUR TIMES A  each 3    Blood Glucose Monitoring Suppl (ACCU-CHEK JOHNATHAN) RUMA Use as needed 1 Device 0    aspirin 81 MG chewable tablet Take 1 tablet by mouth daily. 30 tablet 5    Lancets MISC 2 times daily. Lancets for a Anna meter. 50 each 11    insulin glargine (BASAGLAR KWIKPEN) 100 UNIT/ML injection pen Inject 50 Units into the skin nightly (Patient not taking: Reported on 5/12/2022) 5 pen 2    clobetasol (TEMOVATE) 0.05 % ointment APPLY TO AFFECTED AREA(S) TWO TIMES A DAY AS NEEDED FOR CONTROL OF PSORIASIS 15 g 5    insulin lispro, 1 Unit Dial, 100 UNIT/ML SOPN INJECT 20-28 UNITS UNDER THE SKIN THREE TIMES A DAY BEFORE MEALS  2-8 units HS 45 mL 2    triamcinolone (KENALOG) 0.1 % ointment Apply topically 2 times daily.  1 Tube 2    Biotin 29394 MCG TABS Take by mouth  (Patient not taking: Reported on 5/12/2022)      metoprolol succinate (TOPROL XL) 25 MG extended release tablet Take 1 tablet by mouth daily (Patient not taking: Reported on 5/12/2022) 90 tablet 3     No current facility-administered medications on file prior to visit. REVIEW OF SYSTEMS:    CONSTITUTIONAL: Negative for fevers and chills  HEENT: Negative for oropharyngeal exudate, post nasal drip, sinus pain / pressure, nasal congestion, ear pain  RESPIRATORY:  See HPI  CARDIOVASCULAR: Negative for chest pain, palpitations, edema  GASTROINTESTINAL: Negative for nausea, vomiting, diarrhea, constipation and abdominal pain  HEMATOLOGICAL: Negative for adenopathy  SKIN: Negative for clubbing, cyanosis, skin lesions  EXTREMITIES: Negative for weakness, decreased ROM  NEUROLOGICAL: Negative for unilateral weakness, speech or gait abnormalities    Objective:   PHYSICAL EXAM:        VITALS:    /77 (Site: Right Upper Arm, Position: Sitting, Cuff Size: Large Adult)   Pulse 84   Temp 96.5 °F (35.8 °C) (Infrared)   Ht 5' 10\" (1.778 m)   Wt 206 lb 12.8 oz (93.8 kg)   SpO2 96% Comment: ra  BMI 29.67 kg/m²   On RA  CONSTITUTIONAL:  Awake, alert, cooperative, no apparent distress, and appears stated age  HEENT: No oropharyngeal exudate, PERRL, no cervical adenopathy, no tracheal deviation, thyroid size normal  LUNGS:  No increased work of breathing. + crackles bilateral bases. No wheezing  CARDIOVASCULAR:  normal S1 and S2 and no JVD  ABDOMEN:  Normal bowel sounds, non-distended and non-tender to palpation  EXT: No edema, no calf tenderness. Pulses are present bilaterally. NEUROLOGIC:  Mental Status Exam:  Level of Alertness:   awake  Orientation:   person, place, time.   SKIN:  normal skin color, texture, turgor, no redness, warmth, or swelling     DATA:    PFTs  Indication: Shortness of breath, weakness, non productive cough    Test comment:     Spirometry data is acceptable and reproducible.     Pulse ox is 91% on room air    Estimated body mass index is 28.3 kg/m² as calculated from the   following:    Height as of 11/21/17: 5' 10\" (1.778 m).    Weight as of 11/21/17: 197 lb 3.2 oz (89.4 kg). Spirometry data:    FEV1/FVC: 89. Predicted ratio 73    Pre-Bronchodilator FEV1 2.07L, which is 63% predicted    Post-Bronchodilator FEV1 2.02L, which is 61% predicted    There is -2% reversibility     FVC is 2.33L, which is 51% predicted    Lung Volumes:    TLC is 4.85L, which is 67% predicted    RV is 2.51L which is 99% predicted    Diffusion Capacity:    DLCO is 13.77 which is 42% predicted    Impression:    1. There is no obstruction present    2. There is no response to bronchodilator therapy         [Increase in FEV1 => 12% of control and => 200 ml]    3. There is moderate restriction     4. There is moderate reduction in diffusion capacity    Comment: Moderate restrictive disease with moderate decrease in   diffusion capacity consistent with the diagnosis of interstitial   lung disease.      Radiology Review:  Pertinent images / reports were reviewed as a part of this visit. CXR 10/2020  FINDINGS: Portable AP radiograph of the chest was obtained. The patient is status post a prior sternotomy. The heart and mediastinum are within normal limits for size and configuration. No infiltrate, effusion or pneumothorax is identified.           Impression   1.  No evidence of acute cardiopulmonary disease. CT Chest 11/29/2017 reveals the following:  Impression       Pulmonary fibrosis bilateral lower lobe subpleural regions as   described similar to previous examination.       Marked coronary artery calcification which is a risk factor for   acute coronary syndrome.       No lobar consolidation.       Ascending thoracic aorta upper limits of normal.     Assessment:   1. EDWARDS -unclear etiology. No improvement with Dulera  2. ILD - NOS. mild progression from 2017  3. BJ -not using BiPAP  4. Chronic Diastolic CHF  5. Restless Legs Syndrome  6.  ESRD on PD    Plan:   1.  . Stop Beauty Staple as not helping. Continue albuterol as needed. 2.  Cardiac catheterization since last visit reported as normal  3. Increase activity level  4. Z pac for possible acute bronchitis   5. He is up-to-date with Prevnar-13 2015, COVID 19 5/2021 and Pneumovax 2014 vaccinations  6.   RTC 4 [facility]

## 2022-05-16 ENCOUNTER — TELEPHONE (OUTPATIENT)
Dept: ENDOCRINOLOGY | Age: 77
End: 2022-05-16

## 2022-05-16 NOTE — TELEPHONE ENCOUNTER
The insurance wont pay for lantus. They will pay for levemir. Pharm name is [facility] on [address] [facility]. Can the doctor to order levemir instead.

## 2022-05-17 RX ORDER — INSULIN DETEMIR 100 [IU]/ML
INJECTION, SOLUTION SUBCUTANEOUS
Qty: 5 PEN | Refills: 3 | Status: SHIPPED | OUTPATIENT
Start: 2022-05-17 | End: 2022-06-21 | Stop reason: SDUPTHER

## 2022-05-17 RX ORDER — PEN NEEDLE, DIABETIC 31 GX5/16"
NEEDLE, DISPOSABLE MISCELLANEOUS
Qty: 200 EACH | Refills: 2 | Status: SHIPPED | OUTPATIENT
Start: 2022-05-17

## 2022-06-07 ENCOUNTER — OFFICE VISIT (OUTPATIENT)
Dept: INTERNAL MEDICINE CLINIC | Age: 77
End: 2022-06-07
Payer: COMMERCIAL

## 2022-06-07 VITALS
HEART RATE: 73 BPM | BODY MASS INDEX: 28.92 KG/M2 | SYSTOLIC BLOOD PRESSURE: 120 MMHG | DIASTOLIC BLOOD PRESSURE: 73 MMHG | WEIGHT: 202 LBS | HEIGHT: 70 IN

## 2022-06-07 DIAGNOSIS — L03.317 CELLULITIS OF BUTTOCK: ICD-10-CM

## 2022-06-07 DIAGNOSIS — L89.301 PRESSURE INJURY OF BUTTOCK, STAGE 1, UNSPECIFIED LATERALITY: Primary | ICD-10-CM

## 2022-06-07 PROCEDURE — G8427 DOCREV CUR MEDS BY ELIG CLIN: HCPCS | Performed by: INTERNAL MEDICINE

## 2022-06-07 PROCEDURE — 1123F ACP DISCUSS/DSCN MKR DOCD: CPT | Performed by: INTERNAL MEDICINE

## 2022-06-07 PROCEDURE — 1036F TOBACCO NON-USER: CPT | Performed by: INTERNAL MEDICINE

## 2022-06-07 PROCEDURE — 99214 OFFICE O/P EST MOD 30 MIN: CPT | Performed by: INTERNAL MEDICINE

## 2022-06-07 PROCEDURE — G8417 CALC BMI ABV UP PARAM F/U: HCPCS | Performed by: INTERNAL MEDICINE

## 2022-06-07 RX ORDER — DOXYCYCLINE HYCLATE 100 MG
100 TABLET ORAL 2 TIMES DAILY
Qty: 20 TABLET | Refills: 0 | Status: SHIPPED | OUTPATIENT
Start: 2022-06-07 | End: 2022-06-17

## 2022-06-07 SDOH — ECONOMIC STABILITY: FOOD INSECURITY: WITHIN THE PAST 12 MONTHS, YOU WORRIED THAT YOUR FOOD WOULD RUN OUT BEFORE YOU GOT MONEY TO BUY MORE.: NEVER TRUE

## 2022-06-07 SDOH — ECONOMIC STABILITY: FOOD INSECURITY: WITHIN THE PAST 12 MONTHS, THE FOOD YOU BOUGHT JUST DIDN'T LAST AND YOU DIDN'T HAVE MONEY TO GET MORE.: NEVER TRUE

## 2022-06-07 ASSESSMENT — PATIENT HEALTH QUESTIONNAIRE - PHQ9
SUM OF ALL RESPONSES TO PHQ QUESTIONS 1-9: 0
SUM OF ALL RESPONSES TO PHQ QUESTIONS 1-9: 0
2. FEELING DOWN, DEPRESSED OR HOPELESS: 0
SUM OF ALL RESPONSES TO PHQ QUESTIONS 1-9: 0
1. LITTLE INTEREST OR PLEASURE IN DOING THINGS: 0
SUM OF ALL RESPONSES TO PHQ QUESTIONS 1-9: 0
SUM OF ALL RESPONSES TO PHQ9 QUESTIONS 1 & 2: 0

## 2022-06-07 ASSESSMENT — SOCIAL DETERMINANTS OF HEALTH (SDOH): HOW HARD IS IT FOR YOU TO PAY FOR THE VERY BASICS LIKE FOOD, HOUSING, MEDICAL CARE, AND HEATING?: NOT HARD AT ALL

## 2022-06-07 NOTE — PROGRESS NOTES
Zuleika Martinez (:  1945) is a 69 y.o. male,Established patient, here for evaluation of the following chief complaint(s): Wound Infection (right buttocks, painful sometimes, ongoing for awhile just getting worse)         ASSESSMENT/PLAN:  1. Pressure injury of buttock, stage 1, unspecified laterality   - pressure injury bilaterally, I do not see any open wound though there is a tender area which makes me concerned for developing deeper infection. I do think there is a superimposed infection as well as pressure injury, will treat with antibiotics as below, limited due to allergies. Refer to home care for treatment of pressure injury. 2. Cellulitis of buttock   - treat with doxycycline 100mg twice daily x10 days    Return if symptoms worsen or fail to improve. Subjective   SUBJECTIVE/OBJECTIVE:  HPI    He thinks that he has developed a pressure sore from sitting. He tries to change position but is not very active. His nephrologist noted an elevated WBC count and is concerned he has an infection in the area. It is not painful. Review of Systems       Objective   Physical Exam  Constitutional:       General: He is not in acute distress. Appearance: Normal appearance. HENT:      Head: Normocephalic and atraumatic. Skin:     General: Skin is warm and dry. Comments: Erythema of the buttocks, only partially blanching. Small closed wound on the right buttock, no drainage, fluctuance, or induration, but is tender. Neurological:      Mental Status: He is alert. An electronic signature was used to authenticate this note.     --Adonay Dye MD

## 2022-06-21 ENCOUNTER — OFFICE VISIT (OUTPATIENT)
Dept: ENDOCRINOLOGY | Age: 77
End: 2022-06-21
Payer: COMMERCIAL

## 2022-06-21 VITALS
BODY MASS INDEX: 29.72 KG/M2 | HEIGHT: 70 IN | SYSTOLIC BLOOD PRESSURE: 130 MMHG | DIASTOLIC BLOOD PRESSURE: 60 MMHG | HEART RATE: 82 BPM | OXYGEN SATURATION: 94 % | WEIGHT: 207.6 LBS

## 2022-06-21 LAB — HBA1C MFR BLD: 6.9 %

## 2022-06-21 PROCEDURE — 83036 HEMOGLOBIN GLYCOSYLATED A1C: CPT | Performed by: INTERNAL MEDICINE

## 2022-06-21 PROCEDURE — G8427 DOCREV CUR MEDS BY ELIG CLIN: HCPCS | Performed by: INTERNAL MEDICINE

## 2022-06-21 PROCEDURE — G8417 CALC BMI ABV UP PARAM F/U: HCPCS | Performed by: INTERNAL MEDICINE

## 2022-06-21 PROCEDURE — 99214 OFFICE O/P EST MOD 30 MIN: CPT | Performed by: INTERNAL MEDICINE

## 2022-06-21 PROCEDURE — 1036F TOBACCO NON-USER: CPT | Performed by: INTERNAL MEDICINE

## 2022-06-21 PROCEDURE — 3052F HG A1C>EQUAL 8.0%<EQUAL 9.0%: CPT | Performed by: INTERNAL MEDICINE

## 2022-06-21 PROCEDURE — 1123F ACP DISCUSS/DSCN MKR DOCD: CPT | Performed by: INTERNAL MEDICINE

## 2022-06-21 RX ORDER — INSULIN DETEMIR 100 [IU]/ML
INJECTION, SOLUTION SUBCUTANEOUS
Qty: 10 PEN | Refills: 3 | Status: ON HOLD
Start: 2022-06-21 | End: 2022-09-19 | Stop reason: HOSPADM

## 2022-06-21 RX ORDER — INSULIN ASPART 100 [IU]/ML
INJECTION, SOLUTION INTRAVENOUS; SUBCUTANEOUS
Qty: 10 PEN | Refills: 3 | Status: ON HOLD
Start: 2022-06-21 | End: 2022-09-19 | Stop reason: HOSPADM

## 2022-06-21 NOTE — PROGRESS NOTES
Seen as f/u patient for diabetes    Interim:  Doing PD   High glucose  Increased novolog  States had an extra bag for PD    Takes humalog after meals  Has boost with breakfast      Dexcom readings different from meter- not using now  Uses PD at 8pm   Solution at [facility] does not have dextrose per patient    Hospitalized for Syncope     Admitted on 1/17 for MORIS on CKD, Acute exacerbation of CHF. Resp failure  Hypoglycemia on admission BG 28? Diagnosed with Type 2 diabetes mellitus in  1995  Known diabetic complications: Nephropathy, Neuropathy, mild retinopathy    Current diabetic medications   levemir  50  units  novolog 28 units TID  SSI 2 for 50>150 TID and HS   Half less than 90    On insulin since 1998    He has been on janumet and glipizide    Last A1c 6.9%<-----8.1%<-----5.2%,----6.6%<----6.1%<-----6.9%<----- 6.4%<----6.9%<---- 6.1%<-----6.6%<------5.5%<----6.6%<----- 7.2% <-----8.9 on 7/16<--- 7.4 on 4/16<------7.9 on 1/16<----  10.4 on 10/15 <---- 8.5 <--- 8.2 <--- 8.6    Prior visit with dietician: no  Current diet: on average, 3 meals per day does not count CHO  Current exercise: walking   Current monitoring regimen: home blood tests - 4 times daily     Has brought blood glucose log/meter: yes  Home blood sugar records:132-271  Any episodes of hypoglycemia?  Occ am     H/O CABG in 06    Hyperlipidemia:for years, stable  LDL 44 on 11/17  lipitor 40mg  LDL 58 on 11/18    LDL 14 on 11/19  Last eye exam: 11/20  Last foot exam: 5/19 sees podiatry  Last microalbumin to creatinine ratio: ESRD  On PD    HTN: stable, for years, takes Losartan 25mg ( stopped by nephrologist) coreg 6.25mg BID    H/o subclinical hypothyroidism,stable    TSH 3.09 on 5/18  Levothyroxine 50mcg    He has neuropathy,was on neurontin    Past Medical History:   Diagnosis Date    Allergic rhinitis due to other allergen 7/12/2010    Coronary atherosclerosis of unspecified type of vessel, native or graft 7/12/2010    Diabetic eye exam ([facility] 04/29/2015    Diabetic eye exam ([facility] 5/5/2016    [place] [facility]    Generalized osteoarthrosis, involving multiple sites 7/12/2010    Other psoriasis 7/12/2010    Pneumonia     Prolonged emergence from general anesthesia     Psoriatic arthropathy ([facility] 7/12/2010    Type II or unspecified type diabetes mellitus without mention of complication, not stated as uncontrolled 7/12/2010    Unspecified essential hypertension 7/12/2010    Unspecified sleep apnea 7/12/2010     Past Surgical History:   Procedure Laterality Date    CARPAL TUNNEL RELEASE      s/p    CATARACT REMOVAL      CORONARY ARTERY BYPASS GRAFT      JOINT REPLACEMENT      LAPAROSCOPY INSERTION PERITONEAL CATHETER N/A 10/26/2020    LAPAROSCOPIC PERITONEAL DIALYSIS CATHETER PLACEMENT; LAPAROSCOPIC OMENTOPEXY performed by Juliette Crain DO at [address] [place] [address], reviewed    Vitals:    06/21/22 1215   BP: 130/60   Pulse: 82   SpO2: 94%       Constitutional: Well-developed, appears stated age, cooperative, in no acute distress  H/E/N/M/T:atraumatic, normocephalic, external ears, nose, lips normal without lesions  Eyes: Lids, lashes, conjunctivae and sclerae normal, No proptosis, no redness  Neck: supple, symmetrical, no swelling  Skin: No obvious rashes or lesions present.   Skin and hair texture normal  Psychiatric: Judgement and Insight:  judgement and insight appear normal  Neuro: Normal without focal findings, speech is normal normal, speech is spontaneous  Chest: No labored breathing, no chest deformity, no stridor  Musculoskeletal: No joint deformity, swelling        5/19  Skeletal foot exam is normal, no skin lesions, toenails are normal, DP not palpable,  10 g monofilament is 10/10 on the right and 10/10 on the left    Lab Reviewed   No components found for: CHLPL  Lab Results   Component Value Date    TRIG 166 (H) 04/15/2022    TRIG 192 (H) 02/19/2022    TRIG 265 (H) 11/04/2019     Lab Results   Component Value Date    HDL 44 04/15/2022    HDL 41 02/19/2022    HDL 39 (L) 11/04/2019     Lab Results   Component Value Date    LDLCALC 43 04/15/2022    LDLCALC 41 02/19/2022    LDLCALC 14 11/04/2019     Lab Results   Component Value Date    LABVLDL 33 04/15/2022    LABVLDL 38 02/19/2022    LABVLDL 53 11/04/2019     Lab Results   Component Value Date    LABA1C 8.1 03/09/2022       Assessment:     Kassie Mortimer is a 69 y.o. male with :    1.T2DM: Longstanding,uncontrolled, insulin resistant, goal A1c 7-8%. Advised Dexcom not approved in dialysis patient, use glucose meter for self monitoring. Highs in morning and during the day, adjust levemir dose and novolog. Clenton Reas Also take SSI at night   A1c has improved  2. HTN: BP at goal  3. HLD: LDL at goal, he is  On statin  4. Obesity  5. CAD  6. CKD III  7. Psoriatic arthritis: 8. Neuropathy: Had been on lyrica, prescribed topical ,off of it. Started neurontin, did not help, . He does have some drowsiness, off of it. Advise B12  9. Fatigue:  TSH high with normal FT4, subclinical hypothyroid, advise to try replacement. He was inquiring about Co-Q 10 , advised can take 200mg  His total testosterone was normal with free level low normal. Advised given total is in a good range, h/o CAD, Age, recommend no replacement at this point. 10. Subclinical hypothyroid: On replacement,  Last level normal, continue levothyroxine, TSH 3.77 on 2/22    Plan:       levemir  56  units     novolog 30/30/32  units TID  SSI 2 for 50>150 TID and HS   Half less than 90   Advise to check blood sugar 4 times a day   Patient to send blood sugar log for titration. Advise to low simple carbohydrate and protein with each  meal diet. Diabetes Care: recommend yearly eye exam, foot exam and urine microalbumin to  creatinine ratio.     -Hyperlipidemia, LDL goal is <70 mg/dl   -Hypertension: Continue same  -Daily ASA:81mg  -Smoking status: Non smoker

## 2022-06-30 RX ORDER — LEVOTHYROXINE SODIUM 0.05 MG/1
TABLET ORAL
Qty: 90 TABLET | Refills: 8 | Status: SHIPPED | OUTPATIENT
Start: 2022-06-30

## 2022-06-30 NOTE — TELEPHONE ENCOUNTER
Medication:   Requested Prescriptions     Pending Prescriptions Disp Refills    levothyroxine (SYNTHROID) 50 MCG tablet [Pharmacy Med Name: LEVOTHYROXINE 50 MCG TABLET] 90 tablet 8     Sig: TAKE ONE TABLET BY MOUTH DAILY       Last Filled:      Patient Phone Number: [phone] (home)     Last appt: 6/21/2022   Next appt: 9/28/2022    Last Thyroid:   Lab Results   Component Value Date/Time    TSH 5.20 01/30/2018 08:02 AM

## 2022-07-21 RX ORDER — LANCETS 30 GAUGE
EACH MISCELLANEOUS
Qty: 150 EACH | Refills: 5 | Status: SHIPPED | OUTPATIENT
Start: 2022-07-21

## 2022-08-09 ENCOUNTER — TELEPHONE (OUTPATIENT)
Dept: INTERNAL MEDICINE CLINIC | Age: 77
End: 2022-08-09

## 2022-08-09 NOTE — TELEPHONE ENCOUNTER
Izabella Nair from [address] [facility] called and  stated that pt  is getting weaker and he cant walk and would like to know if he can get a referral  for physical therapy . Please call and advise.

## 2022-08-12 ENCOUNTER — TELEPHONE (OUTPATIENT)
Dept: INTERNAL MEDICINE CLINIC | Age: 77
End: 2022-08-12

## 2022-08-12 NOTE — TELEPHONE ENCOUNTER
Mukesh Magallon from [facility] called in stating that the patient would needs a new visit note for PT. Patient used visit on 6/7 for last time patient had PT. Pls call and advise.

## 2022-08-15 ENCOUNTER — OFFICE VISIT (OUTPATIENT)
Dept: CARDIOLOGY CLINIC | Age: 77
End: 2022-08-15
Payer: COMMERCIAL

## 2022-08-15 VITALS
SYSTOLIC BLOOD PRESSURE: 118 MMHG | HEART RATE: 91 BPM | DIASTOLIC BLOOD PRESSURE: 60 MMHG | WEIGHT: 201 LBS | BODY MASS INDEX: 28.84 KG/M2

## 2022-08-15 DIAGNOSIS — E78.5 HYPERLIPIDEMIA, UNSPECIFIED HYPERLIPIDEMIA TYPE: Primary | ICD-10-CM

## 2022-08-15 DIAGNOSIS — I25.10 CORONARY ARTERY DISEASE INVOLVING NATIVE CORONARY ARTERY OF NATIVE HEART WITHOUT ANGINA PECTORIS: ICD-10-CM

## 2022-08-15 DIAGNOSIS — I10 HTN (HYPERTENSION), BENIGN: ICD-10-CM

## 2022-08-15 PROCEDURE — G8427 DOCREV CUR MEDS BY ELIG CLIN: HCPCS | Performed by: INTERNAL MEDICINE

## 2022-08-15 PROCEDURE — G8417 CALC BMI ABV UP PARAM F/U: HCPCS | Performed by: INTERNAL MEDICINE

## 2022-08-15 PROCEDURE — 99214 OFFICE O/P EST MOD 30 MIN: CPT | Performed by: INTERNAL MEDICINE

## 2022-08-15 PROCEDURE — 1036F TOBACCO NON-USER: CPT | Performed by: INTERNAL MEDICINE

## 2022-08-15 PROCEDURE — 1123F ACP DISCUSS/DSCN MKR DOCD: CPT | Performed by: INTERNAL MEDICINE

## 2022-08-15 NOTE — PROGRESS NOTES
[address] Daily Progress Note      Admit Date:  (Not on file)    CC: follow up CAD    Subjective:  Mr. Peggy Stewart denies chest pain    Objective:   /60   Pulse 91   Wt 201 lb (91.2 kg)   BMI 28.84 kg/m²   No intake or output data in the 24 hours ending 08/15/22 1412      Physical Exam:  General:  Awake, alert, NAD  Eyes:  EOMI PERRL anicteric  Skin:  Warm and dry. Neck:  JVP normal  Chest:  Diminshed. No Rales. Cardiovascular:   RRR, Normal S1S2. No Murmur. No Rub. No Gallops. Abdomen:  Soft NT  + bs  Extremities:  No edema  Neuro:  CN II-XII intact. No focal deficits. No weakness. No lateralizing findings. Psych:  Normal thought and affect. MSK:  No Cyanosis nor Clubbing.   Symmetrical strength upper and lower extremities        Assessment:  Patient Active Problem List    Diagnosis Date Noted    NSTEMI (non-ST elevated myocardial infarction) ([address]     Left lower quadrant abdominal pain     ESRD on peritoneal dialysis ([address]     Dyspnea on exertion 04/01/2022    Orthostatic hypotension     Diabetes mellitus type 2 in nonobese [facility])     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 ([address]     Acquired hypothyroidism     Unstable angina ([address]     Syncope and collapse 05/02/2018    Chronic fatigue 01/23/2018    Type 2 diabetes mellitus with chronic kidney disease ([address] 05/01/2017    BJ (obstructive sleep apnea)     Cough variant asthma     Chronic diastolic heart failure ([address] 01/15/2017    Coronary artery disease involving native coronary artery of native heart with unstable angina pectoris ([address]     ILD (interstitial lung disease) ([address] 05/25/2016    S/P CABG x 4 12/09/2015    Nephropathy 11/04/2015    Glaucoma 02/28/2014    Vitamin D deficiency 07/16/2010    Essential hypertension 07/12/2010    Generalized osteoarthrosis, involving multiple sites 07/12/2010    Arthritis with psoriasis ([address] 07/12/2010    Sleep apnea 07/12/2010    Psoriatic arthropathy ([address] 07/12/2010    Allergic rhinitis due to other allergen 07/12/2010    Coronary atherosclerosis 07/12/2010      Diagnosis Orders   1. Hyperlipidemia, unspecified hyperlipidemia type        2. HTN (hypertension), benign        3. Coronary artery disease involving native coronary artery of native heart without angina pectoris              Plan:  CAD:  stable on April, 2022 angiogram with no intervention. Continue atorvastatin  HTN:  controlled. ESRD on PD.       Core Measures:  Discharge instructions:   LVEF documented:   ACEI for LV dysfunction:   Smoking Cessation:    Fantasma Burnham MD, MD 8/15/2022 2:12 PM

## 2022-08-16 NOTE — TELEPHONE ENCOUNTER
Pricilla Hernandez from [facility] called and stated that pt needs a new visit with  in order to get wound care. Pricilla Hernandez said he will inform the pt. Pricilla Hernandez  [phone]          Please advise.

## 2022-09-02 RX ORDER — CALCIUM CITRATE/VITAMIN D3 200MG-6.25
TABLET ORAL
Qty: 150 STRIP | Refills: 3 | Status: ON HOLD
Start: 2022-09-02 | End: 2022-09-19 | Stop reason: HOSPADM

## 2022-09-02 RX ORDER — ATORVASTATIN CALCIUM 80 MG/1
TABLET, FILM COATED ORAL
Qty: 90 TABLET | Refills: 3 | Status: SHIPPED | OUTPATIENT
Start: 2022-09-02

## 2022-09-02 NOTE — TELEPHONE ENCOUNTER
Medication:   Requested Prescriptions     Pending Prescriptions Disp Refills    blood glucose test strips (TRUE METRIX BLOOD GLUCOSE TEST) strip [Pharmacy Med Name: Comfort Jacinto METRIX GLUCOSE TEST STRIP] 150 strip 3     Sig: USE TO TEST FOUR TIMES A DAY AS NEEDED    atorvastatin (LIPITOR) 80 MG tablet [Pharmacy Med Name: ATORVASTATIN 80 MG TABLET] 90 tablet 3     Sig: TAKE ONE TABLET BY MOUTH ONCE NIGHTLY       Last Filled:      Patient Phone Number: [phone] (home)     Last appt: 06/21/2022  Next appt: 09/28/2022  Last Labs DM:   Lab Results   Component Value Date/Time    LABA1C 6.9 06/21/2022 02:44 PM

## 2022-09-06 ENCOUNTER — APPOINTMENT (OUTPATIENT)
Dept: GENERAL RADIOLOGY | Age: 77
DRG: 640 | End: 2022-09-06
Payer: COMMERCIAL

## 2022-09-06 ENCOUNTER — HOSPITAL ENCOUNTER (INPATIENT)
Age: 77
LOS: 13 days | Discharge: NURSING, REHAB OR LONG-TERM CARE | DRG: 640 | End: 2022-09-19
Attending: EMERGENCY MEDICINE | Admitting: HOSPITALIST
Payer: COMMERCIAL

## 2022-09-06 DIAGNOSIS — R89.2 DRUG TOXICITY: Primary | ICD-10-CM

## 2022-09-06 DIAGNOSIS — R06.89 HYPERCARBIA: ICD-10-CM

## 2022-09-06 DIAGNOSIS — R77.8 ELEVATED TROPONIN: ICD-10-CM

## 2022-09-06 PROBLEM — J96.01 ACUTE RESPIRATORY FAILURE WITH HYPOXIA (HCC): Status: ACTIVE | Noted: 2022-09-06

## 2022-09-06 PROBLEM — G93.41 ACUTE METABOLIC ENCEPHALOPATHY: Status: ACTIVE | Noted: 2022-09-06

## 2022-09-06 PROBLEM — E11.69 DIABETES MELLITUS TYPE 2 IN OBESE (HCC): Status: ACTIVE | Noted: 2017-05-01

## 2022-09-06 PROBLEM — E66.9 DIABETES MELLITUS TYPE 2 IN OBESE (HCC): Status: ACTIVE | Noted: 2017-05-01

## 2022-09-06 PROBLEM — R41.82 AMS (ALTERED MENTAL STATUS): Status: ACTIVE | Noted: 2022-09-06

## 2022-09-06 LAB
A/G RATIO: 1 (ref 1.1–2.2)
ALBUMIN SERPL-MCNC: 3.3 G/DL (ref 3.4–5)
ALP BLD-CCNC: 92 U/L (ref 40–129)
ALT SERPL-CCNC: 43 U/L (ref 10–40)
AMMONIA: 44 UMOL/L (ref 16–60)
ANION GAP SERPL CALCULATED.3IONS-SCNC: 14 MMOL/L (ref 3–16)
AST SERPL-CCNC: 54 U/L (ref 15–37)
BASE EXCESS ARTERIAL: 2.7 MMOL/L (ref -3–3)
BASE EXCESS ARTERIAL: 4.1 MMOL/L (ref -3–3)
BASE EXCESS VENOUS: 5.3 MMOL/L (ref -2–3)
BASOPHILS ABSOLUTE: 0.1 K/UL (ref 0–0.2)
BASOPHILS RELATIVE PERCENT: 0.7 %
BILIRUB SERPL-MCNC: <0.2 MG/DL (ref 0–1)
BUN BLDV-MCNC: 44 MG/DL (ref 7–20)
CALCIUM SERPL-MCNC: 9.3 MG/DL (ref 8.3–10.6)
CARBOXYHEMOGLOBIN ARTERIAL: 0.9 % (ref 0–1.5)
CARBOXYHEMOGLOBIN ARTERIAL: 1 % (ref 0–1.5)
CARBOXYHEMOGLOBIN: 1.8 % (ref 0–1.5)
CHLORIDE BLD-SCNC: 92 MMOL/L (ref 99–110)
CO2: 30 MMOL/L (ref 21–32)
CREAT SERPL-MCNC: 7.1 MG/DL (ref 0.8–1.3)
EKG DIAGNOSIS: NORMAL
EKG Q-T INTERVAL: 370 MS
EKG QRS DURATION: 82 MS
EKG QTC CALCULATION (BAZETT): 387 MS
EKG R AXIS: 16 DEGREES
EKG T AXIS: 73 DEGREES
EKG VENTRICULAR RATE: 66 BPM
EOSINOPHILS ABSOLUTE: 0.4 K/UL (ref 0–0.6)
EOSINOPHILS RELATIVE PERCENT: 2.4 %
ETHANOL: NORMAL MG/DL (ref 0–0.08)
GFR AFRICAN AMERICAN: 9
GFR NON-AFRICAN AMERICAN: 8
GLUCOSE BLD-MCNC: 127 MG/DL (ref 70–99)
HCO3 ARTERIAL: 31 MMOL/L (ref 21–29)
HCO3 ARTERIAL: 31 MMOL/L (ref 21–29)
HCO3 VENOUS: 33.8 MMOL/L (ref 24–28)
HCT VFR BLD CALC: 31 % (ref 40.5–52.5)
HEMATOLOGY PATH CONSULT: YES
HEMOGLOBIN, ART, EXTENDED: 10.2 G/DL
HEMOGLOBIN, ART, EXTENDED: 10.7 G/DL
HEMOGLOBIN, VEN, REDUCED: 37.9 %
HEMOGLOBIN: 10 G/DL (ref 13.5–17.5)
LACTIC ACID: 1.5 MMOL/L (ref 0.4–2)
LYMPHOCYTES ABSOLUTE: 1.4 K/UL (ref 1–5.1)
LYMPHOCYTES RELATIVE PERCENT: 9.5 %
MACROCYTES: ABNORMAL
MCH RBC QN AUTO: 36.8 PG (ref 26–34)
MCHC RBC AUTO-ENTMCNC: 32.3 G/DL (ref 31–36)
MCV RBC AUTO: 113.9 FL (ref 80–100)
METHEMOGLOBIN ARTERIAL: 0.4 % (ref 0–1.4)
METHEMOGLOBIN ARTERIAL: 0.6 % (ref 0–1.4)
METHEMOGLOBIN VENOUS: 0.5 % (ref 0–1.5)
MONOCYTES ABSOLUTE: 0.8 K/UL (ref 0–1.3)
MONOCYTES RELATIVE PERCENT: 5.5 %
NEUTROPHILS ABSOLUTE: 12.1 K/UL (ref 1.7–7.7)
NEUTROPHILS RELATIVE PERCENT: 81.9 %
O2 SAT, ARTERIAL: 97 % (ref 93–100)
O2 SAT, ARTERIAL: 98 % (ref 93–100)
O2 SAT, VEN: 61 %
PCO2 ARTERIAL: 55 MMHG (ref 35–45)
PCO2 ARTERIAL: 68.6 MMHG (ref 35–45)
PCO2, VEN: 71.3 MMHG (ref 41–51)
PDW BLD-RTO: 15.3 % (ref 12.4–15.4)
PH ARTERIAL: 7.26 (ref 7.35–7.45)
PH ARTERIAL: 7.36 (ref 7.35–7.45)
PH VENOUS: 7.28 (ref 7.35–7.45)
PLATELET # BLD: 301 K/UL (ref 135–450)
PMV BLD AUTO: 7.5 FL (ref 5–10.5)
PO2 ARTERIAL: 109 MMHG (ref 75–108)
PO2 ARTERIAL: 94.8 MMHG (ref 75–108)
PO2, VEN: 35 MMHG (ref 25–40)
POLYCHROMASIA: ABNORMAL
POTASSIUM REFLEX MAGNESIUM: 4.2 MMOL/L (ref 3.5–5.1)
PRO-BNP: 1308 PG/ML (ref 0–449)
RBC # BLD: 2.72 M/UL (ref 4.2–5.9)
SODIUM BLD-SCNC: 136 MMOL/L (ref 136–145)
TCO2 ARTERIAL: 32 MMOL/L
TCO2 ARTERIAL: 33 MMOL/L
TCO2 CALC VENOUS: 36 MMOL/L
TOTAL PROTEIN: 6.7 G/DL (ref 6.4–8.2)
TROPONIN: 0.08 NG/ML
WBC # BLD: 14.8 K/UL (ref 4–11)

## 2022-09-06 PROCEDURE — 80053 COMPREHEN METABOLIC PANEL: CPT

## 2022-09-06 PROCEDURE — 99285 EMERGENCY DEPT VISIT HI MDM: CPT

## 2022-09-06 PROCEDURE — 71046 X-RAY EXAM CHEST 2 VIEWS: CPT

## 2022-09-06 PROCEDURE — 82077 ASSAY SPEC XCP UR&BREATH IA: CPT

## 2022-09-06 PROCEDURE — 99222 1ST HOSP IP/OBS MODERATE 55: CPT | Performed by: HOSPITALIST

## 2022-09-06 PROCEDURE — 90945 DIALYSIS ONE EVALUATION: CPT | Performed by: INTERNAL MEDICINE

## 2022-09-06 PROCEDURE — 87040 BLOOD CULTURE FOR BACTERIA: CPT

## 2022-09-06 PROCEDURE — 99223 1ST HOSP IP/OBS HIGH 75: CPT | Performed by: INTERNAL MEDICINE

## 2022-09-06 PROCEDURE — 93005 ELECTROCARDIOGRAM TRACING: CPT | Performed by: EMERGENCY MEDICINE

## 2022-09-06 PROCEDURE — 82803 BLOOD GASES ANY COMBINATION: CPT

## 2022-09-06 PROCEDURE — 82140 ASSAY OF AMMONIA: CPT

## 2022-09-06 PROCEDURE — 94660 CPAP INITIATION&MGMT: CPT

## 2022-09-06 PROCEDURE — 84484 ASSAY OF TROPONIN QUANT: CPT

## 2022-09-06 PROCEDURE — 83605 ASSAY OF LACTIC ACID: CPT

## 2022-09-06 PROCEDURE — 83880 ASSAY OF NATRIURETIC PEPTIDE: CPT

## 2022-09-06 PROCEDURE — 89051 BODY FLUID CELL COUNT: CPT

## 2022-09-06 PROCEDURE — 36600 WITHDRAWAL OF ARTERIAL BLOOD: CPT

## 2022-09-06 PROCEDURE — 36415 COLL VENOUS BLD VENIPUNCTURE: CPT

## 2022-09-06 PROCEDURE — 2060000000 HC ICU INTERMEDIATE R&B

## 2022-09-06 PROCEDURE — 85025 COMPLETE CBC W/AUTO DIFF WBC: CPT

## 2022-09-06 PROCEDURE — 84443 ASSAY THYROID STIM HORMONE: CPT

## 2022-09-06 RX ORDER — ONDANSETRON 4 MG/1
4 TABLET, ORALLY DISINTEGRATING ORAL EVERY 8 HOURS PRN
Status: DISCONTINUED | OUTPATIENT
Start: 2022-09-06 | End: 2022-09-19 | Stop reason: HOSPADM

## 2022-09-06 RX ORDER — ACETAMINOPHEN 650 MG/1
650 SUPPOSITORY RECTAL EVERY 6 HOURS PRN
Status: DISCONTINUED | OUTPATIENT
Start: 2022-09-06 | End: 2022-09-15

## 2022-09-06 RX ORDER — SODIUM CHLORIDE 9 MG/ML
INJECTION, SOLUTION INTRAVENOUS PRN
Status: DISCONTINUED | OUTPATIENT
Start: 2022-09-06 | End: 2022-09-19 | Stop reason: HOSPADM

## 2022-09-06 RX ORDER — INSULIN LISPRO 100 [IU]/ML
0-4 INJECTION, SOLUTION INTRAVENOUS; SUBCUTANEOUS
Status: DISCONTINUED | OUTPATIENT
Start: 2022-09-07 | End: 2022-09-08

## 2022-09-06 RX ORDER — ATORVASTATIN CALCIUM 40 MG/1
80 TABLET, FILM COATED ORAL NIGHTLY
Status: DISCONTINUED | OUTPATIENT
Start: 2022-09-07 | End: 2022-09-19 | Stop reason: HOSPADM

## 2022-09-06 RX ORDER — ASPIRIN 81 MG/1
81 TABLET, CHEWABLE ORAL DAILY
Status: DISCONTINUED | OUTPATIENT
Start: 2022-09-07 | End: 2022-09-19 | Stop reason: HOSPADM

## 2022-09-06 RX ORDER — LEVOTHYROXINE SODIUM 0.05 MG/1
50 TABLET ORAL DAILY
Status: DISCONTINUED | OUTPATIENT
Start: 2022-09-07 | End: 2022-09-19 | Stop reason: HOSPADM

## 2022-09-06 RX ORDER — SODIUM CHLORIDE 0.9 % (FLUSH) 0.9 %
5-40 SYRINGE (ML) INJECTION EVERY 12 HOURS SCHEDULED
Status: DISCONTINUED | OUTPATIENT
Start: 2022-09-06 | End: 2022-09-19 | Stop reason: HOSPADM

## 2022-09-06 RX ORDER — DEXTROSE MONOHYDRATE 100 MG/ML
INJECTION, SOLUTION INTRAVENOUS CONTINUOUS PRN
Status: DISCONTINUED | OUTPATIENT
Start: 2022-09-06 | End: 2022-09-19 | Stop reason: HOSPADM

## 2022-09-06 RX ORDER — POLYETHYLENE GLYCOL 3350 17 G/17G
17 POWDER, FOR SOLUTION ORAL DAILY PRN
Status: DISCONTINUED | OUTPATIENT
Start: 2022-09-06 | End: 2022-09-19 | Stop reason: HOSPADM

## 2022-09-06 RX ORDER — HEPARIN SODIUM 5000 [USP'U]/ML
5000 INJECTION, SOLUTION INTRAVENOUS; SUBCUTANEOUS EVERY 8 HOURS SCHEDULED
Status: DISCONTINUED | OUTPATIENT
Start: 2022-09-06 | End: 2022-09-19 | Stop reason: HOSPADM

## 2022-09-06 RX ORDER — ONDANSETRON 2 MG/ML
4 INJECTION INTRAMUSCULAR; INTRAVENOUS EVERY 6 HOURS PRN
Status: DISCONTINUED | OUTPATIENT
Start: 2022-09-06 | End: 2022-09-19 | Stop reason: HOSPADM

## 2022-09-06 RX ORDER — ACETAMINOPHEN 325 MG/1
650 TABLET ORAL EVERY 6 HOURS PRN
Status: DISCONTINUED | OUTPATIENT
Start: 2022-09-06 | End: 2022-09-15

## 2022-09-06 RX ORDER — INSULIN LISPRO 100 [IU]/ML
0-4 INJECTION, SOLUTION INTRAVENOUS; SUBCUTANEOUS NIGHTLY
Status: DISCONTINUED | OUTPATIENT
Start: 2022-09-06 | End: 2022-09-08

## 2022-09-06 RX ORDER — SODIUM CHLORIDE 0.9 % (FLUSH) 0.9 %
5-40 SYRINGE (ML) INJECTION PRN
Status: DISCONTINUED | OUTPATIENT
Start: 2022-09-06 | End: 2022-09-19 | Stop reason: HOSPADM

## 2022-09-06 ASSESSMENT — ENCOUNTER SYMPTOMS
SHORTNESS OF BREATH: 1
SORE THROAT: 0
DIARRHEA: 0
ABDOMINAL DISTENTION: 1
ABDOMINAL PAIN: 0
RHINORRHEA: 0
COLOR CHANGE: 0
CONSTIPATION: 1
EYE DISCHARGE: 0
PHOTOPHOBIA: 0
NAUSEA: 0
EYE ITCHING: 0
VOMITING: 0
STRIDOR: 0
COUGH: 1
CHOKING: 0
WHEEZING: 0

## 2022-09-06 ASSESSMENT — PAIN SCALES - GENERAL: PAINLEVEL_OUTOF10: 0

## 2022-09-06 NOTE — Clinical Note
Discharge Plan[de-identified] Other/[facility])   Telemetry/Cardiac Monitoring Required?: Yes

## 2022-09-06 NOTE — ED PROVIDER NOTES
[address] [facility]          ATTENDING PHYSICIAN NOTE       Date of evaluation: 9/6/2022    Chief Complaint     Fatigue (Reports increased generalized weakness and fatigue for several days, from home, denies n/v/d, denies any pain, denies fevers)      History of Present Illness     Yady Mann is a 69 y.o. male who presents to the emerge department because of weakness. Patient sleeps all day or most of the day over the last 10 days per family member. Patient not eating. Patient is on home dialysis for peritoneal dialysis for the last year. Normally does 1 hour exchanges at noon and 10/2 hours overnight. His oxygen saturations were low at home going to family less than 90. He just does not feel normal per the patient. No modifying factors or associate signs or symptoms. Review of Systems     Review of Systems no fever chills. Home temperatures been 97. No abdominal pain. No chest pain reported. No shortness of breath reported otherwise any review of systems reported    Past Medical, Surgical, Family, and Social History     He has a past medical history of Allergic rhinitis due to other allergen, Coronary atherosclerosis of unspecified type of vessel, native or graft, Diabetic eye exam ([address] Diabetic eye exam ([address] Generalized osteoarthrosis, involving multiple sites, Other psoriasis, Pneumonia, Prolonged emergence from general anesthesia, Psoriatic arthropathy ([address] Type II or unspecified type diabetes mellitus without mention of complication, not stated as uncontrolled, Unspecified essential hypertension, and Unspecified sleep apnea. He has a past surgical history that includes Coronary artery bypass graft; Carpal tunnel release; joint replacement; Cataract removal; and LAPAROSCOPY INSERTION PERITONEAL CATHETER (N/A, 10/26/2020). His family history includes Coronary Art Dis in his father; Diabetes in his mother; Heart Failure in his mother.   He reports that he quit smoking about 41 years ago. He started smoking about 60 years ago. He has a 18.00 pack-year smoking history. He has never used smokeless tobacco. He reports that he does not drink alcohol and does not use drugs. Medications     Previous Medications    ACCU-CHEK SOFTCLIX LANCETS MISC    USE FOUR TIMES A DAY    ALBUTEROL SULFATE (PROAIR RESPICLICK) 386 (90 BASE) MCG/ACT AEROSOL POWDER INHALATION    Inhale 2 puffs into the lungs every 4 hours as needed for Wheezing or Shortness of Breath    AMLODIPINE (NORVASC) 5 MG TABLET    Take 1 tablet by mouth daily    ASPIRIN 81 MG CHEWABLE TABLET    Take 1 tablet by mouth daily. ATORVASTATIN (LIPITOR) 80 MG TABLET    TAKE ONE TABLET BY MOUTH ONCE NIGHTLY    AZITHROMYCIN (ZITHROMAX) 250 MG TABLET    Take 500 mg by mouth the first day then 250 mg for the remaining four days    B COMPLEX-C-BIOTIN-E-MIN-FA (DIALYVITE 3000 PO)    Take by mouth     B-D ULTRAFINE III SHORT PEN 31G X 8 MM MISC    USE WITH INSULIN FOUR TIMES A DAY    BIOTIN 75928 MCG TABS    Take by mouth     BLOOD GLUCOSE MONITOR KIT AND SUPPLIES    (PLEASE DISPENSE WHAT INS WILL COVER) - Dispense sufficient amount for  TID testing frequency plus additional to accommodate PRN testing needs. Dispense all needed supplies to include: monitor, strips, lancing device, lancets, control solutions, alcohol swabs.     BLOOD GLUCOSE MONITOR STRIPS    Test up to 4 times daily for blood sugar monitoring    BLOOD GLUCOSE MONITORING SUPPL (ACCU-CHEK JOHNATHAN PLUS) W/DEVICE KIT    As needed    BLOOD GLUCOSE MONITORING SUPPL (ACCU-CHEK JOHNATHAN) RUMA    Use as needed    BLOOD GLUCOSE MONITORING SUPPL (TRUE METRIX METER) RUMA    As needed    BLOOD GLUCOSE TEST STRIPS (TRUE METRIX BLOOD GLUCOSE TEST) STRIP    USE TO TEST FOUR TIMES A DAY AS NEEDED    CLOBETASOL (TEMOVATE) 0.05 % OINTMENT    APPLY TO AFFECTED AREA(S) TWO TIMES A DAY AS NEEDED FOR CONTROL OF PSORIASIS    EASY TOUCH LANCETS 30G/TWIST MISC    USE THREE TIMES A DAY TO FOUR TIMES A DAY    FERRIC CITRATE (AURYXIA) 1  MG(FE) TABS    Take by mouth    HANDICAP PLACARD MISC    by Does not apply route Duration: 5 years    INSULIN ASPART (NOVOLOG FLEXPEN) 100 UNIT/ML INJECTION PEN    32-42  units AC TID    INSULIN DETEMIR (LEVEMIR FLEXTOUCH) 100 UNIT/ML INJECTION PEN    56-66 units daily    INSULIN GLARGINE (BASAGLAR KWIKPEN) 100 UNIT/ML INJECTION PEN    Inject 50 Units into the skin nightly    INSULIN LISPRO, 1 UNIT DIAL, 100 UNIT/ML SOPN    INJECT 20-28 UNITS UNDER THE SKIN THREE TIMES A DAY BEFORE MEALS  2-8 units HS    LANCETS MISC    2 times daily. Lancets for a Anna meter. LEVOTHYROXINE (SYNTHROID) 50 MCG TABLET    TAKE ONE TABLET BY MOUTH DAILY    LIDOCAINE 4 % EXTERNAL PATCH    Place 1 patch onto the skin daily    METOPROLOL SUCCINATE (TOPROL XL) 25 MG EXTENDED RELEASE TABLET    Take 1 tablet by mouth daily    MIDODRINE (PROAMATINE) 5 MG TABLET    Take 1 tablet by mouth in the morning and at bedtime    TORSEMIDE (DEMADEX) 100 MG TABLET    TAKE 2 TABLETS BY MOUTH DAILY. TRIAMCINOLONE (KENALOG) 0.1 % OINTMENT    Apply topically 2 times daily. VITAMIN D (ERGOCALCIFEROL) 1.25 MG (05877 UT) CAPS CAPSULE           Allergies     He is allergic to clindamycin, clindamycin/lincomycin, penicillins, sulfa antibiotics, and tazorac [tazarotene]. Physical Exam     INITIAL VITALS: BP: 122/63, Temp: 97.8 °F (36.6 °C), Heart Rate: 85, Resp: 22, SpO2: 92 %   Physical Exam  Vitals and nursing note reviewed. Constitutional:       Appearance: He is well-developed. He is not diaphoretic. Comments: Very fatigued appearing   HENT:      Head: Normocephalic and atraumatic. Eyes:      General: No scleral icterus. Cardiovascular:      Rate and Rhythm: Rhythm irregular. Pulmonary:      Effort: Pulmonary effort is normal. No respiratory distress. Abdominal:      Palpations: Abdomen is soft. Tenderness: no abdominal tenderness There is no guarding or rebound. Musculoskeletal:         General: No signs of injury. Cervical back: Neck supple. Skin:     General: Skin is warm. Neurological:      Comments: Answers very simple questions and states he just does not feel well   Psychiatric:         Behavior: Behavior normal.       Diagnostic Results     EKG   Irregular rhythm. Has pauses to 38 and then goes back into a rate of 75. Acacian altered mental status    RADIOLOGY:  XR CHEST (2 VW)   Final Result      Free air beneath the right hemidiaphragm. Bibasilar atelectasis.       Critical result discussed with Dr. Cristy Regalado at 1:00 PM 9/6/2022          LABS:   Results for orders placed or performed during the hospital encounter of 09/06/22   CBC with Auto Differential   Result Value Ref Range    WBC 14.8 (H) 4.0 - 11.0 K/uL    RBC 2.72 (L) 4.20 - 5.90 M/uL    Hemoglobin 10.0 (L) 13.5 - 17.5 g/dL    Hematocrit 31.0 (L) 40.5 - 52.5 %    .9 (H) 80.0 - 100.0 fL    MCH 36.8 (H) 26.0 - 34.0 pg    MCHC 32.3 31.0 - 36.0 g/dL    RDW 15.3 12.4 - 15.4 %    Platelets 383 625 - 771 K/uL    MPV 7.5 5.0 - 10.5 fL    Path Consult Yes     Neutrophils % 81.9 %    Lymphocytes % 9.5 %    Monocytes % 5.5 %    Eosinophils % 2.4 %    Basophils % 0.7 %    Neutrophils Absolute 12.1 (H) 1.7 - 7.7 K/uL    Lymphocytes Absolute 1.4 1.0 - 5.1 K/uL    Monocytes Absolute 0.8 0.0 - 1.3 K/uL    Eosinophils Absolute 0.4 0.0 - 0.6 K/uL    Basophils Absolute 0.1 0.0 - 0.2 K/uL    Macrocytes Occasional (A)     Polychromasia Occasional (A)    Comprehensive Metabolic Panel w/ Reflex to MG   Result Value Ref Range    Sodium 136 136 - 145 mmol/L    Potassium reflex Magnesium 4.2 3.5 - 5.1 mmol/L    Chloride 92 (L) 99 - 110 mmol/L    CO2 30 21 - 32 mmol/L    Anion Gap 14 3 - 16    Glucose 127 (H) 70 - 99 mg/dL    BUN 44 (H) 7 - 20 mg/dL    Creatinine 7.1 (HH) 0.8 - 1.3 mg/dL    GFR Non-African American 8 (A) >60    GFR  9 (A) >60    Calcium 9.3 8.3 - 10.6 mg/dL    Total Protein 6.7 6.4 - 8.2 g/dL    Albumin 3.3 (L) 3.4 - 5.0 g/dL    Albumin/Globulin Ratio 1.0 (L) 1.1 - 2.2    Total Bilirubin <0.2 0.0 - 1.0 mg/dL    Alkaline Phosphatase 92 40 - 129 U/L    ALT 43 (H) 10 - 40 U/L    AST 54 (H) 15 - 37 U/L   Troponin   Result Value Ref Range    Troponin 0.08 (H) <0.01 ng/mL   Brain Natriuretic Peptide   Result Value Ref Range    Pro-BNP 1,308 (H) 0 - 449 pg/mL   Lactic Acid   Result Value Ref Range    Lactic Acid 1.5 0.4 - 2.0 mmol/L   Blood gas, venous (Lab)   Result Value Ref Range    pH, Cj 7.284 (L) 7.350 - 7.450    pCO2, Cj 71.3 (H) 41.0 - 51.0 mmHg    pO2, Cj 35.0 25.0 - 40.0 mmHg    HCO3, Venous 33.8 (H) 24.0 - 28.0 mmol/L    Base Excess, Cj 5.3 (H) -2.0 - 3.0 mmol/L    O2 Sat, Cj 61 Not established %    Carboxyhemoglobin 1.8 (H) 0.0 - 1.5 %    MetHgb, Cj 0.5 0.0 - 1.5 %    TC02 (Calc), Cj 36 mmol/L    Hemoglobin, Cj, Reduced 37.90 %   EKG 12 Lead   Result Value Ref Range    Ventricular Rate 66 BPM    QRS Duration 82 ms    Q-T Interval 370 ms    QTc Calculation (Bazett) 387 ms    R Axis 16 degrees    T Axis 73 degrees    Diagnosis       EKG performed in ER and to be interpreted by ER physician. Confirmed by MD, ER ([id number]),  Griffin Elizabeth ([id number]) on 9/6/2022 12:45:00 PM       ED BEDSIDE ULTRASOUND:  No results found. RECENT VITALS:  BP: 122/63,Temp: 97.8 °F (36.6 °C), Heart Rate: 88, Resp: 16, SpO2: 100 %     Procedures       ED Course     Nursing Notes, Past Medical Hx, Past Surgical Hx, Social Hx,Allergies, and Family Hx were reviewed. patient was given the following medications:  No orders of the defined types were placed in this encounter. CONSULTS:  IP CONSULT TO HOSPITALIST  IP CONSULT TO [address] [place] [address] DECISIONMAKING / ASSESSMENT / Keith Pelaez is a 69 y.o. male presented with the above. BUN 44 creatinine 7.1. His troponin is 0.08 his BNP is 1308. His white count is elevated 14,800 H&H 10 and 31; pH 7.28. With a PCO2 of 71. Belia Heike   Altered mental status may be due to numerous factors including renal insufficiency hypercarbia and CHF with elevated troponin. Will discuss antibiotics for potential peritoneal infection although patient has not of any abdominal pain at this time but there is free air and a history of potential contamination. Calls placed to hospitalist and to nephrology      Clinical Impression     1. Elevated troponin    2. Hypercarbia        Disposition     PATIENT REFERRED TO:  No follow-up provider specified.     DISCHARGE MEDICATIONS:  New Prescriptions    No medications on file       DISPOSITION Decision To Admit 09/06/2022 01:51:15 PM         Dorcas Younger MD  09/06/22 [id number]

## 2022-09-06 NOTE — H&P
Internal Medicine  PGY 1  History & Physical      CC   AMS    History Obtained From:  patient, family member - daughter    HISTORY OF PRESENT ILLNESS:    This is a 69year old male with a past medical history of ESRD on PD, interstitial lung disease, diastolic HF (6/15 Echo with EF 50-55%), NSTEMI, CAD s/p CABG 2007, T2DM, HTN, HLD, hypothyroidism who presents with AMS. Per family for the last 10 days the patient has been somnolent and lethargic with decreased appetite. He is on home peritoneal dialysis for the past year usually 1 hour at noon and 10/2 hours overnight. O2 at home also noted to be <90% so family called EMS. In the ED pt was afebrile, mildly hypertensive, hemodynamically stable on 3L NC. BMP significant for BUN/Cr 44/7.1, lactate 1.5, Pro-BNP 1.3K, troponin .08, WBC 14.8, Hgb 10. VBG 7.28/71.3/35/33. 8. CXR with free air under R hemidiaphragm and bibasilar atelectasis. Currently, the patient is somnolent but interactive. He is able to speak a few words at a time and states he feels \"terrible\". He says his last PD session was last night. He also endorses cough for unspecified amount of time and constipation for 3-4 days. He used to wear CPAP/BIPAP at home but does not currently.      Past Medical History:        Diagnosis Date    Allergic rhinitis due to other allergen 7/12/2010    Coronary atherosclerosis of unspecified type of vessel, native or graft 7/12/2010    Diabetic eye exam ([facility] 04/29/2015    Diabetic eye exam ([facility] [address] 5/5/2016    [facility] eye    Generalized osteoarthrosis, involving multiple sites 7/12/2010    Other psoriasis 7/12/2010    Pneumonia     Prolonged emergence from general anesthesia     Psoriatic arthropathy ([address] 7/12/2010    Type II or unspecified type diabetes mellitus without mention of complication, not stated as uncontrolled 7/12/2010    Unspecified essential hypertension 7/12/2010    Unspecified sleep apnea 7/12/2010       Past Surgical History:        Procedure Laterality Date    CARPAL TUNNEL RELEASE      s/p    CATARACT REMOVAL      CORONARY ARTERY BYPASS GRAFT      JOINT REPLACEMENT      LAPAROSCOPY INSERTION PERITONEAL CATHETER N/A 10/26/2020    LAPAROSCOPIC PERITONEAL DIALYSIS CATHETER PLACEMENT; LAPAROSCOPIC OMENTOPEXY performed by Berlin Keen DO at [address]       No current facility-administered medications on file prior to encounter. Current Outpatient Medications on File Prior to Encounter   Medication Sig Dispense Refill    blood glucose test strips (TRUE METRIX BLOOD GLUCOSE TEST) strip USE TO TEST FOUR TIMES A DAY AS NEEDED 150 strip 3    atorvastatin (LIPITOR) 80 MG tablet TAKE ONE TABLET BY MOUTH ONCE NIGHTLY 90 tablet 3    Easy Touch Lancets 30G/Twist MISC USE THREE TIMES A DAY TO FOUR TIMES A  each 5    torsemide (DEMADEX) 100 MG tablet TAKE 2 TABLETS BY MOUTH DAILY.  (Patient not taking: Reported on 8/15/2022) 60 tablet 5    levothyroxine (SYNTHROID) 50 MCG tablet TAKE ONE TABLET BY MOUTH DAILY 90 tablet 8    insulin detemir (LEVEMIR FLEXTOUCH) 100 UNIT/ML injection pen 56-66 units daily 10 pen 3    insulin aspart (NOVOLOG FLEXPEN) 100 UNIT/ML injection pen 32-42  units AC TID 10 pen 3    B-D ULTRAFINE III SHORT PEN 31G X 8 MM MISC USE WITH INSULIN FOUR TIMES A  each 2    azithromycin (ZITHROMAX) 250 MG tablet Take 500 mg by mouth the first day then 250 mg for the remaining four days (Patient not taking: Reported on 8/15/2022) 6 tablet 0    amLODIPine (NORVASC) 5 MG tablet Take 1 tablet by mouth daily (Patient not taking: Reported on 8/15/2022) 30 tablet 3    albuterol sulfate (PROAIR RESPICLICK) 619 (90 Base) MCG/ACT aerosol powder inhalation Inhale 2 puffs into the lungs every 4 hours as needed for Wheezing or Shortness of Breath 1 each 5    midodrine (PROAMATINE) 5 MG tablet Take 1 tablet by mouth in the morning and at bedtime 90 tablet 3    insulin glargine (BASAGLAR KWIKPEN) 100 UNIT/ML injection pen Inject 50 Units into the skin nightly 5 pen 2 Blood Glucose Monitoring Suppl (TRUE METRIX METER) RUMA As needed 1 each 0    blood glucose monitor strips Test up to 4 times daily for blood sugar monitoring 200 strip 3    clobetasol (TEMOVATE) 0.05 % ointment APPLY TO AFFECTED AREA(S) TWO TIMES A DAY AS NEEDED FOR CONTROL OF PSORIASIS 15 g 5    blood glucose monitor kit and supplies (PLEASE DISPENSE WHAT INS WILL COVER) - Dispense sufficient amount for  TID testing frequency plus additional to accommodate PRN testing needs. Dispense all needed supplies to include: monitor, strips, lancing device, lancets, control solutions, alcohol swabs. 1 kit 0    insulin lispro, 1 Unit Dial, 100 UNIT/ML SOPN INJECT 20-28 UNITS UNDER THE SKIN THREE TIMES A DAY BEFORE MEALS  2-8 units HS 45 mL 2    B Complex-C-Biotin-E-Min-FA (DIALYVITE 3000 PO) Take by mouth       vitamin D (ERGOCALCIFEROL) 1.25 MG (08709 UT) CAPS capsule       triamcinolone (KENALOG) 0.1 % ointment Apply topically 2 times daily. 1 Tube 2    Ferric Citrate (AURYXIA) 1  MG(Fe) TABS Take by mouth      Biotin 63806 MCG TABS Take by mouth       metoprolol succinate (TOPROL XL) 25 MG extended release tablet Take 1 tablet by mouth daily (Patient not taking: Reported on 8/15/2022) 90 tablet 3    Blood Glucose Monitoring Suppl (ACCU-CHEK JOHNATHAN PLUS) w/Device KIT As needed 1 kit 2    lidocaine 4 % external patch Place 1 patch onto the skin daily 1 box 1    Handicap Placard MISC by Does not apply route Duration: 5 years 1 each 0    ACCU-CHEK SOFTCLIX LANCETS MISC USE FOUR TIMES A  each 3    Blood Glucose Monitoring Suppl (ACCU-CHEK JOHNATHAN) RUMA Use as needed 1 Device 0    aspirin 81 MG chewable tablet Take 1 tablet by mouth daily. 30 tablet 5    Lancets MISC 2 times daily. Lancets for a Anna meter. 50 each 11      Allergies:  Clindamycin, Clindamycin/lincomycin, Penicillins, Sulfa antibiotics, and Tazorac [tazarotene]    Social History:   TOBACCO:   reports that he quit smoking about 41 years ago. He started smoking about 60 years ago. He has a 18.00 pack-year smoking history. He has never used smokeless tobacco.  ETOH:   reports no history of alcohol use. DRUGS : none  Patient currently lives with his wife and daughter    Family History:       Problem Relation Age of Onset    Diabetes Mother     Heart Failure Mother     Coronary Art Dis Father        Review of Systems   Constitutional:  Positive for activity change, appetite change and fatigue. Negative for chills, diaphoresis and fever. HENT:  Negative for congestion, rhinorrhea and sore throat. Eyes:  Negative for photophobia, discharge, itching and visual disturbance. Respiratory:  Positive for cough and shortness of breath. Negative for choking, wheezing and stridor. Cardiovascular:  Positive for leg swelling. Negative for chest pain. Gastrointestinal:  Positive for abdominal distention and constipation. Negative for abdominal pain, diarrhea, nausea and vomiting. Genitourinary:  Positive for decreased urine volume. Negative for flank pain. Musculoskeletal:  Negative for joint swelling, neck pain and neck stiffness. Skin:  Negative for color change and pallor. Neurological:  Positive for weakness. Negative for dizziness, tremors, seizures, syncope, facial asymmetry, light-headedness, numbness and headaches. Psychiatric/Behavioral:  Negative for agitation, behavioral problems, confusion, decreased concentration and hallucinations. The patient is not hyperactive. Physical Exam  Constitutional:       General: He is not in acute distress. Appearance: He is obese. He is ill-appearing. HENT:      Head: Normocephalic and atraumatic. Nose: Nose normal.      Mouth/Throat:      Mouth: Mucous membranes are moist.      Pharynx: Oropharynx is clear. Eyes:      Extraocular Movements: Extraocular movements intact. Conjunctiva/sclera: Conjunctivae normal.      Pupils: Pupils are equal, round, and reactive to light. Neck:      Comments: Large neck  Cardiovascular:      Rate and Rhythm: Normal rate. Pulses: Normal pulses. Heart sounds: Normal heart sounds. Comments: Limited due to body habitus  Pulmonary:      Effort: Respiratory distress present. Comments: Limited due to body habitus  Abdominal:      General: Bowel sounds are normal. There is distension. Tenderness: There is no abdominal tenderness. There is no guarding or rebound. Comments: PD catheter in place   Musculoskeletal:         General: Swelling present. No tenderness or deformity. Cervical back: Normal range of motion. Right lower leg: Edema present. Left lower leg: Edema present. Comments: Limited due to pt condition   Skin:     General: Skin is warm and dry. Capillary Refill: Capillary refill takes less than 2 seconds. Coloration: Skin is not pale. Findings: No bruising. Neurological:      General: No focal deficit present. Mental Status: He is oriented to person, place, and time. Cranial Nerves: No cranial nerve deficit. Motor: No weakness. Comments: Somnolent but interactive to voice   Psychiatric:         Mood and Affect: Mood normal.         Thought Content: Thought content normal.         Judgment: Judgment normal.       Vitals:    09/06/22 1400   BP: (!) 146/75   Pulse: 85   Resp: 11   Temp:    SpO2:        DATA:    Labs:  CBC:   Recent Labs     09/06/22  1237   WBC 14.8*   HGB 10.0*   HCT 31.0*          BMP:   Recent Labs     09/06/22  1237      K 4.2   CL 92*   CO2 30   BUN 44*   CREATININE 7.1*   GLUCOSE 127*     LFT's:   Recent Labs     09/06/22  1237   AST 54*   ALT 43*   BILITOT <0.2   ALKPHOS 92     Troponin:   Recent Labs     09/06/22  1237   TROPONINI 0.08*       XR CHEST (2 VW)   Final Result      Free air beneath the right hemidiaphragm. Bibasilar atelectasis.       Critical result discussed with Dr. Maryjo Woodruff at 1:00 PM 9/6/2022

## 2022-09-06 NOTE — CONSULTS
Nephrology Consult Note                                                                                                                                                                                                                                                                                                                                                               Office : [phone]     Fax :[phone]              Patient's Name: Sulema Nash  9:24 AM  9/7/2022    Reason for Consult:  ESRD   Requesting Physician:  Luci Raygoza MD      Chief Complaint:  AMS      History of Present Ilness: This is a 69year old male with a past medical history of ESRD on PD, interstitial lung disease, diastolic HF (9/30 Echo with EF 50-55%), NSTEMI, CAD s/p CABG 2007, T2DM, HTN, HLD, hypothyroidism who presents with AMS.  Per family for the last 10 days the patient has been somnolent and lethargic with decreased appetite    Pt seen on PD    Past Medical History:   Diagnosis Date    Allergic rhinitis due to other allergen 7/12/2010    Coronary atherosclerosis of unspecified type of vessel, native or graft 7/12/2010    Diabetic eye exam ([facility] [address] 04/29/2015    Diabetic eye exam ([address] 5/5/2016    [place] [facility]    Generalized osteoarthrosis, involving multiple sites 7/12/2010    Other psoriasis 7/12/2010    Pneumonia     Prolonged emergence from general anesthesia     Psoriatic arthropathy ([address] 7/12/2010    Type II or unspecified type diabetes mellitus without mention of complication, not stated as uncontrolled 7/12/2010    Unspecified essential hypertension 7/12/2010    Unspecified sleep apnea 7/12/2010       Past Surgical History:   Procedure Laterality Date    CARPAL TUNNEL RELEASE      s/p    CATARACT REMOVAL      CORONARY ARTERY BYPASS GRAFT      JOINT REPLACEMENT      LAPAROSCOPY INSERTION PERITONEAL CATHETER N/A 10/26/2020    LAPAROSCOPIC PERITONEAL DIALYSIS CATHETER PLACEMENT; LAPAROSCOPIC OMENTOPEXY performed by Kathleen Lundberg Dash Mccormick DO at [facility] OR       Family History   Problem Relation Age of Onset    Diabetes Mother     Heart Failure Mother     Coronary Art Dis Father         reports that he quit smoking about 41 years ago. He started smoking about 60 years ago. He has a 18.00 pack-year smoking history. He has never used smokeless tobacco. He reports that he does not drink alcohol and does not use drugs.     Allergies:  Penicillins, Sulfa antibiotics, Tazorac [tazarotene], Clindamycin, and Clindamycin/lincomycin    Current Medications:    [START ON 9/8/2022] cefepime (MAXIPIME) 1000 mg IVPB minibag, Q24H  vancomycin (VANCOCIN) intermittent dosing (placeholder), See Admin Instructions  vancomycin (VANCOCIN) 500 mg in dextrose 5 % 250 mL IVPB, Once  epoetin daphney-epbx (RETACRIT) injection 2,000 Units, Once per day on Mon Wed Fri  atorvastatin (LIPITOR) tablet 80 mg, Nightly  levothyroxine (SYNTHROID) tablet 50 mcg, Daily  aspirin chewable tablet 81 mg, Daily  sodium chloride flush 0.9 % injection 5-40 mL, 2 times per day  sodium chloride flush 0.9 % injection 5-40 mL, PRN  0.9 % sodium chloride infusion, PRN  ondansetron (ZOFRAN-ODT) disintegrating tablet 4 mg, Q8H PRN   Or  ondansetron (ZOFRAN) injection 4 mg, Q6H PRN  polyethylene glycol (GLYCOLAX) packet 17 g, Daily PRN  acetaminophen (TYLENOL) tablet 650 mg, Q6H PRN   Or  acetaminophen (TYLENOL) suppository 650 mg, Q6H PRN  famotidine (PEPCID) 20 mg in sodium chloride (PF) 10 mL injection, Daily  heparin (porcine) injection 5,000 Units, 3 times per day  insulin lispro (1 Unit Dial) 0-4 Units, TID WC  insulin lispro (1 Unit Dial) 0-4 Units, Nightly  glucose chewable tablet 16 g, PRN  dextrose bolus 10% 125 mL, PRN   Or  dextrose bolus 10% 250 mL, PRN  glucagon (rDNA) injection 1 mg, PRN  dextrose 10 % infusion, Continuous PRN        Review of Systems:   14 point ROS obtained but were negative except mentioned in HPI      Physical exam:     Vitals:  BP (!) 157/67   Pulse 96   Temp 97.6 °F (36.4 °C) (Oral)   Resp 20   Ht 5' 10\" (1.778 m)   Wt 204 lb 9.4 oz (92.8 kg)   SpO2 96%   BMI 29.36 kg/m²   Constitutional:  OAA X3 NAD  Skin: no rash, turgor wnl  Heent:  eomi, mmm  Neck: no bruits or jvd noted  Cardiovascular:  S1, S2 without m/r/g  Respiratory: CTA B without w/r/r  Abdomen:  +bs, soft, TTP   Ext: + lower extremity edema  Psychiatric: mood and affect appropriate  Musculoskeletal:  Rom, muscular strength intact    Data:   Labs:  CBC:   Recent Labs     09/06/22  1237 09/07/22  0421   WBC 14.8* 14.0*   HGB 10.0* 9.0*    256     BMP:    Recent Labs     09/06/22  1237 09/07/22  0421    133*   K 4.2 4.2   CL 92* 90*   CO2 30 28   BUN 44* 55*   CREATININE 7.1* 8.4*   GLUCOSE 127* 333*     Ca/Mg/Phos:   Recent Labs     09/06/22  1237 09/07/22  0421   CALCIUM 9.3 8.5   MG  --  2.20     Hepatic:   Recent Labs     09/06/22  1237   AST 54*   ALT 43*   BILITOT <0.2   ALKPHOS 92     Troponin:   Recent Labs     09/06/22  1237   TROPONINI 0.08*     BNP: No results for input(s): BNP in the last 72 hours. Lipids: No results for input(s): CHOL, TRIG, HDL, LDLCALC, LABVLDL in the last 72 hours. ABGs:   Recent Labs     09/06/22  2220   PHART 7.355   PO2ART 94.8   YSV0FTI 55.0*     INR: No results for input(s): INR in the last 72 hours. UA:No results for input(s): Jessy Savin, GLUCOSEU, BILIRUBINUR, Prachi Xander, BLOODU, PHUR, PROTEINU, UROBILINOGEN, NITRU, LEUKOCYTESUR, LABMICR, URINETYPE in the last 72 hours. Urine Microscopic: No results for input(s): LABCAST, BACTERIA, COMU, HYALCAST, WBCUA, RBCUA, EPIU in the last 72 hours. Urine Culture: No results for input(s): LABURIN in the last 72 hours. Urine Chemistry: No results for input(s): Clearance Bookbinder, PROTEINUR, NAUR in the last 72 hours. IMAGING:  XR CHEST (2 VW)   Final Result      Free air beneath the right hemidiaphragm. Bibasilar atelectasis.       Critical result discussed with Dr. Hair Ashley at 1:00 PM 9/6/2022 Assessment/Plan   ESRD     2. HTN    3. Anemia    4. Acid- base/ Electrolyte imbalance     5.  Abd tenderness - r/o PD peritonitis     Plan   - IV abx   - PD fluid cell count   - Cx   - CCPD tonight   - monitor Labs  - EPO or anemia    - Constipation management                     Thank you for allowing us to participate in care of Jaja Colindres MD  Feel free to contact me   [facility] [address]  Office : [phone]  Fax :[phone]

## 2022-09-07 PROBLEM — R10.817 GENERALIZED ABDOMINAL TENDERNESS: Status: ACTIVE | Noted: 2022-09-07

## 2022-09-07 PROBLEM — R06.89 HYPERCARBIA: Status: ACTIVE | Noted: 2022-09-07

## 2022-09-07 PROBLEM — E87.8 ELECTROLYTE IMBALANCE: Status: ACTIVE | Noted: 2022-09-07

## 2022-09-07 LAB
ANION GAP SERPL CALCULATED.3IONS-SCNC: 15 MMOL/L (ref 3–16)
APPEARANCE FLUID: CLEAR
BASOPHILS ABSOLUTE: 0.1 K/UL (ref 0–0.2)
BASOPHILS RELATIVE PERCENT: 0.6 %
BUN BLDV-MCNC: 55 MG/DL (ref 7–20)
CALCIUM SERPL-MCNC: 8.5 MG/DL (ref 8.3–10.6)
CELL COUNT FLUID TYPE: NORMAL
CHLORIDE BLD-SCNC: 90 MMOL/L (ref 99–110)
CLOT EVALUATION: NORMAL
CO2: 28 MMOL/L (ref 21–32)
COLOR FLUID: NORMAL
CREAT SERPL-MCNC: 8.4 MG/DL (ref 0.8–1.3)
EKG ATRIAL RATE: 98 BPM
EKG DIAGNOSIS: NORMAL
EKG Q-T INTERVAL: 394 MS
EKG QRS DURATION: 76 MS
EKG QTC CALCULATION (BAZETT): 465 MS
EKG R AXIS: 19 DEGREES
EKG T AXIS: 76 DEGREES
EKG VENTRICULAR RATE: 84 BPM
EOSINOPHILS ABSOLUTE: 0.3 K/UL (ref 0–0.6)
EOSINOPHILS RELATIVE PERCENT: 1.9 %
FLUID PATH CONSULT: YES
GFR AFRICAN AMERICAN: 8
GFR NON-AFRICAN AMERICAN: 6
GLUCOSE BLD-MCNC: 157 MG/DL (ref 70–99)
GLUCOSE BLD-MCNC: 242 MG/DL (ref 70–99)
GLUCOSE BLD-MCNC: 308 MG/DL (ref 70–99)
GLUCOSE BLD-MCNC: 333 MG/DL (ref 70–99)
GLUCOSE BLD-MCNC: 346 MG/DL (ref 70–99)
GLUCOSE BLD-MCNC: 349 MG/DL (ref 70–99)
GLUCOSE BLD-MCNC: 400 MG/DL (ref 70–99)
GLUCOSE BLD-MCNC: 412 MG/DL (ref 70–99)
HCT VFR BLD CALC: 26.2 % (ref 40.5–52.5)
HEMATOLOGY PATH CONSULT: NORMAL
HEMOGLOBIN: 9 G/DL (ref 13.5–17.5)
LYMPHOCYTES ABSOLUTE: 1.5 K/UL (ref 1–5.1)
LYMPHOCYTES RELATIVE PERCENT: 10.7 %
LYMPHOCYTES, BODY FLUID: 15 %
MACROPHAGE FLUID: 3 %
MAGNESIUM: 2.2 MG/DL (ref 1.8–2.4)
MCH RBC QN AUTO: 38.8 PG (ref 26–34)
MCHC RBC AUTO-ENTMCNC: 34.3 G/DL (ref 31–36)
MCV RBC AUTO: 113.2 FL (ref 80–100)
MONOCYTE, FLUID: 21 %
MONOCYTES ABSOLUTE: 0.5 K/UL (ref 0–1.3)
MONOCYTES RELATIVE PERCENT: 3.5 %
NEUTROPHIL, FLUID: 61 %
NEUTROPHILS ABSOLUTE: 11.6 K/UL (ref 1.7–7.7)
NEUTROPHILS RELATIVE PERCENT: 83.3 %
NUCLEATED CELLS FLUID: 169 /CUMM
NUMBER OF CELLS COUNTED FLUID: 100
PDW BLD-RTO: 14.9 % (ref 12.4–15.4)
PERFORMED ON: ABNORMAL
PLATELET # BLD: 256 K/UL (ref 135–450)
PMV BLD AUTO: 7.4 FL (ref 5–10.5)
POTASSIUM SERPL-SCNC: 4.2 MMOL/L (ref 3.5–5.1)
RBC # BLD: 2.31 M/UL (ref 4.2–5.9)
RBC FLUID: <1000 /CUMM
SODIUM BLD-SCNC: 133 MMOL/L (ref 136–145)
TSH REFLEX: 2.75 UIU/ML (ref 0.27–4.2)
WBC # BLD: 14 K/UL (ref 4–11)

## 2022-09-07 PROCEDURE — 87205 SMEAR GRAM STAIN: CPT

## 2022-09-07 PROCEDURE — 02HV33Z INSERTION OF INFUSION DEVICE INTO SUPERIOR VENA CAVA, PERCUTANEOUS APPROACH: ICD-10-PCS | Performed by: INTERNAL MEDICINE

## 2022-09-07 PROCEDURE — 83735 ASSAY OF MAGNESIUM: CPT

## 2022-09-07 PROCEDURE — 94660 CPAP INITIATION&MGMT: CPT

## 2022-09-07 PROCEDURE — 6370000000 HC RX 637 (ALT 250 FOR IP): Performed by: STUDENT IN AN ORGANIZED HEALTH CARE EDUCATION/TRAINING PROGRAM

## 2022-09-07 PROCEDURE — 99232 SBSQ HOSP IP/OBS MODERATE 35: CPT | Performed by: HOSPITALIST

## 2022-09-07 PROCEDURE — 93010 ELECTROCARDIOGRAM REPORT: CPT | Performed by: INTERNAL MEDICINE

## 2022-09-07 PROCEDURE — 85025 COMPLETE CBC W/AUTO DIFF WBC: CPT

## 2022-09-07 PROCEDURE — 2060000000 HC ICU INTERMEDIATE R&B

## 2022-09-07 PROCEDURE — 94761 N-INVAS EAR/PLS OXIMETRY MLT: CPT

## 2022-09-07 PROCEDURE — 99233 SBSQ HOSP IP/OBS HIGH 50: CPT | Performed by: INTERNAL MEDICINE

## 2022-09-07 PROCEDURE — 6360000002 HC RX W HCPCS: Performed by: STUDENT IN AN ORGANIZED HEALTH CARE EDUCATION/TRAINING PROGRAM

## 2022-09-07 PROCEDURE — 80048 BASIC METABOLIC PNL TOTAL CA: CPT

## 2022-09-07 PROCEDURE — 87070 CULTURE OTHR SPECIMN AEROBIC: CPT

## 2022-09-07 PROCEDURE — 87040 BLOOD CULTURE FOR BACTERIA: CPT

## 2022-09-07 PROCEDURE — 36415 COLL VENOUS BLD VENIPUNCTURE: CPT

## 2022-09-07 PROCEDURE — A4216 STERILE WATER/SALINE, 10 ML: HCPCS | Performed by: STUDENT IN AN ORGANIZED HEALTH CARE EDUCATION/TRAINING PROGRAM

## 2022-09-07 PROCEDURE — 93005 ELECTROCARDIOGRAM TRACING: CPT | Performed by: HOSPITALIST

## 2022-09-07 PROCEDURE — 2700000000 HC OXYGEN THERAPY PER DAY

## 2022-09-07 PROCEDURE — 90945 DIALYSIS ONE EVALUATION: CPT

## 2022-09-07 PROCEDURE — 2580000003 HC RX 258: Performed by: STUDENT IN AN ORGANIZED HEALTH CARE EDUCATION/TRAINING PROGRAM

## 2022-09-07 PROCEDURE — 2580000003 HC RX 258: Performed by: INTERNAL MEDICINE

## 2022-09-07 PROCEDURE — 6360000002 HC RX W HCPCS: Performed by: INTERNAL MEDICINE

## 2022-09-07 PROCEDURE — 2500000003 HC RX 250 WO HCPCS: Performed by: STUDENT IN AN ORGANIZED HEALTH CARE EDUCATION/TRAINING PROGRAM

## 2022-09-07 PROCEDURE — 6370000000 HC RX 637 (ALT 250 FOR IP)

## 2022-09-07 RX ORDER — INSULIN LISPRO 100 [IU]/ML
10 INJECTION, SOLUTION INTRAVENOUS; SUBCUTANEOUS ONCE
Status: COMPLETED | OUTPATIENT
Start: 2022-09-07 | End: 2022-09-07

## 2022-09-07 RX ADMIN — SODIUM CHLORIDE: 9 INJECTION, SOLUTION INTRAVENOUS at 09:22

## 2022-09-07 RX ADMIN — SODIUM CHLORIDE 25 ML: 9 INJECTION, SOLUTION INTRAVENOUS at 01:48

## 2022-09-07 RX ADMIN — VANCOMYCIN HYDROCHLORIDE 1250 MG: 10 INJECTION, POWDER, LYOPHILIZED, FOR SOLUTION INTRAVENOUS at 01:51

## 2022-09-07 RX ADMIN — HEPARIN SODIUM 5000 UNITS: 5000 INJECTION INTRAVENOUS; SUBCUTANEOUS at 21:23

## 2022-09-07 RX ADMIN — HEPARIN SODIUM 5000 UNITS: 5000 INJECTION INTRAVENOUS; SUBCUTANEOUS at 01:05

## 2022-09-07 RX ADMIN — ASPIRIN 81 MG 81 MG: 81 TABLET ORAL at 09:15

## 2022-09-07 RX ADMIN — FAMOTIDINE 20 MG: 10 INJECTION, SOLUTION INTRAVENOUS at 09:15

## 2022-09-07 RX ADMIN — INSULIN LISPRO 3 UNITS: 100 INJECTION, SOLUTION INTRAVENOUS; SUBCUTANEOUS at 12:10

## 2022-09-07 RX ADMIN — INSULIN LISPRO 10 UNITS: 100 INJECTION, SOLUTION INTRAVENOUS; SUBCUTANEOUS at 17:12

## 2022-09-07 RX ADMIN — INSULIN LISPRO 4 UNITS: 100 INJECTION, SOLUTION INTRAVENOUS; SUBCUTANEOUS at 22:02

## 2022-09-07 RX ADMIN — INSULIN GLARGINE 50 UNITS: 100 INJECTION, SOLUTION SUBCUTANEOUS at 22:48

## 2022-09-07 RX ADMIN — CEFEPIME 2000 MG: 2 INJECTION, POWDER, FOR SOLUTION INTRAVENOUS at 01:01

## 2022-09-07 RX ADMIN — INSULIN LISPRO 4 UNITS: 100 INJECTION, SOLUTION INTRAVENOUS; SUBCUTANEOUS at 17:13

## 2022-09-07 RX ADMIN — SODIUM CHLORIDE, PRESERVATIVE FREE 10 ML: 5 INJECTION INTRAVENOUS at 21:31

## 2022-09-07 RX ADMIN — HEPARIN SODIUM 5000 UNITS: 5000 INJECTION INTRAVENOUS; SUBCUTANEOUS at 05:39

## 2022-09-07 RX ADMIN — SODIUM CHLORIDE, PRESERVATIVE FREE 10 ML: 5 INJECTION INTRAVENOUS at 01:01

## 2022-09-07 RX ADMIN — LEVOTHYROXINE SODIUM 50 MCG: 50 TABLET ORAL at 09:15

## 2022-09-07 RX ADMIN — ATORVASTATIN CALCIUM 80 MG: 40 TABLET, FILM COATED ORAL at 21:23

## 2022-09-07 RX ADMIN — SODIUM CHLORIDE, PRESERVATIVE FREE 10 ML: 5 INJECTION INTRAVENOUS at 09:17

## 2022-09-07 RX ADMIN — VANCOMYCIN HYDROCHLORIDE 500 MG: 1 INJECTION, POWDER, LYOPHILIZED, FOR SOLUTION INTRAVENOUS at 09:23

## 2022-09-07 RX ADMIN — EPOETIN ALFA-EPBX 2000 UNITS: 2000 INJECTION, SOLUTION INTRAVENOUS; SUBCUTANEOUS at 09:15

## 2022-09-07 RX ADMIN — HEPARIN SODIUM 5000 UNITS: 5000 INJECTION INTRAVENOUS; SUBCUTANEOUS at 14:31

## 2022-09-07 RX ADMIN — INSULIN LISPRO 3 UNITS: 100 INJECTION, SOLUTION INTRAVENOUS; SUBCUTANEOUS at 10:55

## 2022-09-07 RX ADMIN — SODIUM CHLORIDE 25 ML: 9 INJECTION, SOLUTION INTRAVENOUS at 01:00

## 2022-09-07 ASSESSMENT — ENCOUNTER SYMPTOMS
ABDOMINAL PAIN: 0
ABDOMINAL DISTENTION: 1
NAUSEA: 0
VOMITING: 0

## 2022-09-07 ASSESSMENT — PAIN SCALES - GENERAL
PAINLEVEL_OUTOF10: 0
PAINLEVEL_OUTOF10: 0

## 2022-09-07 NOTE — PROGRESS NOTES
Nephrology  Note                                                                                                                                                                                                                                                                                                                                                               Office : [phone]     Fax :[phone]              Patient's Name: Samara Palomares  9:27 AM  9/7/2022    Reason for Consult:  ESRD   Requesting Physician:  Avinash Velasquez MD        Pt still not on PD   PD unit Notified as we need fluid studies       Past Medical History:   Diagnosis Date    Allergic rhinitis due to other allergen 7/12/2010    Coronary atherosclerosis of unspecified type of vessel, native or graft 7/12/2010    Diabetic eye exam ([facility] [address] 04/29/2015    Diabetic eye exam ([address] 5/5/2016    [place] [facility]    Generalized osteoarthrosis, involving multiple sites 7/12/2010    Other psoriasis 7/12/2010    Pneumonia     Prolonged emergence from general anesthesia     Psoriatic arthropathy ([address] 7/12/2010    Type II or unspecified type diabetes mellitus without mention of complication, not stated as uncontrolled 7/12/2010    Unspecified essential hypertension 7/12/2010    Unspecified sleep apnea 7/12/2010       Past Surgical History:   Procedure Laterality Date    CARPAL TUNNEL RELEASE      s/p    CATARACT REMOVAL      CORONARY ARTERY BYPASS GRAFT      JOINT REPLACEMENT      LAPAROSCOPY INSERTION PERITONEAL CATHETER N/A 10/26/2020    LAPAROSCOPIC PERITONEAL DIALYSIS CATHETER PLACEMENT; LAPAROSCOPIC OMENTOPEXY performed by Bob Weiner DO at [facility] OR       Family History   Problem Relation Age of Onset    Diabetes Mother     Heart Failure Mother     Coronary Art Dis Father         reports that he quit smoking about 41 years ago. He started smoking about 60 years ago. He has a 18.00 pack-year smoking history.  He has never used smokeless tobacco. He reports that he does not drink alcohol and does not use drugs.     Allergies:  Penicillins, Sulfa antibiotics, Tazorac [tazarotene], Clindamycin, and Clindamycin/lincomycin    Current Medications:    [START ON 9/8/2022] cefepime (MAXIPIME) 1000 mg IVPB minibag, Q24H  vancomycin (VANCOCIN) intermittent dosing (placeholder), See Admin Instructions  vancomycin (VANCOCIN) 500 mg in dextrose 5 % 250 mL IVPB, Once  epoetin daphney-epbx (RETACRIT) injection 2,000 Units, Once per day on Mon Wed Fri  atorvastatin (LIPITOR) tablet 80 mg, Nightly  levothyroxine (SYNTHROID) tablet 50 mcg, Daily  aspirin chewable tablet 81 mg, Daily  sodium chloride flush 0.9 % injection 5-40 mL, 2 times per day  sodium chloride flush 0.9 % injection 5-40 mL, PRN  0.9 % sodium chloride infusion, PRN  ondansetron (ZOFRAN-ODT) disintegrating tablet 4 mg, Q8H PRN   Or  ondansetron (ZOFRAN) injection 4 mg, Q6H PRN  polyethylene glycol (GLYCOLAX) packet 17 g, Daily PRN  acetaminophen (TYLENOL) tablet 650 mg, Q6H PRN   Or  acetaminophen (TYLENOL) suppository 650 mg, Q6H PRN  famotidine (PEPCID) 20 mg in sodium chloride (PF) 10 mL injection, Daily  heparin (porcine) injection 5,000 Units, 3 times per day  insulin lispro (1 Unit Dial) 0-4 Units, TID WC  insulin lispro (1 Unit Dial) 0-4 Units, Nightly  glucose chewable tablet 16 g, PRN  dextrose bolus 10% 125 mL, PRN   Or  dextrose bolus 10% 250 mL, PRN  glucagon (rDNA) injection 1 mg, PRN  dextrose 10 % infusion, Continuous PRN            Physical exam:     Vitals:  BP (!) 157/67   Pulse 96   Temp 97.6 °F (36.4 °C) (Oral)   Resp 20   Ht 5' 10\" (1.778 m)   Wt 204 lb 9.4 oz (92.8 kg)   SpO2 96%   BMI 29.36 kg/m²   Constitutional:  OAA X3 NAD  Skin: no rash, turgor wnl  Heent:  eomi, mmm  Neck: no bruits or jvd noted  Cardiovascular:  S1, S2 without m/r/g  Respiratory: CTA B without w/r/r  Abdomen:  +bs, soft, TTP   Ext: + lower extremity edema  Psychiatric: mood and affect appropriate  Musculoskeletal:  Rom, muscular strength intact    Data:   Labs:  CBC:   Recent Labs     09/06/22  1237 09/07/22  0421   WBC 14.8* 14.0*   HGB 10.0* 9.0*    256       BMP:    Recent Labs     09/06/22  1237 09/07/22  0421    133*   K 4.2 4.2   CL 92* 90*   CO2 30 28   BUN 44* 55*   CREATININE 7.1* 8.4*   GLUCOSE 127* 333*       Ca/Mg/Phos:   Recent Labs     09/06/22  1237 09/07/22  0421   CALCIUM 9.3 8.5   MG  --  2.20       Hepatic:   Recent Labs     09/06/22  1237   AST 54*   ALT 43*   BILITOT <0.2   ALKPHOS 92       Troponin:   Recent Labs     09/06/22  1237   TROPONINI 0.08*       BNP: No results for input(s): BNP in the last 72 hours. Lipids: No results for input(s): CHOL, TRIG, HDL, LDLCALC, LABVLDL in the last 72 hours. ABGs:   Recent Labs     09/06/22  2220   PHART 7.355   PO2ART 94.8   [id number] 55.0*       INR: No results for input(s): INR in the last 72 hours. UA:No results for input(s): Bhavna Loan, GLUCOSEU, BILIRUBINUR, Hermenia Dahlia, BLOODU, PHUR, PROTEINU, UROBILINOGEN, NITRU, LEUKOCYTESUR, LABMICR, URINETYPE in the last 72 hours. Urine Microscopic: No results for input(s): LABCAST, BACTERIA, COMU, HYALCAST, WBCUA, RBCUA, EPIU in the last 72 hours. Urine Culture: No results for input(s): LABURIN in the last 72 hours. Urine Chemistry: No results for input(s): Pantera Yoder, PROTEINUR, NAUR in the last 72 hours. IMAGING:  XR CHEST (2 VW)   Final Result      Free air beneath the right hemidiaphragm. Bibasilar atelectasis. Critical result discussed with Dr. Ollie Baez at 1:00 PM 9/6/2022          Assessment/Plan   ESRD     2. HTN    3. Anemia    4. Acid- base/ Electrolyte imbalance     5.  Abd tenderness - r/o PD peritonitis     Plan   - IV abx   - PD fluid cell count   - Cx   - CCPD today   - monitor Labs  - EPO or anemia    - Constipation management                     Thank you for allowing us to participate in care of Brendan Cranker, MD  Feel free to contact me [facility] [address]  Office : [phone]  Fax :[phone]

## 2022-09-07 NOTE — CONSULTS
Clinical Pharmacy Progress Note  Medication History     Admit Date: 9/6/2022    Pharmacy consulted to verify home medication list by Dr. Rony Vargas. List of of current medications patient is taking is complete. Home Medication list in EPIC updated to reflect changes noted below. Source of information: Rx fill history; interview with patient     Patient's home pharmacy: [facility] made to medication list:   Medications removed: (include reason, ex: therapy completed, patient no longer taking, etc.)  Azithromycin - Rx from 5/2022 x 5d supply; pt reports no longer taking  Basaglar - pt uses Levemir  Insulin lispro - pt uses Novlog  Biotin - pt reports not taking  Amlodipine - pt reports not taking  Ferric citrate - pt reports not taking  Metoprolol - pt states he does not take  Torsemide - pt states he does not take  Lidocaine patch - pt states he does not use   Clobetasol ointment - pt reports not taking  Albuterol - pt reports not taking  Medications added:   Breo  Medication doses adjusted:   Vitamin D --> 50,000 units every 3 months  Other notes:   Pt states he takes Levemir 56 units QHS. Also takes Novolog 30 units with breakfast & lunch, 32 units with dinner. Current Outpatient Medications   Medication Instructions    aspirin 81 mg, Oral, DAILY    atorvastatin (LIPITOR) 80 MG tablet TAKE ONE TABLET BY MOUTH ONCE NIGHTLY    B Complex-C-Biotin-E-Min-FA (DIALYVITE 3000) 3 MG TABS 1 tablet, Oral, DAILY    B-D ULTRAFINE III SHORT PEN 31G X 8 MM MISC USE WITH INSULIN FOUR TIMES A DAY    blood glucose monitor kit and supplies (PLEASE DISPENSE WHAT INS WILL COVER) - Dispense sufficient amount for  TID testing frequency plus additional to accommodate PRN testing needs. Dispense all needed supplies to include: monitor, strips, lancing device, lancets, control solutions, alcohol swabs.     blood glucose monitor strips Test up to 4 times daily for blood sugar monitoring    Blood Glucose Monitoring Suppl (TRUE METRIX METER) RUMA As needed    blood glucose test strips (TRUE METRIX BLOOD GLUCOSE TEST) strip USE TO TEST FOUR TIMES A DAY AS NEEDED    BREO ELLIPTA 200-25 MCG/INH AEPB inhaler 1 puff, Inhalation, DAILY    Easy Touch Lancets 30G/Twist MISC USE THREE TIMES A DAY TO FOUR TIMES A DAY    Handicap Placard MISC Does not apply, Duration: 5 years    insulin aspart (NOVOLOG FLEXPEN) 100 UNIT/ML injection pen 32-42  units AC TID    insulin detemir (LEVEMIR FLEXTOUCH) 100 UNIT/ML injection pen 56-66 units daily    levothyroxine (SYNTHROID) 50 MCG tablet TAKE ONE TABLET BY MOUTH DAILY    midodrine (PROAMATINE) 5 mg, Oral, 2 times daily    triamcinolone (KENALOG) 0.1 % ointment Apply topically 2 times daily. vitamin D (ERGOCALCIFEROL) 50,000 Units, Oral, EVERY 3 MONTHS       Please call with any questions.   Delorse Klinefelter, PharmD, [facility]  Wireless: [phone]   9/7/2022 10:58 AM

## 2022-09-07 NOTE — PROGRESS NOTES
Pt a/o x4 but tired/lethargic. VSS. Denies N/V. No complaints of pain at this time. Turning pt q2 hrs. Pt requiring 2 L O2. Peritoneal dialysis going. Will continue to monitor patient.      Quique Mcdonnell RN

## 2022-09-07 NOTE — PROGRESS NOTES
Progress Note    Admit Date: 9/6/2022  Day: 2  Diet: ADULT DIET; Regular    CC: AMS     Interval history: He denies abdominal pain. He is yet to start PD. HPI: This is a 69year old male with a past medical history of ESRD on PD, interstitial lung disease, diastolic HF (2/25 Echo with EF 50-55%), NSTEMI, CAD s/p CABG 2007, BJ, cough variant asthma, T2DM, HTN, HLD, hypothyroidism who presents with AMS. Per family for the last 10 days the patient has been somnolent and lethargic with decreased appetite. He is on home peritoneal dialysis for the past year usually 1 hour at noon and 10/2 hours overnight. O2 at home also noted to be <90% so family called EMS. In the ED pt was afebrile, mildly hypertensive, hemodynamically stable on 3L NC. BMP significant for BUN/Cr 44/7.1, lactate 1.5, Pro-BNP 1.3K, troponin .08, WBC 14.8, Hgb 10. VBG 7.28/71.3/35/33. 8. CXR with free air under R hemidiaphragm and bibasilar atelectasis. Currently, the patient is somnolent but interactive. He is able to speak a few words at a time and states he feels \"terrible\". He says his last PD session was last night. He also endorses cough for unspecified amount of time and constipation for 3-4 days. He used to wear CPAP/BIPAP at home but does not currently.      Medications:     Scheduled Meds:   [START ON 9/8/2022] cefepime  1,000 mg IntraVENous Q24H    vancomycin (VANCOCIN) intermittent dosing (placeholder)   Other See Admin Instructions    epoetin daphney-epbx  2,000 Units SubCUTAneous Once per day on Mon Wed Fri    atorvastatin  80 mg Oral Nightly    levothyroxine  50 mcg Oral Daily    aspirin  81 mg Oral Daily    sodium chloride flush  5-40 mL IntraVENous 2 times per day    famotidine (PEPCID) injection  20 mg IntraVENous Daily    heparin (porcine)  5,000 Units SubCUTAneous 3 times per day    insulin lispro  0-4 Units SubCUTAneous TID WC    insulin lispro  0-4 Units SubCUTAneous Nightly     Continuous Infusions:   sodium chloride 20 mL/hr at 09/07/22 0922    dextrose       PRN Meds:sodium chloride flush, sodium chloride, ondansetron **OR** ondansetron, polyethylene glycol, acetaminophen **OR** acetaminophen, glucose, dextrose bolus **OR** dextrose bolus, glucagon (rDNA), dextrose    Objective:   Vitals:   T-max:  Patient Vitals for the past 8 hrs:   BP Temp Temp src Pulse Resp SpO2 Weight   09/07/22 0853 (!) 157/67 97.6 °F (36.4 °C) Oral 96 20 96 % --   09/07/22 0835 -- -- -- -- -- 96 % --   09/07/22 0347 -- -- -- -- -- 97 % --   09/07/22 0318 (!) 143/73 98.1 °F (36.7 °C) Oral 87 20 97 % 204 lb 9.4 oz (92.8 kg)       Intake/Output Summary (Last 24 hours) at 9/7/2022 1058  Last data filed at 9/7/2022 0665  Gross per 24 hour   Intake 444 ml   Output --   Net 444 ml       Review of Systems   Constitutional:  Positive for appetite change and fatigue. Cardiovascular:  Negative for chest pain and leg swelling. Gastrointestinal:  Positive for abdominal distention. Negative for abdominal pain, nausea and vomiting. Psychiatric/Behavioral:  Negative for confusion. Physical Exam  Constitutional:       Appearance: Normal appearance. He is obese. HENT:      Head: Normocephalic and atraumatic. Mouth/Throat:      Mouth: Mucous membranes are moist.   Eyes:      Pupils: Pupils are equal, round, and reactive to light. Cardiovascular:      Rate and Rhythm: Normal rate and regular rhythm. Pulses: Normal pulses. Heart sounds: Normal heart sounds. Pulmonary:      Effort: Pulmonary effort is normal.      Breath sounds: Normal breath sounds. Abdominal:      General: Abdomen is flat. There is distension. Palpations: Abdomen is soft. Tenderness: There is abdominal tenderness. Comments: PD catheter in place    Musculoskeletal:      Right lower leg: No edema. Left lower leg: No edema. Skin:     General: Skin is warm. Capillary Refill: Capillary refill takes less than 2 seconds.    Neurological:      Mental Status: He is alert and oriented to person, place, and time. Psychiatric:         Mood and Affect: Mood normal.         Behavior: Behavior normal.         Thought Content: Thought content normal.         Judgment: Judgment normal.       LABS:    CBC:   Recent Labs     09/06/22  1237 09/07/22  0421   WBC 14.8* 14.0*   HGB 10.0* 9.0*   HCT 31.0* 26.2*    256   .9* 113.2*     Renal:    Recent Labs     09/06/22  1237 09/07/22  0421    133*   K 4.2 4.2   CL 92* 90*   CO2 30 28   BUN 44* 55*   CREATININE 7.1* 8.4*   GLUCOSE 127* 333*   CALCIUM 9.3 8.5   MG  --  2.20   ANIONGAP 14 15     Hepatic:   Recent Labs     09/06/22  1237   AST 54*   ALT 43*   BILITOT <0.2   PROT 6.7   LABALBU 3.3*   ALKPHOS 92     Troponin:   Recent Labs     09/06/22  1237   TROPONINI 0.08*     BNP: No results for input(s): BNP in the last 72 hours. Lipids: No results for input(s): CHOL, HDL in the last 72 hours. Invalid input(s): LDLCALCU, TRIGLYCERIDE  ABGs:    Recent Labs     09/06/22  1822 09/06/22  2220   PHART 7.263* 7.355   [id number] 68.6* 55.0*   PO2ART 109.0* 94.8   [id number] 31* 31*   BEART 2.7 4.1*   [id number] 98 97   [id number] 33 32       INR: No results for input(s): INR in the last 72 hours. Lactate: No results for input(s): LACTATE in the last 72 hours. Cultures:  -----------------------------------------------------------------  RAD:   XR CHEST (2 VW)   Final Result      Free air beneath the right hemidiaphragm. Bibasilar atelectasis.       Critical result discussed with Dr. Anderson Toth at 1:00 PM 9/6/2022          Assessment/Plan:   Acute hypoxic hypercapnic respiratory failure   -O2 saturation in less than 90 prior to presentation and 86 in the ER.   -VBG on admission 7.28/71.3/35/33.8  -ABG 09/06 7.355/55/94.8/31  -CXR 9/6 with free air under diaphragm and bibasilar atelectasis   -Follows Dr Belle Vogel for EDWARDS  -On 2L O2 and sating at 96%, wean as tolerated   -continue to monitor vitals     Acute metabolic encephalopathy   -likely 2/2 CO2 narcosis 2/2 Bipap non-compliance. - He does not use home BIPAP, reports he's unable to tolerate it. - Ammonia level - wnl , serum ethanol - none detected   -VBG on admission 7.28/71.3/35/33.8  -ABG 09/06 7.355/55/94.8/31  -On 2L O2 and sating at 03%    Diastolic CHF  -last echo 7/67: EF 43-60%, LVH, nl diastolic function, elevated pulmonary artery systolic pressure 36 mmHg.  -Follows Dr Bird Mar outpatient   Pro-BNP 9/6: 1308, Tn 0.08 (hemolyzed sample)  -monitor     ESRD on PD   ? PD peritonitis  -pt denies abdominal pain but abdomen is tender on palpation. Denies nausea or vomiting. Has remained afebrile, mildly hypertensive. pt last PD was 09/05 night.   -for continuous cyclic peritoneal dialysis (CCPD) per nephrology   -Peritoneal fluid analysis ( gram stain, cell count w/differential and culture) pending  -blood cultures pending   -RFP  -Ct retacrit 2,000 units MWF  -Ct IV vancomycin and cefepime for broad spectrum coverage pending PD fluid culture result. HTN  -holding home amlodipine     T2DM  -SSI while in patient   -POC glucose   -hypoglycemic protocol     Hypothyroidism   -ct  synthroid 50 mcg    CAD s/p CABG 2007  -stable on 04/22 angiogram with no intervention.  -ct aspirin   -ct Lipitor     Code Status: Full code   FEN: Adult Regular diet   PPX: Heparin   DISPO: Pratik Stovall MD, PGY-1  09/07/22  10:58 AM    This patient has been staffed and discussed with Rosalia Robins MD.      Addendum to Resident H& P/Progress note:  I have personally seen,examined and evaluated the patient.  I have reviewed the current history, physical findings, labs and assessment and plan and agree with note as documented by resident MD ( )  Discussed the patient's condition with Dr Ambreen Sneed, nephrology    Rosalia Robins MD, [facility]

## 2022-09-07 NOTE — CARE COORDINATION
Case Management Assessment           Initial Evaluation                Date / Time of Evaluation: 9/7/2022 3:45 PM                 Assessment Completed by: Alize Wong RN    Patient Name: Demetria Roper     YOB: 1945  Diagnosis: Hypercarbia [R06.89]  Elevated troponin [R77.8]  Acute respiratory failure with hypoxia ([address] [J96.01]  AMS (altered mental status) [R41.82]     Date / Time: 9/6/2022 11:46 AM    Patient Admission Status: Inpatient    If patient is discharged prior to next notation, then this note serves as note for discharge by case management. Current PCP: Pat Capps MD  Clinic Patient: No    Chart Reviewed: Yes  Patient/ Family Interviewed: Yes    Initial assessment completed at bedside with: daughter, patient sleeping     Hospitalization in the last 30 days: No    Emergency Contacts:  Extended Emergency Contact Information  Primary Emergency Contact: [facility] [address] Phone: [phone]  Relation: Spouse  Secondary Emergency Contact: [place] [address] Phone: [phone]  Relation: Child  Preferred language: English   needed?  No    Advance Directives:   Code Status: Full Code    Healthcare Power of : No      Financial  Payor: [employer] / Plan: [address] / Product Type: *No Product type* /     Pre-cert required for SNF: Yes    Pharmacy    [facility] [id number] [facility], [address] [place] - F [phone]  [address] [facility]  Phone: [phone] Fax: [phone]    [facility] [id number] [place] [address], [address]  Phone: [phone] Fax: [phone]    [id number] [id number]  Wyatt Marcos [address] [facility],[address]  [address] [address]  Phone: [phone] Fax: [phone]      Potential assistance Purchasing Medications: Potential Assistance Purchasing Medications: No  Does Patient want to participate in local refill/ meds to beds program?:      Meds To Beds General Rules:  1. Can ONLY be done Monday- Friday between 8:30am-5pm  2. Prescription(s) must be in pharmacy by 3pm to be filled same day  3. Copy of patient's insurance/ prescription drug card and patient face sheet must be sent along with the prescription(s)  4. Cost of Rx cannot be added to hospital bill. If financial assistance is needed, please contact unit  or ;  or  CANNOT provide pharmacy voucher for patients co-pays  5. Patients can then  the prescription on their way out of the hospital at discharge, or pharmacy can deliver to the bedside if staff is available. (payment due at time of pick-up or delivery - cash, check, or card accepted)     Able to afford home medications/ co-pay costs: Yes    ADLS  Support Systems: Spouse/Significant Other, Children    PT AM-PAC:   /24  OT AM-PAC:   /24    [place]: from home in [address] [facility] one level  Steps: one to enter    Plans to RETURN to current housing: No  Barriers to RETURNING to current housing: medical complications     Dougie Tan 79  Currently ACTIVE with [address]: No  Home Care Agency: Not Applicable    Durable Medical Equipment  DME Provider: n/a  Equipment: walker and bath bench  Dialysis  Active with HD/PD prior to admission: Yes  Pt does home PD    DISCHARGE PLAN:  Disposition: SNF with [address] for discharge: EMS transportation     Factors facilitating achievement of predicted outcomes: Family support    Barriers to discharge: Medical complications and Medication managment    Additional Case Management Notes: CM met with daughter at bedside. Pt was sleeping. Pt lives with wife who recently had open heart surgery and pt has been requiring more care that spouse and daughters can't provide.  List of facilities that do PD given to daughter. She will look at list and give choices to CM in am. Pt will need precert. The Plan for Transition of Care is related to the following treatment goals of Hypercarbia [R06.89]  Elevated troponin [R77.8]  Acute respiratory failure with hypoxia ([address] [J96.01]  AMS (altered mental status) [R41.82]    The Patient and/or patient representative Sophia Alan and his family were provided with a choice of provider and agrees with the discharge plan Yes    Freedom of choice list was provided with basic dialogue that supports the patient's individualized plan of care/goals and shares the quality data associated with the providers.  Yes    Care Transition patient: No    Monica Carter RN  [facility] [employer]  Case Management Department  Ph: [phone]   Fax: [phone]

## 2022-09-07 NOTE — DISCHARGE INSTRUCTIONS
-Please follow up with your primary care physician in one week   -Follow up with nephrology in one week   -Follow up with pulmonology in one week  - Start taking Lantus 50 units nightly and use low dose sliding scale      Extra Heart Failure sites:   https://[employer]/ --- this is American Heart Association interactive Healthier Living with Heart Failure guidebook. Please copy and paste link into search bar. Use your mouse to scroll through the pages. Lots and lots of info / tips    HF [place] liliana  --- free smart phone liliana available for [employer] and [employer]. Use your phone to track sodium / fluid intake,  symptoms, weight, etc.    Kandee Sandifer. [employer] - website-- [employer] is a dialysis company. All dialysis patients follow a renal diet which IS low sodium!! This website offers free seasonal cookbooks.   Each quarter, they will release 25-30 new recipes with a breakdown of calories, sodium, glucose, etc    [email or web address]/recipes -- more free recipes

## 2022-09-07 NOTE — PROGRESS NOTES
Pharmacy Note - Renal Dosing and Extended Infusion Beta-Lactam Adjustment    Cefepime 1000 mg daily ordered for patient. Per [address] Renal Dose Adjustment Policy and Extended Infusion Beta-Lactam Policy, order will be changed to 2000 mg x1; followed by 1000 mg every 24 hours. Estimated Creatinine Clearance: Estimated Creatinine Clearance: 10 mL/min (A) (based on SCr of 7.1 mg/dL [facility])). Dialysis Status, MORIS, CKD:   BMI: Body mass index is 28.79 kg/m². Rationale for Adjustment: Agent is renally eliminated and demonstrates time-dependent effect on bacterial eradication. Extended-infusion dosing strategy aims to enhance microbiologic and clinical efficacy. Pharmacy will continue to monitor renal function, cultures and sensitivities (where available) and adjust dose as necessary. Please call with any questions.     Isreal Walker, PharmD, BCPS

## 2022-09-07 NOTE — PLAN OF CARE
Problem: Safety - Adult  Goal: Free from fall injury  9/7/2022 1231 by Coleen Tidwell  Outcome: Progressing  Flowsheets (Taken 9/7/2022 0726 by Dina Barron RN)  Free From Fall Injury: Instruct family/caregiver on patient safety  Note: Patient free from falls/ injury during duration of shift. Bed alarm on, call light w/in reach, bed in lowest position, non-skid socks on and fall sign posted outside door. Problem: Skin/Tissue Integrity  Goal: Absence of new skin breakdown  Description: 1. Monitor for areas of redness and/or skin breakdown  2. Assess vascular access sites hourly  3. Every 4-6 hours minimum:  Change oxygen saturation probe site  4. Every 4-6 hours:  If on nasal continuous positive airway pressure, respiratory therapy assess nares and determine need for appliance change or resting period. 9/7/2022 1231 by Coleen Tidwell  Outcome: Progressing  Note: Skin CDI, turning pt q2 hrs, scattered bruising/ scabs. No evidence of skin breakdown.

## 2022-09-07 NOTE — PROGRESS NOTES
Clinical Pharmacy Progress Note    IV Vancomycin - Management by Pharmacy    Consult Date(s): 9/6/22  Consulting Provider(s): Dr. Gato Connors / Plan    Intra-Abdominal Infection - Vancomycin  Concurrent Antimicrobials: Cefepime  Day of Vanc Therapy / Ordered Duration: #1  Current Dosing Method: Intermittent Dosing by Levels  Therapeutic Goal: ~ 15 mg/L  Current Dose / Frequency: 1250 mg Ivx1 (~15mg/kg)  Plan / Rationale:   Given that that patient has ESRD (on PD), will dose based on levels at this time. Will continue to monitor clinical condition and make adjustments to regimen as appropriate. Thank you for consulting Pharmacy! Minnie Romero, PharmD, BCPS      Interval update:     Subjective/Objective: Mr. Abhinav He is a 69 y.o. male with a PMHx significant for interstitial lung disease, diastolic H, CAD s/p CABG 5577, T2DM, HTN, HLD, hypothyroidism, admitted for Intra-Abdominal Infection. Pharmacy has been consulted to dose vancomycin. Ht Readings from Last 1 Encounters:   09/06/22 5' 10\" (1.778 m)     Wt Readings from Last 1 Encounters:   08/15/22 201 lb (91.2 kg)       Current & Prior Antimicrobial Regimen(s):  Vancomycin 1250 mg Ivx1  Cefepime 2000 mg x1; followed by 1000 mg every 24 hours    Level(s) / Doses:    Date Time Dose Level / Type of Level Interpretation                 Note: Serum levels collected for AUC-based dosing may be high if collected in close proximity to the dose administered. This is not necessarily indicative of toxicity. Cultures & Sensitivities:    Date Site Micro Susceptibility / Result   9/6/22 Bloodx2 pending    9/6/22 Body fluid pending      Labs / Ancillary Data:    CrCl cannot be calculated (Unknown ideal weight. ). Recent Labs     09/06/22  1237   CREATININE 7.1*   BUN 44*   WBC 14.8*       Additional Lab Values / Findings of Note:    Procalcitonin: No results for input(s): PROCAL in the last 72 hours.

## 2022-09-07 NOTE — PROGRESS NOTES
4 Eyes Admission Assessment     I agree as the admission nurse that 2 RN's have performed a thorough Head to Toe Skin Assessment on the patient. ALL assessment sites listed below have been assessed on admission. Areas assessed by both nurses:   [x]   Head, Face, and Ears   [x]   Shoulders, Back, and Chest  [x]   Arms, Elbows, and Hands   [x]   Coccyx, Sacrum, and Ischium  [x]   Legs, Feet, and Heels    -Blanchable redness on coccyx  -Scattered bruising   -Excoriation on right leg         Does the Patient have Skin Breakdown?   No         Jorge Luis Prevention initiated:  Yes   Wound Care Orders initiated:  No      [facility] nurse consulted for Pressure Injury (Stage 3,4, Unstageable, DTI, NWPT, and Complex wounds) or Jorge Luis score 18 or lower:  No      Nurse 1 eSignature: Electronically signed by Sergio Gudino RN on 9/7/22 at 12:01 AM EDT    **SHARE this note so that the co-signing nurse is able to place an eSignature**    Nurse 2 eSignature: Electronically signed by Harmony Obregon RN on 9/7/22 at 12:06 AM EDT

## 2022-09-07 NOTE — CONSULTS
Clinical Pharmacy Progress Note    Vancomycin - Management by Pharmacy    Consult Date(s): 9/7/22  Consulting Provider(s): Dr. Jackie Mcclain / Yomi Elliott      ? Peritonitis - Vancomycin  Concurrent Antimicrobials:   Cefepime - Day #1  Day of Vanc Therapy / Ordered Duration: #1  Current Dosing Method: Intermittent Dosing by Levels  Therapeutic Goal: ~ 15 mg/L  Current Dose / Frequency / Plan / Rationale:   Given ESRD on PD, will dose vancomycin based on intermittent levels for now. Have entered placeholder onto [facility] Received 1250mg IV x1 overnight. Will give additional dose of 500mg IV this AM, for total loading dose of 1750mg IV (~20 mg/kg). Vancomycin level ordered for tomorrow AM, Thurs 9/8 to assess timing of next dose. Will continue to monitor clinical condition and make adjustments to regimen as appropriate. Thank you for consulting Pharmacy! Reji StevensD, BCPS  Wireless: [id number]   9/7/2022 7:49 AM        Interval update: Nephrology consulted - PD fluid studies sent; started on empiric ABx. Subjective/Objective: Mr. Cipriano Reed is a 69 y.o. male with a PMHx significant for ESRD on PD, ILD, HFpEF, NSTEMI, CAD, DM2, HTN, hypothyroidism who presented to ED with AMS. Admitted with acute encephalopathy, respiratory failure. Pharmacy has been consulted to dose Vancomycin.     Ht Readings from Last 1 Encounters:   09/06/22 5' 10\" (1.778 m)     Wt Readings from Last 1 Encounters:   09/07/22 204 lb 9.4 oz (92.8 kg)     Current & Prior Antimicrobial Regimen(s):  Cefepime (9/7-current)  Vancomycin - Pharmacy to dose  Intermittent dosing based on levels (9/7-current)    Date Dose Vanc Level   9/7 1250mg IV    9/8              Cultures & Sensitivities:  Date Site Micro Susceptibility / Result    PD fluid      9/6 Blood  Sent x 2      Labs / Ancillary Data:  Recent Labs     09/06/22  1237 09/07/22  0421   CREATININE 7.1* 8.4*   BUN 44* 55*   WBC 14.8* 14.0*       Additional Lab Values / Findings of Note:    Procalcitonin: No results for input(s): PROCAL in the last 72 hours.

## 2022-09-07 NOTE — PLAN OF CARE
Problem: Discharge Planning  Goal: Discharge to home or other facility with appropriate resources  9/7/2022 0213 by Doni Funes RN  Outcome: Progressing  9/7/2022 0213 by Doni Funes RN  Flowsheets  Taken 9/7/2022 0828  Discharge to home or other facility with appropriate resources:   Identify barriers to discharge with patient and caregiver   Arrange for needed discharge resources and transportation as appropriate   Identify discharge learning needs (meds, wound care, etc)    Problem: Safety - Adult  Goal: Free from fall injury  Outcome: Progressing  Flowsheets (Taken 9/7/2022 0213)  Free From Fall Injury: Instruct family/caregiver on patient safety     Problem: ABCDS Injury Assessment  Goal: Absence of physical injury  Outcome: Progressing  Flowsheets (Taken 9/7/2022 0213)  Absence of Physical Injury: Implement safety measures based on patient assessment     Problem: Skin/Tissue Integrity  Goal: Absence of new skin breakdown  Description: 1. Monitor for areas of redness and/or skin breakdown  2. Assess vascular access sites hourly  3. Every 4-6 hours minimum:  Change oxygen saturation probe site  4. Every 4-6 hours:  If on nasal continuous positive airway pressure, respiratory therapy assess nares and determine need for appliance change or resting period.   Outcome: Progressing

## 2022-09-08 LAB
ANION GAP SERPL CALCULATED.3IONS-SCNC: 14 MMOL/L (ref 3–16)
APPEARANCE FLUID: CLEAR
BASOPHILS ABSOLUTE: 0.1 K/UL (ref 0–0.2)
BASOPHILS RELATIVE PERCENT: 0.5 %
BUN BLDV-MCNC: 49 MG/DL (ref 7–20)
CALCIUM SERPL-MCNC: 8.1 MG/DL (ref 8.3–10.6)
CELL COUNT FLUID TYPE: NORMAL
CHLORIDE BLD-SCNC: 91 MMOL/L (ref 99–110)
CLOT EVALUATION: NORMAL
CO2: 29 MMOL/L (ref 21–32)
COLOR FLUID: COLORLESS
CREAT SERPL-MCNC: 8.1 MG/DL (ref 0.8–1.3)
EOSINOPHILS ABSOLUTE: 0.3 K/UL (ref 0–0.6)
EOSINOPHILS RELATIVE PERCENT: 2.7 %
FLUID DIFF COMMENT: NORMAL
GFR AFRICAN AMERICAN: 8
GFR NON-AFRICAN AMERICAN: 6
GLUCOSE BLD-MCNC: 402 MG/DL (ref 70–99)
GLUCOSE BLD-MCNC: 406 MG/DL (ref 70–99)
GLUCOSE BLD-MCNC: 406 MG/DL (ref 70–99)
GLUCOSE BLD-MCNC: 422 MG/DL (ref 70–99)
GLUCOSE BLD-MCNC: 433 MG/DL (ref 70–99)
GLUCOSE BLD-MCNC: 446 MG/DL (ref 70–99)
GLUCOSE BLD-MCNC: 466 MG/DL (ref 70–99)
HCT VFR BLD CALC: 27.4 % (ref 40.5–52.5)
HEMOGLOBIN: 9.3 G/DL (ref 13.5–17.5)
LYMPHOCYTES ABSOLUTE: 0.5 K/UL (ref 1–5.1)
LYMPHOCYTES RELATIVE PERCENT: 4.7 %
LYMPHOCYTES, BODY FLUID: 23 %
MACROPHAGE FLUID: 17 %
MAGNESIUM: 2 MG/DL (ref 1.8–2.4)
MCH RBC QN AUTO: 38 PG (ref 26–34)
MCHC RBC AUTO-ENTMCNC: 33.9 G/DL (ref 31–36)
MCV RBC AUTO: 112.1 FL (ref 80–100)
MONOCYTES ABSOLUTE: 0.5 K/UL (ref 0–1.3)
MONOCYTES RELATIVE PERCENT: 4.8 %
NEUTROPHIL, FLUID: 60 %
NEUTROPHILS ABSOLUTE: 9.1 K/UL (ref 1.7–7.7)
NEUTROPHILS RELATIVE PERCENT: 87.3 %
NUCLEATED CELLS FLUID: 10 /CUMM
PDW BLD-RTO: 14.6 % (ref 12.4–15.4)
PERFORMED ON: ABNORMAL
PLATELET # BLD: 251 K/UL (ref 135–450)
PMV BLD AUTO: 7.2 FL (ref 5–10.5)
POTASSIUM SERPL-SCNC: 4.2 MMOL/L (ref 3.5–5.1)
RBC # BLD: 2.45 M/UL (ref 4.2–5.9)
RBC FLUID: 1 /CUMM
SODIUM BLD-SCNC: 134 MMOL/L (ref 136–145)
VANCOMYCIN RANDOM: 16.7 UG/ML
WBC # BLD: 10.4 K/UL (ref 4–11)

## 2022-09-08 PROCEDURE — 2060000000 HC ICU INTERMEDIATE R&B

## 2022-09-08 PROCEDURE — 94660 CPAP INITIATION&MGMT: CPT

## 2022-09-08 PROCEDURE — 83735 ASSAY OF MAGNESIUM: CPT

## 2022-09-08 PROCEDURE — 6370000000 HC RX 637 (ALT 250 FOR IP)

## 2022-09-08 PROCEDURE — 80202 ASSAY OF VANCOMYCIN: CPT

## 2022-09-08 PROCEDURE — 6360000002 HC RX W HCPCS: Performed by: STUDENT IN AN ORGANIZED HEALTH CARE EDUCATION/TRAINING PROGRAM

## 2022-09-08 PROCEDURE — 80048 BASIC METABOLIC PNL TOTAL CA: CPT

## 2022-09-08 PROCEDURE — 6370000000 HC RX 637 (ALT 250 FOR IP): Performed by: STUDENT IN AN ORGANIZED HEALTH CARE EDUCATION/TRAINING PROGRAM

## 2022-09-08 PROCEDURE — 6360000002 HC RX W HCPCS: Performed by: INTERNAL MEDICINE

## 2022-09-08 PROCEDURE — 99233 SBSQ HOSP IP/OBS HIGH 50: CPT | Performed by: INTERNAL MEDICINE

## 2022-09-08 PROCEDURE — 36415 COLL VENOUS BLD VENIPUNCTURE: CPT

## 2022-09-08 PROCEDURE — 99232 SBSQ HOSP IP/OBS MODERATE 35: CPT | Performed by: HOSPITALIST

## 2022-09-08 PROCEDURE — 2700000000 HC OXYGEN THERAPY PER DAY

## 2022-09-08 PROCEDURE — 2580000003 HC RX 258: Performed by: STUDENT IN AN ORGANIZED HEALTH CARE EDUCATION/TRAINING PROGRAM

## 2022-09-08 PROCEDURE — 36600 WITHDRAWAL OF ARTERIAL BLOOD: CPT

## 2022-09-08 PROCEDURE — 89050 BODY FLUID CELL COUNT: CPT

## 2022-09-08 PROCEDURE — 94761 N-INVAS EAR/PLS OXIMETRY MLT: CPT

## 2022-09-08 PROCEDURE — 6370000000 HC RX 637 (ALT 250 FOR IP): Performed by: INTERNAL MEDICINE

## 2022-09-08 PROCEDURE — 2580000003 HC RX 258: Performed by: INTERNAL MEDICINE

## 2022-09-08 PROCEDURE — 85025 COMPLETE CBC W/AUTO DIFF WBC: CPT

## 2022-09-08 RX ORDER — INSULIN LISPRO 100 [IU]/ML
10 INJECTION, SOLUTION INTRAVENOUS; SUBCUTANEOUS ONCE
Status: COMPLETED | OUTPATIENT
Start: 2022-09-08 | End: 2022-09-08

## 2022-09-08 RX ORDER — INSULIN LISPRO 100 [IU]/ML
15 INJECTION, SOLUTION INTRAVENOUS; SUBCUTANEOUS ONCE
Status: COMPLETED | OUTPATIENT
Start: 2022-09-08 | End: 2022-09-08

## 2022-09-08 RX ORDER — FAMOTIDINE 20 MG/1
20 TABLET, FILM COATED ORAL DAILY
Status: DISCONTINUED | OUTPATIENT
Start: 2022-09-08 | End: 2022-09-19 | Stop reason: HOSPADM

## 2022-09-08 RX ORDER — INSULIN LISPRO 100 [IU]/ML
0-16 INJECTION, SOLUTION INTRAVENOUS; SUBCUTANEOUS
Status: DISCONTINUED | OUTPATIENT
Start: 2022-09-08 | End: 2022-09-12

## 2022-09-08 RX ORDER — INSULIN LISPRO 100 [IU]/ML
0-4 INJECTION, SOLUTION INTRAVENOUS; SUBCUTANEOUS NIGHTLY
Status: DISCONTINUED | OUTPATIENT
Start: 2022-09-08 | End: 2022-09-12

## 2022-09-08 RX ORDER — INSULIN LISPRO 100 [IU]/ML
20 INJECTION, SOLUTION INTRAVENOUS; SUBCUTANEOUS
Status: DISCONTINUED | OUTPATIENT
Start: 2022-09-08 | End: 2022-09-09

## 2022-09-08 RX ORDER — POLYETHYLENE GLYCOL 3350 17 G/17G
17 POWDER, FOR SOLUTION ORAL 2 TIMES DAILY
Status: DISCONTINUED | OUTPATIENT
Start: 2022-09-08 | End: 2022-09-19 | Stop reason: HOSPADM

## 2022-09-08 RX ADMIN — ATORVASTATIN CALCIUM 80 MG: 40 TABLET, FILM COATED ORAL at 21:55

## 2022-09-08 RX ADMIN — FAMOTIDINE 20 MG: 20 TABLET ORAL at 10:05

## 2022-09-08 RX ADMIN — HEPARIN SODIUM 5000 UNITS: 5000 INJECTION INTRAVENOUS; SUBCUTANEOUS at 05:01

## 2022-09-08 RX ADMIN — INSULIN LISPRO 20 UNITS: 100 INJECTION, SOLUTION INTRAVENOUS; SUBCUTANEOUS at 18:31

## 2022-09-08 RX ADMIN — SODIUM CHLORIDE, PRESERVATIVE FREE 5 ML: 5 INJECTION INTRAVENOUS at 21:42

## 2022-09-08 RX ADMIN — INSULIN LISPRO 16 UNITS: 100 INJECTION, SOLUTION INTRAVENOUS; SUBCUTANEOUS at 18:33

## 2022-09-08 RX ADMIN — INSULIN LISPRO 10 UNITS: 100 INJECTION, SOLUTION INTRAVENOUS; SUBCUTANEOUS at 06:25

## 2022-09-08 RX ADMIN — LEVOTHYROXINE SODIUM 50 MCG: 50 TABLET ORAL at 10:05

## 2022-09-08 RX ADMIN — INSULIN LISPRO 16 UNITS: 100 INJECTION, SOLUTION INTRAVENOUS; SUBCUTANEOUS at 13:57

## 2022-09-08 RX ADMIN — POLYETHYLENE GLYCOL (3350) 17 G: 17 POWDER, FOR SOLUTION ORAL at 21:59

## 2022-09-08 RX ADMIN — HEPARIN SODIUM 5000 UNITS: 5000 INJECTION INTRAVENOUS; SUBCUTANEOUS at 22:31

## 2022-09-08 RX ADMIN — BISACODYL 10 MG: 5 TABLET, COATED ORAL at 14:01

## 2022-09-08 RX ADMIN — SODIUM CHLORIDE, PRESERVATIVE FREE 10 ML: 5 INJECTION INTRAVENOUS at 10:06

## 2022-09-08 RX ADMIN — INSULIN LISPRO 4 UNITS: 100 INJECTION, SOLUTION INTRAVENOUS; SUBCUTANEOUS at 21:50

## 2022-09-08 RX ADMIN — VANCOMYCIN HYDROCHLORIDE 1500 MG: 10 INJECTION, POWDER, LYOPHILIZED, FOR SOLUTION INTRAVENOUS at 10:04

## 2022-09-08 RX ADMIN — INSULIN LISPRO 16 UNITS: 100 INJECTION, SOLUTION INTRAVENOUS; SUBCUTANEOUS at 09:46

## 2022-09-08 RX ADMIN — ASPIRIN 81 MG 81 MG: 81 TABLET ORAL at 10:05

## 2022-09-08 RX ADMIN — INSULIN LISPRO 20 UNITS: 100 INJECTION, SOLUTION INTRAVENOUS; SUBCUTANEOUS at 13:53

## 2022-09-08 RX ADMIN — INSULIN LISPRO 10 UNITS: 100 INJECTION, SOLUTION INTRAVENOUS; SUBCUTANEOUS at 09:47

## 2022-09-08 RX ADMIN — CEFEPIME 1000 MG: 1 INJECTION, POWDER, FOR SOLUTION INTRAMUSCULAR; INTRAVENOUS at 00:01

## 2022-09-08 RX ADMIN — POLYETHYLENE GLYCOL (3350) 17 G: 17 POWDER, FOR SOLUTION ORAL at 14:00

## 2022-09-08 RX ADMIN — INSULIN LISPRO 15 UNITS: 100 INJECTION, SOLUTION INTRAVENOUS; SUBCUTANEOUS at 22:00

## 2022-09-08 RX ADMIN — HEPARIN SODIUM 5000 UNITS: 5000 INJECTION INTRAVENOUS; SUBCUTANEOUS at 16:31

## 2022-09-08 ASSESSMENT — ENCOUNTER SYMPTOMS
ABDOMINAL DISTENTION: 1
NAUSEA: 0
ABDOMINAL PAIN: 0
VOMITING: 0

## 2022-09-08 NOTE — FLOWSHEET NOTE
09/07/22 2022   Vitals   BP (!) 159/66   Temp 98.3 °F (36.8 °C)   Temp Source Oral   Heart Rate 96   Resp 18   SpO2 99 %   Weight 210 lb 8.6 oz (95.5 kg)     Disconnected from CCPD per protocol. Effluent: clear  Total time: 8 hr 43 min   Total UF:  362 ml. Total Volume:  9996 ml. Dwell time lost:  0 hr 42 min. Pt Tolerated procedure:  Tolerated CCPD well without incident  Report given to: Addi Monzon RN  Last BM: 9/5/2022

## 2022-09-08 NOTE — DIALYSIS
CCPD Order   Exchanges: 5   Exchange Volume: 2500 ml   Total Time: 10 hrs   Dextrose: 2.5%   Last Fill: 0 ml   Total Volume: 12,500 ml     Orders verified. Supplies taken to pt's room. Report received. Cycler set up, primed and pre tested. Dressing changed on [facility] Cath site. Pt connected to cycler. CCPD initiated without problem. Initial effluent clear. If problems should arise please call the [phone] number on top of PD cycler machine.        If problems persist please call [phone]

## 2022-09-08 NOTE — PLAN OF CARE
Problem: Discharge Planning  Goal: Discharge to home or other facility with appropriate resources  Outcome: Progressing  Flowsheets (Taken 9/8/2022 1848)  Discharge to home or other facility with appropriate resources:   Identify barriers to discharge with patient and caregiver   Identify discharge learning needs (meds, wound care, etc)     Problem: Safety - Adult  Goal: Free from fall injury  Outcome: Progressing  Flowsheets (Taken 9/8/2022 1848)  Free From Fall Injury: Instruct family/caregiver on patient safety     Problem: Skin/Tissue Integrity  Goal: Absence of new skin breakdown  Description: 1. Monitor for areas of redness and/or skin breakdown  2. Assess vascular access sites hourly  3. Every 4-6 hours minimum:  Change oxygen saturation probe site  4. Every 4-6 hours:  If on nasal continuous positive airway pressure, respiratory therapy assess nares and determine need for appliance change or resting period.   Outcome: Progressing     Problem: Cardiovascular - Adult  Goal: Maintains optimal cardiac output and hemodynamic stability  Outcome: Progressing  Flowsheets (Taken 9/8/2022 1848)  Maintains optimal cardiac output and hemodynamic stability:   Monitor blood pressure and heart rate   Assess for signs of decreased cardiac output     Problem: Respiratory - Adult  Goal: Achieves optimal ventilation and oxygenation  Outcome: Progressing  Flowsheets (Taken 9/8/2022 1848)  Achieves optimal ventilation and oxygenation:   Assess for changes in respiratory status   Assess for changes in mentation and behavior   Position to facilitate oxygenation and minimize respiratory effort   Oxygen supplementation based on oxygen saturation or arterial blood gases   Assess the need for suctioning and aspirate as needed   Assess and instruct to report shortness of breath or any respiratory difficulty   Respiratory therapy support as indicated

## 2022-09-08 NOTE — PROGRESS NOTES
09/08/22 1234   Encounter Summary   Encounter Overview/Reason  Initial Encounter;Spiritual/Emotional Needs   Service Provided For: Patient and family together   Referral/Consult From: Rounding   Support System Spouse; Children   Last Encounter  09/08/22  (9/8 Fr WAN)   Complexity of Encounter Low   Begin Time 1050   End Time  1125   Total Time Calculated 35 min   Spiritual/Emotional needs   Type Spiritual Support   Assessment/Intervention/Outcome   Assessment Coping; Hopeful;Calm   Intervention Active listening;Confronted/Challenged; Discussed belief system/Bahai practices/rani;Discussed illness injury and its impact; Discussed meaning/purpose;Discussed relationship with God;Explored/Affirmed feelings, thoughts, concerns;Explored Coping Skills/Resources   Outcome Coping;Encouraged;Engaged in conversation;Expressed feelings, needs, and concerns;Expressed feelings of Chloe, Peace and/or Love;Expressed Gratitude   Fr WAN 9/8/2022

## 2022-09-08 NOTE — FLOWSHEET NOTE
09/07/22 1221   Vitals   BP (!) 172/71   Temp 99 °F (37.2 °C)   Temp Source Oral   Heart Rate (!) 102   Resp 18   SpO2 99 %   Weight 204 lb 9.4 oz (92.8 kg)     CCPD Order   Exchanges: 5   Exchange Volume: 2500 ml   Total Time: 10 hrs   Dextrose: 2.5%   Last Fill: 0 ml   Total Volume: 91229 ml     Orders verified. Supplies taken to pt's room. Report received. Cycler set up, primed and pre tested. Dressing changed on [facility] Cath site. Pt connected to cycler. CCPD initiated without problem. Initial effluent clear. If problems should arise please call the [phone] number on top of PD cycler machine.        If problems persist please call [phone]

## 2022-09-08 NOTE — FLOWSHEET NOTE
09/07/22 2033   Vitals   BP (!) 159/66   Temp 98.3 °F (36.8 °C)   Temp Source Oral   Heart Rate 96   Resp 18   SpO2 99 %   Weight 210 lb 8.6 oz (95.5 kg)       CCPD Order   Exchanges: 5   Exchange Volume: 2500 ml   Total Time: 10 hrs   Dextrose: 2.5%   Last Fill: 0 ml   Total Volume: 64402 ml     Orders verified. Supplies taken to pt's room. Report received. Cycler set up, primed and pre tested. Dressing changed on [facility] Cath site. Pt connected to cycler. CCPD initiated without problem. Initial effluent clear. If problems should arise please call the [phone] number on top of PD cycler machine.        If problems persist please call [phone]

## 2022-09-08 NOTE — PROGRESS NOTES
Nephrology  Note                                                                                                                                                                                                                                                                                                                                                               Office : [phone]     Fax :[phone]              Patient's Name: Sameera Barr  11:12 AM  9/8/2022    Reason for Consult:  ESRD   Requesting Physician:  Kaushik Millan MD      Jamie PD well   Fluid clear   No signs of PD peritonitis         Past Medical History:   Diagnosis Date    Allergic rhinitis due to other allergen 7/12/2010    Coronary atherosclerosis of unspecified type of vessel, native or graft 7/12/2010    Diabetic eye exam ([facility] [address] 04/29/2015    Diabetic eye exam ([address] 5/5/2016    [facility]    Generalized osteoarthrosis, involving multiple sites 7/12/2010    Other psoriasis 7/12/2010    Pneumonia     Prolonged emergence from general anesthesia     Psoriatic arthropathy ([facility] [address] 7/12/2010    Type II or unspecified type diabetes mellitus without mention of complication, not stated as uncontrolled 7/12/2010    Unspecified essential hypertension 7/12/2010    Unspecified sleep apnea 7/12/2010       Past Surgical History:   Procedure Laterality Date    CARPAL TUNNEL RELEASE      s/p    CATARACT REMOVAL      CORONARY ARTERY BYPASS GRAFT      JOINT REPLACEMENT      LAPAROSCOPY INSERTION PERITONEAL CATHETER N/A 10/26/2020    LAPAROSCOPIC PERITONEAL DIALYSIS CATHETER PLACEMENT; LAPAROSCOPIC OMENTOPEXY performed by Zoe Bone DO at [facility]       Family History   Problem Relation Age of Onset    Diabetes Mother     Heart Failure Mother     Coronary Art Dis Father         reports that he quit smoking about 41 years ago. He started smoking about 60 years ago. He has a 18.00 pack-year smoking history.  He has never used smokeless tobacco. He reports that he does not drink alcohol and does not use drugs.     Allergies:  Penicillins, Sulfa antibiotics, Tazorac [tazarotene], Clindamycin, and Clindamycin/lincomycin    Current Medications:    insulin lispro (1 Unit Dial) 0-16 Units, TID WC  insulin lispro (1 Unit Dial) 0-4 Units, Nightly  vancomycin (VANCOCIN) 1,500 mg in dextrose 5 % 250 mL IVPB, Once  famotidine (PEPCID) tablet 20 mg, Daily  insulin glargine (LANTUS;BASAGLAR) injection pen 60 Units, Nightly  insulin lispro (1 Unit Dial) 20 Units, TID WC  polyethylene glycol (GLYCOLAX) packet 17 g, BID  bisacodyl (DULCOLAX) EC tablet 10 mg, Once  cefepime (MAXIPIME) 1000 mg IVPB minibag, Q24H  vancomycin (VANCOCIN) intermittent dosing (placeholder), See Admin Instructions  epoetin daphney-epbx (RETACRIT) injection 2,000 Units, Once per day on Mon Wed Fri  atorvastatin (LIPITOR) tablet 80 mg, Nightly  levothyroxine (SYNTHROID) tablet 50 mcg, Daily  aspirin chewable tablet 81 mg, Daily  sodium chloride flush 0.9 % injection 5-40 mL, 2 times per day  sodium chloride flush 0.9 % injection 5-40 mL, PRN  0.9 % sodium chloride infusion, PRN  ondansetron (ZOFRAN-ODT) disintegrating tablet 4 mg, Q8H PRN   Or  ondansetron (ZOFRAN) injection 4 mg, Q6H PRN  polyethylene glycol (GLYCOLAX) packet 17 g, Daily PRN  acetaminophen (TYLENOL) tablet 650 mg, Q6H PRN   Or  acetaminophen (TYLENOL) suppository 650 mg, Q6H PRN  heparin (porcine) injection 5,000 Units, 3 times per day  glucose chewable tablet 16 g, PRN  dextrose bolus 10% 125 mL, PRN   Or  dextrose bolus 10% 250 mL, PRN  glucagon (rDNA) injection 1 mg, PRN  dextrose 10 % infusion, Continuous PRN            Physical exam:     Vitals:  /73   Pulse 87   Temp 98.2 °F (36.8 °C) (Axillary)   Resp 22   Ht 5' 10\" (1.778 m)   Wt 208 lb 8.9 oz (94.6 kg)   SpO2 98%   BMI 29.92 kg/m²   Constitutional:  OAA X3 NAD  Skin: no rash, turgor wnl  Heent:  eomi, mmm  Neck: no bruits or jvd noted  Cardiovascular:  S1, S2 without m/r/g  Respiratory: CTA B without w/r/r  Abdomen:  +bs, soft, TTP   Ext: + lower extremity edema  Psychiatric: mood and affect appropriate  Musculoskeletal:  Rom, muscular strength intact    Data:   Labs:  CBC:   Recent Labs     09/06/22  1237 09/07/22 0421 09/08/22  0419   WBC 14.8* 14.0* 10.4   HGB 10.0* 9.0* 9.3*    256 251       BMP:    Recent Labs     09/06/22  1237 09/07/22  0421 09/08/22  0419    133* 134*   K 4.2 4.2 4.2   CL 92* 90* 91*   CO2 30 28 29   BUN 44* 55* 49*   CREATININE 7.1* 8.4* 8.1*   GLUCOSE 127* 333* 406*       Ca/Mg/Phos:   Recent Labs     09/06/22  1237 09/07/22  0421 09/08/22  0419   CALCIUM 9.3 8.5 8.1*   MG  --  2.20 2.00       Hepatic:   Recent Labs     09/06/22  1237   AST 54*   ALT 43*   BILITOT <0.2   ALKPHOS 92       Troponin:   Recent Labs     09/06/22  1237   TROPONINI 0.08*       BNP: No results for input(s): BNP in the last 72 hours. Lipids: No results for input(s): CHOL, TRIG, HDL, LDLCALC, LABVLDL in the last 72 hours. ABGs:   Recent Labs     09/06/22  2220   PHART 7.355   PO2ART 94.8   [id number] 55.0*       INR: No results for input(s): INR in the last 72 hours. UA:No results for input(s): Tennille Jacqueline, GLUCOSEU, BILIRUBINUR, Karene Specter, BLOODU, PHUR, PROTEINU, UROBILINOGEN, NITRU, LEUKOCYTESUR, LABMICR, URINETYPE in the last 72 hours. Urine Microscopic: No results for input(s): LABCAST, BACTERIA, COMU, HYALCAST, WBCUA, RBCUA, EPIU in the last 72 hours. Urine Culture: No results for input(s): LABURIN in the last 72 hours. Urine Chemistry: No results for input(s): Fermin Casiano, PROTEINUR, NAUR in the last 72 hours. IMAGING:  XR CHEST (2 VW)   Final Result      Free air beneath the right hemidiaphragm. Bibasilar atelectasis. Critical result discussed with Dr. Romayne Arias at 1:00 PM 9/6/2022          Assessment/Plan   ESRD     2. HTN    3. Anemia    4. Acid- base/ Electrolyte imbalance     5.  Abd tenderness - r/o PD peritonitis     Plan - IV abx - likely PNA  - feels better   - PD fluid cell count - nl - no PD peritonitis   - Cx   - CCPD today   - monitor Labs  - EPO or anemia    - Constipation management   - PT eval                   Thank you for allowing us to participate in care of Justin Wasserman MD  Feel free to contact me   [facility] [address]  Office : [phone]  Fax :[phone]

## 2022-09-08 NOTE — FLOWSHEET NOTE
Disconnected from CCPD per protocol. Effluent: Clear  Total time: 10 hr 44 min   Total UF:  1177 ml. Total Volume:  35777 ml. Dwell time gained:  00 hr 46 min. Pt Tolerated procedure: Pt offers no c/o.     Cell count and differential sent to lab.       09/08/22 0730   Vitals   /72   Temp 98 °F (36.7 °C)   Heart Rate 79   Resp 20   Weight 208 lb 8.9 oz (94.6 kg)   Cycler   Ultrafiltration (UF) (mL) 1177 mL   Average Dwell Time (Hours:Minutes) 82   Lost Dwell Time (Hours:Minutes) 0046

## 2022-09-08 NOTE — PROGRESS NOTES
Progress Note    Admit Date: 9/6/2022  Day: 2  Diet: ADULT DIET; Regular; 3 carb choices (45 gm/meal); Low Fat/Low Chol/High Fiber/2 gm Na    CC: AMS     Interval history: Overnight, tolerated the BiPAP without any problems. BG was elevated to 349, and he received 10 units of lispro. BG still elevated to 422 at 0655 am and he got an additional 10 IU. Called his daughter who confirmed that at home he takes 60 IU lantus nightly and  32/30/30 novolog with meals. She states that his BG runs in the 200s at home and occasionally in the 400s. PD ongoing with 1177 total UF today and 362 mls yesterday. He is tolerating CCPD well. PD fluid studies wnl. HPI: This is a 69year old male with a past medical history of ESRD on PD, interstitial lung disease, diastolic HF (6/04 Echo with EF 50-55%), NSTEMI, CAD s/p CABG 2007, BJ, cough variant asthma, T2DM, HTN, HLD, hypothyroidism who presents with AMS. Per family for the last 10 days the patient has been somnolent and lethargic with decreased appetite. He is on home peritoneal dialysis for the past year usually 1 hour at noon and 10/2 hours overnight. O2 at home also noted to be <90% so family called EMS. In the ED pt was afebrile, mildly hypertensive, hemodynamically stable on 3L NC. BMP significant for BUN/Cr 44/7.1, lactate 1.5, Pro-BNP 1.3K, troponin .08, WBC 14.8, Hgb 10. VBG 7.28/71.3/35/33. 8. CXR with free air under R hemidiaphragm and bibasilar atelectasis. Currently, the patient is somnolent but interactive. He is able to speak a few words at a time and states he feels \"terrible\". He says his last PD session was last night. He also endorses cough for unspecified amount of time and constipation for 3-4 days. He used to wear CPAP/BIPAP at home but does not currently.      Medications:     Scheduled Meds:   insulin lispro  0-16 Units SubCUTAneous TID WC    insulin lispro  0-4 Units SubCUTAneous Nightly    vancomycin  1,500 mg IntraVENous Once    famotidine 20 mg Oral Daily    insulin glargine  60 Units SubCUTAneous Nightly    insulin lispro  20 Units SubCUTAneous TID     cefepime  1,000 mg IntraVENous Q24H    vancomycin (VANCOCIN) intermittent dosing (placeholder)   Other See Admin Instructions    epoetin daphney-epbx  2,000 Units SubCUTAneous Once per day on Mon Wed Fri    atorvastatin  80 mg Oral Nightly    levothyroxine  50 mcg Oral Daily    aspirin  81 mg Oral Daily    sodium chloride flush  5-40 mL IntraVENous 2 times per day    heparin (porcine)  5,000 Units SubCUTAneous 3 times per day     Continuous Infusions:   sodium chloride 20 mL/hr at 09/07/22 0922    dextrose       PRN Meds:sodium chloride flush, sodium chloride, ondansetron **OR** ondansetron, polyethylene glycol, acetaminophen **OR** acetaminophen, glucose, dextrose bolus **OR** dextrose bolus, glucagon (rDNA), dextrose    Objective:   Vitals:   T-max:  Patient Vitals for the past 8 hrs:   BP Temp Temp src Pulse Resp SpO2 Weight   09/08/22 0829 -- -- -- 87 -- -- --   09/08/22 0800 -- -- -- -- -- 98 % --   09/08/22 0743 124/73 98.2 °F (36.8 °C) Axillary 87 22 98 % --   09/08/22 0730 116/72 98 °F (36.7 °C) Axillary 79 20 -- 208 lb 8.9 oz (94.6 kg)   09/08/22 0504 -- -- -- -- -- -- 208 lb 8.9 oz (94.6 kg)   09/08/22 0404 117/63 97.7 °F (36.5 °C) Axillary 77 20 99 % --         Intake/Output Summary (Last 24 hours) at 9/8/2022 1019  Last data filed at 9/8/2022 0730  Gross per 24 hour   Intake 170 ml   Output 1177 ml   Net -1007 ml         Review of Systems   Constitutional:  Positive for appetite change and fatigue. Cardiovascular:  Negative for chest pain and leg swelling. Gastrointestinal:  Positive for abdominal distention. Negative for abdominal pain, nausea and vomiting. Psychiatric/Behavioral:  Negative for confusion. Physical Exam  Constitutional:       Appearance: Normal appearance. He is obese. HENT:      Head: Normocephalic and atraumatic.       Mouth/Throat:      Mouth: Mucous membranes are moist.   Eyes:      Pupils: Pupils are equal, round, and reactive to light. Cardiovascular:      Rate and Rhythm: Normal rate and regular rhythm. Pulses: Normal pulses. Heart sounds: Normal heart sounds. Pulmonary:      Effort: Pulmonary effort is normal.      Breath sounds: Normal breath sounds. Abdominal:      General: Abdomen is flat. There is distension. Palpations: Abdomen is soft. Tenderness: There is abdominal tenderness. Comments: PD catheter in place    Musculoskeletal:      Right lower leg: No edema. Left lower leg: No edema. Skin:     General: Skin is warm. Capillary Refill: Capillary refill takes less than 2 seconds. Neurological:      Mental Status: He is alert and oriented to person, place, and time. Psychiatric:         Mood and Affect: Mood normal.         Behavior: Behavior normal.         Thought Content: Thought content normal.         Judgment: Judgment normal.       LABS:    CBC:   Recent Labs     09/06/22  1237 09/07/22  0421 09/08/22  0419   WBC 14.8* 14.0* 10.4   HGB 10.0* 9.0* 9.3*   HCT 31.0* 26.2* 27.4*    256 251   .9* 113.2* 112.1*       Renal:    Recent Labs     09/06/22  1237 09/07/22  0421 09/08/22  0419    133* 134*   K 4.2 4.2 4.2   CL 92* 90* 91*   CO2 30 28 29   BUN 44* 55* 49*   CREATININE 7.1* 8.4* 8.1*   GLUCOSE 127* 333* 406*   CALCIUM 9.3 8.5 8.1*   MG  --  2.20 2.00   ANIONGAP 14 15 14       Hepatic:   Recent Labs     09/06/22  1237   AST 54*   ALT 43*   BILITOT <0.2   PROT 6.7   LABALBU 3.3*   ALKPHOS 92       Troponin:   Recent Labs     09/06/22  1237   TROPONINI 0.08*       BNP: No results for input(s): BNP in the last 72 hours. Lipids: No results for input(s): CHOL, HDL in the last 72 hours.     Invalid input(s): LDLCALCU, TRIGLYCERIDE  ABGs:    Recent Labs     09/06/22  1822 09/06/22  2220   PHART 7.263* 7.355   [id number] 68.6* 55.0*   PO2ART 109.0* 94.8   WRX0IFI 31* 31*   BEART 2.7 4.1* [id number] 98 97   FOC0AFY 33 32         INR: No results for input(s): INR in the last 72 hours. Lactate: No results for input(s): LACTATE in the last 72 hours. Cultures:  -----------------------------------------------------------------  RAD:   XR CHEST (2 VW)   Final Result      Free air beneath the right hemidiaphragm. Bibasilar atelectasis. Critical result discussed with Dr. Temo Hernandez at 1:00 PM 9/6/2022          Assessment/Plan:   Tiarra Gifford is a 69 y.o. male with medical history of ESRD on PD, ILD, BJ who presented with AMS and fatigue. Acute hypoxic hypercapnic respiratory failure   -O2 saturation in less than 90 prior to presentation and 86 in the ER.   -VBG on admission 7.28/71.3/35/33.8  -ABG 09/06 7.355/55/94.8/31  -CXR 9/6 with free air under diaphragm and bibasilar atelectasis   -Follows Dr Darroin Jacobson for EDWARDS  -On 2L O2 and sating at 96%, wean as tolerated   -continue to monitor vitals     Acute metabolic encephalopathy   -likely 2/2 CO2 narcosis 2/2 Bipap non-compliance. - He does not use home BIPAP, reports he's unable to tolerate it. - Ammonia level - wnl , serum ethanol - none detected   -VBG on admission 7.28/71.3/35/33.8  -ABG 09/06 7.355/55/94.8/31  -On 2L O2 and sating at 98%  -CT IV abx    Diastolic CHF  -last echo 1/07: EF 20-25%, LVH, nl diastolic function, elevated pulmonary artery systolic pressure 36 mmHg.  -Follows Dr Cruz Found outpatient   Pro-BNP 9/6: 1308, Tn 0.08 (hemolyzed sample)  -monitor     ESRD on PD   PD peritonitis  -pt denies abdominal pain but abdomen is tender on palpation. Denies nausea or vomiting. Has remained afebrile, mildly hypertensive. pt last PD was 09/05 night.   -Ct continuous cyclic peritoneal dialysis (CCPD) per nephrology   -Peritoneal fluid analysis ( gram stain, cell count w/differential )  are wnl.  PD fluid culture pending.  -blood cultures pending   -Ct retacrit 2,000 units MWF  -Ct IV vancomycin and cefepime, day #2 for broad spectrum coverage pending PD fluid culture result.   -For PT/OT eval per nephro    HTN  -holding home amlodipine     T2DM, uncontrolled   -On lantus 60 units nightly, insulin lispro 20 units tid with meals  -SSI while in patient   -POC glucose   -hypoglycemic protocol     Hypothyroidism   -ct  synthroid 50 mcg    CAD s/p CABG 2007  -stable on 04/22 angiogram with no intervention.  -ct aspirin   -ct Lipitor     Code Status: Full code   FEN: Adult Regular diet   PPX: Heparin   DISPO: Chalino Stovall MD, PGY-1  09/08/22  10:19 AM    This patient has been staffed and discussed with Loyd Lynch MD.     Addendum to Resident H& P/Progress note:  I have personally seen,examined and evaluated the patient. I have reviewed the current history, physical findings, labs and assessment and plan and agree with note as documented by resident MD ( )    Accucheck Glucoses:   Recent Labs     09/07/22  1828 09/07/22  2130 09/08/22  0655 09/08/22  0837 09/08/22  1231   POCGLU 400* 349* 422* 406* 433*      Insulin regimen was adjusted.   Sepsis was ruled out    Loyd Lynch MD, [facility]

## 2022-09-08 NOTE — CARE COORDINATION
CM continues to follow for DC planning and needs. Patient continues to wean on O2, nephro following and PD per nephro orders. PT/OT evals noted, awaiting evals for further assistance with DC planning. Family has SNF list for review, patient will need pre-cert for SNF admission.     Thank you,  Daylin Garcia RN,   4th Floor Progressive Care Unit  [phone]

## 2022-09-08 NOTE — PROGRESS NOTES
Notification of IV to PO Conversion     IV To Po Conversion:   Will convert famotidine 20mg IV daily to 20mg PO daily based on [facility] IV to PO policy (see below). Please call with questions.   Reji LeoneD, BCPS  Wireless: [id number]   9/8/2022 7:54 AM      Criteria for conversion from IV to PO therapy per [facility] IV to PO Protocol:   Patients should meet all of the following inclusion criteria and none of the exclusion criteria    Criteria to initiate medication route switch (Inclusion Criteria)  IV therapy for > 24 hours (antibiotics only)  Tolerating diet more advanced than clear liquids  Tolerating oral (PO) medications  Does not require vasopressor therapy for blood pressure support  No seizures for 72hrs (antiepileptic medications only)      Criteria indicating that the patient is NOT a candidate for IV to PO conversion (Exclusion Criteria)  Infections requiring IV therapy (ie: meningitis, endocarditis, osteomyelitis, pancreatitis)  Nausea and/or vomiting or severe diarrhea within past 24 hours  Has gastrectomy, ileus, gastric outlet or bowel obstruction, or malabsorption syndromes  Has significant, painful oral ulceration  TPN with an NPO order  Active GI bleed  Unable to swallow  Nothing by Mouth (NPO) status  Febrile in the last 24 hours- (antibiotics only)  Clinical deteriorating or unstable - (antibiotics only)  Pediatric patients and patients who are not euthyroid (not on oral levothyroxine/not stabilized on oral levothyroxine) - (Levothyroxine only)  Patients being treated for myxedema coma or during the organ donation process - (Levothyroxine only)    Other notes-   Patients may be given suppositories when available for the product being ordered if no contraindications exist (ie: rectal surgery or infection)   Oral solutions/suspensions may be considered for patients with a functioning enteral tube not on continuous suction (if medication is available in this formulation)

## 2022-09-08 NOTE — PROGRESS NOTES
Clinical Pharmacy Progress Note    Vancomycin - Management by Pharmacy    Consult Date(s): 9/7/22  Consulting Provider(s): Dr. Mile Granda / Leonardo Pineda      ? Peritonitis - Vancomycin  Concurrent Antimicrobials:   Cefepime - Day #2  Day of Vanc Therapy / Ordered Duration: #2  Current Dosing Method: Intermittent Dosing by Levels  Therapeutic Goal: ~ 15 mg/L  Current Dose / Frequency / Plan / Rationale:   Given ESRD on PD, will dose vancomycin based on intermittent levels for now. Have entered placeholder onto [facility] Received total of 1750mg IV x1 yesterday. Level today = 16.7 mg/L. Will give 1500mg IV x1 today. Random level ordered for tomorrow, Fri 9/9 to assess timing of next dose. Will continue to monitor clinical condition and make adjustments to regimen as appropriate. Please call with questions--  Meli Faraz, PharmD, BCPS  Wireless: [phone]   9/8/2022 7:47 AM        Interval update: WBC trending down (14-->10.4). Subjective/Objective: Mr. Darwin Evans is a 69 y.o. male with a PMHx significant for ESRD on PD, ILD, HFpEF, NSTEMI, CAD, DM2, HTN, hypothyroidism who presented to ED with AMS. Admitted with acute encephalopathy, respiratory failure. Pharmacy has been consulted to dose Vancomycin.     Ht Readings from Last 1 Encounters:   09/06/22 5' 10\" (1.778 m)     Wt Readings from Last 1 Encounters:   09/08/22 208 lb 8.9 oz (94.6 kg)     Current & Prior Antimicrobial Regimen(s):  Cefepime (9/7-current)  Vancomycin - Pharmacy to dose  Intermittent dosing based on levels (9/7-current)    Date Dose Vanc Level   9/7 1250mg IV + 500mg IV    9/8  16.7 mg/L            Cultures & Sensitivities:  Date Site Micro Susceptibility / Result   9/7 PD fluid  No organisms seen    9/6 Blood  NGTD x 2      Labs / Ancillary Data:  Recent Labs     09/06/22  1237 09/07/22  0421 09/08/22  0419   CREATININE 7.1* 8.4* 8.1*   BUN 44* 55* 49*   WBC 14.8* 14.0* 10.4         Additional Lab Values / Findings of Note:    Procalcitonin: No results for input(s): PROCAL in the last 72 hours.

## 2022-09-08 NOTE — DISCHARGE INSTR - COC
Continuity of Care Form    Patient Name: Sulema Nash   :  1945  MRN:  [id number]    Admit date:  2022  Discharge date:  ***    Code Status Order: Full Code   Advance Directives:     Admitting Physician:  Cynthia Phalen, MD  PCP: Luci Raygoza MD    Discharging Nurse: Rebecca Tineo RN  [address] Unit/Room#: 6936/9128-82  Discharging Unit Phone Number: [phone]      Emergency Contact:   Extended Emergency Contact Information  Primary Emergency Contact: Rafaela Dumnot   [address] Phone: [phone]  Relation: Spouse  Secondary Emergency Contact: Enrike Stuart Phone: [phone]  Relation: Child  Preferred language: English   needed?  No    Past Surgical History:  Past Surgical History:   Procedure Laterality Date    CARPAL TUNNEL RELEASE      s/p    CATARACT REMOVAL      CORONARY ARTERY BYPASS GRAFT      JOINT REPLACEMENT      LAPAROSCOPY INSERTION PERITONEAL CATHETER N/A 10/26/2020    LAPAROSCOPIC PERITONEAL DIALYSIS CATHETER PLACEMENT; LAPAROSCOPIC OMENTOPEXY performed by Faraz Barkley DO at [address] OR       Immunization History:   Immunization History   Administered Date(s) Administered    COVID-19, PFIZER PURPLE top, DILUTE for use, (age 15 y+), 30mcg/0.3mL 2021, 2021, 2021    Influenza Vaccine, unspecified formulation 10/16/2014, 10/19/2015, 10/21/2016    Influenza Virus Vaccine 10/16/2014, 10/19/2015, 10/21/2016    Influenza, FLUAD, (age 72 y+), Adjuvanted, 0.5mL 2020    Influenza, High Dose (Fluzone 65 yrs and older) 10/16/2014, 10/19/2015, 10/21/2016, 2017, 2018    Influenza, Triv, inactivated, subunit, adjuvanted, IM (Fluad 65 yrs and older) 2019    PPD Test 2016    Pneumococcal Conjugate 13-valent ([id number]) 10/19/2015    Pneumococcal Polysaccharide ([id number]) 10/16/2014    Tdap (Boostrix, Adacel) 2016       Active Problems:  Patient Active Problem List   Diagnosis Code    Essential hypertension I10    Generalized osteoarthrosis, involving multiple sites M15.9    Arthritis with psoriasis ([facility]) L40.50    Sleep apnea G47.30    Psoriatic arthropathy ([facility]) L40.50    Allergic rhinitis due to other allergen J30.89    Coronary atherosclerosis I25.10    Vitamin D deficiency E55.9    Glaucoma H40.9    Nephropathy N28.9    S/P CABG x 4 Z95.1    ILD (interstitial lung disease) ([facility]) J84.9    Acute respiratory failure with hypoxia and hypercapnia ([facility]) J96.01, J96.02    Chronic diastolic heart failure ([facility]) I50.32    Elevated troponin R77.8    Coronary artery disease involving native coronary artery of native heart with unstable angina pectoris ([facility]) I25.110    BJ (obstructive sleep apnea) G47.33    Cough variant asthma J45.991    NSTEMI (non-ST elevated myocardial infarction) ([facility]) I21.4    Diabetes mellitus type 2 in obese ([facility]) E11.69, E66.9    Chronic fatigue R53.82    Syncope and collapse R55    Type 2 diabetes mellitus with hyperglycemia, with long-term current use of insulin ([facility]) E11.65, Z79.4    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 ([facility]) E11.22, N18.5    Acquired hypothyroidism E03.9    Unstable angina ([facility]) I20.0    Orthostatic hypotension I95.1    Dyspnea on exertion R06.00    Left lower quadrant abdominal pain R10.32    ESRD on peritoneal dialysis ([facility] [address] N18.6, Z99.2    AMS (altered mental status) R41.82    Acute respiratory failure with hypoxia ([facility]) M52.25    Acute metabolic encephalopathy W79.00    Hypercarbia R06.89    Electrolyte imbalance E87.8    Generalized abdominal tenderness R10.817    Chronic hypercapnic respiratory failure ([facility]) J96.12    BJ treated with BiPAP G47.33    Drug toxicity R89.2    Physical deconditioning R53.81    Moderate episode of recurrent major depressive disorder ([facility]) F33.1       Isolation/Infection:   Isolation            No Isolation          Patient Infection Status       Infection Onset Added Last Indicated Last Indicated By Review Planned Expiration Resolved Resolved By    None active    Resolved    COVID-19 20 COVID-19   21             Nurse Assessment:  Last Vital Signs: /65   Pulse 81   Temp 97.6 °F (36.4 °C) (Axillary)   Resp 20   Ht 5' 10\" (1.778 m)   Wt 222 lb 10.6 oz (101 kg)   SpO2 97%   BMI 31.95 kg/m²     Last documented pain score (0-10 scale): Pain Level: 0  Last Weight:   Wt Readings from Last 1 Encounters:   22 222 lb 10.6 oz (101 kg)     Mental Status:  oriented and alert    IV Access:  - None    Nursing Mobility/ADLs:  Walking   Dependent  Transfer  Dependent  Bathing  Dependent  Dressing  Dependent  Toileting  Dependent  Feeding  Assisted  Med Admin  Assisted  Med Delivery   whole    Wound Care Documentation and Therapy:  Incision 10/26/20 Abdomen Medial (Active)   Number of days: 693        Elimination:  Continence: Bowel: No  Bladder: No  Urinary Catheter: None   Colostomy/Ileostomy/Ileal Conduit: No       Date of Last BM: 22    Intake/Output Summary (Last 24 hours) at 2022 1256  Last data filed at 2022 1208  Gross per 24 hour   Intake 420 ml   Output 1488 ml   Net -1068 ml     I/O last 3 completed shifts: In: 56 [P.O.:370]  Out: -12     Safety Concerns:     None    Impairments/Disabilities:      None    Nutrition Therapy:  Current Nutrition Therapy:   - Oral Diet:  General    Routes of Feeding: Oral  Liquids: Thin Liquids  Daily Fluid Restriction: no  Last Modified Barium Swallow with Video (Video Swallowing Test): not done    Treatments at the Time of Hospital Discharge:   Respiratory Treatments: ***  Oxygen Therapy:  is on oxygen at 1 L/min per nasal cannula. Ventilator:    - BiPAP   IPAP: 15 cmH20, CPAP/EPAP: 5 cmH2O only when sleeping    Heart Failure Instructions for Daily Management  Patient was treated for chronic diastolic heart failure. he  will require the following:    Please weigh daily on the same scale and approximately the same time of day.   Report weight gain of 3 pounds/day or 5 pounds/week to : blank MOLINA, Yola Alvarez [phone], and [address] [phone]. Please use hospital discharge weight as baseline reference. Please monitor for signs and symptoms of and report to MD:  Worsening Heart Failure: sudden weight gain, shortness of breath, lower extremity or general edema/swelling, abdominal bloating/swelling, inability to lie flat, intolerance to usual activity, or cough (especially at night). Report these finding even if no increase in weight. Dehydration:  having difficulty or a decrease in urination, dizziness, worsening fatigue, or new onset/worsening of generalized weakness. Please continue a LOW SODIUM diet and LIMIT fluid intake to 48 - 64 ounces ( 1.5 - 2 liters) per day. Call Yola Alvarez [phone], blank MOLINA, and [address] [phone] with any questions or concerns. Please continue heart failure education to patient and family/support system. See After Visit Summary for hospital follow up appointment details. Consider spiritual care referral for support and/or completion of advance directives . Consider: having the [facility] MD complete required 7 day follow up, [place] [id number] telehealth program if patient agreeable and able to participate, and palliative care consult for ongoing goals of care, end of life, and/or chronic disease management discussions. Patient's primary cardiologist is Dr Dolores Rodriguez.          Rehab Therapies: Physical Therapy  Weight Bearing Status/Restrictions: No weight bearing restrictions  Other Medical Equipment (for information only, NOT a DME order):  walker  Other Treatments: ***    Patient's personal belongings (please select all that are sent with patient):  Kassie    RN SIGNATURE:  Electronically signed by Morena Voss RN on 9/19/22 at 2:36 PM EDT    CASE MANAGEMENT/SOCIAL WORK SECTION    Inpatient Status Date: ***    Readmission Risk Assessment Score:  Readmission Risk              Risk of Unplanned Readmission:  26           Discharging to Facility/ Agency   Name:   Address:  Phone:  Fax:    Dialysis Facility (if applicable)   Name:  Address:  Dialysis Schedule:  Phone:  Fax:    / signature: {Esignature:[id number]}    PHYSICIAN SECTION    Prognosis: Good    Condition at Discharge: Stable    Rehab Potential (if transferring to Rehab): Good    Recommended Labs or Other Treatments After Discharge: 3-5 sessions of physical and occupational therapy    Physician Certification: I certify the above information and transfer of Aron Fallon  is necessary for the continuing treatment of the diagnosis listed and that he requires [facility] for greater 30 days.      Update Admission H&P: No change in H&P    PHYSICIAN SIGNATURE:  Electronically signed by Bob Mcmahon MD and Bette Rashid MD on 9/19/22 at 12:56 PM EDT

## 2022-09-08 NOTE — PLAN OF CARE
Problem: Discharge Planning  Goal: Discharge to home or other facility with appropriate resources  Outcome: Progressing     Problem: Safety - Adult  Goal: Free from fall injury  9/8/2022 0016 by Antonia Rock RN  Outcome: Progressing  9/7/2022 1231 by Merry Deluca  Outcome: Progressing  Flowsheets (Taken 9/7/2022 0726 by Kandi Menjivar RN)  Free From Fall Injury: Instruct family/caregiver on patient safety  Note: Patient free from falls/ injury during duration of shift. Bed alarm on, call light w/in reach, bed in lowest position, non-skid socks on and fall sign posted outside door. Problem: ABCDS Injury Assessment  Goal: Absence of physical injury  Outcome: Progressing     Problem: Skin/Tissue Integrity  Goal: Absence of new skin breakdown  Description: 1. Monitor for areas of redness and/or skin breakdown  2. Assess vascular access sites hourly  3. Every 4-6 hours minimum:  Change oxygen saturation probe site  4. Every 4-6 hours:  If on nasal continuous positive airway pressure, respiratory therapy assess nares and determine need for appliance change or resting period. 9/8/2022 0016 by Antonia Rock RN  Outcome: Progressing  9/7/2022 1231 by Merry Deluca  Outcome: Progressing  Note: Skin CDI, turning pt q2 hrs, scattered bruising/ scabs. No evidence of skin breakdown.       Problem: Chronic Conditions and Co-morbidities  Goal: Patient's chronic conditions and co-morbidity symptoms are monitored and maintained or improved  Outcome: Progressing     Problem: Cardiovascular - Adult  Goal: Maintains optimal cardiac output and hemodynamic stability  Outcome: Progressing  Goal: Absence of cardiac dysrhythmias or at baseline  Outcome: Progressing     Problem: Respiratory - Adult  Goal: Achieves optimal ventilation and oxygenation  Outcome: Progressing     Problem: Metabolic/Fluid and Electrolytes - Adult  Goal: Electrolytes maintained within normal limits  Outcome: Progressing  Goal: Hemodynamic stability and optimal renal function maintained  Outcome: Progressing   Patient education on safety precautions and skin integrity, verbalizes understanding of use of call light for all assistance and turns. Fall precautions in place patient resting comfortably in bed with call light next to them.

## 2022-09-09 LAB
ANION GAP SERPL CALCULATED.3IONS-SCNC: 15 MMOL/L (ref 3–16)
APPEARANCE FLUID: CLEAR
BASE EXCESS ARTERIAL: 0.8 MMOL/L (ref -3–3)
BASOPHILS ABSOLUTE: 0.1 K/UL (ref 0–0.2)
BASOPHILS RELATIVE PERCENT: 0.9 %
BUN BLDV-MCNC: 47 MG/DL (ref 7–20)
CALCIUM SERPL-MCNC: 8.2 MG/DL (ref 8.3–10.6)
CARBOXYHEMOGLOBIN ARTERIAL: 0.7 % (ref 0–1.5)
CELL COUNT FLUID TYPE: NORMAL
CHLORIDE BLD-SCNC: 87 MMOL/L (ref 99–110)
CLOT EVALUATION: NORMAL
CO2: 28 MMOL/L (ref 21–32)
COLOR FLUID: COLORLESS
CREAT SERPL-MCNC: 7.2 MG/DL (ref 0.8–1.3)
EOSINOPHILS ABSOLUTE: 0.6 K/UL (ref 0–0.6)
EOSINOPHILS RELATIVE PERCENT: 5.1 %
GFR AFRICAN AMERICAN: 9
GFR NON-AFRICAN AMERICAN: 7
GLUCOSE BLD-MCNC: 151 MG/DL (ref 70–99)
GLUCOSE BLD-MCNC: 154 MG/DL (ref 70–99)
GLUCOSE BLD-MCNC: 202 MG/DL (ref 70–99)
GLUCOSE BLD-MCNC: 219 MG/DL (ref 70–99)
GLUCOSE BLD-MCNC: 241 MG/DL (ref 70–99)
GLUCOSE BLD-MCNC: 303 MG/DL (ref 70–99)
GLUCOSE BLD-MCNC: 350 MG/DL (ref 70–99)
GLUCOSE BLD-MCNC: 371 MG/DL (ref 70–99)
GLUCOSE BLD-MCNC: 417 MG/DL (ref 70–99)
HCO3 ARTERIAL: 29 MMOL/L (ref 21–29)
HCT VFR BLD CALC: 26.6 % (ref 40.5–52.5)
HEMOGLOBIN, ART, EXTENDED: 13.1 G/DL
HEMOGLOBIN: 9.3 G/DL (ref 13.5–17.5)
LYMPHOCYTES ABSOLUTE: 0.9 K/UL (ref 1–5.1)
LYMPHOCYTES RELATIVE PERCENT: 7.9 %
LYMPHOCYTES, BODY FLUID: 3 %
MACROPHAGE FLUID: 44 %
MAGNESIUM: 2.1 MG/DL (ref 1.8–2.4)
MCH RBC QN AUTO: 38.8 PG (ref 26–34)
MCHC RBC AUTO-ENTMCNC: 35.2 G/DL (ref 31–36)
MCV RBC AUTO: 110.1 FL (ref 80–100)
MESOTHELIAL FLUID: 1 %
METHEMOGLOBIN ARTERIAL: 0.7 % (ref 0–1.4)
MONOCYTES ABSOLUTE: 0.6 K/UL (ref 0–1.3)
MONOCYTES RELATIVE PERCENT: 5 %
NEUTROPHIL, FLUID: 52 %
NEUTROPHILS ABSOLUTE: 9.5 K/UL (ref 1.7–7.7)
NEUTROPHILS RELATIVE PERCENT: 81.1 %
NUCLEATED CELLS FLUID: 48 /CUMM
O2 SAT, ARTERIAL: 64 % (ref 93–100)
PCO2 ARTERIAL: 61.1 MMHG (ref 35–45)
PDW BLD-RTO: 15.1 % (ref 12.4–15.4)
PERFORMED ON: ABNORMAL
PH ARTERIAL: 7.28 (ref 7.35–7.45)
PLATELET # BLD: 236 K/UL (ref 135–450)
PMV BLD AUTO: 7.7 FL (ref 5–10.5)
PO2 ARTERIAL: 35.6 MMHG (ref 75–108)
POTASSIUM SERPL-SCNC: 3.7 MMOL/L (ref 3.5–5.1)
RBC # BLD: 2.41 M/UL (ref 4.2–5.9)
RBC FLUID: <1000 /CUMM
SODIUM BLD-SCNC: 130 MMOL/L (ref 136–145)
TCO2 ARTERIAL: 31 MMOL/L
VANCOMYCIN RANDOM: 31 UG/ML
WBC # BLD: 11.8 K/UL (ref 4–11)

## 2022-09-09 PROCEDURE — 6360000002 HC RX W HCPCS: Performed by: INTERNAL MEDICINE

## 2022-09-09 PROCEDURE — 82803 BLOOD GASES ANY COMBINATION: CPT

## 2022-09-09 PROCEDURE — 36600 WITHDRAWAL OF ARTERIAL BLOOD: CPT

## 2022-09-09 PROCEDURE — 94660 CPAP INITIATION&MGMT: CPT

## 2022-09-09 PROCEDURE — 90945 DIALYSIS ONE EVALUATION: CPT

## 2022-09-09 PROCEDURE — 6370000000 HC RX 637 (ALT 250 FOR IP): Performed by: STUDENT IN AN ORGANIZED HEALTH CARE EDUCATION/TRAINING PROGRAM

## 2022-09-09 PROCEDURE — 6370000000 HC RX 637 (ALT 250 FOR IP)

## 2022-09-09 PROCEDURE — 2580000003 HC RX 258: Performed by: INTERNAL MEDICINE

## 2022-09-09 PROCEDURE — 2700000000 HC OXYGEN THERAPY PER DAY

## 2022-09-09 PROCEDURE — 87205 SMEAR GRAM STAIN: CPT

## 2022-09-09 PROCEDURE — 89050 BODY FLUID CELL COUNT: CPT

## 2022-09-09 PROCEDURE — 6360000002 HC RX W HCPCS: Performed by: STUDENT IN AN ORGANIZED HEALTH CARE EDUCATION/TRAINING PROGRAM

## 2022-09-09 PROCEDURE — 6370000000 HC RX 637 (ALT 250 FOR IP): Performed by: INTERNAL MEDICINE

## 2022-09-09 PROCEDURE — 99233 SBSQ HOSP IP/OBS HIGH 50: CPT | Performed by: INTERNAL MEDICINE

## 2022-09-09 PROCEDURE — 85025 COMPLETE CBC W/AUTO DIFF WBC: CPT

## 2022-09-09 PROCEDURE — 36415 COLL VENOUS BLD VENIPUNCTURE: CPT

## 2022-09-09 PROCEDURE — 87070 CULTURE OTHR SPECIMN AEROBIC: CPT

## 2022-09-09 PROCEDURE — 80048 BASIC METABOLIC PNL TOTAL CA: CPT

## 2022-09-09 PROCEDURE — 2580000003 HC RX 258: Performed by: STUDENT IN AN ORGANIZED HEALTH CARE EDUCATION/TRAINING PROGRAM

## 2022-09-09 PROCEDURE — 2060000000 HC ICU INTERMEDIATE R&B

## 2022-09-09 PROCEDURE — 99232 SBSQ HOSP IP/OBS MODERATE 35: CPT | Performed by: HOSPITALIST

## 2022-09-09 PROCEDURE — 80202 ASSAY OF VANCOMYCIN: CPT

## 2022-09-09 PROCEDURE — 83735 ASSAY OF MAGNESIUM: CPT

## 2022-09-09 PROCEDURE — 94761 N-INVAS EAR/PLS OXIMETRY MLT: CPT

## 2022-09-09 RX ORDER — LEVOFLOXACIN 5 MG/ML
250 INJECTION, SOLUTION INTRAVENOUS EVERY 24 HOURS
Status: DISCONTINUED | OUTPATIENT
Start: 2022-09-09 | End: 2022-09-11

## 2022-09-09 RX ORDER — INSULIN LISPRO 100 [IU]/ML
20 INJECTION, SOLUTION INTRAVENOUS; SUBCUTANEOUS ONCE
Status: COMPLETED | OUTPATIENT
Start: 2022-09-09 | End: 2022-09-09

## 2022-09-09 RX ORDER — INSULIN LISPRO 100 [IU]/ML
25 INJECTION, SOLUTION INTRAVENOUS; SUBCUTANEOUS
Status: DISCONTINUED | OUTPATIENT
Start: 2022-09-09 | End: 2022-09-12

## 2022-09-09 RX ADMIN — ASPIRIN 81 MG 81 MG: 81 TABLET ORAL at 09:34

## 2022-09-09 RX ADMIN — POLYETHYLENE GLYCOL (3350) 17 G: 17 POWDER, FOR SOLUTION ORAL at 21:39

## 2022-09-09 RX ADMIN — INSULIN LISPRO 20 UNITS: 100 INJECTION, SOLUTION INTRAVENOUS; SUBCUTANEOUS at 01:13

## 2022-09-09 RX ADMIN — SODIUM CHLORIDE, PRESERVATIVE FREE 10 ML: 5 INJECTION INTRAVENOUS at 21:39

## 2022-09-09 RX ADMIN — CEFEPIME 1000 MG: 1 INJECTION, POWDER, FOR SOLUTION INTRAMUSCULAR; INTRAVENOUS at 00:34

## 2022-09-09 RX ADMIN — HEPARIN SODIUM 5000 UNITS: 5000 INJECTION INTRAVENOUS; SUBCUTANEOUS at 21:55

## 2022-09-09 RX ADMIN — EPOETIN ALFA-EPBX 2000 UNITS: 2000 INJECTION, SOLUTION INTRAVENOUS; SUBCUTANEOUS at 09:35

## 2022-09-09 RX ADMIN — BISACODYL 10 MG: 5 TABLET, COATED ORAL at 11:21

## 2022-09-09 RX ADMIN — INSULIN LISPRO 4 UNITS: 100 INJECTION, SOLUTION INTRAVENOUS; SUBCUTANEOUS at 10:43

## 2022-09-09 RX ADMIN — INSULIN LISPRO 25 UNITS: 100 INJECTION, SOLUTION INTRAVENOUS; SUBCUTANEOUS at 10:44

## 2022-09-09 RX ADMIN — SODIUM CHLORIDE, PRESERVATIVE FREE 10 ML: 5 INJECTION INTRAVENOUS at 09:36

## 2022-09-09 RX ADMIN — POLYETHYLENE GLYCOL (3350) 17 G: 17 POWDER, FOR SOLUTION ORAL at 09:34

## 2022-09-09 RX ADMIN — FAMOTIDINE 20 MG: 20 TABLET ORAL at 09:34

## 2022-09-09 RX ADMIN — HEPARIN SODIUM 5000 UNITS: 5000 INJECTION INTRAVENOUS; SUBCUTANEOUS at 16:29

## 2022-09-09 RX ADMIN — LEVOTHYROXINE SODIUM 50 MCG: 50 TABLET ORAL at 09:34

## 2022-09-09 RX ADMIN — LEVOFLOXACIN 250 MG: 5 INJECTION, SOLUTION INTRAVENOUS at 16:28

## 2022-09-09 RX ADMIN — HEPARIN SODIUM 5000 UNITS: 5000 INJECTION INTRAVENOUS; SUBCUTANEOUS at 06:02

## 2022-09-09 RX ADMIN — ATORVASTATIN CALCIUM 80 MG: 40 TABLET, FILM COATED ORAL at 21:39

## 2022-09-09 ASSESSMENT — PAIN SCALES - GENERAL
PAINLEVEL_OUTOF10: 0

## 2022-09-09 ASSESSMENT — ENCOUNTER SYMPTOMS
ABDOMINAL PAIN: 0
COUGH: 1
COLOR CHANGE: 0
SHORTNESS OF BREATH: 0
ABDOMINAL DISTENTION: 1
VOMITING: 0
NAUSEA: 0

## 2022-09-09 NOTE — PROGRESS NOTES
Progress Note    Admit Date: 9/6/2022  Day: 3  Diet: ADULT DIET; Regular; 3 carb choices (45 gm/meal); Low Fat/Low Chol/High Fiber/2 gm Na  ADULT ORAL NUTRITION SUPPLEMENT; Breakfast, Lunch, Dinner; Renal Oral Supplement    CC: AMS     Interval history: Overnight, pt refused BiPAP. He appears more lethargic this morning. He has occasional jerking of both upper limbs. Tolerating oral feeds. BG continues to be elevated above 400s overnight. Down to the 200s this morning. Pharmacy was contacted by night team to adjust all drips to normal saline and drips compatible to NS were adjusted. Had CCPD session overnight with total UF of 1705 mls which he tolerated. HPI: This is a 69year old male with a past medical history of ESRD on PD, interstitial lung disease, diastolic HF (3/05 Echo with EF 50-55%), NSTEMI, CAD s/p CABG 2007, BJ, cough variant asthma, T2DM, HTN, HLD, hypothyroidism who presents with AMS. Per family for the last 10 days the patient has been somnolent and lethargic with decreased appetite. He is on home peritoneal dialysis for the past year usually 1 hour at noon and 10/2 hours overnight. O2 at home also noted to be <90% so family called EMS. In the ED pt was afebrile, mildly hypertensive, hemodynamically stable on 3L NC. BMP significant for BUN/Cr 44/7.1, lactate 1.5, Pro-BNP 1.3K, troponin .08, WBC 14.8, Hgb 10. VBG 7.28/71.3/35/33. 8. CXR with free air under R hemidiaphragm and bibasilar atelectasis. Currently, the patient is somnolent but interactive. He is able to speak a few words at a time and states he feels \"terrible\". He says his last PD session was last night. He also endorses cough for unspecified amount of time and constipation for 3-4 days. He used to wear CPAP/BIPAP at home but does not currently.      Medications:     Scheduled Meds:   insulin lispro  0-16 Units SubCUTAneous TID WC    insulin lispro  0-4 Units SubCUTAneous Nightly    famotidine  20 mg Oral Daily    insulin glargine  60 Units SubCUTAneous Nightly    insulin lispro  20 Units SubCUTAneous TID WC    polyethylene glycol  17 g Oral BID    cefepime  1,000 mg IntraVENous Q24H    vancomycin (VANCOCIN) intermittent dosing (placeholder)   Other See Admin Instructions    epoetin daphney-epbx  2,000 Units SubCUTAneous Once per day on Mon Wed Fri    atorvastatin  80 mg Oral Nightly    levothyroxine  50 mcg Oral Daily    aspirin  81 mg Oral Daily    sodium chloride flush  5-40 mL IntraVENous 2 times per day    heparin (porcine)  5,000 Units SubCUTAneous 3 times per day     Continuous Infusions:   sodium chloride 20 mL/hr at 09/07/22 0922    dextrose       PRN Meds:sodium chloride flush, sodium chloride, ondansetron **OR** ondansetron, polyethylene glycol, acetaminophen **OR** acetaminophen, glucose, dextrose bolus **OR** dextrose bolus, glucagon (rDNA), dextrose    Objective:   Vitals:   T-max:  Patient Vitals for the past 8 hrs:   BP Temp Temp src Pulse Resp SpO2   09/09/22 0234 136/68 98.5 °F (36.9 °C) Oral 90 17 98 %   09/09/22 0007 -- -- -- -- -- 97 %   09/08/22 2337 132/65 97.7 °F (36.5 °C) Oral 95 20 97 %   09/08/22 2125 (!) 173/69 98.1 °F (36.7 °C) Oral 88 14 98 %         Intake/Output Summary (Last 24 hours) at 9/9/2022 0518  Last data filed at 9/8/2022 1000  Gross per 24 hour   Intake 290 ml   Output 1177 ml   Net -887 ml         Review of Systems   Constitutional:  Positive for appetite change and fatigue. Respiratory:  Positive for cough. Negative for shortness of breath. Cardiovascular:  Negative for chest pain and leg swelling. Gastrointestinal:  Positive for abdominal distention. Negative for abdominal pain, nausea and vomiting. Musculoskeletal:  Positive for myalgias. Skin:  Negative for color change. Neurological:  Negative for headaches. Psychiatric/Behavioral:  Negative for confusion. Physical Exam  Constitutional:       Appearance: Normal appearance. He is obese.    HENT:      Head: Normocephalic and atraumatic. Mouth/Throat:      Mouth: Mucous membranes are moist.   Eyes:      Pupils: Pupils are equal, round, and reactive to light. Cardiovascular:      Rate and Rhythm: Normal rate and regular rhythm. Pulses: Normal pulses. Heart sounds: Normal heart sounds. Pulmonary:      Effort: Pulmonary effort is normal.      Breath sounds: Normal breath sounds. Abdominal:      General: Abdomen is flat. There is distension. Palpations: Abdomen is soft. Tenderness: There is abdominal tenderness. Comments: PD catheter in place    Musculoskeletal:      Right lower leg: No edema. Left lower leg: No edema. Skin:     General: Skin is warm. Capillary Refill: Capillary refill takes less than 2 seconds. Neurological:      Mental Status: He is alert and oriented to person, place, and time. Psychiatric:         Mood and Affect: Mood normal.         Behavior: Behavior normal.         Thought Content: Thought content normal.         Judgment: Judgment normal.       LABS:    CBC:   Recent Labs     09/06/22  1237 09/07/22  0421 09/08/22  0419   WBC 14.8* 14.0* 10.4   HGB 10.0* 9.0* 9.3*   HCT 31.0* 26.2* 27.4*    256 251   .9* 113.2* 112.1*       Renal:    Recent Labs     09/06/22  1237 09/07/22  0421 09/08/22  0419    133* 134*   K 4.2 4.2 4.2   CL 92* 90* 91*   CO2 30 28 29   BUN 44* 55* 49*   CREATININE 7.1* 8.4* 8.1*   GLUCOSE 127* 333* 406*   CALCIUM 9.3 8.5 8.1*   MG  --  2.20 2.00   ANIONGAP 14 15 14       Hepatic:   Recent Labs     09/06/22  1237   AST 54*   ALT 43*   BILITOT <0.2   PROT 6.7   LABALBU 3.3*   ALKPHOS 92       Troponin:   Recent Labs     09/06/22  1237   TROPONINI 0.08*       BNP: No results for input(s): BNP in the last 72 hours. Lipids: No results for input(s): CHOL, HDL in the last 72 hours.     Invalid input(s): LDLCALCU, TRIGLYCERIDE  ABGs:    Recent Labs     09/06/22  1822 09/06/22  2220   PHART 7.263* 7.355   FYZ4TJY 68.6* 55.0* PO2ART 109.0* 94.8   FNZ4YPX 31* 31*   BEART 2.7 4.1*   R4VUQQZZ 98 97   MRM7QAN 33 32         INR: No results for input(s): INR in the last 72 hours. Lactate: No results for input(s): LACTATE in the last 72 hours. Cultures:  -----------------------------------------------------------------  RAD:   XR CHEST (2 VW)   Final Result      Free air beneath the right hemidiaphragm. Bibasilar atelectasis. Critical result discussed with Dr. Maryjo Woodruff at 1:00 PM 9/6/2022          Assessment/Plan:   Jued Esquivel is a 69 y.o. male with medical history of ESRD on PD, ILD, BJ who presented with AMS and fatigue. Acute hypoxic hypercapnic respiratory failure   -O2 saturation less than 90 prior to presentation and 86 in the ER.   -VBG on admission 7.28/71.3/35/33.8  -ABG 09/06 7.355/55/94.8/31  -CXR 9/6 with free air under diaphragm and bibasilar atelectasis   -Follows Dr Charle Sicard for EDWARDS  --Ct IV vancomycin and cefepime day #3  -On 2L O2 and sating at 96%, wean as tolerated   -continue to monitor vitals   -WBC trending up -11.8, CT IV abx. Acute metabolic encephalopathy   -likely 2/2 CO2 narcosis 2/2 Bipap non-compliance. - He does not use home BIPAP, reports he's unable to tolerate it. - Ammonia level - wnl , serum ethanol - none detected   -VBG on admission 7.28/71.3/35/33.8  -ABG 09/06 7.355/55/94.8/31  -On 2L O2 and sating at 49%      Diastolic CHF  -last echo 9/37: EF 57-91%, LVH, nl diastolic function, elevated pulmonary artery systolic pressure 36 mmHg.  -Follows Dr Jg Morales outpatient   Pro-BNP 9/6: 1308, Tn 0.08 (hemolyzed sample)  -monitor     ESRD on PD   -pt denies abdominal pain but abdomen is tender on palpation. Denies nausea or vomiting. Has remained afebrile, hypertensive.   -Ct continuous cyclic peritoneal dialysis (CCPD) per nephrology   -Peritoneal fluid analysis ( gram stain, cell count w/differential ) are wnl. Not likely PD peritonitis.   -PD fluid culture pending.  -blood cultures pending -Ct retacrit 2,000 units MWF  -For PT/OT eval per nephro    HTN  -holding home amlodipine     Hyperglycemia  -T2DM, uncontrolled. Likely that the dextrose solution used for CCPD is contributing to continued hyperglycemia.  -HbA1c - 6.9 (6/22)  -On lantus 65 units nightly, insulin lispro 25 units tid with meals  -SSI while in patient   -POC glucose   -hypoglycemic protocol     Hypothyroidism   -ct  synthroid 50 mcg    CAD s/p CABG 2007  -stable on 04/22 angiogram with no intervention.  -ct aspirin   -ct Lipitor     Code Status: Full code   FEN: Adult Regular diet   PPX: Heparin   DISPO: Pratik Stovall MD, PGY-1  09/09/22  5:18 AM    This patient has been staffed and discussed with Rosalia Robins MD.     Addendum to Resident H& P/Progress note:  I have personally seen,examined and evaluated the patient.  I have reviewed the current history, physical findings, labs and assessment and plan and agree with note as documented by resident MD ( )      Rosalia Robins MD, [address]

## 2022-09-09 NOTE — PROGRESS NOTES
Clinical Pharmacy Progress Note    Vancomycin - Management by Pharmacy    Consult Date(s): 9/7/22  Consulting Provider(s): Dr. Sienna Husain / Marisol Iqbal      ? Peritonitis - Vancomycin  Concurrent Antimicrobials:   Cefepime - Day #3  Day of Vanc Therapy / Ordered Duration: #3  Current Dosing Method: Intermittent Dosing by Levels  Therapeutic Goal: ~ 15 mg/L  Current Dose / Frequency / Plan / Rationale:   Given ESRD on PD, will dose vancomycin based on intermittent levels for now. Have entered placeholder onto [facility] Random level today = 31.0  mg/L after 1500 mg IV x1 given yesterday  Will not order vancomycin dose today   Random level ordered for tomorrow   Will continue to monitor clinical condition and make adjustments to regimen as appropriate. Please call with any questions:    Terri KING. BCPS   9/9/2022 7:32 AM  [phone]        Interval update: WBC trending back up (10.4--> 11.8). Remains afebrile and HDS. PD fluid culture NGTD    Subjective/Objective: Mr. Kaye Huynh is a 69 y.o. male with a PMHx significant for ESRD on PD, ILD, HFpEF, NSTEMI, CAD, DM2, HTN, hypothyroidism who presented to ED with AMS. Admitted with acute encephalopathy, respiratory failure. Pharmacy has been consulted to dose Vancomycin.     Ht Readings from Last 1 Encounters:   09/06/22 5' 10\" (1.778 m)     Wt Readings from Last 1 Encounters:   09/08/22 208 lb 8.9 oz (94.6 kg)     Current & Prior Antimicrobial Regimen(s):  Cefepime (9/7-current)  Vancomycin - Pharmacy to dose  Intermittent dosing based on levels (9/7-current)    Date Dose Vanc Level   9/7 01:51        09:23 1250mg IV + 500mg IV    9/8  04:19        10:04   1500 mg IV  16.7 mg/L     9/9  04:22  31.0 mg/L            Cultures & Sensitivities:  Date Site Micro Susceptibility / Result   9/7 PD fluid  NGTD    9/6 Blood  NGTD x 2      Labs / Ancillary Data:  Recent Labs     09/07/22  0421 09/08/22  0419 09/09/22  0422   CREATININE 8.4* 8.1* 7.2*   BUN 55* 49* 47*   WBC 14.0* 10.4 11.8*         Additional Lab Values / Findings of Note:    Procalcitonin: No results for input(s): PROCAL in the last 72 hours.

## 2022-09-09 NOTE — PROGRESS NOTES
Physical Therapy/Occupational Therapy  No treatment  Chart reviewed for PT/OT evaluation. Spoke with RN who requests therapy hold evaluations at this time as patient is lethargic and hypercapnic. Will continue to follow and evaluate as able.     Tena [address]  [address]

## 2022-09-09 NOTE — PROGRESS NOTES
Nephrology  Note                                                                                                                                                                                                                                                                                                                                                               Office : [phone]     Fax :[phone]              Patient's Name: Regan Nunez  11:26 AM  9/9/2022    Reason for Consult:  ESRD   Requesting Physician:  Cezar Boyer MD      Jamie PD well   Fluid clear   No signs of PD peritonitis   Lethargic today           Past Medical History:   Diagnosis Date    Allergic rhinitis due to other allergen 7/12/2010    Coronary atherosclerosis of unspecified type of vessel, native or graft 7/12/2010    Diabetic eye exam ([facility] [address] 04/29/2015    Diabetic eye exam ([address] 5/5/2016    [place] [facility]    Generalized osteoarthrosis, involving multiple sites 7/12/2010    Other psoriasis 7/12/2010    Pneumonia     Prolonged emergence from general anesthesia     Psoriatic arthropathy ([facility] [address] 7/12/2010    Type II or unspecified type diabetes mellitus without mention of complication, not stated as uncontrolled 7/12/2010    Unspecified essential hypertension 7/12/2010    Unspecified sleep apnea 7/12/2010       Past Surgical History:   Procedure Laterality Date    CARPAL TUNNEL RELEASE      s/p    CATARACT REMOVAL      CORONARY ARTERY BYPASS GRAFT      JOINT REPLACEMENT      LAPAROSCOPY INSERTION PERITONEAL CATHETER N/A 10/26/2020    LAPAROSCOPIC PERITONEAL DIALYSIS CATHETER PLACEMENT; LAPAROSCOPIC OMENTOPEXY performed by Canelo Barrios DO at [address] OR       Family History   Problem Relation Age of Onset    Diabetes Mother     Heart Failure Mother     Coronary Art Dis Father         reports that he quit smoking about 41 years ago. He started smoking about 60 years ago. He has a 18.00 pack-year smoking history.  He has never used smokeless tobacco. He reports that he does not drink alcohol and does not use drugs.     Allergies:  Penicillins, Sulfa antibiotics, Tazorac [tazarotene], Clindamycin, and Clindamycin/lincomycin    Current Medications:    insulin lispro (1 Unit Dial) 25 Units, TID WC  insulin glargine (LANTUS;BASAGLAR) injection pen 65 Units, Nightly  levoFLOXacin (LEVAQUIN) 250 MG/50ML infusion 250 mg, Q24H  insulin lispro (1 Unit Dial) 0-16 Units, TID WC  insulin lispro (1 Unit Dial) 0-4 Units, Nightly  famotidine (PEPCID) tablet 20 mg, Daily  polyethylene glycol (GLYCOLAX) packet 17 g, BID  vancomycin (VANCOCIN) intermittent dosing (placeholder), See Admin Instructions  epoetin daphney-epbx (RETACRIT) injection 2,000 Units, Once per day on Mon Wed Fri  atorvastatin (LIPITOR) tablet 80 mg, Nightly  levothyroxine (SYNTHROID) tablet 50 mcg, Daily  aspirin chewable tablet 81 mg, Daily  sodium chloride flush 0.9 % injection 5-40 mL, 2 times per day  sodium chloride flush 0.9 % injection 5-40 mL, PRN  0.9 % sodium chloride infusion, PRN  ondansetron (ZOFRAN-ODT) disintegrating tablet 4 mg, Q8H PRN   Or  ondansetron (ZOFRAN) injection 4 mg, Q6H PRN  polyethylene glycol (GLYCOLAX) packet 17 g, Daily PRN  acetaminophen (TYLENOL) tablet 650 mg, Q6H PRN   Or  acetaminophen (TYLENOL) suppository 650 mg, Q6H PRN  heparin (porcine) injection 5,000 Units, 3 times per day  glucose chewable tablet 16 g, PRN  dextrose bolus 10% 125 mL, PRN   Or  dextrose bolus 10% 250 mL, PRN  glucagon (rDNA) injection 1 mg, PRN  dextrose 10 % infusion, Continuous PRN            Physical exam:     Vitals:  BP (!) 125/57   Pulse 83   Temp 97.5 °F (36.4 °C) (Oral)   Resp 16   Ht 5' 10\" (1.778 m)   Wt 211 lb 3.2 oz (95.8 kg)   SpO2 95%   BMI 30.30 kg/m²   Constitutional:  OAA X3 NAD  Skin: no rash, turgor wnl  Heent:  eomi, mmm  Neck: no bruits or jvd noted  Cardiovascular:  S1, S2 without m/r/g  Respiratory: CTA B without w/r/r  Abdomen:  +bs, soft, TTP   Ext: + lower extremity edema  Psychiatric: mood and affect appropriate  Musculoskeletal:  Rom, muscular strength intact    Data:   Labs:  CBC:   Recent Labs     09/07/22 0421 09/08/22 0419 09/09/22 0422   WBC 14.0* 10.4 11.8*   HGB 9.0* 9.3* 9.3*    251 236       BMP:    Recent Labs     09/07/22  0421 09/08/22 0419 09/09/22 0422   * 134* 130*   K 4.2 4.2 3.7   CL 90* 91* 87*   CO2 28 29 28   BUN 55* 49* 47*   CREATININE 8.4* 8.1* 7.2*   GLUCOSE 333* 406* 303*       Ca/Mg/Phos:   Recent Labs     09/07/22 0421 09/08/22 0419 09/09/22 0422   CALCIUM 8.5 8.1* 8.2*   MG 2.20 2.00 2.10       Hepatic:   Recent Labs     09/06/22  1237   AST 54*   ALT 43*   BILITOT <0.2   ALKPHOS 92       Troponin:   Recent Labs     09/06/22  1237   TROPONINI 0.08*       BNP: No results for input(s): BNP in the last 72 hours. Lipids: No results for input(s): CHOL, TRIG, HDL, LDLCALC, LABVLDL in the last 72 hours. ABGs:   Recent Labs     09/06/22  2220   PHART 7.355   PO2ART 94.8   MXQ3CEP 55.0*       INR: No results for input(s): INR in the last 72 hours. UA:No results for input(s): Derek Crown, GLUCOSEU, BILIRUBINUR, [place], BLOODU, PHUR, PROTEINU, UROBILINOGEN, NITRU, LEUKOCYTESUR, LABMICR, URINETYPE in the last 72 hours. Urine Microscopic: No results for input(s): LABCAST, BACTERIA, COMU, HYALCAST, WBCUA, RBCUA, EPIU in the last 72 hours. Urine Culture: No results for input(s): LABURIN in the last 72 hours. Urine Chemistry: No results for input(s): Peola Core, PROTEINUR, NAUR in the last 72 hours. IMAGING:  XR CHEST (2 VW)   Final Result      Free air beneath the right hemidiaphragm. Bibasilar atelectasis. Critical result discussed with Dr. Ariza at 1:00 PM 9/6/2022          Assessment/Plan   ESRD     2. HTN    3. Anemia    4. Acid- base/ Electrolyte imbalance     5.  Abd tenderness - r/o PD peritonitis     Plan   - IV abx - likely PNA  - change cefepime to levaquin as pt is lethargic   - Cefepime toxicity is possible   - ABG   - PD fluid cell count - nl - no PD peritonitis   - Cx   - CCPD today   - monitor Labs  - EPO or anemia    - Constipation management   - PT eval                   Thank you for allowing us to participate in care of Mo Aranda MD  Feel free to contact me   [facility] [address]  Office : [phone]  Fax :[phone]

## 2022-09-09 NOTE — CARE COORDINATION
Patient is from home with spouse, does PD at home. CM has concerns that pt may need SNF, PT/OT pending at this time due to pt condition, list provided to family. CM will continue to follow and make appropriate referrals when pt medically stable and able to work with therapy. Will need a pre-cert for SNF.     Jossue Molina RN, BSN,   4th Floor Progressive Care Unit  [phone]

## 2022-09-09 NOTE — PROGRESS NOTES
Physician Progress Note      PATIENT:               Kyleigh Irving  CSN #:                  [id number]  :                       1945  ADMIT DATE:       2022 11:46 AM  DISCH DATE:  Ochoa Rodriguez  PROVIDER #:        Elena Greenfield MD          QUERY TEXT:    Pt admitted with \"Acute metabolic encephalopathy , Acute hypoxic hypercapnic   respiratory failure\". Pt noted to have met SIRS criteria on admission. If   possible, please document in the progress notes and discharge summary if you   are evaluating and /or treating any of the following: The medical record reflects the following:  Risk Factors: ESRD on PD; abdominal tenderness  Clinical Indicators: DGB=64.1; ZB=72; Metabolic encephalopathy; per H/P   assessment: \"Positive for abdominal distention, is ill-  appearing. Positive for weakness\". Nephrology: \"Per family for the last 10   days the patient has been somnolent and lethargic with  decreased appetite; Pt still not on PD; Abd tenderness - r/o PD peritonitis\"  Treatments: CBC, CMP, KUB; Nephrology consult; Cefepime IV, Vancomycin IV  Options provided:  -- Sepsis d/t possible peritonitis, present on admission  -- Sepsis d/t possible peritonitis present on admission, now resolving  -- Sepsis was ruled out  -- Other - I will add my own diagnosis  -- Disagree - Not applicable / Not valid  -- Disagree - Clinically unable to determine / Unknown  -- Refer to Clinical Documentation Reviewer    PROVIDER RESPONSE TEXT:    After further study, sepsis was ruled out for this patient.     Query created by: Steven Luciano on 2022 1:08 PM      Electronically signed by:  Elena Greenfield MD 2022 5:13 PM

## 2022-09-09 NOTE — PROGRESS NOTES
Disconnected from CCPD per protocol. Effluent: Clear/ pale yellow   Total time: 10 hr 8 min   Total UF:  1705 ml. Total Volume:  80379 ml. Dwell time gained:  0 hr 50 min. Pt Tolerated procedure:  Well   Report given to: Lennox Sleek, RN      PD fluid collected for cell count and culture; obtained and taken to lab

## 2022-09-09 NOTE — PROGRESS NOTES
Pt does not want to wear bipap this evening. Patient is alert and oriented and in no respiratory distress.

## 2022-09-09 NOTE — PROGRESS NOTES
Pt placed on bipap, sleeping most of shift, not staying awake to eat meals. Avasys monitor in room to monitor and patient on continuous pulse ox. MD aware.

## 2022-09-09 NOTE — PROGRESS NOTES
Clinical Pharmacy Consult Note    69 y.o. male admitted with Acute metabolic encephalopathy. Pharmacy has been asked by Dr. Branch Ask to adjust all drips to normal saline as appropriate based on compatibility to avoid fluid shifts since D5 is osmotically active. The following intermittent IV drips/infusions have been adjusted to saline:  Cefepime    The following medications must remain in D5W due to incompatibility with normal saline:  Amphotericin  Mycophenolate  Nitroprusside  Penicillin G Potassium    Please be aware that patient has D5W ordered as part of hypoglycemia orderset. [facility] will follow daily to ensure all new IVPBs + drips are in NS. Please call with questions.   Xiao Bolanos [facility], PharmD, [address]  9/9/2022 12:11 AM

## 2022-09-09 NOTE — PLAN OF CARE
Problem: Safety - Adult  Goal: Free from fall injury  Flowsheets (Taken 9/9/2022 1644)  Free From Fall Injury: Instruct family/caregiver on patient safety     Problem: ABCDS Injury Assessment  Goal: Absence of physical injury  Flowsheets (Taken 9/7/2022 0213 by Gissell Cyr RN)  Absence of Physical Injury: Implement safety measures based on patient assessment     Problem: Skin/Tissue Integrity  Goal: Absence of new skin breakdown  Description: 1. Monitor for areas of redness and/or skin breakdown  2. Assess vascular access sites hourly  3. Every 4-6 hours minimum:  Change oxygen saturation probe site  4. Every 4-6 hours:  If on nasal continuous positive airway pressure, respiratory therapy assess nares and determine need for appliance change or resting period. Note: Jorge Luis skin assessment completed. Pt's skin remains intact. Repositioned every 2 hours. Sacral heart and barrier cream applied. Will continue to monitor for skin integrity impairment.        Problem: Cardiovascular - Adult  Goal: Maintains optimal cardiac output and hemodynamic stability  Flowsheets (Taken 9/9/2022 7874)  Maintains optimal cardiac output and hemodynamic stability:   Monitor blood pressure and heart rate   Assess for signs of decreased cardiac output

## 2022-09-09 NOTE — PROGRESS NOTES
Pt appears more lethargic, difficult to arouse, very delayed responses, difficulty following instructions. Dr. Dorinda Zapata to room to assess. . Abg ordered, RT called to collect. Will continue to monitor.

## 2022-09-09 NOTE — PROGRESS NOTES
09/09/22 1701   NIV Type   $NIV $Daily Charge   Skin Assessment Clean, dry, & intact   NIV Started/Stopped (S)  On  (1700)   Equipment Type Trilogy   Mode Bilevel   Mask Type Full face mask   Mask Size Medium   Settings/Measurements   PIP Observed 10 cm H20   IPAP 10 cmH20   CPAP/EPAP 5 cmH2O   Vt (Measured) 401 mL   Rate Ordered 12   Resp 17   FiO2  30 %   Minute Volume (L/min) 6.1 Liters   Mask Leak (lpm) 46 lpm   Comfort Level Good   Using Accessory Muscles No   SpO2 98   Breath Sounds   Right Upper Lobe Diminished   Right Middle Lobe Diminished   Right Lower Lobe Diminished   Left Upper Lobe Diminished   Left Lower Lobe Diminished   Alarm Settings   Apnea (secs) 20 secs   RR Low (bpm) 8   RR High (bpm) 40 br/min   Oxygen Therapy/Pulse Ox   SpO2 98 %   Pulse Oximeter Device Mode Continuous     Patient became more lethargic during shift. Placed on NIV and sleeping at this time. Bedside RN aware, RT will continue to monitor.

## 2022-09-10 PROBLEM — G47.33 OSA TREATED WITH BIPAP: Status: ACTIVE | Noted: 2022-09-10

## 2022-09-10 PROBLEM — J96.12 CHRONIC HYPERCAPNIC RESPIRATORY FAILURE (HCC): Status: ACTIVE | Noted: 2022-09-10

## 2022-09-10 LAB
ANION GAP SERPL CALCULATED.3IONS-SCNC: 16 MMOL/L (ref 3–16)
APPEARANCE FLUID: CLEAR
BASE EXCESS VENOUS: 1.7 MMOL/L (ref -2–3)
BASOPHILS ABSOLUTE: 0.1 K/UL (ref 0–0.2)
BASOPHILS RELATIVE PERCENT: 0.5 %
BLOOD CULTURE, ROUTINE: NORMAL
BODY FLUID CULTURE, STERILE: NORMAL
BUN BLDV-MCNC: 48 MG/DL (ref 7–20)
CALCIUM SERPL-MCNC: 8.6 MG/DL (ref 8.3–10.6)
CARBOXYHEMOGLOBIN: 1.6 % (ref 0–1.5)
CELL COUNT FLUID TYPE: NORMAL
CHLORIDE BLD-SCNC: 88 MMOL/L (ref 99–110)
CLOT EVALUATION: NORMAL
CO2: 28 MMOL/L (ref 21–32)
COLOR FLUID: COLORLESS
CREAT SERPL-MCNC: 7.3 MG/DL (ref 0.8–1.3)
CULTURE, BLOOD 2: NORMAL
EOSINOPHILS ABSOLUTE: 0.4 K/UL (ref 0–0.6)
EOSINOPHILS RELATIVE PERCENT: 4.4 %
FLUID DIFF COMMENT: NORMAL
GFR AFRICAN AMERICAN: 9
GFR NON-AFRICAN AMERICAN: 7
GLUCOSE BLD-MCNC: 155 MG/DL (ref 70–99)
GLUCOSE BLD-MCNC: 192 MG/DL (ref 70–99)
GLUCOSE BLD-MCNC: 220 MG/DL (ref 70–99)
GLUCOSE BLD-MCNC: 267 MG/DL (ref 70–99)
GLUCOSE BLD-MCNC: 284 MG/DL (ref 70–99)
GLUCOSE BLD-MCNC: 90 MG/DL (ref 70–99)
GRAM STAIN RESULT: NORMAL
HCO3 VENOUS: 28.7 MMOL/L (ref 24–28)
HCT VFR BLD CALC: 27.1 % (ref 40.5–52.5)
HEMOGLOBIN, VEN, REDUCED: 34.4 %
HEMOGLOBIN: 9.5 G/DL (ref 13.5–17.5)
LYMPHOCYTES ABSOLUTE: 1.1 K/UL (ref 1–5.1)
LYMPHOCYTES RELATIVE PERCENT: 10.4 %
MAGNESIUM: 1.9 MG/DL (ref 1.8–2.4)
MCH RBC QN AUTO: 38.7 PG (ref 26–34)
MCHC RBC AUTO-ENTMCNC: 35.1 G/DL (ref 31–36)
MCV RBC AUTO: 110.4 FL (ref 80–100)
METHEMOGLOBIN VENOUS: 0.8 % (ref 0–1.5)
MONOCYTES ABSOLUTE: 0.3 K/UL (ref 0–1.3)
MONOCYTES RELATIVE PERCENT: 3.1 %
NEUTROPHILS ABSOLUTE: 8.3 K/UL (ref 1.7–7.7)
NEUTROPHILS RELATIVE PERCENT: 81.6 %
NUCLEATED CELLS FLUID: 3 /CUMM
O2 SAT, VEN: 65 %
PCO2, VEN: 57 MMHG (ref 41–51)
PDW BLD-RTO: 15 % (ref 12.4–15.4)
PERFORMED ON: ABNORMAL
PERFORMED ON: NORMAL
PH VENOUS: 7.31 (ref 7.35–7.45)
PLATELET # BLD: 239 K/UL (ref 135–450)
PMV BLD AUTO: 7.4 FL (ref 5–10.5)
PO2, VEN: 34.4 MMHG (ref 25–40)
POTASSIUM SERPL-SCNC: 3.1 MMOL/L (ref 3.5–5.1)
RBC # BLD: 2.46 M/UL (ref 4.2–5.9)
RBC FLUID: 2 /CUMM
SODIUM BLD-SCNC: 132 MMOL/L (ref 136–145)
TCO2 CALC VENOUS: 30 MMOL/L
VANCOMYCIN RANDOM: 25.8 UG/ML
WBC # BLD: 10.1 K/UL (ref 4–11)

## 2022-09-10 PROCEDURE — 2580000003 HC RX 258: Performed by: STUDENT IN AN ORGANIZED HEALTH CARE EDUCATION/TRAINING PROGRAM

## 2022-09-10 PROCEDURE — 99233 SBSQ HOSP IP/OBS HIGH 50: CPT | Performed by: INTERNAL MEDICINE

## 2022-09-10 PROCEDURE — 2060000000 HC ICU INTERMEDIATE R&B

## 2022-09-10 PROCEDURE — 94761 N-INVAS EAR/PLS OXIMETRY MLT: CPT

## 2022-09-10 PROCEDURE — 2700000000 HC OXYGEN THERAPY PER DAY

## 2022-09-10 PROCEDURE — 6360000002 HC RX W HCPCS: Performed by: STUDENT IN AN ORGANIZED HEALTH CARE EDUCATION/TRAINING PROGRAM

## 2022-09-10 PROCEDURE — 90945 DIALYSIS ONE EVALUATION: CPT

## 2022-09-10 PROCEDURE — 80202 ASSAY OF VANCOMYCIN: CPT

## 2022-09-10 PROCEDURE — 89050 BODY FLUID CELL COUNT: CPT

## 2022-09-10 PROCEDURE — 83735 ASSAY OF MAGNESIUM: CPT

## 2022-09-10 PROCEDURE — 99232 SBSQ HOSP IP/OBS MODERATE 35: CPT | Performed by: HOSPITALIST

## 2022-09-10 PROCEDURE — 36415 COLL VENOUS BLD VENIPUNCTURE: CPT

## 2022-09-10 PROCEDURE — 6360000002 HC RX W HCPCS: Performed by: INTERNAL MEDICINE

## 2022-09-10 PROCEDURE — 6370000000 HC RX 637 (ALT 250 FOR IP): Performed by: STUDENT IN AN ORGANIZED HEALTH CARE EDUCATION/TRAINING PROGRAM

## 2022-09-10 PROCEDURE — 85025 COMPLETE CBC W/AUTO DIFF WBC: CPT

## 2022-09-10 PROCEDURE — 94660 CPAP INITIATION&MGMT: CPT

## 2022-09-10 PROCEDURE — 99223 1ST HOSP IP/OBS HIGH 75: CPT | Performed by: INTERNAL MEDICINE

## 2022-09-10 PROCEDURE — 80048 BASIC METABOLIC PNL TOTAL CA: CPT

## 2022-09-10 PROCEDURE — 6370000000 HC RX 637 (ALT 250 FOR IP): Performed by: INTERNAL MEDICINE

## 2022-09-10 RX ADMIN — SODIUM CHLORIDE, PRESERVATIVE FREE 10 ML: 5 INJECTION INTRAVENOUS at 20:28

## 2022-09-10 RX ADMIN — HEPARIN SODIUM 5000 UNITS: 5000 INJECTION INTRAVENOUS; SUBCUTANEOUS at 23:44

## 2022-09-10 RX ADMIN — HEPARIN SODIUM 5000 UNITS: 5000 INJECTION INTRAVENOUS; SUBCUTANEOUS at 13:21

## 2022-09-10 RX ADMIN — HEPARIN SODIUM 5000 UNITS: 5000 INJECTION INTRAVENOUS; SUBCUTANEOUS at 05:40

## 2022-09-10 RX ADMIN — SODIUM CHLORIDE 25 ML: 9 INJECTION, SOLUTION INTRAVENOUS at 11:32

## 2022-09-10 RX ADMIN — LEVOFLOXACIN 250 MG: 5 INJECTION, SOLUTION INTRAVENOUS at 11:33

## 2022-09-10 RX ADMIN — POTASSIUM BICARBONATE 40 MEQ: 782 TABLET, EFFERVESCENT ORAL at 17:47

## 2022-09-10 RX ADMIN — POLYETHYLENE GLYCOL (3350) 17 G: 17 POWDER, FOR SOLUTION ORAL at 20:11

## 2022-09-10 RX ADMIN — ATORVASTATIN CALCIUM 80 MG: 40 TABLET, FILM COATED ORAL at 20:11

## 2022-09-10 RX ADMIN — SODIUM CHLORIDE, PRESERVATIVE FREE 10 ML: 5 INJECTION INTRAVENOUS at 08:37

## 2022-09-10 RX ADMIN — ASPIRIN 81 MG 81 MG: 81 TABLET ORAL at 08:36

## 2022-09-10 RX ADMIN — POLYETHYLENE GLYCOL (3350) 17 G: 17 POWDER, FOR SOLUTION ORAL at 08:36

## 2022-09-10 RX ADMIN — INSULIN LISPRO 25 UNITS: 100 INJECTION, SOLUTION INTRAVENOUS; SUBCUTANEOUS at 08:37

## 2022-09-10 RX ADMIN — FAMOTIDINE 20 MG: 20 TABLET ORAL at 08:36

## 2022-09-10 RX ADMIN — INSULIN LISPRO 4 UNITS: 100 INJECTION, SOLUTION INTRAVENOUS; SUBCUTANEOUS at 08:38

## 2022-09-10 RX ADMIN — ACETAMINOPHEN 650 MG: 325 TABLET, FILM COATED ORAL at 18:16

## 2022-09-10 RX ADMIN — LEVOTHYROXINE SODIUM 50 MCG: 50 TABLET ORAL at 08:36

## 2022-09-10 ASSESSMENT — PAIN SCALES - GENERAL
PAINLEVEL_OUTOF10: 0

## 2022-09-10 NOTE — PLAN OF CARE
Problem: Discharge Planning  Goal: Discharge to home or other facility with appropriate resources  9/10/2022 1551 by Vinicius Serrano RN  Outcome: Progressing  Flowsheets (Taken 9/10/2022 1551)  Discharge to home or other facility with appropriate resources:   Identify barriers to discharge with patient and caregiver   Arrange for needed discharge resources and transportation as appropriate   Refer to discharge planning if patient needs post-hospital services based on physician order or complex needs related to functional status, cognitive ability or social support system   Identify discharge learning needs (meds, wound care, etc)  9/10/2022 1550 by Vinicius Serrano RN  Outcome: Progressing  Flowsheets (Taken 9/10/2022 1550)  Discharge to home or other facility with appropriate resources:   Identify barriers to discharge with patient and caregiver   Arrange for needed discharge resources and transportation as appropriate   Identify discharge learning needs (meds, wound care, etc)   Refer to discharge planning if patient needs post-hospital services based on physician order or complex needs related to functional status, cognitive ability or social support system     Problem: Safety - Adult  Goal: Free from fall injury  9/10/2022 1551 by Vinicius Serrano RN  Outcome: Progressing  Flowsheets (Taken 9/10/2022 1551)  Free From Fall Injury: Instruct family/caregiver on patient safety  9/10/2022 1550 by Vinicius Serrano RN  Outcome: Progressing  Flowsheets (Taken 9/10/2022 1550)  Free From Fall Injury: Instruct family/caregiver on patient safety     Problem: ABCDS Injury Assessment  Goal: Absence of physical injury  Outcome: Progressing  Flowsheets (Taken 9/10/2022 1551)  Absence of Physical Injury: Implement safety measures based on patient assessment     Problem: Skin/Tissue Integrity  Goal: Absence of new skin breakdown  Description: 1. Monitor for areas of redness and/or skin breakdown  2. Assess vascular access sites hourly  3. Every 4-6 hours minimum:  Change oxygen saturation probe site  4. Every 4-6 hours:  If on nasal continuous positive airway pressure, respiratory therapy assess nares and determine need for appliance change or resting period.   Outcome: Progressing     Problem: Respiratory - Adult  Goal: Achieves optimal ventilation and oxygenation  Outcome: Progressing  Flowsheets (Taken 9/10/2022 1551)  Achieves optimal ventilation and oxygenation:   Assess for changes in respiratory status   Assess for changes in mentation and behavior   Position to facilitate oxygenation and minimize respiratory effort   Oxygen supplementation based on oxygen saturation or arterial blood gases   Assess the need for suctioning and aspirate as needed   Assess and instruct to report shortness of breath or any respiratory difficulty   Respiratory therapy support as indicated     Problem: Metabolic/Fluid and Electrolytes - Adult  Goal: Electrolytes maintained within normal limits  Outcome: Progressing  Flowsheets (Taken 9/10/2022 1551)  Electrolytes maintained within normal limits:   Monitor labs and assess patient for signs and symptoms of electrolyte imbalances   Administer electrolyte replacement as ordered   Monitor response to electrolyte replacements, including repeat lab results as appropriate

## 2022-09-10 NOTE — PLAN OF CARE
Problem: Safety - Adult  Goal: Free from fall injury  5/77/4301 [id number] by Boris Arnold RN  Outcome: Toi Mejia (Taken 9/9/2022 1644 by Mariel Mercedes RN)  Free From Fall Injury: Instruct family/caregiver on patient safety  Note: Pt is a Fall Risk. See Dawood Gandara Fall Risk Score. Pt bed in low position and side rails up. Call light and belongings in reach. Pt encouraged to call for assistance. Will continue with hourly rounds for PO intake, pain needs, toileting, and repositioning as needed. Problem: Skin/Tissue Integrity  Goal: Absence of new skin breakdown  Description: 1. Monitor for areas of redness and/or skin breakdown  2. Assess vascular access sites hourly  3. Every 4-6 hours minimum:  Change oxygen saturation probe site  4. Every 4-6 hours:  If on nasal continuous positive airway pressure, respiratory therapy assess nares and determine need for appliance change or resting period. 7/18/9598 9111 by Boris Arnold RN  Outcome: Progressing  Note: Pt on specialty mattress. Heels elevated off bed. Repositioned q2h. Pt is anuric. Problem: Respiratory - Adult  Goal: Achieves optimal ventilation and oxygenation  Outcome: Progressing  Flowsheets (Taken 9/8/2022 1848 by Rita Nieto RN)  Achieves optimal ventilation and oxygenation:   Assess for changes in respiratory status   Assess for changes in mentation and behavior   Position to facilitate oxygenation and minimize respiratory effort   Oxygen supplementation based on oxygen saturation or arterial blood gases   Assess the need for suctioning and aspirate as needed   Assess and instruct to report shortness of breath or any respiratory difficulty   Respiratory therapy support as indicated  Note: SpO2> 90% on biPAP for CO2 retention and lethargy. Continuous SpO2 and avasys camera on. Pt educated on necessity of bipap.

## 2022-09-10 NOTE — PROGRESS NOTES
Pt refused all lab draws this morning, including BMP, CBC, and Random Vanc. Education regarding necessity of labs for pt's plan of care provided by this RN; however, pt continues to refuse. On call residents aware.      Electronically signed by Brooke Esteban RN on 4/27/0576 at 4:43 AM

## 2022-09-10 NOTE — PROGRESS NOTES
Internal Medicine Progress Note    Admit Date: 9/6/2022  Day: 4   Diet: ADULT DIET; Regular; 3 carb choices (45 gm/meal); Low Fat/Low Chol/High Fiber/2 gm Na  ADULT ORAL NUTRITION SUPPLEMENT; Breakfast, Lunch, Dinner; Renal Oral Supplement    CC: altered mental status    Interval history:   No acute events overnight. Patient tolerated BiPAP most of the night. He is irritable this morning and refuses to have any labs drawn. Denies shortness of breath, chest pain, abdominal pain, chills, fever, nausea or vomiting.       Medications:     Scheduled Meds:   insulin glargine  70 Units SubCUTAneous Nightly    insulin lispro  25 Units SubCUTAneous TID WC    levofloxacin  250 mg IntraVENous Q24H    insulin lispro  0-16 Units SubCUTAneous TID WC    insulin lispro  0-4 Units SubCUTAneous Nightly    famotidine  20 mg Oral Daily    polyethylene glycol  17 g Oral BID    vancomycin (VANCOCIN) intermittent dosing (placeholder)   Other See Admin Instructions    epoetin daphney-epbx  2,000 Units SubCUTAneous Once per day on Mon Wed Fri    atorvastatin  80 mg Oral Nightly    levothyroxine  50 mcg Oral Daily    aspirin  81 mg Oral Daily    sodium chloride flush  5-40 mL IntraVENous 2 times per day    heparin (porcine)  5,000 Units SubCUTAneous 3 times per day     Continuous Infusions:   sodium chloride 20 mL/hr at 09/07/22 0922    dextrose       PRN Meds:sodium chloride flush, sodium chloride, ondansetron **OR** ondansetron, polyethylene glycol, acetaminophen **OR** acetaminophen, glucose, dextrose bolus **OR** dextrose bolus, glucagon (rDNA), dextrose    Objective:   Vitals:   T-max:  Patient Vitals for the past 8 hrs:   BP Temp Temp src Pulse Resp SpO2 Weight   09/10/22 0557 -- -- -- 84 -- 98 % --   09/10/22 0519 -- -- -- -- -- -- 218 lb 11.1 oz (99.2 kg)   09/10/22 0400 -- -- -- 80 -- 99 % --   09/10/22 0311 137/79 97.9 °F (36.6 °C) Axillary 80 28 97 % --   09/10/22 0208 -- -- -- 82 -- 98 % --   09/10/22 0016 -- -- -- -- 20 -- -- 09/10/22 0015 -- -- -- 74 20 98 % --   09/09/22 2353 -- -- -- -- -- 98 % --   09/09/22 2331 138/76 98 °F (36.7 °C) Axillary 78 22 99 % --       Intake/Output Summary (Last 24 hours) at 9/10/2022 0730  Last data filed at 9/10/2022 0311  Gross per 24 hour   Intake 0 ml   Output 0 ml   Net 0 ml       Physical Exam  Constitutional:       Appearance: Normal appearance. He is obese. HENT:      Head: Normocephalic and atraumatic. Mouth/Throat:      Mouth: Mucous membranes are moist.      Pharynx: Oropharynx is clear. Eyes:      Conjunctiva/sclera: Conjunctivae normal.      Pupils: Pupils are equal, round, and reactive to light. Cardiovascular:      Rate and Rhythm: Normal rate and regular rhythm. Pulses: Normal pulses. Heart sounds: Normal heart sounds. Pulmonary:      Effort: Pulmonary effort is normal.      Breath sounds: Normal breath sounds. Abdominal:      General: Abdomen is flat. Bowel sounds are normal.      Palpations: Abdomen is soft. Musculoskeletal:      Cervical back: Normal range of motion and neck supple. Skin:     General: Skin is warm and dry. Capillary Refill: Capillary refill takes less than 2 seconds. Neurological:      General: No focal deficit present. Mental Status: He is alert and oriented to person, place, and time. Mental status is at baseline. LABS:    CBC:   Recent Labs     09/08/22 0419 09/09/22 0422   WBC 10.4 11.8*   HGB 9.3* 9.3*   HCT 27.4* 26.6*    236   .1* 110.1*     Renal:    Recent Labs     09/08/22 0419 09/09/22 0422   * 130*   K 4.2 3.7   CL 91* 87*   CO2 29 28   BUN 49* 47*   CREATININE 8.1* 7.2*   GLUCOSE 406* 303*   CALCIUM 8.1* 8.2*   MG 2.00 2.10   ANIONGAP 14 15       ABGs:    Recent Labs     09/09/22  2030   PHART 7.282*   KKE2RIB 61.1*   PO2ART 35.6*   XGH6XSV 29   BEART 0.8   W1GEWCBY 64*   YDP0ZCC 31       INR: No results for input(s): INR in the last 72 hours.   Lactate: No results for input(s): LACTATE in the last 72 hours. Cultures:  -----------------------------------------------------------------  RAD:   XR CHEST (2 VW)   Final Result      Free air beneath the right hemidiaphragm. Bibasilar atelectasis. Critical result discussed with Dr. Marko Hannon at 1:00 PM 9/6/2022            Assessment/Plan:   Gopal Nogueira is a 69 y.o. male with medical history of ESRD on PD, ILD, BJ who presented with AMS and fatigue. Acute hypoxic hypercapnic respiratory failure 2/2 CAP   -O2 saturation less than 90 prior to presentation and 86 in the ER.   -VBG on admission 7.28/71.3/35/33.8  -ABG 09/06 7.355/55/94.8/31  -CXR 9/6 with free air under diaphragm and bibasilar atelectasis   -Follows Dr Marisa Holcomb for EDWARDS  -Ct IV vancomycin and cefepime day #3  -On 2L O2 and sating at 96%, wean as tolerated   -Continue PAP QHS  -CT IV abx. Acute metabolic encephalopathy   -likely 2/2 CO2 narcosis 2/2 Bipap non-compliance. - He does not use home BIPAP, reports he's unable to tolerate it. - Ammonia level - wnl , serum ethanol - none detected   -VBG on admission 7.28/71.3/35/33.8  -ABG 09/06 7.355/55/94.8/31  -On 2L O2 and sating at 54%        Diastolic CHF  -last echo 9/57: EF 52-81%, LVH, nl diastolic function, elevated pulmonary artery systolic pressure 36 mmHg.  -Follows Dr Jackie Burciaga outpatient   Pro-BNP 9/6: 1308, Tn 0.08 (hemolyzed sample)  -monitor      ESRD on PD   -pt denies abdominal pain but abdomen is tender on palpation. Denies nausea or vomiting. Has remained afebrile, hypertensive.   -Ct continuous cyclic peritoneal dialysis (CCPD) per nephrology   -Peritoneal fluid analysis ( gram stain, cell count w/differential ) are wnl. Not likely PD peritonitis. -PD fluid culture pending.  -blood cultures pending   -Ct retacrit 2,000 units MWF  -For PT/OT eval per nephro     HTN  -holding home amlodipine      Hyperglycemia  -T2DM, uncontrolled.  Likely that the dextrose solution used for CCPD is contributing to continued hyperglycemia.  -HbA1c - 6.9 (6/22)  -On lantus 70 units nightly, insulin lispro 25 units tid with meals  -SSI while in patient   -POC glucose   -hypoglycemic protocol      Hypothyroidism   -ct  synthroid 50 mcg     CAD s/p CABG 2007  -stable on 04/22 angiogram with no intervention.  -ct aspirin   -ct Lipitor       Code Status:Full Code  FEN: ADULT DIET; Regular; 3 carb choices (45 gm/meal); Low Fat/Low Chol/High Fiber/2 gm Na  ADULT ORAL NUTRITION SUPPLEMENT; Breakfast, Lunch, Dinner; Renal Oral Supplement  PPX:  SQH  DISPO: [facility]    Galo Mayers MD  Internal Medicine PGY-3  09/10/22  7:30 AM    This patient will be staffed and discussed with Idalia Llanos MD.     Addendum to Resident H& P/Progress note:  I have personally seen,examined and evaluated the patient.  I have reviewed the current history, physical findings, labs and assessment and plan and agree with note as documented by resident MD ( Jayda Mcclain)      Idalia Llanos MD, [facility]

## 2022-09-10 NOTE — PROGRESS NOTES
Pt placed on specialty mattress.      Electronically signed by Ashwini Galarza RN on 0/2/3878 at 9:56 PM

## 2022-09-10 NOTE — FLOWSHEET NOTE
Disconnected from CCPD per protocol. Effluent: clear  Total time: 10 hr 18 min   Total UF:  -133 ml. Total Volume:  04136 ml. Dwell time gained:  00 hr 44 min. Pt Tolerated procedure: Offers no c/o.     Cell count & differential sent to lab.       09/10/22 0815   Vitals   BP (!) 147/72   Temp 97.6 °F (36.4 °C)   Heart Rate 83   Resp 18   Weight 218 lb 11.1 oz (99.2 kg)   Cycler   Ultrafiltration (UF) (mL) -133 mL   Average Dwell Time (Hours:Minutes) 82   Lost Dwell Time (Hours:Minutes) [phone]

## 2022-09-10 NOTE — PROGRESS NOTES
Clinical Pharmacy Progress Note    Vancomycin - Management by Pharmacy    Consult Date(s): 9/7/22  Consulting Provider(s): Dr. Cici Godoy / [place]      ? Peritonitis - Vancomycin  Concurrent Antimicrobials:   Cefepime - Day #3  Day of Vanc Therapy / Ordered Duration: #3  Current Dosing Method: Intermittent Dosing by Levels  Therapeutic Goal: ~ 15 mg/L  Current Dose / Frequency / Plan / Rationale:   Given ESRD on PD, will dose vancomycin based on intermittent levels for now. Have entered placeholder onto [facility] Random level today = 25.8  mg/L after 1500 mg IV x1 given 9/8  Will not order vancomycin dose today   Random level ordered for tomorrow   Will continue to monitor clinical condition and make adjustments to regimen as appropriate. Please call with any questions:  Jennifer Avalos, PharmD  PGY-1 Pharmacy Resident  [facility]  [id number]  9/10/2022   11:15 AM          Interval update: WBC trending back down (11.8--> 10.1). Remains afebrile and HDS. PD fluid culture NGTD    Subjective/Objective: Mr. Dominga Casas is a 69 y.o. male with a PMHx significant for ESRD on PD, ILD, HFpEF, NSTEMI, CAD, DM2, HTN, hypothyroidism who presented to ED with AMS. Admitted with acute encephalopathy, respiratory failure. Pharmacy has been consulted to dose Vancomycin.     Ht Readings from Last 1 Encounters:   09/06/22 5' 10\" (1.778 m)     Wt Readings from Last 1 Encounters:   09/10/22 218 lb 11.1 oz (99.2 kg)     Current & Prior Antimicrobial Regimen(s):  Cefepime (9/7-current)  Vancomycin - Pharmacy to dose  Intermittent dosing based on levels (9/7-current)    Date Dose Vanc Level   9/7 01:51        09:23 1250mg IV + 500mg IV    9/8  04:19        10:04   1500 mg IV  16.7 mg/L     9/9  04:22 -- 31.0 mg/L   9/10 09:15 -- 25.8 mg/L       Cultures & Sensitivities:  Date Site Micro Susceptibility / Result   9/9 Body Fluid NGTD    9/7 PD fluid  NGTD    9/6 Blood  NGTD x 2      Labs / Ancillary Data:  Recent Labs     09/08/22  0419 09/09/22  0422 09/10/22  0915   CREATININE 8.1* 7.2* 7.3*   BUN 49* 47* 48*   WBC 10.4 11.8* 10.1       Additional Lab Values / Findings of Note:    Procalcitonin: No results for input(s): PROCAL in the last 72 hours.

## 2022-09-10 NOTE — CONSULTS
Initial Pulmonary & Critical Care Consult Note      Reason for Consult:  Hxpoxic episodes, probably BJ  Requesting Physician: Dr. Sharon Figueroa:   [address] [facility] [place]:                The patient is a 69 y.o. male with significant past medical history of BJ not compliant with Bipap, asthma, nonspecific ILD, HFpEF and ESRD on PD presented to [place] [address] 9/6 with increasing lethargy, somnolence and anorexia. Infectious and metabolic work up unrevealing so far. He was placed on Bipap empirically until blood gas could be obtained which last night VBG was 7.31/57. I see [facility] as an outpatient and he recognized me. He would interact but easily would dose off and couldn't maintain attention span.  He denies any dyspnea, CP or purulent cough    Past Medical History:      Diagnosis Date    Allergic rhinitis due to other allergen 7/12/2010    Coronary atherosclerosis of unspecified type of vessel, native or graft 7/12/2010    Diabetic eye exam ([address] 04/29/2015    Diabetic eye exam ([address] 5/5/2016    [place] [facility]    Generalized osteoarthrosis, involving multiple sites 7/12/2010    Other psoriasis 7/12/2010    Pneumonia     Prolonged emergence from general anesthesia     Psoriatic arthropathy ([address] 7/12/2010    Type II or unspecified type diabetes mellitus without mention of complication, not stated as uncontrolled 7/12/2010    Unspecified essential hypertension 7/12/2010    Unspecified sleep apnea 7/12/2010      Past Surgical History:        Procedure Laterality Date    CARPAL TUNNEL RELEASE      s/p    CATARACT REMOVAL      CORONARY ARTERY BYPASS GRAFT      JOINT REPLACEMENT      LAPAROSCOPY INSERTION PERITONEAL CATHETER N/A 10/26/2020    LAPAROSCOPIC PERITONEAL DIALYSIS CATHETER PLACEMENT; LAPAROSCOPIC OMENTOPEXY performed by Nayan Brooks DO at [facility] OR     Current Medications:     insulin glargine  70 Units SubCUTAneous Nightly    insulin lispro  25 Units SubCUTAneous TID WC    levofloxacin  250 mg IntraVENous Q24H    insulin lispro  0-16 Units SubCUTAneous TID     insulin lispro  0-4 Units SubCUTAneous Nightly    famotidine  20 mg Oral Daily    polyethylene glycol  17 g Oral BID    vancomycin (VANCOCIN) intermittent dosing (placeholder)   Other See Admin Instructions    epoetin daphney-epbx  2,000 Units SubCUTAneous Once per day on Mon Wed Fri    atorvastatin  80 mg Oral Nightly    levothyroxine  50 mcg Oral Daily    aspirin  81 mg Oral Daily    sodium chloride flush  5-40 mL IntraVENous 2 times per day    heparin (porcine)  5,000 Units SubCUTAneous 3 times per day        Social History:    Tobacco Use   Smoking Status Former Smoker    Packs/day: 1.00    Years: 18.00    Pack years: 18.00    Start date: 6/1/1962    Quit date: 11/1/1980    Years since quitting:        Family History:   DM  CHF    REVIEW OF SYSTEMS:    CONSTITUTIONAL:  negative for fevers, chills, diaphoresis, activity change, appetite change, fatigue, night sweats and unexpected weight change.    EYES:  negative for blurred vision, eye discharge, visual disturbance and icterus  HEENT:  negative for hearing loss, tinnitus, ear drainage, sinus pressure, nasal congestion, epistaxis and snoring  RESPIRATORY:  See HPI  CARDIOVASCULAR:  negative for chest pain, palpitations, exertional chest pressure/discomfort, edema, syncope  GASTROINTESTINAL:  negative for nausea, vomiting, diarrhea, constipation, blood in stool and abdominal pain  GENITOURINARY:  negative for frequency, dysuria, urinary incontinence, decreased urine volume, and hematuria  HEMATOLOGIC/LYMPHATIC:  negative for easy bruising, bleeding and lymphadenopathy  ALLERGIC/IMMUNOLOGIC:  negative for recurrent infections, angioedema, anaphylaxis and drug reactions  ENDOCRINE:  negative for weight changes and diabetic symptoms including polyuria, polydipsia and polyphagia  MUSCULOSKELETAL:  negative for  pain in all extremities, joint swelling, decreased range of motion in all extremities and muscle weakness  NEUROLOGICAL: negative for headaches, slurred speech, unilateral weakness  PSYCHIATRIC/BEHAVIORAL: negative for hallucinations, behavioral problems, confusion and agitation. Objective:   PHYSICAL EXAM:      VITALS:  BP (!) 163/82   Pulse 82   Temp 97.9 °F (36.6 °C) (Oral)   Resp 18   Ht 5' 10\" (1.778 m)   Wt 216 lb 4.3 oz (98.1 kg)   SpO2 100%   BMI 31.03 kg/m²   24HR INTAKE/OUTPUT:    Intake/Output Summary (Last 24 hours) at 9/10/2022 183  Last data filed at 9/10/2022 1820  Gross per 24 hour   Intake 440 ml   Output -103 ml   Net 543 ml     CURRENT PULSE OXIMETRY:  SpO2: 100 %  24HR PULSE OXIMETRY RANGE:  SpO2  Av.9 %  Min: 95 %  Max: 100 %    CONSTITUTIONAL:  awake, alert, cooperative, no apparent distress, and appears stated age  EYES: Pupils equal round and reactive to light. Normal conjunctiva  EARS, NOSE, MOUTH & THROAT: Normal oropharynx. Ears and nose appear normal  NECK:  Supple, symmetrical, trachea midline, no adenopathy, thyroid symmetric, not enlarged and no tenderness, skin normal  LUNGS:  no increased work of breathing and clear to auscultation. No accessory muscle use  CARDIOVASCULAR:  normal S1 and S2, no edema and no JVD  ABDOMEN:  normal bowel sounds, non-distended and no masses palpated, and no tenderness to palpation. No hepatospleenomegaly  LYMPHADENOPATHY:  no axillary or supraclavicular adenopathy. No cervical adnenopathy  PSYCHIATRIC: Oriented to person place and time. No obvious depression or anxiety. MUSCULOSKELETAL: No obvious misalignment or effusion of the joints. No clubbing, cyanosis of the digits. SKIN:  normal skin color, texture, turgor and no redness, warmth, or swelling.  No palpable nodules    DATA:    Old records have been reviewed  CBC with Differential:    Lab Results   Component Value Date/Time    WBC 10.1 09/10/2022 09:15 AM    RBC 2.46 09/10/2022 09:15 AM    HGB 9.5 09/10/2022 09:15 AM    HCT 27.1 09/10/2022 09:15 AM     09/10/2022 09:15 AM    .4 09/10/2022 09:15 AM    MCH 38.7 09/10/2022 09:15 AM    MCHC 35.1 09/10/2022 09:15 AM    RDW 15.0 09/10/2022 09:15 AM    SEGSPCT 61.0 07/23/2012 10:49 AM    LYMPHOPCT 10.4 09/10/2022 09:15 AM    MONOPCT 3.1 09/10/2022 09:15 AM    EOSPCT 2.1 03/27/2012 12:26 AM    BASOPCT 0.5 09/10/2022 09:15 AM    MONOSABS 0.3 09/10/2022 09:15 AM    LYMPHSABS 1.1 09/10/2022 09:15 AM    EOSABS 0.4 09/10/2022 09:15 AM    BASOSABS 0.1 09/10/2022 09:15 AM    DIFFTYPE Auto-K 07/23/2012 10:49 AM     BMP:    Lab Results   Component Value Date/Time     09/10/2022 09:15 AM    K 3.1 09/10/2022 09:15 AM    K 4.2 09/06/2022 12:37 PM    CL 88 09/10/2022 09:15 AM    CO2 28 09/10/2022 09:15 AM    BUN 48 09/10/2022 09:15 AM    CREATININE 7.3 09/10/2022 09:15 AM    CALCIUM 8.6 09/10/2022 09:15 AM    GFRAA 9 09/10/2022 09:15 AM    GFRAA 60 05/21/2013 07:27 AM    LABGLOM 7 09/10/2022 09:15 AM    GLUCOSE 192 09/10/2022 09:15 AM    GLUCOSE 163 07/22/2011 12:00 AM     Hepatic Function Panel:    Lab Results   Component Value Date/Time    ALKPHOS 92 09/06/2022 12:37 PM    ALT 43 09/06/2022 12:37 PM    AST 54 09/06/2022 12:37 PM    PROT 6.7 09/06/2022 12:37 PM    PROT 7.0 08/15/2012 08:52 AM    BILITOT <0.2 09/06/2022 12:37 PM    BILIDIR <0.2 01/15/2017 05:30 AM    IBILI see below 01/15/2017 05:30 AM     ABG:    Lab Results   Component Value Date/Time    ZAH5WPY 29 09/09/2022 08:30 PM    BEART 0.8 09/09/2022 08:30 PM    K7EXEKKT 64 09/09/2022 08:30 PM    PHART 7.282 09/09/2022 08:30 PM    MZW1FQO 61.1 09/09/2022 08:30 PM    PO2ART 35.6 09/09/2022 08:30 PM    TDT6IXG 31 09/09/2022 08:30 PM         Radiology Review:  All pertinent images / reports were reviewed as a part of this visit. CXR reveals the following:     Impression       Free air beneath the right hemidiaphragm. Bibasilar atelectasis. PFTs:  Indication: Shortness of breath, weakness, non productive cough    Test comment:     Spirometry data is acceptable and reproducible. Pulse ox is 91% on room air    Estimated body mass index is 28.3 kg/m² as calculated from the   following:    Height as of 11/21/17: 5' 10\" (1.778 m). Weight as of 11/21/17: 197 lb 3.2 oz (89.4 kg). Spirometry data:    FEV1/FVC: 89. Predicted ratio 73    Pre-Bronchodilator FEV1 2.07L, which is 63% predicted    Post-Bronchodilator FEV1 2.02L, which is 61% predicted    There is -2% reversibility     FVC is 2.33L, which is 51% predicted    Lung Volumes:    TLC is 4.85L, which is 67% predicted    RV is 2.51L which is 99% predicted    Diffusion Capacity:    DLCO is 13.77 which is 42% predicted    Impression:    1. There is no obstruction present    2. There is no response to bronchodilator therapy         [Increase in FEV1 => 12% of control and => 200 ml]    3. There is moderate restriction     4. There is moderate reduction in diffusion capacity    Comment: Moderate restrictive disease with moderate decrease in   diffusion capacity consistent with the diagnosis of interstitial   lung disease. Assessment/Plan          Unclear cause of delerium but based on VBG last night it is not hypercapnea. Do not think he has pneumonia as well. Will change Bipap to QHS as he has BJ    He is on 2 L with O2 sat 1005.  Can wean with goal of 88%        Dre Quiroga

## 2022-09-10 NOTE — PROGRESS NOTES
Patient's evening blood sugar is 155, no sliding scale lispro required. Patient is eating minimally, scheduled 25 units of lispro held at this time. On Call resident made aware. No new orders at this time.

## 2022-09-10 NOTE — FLOWSHEET NOTE
09/09/22 2143   Vitals   BP (!) 160/71   Temp 97.9 °F (36.6 °C)   Temp Source Axillary   Heart Rate 78   Resp 18   SpO2 99 %   Weight 219 lb 5.7 oz (99.5 kg)       CCPD Order   Exchanges: 5   Exchange Volume: 2500 ml   Total Time: 10 hrs   Dextrose: 1.5%   Last Fill: 0 ml   Total Volume: 38813 ml     Orders verified. Supplies taken to pt's room. Report received. Cycler set up, primed and pre tested. Dressing changed on [facility] Cath site. Pt connected to cycler. CCPD initiated without problem. Initial effluent clear. If problems should arise please call the [phone] number on top of PD cycler machine.

## 2022-09-10 NOTE — FLOWSHEET NOTE
09/10/22 1715   Vitals   BP (!) 163/82   Temp 97.9 °F (36.6 °C)   Temp Source Oral   Heart Rate 87   Resp 18   SpO2 100 %   Weight 216 lb 4.3 oz (98.1 kg)      CCPD Order              Exchanges: 5              Exchange Volume: 2500 ml              Total Time: 10 hrs              Dextrose: 1.5%              Last Fill: 0 ml              Total Volume: 12970 ml     Orders verified. Supplies taken to pt's room. Report received. Cycler set up, primed and pre tested. Dressing changed on [facility] Cath site. Pt connected to cycler. CCPD initiated without problem. Initial effluent clear. If problems should arise please call the [phone] number on top of PD cycler machine.

## 2022-09-11 ENCOUNTER — APPOINTMENT (OUTPATIENT)
Dept: CT IMAGING | Age: 77
DRG: 640 | End: 2022-09-11
Payer: COMMERCIAL

## 2022-09-11 LAB
ANION GAP SERPL CALCULATED.3IONS-SCNC: 14 MMOL/L (ref 3–16)
APPEARANCE FLUID: CLEAR
BASE EXCESS ARTERIAL: 2.4 MMOL/L (ref -3–3)
BASE EXCESS ARTERIAL: 3.8 MMOL/L (ref -3–3)
BASOPHILS ABSOLUTE: 0 K/UL (ref 0–0.2)
BASOPHILS RELATIVE PERCENT: 0.5 %
BLOOD CULTURE, ROUTINE: NORMAL
BUN BLDV-MCNC: 46 MG/DL (ref 7–20)
CALCIUM SERPL-MCNC: 8.9 MG/DL (ref 8.3–10.6)
CARBOXYHEMOGLOBIN ARTERIAL: 0.3 % (ref 0–1.5)
CARBOXYHEMOGLOBIN ARTERIAL: 0.5 % (ref 0–1.5)
CELL COUNT FLUID TYPE: NORMAL
CHLORIDE BLD-SCNC: 87 MMOL/L (ref 99–110)
CLOT EVALUATION: NORMAL
CO2: 28 MMOL/L (ref 21–32)
COLOR FLUID: COLORLESS
CREAT SERPL-MCNC: 7 MG/DL (ref 0.8–1.3)
EOSINOPHILS ABSOLUTE: 0.4 K/UL (ref 0–0.6)
EOSINOPHILS RELATIVE PERCENT: 3.8 %
FLUID DIFF COMMENT: NORMAL
GFR AFRICAN AMERICAN: 9
GFR NON-AFRICAN AMERICAN: 8
GLUCOSE BLD-MCNC: 107 MG/DL (ref 70–99)
GLUCOSE BLD-MCNC: 118 MG/DL (ref 70–99)
GLUCOSE BLD-MCNC: 153 MG/DL (ref 70–99)
GLUCOSE BLD-MCNC: 259 MG/DL (ref 70–99)
GLUCOSE BLD-MCNC: 284 MG/DL (ref 70–99)
GLUCOSE BLD-MCNC: 300 MG/DL (ref 70–99)
HCO3 ARTERIAL: 29 MMOL/L (ref 21–29)
HCO3 ARTERIAL: 31 MMOL/L (ref 21–29)
HCT VFR BLD CALC: 26.7 % (ref 40.5–52.5)
HEMOGLOBIN, ART, EXTENDED: 13.9 G/DL
HEMOGLOBIN, ART, EXTENDED: 9.6 G/DL
HEMOGLOBIN: 9.6 G/DL (ref 13.5–17.5)
LYMPHOCYTES ABSOLUTE: 0.9 K/UL (ref 1–5.1)
LYMPHOCYTES RELATIVE PERCENT: 9.5 %
MAGNESIUM: 2 MG/DL (ref 1.8–2.4)
MCH RBC QN AUTO: 39.2 PG (ref 26–34)
MCHC RBC AUTO-ENTMCNC: 35.9 G/DL (ref 31–36)
MCV RBC AUTO: 109.3 FL (ref 80–100)
METHEMOGLOBIN ARTERIAL: 0.4 % (ref 0–1.4)
METHEMOGLOBIN ARTERIAL: <0 % (ref 0–1.4)
MONOCYTES ABSOLUTE: 0.5 K/UL (ref 0–1.3)
MONOCYTES RELATIVE PERCENT: 5.1 %
NEUTROPHILS ABSOLUTE: 7.7 K/UL (ref 1.7–7.7)
NEUTROPHILS RELATIVE PERCENT: 81.1 %
NUCLEATED CELLS FLUID: 3 /CUMM
O2 SAT, ARTERIAL: 97 % (ref 93–100)
O2 SAT, ARTERIAL: 98 % (ref 93–100)
PCO2 ARTERIAL: 48.7 MMHG (ref 35–45)
PCO2 ARTERIAL: 63 MMHG (ref 35–45)
PDW BLD-RTO: 14.9 % (ref 12.4–15.4)
PERFORMED ON: ABNORMAL
PH ARTERIAL: 7.29 (ref 7.35–7.45)
PH ARTERIAL: 7.39 (ref 7.35–7.45)
PLATELET # BLD: 236 K/UL (ref 135–450)
PMV BLD AUTO: 7.2 FL (ref 5–10.5)
PO2 ARTERIAL: 105 MMHG (ref 75–108)
PO2 ARTERIAL: 121 MMHG (ref 75–108)
POTASSIUM SERPL-SCNC: 3.8 MMOL/L (ref 3.5–5.1)
RBC # BLD: 2.44 M/UL (ref 4.2–5.9)
RBC FLUID: 0 /CUMM
SODIUM BLD-SCNC: 129 MMOL/L (ref 136–145)
TCO2 ARTERIAL: 31 MMOL/L
TCO2 ARTERIAL: 33 MMOL/L
VANCOMYCIN RANDOM: 26.2 UG/ML
WBC # BLD: 9.4 K/UL (ref 4–11)

## 2022-09-11 PROCEDURE — 82803 BLOOD GASES ANY COMBINATION: CPT

## 2022-09-11 PROCEDURE — 89050 BODY FLUID CELL COUNT: CPT

## 2022-09-11 PROCEDURE — 6370000000 HC RX 637 (ALT 250 FOR IP)

## 2022-09-11 PROCEDURE — 85025 COMPLETE CBC W/AUTO DIFF WBC: CPT

## 2022-09-11 PROCEDURE — 99232 SBSQ HOSP IP/OBS MODERATE 35: CPT | Performed by: HOSPITALIST

## 2022-09-11 PROCEDURE — 80202 ASSAY OF VANCOMYCIN: CPT

## 2022-09-11 PROCEDURE — 36415 COLL VENOUS BLD VENIPUNCTURE: CPT

## 2022-09-11 PROCEDURE — 2060000000 HC ICU INTERMEDIATE R&B

## 2022-09-11 PROCEDURE — 94660 CPAP INITIATION&MGMT: CPT

## 2022-09-11 PROCEDURE — 2580000003 HC RX 258: Performed by: STUDENT IN AN ORGANIZED HEALTH CARE EDUCATION/TRAINING PROGRAM

## 2022-09-11 PROCEDURE — 99232 SBSQ HOSP IP/OBS MODERATE 35: CPT | Performed by: INTERNAL MEDICINE

## 2022-09-11 PROCEDURE — 70450 CT HEAD/BRAIN W/O DYE: CPT

## 2022-09-11 PROCEDURE — 80048 BASIC METABOLIC PNL TOTAL CA: CPT

## 2022-09-11 PROCEDURE — 90945 DIALYSIS ONE EVALUATION: CPT

## 2022-09-11 PROCEDURE — 83735 ASSAY OF MAGNESIUM: CPT

## 2022-09-11 PROCEDURE — 94761 N-INVAS EAR/PLS OXIMETRY MLT: CPT

## 2022-09-11 PROCEDURE — 99233 SBSQ HOSP IP/OBS HIGH 50: CPT | Performed by: INTERNAL MEDICINE

## 2022-09-11 PROCEDURE — 2700000000 HC OXYGEN THERAPY PER DAY

## 2022-09-11 PROCEDURE — 6360000002 HC RX W HCPCS: Performed by: STUDENT IN AN ORGANIZED HEALTH CARE EDUCATION/TRAINING PROGRAM

## 2022-09-11 RX ADMIN — SODIUM CHLORIDE, PRESERVATIVE FREE 10 ML: 5 INJECTION INTRAVENOUS at 09:59

## 2022-09-11 RX ADMIN — SODIUM CHLORIDE, PRESERVATIVE FREE 10 ML: 5 INJECTION INTRAVENOUS at 22:18

## 2022-09-11 RX ADMIN — INSULIN LISPRO 8 UNITS: 100 INJECTION, SOLUTION INTRAVENOUS; SUBCUTANEOUS at 10:04

## 2022-09-11 RX ADMIN — INSULIN GLARGINE 35 UNITS: 100 INJECTION, SOLUTION SUBCUTANEOUS at 22:55

## 2022-09-11 RX ADMIN — HEPARIN SODIUM 5000 UNITS: 5000 INJECTION INTRAVENOUS; SUBCUTANEOUS at 21:24

## 2022-09-11 RX ADMIN — HEPARIN SODIUM 5000 UNITS: 5000 INJECTION INTRAVENOUS; SUBCUTANEOUS at 14:26

## 2022-09-11 RX ADMIN — HEPARIN SODIUM 5000 UNITS: 5000 INJECTION INTRAVENOUS; SUBCUTANEOUS at 04:50

## 2022-09-11 RX ADMIN — INSULIN LISPRO 25 UNITS: 100 INJECTION, SOLUTION INTRAVENOUS; SUBCUTANEOUS at 10:06

## 2022-09-11 ASSESSMENT — PAIN SCALES - GENERAL
PAINLEVEL_OUTOF10: 0

## 2022-09-11 NOTE — PROGRESS NOTES
After 3 hrs on Bipap pt will occasionally responds to voice, but does not interact with staff. Makes purposeful movements to scratch face, but does not follow commands. Cannot verbalize to staff, but is responsive to pain.

## 2022-09-11 NOTE — PROGRESS NOTES
CCPD Order   Exchanges: 5   Exchange Volume: 2500 ml   Total Time: 10 hrs   Dextrose: 1.5%   Last Fill: 0 ml   Total Volume: 05553 ml     Orders verified. Supplies taken to pt's room. Report received. Cycler set up, primed and pre tested. Dressing changed on [facility] Cath site. Pt connected to cycler. CCPD initiated without problem. Initial effluent clear. If problems should arise please call the [phone] number on top of PD cycler machine.

## 2022-09-11 NOTE — PROGRESS NOTES
Pt is not interacting with staff. Unable to say anything, no evidence of orientation. Unsafe to give oral medications. HR 84; Sat's 100% on 2 liters.

## 2022-09-11 NOTE — PROGRESS NOTES
Pulmonary Followup Note    Indication for visit: Hypercapnea    Subjective:    Pt took break from LK FREEMANra yesterday afternoon and was placed back on it around 2100 and was reported to wear it all night. He was off it this morning and ABG at 12:15 PM was 7.30/63/121 so was placed back on Bipap 15/5 with Vt 600 and MV 8 when I saw him. He did not seem as alert as yesterday. I ordered a repeat ABG to see if correction of pH to normal helps with lethargy. ABG at 14:28 was improved at 7.39/49/105. insulin glargine  70 Units SubCUTAneous Nightly    insulin lispro  25 Units SubCUTAneous TID WC    insulin lispro  0-16 Units SubCUTAneous TID WC    insulin lispro  0-4 Units SubCUTAneous Nightly    famotidine  20 mg Oral Daily    polyethylene glycol  17 g Oral BID    epoetin daphney-epbx  2,000 Units SubCUTAneous Once per day on Mon Wed Fri    atorvastatin  80 mg Oral Nightly    levothyroxine  50 mcg Oral Daily    aspirin  81 mg Oral Daily    sodium chloride flush  5-40 mL IntraVENous 2 times per day    heparin (porcine)  5,000 Units SubCUTAneous 3 times per day       Continuous Infusions:   sodium chloride 25 mL (09/10/22 1132)    dextrose         ROS: No fever    PHYSICAL EXAMINATION:  BP (!) 140/59   Pulse 82   Temp 97.6 °F (36.4 °C) (Oral)   Resp 16   Ht 5' 10\" (1.778 m)   Wt 205 lb 7.5 oz (93.2 kg)   SpO2 98%   BMI 29.48 kg/m²     Gen: Lethargic but appears in no distress  Eyes: PERRL, EOMI, normal conjunctivae  Ears, Nose, Mouth and Throat: Hearing is normal. Oropharynx is normal  Neck: No lymphadenopathy  Respiratory: CTA  CV: RRR without M/R/R  Abd: +BS, soft, NT/ND  Musculoskeletal: No cyanosis, clubbing, or edema.   Skin: No rashes, ulcers, or subcutaneous nodules      DATA  CBC:   Recent Labs     09/09/22  0422 09/10/22  0915 09/11/22  0326   WBC 11.8* 10.1 9.4   HGB 9.3* 9.5* 9.6*   HCT 26.6* 27.1* 26.7*   .1* 110.4* 109.3*    239 236     BMP:   Recent Labs 09/09/22  0422 09/10/22  0915 09/11/22  0326   * 132* 129*   K 3.7 3.1* 3.8   CL 87* 88* 87*   CO2 28 28 28   BUN 47* 48* 46*   CREATININE 7.2* 7.3* 7.0*     Recent Labs     09/11/22  1215 09/11/22  1428   PHART 7.295* 7.390   [id number] 63.0* 48.7*   PO2ART 121.0* 105.0          Radiology Review:  All pertinent images / reports were reviewed as a part of this visit. CXR reveals the following:      Impression       Free air beneath the right hemidiaphragm. Bibasilar atelectasis. PFTs:  Indication: Shortness of breath, weakness, non productive cough    Test comment:     Spirometry data is acceptable and reproducible. Pulse ox is 91% on room air    Estimated body mass index is 28.3 kg/m² as calculated from the   following:    Height as of 11/21/17: 5' 10\" (1.778 m). Weight as of 11/21/17: 197 lb 3.2 oz (89.4 kg). Spirometry data:    FEV1/FVC: 89. Predicted ratio 73    Pre-Bronchodilator FEV1 2.07L, which is 63% predicted    Post-Bronchodilator FEV1 2.02L, which is 61% predicted    There is -2% reversibility     FVC is 2.33L, which is 51% predicted    Lung Volumes:    TLC is 4.85L, which is 67% predicted    RV is 2.51L which is 99% predicted    Diffusion Capacity:    DLCO is 13.77 which is 42% predicted    Impression:    1. There is no obstruction present    2. There is no response to bronchodilator therapy         [Increase in FEV1 => 12% of control and => 200 ml]    3. There is moderate restriction     4. There is moderate reduction in diffusion capacity    Comment: Moderate restrictive disease with moderate decrease in   diffusion capacity consistent with the diagnosis of interstitial   lung disease. ASSESSMENT/PLAN:     Unclear cause of delerium but based on recent ABG/VBGs I do not think hypercapnea is main . Most recent ABG shows normal pH and almost normal paCO2.  Unfortunaelty I am not at hospital but have asked NSG to evaluate lethargy and comment in chart     Cont Bipap qHS and prn    Neuro consult may be helpful to help with etioogy         Jaxon Varela MD

## 2022-09-11 NOTE — PLAN OF CARE
Problem: Chronic Conditions and Co-morbidities  Goal: Patient's chronic conditions and co-morbidity symptoms are monitored and maintained or improved  Outcome: Progressing  Flowsheets (Taken 9/11/2022 1837)  Care Plan - Patient's Chronic Conditions and Co-Morbidity Symptoms are Monitored and Maintained or Improved:   Monitor and assess patient's chronic conditions and comorbid symptoms for stability, deterioration, or improvement   Update acute care plan with appropriate goals if chronic or comorbid symptoms are exacerbated and prevent overall improvement and discharge   Collaborate with multidisciplinary team to address chronic and comorbid conditions and prevent exacerbation or deterioration  Note: Pt remains on continuous bipap. SPO2 above 95%, continuous pulse ox.       Problem: Metabolic/Fluid and Electrolytes - Adult  Goal: Electrolytes maintained within normal limits  Outcome: Progressing  Flowsheets (Taken 9/11/2022 1837)  Electrolytes maintained within normal limits:   Administer electrolyte replacement as ordered   Monitor labs and assess patient for signs and symptoms of electrolyte imbalances   Monitor response to electrolyte replacements, including repeat lab results as appropriate  Note: Pt NPO d/t mentation      Problem: Pain  Goal: Verbalizes/displays adequate comfort level or baseline comfort level  Outcome: Progressing  Flowsheets (Taken 9/11/2022 1837)  Verbalizes/displays adequate comfort level or baseline comfort level:   Encourage patient to monitor pain and request assistance   Administer analgesics based on type and severity of pain and evaluate response   Consider cultural and social influences on pain and pain management   Assess pain using appropriate pain scale   Implement non-pharmacological measures as appropriate and evaluate response

## 2022-09-11 NOTE — CONSULTS
Vancomycin has been discontinued. Pharmacy will sign off consult. If medication dosing is resumed, please re-consult pharmacy. Please call with any questions.   Villa Aguirre PharmD, BCPS  Main pharmacy: [id number]  9/11/2022 9:59 AM

## 2022-09-11 NOTE — PROGRESS NOTES
MEWS score of 3. Unable to follow commands, moans to sternal rub. MD called to bedside. Stat ABG and head CT ordered.

## 2022-09-11 NOTE — PROGRESS NOTES
Internal Medicine Progress Note    Admit Date: 9/6/2022  Day: 5   Diet: ADULT DIET; Regular; 3 carb choices (45 gm/meal); Low Fat/Low Chol/High Fiber/2 gm Na  ADULT ORAL NUTRITION SUPPLEMENT; Breakfast, Lunch, Dinner; Renal Oral Supplement    CC: altered mental status    Interval history:   Lispro 25 night dose was held as BG was 155. Patient tolerated BiPAP through the night. This morning, he is awake but not interactive. Appears confused. He is continued on CCPD with UF of -137 mls today which he tolerated.        Medications:     Scheduled Meds:   insulin glargine  70 Units SubCUTAneous Nightly    insulin lispro  25 Units SubCUTAneous TID WC    levofloxacin  250 mg IntraVENous Q24H    insulin lispro  0-16 Units SubCUTAneous TID WC    insulin lispro  0-4 Units SubCUTAneous Nightly    famotidine  20 mg Oral Daily    polyethylene glycol  17 g Oral BID    vancomycin (VANCOCIN) intermittent dosing (placeholder)   Other See Admin Instructions    epoetin daphney-epbx  2,000 Units SubCUTAneous Once per day on Mon Wed Fri    atorvastatin  80 mg Oral Nightly    levothyroxine  50 mcg Oral Daily    aspirin  81 mg Oral Daily    sodium chloride flush  5-40 mL IntraVENous 2 times per day    heparin (porcine)  5,000 Units SubCUTAneous 3 times per day     Continuous Infusions:   sodium chloride 25 mL (09/10/22 1132)    dextrose       PRN Meds:sodium chloride flush, sodium chloride, ondansetron **OR** ondansetron, polyethylene glycol, acetaminophen **OR** acetaminophen, glucose, dextrose bolus **OR** dextrose bolus, glucagon (rDNA), dextrose    Objective:   Vitals:   T-max:  Patient Vitals for the past 8 hrs:   BP Temp Temp src Pulse Resp SpO2 Weight   09/11/22 0645 (!) 162/94 97.5 °F (36.4 °C) Axillary 93 18 -- 205 lb 7.5 oz (93.2 kg)   09/11/22 0600 -- -- -- 80 -- -- --   09/11/22 0529 -- -- -- -- -- -- 205 lb 7.5 oz (93.2 kg)   09/11/22 0423 (!) 154/62 97.1 °F (36.2 °C) Oral 84 20 97 % --   09/11/22 0400 -- -- -- 85 -- -- -- 09/11/22 0200 -- -- -- 79 -- -- --   09/11/22 0005 (!) 152/78 97.3 °F (36.3 °C) Oral 82 21 98 % --   09/11/22 0000 -- -- -- 83 -- -- --         Intake/Output Summary (Last 24 hours) at 9/11/2022 0714  Last data filed at 9/11/2022 0645  Gross per 24 hour   Intake 470 ml   Output -240 ml   Net 710 ml         Physical Exam  Constitutional:       Appearance: Normal appearance. He is obese. HENT:      Head: Normocephalic and atraumatic. Mouth/Throat:      Mouth: Mucous membranes are moist.      Pharynx: Oropharynx is clear. Eyes:      Conjunctiva/sclera: Conjunctivae normal.      Pupils: Pupils are equal, round, and reactive to light. Cardiovascular:      Rate and Rhythm: Normal rate and regular rhythm. Pulses: Normal pulses. Heart sounds: Normal heart sounds. Pulmonary:      Effort: Pulmonary effort is normal.      Breath sounds: Normal breath sounds. Abdominal:      General: Abdomen is flat. Bowel sounds are normal.      Palpations: Abdomen is soft. Musculoskeletal:      Cervical back: Normal range of motion and neck supple. Skin:     General: Skin is warm and dry. Capillary Refill: Capillary refill takes less than 2 seconds. Neurological:      General: No focal deficit present. Mental Status: He is alert and oriented to person, place, and time. Mental status is at baseline.          LABS:    CBC:   Recent Labs     09/09/22  0422 09/10/22  0915 09/11/22  0326   WBC 11.8* 10.1 9.4   HGB 9.3* 9.5* 9.6*   HCT 26.6* 27.1* 26.7*    239 236   .1* 110.4* 109.3*       Renal:    Recent Labs     09/09/22  0422 09/10/22  0915 09/11/22  0326   * 132* 129*   K 3.7 3.1* 3.8   CL 87* 88* 87*   CO2 28 28 28   BUN 47* 48* 46*   CREATININE 7.2* 7.3* 7.0*   GLUCOSE 303* 192* 284*   CALCIUM 8.2* 8.6 8.9   MG 2.10 1.90 2.00   ANIONGAP 15 16 14         ABGs:    Recent Labs     09/09/22  2030   PHART 7.282*   [id number] 61.1*   PO2ART 35.6*   [id number] 29   BEART 0.8   [id number] 64* [id number] 31         -----------------------------------------------------------------  RAD:   XR CHEST (2 VW)   Final Result      Free air beneath the right hemidiaphragm. Bibasilar atelectasis. Critical result discussed with Dr. Benjamín Pereira at 1:00 PM 9/6/2022            Assessment/Plan:   Kristin Valdez is a 69 y.o. male with medical history of ESRD on PD, ILD, BJ who presented with AMS and fatigue. Acute hypoxic hypercapnic respiratory failure 2/2 CAP   -O2 saturation less than 90 prior to presentation and 86 in the ER.   -VBG on admission 7.28/71.3/35/33.8  -ABG 09/06 7.355/55/94.8/31  -CXR 9/6 with free air under diaphragm and bibasilar atelectasis   -Follows Dr Agustina Brooks for EDWARDS. -Cefepime d/c 09/09 d/t concerns for toxicity, started on levaquin 250 mg dly. Abx discontinued today.  -On BiPAP, continue QHS  -wean O2 as tolerated with goal of 88%      Acute metabolic encephalopathy   -likely 2/2 CO2 narcosis 2/2 Bipap non-compliance. - He does not use home BIPAP, reports he's unable to tolerate it. - Ammonia level - wnl , serum ethanol - none detected   -VBG on admission 7.28/71.3/35/33.8  -ABG 09/06 7.355/55/94.8/31  -Pulmonology consulted during this admission. Patient is know to Dr Agustina Brooks. Per Dr Agustina Brooks, unclear cause of delirium.   -On 2L O2 and sating at 98% - wean as tolerated with goal of 88% per pulmonology   -Cefepime d/c 09/09 d/t concerns for toxicity, started on levaquin 250 mg dly. Vanc and Levaquin discontinued 9/11. No concerns for PNA or PD peritonitis. AMS is likely from ? Cefepime toxicity.  -Continue PAP QHS per pulm. -monitor closely      Diastolic CHF  -last echo 1/60: EF 65-34%, LVH, nl diastolic function, elevated pulmonary artery systolic pressure 36 mmHg.  -Follows Dr Adri Serrano outpatient   Pro-BNP 9/6: 1308, Tn 0.08 (hemolyzed sample)  -monitor      ESRD on PD   -pt denies abdominal pain but abdomen is tender on palpation. Denies nausea or vomiting.  Has remained afebrile, hypertensive.   -Ct continuous cyclic peritoneal dialysis (CCPD) per nephrology   -Peritoneal fluid analysis ( gram stain, cell count w/differential ) are wnl. Not likely PD peritonitis. -PD fluid culture pending.  -blood cultures pending   -Ct retacrit 2,000 units MWF  -For PT/OT eval per nephro     HTN  -holding home amlodipine      Hyperglycemia  -T2DM, uncontrolled. Likely that the dextrose solution used for CCPD is contributing to continued hyperglycemia.  -HbA1c - 6.9 (6/22)  -On lantus 70 units nightly, insulin lispro 25 units tid with meals  -SSI while in patient   -POC glucose   -hypoglycemic protocol      Hypothyroidism   -ct  synthroid 50 mcg     CAD s/p CABG 2007  -stable on 04/22 angiogram with no intervention.  -ct aspirin   -ct Lipitor       Code Status:Full Code  FEN: ADULT DIET; Regular; 3 carb choices (45 gm/meal); Low Fat/Low Chol/High Fiber/2 gm Na  ADULT ORAL NUTRITION SUPPLEMENT; Breakfast, Lunch, Dinner; Renal Oral Supplement  PPX:  SQH  DISPO: [facility]    Easton Barrientos MD  Internal Medicine PGY-3  09/11/22  7:14 AM    This patient will be staffed and discussed with Juan M Grossman MD.     Addendum to Resident H& P/Progress note:  I have personally seen,examined and evaluated the patient.  I have reviewed the current history, physical findings, labs and assessment and plan and agree with note as documented by resident MD ( )  Discussed the patient's condition with Dr Belle Vogel, pulmonologist.    Juan M Grossman MD, [address]

## 2022-09-11 NOTE — PROGRESS NOTES
Physical Therapy/Occupational Therapy  HOLD  Chart review completed. Attempted to see pt at 835 on 9/11/22. Per RN, pt remains impulsive and RN requesting that pt remain in bed. PT/OT note strict bedrest order in chart, specifying bedrest until AMS resolves. PT/OT will HOLD evals at this time until bedrest orders are lifted and activity orders are updated. RN aware. Addendum 1140:  Per RN, pt is not able to follow commands appropriately to participate in therapy this date. PT/OT will HOLD and re-attempt as schedule allows.     Katerin Torres, PT  Marquez Ross, OTR/L

## 2022-09-11 NOTE — PROGRESS NOTES
Clinical Pharmacy Progress Note    Vancomycin - Management by Pharmacy    Consult Date(s): 9/7/22  Consulting Provider(s): Dr. Derrek Schaffer / Pau Christine      ? Peritonitis - Vancomycin  Concurrent Antimicrobials:   Levofloxacin- Day #3  Day of Vanc Therapy / Ordered Duration: #4  Current Dosing Method: Intermittent Dosing by Levels  Therapeutic Goal: ~ 15 mg/L  Current Dose / Frequency / Plan / Rationale:   Given ESRD on PD, will dose vancomycin based on intermittent levels for now. Have entered placeholder onto [facility] Random level today = 26.2  mg/L after 1500 mg IV x1 given 9/8  Will not order vancomycin dose today   Random level ordered for tomorrow   Will continue to monitor clinical condition and make adjustments to regimen as appropriate. Please call with any questions:  Greg Ortiz, PharmD  PGY-1 Pharmacy Resident  [facility]  [id number]  9/11/2022   7:00 AM          Interval update: WBC trending back down (9.4). Remains afebrile and HDS. PD fluid culture NGTD    Subjective/Objective: Mr. Sulema Nash is a 69 y.o. male with a PMHx significant for ESRD on PD, ILD, HFpEF, NSTEMI, CAD, DM2, HTN, hypothyroidism who presented to ED with AMS. Admitted with acute encephalopathy, respiratory failure. Pharmacy has been consulted to dose Vancomycin.     Ht Readings from Last 1 Encounters:   09/06/22 5' 10\" (1.778 m)     Wt Readings from Last 1 Encounters:   09/11/22 205 lb 7.5 oz (93.2 kg)     Current & Prior Antimicrobial Regimen(s):  Cefepime (9/7-current)  Vancomycin - Pharmacy to dose  Intermittent dosing based on levels (9/7-current)    Date Dose Vanc Level   9/7 01:51        09:23 1250mg IV + 500mg IV    9/8  04:19        10:04   1500 mg IV  16.7 mg/L     9/9  04:22 -- 31.0 mg/L   9/10 09:15 -- 25.8 mg/L   9/11 03:26 -- 26.2 mg/L       Cultures & Sensitivities:  Date Site Micro Susceptibility / Result   9/9 Body Fluid NGTD    9/7 PD fluid  NGTD    9/6 Blood  NGTD x 2 Labs / Ancillary Data:  Recent Labs     09/09/22  0422 09/10/22  0915 09/11/22  0326   CREATININE 7.2* 7.3* 7.0*   BUN 47* 48* 46*   WBC 11.8* 10.1 9.4         Additional Lab Values / Findings of Note:    Procalcitonin: No results for input(s): PROCAL in the last 72 hours.

## 2022-09-11 NOTE — FLOWSHEET NOTE
Disconnected from CCPD per protocol. Effluent: Clear   Total time: 10 hr 20 min   Total UF:  -137 ml. Total Volume:  62154 ml. Dwell time gained:  0 hr 54 min. Pt Tolerated procedure: Pt resting quietly. Offers no c/o.     Cell count & differential sent to lab.       09/11/22 0645   Vitals   BP (!) 162/94   Temp 97.5 °F (36.4 °C)   Heart Rate 93   Resp 18   Weight 205 lb 7.5 oz (93.2 kg)   Cycler   Ultrafiltration (UF) (mL) -137 mL   Average Dwell Time (Hours:Minutes) 82   Lost Dwell Time (Hours:Minutes) 0054

## 2022-09-11 NOTE — PROGRESS NOTES
Stat ABG resulted. Arterial Blood Gas result:  pO2 121.0; pCO2 63; pH 7.295;  HCO3 31, %O2 Sat 121.      MD made aware

## 2022-09-11 NOTE — PROGRESS NOTES
Nephrology  Note                                                                                                                                                                                                                                                                                                                                                               Office : [phone]     Fax :[phone]              Patient's Name: Regan Nunez  10:46 AM  9/11/2022    Reason for Consult:  ESRD   Requesting Physician:  Cezar Boyer MD      Jamie PD well   Fluid clear   No signs of PD peritonitis     Confused             Past Medical History:   Diagnosis Date    Allergic rhinitis due to other allergen 7/12/2010    Coronary atherosclerosis of unspecified type of vessel, native or graft 7/12/2010    Diabetic eye exam ([address] 04/29/2015    Diabetic eye exam ([address] 5/5/2016    [place] [facility]    Generalized osteoarthrosis, involving multiple sites 7/12/2010    Other psoriasis 7/12/2010    Pneumonia     Prolonged emergence from general anesthesia     Psoriatic arthropathy ([address] 7/12/2010    Type II or unspecified type diabetes mellitus without mention of complication, not stated as uncontrolled 7/12/2010    Unspecified essential hypertension 7/12/2010    Unspecified sleep apnea 7/12/2010       Past Surgical History:   Procedure Laterality Date    CARPAL TUNNEL RELEASE      s/p    CATARACT REMOVAL      CORONARY ARTERY BYPASS GRAFT      JOINT REPLACEMENT      LAPAROSCOPY INSERTION PERITONEAL CATHETER N/A 10/26/2020    LAPAROSCOPIC PERITONEAL DIALYSIS CATHETER PLACEMENT; LAPAROSCOPIC OMENTOPEXY performed by Canelo Barrios DO at [facility] OR       Family History   Problem Relation Age of Onset    Diabetes Mother     Heart Failure Mother     Coronary Art Dis Father         reports that he quit smoking about 41 years ago. He started smoking about 60 years ago. He has a 18.00 pack-year smoking history.  He has never used smokeless tobacco. He reports that he does not drink alcohol and does not use drugs.     Allergies:  Penicillins, Sulfa antibiotics, Tazorac [tazarotene], Clindamycin, and Clindamycin/lincomycin    Current Medications:    insulin glargine (LANTUS;BASAGLAR) injection pen 70 Units, Nightly  insulin lispro (1 Unit Dial) 25 Units, TID WC  insulin lispro (1 Unit Dial) 0-16 Units, TID WC  insulin lispro (1 Unit Dial) 0-4 Units, Nightly  famotidine (PEPCID) tablet 20 mg, Daily  polyethylene glycol (GLYCOLAX) packet 17 g, BID  epoetin daphney-epbx (RETACRIT) injection 2,000 Units, Once per day on Mon Wed Fri  atorvastatin (LIPITOR) tablet 80 mg, Nightly  levothyroxine (SYNTHROID) tablet 50 mcg, Daily  aspirin chewable tablet 81 mg, Daily  sodium chloride flush 0.9 % injection 5-40 mL, 2 times per day  sodium chloride flush 0.9 % injection 5-40 mL, PRN  0.9 % sodium chloride infusion, PRN  ondansetron (ZOFRAN-ODT) disintegrating tablet 4 mg, Q8H PRN   Or  ondansetron (ZOFRAN) injection 4 mg, Q6H PRN  polyethylene glycol (GLYCOLAX) packet 17 g, Daily PRN  acetaminophen (TYLENOL) tablet 650 mg, Q6H PRN   Or  acetaminophen (TYLENOL) suppository 650 mg, Q6H PRN  heparin (porcine) injection 5,000 Units, 3 times per day  glucose chewable tablet 16 g, PRN  dextrose bolus 10% 125 mL, PRN   Or  dextrose bolus 10% 250 mL, PRN  glucagon (rDNA) injection 1 mg, PRN  dextrose 10 % infusion, Continuous PRN            Physical exam:     Vitals:  BP (!) 169/74   Pulse 79   Temp 97.6 °F (36.4 °C) (Axillary)   Resp 18   Ht 5' 10\" (1.778 m)   Wt 205 lb 7.5 oz (93.2 kg)   SpO2 98%   BMI 29.48 kg/m²   Constitutional:  OAA X3 NAD  Skin: no rash, turgor wnl  Heent:  eomi, mmm  Neck: no bruits or jvd noted  Cardiovascular:  S1, S2 without m/r/g  Respiratory: CTA B without w/r/r  Abdomen:  +bs, soft, TTP   Ext: + lower extremity edema  Psychiatric: mood and affect appropriate  Musculoskeletal:  Rom, muscular strength intact    Data:   Labs:  CBC:   Recent Labs 09/09/22  0422 09/10/22  0915 09/11/22  0326   WBC 11.8* 10.1 9.4   HGB 9.3* 9.5* 9.6*    239 236       BMP:    Recent Labs     09/09/22  0422 09/10/22  0915 09/11/22  0326   * 132* 129*   K 3.7 3.1* 3.8   CL 87* 88* 87*   CO2 28 28 28   BUN 47* 48* 46*   CREATININE 7.2* 7.3* 7.0*   GLUCOSE 303* 192* 284*       Ca/Mg/Phos:   Recent Labs     09/09/22  0422 09/10/22  0915 09/11/22  0326   CALCIUM 8.2* 8.6 8.9   MG 2.10 1.90 2.00       Hepatic:   No results for input(s): AST, ALT, ALB, BILITOT, ALKPHOS in the last 72 hours. Troponin:   No results for input(s): TROPONINI in the last 72 hours. BNP: No results for input(s): BNP in the last 72 hours. Lipids: No results for input(s): CHOL, TRIG, HDL, LDLCALC, LABVLDL in the last 72 hours. ABGs:   Recent Labs     09/09/22 2030   PHART 7.282*   PO2ART 35.6*   [id number] 61.1*       INR: No results for input(s): INR in the last 72 hours. UA:No results for input(s): [place], GLUCOSEU, BILIRUBINUR, Phoebe Bolls, BLOODU, PHUR, PROTEINU, UROBILINOGEN, NITRU, LEUKOCYTESUR, LABMICR, URINETYPE in the last 72 hours. Urine Microscopic: No results for input(s): LABCAST, BACTERIA, COMU, HYALCAST, WBCUA, RBCUA, EPIU in the last 72 hours. Urine Culture: No results for input(s): LABURIN in the last 72 hours. Urine Chemistry: No results for input(s): Luther Necessary, PROTEINUR, NAUR in the last 72 hours. IMAGING:  XR CHEST (2 VW)   Final Result      Free air beneath the right hemidiaphragm. Bibasilar atelectasis. Critical result discussed with Dr. Jannet Ellis at 1:00 PM 9/6/2022          Assessment/Plan   ESRD     2. HTN    3. Anemia    4. Acid- base/ Electrolyte imbalance     5.  Abd tenderness - r/o PD peritonitis     Plan   - Cefepime toxicity is possible   - will do HD in am if not better   - ABG - reviewed   - PD fluid cell count - nl - no PD peritonitis   - CCPD today   - monitor Labs  - EPO or anemia    - Constipation management   - PT eval Thank you for allowing us to participate in care of Manuel Camarillo MD  Feel free to contact me   [facility] [address]  Office : [phone]  Fax :[phone]

## 2022-09-11 NOTE — PLAN OF CARE
Problem: Safety - Adult  Goal: Free from fall injury  9/10/2022 1551 by Shaina Ramirez RN  Outcome: Progressing  Flowsheets (Taken 9/10/2022 1551)  Free From Fall Injury: [place] family/caregiver on patient safety     Problem: Skin/Tissue Integrity  Goal: Absence of new skin breakdown  Description: 1. Monitor for areas of redness and/or skin breakdown  2. Assess vascular access sites hourly  3. Every 4-6 hours minimum:  Change oxygen saturation probe site  4. Every 4-6 hours:  If on nasal continuous positive airway pressure, respiratory therapy assess nares and determine need for appliance change or resting period.   9/11/2022 0539 by Carol Nair RN  Outcome: Progressing  Note: Q2 hrly turning observed and done      Problem: Respiratory - Adult  Goal: Achieves optimal ventilation and oxygenation  9/11/2022 0539 by Carol Nair RN  Outcome: Progressing  Flowsheets  Taken 9/11/2022 0539 by Carol Nair RN  Achieves optimal ventilation and oxygenation:   Assess for changes in respiratory status   Position to facilitate oxygenation and minimize respiratory effort   Assess for changes in mentation and behavior   Oxygen supplementation based on oxygen saturation or arterial blood gases   Assess and instruct to report shortness of breath or any respiratory difficulty   Respiratory therapy support as indicated  Taken 9/10/2022 1955 by Carol Nair RN  Achieves optimal ventilation and oxygenation: Assess for changes in respiratory status  Taken 9/10/2022 1551 by Shaina Ramirez RN  Achieves optimal ventilation and oxygenation:   Assess for changes in respiratory status   Assess for changes in mentation and behavior   Position to facilitate oxygenation and minimize respiratory effort   Oxygen supplementation based on oxygen saturation or arterial blood gases   Assess the need for suctioning and aspirate as needed   Assess and instruct to report shortness of breath or any respiratory difficulty   Respiratory therapy support as indicated  Note: On BIPAP overnight, no respiratory distress noted

## 2022-09-12 ENCOUNTER — APPOINTMENT (OUTPATIENT)
Dept: GENERAL RADIOLOGY | Age: 77
DRG: 640 | End: 2022-09-12
Payer: COMMERCIAL

## 2022-09-12 PROBLEM — R89.2 DRUG TOXICITY: Status: ACTIVE | Noted: 2022-09-12

## 2022-09-12 LAB
ANION GAP SERPL CALCULATED.3IONS-SCNC: 18 MMOL/L (ref 3–16)
APPEARANCE FLUID: CLEAR
BASOPHILS ABSOLUTE: 0 K/UL (ref 0–0.2)
BASOPHILS RELATIVE PERCENT: 0.4 %
BODY FLUID CULTURE, STERILE: NORMAL
BUN BLDV-MCNC: 46 MG/DL (ref 7–20)
CALCIUM SERPL-MCNC: 8.8 MG/DL (ref 8.3–10.6)
CELL COUNT FLUID TYPE: NORMAL
CHLORIDE BLD-SCNC: 90 MMOL/L (ref 99–110)
CLOT EVALUATION: NORMAL
CO2: 25 MMOL/L (ref 21–32)
COLOR FLUID: COLORLESS
CREAT SERPL-MCNC: 7.3 MG/DL (ref 0.8–1.3)
EOSINOPHILS ABSOLUTE: 0.4 K/UL (ref 0–0.6)
EOSINOPHILS RELATIVE PERCENT: 4 %
FLUID DIFF COMMENT: NORMAL
GFR AFRICAN AMERICAN: 9
GFR NON-AFRICAN AMERICAN: 7
GLUCOSE BLD-MCNC: 137 MG/DL (ref 70–99)
GLUCOSE BLD-MCNC: 194 MG/DL (ref 70–99)
GLUCOSE BLD-MCNC: 207 MG/DL (ref 70–99)
GLUCOSE BLD-MCNC: 215 MG/DL (ref 70–99)
GLUCOSE BLD-MCNC: 216 MG/DL (ref 70–99)
GRAM STAIN RESULT: NORMAL
HCT VFR BLD CALC: 28.3 % (ref 40.5–52.5)
HEMOGLOBIN: 9.7 G/DL (ref 13.5–17.5)
LYMPHOCYTES ABSOLUTE: 1.1 K/UL (ref 1–5.1)
LYMPHOCYTES RELATIVE PERCENT: 10.5 %
MAGNESIUM: 2.2 MG/DL (ref 1.8–2.4)
MCH RBC QN AUTO: 37.9 PG (ref 26–34)
MCHC RBC AUTO-ENTMCNC: 34.4 G/DL (ref 31–36)
MCV RBC AUTO: 110.4 FL (ref 80–100)
MONOCYTES ABSOLUTE: 0.6 K/UL (ref 0–1.3)
MONOCYTES RELATIVE PERCENT: 5.6 %
NEUTROPHILS ABSOLUTE: 8.2 K/UL (ref 1.7–7.7)
NEUTROPHILS RELATIVE PERCENT: 79.5 %
NUCLEATED CELLS FLUID: 3 /CUMM
PDW BLD-RTO: 14.8 % (ref 12.4–15.4)
PERFORMED ON: ABNORMAL
PLATELET # BLD: 254 K/UL (ref 135–450)
PMV BLD AUTO: 7.5 FL (ref 5–10.5)
POTASSIUM SERPL-SCNC: 3.6 MMOL/L (ref 3.5–5.1)
RBC # BLD: 2.57 M/UL (ref 4.2–5.9)
RBC FLUID: <1000 /CUMM
SODIUM BLD-SCNC: 133 MMOL/L (ref 136–145)
VANCOMYCIN RANDOM: 22.6 UG/ML
WBC # BLD: 10.4 K/UL (ref 4–11)

## 2022-09-12 PROCEDURE — 83735 ASSAY OF MAGNESIUM: CPT

## 2022-09-12 PROCEDURE — 89050 BODY FLUID CELL COUNT: CPT

## 2022-09-12 PROCEDURE — 87340 HEPATITIS B SURFACE AG IA: CPT

## 2022-09-12 PROCEDURE — 6360000002 HC RX W HCPCS: Performed by: STUDENT IN AN ORGANIZED HEALTH CARE EDUCATION/TRAINING PROGRAM

## 2022-09-12 PROCEDURE — 86706 HEP B SURFACE ANTIBODY: CPT

## 2022-09-12 PROCEDURE — 2060000000 HC ICU INTERMEDIATE R&B

## 2022-09-12 PROCEDURE — 80202 ASSAY OF VANCOMYCIN: CPT

## 2022-09-12 PROCEDURE — 2700000000 HC OXYGEN THERAPY PER DAY

## 2022-09-12 PROCEDURE — 36415 COLL VENOUS BLD VENIPUNCTURE: CPT

## 2022-09-12 PROCEDURE — 99233 SBSQ HOSP IP/OBS HIGH 50: CPT | Performed by: INTERNAL MEDICINE

## 2022-09-12 PROCEDURE — 90935 HEMODIALYSIS ONE EVALUATION: CPT

## 2022-09-12 PROCEDURE — 2580000003 HC RX 258: Performed by: STUDENT IN AN ORGANIZED HEALTH CARE EDUCATION/TRAINING PROGRAM

## 2022-09-12 PROCEDURE — 80048 BASIC METABOLIC PNL TOTAL CA: CPT

## 2022-09-12 PROCEDURE — 99232 SBSQ HOSP IP/OBS MODERATE 35: CPT | Performed by: INTERNAL MEDICINE

## 2022-09-12 PROCEDURE — 5A1D70Z PERFORMANCE OF URINARY FILTRATION, INTERMITTENT, LESS THAN 6 HOURS PER DAY: ICD-10-PCS | Performed by: SURGERY

## 2022-09-12 PROCEDURE — 3E1M39Z IRRIGATION OF PERITONEAL CAVITY USING DIALYSATE, PERCUTANEOUS APPROACH: ICD-10-PCS | Performed by: INTERNAL MEDICINE

## 2022-09-12 PROCEDURE — 99232 SBSQ HOSP IP/OBS MODERATE 35: CPT | Performed by: HOSPITALIST

## 2022-09-12 PROCEDURE — 6370000000 HC RX 637 (ALT 250 FOR IP): Performed by: STUDENT IN AN ORGANIZED HEALTH CARE EDUCATION/TRAINING PROGRAM

## 2022-09-12 PROCEDURE — 85025 COMPLETE CBC W/AUTO DIFF WBC: CPT

## 2022-09-12 PROCEDURE — 71045 X-RAY EXAM CHEST 1 VIEW: CPT

## 2022-09-12 PROCEDURE — 99222 1ST HOSP IP/OBS MODERATE 55: CPT | Performed by: SURGERY

## 2022-09-12 PROCEDURE — 94761 N-INVAS EAR/PLS OXIMETRY MLT: CPT

## 2022-09-12 PROCEDURE — 6360000002 HC RX W HCPCS

## 2022-09-12 PROCEDURE — 94660 CPAP INITIATION&MGMT: CPT

## 2022-09-12 RX ORDER — INSULIN LISPRO 100 [IU]/ML
0-8 INJECTION, SOLUTION INTRAVENOUS; SUBCUTANEOUS
Status: DISCONTINUED | OUTPATIENT
Start: 2022-09-12 | End: 2022-09-14

## 2022-09-12 RX ORDER — INSULIN LISPRO 100 [IU]/ML
5 INJECTION, SOLUTION INTRAVENOUS; SUBCUTANEOUS
Status: DISCONTINUED | OUTPATIENT
Start: 2022-09-12 | End: 2022-09-17

## 2022-09-12 RX ORDER — INSULIN LISPRO 100 [IU]/ML
10 INJECTION, SOLUTION INTRAVENOUS; SUBCUTANEOUS
Status: DISCONTINUED | OUTPATIENT
Start: 2022-09-12 | End: 2022-09-12

## 2022-09-12 RX ORDER — INSULIN LISPRO 100 [IU]/ML
0-4 INJECTION, SOLUTION INTRAVENOUS; SUBCUTANEOUS NIGHTLY
Status: DISCONTINUED | OUTPATIENT
Start: 2022-09-12 | End: 2022-09-14 | Stop reason: SDUPTHER

## 2022-09-12 RX ORDER — HEPARIN SODIUM 1000 [USP'U]/ML
10000 INJECTION, SOLUTION INTRAVENOUS; SUBCUTANEOUS ONCE
Status: COMPLETED | OUTPATIENT
Start: 2022-09-12 | End: 2022-09-12

## 2022-09-12 RX ADMIN — SODIUM CHLORIDE, PRESERVATIVE FREE 10 ML: 5 INJECTION INTRAVENOUS at 20:55

## 2022-09-12 RX ADMIN — EPOETIN ALFA-EPBX 2000 UNITS: 2000 INJECTION, SOLUTION INTRAVENOUS; SUBCUTANEOUS at 17:41

## 2022-09-12 RX ADMIN — HEPARIN SODIUM 5000 UNITS: 5000 INJECTION INTRAVENOUS; SUBCUTANEOUS at 23:18

## 2022-09-12 RX ADMIN — HEPARIN SODIUM 10000 UNITS: 1000 INJECTION INTRAVENOUS; SUBCUTANEOUS at 10:40

## 2022-09-12 RX ADMIN — HEPARIN SODIUM 5000 UNITS: 5000 INJECTION INTRAVENOUS; SUBCUTANEOUS at 05:53

## 2022-09-12 RX ADMIN — INSULIN GLARGINE 35 UNITS: 100 INJECTION, SOLUTION SUBCUTANEOUS at 23:01

## 2022-09-12 ASSESSMENT — PAIN SCALES - GENERAL
PAINLEVEL_OUTOF10: 0

## 2022-09-12 ASSESSMENT — ENCOUNTER SYMPTOMS
ABDOMINAL PAIN: 0
COUGH: 0

## 2022-09-12 NOTE — PLAN OF CARE
Problem: Discharge Planning  Goal: Discharge to home or other facility with appropriate resources  9/12/2022 0942 by Abdiel Owusu RN  Outcome: Progressing  Flowsheets (Taken 9/12/2022 1277)  Discharge to home or other facility with appropriate resources:   Identify barriers to discharge with patient and caregiver   Arrange for needed discharge resources and transportation as appropriate   Identify discharge learning needs (meds, wound care, etc)   Arrange for interpreters to assist at discharge as needed     Problem: Safety - Adult  Goal: Free from fall injury  9/12/2022 0942 by Abdiel Owusu RN  Outcome: Progressing  Flowsheets  Taken 9/12/2022 0942  Free From Fall Injury: Instruct family/caregiver on patient safety  Taken 9/12/2022 0733  Free From Fall Injury: Instruct family/caregiver on patient safety     Problem: ABCDS Injury Assessment  Goal: Absence of physical injury  Outcome: Progressing  Flowsheets  Taken 9/12/2022 0942  Absence of Physical Injury: Implement safety measures based on patient assessment  Taken 9/12/2022 0733  Absence of Physical Injury: Implement safety measures based on patient assessment     Problem: Skin/Tissue Integrity  Goal: Absence of new skin breakdown  Description: 1. Monitor for areas of redness and/or skin breakdown  2. Assess vascular access sites hourly  3. Every 4-6 hours minimum:  Change oxygen saturation probe site  4. Every 4-6 hours:  If on nasal continuous positive airway pressure, respiratory therapy assess nares and determine need for appliance change or resting period.   9/12/2022 0942 by Abdiel Owusu RN  Outcome: Progressing     Problem: Chronic Conditions and Co-morbidities  Goal: Patient's chronic conditions and co-morbidity symptoms are monitored and maintained or improved  Outcome: Progressing  Flowsheets (Taken 9/12/2022 0942)  Care Plan - Patient's Chronic Conditions and Co-Morbidity Symptoms are Monitored and Maintained or Improved: Monitor and assess patient's chronic conditions and comorbid symptoms for stability, deterioration, or improvement   Collaborate with multidisciplinary team to address chronic and comorbid conditions and prevent exacerbation or deterioration   Update acute care plan with appropriate goals if chronic or comorbid symptoms are exacerbated and prevent overall improvement and discharge     Problem: Cardiovascular - Adult  Goal: Maintains optimal cardiac output and hemodynamic stability  9/12/2022 0942 by Emily Gannon RN  Outcome: Progressing  Flowsheets (Taken 9/12/2022 7952)  Maintains optimal cardiac output and hemodynamic stability:   Monitor blood pressure and heart rate   Monitor urine output and notify Licensed Independent Practitioner for values outside of normal range   Assess for signs of decreased cardiac output   Administer fluid and/or volume expanders as ordered     Problem: Cardiovascular - Adult  Goal: Absence of cardiac dysrhythmias or at baseline  Outcome: Progressing  Flowsheets (Taken 9/12/2022 0942)  Absence of cardiac dysrhythmias or at baseline:   Monitor cardiac rate and rhythm   Assess for signs of decreased cardiac output   Administer antiarrhythmia medication and electrolyte replacement as ordered     Problem: Metabolic/Fluid and Electrolytes - Adult  Goal: Electrolytes maintained within normal limits  9/12/2022 0942 by Emily Gannon RN  Outcome: Progressing  Flowsheets (Taken 9/12/2022 8812)  Electrolytes maintained within normal limits:   Monitor labs and assess patient for signs and symptoms of electrolyte imbalances   Administer electrolyte replacement as ordered   Monitor response to electrolyte replacements, including repeat lab results as appropriate   Fluid restriction as ordered     Problem: Metabolic/Fluid and Electrolytes - Adult  Goal: Hemodynamic stability and optimal renal function maintained  Outcome: Progressing  Flowsheets (Taken 9/12/2022 0942)  Hemodynamic stability and optimal renal function maintained:   Monitor labs and assess for signs and symptoms of volume excess or deficit   Monitor intake, output and patient weight   Monitor urine specific gravity, serum osmolarity and serum sodium as indicated or ordered   Monitor response to interventions for patient's volume status, including labs, urine output, blood pressure (other measures as available)   Encourage oral intake as appropriate     Problem: Pain  Goal: Verbalizes/displays adequate comfort level or baseline comfort level  9/12/2022 0942 by Raúl Rodriguez RN  Outcome: Progressing  Flowsheets (Taken 9/12/2022 7483)  Verbalizes/displays adequate comfort level or baseline comfort level:   Encourage patient to monitor pain and request assistance   Assess pain using appropriate pain scale   Administer analgesics based on type and severity of pain and evaluate response   Implement non-pharmacological measures as appropriate and evaluate response

## 2022-09-12 NOTE — PROGRESS NOTES
09/12/22 1400   Encounter Summary   Encounter Overview/Reason  Attempted Encounter  (left note. pt asleep)   Service Provided For: Patient not available   Referral/Consult From: Nurse   Last Encounter    (es 9/12 left note)   Complexity of Encounter Moderate   Begin Time 1315   End Time  1320   Total Time Calculated 5 min   Plan and Referrals   Plan/Referrals Other (Comment)  (as needed.   left note)

## 2022-09-12 NOTE — FLOWSHEET NOTE
[facility] tx completed, No fluid removed. Effluent drain clear, pale yellow, no fibrin noted. PD fluid obtained for cell count and cultures. Report given to [facility].         09/12/22 0826   Vitals   BP (!) 156/74   Temp 97.6 °F (36.4 °C)   Temp Source Axillary   Heart Rate 77   Resp 20   SpO2 99 %   Weight 218 lb 4.1 oz (99 kg)   Peritoneal Dialysis Catheter   Placement Date/Time: 10/26/20 0748   Inserted by: Dr Hedy Bhandari Size (fr): (c)    Status Clamped   Site Condition Clean, dry, intact   Dressing Status Clean, dry & intact   Dressing Gauze   Date of Last Dressing Change 09/11/22   Dialysis Type Continuous cycling   Cycler   Verification of Prescription CCPD   Total Volume Programmed 67172 mL   Therapy Time (Hours:Minutes) 10   Fill Volume 2500 mL   Last Fill Volume 0 mL   Number of Cycles 5   Bag Volume 5000 mL   Number of Bags Used 3   I Drain (mL) 0 mL

## 2022-09-12 NOTE — PLAN OF CARE
Problem: Discharge Planning  Goal: Discharge to home or other facility with appropriate resources  Outcome: Progressing   Patient to be discharged when medically stable. Problem: Safety - Adult  Goal: Free from fall injury  Outcome: Progressing   Fall precautions in place. Bed alarm activated, low position, wheels locked. [employer] monitor in room. Patient currently unable to utilize call light. Frequent rounding to assess safety. Problem: Skin/Tissue Integrity  Goal: Absence of new skin breakdown  Description: 1. Monitor for areas of redness and/or skin breakdown  2. Assess vascular access sites hourly  3. Every 4-6 hours minimum:  Change oxygen saturation probe site  4. Every 4-6 hours:  If on nasal continuous positive airway pressure, respiratory therapy assess nares and determine need for appliance change or resting period. Outcome: Progressing   Redness to coccyx, scattered bruising and abrasions. Patient repositioned q 2 hours to prevent skin breakdown. Problem: Cardiovascular - Adult  Goal: Maintains optimal cardiac output and hemodynamic stability  Outcome: Progressing   Vitals stable, afebrile. SR 1st degree on tele. Problem: Respiratory - Adult  Goal: Achieves optimal ventilation and oxygenation  Outcome: Progressing   Patient wearing continuous bi-pap, only responsive to pain and occasionally voice. Residents aware. Problem: Metabolic/Fluid and Electrolytes - Adult  Goal: Electrolytes maintained within normal limits  9/12/2022 0055 by Sushant Astorga RN  Outcome: Progressing   Monitoring electrolytes daily. Problem: Pain  Goal: Verbalizes/displays adequate comfort level or baseline comfort level  9/12/2022 0055 by Sushant Astorga RN  Outcome: Progressing   Patient barely responsive, no signs of pain noted.

## 2022-09-12 NOTE — CARE COORDINATION
CM following for discharge planning. CM met with pt's son and daughter regarding placement. They were interested in [facility] & [facility]. They do no do HD and they do not currently have PD beds open. Pt is currently on HD. CM sent to facilities with HD in house as tranport would be difficult. List emailed to Dawna, pts son. Pt will need precert.      Luis dEuardo Gerardo RN, BSN, [id number]  Case Management Department  [phone]

## 2022-09-12 NOTE — PROGRESS NOTES
Progress Note    Admit Date: 9/6/2022  Day: 6  Diet: Diet NPO    CC: AMS    Interval history: Pt tolerated BiPAP through the night. Pt is more alert today. He is oriented to place, time and person. He is able to tell me that he is at [facility] and the month is September. He has a HD line inserted for HD today. Medications:     Scheduled Meds:   insulin glargine  35 Units SubCUTAneous Nightly    insulin lispro  25 Units SubCUTAneous TID WC    insulin lispro  0-16 Units SubCUTAneous TID WC    insulin lispro  0-4 Units SubCUTAneous Nightly    famotidine  20 mg Oral Daily    polyethylene glycol  17 g Oral BID    epoetin daphney-epbx  2,000 Units SubCUTAneous Once per day on Mon Wed Fri    atorvastatin  80 mg Oral Nightly    levothyroxine  50 mcg Oral Daily    aspirin  81 mg Oral Daily    sodium chloride flush  5-40 mL IntraVENous 2 times per day    heparin (porcine)  5,000 Units SubCUTAneous 3 times per day     Continuous Infusions:   sodium chloride 25 mL (09/10/22 1132)    dextrose       PRN Meds:sodium chloride flush, sodium chloride, ondansetron **OR** ondansetron, polyethylene glycol, acetaminophen **OR** acetaminophen, glucose, dextrose bolus **OR** dextrose bolus, glucagon (rDNA), dextrose    Objective:   Vitals:   T-max:  Patient Vitals for the past 8 hrs:   BP Temp Temp src Pulse Resp SpO2 Weight   09/12/22 0554 -- -- -- 88 -- -- --   09/12/22 0403 120/64 98.1 °F (36.7 °C) Axillary 87 20 98 % 223 lb 8.7 oz (101.4 kg)   09/12/22 0200 -- -- -- 86 -- 99 % --       Intake/Output Summary (Last 24 hours) at 9/12/2022 4781  Last data filed at 9/12/2022 0406  Gross per 24 hour   Intake 120 ml   Output --   Net 120 ml       Review of Systems   Respiratory:  Negative for cough. Cardiovascular:  Negative for chest pain. Gastrointestinal:  Negative for abdominal pain. Physical Exam  Constitutional:       Appearance: He is obese. He is ill-appearing.    Eyes:      Pupils: Pupils are equal, round, and reactive to light. Cardiovascular:      Rate and Rhythm: Normal rate and regular rhythm. Pulses: Normal pulses. Heart sounds: Normal heart sounds. Abdominal:      General: Bowel sounds are normal. There is distension. Palpations: Abdomen is soft. Tenderness: There is abdominal tenderness. Musculoskeletal:      Right lower leg: No edema. Left lower leg: No edema. Skin:     General: Skin is warm. Neurological:      Mental Status: He is oriented to person, place, and time. LABS:    CBC:   Recent Labs     09/10/22  0915 09/11/22  0326 09/12/22  0417   WBC 10.1 9.4 10.4   HGB 9.5* 9.6* 9.7*   HCT 27.1* 26.7* 28.3*    236 254   .4* 109.3* 110.4*     Renal:    Recent Labs     09/10/22  0915 09/11/22  0326 09/12/22  0417   * 129* 133*   K 3.1* 3.8 3.6   CL 88* 87* 90*   CO2 28 28 25   BUN 48* 46* 46*   CREATININE 7.3* 7.0* 7.3*   GLUCOSE 192* 284* 207*   CALCIUM 8.6 8.9 8.8   MG 1.90 2.00 2.20   ANIONGAP 16 14 18*     ABGs:    Recent Labs     09/09/22  2030 09/11/22  1215 09/11/22  1428   PHART 7.282* 7.295* 7.390   ACA9CNG 61.1* 63.0* 48.7*   PO2ART 35.6* 121.0* 105.0   CHH6SWD 29 31* 29   BEART 0.8 2.4 3.8*   [id number] 64* 98 97   NBP9MWS 31 33 31       INR: No results for input(s): INR in the last 72 hours. Lactate: No results for input(s): LACTATE in the last 72 hours. Cultures:  -----------------------------------------------------------------  RAD:   CT HEAD WO CONTRAST   Final Result   1. No evidence for acute intracranial process. No bleed or shift   2. Remote infarct left thalamus      XR CHEST (2 VW)   Final Result      Free air beneath the right hemidiaphragm. Bibasilar atelectasis. Critical result discussed with Dr. Kary Keene at 1:00 PM 9/6/2022          Assessment/Plan:     Acute encephalopathy  -Pt was admitted for AMS initially thought to be due to be 2/2 CO2 narcosis 2/2 Bipap non-complaince.  He improved intitally with Bipap use while inpatient but has been increasingly lethargic for the past 2 days. 09/11 a head ct was ordered d/t AMS, and was no acute intracranial process. There has also been concern for likely cefepime toxicity following increasing lethargy and AMS. Per nephrology, plan to do HD today as cefepime is not dialyzable via PD.  -Pulmonology following: Normal ABG 9/11 and Dr Yesy Calabrese agrees that acute encephalopathy is unlikely from hypercapnia. ESRD on PD   -For HD today.  -Ct retacrit 2000 units MWF    T2DM, uncontrolled   -35 IU basal insulin   -5 IU with meals tids   -MDSSI  -POC glucise   -hypoglycemia protocol     Hypothyroidism   -ct  synthroid 50 mcg     CAD s/p CABG 2007  -stable on 04/22 angiogram with no intervention.  -ct aspirin   -ct Lipitor     Code Status: Full code   FEN: NPO  PPX: Heparin   DISPO: Gayle Stovall MD, PGY-1  09/12/22  8:21 AM    This patient has been staffed and discussed with Justin Aparicio MD.      Addendum to Resident H& P/Progress note:  I have personally seen,examined and evaluated the patient.  I have reviewed the current history, physical findings, labs and assessment and plan and agree with note as documented by resident MD ( )      Justin Aparicio MD, [address]

## 2022-09-12 NOTE — PROCEDURES
Demetria Roper is a 69 y.o. male patient. 1. Elevated troponin    2. Hypercarbia      Past Medical History:   Diagnosis Date    Allergic rhinitis due to other allergen 7/12/2010    Coronary atherosclerosis of unspecified type of vessel, native or graft 7/12/2010    Diabetic eye exam ([facility] [address] 04/29/2015    Diabetic eye exam ([facility] [address] 5/5/2016    [facility] [facility]    Generalized osteoarthrosis, involving multiple sites 7/12/2010    Other psoriasis 7/12/2010    Pneumonia     Prolonged emergence from general anesthesia     Psoriatic arthropathy ([facility] [address] 7/12/2010    Type II or unspecified type diabetes mellitus without mention of complication, not stated as uncontrolled 7/12/2010    Unspecified essential hypertension 7/12/2010    Unspecified sleep apnea 7/12/2010     Blood pressure (!) 161/80, pulse 88, temperature 97.7 °F (36.5 °C), temperature source Axillary, resp. rate 18, height 5' 10\" (1.778 m), weight 218 lb 4.1 oz (99 kg), SpO2 98 %. Central Line    Date/Time: 9/12/2022 10:09 AM  Performed by: Paul Andrew MD  Authorized by: Paul Andrew MD   Consent: Written consent obtained. Risks and benefits: risks, benefits and alternatives were discussed  Consent given by: spouse  Procedure consent: procedure consent matches procedure scheduled  Relevant documents: relevant documents present and verified  Test results: test results available and properly labeled  Site marked: the operative site was marked  Imaging studies: imaging studies available  Required items: required blood products, implants, devices, and special equipment available  Patient identity confirmed: arm band, provided demographic data and hospital-assigned identification number  Time out: Immediately prior to procedure a \"time out\" was called to verify the correct patient, procedure, equipment, support staff and site/side marked as required.   Indications: vascular access  Anesthesia: local infiltration    Sedation:  Patient sedated: no    Preparation: skin prepped with ChloraPrep  Skin prep agent dried: skin prep agent completely dried prior to procedure  Sterile barriers: all five maximum sterile barriers used - cap, mask, sterile gown, sterile gloves, and large sterile sheet  Hand hygiene: hand hygiene performed prior to central venous catheter insertion  Location details: right internal jugular  Patient position: flat  Catheter type: triple lumen  Catheter size: 12 Fr  Pre-procedure: landmarks identified  Ultrasound guidance: yes  Sterile ultrasound techniques: sterile gel and sterile probe covers were used  Number of attempts: 1  Successful placement: yes  Post-procedure: line sutured and dressing applied  Assessment: blood return through all ports, free fluid flow, placement verified by x-ray and no pneumothorax on x-ray  Patient tolerance: patient tolerated the procedure well with no immediate complications  Comments: Ultrasound guided  EBL 3 cc          Tahira Dietz MD  9/12/2022

## 2022-09-12 NOTE — PROGRESS NOTES
Physical / Occupational Therapy Hold    Orders received, chart reviewed. Attempted therapy evaluations, per RN pt is still not following commands and not appropriate for therapy evaluations at this time. Will follow up later this PM vs 9/13 as appropriate.   Armando Castro, PT, DPT   Savanna Archer, OTR/L

## 2022-09-12 NOTE — PROGRESS NOTES
[facility]     Mary Sampson MD   Please advise re: insulin since patient is NPO. Patient: Memo Gr   9/12/22   9:22 AM       Mary Sampson MD   Patient:   Memo Gr    YOB: 1945  MRN:   [id number]  [facility]: [facility]    [address]   9/12/22 9:22 AM  [phone] Hospital or Facility: [facility] From: Stevie Saravanan RE: Dee Jasmine 1945 RM: [id number] Please advise re: insulin since patient is NPO.  Need Callback: NO CALLBACK REQ Telemetry  Unread

## 2022-09-12 NOTE — PROGRESS NOTES
subcutaneous nodules      DATA  CBC:   Recent Labs     09/10/22  0915 09/11/22  0326 09/12/22  0417   WBC 10.1 9.4 10.4   HGB 9.5* 9.6* 9.7*   HCT 27.1* 26.7* 28.3*   .4* 109.3* 110.4*    236 254       BMP:   Recent Labs     09/10/22  0915 09/11/22  0326 09/12/22  0417   * 129* 133*   K 3.1* 3.8 3.6   CL 88* 87* 90*   CO2 28 28 25   BUN 48* 46* 46*   CREATININE 7.3* 7.0* 7.3*       Recent Labs     09/11/22  1215 09/11/22  1428   PHART 7.295* 7.390   JCW2ACJ 63.0* 48.7*   PO2ART 121.0* 105.0            Radiology Review:  All pertinent images / reports were reviewed as a part of this visit. CXR reveals the following:      Impression       Free air beneath the right hemidiaphragm. Bibasilar atelectasis. PFTs:  Indication: Shortness of breath, weakness, non productive cough    Test comment:     Spirometry data is acceptable and reproducible. Pulse ox is 91% on room air    Estimated body mass index is 28.3 kg/m² as calculated from the   following:    Height as of 11/21/17: 5' 10\" (1.778 m). Weight as of 11/21/17: 197 lb 3.2 oz (89.4 kg). Spirometry data:    FEV1/FVC: 89. Predicted ratio 73    Pre-Bronchodilator FEV1 2.07L, which is 63% predicted    Post-Bronchodilator FEV1 2.02L, which is 61% predicted    There is -2% reversibility     FVC is 2.33L, which is 51% predicted    Lung Volumes:    TLC is 4.85L, which is 67% predicted    RV is 2.51L which is 99% predicted    Diffusion Capacity:    DLCO is 13.77 which is 42% predicted    Impression:    1. There is no obstruction present    2. There is no response to bronchodilator therapy         [Increase in FEV1 => 12% of control and => 200 ml]    3. There is moderate restriction     4. There is moderate reduction in diffusion capacity    Comment: Moderate restrictive disease with moderate decrease in   diffusion capacity consistent with the diagnosis of interstitial   lung disease.         ASSESSMENT/PLAN:     Unclear cause of acute encephalopathy but based on normal ABG yesterday afternoon on BiPAP and no improvement in delirium, hypercapnia is not likely the cause     Neuro consult may be helpful to assist in determining etiology of acute encephalopathy     Cont Bipap qHS and prn    Will follow peripherally.   Please call with any questions or issues    Hansel Oneil MD

## 2022-09-12 NOTE — PROGRESS NOTES
Nephrology  Note                                                                                                                                                                                                                                                                                                                                                               Office : [phone]     Fax :[phone]              Patient's Name: Abhinav He  11:37 AM  9/12/2022    Reason for Consult:  ESRD   Requesting Physician:  Peace Mcelroy MD      Jamie PD well   Fluid clear   No signs of PD peritonitis     Confused             Past Medical History:   Diagnosis Date    Allergic rhinitis due to other allergen 7/12/2010    Coronary atherosclerosis of unspecified type of vessel, native or graft 7/12/2010    Diabetic eye exam ([facility] [address] 04/29/2015    Diabetic eye exam ([address] 5/5/2016    [place] [facility]    Generalized osteoarthrosis, involving multiple sites 7/12/2010    Other psoriasis 7/12/2010    Pneumonia     Prolonged emergence from general anesthesia     Psoriatic arthropathy ([facility] [address] 7/12/2010    Type II or unspecified type diabetes mellitus without mention of complication, not stated as uncontrolled 7/12/2010    Unspecified essential hypertension 7/12/2010    Unspecified sleep apnea 7/12/2010       Past Surgical History:   Procedure Laterality Date    CARPAL TUNNEL RELEASE      s/p    CATARACT REMOVAL      CORONARY ARTERY BYPASS GRAFT      JOINT REPLACEMENT      LAPAROSCOPY INSERTION PERITONEAL CATHETER N/A 10/26/2020    LAPAROSCOPIC PERITONEAL DIALYSIS CATHETER PLACEMENT; LAPAROSCOPIC OMENTOPEXY performed by Tony Castillo DO at [facility] OR       Family History   Problem Relation Age of Onset    Diabetes Mother     Heart Failure Mother     Coronary Art Dis Father         reports that he quit smoking about 41 years ago. He started smoking about 60 years ago. He has a 18.00 pack-year smoking history.  He has never used smokeless tobacco. He reports that he does not drink alcohol and does not use drugs.     Allergies:  Penicillins, Sulfa antibiotics, Tazorac [tazarotene], Clindamycin, and Clindamycin/lincomycin    Current Medications:    insulin lispro (1 Unit Dial) 10 Units, TID WC  insulin lispro (1 Unit Dial) 0-8 Units, TID WC  insulin lispro (1 Unit Dial) 0-4 Units, Nightly  insulin glargine (LANTUS;BASAGLAR) injection pen 35 Units, Nightly  famotidine (PEPCID) tablet 20 mg, Daily  polyethylene glycol (GLYCOLAX) packet 17 g, BID  epoetin daphney-epbx (RETACRIT) injection 2,000 Units, Once per day on Mon Wed Fri  atorvastatin (LIPITOR) tablet 80 mg, Nightly  levothyroxine (SYNTHROID) tablet 50 mcg, Daily  aspirin chewable tablet 81 mg, Daily  sodium chloride flush 0.9 % injection 5-40 mL, 2 times per day  sodium chloride flush 0.9 % injection 5-40 mL, PRN  0.9 % sodium chloride infusion, PRN  ondansetron (ZOFRAN-ODT) disintegrating tablet 4 mg, Q8H PRN   Or  ondansetron (ZOFRAN) injection 4 mg, Q6H PRN  polyethylene glycol (GLYCOLAX) packet 17 g, Daily PRN  acetaminophen (TYLENOL) tablet 650 mg, Q6H PRN   Or  acetaminophen (TYLENOL) suppository 650 mg, Q6H PRN  heparin (porcine) injection 5,000 Units, 3 times per day  glucose chewable tablet 16 g, PRN  dextrose bolus 10% 125 mL, PRN   Or  dextrose bolus 10% 250 mL, PRN  glucagon (rDNA) injection 1 mg, PRN  dextrose 10 % infusion, Continuous PRN            Physical exam:     Vitals:  BP (!) 152/62   Pulse 78   Temp 98.1 °F (36.7 °C) (Axillary)   Resp 19   Ht 5' 10\" (1.778 m)   Wt 218 lb 4.1 oz (99 kg)   SpO2 98%   BMI 31.32 kg/m²   Constitutional:  OAA X3 NAD  Skin: no rash, turgor wnl  Heent:  eomi, mmm  Neck: no bruits or jvd noted  Cardiovascular:  S1, S2 without m/r/g  Respiratory: CTA B without w/r/r  Abdomen:  +bs, soft, TTP   Ext: + lower extremity edema  Psychiatric: mood and affect appropriate  Musculoskeletal:  Rom, muscular strength intact    Data:   Labs:  CBC:   Recent Labs 09/10/22  0915 09/11/22  0326 09/12/22  0417   WBC 10.1 9.4 10.4   HGB 9.5* 9.6* 9.7*    236 254       BMP:    Recent Labs     09/10/22  0915 09/11/22  0326 09/12/22  0417   * 129* 133*   K 3.1* 3.8 3.6   CL 88* 87* 90*   CO2 28 28 25   BUN 48* 46* 46*   CREATININE 7.3* 7.0* 7.3*   GLUCOSE 192* 284* 207*       Ca/Mg/Phos:   Recent Labs     09/10/22  0915 09/11/22  0326 09/12/22  0417   CALCIUM 8.6 8.9 8.8   MG 1.90 2.00 2.20       Hepatic:   No results for input(s): AST, ALT, ALB, BILITOT, ALKPHOS in the last 72 hours. Troponin:   No results for input(s): TROPONINI in the last 72 hours. BNP: No results for input(s): BNP in the last 72 hours. Lipids: No results for input(s): CHOL, TRIG, HDL, LDLCALC, LABVLDL in the last 72 hours. ABGs:   Recent Labs     09/11/22  1428   PHART 7.390   PO2ART 105.0   JEX0CNW 48.7*       INR: No results for input(s): INR in the last 72 hours. UA:No results for input(s): Omid Forreston, GLUCOSEU, BILIRUBINUR, [place] Pa, BLOODU, PHUR, PROTEINU, UROBILINOGEN, NITRU, LEUKOCYTESUR, LABMICR, URINETYPE in the last 72 hours. Urine Microscopic: No results for input(s): LABCAST, BACTERIA, COMU, HYALCAST, WBCUA, RBCUA, EPIU in the last 72 hours. Urine Culture: No results for input(s): LABURIN in the last 72 hours. Urine Chemistry: No results for input(s): David Brink, PROTEINUR, NAUR in the last 72 hours. IMAGING:  XR CHEST PORTABLE   Final Result   Impression: Interval placement of a right internal jugular approach dialysis catheter, as above. CT HEAD WO CONTRAST   Final Result   1. No evidence for acute intracranial process. No bleed or shift   2. Remote infarct left thalamus      XR CHEST (2 VW)   Final Result      Free air beneath the right hemidiaphragm. Bibasilar atelectasis. Critical result discussed with Dr. Isabelle Morris at 1:00 PM 9/6/2022          Assessment/Plan   ESRD     2. HTN    3.  Anemia    4. Acid- base/ Electrolyte imbalance 5. Abd tenderness - r/o PD peritonitis     Plan   - Cefepime toxicity is possible   - HD today   - ABG - reviewed   - PD fluid cell count - nl - no PD peritonitis   - CCPD today   - monitor Labs  - EPO or anemia    - Constipation management   - PT eval                   Thank you for allowing us to participate in care of Fazal Ortiz MD  Feel free to contact me   [facility] [address]  Office : [phone]  Fax :[phone]

## 2022-09-12 NOTE — CONSULTS
Clinical Pharmacy Consult Note    69 y.o. male admitted with AMS. Pharmacy has been asked by Dr. Hernan Washington to adjust all drips to normal saline as appropriate based on compatibility to avoid fluid shifts since D5 is osmotically active. The following intermittent IV drips/infusions have been adjusted to saline:  None at this time    The following medications must remain in D5W due to incompatibility with normal saline:  None at this time    Please be aware that patient has D5W ordered as part of hypoglycemia orderset. As patient is not being treated for pituitary tumor resection or requiring hypertonic saline (defined as >0.9% NaCl), pharmacy will sign off of IV review consult, per protocol. Please call with questions.   Hugh Bowser PharmD, BCPS  Wireless: [id number]   9/12/2022 7:58 AM

## 2022-09-12 NOTE — CONSULTS
Department of General Surgery  Surgical Service    Line Consultation    Reason for Consult: Access     [place]:  Donald Alonso is a 69 y.o.  male recently admitted on 9/6 with AMS. Patient requires HD line placement for dialysis for cefepime toxicity. Therefore general surgery has been consulted for assistance with access. Past Medical History:   has a past medical history of Allergic rhinitis due to other allergen, Coronary atherosclerosis of unspecified type of vessel, native or graft, Diabetic eye exam ([facility] [address] Diabetic eye exam ([address] Generalized osteoarthrosis, involving multiple sites, Other psoriasis, Pneumonia, Prolonged emergence from general anesthesia, Psoriatic arthropathy ([address] Type II or unspecified type diabetes mellitus without mention of complication, not stated as uncontrolled, Unspecified essential hypertension, and Unspecified sleep apnea. Past Surgical History:   has a past surgical history that includes Coronary artery bypass graft; Carpal tunnel release; joint replacement; Cataract removal; and LAPAROSCOPY INSERTION PERITONEAL CATHETER (N/A, 10/26/2020). Medications:  Prior to Admission medications    Medication Sig Start Date End Date Taking?  Authorizing Provider   BREO ELLIPTA 200-25 MCG/INH AEPB inhaler Inhale 1 puff into the lungs daily   Yes Historical Provider, MD   blood glucose test strips (TRUE METRIX BLOOD GLUCOSE TEST) strip USE TO TEST FOUR TIMES A DAY AS NEEDED 9/2/22   Amado Baxter MD   atorvastatin (LIPITOR) 80 MG tablet TAKE ONE TABLET BY MOUTH ONCE NIGHTLY 9/2/22   Amado Baxter MD   Easy Touch Lancets 30G/Twist MISC USE THREE TIMES A DAY TO FOUR TIMES A DAY 7/21/22   Amado Baxter MD   levothyroxine (SYNTHROID) 50 MCG tablet TAKE ONE TABLET BY MOUTH DAILY 6/30/22   Amado Baxter MD   insulin detemir (LEVEMIR FLEXTOUCH) 100 UNIT/ML injection pen 56-66 units daily 6/21/22   Amado Baxter MD   insulin aspart (Earlyne Maya) 100 UNIT/ML injection pen 32-42  units AC TID 6/21/22   Britta London MD   B-D ULTRAFINE III SHORT PEN 31G X 8 MM MISC USE WITH INSULIN FOUR TIMES A DAY 5/17/22   Jarvis Marie MD   midodrine (PROAMATINE) 5 MG tablet Take 1 tablet by mouth in the morning and at bedtime 3/25/22   Jarett Bobo MD   Blood Glucose Monitoring Suppl (TRUE METRIX METER) [address] As needed 3/11/22   Britta London MD   blood glucose monitor strips Test up to 4 times daily for blood sugar monitoring 2/15/22   Jarvsi Marie MD   blood glucose monitor kit and supplies (PLEASE DISPENSE WHAT INS WILL COVER) - Dispense sufficient amount for  TID testing frequency plus additional to accommodate PRN testing needs. Dispense all needed supplies to include: monitor, strips, lancing device, lancets, control solutions, alcohol swabs. 2/8/22   MD OMAR Roach Complex-C-Biotin-E-Min-FA (DIALYVITE 3000) 3 MG TABS Take 1 tablet by mouth daily    Historical Provider, MD   vitamin D (ERGOCALCIFEROL) 1.25 MG (23578 UT) CAPS capsule Take 50,000 Units by mouth every 3 months 7/14/21   Historical Provider, MD   triamcinolone (KENALOG) 0.1 % ointment Apply topically 2 times daily. 5/25/21 2/16/22  Jarett Bobo MD   Handicap Placard MISC by Does not apply route Duration: 5 years 4/18/18   Miroslava Pennington MD   aspirin 81 MG chewable tablet Take 1 tablet by mouth daily. 3/28/12   Elizabeth Oseguera MD       Allergies:  Penicillins, Sulfa antibiotics, Tazorac [tazarotene], Clindamycin, and Clindamycin/lincomycin    Family History:  family history includes Coronary Art Dis in his father; Diabetes in his mother; Heart Failure in his mother. Social History:   reports that he quit smoking about 41 years ago. He started smoking about 60 years ago. He has a 18.00 pack-year smoking history. He has never used smokeless tobacco. He reports that he does not drink alcohol and does not use drugs.      REVIEW OF SYSTEMS:    A 5 point review of systems was completed PHYSICAL EXAM:    VITALS:  /64   Pulse 88   Temp 98.1 °F (36.7 °C) (Axillary)   Resp 20   Ht 5' 10\" (1.778 m)   Wt 223 lb 8.7 oz (101.4 kg)   SpO2 98%   BMI 32.08 kg/m²     CONSTITUTIONAL awake, alert & oriented x 3, cooperative, no apparent distress, and appears stated age   HEENT  CHEST No palpable lymphadenopathy, anicteric  N/A   LUNGS No increased work of breathing, good air exchange, clear to auscultation bilaterally, no crackles or wheezing   CV Regular rate and rhythm, normal S1 and S2, and no murmur noted   ABDOMEN Soft, non-tender, non-distended, no scars noted, no hernias or masses palpated   EXT No LE edema                   DATA:    CT HEAD WO CONTRAST   Final Result   1. No evidence for acute intracranial process. No bleed or shift   2. Remote infarct left thalamus      XR CHEST (2 VW)   Final Result      Free air beneath the right hemidiaphragm. Bibasilar atelectasis. Critical result discussed with Dr. Jannet Ellis at 1:00 PM 9/6/2022          ECHO:      ASSESSMENT AND PLAN:    Vinay Boyle is a 69 y.o.  male who presents with AMS, experiencing cefepime toxicity requiring dialysis. Patient requires central access for HD  Plan to attempt line placement of right IJ  Case discussed with Dr. Geovanni Eng.      Jose Juan Simmons MD  8:18 AM  9/12/2022

## 2022-09-13 LAB
ANION GAP SERPL CALCULATED.3IONS-SCNC: 13 MMOL/L (ref 3–16)
BASOPHILS ABSOLUTE: 0 K/UL (ref 0–0.2)
BASOPHILS RELATIVE PERCENT: 0 %
BUN BLDV-MCNC: 19 MG/DL (ref 7–20)
CALCIUM SERPL-MCNC: 8.6 MG/DL (ref 8.3–10.6)
CHLORIDE BLD-SCNC: 99 MMOL/L (ref 99–110)
CO2: 26 MMOL/L (ref 21–32)
CREAT SERPL-MCNC: 4.2 MG/DL (ref 0.8–1.3)
EOSINOPHILS ABSOLUTE: 0.5 K/UL (ref 0–0.6)
EOSINOPHILS RELATIVE PERCENT: 5 %
FOLATE: 19.64 NG/ML (ref 4.78–24.2)
GFR AFRICAN AMERICAN: 17
GFR NON-AFRICAN AMERICAN: 14
GLUCOSE BLD-MCNC: 106 MG/DL (ref 70–99)
GLUCOSE BLD-MCNC: 108 MG/DL (ref 70–99)
GLUCOSE BLD-MCNC: 113 MG/DL (ref 70–99)
GLUCOSE BLD-MCNC: 135 MG/DL (ref 70–99)
GLUCOSE BLD-MCNC: 144 MG/DL (ref 70–99)
HBV SURFACE AB TITR SER: <3.5 MIU/ML
HCT VFR BLD CALC: 26.7 % (ref 40.5–52.5)
HEMOGLOBIN: 9 G/DL (ref 13.5–17.5)
HEPATITIS B SURFACE ANTIGEN INTERPRETATION: NORMAL
LYMPHOCYTES ABSOLUTE: 0.7 K/UL (ref 1–5.1)
LYMPHOCYTES RELATIVE PERCENT: 7 %
MACROCYTES: ABNORMAL
MAGNESIUM: 1.9 MG/DL (ref 1.8–2.4)
MCH RBC QN AUTO: 38.2 PG (ref 26–34)
MCHC RBC AUTO-ENTMCNC: 33.7 G/DL (ref 31–36)
MCV RBC AUTO: 113.3 FL (ref 80–100)
MONOCYTES ABSOLUTE: 0.1 K/UL (ref 0–1.3)
MONOCYTES RELATIVE PERCENT: 1 %
NEUTROPHILS ABSOLUTE: 8.4 K/UL (ref 1.7–7.7)
NEUTROPHILS RELATIVE PERCENT: 87 %
PDW BLD-RTO: 14.9 % (ref 12.4–15.4)
PERFORMED ON: ABNORMAL
PLATELET # BLD: 230 K/UL (ref 135–450)
PMV BLD AUTO: 7.3 FL (ref 5–10.5)
POLYCHROMASIA: ABNORMAL
POTASSIUM SERPL-SCNC: 4.3 MMOL/L (ref 3.5–5.1)
RBC # BLD: 2.36 M/UL (ref 4.2–5.9)
SODIUM BLD-SCNC: 138 MMOL/L (ref 136–145)
VITAMIN B-12: 1440 PG/ML (ref 211–911)
WBC # BLD: 9.6 K/UL (ref 4–11)

## 2022-09-13 PROCEDURE — 85025 COMPLETE CBC W/AUTO DIFF WBC: CPT

## 2022-09-13 PROCEDURE — 94660 CPAP INITIATION&MGMT: CPT

## 2022-09-13 PROCEDURE — 6370000000 HC RX 637 (ALT 250 FOR IP): Performed by: STUDENT IN AN ORGANIZED HEALTH CARE EDUCATION/TRAINING PROGRAM

## 2022-09-13 PROCEDURE — 82607 VITAMIN B-12: CPT

## 2022-09-13 PROCEDURE — 97162 PT EVAL MOD COMPLEX 30 MIN: CPT

## 2022-09-13 PROCEDURE — 94761 N-INVAS EAR/PLS OXIMETRY MLT: CPT

## 2022-09-13 PROCEDURE — 6360000002 HC RX W HCPCS: Performed by: STUDENT IN AN ORGANIZED HEALTH CARE EDUCATION/TRAINING PROGRAM

## 2022-09-13 PROCEDURE — 6370000000 HC RX 637 (ALT 250 FOR IP): Performed by: INTERNAL MEDICINE

## 2022-09-13 PROCEDURE — 2700000000 HC OXYGEN THERAPY PER DAY

## 2022-09-13 PROCEDURE — 80048 BASIC METABOLIC PNL TOTAL CA: CPT

## 2022-09-13 PROCEDURE — 97166 OT EVAL MOD COMPLEX 45 MIN: CPT

## 2022-09-13 PROCEDURE — 90935 HEMODIALYSIS ONE EVALUATION: CPT

## 2022-09-13 PROCEDURE — 2580000003 HC RX 258: Performed by: STUDENT IN AN ORGANIZED HEALTH CARE EDUCATION/TRAINING PROGRAM

## 2022-09-13 PROCEDURE — 97530 THERAPEUTIC ACTIVITIES: CPT

## 2022-09-13 PROCEDURE — 83735 ASSAY OF MAGNESIUM: CPT

## 2022-09-13 PROCEDURE — 99232 SBSQ HOSP IP/OBS MODERATE 35: CPT | Performed by: HOSPITALIST

## 2022-09-13 PROCEDURE — 36415 COLL VENOUS BLD VENIPUNCTURE: CPT

## 2022-09-13 PROCEDURE — 97535 SELF CARE MNGMENT TRAINING: CPT

## 2022-09-13 PROCEDURE — 82746 ASSAY OF FOLIC ACID SERUM: CPT

## 2022-09-13 PROCEDURE — 99233 SBSQ HOSP IP/OBS HIGH 50: CPT | Performed by: INTERNAL MEDICINE

## 2022-09-13 PROCEDURE — 2060000000 HC ICU INTERMEDIATE R&B

## 2022-09-13 RX ORDER — MAGNESIUM SULFATE IN WATER 40 MG/ML
2000 INJECTION, SOLUTION INTRAVENOUS ONCE
Status: COMPLETED | OUTPATIENT
Start: 2022-09-13 | End: 2022-09-13

## 2022-09-13 RX ADMIN — LEVOTHYROXINE SODIUM 50 MCG: 50 TABLET ORAL at 10:09

## 2022-09-13 RX ADMIN — ACETAMINOPHEN 650 MG: 325 TABLET, FILM COATED ORAL at 06:26

## 2022-09-13 RX ADMIN — POLYETHYLENE GLYCOL (3350) 17 G: 17 POWDER, FOR SOLUTION ORAL at 10:08

## 2022-09-13 RX ADMIN — ASPIRIN 81 MG 81 MG: 81 TABLET ORAL at 10:09

## 2022-09-13 RX ADMIN — HEPARIN SODIUM 5000 UNITS: 5000 INJECTION INTRAVENOUS; SUBCUTANEOUS at 06:00

## 2022-09-13 RX ADMIN — SODIUM CHLORIDE, PRESERVATIVE FREE 10 ML: 5 INJECTION INTRAVENOUS at 10:09

## 2022-09-13 RX ADMIN — ATORVASTATIN CALCIUM 80 MG: 40 TABLET, FILM COATED ORAL at 20:51

## 2022-09-13 RX ADMIN — HEPARIN SODIUM 5000 UNITS: 5000 INJECTION INTRAVENOUS; SUBCUTANEOUS at 20:51

## 2022-09-13 RX ADMIN — SODIUM CHLORIDE, PRESERVATIVE FREE 10 ML: 5 INJECTION INTRAVENOUS at 20:51

## 2022-09-13 RX ADMIN — MAGNESIUM SULFATE HEPTAHYDRATE 2000 MG: 40 INJECTION, SOLUTION INTRAVENOUS at 10:08

## 2022-09-13 RX ADMIN — POLYETHYLENE GLYCOL (3350) 17 G: 17 POWDER, FOR SOLUTION ORAL at 20:51

## 2022-09-13 RX ADMIN — FAMOTIDINE 20 MG: 20 TABLET ORAL at 10:09

## 2022-09-13 ASSESSMENT — PAIN SCALES - WONG BAKER
WONGBAKER_NUMERICALRESPONSE: 0

## 2022-09-13 ASSESSMENT — PAIN DESCRIPTION - DESCRIPTORS: DESCRIPTORS: ACHING;DISCOMFORT

## 2022-09-13 ASSESSMENT — PAIN DESCRIPTION - PAIN TYPE: TYPE: ACUTE PAIN

## 2022-09-13 ASSESSMENT — PAIN SCALES - GENERAL
PAINLEVEL_OUTOF10: 5
PAINLEVEL_OUTOF10: 0

## 2022-09-13 ASSESSMENT — PAIN - FUNCTIONAL ASSESSMENT: PAIN_FUNCTIONAL_ASSESSMENT: ACTIVITIES ARE NOT PREVENTED

## 2022-09-13 ASSESSMENT — PAIN DESCRIPTION - ONSET: ONSET: ON-GOING

## 2022-09-13 ASSESSMENT — ENCOUNTER SYMPTOMS
WHEEZING: 0
EYES NEGATIVE: 1
ABDOMINAL PAIN: 0
CHEST TIGHTNESS: 0
ALLERGIC/IMMUNOLOGIC NEGATIVE: 1
SHORTNESS OF BREATH: 0
RESPIRATORY NEGATIVE: 1
COUGH: 0

## 2022-09-13 ASSESSMENT — PAIN DESCRIPTION - FREQUENCY: FREQUENCY: INTERMITTENT

## 2022-09-13 ASSESSMENT — PAIN DESCRIPTION - LOCATION: LOCATION: SHOULDER

## 2022-09-13 ASSESSMENT — PAIN DESCRIPTION - ORIENTATION: ORIENTATION: LEFT

## 2022-09-13 NOTE — PLAN OF CARE
Problem: Discharge Planning  Goal: Discharge to home or other facility with appropriate resources  9/13/2022 1435 by Man Gardiner RN  Outcome: Progressing  Flowsheets (Taken 9/13/2022 1435)  Discharge to home or other facility with appropriate resources:   Identify barriers to discharge with patient and caregiver   Arrange for needed discharge resources and transportation as appropriate   Identify discharge learning needs (meds, wound care, etc)   Arrange for interpreters to assist at discharge as needed     Problem: Safety - Adult  Goal: Free from fall injury  9/13/2022 1435 by Man Gardiner RN  Outcome: Progressing  Flowsheets  Taken 9/13/2022 1435  Free From Fall Injury: Instruct family/caregiver on patient safety  Taken 9/13/2022 0737  Free From Fall Injury: Instruct family/caregiver on patient safety     Problem: ABCDS Injury Assessment  Goal: Absence of physical injury  Outcome: Progressing  Flowsheets  Taken 9/13/2022 1435  Absence of Physical Injury: Implement safety measures based on patient assessment  Taken 9/13/2022 0737  Absence of Physical Injury: Implement safety measures based on patient assessment     Problem: Skin/Tissue Integrity  Goal: Absence of new skin breakdown  Description: 1. Monitor for areas of redness and/or skin breakdown  2. Assess vascular access sites hourly  3. Every 4-6 hours minimum:  Change oxygen saturation probe site  4. Every 4-6 hours:  If on nasal continuous positive airway pressure, respiratory therapy assess nares and determine need for appliance change or resting period.   9/13/2022 1435 by Man Gardiner RN  Outcome: Progressing     Problem: Chronic Conditions and Co-morbidities  Goal: Patient's chronic conditions and co-morbidity symptoms are monitored and maintained or improved  Outcome: Progressing  Flowsheets (Taken 9/13/2022 1435)  Care Plan - Patient's Chronic Conditions and Co-Morbidity Symptoms are Monitored and Maintained or Improved: Monitor and assess patient's chronic conditions and comorbid symptoms for stability, deterioration, or improvement   Collaborate with multidisciplinary team to address chronic and comorbid conditions and prevent exacerbation or deterioration     Problem: Cardiovascular - Adult  Goal: Maintains optimal cardiac output and hemodynamic stability  9/13/2022 1435 by Enoch Spear RN  Outcome: Progressing  Flowsheets (Taken 9/13/2022 1435)  Maintains optimal cardiac output and hemodynamic stability:   Monitor blood pressure and heart rate   Monitor urine output and notify Licensed Independent Practitioner for values outside of normal range   Assess for signs of decreased cardiac output   Administer fluid and/or volume expanders as ordered     Problem: Metabolic/Fluid and Electrolytes - Adult  Goal: Electrolytes maintained within normal limits  9/13/2022 1435 by Enoch Spear RN  Outcome: Progressing  Flowsheets (Taken 9/13/2022 1435)  Electrolytes maintained within normal limits:   Monitor labs and assess patient for signs and symptoms of electrolyte imbalances   Administer electrolyte replacement as ordered   Monitor response to electrolyte replacements, including repeat lab results as appropriate   Fluid restriction as ordered     Problem: Pain  Goal: Verbalizes/displays adequate comfort level or baseline comfort level  9/13/2022 1435 by Enoch Spear RN  Outcome: Progressing  Flowsheets (Taken 9/13/2022 1435)  Verbalizes/displays adequate comfort level or baseline comfort level:   Encourage patient to monitor pain and request assistance   Assess pain using appropriate pain scale   Administer analgesics based on type and severity of pain and evaluate response   Implement non-pharmacological measures as appropriate and evaluate response

## 2022-09-13 NOTE — PLAN OF CARE
Problem: Discharge Planning  Goal: Discharge to home or other facility with appropriate resources  9/13/2022 0222 by Marshal Sky RN  Outcome: Progressing   Patient to be discharged when medically stable. Problem: Safety - Adult  Goal: Free from fall injury  9/13/2022 0222 by Marshal Sky RN  Outcome: Progressing   Fall precautions in place. Bed alarm activated, low position, wheels locked. Call light in reach. Frequent rounding to assess needs. Problem: Skin/Tissue Integrity  Goal: Absence of new skin breakdown  Description: 1. Monitor for areas of redness and/or skin breakdown  2. Assess vascular access sites hourly  3. Every 4-6 hours minimum:  Change oxygen saturation probe site  4. Every 4-6 hours:  If on nasal continuous positive airway pressure, respiratory therapy assess nares and determine need for appliance change or resting period. 9/13/2022 0222 by Marshal Sky RN  Outcome: Progressing   Redness to buttocks, sacral heart intact. Ecchymosis and abrasions scattered. Patient turned and repositioned q 2 hours to prevent further breakdown. Problem: Cardiovascular - Adult  Goal: Maintains optimal cardiac output and hemodynamic stability  9/13/2022 0222 by Marshal Sky RN  Outcome: Progressing   SR 1st degree on tele. Vitals stable, afebrile. Problem: Respiratory - Adult  Goal: Achieves optimal ventilation and oxygenation  9/13/2022 0222 by Marshal Sky RN  Outcome: Progressing   SpO2 100% 1 L NC. Lungs clear, diminished. Problem: Metabolic/Fluid and Electrolytes - Adult  Goal: Electrolytes maintained within normal limits  9/13/2022 0222 by Marshal Sky RN  Outcome: Progressing   Monitoring electrolytes daily. Problem: Pain  Goal: Verbalizes/displays adequate comfort level or baseline comfort level  9/13/2022 0222 by Marshal Sky RN  Outcome: Progressing   Denies pain.

## 2022-09-13 NOTE — PROGRESS NOTES
Physical Therapy  Facility/Department: [place] [address] [facility]  Physical Therapy Initial Assessment    Name: Jacob Olson  : 1945  MRN: [id number]  Date of Service: 2022    Discharge Recommendations: Jacob Olson scored a 13/24 on the AM-PAC short mobility form. Current research shows that an AM-PAC score of 17 or less is typically not associated with a discharge to the patient's home setting. Based on the patient's AM-PAC score and their current functional mobility deficits, it is recommended that the patient have 3-5 sessions per week of Physical Therapy at d/c to increase the patient's independence. Please see assessment section for further patient specific details. If patient discharges prior to next session this note will serve as a discharge summary. Please see below for the latest assessment towards goals. PT Equipment Recommendations  Other: defer      Patient Diagnosis(es): The primary encounter diagnosis was Drug toxicity. Diagnoses of Elevated troponin and Hypercarbia were also pertinent to this visit. Past Medical History:  has a past medical history of Allergic rhinitis due to other allergen, Coronary atherosclerosis of unspecified type of vessel, native or graft, Diabetic eye exam ([address] Diabetic eye exam ([address] Generalized osteoarthrosis, involving multiple sites, Other psoriasis, Pneumonia, Prolonged emergence from general anesthesia, Psoriatic arthropathy ([address] Type II or unspecified type diabetes mellitus without mention of complication, not stated as uncontrolled, Unspecified essential hypertension, and Unspecified sleep apnea. Past Surgical History:  has a past surgical history that includes Coronary artery bypass graft; Carpal tunnel release; joint replacement; Cataract removal; and LAPAROSCOPY INSERTION PERITONEAL CATHETER (N/A, 10/26/2020).     Assessment   Body Structures, Functions, Activity Limitations Requiring Skilled Therapeutic Intervention: Decreased functional mobility ; Decreased safe awareness;Decreased cognition;Decreased endurance;Decreased balance;Decreased strength  Assessment: pt is a 70 yo male from home with family with unclear baseline function due to inconsistencies in history. pt appears to be functioning below baseline function requiring max A for bed mobility, min A for transfers and short distance ambulation at RW with max VCs for sequencing and initiation. pt required increased time to complete all tasks and to respond to stimuli/ attend to questions. pt also requiring max encouragement to participate with little motivation to improve health. pt is unsafe to return home in current state and will benefit from further IP PT upon dc to maximize independence. PT to f/u  Treatment Diagnosis: decreased functional mobility  Therapy Prognosis: Fair  Decision Making: Medium Complexity  Requires PT Follow-Up: Yes  Activity Tolerance  Activity Tolerance: Patient tolerated evaluation without incident;Patient limited by fatigue;Treatment limited secondary to decreased cognition  Activity Tolerance Comments: pt also limited by poor motivation- needing max encouragement     Plan   Plan  Plan:  (2-5)  Current Treatment Recommendations: Balance training, Strengthening, Functional mobility training, Transfer training, Gait training, Neuromuscular re-education, Stair training, Endurance training, Home exercise program, Safety education & training, Patient/Caregiver education & training, Therapeutic activities  Safety Devices  Type of Devices: Call light within reach, Chair alarm in place, Gait belt, Nurse notified, Left in chair     Restrictions  Position Activity Restriction  Other position/activity restrictions: HD, peritoneal dialysis, cont.  pulse ox, OT eval and treat     Subjective   General  Chart Reviewed: Yes  Patient assessed for rehabilitation services?: Yes  Additional Pertinent Hx: pt is a 70 yo male presenting from home with weakness, AMS, decreased appetite, and fatigue. per family report pt has been lying in bed sleeping most of the last 10 days and not acting like himself. 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  Referring Practitioner: Ray Valladares MD  Diagnosis: AMS  Follows Commands: Within Functional Limits  Subjective  Subjective: pt supine in bed, difficult to arouse requiring maximum encouragement to participate and to initiate tasks         Social/Functional History  Social/Functional History  Lives With: Spouse  Type of Home: Condo  Home Layout: Two level, Able to Live on Main level with bedroom/bathroom  Home Access: Stairs to enter without rails  Entrance Stairs - Number of Steps: 1  Bathroom Shower/Tub: Walk-in shower  Bathroom Toilet: Standard  Bathroom Equipment: Grab bars in shower, Shower chair  Home Equipment: Oliver Sciara, [place], Oliver Sciara, 4 wheeled (sleeps in [address])  [address] Help From: Family  ADL Assistance: Needs assistance (per pt report pt is dependent upon wife and dgts for all ADLs. taking sponge baths and using brief for toileting)  Toileting: Needs assistance  Homemaking Assistance: Needs assistance  Homemaking Responsibilities: No (wife does household responsibilities- now dgts took over due to wife having open heart surg)  Ambulation Assistance: Independent (used walker prior to ~10 days ago- wasnt ambulating recently)  Transfer Assistance: Independent  Active : No  Patient's  Info: daughters or wife  Occupation: Retired  Type of Occupation:   Additional Comments: pt fatigued throughout session and is a poor historian. per pt wife just had open heart surgery and now is unable to assist- daughters were helping pt and now assisting his wife with household tasks and driving.  per pt he was able to walk prior to this recent decline but reports he needed a lot of assistance for ADLs and IADLs  Vision/Hearing  Vision  Vision: Impaired  Vision Exceptions: Wears glasses for reading  Hearing  Hearing: Within functional limits Cognition   Orientation  Overall Orientation Status: Within Functional Limits  Orientation Level: Oriented X4  Cognition  Overall Cognitive Status: Exceptions  Arousal/Alertness: Delayed responses to stimuli  Following Commands: Follows one step commands with increased time  Attention Span: Attends with cues to redirect; Difficulty attending to directions  Memory: Decreased long term memory  Safety Judgement: Decreased awareness of need for assistance  Problem Solving: Assistance required to generate solutions;Assistance required to implement solutions;Assistance required to correct errors made;Assistance required to identify errors made  Insights: Decreased awareness of deficits  Initiation: Requires cues for some  Sequencing: Requires cues for some                 Strength RLE  Comment: grossly 3+/5  Strength LLE  Comment: grossly 3+/5        Balance  Sitting: Impaired (pt flucuated between requiring min A-CGA; pt sat on EOB for ~5min)  Sitting - Static: Fair (occasional)  Sitting - Dynamic: Fair (occasional)  Standing: Impaired (use of 2WW- max VC)  Standing - Static: Constant support  Standing - Dynamic: Constant support  Transfer Training  Transfer Training: Yes  Sit to Stand: Minimum assistance  Stand to Sit: Minimum assistance  Stand Pivot Transfers: Minimum assistance  Bed to Chair: Minimum assistance  Bed mobility  Supine to Sit: Maximum assistance  Scooting: Maximal assistance  Bed Mobility Comments: little initiation made, max VCs for sequencing  Transfers  Sit to Stand: Minimal Assistance (at RW from EOB)  Stand to sit: Minimal Assistance  Bed to Chair: Minimal assistance (from EOB to recliner with RW)  Comment: max VCs throughout due for hand placement, sequencing, and initiation  Ambulation  Surface: level tile  Device: Rolling Walker  Assistance: Minimal assistance  Quality of Gait: mildly unsteady gait with shuffling steps and flexed trunk  Gait Deviations: Slow Ashlee;Decreased step length;Decreased step height;Shuffles  Distance: few steps to chair  Comments: limited tolerance due to fatigue and motivation  More Ambulation?: No  Stairs/Curb  Stairs?: No     Balance  Posture: Fair  Sitting - Static: Fair (CGA- min A for posterior lean)  Sitting - Dynamic: Fair;-  Standing - Static: Fair;- (min A at RW)  Standing - Dynamic: Poor;+           OutComes Score                                                  AM-PAC Score  AM-PAC Inpatient Mobility Raw Score : 13 (09/13/22 1015)  AM-PAC Inpatient T-Scale Score : 36.74 (09/13/22 1015)  Mobility Inpatient CMS 0-100% Score: 64.91 (09/13/22 1015)  Mobility Inpatient CMS G-Code Modifier : CL (09/13/22 1015)          Tinneti Score       Goals  Short Term Goals  Time Frame for Short term goals: by dc  Short term goal 1: pt will perform bed mobility with min A  Short term goal 2: pt will perform functional transfers with LRAD and SBA  Short term goal 3: pt will ambulate 48' with LRAD and CGA  Patient Goals   Patient goals : not stated       Education  Patient Education  Education Given To: Patient  Education Provided: Role of Therapy;Plan of Care;Transfer Training  Education Provided Comments: importance of OOB activity  Education Method: Demonstration;Verbal  Barriers to Learning: Cognition  Education Outcome: Verbalized understanding;Continued education needed      Therapy Time   Individual Concurrent Group Co-treatment   Time In 0830         Time Out 0938         Minutes 68             Timed Code Treatment Minutes:  53 min     Total Treatment Minutes:  76 min       Hernan Burr, PT

## 2022-09-13 NOTE — CARE COORDINATION
CM following for discharge planning. CM met with pt's son and daughter regarding SNF referrals. If HD temporary than looking for facility that handles PD. Referrals sent. Await call backs.      Aysha Ricketts RN, BSN, [address]  Case Management Department  [phone]

## 2022-09-13 NOTE — PROGRESS NOTES
Moderate assistance of two was needed to get patient from the chair to the bed with use of walker and gait belt. Vitals are as noted. Tolerated activity fairly well.

## 2022-09-13 NOTE — PROGRESS NOTES
Occupational Therapy  Facility/Department: [place] [address] [facility]  Occupational Therapy Initial Assessment    Name: Demetria Roper  : 1945  MRN: [id number]  Date of Service: 2022    Discharge Recommendations: Demetria Roper scored a 14/24 on the AM-PAC ADL Inpatient form. Current research shows that an AM-PAC score of 17 or less is typically not associated with a discharge to the patient's home setting. Based on the patient's AM-PAC score and their current ADL deficits, it is recommended that the patient have 3-5 sessions per week of Occupational Therapy at d/c to increase the patient's independence. Please see assessment section for further patient specific details. If patient discharges prior to next session this note will serve as a discharge summary. Please see below for the latest assessment towards goals. OT Equipment Recommendations  Equipment Needed: No  Other: defer to next level of care       Patient Diagnosis(es): The primary encounter diagnosis was Drug toxicity. Diagnoses of Elevated troponin and Hypercarbia were also pertinent to this visit. Past Medical History:  has a past medical history of Allergic rhinitis due to other allergen, Coronary atherosclerosis of unspecified type of vessel, native or graft, Diabetic eye exam ([address] Diabetic eye exam ([address] Generalized osteoarthrosis, involving multiple sites, Other psoriasis, Pneumonia, Prolonged emergence from general anesthesia, Psoriatic arthropathy ([address] Type II or unspecified type diabetes mellitus without mention of complication, not stated as uncontrolled, Unspecified essential hypertension, and Unspecified sleep apnea. Past Surgical History:  has a past surgical history that includes Coronary artery bypass graft; Carpal tunnel release; joint replacement; Cataract removal; and LAPAROSCOPY INSERTION PERITONEAL CATHETER (N/A, 10/26/2020).     Treatment Diagnosis: decreased independence with ADLs and fx mobility      Assessment Performance deficits / Impairments: Decreased functional mobility ; Decreased ADL status; Decreased cognition;Decreased endurance;Decreased balance  Assessment: Pt is below his baseline this date throughout session needing maximal encouragement to participate and perform ADLs and fx mobility. Pt required min A for transfers and max-mod A for ADLs. Pt was recieving assistance at home prior to admission but shows decline in endurance and participation. Pt holding his head throughout session and minally conversive. Pt would benefit from ongoing inpatient therapy services at discharge. If pt were to go home, pt would required 24hr A and HHOT. Will cont to follow on acute OT POC. Treatment Diagnosis: decreased independence with ADLs and fx mobility  Prognosis: Fair  Decision Making: Medium Complexity  Activity Tolerance  Activity Tolerance: Treatment limited secondary to decreased cognition  Activity Tolerance Comments: poor engagement throughout session; pt seeming in a down/depressed state; pt needing maximal encouragement throughout session        Plan   Plan  Times per Week: 2-5  Times per Day: Daily  Current Treatment Recommendations: Strengthening, Balance training, Functional mobility training, Endurance training, Self-Care / ADL, Safety education & training     Restrictions   HD, peritoneal dialysis, cont. pulse ox, OT eval and treat    Subjective   General - RN approved OT/PT evaluation  Chart Reviewed: Yes  Patient assessed for rehabilitation services?: Yes  Additional Pertinent Hx: 69 y.o. 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 Pt agreeable to OT evaluation.  Pt minimally conversive and holding head in hand throughout session. Pt at point stated, \"just pull the plug, please let me die\". Pt was provided emotional support throughout session. Social/Functional History  Social/Functional History  Lives With: Spouse  Type of Home: Condo  Home Layout: Two level, Able to Live on Main level with bedroom/bathroom  Home Access: Stairs to enter without rails  Entrance Stairs - Number of Steps: 1  Bathroom Shower/Tub: Walk-in shower  Bathroom Toilet: Standard  Bathroom Equipment: Grab bars in shower, Shower chair  Home Equipment: Walker, rolling, Walker, 4 wheeled (sleeps in recliner)  Receives Help From: Family  ADL Assistance: Needs assistance (per pt report pt is dependent upon wife and dgts for all ADLs. taking sponge baths and using brief for toileting)  Toileting: Needs assistance  Homemaking Assistance: Needs assistance  Homemaking Responsibilities: No (wife does household responsibilities- now dgts took over due to wife having open heart surg)  Ambulation Assistance: Independent (used walker prior to ~10 days ago- wasnt ambulating recently)  Transfer Assistance: Independent  Active : No  Patient's  Info: daughters or wife  Occupation: Retired  Type of Occupation:   Additional Comments: pt fatigued throughout session and is a poor historian. per pt wife just had open heart surgery and now is unable to assist- daughters were helping pt and now assisting his wife with household tasks and driving. per pt he was able to walk prior to this recent decline but reports he needed a lot of assistance for ADLs and IADLs       Objective   Heart Rate: 84  Heart Rate Source: Monitor  BP: (!) 154/75  BP Location: Left upper arm  BP Method: Automatic  Patient Position: Semi fowlers  MAP (Calculated): 101.33  Resp: 19  SpO2: 97 %  O2 Device: Nasal cannula             Safety Devices  Type of Devices: Call light within reach; Chair alarm in place;Gait belt;Nurse notified; Left in chair  Balance  Sitting: Impaired (pt flucuated between requiring min A-CGA; pt sat on EOB for ~5min)  Sitting - Static: Fair (occasional)  Sitting - Dynamic: Fair (occasional)  Standing: Impaired (use of 2WW- max VC)  Standing - Static: Constant support  Standing - Dynamic: Constant support  Transfer Training  Transfer Training: Yes  Sit to Stand: Minimum assistance  Stand to Sit: Minimum assistance  Stand Pivot Transfers: Minimum assistance  Bed to Chair: Minimum assistance     AROM: Within functional limits  Strength: Generally decreased, functional  Coordination: Within functional limits  Tone: Normal  Sensation: Intact  ADL  Feeding: Setup  Feeding Skilled Clinical Factors: breakfast tray present at end of session; omlet cut into pieces for pt but pt refusing to eat with reports of poor appetite  Grooming: Setup; Moderate assistance; Increased time to complete  Grooming Skilled Clinical Factors: Pt initially attempted wiping off eyes but then required mod A for thorough cleaning  UE Bathing: Maximum assistance  UE Bathing Skilled Clinical Factors: to don deodarant  UE Dressing: Moderate assistance; Increased time to complete  UE Dressing Skilled Clinical Factors: to don new gown; pt needing increased time  LE Dressing: Maximum assistance  LE Dressing Skilled Clinical Factors: to don new socks     Activity Tolerance  Activity Tolerance: Patient tolerated evaluation without incident;Patient limited by fatigue;Treatment limited secondary to decreased cognition  Activity Tolerance Comments: pt also limited by poor motivation- needing max encouragement  Bed mobility  Supine to Sit: Maximum assistance  Scooting: Maximal assistance  Bed Mobility Comments: little initiation made, max VCs for sequencing     Vision  Vision: Impaired  Vision Exceptions: Wears glasses for reading  Hearing  Hearing: Within functional limits  Cognition  Overall Cognitive Status: Exceptions  Arousal/Alertness: Delayed responses to stimuli  Following Commands: Follows one step commands with increased time  Attention Span: Attends with cues to redirect; Difficulty attending to directions  Memory: Decreased long term memory  Safety Judgement: Decreased awareness of need for assistance  Problem Solving: Assistance required to generate solutions;Assistance required to implement solutions;Assistance required to correct errors made;Assistance required to identify errors made  Insights: Decreased awareness of deficits  Initiation: Requires cues for some  Sequencing: Requires cues for some  Cognition Comment: pt requires increased encouragement throughout session  Orientation  Overall Orientation Status: Within Functional Limits  Orientation Level: Oriented X4                  Education Given To: Patient  Education Provided: Role of Therapy;Plan of Care;Transfer Training;ADL Adaptive Strategies  Education Method: Verbal  Barriers to Learning: Cognition (AMS)  Education Outcome: Continued education needed                        G-Code     OutComes Score                                                  AM-PAC Score        AM-PAC Inpatient Daily Activity Raw Score: 14 (09/13/22 1306)  AM-PAC Inpatient ADL T-Scale Score : 33.39 (09/13/22 1306)  ADL Inpatient CMS 0-100% Score: 59.67 (09/13/22 1306)  ADL Inpatient CMS G-Code Modifier : CK (09/13/22 1306)         Goals  Short Term Goals  Time Frame for Short term goals: by discharge  Short Term Goal 1: Pt will perform toilet transfer with CGA  Short Term Goal 2: Pt will perform ADL grooming task in standing with CGA  Short Term Goal 3: Pt will perform LB dressing with min A and AE as needed  Patient Goals   Patient goals : not stated       Therapy Time   Individual Concurrent Group Co-treatment   Time In 0905         Time Out 0930         Minutes 25         Timed Code Treatment Minutes: 10 Minutes (+ 15 min OT eval)       Zay Ferrer OT

## 2022-09-13 NOTE — PROGRESS NOTES
Treatment time: 3.5 hrs     Net UF: 0 kg     Pre weight: 98.0 kg   Post weight: 98.0 kg   EDW: TBD     Access used: RNTDC   Access function: Well     Medications or blood products given: None     Regular outpatient schedule: Home PD     Summary of response to treatment: Pt tolerated tx well ; pt RNTDC to be pulled post tx     Copy of dialysis treatment record placed in chart, to be scanned into EMR.

## 2022-09-13 NOTE — PROGRESS NOTES
Nephrology  Note                                                                                                                                                                                                                                                                                                                                                               Office : [phone]     Fax :[phone]              Patient's Name: Marcos Arzola  9:47 AM  9/13/2022    Reason for Consult:  ESRD   Requesting Physician:  Kathryn East MD      Jamie HD  well   AMS better   More awake                 Past Medical History:   Diagnosis Date    Allergic rhinitis due to other allergen 7/12/2010    Coronary atherosclerosis of unspecified type of vessel, native or graft 7/12/2010    Diabetic eye exam ([facility] [address] 04/29/2015    Diabetic eye exam ([address] 5/5/2016    [place] [facility]    Generalized osteoarthrosis, involving multiple sites 7/12/2010    Other psoriasis 7/12/2010    Pneumonia     Prolonged emergence from general anesthesia     Psoriatic arthropathy ([facility] [address] 7/12/2010    Type II or unspecified type diabetes mellitus without mention of complication, not stated as uncontrolled 7/12/2010    Unspecified essential hypertension 7/12/2010    Unspecified sleep apnea 7/12/2010       Past Surgical History:   Procedure Laterality Date    CARPAL TUNNEL RELEASE      s/p    CATARACT REMOVAL      CORONARY ARTERY BYPASS GRAFT      JOINT REPLACEMENT      LAPAROSCOPY INSERTION PERITONEAL CATHETER N/A 10/26/2020    LAPAROSCOPIC PERITONEAL DIALYSIS CATHETER PLACEMENT; LAPAROSCOPIC OMENTOPEXY performed by Quirino Broderick DO at [address] OR       Family History   Problem Relation Age of Onset    Diabetes Mother     Heart Failure Mother     Coronary Art Dis Father         reports that he quit smoking about 41 years ago. He started smoking about 60 years ago. He has a 18.00 pack-year smoking history.  He has never used smokeless tobacco. He reports that he does not drink alcohol and does not use drugs.     Allergies:  Penicillins, Sulfa antibiotics, Tazorac [tazarotene], Clindamycin, and Clindamycin/lincomycin    Current Medications:    insulin glargine (LANTUS;BASAGLAR) injection pen 20 Units, Nightly  magnesium sulfate 2000 mg in 50 mL IVPB premix, Once  insulin lispro (1 Unit Dial) 0-8 Units, TID WC  insulin lispro (1 Unit Dial) 0-4 Units, Nightly  insulin lispro (1 Unit Dial) 5 Units, TID WC  famotidine (PEPCID) tablet 20 mg, Daily  polyethylene glycol (GLYCOLAX) packet 17 g, BID  epoetin daphney-epbx (RETACRIT) injection 2,000 Units, Once per day on Mon Wed Fri  atorvastatin (LIPITOR) tablet 80 mg, Nightly  levothyroxine (SYNTHROID) tablet 50 mcg, Daily  aspirin chewable tablet 81 mg, Daily  sodium chloride flush 0.9 % injection 5-40 mL, 2 times per day  sodium chloride flush 0.9 % injection 5-40 mL, PRN  0.9 % sodium chloride infusion, PRN  ondansetron (ZOFRAN-ODT) disintegrating tablet 4 mg, Q8H PRN   Or  ondansetron (ZOFRAN) injection 4 mg, Q6H PRN  polyethylene glycol (GLYCOLAX) packet 17 g, Daily PRN  acetaminophen (TYLENOL) tablet 650 mg, Q6H PRN   Or  acetaminophen (TYLENOL) suppository 650 mg, Q6H PRN  heparin (porcine) injection 5,000 Units, 3 times per day  glucose chewable tablet 16 g, PRN  dextrose bolus 10% 125 mL, PRN   Or  dextrose bolus 10% 250 mL, PRN  glucagon (rDNA) injection 1 mg, PRN  dextrose 10 % infusion, Continuous PRN            Physical exam:     Vitals:  /73   Pulse 84   Temp 97.6 °F (36.4 °C) (Oral)   Resp 18   Ht 5' 10\" (1.778 m)   Wt 216 lb 14.9 oz (98.4 kg)   SpO2 98%   BMI 31.13 kg/m²   Constitutional:  OAA X3 NAD  Skin: no rash, turgor wnl  Heent:  eomi, mmm  Neck: no bruits or jvd noted  Cardiovascular:  S1, S2 without m/r/g  Respiratory: CTA B without w/r/r  Abdomen:  +bs, soft, TTP   Ext: + lower extremity edema  Psychiatric: mood and affect appropriate  Musculoskeletal:  Rom, muscular strength intact    Data:   Labs:  CBC: Recent Labs     09/11/22  0326 09/12/22 0417 09/13/22  0415   WBC 9.4 10.4 9.6   HGB 9.6* 9.7* 9.0*    254 230       BMP:    Recent Labs     09/11/22  0326 09/12/22 0417 09/13/22 0415   * 133* 138   K 3.8 3.6 4.3   CL 87* 90* 99   CO2 28 25 26   BUN 46* 46* 19   CREATININE 7.0* 7.3* 4.2*   GLUCOSE 284* 207* 135*       Ca/Mg/Phos:   Recent Labs     09/11/22  0326 09/12/22 0417 09/13/22 0415   CALCIUM 8.9 8.8 8.6   MG 2.00 2.20 1.90       Hepatic:   No results for input(s): AST, ALT, ALB, BILITOT, ALKPHOS in the last 72 hours. Troponin:   No results for input(s): TROPONINI in the last 72 hours. BNP: No results for input(s): BNP in the last 72 hours. Lipids: No results for input(s): CHOL, TRIG, HDL, LDLCALC, LABVLDL in the last 72 hours. ABGs:   Recent Labs     09/11/22  1428   PHART 7.390   PO2ART 105.0   [id number] 48.7*       INR: No results for input(s): INR in the last 72 hours. UA:No results for input(s): Brittanie Lane, GLUCOSEU, BILIRUBINUR, Dionne Duster, BLOODU, PHUR, PROTEINU, UROBILINOGEN, NITRU, LEUKOCYTESUR, LABMICR, URINETYPE in the last 72 hours. Urine Microscopic: No results for input(s): LABCAST, BACTERIA, COMU, HYALCAST, WBCUA, RBCUA, EPIU in the last 72 hours. Urine Culture: No results for input(s): LABURIN in the last 72 hours. Urine Chemistry: No results for input(s): Juan Tabatha, PROTEINUR, NAUR in the last 72 hours. IMAGING:  XR CHEST PORTABLE   Final Result   Impression: Interval placement of a right internal jugular approach dialysis catheter, as above. CT HEAD WO CONTRAST   Final Result   1. No evidence for acute intracranial process. No bleed or shift   2. Remote infarct left thalamus      XR CHEST (2 VW)   Final Result      Free air beneath the right hemidiaphragm. Bibasilar atelectasis. Critical result discussed with Dr. Jamaica Real at 1:00 PM 9/6/2022          Assessment/Plan   ESRD     2. HTN    3.  Anemia    4. Acid- base/ Electrolyte imbalance     5.  Abd tenderness - r/o PD peritonitis     Plan   - Cefepime toxicity is possible   - HD again today   - ABG - reviewed   - PD fluid cell count - nl - no PD peritonitis   - CCPD today   - monitor Labs  - EPO or anemia    - Constipation management   - PT eval                   Thank you for allowing us to participate in care of Jg Menjivar MD  Feel free to contact me   [facility] [address]  Office : [phone]  Fax :[phone]

## 2022-09-13 NOTE — PROGRESS NOTES
Progress Note    Admit Date: 9/6/2022  Day: 7  Diet: ADULT DIET; Regular; 3 carb choices (45 gm/meal); Low Fat/Low Chol/High Fiber/DONATO    CC: AMS    Interval history:     No acute events overnight. Nursing and consult notes reviewed. Patient was seen and examined by our team this morning by bedside. Patient received hemodialysis yesterday (9/12) at 2028; he tolerated the treatment well, but has transient hypotension with systolic blood pressure dropping to mid-80s. Blood pressure responded well to saline bolus and is at baseline this morning. Overall, has improved markedly from yesterday. Yesterday evening, Mr. Jessie Quezada had a period of AMS with reduced responsiveness. He did not interact with our team, but remained hemodynamically stable. This morning he is alert and oriented x4. He repots feeling better, but was unable to describe specifics as to why he feels improvement. Patient still wishes to \"get well\" and became irritated after a few questions wising to no longer be asked questions. CM met with patient's son and daughter regarding placement. They were interested in [facility]. They do not do HD and they do not currently have PD beds open. Pt is currently on HD. CM sent to facilities with HD in house as tranport would be difficult. Patient requires pre-certification    Denies any chest pain, shortness of breath, syncopal episodes, nausea, vomiting, abdominal pain or palpitations. HPI:     This is a 69year old male with a past medical history of ESRD on PD, interstitial lung disease, diastolic HF (6/49 Echo with EF 50-55%), NSTEMI, CAD s/p CABG 2007, T2DM, HTN, HLD, hypothyroidism who presents with AMS. Per family for the last 10 days the patient has been somnolent and lethargic with decreased appetite. He is on home peritoneal dialysis for the past year usually 1 hour at noon and 10/2 hours overnight. O2 at home also noted to be <90% so family called EMS.      In the ED pt was afebrile, mildly hypertensive, hemodynamically stable on 3L NC. BMP significant for BUN/Cr 44/7.1, lactate 1.5, Pro-BNP 1.3K, troponin .08, WBC 14.8, Hgb 10. VBG 7.28/71.3/35/33. 8. CXR with free air under R hemidiaphragm and bibasilar atelectasis. Currently, the patient is somnolent but interactive. He is able to speak a few words at a time and states he feels \"terrible\". He says his last PD session was last night. He also endorses cough for unspecified amount of time and constipation for 3-4 days. He used to wear CPAP/BIPAP at home but does not currently.      Medications:     Scheduled Meds:   insulin glargine  20 Units SubCUTAneous Nightly    magnesium sulfate  2,000 mg IntraVENous Once    insulin lispro  0-8 Units SubCUTAneous TID WC    insulin lispro  0-4 Units SubCUTAneous Nightly    insulin lispro  5 Units SubCUTAneous TID WC    famotidine  20 mg Oral Daily    polyethylene glycol  17 g Oral BID    epoetin daphney-epbx  2,000 Units SubCUTAneous Once per day on Mon Wed Fri    atorvastatin  80 mg Oral Nightly    levothyroxine  50 mcg Oral Daily    aspirin  81 mg Oral Daily    sodium chloride flush  5-40 mL IntraVENous 2 times per day    heparin (porcine)  5,000 Units SubCUTAneous 3 times per day     Continuous Infusions:   sodium chloride 25 mL (09/10/22 1132)    dextrose       PRN Meds:sodium chloride flush, sodium chloride, ondansetron **OR** ondansetron, polyethylene glycol, acetaminophen **OR** acetaminophen, glucose, dextrose bolus **OR** dextrose bolus, glucagon (rDNA), dextrose    Objective:   Vitals:   T-max:  Patient Vitals for the past 8 hrs:   BP Temp Temp src Pulse Resp SpO2 Weight   09/13/22 0837 127/73 97.6 °F (36.4 °C) Oral 84 18 98 % --   09/13/22 0400 -- -- -- 88 20 97 % 216 lb 14.9 oz (98.4 kg)   09/13/22 0357 (!) 145/76 97.6 °F (36.4 °C) Axillary 88 18 99 % --       Intake/Output Summary (Last 24 hours) at 9/13/2022 0944  Last data filed at 9/13/2022 0615  Gross per 24 hour   Intake 1140 ml   Output 900 ml Net 240 ml       Review of Systems   Constitutional: Negative. HENT: Negative. Eyes: Negative. Respiratory: Negative. Negative for cough, chest tightness, shortness of breath and wheezing. Cardiovascular: Negative. Gastrointestinal:  Negative for abdominal pain. Endocrine: Negative. Genitourinary: Negative. Musculoskeletal: Negative. Allergic/Immunologic: Negative. Neurological: Negative. Psychiatric/Behavioral: Negative. Physical Exam  Constitutional:       Appearance: He is obese. He is ill-appearing. Eyes:      Pupils: Pupils are equal, round, and reactive to light. Cardiovascular:      Rate and Rhythm: Normal rate and regular rhythm. Pulses: Normal pulses. Heart sounds: Normal heart sounds. Abdominal:      General: Bowel sounds are normal. There is distension. Palpations: Abdomen is soft. Tenderness: There is abdominal tenderness. Musculoskeletal:      Right lower leg: No edema. Left lower leg: No edema. Skin:     General: Skin is warm. Neurological:      Mental Status: He is oriented to person, place, and time. LABS:    CBC:   Recent Labs     09/11/22 0326 09/12/22 0417 09/13/22 0415   WBC 9.4 10.4 9.6   HGB 9.6* 9.7* 9.0*   HCT 26.7* 28.3* 26.7*    254 230   .3* 110.4* 113.3*     Renal:    Recent Labs     09/11/22 0326 09/12/22 0417 09/13/22 0415   * 133* 138   K 3.8 3.6 4.3   CL 87* 90* 99   CO2 28 25 26   BUN 46* 46* 19   CREATININE 7.0* 7.3* 4.2*   GLUCOSE 284* 207* 135*   CALCIUM 8.9 8.8 8.6   MG 2.00 2.20 1.90   ANIONGAP 14 18* 13     Hepatic: No results for input(s): AST, ALT, BILITOT, BILIDIR, PROT, LABALBU, ALKPHOS in the last 72 hours. Troponin: No results for input(s): TROPONINI in the last 72 hours. BNP: No results for input(s): BNP in the last 72 hours. Lipids: No results for input(s): CHOL, HDL in the last 72 hours.     Invalid input(s): LDLCALCU, TRIGLYCERIDE  ABGs:    Recent Labs 09/11/22  1215 09/11/22  1428   PHART 7.295* 7.390   [id number] 63.0* 48.7*   PO2ART 121.0* 105.0   PYQ3KXM 31* 29   BEART 2.4 3.8*   [id number] 98 97   BVQ7INS 33 31       INR: No results for input(s): INR in the last 72 hours. Lactate: No results for input(s): LACTATE in the last 72 hours. Cultures:  -----------------------------------------------------------------  RAD:   XR CHEST PORTABLE   Final Result   Impression: Interval placement of a right internal jugular approach dialysis catheter, as above. CT HEAD WO CONTRAST   Final Result   1. No evidence for acute intracranial process. No bleed or shift   2. Remote infarct left thalamus      XR CHEST (2 VW)   Final Result      Free air beneath the right hemidiaphragm. Bibasilar atelectasis. Critical result discussed with Dr. Ananda Pruitt at 1:00 PM 9/6/2022          Assessment/Plan:   69 y.o. male with pertinent medical history of ESRD on PD, interstitial lung disease, diastolic HF (6/45 Echo with EF 50-55%), NSTEMI, CAD s/p CABG 2007, T2DM, HTN, HLD, hypothyroidism who was admitted on 9/6/2022 for management of AMS secondary to acute encephalopathy. Acute encephalopathy  Pt was admitted for AMS initially thought to be due to be 2/2 CO2 narcosis 2/2 Bipap non-complaince. He improved intitally with Bipap use while inpatient but has been increasingly lethargic for the past 2 days. 09/11 a head ct was ordered d/t AMS, and was no acute intracranial process. - There has also been concern for likely cefepime toxicity following increasing lethargy and AMS. -Pulmonology following peripheral: Normal ABG 9/11 and Dr Sherren Conner agrees that acute encephalopathy is unlikely from hypercapnia.    - Recommended consult to neuro to assist in determining etiology of acute encephalopathy (9/12)   - Continue Bipap qHS and prn     ESRD on PD   - Nephrology will continue to follow and determine when HD is indicated   - HD yesterday (9/12) to dialysis Cefepime as PD will not detoxify Cefepime   - HD scheduled for today (9/13)  - Continue retacrit 2000 units MWF     T2DM, uncontrolled   - 35 IU basal insulin   - 5 IU with meals ITD  - MDSSI  - POC glucose   - Hypoglycemia protocol      Hypomagnesemia  - M2+ 1.9 (9/7)  - Ordered Mag Sulfate 2,000 mg IV bolus (x1)    Hypothyroidism   - Continue synthroid 50 mcg     CAD s/p CABG 2007  - Stable on 04/22 angiogram with no intervention.  - Continue home aspirin   - Continue home Lipitor      Code Status: Full code   FEN: NPO  PPX: Heparin   DISPO: Kaci Young, MS4  09/13/22  9:44 AM    This patient has been staffed and discussed with Kyleigh Carvajal MD.     I saw and examined this patient. I have reviewed the current history, physical findings, labs and assessment and plan and agree with note as documented by the medical student, Ancelmo Garcia, MS4    Kiara Braga MD  PGY-1     Addendum to Resident H& P/Progress note:  I have personally seen,examined and evaluated the patient.  I have reviewed the current history, physical findings, labs and assessment and plan and agree with note as documented by MS 4, Casper Soulier, resident MD ( [place])      Kyleigh Carvajal MD, Ben Less

## 2022-09-13 NOTE — FLOWSHEET NOTE
Treatment time: 3 Hours  Net UF: 0 ML    Pre weight: 99.2 Kg  Post weight: 99.2 kg  EDW: TBD kg    Access used: Right neck NTDC   Access function: positional with  ml/min    Medications or blood products given: Retacrit 2000    Regular outpatient schedule: PD rto HD pt    Summary of response to treatment: Tolerated tx well., bp dropped systolic to 70H but responded well to saline bolus. Copy of dialysis treatment record placed in chart, to be scanned into EMR.  Report to floor RN.   09/12/22 1445 09/12/22 1805   Treatment   Time On 1500  --    Time Off  --  1805   Treatment Goal 400 823   Vital Signs   BP (!) 152/82 (!) 99/57   Temp 97.7 °F (36.5 °C) 97.4 °F (36.3 °C)   Heart Rate 78 86   Resp 16 16   SpO2 100 %  --    Weight 218 lb 11.1 oz (99.2 kg) 218 lb 11.1 oz (99.2 kg)   Treatment Initiation   Dialyze Hours 3  --    Post-Hemodialysis Assessment   Rinseback Volume (ml)  --  400 ml   Blood Volume Processed (Liters)  --  61.1 l/min   Dialyzer Clearance  --  Lightly streaked   Duration of Treatment (minutes)  --  180 minutes   Hemodialysis Intake (ml)  --  900 ml   Hemodialysis Output (ml)  --  900 ml   Tolerated Treatment  --  Good

## 2022-09-14 LAB
ANION GAP SERPL CALCULATED.3IONS-SCNC: 10 MMOL/L (ref 3–16)
ANISOCYTOSIS: ABNORMAL
ATYPICAL LYMPHOCYTE RELATIVE PERCENT: 2 % (ref 0–6)
BASOPHILS ABSOLUTE: 0 K/UL (ref 0–0.2)
BASOPHILS RELATIVE PERCENT: 0 %
BUN BLDV-MCNC: 12 MG/DL (ref 7–20)
CALCIUM SERPL-MCNC: 8.7 MG/DL (ref 8.3–10.6)
CHLORIDE BLD-SCNC: 100 MMOL/L (ref 99–110)
CO2: 28 MMOL/L (ref 21–32)
CREAT SERPL-MCNC: 3.1 MG/DL (ref 0.8–1.3)
EOSINOPHILS ABSOLUTE: 0.1 K/UL (ref 0–0.6)
EOSINOPHILS RELATIVE PERCENT: 1 %
GFR AFRICAN AMERICAN: 24
GFR NON-AFRICAN AMERICAN: 20
GLUCOSE BLD-MCNC: 109 MG/DL (ref 70–99)
GLUCOSE BLD-MCNC: 150 MG/DL (ref 70–99)
GLUCOSE BLD-MCNC: 158 MG/DL (ref 70–99)
GLUCOSE BLD-MCNC: 192 MG/DL (ref 70–99)
GLUCOSE BLD-MCNC: 91 MG/DL (ref 70–99)
HCT VFR BLD CALC: 27.6 % (ref 40.5–52.5)
HEMOGLOBIN: 9.3 G/DL (ref 13.5–17.5)
LYMPHOCYTES ABSOLUTE: 1.6 K/UL (ref 1–5.1)
LYMPHOCYTES RELATIVE PERCENT: 13 %
MACROCYTES: ABNORMAL
MAGNESIUM: 2.3 MG/DL (ref 1.8–2.4)
MCH RBC QN AUTO: 38.6 PG (ref 26–34)
MCHC RBC AUTO-ENTMCNC: 33.7 G/DL (ref 31–36)
MCV RBC AUTO: 114.3 FL (ref 80–100)
MONOCYTES ABSOLUTE: 0.8 K/UL (ref 0–1.3)
MONOCYTES RELATIVE PERCENT: 8 %
MYELOCYTE PERCENT: 3 %
NEUTROPHILS ABSOLUTE: 8 K/UL (ref 1.7–7.7)
NEUTROPHILS RELATIVE PERCENT: 73 %
NUCLEATED RED BLOOD CELLS: 1 /100 WBC
OVALOCYTES: ABNORMAL
PDW BLD-RTO: 15 % (ref 12.4–15.4)
PERFORMED ON: ABNORMAL
PERFORMED ON: NORMAL
PLATELET # BLD: 239 K/UL (ref 135–450)
PMV BLD AUTO: 6.9 FL (ref 5–10.5)
POIKILOCYTES: ABNORMAL
POLYCHROMASIA: ABNORMAL
POTASSIUM SERPL-SCNC: 4.6 MMOL/L (ref 3.5–5.1)
RBC # BLD: 2.41 M/UL (ref 4.2–5.9)
SCHISTOCYTES: ABNORMAL
SODIUM BLD-SCNC: 138 MMOL/L (ref 136–145)
WBC # BLD: 10.5 K/UL (ref 4–11)

## 2022-09-14 PROCEDURE — 6370000000 HC RX 637 (ALT 250 FOR IP): Performed by: STUDENT IN AN ORGANIZED HEALTH CARE EDUCATION/TRAINING PROGRAM

## 2022-09-14 PROCEDURE — 97535 SELF CARE MNGMENT TRAINING: CPT

## 2022-09-14 PROCEDURE — 94761 N-INVAS EAR/PLS OXIMETRY MLT: CPT

## 2022-09-14 PROCEDURE — 2700000000 HC OXYGEN THERAPY PER DAY

## 2022-09-14 PROCEDURE — 80048 BASIC METABOLIC PNL TOTAL CA: CPT

## 2022-09-14 PROCEDURE — 6360000002 HC RX W HCPCS: Performed by: STUDENT IN AN ORGANIZED HEALTH CARE EDUCATION/TRAINING PROGRAM

## 2022-09-14 PROCEDURE — 94660 CPAP INITIATION&MGMT: CPT

## 2022-09-14 PROCEDURE — 99232 SBSQ HOSP IP/OBS MODERATE 35: CPT | Performed by: HOSPITALIST

## 2022-09-14 PROCEDURE — 2580000003 HC RX 258: Performed by: STUDENT IN AN ORGANIZED HEALTH CARE EDUCATION/TRAINING PROGRAM

## 2022-09-14 PROCEDURE — 97530 THERAPEUTIC ACTIVITIES: CPT

## 2022-09-14 PROCEDURE — 97116 GAIT TRAINING THERAPY: CPT

## 2022-09-14 PROCEDURE — 83735 ASSAY OF MAGNESIUM: CPT

## 2022-09-14 PROCEDURE — 6370000000 HC RX 637 (ALT 250 FOR IP): Performed by: INTERNAL MEDICINE

## 2022-09-14 PROCEDURE — 2060000000 HC ICU INTERMEDIATE R&B

## 2022-09-14 PROCEDURE — 85025 COMPLETE CBC W/AUTO DIFF WBC: CPT

## 2022-09-14 PROCEDURE — 99233 SBSQ HOSP IP/OBS HIGH 50: CPT | Performed by: INTERNAL MEDICINE

## 2022-09-14 RX ORDER — INSULIN LISPRO 100 [IU]/ML
0-4 INJECTION, SOLUTION INTRAVENOUS; SUBCUTANEOUS
Status: DISCONTINUED | OUTPATIENT
Start: 2022-09-14 | End: 2022-09-16

## 2022-09-14 RX ORDER — INSULIN LISPRO 100 [IU]/ML
0-4 INJECTION, SOLUTION INTRAVENOUS; SUBCUTANEOUS NIGHTLY
Status: DISCONTINUED | OUTPATIENT
Start: 2022-09-14 | End: 2022-09-16

## 2022-09-14 RX ADMIN — POLYETHYLENE GLYCOL (3350) 17 G: 17 POWDER, FOR SOLUTION ORAL at 21:30

## 2022-09-14 RX ADMIN — ATORVASTATIN CALCIUM 80 MG: 40 TABLET, FILM COATED ORAL at 21:30

## 2022-09-14 RX ADMIN — POLYETHYLENE GLYCOL (3350) 17 G: 17 POWDER, FOR SOLUTION ORAL at 08:53

## 2022-09-14 RX ADMIN — SODIUM CHLORIDE, PRESERVATIVE FREE 10 ML: 5 INJECTION INTRAVENOUS at 08:53

## 2022-09-14 RX ADMIN — SODIUM CHLORIDE, PRESERVATIVE FREE 10 ML: 5 INJECTION INTRAVENOUS at 21:39

## 2022-09-14 RX ADMIN — LEVOTHYROXINE SODIUM 50 MCG: 50 TABLET ORAL at 08:54

## 2022-09-14 RX ADMIN — FAMOTIDINE 20 MG: 20 TABLET ORAL at 08:54

## 2022-09-14 RX ADMIN — HEPARIN SODIUM 5000 UNITS: 5000 INJECTION INTRAVENOUS; SUBCUTANEOUS at 21:30

## 2022-09-14 RX ADMIN — ASPIRIN 81 MG 81 MG: 81 TABLET ORAL at 08:54

## 2022-09-14 RX ADMIN — EPOETIN ALFA-EPBX 2000 UNITS: 2000 INJECTION, SOLUTION INTRAVENOUS; SUBCUTANEOUS at 08:58

## 2022-09-14 RX ADMIN — HEPARIN SODIUM 5000 UNITS: 5000 INJECTION INTRAVENOUS; SUBCUTANEOUS at 14:22

## 2022-09-14 RX ADMIN — HEPARIN SODIUM 5000 UNITS: 5000 INJECTION INTRAVENOUS; SUBCUTANEOUS at 05:49

## 2022-09-14 ASSESSMENT — PAIN SCALES - WONG BAKER
WONGBAKER_NUMERICALRESPONSE: 0

## 2022-09-14 ASSESSMENT — PAIN SCALES - GENERAL
PAINLEVEL_OUTOF10: 0
PAINLEVEL_OUTOF10: 0

## 2022-09-14 NOTE — CARE COORDINATION
CM following for discharge planning. [address] are reviewing for wait list for PD. [facility] [address] declined. CM called 12 other facilities that are on our PD SNF list and none of them are currently doing PD due to staffing or training. AddendumRojelio Mccabe added pt to waiting list for PD. [facility] denied pt. Referral sent to [facility] and they can accept pt and have PD trained staff. Precert to be started this afternoon or in am. CM left vm for ghazal Alcantarroni Judith.     Contact ghazal Neelam Wong Ez Andersen RN, BSN, [address]  Case Management Department  [phone]

## 2022-09-14 NOTE — PROGRESS NOTES
Progress Note    Admit Date: 9/6/2022  Day: 8  Diet: ADULT DIET; Regular; 3 carb choices (45 gm/meal); Low Fat/Low Chol/High Fiber/DONATO    CC: AMS    Interval history:     Overnight, Mr. Johnny Whalen did not tolerate his BiPAP and did not want to wear it. Patient was educated by nursing about the importance of using BiPAP while asleep, but patient still refused. Patient was reported saying \"I just want to die, I'm not getting any better\". Nursing and consult notes reviewed. Telemetry reported 1st degree heart block overnight with brief periods of 2nd degree (type I) heart block. Patient was seen and examined by our team this morning at bedside. Overall, medically has been improving with creatinine falling to 3.1 from 4.2 since yesterday. His HD vascular catheter was not removed yesterday by nursing (Per EMR, nurse stated she does not pull vascular catheters). Patient re-endorsed not wanting to continue HD, with a preference for PD. Patient denies SI/HI, however feels overwhelmed in the hospital and does not have hope for improving in the long term or achieving a satisfactory quality of life. He has no plans to harm himself and would be willing to tell providers if he was having such thoughts. Patient did not want to continue talking and closed his eyes. Patient has been eating less per chart review, but did not want to talk about it this morning. Social work is it the process of finding placement. Patient is on PD, and few locations take patients with PD requirements. Of those present, they have no spare beds available at the moment. Denies any chest pain, shortness of breath, syncopal episodes, nausea, vomiting, abdominal pain or palpitations. HPI:     This is a 69year old male with a past medical history of ESRD on PD, interstitial lung disease, diastolic HF (3/88 Echo with EF 50-55%), NSTEMI, CAD s/p CABG 2007, T2DM, HTN, HLD, hypothyroidism who presents with AMS.  Per family for the last 10 days 09/14/22 0610 -- -- -- 88 -- -- --   09/14/22 0417 -- -- -- -- -- 95 % --   09/14/22 0330 (!) 149/75 97.2 °F (36.2 °C) Oral 83 20 95 % 209 lb 14.1 oz (95.2 kg)       Intake/Output Summary (Last 24 hours) at 9/14/2022 1031  Last data filed at 9/14/2022 0843  Gross per 24 hour   Intake 600 ml   Output --   Net 600 ml     Review of Systems   Constitutional: Negative. HENT: Negative. Eyes: Negative. Respiratory: Negative. Negative for cough, chest tightness, shortness of breath and wheezing. Cardiovascular: Negative. Gastrointestinal:  Negative for abdominal pain. Endocrine: Negative. Genitourinary: Negative. Musculoskeletal: Negative. Allergic/Immunologic: Negative. Neurological: Negative. Psychiatric/Behavioral: Negative. Physical Exam  Constitutional:       Appearance: He is obese. He is ill-appearing. Eyes:      Pupils: Pupils are equal, round, and reactive to light. Cardiovascular:      Rate and Rhythm: Normal rate and regular rhythm. Pulses: Normal pulses. Heart sounds: Normal heart sounds. Abdominal:      General: Bowel sounds are normal. There is distension. Palpations: Abdomen is soft. Tenderness: There is abdominal tenderness. Musculoskeletal:      Right lower leg: No edema. Left lower leg: No edema. Skin:     General: Skin is warm. Neurological:      Mental Status: He is oriented to person, place, and time.      LABS:    CBC:   Recent Labs     09/12/22 0417 09/13/22 0415 09/14/22 0430   WBC 10.4 9.6 10.5   HGB 9.7* 9.0* 9.3*   HCT 28.3* 26.7* 27.6*    230 239   .4* 113.3* 114.3*     Renal:    Recent Labs     09/12/22 0417 09/13/22 0415 09/14/22 0430   * 138 138   K 3.6 4.3 4.6   CL 90* 99 100   CO2 25 26 28   BUN 46* 19 12   CREATININE 7.3* 4.2* 3.1*   GLUCOSE 207* 135* 109*   CALCIUM 8.8 8.6 8.7   MG 2.20 1.90 2.30   ANIONGAP 18* 13 10       ABGs:    Recent Labs     09/11/22  1215 09/11/22  1428   ELOISE 7.295* 7.390   IRL4NXX 63.0* 48.7*   PO2ART 121.0* 105.0   GMH5HRP 31* 29   BEART 2.4 3.8*   [id number] 98 97   EPM4YDY 33 31       INR: No results for input(s): INR in the last 72 hours. Lactate: No results for input(s): LACTATE in the last 72 hours. Cultures:  -----------------------------------------------------------------  RAD:   XR CHEST PORTABLE   Final Result   Impression: Interval placement of a right internal jugular approach dialysis catheter, as above. CT HEAD WO CONTRAST   Final Result   1. No evidence for acute intracranial process. No bleed or shift   2. Remote infarct left thalamus      XR CHEST (2 VW)   Final Result      Free air beneath the right hemidiaphragm. Bibasilar atelectasis. Critical result discussed with Dr. Jannet Ellis at 1:00 PM 9/6/2022          Assessment/Plan:   69 y.o. male with pertinent medical history of ESRD on PD, interstitial lung disease, diastolic HF (6/24 Echo with EF 50-55%), NSTEMI, CAD s/p CABG 2007, T2DM, HTN, HLD, hypothyroidism who was admitted on 9/6/2022 for management of AMS secondary to acute encephalopathy. Acute encephalopathy  Pt was admitted for AMS initially thought to be due to be 2/2 CO2 narcosis 2/2 Bipap non-complaince. He improved intitally with Bipap use while inpatient but has been increasingly lethargic for the past 2 days. 09/11 a head ct was ordered d/t AMS, and was no acute intracranial process. - There has also been concern for likely cefepime toxicity following increasing lethargy and AMS. -Pulmonology following peripheral: Normal ABG 9/11 and Dr Edgard Arrieta agrees that acute encephalopathy is unlikely from hypercapnia. - Recommended consult to neuro to assist in determining etiology of acute encephalopathy (9/12)              - Continue Bipap qHS and prn   - We discussed patient's unwillingness to use BiPAP overnight.  Pulmonology recommended a new mask fitting, however those resources are not available in house and will need to be followed up with outpatient (9/14)     ESRD on PD   - Nephrology will continue to follow and determine when HD is indicated              - HD (9/12 and 9/13) to dialyze Cefepime as PD will not detoxify Cefepime   - Continue retacrit 2000 units MWF    - Dialysis not indicated further, nephology will continue to assess.  - HD vascular catheter will be removed today by nursing or IR (9/14)    - We contacted IR to remove AV fistula, they agreed to ensure the vas cath was removed and did not need a consult to be placed. - Patient would like to continue PD outpatient     T2DM, uncontrolled   - 35 IU basal insulin    - Decrease basal insulin from 35 U to 30 U, due to patient's decreased appetite and lower blood sugars (9/14)  - 5 IU with meals ITD  - Switch from MDSSI to LDSSI (9/14)  - POC glucose   - Hypoglycemia protocol      Hypomagnesemia - Resolved  - Mg2+ 1.9 (9/7), 2.3 (9/14)  - Ordered Mag Sulfate 2,000 mg IV bolus (x1) on 9/7     Hypothyroidism   - Continue synthroid 50 mcg     CAD s/p CABG 2007  - Stable on 04/22 angiogram with no intervention.  - Continue home aspirin   - Continue home Lipitor      Code Status: Full code   FEN: ADULT DIET; Regular; 3 carb choices (45 gm/meal); Low Fat/Low Chol/High Fiber/DONATO  PPX: Heparin   DISPO: DIONI    Larissa Ludwig, MS4  09/14/22  10:31 AM    This patient has been staffed and discussed with Ravin Keating MD.     I have seen and evaluated this pt and agree with the medical students note on the history, physical examination findings, assessment and plan. Mental status has returned back to baseline. Had last session of HD 09/13 and vas catheter removed by dialysis RN this morning. Patient is to continue on PD. Awaiting placement in SNF who are able to take patients with PD. Plan is to discharge to SNF once pt gets placement and to arrange with social work on how to get a more comfortable mask outpatient for his BiPAP to increase compliance.  Family is in agreement with plan. Vic Stovall MD  PGY-1    Addendum to Resident H& P/Progress note:  I have personally seen,examined and evaluated the patient.  I have reviewed the current history, physical findings, labs and assessment and plan and agree with note as documented by resident MD ( )      Georgeann Soulier, MD, [address]

## 2022-09-14 NOTE — PROGRESS NOTES
Occupational Therapy  Facility/Department: [facility] 4 PCU  Daily Treatment Note  NAME: Alba Richards  : 1945  MRN: [id number]    Date of Service: 2022    Discharge Recommendations: Alba Richards scored a 15/24 on the AM-PAC ADL Inpatient form. Current research shows that an AM-PAC score of 17 or less is typically not associated with a discharge to the patient's home setting. Based on the patient's AM-PAC score and their current ADL deficits, it is recommended that the patient have 3-5 sessions per week of Occupational Therapy at d/c to increase the patient's independence. Please see assessment section for further patient specific details. If patient discharges prior to next session this note will serve as a discharge summary. Please see below for the latest assessment towards goals. OT Equipment Recommendations  Other: defer to next level of care      Patient Diagnosis(es): The primary encounter diagnosis was Drug toxicity. Diagnoses of Elevated troponin and Hypercarbia were also pertinent to this visit. Assessment    Assessment: Pt continues to be below his functional baseline with decreased endurance and generalized weakness. Pt did make signficant improvement with fx mobility distance, walking to/from bathroom with min A while spO2 remained >90%. Pt required mod A for toilet transfer. Pts mood improved from last session with more alertness and responsiveness throughout session. Pt remains on 1L of O2, which he was not using prior to admission. Pt continues to required max A for LB ADLs and required max A this date for rear nayana care. Recommend ongoing inapatient therapy services at discharge.   Activity Tolerance: Patient limited by fatigue;Treatment limited secondary to decreased cognition;Patient tolerated treatment well;Patient limited by endurance  Other: defer to next level of care      Plan   Plan  Times per Week: 2-5  Times per Day: Daily  Current Treatment Recommendations: Strengthening;Balance training;Functional mobility training; Endurance training;Self-Care / ADL; Safety education & training     Restrictions       Subjective   Subjective  Subjective: Pt agreeble to OT treatment session. Pt stating, \"when will I see you again? \"  Pain: complaints of neck pain- pt provided with heat pack, which he reports improves the pain  Orientation  Overall Orientation Status: Within Functional Limits  Orientation Level: Oriented X4  Pain: pt reports soreness in bottom from sitting in chair - not rated  Cognition  Overall Cognitive Status: Exceptions  Arousal/Alertness: Delayed responses to stimuli  Following Commands: Follows one step commands with increased time  Attention Span: Attends with cues to redirect  Memory: Decreased long term memory  Safety Judgement: Decreased awareness of need for assistance  Problem Solving: Assistance required to generate solutions;Assistance required to implement solutions;Assistance required to correct errors made;Assistance required to identify errors made  Insights: Decreased awareness of deficits  Initiation: Requires cues for some  Sequencing: Requires cues for some  Cognition Comment: improved arousal and alertness this session; pt requires increased encouragement throughout session 2/2 being self limiting with mobility        Objective    Vitals     Bed Mobility Training  Bed Mobility Training: Yes  Supine to Sit: Contact-guard assistance (increased time; effortful; HOB elevated; use of rail)  Sit to Supine: Stand-by assistance  Scooting: Stand-by assistance  Balance  Sitting: Impaired (CGA EOB)  Sitting - Static: Fair (occasional)  Sitting - Dynamic: Fair (occasional)  Standing: Impaired  Standing - Static: Constant support; Fair (min A at [facility] [place] [employer])  Standing - Dynamic: Constant support;Poor  Transfer Training  Transfer Training: Yes  Sit to Stand: Minimum assistance (from recliner at [facility])  Stand to Sit: Minimum assistance  Toilet Transfer:  Moderate assistance; Additional time (from low toilet at [employer])  Gait Training  Gait Training: Yes (inc encouragement required for ambulation due to self limiting traits. dec tolerance noted with inability to tolerate further amb)  Gait  Overall Level of Assistance: Minimum assistance  Interventions: Safety awareness training;Verbal cues  Base of Support: Widened  Speed/Ashlee: Slow;Shuffled  Step Length: Left shortened;Right shortened  Gait Abnormalities: Shuffling gait  Distance (ft):  (Pt ambulated from bed to chair; from chair to commode; from commode to bed)  Assistive Device: Gait belt;Walker, rolling     ADL  Feeding: Setup  Feeding Skilled Clinical Factors: breakfast tray present at end of session; omlet cut into pieces and bread covered with jelly; pt displaying improved appetite this session  Grooming: Setup; Increased time to complete;Contact guard assistance  Grooming Skilled Clinical Factors: to wipe face and wash hands  UE Dressing: Minimal assistance  UE Dressing Skilled Clinical Factors: to don new gown while seated on edge of bed  LE Dressing: Maximum assistance  LE Dressing Skilled Clinical Factors: to don new socks and new brief  Toileting: Maximum assistance  Toileting Skilled Clinical Factors: for rear nayana care following BM and for brief mngmt  Additional Comments: pt fatigues quickly after standing ; pt on 1L of O2 with spO2 >90% throughout session     Other Specialty Interventions  Other Treatments/Modalities: hot pack applied to neck for muscle pain  Safety Devices  Type of Devices: Call light within reach;Gait belt;Nurse notified; Left in bed;Bed alarm in place     Patient Education  Education Provided: Role of Therapy;Plan of Care;Transfer Training;ADL Adaptive Strategies  Education Method: Verbal  Barriers to Learning: Cognition (slightly improving)  Education Outcome: Continued education needed;Verbalized understanding    Goals  Short Term Goals  Time Frame for Short term goals: by discharge  Short Term Goal 1: Pt will perform toilet transfer with CGA  Short Term Goal 2: Pt will perform ADL grooming task in standing with CGA  Short Term Goal 3: Pt will perform LB dressing with min A and AE as needed  Patient Goals   Patient goals : not stated       Therapy Time   Individual Concurrent Group Co-treatment   Time In 0830         Time Out 0938         Minutes 68         Timed Code Treatment Minutes: Rah Galarza 87, [place]

## 2022-09-14 NOTE — PROGRESS NOTES
Right IJ non-tunneled dialysis catheter removed intact per order. Manual pressure held x 5 minutes, gauze with occlusive dressing applied.

## 2022-09-14 NOTE — PLAN OF CARE
Problem: Safety - Adult  Goal: Free from fall injury  Outcome: Progressing  Flowsheets (Taken 9/14/2022 1801)  Free From Fall Injury: Instruct family/caregiver on patient safety     Problem: Skin/Tissue Integrity  Goal: Absence of new skin breakdown  Description: 1. Monitor for areas of redness and/or skin breakdown  2. Assess vascular access sites hourly  3. Every 4-6 hours minimum:  Change oxygen saturation probe site  4. Every 4-6 hours:  If on nasal continuous positive airway pressure, respiratory therapy assess nares and determine need for appliance change or resting period.   Outcome: Progressing     Problem: Chronic Conditions and Co-morbidities  Goal: Patient's chronic conditions and co-morbidity symptoms are monitored and maintained or improved  Outcome: Progressing  Flowsheets (Taken 9/14/2022 1801)  Care Plan - Patient's Chronic Conditions and Co-Morbidity Symptoms are Monitored and Maintained or Improved:   Monitor and assess patient's chronic conditions and comorbid symptoms for stability, deterioration, or improvement   Collaborate with multidisciplinary team to address chronic and comorbid conditions and prevent exacerbation or deterioration   Update acute care plan with appropriate goals if chronic or comorbid symptoms are exacerbated and prevent overall improvement and discharge     Problem: Respiratory - Adult  Goal: Achieves optimal ventilation and oxygenation  Outcome: Progressing  Flowsheets (Taken 9/14/2022 1801)  Achieves optimal ventilation and oxygenation:   Assess for changes in respiratory status   Position to facilitate oxygenation and minimize respiratory effort   Assess for changes in mentation and behavior   Oxygen supplementation based on oxygen saturation or arterial blood gases   Assess and instruct to report shortness of breath or any respiratory difficulty

## 2022-09-14 NOTE — PROGRESS NOTES
Nephrology  Note                                                                                                                                                                                                                                                                                                                                                               Office : [phone]     Fax :[phone]              Patient's Name: Darwin Evans  8:44 AM  9/14/2022    Reason for Consult:  ESRD   Requesting Physician:  Sadi Gordon MD      [facility] better   More awake   HD cath in place           Past Medical History:   Diagnosis Date    Allergic rhinitis due to other allergen 7/12/2010    Coronary atherosclerosis of unspecified type of vessel, native or graft 7/12/2010    Diabetic eye exam ([facility] [address] 04/29/2015    Diabetic eye exam ([address] 5/5/2016    [place] [facility]    Generalized osteoarthrosis, involving multiple sites 7/12/2010    Other psoriasis 7/12/2010    Pneumonia     Prolonged emergence from general anesthesia     Psoriatic arthropathy ([facility] [address] 7/12/2010    Type II or unspecified type diabetes mellitus without mention of complication, not stated as uncontrolled 7/12/2010    Unspecified essential hypertension 7/12/2010    Unspecified sleep apnea 7/12/2010       Past Surgical History:   Procedure Laterality Date    CARPAL TUNNEL RELEASE      s/p    CATARACT REMOVAL      CORONARY ARTERY BYPASS GRAFT      JOINT REPLACEMENT      LAPAROSCOPY INSERTION PERITONEAL CATHETER N/A 10/26/2020    LAPAROSCOPIC PERITONEAL DIALYSIS CATHETER PLACEMENT; LAPAROSCOPIC OMENTOPEXY performed by Rubina Kumar DO at [facility] OR       Family History   Problem Relation Age of Onset    Diabetes Mother     Heart Failure Mother     Coronary Art Dis Father         reports that he quit smoking about 41 years ago. He started smoking about 60 years ago. He has a 18.00 pack-year smoking history.  He has never used smokeless tobacco. He reports that he does not drink alcohol and does not use drugs.     Allergies:  Penicillins, Sulfa antibiotics, Tazorac [tazarotene], Clindamycin, and Clindamycin/lincomycin    Current Medications:    insulin glargine (LANTUS;BASAGLAR) injection pen 35 Units, Nightly  insulin lispro (1 Unit Dial) 0-8 Units, TID WC  insulin lispro (1 Unit Dial) 0-4 Units, Nightly  [Held by provider] insulin lispro (1 Unit Dial) 5 Units, TID WC  famotidine (PEPCID) tablet 20 mg, Daily  polyethylene glycol (GLYCOLAX) packet 17 g, BID  epoetin daphney-epbx (RETACRIT) injection 2,000 Units, Once per day on Mon Wed Fri  atorvastatin (LIPITOR) tablet 80 mg, Nightly  levothyroxine (SYNTHROID) tablet 50 mcg, Daily  aspirin chewable tablet 81 mg, Daily  sodium chloride flush 0.9 % injection 5-40 mL, 2 times per day  sodium chloride flush 0.9 % injection 5-40 mL, PRN  0.9 % sodium chloride infusion, PRN  ondansetron (ZOFRAN-ODT) disintegrating tablet 4 mg, Q8H PRN   Or  ondansetron (ZOFRAN) injection 4 mg, Q6H PRN  polyethylene glycol (GLYCOLAX) packet 17 g, Daily PRN  acetaminophen (TYLENOL) tablet 650 mg, Q6H PRN   Or  acetaminophen (TYLENOL) suppository 650 mg, Q6H PRN  heparin (porcine) injection 5,000 Units, 3 times per day  glucose chewable tablet 16 g, PRN  dextrose bolus 10% 125 mL, PRN   Or  dextrose bolus 10% 250 mL, PRN  glucagon (rDNA) injection 1 mg, PRN  dextrose 10 % infusion, Continuous PRN            Physical exam:     Vitals:  /69   Pulse 92   Temp 97.3 °F (36.3 °C) (Oral)   Resp 21   Ht 5' 10\" (1.778 m)   Wt 209 lb 14.1 oz (95.2 kg)   SpO2 96%   BMI 30.11 kg/m²   Constitutional:  OAA X3 NAD  Skin: no rash, turgor wnl  Heent:  eomi, mmm  Neck: no bruits or jvd noted  Cardiovascular:  S1, S2 without m/r/g  Respiratory: CTA B without w/r/r  Abdomen:  +bs, soft, TTP   Ext: + lower extremity edema  Psychiatric: mood and affect appropriate  Musculoskeletal:  Rom, muscular strength intact    Data:   Labs:  CBC:   Recent Labs     09/12/22  5328 09/13/22 0415 09/14/22  0430   WBC 10.4 9.6 10.5   HGB 9.7* 9.0* 9.3*    230 239       BMP:    Recent Labs     09/12/22 0417 09/13/22 0415 09/14/22  0430   * 138 138   K 3.6 4.3 4.6   CL 90* 99 100   CO2 25 26 28   BUN 46* 19 12   CREATININE 7.3* 4.2* 3.1*   GLUCOSE 207* 135* 109*       Ca/Mg/Phos:   Recent Labs     09/12/22 0417 09/13/22 0415 09/14/22  0430   CALCIUM 8.8 8.6 8.7   MG 2.20 1.90 2.30       Hepatic:   No results for input(s): AST, ALT, ALB, BILITOT, ALKPHOS in the last 72 hours. Troponin:   No results for input(s): TROPONINI in the last 72 hours. BNP: No results for input(s): BNP in the last 72 hours. Lipids: No results for input(s): CHOL, TRIG, HDL, LDLCALC, LABVLDL in the last 72 hours. ABGs:   Recent Labs     09/11/22  1428   PHART 7.390   PO2ART 105.0   XWV1RBM 48.7*       INR: No results for input(s): INR in the last 72 hours. UA:No results for input(s): Susa Merry, GLUCOSEU, BILIRUBINUR, Og Luigi, BLOODU, PHUR, PROTEINU, UROBILINOGEN, NITRU, LEUKOCYTESUR, LABMICR, URINETYPE in the last 72 hours. Urine Microscopic: No results for input(s): LABCAST, BACTERIA, COMU, HYALCAST, WBCUA, RBCUA, EPIU in the last 72 hours. Urine Culture: No results for input(s): LABURIN in the last 72 hours. Urine Chemistry: No results for input(s): Doneen Nanny, PROTEINUR, NAUR in the last 72 hours. IMAGING:  XR CHEST PORTABLE   Final Result   Impression: Interval placement of a right internal jugular approach dialysis catheter, as above. CT HEAD WO CONTRAST   Final Result   1. No evidence for acute intracranial process. No bleed or shift   2. Remote infarct left thalamus      XR CHEST (2 VW)   Final Result      Free air beneath the right hemidiaphragm. Bibasilar atelectasis. Critical result discussed with Dr. Akhil Kaye at 1:00 PM 9/6/2022          Assessment/Plan   ESRD     2. HTN    3. Anemia    4. Acid- base/ Electrolyte imbalance     5.  Abd tenderness - r/o PD peritonitis     Plan   - Cefepime toxicity is possible   - HD x 2 sessions   - remove HD cath   - ABG - reviewed   - PD fluid cell count - nl - no PD peritonitis   - CCPD tonight   - monitor Labs  - EPO or anemia    - Constipation management   - PT eval                   Thank you for allowing us to participate in care of Fatimah Colunga MD  Feel free to contact me   [facility] [address]  Office : [phone]  Fax :[phone]

## 2022-09-14 NOTE — PROGRESS NOTES
Patient pulling off bi-pap stating he doesn't want to wear it. Educated patient regarding importance of bi-pap overnight while asleep, but still refusing. Patient states \"I just want to die, I'm not getting any better. \"  Spoke with patient about vas cath getting pulled in am and the reason for vas cath placement. Patient states he understands but continues to state \"I just wanna die. Nurse tried to console patient but patient eventually closed his eyes and drifted off to sleep.

## 2022-09-14 NOTE — PROGRESS NOTES
Physical Therapy  Facility/Department: [place] [facility] [address] [facility]  Daily Treatment Note  NAME: Yady Mann  : 1945  MRN: [id number]    Date of Service: 2022    Discharge Recommendations: Yady Mann scored a 14/24 on the AM-PAC short mobility form. Current research shows that an AM-PAC score of 17 or less is typically not associated with a discharge to the patient's home setting. Based on the patient's AM-PAC score and their current functional mobility deficits, it is recommended that the patient have 3-5 sessions per week of Physical Therapy at d/c to increase the patient's independence. Please see assessment section for further patient specific details. If patient discharges prior to next session this note will serve as a discharge summary. Please see below for the latest assessment towards goals. PT Equipment Recommendations  Other: defer    Patient Diagnosis(es): The primary encounter diagnosis was Drug toxicity. Diagnoses of Elevated troponin and Hypercarbia were also pertinent to this visit. Assessment   Assessment: pt tolerated session fair with improved tolerance however continues to be self limiting requiring maximum encouragement to participate and push himself. pt requiring min- mod A for transfers at [address] [place] [address] and min A for short distance ambulation at . pt fatigues quickly requiring rest breaks however o2 sats stable on both RA and 1Lo2. pt is well below baseline and would benefit from further IP PT upon dc. continue PT POC  Activity Tolerance: Patient tolerated evaluation without incident;Patient limited by fatigue;Treatment limited secondary to decreased cognition (o2 sats on 1Lo2 96-97%, o2 sats on RA 92-93% post ambulation)  Other: defer     Plan    Plan  Plan:  (2-5)  Current Treatment Recommendations: Balance training;Strengthening; Functional mobility training;Transfer training;Gait training;Neuromuscular re-education;Stair training; Endurance training;Home exercise program;Safety education & training;Patient/Caregiver education & training; Therapeutic activities     Restrictions  Position Activity Restriction  Other position/activity restrictions: HD, peritoneal dialysis, cont. pulse ox, OT eval and treat     Subjective    Subjective  Subjective: pt up in chair and wishing to return to bed upon arrival  Pain: pt reports soreness in bottom from sitting in chair - not rated  Orientation  Overall Orientation Status: Within Functional Limits  Orientation Level: Oriented X4  Cognition  Overall Cognitive Status: Exceptions  Arousal/Alertness: Delayed responses to stimuli  Following Commands: Follows one step commands with increased time  Attention Span: Attends with cues to redirect; Difficulty attending to directions  Memory: Decreased long term memory  Safety Judgement: Decreased awareness of need for assistance  Problem Solving: Assistance required to generate solutions;Assistance required to implement solutions;Assistance required to correct errors made;Assistance required to identify errors made  Insights: Decreased awareness of deficits  Initiation: Requires cues for some  Sequencing: Requires cues for some  Cognition Comment: pt requires increased encouragement throughout session     Objective   Vitals     Bed Mobility Training  Bed Mobility Training: Yes  Sit to Supine: Stand-by assistance  Scooting: Stand-by assistance  Balance  Sitting: Impaired (CGA EOB)  Sitting - Static: Fair (occasional)  Sitting - Dynamic: Fair (occasional)  Standing: Impaired  Standing - Static: Constant support; Fair (min A at [employer] for [employer])  Standing - Dynamic: Constant support;Poor  Transfer Training  Transfer Training: Yes  Sit to Stand: Minimum assistance (from recliner at [employer])  Stand to Sit: Minimum assistance  Toilet Transfer: Moderate assistance; Additional time (from low toilet at [employer])  Gait Training  Gait Training: Yes (inc encouragement required for ambulation due to self limiting traits.  dec tolerance noted with inability to tolerate further amb)  Gait  Overall Level of Assistance: Minimum assistance  Interventions: Safety awareness training;Verbal cues  Base of Support: Widened  Speed/Ashlee: Shuffled; Slow  Step Length: Left shortened;Right shortened  Gait Abnormalities: Shuffling gait  Distance (ft): 10 Feet (x2)  Assistive Device: Gait belt;Walker, rolling        Other Specialty Interventions  Other Treatments/Modalities: pt able to use restroom for prolonged BM. dependent for pericare and brief management  Safety Devices  Type of Devices: Call light within reach;Gait belt;Nurse notified; Left in bed;Bed alarm in place       Goals  Short Term Goals  Time Frame for Short term goals: by dc  Short term goal 1: pt will perform bed mobility with min A  Short term goal 2: pt will perform functional transfers with LRAD and SBA  Short term goal 3: pt will ambulate 48' with LRAD and CGA  Patient Goals   Patient goals : not stated    Education  Patient Education  Education Given To: Patient  Education Provided: Role of Therapy;Plan of Care;Transfer Training  Education Provided Comments: importance of OOB activity  Education Method: Demonstration;Verbal  Barriers to Learning: Cognition  Education Outcome: Verbalized understanding;Continued education needed    Therapy Time   Individual Concurrent Group Co-treatment   Time In 1025         Time Out 1050         Minutes 25                 Armando Castro PT

## 2022-09-15 PROBLEM — R53.81 PHYSICAL DECONDITIONING: Status: ACTIVE | Noted: 2022-09-15

## 2022-09-15 LAB
ANION GAP SERPL CALCULATED.3IONS-SCNC: 13 MMOL/L (ref 3–16)
ANISOCYTOSIS: ABNORMAL
BANDED NEUTROPHILS RELATIVE PERCENT: 2 % (ref 0–7)
BASOPHILS ABSOLUTE: 0 K/UL (ref 0–0.2)
BASOPHILS RELATIVE PERCENT: 0 %
BUN BLDV-MCNC: 25 MG/DL (ref 7–20)
CALCIUM SERPL-MCNC: 8.6 MG/DL (ref 8.3–10.6)
CHLORIDE BLD-SCNC: 96 MMOL/L (ref 99–110)
CO2: 27 MMOL/L (ref 21–32)
CREAT SERPL-MCNC: 5 MG/DL (ref 0.8–1.3)
EOSINOPHILS ABSOLUTE: 0.3 K/UL (ref 0–0.6)
EOSINOPHILS RELATIVE PERCENT: 3 %
GFR AFRICAN AMERICAN: 14
GFR NON-AFRICAN AMERICAN: 11
GLUCOSE BLD-MCNC: 143 MG/DL (ref 70–99)
GLUCOSE BLD-MCNC: 171 MG/DL (ref 70–99)
GLUCOSE BLD-MCNC: 172 MG/DL (ref 70–99)
GLUCOSE BLD-MCNC: 187 MG/DL (ref 70–99)
GLUCOSE BLD-MCNC: 258 MG/DL (ref 70–99)
HCT VFR BLD CALC: 27.2 % (ref 40.5–52.5)
HEMOGLOBIN: 9.2 G/DL (ref 13.5–17.5)
LYMPHOCYTES ABSOLUTE: 0.9 K/UL (ref 1–5.1)
LYMPHOCYTES RELATIVE PERCENT: 9 %
MACROCYTES: ABNORMAL
MAGNESIUM: 2.3 MG/DL (ref 1.8–2.4)
MCH RBC QN AUTO: 38.9 PG (ref 26–34)
MCHC RBC AUTO-ENTMCNC: 33.9 G/DL (ref 31–36)
MCV RBC AUTO: 114.9 FL (ref 80–100)
MONOCYTES ABSOLUTE: 0.4 K/UL (ref 0–1.3)
MONOCYTES RELATIVE PERCENT: 4 %
MYELOCYTE PERCENT: 1 %
NEUTROPHILS ABSOLUTE: 8.1 K/UL (ref 1.7–7.7)
NEUTROPHILS RELATIVE PERCENT: 81 %
PDW BLD-RTO: 15.4 % (ref 12.4–15.4)
PERFORMED ON: ABNORMAL
PLATELET # BLD: 252 K/UL (ref 135–450)
PMV BLD AUTO: 7.2 FL (ref 5–10.5)
POLYCHROMASIA: ABNORMAL
POTASSIUM SERPL-SCNC: 4.6 MMOL/L (ref 3.5–5.1)
RBC # BLD: 2.37 M/UL (ref 4.2–5.9)
SODIUM BLD-SCNC: 136 MMOL/L (ref 136–145)
WBC # BLD: 9.7 K/UL (ref 4–11)

## 2022-09-15 PROCEDURE — 8040000000 HC CCPD INPATIENT

## 2022-09-15 PROCEDURE — 2700000000 HC OXYGEN THERAPY PER DAY

## 2022-09-15 PROCEDURE — 2060000000 HC ICU INTERMEDIATE R&B

## 2022-09-15 PROCEDURE — 85025 COMPLETE CBC W/AUTO DIFF WBC: CPT

## 2022-09-15 PROCEDURE — 99233 SBSQ HOSP IP/OBS HIGH 50: CPT | Performed by: INTERNAL MEDICINE

## 2022-09-15 PROCEDURE — 2580000003 HC RX 258: Performed by: STUDENT IN AN ORGANIZED HEALTH CARE EDUCATION/TRAINING PROGRAM

## 2022-09-15 PROCEDURE — 83735 ASSAY OF MAGNESIUM: CPT

## 2022-09-15 PROCEDURE — 6360000002 HC RX W HCPCS: Performed by: STUDENT IN AN ORGANIZED HEALTH CARE EDUCATION/TRAINING PROGRAM

## 2022-09-15 PROCEDURE — 6370000000 HC RX 637 (ALT 250 FOR IP): Performed by: STUDENT IN AN ORGANIZED HEALTH CARE EDUCATION/TRAINING PROGRAM

## 2022-09-15 PROCEDURE — 36415 COLL VENOUS BLD VENIPUNCTURE: CPT

## 2022-09-15 PROCEDURE — 94761 N-INVAS EAR/PLS OXIMETRY MLT: CPT

## 2022-09-15 PROCEDURE — 99232 SBSQ HOSP IP/OBS MODERATE 35: CPT | Performed by: HOSPITALIST

## 2022-09-15 PROCEDURE — 6370000000 HC RX 637 (ALT 250 FOR IP): Performed by: INTERNAL MEDICINE

## 2022-09-15 PROCEDURE — 80048 BASIC METABOLIC PNL TOTAL CA: CPT

## 2022-09-15 RX ORDER — ACETAMINOPHEN 650 MG/1
650 SUPPOSITORY RECTAL EVERY 6 HOURS PRN
Status: DISCONTINUED | OUTPATIENT
Start: 2022-09-15 | End: 2022-09-19 | Stop reason: HOSPADM

## 2022-09-15 RX ORDER — ACETAMINOPHEN 325 MG/1
650 TABLET ORAL EVERY 4 HOURS PRN
Status: DISCONTINUED | OUTPATIENT
Start: 2022-09-15 | End: 2022-09-15 | Stop reason: SDUPTHER

## 2022-09-15 RX ORDER — ACETAMINOPHEN 325 MG/1
650 TABLET ORAL EVERY 6 HOURS PRN
Status: DISCONTINUED | OUTPATIENT
Start: 2022-09-15 | End: 2022-09-19 | Stop reason: HOSPADM

## 2022-09-15 RX ORDER — GENTAMICIN SULFATE 1 MG/G
CREAM TOPICAL PRN
Status: DISCONTINUED | OUTPATIENT
Start: 2022-09-15 | End: 2022-09-19 | Stop reason: HOSPADM

## 2022-09-15 RX ADMIN — POLYETHYLENE GLYCOL (3350) 17 G: 17 POWDER, FOR SOLUTION ORAL at 21:45

## 2022-09-15 RX ADMIN — ASPIRIN 81 MG 81 MG: 81 TABLET ORAL at 08:55

## 2022-09-15 RX ADMIN — ATORVASTATIN CALCIUM 80 MG: 40 TABLET, FILM COATED ORAL at 21:45

## 2022-09-15 RX ADMIN — HEPARIN SODIUM 5000 UNITS: 5000 INJECTION INTRAVENOUS; SUBCUTANEOUS at 12:57

## 2022-09-15 RX ADMIN — HEPARIN SODIUM 5000 UNITS: 5000 INJECTION INTRAVENOUS; SUBCUTANEOUS at 05:54

## 2022-09-15 RX ADMIN — FAMOTIDINE 20 MG: 20 TABLET ORAL at 08:55

## 2022-09-15 RX ADMIN — SODIUM CHLORIDE, PRESERVATIVE FREE 10 ML: 5 INJECTION INTRAVENOUS at 08:56

## 2022-09-15 RX ADMIN — ACETAMINOPHEN 650 MG: 325 TABLET, FILM COATED ORAL at 12:57

## 2022-09-15 RX ADMIN — LEVOTHYROXINE SODIUM 50 MCG: 50 TABLET ORAL at 08:55

## 2022-09-15 RX ADMIN — SODIUM CHLORIDE, PRESERVATIVE FREE 10 ML: 5 INJECTION INTRAVENOUS at 08:55

## 2022-09-15 RX ADMIN — POLYETHYLENE GLYCOL (3350) 17 G: 17 POWDER, FOR SOLUTION ORAL at 08:56

## 2022-09-15 RX ADMIN — HEPARIN SODIUM 5000 UNITS: 5000 INJECTION INTRAVENOUS; SUBCUTANEOUS at 21:45

## 2022-09-15 RX ADMIN — SODIUM CHLORIDE, PRESERVATIVE FREE 10 ML: 5 INJECTION INTRAVENOUS at 21:47

## 2022-09-15 ASSESSMENT — PAIN DESCRIPTION - FREQUENCY
FREQUENCY: CONTINUOUS
FREQUENCY: CONTINUOUS

## 2022-09-15 ASSESSMENT — PAIN DESCRIPTION - ONSET
ONSET: GRADUAL
ONSET: ON-GOING

## 2022-09-15 ASSESSMENT — PAIN DESCRIPTION - ORIENTATION
ORIENTATION: LEFT
ORIENTATION: LEFT

## 2022-09-15 ASSESSMENT — PAIN SCALES - GENERAL
PAINLEVEL_OUTOF10: 0
PAINLEVEL_OUTOF10: 6
PAINLEVEL_OUTOF10: 0
PAINLEVEL_OUTOF10: 1
PAINLEVEL_OUTOF10: 0

## 2022-09-15 ASSESSMENT — ENCOUNTER SYMPTOMS
ALLERGIC/IMMUNOLOGIC NEGATIVE: 1
EYES NEGATIVE: 1
ABDOMINAL DISTENTION: 1
SHORTNESS OF BREATH: 1
COUGH: 1

## 2022-09-15 ASSESSMENT — PAIN SCALES - WONG BAKER
WONGBAKER_NUMERICALRESPONSE: 0

## 2022-09-15 ASSESSMENT — PAIN DESCRIPTION - LOCATION
LOCATION: ARM
LOCATION: SHOULDER

## 2022-09-15 ASSESSMENT — PAIN DESCRIPTION - PAIN TYPE
TYPE: ACUTE PAIN
TYPE: ACUTE PAIN

## 2022-09-15 ASSESSMENT — PAIN DESCRIPTION - DESCRIPTORS
DESCRIPTORS: ACHING
DESCRIPTORS: ACHING

## 2022-09-15 ASSESSMENT — PAIN - FUNCTIONAL ASSESSMENT
PAIN_FUNCTIONAL_ASSESSMENT: ACTIVITIES ARE NOT PREVENTED
PAIN_FUNCTIONAL_ASSESSMENT: ACTIVITIES ARE NOT PREVENTED

## 2022-09-15 NOTE — PLAN OF CARE
Problem: Safety - Adult  Goal: Free from fall injury  Outcome: Progressing  Flowsheets (Taken 9/15/2022 1803)  Free From Fall Injury: Instruct family/caregiver on patient safety     Problem: Chronic Conditions and Co-morbidities  Goal: Patient's chronic conditions and co-morbidity symptoms are monitored and maintained or improved  Outcome: Progressing  Flowsheets (Taken 9/15/2022 1803)  Care Plan - Patient's Chronic Conditions and Co-Morbidity Symptoms are Monitored and Maintained or Improved:   Monitor and assess patient's chronic conditions and comorbid symptoms for stability, deterioration, or improvement   Collaborate with multidisciplinary team to address chronic and comorbid conditions and prevent exacerbation or deterioration   Update acute care plan with appropriate goals if chronic or comorbid symptoms are exacerbated and prevent overall improvement and discharge     Problem: Respiratory - Adult  Goal: Achieves optimal ventilation and oxygenation  Outcome: Progressing  Flowsheets (Taken 9/15/2022 1803)  Achieves optimal ventilation and oxygenation:   Assess for changes in respiratory status   Assess for changes in mentation and behavior   Position to facilitate oxygenation and minimize respiratory effort   Oxygen supplementation based on oxygen saturation or arterial blood gases   Assess and instruct to report shortness of breath or any respiratory difficulty

## 2022-09-15 NOTE — CARE COORDINATION
CM following. CM called Maida Padilla at [place], precert has been started and is pending. CM set up transport for later today pending precert (5:16KO via \[facility]""y See ([facility])), otherwise CM will reschedule.         Electronically signed by JOE Pollard, LSW on 9/15/2022 at 12:02 PM

## 2022-09-15 NOTE — PROGRESS NOTES
Called dialysis charge nurse to check status for peritoneal dialysis. She stated Dr Gaye Lares will be restarting on his nightly tomorrow.

## 2022-09-15 NOTE — DIALYSIS
CCPD Order   Exchanges: 5   Exchange Volume: 2500 ml   Total Time: 10 hrs   Dextrose: 1.5%   Last Fill: 0 ml   Total Volume: 12,000 ml     Orders verified. Supplies taken to pt's room. Report received. Cycler set up, primed and pre tested. Dressing changed on [facility] Cath site. Pt connected to cycler. CCPD initiated without problem. Initial effluent clear. If problems should arise please call the [phone] on top of PD cycler machine.        If problems persist please call [phone]

## 2022-09-15 NOTE — PROGRESS NOTES
Progress Note    Admit Date: 9/6/2022  Day: 9  Diet: ADULT DIET; Regular; 3 carb choices (45 gm/meal); Low Fat/Low Chol/High Fiber/DONATO    CC: AMS    Interval history:    Overnight, Mr. Nancy Weaver continued to not tolerate his BiPAP, wearing it for 30-90 minutes per nursing. Nursing and consult notes reviewed. Patient was seen and examined by our team this morning at bedside. Hemodialysis vascular access was removed yesterday,. BUN increased from 12 to 25 . Patent is scheduled for PD tonight (9/15). Overall, patient has remained relatively stable with increasing shortness of breath marked by diffuse bilateral rales. He did not wish to talk much this morning, but noted increased fatigue as well. He denies SI/HI and did not repeat concerning phrases he has stated recently (\"I want to die\"\", etc.). Patient's appetite has returned per nursing. We recently titrated his insulin based on reduced food intake. We will continue to take serial glucose readings and titrate insulin as needed. Denies any chest pain, syncopal episodes, nausea, vomiting, abdominal pain or palpitations. Patient denied shortness of breath, but this was inconsistent with physical exam.    CM Note (9/14)    CM following for discharge planning. [address] are reviewing for wait list for PD. [facility]. declined. CM called 12 other facilities that are on our PD SNF list and none of them are currently doing PD due to staffing or training. [facility] added pt to waiting list for PD. [facility] denied pt. Referral sent to [facility] and they can accept pt and have PD trained staff. Precert to be started this afternoon or in am. CM left vm for daughter Terri.     HPI:     This is a 69year old male with a past medical history of ESRD on PD, interstitial lung disease, diastolic HF (8/33 Echo with EF 50-55%), NSTEMI, CAD s/p CABG 2007, T2DM, HTN, HLD, hypothyroidism who presents with AMS. Per family for the last 10 days the patient has been somnolent and lethargic with decreased appetite. He is on home peritoneal dialysis for the past year usually 1 hour at noon and 10/2 hours overnight. O2 at home also noted to be <90% so family called EMS. In the ED pt was afebrile, mildly hypertensive, hemodynamically stable on 3L NC. BMP significant for BUN/Cr 44/7.1, lactate 1.5, Pro-BNP 1.3K, troponin .08, WBC 14.8, Hgb 10. VBG 7.28/71.3/35/33. 8. CXR with free air under R hemidiaphragm and bibasilar atelectasis. Currently, the patient is somnolent but interactive. He is able to speak a few words at a time and states he feels \"terrible\". He says his last PD session was last night. He also endorses cough for unspecified amount of time and constipation for 3-4 days. He used to wear CPAP/BIPAP at home but does not currently.      Medications:     Scheduled Meds:   insulin glargine  30 Units SubCUTAneous Nightly    insulin lispro  0-4 Units SubCUTAneous TID WC    insulin lispro  0-4 Units SubCUTAneous Nightly    [Held by provider] insulin lispro  5 Units SubCUTAneous TID WC    famotidine  20 mg Oral Daily    polyethylene glycol  17 g Oral BID    epoetin daphney-epbx  2,000 Units SubCUTAneous Once per day on Mon Wed Fri    atorvastatin  80 mg Oral Nightly    levothyroxine  50 mcg Oral Daily    aspirin  81 mg Oral Daily    sodium chloride flush  5-40 mL IntraVENous 2 times per day    heparin (porcine)  5,000 Units SubCUTAneous 3 times per day     Continuous Infusions:   sodium chloride 25 mL (09/10/22 1132)    dextrose       PRN Meds:sodium chloride flush, sodium chloride, ondansetron **OR** ondansetron, polyethylene glycol, acetaminophen **OR** acetaminophen, glucose, dextrose bolus **OR** dextrose bolus, glucagon (rDNA), dextrose    Objective:   Vitals:   T-max:  Patient Vitals for the past 8 hrs:   BP Temp Temp src Pulse Resp SpO2 Weight   09/15/22 0844 129/62 98.2 °F (36.8 °C) Oral 86 16 98 % -- 09/15/22 0804 -- -- -- 88 18 99 % --   09/15/22 0607 -- -- -- -- -- -- 219 lb 12.8 oz (99.7 kg)   09/15/22 0358 (!) 155/80 98.1 °F (36.7 °C) Oral 81 18 98 % --       Intake/Output Summary (Last 24 hours) at 9/15/2022 1043  Last data filed at 9/14/2022 1942  Gross per 24 hour   Intake 960 ml   Output --   Net 960 ml       Review of Systems   Constitutional:  Positive for fatigue. HENT: Negative. Eyes: Negative. Respiratory:  Positive for cough and shortness of breath. Cardiovascular: Negative. Gastrointestinal:  Positive for abdominal distention. Endocrine: Negative. Genitourinary: Negative. Musculoskeletal: Negative. Skin: Negative. Allergic/Immunologic: Negative. Neurological: Negative. Hematological: Negative. Psychiatric/Behavioral: Negative. Physical Exam  Constitutional:       Appearance: He is obese. He is ill-appearing. Comments: + Alert, but lethargic   Eyes:      Pupils: Pupils are equal, round, and reactive to light. Cardiovascular:      Rate and Rhythm: Normal rate and regular rhythm. Pulmonary:      Effort: Pulmonary effort is normal.      Breath sounds: Rales present. Comments: + Bilateral rales. Diffuse (patient was supine)  Abdominal:      General: Bowel sounds are normal. There is distension. Palpations: Abdomen is soft. Musculoskeletal:      Right lower leg: Edema present. Left lower leg: Edema present. Neurological:      General: No focal deficit present. Mental Status: He is alert and oriented to person, place, and time. Mental status is at baseline.        LABS:    CBC:   Recent Labs     09/13/22  0415 09/14/22  0430 09/15/22  0434   WBC 9.6 10.5 9.7   HGB 9.0* 9.3* 9.2*   HCT 26.7* 27.6* 27.2*    239 252   .3* 114.3* 114.9*     Renal:    Recent Labs     09/13/22  0415 09/14/22  0430 09/15/22  0434    138 136   K 4.3 4.6 4.6   CL 99 100 96*   CO2 26 28 27   BUN 19 12 25*   CREATININE 4.2* 3.1* 5.0* GLUCOSE 135* 109* 172*   CALCIUM 8.6 8.7 8.6   MG 1.90 2.30 2.30   ANIONGAP 13 10 13     RAD:   XR CHEST PORTABLE   Final Result   Impression: Interval placement of a right internal jugular approach dialysis catheter, as above. CT HEAD WO CONTRAST   Final Result   1. No evidence for acute intracranial process. No bleed or shift   2. Remote infarct left thalamus      XR CHEST (2 VW)   Final Result      Free air beneath the right hemidiaphragm. Bibasilar atelectasis. Critical result discussed with Dr. Ariza at 1:00 PM 9/6/2022          Assessment/Plan:   69 y.o. male with pertinent medical history of ESRD on PD, interstitial lung disease, diastolic HF (7/91 Echo with EF 50-55%), NSTEMI, CAD s/p CABG 2007, T2DM, HTN, HLD, hypothyroidism who was admitted on 9/6/2022 for management of AMS secondary to acute encephalopathy. Acute encephalopathy  Pt was admitted for AMS initially thought to be due to be 2/2 CO2 narcosis 2/2 Bipap non-complaince. He improved intitally with Bipap use while inpatient but has been increasingly lethargic for the past 2 days. 09/11 a head ct was ordered d/t AMS, and was no acute intracranial process. - There has also been concern for likely cefepime toxicity following increasing lethargy and AMS. -Pulmonology following peripheral: Normal ABG 9/11 and Dr Prince Jimenes agrees that acute encephalopathy is unlikely from hypercapnia. - Recommended consult to neuro to assist in determining etiology of acute encephalopathy (9/12)              - Continue Bipap qHS and prn              - We discussed patient's unwillingness to use BiPAP overnight.  Pulmonology recommended a new mask fitting, however those resources are not available in house and will need to be followed up with outpatient (9/14)     ESRD on PD   - Nephrology will continue to follow and determine when HD is indicated              - HD (9/12 and 9/13) to dialyze Cefepime as PD will not detoxify Cefepime   - Continue retacrit 2000 units MWF     - Dialysis not indicated further, nephology will continue to assess.  - HD vascular catheter will be removed (9/14)  by dialysis nursing                          - We contacted IR to remove AV fistula, they agreed to ensure the vas cath was removed and did not need a consult to be placed. - Patient would like to continue PD outpatient    - PD scheduled for tonight (9/15)    - BUN increased from 12 to 25 and Cr increased from 3.1 to 5.0 from yesterday     T2DM, uncontrolled   - 35 IU basal insulin               - Decrease basal insulin from 35 U to 30 U, due to patient's decreased appetite and lower blood sugars (9/14)  - 5 IU with meals ITD  - Switch from MDSSI to LDSSI (9/14)  - POC glucose   - Hypoglycemia protocol      Hypomagnesemia - Resolved  - Mg2+ 1.9 (9/7), 2.3 (9/14)  - Ordered Mag Sulfate 2,000 mg IV bolus (x1) on 9/7     Hypothyroidism   - Continue synthroid 50 mcg     CAD s/p CABG 2007  - Stable on 04/22 angiogram with no intervention.  - Continue home aspirin   - Continue home Lipitor     Shortness of breath - NEW (9/15)  - Patient has bilateral diffuse rales with shortness of breath on physical exam. Non-productive intermittent wet cough also present. There has been no fever in the past 48 hours. - We will reassesses patient. If symptoms remain or worsen after PD, we will consider blood gasses for possible acidosis. - Symptoms may be due to atelectasis, consider repeat imaging. Code Status: Full code   FEN: ADULT DIET; Regular; 3 carb choices (45 gm/meal); Low Fat/Low Chol/High Fiber/DONATO  PPX: Heparin   DISPO: IP, ready for discharge. CM has been contacting SNFs, patient is now on waiting list for facility that will take patients with PD requirements,    Keegan Vidales [facility]  09/15/22  10:43 AM    ADDENDUM  Patient seen today with daughter and son by the bedside. Patient is alert. Denies any cough, chest pain or shortness of breath.  Spoke to , Ganga Galvan and then daughter and son about placement. [facility] precert has started and CM is waiting to hear back from them. CM said she will set up transport for later today as [facility] precert sometimes goes through same day. Plan is to discharge as soon as we hear back from them. Pt is medically stable for discharge     Chen Stovall MD  PGY-1    This patient has been staffed and discussed with Charlotte Anderson MD.      Addendum to Resident H& P/Progress note:  I have personally seen,examined and evaluated the patient. I have reviewed the current history, physical findings, labs and assessment and plan and agree with note as documented by resident MD ( ) and MS 4, Keiko Ward. Mental status has improved.  + Physical deconditioning. Awaiting for skilled nursing facility placement.       Charlotte Anderson MD, FACP

## 2022-09-15 NOTE — PROGRESS NOTES
Nephrology  Note                                                                                                                                                                                                                                                                                                                                                               Office : [phone]     Fax :[phone]              Patient's Name: David Matute  11:32 AM  9/15/2022    Reason for Consult:  ESRD   Requesting Physician:  Farley Runner, MD      AMS better   More awake   HD cath removed           Past Medical History:   Diagnosis Date    Allergic rhinitis due to other allergen 7/12/2010    Coronary atherosclerosis of unspecified type of vessel, native or graft 7/12/2010    Diabetic eye exam ([address] 04/29/2015    Diabetic eye exam ([address] 5/5/2016    [place] [facility]    Generalized osteoarthrosis, involving multiple sites 7/12/2010    Other psoriasis 7/12/2010    Pneumonia     Prolonged emergence from general anesthesia     Psoriatic arthropathy ([address] 7/12/2010    Type II or unspecified type diabetes mellitus without mention of complication, not stated as uncontrolled 7/12/2010    Unspecified essential hypertension 7/12/2010    Unspecified sleep apnea 7/12/2010       Past Surgical History:   Procedure Laterality Date    CARPAL TUNNEL RELEASE      s/p    CATARACT REMOVAL      CORONARY ARTERY BYPASS GRAFT      JOINT REPLACEMENT      LAPAROSCOPY INSERTION PERITONEAL CATHETER N/A 10/26/2020    LAPAROSCOPIC PERITONEAL DIALYSIS CATHETER PLACEMENT; LAPAROSCOPIC OMENTOPEXY performed by Bisi Hdez DO at [facility] OR       Family History   Problem Relation Age of Onset    Diabetes Mother     Heart Failure Mother     Coronary Art Dis Father         reports that he quit smoking about 41 years ago. He started smoking about 60 years ago. He has a 18.00 pack-year smoking history.  He has never used smokeless tobacco. He reports that he does not drink alcohol and does not use drugs.     Allergies:  Penicillins, Sulfa antibiotics, Tazorac [tazarotene], Clindamycin, and Clindamycin/lincomycin    Current Medications:    insulin glargine (LANTUS;BASAGLAR) injection pen 30 Units, Nightly  insulin lispro (1 Unit Dial) 0-4 Units, TID WC  insulin lispro (1 Unit Dial) 0-4 Units, Nightly  [Held by provider] insulin lispro (1 Unit Dial) 5 Units, TID WC  famotidine (PEPCID) tablet 20 mg, Daily  polyethylene glycol (GLYCOLAX) packet 17 g, BID  epoetin daphney-epbx (RETACRIT) injection 2,000 Units, Once per day on Mon Wed Fri  atorvastatin (LIPITOR) tablet 80 mg, Nightly  levothyroxine (SYNTHROID) tablet 50 mcg, Daily  aspirin chewable tablet 81 mg, Daily  sodium chloride flush 0.9 % injection 5-40 mL, 2 times per day  sodium chloride flush 0.9 % injection 5-40 mL, PRN  0.9 % sodium chloride infusion, PRN  ondansetron (ZOFRAN-ODT) disintegrating tablet 4 mg, Q8H PRN   Or  ondansetron (ZOFRAN) injection 4 mg, Q6H PRN  polyethylene glycol (GLYCOLAX) packet 17 g, Daily PRN  acetaminophen (TYLENOL) tablet 650 mg, Q6H PRN   Or  acetaminophen (TYLENOL) suppository 650 mg, Q6H PRN  heparin (porcine) injection 5,000 Units, 3 times per day  glucose chewable tablet 16 g, PRN  dextrose bolus 10% 125 mL, PRN   Or  dextrose bolus 10% 250 mL, PRN  glucagon (rDNA) injection 1 mg, PRN  dextrose 10 % infusion, Continuous PRN            Physical exam:     Vitals:  /62   Pulse 88   Temp 98.2 °F (36.8 °C) (Oral)   Resp 16   Ht 5' 10\" (1.778 m)   Wt 219 lb 12.8 oz (99.7 kg)   SpO2 98%   BMI 31.54 kg/m²   Constitutional:  OAA X3 NAD  Skin: no rash, turgor wnl  Heent:  eomi, mmm  Neck: no bruits or jvd noted  Cardiovascular:  S1, S2 without m/r/g  Respiratory: CTA B without w/r/r  Abdomen:  +bs, soft, TTP   Ext: + lower extremity edema  Psychiatric: mood and affect appropriate  Musculoskeletal:  Rom, muscular strength intact    Data:   Labs:  CBC:   Recent Labs     09/13/22  1876 09/14/22  0430 09/15/22  0434   WBC 9.6 10.5 9.7   HGB 9.0* 9.3* 9.2*    239 252       BMP:    Recent Labs     09/13/22  0415 09/14/22  0430 09/15/22  0434    138 136   K 4.3 4.6 4.6   CL 99 100 96*   CO2 26 28 27   BUN 19 12 25*   CREATININE 4.2* 3.1* 5.0*   GLUCOSE 135* 109* 172*       Ca/Mg/Phos:   Recent Labs     09/13/22  0415 09/14/22  0430 09/15/22  0434   CALCIUM 8.6 8.7 8.6   MG 1.90 2.30 2.30       Hepatic:   No results for input(s): AST, ALT, ALB, BILITOT, ALKPHOS in the last 72 hours. Troponin:   No results for input(s): TROPONINI in the last 72 hours. BNP: No results for input(s): BNP in the last 72 hours. Lipids: No results for input(s): CHOL, TRIG, HDL, LDLCALC, LABVLDL in the last 72 hours. ABGs:   No results for input(s): PHART, PO2ART, VVT0EIU in the last 72 hours. INR: No results for input(s): INR in the last 72 hours. UA:No results for input(s): Ileene Sham, GLUCOSEU, BILIRUBINUR, Albertina Rump, BLOODU, PHUR, PROTEINU, UROBILINOGEN, NITRU, LEUKOCYTESUR, LABMICR, URINETYPE in the last 72 hours. Urine Microscopic: No results for input(s): LABCAST, BACTERIA, COMU, HYALCAST, WBCUA, RBCUA, EPIU in the last 72 hours. Urine Culture: No results for input(s): LABURIN in the last 72 hours. Urine Chemistry: No results for input(s): Adan Casiano, PROTEINUR, NAUR in the last 72 hours. IMAGING:  XR CHEST PORTABLE   Final Result   Impression: Interval placement of a right internal jugular approach dialysis catheter, as above. CT HEAD WO CONTRAST   Final Result   1. No evidence for acute intracranial process. No bleed or shift   2. Remote infarct left thalamus      XR CHEST (2 VW)   Final Result      Free air beneath the right hemidiaphragm. Bibasilar atelectasis. Critical result discussed with Dr. Tamika Carranza at 1:00 PM 9/6/2022          Assessment/Plan   ESRD     2. HTN    3. Anemia    4. Acid- base/ Electrolyte imbalance     5.  Abd tenderness - r/o PD peritonitis     Plan   - Cefepime toxicity is possible   - HD x 2 sessions   - removed HD cath   - ABG - reviewed   - PD fluid cell count - nl - no PD peritonitis   - CCPD tonight   - monitor Labs  - EPO or anemia    - Constipation management   - PT eval                   Thank you for allowing us to participate in care of Luis Enrique Anderson MD  Feel free to contact me   [facility] [address]  Office : [phone]  Fax :[phone]

## 2022-09-15 NOTE — PLAN OF CARE
Problem: Discharge Planning  Goal: Discharge to home or other facility with appropriate resources  Outcome: Progressing  Flowsheets (Taken 9/15/2022 0318)  Discharge to home or other facility with appropriate resources:   Identify barriers to discharge with patient and caregiver   Identify discharge learning needs (meds, wound care, etc)   Arrange for needed discharge resources and transportation as appropriate     Problem: Skin/Tissue Integrity  Goal: Absence of new skin breakdown  Description: 1. Monitor for areas of redness and/or skin breakdown  2. Assess vascular access sites hourly  3. Every 4-6 hours minimum:  Change oxygen saturation probe site  4. Every 4-6 hours:  If on nasal continuous positive airway pressure, respiratory therapy assess nares and determine need for appliance change or resting period.   9/15/2022 0318 by Leonor Sepulveda RN  Outcome: Progressing  9/14/2022 1801 by Roosevelt Lindsey RN  Outcome: Progressing     Problem: Cardiovascular - Adult  Goal: Maintains optimal cardiac output and hemodynamic stability  Outcome: Progressing  Flowsheets (Taken 9/15/2022 0318)  Maintains optimal cardiac output and hemodynamic stability: Monitor blood pressure and heart rate     Problem: Respiratory - Adult  Goal: Achieves optimal ventilation and oxygenation  9/15/2022 0318 by Leonor Sepulveda RN  Outcome: Progressing  Flowsheets (Taken 9/15/2022 0318)  Achieves optimal ventilation and oxygenation:   Assess for changes in respiratory status   Assess for changes in mentation and behavior   Position to facilitate oxygenation and minimize respiratory effort   Oxygen supplementation based on oxygen saturation or arterial blood gases   Assess the need for suctioning and aspirate as needed     Problem: Cardiovascular - Adult  Goal: Absence of cardiac dysrhythmias or at baseline  Flowsheets (Taken 9/15/2022 0318)  Absence of cardiac dysrhythmias or at baseline: Monitor cardiac rate and rhythm

## 2022-09-16 LAB
ANION GAP SERPL CALCULATED.3IONS-SCNC: 13 MMOL/L (ref 3–16)
BASOPHILS ABSOLUTE: 0 K/UL (ref 0–0.2)
BASOPHILS RELATIVE PERCENT: 0 %
BUN BLDV-MCNC: 29 MG/DL (ref 7–20)
CALCIUM SERPL-MCNC: 8.7 MG/DL (ref 8.3–10.6)
CHLORIDE BLD-SCNC: 92 MMOL/L (ref 99–110)
CO2: 28 MMOL/L (ref 21–32)
CREAT SERPL-MCNC: 5.3 MG/DL (ref 0.8–1.3)
EOSINOPHILS ABSOLUTE: 0.3 K/UL (ref 0–0.6)
EOSINOPHILS RELATIVE PERCENT: 3 %
GFR AFRICAN AMERICAN: 13
GFR NON-AFRICAN AMERICAN: 11
GLUCOSE BLD-MCNC: 191 MG/DL (ref 70–99)
GLUCOSE BLD-MCNC: 199 MG/DL (ref 70–99)
GLUCOSE BLD-MCNC: 218 MG/DL (ref 70–99)
GLUCOSE BLD-MCNC: 310 MG/DL (ref 70–99)
GLUCOSE BLD-MCNC: 338 MG/DL (ref 70–99)
GLUCOSE BLD-MCNC: 343 MG/DL (ref 70–99)
HCT VFR BLD CALC: 29.2 % (ref 40.5–52.5)
HEMOGLOBIN: 10.1 G/DL (ref 13.5–17.5)
LYMPHOCYTES ABSOLUTE: 1.4 K/UL (ref 1–5.1)
LYMPHOCYTES RELATIVE PERCENT: 14 %
MACROCYTES: ABNORMAL
MAGNESIUM: 2.4 MG/DL (ref 1.8–2.4)
MCH RBC QN AUTO: 39.1 PG (ref 26–34)
MCHC RBC AUTO-ENTMCNC: 34.4 G/DL (ref 31–36)
MCV RBC AUTO: 113.7 FL (ref 80–100)
MONOCYTES ABSOLUTE: 0.8 K/UL (ref 0–1.3)
MONOCYTES RELATIVE PERCENT: 8 %
MYELOCYTE PERCENT: 4 %
NEUTROPHILS ABSOLUTE: 7.4 K/UL (ref 1.7–7.7)
NEUTROPHILS RELATIVE PERCENT: 71 %
PDW BLD-RTO: 15.3 % (ref 12.4–15.4)
PERFORMED ON: ABNORMAL
PLATELET # BLD: 274 K/UL (ref 135–450)
PMV BLD AUTO: 7.2 FL (ref 5–10.5)
POTASSIUM SERPL-SCNC: 4.2 MMOL/L (ref 3.5–5.1)
RBC # BLD: 2.57 M/UL (ref 4.2–5.9)
SODIUM BLD-SCNC: 133 MMOL/L (ref 136–145)
WBC # BLD: 9.8 K/UL (ref 4–11)

## 2022-09-16 PROCEDURE — 6370000000 HC RX 637 (ALT 250 FOR IP): Performed by: STUDENT IN AN ORGANIZED HEALTH CARE EDUCATION/TRAINING PROGRAM

## 2022-09-16 PROCEDURE — 6370000000 HC RX 637 (ALT 250 FOR IP)

## 2022-09-16 PROCEDURE — 2060000000 HC ICU INTERMEDIATE R&B

## 2022-09-16 PROCEDURE — 36415 COLL VENOUS BLD VENIPUNCTURE: CPT

## 2022-09-16 PROCEDURE — 6360000002 HC RX W HCPCS: Performed by: STUDENT IN AN ORGANIZED HEALTH CARE EDUCATION/TRAINING PROGRAM

## 2022-09-16 PROCEDURE — 99233 SBSQ HOSP IP/OBS HIGH 50: CPT | Performed by: INTERNAL MEDICINE

## 2022-09-16 PROCEDURE — 83735 ASSAY OF MAGNESIUM: CPT

## 2022-09-16 PROCEDURE — 99232 SBSQ HOSP IP/OBS MODERATE 35: CPT | Performed by: HOSPITALIST

## 2022-09-16 PROCEDURE — 6370000000 HC RX 637 (ALT 250 FOR IP): Performed by: INTERNAL MEDICINE

## 2022-09-16 PROCEDURE — 80048 BASIC METABOLIC PNL TOTAL CA: CPT

## 2022-09-16 PROCEDURE — 85025 COMPLETE CBC W/AUTO DIFF WBC: CPT

## 2022-09-16 PROCEDURE — 94761 N-INVAS EAR/PLS OXIMETRY MLT: CPT

## 2022-09-16 PROCEDURE — 2580000003 HC RX 258: Performed by: STUDENT IN AN ORGANIZED HEALTH CARE EDUCATION/TRAINING PROGRAM

## 2022-09-16 PROCEDURE — 2700000000 HC OXYGEN THERAPY PER DAY

## 2022-09-16 PROCEDURE — 82947 ASSAY GLUCOSE BLOOD QUANT: CPT

## 2022-09-16 RX ORDER — BUPROPION HYDROCHLORIDE 100 MG/1
100 TABLET ORAL DAILY
Status: DISCONTINUED | OUTPATIENT
Start: 2022-09-16 | End: 2022-09-16

## 2022-09-16 RX ORDER — BUPROPION HYDROCHLORIDE 100 MG/1
100 TABLET ORAL EVERY OTHER DAY
Status: DISCONTINUED | OUTPATIENT
Start: 2022-09-17 | End: 2022-09-19 | Stop reason: HOSPADM

## 2022-09-16 RX ORDER — BUPROPION HYDROCHLORIDE 100 MG/1
100 TABLET ORAL EVERY OTHER DAY
Status: CANCELLED | OUTPATIENT
Start: 2022-09-16

## 2022-09-16 RX ORDER — INSULIN LISPRO 100 [IU]/ML
0-8 INJECTION, SOLUTION INTRAVENOUS; SUBCUTANEOUS
Status: DISCONTINUED | OUTPATIENT
Start: 2022-09-16 | End: 2022-09-18

## 2022-09-16 RX ORDER — INSULIN LISPRO 100 [IU]/ML
0-4 INJECTION, SOLUTION INTRAVENOUS; SUBCUTANEOUS NIGHTLY
Status: DISCONTINUED | OUTPATIENT
Start: 2022-09-16 | End: 2022-09-18

## 2022-09-16 RX ADMIN — ATORVASTATIN CALCIUM 80 MG: 40 TABLET, FILM COATED ORAL at 21:10

## 2022-09-16 RX ADMIN — FAMOTIDINE 20 MG: 20 TABLET ORAL at 09:11

## 2022-09-16 RX ADMIN — HEPARIN SODIUM 5000 UNITS: 5000 INJECTION INTRAVENOUS; SUBCUTANEOUS at 05:31

## 2022-09-16 RX ADMIN — SODIUM CHLORIDE, PRESERVATIVE FREE 10 ML: 5 INJECTION INTRAVENOUS at 21:11

## 2022-09-16 RX ADMIN — LEVOTHYROXINE SODIUM 50 MCG: 50 TABLET ORAL at 09:11

## 2022-09-16 RX ADMIN — INSULIN LISPRO 3 UNITS: 100 INJECTION, SOLUTION INTRAVENOUS; SUBCUTANEOUS at 09:12

## 2022-09-16 RX ADMIN — HEPARIN SODIUM 5000 UNITS: 5000 INJECTION INTRAVENOUS; SUBCUTANEOUS at 16:27

## 2022-09-16 RX ADMIN — POLYETHYLENE GLYCOL (3350) 17 G: 17 POWDER, FOR SOLUTION ORAL at 21:10

## 2022-09-16 RX ADMIN — HEPARIN SODIUM 5000 UNITS: 5000 INJECTION INTRAVENOUS; SUBCUTANEOUS at 21:16

## 2022-09-16 RX ADMIN — INSULIN LISPRO 2 UNITS: 100 INJECTION, SOLUTION INTRAVENOUS; SUBCUTANEOUS at 18:18

## 2022-09-16 RX ADMIN — POLYETHYLENE GLYCOL (3350) 17 G: 17 POWDER, FOR SOLUTION ORAL at 09:11

## 2022-09-16 RX ADMIN — ASPIRIN 81 MG 81 MG: 81 TABLET ORAL at 09:11

## 2022-09-16 RX ADMIN — INSULIN LISPRO 4 UNITS: 100 INJECTION, SOLUTION INTRAVENOUS; SUBCUTANEOUS at 23:07

## 2022-09-16 RX ADMIN — BUPROPION HYDROCHLORIDE 100 MG: 100 TABLET, FILM COATED ORAL at 09:11

## 2022-09-16 RX ADMIN — EPOETIN ALFA-EPBX 2000 UNITS: 2000 INJECTION, SOLUTION INTRAVENOUS; SUBCUTANEOUS at 12:10

## 2022-09-16 ASSESSMENT — ENCOUNTER SYMPTOMS
ABDOMINAL DISTENTION: 1
EYES NEGATIVE: 1
COUGH: 0
ALLERGIC/IMMUNOLOGIC NEGATIVE: 1
SHORTNESS OF BREATH: 0

## 2022-09-16 ASSESSMENT — PAIN SCALES - GENERAL
PAINLEVEL_OUTOF10: 0

## 2022-09-16 NOTE — PLAN OF CARE
Problem: Discharge Planning  Goal: Discharge to home or other facility with appropriate resources  Recent Flowsheet Documentation  Taken 9/16/2022 0909 by Efrain Pretty RN  Discharge to home or other facility with appropriate resources: Identify barriers to discharge with patient and caregiver     Problem: Safety - Adult  Goal: Free from fall injury  Flowsheets (Taken 9/16/2022 0150 by Thompson Feng RN)  Free From Fall Injury: Instruct family/caregiver on patient safety     Problem: Skin/Tissue Integrity  Goal: Absence of new skin breakdown  Description: 1. Monitor for areas of redness and/or skin breakdown  2. Assess vascular access sites hourly  3. Every 4-6 hours minimum:  Change oxygen saturation probe site  4. Every 4-6 hours:  If on nasal continuous positive airway pressure, respiratory therapy assess nares and determine need for appliance change or resting period. Note: Pt at risk for skin breakdown. See Jorge Luis score. Pt remains on bedrest. Unable to reposition self in bed. Heels elevated off bed. Sacral heart mepilex intact to protect,  site inspected and intact underneath. Will continue to turn and reposition patient every two hours and as needed. Will continue to keep patient clean and dry, applying skin care cream as needed. Pillows used for repositioning q2hs. Will continue to monitor and assess for skin breakdown.        Problem: Chronic Conditions and Co-morbidities  Goal: Patient's chronic conditions and co-morbidity symptoms are monitored and maintained or improved  Recent Flowsheet Documentation  Taken 9/16/2022 0909 by Efrain Pretty RN  Care Plan - Patient's Chronic Conditions and Co-Morbidity Symptoms are Monitored and Maintained or Improved: Monitor and assess patient's chronic conditions and comorbid symptoms for stability, deterioration, or improvement     Problem: Cardiovascular - Adult  Goal: Maintains optimal cardiac output and hemodynamic stability  Flowsheets (Taken 9/16/2022 1833)  Maintains optimal cardiac output and hemodynamic stability:   Monitor blood pressure and heart rate   Monitor urine output and notify Licensed Independent Practitioner for values outside of normal range   Assess for signs of decreased cardiac output     Problem: Metabolic/Fluid and Electrolytes - Adult  Goal: Electrolytes maintained within normal limits  Recent Flowsheet Documentation  Taken 9/16/2022 0909 by Reyna Moy RN  Electrolytes maintained within normal limits: Monitor labs and assess patient for signs and symptoms of electrolyte imbalances

## 2022-09-16 NOTE — FLOWSHEET NOTE
Disconnected from CCPD per protocol. Effluent: clear  Total time: 10 hr 21 min   Total UF:  89 ml. Total Volume:  07908 ml. Dwell time gained:  01 hr 11 min.   Pt Tolerated procedure: Pt offers no c/o.       09/16/22 0545   Vitals   BP (!) 147/67   Temp 98.7 °F (37.1 °C)   Heart Rate 91   Resp 18   Weight 212 lb 11.9 oz (96.5 kg)   Cycler   Ultrafiltration (UF) (mL) 89 mL   Average Dwell Time (Hours:Minutes) 85   Lost Dwell Time (Hours:Minutes) 0207

## 2022-09-16 NOTE — PLAN OF CARE
Problem: Discharge Planning  Goal: Discharge to home or other facility with appropriate resources  Outcome: Progressing  Flowsheets (Taken 9/16/2022 0150)  Discharge to home or other facility with appropriate resources:   Identify barriers to discharge with patient and caregiver   Arrange for needed discharge resources and transportation as appropriate   Identify discharge learning needs (meds, wound care, etc)   Refer to discharge planning if patient needs post-hospital services based on physician order or complex needs related to functional status, cognitive ability or social support system     Problem: Safety - Adult  Goal: Free from fall injury  9/16/2022 0150 by Tamra Pérez RN  Outcome: Progressing  Flowsheets (Taken 9/16/2022 0150)  Free From Fall Injury: Instruct family/caregiver on patient safety     Problem: Skin/Tissue Integrity  Goal: Absence of new skin breakdown  Description: 1. Monitor for areas of redness and/or skin breakdown  2. Assess vascular access sites hourly  3. Every 4-6 hours minimum:  Change oxygen saturation probe site  4. Every 4-6 hours:  If on nasal continuous positive airway pressure, respiratory therapy assess nares and determine need for appliance change or resting period.   Outcome: Progressing     Problem: Chronic Conditions and Co-morbidities  Goal: Patient's chronic conditions and co-morbidity symptoms are monitored and maintained or improved  9/16/2022 0150 by Tamra Pérez RN  Outcome: Progressing  Flowsheets (Taken 9/16/2022 0150)  Care Plan - Patient's Chronic Conditions and Co-Morbidity Symptoms are Monitored and Maintained or Improved:   Monitor and assess patient's chronic conditions and comorbid symptoms for stability, deterioration, or improvement   Collaborate with multidisciplinary team to address chronic and comorbid conditions and prevent exacerbation or deterioration   Update acute care plan with appropriate goals if chronic or comorbid symptoms are exacerbated and prevent overall improvement and discharge     Problem: Cardiovascular - Adult  Goal: Maintains optimal cardiac output and hemodynamic stability  Outcome: Progressing  Flowsheets (Taken 9/16/2022 0150)  Maintains optimal cardiac output and hemodynamic stability:   Monitor blood pressure and heart rate   Assess for signs of decreased cardiac output   Monitor urine output and notify Licensed Independent Practitioner for values outside of normal range     Problem: Cardiovascular - Adult  Goal: Absence of cardiac dysrhythmias or at baseline  Outcome: Progressing  Flowsheets (Taken 9/16/2022 0150)  Absence of cardiac dysrhythmias or at baseline:   Monitor cardiac rate and rhythm   Assess for signs of decreased cardiac output     Problem: Pain  Goal: Verbalizes/displays adequate comfort level or baseline comfort level  Outcome: Progressing  Flowsheets (Taken 9/16/2022 0150)  Verbalizes/displays adequate comfort level or baseline comfort level:   Encourage patient to monitor pain and request assistance   Assess pain using appropriate pain scale   Administer analgesics based on type and severity of pain and evaluate response   Implement non-pharmacological measures as appropriate and evaluate response

## 2022-09-16 NOTE — PROGRESS NOTES
Nephrology  Note                                                                                                                                                                                                                                                                                                                                                               Office : [phone]     Fax :[phone]              Patient's Name: Kaye Huynh  11:20 AM  9/16/2022    Reason for Consult:  ESRD   Requesting Physician:  Louis Hyman MD      AMS better   Weak     HD cath removed           Past Medical History:   Diagnosis Date    Allergic rhinitis due to other allergen 7/12/2010    Coronary atherosclerosis of unspecified type of vessel, native or graft 7/12/2010    Diabetic eye exam ([facility] [address] 04/29/2015    Diabetic eye exam ([address] 5/5/2016    [place] [facility]    Generalized osteoarthrosis, involving multiple sites 7/12/2010    Other psoriasis 7/12/2010    Pneumonia     Prolonged emergence from general anesthesia     Psoriatic arthropathy ([address] 7/12/2010    Type II or unspecified type diabetes mellitus without mention of complication, not stated as uncontrolled 7/12/2010    Unspecified essential hypertension 7/12/2010    Unspecified sleep apnea 7/12/2010       Past Surgical History:   Procedure Laterality Date    CARPAL TUNNEL RELEASE      s/p    CATARACT REMOVAL      CORONARY ARTERY BYPASS GRAFT      JOINT REPLACEMENT      LAPAROSCOPY INSERTION PERITONEAL CATHETER N/A 10/26/2020    LAPAROSCOPIC PERITONEAL DIALYSIS CATHETER PLACEMENT; LAPAROSCOPIC OMENTOPEXY performed by Dewey Aguilar DO at [facility] OR       Family History   Problem Relation Age of Onset    Diabetes Mother     Heart Failure Mother     Coronary Art Dis Father         reports that he quit smoking about 41 years ago. He started smoking about 60 years ago. He has a 18.00 pack-year smoking history.  He has never used smokeless tobacco. He reports that he does not drink alcohol and does not use drugs.     Allergies:  Penicillins, Sulfa antibiotics, Tazorac [tazarotene], Clindamycin, and Clindamycin/lincomycin    Current Medications:    [Held by provider] buPROPion (WELLBUTRIN) tablet 100 mg, Daily  insulin glargine (LANTUS;BASAGLAR) injection pen 35 Units, Nightly  gentamicin (GARAMYCIN) 0.1 % cream, PRN  acetaminophen (TYLENOL) tablet 650 mg, Q6H PRN   Or  acetaminophen (TYLENOL) suppository 650 mg, Q6H PRN  insulin lispro (1 Unit Dial) 0-4 Units, TID WC  insulin lispro (1 Unit Dial) 0-4 Units, Nightly  [Held by provider] insulin lispro (1 Unit Dial) 5 Units, TID WC  famotidine (PEPCID) tablet 20 mg, Daily  polyethylene glycol (GLYCOLAX) packet 17 g, BID  epoetin daphney-epbx (RETACRIT) injection 2,000 Units, Once per day on Mon Wed Fri  atorvastatin (LIPITOR) tablet 80 mg, Nightly  levothyroxine (SYNTHROID) tablet 50 mcg, Daily  aspirin chewable tablet 81 mg, Daily  sodium chloride flush 0.9 % injection 5-40 mL, 2 times per day  sodium chloride flush 0.9 % injection 5-40 mL, PRN  0.9 % sodium chloride infusion, PRN  ondansetron (ZOFRAN-ODT) disintegrating tablet 4 mg, Q8H PRN   Or  ondansetron (ZOFRAN) injection 4 mg, Q6H PRN  polyethylene glycol (GLYCOLAX) packet 17 g, Daily PRN  heparin (porcine) injection 5,000 Units, 3 times per day  glucose chewable tablet 16 g, PRN  dextrose bolus 10% 125 mL, PRN   Or  dextrose bolus 10% 250 mL, PRN  glucagon (rDNA) injection 1 mg, PRN  dextrose 10 % infusion, Continuous PRN            Physical exam:     Vitals:  /73   Pulse 80   Temp 98.1 °F (36.7 °C) (Oral)   Resp 18   Ht 5' 10\" (1.778 m)   Wt 212 lb 11.9 oz (96.5 kg)   SpO2 95%   BMI 30.53 kg/m²   Constitutional:  OAA X3 NAD  Skin: no rash, turgor wnl  Heent:  eomi, mmm  Neck: no bruits or jvd noted  Cardiovascular:  S1, S2 without m/r/g  Respiratory: CTA B without w/r/r  Abdomen:  +bs, soft, TTP   Ext: + lower extremity edema  Psychiatric: mood and affect appropriate  Musculoskeletal:  Rom, muscular strength intact    Data:   Labs:  CBC:   Recent Labs     09/14/22  0430 09/15/22  0434 09/16/22  0426   WBC 10.5 9.7 9.8   HGB 9.3* 9.2* 10.1*    252 274       BMP:    Recent Labs     09/14/22  0430 09/15/22  0434 09/16/22  0426    136 133*   K 4.6 4.6 4.2    96* 92*   CO2 28 27 28   BUN 12 25* 29*   CREATININE 3.1* 5.0* 5.3*   GLUCOSE 109* 172* 343*       Ca/Mg/Phos:   Recent Labs     09/14/22  0430 09/15/22  0434 09/16/22  0426   CALCIUM 8.7 8.6 8.7   MG 2.30 2.30 2.40       Hepatic:   No results for input(s): AST, ALT, ALB, BILITOT, ALKPHOS in the last 72 hours. Troponin:   No results for input(s): TROPONINI in the last 72 hours. BNP: No results for input(s): BNP in the last 72 hours. Lipids: No results for input(s): CHOL, TRIG, HDL, LDLCALC, LABVLDL in the last 72 hours. ABGs:   No results for input(s): PHART, PO2ART, SAH7FAE in the last 72 hours. INR: No results for input(s): INR in the last 72 hours. UA:No results for input(s): Derek Crown, GLUCOSEU, BILIRUBINUR, [place], BLOODU, PHUR, PROTEINU, UROBILINOGEN, NITRU, LEUKOCYTESUR, LABMICR, URINETYPE in the last 72 hours. Urine Microscopic: No results for input(s): LABCAST, BACTERIA, COMU, HYALCAST, WBCUA, RBCUA, EPIU in the last 72 hours. Urine Culture: No results for input(s): LABURIN in the last 72 hours. Urine Chemistry: No results for input(s): Peola Core, PROTEINUR, NAUR in the last 72 hours. IMAGING:  XR CHEST PORTABLE   Final Result   Impression: Interval placement of a right internal jugular approach dialysis catheter, as above. CT HEAD WO CONTRAST   Final Result   1. No evidence for acute intracranial process. No bleed or shift   2. Remote infarct left thalamus      XR CHEST (2 VW)   Final Result      Free air beneath the right hemidiaphragm. Bibasilar atelectasis.       Critical result discussed with Dr. Ariza at 1:00 PM 9/6/2022 Assessment/Plan   ESRD     2. HTN    3. Anemia    4. Acid- base/ Electrolyte imbalance     5.  Abd tenderness - r/o PD peritonitis     Plan   - Cefepime toxicity is possible   - HD x 2 sessions   - removed HD cath   - ABG - reviewed   - PD fluid cell count - nl - no PD peritonitis   - CCPD tonight   - monitor Labs  - EPO or anemia    - Constipation management   - PT eval                   Thank you for allowing us to participate in care of Mallory Portillo MD  Feel free to contact me   [facility] [address]  Office : [phone]  Fax :[phone]

## 2022-09-16 NOTE — PROGRESS NOTES
CCPD Order   Exchanges: 5   Exchange Volume: 2500 ml   Total Time: 10 hrs   Dextrose: 1.5%   Last Fill: 0 ml   Total Volume: 84321 ml     Orders verified. Supplies taken to pt's room. Report received. Cycler set up, primed and pre tested. Dressing changed on [facility] Cath site. Pt connected to cycler. CCPD initiated without problem. Initial effluent clear. If problems should arise please call the [phone] on top of PD cycler machine.

## 2022-09-16 NOTE — DISCHARGE SUMMARY
Hospital Discharge Summary    Patient's PCP: Lore Lebron MD  Admit Date: 9/6/2022   Discharge Date: 9/19/22    Admitting Physician: Dr. Rosalia Robins MD  Discharge Physician: Dr. Cordell Glezu  Consults: pulmonary/intensive care and nephrology    HPI: Madyson Stanford is a 70 y/o male with significant past medical history fo ESRD on PD, interstitial lung disease, diastolic HF (5/33 Echo with EF 50-55%), NSTEMI, CAD s/p CABG 2007, T2DM, HTN, HLD, hypothyroidism who presented with AMS. Per family, patient had been somnolent and lethargic with decreased appetite for 10 days prior to admission. EMS was called by family as the patient's condition was progressively worsening up to admission. No such episodes have occurred in the past and patient's denied any instigating episode. Patient has been on home dialysis for the past year, usually 1 hour at noon and 10-12 hours overnight. Upon arrival to [facility] ED, patient was afebrile, mildly hypertensive, hemodynamically stable on 3L NC. Initial labs showed BUN 44 creatinine 7.1. His troponin was 0.08 his BNP is 1308. His white count was elevated 14,800 H&H 10 and 31; pH 7.28 with a PCO2 of 71. CXR with free air under R hemidiaphragm and bibasilar atelectasis. Brief hospital course:  Given the concern of the patient's presentation and the concern of the possible multi-factorial etiology contributing to patients symptomatology. Patient was admitted, evaluated, and found to have acute respiratory failure with resulting acute metabolic encephalopathy. ARF was treated with supportive care on oxygen. CO2 narcosis 2/2 BiPAP non-compliance likely caused an acute metabolic encephalopathy. PD was continued as well as all other home medications from the start of admission. Nephrology was consulted (9/6) with recommendation to continue CCPD overnight and IV antibiotics.      Until 9/12, treatment for respiratory failure and encephalopathy continued with variable change our patients mental status. At this time, Mr. Godfrey Garcia serum glucose elevated to 349 on 9/8. He was given 10 units of Lispro and started on long acting insulin with Sliding Scale to control his blood sugar. Per daughter, the patient takes 60 IU lantus nightly and  32/30/30 novolog with meals at home. She states that his BG runs in the 200s at home and occasionally in the 400s. We continued to titrate insulin to manage blood sugar values during admission. Pulmonology was consulted (9/10) to address patient's respiratory difficulties. We had the patient on BiPAP during admission, with variable usage throughout the night. Pulmonary agreed BiPAP is in the best care of the patient, however patient largely was non-complaint during admission. Pulmonary did not suspect PNA or infection. On 9/12 with suspicion for cefepime toxicity induced AMS patient received two rounds of HD, and returned to daily PD after mental status improved. Over the past several days before discharge, Mr. Cipriano Cruz made several concerning comments about no longer wanting to be alive and \"wanting things to end\". He affect had become flat, and while he denied feeling depressed, SI, or HI we were concerned for depression. He was started on an Wellbutrin 100 mg every other day, adjusted per pharmacy for renal dosing. The days prior to discharge, patient began to develop mild hyponatremia due to net positive fluid balance for several days. We consulted to nephrology and they took more fluid off during dialysis and corrected his low sodium. Overall, Mr. Cipriano Cruz presented to [facility] for AMS secondary to respiratory failure due to BiPAP non-compliance. We continued supportive care during the remained of the admission as Mr. Godfrey Garcia mental status improved. PT/OT recommended SNF placement. A facility that can accommodate patient's PD was found and he was discharged to that facility.     Discharge Diagnoses:   Patient Active Problem List   Diagnosis Essential hypertension    Generalized osteoarthrosis, involving multiple sites    Arthritis with psoriasis ([address]    Sleep apnea    Psoriatic arthropathy (HCC)    Allergic rhinitis due to other allergen    Coronary atherosclerosis    Vitamin D deficiency    Glaucoma    Nephropathy    S/P CABG x 4    ILD (interstitial lung disease) (HCC)    Acute respiratory failure with hypoxia and hypercapnia (HCC)    Chronic diastolic heart failure (HCC)    Elevated troponin    Coronary artery disease involving native coronary artery of native heart with unstable angina pectoris (HCC)    BJ (obstructive sleep apnea)    Cough variant asthma    NSTEMI (non-ST elevated myocardial infarction) ([address]    Diabetes mellitus type 2 in obese (HCC)    Chronic fatigue    Syncope and collapse    Type 2 diabetes mellitus with hyperglycemia, with long-term current use of insulin ([address]    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 (HCC)    Acquired hypothyroidism    Unstable angina (HCC)    Orthostatic hypotension    Dyspnea on exertion    Left lower quadrant abdominal pain    ESRD on peritoneal dialysis ([address]    AMS (altered mental status)    Acute respiratory failure with hypoxia (HCC)    Acute metabolic encephalopathy    Hypercarbia    Electrolyte imbalance    Generalized abdominal tenderness    Chronic hypercapnic respiratory failure (HCC)    BJ treated with BiPAP    Drug toxicity    Physical deconditioning       Physical Exam: /61   Pulse 82   Temp 97 °F (36.1 °C) (Oral)   Resp 18   Ht 5' 10\" (1.778 m)   Wt 212 lb 11.9 oz (96.5 kg)   SpO2 98%   BMI 30.53 kg/m²     Recent Labs     09/15/22  1308 09/15/22  1713 09/15/22  1953 09/16/22  0717 09/16/22  1216   POCGLU 187* 171* 258* 338* 199*       /61   Pulse 82   Temp 97 °F (36.1 °C) (Oral)   Resp 18   Ht 5' 10\" (1.778 m)   Wt 212 lb 11.9 oz (96.5 kg)   SpO2 98%   BMI 30.53 kg/m²     General Appearance:    Alert, cooperative, no distress, appears stated age   Head: Normocephalic, without obvious abnormality, atraumatic   Eyes:    PERRL, conjunctiva/corneas clear, EOM's intact, fundi     benign, both eyes        Ears:    Normal TM's and external ear canals, both ears   Nose:   Nares normal, septum midline, mucosa normal, no drainage    or sinus tenderness   Throat:   Lips, mucosa, and tongue normal; teeth and gums normal   Neck:   Supple, symmetrical, trachea midline, no adenopathy;        thyroid:  No enlargement/tenderness/nodules; no carotid    bruit or JVD   Back:     Symmetric, no curvature, ROM normal, no CVA tenderness   Lungs:     Clear to auscultation bilaterally, respirations unlabored   Chest wall:    No tenderness or deformity   Heart:    Regular rate and rhythm, S1 and S2 normal, no murmur, rub   or gallop   Abdomen:     Soft, non-tender, bowel sounds active all four quadrants,     no masses, no organomegaly   Genitalia:    Normal male without lesion, discharge or tenderness   Rectal:    Normal tone, normal prostate, no masses or tenderness;    guaiac negative stool   Extremities:   Extremities normal, atraumatic, no cyanosis or edema   Pulses:   2+ and symmetric all extremities   Skin:   Skin color, texture, turgor normal, no rashes or lesions   Lymph nodes:   Cervical, supraclavicular, and axillary nodes normal   Neurologic:   CNII-XII intact. Normal strength, sensation and reflexes       throughout       LABS:  Recent Labs     09/16/22  0426   WBC 9.8   HGB 10.1*         Recent Labs     09/16/22  0426 09/16/22  1300   *  --    K 4.2  --    CL 92*  --    CO2 28  --    BUN 29*  --    CREATININE 5.3*  --    GLUCOSE 343* 191*     No results for input(s): INR in the last 72 hours. Significant Diagnostic Studies:     CT Head WO Contrast (9/11/22)    Impression   1. No evidence for acute intracranial process. No bleed or shift   2.  Remote infarct left thalamus     CXR (9/12/22)       Impression   Impression: Interval placement of a right internal jugular approach dialysis catheter, as above. CXR (9/6/22)    Impression       Free air beneath the right hemidiaphragm. Bibasilar atelectasis. Critical result discussed with Dr. Moni Bain at 1:00 PM 9/6/2022       Treatments:    Inpatient:    - Cefepime 2000 mg IV once   - Levofloxacin 250 mg IV q24h  - Vancomycin 500 mg IV once    - Continued all other home medications during admission    Outpatient:    - Start Bupropion 100 mg every other day for depression  - Continued all other home medications after discharge    Discharge Medications:     Medication List        CONTINUE taking these medications      B-D ULTRAFINE III SHORT PEN 31G X 8 MM Misc  Generic drug: Insulin Pen Needle  USE WITH INSULIN FOUR TIMES A DAY     * blood glucose monitor kit and supplies  (PLEASE DISPENSE WHAT INS WILL COVER) - Dispense sufficient amount for  TID testing frequency plus additional to accommodate PRN testing needs. Dispense all needed supplies to include: monitor, strips, lancing device, lancets, control solutions, alcohol swabs. * True Metrix Meter [place]  As needed     Easy Touch Lancets 30G/Twist Misc  USE THREE TIMES A DAY TO FOUR TIMES A DAY     Handicap Placard Misc  by Does not apply route Duration: 5 years           * This list has 2 medication(s) that are the same as other medications prescribed for you. Read the directions carefully, and ask your doctor or other care provider to review them with you. ASK your doctor about these medications      aspirin 81 MG chewable tablet  Take 1 tablet by mouth daily.      atorvastatin 80 MG tablet  Commonly known as: LIPITOR  TAKE ONE TABLET BY MOUTH ONCE NIGHTLY     * blood glucose test strips  Test up to 4 times daily for blood sugar monitoring     * True Metrix Blood Glucose Test strip  Generic drug: blood glucose test strips  USE TO TEST FOUR TIMES A DAY AS NEEDED     Breo Ellipta 200-25 MCG/INH Aepb inhaler  Generic drug: Fluticasone furoate-vilanterol     Dialyvite 3000 3 MG Tabs     Levemir FlexTouch 100 UNIT/ML injection pen  Generic drug: insulin detemir  56-66 units daily     levothyroxine 50 MCG tablet  Commonly known as: SYNTHROID  TAKE ONE TABLET BY MOUTH DAILY     midodrine 5 MG tablet  Commonly known as: PROAMATINE  Take 1 tablet by mouth in the morning and at bedtime     NovoLOG FlexPen 100 UNIT/ML injection pen  Generic drug: insulin aspart  32-42  units AC TID     triamcinolone 0.1 % ointment  Commonly known as: KENALOG  Apply topically 2 times daily. vitamin D 1.25 MG (11553 UT) Caps capsule  Commonly known as: ERGOCALCIFEROL           * This list has 2 medication(s) that are the same as other medications prescribed for you. Read the directions carefully, and ask your doctor or other care provider to review them with you. Activity: activity as tolerated  Diet: regular diet  Wound Care: none needed    Disposition: SNF  Discharged Condition: Stable  Follow Up: Primary Care Physician in after discharge from SNF. We encourage the patient to wear his BiPAP machine daily as well remaining on schedule for his dialysis. Melba Reich, MS4  Internal Medicine     Ang Gordillo MD  PGY-1    Addendum to Resident H& P/Progress note:  I have personally seen,examined and evaluated the patient.  I have reviewed the current history, physical findings, labs and assessment and plan and agree with note as documented by MS 4, Melba Reich, and resident MD ( )  Time spent on day of discharge more than 30 minutes    Tomer Burleson MD, [address]

## 2022-09-16 NOTE — PROGRESS NOTES
Physician Progress Note      PATIENT:               Hillary Locke  CSN #:                  [id number]  :                       1945  ADMIT DATE:       2022 11:46 AM  DISCH DATE:  Yusuf Lang  PROVIDER #:        Cody Gaffney MD          QUERY TEXT:    Pt admitted with Acute hypoxic hypercapnic respiratory failure. Per H/P :   \"Possibly 2/2 fluid overload/CHF exacerbation in setting of ESRD on PD and   ILD\". Please document in progress notes and discharge summary the   relationship, if any, between Acute hypoxic hypercapnic respiratory failure   and fluid overload vs. ILD. The medical record reflects the following:  Risk Factors: ESRD, ILD  Clinical Indicators: per H/P: \"Acute hypoxic hypercapnic respiratory failure   Possibly 2/2 fluid overload/CHF exacerbation in  setting of ESRD on PD and ILD (diagnosed on  chest CT follows with Dr. Agustina Brooks . [facility] Pt had similar presentation in 2017 and  required intubation\"; BNP on admission=1,308 pg/ml; CXR  with free air   under diaphragm and bibasilar atelectasis  Treatment: CBC, CMP, CXR, Nephrology consult; CCPD; Oxygen therapy; V/S and   I/O monitoring per unit protocol  Options provided:  -- Acute hypoxic hypercapnic respiratory failure is due to fluid overload  -- Acute hypoxic hypercapnic respiratory failure is due to ILD  -- Acute hypoxic hypercapnic respiratory failure multifactorial  -- Other - I will add my own diagnosis  -- Disagree - Not applicable / Not valid  -- Disagree - Clinically unable to determine / Unknown  -- Refer to Clinical Documentation Reviewer    PROVIDER RESPONSE TEXT:    This patient has Acute hypoxic hypercapnic respiratory failure,   multifactorial, due to fluid overload and ILD.     Query created by: Re Paniagua on 2022 11:18 AM      Electronically signed by:  Cody Gaffney MD 2022 4:09 PM

## 2022-09-17 PROBLEM — F33.1 MODERATE EPISODE OF RECURRENT MAJOR DEPRESSIVE DISORDER (HCC): Status: ACTIVE | Noted: 2022-09-17

## 2022-09-17 LAB
ANION GAP SERPL CALCULATED.3IONS-SCNC: 12 MMOL/L (ref 3–16)
BASOPHILS ABSOLUTE: 0 K/UL (ref 0–0.2)
BASOPHILS RELATIVE PERCENT: 0 %
BUN BLDV-MCNC: 30 MG/DL (ref 7–20)
CALCIUM SERPL-MCNC: 8.7 MG/DL (ref 8.3–10.6)
CHLORIDE BLD-SCNC: 91 MMOL/L (ref 99–110)
CO2: 26 MMOL/L (ref 21–32)
CREAT SERPL-MCNC: 5.7 MG/DL (ref 0.8–1.3)
EOSINOPHILS ABSOLUTE: 0.1 K/UL (ref 0–0.6)
EOSINOPHILS RELATIVE PERCENT: 1 %
GFR AFRICAN AMERICAN: 12
GFR NON-AFRICAN AMERICAN: 10
GLUCOSE BLD-MCNC: 138 MG/DL (ref 70–99)
GLUCOSE BLD-MCNC: 199 MG/DL (ref 70–99)
GLUCOSE BLD-MCNC: 214 MG/DL (ref 70–99)
GLUCOSE BLD-MCNC: 274 MG/DL (ref 70–99)
GLUCOSE BLD-MCNC: 340 MG/DL (ref 70–99)
GLUCOSE BLD-MCNC: 347 MG/DL (ref 70–99)
HCT VFR BLD CALC: 27.5 % (ref 40.5–52.5)
HEMOGLOBIN: 9.4 G/DL (ref 13.5–17.5)
LYMPHOCYTES ABSOLUTE: 0.4 K/UL (ref 1–5.1)
LYMPHOCYTES RELATIVE PERCENT: 4 %
MAGNESIUM: 2.2 MG/DL (ref 1.8–2.4)
MCH RBC QN AUTO: 39.5 PG (ref 26–34)
MCHC RBC AUTO-ENTMCNC: 34.3 G/DL (ref 31–36)
MCV RBC AUTO: 115 FL (ref 80–100)
MONOCYTES ABSOLUTE: 0.2 K/UL (ref 0–1.3)
MONOCYTES RELATIVE PERCENT: 2 %
MYELOCYTE PERCENT: 2 %
NEUTROPHILS ABSOLUTE: 8.8 K/UL (ref 1.7–7.7)
NEUTROPHILS RELATIVE PERCENT: 91 %
PDW BLD-RTO: 15.3 % (ref 12.4–15.4)
PERFORMED ON: ABNORMAL
PLATELET # BLD: 266 K/UL (ref 135–450)
PLATELET SLIDE REVIEW: ADEQUATE
PMV BLD AUTO: 7.2 FL (ref 5–10.5)
POLYCHROMASIA: ABNORMAL
POTASSIUM SERPL-SCNC: 4.3 MMOL/L (ref 3.5–5.1)
RBC # BLD: 2.39 M/UL (ref 4.2–5.9)
SLIDE REVIEW: ABNORMAL
SODIUM BLD-SCNC: 129 MMOL/L (ref 136–145)
WBC # BLD: 9.5 K/UL (ref 4–11)

## 2022-09-17 PROCEDURE — 99233 SBSQ HOSP IP/OBS HIGH 50: CPT | Performed by: INTERNAL MEDICINE

## 2022-09-17 PROCEDURE — 6370000000 HC RX 637 (ALT 250 FOR IP): Performed by: STUDENT IN AN ORGANIZED HEALTH CARE EDUCATION/TRAINING PROGRAM

## 2022-09-17 PROCEDURE — 6360000002 HC RX W HCPCS: Performed by: STUDENT IN AN ORGANIZED HEALTH CARE EDUCATION/TRAINING PROGRAM

## 2022-09-17 PROCEDURE — 2700000000 HC OXYGEN THERAPY PER DAY

## 2022-09-17 PROCEDURE — 90945 DIALYSIS ONE EVALUATION: CPT

## 2022-09-17 PROCEDURE — 2060000000 HC ICU INTERMEDIATE R&B

## 2022-09-17 PROCEDURE — 83735 ASSAY OF MAGNESIUM: CPT

## 2022-09-17 PROCEDURE — 85025 COMPLETE CBC W/AUTO DIFF WBC: CPT

## 2022-09-17 PROCEDURE — 36415 COLL VENOUS BLD VENIPUNCTURE: CPT

## 2022-09-17 PROCEDURE — 6370000000 HC RX 637 (ALT 250 FOR IP): Performed by: INTERNAL MEDICINE

## 2022-09-17 PROCEDURE — 2580000003 HC RX 258: Performed by: STUDENT IN AN ORGANIZED HEALTH CARE EDUCATION/TRAINING PROGRAM

## 2022-09-17 PROCEDURE — 99232 SBSQ HOSP IP/OBS MODERATE 35: CPT | Performed by: HOSPITALIST

## 2022-09-17 PROCEDURE — 80048 BASIC METABOLIC PNL TOTAL CA: CPT

## 2022-09-17 PROCEDURE — 94761 N-INVAS EAR/PLS OXIMETRY MLT: CPT

## 2022-09-17 RX ORDER — INSULIN LISPRO 100 [IU]/ML
4 INJECTION, SOLUTION INTRAVENOUS; SUBCUTANEOUS
Status: DISCONTINUED | OUTPATIENT
Start: 2022-09-17 | End: 2022-09-19 | Stop reason: HOSPADM

## 2022-09-17 RX ADMIN — ASPIRIN 81 MG 81 MG: 81 TABLET ORAL at 09:45

## 2022-09-17 RX ADMIN — LEVOTHYROXINE SODIUM 50 MCG: 50 TABLET ORAL at 09:45

## 2022-09-17 RX ADMIN — INSULIN LISPRO 4 UNITS: 100 INJECTION, SOLUTION INTRAVENOUS; SUBCUTANEOUS at 13:57

## 2022-09-17 RX ADMIN — FAMOTIDINE 20 MG: 20 TABLET ORAL at 09:45

## 2022-09-17 RX ADMIN — INSULIN LISPRO 4 UNITS: 100 INJECTION, SOLUTION INTRAVENOUS; SUBCUTANEOUS at 09:47

## 2022-09-17 RX ADMIN — HEPARIN SODIUM 5000 UNITS: 5000 INJECTION INTRAVENOUS; SUBCUTANEOUS at 05:41

## 2022-09-17 RX ADMIN — POLYETHYLENE GLYCOL (3350) 17 G: 17 POWDER, FOR SOLUTION ORAL at 09:45

## 2022-09-17 RX ADMIN — Medication 1 PACKET: at 15:50

## 2022-09-17 RX ADMIN — HEPARIN SODIUM 5000 UNITS: 5000 INJECTION INTRAVENOUS; SUBCUTANEOUS at 20:59

## 2022-09-17 RX ADMIN — POLYETHYLENE GLYCOL (3350) 17 G: 17 POWDER, FOR SOLUTION ORAL at 20:59

## 2022-09-17 RX ADMIN — INSULIN LISPRO 4 UNITS: 100 INJECTION, SOLUTION INTRAVENOUS; SUBCUTANEOUS at 09:45

## 2022-09-17 RX ADMIN — HEPARIN SODIUM 5000 UNITS: 5000 INJECTION INTRAVENOUS; SUBCUTANEOUS at 15:50

## 2022-09-17 RX ADMIN — BUPROPION HYDROCHLORIDE 100 MG: 100 TABLET, FILM COATED ORAL at 12:15

## 2022-09-17 RX ADMIN — ATORVASTATIN CALCIUM 80 MG: 40 TABLET, FILM COATED ORAL at 20:59

## 2022-09-17 RX ADMIN — SODIUM CHLORIDE, PRESERVATIVE FREE 10 ML: 5 INJECTION INTRAVENOUS at 21:06

## 2022-09-17 RX ADMIN — INSULIN LISPRO 4 UNITS: 100 INJECTION, SOLUTION INTRAVENOUS; SUBCUTANEOUS at 18:12

## 2022-09-17 ASSESSMENT — PAIN SCALES - GENERAL
PAINLEVEL_OUTOF10: 0

## 2022-09-17 ASSESSMENT — PAIN SCALES - WONG BAKER: WONGBAKER_NUMERICALRESPONSE: 0

## 2022-09-17 NOTE — PROGRESS NOTES
Nephrology  Note                                                                                                                                                                                                                                                                                                                                                               Office : [phone]     Fax :[phone]              Patient's Name: Carley Russell  1:41 PM  9/17/2022    Reason for Consult:  ESRD   Requesting Physician:  MD CLARA Ayala           Past Medical History:   Diagnosis Date    Allergic rhinitis due to other allergen 7/12/2010    Coronary atherosclerosis of unspecified type of vessel, native or graft 7/12/2010    Diabetic eye exam ([address] 04/29/2015    Diabetic eye exam ([address] 5/5/2016    [place] [facility]    Generalized osteoarthrosis, involving multiple sites 7/12/2010    Other psoriasis 7/12/2010    Pneumonia     Prolonged emergence from general anesthesia     Psoriatic arthropathy ([address] 7/12/2010    Type II or unspecified type diabetes mellitus without mention of complication, not stated as uncontrolled 7/12/2010    Unspecified essential hypertension 7/12/2010    Unspecified sleep apnea 7/12/2010       Past Surgical History:   Procedure Laterality Date    CARPAL TUNNEL RELEASE      s/p    CATARACT REMOVAL      CORONARY ARTERY BYPASS GRAFT      JOINT REPLACEMENT      LAPAROSCOPY INSERTION PERITONEAL CATHETER N/A 10/26/2020    LAPAROSCOPIC PERITONEAL DIALYSIS CATHETER PLACEMENT; LAPAROSCOPIC OMENTOPEXY performed by Berlin Keen DO at [facility] OR       Family History   Problem Relation Age of Onset    Diabetes Mother     Heart Failure Mother     Coronary Art Dis Father         reports that he quit smoking about 41 years ago. He started smoking about 60 years ago. He has a 18.00 pack-year smoking history.  He has never used smokeless tobacco. He reports that he does not drink alcohol and does not use drugs.     Allergies:  Penicillins, Sulfa antibiotics, Tazorac [tazarotene], Clindamycin, and Clindamycin/lincomycin    Current Medications:    insulin lispro (1 Unit Dial) 4 Units, TID WC  insulin glargine (LANTUS;BASAGLAR) injection pen 35 Units, Nightly  buPROPion (WELLBUTRIN) tablet 100 mg, Every Other Day  insulin lispro (1 Unit Dial) 0-8 Units, TID WC  insulin lispro (1 Unit Dial) 0-4 Units, Nightly  gentamicin (GARAMYCIN) 0.1 % cream, PRN  acetaminophen (TYLENOL) tablet 650 mg, Q6H PRN   Or  acetaminophen (TYLENOL) suppository 650 mg, Q6H PRN  famotidine (PEPCID) tablet 20 mg, Daily  polyethylene glycol (GLYCOLAX) packet 17 g, BID  epoetin daphney-epbx (RETACRIT) injection 2,000 Units, Once per day on Mon Wed Fri  atorvastatin (LIPITOR) tablet 80 mg, Nightly  levothyroxine (SYNTHROID) tablet 50 mcg, Daily  aspirin chewable tablet 81 mg, Daily  sodium chloride flush 0.9 % injection 5-40 mL, 2 times per day  sodium chloride flush 0.9 % injection 5-40 mL, PRN  0.9 % sodium chloride infusion, PRN  ondansetron (ZOFRAN-ODT) disintegrating tablet 4 mg, Q8H PRN   Or  ondansetron (ZOFRAN) injection 4 mg, Q6H PRN  polyethylene glycol (GLYCOLAX) packet 17 g, Daily PRN  heparin (porcine) injection 5,000 Units, 3 times per day  glucose chewable tablet 16 g, PRN  dextrose bolus 10% 125 mL, PRN   Or  dextrose bolus 10% 250 mL, PRN  glucagon (rDNA) injection 1 mg, PRN  dextrose 10 % infusion, Continuous PRN            Physical exam:     Vitals:  /73   Pulse 83   Temp 97.4 °F (36.3 °C) (Axillary)   Resp 18   Ht 5' 10\" (1.778 m)   Wt 222 lb 10.6 oz (101 kg)   SpO2 96%   BMI 31.95 kg/m²   Constitutional:  OAA X3 NAD  Skin: no rash, turgor wnl  Heent:  eomi, mmm  Neck: no bruits or jvd noted  Cardiovascular:  S1, S2 without m/r/g  Respiratory: CTA B without w/r/r  Abdomen:  +bs, soft, TTP   Ext: + lower extremity edema  Psychiatric: mood and affect appropriate  Musculoskeletal:  Rom, muscular strength intact    Data:   Labs:  CBC:   Recent Labs     09/15/22  0434 09/16/22 0426 09/17/22 0426   WBC 9.7 9.8 9.5   HGB 9.2* 10.1* 9.4*    274 266       BMP:    Recent Labs     09/15/22  0434 09/16/22  0426 09/16/22  1300 09/17/22 0426    133*  --  129*   K 4.6 4.2  --  4.3   CL 96* 92*  --  91*   CO2 27 28  --  26   BUN 25* 29*  --  30*   CREATININE 5.0* 5.3*  --  5.7*   GLUCOSE 172* 343* 191* 340*       Ca/Mg/Phos:   Recent Labs     09/15/22  0434 09/16/22  0426 09/17/22  0426   CALCIUM 8.6 8.7 8.7   MG 2.30 2.40 2.20       Hepatic:   No results for input(s): AST, ALT, ALB, BILITOT, ALKPHOS in the last 72 hours. Troponin:   No results for input(s): TROPONINI in the last 72 hours. BNP: No results for input(s): BNP in the last 72 hours. Lipids: No results for input(s): CHOL, TRIG, HDL, LDLCALC, LABVLDL in the last 72 hours. ABGs:   No results for input(s): PHART, PO2ART, MFF7LOV in the last 72 hours. INR: No results for input(s): INR in the last 72 hours. UA:No results for input(s): [place], GLUCOSEU, BILIRUBINUR, [place], BLOODU, PHUR, PROTEINU, UROBILINOGEN, NITRU, LEUKOCYTESUR, LABMICR, URINETYPE in the last 72 hours. Urine Microscopic: No results for input(s): LABCAST, BACTERIA, COMU, HYALCAST, WBCUA, RBCUA, EPIU in the last 72 hours. Urine Culture: No results for input(s): LABURIN in the last 72 hours. Urine Chemistry: No results for input(s): [place], PROTEINUR, NAUR in the last 72 hours. IMAGING:  XR CHEST PORTABLE   Final Result   Impression: Interval placement of a right internal jugular approach dialysis catheter, as above. CT HEAD WO CONTRAST   Final Result   1. No evidence for acute intracranial process. No bleed or shift   2. Remote infarct left thalamus      XR CHEST (2 VW)   Final Result      Free air beneath the right hemidiaphragm. Bibasilar atelectasis.       Critical result discussed with Dr. Isabelle Morris at 1:00 PM 9/6/2022 Assessment/Plan   ESRD     2. HTN    3. Anemia    4. Acid- base/ Electrolyte imbalance     5.  Abd tenderness - r/o PD peritonitis     Plan   - Cefepime toxicity is possible   - HD x 2 sessions   - removed HD cath   - ABG - reviewed   - PD fluid cell count - nl - no PD peritonitis   - CCPD tonight   - monitor Labs  - EPO or anemia    - Constipation management   - PT eval                   Thank you for allowing us to participate in care of Benjamine Frankel, MD  Feel free to contact me   [facility] [address]  Office : [phone]  Fax :[phone]

## 2022-09-17 NOTE — CARE COORDINATION
ADDENDUM: [facility], she will call  once it is approved.   Electronically signed by JOE Perez, BERE on 9/17/2022 at 11:59 AM    KATRIN called [facility] [place] [phone],  and LVM regarding pre-cert        Electronically signed by JOE Perez LSW on 9/17/2022 at 10:54 AM  [phone]

## 2022-09-17 NOTE — PLAN OF CARE
Problem: Metabolic/Fluid and Electrolytes - Adult  Goal: Hemodynamic stability and optimal renal function maintained  Outcome: Progressing  Flowsheets (Taken 9/17/2022 0511)  Hemodynamic stability and optimal renal function maintained:   Monitor labs and assess for signs and symptoms of volume excess or deficit   Monitor intake, output and patient weight   Monitor urine specific gravity, serum osmolarity and serum sodium as indicated or ordered   Encourage oral intake as appropriate     Problem: Metabolic/Fluid and Electrolytes - Adult  Goal: Electrolytes maintained within normal limits  Outcome: Progressing  Flowsheets (Taken 9/17/2022 0511)  Electrolytes maintained within normal limits:   Monitor labs and assess patient for signs and symptoms of electrolyte imbalances   Administer electrolyte replacement as ordered   Monitor response to electrolyte replacements, including repeat lab results as appropriate     Problem: Respiratory - Adult  Goal: Achieves optimal ventilation and oxygenation  Outcome: Progressing  Flowsheets (Taken 9/17/2022 0511)  Achieves optimal ventilation and oxygenation:   Assess for changes in respiratory status   Assess for changes in mentation and behavior   Position to facilitate oxygenation and minimize respiratory effort   Oxygen supplementation based on oxygen saturation or arterial blood gases   Encourage broncho-pulmonary hygiene including cough, deep breathe, incentive spirometry   Assess and instruct to report shortness of breath or any respiratory difficulty   Respiratory therapy support as indicated     Problem: Cardiovascular - Adult  Goal: Absence of cardiac dysrhythmias or at baseline  Outcome: Progressing  Flowsheets (Taken 9/17/2022 0511)  Absence of cardiac dysrhythmias or at baseline:   Monitor cardiac rate and rhythm   Assess for signs of decreased cardiac output   Administer antiarrhythmia medication and electrolyte replacement as ordered     Problem: Cardiovascular - Adult  Goal: Maintains optimal cardiac output and hemodynamic stability  9/17/2022 0511 by Hortencia Ibrahim RN  Outcome: Progressing  Flowsheets (Taken 9/17/2022 0511)  Maintains optimal cardiac output and hemodynamic stability:   Monitor blood pressure and heart rate   Monitor urine output and notify Licensed Independent Practitioner for values outside of normal range   Assess for signs of decreased cardiac output     Problem: Pain  Goal: Verbalizes/displays adequate comfort level or baseline comfort level  Outcome: Progressing  Flowsheets (Taken 9/17/2022 0511)  Verbalizes/displays adequate comfort level or baseline comfort level:   Assess pain using appropriate pain scale   Encourage patient to monitor pain and request assistance   Administer analgesics based on type and severity of pain and evaluate response   Implement non-pharmacological measures as appropriate and evaluate response     Problem: Chronic Conditions and Co-morbidities  Goal: Patient's chronic conditions and co-morbidity symptoms are monitored and maintained or improved  Outcome: Progressing  Flowsheets (Taken 9/17/2022 0511)  Care Plan - Patient's Chronic Conditions and Co-Morbidity Symptoms are Monitored and Maintained or Improved:   Monitor and assess patient's chronic conditions and comorbid symptoms for stability, deterioration, or improvement   Collaborate with multidisciplinary team to address chronic and comorbid conditions and prevent exacerbation or deterioration   Update acute care plan with appropriate goals if chronic or comorbid symptoms are exacerbated and prevent overall improvement and discharge     Problem: Skin/Tissue Integrity  Goal: Absence of new skin breakdown  Description: 1. Monitor for areas of redness and/or skin breakdown  2. Assess vascular access sites hourly  3. Every 4-6 hours minimum:  Change oxygen saturation probe site  4.   Every 4-6 hours:  If on nasal continuous positive airway pressure, respiratory therapy assess nares and determine need for appliance change or resting period.   9/17/2022 0511 by Grace David RN  Outcome: Progressing     Problem: ABCDS Injury Assessment  Goal: Absence of physical injury  Outcome: Progressing  Flowsheets (Taken 9/17/2022 0511)  Absence of Physical Injury: Implement safety measures based on patient assessment     Problem: Safety - Adult  Goal: Free from fall injury  9/17/2022 0511 by Grace David RN  Outcome: Progressing  Flowsheets (Taken 9/17/2022 0511)  Free From Fall Injury: Instruct family/caregiver on patient safety     Problem: Discharge Planning  Goal: Discharge to home or other facility with appropriate resources  Outcome: Progressing  Flowsheets (Taken 9/17/2022 0511)  Discharge to home or other facility with appropriate resources:   Identify barriers to discharge with patient and caregiver   Arrange for needed discharge resources and transportation as appropriate   Identify discharge learning needs (meds, wound care, etc)   Refer to discharge planning if patient needs post-hospital services based on physician order or complex needs related to functional status, cognitive ability or social support system

## 2022-09-17 NOTE — PROGRESS NOTES
Progress Note  PGY-3    CC: AMS  Patient's PCP: Derrick Patterson MD    Interval History   No acute events over night. Patient was seen at bed side this morning. He is on 1L O2 NC saturating 97%. He denied SOB, chest pain, cough, nausea and vomiting. Patient does report mild improvement in his mood from yesterday. Discussed with SW: we are still waiting for pre-cert from medical insurance company for SNF placement. MEDICATIONS:     No current facility-administered medications on file prior to encounter. Current Outpatient Medications on File Prior to Encounter   Medication Sig Dispense Refill    BREO ELLIPTA 200-25 MCG/INH AEPB inhaler Inhale 1 puff into the lungs daily      blood glucose test strips (TRUE METRIX BLOOD GLUCOSE TEST) strip USE TO TEST FOUR TIMES A DAY AS NEEDED 150 strip 3    atorvastatin (LIPITOR) 80 MG tablet TAKE ONE TABLET BY MOUTH ONCE NIGHTLY 90 tablet 3    Easy Touch Lancets 30G/Twist MISC USE THREE TIMES A DAY TO FOUR TIMES A  each 5    levothyroxine (SYNTHROID) 50 MCG tablet TAKE ONE TABLET BY MOUTH DAILY 90 tablet 8    insulin detemir (LEVEMIR FLEXTOUCH) 100 UNIT/ML injection pen 56-66 units daily 10 pen 3    insulin aspart (NOVOLOG FLEXPEN) 100 UNIT/ML injection pen 32-42  units AC TID 10 pen 3    B-D ULTRAFINE III SHORT PEN 31G X 8 MM MISC USE WITH INSULIN FOUR TIMES A  each 2    midodrine (PROAMATINE) 5 MG tablet Take 1 tablet by mouth in the morning and at bedtime 90 tablet 3    Blood Glucose Monitoring Suppl (TRUE METRIX METER) RUMA As needed 1 each 0    blood glucose monitor strips Test up to 4 times daily for blood sugar monitoring 200 strip 3    blood glucose monitor kit and supplies (PLEASE DISPENSE WHAT INS WILL COVER) - Dispense sufficient amount for  TID testing frequency plus additional to accommodate PRN testing needs. Dispense all needed supplies to include: monitor, strips, lancing device, lancets, control solutions, alcohol swabs.  1 kit 0    B Output 274 ml   Net 546 ml     I/O this shift:  In: 240 [P.O.:240]  Out: -   I/O last 3 completed shifts: In: 36 [P.O.:820]  Out: 363     Physical Examination:   General appearance: alert, appears stated age and cooperative. Skin: Skin color, texture, turgor normal.   HEENT: Head: Normocephalic. Neck: no adenopathy. Lungs: clear to auscultation bilaterally. Heart: regular rate and rhythm. Abdomen: soft, non-tender; bowel sounds normal.  Extremities: extremities normal.  Lymphatic: No significant lymph node enlargement palpable. Neurologic: Mental status: Alert, oriented, thought content appropriate. DATA:       Labs:  CBC:   Recent Labs     09/15/22  0434 09/16/22  0426 09/17/22  0426   WBC 9.7 9.8 9.5   HGB 9.2* 10.1* 9.4*   HCT 27.2* 29.2* 27.5*    274 266       BMP:   Recent Labs     09/15/22  0434 09/16/22 0426 09/16/22  1300 09/17/22 0426    133*  --  129*   K 4.6 4.2  --  4.3   CL 96* 92*  --  91*   CO2 27 28  --  26   BUN 25* 29*  --  30*   CREATININE 5.0* 5.3*  --  5.7*   GLUCOSE 172* 343* 191* 340*       Rad:  XR CHEST PORTABLE   Final Result   Impression: Interval placement of a right internal jugular approach dialysis catheter, as above. CT HEAD WO CONTRAST   Final Result   1. No evidence for acute intracranial process. No bleed or shift   2. Remote infarct left thalamus      XR CHEST (2 VW)   Final Result      Free air beneath the right hemidiaphragm. Bibasilar atelectasis. Critical result discussed with Dr. Jannet Ellis at 1:00 PM 9/6/2022          ASSESSMENT AND PLAN:   69 y.o. male with pertinent medical history of ESRD on PD, interstitial lung disease, diastolic HF (0/62 Echo with EF 50-55%), NSTEMI, CAD s/p CABG 2007, T2DM, HTN, HLD, hypothyroidism who was admitted on 9/6/2022 for management of AMS secondary to acute encephalopathy. Suspected major depressive disorder  - Patient reports a \"down mood\" for a period longer than one month.  He has poor sleep, reduced appetite, irritability, loss of interest, and concentration difficulties. - Continue bupropion 100 mg every other day PO  - Bupropion is renally excreted and CKD may allow active metabolizes of the drug to stack. Be on the look out for bupropion overdose (Coma, hypotension, seizures, QRS widening, and ventriclar dysrhythmias.)     ESRD on PD   - PD completed, patient tolerated well (9/17)  - Output: 274 ml  - BUN increased from 29 to 30 from yesterday     T2DM  - Patient's appetite has returned since 9/15 and his blood sugars serum values have increased to 259, 345, 338 from a baseline between 140-180 mg%. - Continue Lantus 35 U nightly   - POC glucose   - MDSSI  - Hypoglycemia protocol   - Started Lispro 4U TID    Acute constipation  No BM x1 week duration.   - Continue Glycolax 17g BID   - Started Psyllium daily   - Monitor BM     Hypomagnesemia - Resolved  - Mg2+ 1.9 (9/7), 2.3 (9/14)  - Ordered Mag Sulfate 2,000 mg IV bolus (x1) on 9/7     Shortness of breath - Resolved  - Patient has bilateral diffuse rales with shortness of breath on physical exam. Non-productive intermittent wet cough also present. There has been no fever in the past 48 hours. - We will reassesses patient. If symptoms remain or worsen after PD, we will consider blood gasses for possible acidosis. - Symptoms may be due to atelectasis, consider repeat imaging.  - SOB resolved after peritoneal dialysis on 9/15             Chronic medical problems:  Hypothyroidism - Continue synthroid 50 mcg  CAD s/p CABG 2007 - Continue aspirin 81 mg daily and lipitor 80 mg nightly      Code Status: Full code   FEN: ADULT DIET; Regular; 3 carb choices (45 gm/meal); Low Fat/Low Chol/High Fiber/DONATO  PPX: Heparin   DISPO: IP, ready for discharge.  Awaiting pre-cert from [address] [place] [address] for SNF placement     This patient has been staffed and discussed with Tashi Cotter MD.   -----------------------------  Lucero Parsons MD, PGY-3  Internal Medicine     Addendum to Resident H& P/Progress note:  I have personally seen,examined and evaluated the patient.  I have reviewed the current history, physical findings, labs and assessment and plan and agree with note as documented by resident MD ( Jenny Aparicio)      Cathi Rodriguez MD, Geraldo Okeefe

## 2022-09-17 NOTE — FLOWSHEET NOTE
Disconnected from CCPD per protocol. Effluent: Clear  Total time: 10 hr 25 min   Total UF:  274 ml. Total Volume:  04723 ml. Dwell time gained:  00 hr 47 min.   Pt Tolerated procedure: Pt resting quietly, offers no c/o.       09/17/22 0526   Vitals   /62   Temp 97.3 °F (36.3 °C)   Heart Rate 77   Resp 18   Weight 222 lb 10.6 oz (101 kg)   Cycler   Ultrafiltration (UF) (mL) 274 mL   Average Dwell Time (Hours:Minutes) 83   Lost Dwell Time (Hours:Minutes) [phone]

## 2022-09-17 NOTE — FLOWSHEET NOTE
CCPD Order   Exchanges: 5   Exchange Volume: 2500 ml   Total Time: 10 hrs   Dextrose: 1.5%   Last Fill: 0 ml   Total Volume: 33344 ml     Orders verified. Supplies taken to pt's room. Report received. Cycler set up, primed and pre tested. Dressing changed on [facility] Cath site. Pt connected to cycler. CCPD initiated without problem. Initial effluent clear. If problems should arise please call the [phone] number on top of PD cycler machine. If problems persist please call [phone]    09/17/22 1559   Vital Signs   /65   Temp 97.3 °F (36.3 °C)   Heart Rate 90   Resp 18   SpO2 91 %   Weight 228 lb 9.9 oz (103.7 kg)   Weight Method Bed scale; Actual   Percent Weight Change 0

## 2022-09-18 LAB
ANION GAP SERPL CALCULATED.3IONS-SCNC: 9 MMOL/L (ref 3–16)
BUN BLDV-MCNC: 28 MG/DL (ref 7–20)
CALCIUM SERPL-MCNC: 8.5 MG/DL (ref 8.3–10.6)
CHLORIDE BLD-SCNC: 92 MMOL/L (ref 99–110)
CO2: 29 MMOL/L (ref 21–32)
CREAT SERPL-MCNC: 6.4 MG/DL (ref 0.8–1.3)
GFR AFRICAN AMERICAN: 10
GFR NON-AFRICAN AMERICAN: 9
GLUCOSE BLD-MCNC: 175 MG/DL (ref 70–99)
GLUCOSE BLD-MCNC: 179 MG/DL (ref 70–99)
GLUCOSE BLD-MCNC: 225 MG/DL (ref 70–99)
GLUCOSE BLD-MCNC: 243 MG/DL (ref 70–99)
GLUCOSE BLD-MCNC: 249 MG/DL (ref 70–99)
PERFORMED ON: ABNORMAL
POTASSIUM REFLEX MAGNESIUM: 4.1 MMOL/L (ref 3.5–5.1)
SODIUM BLD-SCNC: 130 MMOL/L (ref 136–145)

## 2022-09-18 PROCEDURE — 6370000000 HC RX 637 (ALT 250 FOR IP): Performed by: STUDENT IN AN ORGANIZED HEALTH CARE EDUCATION/TRAINING PROGRAM

## 2022-09-18 PROCEDURE — 6370000000 HC RX 637 (ALT 250 FOR IP): Performed by: INTERNAL MEDICINE

## 2022-09-18 PROCEDURE — 99233 SBSQ HOSP IP/OBS HIGH 50: CPT | Performed by: INTERNAL MEDICINE

## 2022-09-18 PROCEDURE — 6360000002 HC RX W HCPCS: Performed by: STUDENT IN AN ORGANIZED HEALTH CARE EDUCATION/TRAINING PROGRAM

## 2022-09-18 PROCEDURE — 2700000000 HC OXYGEN THERAPY PER DAY

## 2022-09-18 PROCEDURE — 99232 SBSQ HOSP IP/OBS MODERATE 35: CPT | Performed by: HOSPITALIST

## 2022-09-18 PROCEDURE — 80048 BASIC METABOLIC PNL TOTAL CA: CPT

## 2022-09-18 PROCEDURE — 2580000003 HC RX 258: Performed by: STUDENT IN AN ORGANIZED HEALTH CARE EDUCATION/TRAINING PROGRAM

## 2022-09-18 PROCEDURE — 90945 DIALYSIS ONE EVALUATION: CPT

## 2022-09-18 PROCEDURE — 94761 N-INVAS EAR/PLS OXIMETRY MLT: CPT

## 2022-09-18 PROCEDURE — 36415 COLL VENOUS BLD VENIPUNCTURE: CPT

## 2022-09-18 PROCEDURE — 2060000000 HC ICU INTERMEDIATE R&B

## 2022-09-18 RX ORDER — INSULIN LISPRO 100 [IU]/ML
0-16 INJECTION, SOLUTION INTRAVENOUS; SUBCUTANEOUS
Status: DISCONTINUED | OUTPATIENT
Start: 2022-09-18 | End: 2022-09-18

## 2022-09-18 RX ORDER — INSULIN LISPRO 100 [IU]/ML
0-4 INJECTION, SOLUTION INTRAVENOUS; SUBCUTANEOUS NIGHTLY
Status: DISCONTINUED | OUTPATIENT
Start: 2022-09-18 | End: 2022-09-19

## 2022-09-18 RX ORDER — INSULIN LISPRO 100 [IU]/ML
0-8 INJECTION, SOLUTION INTRAVENOUS; SUBCUTANEOUS
Status: DISCONTINUED | OUTPATIENT
Start: 2022-09-18 | End: 2022-09-19

## 2022-09-18 RX ORDER — INSULIN LISPRO 100 [IU]/ML
0-4 INJECTION, SOLUTION INTRAVENOUS; SUBCUTANEOUS NIGHTLY
Status: DISCONTINUED | OUTPATIENT
Start: 2022-09-18 | End: 2022-09-18

## 2022-09-18 RX ADMIN — SODIUM CHLORIDE, PRESERVATIVE FREE 10 ML: 5 INJECTION INTRAVENOUS at 22:15

## 2022-09-18 RX ADMIN — INSULIN LISPRO 4 UNITS: 100 INJECTION, SOLUTION INTRAVENOUS; SUBCUTANEOUS at 12:25

## 2022-09-18 RX ADMIN — INSULIN LISPRO 4 UNITS: 100 INJECTION, SOLUTION INTRAVENOUS; SUBCUTANEOUS at 08:51

## 2022-09-18 RX ADMIN — ATORVASTATIN CALCIUM 80 MG: 40 TABLET, FILM COATED ORAL at 20:51

## 2022-09-18 RX ADMIN — HEPARIN SODIUM 5000 UNITS: 5000 INJECTION INTRAVENOUS; SUBCUTANEOUS at 06:19

## 2022-09-18 RX ADMIN — INSULIN LISPRO 2 UNITS: 100 INJECTION, SOLUTION INTRAVENOUS; SUBCUTANEOUS at 08:51

## 2022-09-18 RX ADMIN — HEPARIN SODIUM 5000 UNITS: 5000 INJECTION INTRAVENOUS; SUBCUTANEOUS at 22:13

## 2022-09-18 RX ADMIN — POLYETHYLENE GLYCOL (3350) 17 G: 17 POWDER, FOR SOLUTION ORAL at 20:51

## 2022-09-18 RX ADMIN — INSULIN LISPRO 4 UNITS: 100 INJECTION, SOLUTION INTRAVENOUS; SUBCUTANEOUS at 18:36

## 2022-09-18 RX ADMIN — HEPARIN SODIUM 5000 UNITS: 5000 INJECTION INTRAVENOUS; SUBCUTANEOUS at 15:59

## 2022-09-18 RX ADMIN — Medication 1 PACKET: at 08:49

## 2022-09-18 RX ADMIN — FAMOTIDINE 20 MG: 20 TABLET ORAL at 08:50

## 2022-09-18 RX ADMIN — SODIUM CHLORIDE, PRESERVATIVE FREE 10 ML: 5 INJECTION INTRAVENOUS at 08:52

## 2022-09-18 RX ADMIN — POLYETHYLENE GLYCOL (3350) 17 G: 17 POWDER, FOR SOLUTION ORAL at 08:52

## 2022-09-18 RX ADMIN — ASPIRIN 81 MG 81 MG: 81 TABLET ORAL at 08:50

## 2022-09-18 RX ADMIN — LEVOTHYROXINE SODIUM 50 MCG: 50 TABLET ORAL at 08:52

## 2022-09-18 ASSESSMENT — PAIN SCALES - GENERAL
PAINLEVEL_OUTOF10: 0

## 2022-09-18 NOTE — FLOWSHEET NOTE
Disconnected from CCPD per protocol. Effluent: clear  Total time: 10 hr 38 min   Total UF:  -12 ml. Total Volume:  76727 ml. Dwell time gained:  01 hr 14 min.   Pt Tolerated procedure: Pt offers no c/o.       09/18/22 0655   Vitals   /63   Temp 98.3 °F (36.8 °C)   Heart Rate 63   Resp 18   Weight 229 lb 4.5 oz (104 kg)   Cycler   Ultrafiltration (UF) (mL) -12 mL   Average Dwell Time (Hours:Minutes) 90   Lost Dwell Time (Hours:Minutes) 0074

## 2022-09-18 NOTE — PLAN OF CARE
Problem: Safety - Adult  Goal: Free from fall injury  Outcome: Progressing  Flowsheets (Taken 9/18/2022 1244)  Free From Fall Injury:   Instruct family/caregiver on patient safety   Based on caregiver fall risk screen, instruct family/caregiver to ask for assistance with transferring infant if caregiver noted to have fall risk factors     Problem: Skin/Tissue Integrity  Goal: Absence of new skin breakdown  Description: 1. Monitor for areas of redness and/or skin breakdown  2. Assess vascular access sites hourly  3. Every 4-6 hours minimum:  Change oxygen saturation probe site  4. Every 4-6 hours:  If on nasal continuous positive airway pressure, respiratory therapy assess nares and determine need for appliance change or resting period. Outcome: Progressing  Note: Specialty mattress, Q2 turns, pillow support.      Problem: Cardiovascular - Adult  Goal: Maintains optimal cardiac output and hemodynamic stability  Outcome: Not Progressing  Flowsheets (Taken 9/18/2022 1751)  Maintains optimal cardiac output and hemodynamic stability:   Monitor blood pressure and heart rate   Monitor urine output and notify Licensed Independent Practitioner for values outside of normal range   Assess for signs of decreased cardiac output   Administer fluid and/or volume expanders as ordered

## 2022-09-18 NOTE — PROGRESS NOTES
Nephrology  Note                                                                                                                                                                                                                                                                                                                                                               Office : [phone]     Fax :[phone]              Patient's Name: Carley Russell  4:22 PM  9/18/2022    Reason for Consult:  ESRD   Requesting Physician:  Nayely Buitrago MD      Lethargic again   Owen Zapata CCPD           Past Medical History:   Diagnosis Date    Allergic rhinitis due to other allergen 7/12/2010    Coronary atherosclerosis of unspecified type of vessel, native or graft 7/12/2010    Diabetic eye exam ([address] 04/29/2015    Diabetic eye exam ([address] 5/5/2016    [place] [facility]    Generalized osteoarthrosis, involving multiple sites 7/12/2010    Other psoriasis 7/12/2010    Pneumonia     Prolonged emergence from general anesthesia     Psoriatic arthropathy [address] [facility] 7/12/2010    Type II or unspecified type diabetes mellitus without mention of complication, not stated as uncontrolled 7/12/2010    Unspecified essential hypertension 7/12/2010    Unspecified sleep apnea 7/12/2010       Past Surgical History:   Procedure Laterality Date    CARPAL TUNNEL RELEASE      s/p    CATARACT REMOVAL      CORONARY ARTERY BYPASS GRAFT      JOINT REPLACEMENT      LAPAROSCOPY INSERTION PERITONEAL CATHETER N/A 10/26/2020    LAPAROSCOPIC PERITONEAL DIALYSIS CATHETER PLACEMENT; LAPAROSCOPIC OMENTOPEXY performed by Berlin Keen DO at [facility] OR       Family History   Problem Relation Age of Onset    Diabetes Mother     Heart Failure Mother     Coronary Art Dis Father         reports that he quit smoking about 41 years ago. He started smoking about 60 years ago. He has a 18.00 pack-year smoking history.  He has never used smokeless tobacco. He reports that he does not drink alcohol and does not use drugs.     Allergies:  Penicillins, Sulfa antibiotics, Tazorac [tazarotene], Clindamycin, and Clindamycin/lincomycin    Current Medications:    insulin glargine (LANTUS;BASAGLAR) injection pen 40 Units, Nightly  insulin lispro (1 Unit Dial) 0-8 Units, TID WC  insulin lispro (1 Unit Dial) 0-4 Units, Nightly  insulin lispro (1 Unit Dial) 4 Units, TID WC  psyllium (HYDROCIL) 95 % packet 1 packet, Daily  buPROPion (WELLBUTRIN) tablet 100 mg, Every Other Day  gentamicin (GARAMYCIN) 0.1 % cream, PRN  acetaminophen (TYLENOL) tablet 650 mg, Q6H PRN   Or  acetaminophen (TYLENOL) suppository 650 mg, Q6H PRN  famotidine (PEPCID) tablet 20 mg, Daily  polyethylene glycol (GLYCOLAX) packet 17 g, BID  epoetin daphney-epbx (RETACRIT) injection 2,000 Units, Once per day on Mon Wed Fri  atorvastatin (LIPITOR) tablet 80 mg, Nightly  levothyroxine (SYNTHROID) tablet 50 mcg, Daily  aspirin chewable tablet 81 mg, Daily  sodium chloride flush 0.9 % injection 5-40 mL, 2 times per day  sodium chloride flush 0.9 % injection 5-40 mL, PRN  0.9 % sodium chloride infusion, PRN  ondansetron (ZOFRAN-ODT) disintegrating tablet 4 mg, Q8H PRN   Or  ondansetron (ZOFRAN) injection 4 mg, Q6H PRN  polyethylene glycol (GLYCOLAX) packet 17 g, Daily PRN  heparin (porcine) injection 5,000 Units, 3 times per day  glucose chewable tablet 16 g, PRN  dextrose bolus 10% 125 mL, PRN   Or  dextrose bolus 10% 250 mL, PRN  glucagon (rDNA) injection 1 mg, PRN  dextrose 10 % infusion, Continuous PRN            Physical exam:     Vitals:  BP (!) 165/68   Pulse 90   Temp 97.5 °F (36.4 °C) (Axillary)   Resp 20   Ht 5' 10\" (1.778 m)   Wt 229 lb 4.5 oz (104 kg)   SpO2 93%   BMI 32.90 kg/m²   Constitutional:  OAA X3 NAD  Skin: no rash, turgor wnl  Heent:  eomi, mmm  Neck: no bruits or jvd noted  Cardiovascular:  S1, S2 without m/r/g  Respiratory: CTA B without w/r/r  Abdomen:  +bs, soft, TTP   Ext: + lower extremity edema  Psychiatric: mood and affect appropriate  Musculoskeletal:  Rom, muscular strength intact    Data:   Labs:  CBC:   Recent Labs     09/16/22 0426 09/17/22 0426   WBC 9.8 9.5   HGB 10.1* 9.4*    266       BMP:    Recent Labs     09/16/22  0426 09/16/22  1300 09/17/22  0426 09/18/22  0918   *  --  129* 130*   K 4.2  --  4.3 4.1   CL 92*  --  91* 92*   CO2 28  --  26 29   BUN 29*  --  30* 28*   CREATININE 5.3*  --  5.7* 6.4*   GLUCOSE 343* 191* 340* 243*       Ca/Mg/Phos:   Recent Labs     09/16/22  0426 09/17/22 0426 09/18/22 0918   CALCIUM 8.7 8.7 8.5   MG 2.40 2.20  --        Hepatic:   No results for input(s): AST, ALT, ALB, BILITOT, ALKPHOS in the last 72 hours. Troponin:   No results for input(s): TROPONINI in the last 72 hours. BNP: No results for input(s): BNP in the last 72 hours. Lipids: No results for input(s): CHOL, TRIG, HDL, LDLCALC, LABVLDL in the last 72 hours. ABGs:   No results for input(s): PHART, PO2ART, DNV3SRK in the last 72 hours. INR: No results for input(s): INR in the last 72 hours. UA:No results for input(s): Bhavna Loan, GLUCOSEU, BILIRUBINUR, Hermenia Dahlia, BLOODU, PHUR, PROTEINU, UROBILINOGEN, NITRU, LEUKOCYTESUR, LABMICR, URINETYPE in the last 72 hours. Urine Microscopic: No results for input(s): LABCAST, BACTERIA, COMU, HYALCAST, WBCUA, RBCUA, EPIU in the last 72 hours. Urine Culture: No results for input(s): LABURIN in the last 72 hours. Urine Chemistry: No results for input(s): [place], PROTEINUR, NAUR in the last 72 hours. IMAGING:  XR CHEST PORTABLE   Final Result   Impression: Interval placement of a right internal jugular approach dialysis catheter, as above. CT HEAD WO CONTRAST   Final Result   1. No evidence for acute intracranial process. No bleed or shift   2. Remote infarct left thalamus      XR CHEST (2 VW)   Final Result      Free air beneath the right hemidiaphragm. Bibasilar atelectasis.       Critical result discussed with  [facility] at 1:00 PM 9/6/2022          Assessment/Plan   ESRD     2. HTN    3. Anemia    4. Acid- base/ Electrolyte imbalance     5.  Abd tenderness - r/o PD peritonitis     Plan   - Cefepime toxicity is possible   - HD x 2 sessions   - removed HD cath   - ABG - reviewed   - PD fluid cell count - nl - no PD peritonitis   - CCPD tonight   - monitor Labs  - EPO or anemia    - Constipation management   - PT eval                   Thank you for allowing us to participate in care of Manuel Camarillo MD  Feel free to contact me   [facility] [address]  Office : [phone]  Fax :[phone]

## 2022-09-18 NOTE — PROGRESS NOTES
Progress Note    Admit Date: 9/6/2022  Day: 12  Diet: ADULT DIET; Regular; 3 carb choices (45 gm/meal); Low Fat/Low Chol/High Fiber/DONATO; 1200 ml    CC: AMS    Interval history:     No acute events overnight. Nursing and consult notes reviewed. Patient was seen and examined by our team this morning at bedside and reexamined several hours later. Per nursling, patient did not wear BiPAP overnight and has been excessively fatigued from baseline. CCPD last night tolerated well, 47 ml fluid removed. During our first visit this morning, Mr. Bertha Mitchell was mildly altered compared to baseline. He reported feeling\"good\" and could say his name when asked, however was unable to answer other questions or maintain eye contact. He was reactive to painful stimulus, but would not move his head to look at any object in the room when asked. We talked with nursing, who noticed these symptoms as well when the patient was woken up, but noted he improves when our patient has \"woken up\". We revisited the patient later in the morning who demonstrated improvement in him mental status. At reassessment he is alert and oriented x4. We discussed our plan for discharge and pending pre-certification. Patient is in agreement and looking forward to discharge. Denies any chest pain, shortness of breath, syncopal episodes, nausea, vomiting, abdominal pain or palpitations.      Medications:     Scheduled Meds:   insulin lispro  0-16 Units SubCUTAneous TID WC    insulin lispro  0-4 Units SubCUTAneous Nightly    insulin lispro  4 Units SubCUTAneous TID     psyllium  1 packet Oral Daily    insulin glargine  35 Units SubCUTAneous Nightly    buPROPion  100 mg Oral Every Other Day    famotidine  20 mg Oral Daily    polyethylene glycol  17 g Oral BID    epoetin daphney-epbx  2,000 Units SubCUTAneous Once per day on Mon Wed Fri    atorvastatin  80 mg Oral Nightly    levothyroxine  50 mcg Oral Daily    aspirin  81 mg Oral Daily    sodium chloride flush  5-40 mL IntraVENous 2 times per day    heparin (porcine)  5,000 Units SubCUTAneous 3 times per day     Continuous Infusions:   sodium chloride 25 mL (09/10/22 1132)    dextrose       PRN Meds:gentamicin, acetaminophen **OR** acetaminophen, sodium chloride flush, sodium chloride, ondansetron **OR** ondansetron, polyethylene glycol, glucose, dextrose bolus **OR** dextrose bolus, glucagon (rDNA), dextrose    Objective:   Vitals:   T-max:  Patient Vitals for the past 8 hrs:   BP Temp Temp src Pulse Resp SpO2 Weight   09/18/22 0842 (!) 151/77 97.5 °F (36.4 °C) Axillary 94 20 95 % --   09/18/22 0655 123/63 98.3 °F (36.8 °C) Axillary 63 18 -- 229 lb 4.5 oz (104 kg)   09/18/22 0609 -- -- -- 93 -- -- --   09/18/22 0412 -- -- -- -- -- -- 225 lb 12 oz (102.4 kg)   09/18/22 0405 (!) 154/72 (!) 96.5 °F (35.8 °C) Axillary 91 20 93 % --   09/18/22 0214 -- -- -- 74 -- -- --       Intake/Output Summary (Last 24 hours) at 9/18/2022 0905  Last data filed at 9/18/2022 0842  Gross per 24 hour   Intake 340 ml   Output -12 ml   Net 352 ml     Review of Systems   Constitutional:  Positive for fatigue. HENT: Negative. Eyes: Negative. Respiratory:  Negative for cough and shortness of breath. Cardiovascular: Negative. Gastrointestinal:  Positive for abdominal distention. Endocrine: Negative. Genitourinary: Negative. Musculoskeletal: Negative. Skin: Negative. Allergic/Immunologic: Negative. Neurological: Negative. Hematological: Negative. Psychiatric/Behavioral: Negative. Physical Exam  Constitutional:       Appearance: He is obese. He is ill-appearing. Cardiovascular:      Rate and Rhythm: Normal rate and regular rhythm. Pulmonary:      Effort: Pulmonary effort is normal.      Breath sounds: Normal breath sounds. Abdominal:      General: Bowel sounds are normal. There is distension. Skin:     General: Skin is warm and dry. Neurological:      General: No focal deficit present.       Mental Status: He is alert and oriented to person, place, and time. Mental status is at baseline. Psychiatric:      Comments: + Depressed affect     LABS:    CBC:   Recent Labs     09/16/22 0426 09/17/22 0426   WBC 9.8 9.5   HGB 10.1* 9.4*   HCT 29.2* 27.5*    266   .7* 115.0*     Renal:    Recent Labs     09/16/22  0426 09/16/22  1300 09/17/22 0426   *  --  129*   K 4.2  --  4.3   CL 92*  --  91*   CO2 28  --  26   BUN 29*  --  30*   CREATININE 5.3*  --  5.7*   GLUCOSE 343* 191* 340*   CALCIUM 8.7  --  8.7   MG 2.40  --  2.20   ANIONGAP 13  --  12     Hepatic: No results for input(s): AST, ALT, BILITOT, BILIDIR, PROT, LABALBU, ALKPHOS in the last 72 hours. Troponin: No results for input(s): TROPONINI in the last 72 hours. BNP: No results for input(s): BNP in the last 72 hours. Lipids: No results for input(s): CHOL, HDL in the last 72 hours. Invalid input(s): LDLCALCU, TRIGLYCERIDE  ABGs:  No results for input(s): PHART, DAZ0VLM, PO2ART, RLK0CVU, BEART, THGBART, S4JABABR, FRI2LYF in the last 72 hours. INR: No results for input(s): INR in the last 72 hours. Lactate: No results for input(s): LACTATE in the last 72 hours. Cultures:  -----------------------------------------------------------------  RAD:   XR CHEST PORTABLE   Final Result   Impression: Interval placement of a right internal jugular approach dialysis catheter, as above. CT HEAD WO CONTRAST   Final Result   1. No evidence for acute intracranial process. No bleed or shift   2. Remote infarct left thalamus      XR CHEST (2 VW)   Final Result      Free air beneath the right hemidiaphragm. Bibasilar atelectasis.       Critical result discussed with Dr. Isabelle Morris at 1:00 PM 9/6/2022          Assessment/Plan:   69 y.o. male with pertinent medical history of ESRD on PD, interstitial lung disease, diastolic HF (1/85 Echo with EF 50-55%), NSTEMI, CAD s/p CABG 2007, T2DM, HTN, HLD, hypothyroidism who was admitted on 9/6/2022 for management of AMS secondary to acute encephalopathy. Suspected major depressive disorder  - Patient reports a \"down mood\" for a period longer than one month. He has poor sleep, reduced appetite, irritability, loss of interest, and concentration difficulties. - Continue bupropion 100 mg every other day PO  - Bupropion is renally excreted and CKD may allow active metabolizes of the drug to stack. Be on the look out for bupropion overdose (Coma, hypotension, seizures, QRS widening, and ventriclar dysrhythmias.)     ESRD on PD   - PD completed, patient tolerated well (9/17)  - Output: 274 ml  - BUN increased from 29 to 30 from yesterday     T2DM  - Patient's appetite has returned since 9/15 and his blood sugars serum values have increased to 259, 345, 338 from a baseline between 140-180 mg%. - Increase Lantus 40 U tonight (9/18) as blood sugars are still elevated in the mid-200s  - POC glucose   - MDSSI  - Hypoglycemia protocol   - Started Lispro 4U TID     Acute constipation  No BM x1 week duration.   - Continue Glycolax 17g BID   - Started Psyllium daily   - Monitor BM     Hypomagnesemia - Resolved  - Mg2+ 1.9 (9/7), 2.3 (9/14)  - Ordered Mag Sulfate 2,000 mg IV bolus (x1) on 9/7    Hyponatermia  - Na+ 130 (9/18)  - Low sodium may be the result of increasing fluid  - Per I/Os, patient has daily net fluid of +778 ml with recorded weights increasing 3 kg over the past two days  -We contacted nephrology for their recommendations regarding fluid accumulation. (9/18) Nephrology will try to remove more fluid on PD tonight     Shortness of breath - Resolved  - Patient has bilateral diffuse rales with shortness of breath on physical exam. Non-productive intermittent wet cough also present. There has been no fever in the past 48 hours. - We will reassesses patient. If symptoms remain or worsen after PD, we will consider blood gasses for possible acidosis.   - Symptoms may be due to atelectasis, consider repeat imaging.  - SOB resolved after peritoneal dialysis on 9/15             Chronic medical problems:  Hypothyroidism - Continue synthroid 50 mcg  CAD s/p CABG 2007 - Continue aspirin 81 mg daily and lipitor 80 mg nightly      Code Status: Full code   FEN: ADULT DIET; Regular; 3 carb choices (45 gm/meal); Low Fat/Low Chol/High Fiber/DONATO  PPX: Heparin   DISPO: IP, ready for discharge. Awaiting pre-cert from [address] [place] [address] for SNF placement     See Martínez [facility]  09/18/22  9:05 AM    This patient has been staffed and discussed with Rita Long MD.     I saw and examined this patient and agree with the documentation of history, physical findings, assessment and plan as documented by the medical student. Bereket Stovall MD  PGY-1     Addendum to Resident H& P/Progress note:  I have personally seen,examined and evaluated the patient. I have reviewed the current history, physical findings, labs and assessment and plan and agree with note as documented by resident MD (  and MS 4 Gwenfabienshavonne.   Accucheck Glucoses:   Recent Labs     09/17/22  1200 09/17/22  1731 09/17/22  2058 09/18/22  0719 09/18/22  1223   POCGLU 199* 138* 214* 249* 179*    Insulin regimen was adjusted    Rita Long MD, Nicolasa Sampson

## 2022-09-19 VITALS
TEMPERATURE: 97.6 F | SYSTOLIC BLOOD PRESSURE: 137 MMHG | HEIGHT: 70 IN | DIASTOLIC BLOOD PRESSURE: 65 MMHG | RESPIRATION RATE: 20 BRPM | WEIGHT: 222.66 LBS | HEART RATE: 81 BPM | BODY MASS INDEX: 31.88 KG/M2 | OXYGEN SATURATION: 97 %

## 2022-09-19 LAB
ANION GAP SERPL CALCULATED.3IONS-SCNC: 12 MMOL/L (ref 3–16)
BASOPHILS ABSOLUTE: 0 K/UL (ref 0–0.2)
BASOPHILS RELATIVE PERCENT: 0 %
BUN BLDV-MCNC: 30 MG/DL (ref 7–20)
CALCIUM SERPL-MCNC: 8.5 MG/DL (ref 8.3–10.6)
CHLORIDE BLD-SCNC: 91 MMOL/L (ref 99–110)
CO2: 28 MMOL/L (ref 21–32)
CREAT SERPL-MCNC: 6.8 MG/DL (ref 0.8–1.3)
EOSINOPHILS ABSOLUTE: 0.1 K/UL (ref 0–0.6)
EOSINOPHILS RELATIVE PERCENT: 1 %
GFR AFRICAN AMERICAN: 10
GFR NON-AFRICAN AMERICAN: 8
GLUCOSE BLD-MCNC: 166 MG/DL (ref 70–99)
GLUCOSE BLD-MCNC: 255 MG/DL (ref 70–99)
GLUCOSE BLD-MCNC: 384 MG/DL (ref 70–99)
HCT VFR BLD CALC: 28.3 % (ref 40.5–52.5)
HEMOGLOBIN: 9.5 G/DL (ref 13.5–17.5)
LYMPHOCYTES ABSOLUTE: 0.8 K/UL (ref 1–5.1)
LYMPHOCYTES RELATIVE PERCENT: 10 %
MACROCYTES: ABNORMAL
MCH RBC QN AUTO: 38.4 PG (ref 26–34)
MCHC RBC AUTO-ENTMCNC: 33.5 G/DL (ref 31–36)
MCV RBC AUTO: 114.7 FL (ref 80–100)
METAMYELOCYTES RELATIVE PERCENT: 2 %
MONOCYTES ABSOLUTE: 0.6 K/UL (ref 0–1.3)
MONOCYTES RELATIVE PERCENT: 7 %
MYELOCYTE PERCENT: 1 %
NEUTROPHILS ABSOLUTE: 6.8 K/UL (ref 1.7–7.7)
NEUTROPHILS RELATIVE PERCENT: 79 %
PDW BLD-RTO: 15.2 % (ref 12.4–15.4)
PERFORMED ON: ABNORMAL
PERFORMED ON: ABNORMAL
PLATELET # BLD: 285 K/UL (ref 135–450)
PMV BLD AUTO: 7.2 FL (ref 5–10.5)
POLYCHROMASIA: ABNORMAL
POTASSIUM REFLEX MAGNESIUM: 4.1 MMOL/L (ref 3.5–5.1)
RBC # BLD: 2.47 M/UL (ref 4.2–5.9)
SODIUM BLD-SCNC: 131 MMOL/L (ref 136–145)
WBC # BLD: 8.3 K/UL (ref 4–11)

## 2022-09-19 PROCEDURE — 85025 COMPLETE CBC W/AUTO DIFF WBC: CPT

## 2022-09-19 PROCEDURE — 94660 CPAP INITIATION&MGMT: CPT

## 2022-09-19 PROCEDURE — 99239 HOSP IP/OBS DSCHRG MGMT >30: CPT | Performed by: HOSPITALIST

## 2022-09-19 PROCEDURE — 2580000003 HC RX 258: Performed by: STUDENT IN AN ORGANIZED HEALTH CARE EDUCATION/TRAINING PROGRAM

## 2022-09-19 PROCEDURE — 94761 N-INVAS EAR/PLS OXIMETRY MLT: CPT

## 2022-09-19 PROCEDURE — 6370000000 HC RX 637 (ALT 250 FOR IP): Performed by: STUDENT IN AN ORGANIZED HEALTH CARE EDUCATION/TRAINING PROGRAM

## 2022-09-19 PROCEDURE — 97110 THERAPEUTIC EXERCISES: CPT

## 2022-09-19 PROCEDURE — 2700000000 HC OXYGEN THERAPY PER DAY

## 2022-09-19 PROCEDURE — 80048 BASIC METABOLIC PNL TOTAL CA: CPT

## 2022-09-19 PROCEDURE — 36415 COLL VENOUS BLD VENIPUNCTURE: CPT

## 2022-09-19 PROCEDURE — 6360000002 HC RX W HCPCS: Performed by: STUDENT IN AN ORGANIZED HEALTH CARE EDUCATION/TRAINING PROGRAM

## 2022-09-19 PROCEDURE — 97530 THERAPEUTIC ACTIVITIES: CPT

## 2022-09-19 PROCEDURE — 6370000000 HC RX 637 (ALT 250 FOR IP): Performed by: INTERNAL MEDICINE

## 2022-09-19 RX ORDER — INSULIN LISPRO 100 [IU]/ML
0-4 INJECTION, SOLUTION INTRAVENOUS; SUBCUTANEOUS
Qty: 10 ML | Refills: 0 | Status: SHIPPED | OUTPATIENT
Start: 2022-09-19

## 2022-09-19 RX ORDER — INSULIN GLARGINE 100 [IU]/ML
50 INJECTION, SOLUTION SUBCUTANEOUS NIGHTLY
OUTPATIENT
Start: 2022-09-19

## 2022-09-19 RX ORDER — BUPROPION HYDROCHLORIDE 100 MG/1
100 TABLET ORAL EVERY OTHER DAY
Qty: 60 TABLET | Refills: 3 | Status: SHIPPED | OUTPATIENT
Start: 2022-09-21

## 2022-09-19 RX ORDER — INSULIN LISPRO 100 [IU]/ML
INJECTION, SOLUTION INTRAVENOUS; SUBCUTANEOUS
Qty: 45 ML | Refills: 2 | OUTPATIENT
Start: 2022-09-19

## 2022-09-19 RX ORDER — INSULIN LISPRO 100 [IU]/ML
0-4 INJECTION, SOLUTION INTRAVENOUS; SUBCUTANEOUS NIGHTLY
Qty: 10 ML | Refills: 0 | Status: SHIPPED | OUTPATIENT
Start: 2022-09-19

## 2022-09-19 RX ORDER — INSULIN GLARGINE 100 [IU]/ML
50 INJECTION, SOLUTION SUBCUTANEOUS NIGHTLY
Qty: 5 ADJUSTABLE DOSE PRE-FILLED PEN SYRINGE | Refills: 3 | Status: SHIPPED | OUTPATIENT
Start: 2022-09-19

## 2022-09-19 RX ORDER — INSULIN LISPRO 100 [IU]/ML
0-4 INJECTION, SOLUTION INTRAVENOUS; SUBCUTANEOUS NIGHTLY
Status: DISCONTINUED | OUTPATIENT
Start: 2022-09-19 | End: 2022-09-19 | Stop reason: HOSPADM

## 2022-09-19 RX ORDER — INSULIN LISPRO 100 [IU]/ML
0-4 INJECTION, SOLUTION INTRAVENOUS; SUBCUTANEOUS
Status: DISCONTINUED | OUTPATIENT
Start: 2022-09-19 | End: 2022-09-19 | Stop reason: HOSPADM

## 2022-09-19 RX ORDER — BUPROPION HYDROCHLORIDE 100 MG/1
100 TABLET ORAL EVERY OTHER DAY
Qty: 60 TABLET | Refills: 3 | OUTPATIENT
Start: 2022-09-19

## 2022-09-19 RX ADMIN — INSULIN LISPRO 4 UNITS: 100 INJECTION, SOLUTION INTRAVENOUS; SUBCUTANEOUS at 10:02

## 2022-09-19 RX ADMIN — SODIUM CHLORIDE, PRESERVATIVE FREE 10 ML: 5 INJECTION INTRAVENOUS at 08:45

## 2022-09-19 RX ADMIN — HEPARIN SODIUM 5000 UNITS: 5000 INJECTION INTRAVENOUS; SUBCUTANEOUS at 06:51

## 2022-09-19 RX ADMIN — FAMOTIDINE 20 MG: 20 TABLET ORAL at 08:43

## 2022-09-19 RX ADMIN — HEPARIN SODIUM 5000 UNITS: 5000 INJECTION INTRAVENOUS; SUBCUTANEOUS at 14:09

## 2022-09-19 RX ADMIN — INSULIN LISPRO 4 UNITS: 100 INJECTION, SOLUTION INTRAVENOUS; SUBCUTANEOUS at 14:03

## 2022-09-19 RX ADMIN — LEVOTHYROXINE SODIUM 50 MCG: 50 TABLET ORAL at 08:43

## 2022-09-19 RX ADMIN — ASPIRIN 81 MG 81 MG: 81 TABLET ORAL at 08:44

## 2022-09-19 RX ADMIN — EPOETIN ALFA-EPBX 2000 UNITS: 2000 INJECTION, SOLUTION INTRAVENOUS; SUBCUTANEOUS at 08:46

## 2022-09-19 RX ADMIN — Medication 1 PACKET: at 08:44

## 2022-09-19 RX ADMIN — POLYETHYLENE GLYCOL (3350) 17 G: 17 POWDER, FOR SOLUTION ORAL at 08:43

## 2022-09-19 RX ADMIN — BUPROPION HYDROCHLORIDE 100 MG: 100 TABLET, FILM COATED ORAL at 08:43

## 2022-09-19 ASSESSMENT — PAIN SCALES - GENERAL
PAINLEVEL_OUTOF10: 0

## 2022-09-19 NOTE — PROGRESS NOTES
Discharge note: Patient has been seen by doctor. Discharge order obtained, and discharge instructions reviewed. Patient educated, using the teach back method, about follow up instructions and discharge instructions. A completed copy of the AVS instructions given to patient and all questions answered. IV catheter removed without complaints, catheter intact, site WNL. Briana Press   Electronically signed by Nargis Balderas RN on 9/19/2022 at 4:42 PM

## 2022-09-19 NOTE — PROGRESS NOTES
Progress Note    Admit Date: 9/6/2022  Day: 13  Diet: ADULT DIET; Regular; 3 carb choices (45 gm/meal); Low Fat/Low Chol/High Fiber/DONATO; 1200 ml    CC: AMS    Interval history:     No acute events overnight. Nursing and consult notes reviewed. Patient was seen and examined by our team this morning at bedside and reexamined several hours later. Per nursling, patient did wear BiPAP overnight and was much more alert and oriented. CCPD last night tolerated well, Dextrose increased from 1.5% to 2.5% to increase fluid diuresis. Overall, Mr. Perez Whigham alertness was much improved from yesterday. He reported feeling well and has no new complaints. He denies SI/HI and did not make comments abut no longer wanting to be alive. Discussed our plan for discharge and pending pre-certification. Patient is in agreement and looking forward to discharge. Denies any chest pain, shortness of breath, syncopal episodes, nausea, vomiting, abdominal pain or palpitations.        Medications:     Scheduled Meds:   insulin glargine  40 Units SubCUTAneous Nightly    insulin lispro  0-8 Units SubCUTAneous TID WC    insulin lispro  0-4 Units SubCUTAneous Nightly    insulin lispro  4 Units SubCUTAneous TID WC    psyllium  1 packet Oral Daily    buPROPion  100 mg Oral Every Other Day    famotidine  20 mg Oral Daily    polyethylene glycol  17 g Oral BID    epoetin daphney-epbx  2,000 Units SubCUTAneous Once per day on Mon Wed Fri    atorvastatin  80 mg Oral Nightly    levothyroxine  50 mcg Oral Daily    aspirin  81 mg Oral Daily    sodium chloride flush  5-40 mL IntraVENous 2 times per day    heparin (porcine)  5,000 Units SubCUTAneous 3 times per day     Continuous Infusions:   sodium chloride 25 mL (09/10/22 1132)    dextrose       PRN Meds:gentamicin, acetaminophen **OR** acetaminophen, sodium chloride flush, sodium chloride, ondansetron **OR** ondansetron, polyethylene glycol, glucose, dextrose bolus **OR** dextrose bolus, glucagon (rDNA), dextrose    Objective:   Vitals:   T-max:  Patient Vitals for the past 8 hrs:   BP Temp Temp src Pulse Resp SpO2 Weight   09/19/22 1011 -- -- -- 80 -- -- --   09/19/22 0742 (!) 131/50 97.5 °F (36.4 °C) Axillary 75 24 97 % --   09/19/22 0720 -- -- -- -- -- -- 222 lb 10.6 oz (101 kg)   09/19/22 0438 136/67 97 °F (36.1 °C) Oral 87 24 97 % --   09/19/22 0408 -- -- -- -- 16 -- --   09/19/22 0334 -- -- -- -- -- 99 % --       Intake/Output Summary (Last 24 hours) at 9/19/2022 1029  Last data filed at 9/19/2022 1011  Gross per 24 hour   Intake 420 ml   Output 1488 ml   Net -1068 ml       Review of Systems   Constitutional:  Positive for fatigue. HENT: Negative. Eyes: Negative. Respiratory:  Negative for cough and shortness of breath. Cardiovascular: Negative. Gastrointestinal:  Positive for abdominal distention. Endocrine: Negative. Genitourinary: Negative. Musculoskeletal: Negative. Skin: Negative. Allergic/Immunologic: Negative. Neurological: Negative. Hematological: Negative. Psychiatric/Behavioral: Negative. Physical Exam  Constitutional:       Appearance: He is obese. He is ill-appearing. Cardiovascular:      Rate and Rhythm: Normal rate and regular rhythm. Pulmonary:      Effort: Pulmonary effort is normal.      Breath sounds: Normal breath sounds. Abdominal:      General: Bowel sounds are normal. There is distension. Skin:     General: Skin is warm and dry. Neurological:      General: No focal deficit present. Mental Status: He is alert and oriented to person, place, and time. Mental status is at baseline.    Psychiatric:      Comments: + Depressed affect     LABS:    CBC:   Recent Labs     09/17/22 0426 09/19/22 0423   WBC 9.5 8.3   HGB 9.4* 9.5*   HCT 27.5* 28.3*    285   .0* 114.7*     Renal:    Recent Labs     09/17/22 0426 09/18/22 0918 09/19/22 0423   * 130* 131*   K 4.3 4.1 4.1   CL 91* 92* 91*   CO2 26 29 28   BUN 30* 28* 30* CREATININE 5.7* 6.4* 6.8*   GLUCOSE 340* 243* 384*   CALCIUM 8.7 8.5 8.5   MG 2.20  --   --    ANIONGAP 12 9 12     -----------------------------------------------------------------  RAD:   XR CHEST PORTABLE   Final Result   Impression: Interval placement of a right internal jugular approach dialysis catheter, as above. CT HEAD WO CONTRAST   Final Result   1. No evidence for acute intracranial process. No bleed or shift   2. Remote infarct left thalamus      XR CHEST (2 VW)   Final Result      Free air beneath the right hemidiaphragm. Bibasilar atelectasis. Critical result discussed with Dr. Akhil Kaye at 1:00 PM 9/6/2022          Assessment/Plan:   69 y.o. male with pertinent medical history of ESRD on PD, interstitial lung disease, diastolic HF (1/06 Echo with EF 50-55%), NSTEMI, CAD s/p CABG 2007, T2DM, HTN, HLD, hypothyroidism who was admitted on 9/6/2022 for management of AMS secondary to acute encephalopathy. Suspected major depressive disorder  - Patient reports a \"down mood\" for a period longer than one month. He has poor sleep, reduced appetite, irritability, loss of interest, and concentration difficulties. - Continue bupropion 100 mg every other day PO  - Bupropion is renally excreted and CKD may allow active metabolizes of the drug to stack. Be on the look out for bupropion overdose (Coma, hypotension, seizures, QRS widening, and ventriclar dysrhythmias.)     ESRD on PD   - PD completed, patient tolerated well (9/18)  - BUN increased from 28 (9/17) to 30 (9/18)     T2DM  - Patient's appetite has returned since 9/15 and his blood sugars serum values have increased to 259, 345, 338 from a baseline between 140-180 mg%.    - Increase Lantus 50 U from 40 U tonight (9/19) as blood sugars are still elevated in the high-300s in the morning   - We will hold on changing short acting as there is 140 mg% drop of serum glucose, indicating bolus doses many be appropriate.    - POC glucose   - MDSSI  - Hypoglycemia protocol   - Started Lispro 4U TID     Acute constipation  No BM x1 week duration.   - Continue Glycolax 17g BID   - Started Psyllium daily   - Monitor BM     Hypomagnesemia - Resolved  - Mg2+ 1.9 (9/7), 2.3 (9/14)  - Ordered Mag Sulfate 2,000 mg IV bolus (x1) on 9/7     Hyponatermia  - Na+ 129 (9/17), 130 (9/18), 131 (9/19)  - Low sodium may be the result of increasing fluid  - Per I/Os, patient has daily net fluid of +778 ml with recorded weights increasing 3 kg over the past two days  -We contacted nephrology for their recommendations regarding fluid accumulation. (9/18)   - Dextrose solution increased 1.5% to 2.5%. Increased fluid diureses. Shortness of breath - Resolved  - Patient has bilateral diffuse rales with shortness of breath on physical exam. Non-productive intermittent wet cough also present. There has been no fever in the past 48 hours. - We will reassesses patient. If symptoms remain or worsen after PD, we will consider blood gasses for possible acidosis. - Symptoms may be due to atelectasis, consider repeat imaging.  - SOB resolved after peritoneal dialysis on 9/15             Chronic medical problems:  Hypothyroidism - Continue synthroid 50 mcg  CAD s/p CABG 2007 - Continue aspirin 81 mg daily and lipitor 80 mg nightly      Code Status: Full code   FEN: ADULT DIET; Regular; 3 carb choices (45 gm/meal); Low Fat/Low Chol/High Fiber/DONATO  PPX: Heparin   DISPO: IP, ready for discharge. Awaiting pre-cert from [address] [place] [address] for SNF placement     Alexandra Eastman [facility]  09/19/22  10:29 AM    This patient has been staffed and discussed with Tramaine Davidson MD.      I saw and evaluated this patient and agree with the documentation of the history, physical findings, assessment and plan. Rivera Stovall MD  PGY-1    Addendum to Resident H& P/Progress note:  I have personally seen,examined and evaluated the patient.  I have reviewed the current history, physical findings, labs and assessment and plan and agree with note as documented by MS 4, Luanne Canchola, and resident MD ( Ngwu)      Samina Owens MD, Nayn Trinidad

## 2022-09-19 NOTE — PLAN OF CARE
Problem: Safety - Adult  Goal: Free from fall injury  Outcome: Progressing  Flowsheets (Taken 9/19/2022 1030)  Free From Fall Injury:   Instruct family/caregiver on patient safety   Based on caregiver fall risk screen, instruct family/caregiver to ask for assistance with transferring infant if caregiver noted to have fall risk factors     Problem: ABCDS Injury Assessment  Goal: Absence of physical injury  Recent Flowsheet Documentation  Taken 9/19/2022 1030 by Karen Sood RN  Absence of Physical Injury: Implement safety measures based on patient assessment     Problem: Chronic Conditions and Co-morbidities  Goal: Patient's chronic conditions and co-morbidity symptoms are monitored and maintained or improved  Outcome: Progressing  Flowsheets (Taken 9/17/2022 2014 by Ying Dalton RN)  Care Plan - Patient's Chronic Conditions and Co-Morbidity Symptoms are Monitored and Maintained or Improved:   Monitor and assess patient's chronic conditions and comorbid symptoms for stability, deterioration, or improvement   Collaborate with multidisciplinary team to address chronic and comorbid conditions and prevent exacerbation or deterioration   Update acute care plan with appropriate goals if chronic or comorbid symptoms are exacerbated and prevent overall improvement and discharge

## 2022-09-19 NOTE — PROGRESS NOTES
CCPD Order   Exchanges: 5   Exchange Volume: 2500 ml   Total Time: 10 hrs   Dextrose: 2.5%   Last Fill: 0 ml   Total Volume: 15960 ml     Orders verified. Supplies taken to pt's room. Report received. Cycler set up, primed and pre tested. Dressing changed on [facility] Cath site. Pt connected to cycler. CCPD initiated without problem. Initial effluent clear. If problems should arise please call the [phone] on top of PD cycler machine.        If problems persist please call [phone]

## 2022-09-19 NOTE — PLAN OF CARE
Problem: Discharge Planning  Goal: Discharge to home or other facility with appropriate resources  Outcome: Completed     Problem: Safety - Adult  Goal: Free from fall injury  9/19/2022 1705 by Emmie Rivera RN  Outcome: Completed  9/19/2022 1641 by Emmie Rivera RN  Outcome: Progressing  Flowsheets (Taken 9/19/2022 1030)  Free From Fall Injury:   Bhavani Olivia family/caregiver on patient safety   Based on caregiver fall risk screen, instruct family/caregiver to ask for assistance with transferring infant if caregiver noted to have fall risk factors     Problem: ABCDS Injury Assessment  Goal: Absence of physical injury  Outcome: Completed  Flowsheets (Taken 9/19/2022 1030)  Absence of Physical Injury: Implement safety measures based on patient assessment     Problem: Skin/Tissue Integrity  Goal: Absence of new skin breakdown  Description: 1. Monitor for areas of redness and/or skin breakdown  2. Assess vascular access sites hourly  3. Every 4-6 hours minimum:  Change oxygen saturation probe site  4. Every 4-6 hours:  If on nasal continuous positive airway pressure, respiratory therapy assess nares and determine need for appliance change or resting period.   Outcome: Completed     Problem: Chronic Conditions and Co-morbidities  Goal: Patient's chronic conditions and co-morbidity symptoms are monitored and maintained or improved  9/19/2022 1641 by Emmie Rivera RN  Outcome: Completed  9/19/2022 1641 by Emmie Rivera RN  Outcome: Progressing  Flowsheets (Taken 9/17/2022 2014 by Virgilio Ford RN)  Care Plan - Patient's Chronic Conditions and Co-Morbidity Symptoms are Monitored and Maintained or Improved:   Monitor and assess patient's chronic conditions and comorbid symptoms for stability, deterioration, or improvement   Collaborate with multidisciplinary team to address chronic and comorbid conditions and prevent exacerbation or deterioration   Update acute care plan with appropriate goals if chronic or comorbid symptoms are exacerbated and prevent overall improvement and discharge     Problem: Cardiovascular - Adult  Goal: Maintains optimal cardiac output and hemodynamic stability  Outcome: Completed     Problem: Metabolic/Fluid and Electrolytes - Adult  Goal: Electrolytes maintained within normal limits  Outcome: Completed  Goal: Hemodynamic stability and optimal renal function maintained  Outcome: Completed     Problem: Pain  Goal: Verbalizes/displays adequate comfort level or baseline comfort level  Outcome: Completed

## 2022-09-19 NOTE — PROGRESS NOTES
Treatment time: 10 Hours 48 minutes  Net UF: 1488 ml  Dwell Time: 44 minutes gained    Treatment completed without complications or complaints from patient. Effluent yellow, clear and lines taped to patient per protocol. Patient resting comfortably with VSS upon exiting room. Copy of dialysis treatment record placed in chart, to be scanned into EMR and report given to Delia Leahy RN.

## 2022-09-19 NOTE — CARE COORDINATION
Case Management Assessment            Discharge Note                    Date / Time of Note: 9/19/2022 11:49 AM                  Discharge Note Completed by: Rocco Duffy RN    Patient Name: Zacarias Louie   YOB: 1945  Diagnosis: Hypercarbia [R06.89]  Elevated troponin [R77.8]  Acute respiratory failure with hypoxia ([address] [J96.01]  AMS (altered mental status) [R41.82]   Date / Time: 9/6/2022 11:46 AM    Current PCP: Hong Womack MD  Clinic patient: No    Hospitalization in the last 30 days: No    Advance Directives:  Code Status: Full Code  [place] DNR form completed and on chart: No, Not Indicated    Financial:  Payor: Donna Falcon / Plan: Myra Cordova / Product Type: *No Product type* /      Pharmacy:    [facility] [id number] [place] [id number] [facility] F [phone]  [address]  Phone: [phone] Fax: [phone]    [facility] [id number] [place] [address] [phone] - f [phone] [address]  Phone: [phone] Fax: [phone]    [facility]ile [facility]ef[id number], [address] [phone] Suri Jimenez [phone]  [address] [place] [address]  Phone: [phone] Fax: [phone]      Assistance purchasing medications?: Potential Assistance Purchasing Medications: No  Assistance provided by Case Management: None at this time    Does patient want to participate in local refill/ meds to beds program?: No    Meds To Beds General Rules:  1. Can ONLY be done Monday- Friday between 8:30am-5pm  2. Prescription(s) must be in pharmacy by 3pm to be filled same day  3. Copy of patient's insurance/ prescription drug card and patient face sheet must be sent along with the prescription(s)  4. Cost of Rx cannot be added to hospital bill. If financial assistance is needed, please contact unit  or ;   or  CANNOT provide pharmacy voucher for patients co-pays  5. Patients can then  the prescription on their way out of the hospital at discharge, or pharmacy can deliver to the bedside if staff is available. (payment due at time of pick-up or delivery - cash, check, or card accepted)     Able to afford home medications/ co-pay costs: Yes    ADLS:  Current PT AM-PAC Score: 13 /24  Current OT AM-PAC Score: 15 /24      DISCHARGE Disposition: Mehran Rodríguez (SNF): [facility] Phone: [phone] Fax: [phone]    LOC at discharge: Skilled  AYAH Completed: Yes    Notification completed in HENS/PAS?:  Yes : CM has completed HENS online through secure website for SNF admission at [facility]. Document ID #: [id number]    IMM Completed:   Yes, Case management has presented and reviewed IMM letter #2 to the patient and/or family/ POA. Patient and/or family/POA verbalized understanding of their medicare rights and appeal process if needed. Patient and/or family/POA has signed, initialed and placed today's date (09.19.2022) and time (.) on IMM letter #2 on the the appropriate lines. Patient and/or family/POA, copy of letter offered and they are aware that this original copy of IMM letter #2 is available prior to discharge from the paper chart on the unit. Electronic documentation has been entered into epic for IMM letter #2 and original paper copy has been added to the paper chart at the nurses station.      Transportation:  Transportation PLAN for discharge: EMS transportation   Mode of Transport: Ambulance stretcher - BLS  Reason for medical transport: Bed confined: Meets the following criteria 1) unable to get out of bed without assistance or ambulate, 2) unable to safely sit up in a wheelchair, 3) unable to maintain erect seating position in a chair for time needed for transport, Confused due to memory loss and requires ambulance transport due to safe transfers, and Third party assistance/ attendant required to apply, administer or regulate oxygen during transport  Name of Transport Company:  [employer]: [phone]  Time of Transport: 1630    Transport form completed: Yes    Home Care:  [address] ordered at discharge: No, Not [address]: Not Applicable  Orders faxed: No    Durable Medical Equipment:  DME Provider: deferred to SNF  Equipment obtained during hospitalization:     Home Oxygen and Respiratory Equipment:  Oxygen needed at discharge?: Yes  [address]: Not Applicable  Portable tank available for discharge?: Yes    Dialysis:  Dialysis patient: Yes    Dialysis Center:  Not Applicable    Hospice Services:  Location: Not Applicable  Agency: Not Applicable    Consents signed: No, Not Indicated    Referrals made at [facility] for outpatient continued care:  Not Applicable    Additional CM Notes: Pre-cert has been obtained for SNF admission. Transport arranged for 4:30pm via [employer] ([facility]). CM spoke with patients daughter Michael jurado and confirmed plans for DC and she is in agreement with DC to  today. The Plan for Transition of Care is related to the following treatment goals of Hypercarbia [R06.89]  Elevated troponin [R77.8]  Acute respiratory failure with hypoxia ([facility] [address] [J96.01]  AMS (altered mental status) [R41.82]    The Patient and/or patient representative Jeri Ashford and his family were provided with a choice of provider and agrees with the discharge plan Yes    Freedom of choice list was provided with basic dialogue that supports the patient's individualized plan of care/goals and shares the quality data associated with the providers.  Yes    Care Transitions patient: No    Nash Gottron, RN  [facility] [employer]  Case Management Department  Ph: [phone]  Fax: [phone]

## 2022-09-19 NOTE — PLAN OF CARE
Problem: Discharge Planning  Goal: Discharge to home or other facility with appropriate resources  Flowsheets (Taken 9/19/2022 0156)  Discharge to home or other facility with appropriate resources: Identify barriers to discharge with patient and caregiver  Note: Case management involved with D/C planning. Awaiting precert to [facility]. Problem: Safety - Adult  Goal: Free from fall injury  9/19/2022 0209 by Jose De Jesus Aranda RN  Flowsheets (Taken 9/19/2022 0158)  Free From Fall Injury: Instruct family/caregiver on patient safety  Note: Fall precautions in place and bed alarm in use. Bed in locked and low position. SR up x3. Makes no attempts OOB without assistance. 9/18/2022 1751 by aSi Richard RN  Outcome: Progressing  Flowsheets (Taken 9/18/2022 1244)  Free From Fall Injury:   Nav Serrano family/caregiver on patient safety   Based on caregiver fall risk screen, instruct family/caregiver to ask for assistance with transferring infant if caregiver noted to have fall risk factors     Problem: Skin/Tissue Integrity  Goal: Absence of new skin breakdown  Description: 1. Monitor for areas of redness and/or skin breakdown  2. Assess vascular access sites hourly  3. Every 4-6 hours minimum:  Change oxygen saturation probe site  4. Every 4-6 hours:  If on nasal continuous positive airway pressure, respiratory therapy assess nares and determine need for appliance change or resting period. 9/19/2022 0209 by Jose De Jesus Aranda RN  Outcome: Progressing  Note: Turning and repositioning Q2 hours. Heels elevated off mattress. Remains on low airloss mattress. Pericare provided and cream applied. Problem: Skin/Tissue Integrity  Goal: Absence of new skin breakdown  Description: 1. Monitor for areas of redness and/or skin breakdown  2. Assess vascular access sites hourly  3. Every 4-6 hours minimum:  Change oxygen saturation probe site  4.   Every 4-6 hours:  If on nasal continuous positive airway pressure, respiratory therapy assess nares and determine need for appliance change or resting period. 9/19/2022 0209 by Orlin Chowdary RN  Outcome: Progressing  Note: Turning and repositioning Q2 hours. Heels elevated off mattress. Remains on low airloss mattress. Pericare provided and cream applied. Problem: Cardiovascular - Adult  Goal: Maintains optimal cardiac output and hemodynamic stability  9/19/2022 0209 by Orlin Chowdary RN  Flowsheets (Taken 9/19/2022 0158)  Maintains optimal cardiac output and hemodynamic stability: Monitor blood pressure and heart rate  Note: VSS. SR with 1°HB, PVC, PAC's on Tele. Problem: Respiratory - Adult  Goal: Achieves optimal ventilation and oxygenation  Flowsheets (Taken 9/19/2022 0201)  Achieves optimal ventilation and oxygenation: Assess for changes in respiratory status  Note: Lungs diminished, fine crackles LLL. On Bipap, tolerating well. 96% on 40%FiO2. 1L O2 when off Bipap. Problem: Cardiovascular - Adult  Goal: Maintains optimal cardiac output and hemodynamic stability  9/19/2022 0209 by Orlin Chowdary RN  Flowsheets (Taken 9/19/2022 0158)  Maintains optimal cardiac output and hemodynamic stability: Monitor blood pressure and heart rate  Note: VSS. SR with 1°HB, PVC, PAC's on Tele.

## 2022-09-19 NOTE — PROGRESS NOTES
Physical Therapy  Facility/Department: [place] [address] PCU  Daily Treatment Note  NAME: Marcos Arzola  : 1945  MRN: [id number]    Date of Service: 2022    Discharge Recommendations: Marcos Arzola scored a 13/24 on the AM-PAC short mobility form. Current research shows that an AM-PAC score of 17 or less is typically not associated with a discharge to the patient's home setting. Based on the patient's AM-PAC score and their current functional mobility deficits, it is recommended that the patient have 3-5 sessions per week of Physical Therapy at d/c to increase the patient's independence. Please see assessment section for further patient specific details. If patient discharges prior to next session this note will serve as a discharge summary. Please see below for the latest assessment towards goals. PT Equipment Recommendations  Other: defer    Patient Diagnosis(es): The primary encounter diagnosis was Drug toxicity. Diagnoses of Elevated troponin and Hypercarbia were also pertinent to this visit. Assessment   Assessment: pt tolerated session fair with decreased tolerance requiring increased assist on this date and inc time to complete tasks and recover after. pt o2 sats unstable on this date dropping into the 70-80s with mobility on 1Lo2, educated on importance of deep pursed lip breathing with mobility and also importance of OOB activity even when therapy unable to see pt that day. pt continues to be self limiting requiring maximum encouragement to participate and push himself. pt requiring mod A for transfers at [facility] and mod A for stand pivot transfers/ standing marches demonstrating a posterior lean on this date. pt fatigues quickly requiring rest breaks and inc time to recover with minimal activity.  pt is well below baseline and would benefit from further IP PT upon dc. continue PT POC  Activity Tolerance: Patient limited by fatigue;Treatment limited secondary to decreased cognition;Patient tolerated treatment well;Patient limited by endurance (pt on 1Lo2 throughout session with o2 sats dropping into 70s-80s with all mobility requiring ~2 min to recover to mid 90s again. pt instructed on use of deep/ pursed lip breathing with mobility)  Other: defer     Plan    Plan  Plan:  (2-5)  Current Treatment Recommendations: Balance training;Strengthening; Functional mobility training;Transfer training;Gait training;Neuromuscular re-education;Stair training; Endurance training;Home exercise program;Safety education & training;Patient/Caregiver education & training; Therapeutic activities     Restrictions  Position Activity Restriction  Other position/activity restrictions: HD, peritoneal dialysis, cont. pulse ox, OT eval and treat     Subjective    Subjective  Subjective: pt supine in bed and agreeable to PT. inc fatigue and shakiness today  Pain: pt denies pain  Orientation  Overall Orientation Status: Within Functional Limits  Orientation Level: Oriented X4  Cognition  Overall Cognitive Status: Exceptions  Arousal/Alertness: Delayed responses to stimuli  Following Commands: Follows one step commands with increased time  Attention Span: Attends with cues to redirect  Memory: Decreased long term memory  Safety Judgement: Decreased awareness of need for assistance  Problem Solving: Assistance required to generate solutions;Assistance required to implement solutions;Assistance required to correct errors made;Assistance required to identify errors made  Insights: Decreased awareness of deficits  Initiation: Requires cues for some  Sequencing: Requires cues for some  Cognition Comment: verbal cues for all sequencing and initaiton.  pt requires increased encouragement throughout session 2/2 being self limiting with mobility     Objective   Vitals     Bed Mobility Training  Bed Mobility Training: Yes  Supine to Sit: Additional time;Minimum assistance (max VCs for sequencing/ initiation)  Scooting: Contact-guard assistance  Balance  Sitting: Impaired (SBA at EOB)  Sitting - Static: Fair (occasional)  Sitting - Dynamic: Fair (occasional)  Standing: Impaired  Standing - Static: Fair;Constant support (CGA - min A at RW)  Standing - Dynamic: Constant support;Poor  Transfer Training  Transfer Training: Yes  Sit to Stand: Moderate assistance (at RW from EOB and recliner x2. inc encourgement, VCs for initiation)  Stand to Sit: Minimum assistance  Stand Pivot Transfers: Moderate assistance (EOB to recliner with RW, mod A due to posterior lean)  Gait Training  Gait Training: No     PT Exercises  Dynamic Standing Balance Exercises: pt performed standing marching with BUE support on RW for 1 min x2 trials. pt fatigues quickly requiring seated rest breaks. min-mod A for standing balance with VCs for correcting posterior lean     Safety Devices  Type of Devices: Call light within reach;Gait belt;Nurse notified; Chair alarm in place; Left in chair       Goals  Short Term Goals  Time Frame for Short term goals: by dc  Short term goal 1: pt will perform bed mobility with min A  Short term goal 2: pt will perform functional transfers with LRAD and SBA  Short term goal 3: pt will ambulate 48' with LRAD and CGA  Patient Goals   Patient goals : not stated    Education  Patient Education  Education Given To: Patient  Education Provided: Role of Therapy;Plan of Care;Transfer Training  Education Provided Comments: importance of OOB activity  Education Method: Demonstration;Verbal  Barriers to Learning: Cognition  Education Outcome: Verbalized understanding;Continued education needed    Therapy Time   Individual Concurrent Group Co-treatment   Time In [facility]         Time Out 0928         Minutes 2520 Swati Davila, PT

## 2022-09-21 ENCOUNTER — APPOINTMENT (OUTPATIENT)
Dept: GENERAL RADIOLOGY | Age: 77
DRG: 640 | End: 2022-09-21
Payer: COMMERCIAL

## 2022-09-21 ENCOUNTER — HOSPITAL ENCOUNTER (INPATIENT)
Age: 77
LOS: 19 days | Discharge: NURSING, REHAB OR LONG-TERM CARE | DRG: 640 | End: 2022-10-10
Attending: EMERGENCY MEDICINE | Admitting: STUDENT IN AN ORGANIZED HEALTH CARE EDUCATION/TRAINING PROGRAM
Payer: COMMERCIAL

## 2022-09-21 ENCOUNTER — APPOINTMENT (OUTPATIENT)
Dept: CT IMAGING | Age: 77
DRG: 640 | End: 2022-09-21
Payer: COMMERCIAL

## 2022-09-21 DIAGNOSIS — R65.10 SIRS (SYSTEMIC INFLAMMATORY RESPONSE SYNDROME) (HCC): ICD-10-CM

## 2022-09-21 DIAGNOSIS — N17.9 ACUTE RENAL FAILURE SUPERIMPOSED ON CHRONIC KIDNEY DISEASE, ON CHRONIC DIALYSIS, UNSPECIFIED ACUTE RENAL FAILURE TYPE (HCC): ICD-10-CM

## 2022-09-21 DIAGNOSIS — N18.9 ACUTE RENAL FAILURE SUPERIMPOSED ON CHRONIC KIDNEY DISEASE, ON CHRONIC DIALYSIS, UNSPECIFIED ACUTE RENAL FAILURE TYPE (HCC): ICD-10-CM

## 2022-09-21 DIAGNOSIS — Z99.2 ACUTE RENAL FAILURE SUPERIMPOSED ON CHRONIC KIDNEY DISEASE, ON CHRONIC DIALYSIS, UNSPECIFIED ACUTE RENAL FAILURE TYPE (HCC): ICD-10-CM

## 2022-09-21 DIAGNOSIS — I50.9 ACUTE CONGESTIVE HEART FAILURE, UNSPECIFIED HEART FAILURE TYPE (HCC): Primary | ICD-10-CM

## 2022-09-21 PROBLEM — J96.22 ACUTE ON CHRONIC RESPIRATORY FAILURE WITH HYPOXIA AND HYPERCAPNIA (HCC): Status: ACTIVE | Noted: 2022-09-21

## 2022-09-21 PROBLEM — J96.21 ACUTE ON CHRONIC RESPIRATORY FAILURE WITH HYPOXIA AND HYPERCAPNIA (HCC): Status: ACTIVE | Noted: 2022-09-21

## 2022-09-21 LAB
A/G RATIO: 1.2 (ref 1.1–2.2)
ACETAMINOPHEN LEVEL: <5 UG/ML (ref 10–30)
ALBUMIN SERPL-MCNC: 3.6 G/DL (ref 3.4–5)
ALP BLD-CCNC: 129 U/L (ref 40–129)
ALT SERPL-CCNC: 44 U/L (ref 10–40)
AMMONIA: 42 UMOL/L (ref 16–60)
AMPHETAMINE SCREEN, URINE: NORMAL
ANION GAP SERPL CALCULATED.3IONS-SCNC: 10 MMOL/L (ref 3–16)
AST SERPL-CCNC: 28 U/L (ref 15–37)
BACTERIA: ABNORMAL /HPF
BARBITURATE SCREEN URINE: NORMAL
BASE EXCESS ARTERIAL: -2 MMOL/L (ref -3–3)
BASE EXCESS ARTERIAL: 0.2 MMOL/L (ref -3–3)
BASE EXCESS ARTERIAL: 1.8 MMOL/L (ref -3–3)
BASOPHILIC STIPPLING: ABNORMAL
BASOPHILS ABSOLUTE: 0.2 K/UL (ref 0–0.2)
BASOPHILS RELATIVE PERCENT: 1.2 %
BENZODIAZEPINE SCREEN, URINE: NORMAL
BILIRUB SERPL-MCNC: 0.3 MG/DL (ref 0–1)
BILIRUBIN URINE: NEGATIVE
BLOOD, URINE: ABNORMAL
BUN BLDV-MCNC: 35 MG/DL (ref 7–20)
CALCIUM SERPL-MCNC: 8.8 MG/DL (ref 8.3–10.6)
CANNABINOID SCREEN URINE: NORMAL
CARBOXYHEMOGLOBIN ARTERIAL: 1.4 % (ref 0–1.5)
CARBOXYHEMOGLOBIN ARTERIAL: 1.5 % (ref 0–1.5)
CARBOXYHEMOGLOBIN ARTERIAL: 1.5 % (ref 0–1.5)
CHLORIDE BLD-SCNC: 95 MMOL/L (ref 99–110)
CLARITY: ABNORMAL
CO2: 31 MMOL/L (ref 21–32)
COCAINE METABOLITE SCREEN URINE: NORMAL
COLOR: ABNORMAL
CREAT SERPL-MCNC: 8.1 MG/DL (ref 0.8–1.3)
EKG ATRIAL RATE: 83 BPM
EKG DIAGNOSIS: NORMAL
EKG P AXIS: 37 DEGREES
EKG P-R INTERVAL: 284 MS
EKG Q-T INTERVAL: 392 MS
EKG QRS DURATION: 90 MS
EKG QTC CALCULATION (BAZETT): 460 MS
EKG R AXIS: 50 DEGREES
EKG T AXIS: 92 DEGREES
EKG VENTRICULAR RATE: 83 BPM
EOSINOPHILS ABSOLUTE: 0.3 K/UL (ref 0–0.6)
EOSINOPHILS RELATIVE PERCENT: 2.7 %
EPITHELIAL CELLS, UA: 2 /HPF (ref 0–5)
ETHANOL: NORMAL MG/DL (ref 0–0.08)
FENTANYL SCREEN, URINE: NORMAL
GFR AFRICAN AMERICAN: 8
GFR NON-AFRICAN AMERICAN: 6
GLUCOSE BLD-MCNC: 352 MG/DL (ref 70–99)
GLUCOSE BLD-MCNC: 385 MG/DL (ref 70–99)
GLUCOSE BLD-MCNC: 405 MG/DL (ref 70–99)
GLUCOSE URINE: 250 MG/DL
HCO3 ARTERIAL: 28.9 MMOL/L (ref 21–29)
HCO3 ARTERIAL: 28.9 MMOL/L (ref 21–29)
HCO3 ARTERIAL: 29.1 MMOL/L (ref 21–29)
HCT VFR BLD CALC: 29.3 % (ref 40.5–52.5)
HEMATOLOGY PATH CONSULT: YES
HEMOGLOBIN, ART, EXTENDED: 9.1 G/DL (ref 13.5–17.5)
HEMOGLOBIN, ART, EXTENDED: 9.6 G/DL (ref 13.5–17.5)
HEMOGLOBIN, ART, EXTENDED: 9.6 G/DL (ref 13.5–17.5)
HEMOGLOBIN: 9.7 G/DL (ref 13.5–17.5)
HYALINE CASTS: 1 /LPF (ref 0–8)
KETONES, URINE: NEGATIVE MG/DL
LACTIC ACID, SEPSIS: 1.6 MMOL/L (ref 0.4–1.9)
LEUKOCYTE ESTERASE, URINE: ABNORMAL
LYMPHOCYTES ABSOLUTE: 1.3 K/UL (ref 1–5.1)
LYMPHOCYTES RELATIVE PERCENT: 10.4 %
Lab: NORMAL
MACROCYTES: ABNORMAL
MCH RBC QN AUTO: 38.3 PG (ref 26–34)
MCHC RBC AUTO-ENTMCNC: 33 G/DL (ref 31–36)
MCV RBC AUTO: 115.9 FL (ref 80–100)
METHADONE SCREEN, URINE: NORMAL
METHEMOGLOBIN ARTERIAL: 0.2 %
METHEMOGLOBIN ARTERIAL: 0.2 %
METHEMOGLOBIN ARTERIAL: 0.3 %
MICROSCOPIC EXAMINATION: YES
MONOCYTES ABSOLUTE: 1 K/UL (ref 0–1.3)
MONOCYTES RELATIVE PERCENT: 7.9 %
NEUTROPHILS ABSOLUTE: 9.7 K/UL (ref 1.7–7.7)
NEUTROPHILS RELATIVE PERCENT: 77.8 %
NITRITE, URINE: NEGATIVE
O2 SAT, ARTERIAL: 98 %
O2 SAT, ARTERIAL: 98.1 %
O2 SAT, ARTERIAL: 98.1 %
O2 THERAPY: ABNORMAL
OPIATE SCREEN URINE: NORMAL
OXYCODONE URINE: NORMAL
PCO2 ARTERIAL: 58.2 MMHG (ref 35–45)
PCO2 ARTERIAL: 71.5 MMHG (ref 35–45)
PCO2 ARTERIAL: 94.8 MMHG (ref 35–45)
PDW BLD-RTO: 15.1 % (ref 12.4–15.4)
PERFORMED ON: ABNORMAL
PERFORMED ON: ABNORMAL
PH ARTERIAL: 7.09 (ref 7.35–7.45)
PH ARTERIAL: 7.21 (ref 7.35–7.45)
PH ARTERIAL: 7.3 (ref 7.35–7.45)
PH UA: 6
PH UA: 6 (ref 5–8)
PHENCYCLIDINE SCREEN URINE: NORMAL
PLATELET # BLD: 389 K/UL (ref 135–450)
PLATELET SLIDE REVIEW: ADEQUATE
PMV BLD AUTO: 7 FL (ref 5–10.5)
PO2 ARTERIAL: 102 MMHG (ref 75–108)
PO2 ARTERIAL: 102 MMHG (ref 75–108)
PO2 ARTERIAL: 119 MMHG (ref 75–108)
POTASSIUM REFLEX MAGNESIUM: 3.9 MMOL/L (ref 3.5–5.1)
PRO-BNP: 9550 PG/ML (ref 0–449)
PROTEIN UA: 300 MG/DL
RAPID INFLUENZA  B AGN: NEGATIVE
RAPID INFLUENZA A AGN: NEGATIVE
RBC # BLD: 2.53 M/UL (ref 4.2–5.9)
RBC UA: 5 /HPF (ref 0–4)
SALICYLATE, SERUM: <0.3 MG/DL (ref 15–30)
SLIDE REVIEW: ABNORMAL
SODIUM BLD-SCNC: 136 MMOL/L (ref 136–145)
SPECIFIC GRAVITY UA: 1.01 (ref 1–1.03)
TCO2 ARTERIAL: 68.7 MMOL/L
TCO2 ARTERIAL: 69.6 MMOL/L
TCO2 ARTERIAL: 71.6 MMOL/L
TOTAL PROTEIN: 6.6 G/DL (ref 6.4–8.2)
TROPONIN: 0.06 NG/ML
URINE REFLEX TO CULTURE: ABNORMAL
URINE TYPE: ABNORMAL
UROBILINOGEN, URINE: 0.2 E.U./DL
WBC # BLD: 12.4 K/UL (ref 4–11)
WBC UA: 3 /HPF (ref 0–5)

## 2022-09-21 PROCEDURE — 85025 COMPLETE CBC W/AUTO DIFF WBC: CPT

## 2022-09-21 PROCEDURE — 83605 ASSAY OF LACTIC ACID: CPT

## 2022-09-21 PROCEDURE — 93005 ELECTROCARDIOGRAM TRACING: CPT | Performed by: EMERGENCY MEDICINE

## 2022-09-21 PROCEDURE — 93010 ELECTROCARDIOGRAM REPORT: CPT | Performed by: INTERNAL MEDICINE

## 2022-09-21 PROCEDURE — 80307 DRUG TEST PRSMV CHEM ANLYZR: CPT

## 2022-09-21 PROCEDURE — 80143 DRUG ASSAY ACETAMINOPHEN: CPT

## 2022-09-21 PROCEDURE — 87804 INFLUENZA ASSAY W/OPTIC: CPT

## 2022-09-21 PROCEDURE — 2580000003 HC RX 258: Performed by: EMERGENCY MEDICINE

## 2022-09-21 PROCEDURE — 87631 RESP VIRUS 3-5 TARGETS: CPT

## 2022-09-21 PROCEDURE — 94760 N-INVAS EAR/PLS OXIMETRY 1: CPT

## 2022-09-21 PROCEDURE — 2700000000 HC OXYGEN THERAPY PER DAY

## 2022-09-21 PROCEDURE — 87070 CULTURE OTHR SPECIMN AEROBIC: CPT

## 2022-09-21 PROCEDURE — 36600 WITHDRAWAL OF ARTERIAL BLOOD: CPT

## 2022-09-21 PROCEDURE — 6360000002 HC RX W HCPCS: Performed by: STUDENT IN AN ORGANIZED HEALTH CARE EDUCATION/TRAINING PROGRAM

## 2022-09-21 PROCEDURE — 94761 N-INVAS EAR/PLS OXIMETRY MLT: CPT

## 2022-09-21 PROCEDURE — 71045 X-RAY EXAM CHEST 1 VIEW: CPT

## 2022-09-21 PROCEDURE — 6360000002 HC RX W HCPCS: Performed by: EMERGENCY MEDICINE

## 2022-09-21 PROCEDURE — 96374 THER/PROPH/DIAG INJ IV PUSH: CPT

## 2022-09-21 PROCEDURE — 2580000003 HC RX 258: Performed by: STUDENT IN AN ORGANIZED HEALTH CARE EDUCATION/TRAINING PROGRAM

## 2022-09-21 PROCEDURE — 6370000000 HC RX 637 (ALT 250 FOR IP): Performed by: STUDENT IN AN ORGANIZED HEALTH CARE EDUCATION/TRAINING PROGRAM

## 2022-09-21 PROCEDURE — 70450 CT HEAD/BRAIN W/O DYE: CPT

## 2022-09-21 PROCEDURE — 81001 URINALYSIS AUTO W/SCOPE: CPT

## 2022-09-21 PROCEDURE — 82803 BLOOD GASES ANY COMBINATION: CPT

## 2022-09-21 PROCEDURE — 83036 HEMOGLOBIN GLYCOSYLATED A1C: CPT

## 2022-09-21 PROCEDURE — 99285 EMERGENCY DEPT VISIT HI MDM: CPT

## 2022-09-21 PROCEDURE — 80179 DRUG ASSAY SALICYLATE: CPT

## 2022-09-21 PROCEDURE — 87205 SMEAR GRAM STAIN: CPT

## 2022-09-21 PROCEDURE — 83880 ASSAY OF NATRIURETIC PEPTIDE: CPT

## 2022-09-21 PROCEDURE — 96365 THER/PROPH/DIAG IV INF INIT: CPT

## 2022-09-21 PROCEDURE — 94640 AIRWAY INHALATION TREATMENT: CPT

## 2022-09-21 PROCEDURE — 99223 1ST HOSP IP/OBS HIGH 75: CPT | Performed by: INTERNAL MEDICINE

## 2022-09-21 PROCEDURE — 82140 ASSAY OF AMMONIA: CPT

## 2022-09-21 PROCEDURE — 96366 THER/PROPH/DIAG IV INF ADDON: CPT

## 2022-09-21 PROCEDURE — 6370000000 HC RX 637 (ALT 250 FOR IP): Performed by: INTERNAL MEDICINE

## 2022-09-21 PROCEDURE — 94660 CPAP INITIATION&MGMT: CPT

## 2022-09-21 PROCEDURE — 89051 BODY FLUID CELL COUNT: CPT

## 2022-09-21 PROCEDURE — 36415 COLL VENOUS BLD VENIPUNCTURE: CPT

## 2022-09-21 PROCEDURE — 82077 ASSAY SPEC XCP UR&BREATH IA: CPT

## 2022-09-21 PROCEDURE — 84484 ASSAY OF TROPONIN QUANT: CPT

## 2022-09-21 PROCEDURE — 87040 BLOOD CULTURE FOR BACTERIA: CPT

## 2022-09-21 PROCEDURE — 6360000002 HC RX W HCPCS: Performed by: INTERNAL MEDICINE

## 2022-09-21 PROCEDURE — 80053 COMPREHEN METABOLIC PANEL: CPT

## 2022-09-21 PROCEDURE — 2000000000 HC ICU R&B

## 2022-09-21 RX ORDER — ATORVASTATIN CALCIUM 80 MG/1
80 TABLET, FILM COATED ORAL NIGHTLY
Status: DISCONTINUED | OUTPATIENT
Start: 2022-09-21 | End: 2022-10-10 | Stop reason: HOSPADM

## 2022-09-21 RX ORDER — INSULIN GLARGINE 100 [IU]/ML
55 INJECTION, SOLUTION SUBCUTANEOUS NIGHTLY
Status: DISCONTINUED | OUTPATIENT
Start: 2022-09-21 | End: 2022-09-21

## 2022-09-21 RX ORDER — FUROSEMIDE 10 MG/ML
80 INJECTION INTRAMUSCULAR; INTRAVENOUS ONCE
Status: COMPLETED | OUTPATIENT
Start: 2022-09-21 | End: 2022-09-21

## 2022-09-21 RX ORDER — ONDANSETRON 4 MG/1
4 TABLET, ORALLY DISINTEGRATING ORAL EVERY 8 HOURS PRN
Status: DISCONTINUED | OUTPATIENT
Start: 2022-09-21 | End: 2022-10-10 | Stop reason: HOSPADM

## 2022-09-21 RX ORDER — INSULIN GLARGINE 100 [IU]/ML
40 INJECTION, SOLUTION SUBCUTANEOUS NIGHTLY
Status: DISCONTINUED | OUTPATIENT
Start: 2022-09-21 | End: 2022-09-30

## 2022-09-21 RX ORDER — SODIUM CHLORIDE 9 MG/ML
INJECTION, SOLUTION INTRAVENOUS PRN
Status: DISCONTINUED | OUTPATIENT
Start: 2022-09-21 | End: 2022-10-10 | Stop reason: HOSPADM

## 2022-09-21 RX ORDER — POLYETHYLENE GLYCOL 3350 17 G/17G
17 POWDER, FOR SOLUTION ORAL DAILY PRN
Status: DISCONTINUED | OUTPATIENT
Start: 2022-09-21 | End: 2022-10-10 | Stop reason: HOSPADM

## 2022-09-21 RX ORDER — DEXTROSE MONOHYDRATE 100 MG/ML
INJECTION, SOLUTION INTRAVENOUS CONTINUOUS PRN
Status: DISCONTINUED | OUTPATIENT
Start: 2022-09-21 | End: 2022-10-10 | Stop reason: HOSPADM

## 2022-09-21 RX ORDER — ONDANSETRON 2 MG/ML
4 INJECTION INTRAMUSCULAR; INTRAVENOUS EVERY 6 HOURS PRN
Status: DISCONTINUED | OUTPATIENT
Start: 2022-09-21 | End: 2022-10-10 | Stop reason: HOSPADM

## 2022-09-21 RX ORDER — SODIUM CHLORIDE, SODIUM LACTATE, CALCIUM CHLORIDE, MAGNESIUM CHLORIDE AND DEXTROSE 4.25; 538; 448; 18.3; 5.08 G/100ML; MG/100ML; MG/100ML; MG/100ML; MG/100ML
2000 INJECTION, SOLUTION INTRAPERITONEAL 4 TIMES DAILY
Status: DISCONTINUED | OUTPATIENT
Start: 2022-09-21 | End: 2022-09-24

## 2022-09-21 RX ORDER — INSULIN LISPRO 100 [IU]/ML
0-16 INJECTION, SOLUTION INTRAVENOUS; SUBCUTANEOUS EVERY 4 HOURS
Status: DISCONTINUED | OUTPATIENT
Start: 2022-09-21 | End: 2022-09-28

## 2022-09-21 RX ORDER — ACETAMINOPHEN 650 MG/1
650 SUPPOSITORY RECTAL EVERY 6 HOURS PRN
Status: DISCONTINUED | OUTPATIENT
Start: 2022-09-21 | End: 2022-10-10 | Stop reason: HOSPADM

## 2022-09-21 RX ORDER — FUROSEMIDE 40 MG/1
40 TABLET ORAL DAILY
Status: DISCONTINUED | OUTPATIENT
Start: 2022-09-22 | End: 2022-09-22

## 2022-09-21 RX ORDER — BUPROPION HYDROCHLORIDE 100 MG/1
100 TABLET ORAL EVERY OTHER DAY
Status: DISCONTINUED | OUTPATIENT
Start: 2022-09-23 | End: 2022-10-10 | Stop reason: HOSPADM

## 2022-09-21 RX ORDER — GENTAMICIN SULFATE 1 MG/G
CREAM TOPICAL DAILY
Status: DISCONTINUED | OUTPATIENT
Start: 2022-09-21 | End: 2022-10-10 | Stop reason: HOSPADM

## 2022-09-21 RX ORDER — SODIUM CHLORIDE 0.9 % (FLUSH) 0.9 %
5-40 SYRINGE (ML) INJECTION PRN
Status: DISCONTINUED | OUTPATIENT
Start: 2022-09-21 | End: 2022-10-10 | Stop reason: HOSPADM

## 2022-09-21 RX ORDER — HEPARIN SODIUM 5000 [USP'U]/ML
5000 INJECTION, SOLUTION INTRAVENOUS; SUBCUTANEOUS EVERY 8 HOURS SCHEDULED
Status: DISCONTINUED | OUTPATIENT
Start: 2022-09-21 | End: 2022-10-10 | Stop reason: HOSPADM

## 2022-09-21 RX ORDER — ALBUTEROL SULFATE 2.5 MG/3ML
2.5 SOLUTION RESPIRATORY (INHALATION) EVERY 4 HOURS PRN
Refills: 5 | Status: DISCONTINUED | OUTPATIENT
Start: 2022-09-21 | End: 2022-10-10 | Stop reason: HOSPADM

## 2022-09-21 RX ORDER — LEVOTHYROXINE SODIUM 0.03 MG/1
50 TABLET ORAL DAILY
Status: DISCONTINUED | OUTPATIENT
Start: 2022-09-22 | End: 2022-10-10 | Stop reason: HOSPADM

## 2022-09-21 RX ORDER — SODIUM CHLORIDE 0.9 % (FLUSH) 0.9 %
5-40 SYRINGE (ML) INJECTION EVERY 12 HOURS SCHEDULED
Status: DISCONTINUED | OUTPATIENT
Start: 2022-09-21 | End: 2022-10-10 | Stop reason: HOSPADM

## 2022-09-21 RX ORDER — ASPIRIN 81 MG/1
81 TABLET, CHEWABLE ORAL DAILY
Status: DISCONTINUED | OUTPATIENT
Start: 2022-09-22 | End: 2022-10-10 | Stop reason: HOSPADM

## 2022-09-21 RX ORDER — ACETAMINOPHEN 325 MG/1
650 TABLET ORAL EVERY 6 HOURS PRN
Status: DISCONTINUED | OUTPATIENT
Start: 2022-09-21 | End: 2022-10-10 | Stop reason: HOSPADM

## 2022-09-21 RX ADMIN — VANCOMYCIN HYDROCHLORIDE 1750 MG: 10 INJECTION, POWDER, LYOPHILIZED, FOR SOLUTION INTRAVENOUS at 16:16

## 2022-09-21 RX ADMIN — GENTAMICIN SULFATE: 1 CREAM TOPICAL at 18:41

## 2022-09-21 RX ADMIN — Medication 10 ML: at 21:35

## 2022-09-21 RX ADMIN — INSULIN GLARGINE 40 UNITS: 100 INJECTION, SOLUTION SUBCUTANEOUS at 21:20

## 2022-09-21 RX ADMIN — CEFEPIME 1000 MG: 1 INJECTION, POWDER, FOR SOLUTION INTRAMUSCULAR; INTRAVENOUS at 10:36

## 2022-09-21 RX ADMIN — FUROSEMIDE 80 MG: 10 INJECTION, SOLUTION INTRAMUSCULAR; INTRAVENOUS at 10:36

## 2022-09-21 RX ADMIN — Medication 2 PUFF: at 19:24

## 2022-09-21 RX ADMIN — HEPARIN SODIUM 5000 UNITS: 5000 INJECTION INTRAVENOUS; SUBCUTANEOUS at 21:19

## 2022-09-21 RX ADMIN — INSULIN LISPRO 16 UNITS: 100 INJECTION, SOLUTION INTRAVENOUS; SUBCUTANEOUS at 18:02

## 2022-09-21 RX ADMIN — INSULIN LISPRO 16 UNITS: 100 INJECTION, SOLUTION INTRAVENOUS; SUBCUTANEOUS at 21:20

## 2022-09-21 ASSESSMENT — PAIN - FUNCTIONAL ASSESSMENT: PAIN_FUNCTIONAL_ASSESSMENT: NONE - DENIES PAIN

## 2022-09-21 NOTE — H&P
Hospital Medicine History & Physical        Name:  Ryan Baum  /Age/Sex: 1945  (69 y.o. male)  MRN & CSN:  [id number] & [id number]     PCP: Silvia Frances MD    Date of Admission: 2022    Date of Service: Pt seen/examined on 2022    Patient Status:  Inpatient - Patient will most likely require more than 48 hours of treatment and requires intensive medical treatment/monitoring     Chief Complaint:    Chief Complaint   Patient presents with    Altered Mental Status     Patient arrives via [place] from [facility], altered, last seen normal last night, started peritoneal dialysis last night, O2 low on normal 3L, placed on NRB with some improvement, increased WOB noted          History Of Present Illness:     69 y.o. male who presented to [facility] with complaints of altered mental status. Patient is relatively nonverbal at baseline after prior history of illness. Questionable aphasia from stroke versus ongoing dementia. Patient daughter at bedside and was able to provide information. She states the patient was more confused at his nursing facility this morning. Additionally, patient was found to be hypoxic. He was started on oxygen, with little improvement. He was placed on BiPAP with more improvement. Per the daughter, patient is supposed be wearing BiPAP at night, but is noncompliant. There were no other associated symptoms with his confusion. During work-up in the ER, patient was found to have CO2 of 94.8 with pH of 7.095 on admission. Patient does have significant dementia and is aware he is confused, but is relatively nonverbal and unable to answer complex questions. He is able to answer \"yes and no\" questions. Prior tobacco user, quit approximately 4 years ago. No alcohol or drug use.     Past Medical History:          Diagnosis Date    Allergic rhinitis due to other allergen 2010    Coronary atherosclerosis of unspecified type of vessel, native or graft 7/12/2010    Diabetic eye exam ([facility] [address] 04/29/2015    Diabetic eye exam ([facility] [address] 5/5/2016    [place] [facility]    Generalized osteoarthrosis, involving multiple sites 7/12/2010    Other psoriasis 7/12/2010    Pneumonia     Prolonged emergence from general anesthesia     Psoriatic arthropathy ([facility] [address] 7/12/2010    Type II or unspecified type diabetes mellitus without mention of complication, not stated as uncontrolled 7/12/2010    Unspecified essential hypertension 7/12/2010    Unspecified sleep apnea 7/12/2010       Past Surgical History:          Procedure Laterality Date    CARPAL TUNNEL RELEASE      s/p    CATARACT REMOVAL      CORONARY ARTERY BYPASS GRAFT      JOINT REPLACEMENT      LAPAROSCOPY INSERTION PERITONEAL CATHETER N/A 10/26/2020    LAPAROSCOPIC PERITONEAL DIALYSIS CATHETER PLACEMENT; LAPAROSCOPIC OMENTOPEXY performed by Thom Duffy DO at [address] OR       Medications Prior to Admission:      Prior to Admission medications    Medication Sig Start Date End Date Taking?  Authorizing Provider   albuterol sulfate (PROAIR RESPICLICK) 672 (90 Base) MCG/ACT aerosol powder inhalation Inhale 2 puffs into the lungs every 4 hours as needed for Wheezing or Shortness of Breath 9/19/22   Sylvain Stovall MD   buPROPion (WELLBUTRIN) 100 MG tablet Take 1 tablet by mouth every other day 9/21/22   Sylvain Stovall MD   epoetin daphney-epbx (RETACRIT) 2000 UNIT/ML injection Inject 1 mL into the skin three times a week 9/21/22 10/21/22  Sylvain Stovall MD   insulin glargine (LANTUS SOLOSTAR) 100 UNIT/ML injection pen Inject 50 Units into the skin nightly  Patient taking differently: Inject 40 Units into the skin nightly 9/19/22   Sylvain Stovall MD   insulin lispro, 1 Unit Dial, 100 UNIT/ML SOPN Inject 0-4 Units into the skin 3 times daily (with meals) 9/19/22   Sylvain Stovall MD   insulin lispro, 1 Unit Dial, 100 UNIT/ML SOPN Inject 0-4 Units into the skin nightly 9/19/22   MD JUJU Paz 200-25 MCG/INH AEPB inhaler Inhale 1 puff into the lungs daily    Historical Provider, MD   atorvastatin (LIPITOR) 80 MG tablet TAKE ONE TABLET BY MOUTH ONCE NIGHTLY 9/2/22   Shelby Jose MD   Easy Touch Lancets 30G/Twist MISC USE THREE TIMES A DAY TO FOUR TIMES A DAY 7/21/22   Shelby Jose MD   levothyroxine (SYNTHROID) 50 MCG tablet TAKE ONE TABLET BY MOUTH DAILY 6/30/22   Shelby Jose MD   insulin detemir (LEVEMIR FLEXTOUCH) 100 UNIT/ML injection pen 56-66 units daily 6/21/22 9/19/22  Shelby Jose MD   insulin aspart (NOVOLOG FLEXPEN) 100 UNIT/ML injection pen 32-42  units AC TID 6/21/22 9/19/22  Shelby Jose MD   B-D ULTRAFINE III SHORT PEN 31G X 8 MM MISC USE WITH INSULIN FOUR TIMES A DAY 5/17/22   Tracy Souza MD   midodrine (PROAMATINE) 5 MG tablet Take 1 tablet by mouth in the morning and at bedtime 3/25/22 9/19/22  Radha Pederson MD   Blood Glucose Monitoring Suppl (TRUE METRIX METER) [address] As needed 3/11/22   Shelby Jose MD   blood glucose monitor strips Test up to 4 times daily for blood sugar monitoring 2/15/22   Tracy Souza MD   blood glucose monitor kit and supplies (PLEASE DISPENSE WHAT INS WILL COVER) - Dispense sufficient amount for  TID testing frequency plus additional to accommodate PRN testing needs. Dispense all needed supplies to include: monitor, strips, lancing device, lancets, control solutions, alcohol swabs. 2/8/22   Tracy Souza MD   B Complex-C-Biotin-E-Min-FA (DIALYVITE 3000) 3 MG TABS Take 1 tablet by mouth daily    Historical Provider, MD   vitamin D (ERGOCALCIFEROL) 1.25 MG (72866 UT) CAPS capsule Take 50,000 Units by mouth every 3 months 7/14/21   Historical Provider, MD   triamcinolone (KENALOG) 0.1 % ointment Apply topically 2 times daily. 5/25/21 9/19/22  Radha Pederson MD   Handicap Placard MISC by Does not apply route Duration: 5 years 4/18/18   Andra Najjar, MD   aspirin 81 MG chewable tablet Take 1 tablet by mouth daily.  3/28/12 Gen Hollins MD       Allergies:  Penicillins, Sulfa antibiotics, Tazorac [tazarotene], Clindamycin, and Clindamycin/lincomycin    Social History:      The patient currently lives at nursing home. TOBACCO:   reports that he quit smoking about 41 years ago. He started smoking about 60 years ago. He has a 18.00 pack-year smoking history. He has never used smokeless tobacco.  ETOH:   reports no history of alcohol use. E-cigarette/Vaping       Questions Responses    E-cigarette/Vaping Use Never User    Start Date     Passive Exposure     Quit Date     Counseling Given     Comments               Family History:       Reviewed and negative in regards to presenting illness/complaint. Problem Relation Age of Onset    Diabetes Mother     Heart Failure Mother     Coronary Art Dis Father        REVIEW OF SYSTEMS COMPLETED:   Pertinent positives as noted in the HPI. All other systems reviewed and negative. PHYSICAL EXAM PERFORMED:    /66   Pulse 80   Temp 97.4 °F (36.3 °C) (Temporal)   Resp 26   SpO2 100%     General appearance:  No apparent distress, appears stated age and cooperative. Chronically ill-appearing. Fatigued. HEENT:  Normal cephalic, atraumatic without obvious deformity. Pupils equal, round, and reactive to light. Extra ocular muscles intact. Conjunctivae/corneas clear. BiPAP mask present. Neck: Supple, with full range of motion. No jugular venous distention. Trachea midline. Respiratory:  Normal respiratory effort. Moderate wheezing noted bilaterally. No crackles or rhonchi. Cardiovascular:  Regular rate and rhythm with normal S1/S2 without murmurs, rubs or gallops. Trace pitting edema LE bilaterally. Abdomen: Soft, non-tender, non-distended with normal bowel sounds. : No CVA tenderness  Musculoskeletal:  No clubbing or cyanosis. Full range of motion without deformity. Skin: Skin color, texture, turgor normal.  No rashes or lesions.   Neurologic:  Neurovascularly intact without any focal sensory/motor deficits. Cranial nerves: II-XII intact, grossly non-focal.  Psychiatric:  Alert and oriented, thought content appropriate, normal insight  Peripheral Pulses: +2 palpable, equal bilaterally       Labs:     Recent Labs     09/19/22 0423 09/21/22  0857   WBC 8.3 12.4*   HGB 9.5* 9.7*   HCT 28.3* 29.3*    389     Recent Labs     09/19/22  0423 09/21/22  0857   * 136   K 4.1 3.9   CL 91* 95*   CO2 28 31   BUN 30* 35*   CREATININE 6.8* 8.1*   CALCIUM 8.5 8.8     Recent Labs     09/21/22  0857   AST 28   ALT 44*   BILITOT 0.3   ALKPHOS 129     No results for input(s): INR in the last 72 hours. Recent Labs     09/21/22  0857   TROPONINI 0.06*       Urinalysis:      Lab Results   Component Value Date/Time    NITRU Negative 09/21/2022 03:18 PM    WBCUA 3 09/21/2022 03:18 PM    BACTERIA None Seen 09/21/2022 03:18 PM    RBCUA 5 09/21/2022 03:18 PM    BLOODU SMALL 09/21/2022 03:18 PM    SPECGRAV 1.015 09/21/2022 03:18 PM    GLUCOSEU 250 09/21/2022 03:18 PM    GLUCOSEU >=1000 03/26/2012 10:22 PM       Radiology:     CXR: I have reviewed the CXR with the following interpretation: Pulmonary venous congestion with bilateral perihilar and lower lobe airspace disease with possible bilateral pleural effusions. EKG:  I have reviewed the EKG with the following interpretation: NSR with indications of old MI    XR CHEST PORTABLE   Final Result   CHF with pulmonary edema. CT HEAD WO CONTRAST   Preliminary Result   No definite evidence of acute intracranial abnormality on a study   significantly limited by motion/streak artifact as described above.              Medications:  Medications Prior to Admission: albuterol sulfate (PROAIR RESPICLICK) 837 (90 Base) MCG/ACT aerosol powder inhalation, Inhale 2 puffs into the lungs every 4 hours as needed for Wheezing or Shortness of Breath  buPROPion (WELLBUTRIN) 100 MG tablet, Take 1 tablet by mouth every other day  epoetin daphney-epbx (RETACRIT) 2000 UNIT/ML injection, Inject 1 mL into the skin three times a week  insulin glargine (LANTUS SOLOSTAR) 100 UNIT/ML injection pen, Inject 50 Units into the skin nightly (Patient taking differently: Inject 40 Units into the skin nightly)  insulin lispro, 1 Unit Dial, 100 UNIT/ML SOPN, Inject 0-4 Units into the skin 3 times daily (with meals)  insulin lispro, 1 Unit Dial, 100 UNIT/ML SOPN, Inject 0-4 Units into the skin nightly  BREO ELLIPTA 200-25 MCG/INH AEPB inhaler, Inhale 1 puff into the lungs daily  atorvastatin (LIPITOR) 80 MG tablet, TAKE ONE TABLET BY MOUTH ONCE NIGHTLY  Easy Touch Lancets 30G/Twist MISC, USE THREE TIMES A DAY TO FOUR TIMES A DAY  levothyroxine (SYNTHROID) 50 MCG tablet, TAKE ONE TABLET BY MOUTH DAILY  B-D ULTRAFINE III SHORT PEN 31G X 8 MM MISC, USE WITH INSULIN FOUR TIMES A DAY  Blood Glucose Monitoring Suppl (TRUE METRIX METER) RUMA, As needed  blood glucose monitor strips, Test up to 4 times daily for blood sugar monitoring  blood glucose monitor kit and supplies, (PLEASE DISPENSE WHAT INS WILL COVER) - Dispense sufficient amount for  TID testing frequency plus additional to accommodate PRN testing needs. Dispense all needed supplies to include: monitor, strips, lancing device, lancets, control solutions, alcohol swabs. B Complex-C-Biotin-E-Min-FA (DIALYVITE 3000) 3 MG TABS, Take 1 tablet by mouth daily  vitamin D (ERGOCALCIFEROL) 1.25 MG (78143 UT) CAPS capsule, Take 50,000 Units by mouth every 3 months  Handicap Placard MISC, by Does not apply route Duration: 5 years  aspirin 81 MG chewable tablet, Take 1 tablet by mouth daily.   Current Facility-Administered Medications   Medication Dose Route Frequency Provider Last Rate Last Admin    vancomycin (VANCOCIN) 1,750 mg in dextrose 5 % 500 mL IVPB  20 mg/kg (Adjusted) IntraVENous Once Angel Ames  mL/hr at 09/21/22 1616 1,750 mg at 09/21/22 1616    dianeal lo-eben 4.25% 2,000 mL (Patient Supplied)  2,000 mL IntraPERitoneal 4x daily Lucita Sicard, MD        gentamicin (GARAMYCIN) 0.1 % cream   Topical Daily Lucita Sicard, MD        sodium chloride flush 0.9 % injection 5-40 mL  5-40 mL IntraVENous 2 times per day Marnie Goldberg, DO        sodium chloride flush 0.9 % injection 5-40 mL  5-40 mL IntraVENous PRN Marnie Goldberg, DO        0.9 % sodium chloride infusion   IntraVENous PRN Marnie Taylorn, DO        ondansetron (ZOFRAN-ODT) disintegrating tablet 4 mg  4 mg Oral Q8H PRN Marnie Goldberg, DO        Or    ondansetron (ZOFRAN) injection 4 mg  4 mg IntraVENous Q6H PRN Marniecarolina Taylorn, DO        polyethylene glycol (GLYCOLAX) packet 17 g  17 g Oral Daily PRN Marniecarolina Taylorn, DO        acetaminophen (TYLENOL) tablet 650 mg  650 mg Oral Q6H PRN Marniecarolina Taylorn, DO        Or    acetaminophen (TYLENOL) suppository 650 mg  650 mg Rectal Q6H PRN Marnie Goldberg, DO        albuterol (PROVENTIL) nebulizer solution 2.5 mg  2.5 mg Nebulization Q4H PRN Marnie Goldberg, DO        [START ON 9/22/2022] aspirin chewable tablet 81 mg  81 mg Oral Daily Wyatt Shelby, DO        atorvastatin (LIPITOR) tablet 80 mg  80 mg Oral Nightly Wyatt Membrenop, DO        mometasone-formoterol (DULERA) 200-5 MCG/ACT inhaler 2 puff  2 puff Inhalation BID Marnie Goldberg DO        [START ON 9/23/2022] buPROPion [facility]) tablet 100 mg  100 mg Oral Every Other Day Marnie Goldberg DO        [START ON 9/22/2022] levothyroxine (SYNTHROID) tablet 50 mcg  50 mcg Oral Daily Wyatt Malep, DO        dextrose bolus 10% 125 mL  125 mL IntraVENous PRN Marnie Goldberg, DO        Or    dextrose bolus 10% 250 mL  250 mL IntraVENous PRN Marnie Goldberg, DO        glucagon (rDNA) injection 1 mg  1 mg SubCUTAneous PRN Wyatt Shelby, DO        dextrose 10 % infusion   IntraVENous Continuous PRN Wyatt Shelby,         insulin lispro (HUMALOG) injection vial 0-16 Units  0-16 Units SubCUTAneous Q4H Wyatt Shelby DO        insulin glargine (LANTUS) injection vial 40 Units  40 Units SubCUTAneous Nightly Angel Cardoza DO Afsaneh        heparin (porcine) injection 5,000 Units  5,000 Units SubCUTAneous 3 times per day Bulmaro Holloway DO           Consults:    IP CONSULT TO NEPHROLOGY  IP CONSULT TO PALLIATIVE CARE    ASSESSMENT:    Active Hospital Problems    Diagnosis Date Noted    Acute on chronic respiratory failure with hypoxia and hypercapnia (HCC) [K67.54, J96.22] 09/21/2022     Priority: High    Moderate episode of recurrent major depressive disorder ([facility] [address] [F33.1] 09/17/2022     Priority: Medium    BJ treated with BiPAP [G47.33] 09/10/2022     Priority: Medium    Acute metabolic encephalopathy [C85.42] 09/06/2022     Priority: Medium    Type 2 diabetes mellitus with hyperglycemia, with long-term current use of insulin (HCC) [E11.65, Z79.4]     Hypertension associated with stage 5 chronic kidney disease due to type 2 diabetes mellitus ([facility] [address] [E11.22, I12.0, N18.5]     Chronic diastolic heart failure (HCC) [I50.32] 01/15/2017    Acute respiratory failure with hypoxia and hypercapnia ([facility] [address] [J96.01, J96.02] 01/15/2017    Arthritis with psoriasis ([facility] [address] [L40.50] 07/12/2010    Psoriatic arthropathy ([facility] [address] [L40.50] 07/12/2010    Sleep apnea [G47.30] 07/12/2010         PLAN:  This is a 69 y.o. male who presented to [facility] and is being treated for:    Acute on chronic hypoxic and hypercapnic respiratory failure  -Despite slight elevation in white count and tachycardia, do not believe this is infectious process, rather predominantly pulmonary vs HF related  -will continue lasix for now at 40mg PO QD; monitor UOP  -Discontinue antibiotics  -Supposed to be on BiPAP at night; noncompliant  -Continue BiPAP for the next 24 hours; can remove tomorrow during the day if patient is less somnolent  -Recommend BiPAP at night  -Repeat ABG  -Upper respiratory and pulmonary work-up ordered  -Inhaler therapy  -Palliative consulted; appreciate recommendations    Acute metabolic encephalopathy  -See above  -Ammonia not elevated  -Hold off on CT head at the moment; if there are any acute changes, CT head will be ordered  -Supportive care  -Daily labs; replace electrolytes as needed    Type 2 diabetes  -lantus  -ssi    CKD 5 on dialysis  -Peritoneal dialysis  -Daily labs  -Nephrology consulted; appreciate recommendations    HTN  -Continue home antihypertensives    Depression  -Continue Wellbutrin    Hypothyroid  -synthroid      DVT ppx: Heparin  GI ppx: Diet/Tube Feeds  Diet: ADULT DIET; Regular  Code Status: Full Code    PT/OT Eval Status: ordered    Disposition - back to ECF when medical issues resolve       Viktor Purvis DO  9/21/2022  5:52 PM    Please note that some part of this chart was generated using Dragon dictation software. Although every effort was made to ensure the accuracy of this automated transcription, some errors in transcription may have occurred inadvertently. If you may need any clarification, please do not hesitate to contact me through [facility].

## 2022-09-21 NOTE — CARE COORDINATION
Patient reported to be from the [place]. [facility] for SNF. SW called Angélica Wolf in admissions and left a message to confirm this information. Have not received a call back yet. Pt normally A&O but currently has AMS. Addendum:  Received a call back from Angélica Wolf at the [place]. Patient is SNF at the facility and able to return when ready. They would like a rapid COVID before discharge if possible. Stated if he is in the hospital longer than 48 hours, he will need a new precert.     Electronically signed by Jackye Shone, MSW, BERE on 9/21/2022 at 2:07 PM

## 2022-09-21 NOTE — RT PROTOCOL NOTE
RT Inhaler-Nebulizer Bronchodilator Protocol Note    There is a bronchodilator order in the chart from a provider indicating to follow the RT Bronchodilator Protocol and there is an Initiate RT Inhaler-Nebulizer Bronchodilator Protocol order as well (see protocol at bottom of note). CXR Findings:  XR CHEST PORTABLE    Result Date: 9/21/2022  CHF with pulmonary edema. The findings from the last RT Protocol Assessment were as follows:   History Pulmonary Disease: Smoker 15 pack years or more  Respiratory Pattern: Dyspnea on exertion or RR 21-25 bpm  Breath Sounds: Slightly diminished and/or crackles  Cough: Strong, spontaneous, non-productive  Indication for Bronchodilator Therapy:    Bronchodilator Assessment Score: 5    Aerosolized bronchodilator medication orders have been revised according to the RT Inhaler-Nebulizer Bronchodilator Protocol below. Respiratory Therapist to perform RT Therapy Protocol Assessment initially then follow the protocol. Repeat RT Therapy Protocol Assessment PRN for score 0-3 or on second treatment, BID, and PRN for scores above 3. No Indications - adjust the frequency to every 6 hours PRN wheezing or bronchospasm, if no treatments needed after 48 hours then discontinue using Per Protocol order mode. If indication present, adjust the RT bronchodilator orders based on the Bronchodilator Assessment Score as indicated below. Use Inhaler orders unless patient has one or more of the following: on home nebulizer, not able to hold breath for 10 seconds, is not alert and oriented, cannot activate and use MDI correctly, or respiratory rate 25 breaths per minute or more, then use the equivalent nebulizer order(s) with same Frequency and PRN reasons based on the score. If a patient is on this medication at home then do not decrease Frequency below that used at home.     0-3 - enter or revise RT bronchodilator order(s) to equivalent RT Bronchodilator order with Frequency of every 4 hours PRN for wheezing or increased work of breathing using Per Protocol order mode. 4-6 - enter or revise RT Bronchodilator order(s) to two equivalent RT bronchodilator orders with one order with BID Frequency and one order with Frequency of every 4 hours PRN wheezing or increased work of breathing using Per Protocol order mode. 7-10 - enter or revise RT Bronchodilator order(s) to two equivalent RT bronchodilator orders with one order with TID Frequency and one order with Frequency of every 4 hours PRN wheezing or increased work of breathing using Per Protocol order mode. 11-13 - enter or revise RT Bronchodilator order(s) to one equivalent RT bronchodilator order with QID Frequency and an Albuterol order with Frequency of every 4 hours PRN wheezing or increased work of breathing using Per Protocol order mode. Greater than 13 - enter or revise RT Bronchodilator order(s) to one equivalent RT bronchodilator order with every 4 hours Frequency and an Albuterol order with Frequency of every 2 hours PRN wheezing or increased work of breathing using Per Protocol order mode. RT to enter RT Home Evaluation for COPD & MDI Assessment order using Per Protocol order mode.     Electronically signed by Caesar Dunbar RCP on 9/21/2022 at 4:49 PM

## 2022-09-21 NOTE — PROGRESS NOTES
Pt now unable to remain alert and cannot answer orientation questions. Alertness changing during shift. More alert and able to answer all orientation questions at 1600, but unable to answer at 1400 today. Fail swallow screen. ABG obtained, remains on PAP.

## 2022-09-21 NOTE — ACP (ADVANCE CARE PLANNING)
Advanced Care Planning Note. Purpose of Encounter: Advanced care planning in light of recurrent admission with acute hypoxic and hypercapnic respiratory failure  Parties In Attendance: Patient, daughter, Hoang Hawkins  Decisional Capacity: Uncertain given respiratory failure  Subjective: Shortness of breath and difficulty breathing.   Daughter at bedside able to talk about patient's disposition  Objective: CO2 98 on admission  Goals of Care Determination: Full code with palliative consult  Plan: Palliative consult  Code Status: Full code   Time spent on Advanced care Plannin minutes  Advanced Care Planning Documents: Completed advanced directives on chart    Heather Le DO  2022 3:14 PM

## 2022-09-21 NOTE — PROGRESS NOTES
Telephone call with Dr. Montse Leon, FiO2 reduced to 28% and repeat ABG to be obtained in 1 hour.  Orders placed

## 2022-09-21 NOTE — ED NOTES
Patient arrives via [facility] From [facility], new patient to them, started peritoneal dialysis for the first time last night, was at his normal A&O x4 mental status last night, this AM, AMS, not verbally responding, found to by hypoxic in the 80's on normal 3L/NC, staff attempted 6L no change, currently on NRB at 95%  MD wanting BIPAP  ABG obtained with US to R wrist  Labs and first set of blood cultures obtained     Scott Buenrostro RN  09/21/22 [id number]

## 2022-09-21 NOTE — CONSULTS
Office : [phone]     Fax :[phone]       Nephrology Consult Note      Patient's Name: Aron Fallon  10:50 AM  9/21/2022    Reason for Consult:  ESRD on peritoneal dialysis. Requesting Physician:  Tracy Souza MD      Chief Complaint:    Chief Complaint   Patient presents with    Altered Mental Status     Patient arrives via [place] from [place], altered, last seen normal last night, started peritoneal dialysis last night, O2 low on normal 3L, placed on NRB with some improvement, increased WOB noted           History of Present Ilness:    Aron Fallon is a 69 y.o. male with prior history of ESRD on peritoneal dialysis , DM 2, HTN who was sent from [facility] with main c/o SOB. In ER he was found to be hypoxic. CXR shows volume overload. On BiPAP now. Still makes urine . Also had elevated WBC. No abdominal pain. No fever   BS Elevated. No intake/output data recorded.     Past Medical History:   Diagnosis Date    Allergic rhinitis due to other allergen 7/12/2010    Coronary atherosclerosis of unspecified type of vessel, native or graft 7/12/2010    Diabetic eye exam ([address] 04/29/2015    Diabetic eye exam ([address] 5/5/2016    [place] [facility]    Generalized osteoarthrosis, involving multiple sites 7/12/2010    Other psoriasis 7/12/2010    Pneumonia     Prolonged emergence from general anesthesia     Psoriatic arthropathy ([address] 7/12/2010    Type II or unspecified type diabetes mellitus without mention of complication, not stated as uncontrolled 7/12/2010    Unspecified essential hypertension 7/12/2010    Unspecified sleep apnea 7/12/2010       Past Surgical History:   Procedure Laterality Date    CARPAL TUNNEL RELEASE      s/p    CATARACT REMOVAL      CORONARY ARTERY BYPASS GRAFT JOINT REPLACEMENT      LAPAROSCOPY INSERTION PERITONEAL CATHETER N/A 10/26/2020    LAPAROSCOPIC PERITONEAL DIALYSIS CATHETER PLACEMENT; LAPAROSCOPIC OMENTOPEXY performed by Miguel Ángel Camejo DO at [facility] OR       Family History   Problem Relation Age of Onset    Diabetes Mother     Heart Failure Mother     Coronary Art Dis Father         reports that he quit smoking about 41 years ago. He started smoking about 60 years ago. He has a 18.00 pack-year smoking history. He has never used smokeless tobacco. He reports that he does not drink alcohol and does not use drugs.         Allergies:  Penicillins, Sulfa antibiotics, Tazorac [tazarotene], Clindamycin, and Clindamycin/lincomycin    Current Medications:    vancomycin (VANCOCIN) 1,750 mg in dextrose 5 % 500 mL IVPB, Once  cefepime (MAXIPIME) 1000 mg IVPB minibag, Once  dianeal lo-eben 4.25% 2,000 mL, Q2H  gentamicin (GARAMYCIN) 0.1 % cream, Daily        Review of Systems:   14 point ROS obtained but were negative except mentioned in HPI      Physical exam:     Vitals:  /70   Pulse 82   Temp 97.8 °F (36.6 °C)   Resp 22   SpO2 96%   Constitutional:  OAA X3 NAD  Skin: no rash, turgor wnl  Heent:  eomi, mmm  Neck: no bruits or jvd noted  Cardiovascular:  S1, S2 without m/r/g  Respiratory: b/l crackles   Abdomen:  +bs, soft, nt, nd  Ext: mild  lower extremity edema  Psychiatric: mood and affect appropriate  Musculoskeletal:  Rom, muscular strength intact    Labs:  CBC:   Recent Labs     09/19/22 0423 09/21/22  0857   WBC 8.3 12.4*   HGB 9.5* 9.7*    389     BMP:    Recent Labs     09/19/22 0423 09/21/22  0857   * 136   K 4.1 3.9   CL 91* 95*   CO2 28 31   BUN 30* 35*   CREATININE 6.8* 8.1*   GLUCOSE 384* 385*     Ca/Mg/Phos:   Recent Labs     09/19/22 0423 09/21/22  0857   CALCIUM 8.5 8.8     Hepatic:   Recent Labs     09/21/22  0857   AST 28   ALT 44*   BILITOT 0.3   ALKPHOS 129     Troponin:   Recent Labs     09/21/22  0857   TROPONINI 0.06*     BNP: No results for input(s): BNP in the last 72 hours. Lipids: No results for input(s): CHOL, TRIG, HDL, LDLCALC, LABVLDL in the last 72 hours. ABGs:   Recent Labs     09/21/22  0936   PHART 7.095*   PO2ART 119.0*   YZD6IGM 94.8*     INR: No results for input(s): INR in the last 72 hours. UA:No results for input(s): [place], GLUCOSEU, BILIRUBINUR, [place], BLOODU, PHUR, PROTEINU, UROBILINOGEN, NITRU, LEUKOCYTESUR, LABMICR, URINETYPE in the last 72 hours. Urine Microscopic: No results for input(s): LABCAST, BACTERIA, COMU, HYALCAST, WBCUA, RBCUA, EPIU in the last 72 hours. Urine Culture: No results for input(s): LABURIN in the last 72 hours. Urine Chemistry: No results for input(s): [place], PROTEINUR, NAUR in the last 72 hours. IMAGING:  XR CHEST PORTABLE   Final Result   CHF with pulmonary edema. CT HEAD WO CONTRAST   Preliminary Result   No definite evidence of acute intracranial abnormality on a study   significantly limited by motion/streak artifact as described above. Assessment/Plan :      1. ESRD   Volume overload   Still makes urine   Will give lasix 80 mg iv x 1   Placed king . Had  300 ml urine . Continue on BIPAP    Will start on CCPD now with 4.25 % for effective UF    Send PD fluid for cell count and culture     Give vanc and cefepime empirically     2. HTN  BP controlled       3. Anemia in ESRD   On epogen     4. Acid- base disorder. monitor     5. Electrolytes.  Monitor                   D/w primary team      Thank you for allowing us to participate in care of Marcos Arzola         Electronically signed by: Alba Peoples MD, 9/21/2022, 10:50 AM      [facility] [address]  Office : [phone]  Fax :[phone]

## 2022-09-21 NOTE — ED NOTES
Report given to Lizbeth Stern RN. Patient on monitor and going upstairs. No signs of distress at this time.       Omero Klein RN  09/21/22 7937

## 2022-09-21 NOTE — PROGRESS NOTES
09/21/22 1647   RT Protocol   History Pulmonary Disease 1   Respiratory pattern 2   Breath sounds 2   Cough 0   Bronchodilator Assessment Score 5

## 2022-09-21 NOTE — DIALYSIS
CCPD Order   Exchanges: 4   Exchange Volume: 2000 ml   Total Time: 8 hrs   Dextrose: 4.25%   Last Fill: 0 ml   Total Volume: 8000 ml     Orders verified. Supplies taken to pt's room. Report received. Cycler set up, primed and pre tested. [facility]. Pt connected to cycler. CCPD initiated without problem. Initial effluent clear. If problems should arise please call the [phone] on top of PD cycler machine.        If problems persist please call [phone]

## 2022-09-21 NOTE — PROGRESS NOTES
Patient admitted from emergency department via stretcher on monitor. Transferred to  bed. Placed on monitors. Vital signs obtained. Orders reviewed and acknowledged. Admission completed. Oriented to room, call light and environment. Questions answered. Bed placed in low position. Call light explained and within reach.

## 2022-09-21 NOTE — PROGRESS NOTES
Pharmacy Home Medication Reconciliation Note    A medication reconciliation has been completed for Aron Fallon 1945    Information provided by: facility med list [facility]    The patient's home medication list is as follows:  Prior to Admission medications    Medication Sig Start Date End Date Taking?  Authorizing Provider   albuterol sulfate (PROAIR RESPICLICK) 705 (90 Base) MCG/ACT aerosol powder inhalation Inhale 2 puffs into the lungs every 4 hours as needed for Wheezing or Shortness of Breath 9/19/22   Darling Stovall MD   buPROPion (WELLBUTRIN) 100 MG tablet Take 1 tablet by mouth every other day 9/21/22   Darling Stovall MD   epoetin daphney-epbx (RETACRIT) 2000 UNIT/ML injection Inject 1 mL into the skin three times a week 9/21/22 10/21/22  Darling Stovall MD   insulin glargine (LANTUS SOLOSTAR) 100 UNIT/ML injection pen Inject 50 Units into the skin nightly  Patient taking differently: Inject 40 Units into the skin nightly 9/19/22   Darling Stovall MD   insulin lispro, 1 Unit Dial, 100 UNIT/ML SOPN Inject 0-4 Units into the skin 3 times daily (with meals) 9/19/22   Darling Stovall MD   insulin lispro, 1 Unit Dial, 100 UNIT/ML SOPN Inject 0-4 Units into the skin nightly 9/19/22   Ogodilona Randallsopineda Stovall MD   BREO ELLIPTA 200-25 MCG/INH AEPB inhaler Inhale 1 puff into the lungs daily    Historical Provider, MD   atorvastatin (LIPITOR) 80 MG tablet TAKE ONE TABLET BY MOUTH ONCE NIGHTLY 9/2/22   Shelby Jose MD   Easy Touch Lancets 30G/Twist MISC USE THREE TIMES A DAY TO FOUR TIMES A DAY 7/21/22   Shelby Jose MD   levothyroxine (SYNTHROID) 50 MCG tablet TAKE ONE TABLET BY MOUTH DAILY 6/30/22   Shelby Jose MD   insulin detemir (LEVEMIR FLEXTOUCH) 100 UNIT/ML injection pen 56-66 units daily 6/21/22 9/19/22  Shelby Jose MD   insulin aspart (NOVOLOG FLEXPEN) 100 UNIT/ML injection pen 32-42  units AC TID 6/21/22 9/19/22  Jimi Mesa Aria Muhammad MD   B-D ULTRAFINE III SHORT PEN 31G X 8 MM MISC USE WITH INSULIN FOUR TIMES A DAY 5/17/22   Yola Alvarez MD   midodrine (PROAMATINE) 5 MG tablet Take 1 tablet by mouth in the morning and at bedtime 3/25/22 9/19/22  Lauren Zapien MD   Blood Glucose Monitoring Suppl (TRUE METRIX METER) Esme Banister As needed 3/11/22   Beto Masterson MD   blood glucose monitor strips Test up to 4 times daily for blood sugar monitoring 2/15/22   Yola Alvarez MD   blood glucose monitor kit and supplies (PLEASE DISPENSE WHAT INS WILL COVER) - Dispense sufficient amount for  TID testing frequency plus additional to accommodate PRN testing needs. Dispense all needed supplies to include: monitor, strips, lancing device, lancets, control solutions, alcohol swabs. 2/8/22   Yola Alvarez MD   B Complex-C-Biotin-E-Min-FA (DIALYVITE 3000) 3 MG TABS Take 1 tablet by mouth daily    Historical Provider, MD   vitamin D (ERGOCALCIFEROL) 1.25 MG (29438 UT) CAPS capsule Take 50,000 Units by mouth every 3 months 7/14/21   Historical Provider, MD   triamcinolone (KENALOG) 0.1 % ointment Apply topically 2 times daily. 5/25/21 9/19/22  Lauren Zapien MD   Handicap Placard MISC by Does not apply route Duration: 5 years 4/18/18   Sofia Olmos MD   aspirin 81 MG chewable tablet Take 1 tablet by mouth daily. 3/28/12   Marilou Huynh MD        Of note, patient's Lantus dose is 40 units at bedtime, not 50. Timing of last doses updated.     Thank you,  Brooke Brown, PharmD, BCCCP

## 2022-09-21 NOTE — ED PROVIDER NOTES
[facility]    Patient Identification  Pt Name: Ryan Baum  MRN: [id number]  [place] 1945  Date of evaluation: 9/21/2022  Provider: Dayo Muñoz MD  PCP: Silvia Frances MD    Chief Complaint  Altered Mental Status (Patient arrives via [place] from ECF, altered, last seen normal last night, started peritoneal dialysis last night, O2 low on normal 3L, placed on NRB with some improvement, increased WOB noted)      HPI  (History provided by EMS and nursing home report; limited by patient's inability to speak)  This is a 69 y.o. male who was brought in by EMS transportation for shortness of breath and altered mental status. Patient was just transferred to a nursing home post an acute stay at [facility].  Per nursing report, the patient woke up nonverbal this morning. Nurses also noted the patient to be confused, so they sent him to the emergency department. It is unclear what his baseline is as there is no family present at bedside currently. Patient seemed to understand questions asked and nodded shook his head in response. We will confirm he was not having chest pain, abdominal pain, or headache. He did feel generally weak and fatigued    ROS  10 systems reviewed, pertinent positives/negatives per HPI otherwise noted to be negative.     I have reviewed the following nursing documentation:  Allergies: Penicillins, Sulfa antibiotics, Tazorac [tazarotene], Clindamycin, and Clindamycin/lincomycin    Past medical history:   Past Medical History:   Diagnosis Date    Allergic rhinitis due to other allergen 7/12/2010    Coronary atherosclerosis of unspecified type of vessel, native or graft 7/12/2010    Diabetic eye exam ([facility] [address] 04/29/2015    Diabetic eye exam ([facility] [address] 5/5/2016    [place] [facility]    Generalized osteoarthrosis, involving multiple sites 7/12/2010    Other psoriasis 7/12/2010    Pneumonia     Prolonged emergence from general anesthesia     Psoriatic arthropathy ([facility] [address] 7/12/2010    Type II or unspecified type diabetes mellitus without mention of complication, not stated as uncontrolled 7/12/2010    Unspecified essential hypertension 7/12/2010    Unspecified sleep apnea 7/12/2010     Past surgical history:   Past Surgical History:   Procedure Laterality Date    CARPAL TUNNEL RELEASE      s/p    CATARACT REMOVAL      CORONARY ARTERY BYPASS GRAFT      JOINT REPLACEMENT      LAPAROSCOPY INSERTION PERITONEAL CATHETER N/A 10/26/2020    LAPAROSCOPIC PERITONEAL DIALYSIS CATHETER PLACEMENT; LAPAROSCOPIC OMENTOPEXY performed by Isabel Neville DO at [address] [facility] medications:   Previous Medications    ALBUTEROL SULFATE (PROAIR RESPICLICK) 372 (90 BASE) MCG/ACT AEROSOL POWDER INHALATION    Inhale 2 puffs into the lungs every 4 hours as needed for Wheezing or Shortness of Breath    ASPIRIN 81 MG CHEWABLE TABLET    Take 1 tablet by mouth daily. ATORVASTATIN (LIPITOR) 80 MG TABLET    TAKE ONE TABLET BY MOUTH ONCE NIGHTLY    B COMPLEX-C-BIOTIN-E-MIN-FA (DIALYVITE 3000) 3 MG TABS    Take 1 tablet by mouth daily    B-D ULTRAFINE III SHORT PEN 31G X 8 MM MISC    USE WITH INSULIN FOUR TIMES A DAY    BLOOD GLUCOSE MONITOR KIT AND SUPPLIES    (PLEASE DISPENSE WHAT INS WILL COVER) - Dispense sufficient amount for  TID testing frequency plus additional to accommodate PRN testing needs. Dispense all needed supplies to include: monitor, strips, lancing device, lancets, control solutions, alcohol swabs.     BLOOD GLUCOSE MONITOR STRIPS    Test up to 4 times daily for blood sugar monitoring    BLOOD GLUCOSE MONITORING SUPPL (TRUE METRIX METER) RUMA    As needed    BREO ELLIPTA 200-25 MCG/INH AEPB INHALER    Inhale 1 puff into the lungs daily    BUPROPION (WELLBUTRIN) 100 MG TABLET    Take 1 tablet by mouth every other day    EASY TOUCH LANCETS 30G/TWIST MISC    USE THREE TIMES A DAY TO FOUR TIMES A DAY    EPOETIN GERSON-EPBX (RETACRIT) 2000 UNIT/ML INJECTION    Inject 1 mL into the skin three times a week    HANDICAP PLACARD MISC    by Does not apply route Duration: 5 years    INSULIN GLARGINE (LANTUS SOLOSTAR) 100 UNIT/ML INJECTION PEN    Inject 50 Units into the skin nightly    INSULIN LISPRO, 1 UNIT DIAL, 100 UNIT/ML SOPN    Inject 0-4 Units into the skin 3 times daily (with meals)    INSULIN LISPRO, 1 UNIT DIAL, 100 UNIT/ML SOPN    Inject 0-4 Units into the skin nightly    LEVOTHYROXINE (SYNTHROID) 50 MCG TABLET    TAKE ONE TABLET BY MOUTH DAILY    VITAMIN D (ERGOCALCIFEROL) 1.25 MG (29627 UT) CAPS CAPSULE    Take 50,000 Units by mouth every 3 months       Social history:  reports that he quit smoking about 41 years ago. He started smoking about 60 years ago. He has a 18.00 pack-year smoking history. He has never used smokeless tobacco. He reports that he does not drink alcohol and does not use drugs. Family history:    Family History   Problem Relation Age of Onset    Diabetes Mother     Heart Failure Mother     Coronary Art Dis Father        Exam  ED Triage Vitals   BP Temp Temp Source Heart Rate Resp SpO2 Height Weight   09/21/22 0858 09/21/22 0858 09/21/22 1435 09/21/22 0858 09/21/22 0858 09/21/22 0858 09/22/22 0909 09/22/22 0400   136/62 97.8 °F (36.6 °C) Temporal 73 24 95 % 5' 10\" (1.778 m) 205 lb 7.5 oz (93.2 kg)       Nursing note and vitals reviewed. Constitutional: Toxic appearing and in acute distress  HENT:      Head: Normocephalic and atraumatic. Ears: External ears normal.      Nose: Nose normal.     Mouth: Membrane mucosa dry  Eyes: Anicteric sclera. No discharge. Neck: Supple. Trachea midline. Cardiovascular: RRR; no murmurs, rubs, or gallops. Pulmonary/Chest: Increased work of breathing with tachypnea. Diffuse rhonchi throughout with diminished breath sounds. Abdominal: Soft. Distended. Mild diffuse tenderness. Musculoskeletal: Moves all extremities. No gross deformity. Neurological: Alert and responsive to questions with nonverbal head nods and shakes.   Not speaking. Unclear what baseline is. Able to move all 4 extremities. Skin: Pale. EKG  The Ekg interpreted by me in the absence of a cardiologist shows. normal sinus rhythm with a rate of 83  Axis is   Normal  QTc is  normal  1st degree AV block   No specific ST-T wave changes appreciated. No evidence of acute ischemia. No significant change from prior EKG dated 9/7/2022      Radiology  XR CHEST PORTABLE   Final Result   CHF with pulmonary edema. CT HEAD WO CONTRAST   Preliminary Result   No definite evidence of acute intracranial abnormality on a study   significantly limited by motion/streak artifact as described above.              Labs  Results for orders placed or performed during the hospital encounter of 09/21/22   CBC with Auto Differential   Result Value Ref Range    WBC 12.4 (H) 4.0 - 11.0 K/uL    RBC 2.53 (L) 4.20 - 5.90 M/uL    Hemoglobin 9.7 (L) 13.5 - 17.5 g/dL    Hematocrit 29.3 (L) 40.5 - 52.5 %    .9 (H) 80.0 - 100.0 fL    MCH 38.3 (H) 26.0 - 34.0 pg    MCHC 33.0 31.0 - 36.0 g/dL    RDW 15.1 12.4 - 15.4 %    Platelets 950 411 - 359 K/uL    MPV 7.0 5.0 - 10.5 fL    PLATELET SLIDE REVIEW Adequate     SLIDE REVIEW see below     Path Consult Yes     Neutrophils % 77.8 %    Lymphocytes % 10.4 %    Monocytes % 7.9 %    Eosinophils % 2.7 %    Basophils % 1.2 %    Neutrophils Absolute 9.7 (H) 1.7 - 7.7 K/uL    Lymphocytes Absolute 1.3 1.0 - 5.1 K/uL    Monocytes Absolute 1.0 0.0 - 1.3 K/uL    Eosinophils Absolute 0.3 0.0 - 0.6 K/uL    Basophils Absolute 0.2 0.0 - 0.2 K/uL    Macrocytes Occasional (A)     Basophilic Stippling Occasional (A)    Comprehensive Metabolic Panel w/ Reflex to MG   Result Value Ref Range    Sodium 136 136 - 145 mmol/L    Potassium reflex Magnesium 3.9 3.5 - 5.1 mmol/L    Chloride 95 (L) 99 - 110 mmol/L    CO2 31 21 - 32 mmol/L    Anion Gap 10 3 - 16    Glucose 385 (H) 70 - 99 mg/dL    BUN 35 (H) 7 - 20 mg/dL    Creatinine 8.1 (HH) 0.8 - 1.3 mg/dL    GFR Non- 6 (A) >60    GFR  8 (A) >60    Calcium 8.8 8.3 - 10.6 mg/dL    Total Protein 6.6 6.4 - 8.2 g/dL    Albumin 3.6 3.4 - 5.0 g/dL    Albumin/Globulin Ratio 1.2 1.1 - 2.2    Total Bilirubin 0.3 0.0 - 1.0 mg/dL    Alkaline Phosphatase 129 40 - 129 U/L    ALT 44 (H) 10 - 40 U/L    AST 28 15 - 37 U/L   Blood gas, arterial   Result Value Ref Range    pH, Arterial 7.095 (LL) 7.350 - 7.450    pCO2, Arterial 94.8 (HH) 35.0 - 45.0 mmHg    pO2, Arterial 119.0 (H) 75.0 - 108.0 mmHg    HCO3, Arterial 29.1 (H) 21.0 - 29.0 mmol/L    Base Excess, Arterial -2.0 -3.0 - 3.0 mmol/L    Hemoglobin, Art, Extended 9.6 (L) 13.5 - 17.5 g/dL    O2 Sat, Arterial 98.0 >92 %    Carboxyhgb, Arterial 1.5 0.0 - 1.5 %    Methemoglobin, Arterial 0.2 <1.5 %    TCO2, Arterial 71.6 Not Established mmol/L    O2 Therapy Unknown    Lactate, Sepsis   Result Value Ref Range    Lactic Acid, Sepsis 1.6 0.4 - 1.9 mmol/L   Troponin   Result Value Ref Range    Troponin 0.06 (H) <0.01 ng/mL   Brain Natriuretic Peptide   Result Value Ref Range    Pro-BNP 9,550 (H) 0 - 449 pg/mL   Acetaminophen Level   Result Value Ref Range    Acetaminophen Level <5 (L) 10 - 30 ug/mL   Salicylate   Result Value Ref Range    Salicylate, Serum <3.4 (L) 15.0 - 30.0 mg/dL   Urine Drug Screen   Result Value Ref Range    Drug Screen Comment: see below    Ethanol   Result Value Ref Range    Ethanol Lvl None Detected mg/dL   Ammonia   Result Value Ref Range    Ammonia 42 16 - 60 umol/L   EKG 12 Lead   Result Value Ref Range    Ventricular Rate 83 BPM    Atrial Rate 83 BPM    P-R Interval 284 ms    QRS Duration 90 ms    Q-T Interval 392 ms    QTc Calculation (Bazett) 460 ms    P Axis 37 degrees    R Axis 50 degrees    T Axis 92 degrees    Diagnosis       Sinus rhythm with 1st degree A-V blockPossible Inferior infarct , age undeterminedCannot rule out Anterior infarct , age undeterminedAbnormal ECG       SEP-1  Is this patient to be included in the SEP-1 Core Measure due to severe sepsis or septic shock? Yes   SEP-1 CORE MEASURE DATA      Sepsis Criteria   Severe Sepsis Criteria   Septic Shock Criteria     Must be confirmed or suspected to move forward with diagnosis of sepsis. Must meet 2:    [] Temperature > 100.9 F (38.3 C)        or < 96.8 F (36 C)  [] HR > 90  [] RR > 20  [] WBC > 12 or < 4 or 10% bands      AND:      [] Infection Confirmed or        Suspected. Must meet 1:    [] Lactate > 2       or   [] Signs of Organ Dysfunction:    - SBP < 90 or MAP < 65  - Altered mental status  - Creatinine > 2 or increased from      baseline  - Urine Output < 0.5 ml/kg/hr  - Bilirubin > 2  - INR > 1.5 (not anticoagulated)  - Platelets < 628,076  - Acute Respiratory Failure as     evidenced by new need for NIPPV     or mechanical ventilation      [] No criteria met for Severe Sepsis. Must meet 1:    [] Lactate > 4        or   [] SBP < 90 or MAP < 65 for at        least two readings in the first        hour after fluid bolus        administration      [] Vasopressors initiated (if hypotension persists after fluid resuscitation)        [] No criteria met for Septic Shock. Patient Vitals for the past 6 hrs:   BP Temp Pulse Resp SpO2   10/03/22 1200 (!) 131/47 97.8 °F (36.6 °C) 80 17 93 %   10/03/22 1400 (!) 121/48 -- 82 15 93 %   10/03/22 1500 -- -- 78 21 94 %   10/03/22 1600 (!) 118/48 -- 79 16 (!) 87 %      Recent Labs     10/01/22  0428 10/03/22  0357   WBC 14.4* 13.3*   CREATININE 9.4* 10.6*    300         Time Severe Sepsis Identified: 1030 am    Fluid Resuscitation Rational: less than 30mL/kg because of a history of ESRD or stage IV/V CKD. Repeat lactate level: not indicated due to initial lactate < 2    Reassessment Exam:   Not applicable. Patient does not have septic shock. MDM and ED Course  Patient presented to the emergency department with reported confusion and shortness of breath.   He was just transferred to a new nursing home after an inpatient stay at [facility] [employer]  They state he was in good health last night when he went to bed, but woke up like this this morning. Patient was unable to communicate verbally with me, but this appears to be his new baseline (since the hospital stay) per daughter. He was hypoxic on arrival and did not stabilize with nasal cannula O2. SpO2 improved with NRB and stabilized with BiPAP. He also has improved breathing with less exertion. Patient's presentation suggests hypervolemia from CHF and CRF. He just had peritoneal dialysis yesterday, but was only started on PD earlier this month at [facility]. I consulted Dr. Beatriz Falcon (nephrologist) for dialysis, which he has arranged. He has also seen the patient at bedside. In addition, patient meets SIRS criteria for sepsis with elevated RR and WBC. After discussing with Dr. Beatriz Falcon, we agree it would be best to start empiric treatment (IV antibiotics already ordered) and obtain PD fluid to evaluate for peritonitis. There is no other potential source found on work up. I consulted Dr. Pal Guajardo through [facility] for admission. He reviewed the patient's history, physical exam, labs, imaging studies, and emergency department course and has decided to admit Madyson Stanford for further evaluation and treatment. As I have deemed necessary from their history, physical, and studies, I have considered and evaluated Madyson Stanford for the following diagnoses:  ACUTE CORONARY SYNDROME, SEVERE CHRONIC OBSTRUCTIVE PULMONARY DISEASE, RESPIRATORY FAILURE/ARREST, ACUTE CONGESTIVE HEART FAILURE, CARDIAC TAMPONADE, PNEUMONIA, PNEUMOTHORAX, PERICARDITIS, PULMONARY EMBOLISM, AORTIC DISSECTION, ARDS        The total Critical Care time is 55 minutes which excludes separately billable procedures. Final Impression  1. Acute congestive heart failure, unspecified heart failure type ([facility] [address]    2.  Acute renal failure superimposed on chronic kidney disease, on chronic dialysis, unspecified acute renal failure type ([facility] [address]    3. SIRS (systemic inflammatory response syndrome) (HCC)        Blood pressure 138/70, pulse 82, temperature 97.8 °F (36.6 °C), resp. rate 20, SpO2 93 %. Disposition:  DISPOSITION Decision To Admit 09/21/2022 12:08:13 PM      This chart was generated using the [address] [place] [address] dictation system. I created this record but it may contain dictation errors given the limitations of this technology.        Ami Munoz MD  10/03/22 5698

## 2022-09-21 NOTE — ED NOTES
Patient transported to CT with RN and RT. Lift sheet placed under patient due to inability to move at this time. Patient returned to room and placed back on the monitor with audible alarms. Patient is currently on Bipap at this time. Lab states ABG is not able to be ran, respiratory attempting to collect new specimen. Patient resting in bed at this time.       Adriana Melchor RN  09/21/22 5794

## 2022-09-22 PROBLEM — J18.9 MULTIFOCAL PNEUMONIA: Status: ACTIVE | Noted: 2022-09-22

## 2022-09-22 PROBLEM — E87.70 HYPERVOLEMIA: Status: ACTIVE | Noted: 2022-09-22

## 2022-09-22 LAB
ALBUMIN SERPL-MCNC: 2.9 G/DL (ref 3.4–5)
ANION GAP SERPL CALCULATED.3IONS-SCNC: 16 MMOL/L (ref 3–16)
BASOPHILS ABSOLUTE: 0.1 K/UL (ref 0–0.2)
BASOPHILS RELATIVE PERCENT: 1.1 %
BUN BLDV-MCNC: 32 MG/DL (ref 7–20)
CALCIUM SERPL-MCNC: 8.3 MG/DL (ref 8.3–10.6)
CHLORIDE BLD-SCNC: 96 MMOL/L (ref 99–110)
CO2: 25 MMOL/L (ref 21–32)
CREAT SERPL-MCNC: 7.9 MG/DL (ref 0.8–1.3)
EOSINOPHILS ABSOLUTE: 0.2 K/UL (ref 0–0.6)
EOSINOPHILS RELATIVE PERCENT: 1.9 %
ESTIMATED AVERAGE GLUCOSE: 139.9 MG/DL
GFR AFRICAN AMERICAN: 8
GFR NON-AFRICAN AMERICAN: 7
GLUCOSE BLD-MCNC: 127 MG/DL (ref 70–99)
GLUCOSE BLD-MCNC: 142 MG/DL (ref 70–99)
GLUCOSE BLD-MCNC: 142 MG/DL (ref 70–99)
GLUCOSE BLD-MCNC: 146 MG/DL (ref 70–99)
GLUCOSE BLD-MCNC: 175 MG/DL (ref 70–99)
GLUCOSE BLD-MCNC: 322 MG/DL (ref 70–99)
HBA1C MFR BLD: 6.5 %
HCT VFR BLD CALC: 27.5 % (ref 40.5–52.5)
HEMATOLOGY PATH CONSULT: NO
HEMATOLOGY PATH CONSULT: NORMAL
HEMOGLOBIN: 9.1 G/DL (ref 13.5–17.5)
LYMPHOCYTES ABSOLUTE: 0.9 K/UL (ref 1–5.1)
LYMPHOCYTES RELATIVE PERCENT: 8.4 %
MCH RBC QN AUTO: 37.9 PG (ref 26–34)
MCHC RBC AUTO-ENTMCNC: 33.1 G/DL (ref 31–36)
MCV RBC AUTO: 114.5 FL (ref 80–100)
MONOCYTES ABSOLUTE: 0.9 K/UL (ref 0–1.3)
MONOCYTES RELATIVE PERCENT: 7.6 %
NEUTROPHILS ABSOLUTE: 9.2 K/UL (ref 1.7–7.7)
NEUTROPHILS RELATIVE PERCENT: 81 %
PDW BLD-RTO: 14.9 % (ref 12.4–15.4)
PERFORMED ON: ABNORMAL
PHOSPHORUS: 7 MG/DL (ref 2.5–4.9)
PLATELET # BLD: 302 K/UL (ref 135–450)
PMV BLD AUTO: 7.1 FL (ref 5–10.5)
POTASSIUM SERPL-SCNC: 3.9 MMOL/L (ref 3.5–5.1)
PROCALCITONIN: 1.02 NG/ML (ref 0–0.15)
RBC # BLD: 2.4 M/UL (ref 4.2–5.9)
SODIUM BLD-SCNC: 137 MMOL/L (ref 136–145)
VANCOMYCIN RANDOM: 33.4 UG/ML
WBC # BLD: 11.3 K/UL (ref 4–11)

## 2022-09-22 PROCEDURE — 94640 AIRWAY INHALATION TREATMENT: CPT

## 2022-09-22 PROCEDURE — 85025 COMPLETE CBC W/AUTO DIFF WBC: CPT

## 2022-09-22 PROCEDURE — 97162 PT EVAL MOD COMPLEX 30 MIN: CPT

## 2022-09-22 PROCEDURE — 2000000000 HC ICU R&B

## 2022-09-22 PROCEDURE — 90945 DIALYSIS ONE EVALUATION: CPT

## 2022-09-22 PROCEDURE — 97530 THERAPEUTIC ACTIVITIES: CPT

## 2022-09-22 PROCEDURE — 99291 CRITICAL CARE FIRST HOUR: CPT | Performed by: INTERNAL MEDICINE

## 2022-09-22 PROCEDURE — 6360000002 HC RX W HCPCS: Performed by: STUDENT IN AN ORGANIZED HEALTH CARE EDUCATION/TRAINING PROGRAM

## 2022-09-22 PROCEDURE — 80202 ASSAY OF VANCOMYCIN: CPT

## 2022-09-22 PROCEDURE — 6360000002 HC RX W HCPCS: Performed by: INTERNAL MEDICINE

## 2022-09-22 PROCEDURE — 2700000000 HC OXYGEN THERAPY PER DAY

## 2022-09-22 PROCEDURE — 3E1M39Z IRRIGATION OF PERITONEAL CAVITY USING DIALYSATE, PERCUTANEOUS APPROACH: ICD-10-PCS | Performed by: INTERNAL MEDICINE

## 2022-09-22 PROCEDURE — 84145 PROCALCITONIN (PCT): CPT

## 2022-09-22 PROCEDURE — 2580000003 HC RX 258: Performed by: STUDENT IN AN ORGANIZED HEALTH CARE EDUCATION/TRAINING PROGRAM

## 2022-09-22 PROCEDURE — 97535 SELF CARE MNGMENT TRAINING: CPT

## 2022-09-22 PROCEDURE — 6370000000 HC RX 637 (ALT 250 FOR IP): Performed by: STUDENT IN AN ORGANIZED HEALTH CARE EDUCATION/TRAINING PROGRAM

## 2022-09-22 PROCEDURE — 80069 RENAL FUNCTION PANEL: CPT

## 2022-09-22 PROCEDURE — 99233 SBSQ HOSP IP/OBS HIGH 50: CPT | Performed by: INTERNAL MEDICINE

## 2022-09-22 PROCEDURE — 94761 N-INVAS EAR/PLS OXIMETRY MLT: CPT

## 2022-09-22 PROCEDURE — 36415 COLL VENOUS BLD VENIPUNCTURE: CPT

## 2022-09-22 PROCEDURE — 6370000000 HC RX 637 (ALT 250 FOR IP): Performed by: INTERNAL MEDICINE

## 2022-09-22 PROCEDURE — 97166 OT EVAL MOD COMPLEX 45 MIN: CPT

## 2022-09-22 RX ORDER — FUROSEMIDE 10 MG/ML
80 INJECTION INTRAMUSCULAR; INTRAVENOUS DAILY
Status: DISCONTINUED | OUTPATIENT
Start: 2022-09-22 | End: 2022-09-25

## 2022-09-22 RX ORDER — IPRATROPIUM BROMIDE AND ALBUTEROL SULFATE 2.5; .5 MG/3ML; MG/3ML
1 SOLUTION RESPIRATORY (INHALATION) 4 TIMES DAILY
Status: DISCONTINUED | OUTPATIENT
Start: 2022-09-22 | End: 2022-10-10 | Stop reason: HOSPADM

## 2022-09-22 RX ADMIN — Medication 2 PUFF: at 08:24

## 2022-09-22 RX ADMIN — INSULIN GLARGINE 40 UNITS: 100 INJECTION, SOLUTION SUBCUTANEOUS at 20:31

## 2022-09-22 RX ADMIN — GENTAMICIN SULFATE: 1 CREAM TOPICAL at 19:04

## 2022-09-22 RX ADMIN — SODIUM CHLORIDE, SODIUM LACTATE, CALCIUM CHLORIDE, MAGNESIUM CHLORIDE AND DEXTROSE 2000 ML: 4.25; 538; 448; 18.3; 5.08 INJECTION, SOLUTION INTRAPERITONEAL at 21:02

## 2022-09-22 RX ADMIN — IPRATROPIUM BROMIDE AND ALBUTEROL SULFATE 1 AMPULE: 2.5; .5 SOLUTION RESPIRATORY (INHALATION) at 21:12

## 2022-09-22 RX ADMIN — ATORVASTATIN CALCIUM 80 MG: 80 TABLET, FILM COATED ORAL at 20:30

## 2022-09-22 RX ADMIN — SODIUM CHLORIDE, SODIUM LACTATE, CALCIUM CHLORIDE, MAGNESIUM CHLORIDE AND DEXTROSE 2000 ML: 4.25; 538; 448; 18.3; 5.08 INJECTION, SOLUTION INTRAPERITONEAL at 09:00

## 2022-09-22 RX ADMIN — GENTAMICIN SULFATE: 1 CREAM TOPICAL at 12:55

## 2022-09-22 RX ADMIN — Medication 2 PUFF: at 21:13

## 2022-09-22 RX ADMIN — IPRATROPIUM BROMIDE AND ALBUTEROL SULFATE 1 AMPULE: 2.5; .5 SOLUTION RESPIRATORY (INHALATION) at 12:33

## 2022-09-22 RX ADMIN — ASPIRIN 81 MG 81 MG: 81 TABLET ORAL at 10:04

## 2022-09-22 RX ADMIN — HEPARIN SODIUM 5000 UNITS: 5000 INJECTION INTRAVENOUS; SUBCUTANEOUS at 21:28

## 2022-09-22 RX ADMIN — SODIUM CHLORIDE, SODIUM LACTATE, CALCIUM CHLORIDE, MAGNESIUM CHLORIDE AND DEXTROSE 2000 ML: 4.25; 538; 448; 18.3; 5.08 INJECTION, SOLUTION INTRAPERITONEAL at 21:05

## 2022-09-22 RX ADMIN — SODIUM CHLORIDE, SODIUM LACTATE, CALCIUM CHLORIDE, MAGNESIUM CHLORIDE AND DEXTROSE 2000 ML: 4.25; 538; 448; 18.3; 5.08 INJECTION, SOLUTION INTRAPERITONEAL at 15:00

## 2022-09-22 RX ADMIN — Medication 10 ML: at 21:28

## 2022-09-22 RX ADMIN — INSULIN LISPRO 12 UNITS: 100 INJECTION, SOLUTION INTRAVENOUS; SUBCUTANEOUS at 20:31

## 2022-09-22 RX ADMIN — FUROSEMIDE 80 MG: 10 INJECTION, SOLUTION INTRAMUSCULAR; INTRAVENOUS at 10:04

## 2022-09-22 RX ADMIN — IPRATROPIUM BROMIDE AND ALBUTEROL SULFATE 1 AMPULE: 2.5; .5 SOLUTION RESPIRATORY (INHALATION) at 16:30

## 2022-09-22 RX ADMIN — HEPARIN SODIUM 5000 UNITS: 5000 INJECTION INTRAVENOUS; SUBCUTANEOUS at 15:00

## 2022-09-22 RX ADMIN — Medication 10 ML: at 09:00

## 2022-09-22 RX ADMIN — SODIUM CHLORIDE, SODIUM LACTATE, CALCIUM CHLORIDE, MAGNESIUM CHLORIDE AND DEXTROSE 2000 ML: 4.25; 538; 448; 18.3; 5.08 INJECTION, SOLUTION INTRAPERITONEAL at 13:00

## 2022-09-22 RX ADMIN — HEPARIN SODIUM 5000 UNITS: 5000 INJECTION INTRAVENOUS; SUBCUTANEOUS at 06:21

## 2022-09-22 ASSESSMENT — ENCOUNTER SYMPTOMS
ABDOMINAL DISTENTION: 1
EYES NEGATIVE: 1
SHORTNESS OF BREATH: 1
BACK PAIN: 1
TROUBLE SWALLOWING: 0
ALLERGIC/IMMUNOLOGIC NEGATIVE: 1

## 2022-09-22 ASSESSMENT — PAIN SCALES - GENERAL
PAINLEVEL_OUTOF10: 0
PAINLEVEL_OUTOF10: 0

## 2022-09-22 NOTE — CONSULTS
PALLIATIVE MEDICINE CONSULTATION     Patient name:Michael Green   HBR:[id number]    :1945  Room/Bed:ICU-3903/3903-01   LOS: 1 day         Date of consult:2022    Consult Information  Palliative Medicine Consult performed by: TRAVON Canchola CNP, CNP    Inpatient consult to Palliative Care  Consult performed by: TRAVON Canchola CNP  Consult ordered by: Jairo Murphy DO  Reason for consult: [address] and code status             ASSESSMENT/RECOMMENDATIONS     69 y.o. male with AMS and debility      Symptom Management:  AMS- Pt slow to respond but alert and oriented x 4 today  Debility- Pt came drom SNF he has increased weakness   Goals of Care- Pt wants to continue aggressive care and is hopeful that he will return home after rehab. FULL code     Patient/Family Goals of Care :    Pt wants to continue aggressive care and is hopeful that he will return home after rehab. FULL code     Disposition/Discharge Plan:   pending    Advance Directives:  Surrogate Decision Maker: Rafaela-spouse  Code status:  Full Code    Case discussed with: patient, floor RN  Thank you for allowing us to participate in the care of this patient. HISTORY     CC: AMS  HPI: The patient is a 69 y.o. male with significant past medical history fo ESRD on PD, interstitial lung disease, diastolic HF ( Echo with EF 50-55%), NSTEMI, CAD s/p CABG , T2DM, HTN, HLD, hypothyroidism who presented with AMS. Palliative Medicine SymptomScreening/ROS:    Review of Systems   Constitutional:  Positive for activity change, appetite change and unexpected weight change. HENT:  Negative for trouble swallowing. Eyes: Negative. Respiratory:  Positive for shortness of breath. Cardiovascular:  Positive for leg swelling. Gastrointestinal:  Positive for abdominal distention. Endocrine: Negative. Genitourinary: Negative. Musculoskeletal:  Positive for arthralgias, back pain and myalgias.    Skin: Positive for pallor. Allergic/Immunologic: Negative. Neurological:  Positive for weakness. Psychiatric/Behavioral:  Negative for confusion. Palliative Performance Scale:     [x] 60%  Amb reduced; Sig dz. Can't do hobbies/housework; Intake normal or reduced, Occasional assist; LOC full/confusion   [] 50%  Mainly sit/lie; Extensive disease. Mainly assist, Intake normal or reduced; Occasional assist; LOC full/confusion   [] 40%  Mainly in bed; Extensive disease; Mainly assist; Intake normal or reduced; Occasional assist; LOC full/confusion   [] 30%  Bed bound, Extensive disease; Total care; Intake reduced; LOC full/confusion   [] 20%  Bed bound; Extensive disease; Total care; Intake minimal; Drowsy/coma   [] 10%  Bed bound; Extensive disease; Total care; Mouth care only; Drowsy/coma   []  0%   Death       Home med list and hospital medications reviewed in chart as of 9/22/2022     EXAM     Vitals:    09/22/22 0830   BP:    Pulse: 76   Resp:    Temp: 97.1 °F (36.2 °C)   SpO2:        Physical Exam  Constitutional:       Appearance: He is ill-appearing. HENT:      Head: Normocephalic and atraumatic. Nose: Nose normal.      Mouth/Throat:      Mouth: Mucous membranes are dry. Eyes:      Pupils: Pupils are equal, round, and reactive to light. Cardiovascular:      Rate and Rhythm: Normal rate. Pulses: Normal pulses. Heart sounds: No murmur heard. No gallop. Pulmonary:      Effort: Pulmonary effort is normal.   Chest:      Chest wall: Tenderness present. Abdominal:      General: There is distension. Musculoskeletal:      Cervical back: Neck supple. Right lower leg: Edema present. Left lower leg: Edema present. Skin:     General: Skin is dry. Coloration: Skin is pale. Neurological:      Mental Status: He is alert and oriented to person, place, and time. Mental status is at baseline.    Psychiatric:         Mood and Affect: Mood normal.         Behavior: Behavior normal.         Thought Content:  Thought content normal.         Judgment: Judgment normal.              Current labs in the epic chart reviewed as of 9/22/2022   Review of previous notes, admits, labs, radiology and testing relevant to this consult done in this chart today 9/22/2022      Total time: 60 minutes  >50% of time spent counseling patient at bedside or POA/family member if applicable , reviewing information and discussing care, coordinating with care team  Signed By: Electronically signed by TRAVON Meyers CNP on 9/22/2022 at 11:02 AM  Palliative Medicine   [phone]    September 22, 2022

## 2022-09-22 NOTE — PROGRESS NOTES
Hospitalist Progress Note    Name:  Aron Fallon    /Age/Sex: 1945  (69 y.o. male)  MRN & CSN:  [id number] & [id number]    PCP: Tracy Souza MD    Date of Admission: 2022    Patient Status:  Inpatient     Chief Complaint: Altered mental status    Hospital Course: 71-year-old man was admitted for altered mental status. Found to be hypoxic and hypercapnic on admission. Placed on BiPAP with improvement. Nephrology following as patient has ESRD on peritoneal dialysis. Pulmonology following. Subjective: Today is:  Hospital Day: 2. Patient seen and examined in [id number]. BiPAP mask off. Patient states his breathing is okay. Alert and oriented x3. Does not member why he came to the hospital.  Denies any pain.       Medications:  Reviewed    Infusion Medications    sodium chloride      dextrose       Scheduled Medications    furosemide  80 mg IntraVENous Daily    cefepime  1,000 mg IntraVENous Q24H    ipratropium-albuterol  1 ampule Inhalation 4x daily    vancomycin (VANCOCIN) intermittent dosing (placeholder)   Other RX Placeholder    dianeal lo-eben 4.25%  2,000 mL IntraPERitoneal 4x daily    gentamicin   Topical Daily    sodium chloride flush  5-40 mL IntraVENous 2 times per day    aspirin  81 mg Oral Daily    atorvastatin  80 mg Oral Nightly    mometasone-formoterol  2 puff Inhalation BID    [START ON 2022] buPROPion  100 mg Oral Every Other Day    levothyroxine  50 mcg Oral Daily    insulin lispro  0-16 Units SubCUTAneous Q4H    insulin glargine  40 Units SubCUTAneous Nightly    heparin (porcine)  5,000 Units SubCUTAneous 3 times per day     PRN Meds: sodium chloride flush, sodium chloride, ondansetron **OR** ondansetron, polyethylene glycol, acetaminophen **OR** acetaminophen, albuterol, dextrose bolus **OR** dextrose bolus, glucagon (rDNA), dextrose      Intake/Output Summary (Last 24 hours) at 2022 1440  Last data filed at 2022 0830  Gross per 24 hour   Intake --   Output 3100 ml   Net -3100 ml       Physical Exam Performed:    BP (!) 106/50   Pulse 76   Temp 97.1 °F (36.2 °C)   Resp 18   Ht 5' 10\" (1.778 m)   Wt 200 lb 2.8 oz (90.8 kg)   SpO2 99%   BMI 28.72 kg/m²     General appearance: No apparent distress, appears stated age and cooperative. Somewhat confused, but more conversational today. HEENT: Pupils equal, round, and reactive to light. Conjunctivae/corneas clear. Neck: Supple, with full range of motion. No jugular venous distention. Trachea midline. Respiratory:  Normal respiratory effort. Clear to auscultation, bilaterally without Rales/Wheezes/Rhonchi. Cardiovascular: Regular rate and rhythm with normal S1/S2 without murmurs, rubs or gallops. Trace pitting edema LE bilaterally. Abdomen: Soft, non-tender, non-distended with normal bowel sounds. : No CVA tenderness. Musculoskeletal: No clubbing or cyanosis. Full range of motion without deformity. Skin: Skin color, texture, turgor normal.  No rashes or lesions. Neurologic:  Neurovascularly intact without any focal sensory/motor deficits. Cranial nerves: II-XII intact, grossly non-focal.  Psychiatric: Alert and oriented, thought content appropriate, normal insight  Peripheral Pulses: +2 palpable, equal bilaterally       Labs:   Recent Labs     09/21/22  0857 09/22/22  0439   WBC 12.4* 11.3*   HGB 9.7* 9.1*   HCT 29.3* 27.5*    302     Recent Labs     09/21/22  0857 09/22/22  0439    137   K 3.9 3.9   CL 95* 96*   CO2 31 25   BUN 35* 32*   CREATININE 8.1* 7.9*   CALCIUM 8.8 8.3   PHOS  --  7.0*     Recent Labs     09/21/22  0857   AST 28   ALT 44*   BILITOT 0.3   ALKPHOS 129     No results for input(s): INR in the last 72 hours.   Recent Labs     09/21/22  0857   TROPONINI 0.06*       Urinalysis:      Lab Results   Component Value Date/Time    NITRU Negative 09/21/2022 03:18 PM    WBCUA 3 09/21/2022 03:18 PM    BACTERIA None Seen 09/21/2022 03:18 PM    RBCUA 5 09/21/2022 03:18 PM BLOODU SMALL 09/21/2022 03:18 PM    SPECGRAV 1.015 09/21/2022 03:18 PM    GLUCOSEU 250 09/21/2022 03:18 PM    GLUCOSEU >=1000 03/26/2012 10:22 PM       Radiology:  XR CHEST PORTABLE   Final Result   CHF with pulmonary edema. CT HEAD WO CONTRAST   Preliminary Result   No definite evidence of acute intracranial abnormality on a study   significantly limited by motion/streak artifact as described above.                  Assessment/Plan:    Active Hospital Problems    Diagnosis     Acute on chronic respiratory failure with hypoxia and hypercapnia (HCC) [J96.21, J96.22]      Priority: High    Multifocal pneumonia [J18.9]      Priority: Medium    Hypervolemia [E87.70]      Priority: Medium    Moderate episode of recurrent major depressive disorder (HCC) [F33.1]      Priority: Medium    BJ treated with BiPAP [G47.33]      Priority: Medium    Acute metabolic encephalopathy [T45.31]      Priority: Medium    End-stage renal disease on peritoneal dialysis ([facility] [address] [N18.6, Z99.2]     Type 2 diabetes mellitus with hyperglycemia, with long-term current use of insulin (HCC) [E11.65, Z79.4]     Hypertension associated with stage 5 chronic kidney disease due to type 2 diabetes mellitus ([address] [E11.22, I12.0, N18.5]     Chronic diastolic heart failure (HCC) [I50.32]     Acute respiratory failure with hypoxia and hypercapnia (HCC) [J96.01, J96.02]     Arthritis with psoriasis (HCC) [L40.50]     Psoriatic arthropathy ([facility] [facility] [L40.50]     Sleep apnea [G47.30]          Hospital Day: 2    This is a 69 y.o. male who presented to [facility] on 9/21/2022 and is being treated for:    Acute on chronic hypoxic and hypercapnic respiratory failure - improving  -Despite slight elevation in white count and tachycardia, do not believe this is infectious process, rather predominantly pulmonary vs HF related  -continue lasix at 40mg PO QD; monitor UOP  -Continue antibiotics; monitor BCx  -BiPAP at night; can have off during day  -Inhaler therapy  -Palliative consulted; appreciate recommendations  -Pulmonology consulted; appreciate recommendations     Acute metabolic encephalopathy - improved  -See above  -Ammonia not elevated  -Hold off on CT head at the moment; if there are any acute changes, CT head will be ordered  -Supportive care  -Daily labs; replace electrolytes as needed     Type 2 diabetes  -lantus  -ssi     CKD 5 on dialysis  -Peritoneal dialysis  -Daily labs  -Nephrology consulted; appreciate recommendations     HTN  -Continue home antihypertensives    Depression  -Continue Wellbutrin     Hypothyroid  -synthroid      DVT ppx: Heparin  GI ppx: Diet/Tube Feeds  Diet: ADULT ORAL NUTRITION SUPPLEMENT; Breakfast, Dinner; Renal Oral Supplement  ADULT DIET; Regular; 4 carb choices (60 gm/meal); Low Phosphorus (Less than 1000 mg)  Code Status: Full Code    Disposition:  Estimated Discharge Date: 1-2 days  Barriers to Discharge:  hypercapnia      PT/OT Eval Status: ordered      Dioni Hanna DO  9/22/2022  2:40 PM      Please note that some part of this chart was generated using Dragon dictation software. Although every effort was made to ensure the accuracy of this automated transcription, some errors in transcription may have occurred inadvertently. If you may need any clarification, please do not hesitate to contact me through [facility].

## 2022-09-22 NOTE — CARE COORDINATION
Discharge Planning     The SW called the admission coordinator Miranda Collazo at The [address] [place] [address] inquiring if Pre-Cert needs to be started for the patient to return. Miranda Collazo stated she will check and follow up with the SW.

## 2022-09-22 NOTE — CARE COORDINATION
Discharge Planning. Pre-cert has been started for the patient to return to [facility]. Juan M Bravo the admission coordinator will continue to update the SW once pre-cert has been obtained.     Electronically signed by JOE Wheatley on 9/22/2022 at 3:54 PM

## 2022-09-22 NOTE — PROGRESS NOTES
No changes overnight, tolerated BiPap well. PD per continuous cycler without complications. Remains alert and oriented to self. Minimal UOP.

## 2022-09-22 NOTE — PROGRESS NOTES
Office : [phone]     Fax :[phone]       Nephrology progress  Note      Patient's Name: Selina Sharma  10:56 AM  9/22/2022    Reason for Consult:  ESRD on peritoneal dialysis. Requesting Physician:  Maida Dominguez MD      Chief Complaint:    Chief Complaint   Patient presents with    Altered Mental Status     Patient arrives via [place] from [facility], altered, last seen normal last night, started peritoneal dialysis last night, O2 low on normal 3L, placed on NRB with some improvement, increased WOB noted           History of Present Ilness:    Selina Sharma is a 69 y.o. male with prior history of ESRD on peritoneal dialysis , DM 2, HTN who was sent from [facility] with main c/o SOB. In ER he was found to be hypoxic. CXR shows volume overload. On BiPAP now. Still makes urine . Also had elevated WBC. No abdominal pain. No fever   BS Elevated.     Interval hx :    Feels much better   Off BiPAP  Had PD yesterday with 2 Liter UF       I/O last 3 completed shifts:  In: -   Out: 2215 [Urine:115]    Past Medical History:   Diagnosis Date    Allergic rhinitis due to other allergen 7/12/2010    Coronary atherosclerosis of unspecified type of vessel, native or graft 7/12/2010    Diabetic eye exam ([facility] 04/29/2015    Diabetic eye exam ([address] 5/5/2016    [place] eye    Generalized osteoarthrosis, involving multiple sites 7/12/2010    Other psoriasis 7/12/2010    Pneumonia     Prolonged emergence from general anesthesia     Psoriatic arthropathy ([address] 7/12/2010    Type II or unspecified type diabetes mellitus without mention of complication, not stated as uncontrolled 7/12/2010    Unspecified essential hypertension 7/12/2010    Unspecified sleep apnea 7/12/2010       Past Surgical History: Procedure Laterality Date    CARPAL TUNNEL RELEASE      s/p    CATARACT REMOVAL      CORONARY ARTERY BYPASS GRAFT      JOINT REPLACEMENT      LAPAROSCOPY INSERTION PERITONEAL CATHETER N/A 10/26/2020    LAPAROSCOPIC PERITONEAL DIALYSIS CATHETER PLACEMENT; LAPAROSCOPIC OMENTOPEXY performed by Adam Cobb DO at [facility] OR       Family History   Problem Relation Age of Onset    Diabetes Mother     Heart Failure Mother     Coronary Art Dis Father         reports that he quit smoking about 41 years ago. He started smoking about 60 years ago. He has a 18.00 pack-year smoking history. He has never used smokeless tobacco. He reports that he does not drink alcohol and does not use drugs.         Allergies:  Penicillins, Sulfa antibiotics, Tazorac [tazarotene], Clindamycin, and Clindamycin/lincomycin    Current Medications:    furosemide (LASIX) injection 80 mg, Daily  cefepime (MAXIPIME) 1000 mg IVPB minibag, Q24H  ipratropium-albuterol (DUONEB) nebulizer solution 1 ampule, 4x daily  vancomycin (VANCOCIN) intermittent dosing (placeholder), RX Placeholder  dianeal lo-eben 4.25% 2,000 mL (Patient Supplied), 4x daily  gentamicin (GARAMYCIN) 0.1 % cream, Daily  sodium chloride flush 0.9 % injection 5-40 mL, 2 times per day  sodium chloride flush 0.9 % injection 5-40 mL, PRN  0.9 % sodium chloride infusion, PRN  ondansetron (ZOFRAN-ODT) disintegrating tablet 4 mg, Q8H PRN   Or  ondansetron (ZOFRAN) injection 4 mg, Q6H PRN  polyethylene glycol (GLYCOLAX) packet 17 g, Daily PRN  acetaminophen (TYLENOL) tablet 650 mg, Q6H PRN   Or  acetaminophen (TYLENOL) suppository 650 mg, Q6H PRN  albuterol (PROVENTIL) nebulizer solution 2.5 mg, Q4H PRN  aspirin chewable tablet 81 mg, Daily  atorvastatin (LIPITOR) tablet 80 mg, Nightly  mometasone-formoterol (DULERA) 200-5 MCG/ACT inhaler 2 puff, BID  [START ON 9/23/2022] buPROPion (WELLBUTRIN) tablet 100 mg, Every Other Day  levothyroxine (SYNTHROID) tablet 50 mcg, Daily  dextrose bolus 10% 125 mL, PRN   Or  dextrose bolus 10% 250 mL, PRN  glucagon (rDNA) injection 1 mg, PRN  dextrose 10 % infusion, Continuous PRN  insulin lispro (HUMALOG) injection vial 0-16 Units, Q4H  insulin glargine (LANTUS) injection vial 40 Units, Nightly  heparin (porcine) injection 5,000 Units, 3 times per day      Review of Systems:   14 point ROS obtained but were negative except mentioned in HPI      Physical exam:     Vitals:  BP (!) 106/50   Pulse 76   Temp 97.1 °F (36.2 °C)   Resp 18   Ht 5' 10\" (1.778 m)   Wt 200 lb 2.8 oz (90.8 kg)   SpO2 99%   BMI 28.72 kg/m²   Constitutional:  OAA X3 NAD  Skin: no rash, turgor wnl  Heent:  eomi, mmm  Neck: no bruits or jvd noted  Cardiovascular:  S1, S2 without m/r/g  Respiratory: b/l crackles   Abdomen:  +bs, soft, nt, nd  Ext: mild  lower extremity edema  Psychiatric: mood and affect appropriate  Musculoskeletal:  Rom, muscular strength intact    Labs:  CBC:   Recent Labs     09/21/22  0857 09/22/22  0439   WBC 12.4* 11.3*   HGB 9.7* 9.1*    302     BMP:    Recent Labs     09/21/22  0857 09/22/22  0439    137   K 3.9 3.9   CL 95* 96*   CO2 31 25   BUN 35* 32*   CREATININE 8.1* 7.9*   GLUCOSE 385* 142*     Ca/Mg/Phos:   Recent Labs     09/21/22  0857 09/22/22  0439   CALCIUM 8.8 8.3   PHOS  --  7.0*     Hepatic:   Recent Labs     09/21/22  0857   AST 28   ALT 44*   BILITOT 0.3   ALKPHOS 129     Troponin:   Recent Labs     09/21/22  0857   TROPONINI 0.06*     BNP: No results for input(s): BNP in the last 72 hours. Lipids: No results for input(s): CHOL, TRIG, HDL, LDLCALC, LABVLDL in the last 72 hours. ABGs:   Recent Labs     09/21/22  2110   PHART 7.304*   PO2ART 102.0   XPU2GMQ 58.2*     INR: No results for input(s): INR in the last 72 hours.   UA:  Recent Labs     09/21/22  1518   COLORU DARK YELLOW*   CLARITYU CLOUDY*   GLUCOSEU 250*   BILIRUBINUR Negative   KETUA Negative   SPECGRAV 1.015   BLOODU SMALL*   PHUR 6.0   PROTEINU 300   UROBILINOGEN 0.2   NITRU Negative LEUKOCYTESUR TRACE*   LABMICR YES   Aziza Deeds NotGiven      Urine Microscopic:   Recent Labs     09/21/22  1518   BACTERIA None Seen   HYALCAST 1   WBCUA 3   RBCUA 5*   EPIU 2     Urine Culture: No results for input(s): LABURIN in the last 72 hours. Urine Chemistry: No results for input(s): Sonu Leakey, PROTEINUR, NAUR in the last 72 hours. IMAGING:  XR CHEST PORTABLE   Final Result   CHF with pulmonary edema. CT HEAD WO CONTRAST   Preliminary Result   No definite evidence of acute intracranial abnormality on a study   significantly limited by motion/streak artifact as described above. Assessment/Plan :      1. ESRD   Volume overload   Better     Continue  CCPD now with 4.25 % for effective UF    Send PD fluid for cell count and culture     Got  vanc and cefepime empirically     2. HTN  BP controlled       3. Anemia in ESRD   On epogen     4. Acid- base disorder. monitor     5. Electrolytes.  Monitor                   D/w primary team      Thank you for allowing us to participate in care of Samara Mom         Electronically signed by: Ziyad Davenport MD, 9/22/2022, 10:56 AM      [facility] [address]  Office : [phone]  Fax :[phone]

## 2022-09-22 NOTE — PROGRESS NOTES
[facility] Department   Phone: [phone]    Occupational Therapy    [x] Initial Evaluation            [] Daily Treatment Note         [] Discharge Summary      Patient: Nany Moss   : 1945   MRN: [id number]   Date of Service:  2022    Admitting Diagnosis:  Acute on chronic respiratory failure with hypoxia and hypercapnia ([address]  Current Admission Summary: Per ED note, \"Patient arrives via [facility] From [facility], new patient to them, started peritoneal dialysis for the first time last night, was at his normal A&O x4 mental status last night, this AM, AMS, not verbally responding, found to by hypoxic in the 80's on normal 3L/NC, staff attempted 6L no change, currently on NRB at 95%. \"  Past Medical History:  has a past medical history of Allergic rhinitis due to other allergen, Coronary atherosclerosis of unspecified type of vessel, native or graft, Diabetic eye exam ([address] Diabetic eye exam ([address] Generalized osteoarthrosis, involving multiple sites, Other psoriasis, Pneumonia, Prolonged emergence from general anesthesia, Psoriatic arthropathy ([address] Type II or unspecified type diabetes mellitus without mention of complication, not stated as uncontrolled, Unspecified essential hypertension, and Unspecified sleep apnea. Past Surgical History:  has a past surgical history that includes Coronary artery bypass graft; Carpal tunnel release; joint replacement; Cataract removal; and LAPAROSCOPY INSERTION PERITONEAL CATHETER (N/A, 10/26/2020). Discharge Recommendations: Nany Moss scored a  on the AM-PAC ADL Inpatient form. Current research shows that an AM-PAC score of 17 or less is typically not associated with a discharge to the patient's home setting.  Based on the patient's AM-PAC score and their current ADL deficits, it is recommended that the patient have 3-5 sessions per week of Occupational Therapy at d/c to increase the patient's independence. Please see assessment section for further patient specific details. If patient discharges prior to next session this note will serve as a discharge summary. Please see below for the latest assessment towards goals. DME Required For Discharge: DME to be determined at next level of care    Precautions/Restrictions: high fall risk  Weight Bearing Restrictions: no restrictions  [] Right Upper Extremity  [] Left Upper Extremity [] Right Lower Extremity  [] Left Lower Extremity     Required Braces/Orthotics: no braces required   [] Right  [] Left  Positional Restrictions:no positional restrictions    Pre-Admission Information   Lives With: spouse   (Spouse just had OHS, per pt report)  Type of Home: condo  Home Layout: one level, able to live on main level  Home Access: level entry  Bathroom Layout: walk in shower, handicap height toilet  Bathroom Equipment: grab bars in shower, shower chair, hand held shower head  Toilet Height: standard height, elevated height (has both)  Home Equipment: rolling walker, single point cane, reacher  Transfer Assistance: requires assistance  Ambulation Assistance:modified independent with use of RW  ADL Assistance: requires assistance with bathing, requires assistance with dressing, . Comment: A with LB ADLs  IADL Assistance: requires assistance with all homemaking tasks, wife & dtr were helping; dtr works  Active :        [] Yes  [x] No  Hand Dominance: [] Left  [x] Right  Current Employment: retired. Occupation:  at [employer]: [employer]  Recent Falls: No recent falls  Pt sleeps in \"lounger\"    Pt from SNF after stay at [facility] Only at SNF 1 night. Questionable historian. Per RN, pt's daughter reporting pt needing a lot more assistance recently.   Examination   Vision:   Vision Gross Assessment: Impaired and Vision Corrective Device: Wears for the computer  Hearing:   WFL  Sensation:   WFL  Tone:   Normotonic  Coordination Testing:   Coordination and Movement Description: (R) UE, (L) UE, intention tremors  Finger/Thumb Opposition: Impaired Bilaterally    Comment: Slight tremors with finger-thumb opposition. ROM:   (L) Shoulder: ~90 degrees     (R) Shoulder: ~90 degrees  (B) Elbow AROM WFL  (B) Wrist AROM WFL  (L) Hand: WFL     (R) Hand: WFL  Comment: Arthritic changes in fingers  Strength:   (B) UE strength grossly WFL    Decision Making: medium complexity  Clinical Presentation: evolving      Subjective  General: Pt supine in bed, agreeable to therapy evaluation. Pt with limited verbalizations, delayed responses. Became more alert with sitting on EOB, with increased questions. Pt goes by \"Ilya. \"  Pain: 0/10  Pain Interventions: not applicable        Activities of Daily Living  Basic Activities of Daily Living  Grooming: stand by assistance Comment: wash face seated on EOB  Lower Extremity Bathing: dependent Comment: posterior and ant nayana hygiene   Lower Extremity Dressing: dependent Comment: don brief, don socks  Toileting: dependent Comment: in supine. Instrumental Activities of Daily Living  No IADL completed on this date. Functional Mobility  Bed Mobility  Supine to Sit: moderate assistance, Comment Pt reached for PT's hands for assist  Sit to Supine: minimal assistance  Rolling Left: moderate assistance  Rolling Right: moderate assistance  Comments:HOB elevated for supine to sit; pt initiated all bed mobility. Rolled multiple times to B sides for extensive BM cleanup, gown, sheet, pad change and don new brief. Transfers  No transfers completed on this date secondary to pt fatigued and requesting to lie down after sitting on EOB 8-9 min. .  Comments:  Functional Mobility:  Sitting Balance: stand by assistance.     Sitting Balance Comment: 8-9 min on EOB with increasing fatigue          Functional Outcomes  AM-PAC Inpatient Daily Activity Raw Score: 11    Cognition  Overall Cognitive Status: Impaired  Arousal/Alterness: inconsistent responses to stimuli  Following Commands: follows one step commands with repetition, follows one step commands with increased time  Attention Span: attends with cues to redirect, difficulty dividing attention  Memory: decreased recall of recent events, decreased short term memory  Problem Solving: assistance required to generate solutions, assistance required to implement solutions, decreased awareness of errors, assistance required to correct errors made  Insights: decreased awareness of deficits  Initiation: requires cues for some  Sequencing: requires cues for some  Comment: Initially very delayed responses; improved as pt became more alert, sitting and talking with therapists. Orientation:    oriented to person, oriented to place, oriented to time, and disoriented to situation (Knows he is in a hospital, but often confused, thinking he is still at [facility] Stated that the room \"looks different. \"  Command Following:   impaired     Education  Barriers To Learning: cognition  Patient Education: patient educated on OT role and benefits, plan of care, discharge recommendations, bed mobility  Learning Assessment:  patient will require reinforcement due to cognitive deficits    Assessment  Activity Tolerance: Fair activity tolerance. Limited by cognition and fatigue. Impairments Requiring Therapeutic Intervention: decreased functional mobility, decreased ADL status, decreased cognition, decreased endurance, decreased balance  Prognosis: good and fair  Clinical Assessment: Pt is below his baseline level of occupational function, based on the above deficits associated with acute respiratory failure with hypoxia and hyperkapnia. Pt  only able to tolerate sitting on EOB 8-9 min and did not complete a transfer today as requestng to lie down. Pt dependent for nayana hygiene and LB dressing.  He would benefit from continued skilled acute OT services to address these deficits and maximize his safety and independence.   Safety Interventions: patient left in bed, bed alarm in place, call light within reach, patient at risk for falls, and nurse notified    Plan  Frequency: 3-5 x/per week  Current Treatment Recommendations: balance training, functional mobility training, transfer training, endurance training, IADL training, and safety education    Goals  Patient Goals: Not stated   Short Term Goals:  Time Frame: Discharge  Patient will complete upper body ADL at stand by assistance   Patient will complete lower body ADL at moderate assistance   Patient will complete toileting at moderate assistance   Patient will complete grooming at set up assistance, stand by assistance   Patient will complete functional transfers at minimal assistance   Patient will increase functional sitting balance to Supervision for improved ADL completion  Patient will complete bed mobility at stand by assistance     Therapy Session Time     Individual Group Co-treatment   Time In    1030   Time Out    1140   Minutes    70        Timed Code Treatment Minutes:   55  min  Total Treatment Minutes:  70 min       Electronically Signed By: JM Sotelo., OTR/L, [id number]

## 2022-09-22 NOTE — PROGRESS NOTES
4 Eyes Skin Assessment     NAME:  Anitra Haddad  YOB: 1945  MEDICAL RECORD NUMBER:  [id number]    The patient is being assess for  Admission    I agree that 2 RN's have performed a thorough Head to Toe Skin Assessment on the patient. ALL assessment sites listed below have been assessed. Areas assessed by both nurses:    Head, Face, Ears, Shoulders, Back, Chest, Arms, Elbows, Hands, Sacrum. Buttock, Coccyx, Ischium, and Legs. Feet and Heels        Does the Patient have a Wound? Yes wound(s) were present on assessment.  LDA wound assessment was Initiated and completed        Jorge Luis Prevention initiated:  Yes   Wound Care Orders initiated:  No    Pressure Injury (Stage 3,4, Unstageable, DTI, NWPT, and Complex wounds) if present place referral/consult order under [de-identified] No    New and Established Ostomies if present place consult order under : No      Nurse 1 eSignature: Electronically signed by Darren Bellamy RN on 9/22/22 at 5:15 PM EDT    **SHARE this note so that the co-signing nurse is able to place an eSignature**    Nurse 2 eSignature: Electronically signed by Svetlana Tang RN on 9/22/22 at 5:16 PM EDT

## 2022-09-22 NOTE — PROGRESS NOTES
Nutrition Note    RECOMMENDATIONS  PO Diet: Diet per RN/SLP. Recommend low phos modifier. ONS: Please order Nepro BID once diet is started    NUTRITION ASSESSMENT   Pt triggered for positive nutrition screen r/t wounds - pressure stage 2 to sacrum. NPO d/t failing swallow screen yesterday. Oriented x1 to person. Recent admission at [facility] for over 2 weeks; family reported decreased appetite for 10 days upon [facility] admission. Po intake was adequate beginning of admission, then averaging 0% closer to discharge. Wt fluctuations documented in wt hx in EMR, hard to accurately assess for recent wt changes; some of which may be related to fluids from dialysis. RD will order ONS once diet is resumed to offer additional nutrition and contiue to monitor for adequate po intake. Nutrition Related Findings: -2.2L. Cl 96, Phos 7.0. HbA1c of 6.5% on 9/21. Glucose 352-405 yesterday; 142-146 today. No BM documented, BS+, distention noted. Oriented x1 to person. Trace, pitting BUE, BLE edema. on PD. Wounds: Pressure Injury, Stage II  Nutrition Education:  Education not indicated   Nutrition Goals: Initiate PO diet, by next RD assessment     MALNUTRITION ASSESSMENT   Acute Illness  Malnutrition Status:  At risk for malnutrition (Comment)    NUTRITION DIAGNOSIS   Inadequate oral intake related to swallowing difficulty as evidenced by NPO or clear liquid status due to medical condition  Increased nutrient needs related to increase demand for energy/nutrients as evidenced by wounds    CURRENT NUTRITION THERAPIES  Diet NPO     PO Intake: NPO   PO Supplement Intake:NPO    ANTHROPOMETRICS  Current Height: 5' 10\" (177.8 cm)  Current Weight: 200 lb 2.8 oz (90.8 kg)    Admission weight: 205 lb (93 kg) (bed wt)  Ideal Body Weight (IBW): 166 lbs  (75 kg)        BMI: 28.7    COMPARATIVE STANDARDS  Energy (kcal):  7204-8482     Protein (g):         Fluid (mL/day):  3782    The patient will be monitored per nutrition standards of care. Consult dietitian if additional nutrition interventions are needed prior to RD reassessment.      Mark Mario MS, RD, LD    Contact: [phone]

## 2022-09-22 NOTE — PROGRESS NOTES
[facility] Department   Phone: [phone]    Physical Therapy    [x] Initial Evaluation            [] Daily Treatment Note         [] Discharge Summary      Patient: Austin Cazares   : 1945   MRN: [id number]   Date of Service:  2022  Admitting Diagnosis: Acute on chronic respiratory failure with hypoxia and hypercapnia [facility])  Current Admission Summary: 69 y.o. male who presented to [facility] with complaints of altered mental status. Patient is relatively nonverbal at baseline after prior history of illness. Questionable aphasia from stroke versus ongoing dementia. Patient daughter at bedside and was able to provide information. She states the patient was more confused at his nursing facility this morning. Additionally, patient was found to be hypoxic. He was started on oxygen, with little improvement. He was placed on BiPAP with more improvement. Per the daughter, patient is supposed be wearing BiPAP at night, but is noncompliant. There were no other associated symptoms with his confusion. During work-up in the ER, patient was found to have CO2 of 94.8 with pH of 7.095 on admission. Patient does have significant dementia and is aware he is confused, but is relatively nonverbal and unable to answer complex questions. He is able to answer \"yes and no\" questions. Prior tobacco user, quit approximately 4 years ago. No alcohol or drug use.   Past Medical History:  has a past medical history of Allergic rhinitis due to other allergen, Coronary atherosclerosis of unspecified type of vessel, native or graft, Diabetic eye exam ([address] Diabetic eye exam ([address] Generalized osteoarthrosis, involving multiple sites, Other psoriasis, Pneumonia, Prolonged emergence from general anesthesia, Psoriatic arthropathy ([address] Type II or unspecified type diabetes mellitus without mention of complication, not stated as uncontrolled, Unspecified essential hypertension, and Unspecified sleep apnea. Past Surgical History:  has a past surgical history that includes Coronary artery bypass graft; Carpal tunnel release; joint replacement; Cataract removal; and LAPAROSCOPY INSERTION PERITONEAL CATHETER (N/A, 10/26/2020). Discharge Recommendations: Zacarias Louie scored a 9/24 on the AM-PAC short mobility form. Current research shows that an AM-PAC score of 17 or less is typically not associated with a discharge to the patient's home setting. Based on the patient's AM-PAC score and their current functional mobility deficits, it is recommended that the patient have 3-5 sessions per week of Physical Therapy at d/c to increase the patient's independence. Please see assessment section for further patient specific details. If patient discharges prior to next session this note will serve as a discharge summary. Please see below for the latest assessment towards goals. DME Required For Discharge: DME to be determined pending patient progress  Precautions/Restrictions: high fall risk  Weight Bearing Restrictions: no restrictions  [] Right Upper Extremity  [] Left Upper Extremity [] Right Lower Extremity  [] Left Lower Extremity     Required Braces/Orthotics: no braces required   [] Right  [] Left  Positional Restrictions:no positional restrictions    Pre-Admission Information   Lives With: spouse (Spouse just had OHS, per pt report)  Type of Home: condo  Home Layout: one level, able to live on main level  Home Access: level entry  Bathroom Layout: walk in shower, handicap height toilet  Bathroom Equipment: grab bars in shower, shower chair, hand held shower head  Toilet Height: standard height, elevated height (has both)  Home Equipment: rolling walker, single point cane, reacher  Transfer Assistance: requires assistance  Ambulation Assistance:modified independent with use of RW  ADL Assistance: requires assistance with bathing, requires assistance with dressing, . 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 Only at SNF 1 night. Questionable historian. Per RN, pt's daughter reporting pt needing a lot more assistance recently. Examination   Vision:   Vision Gross Assessment: Impaired and Vision Corrective Device: wears glasses for reading  Hearing:   WFL  Posture:   WFl  Sensation:   WFL  Proprioception:    WFL  Tone:   Normotonic  ROM:   (B) LE AROM WFL  Strength:   (B) LE strength grossly WFL  Decision Making: medium complexity  Clinical Presentation: evolving      Subjective  General: Patient lying supine in bed with HOB elevated upon arrival. Patient is agreeable to PT/OT. Patient is questionable historian. Pain: Pain rating taken based on observed faces and behaviors  Pain Interventions: RN notified       Functional Mobility  Bed Mobility  Supine to Sit: moderate assistance  Sit to Supine: minimal assistance  Rolling Left: moderate assistance  Rolling Right: moderate assistance  Comments: Patient initiated supine to sit by moving B LE towards EOB, patient required two handheld assist to bring self to upright at mod Ax1. Patient initiated transition to supine independently, however, required min Ax1 to reposition himself upon lying supine. Patient had large BM requiring multiple rolls to both sides in order to clean him with wipes, change bedding linen/gown, and provide a brief. Patient initially required increased assistance with rolling but required less by the end at mod Ax1. Transfers  No transfers completed on this date secondary to cognitive and physical deficits.   Comments:  Ambulation  Ambulation not tested on this date secondary to cognitive and physical deficits. Distance:   Gait Mechanics:   Comments:    Stair Mobility  Stair mobility not completed on this date. Comments:  Wheelchair Mobility:  No w/c mobility completed on this date. Comments:  Balance  Static Sitting Balance: fair (+): maintains balance at SBA/supervision without use of UE support  Dynamic Sitting Balance: fair: maintains balance at CGA without use of UE support  Comments: Patient sat EOB for ~8-9 minutes prior to patient stating he wants to return to lying supine, patient did not report his reasoning and began moving.      Other Therapeutic Interventions  See OT note for ADLs  Functional Outcomes  AM-PAC Inpatient Mobility Raw Score : 9              Cognition  Overall Cognitive Status: Impaired  Arousal/Alterness: delayed responses to stimuli, inconsistent responses to stimuli  Following Commands: follows multi step commands with repetition, follows multi step commands with increased time  Attention Span: attends with cues to redirect, unable to maintain attention  Memory: decreased recall of recent events, decreased short term memory  Safety Judgement: decreased awareness of need for assistance, decreased awareness of need for safety  Problem Solving: assistance required to identify errors made, assistance required to correct errors made  Insights: decreased awareness of deficits  Initiation: requires cues for all  Sequencing: requires cues for all  Orientation:    oriented to person, oriented to place, oriented to time, and disoriented to situation  Command Following:   accurately follows one step commands    Education  Barriers To Learning: cognition  Patient Education: patient educated on goals, PT role and benefits, plan of care, functional mobility training, orientation, injury prevention, transfer training, discharge recommendations  Learning Assessment:  patient will require reinforcement due to cognitive deficits    Assessment  Activity Tolerance: Fair (-); patient demonstrates ability to sit EOB this visit, unable to tolerate more than 8 minutes. Patient SpO2 dropped to 85% following bed mobility including multiple rolls to clean patient from BM. Impairments Requiring Therapeutic Intervention: decreased functional mobility, decreased ROM, decreased strength, decreased cognition, decreased endurance, decreased balance, decreased coordination, decreased posture  Prognosis: fair  Clinical Assessment: Patient is 70 y/o male presenting [facility] secondary to [facility]. Patient is questionable historian, based on the information he provided today, he is currently below baseline function. Patient PLOF included ambulating with RW and requiring assistance with ADLs and transfers. At this time, patient requires mod A with all bed mobility and demonstrated inability to ambulate due to generalized weakness this visit.  Patient will continue to benefit from skilled PT in order to return to PLOF with all functional mobility prior to d/c from the hospital.  Safety Interventions: patient left in bed, bed alarm in place, call light within reach, gait belt, patient at risk for falls, and nurse notified    Plan  Frequency: 3-5 x/per week  Current Treatment Recommendations: strengthening, balance training, functional mobility training, transfer training, endurance training, patient/caregiver education, and safety education    Goals  Patient Goals: Return home   Short Term Goals:  Time Frame: Upon d/c  Patient will complete bed mobility at Independent   Patient will complete transfers at Independent   Patient will ambulate 20 ft with use of rolling walker at stand by assistance    Therapy Session Time      Individual Group Co-treatment   Time In     1030   Time Out     1140   Minutes     70     Timed Code Treatment Minutes:  55 Minutes  Total Treatment Minutes:  70 Minutes       Electronically Signed By: Manuel Bobo, PT  Manuel Bobo PT, DPT, [id number]

## 2022-09-22 NOTE — PLAN OF CARE
Problem: Discharge Planning  Goal: Discharge to home or other facility with appropriate resources  Outcome: Progressing  Flowsheets (Taken 9/21/2022 2000)  Discharge to home or other facility with appropriate resources:   Identify barriers to discharge with patient and caregiver   Refer to discharge planning if patient needs post-hospital services based on physician order or complex needs related to functional status, cognitive ability or social support system     Problem: Safety - Adult  Goal: Free from fall injury  Outcome: Progressing     Problem: Skin/Tissue Integrity  Goal: Absence of new skin breakdown  Description: 1. Monitor for areas of redness and/or skin breakdown  2. Assess vascular access sites hourly  3. Every 4-6 hours minimum:  Change oxygen saturation probe site  4. Every 4-6 hours:  If on nasal continuous positive airway pressure, respiratory therapy assess nares and determine need for appliance change or resting period.   Outcome: Progressing

## 2022-09-22 NOTE — PROGRESS NOTES
[facility] Order   Exchanges: 4   Exchange Volume: 2000 ml   Total Time: 8 hrs   Dextrose: 4.25%   Last Fill: 0 ml   Total Volume: 8000 ml     Orders verified. Supplies taken to pt's room. Report received. Cycler set up, primed and pre tested. [facility]. Pt connected to cycler. [employer] initiated without problem. Initial effluent clear. If problems should arise please call the [phone] on top of PD cycler machine.

## 2022-09-22 NOTE — CONSULTS
P Pulmonary and Critical Care   Consult Note      Reason for Consult: Acute hypoxemic respiratory failure, volume overload, pneumonia  Requesting Physician: Dr Onur Jones:   [address] [facility] / \[facility] [place] [facility]\"":                The patient is a 69 y.o. male with significant past medical history of:      Diagnosis Date    Allergic rhinitis due to other allergen 2010    Coronary atherosclerosis of unspecified type of vessel, native or graft 2010    Diabetic eye exam ([facility] [address] 2015    Diabetic eye exam ([facility] [address] 2016    [place] [facility]    Generalized osteoarthrosis, involving multiple sites 2010    Other psoriasis 2010    Pneumonia     Prolonged emergence from general anesthesia     Psoriatic arthropathy ([facility] [address] 2010    Type II or unspecified type diabetes mellitus without mention of complication, not stated as uncontrolled 2010    Unspecified essential hypertension 2010    Unspecified sleep apnea 2010     Patient presented from the nursing home to the emergency department with onset of shortness of breath and altered mental status. In the ED he was found to be hypoxemic and required BiPAP mask ventilation. In response to this his mental status has improved some. He is known to have end-stage renal disease. He has been on peritoneal dialysis for about 1 year now. While his mental status was improved this morning, he still had difficulty answering questions and participating meaningfully in the history taking process.       Past Surgical History:        Procedure Laterality Date    CARPAL TUNNEL RELEASE      s/p    CATARACT REMOVAL      CORONARY ARTERY BYPASS GRAFT      JOINT REPLACEMENT      LAPAROSCOPY INSERTION PERITONEAL CATHETER N/A 10/26/2020    LAPAROSCOPIC PERITONEAL DIALYSIS CATHETER PLACEMENT; LAPAROSCOPIC OMENTOPEXY performed by Bailey Alicea DO at [facility] OR     Current Medications:    Current Facility-Administered Medications: furosemide (LASIX) injection 80 mg, 80 mg, IntraVENous, Daily  cefepime (MAXIPIME) 1000 mg IVPB minibag, 1,000 mg, IntraVENous, Q24H  ipratropium-albuterol (DUONEB) nebulizer solution 1 ampule, 1 ampule, Inhalation, 4x daily  vancomycin (VANCOCIN) intermittent dosing (placeholder), , Other, RX Placeholder  dianeal lo-eben 4.25% 2,000 mL (Patient Supplied), 2,000 mL, IntraPERitoneal, 4x daily  gentamicin (GARAMYCIN) 0.1 % cream, , Topical, Daily  sodium chloride flush 0.9 % injection 5-40 mL, 5-40 mL, IntraVENous, 2 times per day  sodium chloride flush 0.9 % injection 5-40 mL, 5-40 mL, IntraVENous, PRN  0.9 % sodium chloride infusion, , IntraVENous, PRN  ondansetron (ZOFRAN-ODT) disintegrating tablet 4 mg, 4 mg, Oral, Q8H PRN **OR** ondansetron (ZOFRAN) injection 4 mg, 4 mg, IntraVENous, Q6H PRN  polyethylene glycol (GLYCOLAX) packet 17 g, 17 g, Oral, Daily PRN  acetaminophen (TYLENOL) tablet 650 mg, 650 mg, Oral, Q6H PRN **OR** acetaminophen (TYLENOL) suppository 650 mg, 650 mg, Rectal, Q6H PRN  albuterol (PROVENTIL) nebulizer solution 2.5 mg, 2.5 mg, Nebulization, Q4H PRN  aspirin chewable tablet 81 mg, 81 mg, Oral, Daily  atorvastatin (LIPITOR) tablet 80 mg, 80 mg, Oral, Nightly  mometasone-formoterol (DULERA) 200-5 MCG/ACT inhaler 2 puff, 2 puff, Inhalation, BID  [START ON 9/23/2022] buPROPion (WELLBUTRIN) tablet 100 mg, 100 mg, Oral, Every Other Day  levothyroxine (SYNTHROID) tablet 50 mcg, 50 mcg, Oral, Daily  dextrose bolus 10% 125 mL, 125 mL, IntraVENous, PRN **OR** dextrose bolus 10% 250 mL, 250 mL, IntraVENous, PRN  glucagon (rDNA) injection 1 mg, 1 mg, SubCUTAneous, PRN  dextrose 10 % infusion, , IntraVENous, Continuous PRN  insulin lispro (HUMALOG) injection vial 0-16 Units, 0-16 Units, SubCUTAneous, Q4H  insulin glargine (LANTUS) injection vial 40 Units, 40 Units, SubCUTAneous, Nightly  heparin (porcine) injection 5,000 Units, 5,000 Units, SubCUTAneous, 3 times per day    Allergies   Allergen Reactions    Penicillins Other (See Comments) Syncope \"pass out \" per pt    Sulfa Antibiotics      Patient unsure of reaction     Tazorac [Tazarotene]      Cream - rash    Clindamycin Rash    Clindamycin/Lincomycin Rash       Social History:    TOBACCO:   reports that he quit smoking about 41 years ago. He started smoking about 60 years ago. He has a 18.00 pack-year smoking history. He has never used smokeless tobacco.  ETOH:   reports no history of alcohol use. Patient currently lives independently  Environmental/chemical exposure: None known    Family History:       Problem Relation Age of Onset    Diabetes Mother     Heart Failure Mother     Coronary Art Dis Father      REVIEW OF SYSTEMS:    CONSTITUTIONAL:  negative for fevers, chills, diaphoresis, activity change, appetite change, fatigue, night sweats and unexpected weight change. EYES:  negative for blurred vision, eye discharge, visual disturbance and icterus  HEENT:  negative for hearing loss, tinnitus, ear drainage, sinus pressure, nasal congestion, epistaxis and snoring  RESPIRATORY:  See HPI  CARDIOVASCULAR:  negative for chest pain, palpitations, exertional chest pressure/discomfort, edema, syncope  GASTROINTESTINAL:  negative for nausea, vomiting, diarrhea, constipation, blood in stool and abdominal pain  GENITOURINARY:  negative for frequency, dysuria, urinary incontinence, decreased urine volume, and hematuria  HEMATOLOGIC/LYMPHATIC:  negative for easy bruising, bleeding and lymphadenopathy  ALLERGIC/IMMUNOLOGIC:  negative for recurrent infections, angioedema, anaphylaxis and drug reactions  ENDOCRINE:  negative for weight changes and diabetic symptoms including polyuria, polydipsia and polyphagia  MUSCULOSKELETAL:  negative for  pain, joint swelling, decreased range of motion and muscle weakness  NEUROLOGICAL:  negative for headaches, slurred speech, unilateral weakness  PSYCHIATRIC/BEHAVIORAL: negative for hallucinations, behavioral problems, confusion and agitation.      Objective: PHYSICAL EXAM:      VITALS:  BP (!) 106/50   Pulse 76   Temp 97.1 °F (36.2 °C)   Resp 18   Ht 5' 10\" (1.778 m)   Wt 200 lb 2.8 oz (90.8 kg)   SpO2 99%   BMI 28.72 kg/m²      24HR INTAKE/OUTPUT:    Intake/Output Summary (Last 24 hours) at 9/22/2022 1138  Last data filed at 9/22/2022 0830  Gross per 24 hour   Intake --   Output 5200 ml   Net -5200 ml     CONSTITUTIONAL:  awake, alert, cooperative, no apparent distress, and appears stated age  NECK:  Supple, symmetrical, trachea midline, no adenopathy, thyroid symmetric, not enlarged and no tenderness, skin normal  LUNGS:  no increased work of breathing and clear to auscultation. No accessory muscle use  CARDIOVASCULAR: S1 and S2, no edema and no JVD  ABDOMEN:  normal bowel sounds, non-distended and no masses palpated, and no tenderness to palpation. No hepatospleenomegaly  LYMPHADENOPATHY:  no axillary or supraclavicular adenopathy. No cervical adnenopathy  PSYCHIATRIC: Oriented to person place and time. No obvious depression or anxiety. MUSCULOSKELETAL: No obvious misalignment or effusion of the joints. No clubbing, cyanosis of the digits. SKIN:  normal skin color, texture, turgor and no redness, warmth, or swelling.  No palpable nodules    DATA:    Old records have been reviewed    CBC:  Recent Labs     09/21/22  0857 09/22/22  0439   WBC 12.4* 11.3*   RBC 2.53* 2.40*   HGB 9.7* 9.1*   HCT 29.3* 27.5*    302   .9* 114.5*   MCH 38.3* 37.9*   MCHC 33.0 33.1   RDW 15.1 14.9      BMP:  Recent Labs     09/21/22  0857 09/22/22  0439    137   K 3.9 3.9   CL 95* 96*   CO2 31 25   BUN 35* 32*   CREATININE 8.1* 7.9*   CALCIUM 8.8 8.3   GLUCOSE 385* 142*      ABG:  Recent Labs     09/21/22  0936 09/21/22  1745 09/21/22  2110   PHART 7.095* 7.214* 7.304*   [id number] 94.8* 71.5* 58.2*   PO2ART 119.0* 102.0 102.0   NXZ0WLS 29.1* 28.9 28.9   Q3BAFHJU 98.0 98.1 98.1   BEART -2.0 0.2 1.8     Procalcitonin  Recent Labs     09/22/22  0438   PROCAL 1.02* Lab Results   Component Value Date    .0 (H) 10/26/2013     Lab Results   Component Value Date    CKTOTAL 196 04/29/2015    TROPONINI 0.06 (H) 09/21/2022           Radiology Review:  All pertinent images / reports were reviewed as a part of this visit. Assessment:     Volume overload  Pneumonia  End-stage renal disease  Acute hypoxemic and hypercapnic respiratory failure      Plan:     Patient presented to the emergency department with an arterial blood gas of 7.0 9/95/119 on an unknown FiO2. He has been placed on BiPAP mask ventilation and ABG improved to seven-point 3/58/102. This morning we have taken his BiPAP mask off and he is on nasal cannula oxygen at 3 L/min. He does continue to demonstrate respiratory acidosis without renal compensation. We will plan to continue BiPAP mask ventilation overnight and with sleep during the day. I have reviewed chest imaging and my interpretation is bilateral airspace disease which could be CHF or pneumonia. He does have an elevated procalcitonin at 1.02. He also has a leukocytosis at 12.4. He received a dose of vancomycin in the ED as well as a dose of cefepime. We will continue these medications for now pending culture results. Also likely has an element of volume overload. Presently on peritoneal dialysis. Blood pressures have been marginal.  He does not have a lactic acidosis. Does not appear septic at the moment. Nephrology is following. Total critical care time caring for this patient with life threatening illness, including direct patient contact, management of life support systems, review of data including imaging and labs, discussions with other team members and physicians is at least 32 minutes so far today, excluding procedures.

## 2022-09-22 NOTE — PROGRESS NOTES
Clinical Pharmacy Note: Pharmacy to Dose Vancomycin    Sirena Harrsi is a 69 y.o. male started on Vancomycin for sepsis; consult received from Dr. Kevin Rodrigues to manage therapy. Also receiving the following antibiotics: cefepime 1 g daily. Goal AUC: 400-600 mg/L*hr    Assessment/Plan:  A 1750 mg loading dose was given on 9/21/22 at 1616  Will dose patient intermittently due to poor renal function and peritoneal dialysis  Today's random level was 33.4  No dose today since levels are supratherapeutic  A vancomycin random level has been ordered for 09/23/22 at 0600  Changes in regimen will be determined based on culture results, renal function, and clinical response. Pharmacy will continue to monitor and adjust regimen as necessary. Allergies:  Penicillins, Sulfa antibiotics, Tazorac [tazarotene], Clindamycin, and Clindamycin/lincomycin     Recent Labs     09/21/22  0857 09/22/22  0439   CREATININE 8.1* 7.9*     Recent Labs     09/21/22  0857 09/22/22  0439   WBC 12.4* 11.3*     Ht Readings from Last 1 Encounters:   09/22/22 5' 10\" (1.778 m)        Wt Readings from Last 1 Encounters:   09/22/22 200 lb 2.8 oz (90.8 kg)      Estimated Creatinine Clearance: 9 mL/min (A) (based on SCr of 7.9 mg/dL [facility] CARE AT [facility])).     Thank you for the consult,    Ted Smith, PharmD  PGY-1 Pharmacy Resident  [id number]

## 2022-09-22 NOTE — FLOWSHEET NOTE
Disconnected from CCP[facility] per protocol. Effluent: CLEAR  Total time: 9 hr 11 min   Total UF:  2985 ml. Total Volume:  8002 ml. Dwell time gained:  0 hr 03 min.   Pt Tolerated procedure: WELL, POST VSS   Report given to: Almarie Severin, RN         09/22/22 0874   Vitals   Temp 97.1 °F (36.2 °C)   Heart Rate 76   Weight 200 lb 2.8 oz (90.8 kg)   Peritoneal Dialysis Catheter   Placement Date/Time: 10/26/20 0748   Inserted by: Dr Gayatri Britt Size (fr): (c)    Status Deaccessed   Site Condition Clean, dry, intact   Cycler   Number of Cycles 4   Ultrafiltration (UF) (mL) 2985 mL

## 2022-09-23 PROBLEM — J81.0 ACUTE PULMONARY EDEMA (HCC): Status: ACTIVE | Noted: 2022-09-23

## 2022-09-23 PROBLEM — D72.829 LEUKOCYTOSIS: Status: ACTIVE | Noted: 2022-09-23

## 2022-09-23 LAB
ALBUMIN SERPL-MCNC: 2.9 G/DL (ref 3.4–5)
ANION GAP SERPL CALCULATED.3IONS-SCNC: 14 MMOL/L (ref 3–16)
APPEARANCE FLUID: CLEAR
BUN BLDV-MCNC: 37 MG/DL (ref 7–20)
CALCIUM SERPL-MCNC: 8 MG/DL (ref 8.3–10.6)
CELL COUNT FLUID TYPE: NORMAL
CHLORIDE BLD-SCNC: 97 MMOL/L (ref 99–110)
CLOT EVALUATION: NORMAL
CO2: 26 MMOL/L (ref 21–32)
COLOR FLUID: NORMAL
CREAT SERPL-MCNC: 8.3 MG/DL (ref 0.8–1.3)
GFR AFRICAN AMERICAN: 8
GFR NON-AFRICAN AMERICAN: 6
GLUCOSE BLD-MCNC: 124 MG/DL (ref 70–99)
GLUCOSE BLD-MCNC: 208 MG/DL (ref 70–99)
GLUCOSE BLD-MCNC: 228 MG/DL (ref 70–99)
GLUCOSE BLD-MCNC: 236 MG/DL (ref 70–99)
GLUCOSE BLD-MCNC: 253 MG/DL (ref 70–99)
GLUCOSE BLD-MCNC: 330 MG/DL (ref 70–99)
GLUCOSE BLD-MCNC: 372 MG/DL (ref 70–99)
LYMPHOCYTES, BODY FLUID: 57 %
MONOCYTE, FLUID: 7 %
NEUTROPHIL, FLUID: 36 %
NUCLEATED CELLS FLUID: 23 /CUMM
NUMBER OF CELLS COUNTED FLUID: 100
PERFORMED ON: ABNORMAL
PHOSPHORUS: 6.6 MG/DL (ref 2.5–4.9)
POTASSIUM SERPL-SCNC: 3.3 MMOL/L (ref 3.5–5.1)
RBC FLUID: <1000 /CUMM
SODIUM BLD-SCNC: 137 MMOL/L (ref 136–145)
VANCOMYCIN RANDOM: 27.1 UG/ML

## 2022-09-23 PROCEDURE — 6370000000 HC RX 637 (ALT 250 FOR IP): Performed by: INTERNAL MEDICINE

## 2022-09-23 PROCEDURE — 94761 N-INVAS EAR/PLS OXIMETRY MLT: CPT

## 2022-09-23 PROCEDURE — 80069 RENAL FUNCTION PANEL: CPT

## 2022-09-23 PROCEDURE — 6360000002 HC RX W HCPCS: Performed by: STUDENT IN AN ORGANIZED HEALTH CARE EDUCATION/TRAINING PROGRAM

## 2022-09-23 PROCEDURE — 94660 CPAP INITIATION&MGMT: CPT

## 2022-09-23 PROCEDURE — 80202 ASSAY OF VANCOMYCIN: CPT

## 2022-09-23 PROCEDURE — 6360000002 HC RX W HCPCS: Performed by: INTERNAL MEDICINE

## 2022-09-23 PROCEDURE — 51798 US URINE CAPACITY MEASURE: CPT

## 2022-09-23 PROCEDURE — 94640 AIRWAY INHALATION TREATMENT: CPT

## 2022-09-23 PROCEDURE — 6370000000 HC RX 637 (ALT 250 FOR IP): Performed by: STUDENT IN AN ORGANIZED HEALTH CARE EDUCATION/TRAINING PROGRAM

## 2022-09-23 PROCEDURE — 2000000000 HC ICU R&B

## 2022-09-23 PROCEDURE — 99233 SBSQ HOSP IP/OBS HIGH 50: CPT | Performed by: INTERNAL MEDICINE

## 2022-09-23 PROCEDURE — 90945 DIALYSIS ONE EVALUATION: CPT | Performed by: INTERNAL MEDICINE

## 2022-09-23 PROCEDURE — 2700000000 HC OXYGEN THERAPY PER DAY

## 2022-09-23 PROCEDURE — 36415 COLL VENOUS BLD VENIPUNCTURE: CPT

## 2022-09-23 PROCEDURE — 2580000003 HC RX 258: Performed by: STUDENT IN AN ORGANIZED HEALTH CARE EDUCATION/TRAINING PROGRAM

## 2022-09-23 RX ORDER — POTASSIUM CHLORIDE 20 MEQ/1
40 TABLET, EXTENDED RELEASE ORAL ONCE
Status: COMPLETED | OUTPATIENT
Start: 2022-09-23 | End: 2022-09-23

## 2022-09-23 RX ORDER — SODIUM CHLORIDE, SODIUM LACTATE, CALCIUM CHLORIDE, MAGNESIUM CHLORIDE AND DEXTROSE 2.5; 538; 448; 18.3; 5.08 G/100ML; MG/100ML; MG/100ML; MG/100ML; MG/100ML
2000 INJECTION, SOLUTION INTRAPERITONEAL 4 TIMES DAILY
Status: DISCONTINUED | OUTPATIENT
Start: 2022-09-23 | End: 2022-09-27

## 2022-09-23 RX ADMIN — HEPARIN SODIUM 5000 UNITS: 5000 INJECTION INTRAVENOUS; SUBCUTANEOUS at 20:26

## 2022-09-23 RX ADMIN — BUPROPION HYDROCHLORIDE 100 MG: 100 TABLET, FILM COATED ORAL at 08:24

## 2022-09-23 RX ADMIN — GENTAMICIN SULFATE: 1 CREAM TOPICAL at 12:49

## 2022-09-23 RX ADMIN — Medication 2 PUFF: at 19:42

## 2022-09-23 RX ADMIN — ATORVASTATIN CALCIUM 80 MG: 80 TABLET, FILM COATED ORAL at 20:26

## 2022-09-23 RX ADMIN — INSULIN LISPRO 4 UNITS: 100 INJECTION, SOLUTION INTRAVENOUS; SUBCUTANEOUS at 16:09

## 2022-09-23 RX ADMIN — Medication 2 PUFF: at 08:04

## 2022-09-23 RX ADMIN — INSULIN LISPRO 16 UNITS: 100 INJECTION, SOLUTION INTRAVENOUS; SUBCUTANEOUS at 00:46

## 2022-09-23 RX ADMIN — LEVOTHYROXINE SODIUM 50 MCG: 0.03 TABLET ORAL at 08:24

## 2022-09-23 RX ADMIN — HEPARIN SODIUM 5000 UNITS: 5000 INJECTION INTRAVENOUS; SUBCUTANEOUS at 14:31

## 2022-09-23 RX ADMIN — ASPIRIN 81 MG 81 MG: 81 TABLET ORAL at 08:23

## 2022-09-23 RX ADMIN — INSULIN LISPRO 4 UNITS: 100 INJECTION, SOLUTION INTRAVENOUS; SUBCUTANEOUS at 04:32

## 2022-09-23 RX ADMIN — IPRATROPIUM BROMIDE AND ALBUTEROL SULFATE 1 AMPULE: 2.5; .5 SOLUTION RESPIRATORY (INHALATION) at 12:26

## 2022-09-23 RX ADMIN — INSULIN GLARGINE 40 UNITS: 100 INJECTION, SOLUTION SUBCUTANEOUS at 20:24

## 2022-09-23 RX ADMIN — FUROSEMIDE 80 MG: 10 INJECTION, SOLUTION INTRAMUSCULAR; INTRAVENOUS at 08:25

## 2022-09-23 RX ADMIN — HEPARIN SODIUM 5000 UNITS: 5000 INJECTION INTRAVENOUS; SUBCUTANEOUS at 06:06

## 2022-09-23 RX ADMIN — IPRATROPIUM BROMIDE AND ALBUTEROL SULFATE 1 AMPULE: 2.5; .5 SOLUTION RESPIRATORY (INHALATION) at 19:42

## 2022-09-23 RX ADMIN — IPRATROPIUM BROMIDE AND ALBUTEROL SULFATE 1 AMPULE: 2.5; .5 SOLUTION RESPIRATORY (INHALATION) at 08:02

## 2022-09-23 RX ADMIN — SODIUM CHLORIDE: 9 INJECTION, SOLUTION INTRAVENOUS at 09:15

## 2022-09-23 RX ADMIN — INSULIN LISPRO 12 UNITS: 100 INJECTION, SOLUTION INTRAVENOUS; SUBCUTANEOUS at 20:25

## 2022-09-23 RX ADMIN — Medication 10 ML: at 09:22

## 2022-09-23 RX ADMIN — INSULIN LISPRO 4 UNITS: 100 INJECTION, SOLUTION INTRAVENOUS; SUBCUTANEOUS at 12:48

## 2022-09-23 RX ADMIN — POTASSIUM CHLORIDE 40 MEQ: 1500 TABLET, EXTENDED RELEASE ORAL at 09:17

## 2022-09-23 RX ADMIN — CEFEPIME 1000 MG: 1 INJECTION, POWDER, FOR SOLUTION INTRAMUSCULAR; INTRAVENOUS at 09:15

## 2022-09-23 RX ADMIN — IPRATROPIUM BROMIDE AND ALBUTEROL SULFATE 1 AMPULE: 2.5; .5 SOLUTION RESPIRATORY (INHALATION) at 16:36

## 2022-09-23 RX ADMIN — Medication 10 ML: at 20:33

## 2022-09-23 ASSESSMENT — PAIN SCALES - WONG BAKER
WONGBAKER_NUMERICALRESPONSE: 0

## 2022-09-23 ASSESSMENT — PAIN SCALES - GENERAL
PAINLEVEL_OUTOF10: 0

## 2022-09-23 NOTE — PROGRESS NOTES
Disconnected from CCPD per protocol. Effluent: Clear Yellow  Total time: 8 hr 17 min   Total UF:  1280 ml. Total Volume:  8007 ml. Dwell time gained:  3 min.   Pt Tolerated procedure: pt tolerated tx well

## 2022-09-23 NOTE — PLAN OF CARE
Problem: Discharge Planning  Goal: Discharge to home or other facility with appropriate resources  Outcome: Progressing     Problem: Safety - Adult  Goal: Free from fall injury  Outcome: Progressing     Problem: Skin/Tissue Integrity  Goal: Absence of new skin breakdown  Description: 1. Monitor for areas of redness and/or skin breakdown  2. Assess vascular access sites hourly  3. Every 4-6 hours minimum:  Change oxygen saturation probe site  4. Every 4-6 hours:  If on nasal continuous positive airway pressure, respiratory therapy assess nares and determine need for appliance change or resting period.   Outcome: Progressing     Problem: Nutrition Deficit:  Goal: Optimize nutritional status  Outcome: Progressing     Problem: Pain  Goal: Verbalizes/displays adequate comfort level or baseline comfort level  Outcome: Progressing

## 2022-09-23 NOTE — PROGRESS NOTES
Shift summary:    No significant events this shift. Peritoneal dialysis continues. Creatinine 8.26 and vanco level 27.1. Will pass on to dayshift. Pt on bipap for most of the shift. Pt given approximately 1.5 hour break per his request.    Palliative care consulted and spoke with pt and pt continues to want full treatment with hope of returning to his home.

## 2022-09-23 NOTE — PROGRESS NOTES
Clinical Pharmacy Note: Pharmacy to Dose Vancomycin    Vancomycin Day: 2  Indication: sepsis  Current Dose: dosing by levels - ESRD on PD  Dosing Method: Dosing by random levels    Random: 27.1    Recent Labs     09/22/22  0439 09/23/22  0424   BUN 32* 37*       Recent Labs     09/22/22  0439 09/23/22  0424   CREATININE 7.9* 8.3*       Recent Labs     09/21/22  0857 09/22/22  0439   WBC 12.4* 11.3*         Intake/Output Summary (Last 24 hours) at 9/23/2022 1047  Last data filed at 9/23/2022 0400  Gross per 24 hour   Intake 480 ml   Output 6 ml   Net 474 ml         Ht Readings from Last 1 Encounters:   09/22/22 5' 10\" (1.778 m)        Wt Readings from Last 1 Encounters:   09/23/22 206 lb 2.1 oz (93.5 kg)         Body mass index is 29.58 kg/m². Estimated Creatinine Clearance: 9 mL/min (A) (based on SCr of 8.3 mg/dL [facility] CARE AT [facility])). Assessment/Plan:  Vancomycin level is supratherapeutic. Will not re-dose today. A vancomycin random level has been ordered on 9/24 at 0600 for follow-up. Changes in regimen will be determined based on culture results, renal function, and clinical response. Pharmacy will continue to monitor and adjust regimen as necessary.     Thank you for the consult,    Han Mallory, PharmD, [address] [facility] Pharmacist  [id number]

## 2022-09-23 NOTE — PROGRESS NOTES
Pt. Assessment complete. See Flowsheets for full details. VS remains stable. Pt alert and oriented x 4. Pt denies any pain. Pt now on RA. Pt saturation remains above 90%. Pt tolerating diet well. Pt denies any need at this time. Call light within reach. Will continue to monitor.

## 2022-09-23 NOTE — PROGRESS NOTES
Office : [phone]     Fax :[phone]       Nephrology progress  Note      Patient's Name: Nany Moss  8:43 AM  9/23/2022    Reason for Consult:  ESRD on peritoneal dialysis. Requesting Physician:  Belle Ruiz MD      Chief Complaint:    Chief Complaint   Patient presents with    Altered Mental Status     Patient arrives via [place] from [facility], altered, last seen normal last night, started peritoneal dialysis last night, O2 low on normal 3L, placed on NRB with some improvement, increased WOB noted           History of Present Ilness:    Nany Moss is a 69 y.o. male with prior history of ESRD on peritoneal dialysis , DM 2, HTN who was sent from [facility] with main c/o SOB. In ER he was found to be hypoxic. CXR shows volume overload. On BiPAP now. Still makes urine . Also had elevated WBC. No abdominal pain. No fever   BS Elevated. Interval hx :    Feels much better     Tolerating PD well   UF good         I/O last 3 completed shifts:   In: 18 [P.O.:480]  Out: 3056 [Urine:70]    Past Medical History:   Diagnosis Date    Allergic rhinitis due to other allergen 7/12/2010    Coronary atherosclerosis of unspecified type of vessel, native or graft 7/12/2010    Diabetic eye exam ([address] 04/29/2015    Diabetic eye exam ([address] 5/5/2016    [place] eye    Generalized osteoarthrosis, involving multiple sites 7/12/2010    Other psoriasis 7/12/2010    Pneumonia     Prolonged emergence from general anesthesia     Psoriatic arthropathy ([address] 7/12/2010    Type II or unspecified type diabetes mellitus without mention of complication, not stated as uncontrolled 7/12/2010    Unspecified essential hypertension 7/12/2010    Unspecified sleep apnea 7/12/2010       Past Surgical History: Procedure Laterality Date    CARPAL TUNNEL RELEASE      s/p    CATARACT REMOVAL      CORONARY ARTERY BYPASS GRAFT      JOINT REPLACEMENT      LAPAROSCOPY INSERTION PERITONEAL CATHETER N/A 10/26/2020    LAPAROSCOPIC PERITONEAL DIALYSIS CATHETER PLACEMENT; LAPAROSCOPIC OMENTOPEXY performed by Aleida Loving DO at [facility] OR       Family History   Problem Relation Age of Onset    Diabetes Mother     Heart Failure Mother     Coronary Art Dis Father         reports that he quit smoking about 41 years ago. He started smoking about 60 years ago. He has a 18.00 pack-year smoking history. He has never used smokeless tobacco. He reports that he does not drink alcohol and does not use drugs.         Allergies:  Penicillins, Sulfa antibiotics, Tazorac [tazarotene], Clindamycin, and Clindamycin/lincomycin    Current Medications:    potassium chloride (KLOR-CON M) extended release tablet 40 mEq, Once  furosemide (LASIX) injection 80 mg, Daily  cefepime (MAXIPIME) 1000 mg IVPB minibag, Q24H  ipratropium-albuterol (DUONEB) nebulizer solution 1 ampule, 4x daily  vancomycin (VANCOCIN) intermittent dosing (placeholder), RX Placeholder  dianeal lo-eben 4.25% 2,000 mL (Patient Supplied), 4x daily  gentamicin (GARAMYCIN) 0.1 % cream, Daily  sodium chloride flush 0.9 % injection 5-40 mL, 2 times per day  sodium chloride flush 0.9 % injection 5-40 mL, PRN  0.9 % sodium chloride infusion, PRN  ondansetron (ZOFRAN-ODT) disintegrating tablet 4 mg, Q8H PRN   Or  ondansetron (ZOFRAN) injection 4 mg, Q6H PRN  polyethylene glycol (GLYCOLAX) packet 17 g, Daily PRN  acetaminophen (TYLENOL) tablet 650 mg, Q6H PRN   Or  acetaminophen (TYLENOL) suppository 650 mg, Q6H PRN  albuterol (PROVENTIL) nebulizer solution 2.5 mg, Q4H PRN  aspirin chewable tablet 81 mg, Daily  atorvastatin (LIPITOR) tablet 80 mg, Nightly  mometasone-formoterol (DULERA) 200-5 MCG/ACT inhaler 2 puff, BID  buPROPion (WELLBUTRIN) tablet 100 mg, Every Other Day  levothyroxine (SYNTHROID) tablet 50 mcg, Daily  dextrose bolus 10% 125 mL, PRN   Or  dextrose bolus 10% 250 mL, PRN  glucagon (rDNA) injection 1 mg, PRN  dextrose 10 % infusion, Continuous PRN  insulin lispro (HUMALOG) injection vial 0-16 Units, Q4H  insulin glargine (LANTUS) injection vial 40 Units, Nightly  heparin (porcine) injection 5,000 Units, 3 times per day      Physical exam:     Vitals:  BP (!) 120/53   Pulse 67   Temp 97.5 °F (36.4 °C) (Temporal)   Resp 18   Ht 5' 10\" (1.778 m)   Wt 206 lb 2.1 oz (93.5 kg)   SpO2 98%   BMI 29.58 kg/m²   Constitutional:  OAA X3 NAD  Skin: no rash, turgor wnl  Heent:  eomi, mmm  Neck: no bruits or jvd noted  Cardiovascular:  S1, S2 without m/r/g  Respiratory: b/l crackles   Abdomen:  +bs, soft, nt, nd  Ext: mild  lower extremity edema    Labs:  CBC:   Recent Labs     09/21/22  0857 09/22/22 0439   WBC 12.4* 11.3*   HGB 9.7* 9.1*    302     BMP:    Recent Labs     09/21/22  0857 09/22/22  0439 09/23/22  0424    137 137   K 3.9 3.9 3.3*   CL 95* 96* 97*   CO2 31 25 26   BUN 35* 32* 37*   CREATININE 8.1* 7.9* 8.3*   GLUCOSE 385* 142* 253*     Ca/Mg/Phos:   Recent Labs     09/21/22  0857 09/22/22  0439 09/23/22  0424   CALCIUM 8.8 8.3 8.0*   PHOS  --  7.0* 6.6*     Hepatic:   Recent Labs     09/21/22  0857   AST 28   ALT 44*   BILITOT 0.3   ALKPHOS 129     Troponin:   Recent Labs     09/21/22  0857   TROPONINI 0.06*     BNP: No results for input(s): BNP in the last 72 hours. Lipids: No results for input(s): CHOL, TRIG, HDL, LDLCALC, LABVLDL in the last 72 hours. ABGs:   Recent Labs     09/21/22  2110   PHART 7.304*   PO2ART 102.0   [id number] 58.2*     INR: No results for input(s): INR in the last 72 hours.   UA:  Recent Labs     09/21/22  1518   COLORU DARK YELLOW*   CLARITYU CLOUDY*   GLUCOSEU 250*   BILIRUBINUR Negative   KETUA Negative   SPECGRAV 1.015   BLOODU SMALL*   PHUR 6.0   PROTEINU 300   UROBILINOGEN 0.2   NITRU Negative   LEUKOCYTESUR TRACE*   LABMICR YES   Blaise Mccurdy NotGiven      Urine Microscopic:   Recent Labs     09/21/22  1518   BACTERIA None Seen   HYALCAST 1   WBCUA 3   RBCUA 5*   EPIU 2     Urine Culture: No results for input(s): LABURIN in the last 72 hours. Urine Chemistry: No results for input(s): Ailin Amour, PROTEINUR, NAUR in the last 72 hours. IMAGING:  XR CHEST PORTABLE   Final Result   CHF with pulmonary edema. CT HEAD WO CONTRAST   Preliminary Result   No definite evidence of acute intracranial abnormality on a study   significantly limited by motion/streak artifact as described above. Assessment/Plan :      1. ESRD   Volume overload   Better     Continue  CCPD now with 2.5  % today    PD fluid for cell count and culture -  negative   No growth   No evidence of peritonitis    Stop ABX         2. HTN  BP controlled       3. Anemia in ESRD   On epogen     4. Acid- base disorder. monitor     5. Electrolytes.  Monitor                   D/w primary team      Thank you for allowing us to participate in care of Demetria Roper         Electronically signed by: Ja Owen MD, 9/23/2022, 8:43 AM      [facility] of [address]  Office : [phone]  Fax :[phone]

## 2022-09-23 NOTE — PROGRESS NOTES
Hospitalist Progress Note    Name:  Madyson Stanford    /Age/Sex: 1945  (69 y.o. male)  MRN & CSN:  [id number] & [id number]    PCP: Lore Lebron MD    Date of Admission: 2022    Patient Status:  Inpatient     Chief Complaint: Altered mental status    Hospital Course: 28-year-old man was admitted for altered mental status. Found to be hypoxic and hypercapnic on admission. Placed on BiPAP with improvement. Nephrology following as patient has ESRD on peritoneal dialysis. Pulmonology following. Subjective: Today is:  Hospital Day: 3. Patient seen and examined. Dyspnea improved. Currently on 1 L nasal cannula and saturating well. Denies any complaints. No pain, fever or chills.          Medications:  Reviewed    Infusion Medications    sodium chloride 5 mL/hr at 22 0915    dextrose       Scheduled Medications    dianeal lo-eben 2.5%  2,000 mL IntraPERitoneal 4x daily    furosemide  80 mg IntraVENous Daily    ipratropium-albuterol  1 ampule Inhalation 4x daily    dianeal lo-eben 4.25%  2,000 mL IntraPERitoneal 4x daily    gentamicin   Topical Daily    sodium chloride flush  5-40 mL IntraVENous 2 times per day    aspirin  81 mg Oral Daily    atorvastatin  80 mg Oral Nightly    mometasone-formoterol  2 puff Inhalation BID    buPROPion  100 mg Oral Every Other Day    levothyroxine  50 mcg Oral Daily    insulin lispro  0-16 Units SubCUTAneous Q4H    insulin glargine  40 Units SubCUTAneous Nightly    heparin (porcine)  5,000 Units SubCUTAneous 3 times per day     PRN Meds: sodium chloride flush, sodium chloride, ondansetron **OR** ondansetron, polyethylene glycol, acetaminophen **OR** acetaminophen, albuterol, dextrose bolus **OR** dextrose bolus, glucagon (rDNA), dextrose      Intake/Output Summary (Last 24 hours) at 2022 1438  Last data filed at 2022 0400  Gross per 24 hour   Intake 480 ml   Output 6 ml   Net 474 ml       Physical Exam Performed:    /61   Pulse 80 Temp 97.4 °F (36.3 °C) (Temporal)   Resp 23   Ht 5' 10\" (1.778 m)   Wt 206 lb 2.1 oz (93.5 kg)   SpO2 93%   BMI 29.58 kg/m²     General appearance: No apparent distress, appears stated age and cooperative. Somewhat confused, but more conversational today. HEENT: Pupils equal, round, and reactive to light. Conjunctivae/corneas clear. Neck: Supple, with full range of motion. No jugular venous distention. Trachea midline. Respiratory:  Normal respiratory effort. Clear to auscultation, bilaterally without Rales/Wheezes/Rhonchi. Cardiovascular: Regular rate and rhythm with normal S1/S2 without murmurs, rubs or gallops. Trace pitting edema LE bilaterally. Abdomen: Soft, non-tender, non-distended with normal bowel sounds. : No CVA tenderness. Musculoskeletal: No clubbing or cyanosis. Full range of motion without deformity. Skin: Skin color, texture, turgor normal.  No rashes or lesions. Neurologic:  Neurovascularly intact without any focal sensory/motor deficits. Cranial nerves: II-XII intact, grossly non-focal.  Psychiatric: Alert and oriented, thought content appropriate, normal insight  Peripheral Pulses: +2 palpable, equal bilaterally       Labs:   Recent Labs     09/21/22  0857 09/22/22  0439   WBC 12.4* 11.3*   HGB 9.7* 9.1*   HCT 29.3* 27.5*    302     Recent Labs     09/21/22  0857 09/22/22  0439 09/23/22  0424    137 137   K 3.9 3.9 3.3*   CL 95* 96* 97*   CO2 31 25 26   BUN 35* 32* 37*   CREATININE 8.1* 7.9* 8.3*   CALCIUM 8.8 8.3 8.0*   PHOS  --  7.0* 6.6*     Recent Labs     09/21/22  0857   AST 28   ALT 44*   BILITOT 0.3   ALKPHOS 129     No results for input(s): INR in the last 72 hours.   Recent Labs     09/21/22  0857   TROPONINI 0.06*       Urinalysis:      Lab Results   Component Value Date/Time    NITRU Negative 09/21/2022 03:18 PM    WBCUA 3 09/21/2022 03:18 PM    BACTERIA None Seen 09/21/2022 03:18 PM    RBCUA 5 09/21/2022 03:18 PM    BLOODU SMALL 09/21/2022 03:18 PM SPECGRAV 1.015 09/21/2022 03:18 PM    GLUCOSEU 250 09/21/2022 03:18 PM    GLUCOSEU >=1000 03/26/2012 10:22 PM       Radiology:  XR CHEST PORTABLE   Final Result   CHF with pulmonary edema. CT HEAD WO CONTRAST   Preliminary Result   No definite evidence of acute intracranial abnormality on a study   significantly limited by motion/streak artifact as described above. Assessment/Plan:    Active Hospital Problems    Diagnosis     Leukocytosis [D72.829]      Priority: Medium    Acute pulmonary edema (HCC) [J81.0]      Priority: Medium    Multifocal pneumonia [J18.9]      Priority: Medium    Hypervolemia [E87.70]      Priority: Medium    Acute on chronic respiratory failure with hypoxia and hypercapnia (HCC) [J96.21, J96.22]      Priority: Medium    Moderate episode of recurrent major depressive disorder (HCC) [F33.1]      Priority: Medium    BJ treated with BiPAP [G47.33]      Priority: Medium    Acute metabolic encephalopathy [C99.58]      Priority: Medium    ESRD (end stage renal disease) ([facility]) [N18.6]     Type 2 diabetes mellitus with hyperglycemia, with long-term current use of insulin ([facility]) [E11.65, Z79.4]     Hypertension associated with stage 5 chronic kidney disease due to type 2 diabetes mellitus ([facility] [address] [E11.22, I12.0, N18.5]     Chronic diastolic heart failure ([facility]) [I50.32]     Acute respiratory failure with hypoxia and hypercapnia ([facility]) [J96.01, J96.02]     Arthritis with psoriasis ([facility]) [L40.50]     Psoriatic arthropathy ([facility] [address] [L40.50]     Sleep apnea [G47.30]          Hospital Day: 3    This is a 69 y.o. male who presented to [facility] on 9/21/2022 and is being treated for:    Acute on chronic hypoxic and hypercapnic respiratory failure - improving  -Most likely due to volume overload. -continue IV lasix at 80 mg QD; monitor UOP  -Continued antibiotics. Weaned off BiPAP. Currently on 1 L nasal cannula. Wean off as tolerated.   -Inhaler therapy  -Palliative consulted; appreciate recommendations  -Pulmonology consulted; appreciate recommendations     Acute metabolic encephalopathy - improved  -See above  -Ammonia not elevated  -Hold off on CT head at the moment; if there are any acute changes, CT head will be ordered  -Supportive care  -Daily labs; replace electrolytes as needed     Type 2 diabetes  -lantus  -ssi     CKD 5 on dialysis  -Peritoneal dialysis  -Daily labs  -Nephrology consulted; appreciate recommendations     HTN  -Continue home antihypertensives    Depression  -Continue Wellbutrin     Hypothyroid  -synthroid      DVT ppx: Heparin  GI ppx: Diet/Tube Feeds  Diet: ADULT ORAL NUTRITION SUPPLEMENT; Breakfast, Dinner; Renal Oral Supplement  ADULT DIET; Regular; 4 carb choices (60 gm/meal); Low Phosphorus (Less than 1000 mg)  Code Status: Full Code    Disposition:  Estimated Discharge Date: 1-2 days  Barriers to Discharge: Pre-CERT pending    PT/OT Eval Status: SNF      Tiny Andre MD  9/23/2022  2:38 PM      Please note that some part of this chart was generated using Dragon dictation software. Although every effort was made to ensure the accuracy of this automated transcription, some errors in transcription may have occurred inadvertently. If you may need any clarification, please do not hesitate to contact me through [facility].

## 2022-09-23 NOTE — PROGRESS NOTES
CCPD Order   Exchanges: 4   Exchange Volume: 2000 ml   Total Time: 8 hrs   Dextrose: 2.5%   Last Fill: 0 ml   Total Volume: 8000 ml     Orders verified. Supplies taken to pt's room. Report received. Cycler set up, primed and pre tested. [facility]. Pt connected to cycler. CCPD initiated without problem. Initial effluent clear. If problems should arise please call the [phone] on top of PD cycler machine.

## 2022-09-23 NOTE — PROGRESS NOTES
P Pulmonary and Critical Care  Progress note      Reason for Consult: Acute hypoxemic respiratory failure, volume overload, pneumonia  Requesting Physician: Dr Zulay Celis:   [address] [facility]:                The patient is a 69 y.o. male with significant past medical history of:      Diagnosis Date    Allergic rhinitis due to other allergen 7/12/2010    Coronary atherosclerosis of unspecified type of vessel, native or graft 7/12/2010    Diabetic eye exam ([facility] [address] 04/29/2015    Diabetic eye exam ([address] 5/5/2016    [place] [facility]    Generalized osteoarthrosis, involving multiple sites 7/12/2010    Other psoriasis 7/12/2010    Pneumonia     Prolonged emergence from general anesthesia     Psoriatic arthropathy ([facility] [address] 7/12/2010    Type II or unspecified type diabetes mellitus without mention of complication, not stated as uncontrolled 7/12/2010    Unspecified essential hypertension 7/12/2010    Unspecified sleep apnea 7/12/2010   Interval history: Patient is alert. No respiratory distress. He did wear BiPAP last night with sleep.       Past Surgical History:        Procedure Laterality Date    CARPAL TUNNEL RELEASE      s/p    CATARACT REMOVAL      CORONARY ARTERY BYPASS GRAFT      JOINT REPLACEMENT      LAPAROSCOPY INSERTION PERITONEAL CATHETER N/A 10/26/2020    LAPAROSCOPIC PERITONEAL DIALYSIS CATHETER PLACEMENT; LAPAROSCOPIC OMENTOPEXY performed by Cherylene Glee, DO at [address] OR     Current Medications:    Current Facility-Administered Medications: furosemide (LASIX) injection 80 mg, 80 mg, IntraVENous, Daily  cefepime (MAXIPIME) 1000 mg IVPB minibag, 1,000 mg, IntraVENous, Q24H  ipratropium-albuterol (DUONEB) nebulizer solution 1 ampule, 1 ampule, Inhalation, 4x daily  vancomycin (VANCOCIN) intermittent dosing (placeholder), , Other, RX Placeholder  dianeal lo-eben 4.25% 2,000 mL (Patient Supplied), 2,000 mL, IntraPERitoneal, 4x daily  gentamicin (GARAMYCIN) 0.1 % cream, , Topical, Daily  sodium chloride flush 0.9 % injection 5-40 mL, 5-40 mL, IntraVENous, 2 times per day  sodium chloride flush 0.9 % injection 5-40 mL, 5-40 mL, IntraVENous, PRN  0.9 % sodium chloride infusion, , IntraVENous, PRN  ondansetron (ZOFRAN-ODT) disintegrating tablet 4 mg, 4 mg, Oral, Q8H PRN **OR** ondansetron (ZOFRAN) injection 4 mg, 4 mg, IntraVENous, Q6H PRN  polyethylene glycol (GLYCOLAX) packet 17 g, 17 g, Oral, Daily PRN  acetaminophen (TYLENOL) tablet 650 mg, 650 mg, Oral, Q6H PRN **OR** acetaminophen (TYLENOL) suppository 650 mg, 650 mg, Rectal, Q6H PRN  albuterol (PROVENTIL) nebulizer solution 2.5 mg, 2.5 mg, Nebulization, Q4H PRN  aspirin chewable tablet 81 mg, 81 mg, Oral, Daily  atorvastatin (LIPITOR) tablet 80 mg, 80 mg, Oral, Nightly  mometasone-formoterol (DULERA) 200-5 MCG/ACT inhaler 2 puff, 2 puff, Inhalation, BID  buPROPion (WELLBUTRIN) tablet 100 mg, 100 mg, Oral, Every Other Day  levothyroxine (SYNTHROID) tablet 50 mcg, 50 mcg, Oral, Daily  dextrose bolus 10% 125 mL, 125 mL, IntraVENous, PRN **OR** dextrose bolus 10% 250 mL, 250 mL, IntraVENous, PRN  glucagon (rDNA) injection 1 mg, 1 mg, SubCUTAneous, PRN  dextrose 10 % infusion, , IntraVENous, Continuous PRN  insulin lispro (HUMALOG) injection vial 0-16 Units, 0-16 Units, SubCUTAneous, Q4H  insulin glargine (LANTUS) injection vial 40 Units, 40 Units, SubCUTAneous, Nightly  heparin (porcine) injection 5,000 Units, 5,000 Units, SubCUTAneous, 3 times per day    Allergies   Allergen Reactions    Penicillins Other (See Comments)     Syncope \"pass out \" per pt    Sulfa Antibiotics      Patient unsure of reaction     Tazorac [Tazarotene]      Cream - rash    Clindamycin Rash    Clindamycin/Lincomycin Rash       Social History:    TOBACCO:   reports that he quit smoking about 41 years ago. He started smoking about 60 years ago. He has a 18.00 pack-year smoking history. He has never used smokeless tobacco.  ETOH:   reports no history of alcohol use.   Patient currently lives independently  Environmental/chemical exposure: None known    Family History:       Problem Relation Age of Onset    Diabetes Mother     Heart Failure Mother     Coronary Art Dis Father      REVIEW OF SYSTEMS:    CONSTITUTIONAL:  negative for fevers, chills, diaphoresis, activity change, appetite change, fatigue, night sweats and unexpected weight change. EYES:  negative for blurred vision, eye discharge, visual disturbance and icterus  HEENT:  negative for hearing loss, tinnitus, ear drainage, sinus pressure, nasal congestion, epistaxis and snoring  RESPIRATORY:  See HPI  CARDIOVASCULAR:  negative for chest pain, palpitations, exertional chest pressure/discomfort, edema, syncope  GASTROINTESTINAL:  negative for nausea, vomiting, diarrhea, constipation, blood in stool and abdominal pain  GENITOURINARY:  negative for frequency, dysuria, urinary incontinence, decreased urine volume, and hematuria  HEMATOLOGIC/LYMPHATIC:  negative for easy bruising, bleeding and lymphadenopathy  ALLERGIC/IMMUNOLOGIC:  negative for recurrent infections, angioedema, anaphylaxis and drug reactions  ENDOCRINE:  negative for weight changes and diabetic symptoms including polyuria, polydipsia and polyphagia  MUSCULOSKELETAL:  negative for  pain, joint swelling, decreased range of motion and muscle weakness  NEUROLOGICAL:  negative for headaches, slurred speech, unilateral weakness  PSYCHIATRIC/BEHAVIORAL: negative for hallucinations, behavioral problems, confusion and agitation.      Objective:   PHYSICAL EXAM:      VITALS:  BP (!) 120/53   Pulse 67   Temp 97.5 °F (36.4 °C) (Temporal)   Resp 18   Ht 5' 10\" (1.778 m)   Wt 206 lb 2.1 oz (93.5 kg)   SpO2 98%   BMI 29.58 kg/m²      24HR INTAKE/OUTPUT:    Intake/Output Summary (Last 24 hours) at 9/23/2022 0887  Last data filed at 9/23/2022 0400  Gross per 24 hour   Intake 480 ml   Output 6 ml   Net 474 ml       CONSTITUTIONAL:  awake, alert, cooperative, no apparent distress, and appears stated age  NECK:  Supple, symmetrical, trachea midline, no adenopathy, thyroid symmetric, not enlarged and no tenderness, skin normal  LUNGS:  no increased work of breathing and clear to auscultation. No accessory muscle use  CARDIOVASCULAR: S1 and S2, no edema and no JVD  ABDOMEN:  normal bowel sounds, non-distended and no masses palpated, and no tenderness to palpation. No hepatospleenomegaly  LYMPHADENOPATHY:  no axillary or supraclavicular adenopathy. No cervical adnenopathy  PSYCHIATRIC: Oriented to person place and time. No obvious depression or anxiety. MUSCULOSKELETAL: No obvious misalignment or effusion of the joints. No clubbing, cyanosis of the digits. SKIN:  normal skin color, texture, turgor and no redness, warmth, or swelling. No palpable nodules    DATA:    Old records have been reviewed    CBC:  Recent Labs     09/21/22  0857 09/22/22 0439   WBC 12.4* 11.3*   RBC 2.53* 2.40*   HGB 9.7* 9.1*   HCT 29.3* 27.5*    302   .9* 114.5*   MCH 38.3* 37.9*   MCHC 33.0 33.1   RDW 15.1 14.9        BMP:  Recent Labs     09/21/22  0857 09/22/22  0439 09/23/22  0424    137 137   K 3.9 3.9 3.3*   CL 95* 96* 97*   CO2 31 25 26   BUN 35* 32* 37*   CREATININE 8.1* 7.9* 8.3*   CALCIUM 8.8 8.3 8.0*   GLUCOSE 385* 142* 253*        ABG:  Recent Labs     09/21/22  0936 09/21/22  1745 09/21/22  2110   PHART 7.095* 7.214* 7.304*   WHH4UEJ 94.8* 71.5* 58.2*   PO2ART 119.0* 102.0 102.0   EDP2WYV 29.1* 28.9 28.9   Z0YQHFFY 98.0 98.1 98.1   BEART -2.0 0.2 1.8       Procalcitonin  Recent Labs     09/22/22  0438   PROCAL 1.02*         Lab Results   Component Value Date    .0 (H) 10/26/2013     Lab Results   Component Value Date    CKTOTAL 196 04/29/2015    TROPONINI 0.06 (H) 09/21/2022           Radiology Review:  All pertinent images / reports were reviewed as a part of this visit.      Assessment:     Volume overload  Pneumonia  End-stage renal disease  Acute hypoxemic and hypercapnic respiratory failure      Plan:     Patient presented to the emergency department with an arterial blood gas of 7.0 9/95/119 on an unknown FiO2. He did wear BiPAP during the night but is now been weaned to 1 L nasal cannula oxygen supplement this morning. Tolerating this without respiratory distress. I have reviewed chest imaging and my interpretation is bilateral airspace disease which could be CHF or pneumonia. He does have an elevated procalcitonin at 1.02. He also has a leukocytosis at 12.4. Vancomycin/cefepime pending culture results. So far cultures are no growth. Also likely has an element of volume overload. Creatinine is increasing despite intermittent peritoneal dialysis    Nephrology is following. Total critical care time caring for this patient with life threatening illness, including direct patient contact, management of life support systems, review of data including imaging and labs, discussions with other team members and physicians is at least 32 minutes so far today, excluding procedures.

## 2022-09-23 NOTE — DISCHARGE INSTR - COC
Continuity of Care Form    Patient Name: Darwin Evans   :  1945  MRN:  [id number]    Admit date:  2022  Discharge date:  10/10/2022    Code Status Order: Full Code   Advance Directives:     Admitting Physician:  Adonis Hardy DO  PCP: Sadi Gordon MD    Discharging Nurse: [facility] Unit/Room#: 66[address] [facility]  Discharging Unit Phone Number: [phone]    Emergency Contact:   Extended Emergency Contact Information  Primary Emergency Contact: Rafaela Dumont   [address] Phone: [phone]  Relation: Spouse  Secondary Emergency Contact: Enrike Stuart Phone: [phone]  Relation: Child  Preferred language: English   needed?  No    Past Surgical History:  Past Surgical History:   Procedure Laterality Date    CARPAL TUNNEL RELEASE      s/p    CATARACT REMOVAL      CORONARY ARTERY BYPASS GRAFT      JOINT REPLACEMENT      LAPAROSCOPY INSERTION PERITONEAL CATHETER N/A 10/26/2020    LAPAROSCOPIC PERITONEAL DIALYSIS CATHETER PLACEMENT; LAPAROSCOPIC OMENTOPEXY performed by Rubina Kumar DO at [address] OR       Immunization History:   Immunization History   Administered Date(s) Administered    COVID-19, PFIZER PURPLE top, DILUTE for use, (age 15 y+), 30mcg/0.3mL 2021, 2021, 2021    Influenza Vaccine, unspecified formulation 10/16/2014, 10/19/2015, 10/21/2016    Influenza Virus Vaccine 10/16/2014, 10/19/2015, 10/21/2016    Influenza, FLUAD, (age 72 y+), Adjuvanted, 0.5mL 2020    Influenza, High Dose (Fluzone 65 yrs and older) 10/16/2014, 10/19/2015, 10/21/2016, 2017, 2018    Influenza, Triv, inactivated, subunit, adjuvanted, IM (Fluad 65 yrs and older) 2019    PPD Test 2016    Pneumococcal Conjugate 13-valent ([id number]) 10/19/2015    Pneumococcal Polysaccharide ([id number]) 10/16/2014    Tdap (Boostrix, Adacel) 2016       Active Problems:  Patient Active Problem List   Diagnosis Code    Essential hypertension I10 Generalized osteoarthrosis, involving multiple sites M15.9    Arthritis with psoriasis ([facility]) L40.50    Sleep apnea G47.30    Psoriatic arthropathy ([facility]) L40.50    Allergic rhinitis due to other allergen J30.89    Coronary atherosclerosis I25.10    Vitamin D deficiency E55.9    Glaucoma H40.9    Nephropathy N28.9    S/P CABG x 4 Z95.1    ILD (interstitial lung disease) ([facility]) J84.9    Acute respiratory failure with hypoxia and hypercapnia ([facility]) J96.01, J96.02    Chronic diastolic heart failure ([facility]) I50.32    Elevated troponin R77.8    Coronary artery disease involving native coronary artery of native heart with unstable angina pectoris ([facility]) I25.110    BJ (obstructive sleep apnea) G47.33    Cough variant asthma J45.991    NSTEMI (non-ST elevated myocardial infarction) ([facility]) I21.4    Diabetes mellitus type 2 in obese ([facility]) E11.69, E66.9    Chronic fatigue R53.82    Syncope and collapse R55    Type 2 diabetes mellitus with hyperglycemia, with long-term current use of insulin ([facility]) E11.65, Z79.4    Hypertension associated with stage 5 chronic kidney disease due to type 2 diabetes mellitus ([facility]) E11.22, I12.0, N18.5    Acquired hypothyroidism E03.9    Unstable angina ([facility]) I20.0    Orthostatic hypotension I95.1    Dyspnea on exertion R06.00    Left lower quadrant abdominal pain R10.32    ESRD (end stage renal disease) ([facility]) N18.6    AMS (altered mental status) R41.82    Acute respiratory failure with hypoxia ([facility]) Z76.22    Acute metabolic encephalopathy W56.99    Hypercarbia R06.89    Electrolyte imbalance E87.8    Generalized abdominal tenderness R10.817    Chronic hypercapnic respiratory failure ([facility]) J96.12    BJ treated with BiPAP G47.33    Drug toxicity R89.2    Physical deconditioning R53.81    Moderate episode of recurrent major depressive disorder ([facility]) F33.1    Acute on chronic respiratory failure with hypoxia and hypercapnia ([facility]) J96.21, J96.22    Multifocal pneumonia J18.9    Hypervolemia E87.70 Leukocytosis D72.829    Acute pulmonary edema (HCC) J81.0       Isolation/Infection:   Isolation            No Isolation          Patient Infection Status       Infection Onset Added Last Indicated Last Indicated By Review Planned Expiration Resolved Resolved By    None active    Resolved    COVID-19 20 COVID-19   21             Nurse Assessment:  Last Vital Signs: /61   Pulse 80   Temp 97.4 °F (36.3 °C) (Temporal)   Resp 23   Ht 5' 10\" (1.778 m)   Wt 206 lb 2.1 oz (93.5 kg)   SpO2 93%   BMI 29.58 kg/m²     Last documented pain score (0-10 scale): Pain Level: 0  Last Weight:   Wt Readings from Last 1 Encounters:   22 206 lb 2.1 oz (93.5 kg)     Mental Status:  oriented, alert, and able to concentrate and follow conversation    IV Access:  - None    Nursing Mobility/ADLs:  Walking   Assisted  Transfer  Assisted  Bathing  Assisted  Dressing  Assisted  Toileting  Assisted  Feeding  Independent  Med Admin  Assisted  Med Delivery   whole    Wound Care Documentation and Therapy:  Wound 22 Sacrum Medial;Lower (Active)   Wound Etiology Pressure Stage 2 22 0400   Dressing Status New dressing applied 22 0400   Dressing/Treatment Foam 22 0400   Wound Assessment Pink/red 22 0400   Drainage Amount None 22 0400   Odor None 22 0400   Tiny-wound Assessment Blanchable erythema 22 0400   Number of days: 1       Incision 10/26/20 Abdomen Medial (Active)   Number of days: 697        Elimination:  Continence: Bowel: No  Bladder: No  Urinary Catheter: None   Colostomy/Ileostomy/Ileal Conduit: No       Date of Last BM: 10/8/2022    Intake/Output Summary (Last 24 hours) at 2022 1455  Last data filed at 2022 1400  Gross per 24 hour   Intake 730.6 ml   Output 6 ml   Net 724.6 ml     I/O last 3 completed shifts: In: 480 [P.O.:480]  Out: 3056 [Urine:70]    Safety Concerns:      At Risk for Falls    Impairments/Disabilities: None    Nutrition Therapy:  Current Nutrition Therapy:   - Oral Diet:  Carb Control 4 carbs/meal (1800kcals/day)    Routes of Feeding: Oral  Liquids: Thin Liquids  Daily Fluid Restriction: no  Last Modified Barium Swallow with Video (Video Swallowing Test): not done    Treatments at the Time of Hospital Discharge:   Respiratory Treatments:   Oxygen Therapy:  is not on home oxygen therapy. Ventilator:    - No ventilator support    Rehab Therapies: Physical Therapy and Occupational Therapy  Weight Bearing Status/Restrictions: No weight bearing restrictions  Other Medical Equipment (for information only, NOT a DME order):  walker  Other Treatments: None    Patient's personal belongings (please select all that are sent with patient):  Glasses    RN SIGNATURE:  Electronically signed by Jamie Dial Rn on 10/10/22 @ 10:55 AM    CASE MANAGEMENT/SOCIAL WORK SECTION    Inpatient Status Date: 9/21/2022    Readmission Risk Assessment Score:  Readmission Risk              Risk of Unplanned Readmission:  35           Discharging to Facility/ Agency   [facility]  [address]  [facility], [address]  Phone: [phone]  Fax: [phone]     Dialysis Facility (if applicable)   Name:  Address:  Dialysis Schedule:  Phone:  Fax:    / signature: Aubrie Edwards RN, BSN  [phone]     PHYSICIAN SECTION    Prognosis: Good    Condition at Discharge: Stable    Rehab Potential (if transferring to Rehab): Good    Recommended Labs or Other Treatments After Discharge:     Physician Certification: I certify the above information and transfer of Stephen Edmondson  is necessary for the continuing treatment of the diagnosis listed and that he requires [facility] for less 30 days.      Update Admission H&P: No change in H&P    PHYSICIAN SIGNATURE:  Electronically signed by Darwin Fair MD on 10/10/2022 at 1:43 PM

## 2022-09-23 NOTE — CARE COORDINATION
Discharge Planning Note:    CM called and spoke to Roxanne Melchor at [facility] to F/U on pre-cert. Roxanne Melchor will check on pre-cert and call this CM back.      Electronically signed by Albertina Campuzano RN on 9/23/2022 at 1:14 PM

## 2022-09-23 NOTE — PLAN OF CARE
Problem: Discharge Planning  Goal: Discharge to home or other facility with appropriate resources  9/23/2022 1143 by Juana Balderas RN  Outcome: Progressing     Problem: Safety - Adult  Goal: Free from fall injury  9/23/2022 1143 by Juana Balderas RN  Outcome: Progressing  Flowsheets (Taken 9/23/2022 1139)  Free From Fall Injury: Sil Hwang family/caregiver on patient safety     Problem: Skin/Tissue Integrity  Goal: Absence of new skin breakdown  Description: 1. Monitor for areas of redness and/or skin breakdown  2. Assess vascular access sites hourly  3. Every 4-6 hours minimum:  Change oxygen saturation probe site  4. Every 4-6 hours:  If on nasal continuous positive airway pressure, respiratory therapy assess nares and determine need for appliance change or resting period.   9/23/2022 1143 by Juana Balderas RN  Outcome: Progressing     Problem: Nutrition Deficit:  Goal: Optimize nutritional status  9/23/2022 1143 by Juana Balderas RN  Outcome: Progressing     Problem: Pain  Goal: Verbalizes/displays adequate comfort level or baseline comfort level  9/23/2022 1143 by Juana Balderas RN  Outcome: Progressing     Problem: ABCDS Injury Assessment  Goal: Absence of physical injury  9/23/2022 1143 by Juana Balderas RN  Outcome: Progressing

## 2022-09-23 NOTE — PROGRESS NOTES
Pt vs's and assessment complete. Pt lethargic, but responsive. Pt placed on bipap 12/6 at 28% for night. Pt denies discomfort. Palliative care was consulted and pt wants everything done. MD notified of pt rhythm change of possible afib vs sr with intermittant pauses of less than 3 secs. No new orders. Pt resting quietly without complaint at present.

## 2022-09-24 LAB
A/G RATIO: 1.6 (ref 1.1–2.2)
ALBUMIN SERPL-MCNC: 3.1 G/DL (ref 3.4–5)
ALP BLD-CCNC: 95 U/L (ref 40–129)
ALT SERPL-CCNC: 32 U/L (ref 10–40)
ANION GAP SERPL CALCULATED.3IONS-SCNC: 16 MMOL/L (ref 3–16)
AST SERPL-CCNC: 24 U/L (ref 15–37)
BASOPHILS ABSOLUTE: 0.1 K/UL (ref 0–0.2)
BASOPHILS RELATIVE PERCENT: 0.8 %
BILIRUB SERPL-MCNC: <0.2 MG/DL (ref 0–1)
BODY FLUID CULTURE, STERILE: NORMAL
BUN BLDV-MCNC: 42 MG/DL (ref 7–20)
CALCIUM SERPL-MCNC: 8.3 MG/DL (ref 8.3–10.6)
CHLORIDE BLD-SCNC: 96 MMOL/L (ref 99–110)
CO2: 25 MMOL/L (ref 21–32)
CREAT SERPL-MCNC: 8.4 MG/DL (ref 0.8–1.3)
EOSINOPHILS ABSOLUTE: 0.4 K/UL (ref 0–0.6)
EOSINOPHILS RELATIVE PERCENT: 3.2 %
GFR AFRICAN AMERICAN: 8
GFR NON-AFRICAN AMERICAN: 6
GLUCOSE BLD-MCNC: 128 MG/DL (ref 70–99)
GLUCOSE BLD-MCNC: 154 MG/DL (ref 70–99)
GLUCOSE BLD-MCNC: 192 MG/DL (ref 70–99)
GLUCOSE BLD-MCNC: 194 MG/DL (ref 70–99)
GLUCOSE BLD-MCNC: 202 MG/DL (ref 70–99)
GLUCOSE BLD-MCNC: 252 MG/DL (ref 70–99)
GLUCOSE BLD-MCNC: 417 MG/DL (ref 70–99)
GRAM STAIN RESULT: NORMAL
HCT VFR BLD CALC: 26.8 % (ref 40.5–52.5)
HEMATOLOGY PATH CONSULT: NO
HEMOGLOBIN: 8.9 G/DL (ref 13.5–17.5)
LYMPHOCYTES ABSOLUTE: 0.7 K/UL (ref 1–5.1)
LYMPHOCYTES RELATIVE PERCENT: 6.1 %
MAGNESIUM: 2.4 MG/DL (ref 1.8–2.4)
MCH RBC QN AUTO: 37.5 PG (ref 26–34)
MCHC RBC AUTO-ENTMCNC: 33.3 G/DL (ref 31–36)
MCV RBC AUTO: 112.6 FL (ref 80–100)
MONOCYTES ABSOLUTE: 0.8 K/UL (ref 0–1.3)
MONOCYTES RELATIVE PERCENT: 6.8 %
NEUTROPHILS ABSOLUTE: 9.3 K/UL (ref 1.7–7.7)
NEUTROPHILS RELATIVE PERCENT: 83.1 %
PDW BLD-RTO: 14.8 % (ref 12.4–15.4)
PERFORMED ON: ABNORMAL
PHOSPHORUS: 6.1 MG/DL (ref 2.5–4.9)
PLATELET # BLD: 296 K/UL (ref 135–450)
PMV BLD AUTO: 6.9 FL (ref 5–10.5)
POTASSIUM SERPL-SCNC: 3.4 MMOL/L (ref 3.5–5.1)
RBC # BLD: 2.38 M/UL (ref 4.2–5.9)
SODIUM BLD-SCNC: 137 MMOL/L (ref 136–145)
TOTAL PROTEIN: 5 G/DL (ref 6.4–8.2)
WBC # BLD: 11.1 K/UL (ref 4–11)

## 2022-09-24 PROCEDURE — 85025 COMPLETE CBC W/AUTO DIFF WBC: CPT

## 2022-09-24 PROCEDURE — 99233 SBSQ HOSP IP/OBS HIGH 50: CPT | Performed by: INTERNAL MEDICINE

## 2022-09-24 PROCEDURE — 6370000000 HC RX 637 (ALT 250 FOR IP): Performed by: INTERNAL MEDICINE

## 2022-09-24 PROCEDURE — 36415 COLL VENOUS BLD VENIPUNCTURE: CPT

## 2022-09-24 PROCEDURE — 94640 AIRWAY INHALATION TREATMENT: CPT

## 2022-09-24 PROCEDURE — 6370000000 HC RX 637 (ALT 250 FOR IP): Performed by: STUDENT IN AN ORGANIZED HEALTH CARE EDUCATION/TRAINING PROGRAM

## 2022-09-24 PROCEDURE — 90945 DIALYSIS ONE EVALUATION: CPT | Performed by: INTERNAL MEDICINE

## 2022-09-24 PROCEDURE — 2580000003 HC RX 258: Performed by: STUDENT IN AN ORGANIZED HEALTH CARE EDUCATION/TRAINING PROGRAM

## 2022-09-24 PROCEDURE — 94761 N-INVAS EAR/PLS OXIMETRY MLT: CPT

## 2022-09-24 PROCEDURE — 83735 ASSAY OF MAGNESIUM: CPT

## 2022-09-24 PROCEDURE — 2060000000 HC ICU INTERMEDIATE R&B

## 2022-09-24 PROCEDURE — 84100 ASSAY OF PHOSPHORUS: CPT

## 2022-09-24 PROCEDURE — 6360000002 HC RX W HCPCS: Performed by: INTERNAL MEDICINE

## 2022-09-24 PROCEDURE — 2700000000 HC OXYGEN THERAPY PER DAY

## 2022-09-24 PROCEDURE — 80053 COMPREHEN METABOLIC PANEL: CPT

## 2022-09-24 PROCEDURE — 94660 CPAP INITIATION&MGMT: CPT

## 2022-09-24 PROCEDURE — 6360000002 HC RX W HCPCS: Performed by: STUDENT IN AN ORGANIZED HEALTH CARE EDUCATION/TRAINING PROGRAM

## 2022-09-24 RX ORDER — POTASSIUM CHLORIDE 20 MEQ/1
20 TABLET, EXTENDED RELEASE ORAL ONCE
Status: COMPLETED | OUTPATIENT
Start: 2022-09-24 | End: 2022-09-24

## 2022-09-24 RX ORDER — MIDODRINE HYDROCHLORIDE 5 MG/1
5 TABLET ORAL 2 TIMES DAILY WITH MEALS
Status: DISCONTINUED | OUTPATIENT
Start: 2022-09-24 | End: 2022-10-08

## 2022-09-24 RX ADMIN — HEPARIN SODIUM 5000 UNITS: 5000 INJECTION INTRAVENOUS; SUBCUTANEOUS at 21:59

## 2022-09-24 RX ADMIN — LEVOTHYROXINE SODIUM 50 MCG: 0.03 TABLET ORAL at 05:31

## 2022-09-24 RX ADMIN — ASPIRIN 81 MG 81 MG: 81 TABLET ORAL at 09:27

## 2022-09-24 RX ADMIN — INSULIN LISPRO 4 UNITS: 100 INJECTION, SOLUTION INTRAVENOUS; SUBCUTANEOUS at 14:04

## 2022-09-24 RX ADMIN — INSULIN GLARGINE 40 UNITS: 100 INJECTION, SOLUTION SUBCUTANEOUS at 20:15

## 2022-09-24 RX ADMIN — Medication 2 PUFF: at 08:06

## 2022-09-24 RX ADMIN — Medication 10 ML: at 20:14

## 2022-09-24 RX ADMIN — GENTAMICIN SULFATE: 1 CREAM TOPICAL at 09:34

## 2022-09-24 RX ADMIN — INSULIN LISPRO 8 UNITS: 100 INJECTION, SOLUTION INTRAVENOUS; SUBCUTANEOUS at 20:15

## 2022-09-24 RX ADMIN — INSULIN LISPRO 16 UNITS: 100 INJECTION, SOLUTION INTRAVENOUS; SUBCUTANEOUS at 00:37

## 2022-09-24 RX ADMIN — IPRATROPIUM BROMIDE AND ALBUTEROL SULFATE 1 AMPULE: 2.5; .5 SOLUTION RESPIRATORY (INHALATION) at 12:35

## 2022-09-24 RX ADMIN — MIDODRINE HYDROCHLORIDE 5 MG: 5 TABLET ORAL at 16:53

## 2022-09-24 RX ADMIN — Medication 2 PUFF: at 19:59

## 2022-09-24 RX ADMIN — Medication 10 ML: at 09:27

## 2022-09-24 RX ADMIN — IPRATROPIUM BROMIDE AND ALBUTEROL SULFATE 1 AMPULE: 2.5; .5 SOLUTION RESPIRATORY (INHALATION) at 08:06

## 2022-09-24 RX ADMIN — HEPARIN SODIUM 5000 UNITS: 5000 INJECTION INTRAVENOUS; SUBCUTANEOUS at 06:05

## 2022-09-24 RX ADMIN — POTASSIUM CHLORIDE 20 MEQ: 1500 TABLET, EXTENDED RELEASE ORAL at 16:53

## 2022-09-24 RX ADMIN — HEPARIN SODIUM 5000 UNITS: 5000 INJECTION INTRAVENOUS; SUBCUTANEOUS at 14:59

## 2022-09-24 RX ADMIN — ATORVASTATIN CALCIUM 80 MG: 80 TABLET, FILM COATED ORAL at 20:14

## 2022-09-24 RX ADMIN — IPRATROPIUM BROMIDE AND ALBUTEROL SULFATE 1 AMPULE: 2.5; .5 SOLUTION RESPIRATORY (INHALATION) at 19:59

## 2022-09-24 RX ADMIN — FUROSEMIDE 80 MG: 10 INJECTION, SOLUTION INTRAMUSCULAR; INTRAVENOUS at 09:27

## 2022-09-24 ASSESSMENT — PAIN SCALES - GENERAL
PAINLEVEL_OUTOF10: 0

## 2022-09-24 NOTE — PROGRESS NOTES
Change of nurse assessment completed pt alert on BIPAP and PD tolerating both well, V/S stable, BG checked. Will continue to monitor.

## 2022-09-24 NOTE — PROGRESS NOTES
Disconnected from [facility] per protocol. Effluent: light yellow   Total time: 9 hr 42 min   Total UF:  449 ml. Total Volume:  8007 ml. Dwell time gained:  0 hr 2 min.   Pt Tolerated procedure: tolerated well

## 2022-09-24 NOTE — PROGRESS NOTES
Hospitalist Progress Note    Name:  Marcos Arzola    /Age/Sex: 1945  (69 y.o. male)  MRN & CSN:  [id number] & [id number]    PCP: Kathryn East MD    Date of Admission: 2022    Patient Status:  Inpatient     Chief Complaint: Altered mental status    Hospital Course: 76-year-old man was admitted for altered mental status. Found to be hypoxic and hypercapnic on admission. Placed on BiPAP with improvement. Nephrology following as patient has ESRD on peritoneal dialysis. Pulmonology following. Subjective: Today is:  Hospital Day: 4. Patient seen and examined. Dyspnea improved. Currently on 1 L nasal cannula and saturating well. Denies any complaints. No pain, fever or chills.          Medications:  Reviewed    Infusion Medications    sodium chloride Stopped (22 1649)    dextrose       Scheduled Medications    insulin regular  5 Units SubCUTAneous Once    dianeal lo-eben 2.5%  2,000 mL IntraPERitoneal 4x daily    furosemide  80 mg IntraVENous Daily    ipratropium-albuterol  1 ampule Inhalation 4x daily    gentamicin   Topical Daily    sodium chloride flush  5-40 mL IntraVENous 2 times per day    aspirin  81 mg Oral Daily    atorvastatin  80 mg Oral Nightly    mometasone-formoterol  2 puff Inhalation BID    buPROPion  100 mg Oral Every Other Day    levothyroxine  50 mcg Oral Daily    insulin lispro  0-16 Units SubCUTAneous Q4H    insulin glargine  40 Units SubCUTAneous Nightly    heparin (porcine)  5,000 Units SubCUTAneous 3 times per day     PRN Meds: sodium chloride flush, sodium chloride, ondansetron **OR** ondansetron, polyethylene glycol, acetaminophen **OR** acetaminophen, albuterol, dextrose bolus **OR** dextrose bolus, glucagon (rDNA), dextrose      Intake/Output Summary (Last 24 hours) at 2022 1429  Last data filed at 2022 1846  Gross per 24 hour   Intake 240 ml   Output --   Net 240 ml       Physical Exam Performed:    BP (!) 104/49   Pulse 80   Temp 97.9 °F (36.6 °C) (Temporal)   Resp 21   Ht 5' 10\" (1.778 m)   Wt 200 lb 9.9 oz (91 kg)   SpO2 96%   BMI 28.79 kg/m²     General appearance: No apparent distress, appears stated age and cooperative. Somewhat confused, but more conversational today. HEENT: Pupils equal, round, and reactive to light. Conjunctivae/corneas clear. Neck: Supple, with full range of motion. No jugular venous distention. Trachea midline. Respiratory:  Normal respiratory effort. Clear to auscultation, bilaterally without Rales/Wheezes/Rhonchi. Cardiovascular: Regular rate and rhythm with normal S1/S2 without murmurs, rubs or gallops. Trace pitting edema LE bilaterally. Abdomen: Soft, non-tender, non-distended with normal bowel sounds. : No CVA tenderness. Musculoskeletal: No clubbing or cyanosis. Full range of motion without deformity. Skin: Skin color, texture, turgor normal.  No rashes or lesions. Neurologic:  Neurovascularly intact without any focal sensory/motor deficits. Cranial nerves: II-XII intact, grossly non-focal.  Psychiatric: Alert and oriented, thought content appropriate, normal insight  Peripheral Pulses: +2 palpable, equal bilaterally       Labs:   Recent Labs     09/22/22  0439 09/24/22  0410   WBC 11.3* 11.1*   HGB 9.1* 8.9*   HCT 27.5* 26.8*    296     Recent Labs     09/22/22  0439 09/23/22  0424 09/24/22  0410    137 137   K 3.9 3.3* 3.4*   CL 96* 97* 96*   CO2 25 26 25   BUN 32* 37* 42*   CREATININE 7.9* 8.3* 8.4*   CALCIUM 8.3 8.0* 8.3   PHOS 7.0* 6.6* 6.1*     Recent Labs     09/24/22  0410   AST 24   ALT 32   BILITOT <0.2   ALKPHOS 95     No results for input(s): INR in the last 72 hours. No results for input(s): Eusebia Nasuti in the last 72 hours.       Urinalysis:      Lab Results   Component Value Date/Time    NITRU Negative 09/21/2022 03:18 PM    WBCUA 3 09/21/2022 03:18 PM    BACTERIA None Seen 09/21/2022 03:18 PM    RBCUA 5 09/21/2022 03:18 PM    BLOODU SMALL 09/21/2022 03:18 PM SPECGRAV 1.015 09/21/2022 03:18 PM    GLUCOSEU 250 09/21/2022 03:18 PM    GLUCOSEU >=1000 03/26/2012 10:22 PM       Radiology:  XR CHEST PORTABLE   Final Result   CHF with pulmonary edema. CT HEAD WO CONTRAST   Preliminary Result   No definite evidence of acute intracranial abnormality on a study   significantly limited by motion/streak artifact as described above. Assessment/Plan:    Active Hospital Problems    Diagnosis     Leukocytosis [D72.829]      Priority: Medium    Acute pulmonary edema (HCC) [J81.0]      Priority: Medium    Multifocal pneumonia [J18.9]      Priority: Medium    Hypervolemia [E87.70]      Priority: Medium    Acute on chronic respiratory failure with hypoxia and hypercapnia (HCC) [J96.21, J96.22]      Priority: Medium    Moderate episode of recurrent major depressive disorder (HCC) [F33.1]      Priority: Medium    BJ treated with BiPAP [G47.33]      Priority: Medium    Acute metabolic encephalopathy [K99.59]      Priority: Medium    Anemia in ESRD (end-stage renal disease) ([facility] [N18.6, D63.1]     Type 2 diabetes mellitus with hyperglycemia, with long-term current use of insulin ([facility]) [E11.65, Z79.4]     Hypertension associated with stage 5 chronic kidney disease due to type 2 diabetes mellitus ([facility] [E11.22, I12.0, N18.5]     Chronic diastolic heart failure (HCC) [I50.32]     Acute respiratory failure with hypoxia and hypercapnia (HCC) [J96.01, J96.02]     Arthritis with psoriasis ([facility]) [L40.50]     Psoriatic arthropathy ([facility] [L40.50]     Sleep apnea [G47.30]          Hospital Day: 4    This is a 69 y.o. male who presented to [facility] on 9/21/2022 and is being treated for:    Acute on chronic hypoxic and hypercapnic respiratory failure:- improving  Most likely due to volume overload. Continue IV lasix at 80 mg QD; monitor UOP  Currently on 1-2 L nasal cannula with BiPAP nightly. Wean off as tolerated. Continue inhaler therapy.   Pulmonology consulted: appreciate recommendations       History of BJ:  Unable to tolerate home BiPAP/CPAP. Will need repeat sleep study outpatient. Acute metabolic encephalopathy: Improved  CT head: No acute abnormality. TSH, Ammonia level wnl. B 12 and folate pending. Supportive care       Type 2 diabetes on long-term insulin:  A1c 0.5% 9/21/2022. Continue basal insulin with sliding scale. ESRD on peritoneal dialysis:  Nephrology consulted: Appreciate input. No evidence of PD site infection or peritonitis. PD fluid cultures negative. Antibiotics discontinued. Hypotension: Continue midodrine    Depression: Continue Wellbutrin     Hypothyroid: Continue synthroid. Anemia due to ESRD: Continue EPO. DVT ppx: Heparin  GI ppx: Diet/Tube Feeds  Diet: ADULT ORAL NUTRITION SUPPLEMENT; Breakfast, Dinner; Renal Oral Supplement  ADULT DIET; Regular; 4 carb choices (60 gm/meal); Low Phosphorus (Less than 1000 mg)  Code Status: Full Code    Disposition:  Estimated Discharge Date: 2 days. Barriers to Discharge: Nephrologist recommending PT reevaluation for ARU. Prior facility/SNF unable to handle PD. Dialysate still being adjusted for optimal PD.    PT/OT Eval Status: SNF recommended. Luba Conn MD  9/24/2022  2:29 PM      Please note that some part of this chart was generated using Dragon dictation software. Although every effort was made to ensure the accuracy of this automated transcription, some errors in transcription may have occurred inadvertently. If you may need any clarification, please do not hesitate to contact me through [facility].

## 2022-09-24 NOTE — PROGRESS NOTES
Office : [phone]     Fax :[phone]       Nephrology progress  Note      Patient's Name: Carley Russell  10:05 AM  9/24/2022    Reason for Consult:  ESRD on peritoneal dialysis. Requesting Physician:  Nayely Buitrago MD      Chief Complaint:    Chief Complaint   Patient presents with    Altered Mental Status     Patient arrives via [place] from [facility], altered, last seen normal last night, started peritoneal dialysis last night, O2 low on normal 3L, placed on NRB with some improvement, increased WOB noted           History of Present Ilness:    Carley Russell is a 69 y.o. male with prior history of ESRD on peritoneal dialysis , DM 2, HTN who was sent from [facility] with main c/o SOB. In ER he was found to be hypoxic. CXR shows volume overload. On BiPAP now. Still makes urine . Also had elevated WBC. No abdominal pain. No fever   BS Elevated. Interval hx :    Feels much better     Tolerating PD well   UF good         I/O last 3 completed shifts: In: 490.6 [P.O.:420;  I.V.:21.2; IV Piggyback:49.4]  Out: 5 [Urine:5]    Past Medical History:   Diagnosis Date    Allergic rhinitis due to other allergen 7/12/2010    Coronary atherosclerosis of unspecified type of vessel, native or graft 7/12/2010    Diabetic eye exam ([address] 04/29/2015    Diabetic eye exam ([address] 5/5/2016    [place] eye    Generalized osteoarthrosis, involving multiple sites 7/12/2010    Other psoriasis 7/12/2010    Pneumonia     Prolonged emergence from general anesthesia     Psoriatic arthropathy ([address] 7/12/2010    Type II or unspecified type diabetes mellitus without mention of complication, not stated as uncontrolled 7/12/2010    Unspecified essential hypertension 7/12/2010    Unspecified sleep apnea 7/12/2010 Past Surgical History:   Procedure Laterality Date    CARPAL TUNNEL RELEASE      s/p    CATARACT REMOVAL      CORONARY ARTERY BYPASS GRAFT      JOINT REPLACEMENT      LAPAROSCOPY INSERTION PERITONEAL CATHETER N/A 10/26/2020    LAPAROSCOPIC PERITONEAL DIALYSIS CATHETER PLACEMENT; LAPAROSCOPIC OMENTOPEXY performed by Jackie Alves DO at [facility] OR       Family History   Problem Relation Age of Onset    Diabetes Mother     Heart Failure Mother     Coronary Art Dis Father         reports that he quit smoking about 41 years ago. He started smoking about 60 years ago. He has a 18.00 pack-year smoking history. He has never used smokeless tobacco. He reports that he does not drink alcohol and does not use drugs.         Allergies:  Penicillins, Sulfa antibiotics, Tazorac [tazarotene], Clindamycin, and Clindamycin/lincomycin    Current Medications:    insulin regular (HUMULIN R;NOVOLIN R) injection 5 Units, Once  dianeal lo-eben 2.5% 2,000 mL, 4x daily  furosemide (LASIX) injection 80 mg, Daily  ipratropium-albuterol (DUONEB) nebulizer solution 1 ampule, 4x daily  dianeal lo-eben 4.25% 2,000 mL (Patient Supplied), 4x daily  gentamicin (GARAMYCIN) 0.1 % cream, Daily  sodium chloride flush 0.9 % injection 5-40 mL, 2 times per day  sodium chloride flush 0.9 % injection 5-40 mL, PRN  0.9 % sodium chloride infusion, PRN  ondansetron (ZOFRAN-ODT) disintegrating tablet 4 mg, Q8H PRN   Or  ondansetron (ZOFRAN) injection 4 mg, Q6H PRN  polyethylene glycol (GLYCOLAX) packet 17 g, Daily PRN  acetaminophen (TYLENOL) tablet 650 mg, Q6H PRN   Or  acetaminophen (TYLENOL) suppository 650 mg, Q6H PRN  albuterol (PROVENTIL) nebulizer solution 2.5 mg, Q4H PRN  aspirin chewable tablet 81 mg, Daily  atorvastatin (LIPITOR) tablet 80 mg, Nightly  mometasone-formoterol (DULERA) 200-5 MCG/ACT inhaler 2 puff, BID  buPROPion (WELLBUTRIN) tablet 100 mg, Every Other Day  levothyroxine (SYNTHROID) tablet 50 mcg, Daily  dextrose bolus 10% 125 mL, PRN Or  dextrose bolus 10% 250 mL, PRN  glucagon (rDNA) injection 1 mg, PRN  dextrose 10 % infusion, Continuous PRN  insulin lispro (HUMALOG) injection vial 0-16 Units, Q4H  insulin glargine (LANTUS) injection vial 40 Units, Nightly  heparin (porcine) injection 5,000 Units, 3 times per day      Physical exam:     Vitals:  BP (!) 104/49   Pulse 78   Temp 97.9 °F (36.6 °C) (Temporal)   Resp 24   Ht 5' 10\" (1.778 m)   Wt 200 lb 9.9 oz (91 kg)   SpO2 100%   BMI 28.79 kg/m²   Constitutional:  OAA X3 NAD  Skin: no rash, turgor wnl  Heent:  eomi, mmm  Neck: no bruits or jvd noted  Cardiovascular:  S1, S2 without m/r/g  Respiratory: b/l crackles   Abdomen:  +bs, soft, nt, nd  Ext: mild  lower extremity edema    Labs:  CBC:   Recent Labs     09/22/22  0439 09/24/22  0410   WBC 11.3* 11.1*   HGB 9.1* 8.9*    296     BMP:    Recent Labs     09/22/22  0439 09/23/22  0424 09/24/22  0410    137 137   K 3.9 3.3* 3.4*   CL 96* 97* 96*   CO2 25 26 25   BUN 32* 37* 42*   CREATININE 7.9* 8.3* 8.4*   GLUCOSE 142* 253* 192*     Ca/Mg/Phos:   Recent Labs     09/22/22  0439 09/23/22  0424 09/24/22  0410   CALCIUM 8.3 8.0* 8.3   MG  --   --  2.40   PHOS 7.0* 6.6* 6.1*     Hepatic:   Recent Labs     09/24/22  0410   AST 24   ALT 32   BILITOT <0.2   ALKPHOS 95     Troponin:   No results for input(s): TROPONINI in the last 72 hours. BNP: No results for input(s): BNP in the last 72 hours. Lipids: No results for input(s): CHOL, TRIG, HDL, LDLCALC, LABVLDL in the last 72 hours. ABGs:   Recent Labs     09/21/22 2110   PHART 7.304*   PO2ART 102.0   [id number] 58.2*     INR: No results for input(s): INR in the last 72 hours.   UA:  Recent Labs     09/21/22  1518   COLORU DARK YELLOW*   CLARITYU CLOUDY*   GLUCOSEU 250*   BILIRUBINUR Negative   KETUA Negative   SPECGRAV 1.015   BLOODU SMALL*   PHUR 6.0   PROTEINU 300   UROBILINOGEN 0.2   NITRU Negative   LEUKOCYTESUR TRACE*   LABMICR YES   URINETYPE NotGiven      Urine Microscopic: Recent Labs     09/21/22  1518   BACTERIA None Seen   HYALCAST 1   WBCUA 3   RBCUA 5*   EPIU 2     Urine Culture: No results for input(s): LABURIN in the last 72 hours. Urine Chemistry: No results for input(s): Marlise Elsa, PROTEINUR, NAUR in the last 72 hours. IMAGING:  XR CHEST PORTABLE   Final Result   CHF with pulmonary edema. CT HEAD WO CONTRAST   Preliminary Result   No definite evidence of acute intracranial abnormality on a study   significantly limited by motion/streak artifact as described above. Assessment/Plan :      1. ESRD   Volume overload   Better     Continue  CCPD now with 2.5  % today    PD fluid for cell count and culture -  negative   No growth   No evidence of peritonitis    Stop ABX         2. HTN  BP controlled       3. Anemia in ESRD   On epogen     4. Acid- base disorder. monitor     5. Electrolytes. Monitor       Will need ARU placement .  Preferably at [facility]  Otherwise arrange another ARU where PD can be done properly               D/w primary team      Thank you for allowing us to participate in care of Yady Mann         Electronically signed by: Negar Barrientos MD, 9/24/2022, 10:05 AM      [facility] [address]  Office : [phone]  Fax :[phone]

## 2022-09-24 NOTE — PROGRESS NOTES
PULMONARY AND CRITICAL CARE MEDICINE PROGRESS NOTE    Subjective: No acute events overnight. Patient wore BiPAP for few hours last night. Now he is on 1 L nasal cannula and saturating 100%. He is awake and answering all questions. REVIEW OF SYSTEMS:   Constitutional symptoms: The patient denies fever, fatigue, night sweats, weight loss or weight gain. HEENT: No vision changes. No tinnitus, Denies sinus pain. No hoarseness, or dysphagia. Neck: Patient denies swelling in the neck. Cardiovascular: Denies chest pain, palpitation, syncope. Respiratory: Denies shortness of breath or cough. Gastrointestinal: Denies nausea, abdominal pain or change in bowel function. Genitourinary: Denies obstructive symptoms. No history of incontinence. Skin: No rashes or itching. Muskuloskeletal: Denies weakness or bone pain. Neurological: No headaches or seizures. Psychiatric: Denies mood swings or depression.      MEDICATIONS:     Scheduled Meds:   insulin regular  5 Units SubCUTAneous Once    dianeal lo-eben 2.5%  2,000 mL IntraPERitoneal 4x daily    furosemide  80 mg IntraVENous Daily    ipratropium-albuterol  1 ampule Inhalation 4x daily    gentamicin   Topical Daily    sodium chloride flush  5-40 mL IntraVENous 2 times per day    aspirin  81 mg Oral Daily    atorvastatin  80 mg Oral Nightly    mometasone-formoterol  2 puff Inhalation BID    buPROPion  100 mg Oral Every Other Day    levothyroxine  50 mcg Oral Daily    insulin lispro  0-16 Units SubCUTAneous Q4H    insulin glargine  40 Units SubCUTAneous Nightly    heparin (porcine)  5,000 Units SubCUTAneous 3 times per day       Current Infusions:    sodium chloride Stopped (09/23/22 1649)    dextrose         PRN meds:  sodium chloride flush, sodium chloride, ondansetron **OR** ondansetron, polyethylene glycol, acetaminophen **OR** acetaminophen, albuterol, dextrose bolus **OR** dextrose bolus, glucagon (rDNA), dextrose    PHYSICAL EXAM:  BP (!) 104/49 Pulse 80   Temp 97.9 °F (36.6 °C) (Temporal)   Resp 21   Ht 5' 10\" (1.778 m)   Wt 200 lb 9.9 oz (91 kg)   SpO2 96%   BMI 28.79 kg/m²  I/O last 3 completed shifts: In: 490.6 [P.O.:420; I.V.:21.2; IV Piggyback:49.4]  Out: 5 [Urine:5] No intake/output data recorded. Intake/Output Summary (Last 24 hours) at 9/24/2022 1305  Last data filed at 9/23/2022 1846  Gross per 24 hour   Intake 310.6 ml   Output 0 ml   Net 310.6 ml       CONSTITUTIONAL: He is a 69y.o.-year-old who appears his stated age. He is alert and oriented x 3 and in no acute distress. CARDIOVASCULAR: S1 S2 RRR. Without murmer  RESPIRATORY & CHEST: Lungs are clear to auscultation and percussion. No wheezing, no crackles. Good air movement  GASTROINTESTINAL & ABDOMEN: Soft, nontender, positive bowel sounds in all quadrants, non-distended, without hepatosplenomegaly. GENITOURINARY: Deferred. MUSCULOSKELETAL: No tenderness to palpation of the axial skeleton. There is no clubbing. No cyanosis. No edema of the lower extremities. SKIN OF BODY: No rash or jaundice. PSYCHIATRIC EVALUATION: Normal affect. Patient answers questions appropriately. HEMATOLOGIC/LYMPHATIC/ IMMUNOLOGIC: No palpable lymphadenopathy. NEUROLOGIC: Alert and oriented x 3. Groslly non-focal. Motor strength is 5+/5 in all muscle groups. The patient has a normal sensorium globally.      LABS:    Recent Labs     09/22/22 0439 09/24/22  0410   WBC 11.3* 11.1*   RBC 2.40* 2.38*   HGB 9.1* 8.9*   HCT 27.5* 26.8*   MCH 37.9* 37.5*   MCHC 33.1 33.3   RDW 14.9 14.8    296   MPV 7.1 6.9   NEUTOPHILPCT 81.0 83.1   MONOPCT 7.6 6.8   BASOPCT 1.1 0.8     Recent Labs     09/22/22  0439 09/23/22  0424 09/24/22  0410    137 137   K 3.9 3.3* 3.4*   CL 96* 97* 96*   ANIONGAP 16 14 16   CO2 25 26 25   BUN 32* 37* 42*   CREATININE 7.9* 8.3* 8.4*   CALCIUM 8.3 8.0* 8.3   PROT  --   --  5.0*   BILITOT  --   --  <0.2   ALKPHOS  --   --  95   ALT  --   --  32   AST  --   --  24 GLUCOSE 142* 253* 192*     Recent Labs     09/21/22  1745 09/21/22  2110   PHART 7.214* 7.304*   YUA4NNN 71.5* 58.2*   PO2ART 102.0 102.0   XLL4ICG 28.9 28.9   [id number] 98.1 98.1   BEART 0.2 1.8     Imaging:  I reviewed the chest x-ray from 9/21/2022 and my interpretation is as follows. No lung volumes. No infiltrates noted. IMPRESSION:  Acute hypoxic and hypercapnic respiratory failure  Volume overload  End-stage renal disease on peritoneal dialysis  Obstructive sleep apnea, not on CPAP      PLAN:  Patient presented with acute hypoxic and hypercapnic respiratory failure due to volume overload and pulmonary edema requiring BiPAP. This is improved now and now he is down to 1 L nasal cannula and saturating well. He wore BiPAP for few hours last night. Patient has obstructive sleep apnea which was diagnosed with a sleep study in 2017. Patient states that he had CPAP/BiPAP at home but he could not tolerate. He does not wear the machine at home. He is not sure whether he still has CPAP/BiPAP. Patient would need repeat sleep evaluation as outpatient. Continued on peritoneal dialysis. Patient does not have any critical care needs. Critical care will sign off. Cara Aguilar MD  Pulmonary Critical Care and Sleep Medicine  9/24/2022, 1:05 PM    This note was completed using dragon medical speech recognition software. Grammatical errors, random word insertions, pronoun errors and incomplete sentences are occasional consequences of this technology due to software limitations. If there are questions or concerns about the content of this note of information contained within the body of this dictation they should be addressed with the provider for clarification.

## 2022-09-24 NOTE — PROGRESS NOTES
Shift assessment completed ( See flow sheets for details). Pt alert and oriented X 4. Able to follow commands. Denies any pain and discomfort. Currently on 1 L of oxygen. Vital signs stable. Turned and repositioned pt. All safety measures stay active. Will continue to monitor.

## 2022-09-24 NOTE — PROGRESS NOTES
Shift handoff given to [facility]. Pt left in bed watching tv with call light, bedside table within reach, and bed low and locked.

## 2022-09-24 NOTE — PROGRESS NOTES
Pt  MD notified and 16 units of Humalog given per the Humalog order. MD ordered Humulin before knowing what was already given, after clarification it was discontinued.

## 2022-09-24 NOTE — CARE COORDINATION
Discharge Planning  CM called and spoke with Rebeca Szymanski at the  admissions regarding the Precert status who stated  will check and call back.

## 2022-09-24 NOTE — FLOWSHEET NOTE
CCPD Order   Exchanges: 4   Exchange Volume: 2000 ml   Total Time: 8 hrs   Dextrose: 2.5%      Total Volume: 8000 ml     Orders verified. Supplies taken to pt's room. Report received. Cycler set up, primed and pre tested. Dressing changed on [facility] Cath site. Pt connected to cycler. CCPD initiated without problem. Initial effluent clear. If problems should arise please call the [phone] number on top of PD cycler machine.           09/24/22 1902   Vitals   BP (!) 131/41   Temp 98 °F (36.7 °C)   Heart Rate 77   Resp 17   SpO2 99 %   Weight 200 lb 9.9 oz (91 kg)   Cycler   Verification of Prescription CCPD   Total Volume Programmed 8000 mL   Therapy Time (Hours:Minutes) 8:000   Fill Volume 2000 mL   Dextrose Setting Same (Nonextraneal)   Number of Cycles 4   Bag Volume 5000 mL   Number of Bags Used 2

## 2022-09-25 LAB
A/G RATIO: 1 (ref 1.1–2.2)
ALBUMIN SERPL-MCNC: 2.9 G/DL (ref 3.4–5)
ALP BLD-CCNC: 93 U/L (ref 40–129)
ALT SERPL-CCNC: 36 U/L (ref 10–40)
ANION GAP SERPL CALCULATED.3IONS-SCNC: 13 MMOL/L (ref 3–16)
AST SERPL-CCNC: 29 U/L (ref 15–37)
BASOPHILS ABSOLUTE: 0.1 K/UL (ref 0–0.2)
BASOPHILS RELATIVE PERCENT: 0.8 %
BILIRUB SERPL-MCNC: <0.2 MG/DL (ref 0–1)
BLOOD CULTURE, ROUTINE: NORMAL
BUN BLDV-MCNC: 47 MG/DL (ref 7–20)
CALCIUM SERPL-MCNC: 8.7 MG/DL (ref 8.3–10.6)
CHLORIDE BLD-SCNC: 95 MMOL/L (ref 99–110)
CO2: 25 MMOL/L (ref 21–32)
CREAT SERPL-MCNC: 8.7 MG/DL (ref 0.8–1.3)
CULTURE, BLOOD 2: NORMAL
EOSINOPHILS ABSOLUTE: 0.5 K/UL (ref 0–0.6)
EOSINOPHILS RELATIVE PERCENT: 4.3 %
FERRITIN: 855.7 NG/ML (ref 30–400)
FOLATE: 17.15 NG/ML (ref 4.78–24.2)
GFR AFRICAN AMERICAN: 7
GFR NON-AFRICAN AMERICAN: 6
GLUCOSE BLD-MCNC: 151 MG/DL (ref 70–99)
GLUCOSE BLD-MCNC: 207 MG/DL (ref 70–99)
GLUCOSE BLD-MCNC: 218 MG/DL (ref 70–99)
GLUCOSE BLD-MCNC: 242 MG/DL (ref 70–99)
GLUCOSE BLD-MCNC: 249 MG/DL (ref 70–99)
GLUCOSE BLD-MCNC: 262 MG/DL (ref 70–99)
GLUCOSE BLD-MCNC: 288 MG/DL (ref 70–99)
HCT VFR BLD CALC: 26.7 % (ref 40.5–52.5)
HEMATOLOGY PATH CONSULT: NO
HEMOGLOBIN: 8.9 G/DL (ref 13.5–17.5)
INFLUENZA A BY PCR: NOT DETECTED
INFLUENZA B BY PCR: NOT DETECTED
IRON SATURATION: 37 % (ref 20–50)
IRON: 69 UG/DL (ref 59–158)
LYMPHOCYTES ABSOLUTE: 0.7 K/UL (ref 1–5.1)
LYMPHOCYTES RELATIVE PERCENT: 6 %
MAGNESIUM: 2.6 MG/DL (ref 1.8–2.4)
MCH RBC QN AUTO: 37.8 PG (ref 26–34)
MCHC RBC AUTO-ENTMCNC: 33.3 G/DL (ref 31–36)
MCV RBC AUTO: 113.8 FL (ref 80–100)
MONOCYTES ABSOLUTE: 0.7 K/UL (ref 0–1.3)
MONOCYTES RELATIVE PERCENT: 5.9 %
NEUTROPHILS ABSOLUTE: 9.6 K/UL (ref 1.7–7.7)
NEUTROPHILS RELATIVE PERCENT: 83 %
PDW BLD-RTO: 14.6 % (ref 12.4–15.4)
PERFORMED ON: ABNORMAL
PHOSPHORUS: 5.7 MG/DL (ref 2.5–4.9)
PLATELET # BLD: 289 K/UL (ref 135–450)
PMV BLD AUTO: 7.3 FL (ref 5–10.5)
POTASSIUM SERPL-SCNC: 3.3 MMOL/L (ref 3.5–5.1)
RBC # BLD: 2.35 M/UL (ref 4.2–5.9)
RSV BY PCR: NOT DETECTED
RSV SOURCE: NORMAL
SODIUM BLD-SCNC: 133 MMOL/L (ref 136–145)
TOTAL IRON BINDING CAPACITY: 185 UG/DL (ref 260–445)
TOTAL PROTEIN: 5.7 G/DL (ref 6.4–8.2)
VITAMIN B-12: 1165 PG/ML (ref 211–911)
WBC # BLD: 11.5 K/UL (ref 4–11)

## 2022-09-25 PROCEDURE — 6370000000 HC RX 637 (ALT 250 FOR IP): Performed by: INTERNAL MEDICINE

## 2022-09-25 PROCEDURE — 83550 IRON BINDING TEST: CPT

## 2022-09-25 PROCEDURE — 94640 AIRWAY INHALATION TREATMENT: CPT

## 2022-09-25 PROCEDURE — 36415 COLL VENOUS BLD VENIPUNCTURE: CPT

## 2022-09-25 PROCEDURE — 94760 N-INVAS EAR/PLS OXIMETRY 1: CPT

## 2022-09-25 PROCEDURE — 90945 DIALYSIS ONE EVALUATION: CPT

## 2022-09-25 PROCEDURE — 82607 VITAMIN B-12: CPT

## 2022-09-25 PROCEDURE — 82746 ASSAY OF FOLIC ACID SERUM: CPT

## 2022-09-25 PROCEDURE — 6360000002 HC RX W HCPCS: Performed by: STUDENT IN AN ORGANIZED HEALTH CARE EDUCATION/TRAINING PROGRAM

## 2022-09-25 PROCEDURE — 83735 ASSAY OF MAGNESIUM: CPT

## 2022-09-25 PROCEDURE — 2060000000 HC ICU INTERMEDIATE R&B

## 2022-09-25 PROCEDURE — 80053 COMPREHEN METABOLIC PANEL: CPT

## 2022-09-25 PROCEDURE — 2700000000 HC OXYGEN THERAPY PER DAY

## 2022-09-25 PROCEDURE — 6360000002 HC RX W HCPCS: Performed by: INTERNAL MEDICINE

## 2022-09-25 PROCEDURE — 85025 COMPLETE CBC W/AUTO DIFF WBC: CPT

## 2022-09-25 PROCEDURE — 94660 CPAP INITIATION&MGMT: CPT

## 2022-09-25 PROCEDURE — 94761 N-INVAS EAR/PLS OXIMETRY MLT: CPT

## 2022-09-25 PROCEDURE — 82728 ASSAY OF FERRITIN: CPT

## 2022-09-25 PROCEDURE — 83540 ASSAY OF IRON: CPT

## 2022-09-25 PROCEDURE — 84100 ASSAY OF PHOSPHORUS: CPT

## 2022-09-25 PROCEDURE — 90945 DIALYSIS ONE EVALUATION: CPT | Performed by: INTERNAL MEDICINE

## 2022-09-25 PROCEDURE — 2580000003 HC RX 258: Performed by: STUDENT IN AN ORGANIZED HEALTH CARE EDUCATION/TRAINING PROGRAM

## 2022-09-25 PROCEDURE — 6370000000 HC RX 637 (ALT 250 FOR IP): Performed by: STUDENT IN AN ORGANIZED HEALTH CARE EDUCATION/TRAINING PROGRAM

## 2022-09-25 RX ORDER — POTASSIUM CHLORIDE 20 MEQ/1
40 TABLET, EXTENDED RELEASE ORAL ONCE
Status: COMPLETED | OUTPATIENT
Start: 2022-09-25 | End: 2022-09-25

## 2022-09-25 RX ORDER — TORSEMIDE 100 MG/1
100 TABLET ORAL DAILY
Status: DISCONTINUED | OUTPATIENT
Start: 2022-09-26 | End: 2022-10-10 | Stop reason: HOSPADM

## 2022-09-25 RX ADMIN — INSULIN LISPRO 8 UNITS: 100 INJECTION, SOLUTION INTRAVENOUS; SUBCUTANEOUS at 00:31

## 2022-09-25 RX ADMIN — GENTAMICIN SULFATE: 1 CREAM TOPICAL at 08:51

## 2022-09-25 RX ADMIN — IPRATROPIUM BROMIDE AND ALBUTEROL SULFATE 1 AMPULE: 2.5; .5 SOLUTION RESPIRATORY (INHALATION) at 12:23

## 2022-09-25 RX ADMIN — GENTAMICIN SULFATE: 1 CREAM TOPICAL at 19:54

## 2022-09-25 RX ADMIN — HEPARIN SODIUM 5000 UNITS: 5000 INJECTION INTRAVENOUS; SUBCUTANEOUS at 05:55

## 2022-09-25 RX ADMIN — MIDODRINE HYDROCHLORIDE 5 MG: 5 TABLET ORAL at 17:36

## 2022-09-25 RX ADMIN — ASPIRIN 81 MG 81 MG: 81 TABLET ORAL at 08:49

## 2022-09-25 RX ADMIN — Medication 10 ML: at 20:27

## 2022-09-25 RX ADMIN — INSULIN LISPRO 4 UNITS: 100 INJECTION, SOLUTION INTRAVENOUS; SUBCUTANEOUS at 20:34

## 2022-09-25 RX ADMIN — HEPARIN SODIUM 5000 UNITS: 5000 INJECTION INTRAVENOUS; SUBCUTANEOUS at 15:04

## 2022-09-25 RX ADMIN — ATORVASTATIN CALCIUM 80 MG: 80 TABLET, FILM COATED ORAL at 20:33

## 2022-09-25 RX ADMIN — MIDODRINE HYDROCHLORIDE 5 MG: 5 TABLET ORAL at 08:49

## 2022-09-25 RX ADMIN — BUPROPION HYDROCHLORIDE 100 MG: 100 TABLET, FILM COATED ORAL at 08:51

## 2022-09-25 RX ADMIN — Medication 10 ML: at 08:49

## 2022-09-25 RX ADMIN — POTASSIUM CHLORIDE 40 MEQ: 1500 TABLET, EXTENDED RELEASE ORAL at 12:32

## 2022-09-25 RX ADMIN — Medication 2 PUFF: at 08:13

## 2022-09-25 RX ADMIN — IPRATROPIUM BROMIDE AND ALBUTEROL SULFATE 1 AMPULE: 2.5; .5 SOLUTION RESPIRATORY (INHALATION) at 15:51

## 2022-09-25 RX ADMIN — LEVOTHYROXINE SODIUM 50 MCG: 0.03 TABLET ORAL at 05:55

## 2022-09-25 RX ADMIN — INSULIN LISPRO 4 UNITS: 100 INJECTION, SOLUTION INTRAVENOUS; SUBCUTANEOUS at 17:35

## 2022-09-25 RX ADMIN — INSULIN LISPRO 8 UNITS: 100 INJECTION, SOLUTION INTRAVENOUS; SUBCUTANEOUS at 04:13

## 2022-09-25 RX ADMIN — IPRATROPIUM BROMIDE AND ALBUTEROL SULFATE 1 AMPULE: 2.5; .5 SOLUTION RESPIRATORY (INHALATION) at 08:13

## 2022-09-25 RX ADMIN — HEPARIN SODIUM 5000 UNITS: 5000 INJECTION INTRAVENOUS; SUBCUTANEOUS at 20:33

## 2022-09-25 RX ADMIN — INSULIN GLARGINE 40 UNITS: 100 INJECTION, SOLUTION SUBCUTANEOUS at 20:38

## 2022-09-25 RX ADMIN — FUROSEMIDE 80 MG: 10 INJECTION, SOLUTION INTRAMUSCULAR; INTRAVENOUS at 08:49

## 2022-09-25 RX ADMIN — INSULIN LISPRO 4 UNITS: 100 INJECTION, SOLUTION INTRAVENOUS; SUBCUTANEOUS at 12:32

## 2022-09-25 ASSESSMENT — PAIN SCALES - GENERAL: PAINLEVEL_OUTOF10: 0

## 2022-09-25 NOTE — FLOWSHEET NOTE
Disconnected from CCPD per protocol. Effluent: clear   Total time: 8 hr 53 min   Total UF:  +785 ml. Total Volume:  8013 ml. Dwell time gained:  0 hr 17 min. Pt Tolerated procedure: well. Gain of 785mls this treatment.  Post VSS          09/25/22 1303   Vitals   BP (!) 135/55   Temp 97 °F (36.1 °C)   Heart Rate 74   Resp 28   SpO2 93 %   Weight 201 lb 4.5 oz (91.3 kg)   Cycler   Total Volume Programmed 8013 mL   Therapy Time (Hours:Minutes) 8:53   Ultrafiltration (UF) (mL) -785 mL

## 2022-09-25 NOTE — PROGRESS NOTES
09/25/22 1226   Resting (Room Air)   SpO2 84   Resting (On O2)   SpO2 97   O2 Device Nasal cannula   O2 Flow Rate (l/min) 2 l/min   Comments Pt is unable to ambulate at this time. Pt states doesn't usually walk around much. On RA sats 84% 1L 86% and 2L 93% and above.    During Walk (On O2)   SpO2 97   O2 Device Nasal cannula   Need Additional O2 Flow Rate Rows No   Rate of Dyspnea 0   Comments  --    After Walk   Does the Patient Qualify for Home O2 Yes   Does the Patient Need Portable Oxygen Tanks Yes

## 2022-09-25 NOTE — PROGRESS NOTES
Office : [phone]     Fax :[phone]       Nephrology progress  Note      Patient's Name: Aron Fallon  1:52 PM  9/25/2022    Reason for Consult:  ESRD on peritoneal dialysis. Requesting Physician:  Tracy Souza MD      Chief Complaint:    Chief Complaint   Patient presents with    Altered Mental Status     Patient arrives via [place] from [facility], altered, last seen normal last night, started peritoneal dialysis last night, O2 low on normal 3L, placed on NRB with some improvement, increased WOB noted           History of Present Ilness:    Aron Fallon is a 69 y.o. male with prior history of ESRD on peritoneal dialysis , DM 2, HTN who was sent from [facility] with main c/o SOB. In ER he was found to be hypoxic. CXR shows volume overload. On BiPAP now. Still makes urine . Also had elevated WBC. No abdominal pain. No fever   BS Elevated. Interval hx :    Feels much better     Tolerating PD well         I/O last 3 completed shifts:   In: 350 [P.O.:350]  Out: 10 [Urine:10]    Past Medical History:   Diagnosis Date    Allergic rhinitis due to other allergen 7/12/2010    Coronary atherosclerosis of unspecified type of vessel, native or graft 7/12/2010    Diabetic eye exam ([address] 04/29/2015    Diabetic eye exam ([address] 5/5/2016    [place] eye    Generalized osteoarthrosis, involving multiple sites 7/12/2010    Other psoriasis 7/12/2010    Pneumonia     Prolonged emergence from general anesthesia     Psoriatic arthropathy ([address] 7/12/2010    Type II or unspecified type diabetes mellitus without mention of complication, not stated as uncontrolled 7/12/2010    Unspecified essential hypertension 7/12/2010    Unspecified sleep apnea 7/12/2010       Past Surgical History:   Procedure Laterality Date    CARPAL TUNNEL RELEASE      s/p    CATARACT REMOVAL      CORONARY ARTERY BYPASS GRAFT      JOINT REPLACEMENT      LAPAROSCOPY INSERTION PERITONEAL CATHETER N/A 10/26/2020    LAPAROSCOPIC PERITONEAL DIALYSIS CATHETER PLACEMENT; LAPAROSCOPIC OMENTOPEXY performed by Esperanza Ridley DO at [facility]       Family History   Problem Relation Age of Onset    Diabetes Mother     Heart Failure Mother     Coronary Art Dis Father         reports that he quit smoking about 41 years ago. He started smoking about 60 years ago. He has a 18.00 pack-year smoking history. He has never used smokeless tobacco. He reports that he does not drink alcohol and does not use drugs.         Allergies:  Penicillins, Sulfa antibiotics, Tazorac [tazarotene], Clindamycin, and Clindamycin/lincomycin    Current Medications:    midodrine (PROAMATINE) tablet 5 mg, BID WC  dianeal lo-eben 2.5% 2,000 mL, 4x daily  furosemide (LASIX) injection 80 mg, Daily  ipratropium-albuterol (DUONEB) nebulizer solution 1 ampule, 4x daily  gentamicin (GARAMYCIN) 0.1 % cream, Daily  sodium chloride flush 0.9 % injection 5-40 mL, 2 times per day  sodium chloride flush 0.9 % injection 5-40 mL, PRN  0.9 % sodium chloride infusion, PRN  ondansetron (ZOFRAN-ODT) disintegrating tablet 4 mg, Q8H PRN   Or  ondansetron (ZOFRAN) injection 4 mg, Q6H PRN  polyethylene glycol (GLYCOLAX) packet 17 g, Daily PRN  acetaminophen (TYLENOL) tablet 650 mg, Q6H PRN   Or  acetaminophen (TYLENOL) suppository 650 mg, Q6H PRN  albuterol (PROVENTIL) nebulizer solution 2.5 mg, Q4H PRN  aspirin chewable tablet 81 mg, Daily  atorvastatin (LIPITOR) tablet 80 mg, Nightly  mometasone-formoterol (DULERA) 200-5 MCG/ACT inhaler 2 puff, BID  buPROPion (WELLBUTRIN) tablet 100 mg, Every Other Day  levothyroxine (SYNTHROID) tablet 50 mcg, Daily  dextrose bolus 10% 125 mL, PRN   Or  dextrose bolus 10% 250 mL, PRN  glucagon (rDNA) injection 1 mg, PRN  dextrose 10 % infusion, Continuous PRN  insulin lispro (HUMALOG) injection vial 0-16 Units, Q4H  insulin glargine (LANTUS) injection vial 40 Units, Nightly  heparin (porcine) injection 5,000 Units, 3 times per day      Physical exam:     Vitals:  BP (!) 135/55   Pulse 74   Temp 97 °F (36.1 °C)   Resp 28   Ht 5' 10\" (1.778 m)   Wt 201 lb 4.5 oz (91.3 kg)   SpO2 93%   BMI 28.88 kg/m²   Constitutional:  OAA X3 NAD  Skin: no rash, turgor wnl  Heent:  eomi, mmm  Neck: no bruits or jvd noted  Cardiovascular:  S1, S2 without m/r/g  Respiratory: b/l crackles   Abdomen:  +bs, soft, nt, nd  Ext: mild  lower extremity edema    Labs:  CBC:   Recent Labs     09/24/22 0410 09/25/22 0425   WBC 11.1* 11.5*   HGB 8.9* 8.9*    289     BMP:    Recent Labs     09/23/22  0424 09/24/22 0410 09/25/22 0425    137 133*   K 3.3* 3.4* 3.3*   CL 97* 96* 95*   CO2 26 25 25   BUN 37* 42* 47*   CREATININE 8.3* 8.4* 8.7*   GLUCOSE 253* 192* 249*     Ca/Mg/Phos:   Recent Labs     09/23/22  0424 09/24/22 0410 09/25/22 0425   CALCIUM 8.0* 8.3 8.7   MG  --  2.40 2.60*   PHOS 6.6* 6.1* 5.7*     Hepatic:   Recent Labs     09/24/22 0410 09/25/22 0425   AST 24 29   ALT 32 36   BILITOT <0.2 <0.2   ALKPHOS 95 93     Troponin:   No results for input(s): TROPONINI in the last 72 hours. BNP: No results for input(s): BNP in the last 72 hours. Lipids: No results for input(s): CHOL, TRIG, HDL, LDLCALC, LABVLDL in the last 72 hours. ABGs:   No results for input(s): PHART, PO2ART, WGU9HEY in the last 72 hours. INR: No results for input(s): INR in the last 72 hours. UA:  No results for input(s): Keith Deer, GLUCOSEU, BILIRUBINUR, KETUA, SPECGRAV, BLOODU, PHUR, PROTEINU, UROBILINOGEN, NITRU, LEUKOCYTESUR, Lilibeth Ovens in the last 72 hours. Urine Microscopic:   No results for input(s): LABCAST, BACTERIA, COMU, HYALCAST, WBCUA, RBCUA, EPIU in the last 72 hours. Urine Culture: No results for input(s): LABURIN in the last 72 hours.   Urine Chemistry: No results for input(s): CLUR, LABCREA, PROTEINUR, NAUR in the last 72 hours. IMAGING:  XR CHEST PORTABLE   Final Result   CHF with pulmonary edema. CT HEAD WO CONTRAST   Preliminary Result   No definite evidence of acute intracranial abnormality on a study   significantly limited by motion/streak artifact as described above. Assessment/Plan :      1. ESRD   Volume overload   Better     Continue  CCPD now with 2.5  % today    PD fluid for cell count and culture -  negative   No growth   No evidence of peritonitis    Stop ABX         2. HTN  BP controlled       3. Anemia in ESRD   On epogen     4. Acid- base disorder. monitor     5. Hypokalemia   Replace potassium       6. Debility. Generalized weakness.  Need PT/OT     Will see if qualify for ARU             D/w primary team      Thank you for allowing us to participate in care of Regan Nunez         Electronically signed by: Peggy Oliveira MD, 9/25/2022, Tim 66 PM      [facility] [address]  Office : [phone]  Fax :[phone]

## 2022-09-25 NOTE — PROGRESS NOTES
Hospitalist Progress Note    Name:  Sulema Nash    /Age/Sex: 1945  (69 y.o. male)  MRN & CSN:  [id number] & [id number]    PCP: Luci Raygoza MD    Date of Admission: 2022    Patient Status:  Inpatient     Chief Complaint: Altered mental status    Hospital Course: 27-year-old man was admitted for altered mental status. Found to be hypoxic and hypercapnic on admission. Placed on BiPAP with improvement. Nephrology following as patient has ESRD on peritoneal dialysis. Pulmonology following. Subjective: Today is:  Hospital Day: 5. Patient seen and examined. Dyspnea improved. Currently on 1 L nasal cannula and saturating well. I weaned him off the oxygen and is still doing well        Medications:  Reviewed      Intake/Output Summary (Last 24 hours) at 2022 1405  Last data filed at 2022 1303  Gross per 24 hour   Intake 10 ml   Output -785 ml   Net 795 ml         Physical Exam Performed:    BP (!) 135/55   Pulse 74   Temp 97 °F (36.1 °C)   Resp 28   Ht 5' 10\" (1.778 m)   Wt 201 lb 4.5 oz (91.3 kg)   SpO2 93%   BMI 28.88 kg/m²     General appearance: No apparent distress, appears stated age and cooperative. Somewhat confused, but more conversational today. HEENT: Pupils equal, round, and reactive to light. Conjunctivae/corneas clear. Neck: Supple, with full range of motion. No jugular venous distention. Trachea midline. Respiratory:  Normal respiratory effort. Clear to auscultation, bilaterally without Rales/Wheezes/Rhonchi. Cardiovascular: Regular rate and rhythm with normal S1/S2 without murmurs, rubs or gallops. Trace pitting edema LE bilaterally. Abdomen: Soft, non-tender, non-distended with normal bowel sounds. : No CVA tenderness. Musculoskeletal: No clubbing or cyanosis. Full range of motion without deformity. Skin: Skin color, texture, turgor normal.  No rashes or lesions.       Labs:   Recent Labs     22  0410 22  0425   WBC 11.1* 11.5* HGB 8.9* 8.9*   HCT 26.8* 26.7*    289       Recent Labs     09/23/22  0424 09/24/22 0410 09/25/22 0425    137 133*   K 3.3* 3.4* 3.3*   CL 97* 96* 95*   CO2 26 25 25   BUN 37* 42* 47*   CREATININE 8.3* 8.4* 8.7*   CALCIUM 8.0* 8.3 8.7   PHOS 6.6* 6.1* 5.7*       Recent Labs     09/24/22  0410 09/25/22 0425   AST 24 29   ALT 32 36   BILITOT <0.2 <0.2   ALKPHOS 95 93       No results for input(s): INR in the last 72 hours. No results for input(s): Jovana Yelitza in the last 72 hours. Urinalysis:      Lab Results   Component Value Date/Time    NITRU Negative 09/21/2022 03:18 PM    WBCUA 3 09/21/2022 03:18 PM    BACTERIA None Seen 09/21/2022 03:18 PM    RBCUA 5 09/21/2022 03:18 PM    BLOODU SMALL 09/21/2022 03:18 PM    SPECGRAV 1.015 09/21/2022 03:18 PM    GLUCOSEU 250 09/21/2022 03:18 PM    GLUCOSEU >=1000 03/26/2012 10:22 PM       Radiology:  XR CHEST PORTABLE   Final Result   CHF with pulmonary edema. CT HEAD WO CONTRAST   Preliminary Result   No definite evidence of acute intracranial abnormality on a study   significantly limited by motion/streak artifact as described above.                  Assessment/Plan:    Active Hospital Problems    Diagnosis     Leukocytosis [D72.829]      Priority: Medium    Acute pulmonary edema (HCC) [J81.0]      Priority: Medium    Multifocal pneumonia [J18.9]      Priority: Medium    Hypervolemia [E87.70]      Priority: Medium    Acute on chronic respiratory failure with hypoxia and hypercapnia (HCC) [J96.21, J96.22]      Priority: Medium    Moderate episode of recurrent major depressive disorder (HCC) [F33.1]      Priority: Medium    BJ treated with BiPAP [G47.33]      Priority: Medium    Acute metabolic encephalopathy [D91.74]      Priority: Medium    Anemia in ESRD (end-stage renal disease) ([facility] [N18.6, D63.1]     Type 2 diabetes mellitus with hyperglycemia, with long-term current use of insulin (HCC) [E11.65, Z79.4]     Hypertension associated with stage 5 chronic kidney disease due to type 2 diabetes mellitus ([facility] [address] [E11.22, I12.0, N18.5]     Chronic diastolic heart failure (HCC) [I50.32]     Acute respiratory failure with hypoxia and hypercapnia ([facility] [address] [J96.01, J96.02]     Arthritis with psoriasis (HCC) [L40.50]     Psoriatic arthropathy ([facility] [address] [L40.50]     Sleep apnea [G47.30]          Hospital Day: 5    This is a 69 y.o. male who presented to [facility] on 9/21/2022 and is being treated for:    Acute on chronic hypoxic and hypercapnic respiratory failure:- improving  Most likely due to volume overload. Continue IV lasix at 80 mg QD; monitor UOP  Currently on 1-2 L nasal cannula with BiPAP nightly. Wean off as tolerated. Continue inhaler therapy. Pulmonology consulted: appreciate recommendations       History of BJ:  Unable to tolerate home BiPAP/CPAP. Will need repeat sleep study outpatient. Acute metabolic encephalopathy: Improved  CT head: No acute abnormality. TSH, Ammonia level wnl. B 12 and folate pending. Supportive care       Type 2 diabetes on long-term insulin:  A1c 0.5% 9/21/2022. Continue basal insulin with sliding scale. ESRD on peritoneal dialysis:  Nephrology consulted: Appreciate input. No evidence of PD site infection or peritonitis. PD fluid cultures negative. Antibiotics discontinued. Hypotension: Continue midodrine    Depression: Continue Wellbutrin     Hypothyroid: Continue synthroid. Anemia due to ESRD: Continue EPO. DVT ppx: Heparin  GI ppx: Diet/Tube Feeds  Diet: ADULT ORAL NUTRITION SUPPLEMENT; Breakfast, Dinner; Renal Oral Supplement  ADULT DIET; Regular; 4 carb choices (60 gm/meal); Low Phosphorus (Less than 1000 mg)  Code Status: Full Code    Disposition:  Estimated Discharge Date: 2 days. Barriers to Discharge: Patient has pre-CERT started to [facility].   PT OT is recommending SNF  Based on his insurance and PT OT recommendation he will probably not be a candidate for ARU  Discussed with nephrology as well  Nephrology is going to see if there are other nursing facilities that will be able to handle his peritoneal dialysis as it does run the risk of repeated readmissions which is what happened on this occasion    PT/OT Eval Status: SNF recommended. Vipin Jackson MD  9/25/2022  2:05 PM      Please note that some part of this chart was generated using Dragon dictation software. Although every effort was made to ensure the accuracy of this automated transcription, some errors in transcription may have occurred inadvertently. If you may need any clarification, please do not hesitate to contact me through [facility].

## 2022-09-25 NOTE — PROGRESS NOTES
CCPD Order   Exchanges: 4   Exchange Volume: 2000 ml   Total Time: 8 hrs   Dextrose: 2.5%   Last Fill: 0 ml   Total Volume: 8000 ml     Orders verified. Supplies taken to pt's room. Report received. Cycler set up, primed and pre tested. Dressing [facility] site. Pt connected to cycler. CCPD initiated without problem. Initial effluent clear. If problems should arise please call the [phone] on top of PD cycler machine.

## 2022-09-25 NOTE — PROGRESS NOTES
Assessment completed, see doc flowsheets. Pt is A&O X4, delayed respond. Lung sounds are diminished. VSS. Tele on. Medication given per [facility]. Patient has no needs at this time. Call light within in reach, will continue to monitor.

## 2022-09-25 NOTE — PLAN OF CARE
Problem: Discharge Planning  Goal: Discharge to home or other facility with appropriate resources  Outcome: Progressing     Problem: Safety - Adult  Goal: Free from fall injury  Outcome: Progressing     Problem: Skin/Tissue Integrity  Goal: Absence of new skin breakdown  Description: 1. Monitor for areas of redness and/or skin breakdown  2. Assess vascular access sites hourly  3. Every 4-6 hours minimum:  Change oxygen saturation probe site  4. Every 4-6 hours:  If on nasal continuous positive airway pressure, respiratory therapy assess nares and determine need for appliance change or resting period.   Outcome: Progressing     Problem: Nutrition Deficit:  Goal: Optimize nutritional status  Outcome: Progressing     Problem: Pain  Goal: Verbalizes/displays adequate comfort level or baseline comfort level  Outcome: Progressing     Problem: ABCDS Injury Assessment  Goal: Absence of physical injury  Outcome: Progressing

## 2022-09-26 LAB
A/G RATIO: 0.9 (ref 1.1–2.2)
ALBUMIN SERPL-MCNC: 2.8 G/DL (ref 3.4–5)
ALP BLD-CCNC: 92 U/L (ref 40–129)
ALT SERPL-CCNC: 33 U/L (ref 10–40)
ANION GAP SERPL CALCULATED.3IONS-SCNC: 15 MMOL/L (ref 3–16)
AST SERPL-CCNC: 29 U/L (ref 15–37)
BASOPHILS ABSOLUTE: 0.1 K/UL (ref 0–0.2)
BASOPHILS RELATIVE PERCENT: 0.8 %
BILIRUB SERPL-MCNC: <0.2 MG/DL (ref 0–1)
BUN BLDV-MCNC: 51 MG/DL (ref 7–20)
CALCIUM SERPL-MCNC: 8.5 MG/DL (ref 8.3–10.6)
CHLORIDE BLD-SCNC: 95 MMOL/L (ref 99–110)
CO2: 24 MMOL/L (ref 21–32)
CREAT SERPL-MCNC: 8.7 MG/DL (ref 0.8–1.3)
EOSINOPHILS ABSOLUTE: 0.6 K/UL (ref 0–0.6)
EOSINOPHILS RELATIVE PERCENT: 4.7 %
GFR AFRICAN AMERICAN: 7
GFR NON-AFRICAN AMERICAN: 6
GLUCOSE BLD-MCNC: 166 MG/DL (ref 70–99)
GLUCOSE BLD-MCNC: 186 MG/DL (ref 70–99)
GLUCOSE BLD-MCNC: 191 MG/DL (ref 70–99)
GLUCOSE BLD-MCNC: 279 MG/DL (ref 70–99)
GLUCOSE BLD-MCNC: 292 MG/DL (ref 70–99)
GLUCOSE BLD-MCNC: 311 MG/DL (ref 70–99)
GLUCOSE BLD-MCNC: 320 MG/DL (ref 70–99)
GLUCOSE BLD-MCNC: 363 MG/DL (ref 70–99)
HCT VFR BLD CALC: 26 % (ref 40.5–52.5)
HEMATOLOGY PATH CONSULT: NO
HEMOGLOBIN: 8.6 G/DL (ref 13.5–17.5)
LYMPHOCYTES ABSOLUTE: 0.8 K/UL (ref 1–5.1)
LYMPHOCYTES RELATIVE PERCENT: 6.2 %
MAGNESIUM: 2.6 MG/DL (ref 1.8–2.4)
MCH RBC QN AUTO: 37.6 PG (ref 26–34)
MCHC RBC AUTO-ENTMCNC: 33.2 G/DL (ref 31–36)
MCV RBC AUTO: 113.5 FL (ref 80–100)
MONOCYTES ABSOLUTE: 0.8 K/UL (ref 0–1.3)
MONOCYTES RELATIVE PERCENT: 6.6 %
NEUTROPHILS ABSOLUTE: 10.3 K/UL (ref 1.7–7.7)
NEUTROPHILS RELATIVE PERCENT: 81.7 %
PDW BLD-RTO: 14.6 % (ref 12.4–15.4)
PERFORMED ON: ABNORMAL
PHOSPHORUS: 5.4 MG/DL (ref 2.5–4.9)
PLATELET # BLD: 300 K/UL (ref 135–450)
PMV BLD AUTO: 7.1 FL (ref 5–10.5)
POTASSIUM SERPL-SCNC: 4.3 MMOL/L (ref 3.5–5.1)
RBC # BLD: 2.29 M/UL (ref 4.2–5.9)
SODIUM BLD-SCNC: 134 MMOL/L (ref 136–145)
TOTAL PROTEIN: 5.9 G/DL (ref 6.4–8.2)
WBC # BLD: 12.6 K/UL (ref 4–11)

## 2022-09-26 PROCEDURE — 6370000000 HC RX 637 (ALT 250 FOR IP): Performed by: INTERNAL MEDICINE

## 2022-09-26 PROCEDURE — 6360000002 HC RX W HCPCS: Performed by: INTERNAL MEDICINE

## 2022-09-26 PROCEDURE — 2580000003 HC RX 258: Performed by: INTERNAL MEDICINE

## 2022-09-26 PROCEDURE — 97530 THERAPEUTIC ACTIVITIES: CPT

## 2022-09-26 PROCEDURE — 84100 ASSAY OF PHOSPHORUS: CPT

## 2022-09-26 PROCEDURE — 2060000000 HC ICU INTERMEDIATE R&B

## 2022-09-26 PROCEDURE — 94761 N-INVAS EAR/PLS OXIMETRY MLT: CPT

## 2022-09-26 PROCEDURE — 6360000002 HC RX W HCPCS: Performed by: STUDENT IN AN ORGANIZED HEALTH CARE EDUCATION/TRAINING PROGRAM

## 2022-09-26 PROCEDURE — 6370000000 HC RX 637 (ALT 250 FOR IP): Performed by: STUDENT IN AN ORGANIZED HEALTH CARE EDUCATION/TRAINING PROGRAM

## 2022-09-26 PROCEDURE — 2580000003 HC RX 258: Performed by: STUDENT IN AN ORGANIZED HEALTH CARE EDUCATION/TRAINING PROGRAM

## 2022-09-26 PROCEDURE — 80053 COMPREHEN METABOLIC PANEL: CPT

## 2022-09-26 PROCEDURE — 90945 DIALYSIS ONE EVALUATION: CPT

## 2022-09-26 PROCEDURE — 36415 COLL VENOUS BLD VENIPUNCTURE: CPT

## 2022-09-26 PROCEDURE — 83735 ASSAY OF MAGNESIUM: CPT

## 2022-09-26 PROCEDURE — 90945 DIALYSIS ONE EVALUATION: CPT | Performed by: INTERNAL MEDICINE

## 2022-09-26 PROCEDURE — 85025 COMPLETE CBC W/AUTO DIFF WBC: CPT

## 2022-09-26 PROCEDURE — 94660 CPAP INITIATION&MGMT: CPT

## 2022-09-26 PROCEDURE — 94640 AIRWAY INHALATION TREATMENT: CPT

## 2022-09-26 PROCEDURE — 2700000000 HC OXYGEN THERAPY PER DAY

## 2022-09-26 RX ADMIN — ATORVASTATIN CALCIUM 80 MG: 80 TABLET, FILM COATED ORAL at 20:22

## 2022-09-26 RX ADMIN — HEPARIN SODIUM 5000 UNITS: 5000 INJECTION INTRAVENOUS; SUBCUTANEOUS at 14:48

## 2022-09-26 RX ADMIN — INSULIN GLARGINE 40 UNITS: 100 INJECTION, SOLUTION SUBCUTANEOUS at 20:22

## 2022-09-26 RX ADMIN — IPRATROPIUM BROMIDE AND ALBUTEROL SULFATE 1 AMPULE: 2.5; .5 SOLUTION RESPIRATORY (INHALATION) at 11:35

## 2022-09-26 RX ADMIN — IPRATROPIUM BROMIDE AND ALBUTEROL SULFATE 1 AMPULE: 2.5; .5 SOLUTION RESPIRATORY (INHALATION) at 21:14

## 2022-09-26 RX ADMIN — SODIUM CHLORIDE, SODIUM LACTATE, CALCIUM CHLORIDE, MAGNESIUM CHLORIDE AND DEXTROSE 2000 ML: 2.5; 538; 448; 18.3; 5.08 INJECTION, SOLUTION INTRAPERITONEAL at 09:46

## 2022-09-26 RX ADMIN — INSULIN LISPRO 12 UNITS: 100 INJECTION, SOLUTION INTRAVENOUS; SUBCUTANEOUS at 00:05

## 2022-09-26 RX ADMIN — Medication 10 ML: at 20:21

## 2022-09-26 RX ADMIN — TORSEMIDE 100 MG: 100 TABLET ORAL at 09:46

## 2022-09-26 RX ADMIN — INSULIN LISPRO 8 UNITS: 100 INJECTION, SOLUTION INTRAVENOUS; SUBCUTANEOUS at 20:22

## 2022-09-26 RX ADMIN — IPRATROPIUM BROMIDE AND ALBUTEROL SULFATE 1 AMPULE: 2.5; .5 SOLUTION RESPIRATORY (INHALATION) at 17:15

## 2022-09-26 RX ADMIN — INSULIN LISPRO 12 UNITS: 100 INJECTION, SOLUTION INTRAVENOUS; SUBCUTANEOUS at 04:14

## 2022-09-26 RX ADMIN — LEVOTHYROXINE SODIUM 50 MCG: 0.03 TABLET ORAL at 08:04

## 2022-09-26 RX ADMIN — MIDODRINE HYDROCHLORIDE 5 MG: 5 TABLET ORAL at 18:31

## 2022-09-26 RX ADMIN — IPRATROPIUM BROMIDE AND ALBUTEROL SULFATE 1 AMPULE: 2.5; .5 SOLUTION RESPIRATORY (INHALATION) at 07:52

## 2022-09-26 RX ADMIN — INSULIN LISPRO 16 UNITS: 100 INJECTION, SOLUTION INTRAVENOUS; SUBCUTANEOUS at 23:40

## 2022-09-26 RX ADMIN — ASPIRIN 81 MG 81 MG: 81 TABLET ORAL at 08:04

## 2022-09-26 RX ADMIN — HEPARIN SODIUM 5000 UNITS: 5000 INJECTION INTRAVENOUS; SUBCUTANEOUS at 06:02

## 2022-09-26 RX ADMIN — Medication 2 PUFF: at 21:14

## 2022-09-26 RX ADMIN — EPOETIN ALFA-EPBX 3000 UNITS: 10000 INJECTION, SOLUTION INTRAVENOUS; SUBCUTANEOUS at 14:53

## 2022-09-26 RX ADMIN — GENTAMICIN SULFATE: 1 CREAM TOPICAL at 09:46

## 2022-09-26 RX ADMIN — Medication 2 PUFF: at 07:52

## 2022-09-26 RX ADMIN — MIDODRINE HYDROCHLORIDE 5 MG: 5 TABLET ORAL at 08:04

## 2022-09-26 RX ADMIN — HEPARIN SODIUM 5000 UNITS: 5000 INJECTION INTRAVENOUS; SUBCUTANEOUS at 20:22

## 2022-09-26 NOTE — PLAN OF CARE
Progressing  Flowsheets (Taken 9/26/2022 0800)  Care Plan - Patient's Chronic Conditions and Co-Morbidity Symptoms are Monitored and Maintained or Improved:   Monitor and assess patient's chronic conditions and comorbid symptoms for stability, deterioration, or improvement   Collaborate with multidisciplinary team to address chronic and comorbid conditions and prevent exacerbation or deterioration   Update acute care plan with appropriate goals if chronic or comorbid symptoms are exacerbated and prevent overall improvement and discharge

## 2022-09-26 NOTE — FLOWSHEET NOTE
Disconnected from CCPD per protocol. Effluent: Clear yellow, no fibrin noted. Total time: 8 hr 27 min   Total UF:  591 ml. Total Volume:  8007 ml. Dwell time gained:  0 hr 13 min. Pt Tolerated procedure without difficulty.    Report given to: Harrison Aranda RN        09/26/22 0555   Vitals   BP (!) 121/58   Temp 98 °F (36.7 °C)   Temp Source Temporal   Heart Rate 72   Resp 17   SpO2 99 %   Weight 204 lb (92.5 kg)   Peritoneal Dialysis Catheter   Placement Date/Time: 10/26/20 0748   Inserted by: Dr Fabian Gutierrez Size (fr): (c)    Status Deaccessed   Site Condition Clean, dry, intact   Dressing Status Clean, dry & intact   Dressing Occlusive   Date of Last Dressing Change 09/25/22   Cycler   Ultrafiltration (UF) (mL) 591 mL

## 2022-09-26 NOTE — PROGRESS NOTES
[facility] Department   Phone: [phone]    Physical Therapy    [] Initial Evaluation            [x] Daily Treatment Note         [] Discharge Summary      Patient: Tonie Campos   : 1945   MRN: [id number]   Date of Service:  2022  Admitting Diagnosis: Acute on chronic respiratory failure with hypoxia and hypercapnia [facility])  Current Admission Summary: 69 y.o. male who presented to [facility] with complaints of altered mental status. Patient is relatively nonverbal at baseline after prior history of illness. Questionable aphasia from stroke versus ongoing dementia. Patient daughter at bedside and was able to provide information. She states the patient was more confused at his nursing facility this morning. Additionally, patient was found to be hypoxic. He was started on oxygen, with little improvement. He was placed on BiPAP with more improvement. Per the daughter, patient is supposed be wearing BiPAP at night, but is noncompliant. There were no other associated symptoms with his confusion. During work-up in the ER, patient was found to have CO2 of 94.8 with pH of 7.095 on admission. Patient does have significant dementia and is aware he is confused, but is relatively nonverbal and unable to answer complex questions. He is able to answer \"yes and no\" questions. Prior tobacco user, quit approximately 4 years ago. No alcohol or drug use.   Past Medical History:  has a past medical history of Allergic rhinitis due to other allergen, Coronary atherosclerosis of unspecified type of vessel, native or graft, Diabetic eye exam ([address] Diabetic eye exam ([address] Generalized osteoarthrosis, involving multiple sites, Other psoriasis, Pneumonia, Prolonged emergence from general anesthesia, Psoriatic arthropathy ([address] Type II or unspecified type diabetes mellitus without mention of complication, not stated as uncontrolled, Unspecified essential hypertension, and Unspecified sleep apnea. Past Surgical History:  has a past surgical history that includes Coronary artery bypass graft; Carpal tunnel release; joint replacement; Cataract removal; and LAPAROSCOPY INSERTION PERITONEAL CATHETER (N/A, 10/26/2020). Discharge Recommendations: Reggie Adams scored a 8/24 on the AM-PAC short mobility form. Current research shows that an AM-PAC score of 17 or less is typically not associated with a discharge to the patient's home setting. Based on the patient's AM-PAC score and their current functional mobility deficits, it is recommended that the patient have 3-5 sessions per week of Physical Therapy at d/c to increase the patient's independence. Please see assessment section for further patient specific details. If patient discharges prior to next session this note will serve as a discharge summary. Please see below for the latest assessment towards goals. DME Required For Discharge: DME to be determined pending patient progress  Precautions/Restrictions: high fall risk  Weight Bearing Restrictions: no restrictions  [] Right Upper Extremity  [] Left Upper Extremity [] Right Lower Extremity  [] Left Lower Extremity     Required Braces/Orthotics: no braces required   [] Right  [] Left  Positional Restrictions:no positional restrictions    Pre-Admission Information   Lives With: spouse (Spouse just had OHS, per pt report)  Type of Home: condo  Home Layout: one level, able to live on main level  Home Access: level entry  Bathroom Layout: walk in shower, handicap height toilet  Bathroom Equipment: grab bars in shower, shower chair, hand held shower head  Toilet Height: standard height, elevated height (has both)  Home Equipment: rolling walker, single point cane, reacher  Transfer Assistance: requires assistance  Ambulation Assistance:modified independent with use of RW  ADL Assistance: requires assistance with bathing, requires assistance with dressing, . without use of UE support  Dynamic Sitting Balance: fair: maintains balance at [facility] without use of UE support  Comments: Patient sat EOB for less than 5 minutes but pt returned to supine due to orthostatic hypotension     Other Therapeutic Interventions  Increased for all mobility; reaching to target in seated, limited by pt being symptomatic with orthostatic hypotension   Functional Outcomes  AM-PAC Inpatient Mobility Raw Score : 8              Education  Barriers To Learning: cognition  Patient Education: patient educated on goals, PT role and benefits, plan of care, functional mobility training, orientation, injury prevention, transfer training, discharge recommendations  Learning Assessment:  patient will require reinforcement due to cognitive deficits    Assessment  Activity Tolerance: Fair (-); patient demonstrates ability to sit EOB this visit, unable to tolerate more than 5 minutes. Patient BP dropped to 88/39mmHg, returned to 123/55mmHg when pt was returned to supine   Impairments Requiring Therapeutic Intervention: decreased functional mobility, decreased ROM, decreased strength, decreased cognition, decreased endurance, decreased balance, decreased coordination, decreased posture  Prognosis: fair  Clinical Assessment: Patient presenting with decreased activity tolerance on this date secondary to decreased BP upon sitting. Pt daughter was present and stated pt has been minimally mobile over the last year with a majority of time spent in recliner. Pt requiring maxAx2 for bed mobility on this date and was not able to tolerate sitting EOB due to orthostatic response. Pt and daughter was educated importance of progressing to being in upright position sitting in bed to help prevent orthostatic response with change in position. Therapists also provided education regarding different levels of therapy following discharge and what pt could realistically tolerate.  Pt would continue to benefit from skilled PT services to facilitate independence with functional mobility. Safety Interventions: patient left in bed, bed alarm in place, call light within reach, gait belt, patient at risk for falls, and nurse notified    Plan  Frequency: 3-5 x/per week  Current Treatment Recommendations: strengthening, balance training, functional mobility training, transfer training, endurance training, patient/caregiver education, and safety education    Goals  Patient Goals: Return home   Short Term Goals:  Time Frame: Upon d/c  Patient will complete bed mobility at Independent   Patient will complete transfers at Independent   Patient will ambulate 20 ft with use of rolling walker at stand by assistance  No goals met.      Therapy Session Time      Individual Group Co-treatment   Time In      8251   Time Out      8544   Minutes      29     Timed Code Treatment Minutes:   29  Total Treatment Minutes:  29 Minutes       Electronically Signed By: Manjinder Alvarez, [address], [address] [facility], DPT [id number]

## 2022-09-26 NOTE — PROGRESS NOTES
Office : [phone]     Fax :[phone]       Nephrology progress  Note      Patient's Name: Ryan Baum  8:40 AM  9/26/2022    Reason for Consult:  ESRD on peritoneal dialysis. Requesting Physician:  Silvia Frances MD      Chief Complaint:    Chief Complaint   Patient presents with    Altered Mental Status     Patient arrives via [place] from [facility], altered, last seen normal last night, started peritoneal dialysis last night, O2 low on normal 3L, placed on NRB with some improvement, increased WOB noted           History of Present Ilness:    Ryan Baum is a 69 y.o. male with prior history of ESRD on peritoneal dialysis , DM 2, HTN who was sent from [facility] with main c/o SOB. In ER he was found to be hypoxic. CXR shows volume overload. On BiPAP now. Still makes urine . Also had elevated WBC. No abdominal pain. No fever   BS Elevated. Interval hx :    Feels much better     Tolerating PD well         I/O last 3 completed shifts:   In: 160 [P.O.:160]  Out: -80     Past Medical History:   Diagnosis Date    Allergic rhinitis due to other allergen 7/12/2010    Coronary atherosclerosis of unspecified type of vessel, native or graft 7/12/2010    Diabetic eye exam ([address] 04/29/2015    Diabetic eye exam ([address] 5/5/2016    [place]    Generalized osteoarthrosis, involving multiple sites 7/12/2010    Other psoriasis 7/12/2010    Pneumonia     Prolonged emergence from general anesthesia     Psoriatic arthropathy ([address] 7/12/2010    Type II or unspecified type diabetes mellitus without mention of complication, not stated as uncontrolled 7/12/2010    Unspecified essential hypertension 7/12/2010    Unspecified sleep apnea 7/12/2010       Past Surgical History:   Procedure Laterality Date CARPAL TUNNEL RELEASE      s/p    CATARACT REMOVAL      CORONARY ARTERY BYPASS GRAFT      JOINT REPLACEMENT      LAPAROSCOPY INSERTION PERITONEAL CATHETER N/A 10/26/2020    LAPAROSCOPIC PERITONEAL DIALYSIS CATHETER PLACEMENT; LAPAROSCOPIC OMENTOPEXY performed by Naty Self DO at [facility] OR       Family History   Problem Relation Age of Onset    Diabetes Mother     Heart Failure Mother     Coronary Art Dis Father         reports that he quit smoking about 41 years ago. He started smoking about 60 years ago. He has a 18.00 pack-year smoking history. He has never used smokeless tobacco. He reports that he does not drink alcohol and does not use drugs.         Allergies:  Penicillins, Sulfa antibiotics, Tazorac [tazarotene], Clindamycin, and Clindamycin/lincomycin    Current Medications:    torsemide (DEMADEX) tablet 100 mg, Daily  midodrine (PROAMATINE) tablet 5 mg, BID WC  dianeal lo-eben 2.5% 2,000 mL, 4x daily  ipratropium-albuterol (DUONEB) nebulizer solution 1 ampule, 4x daily  gentamicin (GARAMYCIN) 0.1 % cream, Daily  sodium chloride flush 0.9 % injection 5-40 mL, 2 times per day  sodium chloride flush 0.9 % injection 5-40 mL, PRN  0.9 % sodium chloride infusion, PRN  ondansetron (ZOFRAN-ODT) disintegrating tablet 4 mg, Q8H PRN   Or  ondansetron (ZOFRAN) injection 4 mg, Q6H PRN  polyethylene glycol (GLYCOLAX) packet 17 g, Daily PRN  acetaminophen (TYLENOL) tablet 650 mg, Q6H PRN   Or  acetaminophen (TYLENOL) suppository 650 mg, Q6H PRN  albuterol (PROVENTIL) nebulizer solution 2.5 mg, Q4H PRN  aspirin chewable tablet 81 mg, Daily  atorvastatin (LIPITOR) tablet 80 mg, Nightly  mometasone-formoterol (DULERA) 200-5 MCG/ACT inhaler 2 puff, BID  buPROPion (WELLBUTRIN) tablet 100 mg, Every Other Day  levothyroxine (SYNTHROID) tablet 50 mcg, Daily  dextrose bolus 10% 125 mL, PRN   Or  dextrose bolus 10% 250 mL, PRN  glucagon (rDNA) injection 1 mg, PRN  dextrose 10 % infusion, Continuous PRN  insulin lispro (HUMALOG) injection vial 0-16 Units, Q4H  insulin glargine (LANTUS) injection vial 40 Units, Nightly  heparin (porcine) injection 5,000 Units, 3 times per day      Physical exam:     Vitals:  BP (!) 112/57   Pulse 77   Temp 97.7 °F (36.5 °C) (Temporal)   Resp 18   Ht 5' 10\" (1.778 m)   Wt 204 lb (92.5 kg)   SpO2 98%   BMI 29.27 kg/m²   Constitutional:  OAA X3 NAD  Skin: no rash, turgor wnl  Heent:  eomi, mmm  Neck: no bruits or jvd noted  Cardiovascular:  S1, S2 without m/r/g  Respiratory: b/l crackles   Abdomen:  +bs, soft, nt, nd  Ext: mild  lower extremity edema    Labs:  CBC:   Recent Labs     09/24/22 0410 09/25/22 0425 09/26/22 0424   WBC 11.1* 11.5* 12.6*   HGB 8.9* 8.9* 8.6*    289 300     BMP:    Recent Labs     09/24/22 0410 09/25/22 0425 09/26/22 0424    133* 134*   K 3.4* 3.3* 4.3   CL 96* 95* 95*   CO2 25 25 24   BUN 42* 47* 51*   CREATININE 8.4* 8.7* 8.7*   GLUCOSE 192* 249* 292*     Ca/Mg/Phos:   Recent Labs     09/24/22 0410 09/25/22 0425 09/26/22 0424   CALCIUM 8.3 8.7 8.5   MG 2.40 2.60* 2.60*   PHOS 6.1* 5.7* 5.4*     Hepatic:   Recent Labs     09/24/22 0410 09/25/22 0425 09/26/22 0424   AST 24 29 29   ALT 32 36 33   BILITOT <0.2 <0.2 <0.2   ALKPHOS 95 93 92     Troponin:   No results for input(s): TROPONINI in the last 72 hours. BNP: No results for input(s): BNP in the last 72 hours. Lipids: No results for input(s): CHOL, TRIG, HDL, LDLCALC, LABVLDL in the last 72 hours. ABGs:   No results for input(s): PHART, PO2ART, XZF8URF in the last 72 hours. INR: No results for input(s): INR in the last 72 hours. UA:  No results for input(s): [place] Mace, GLUCOSEU, BILIRUBINUR, KETUA, SPECGRAV, BLOODU, PHUR, PROTEINU, UROBILINOGEN, NITRU, LEUKOCYTESUR, [place] in the last 72 hours. Urine Microscopic:   No results for input(s): LABCAST, BACTERIA, COMU, HYALCAST, WBCUA, RBCUA, EPIU in the last 72 hours.     Urine Culture: No results for input(s): LABURIN in the last 72 hours. Urine Chemistry: No results for input(s): Sharlotte Alt, PROTEINUR, NAUR in the last 72 hours. IMAGING:  XR CHEST PORTABLE   Final Result   CHF with pulmonary edema. CT HEAD WO CONTRAST   Final Result   No definite evidence of acute intracranial abnormality on a study   significantly limited by motion/streak artifact as described above. Assessment/Plan :      1. ESRD   Volume overload   Better     Continue  CCPD now with 2.5  % today    PD fluid for cell count and culture -  negative   No growth   No evidence of peritonitis    Stop ABX         2. HTN  BP controlled       3. Anemia in ESRD   On epogen     4. Acid- base disorder. monitor     5. Hypokalemia   Replace potassium       6. Debility. Generalized weakness.  Need PT/OT     Will see if qualify for rehabilitation             D/w primary team      Thank you for allowing us to participate in care of Cipriano Reed         Electronically signed by: Alicia Wells MD, 9/26/2022, 8:40 AM      [facility] [address]  Office : [phone]  Fax :[phone]

## 2022-09-26 NOTE — PROGRESS NOTES
[facility] Department   Phone: [phone]    Occupational Therapy    [] Initial Evaluation            [x] Daily Treatment Note         [] Discharge Summary      Patient: Dominga Casas   : 1945   MRN: [id number]   Date of Service:  2022    Admitting Diagnosis:  Acute on chronic respiratory failure with hypoxia and hypercapnia ([address]  Current Admission Summary: Per ED note, \"Patient arrives via [facility] From [facility], new patient to them, started peritoneal dialysis for the first time last night, was at his normal A&O x4 mental status last night, this AM, AMS, not verbally responding, found to by hypoxic in the 80's on normal 3L/NC, staff attempted 6L no change, currently on NRB at 95%. \"  Past Medical History:  has a past medical history of Allergic rhinitis due to other allergen, Coronary atherosclerosis of unspecified type of vessel, native or graft, Diabetic eye exam ([address] Diabetic eye exam ([address] Generalized osteoarthrosis, involving multiple sites, Other psoriasis, Pneumonia, Prolonged emergence from general anesthesia, Psoriatic arthropathy ([address] Type II or unspecified type diabetes mellitus without mention of complication, not stated as uncontrolled, Unspecified essential hypertension, and Unspecified sleep apnea. Past Surgical History:  has a past surgical history that includes Coronary artery bypass graft; Carpal tunnel release; joint replacement; Cataract removal; and LAPAROSCOPY INSERTION PERITONEAL CATHETER (N/A, 10/26/2020). Discharge Recommendations: Dominga Casas scored a 10/24 on the AM-PAC ADL Inpatient form. Current research shows that an AM-PAC score of 17 or less is typically not associated with a discharge to the patient's home setting.  Based on the patient's AM-PAC score and their current ADL deficits, it is recommended that the patient have 3-5 sessions per week of Occupational Therapy at d/c to increase the patient's independence. Please see assessment section for further patient specific details. If patient discharges prior to next session this note will serve as a discharge summary. Please see below for the latest assessment towards goals. DME Required For Discharge: DME to be determined at next level of care    Precautions/Restrictions: high fall risk  Weight Bearing Restrictions: no restrictions  [] Right Upper Extremity  [] Left Upper Extremity [] Right Lower Extremity  [] Left Lower Extremity     Required Braces/Orthotics: no braces required   [] Right  [] Left  Positional Restrictions:no positional restrictions    Pre-Admission Information   Lives With: spouse   (Spouse just had OHS, per pt report)  Type of Home: condo  Home Layout: one level, able to live on main level  Home Access: level entry  Bathroom Layout: walk in shower, handicap height toilet  Bathroom Equipment: grab bars in shower, shower chair, hand held shower head  Toilet Height: standard height, elevated height (has both)  Home Equipment: rolling walker, single point cane, reacher  Transfer Assistance: requires assistance  Ambulation Assistance:modified independent with use of RW  ADL Assistance: requires assistance with bathing, requires assistance with dressing, . Comment: A with LB ADLs  IADL Assistance: requires assistance with all homemaking tasks, wife & dtr were helping; dtr works  Active :        [] Yes  [x] No  Hand Dominance: [] Left  [x] Right  Current Employment: retired. Occupation:  at [employer]  Recent Falls: No recent falls  Pt sleeps in \"lounger\"    Pt from SNF after stay at [facility] Only at SNF 1 night. Questionable historian. Per RN, pt's daughter reporting pt needing a lot more assistance recently. Subjective  General: Pt supine in bed, agreeable to therapy session. Pt with limited verbalizations, delayed responses.  Became more alert with sitting on EOB, with increased questions. Pt goes by \"Ilya. \"  Pain: 0/10  Pain Interventions: not applicable        Activities of Daily Living  Basic Activities of Daily Living  General Comments: ADLs not performed this date, unable to fully tolerate   Instrumental Activities of Daily Living  No IADL completed on this date. Functional Mobility  Bed Mobility  Supine to Sit: 2 person assistance with maxA of 2   Sit to Supine: 2 person assistance with maxA of 2   Rolling Left: moderate assistance  Rolling Right: moderate assistance  Scootin person assistance with maxA of 2   Comments:HOB elevated for supine to sit; pt initiated bed mobility with maximal cues and hand over hand. Transfers  No transfers completed on this date secondary to pt fatigued, generalized weakness and decreased BP (patient with limited verbalizations at EOB, reporting increasing dizziness while sitting EOB ~5 minutes. Comments:  Functional Mobility:  Sitting Balance: stand by assistance. Sitting Balance Comment: 5-7 min on EOB with increasing fatigue          Functional Outcomes  AM-PAC Inpatient Daily Activity Raw Score: 10    Cognition  Overall Cognitive Status: Impaired  Arousal/Alterness: inconsistent responses to stimuli  Following Commands: follows one step commands with repetition, follows one step commands with increased time  Attention Span: attends with cues to redirect, difficulty dividing attention  Memory: decreased recall of recent events, decreased short term memory  Problem Solving: assistance required to generate solutions, assistance required to implement solutions, decreased awareness of errors, assistance required to correct errors made  Insights: decreased awareness of deficits  Initiation: requires cues for some  Sequencing: requires cues for some  Comment: Initially very delayed responses; improved as pt became more alert, sitting and talking with therapists.   Orientation:    oriented to person, oriented to place, oriented to time, and disoriented to situation (Knows he is in a hospital, but often confused, thinking he is still at [facility] Stated that the room \"looks different. \"  Command Following:   impaired     Education  Barriers To Learning: cognition  Patient Education: patient educated on OT role and benefits, plan of care, discharge recommendations, bed mobility  Learning Assessment:  patient will require reinforcement due to cognitive deficits    Assessment  Activity Tolerance: Fair activity tolerance. Limited by cognition and fatigue. Impairments Requiring Therapeutic Intervention: decreased functional mobility, decreased ADL status, decreased cognition, decreased endurance, decreased balance  Prognosis: good and fair  Clinical Assessment: Pt is below his baseline level of occupational function, based on the above deficits associated with acute respiratory failure with hypoxia and hyperkapnia. Pt  only able to tolerate sitting on EOB 8-9 min and did not complete a transfer today as requestng to lie down. Pt dependent for nayana hygiene and LB dressing. He would benefit from continued skilled acute OT services to address these deficits and maximize his safety and independence.   Safety Interventions: patient left in bed, bed alarm in place, call light within reach, patient at risk for falls, and nurse notified    Plan  Frequency: 3-5 x/per week  Current Treatment Recommendations: balance training, functional mobility training, transfer training, endurance training, IADL training, and safety education    Goals  Patient Goals: Not stated   Short Term Goals:  Time Frame: Discharge  Patient will complete upper body ADL at stand by assistance   Patient will complete lower body ADL at moderate assistance   Patient will complete toileting at moderate assistance   Patient will complete grooming at set up assistance, stand by assistance   Patient will complete functional transfers at minimal assistance   Patient will increase functional sitting balance to Supervision for improved ADL completion  Patient will complete bed mobility at stand by assistance     Therapy Session Time     Individual Group Co-treatment   Time In    1608   Time Out    1635   Minutes    27        Timed Code Treatment Minutes:   27  min  Total Treatment Minutes:  27min       Electronically Signed By: RYDER Mcmillan/L [address] [place] [address]

## 2022-09-26 NOTE — PROGRESS NOTES
Hospitalist Progress Note    Name:  Catina Jackson    /Age/Sex: 1945  (69 y.o. male)  MRN & CSN:  [id number] & [id number]    PCP: Vinay Mac MD    Date of Admission: 2022    Patient Status:  Inpatient     Chief Complaint: Altered mental status    Hospital Course: 69-year-old man was admitted for altered mental status. Found to be hypoxic and hypercapnic on admission. Placed on BiPAP with improvement. Nephrology following as patient has ESRD on peritoneal dialysis. Pulmonology following. Subjective: Today is:  Hospital Day: 6. Patient seen and examined. Breathing improved  Taken off oxygen saturating 96  No chest pain  No abdominal pain nausea vomiting      Medications:  Reviewed      Intake/Output Summary (Last 24 hours) at 2022 1517  Last data filed at 2022 0555  Gross per 24 hour   Intake 50 ml   Output 591 ml   Net -541 ml         Physical Exam Performed:    BP (!) 115/55   Pulse 76   Temp 97.7 °F (36.5 °C) (Temporal)   Resp 18   Ht 5' 10\" (1.778 m)   Wt 204 lb (92.5 kg)   SpO2 96%   BMI 29.27 kg/m²     General appearance: No apparent distress, appears stated age and cooperative. Somewhat confused, but more conversational today. HEENT: Pupils equal, round, and reactive to light. Conjunctivae/corneas clear. Neck: Supple, with full range of motion. No jugular venous distention. Trachea midline. Respiratory:  Normal respiratory effort. Clear to auscultation, bilaterally without Rales/Wheezes/Rhonchi. Cardiovascular: Regular rate and rhythm with normal S1/S2 without murmurs, rubs or gallops. Trace pitting edema LE bilaterally. Abdomen: Soft, non-tender, non-distended with normal bowel sounds. : No CVA tenderness. Musculoskeletal: No clubbing or cyanosis. Full range of motion without deformity. Skin: Skin color, texture, turgor normal.  No rashes or lesions.   Physical exam remains unchanged from     Labs:   Recent Labs     22  0410 22  5213 09/26/22 0424   WBC 11.1* 11.5* 12.6*   HGB 8.9* 8.9* 8.6*   HCT 26.8* 26.7* 26.0*    289 300       Recent Labs     09/24/22 0410 09/25/22 0425 09/26/22 0424    133* 134*   K 3.4* 3.3* 4.3   CL 96* 95* 95*   CO2 25 25 24   BUN 42* 47* 51*   CREATININE 8.4* 8.7* 8.7*   CALCIUM 8.3 8.7 8.5   PHOS 6.1* 5.7* 5.4*       Recent Labs     09/24/22 0410 09/25/22 0425 09/26/22 0424   AST 24 29 29   ALT 32 36 33   BILITOT <0.2 <0.2 <0.2   ALKPHOS 95 93 92       No results for input(s): INR in the last 72 hours. No results for input(s): [place] in the last 72 hours. Urinalysis:      Lab Results   Component Value Date/Time    NITRU Negative 09/21/2022 03:18 PM    WBCUA 3 09/21/2022 03:18 PM    BACTERIA None Seen 09/21/2022 03:18 PM    RBCUA 5 09/21/2022 03:18 PM    BLOODU SMALL 09/21/2022 03:18 PM    SPECGRAV 1.015 09/21/2022 03:18 PM    GLUCOSEU 250 09/21/2022 03:18 PM    GLUCOSEU >=1000 03/26/2012 10:22 PM       Radiology:  XR CHEST PORTABLE   Final Result   CHF with pulmonary edema. CT HEAD WO CONTRAST   Final Result   No definite evidence of acute intracranial abnormality on a study   significantly limited by motion/streak artifact as described above.                  Assessment/Plan:    Active Hospital Problems    Diagnosis     Leukocytosis [D72.829]      Priority: Medium    Acute pulmonary edema (HCC) [J81.0]      Priority: Medium    Multifocal pneumonia [J18.9]      Priority: Medium    Hypervolemia [E87.70]      Priority: Medium    Acute on chronic respiratory failure with hypoxia and hypercapnia (HCC) [J96.21, J96.22]      Priority: Medium    Moderate episode of recurrent major depressive disorder (HCC) [F33.1]      Priority: Medium    BJ treated with BiPAP [G47.33]      Priority: Medium    Acute metabolic encephalopathy [S29.95]      Priority: Medium    Anemia in ESRD (end-stage renal disease) ([facility] [N18.6, D63.1]     Type 2 diabetes mellitus with hyperglycemia, with long-term current use of insulin (HCC) [E11.65, Z79.4]     Hypertension associated with stage 5 chronic kidney disease due to type 2 diabetes mellitus ([address] [E11.22, I12.0, N18.5]     Chronic diastolic heart failure (HCC) [I50.32]     Acute respiratory failure with hypoxia and hypercapnia ([address] [J96.01, J96.02]     Arthritis with psoriasis ([address] [L40.50]     Psoriatic arthropathy ([address] [L40.50]     Sleep apnea [G47.30]          Hospital Day: 6    This is a 69 y.o. male who presented to [facility] on 9/21/2022 and is being treated for:    Acute on chronic hypoxic and hypercapnic respiratory failure:- improving  Most likely due to volume overload. Continue IV lasix at 80 mg QD; monitor UOP  Currently on 1-2 L nasal cannula with BiPAP nightly. Wean off as tolerated. Continue inhaler therapy. Pulmonology consulted: appreciate recommendations       History of BJ:  Unable to tolerate home BiPAP/CPAP. Will need repeat sleep study outpatient. Acute metabolic encephalopathy: Improved  CT head: No acute abnormality. TSH, Ammonia level wnl. B 12 and folate pending. Supportive care       Type 2 diabetes on long-term insulin:  A1c 0.5% 9/21/2022. Continue basal insulin with sliding scale. ESRD on peritoneal dialysis:  Nephrology consulted: Appreciate input. No evidence of PD site infection or peritonitis. PD fluid cultures negative. Antibiotics discontinued. Hypotension: Continue midodrine    Depression: Continue Wellbutrin     Hypothyroid: Continue synthroid. Anemia due to ESRD: Continue EPO. Overall patient is medically stable to be discharged   ARU pre-CERT will be started for [employer]  I  DVT ppx: Heparin  GI ppx: Diet/Tube Feeds  Diet: ADULT DIET; Regular; 4 carb choices (60 gm/meal);  Low Phosphorus (Less than 1000 mg)  ADULT ORAL NUTRITION SUPPLEMENT; Breakfast, Lunch; Renal Oral Supplement  Code Status: Full Code  Disposition:      Vipin Jackson MD  9/26/2022  3:17 PM      Please note that some part of this chart was generated using Dragon dictation software. Although every effort was made to ensure the accuracy of this automated transcription, some errors in transcription may have occurred inadvertently. If you may need any clarification, please do not hesitate to contact me through [facility].

## 2022-09-26 NOTE — PROGRESS NOTES
CCPD Order   Exchanges: 4   Exchange Volume: 2000 ml   Total Time: 8 hrs   Dextrose: 2.5%   Last Fill: 0 ml   Total Volume: 8000 ml     Orders verified. Supplies taken to pt's room. Report received. Cycler set up, primed and pre tested. [facility]. Pt connected to cycler. CCPD initiated without problem. Initial effluent clear. If problems should arise please call the [phone] on top of PD cycler machine.

## 2022-09-26 NOTE — PROGRESS NOTES
Physician Progress Note      PATIENT:               Priscilla Desai  CSN #:                  [id number]  :                       1945  ADMIT DATE:       2022 8:38 AM  DISCH DATE:  RESPONDING  PROVIDER #:        Kristie Masterson MD          QUERY TEXT:    Pt admitted with Acute on chronic hypoxic and hypercapnic respiratory failure. Pt noted to have diastolic CHF in past medical history. If possible, please   document in the progress notes and discharge summary if you are evaluating   and/or treating any of the following: The medical record reflects the following:  Risk Factors: PMH diastolic CHF, CKD, overload    Clinical Indicators: H&P note documented \"Acute on chronic hypoxic and   hypercapnic respiratory failure-Despite slight elevation in white count and   tachycardia, do not believe this is infectious process, rather predominantly   pulmonary vs HF related. \"     Internal Medicine progress note documented \" Acute on chronic hypoxic and   hypercapnic respiratory failure:- improving  Most likely due to volume overload. \"     Pulmonary note documented \"Patient presented with acute hypoxic and   hypercapnic respiratory failure due to volume overload and pulmonary edema   requiring BiPAP. \"    Treatment: IV lasix, supplemental oxygen, Pulmonary consult, Nephrology   consult  Options provided:  -- Acute pulmonary edema due to decompensated diastolic CHF  -- Noncardiogenic acute pulmonary edema due to fluid overload and missed   dialysis  -- Other - I will add my own diagnosis  -- Disagree - Not applicable / Not valid  -- Disagree - Clinically unable to determine / Unknown  -- Refer to Clinical Documentation Reviewer    PROVIDER RESPONSE TEXT:    This patient has noncardiogenic acute pulmonary edema due to fluid overload   and missed dialysis.     Query created by: Marrian Lesches on 2022 5:19 PM      Electronically signed by:  Kristie Masterson MD 2022 5:45 PM

## 2022-09-26 NOTE — CARE COORDINATION
Discharge Planning Note:    CM received a call from [facility] advising that she received a call from one of their physicians asking about this pt and for a referral.  [facility] where as [facility] does not. CM reached out to Dr. Genie Morrison for directions and he is ok with referral to [facility]. CM faxed referral to see if pt is medically appropriate. CM called family and spoke to dtr Charolette Babinski who advises not knowing anything about [facility]. Mars Taylor voiced desire for pt to return to the [facility] but will discuss with family (mother & brother) and call this CM back with family's decision on disposition. Electronically signed by Michel Mckeon RN on 9/26/2022 at 11:05 AM      Received call back from dtr Charolette Babinski ([phone]) reporting that they received a call from the [facility] physician and after discussing with him, they would like to explore the ARU option. CM to update family once call back received from [facility]. CM requested updated PT/OT notes for pre-cert.     Electronically signed by Michel Mckeon RN on 9/26/2022 at 12:39 PM

## 2022-09-26 NOTE — PROGRESS NOTES
Nutrition Note    RECOMMENDATIONS  PO Diet: Continue current diet  ONS: Decreased Nepro to once daily    NUTRITION ASSESSMENT   Pt triggered for follow-up. Pt is oriented x2 to person/place, information obtained from chart review. On 4 carb choice, low phos diet. Receiving Nepro BID. Average documented intakes of 51-75% throughout admission, accepting ONS. Will decrease ONS to once daily d/t adequate po intake and continue to monitor. Nutrition Related Findings: -3.5L. Na 134, Cl 95, Mg 2.60, phos 5.4. Glucose 311, 166 today. LBM 9/22, BS+, constipation noted. Oriented x2 to person/place. Non-pitting generalized; trace BUE, BLE edema. Wounds: Pressure Injury, Stage I  Nutrition Education:  Education not indicated   Nutrition Goals: PO intake 50% or greater, by next RD assessment     MALNUTRITION ASSESSMENT   Acute Illness  Malnutrition Status: At risk for malnutrition (Comment)    NUTRITION DIAGNOSIS   Increased nutrient needs related to increase demand for energy/nutrients as evidenced by wounds    CURRENT NUTRITION THERAPIES  ADULT ORAL NUTRITION SUPPLEMENT; Breakfast, Dinner; Renal Oral Supplement  ADULT DIET; Regular; 4 carb choices (60 gm/meal); Low Phosphorus (Less than 1000 mg)     PO Intake: 51-75%   PO Supplement Intake:Unable to assess (but accepting)    ANTHROPOMETRICS  Current Height: 5' 10\" (177.8 cm)  Current Weight: 204 lb (92.5 kg)    Admission weight: 205 lb (93 kg) (bed wt)  Ideal Body Weight (IBW): 166 lbs  (75 kg)        BMI: 29.3    COMPARATIVE STANDARDS  Energy (kcal):  1420-2192     Protein (g):         Fluid (mL/day):  9502    The patient will be monitored per nutrition standards of care. Consult dietitian if additional nutrition interventions are needed prior to RD reassessment.      Bert Del Toro, MS, RD, LD    Contact: [phone]

## 2022-09-27 LAB
ANION GAP SERPL CALCULATED.3IONS-SCNC: 16 MMOL/L (ref 3–16)
BASOPHILS ABSOLUTE: 0.2 K/UL (ref 0–0.2)
BASOPHILS RELATIVE PERCENT: 1.2 %
BUN BLDV-MCNC: 59 MG/DL (ref 7–20)
CALCIUM SERPL-MCNC: 8.8 MG/DL (ref 8.3–10.6)
CHLORIDE BLD-SCNC: 95 MMOL/L (ref 99–110)
CO2: 22 MMOL/L (ref 21–32)
CREAT SERPL-MCNC: 9.3 MG/DL (ref 0.8–1.3)
EOSINOPHILS ABSOLUTE: 0.6 K/UL (ref 0–0.6)
EOSINOPHILS RELATIVE PERCENT: 4.6 %
GFR AFRICAN AMERICAN: 7
GFR NON-AFRICAN AMERICAN: 6
GLUCOSE BLD-MCNC: 167 MG/DL (ref 70–99)
GLUCOSE BLD-MCNC: 171 MG/DL (ref 70–99)
GLUCOSE BLD-MCNC: 204 MG/DL (ref 70–99)
GLUCOSE BLD-MCNC: 208 MG/DL (ref 70–99)
GLUCOSE BLD-MCNC: 231 MG/DL (ref 70–99)
GLUCOSE BLD-MCNC: 382 MG/DL (ref 70–99)
HCT VFR BLD CALC: 29.1 % (ref 40.5–52.5)
HEMOGLOBIN: 9.5 G/DL (ref 13.5–17.5)
LYMPHOCYTES ABSOLUTE: 1.2 K/UL (ref 1–5.1)
LYMPHOCYTES RELATIVE PERCENT: 9 %
MCH RBC QN AUTO: 37.1 PG (ref 26–34)
MCHC RBC AUTO-ENTMCNC: 32.6 G/DL (ref 31–36)
MCV RBC AUTO: 113.7 FL (ref 80–100)
MONOCYTES ABSOLUTE: 0.9 K/UL (ref 0–1.3)
MONOCYTES RELATIVE PERCENT: 6.3 %
NEUTROPHILS ABSOLUTE: 10.7 K/UL (ref 1.7–7.7)
NEUTROPHILS RELATIVE PERCENT: 78.9 %
PDW BLD-RTO: 14.4 % (ref 12.4–15.4)
PERFORMED ON: ABNORMAL
PLATELET # BLD: 254 K/UL (ref 135–450)
PMV BLD AUTO: 7.8 FL (ref 5–10.5)
POTASSIUM REFLEX MAGNESIUM: 4.4 MMOL/L (ref 3.5–5.1)
PRO-BNP: 4023 PG/ML (ref 0–449)
PROCALCITONIN: 1.23 NG/ML (ref 0–0.15)
RBC # BLD: 2.56 M/UL (ref 4.2–5.9)
SODIUM BLD-SCNC: 133 MMOL/L (ref 136–145)
WBC # BLD: 13.6 K/UL (ref 4–11)

## 2022-09-27 PROCEDURE — 80048 BASIC METABOLIC PNL TOTAL CA: CPT

## 2022-09-27 PROCEDURE — 84145 PROCALCITONIN (PCT): CPT

## 2022-09-27 PROCEDURE — 94640 AIRWAY INHALATION TREATMENT: CPT

## 2022-09-27 PROCEDURE — 90945 DIALYSIS ONE EVALUATION: CPT | Performed by: INTERNAL MEDICINE

## 2022-09-27 PROCEDURE — 90945 DIALYSIS ONE EVALUATION: CPT

## 2022-09-27 PROCEDURE — 2060000000 HC ICU INTERMEDIATE R&B

## 2022-09-27 PROCEDURE — 6370000000 HC RX 637 (ALT 250 FOR IP): Performed by: INTERNAL MEDICINE

## 2022-09-27 PROCEDURE — 2580000003 HC RX 258: Performed by: INTERNAL MEDICINE

## 2022-09-27 PROCEDURE — 83880 ASSAY OF NATRIURETIC PEPTIDE: CPT

## 2022-09-27 PROCEDURE — 2580000003 HC RX 258: Performed by: STUDENT IN AN ORGANIZED HEALTH CARE EDUCATION/TRAINING PROGRAM

## 2022-09-27 PROCEDURE — 6360000002 HC RX W HCPCS: Performed by: STUDENT IN AN ORGANIZED HEALTH CARE EDUCATION/TRAINING PROGRAM

## 2022-09-27 PROCEDURE — 97530 THERAPEUTIC ACTIVITIES: CPT

## 2022-09-27 PROCEDURE — 94660 CPAP INITIATION&MGMT: CPT

## 2022-09-27 PROCEDURE — 94761 N-INVAS EAR/PLS OXIMETRY MLT: CPT

## 2022-09-27 PROCEDURE — 36415 COLL VENOUS BLD VENIPUNCTURE: CPT

## 2022-09-27 PROCEDURE — 2700000000 HC OXYGEN THERAPY PER DAY

## 2022-09-27 PROCEDURE — 85025 COMPLETE CBC W/AUTO DIFF WBC: CPT

## 2022-09-27 PROCEDURE — 6370000000 HC RX 637 (ALT 250 FOR IP): Performed by: STUDENT IN AN ORGANIZED HEALTH CARE EDUCATION/TRAINING PROGRAM

## 2022-09-27 RX ORDER — SODIUM CHLORIDE, SODIUM LACTATE, CALCIUM CHLORIDE, MAGNESIUM CHLORIDE AND DEXTROSE 4.25; 538; 448; 18.3; 5.08 G/100ML; MG/100ML; MG/100ML; MG/100ML; MG/100ML
2000 INJECTION, SOLUTION INTRAPERITONEAL 4 TIMES DAILY
Status: DISCONTINUED | OUTPATIENT
Start: 2022-09-27 | End: 2022-09-28

## 2022-09-27 RX ADMIN — HEPARIN SODIUM 5000 UNITS: 5000 INJECTION INTRAVENOUS; SUBCUTANEOUS at 14:46

## 2022-09-27 RX ADMIN — IPRATROPIUM BROMIDE AND ALBUTEROL SULFATE 1 AMPULE: 2.5; .5 SOLUTION RESPIRATORY (INHALATION) at 11:18

## 2022-09-27 RX ADMIN — MIDODRINE HYDROCHLORIDE 5 MG: 5 TABLET ORAL at 18:54

## 2022-09-27 RX ADMIN — IPRATROPIUM BROMIDE AND ALBUTEROL SULFATE 1 AMPULE: 2.5; .5 SOLUTION RESPIRATORY (INHALATION) at 08:10

## 2022-09-27 RX ADMIN — ASPIRIN 81 MG 81 MG: 81 TABLET ORAL at 10:37

## 2022-09-27 RX ADMIN — TORSEMIDE 100 MG: 100 TABLET ORAL at 10:36

## 2022-09-27 RX ADMIN — LEVOTHYROXINE SODIUM 50 MCG: 0.03 TABLET ORAL at 04:26

## 2022-09-27 RX ADMIN — INSULIN LISPRO 4 UNITS: 100 INJECTION, SOLUTION INTRAVENOUS; SUBCUTANEOUS at 12:41

## 2022-09-27 RX ADMIN — IPRATROPIUM BROMIDE AND ALBUTEROL SULFATE 1 AMPULE: 2.5; .5 SOLUTION RESPIRATORY (INHALATION) at 15:25

## 2022-09-27 RX ADMIN — GENTAMICIN SULFATE: 1 CREAM TOPICAL at 10:36

## 2022-09-27 RX ADMIN — INSULIN LISPRO 4 UNITS: 100 INJECTION, SOLUTION INTRAVENOUS; SUBCUTANEOUS at 18:20

## 2022-09-27 RX ADMIN — SODIUM CHLORIDE, SODIUM LACTATE, CALCIUM CHLORIDE, MAGNESIUM CHLORIDE AND DEXTROSE 2000 ML: 4.25; 538; 448; 18.3; 5.08 INJECTION, SOLUTION INTRAPERITONEAL at 21:00

## 2022-09-27 RX ADMIN — INSULIN GLARGINE 40 UNITS: 100 INJECTION, SOLUTION SUBCUTANEOUS at 20:29

## 2022-09-27 RX ADMIN — HEPARIN SODIUM 5000 UNITS: 5000 INJECTION INTRAVENOUS; SUBCUTANEOUS at 04:26

## 2022-09-27 RX ADMIN — Medication 2 PUFF: at 08:10

## 2022-09-27 RX ADMIN — INSULIN LISPRO 4 UNITS: 100 INJECTION, SOLUTION INTRAVENOUS; SUBCUTANEOUS at 04:23

## 2022-09-27 RX ADMIN — INSULIN LISPRO 16 UNITS: 100 INJECTION, SOLUTION INTRAVENOUS; SUBCUTANEOUS at 20:29

## 2022-09-27 RX ADMIN — Medication 10 ML: at 10:37

## 2022-09-27 RX ADMIN — Medication 2 PUFF: at 19:50

## 2022-09-27 RX ADMIN — ATORVASTATIN CALCIUM 80 MG: 80 TABLET, FILM COATED ORAL at 20:29

## 2022-09-27 RX ADMIN — Medication 10 ML: at 20:32

## 2022-09-27 RX ADMIN — MIDODRINE HYDROCHLORIDE 5 MG: 5 TABLET ORAL at 10:37

## 2022-09-27 RX ADMIN — IPRATROPIUM BROMIDE AND ALBUTEROL SULFATE 1 AMPULE: 2.5; .5 SOLUTION RESPIRATORY (INHALATION) at 19:50

## 2022-09-27 RX ADMIN — HEPARIN SODIUM 5000 UNITS: 5000 INJECTION INTRAVENOUS; SUBCUTANEOUS at 20:29

## 2022-09-27 ASSESSMENT — PAIN SCALES - GENERAL
PAINLEVEL_OUTOF10: 0

## 2022-09-27 NOTE — PROGRESS NOTES
[facility] [address] Department   Phone: [phone]    Physical Therapy    [] Initial Evaluation            [x] Daily Treatment Note         [] Discharge Summary      Patient: Austin Cazares   : 1945   MRN: [id number]   Date of Service:  2022  Admitting Diagnosis: Acute on chronic respiratory failure with hypoxia and hypercapnia [facility])  Current Admission Summary: 69 y.o. male who presented to [facility] with complaints of altered mental status. Patient is relatively nonverbal at baseline after prior history of illness. Questionable aphasia from stroke versus ongoing dementia. Patient daughter at bedside and was able to provide information. She states the patient was more confused at his nursing facility this morning. Additionally, patient was found to be hypoxic. He was started on oxygen, with little improvement. He was placed on BiPAP with more improvement. Per the daughter, patient is supposed be wearing BiPAP at night, but is noncompliant. There were no other associated symptoms with his confusion. During work-up in the ER, patient was found to have CO2 of 94.8 with pH of 7.095 on admission. Patient does have significant dementia and is aware he is confused, but is relatively nonverbal and unable to answer complex questions. He is able to answer \"yes and no\" questions. Prior tobacco user, quit approximately 4 years ago. No alcohol or drug use.   Past Medical History:  has a past medical history of Allergic rhinitis due to other allergen, Coronary atherosclerosis of unspecified type of vessel, native or graft, Diabetic eye exam ([address] Diabetic eye exam ([address] Generalized osteoarthrosis, involving multiple sites, Other psoriasis, Pneumonia, Prolonged emergence from general anesthesia, Psoriatic arthropathy ([address] Type II or unspecified type diabetes mellitus without mention of complication, not stated as uncontrolled, Unspecified essential hypertension, and Unspecified sleep apnea. Past Surgical History:  has a past surgical history that includes Coronary artery bypass graft; Carpal tunnel release; joint replacement; Cataract removal; and LAPAROSCOPY INSERTION PERITONEAL CATHETER (N/A, 10/26/2020). Discharge Recommendations: Alba Richards scored a 7/24 on the AM-PAC short mobility form. Current research shows that an AM-PAC score of 17 or less is typically not associated with a discharge to the patient's home setting. Based on the patient's AM-PAC score and their current functional mobility deficits, it is recommended that the patient have 3-5 sessions per week of Physical Therapy at d/c to increase the patient's independence. Please see assessment section for further patient specific details. If patient discharges prior to next session this note will serve as a discharge summary. Please see below for the latest assessment towards goals. DME Required For Discharge: DME to be determined pending patient progress  Precautions/Restrictions: high fall risk  Weight Bearing Restrictions: no restrictions  [] Right Upper Extremity  [] Left Upper Extremity [] Right Lower Extremity  [] Left Lower Extremity     Required Braces/Orthotics: no braces required   [] Right  [] Left  Positional Restrictions:no positional restrictions    Pre-Admission Information   Lives With: spouse (Spouse just had OHS, per pt report)  Type of Home: condo  Home Layout: one level, able to live on main level  Home Access: level entry  Bathroom Layout: walk in shower, handicap height toilet  Bathroom Equipment: grab bars in shower, shower chair, hand held shower head  Toilet Height: standard height, elevated height (has both)  Home Equipment: rolling walker, single point cane, reacher  Transfer Assistance: requires assistance  Ambulation Assistance:modified independent with use of RW  ADL Assistance: requires assistance with bathing, requires assistance with dressing, . Comment: A with LB ADLs  IADL Assistance: requires assistance with all homemaking tasks, wife & dtr were helping; dtr works  Active :        [] Yes                 [x] No  Hand Dominance: [] Left                 [x] Right  Current Employment: retired. Occupation:  at [employer]: [employer]  Recent Falls: No recent falls  Pt sleeps in \"lounger\"     Pt from SNF after stay at [facility] Only at SNF 1 night. Questionable historian. Per RN, pt's daughter reporting pt needing a lot more assistance recently. Subjective  General: Patient lying supine in bed with HOB elevated upon arrival. Patient is agreeable to PT/OT. Pain: 0/10  Pain Interventions: not applicable       Functional Mobility  Bed Mobility  Supine to Sit: Comment Mod Ax2  Sit to Supine: Comment Max Ax2  Rolling Left: moderate assistance  Rolling Right: moderate assistance  Scooting: Comment Mod+Min A for lateral scooting towards HOB (4 reps)  Comments: Rolling performed at beginning and end of session for repositioning and adjustment of lift sheet. Attempted bed>chair transfer with use of maxi-john however unable to perform due to low battery. Pt then performed sup<>sit for sitting EOB. Transfers  No transfers completed on this date secondary to low battery on maxi-john lift and pt reporting dizziness upon sitting EOB. Pt taken and dropping to 90s/70s and then increasing to 100s/90s once returned supine. Comments:  Ambulation  Ambulation not tested on this date secondary to not being ambulatory for over a year. Stair Mobility  Stair mobility not completed on this date. Comments:  Wheelchair Mobility:  No w/c mobility completed on this date. Comments:  Balance  Static Sitting Balance: fair (-): maintains balance at CGA with use of UE support  Dynamic Sitting Balance: poor (+): requires min (A) to maintain balance  Comments: Patient sat EOB 5-7 minutes; pt returned to supine due to orthostatic hypotension.  Pt initially requiring CGA to maintain balance and progressively requiring Min A to maintain balance due to reported dizziness and dropping BP. Other Therapeutic Interventions    Functional Outcomes  AM-PAC Inpatient Mobility Raw Score : 7              Education  Barriers To Learning: cognition  Patient Education: patient educated on goals, PT role and benefits, plan of care, functional mobility training, orientation, injury prevention, transfer training, discharge recommendations  Learning Assessment:  patient will require reinforcement due to cognitive deficits    Assessment  Activity Tolerance: Fair; limited due to orthostatic hypotension once seated EOB  Impairments Requiring Therapeutic Intervention: decreased functional mobility, decreased ROM, decreased strength, decreased cognition, decreased endurance, decreased balance, decreased coordination, decreased posture  Prognosis: fair  Clinical Assessment: Pt requiring increased assist for bed mobility this date. Pt able to sit EOB for slightly increased period of time however continues to be limited and requiring progressively increased assist to maintain balance due to reported dizziness and dropping BP. Pt would continue to benefit from skilled PT services to facilitate independence with functional mobility.    Safety Interventions: patient left in bed, bed alarm in place, call light within reach, gait belt, patient at risk for falls, and nurse notified    Plan  Frequency: 3-5 x/per week  Current Treatment Recommendations: strengthening, balance training, functional mobility training, transfer training, endurance training, patient/caregiver education, and safety education    Goals  Patient Goals: Return home   Short Term Goals:  Time Frame: Upon d/c  Patient will complete bed mobility at Independent   Patient will complete transfers at Independent   Patient will ambulate 20 ft with use of rolling walker at stand by assistance  NO GOALS MET THIS DATE     Therapy Session Time      Individual Group Co-treatment   Time In     1117   Time Out     1148   Minutes     31     Timed Code Treatment Minutes:   31  Total Treatment Minutes:  31       Electronically Signed By: Rosario Crowe, [id number] [place] [address],[address], DPT [id number]

## 2022-09-27 NOTE — PROGRESS NOTES
Disconnected from [facility] per protocol. Effluent: Clear/ yellow   Total time: 8 hr 7 min   Total UF:  353 ml. Total Volume:  8007 ml. Dwell time gained:  0 hr 7 min. Pt Tolerated procedure: Well   Report given to:  Karissa Herrmann RN

## 2022-09-27 NOTE — PROGRESS NOTES
Office : [phone]     Fax :[phone]       Nephrology progress  Note      Patient's Name: Sulema Nash  8:33 AM  9/27/2022    Reason for Consult:  ESRD on peritoneal dialysis. Requesting Physician:  Luci Raygoza MD      Chief Complaint:    Chief Complaint   Patient presents with    Altered Mental Status     Patient arrives via [place] [facility], altered, last seen normal last night, started peritoneal dialysis last night, O2 low on normal 3L, placed on NRB with some improvement, increased WOB noted           History of Present Ilness:    Sulema Nash is a 69 y.o. male with prior history of ESRD on peritoneal dialysis , DM 2, HTN who was sent from [facility] with main c/o SOB. In ER he was found to be hypoxic. CXR shows volume overload. On BiPAP now. Still makes urine . Also had elevated WBC. No abdominal pain. No fever   BS Elevated.     Interval hx :    Feels much better   Tolerating PD well     UF low     I/O last 3 completed shifts:  In: -   Out: 591     Past Medical History:   Diagnosis Date    Allergic rhinitis due to other allergen 7/12/2010    Coronary atherosclerosis of unspecified type of vessel, native or graft 7/12/2010    Diabetic eye exam ([address] 04/29/2015    Diabetic eye exam ([address] 5/5/2016    [place] [facility]    Generalized osteoarthrosis, involving multiple sites 7/12/2010    Other psoriasis 7/12/2010    Pneumonia     Prolonged emergence from general anesthesia     Psoriatic arthropathy ([address] 7/12/2010    Type II or unspecified type diabetes mellitus without mention of complication, not stated as uncontrolled 7/12/2010    Unspecified essential hypertension 7/12/2010    Unspecified sleep apnea 7/12/2010       Past Surgical History:   Procedure Laterality Date CARPAL TUNNEL RELEASE      s/p    CATARACT REMOVAL      CORONARY ARTERY BYPASS GRAFT      JOINT REPLACEMENT      LAPAROSCOPY INSERTION PERITONEAL CATHETER N/A 10/26/2020    LAPAROSCOPIC PERITONEAL DIALYSIS CATHETER PLACEMENT; LAPAROSCOPIC OMENTOPEXY performed by Bob Weiner DO at [facility] OR       Family History   Problem Relation Age of Onset    Diabetes Mother     Heart Failure Mother     Coronary Art Dis Father         reports that he quit smoking about 41 years ago. He started smoking about 60 years ago. He has a 18.00 pack-year smoking history. He has never used smokeless tobacco. He reports that he does not drink alcohol and does not use drugs.         Allergies:  Penicillins, Sulfa antibiotics, Tazorac [tazarotene], Clindamycin, and Clindamycin/lincomycin    Current Medications:    epoetin daphney-epbx (RETACRIT) injection 3,000 Units, Once per day on Mon Wed Fri  torsemide (DEMADEX) tablet 100 mg, Daily  midodrine (PROAMATINE) tablet 5 mg, BID WC  dianeal lo-eben 2.5% 2,000 mL, 4x daily  ipratropium-albuterol (DUONEB) nebulizer solution 1 ampule, 4x daily  gentamicin (GARAMYCIN) 0.1 % cream, Daily  sodium chloride flush 0.9 % injection 5-40 mL, 2 times per day  sodium chloride flush 0.9 % injection 5-40 mL, PRN  0.9 % sodium chloride infusion, PRN  ondansetron (ZOFRAN-ODT) disintegrating tablet 4 mg, Q8H PRN   Or  ondansetron (ZOFRAN) injection 4 mg, Q6H PRN  polyethylene glycol (GLYCOLAX) packet 17 g, Daily PRN  acetaminophen (TYLENOL) tablet 650 mg, Q6H PRN   Or  acetaminophen (TYLENOL) suppository 650 mg, Q6H PRN  albuterol (PROVENTIL) nebulizer solution 2.5 mg, Q4H PRN  aspirin chewable tablet 81 mg, Daily  atorvastatin (LIPITOR) tablet 80 mg, Nightly  mometasone-formoterol (DULERA) 200-5 MCG/ACT inhaler 2 puff, BID  buPROPion (WELLBUTRIN) tablet 100 mg, Every Other Day  levothyroxine (SYNTHROID) tablet 50 mcg, Daily  dextrose bolus 10% 125 mL, PRN   Or  dextrose bolus 10% 250 mL, PRN  glucagon (rDNA) injection 1 mg, PRN  dextrose 10 % infusion, Continuous PRN  insulin lispro (HUMALOG) injection vial 0-16 Units, Q4H  insulin glargine (LANTUS) injection vial 40 Units, Nightly  heparin (porcine) injection 5,000 Units, 3 times per day      Physical exam:     Vitals:  BP (!) 135/57   Pulse 66   Temp 97.7 °F (36.5 °C) (Temporal)   Resp 16   Ht 5' 10\" (1.778 m)   Wt 201 lb 11.5 oz (91.5 kg)   SpO2 99%   BMI 28.94 kg/m²   Constitutional:  OAA X3 NAD  Skin: no rash, turgor wnl  Heent:  eomi, mmm  Neck: no bruits or jvd noted  Cardiovascular:  S1, S2 without m/r/g  Respiratory: b/l crackles   Abdomen:  +bs, soft, nt, nd  Ext: mild  lower extremity edema    Labs:  CBC:   Recent Labs     09/25/22 0425 09/26/22 0424   WBC 11.5* 12.6*   HGB 8.9* 8.6*    300     BMP:    Recent Labs     09/25/22 0425 09/26/22 0424   * 134*   K 3.3* 4.3   CL 95* 95*   CO2 25 24   BUN 47* 51*   CREATININE 8.7* 8.7*   GLUCOSE 249* 292*     Ca/Mg/Phos:   Recent Labs     09/25/22 0425 09/26/22 0424   CALCIUM 8.7 8.5   MG 2.60* 2.60*   PHOS 5.7* 5.4*     Hepatic:   Recent Labs     09/25/22 0425 09/26/22 0424   AST 29 29   ALT 36 33   BILITOT <0.2 <0.2   ALKPHOS 93 92     Troponin:   No results for input(s): TROPONINI in the last 72 hours. BNP: No results for input(s): BNP in the last 72 hours. Lipids: No results for input(s): CHOL, TRIG, HDL, LDLCALC, LABVLDL in the last 72 hours. ABGs:   No results for input(s): PHART, PO2ART, TRE0UPM in the last 72 hours. INR: No results for input(s): INR in the last 72 hours. UA:  No results for input(s): [place], GLUCOSEU, BILIRUBINUR, KETUA, SPECGRAV, BLOODU, PHUR, PROTEINU, UROBILINOGEN, NITRU, LEUKOCYTESUR, [place] in the last 72 hours. Urine Microscopic:   No results for input(s): LABCAST, BACTERIA, COMU, HYALCAST, WBCUA, RBCUA, EPIU in the last 72 hours. Urine Culture: No results for input(s): LABURIN in the last 72 hours.   Urine Chemistry: No results for input(s): CLUR, LABCREA, PROTEINUR, NAUR in the last 72 hours. IMAGING:  XR CHEST PORTABLE   Final Result   CHF with pulmonary edema. CT HEAD WO CONTRAST   Final Result   No definite evidence of acute intracranial abnormality on a study   significantly limited by motion/streak artifact as described above. Assessment/Plan :      1. ESRD   Volume overload   Better     Continue  CCPD now with 4.25  % today . Need more UF   PD fluid for cell count and culture -  negative   No growth   No evidence of peritonitis    Stop ABX         2. HTN  BP controlled       3. Anemia in ESRD   On epogen     4. Acid- base disorder. monitor     5. Hypokalemia   Replace potassium       6. Debility. Generalized weakness.  Need PT/OT       Awaiting placement at rehab unit           D/w primary team      Thank you for allowing us to participate in care of Americo Isaac         Electronically signed by: Fatimah Summers MD, 9/27/2022, 8:33 AM      [facility] [address]  Office : [phone]  Fax :[phone]

## 2022-09-27 NOTE — PROGRESS NOTES
[facility] [address] Department   Phone: [phone]    Occupational Therapy    [] Initial Evaluation            [x] Daily Treatment Note         [] Discharge Summary      Patient: Stephen Edmondson   : 1945   MRN: [id number]   Date of Service:  2022    Admitting Diagnosis:  Acute on chronic respiratory failure with hypoxia and hypercapnia ([address]  Current Admission Summary: Per ED note, \"[facility] new patient to them, started peritoneal dialysis for the first time last night, was at his normal A&O x4 mental status last night, this AM, AMS, not verbally responding, found to by hypoxic in the 80's on normal 3L/NC, staff attempted 6L no change, currently on NRB at 95%. \"  Past Medical History:  has a past medical history of Allergic rhinitis due to other allergen, Coronary atherosclerosis of unspecified type of vessel, native or graft, Diabetic eye exam ([address] Diabetic eye exam ([address] Generalized osteoarthrosis, involving multiple sites, Other psoriasis, Pneumonia, Prolonged emergence from general anesthesia, Psoriatic arthropathy ([address] Type II or unspecified type diabetes mellitus without mention of complication, not stated as uncontrolled, Unspecified essential hypertension, and Unspecified sleep apnea. Past Surgical History:  has a past surgical history that includes Coronary artery bypass graft; Carpal tunnel release; joint replacement; Cataract removal; and LAPAROSCOPY INSERTION PERITONEAL CATHETER (N/A, 10/26/2020). Discharge Recommendations: Stephen Edmondson scored a 10/24 on the AM-PAC ADL Inpatient form. Current research shows that an AM-PAC score of 17 or less is typically not associated with a discharge to the patient's home setting.  Based on the patient's AM-PAC score and their current ADL deficits, it is recommended that the patient have 3-5 sessions per week of Occupational Therapy at d/c to increase the patient's independence. Please see assessment section for further patient specific details. If patient discharges prior to next session this note will serve as a discharge summary. Please see below for the latest assessment towards goals. DME Required For Discharge: DME to be determined at next level of care    Precautions/Restrictions: high fall risk  Weight Bearing Restrictions: no restrictions  [] Right Upper Extremity  [] Left Upper Extremity [] Right Lower Extremity  [] Left Lower Extremity     Required Braces/Orthotics: no braces required   [] Right  [] Left  Positional Restrictions:no positional restrictions    Pre-Admission Information   Lives With: spouse   (Spouse just had OHS, per pt report)  Type of Home: condo  Home Layout: one level, able to live on main level  Home Access: level entry  Bathroom Layout: walk in shower, handicap height toilet  Bathroom Equipment: grab bars in shower, shower chair, hand held shower head  Toilet Height: standard height, elevated height (has both)  Home Equipment: rolling walker, single point cane, reacher  Transfer Assistance: requires assistance  Ambulation Assistance:modified independent with use of RW  ADL Assistance: requires assistance with bathing, requires assistance with dressing, . Comment: A with LB ADLs  IADL Assistance: requires assistance with all homemaking tasks, wife & dtr were helping; dtr works  Active :        [] Yes  [x] No  Hand Dominance: [] Left  [x] Right  Current Employment: retired. Occupation:  at [employer]: [employer]  Recent Falls: No recent falls  Pt sleeps in \"lounger\"    Pt from SNF after stay at [facility] Only at SNF 1 night. Questionable historian. Per RN, pt's daughter reporting pt needing a lot more assistance recently. Subjective  General: Pt supine in bed, agreeable to therapy session. Pt with limited verbalizations, delayed responses.  Became more alert with sitting on EOB, with increased questions. Pt goes by \"Ilya. \"  Pain: 0/10  Pain Interventions: not applicable        Activities of Daily Living  Basic Activities of Daily Living  Lower Extremity Dressing: dependent  Dressing Comments: donning socks  General Comments: remainders of ADLs not performed this date, unable to fully tolerate due to low BP, limited activity tolerance   Instrumental Activities of Daily Living  No IADL completed on this date. Functional Mobility  Bed Mobility  Supine to Sit: 2 person assistance with modA of 2   Sit to Supine: 2 person assistance with maxA of 2   Rolling Left: moderate assistance  Rolling Right: moderate assistance  Scootin person assistance with Garry + modA    Comments:HOB elevated for supine to sit; pt initiated bed mobility with maximal cues and hand over hand. Transfers  No transfers completed on this date secondary to pt fatigued, generalized weakness and decreased BP 90's/70's(patient with limited verbalizations at EOB, reporting increasing dizziness while sitting EOB ~5 minutes. BP  110/58 upon return to supine. Comments:  Functional Mobility:  Sitting Balance: stand by assistance.     Sitting Balance Comment: 5-7 min on EOB with increasing fatigue          Functional Outcomes  -PAC Inpatient Daily Activity Raw Score: 10    Cognition  Overall Cognitive Status: Impaired  Arousal/Alterness: inconsistent responses to stimuli  Following Commands: follows one step commands with repetition, follows one step commands with increased time  Attention Span: attends with cues to redirect, difficulty dividing attention  Memory: decreased recall of recent events, decreased short term memory  Problem Solving: assistance required to generate solutions, assistance required to implement solutions, decreased awareness of errors, assistance required to correct errors made  Insights: decreased awareness of deficits  Initiation: requires cues for some  Sequencing: requires cues for some  Comment: Initially very delayed responses; improved as pt became more alert, sitting and talking with therapists. Orientation:    oriented to person, oriented to place, oriented to time, and disoriented to situation   Command Following:   impaired     Education  Barriers To Learning: cognition  Patient Education: patient educated on OT role and benefits, plan of care, discharge recommendations, bed mobility  Learning Assessment:  patient will require reinforcement due to cognitive deficits    Assessment  Activity Tolerance: Fair activity tolerance. Limited by cognition and fatigue. Impairments Requiring Therapeutic Intervention: decreased functional mobility, decreased ADL status, decreased cognition, decreased endurance, decreased balance  Prognosis: good and fair  Clinical Assessment: Pt is below his baseline level of occupational function, based on the above deficits associated with acute respiratory failure with hypoxia and hyperkapnia. Pt  only able to tolerate sitting on EOB 8-9 min and did not complete a transfer today as requestng to lie down. Pt dependent for nayana hygiene and LB dressing. He would benefit from continued skilled acute OT services to address these deficits and maximize his safety and independence.   Safety Interventions: patient left in bed, bed alarm in place, call light within reach, patient at risk for falls, and nurse notified    Plan  Frequency: 3-5 x/per week  Current Treatment Recommendations: balance training, functional mobility training, transfer training, endurance training, IADL training, and safety education    Goals  Patient Goals: Not stated   Short Term Goals:  Time Frame: Discharge  Patient will complete upper body ADL at stand by assistance   Patient will complete lower body ADL at moderate assistance   Patient will complete toileting at moderate assistance   Patient will complete grooming at set up assistance, stand by assistance   Patient will complete functional transfers at minimal assistance

## 2022-09-27 NOTE — PROGRESS NOTES
Hospitalist Progress Note    Name:  Vipin Lema    /Age/Sex: 1945  (69 y.o. male)  MRN & CSN:  [id number] & [id number]    PCP: Breanne yLles MD    Date of Admission: 2022  Chief Complaint: Altered mental status    Hospital Course: This 67-year-old man was admitted for altered mental status. Found to be hypoxic and hypercapnic on admission. Placed on BiPAP with improvement. Nephrology following as patient has ESRD on peritoneal dialysis. Pulmonology following. Interval history:  Pt seen and examined today. Overnight events noted, interval ancillary notes and labs reviewed. 1 L nasal cannula satting around 99 with a; wean as tolerated keep sats above 92%  Afebrile overnight, WBCs 13.6K, cultures negative to date  denied cough, SOB, chest pain, nausea, vomiting or abdominal pain  Awaiting ARU pre-CERT      Assessment and plan    Acute on chronic hypoxic and hypercapnic respiratory failure:-Improved  Most likely due to volume overload. Elevated proBNP at admission; trended down  Initially diuresed with IV Lasix; switched to torsemide  Continue home inhalers and as needed breathing treatment  Supplemental oxygen as needed to keep sats between 90 to 92%  Pulmonology consulted: appreciate recommendations     History of BJ: Unable to tolerate home BiPAP/CPAP. Will need repeat sleep study outpatient. Acute metabolic encephalopathy: Improved  CT head: No acute abnormality. TSH, Ammonia level wnl. B 12 and folate pending. Supportive care     Type 2 diabetes on long-term insulin:  A1c 6.5 on 2022. Continue basal insulin with sliding scale. ESRD on peritoneal dialysis:  Nephrology consulted: Appreciate input. No evidence of PD site infection or peritonitis. PD fluid cultures negative. Antibiotics discontinued. Hypotension: Continue midodrine    Depression: Continue Wellbutrin     Hypothyroid: Continue synthroid. Anemia due to ESRD: Continue EPO.     Overall patient is medically stable to be discharged   ARU pre-CERT will be started for [facility]    DVT ppx: Heparin  GI ppx: Diet/Tube Feeds  Diet: ADULT DIET; Regular; 4 carb choices (60 gm/meal); Low Phosphorus (Less than 1000 mg)  ADULT ORAL NUTRITION SUPPLEMENT; Breakfast, Lunch; Renal Oral Supplement  Code Status: Full Code  Disposition:    Medications:  Reviewed    No intake or output data in the 24 hours ending 09/27/22 0949      Physical Exam Performed:    BP (!) 135/57   Pulse 66   Temp 97.7 °F (36.5 °C) (Temporal)   Resp 16   Ht 5' 10\" (1.778 m)   Wt 201 lb 11.5 oz (91.5 kg)   SpO2 99%   BMI 28.94 kg/m²     General appearance: No apparent distress, appears stated age and cooperative. Somewhat confused, but more conversational today. HEENT: Pupils equal, round, and reactive to light. Conjunctivae/corneas clear. Neck: Supple, with full range of motion. No jugular venous distention. Trachea midline. Respiratory:  Normal respiratory effort. Clear to auscultation, bilaterally without Rales/Wheezes/Rhonchi. Cardiovascular: Regular rate and rhythm with normal S1/S2 without murmurs, rubs or gallops. Trace pitting edema LE bilaterally. Abdomen: Soft, non-tender, non-distended with normal bowel sounds. : No CVA tenderness. Musculoskeletal: No clubbing or cyanosis. Full range of motion without deformity. Skin: Skin color, texture, turgor normal.  No rashes or lesions. Physical exam remains unchanged from 9/25    Labs:   Recent Labs     09/25/22 0425 09/26/22 0424   WBC 11.5* 12.6*   HGB 8.9* 8.6*   HCT 26.7* 26.0*    300       Recent Labs     09/25/22 0425 09/26/22 0424   * 134*   K 3.3* 4.3   CL 95* 95*   CO2 25 24   BUN 47* 51*   CREATININE 8.7* 8.7*   CALCIUM 8.7 8.5   PHOS 5.7* 5.4*       Recent Labs     09/25/22 0425 09/26/22 0424   AST 29 29   ALT 36 33   BILITOT <0.2 <0.2   ALKPHOS 93 92       No results for input(s): INR in the last 72 hours.   No results for input(s): Omero Syed in the last 72 hours. Urinalysis:      Lab Results   Component Value Date/Time    NITRU Negative 09/21/2022 03:18 PM    WBCUA 3 09/21/2022 03:18 PM    BACTERIA None Seen 09/21/2022 03:18 PM    RBCUA 5 09/21/2022 03:18 PM    BLOODU SMALL 09/21/2022 03:18 PM    SPECGRAV 1.015 09/21/2022 03:18 PM    GLUCOSEU 250 09/21/2022 03:18 PM    GLUCOSEU >=1000 03/26/2012 10:22 PM       Radiology:  XR CHEST PORTABLE   Final Result   CHF with pulmonary edema. CT HEAD WO CONTRAST   Final Result   No definite evidence of acute intracranial abnormality on a study   significantly limited by motion/streak artifact as described above. Active Hospital Problems    Diagnosis     Leukocytosis [D72.829]      Priority: Medium    Acute pulmonary edema (HCC) [J81.0]      Priority: Medium    Multifocal pneumonia [J18.9]      Priority: Medium    Hypervolemia [E87.70]      Priority: Medium    Acute on chronic respiratory failure with hypoxia and hypercapnia (HCC) [J96.21, J96.22]      Priority: Medium    Moderate episode of recurrent major depressive disorder (HCC) [F33.1]      Priority: Medium    BJ treated with BiPAP [G47.33]      Priority: Medium    Acute metabolic encephalopathy [X84.12]      Priority: Medium    Anemia in ESRD (end-stage renal disease) (HCC) [N18.6, D63.1]     Type 2 diabetes mellitus with hyperglycemia, with long-term current use of insulin (HCC) [E11.65, Z79.4]     Hypertension associated with stage 5 chronic kidney disease due to type 2 [facility] mellitus [facility] [E11.22, I12.0, N18.5]     Chronic diastolic heart failure (HCC) [I50.32]     Acute respiratory failure with hypoxia and hypercapnia (HCC) [J96.01, J96.02]     Arthritis with psoriasis (HCC) [L40.50]     Psoriatic arthropathy ([facility] [L40.50]     Sleep apnea [G47.30]        Quique Whitney MD  9/27/2022  9:49 AM      Please note that some part of this chart was generated using Dragon dictation software.  Although every effort was made to ensure the accuracy of this automated transcription, some errors in transcription may have occurred inadvertently. If you may need any clarification, please do not hesitate to contact me through [facility].

## 2022-09-27 NOTE — CARE COORDINATION
Discharge Planning Note:    SOCO spoke to Juanita  at [facility] to f/u regarding PT/OT notes being placed after 4;30pm yesterday. Juanita appreciative for update, will pull notes and start pre-cert. Electronically signed by Deedee Jones RN on 9/27/2022 at 8:20 AM      SOCO spoke with Dtjohnie Magaña and provided update.   SOCO obtained sister Roxanne's contact information and updated contact list.     Electronically signed by Deedee Jones RN on 9/27/2022 at 8:25 AM

## 2022-09-27 NOTE — PROGRESS NOTES
CCPD Order   Exchanges: 4   Exchange Volume: 2000 ml   Total Time: 8 hrs   Dextrose: 4.25%   Last Fill: 0 ml   Total Volume: 8000 ml     Orders verified. Supplies taken to pt's room. Report received. Cycler set up, primed and pre tested. [facility]. Pt connected to cycler. CCPD initiated without problem. Initial effluent clear. If problems should arise please call the [phone] on top of PD cycler machine.

## 2022-09-28 LAB
ANION GAP SERPL CALCULATED.3IONS-SCNC: 15 MMOL/L (ref 3–16)
BASOPHILS ABSOLUTE: 0.1 K/UL (ref 0–0.2)
BASOPHILS RELATIVE PERCENT: 0.8 %
BUN BLDV-MCNC: 62 MG/DL (ref 7–20)
CALCIUM SERPL-MCNC: 9 MG/DL (ref 8.3–10.6)
CHLORIDE BLD-SCNC: 95 MMOL/L (ref 99–110)
CO2: 24 MMOL/L (ref 21–32)
CREAT SERPL-MCNC: 9.8 MG/DL (ref 0.8–1.3)
EOSINOPHILS ABSOLUTE: 0.5 K/UL (ref 0–0.6)
EOSINOPHILS RELATIVE PERCENT: 4.7 %
GFR AFRICAN AMERICAN: 6
GFR NON-AFRICAN AMERICAN: 5
GLUCOSE BLD-MCNC: 235 MG/DL (ref 70–99)
GLUCOSE BLD-MCNC: 276 MG/DL (ref 70–99)
GLUCOSE BLD-MCNC: 281 MG/DL (ref 70–99)
GLUCOSE BLD-MCNC: 306 MG/DL (ref 70–99)
GLUCOSE BLD-MCNC: 431 MG/DL (ref 70–99)
HCT VFR BLD CALC: 26.2 % (ref 40.5–52.5)
HEMOGLOBIN: 8.6 G/DL (ref 13.5–17.5)
LYMPHOCYTES ABSOLUTE: 0.8 K/UL (ref 1–5.1)
LYMPHOCYTES RELATIVE PERCENT: 7.6 %
MCH RBC QN AUTO: 37 PG (ref 26–34)
MCHC RBC AUTO-ENTMCNC: 32.8 G/DL (ref 31–36)
MCV RBC AUTO: 112.8 FL (ref 80–100)
MONOCYTES ABSOLUTE: 0.7 K/UL (ref 0–1.3)
MONOCYTES RELATIVE PERCENT: 6.5 %
NEUTROPHILS ABSOLUTE: 8.9 K/UL (ref 1.7–7.7)
NEUTROPHILS RELATIVE PERCENT: 80.4 %
PDW BLD-RTO: 14.5 % (ref 12.4–15.4)
PERFORMED ON: ABNORMAL
PLATELET # BLD: 287 K/UL (ref 135–450)
PMV BLD AUTO: 7.4 FL (ref 5–10.5)
POTASSIUM REFLEX MAGNESIUM: 4.2 MMOL/L (ref 3.5–5.1)
RBC # BLD: 2.33 M/UL (ref 4.2–5.9)
SODIUM BLD-SCNC: 134 MMOL/L (ref 136–145)
WBC # BLD: 11.1 K/UL (ref 4–11)

## 2022-09-28 PROCEDURE — 6360000002 HC RX W HCPCS: Performed by: STUDENT IN AN ORGANIZED HEALTH CARE EDUCATION/TRAINING PROGRAM

## 2022-09-28 PROCEDURE — 6370000000 HC RX 637 (ALT 250 FOR IP): Performed by: INTERNAL MEDICINE

## 2022-09-28 PROCEDURE — 36415 COLL VENOUS BLD VENIPUNCTURE: CPT

## 2022-09-28 PROCEDURE — 6370000000 HC RX 637 (ALT 250 FOR IP): Performed by: STUDENT IN AN ORGANIZED HEALTH CARE EDUCATION/TRAINING PROGRAM

## 2022-09-28 PROCEDURE — 2060000000 HC ICU INTERMEDIATE R&B

## 2022-09-28 PROCEDURE — 2700000000 HC OXYGEN THERAPY PER DAY

## 2022-09-28 PROCEDURE — 1200000000 HC SEMI PRIVATE

## 2022-09-28 PROCEDURE — 85025 COMPLETE CBC W/AUTO DIFF WBC: CPT

## 2022-09-28 PROCEDURE — 94761 N-INVAS EAR/PLS OXIMETRY MLT: CPT

## 2022-09-28 PROCEDURE — 94660 CPAP INITIATION&MGMT: CPT

## 2022-09-28 PROCEDURE — 2580000003 HC RX 258: Performed by: STUDENT IN AN ORGANIZED HEALTH CARE EDUCATION/TRAINING PROGRAM

## 2022-09-28 PROCEDURE — 99233 SBSQ HOSP IP/OBS HIGH 50: CPT | Performed by: INTERNAL MEDICINE

## 2022-09-28 PROCEDURE — 90945 DIALYSIS ONE EVALUATION: CPT

## 2022-09-28 PROCEDURE — 94640 AIRWAY INHALATION TREATMENT: CPT

## 2022-09-28 PROCEDURE — 80048 BASIC METABOLIC PNL TOTAL CA: CPT

## 2022-09-28 RX ORDER — INSULIN LISPRO 100 [IU]/ML
0-16 INJECTION, SOLUTION INTRAVENOUS; SUBCUTANEOUS
Status: DISCONTINUED | OUTPATIENT
Start: 2022-09-28 | End: 2022-10-08

## 2022-09-28 RX ORDER — INSULIN LISPRO 100 [IU]/ML
0-16 INJECTION, SOLUTION INTRAVENOUS; SUBCUTANEOUS
Status: DISCONTINUED | OUTPATIENT
Start: 2022-09-29 | End: 2022-09-28

## 2022-09-28 RX ORDER — INSULIN LISPRO 100 [IU]/ML
5 INJECTION, SOLUTION INTRAVENOUS; SUBCUTANEOUS
Status: DISCONTINUED | OUTPATIENT
Start: 2022-09-28 | End: 2022-09-29

## 2022-09-28 RX ADMIN — INSULIN LISPRO 4 UNITS: 100 INJECTION, SOLUTION INTRAVENOUS; SUBCUTANEOUS at 13:03

## 2022-09-28 RX ADMIN — MIDODRINE HYDROCHLORIDE 5 MG: 5 TABLET ORAL at 08:32

## 2022-09-28 RX ADMIN — MIDODRINE HYDROCHLORIDE 5 MG: 5 TABLET ORAL at 18:07

## 2022-09-28 RX ADMIN — Medication 2 PUFF: at 21:10

## 2022-09-28 RX ADMIN — GENTAMICIN SULFATE: 1 CREAM TOPICAL at 08:32

## 2022-09-28 RX ADMIN — HEPARIN SODIUM 5000 UNITS: 5000 INJECTION INTRAVENOUS; SUBCUTANEOUS at 21:22

## 2022-09-28 RX ADMIN — ATORVASTATIN CALCIUM 80 MG: 80 TABLET, FILM COATED ORAL at 20:21

## 2022-09-28 RX ADMIN — INSULIN LISPRO 12 UNITS: 100 INJECTION, SOLUTION INTRAVENOUS; SUBCUTANEOUS at 08:33

## 2022-09-28 RX ADMIN — LEVOTHYROXINE SODIUM 50 MCG: 0.03 TABLET ORAL at 06:06

## 2022-09-28 RX ADMIN — ASPIRIN 81 MG 81 MG: 81 TABLET ORAL at 08:32

## 2022-09-28 RX ADMIN — IPRATROPIUM BROMIDE AND ALBUTEROL SULFATE 1 AMPULE: 2.5; .5 SOLUTION RESPIRATORY (INHALATION) at 21:10

## 2022-09-28 RX ADMIN — HEPARIN SODIUM 5000 UNITS: 5000 INJECTION INTRAVENOUS; SUBCUTANEOUS at 06:06

## 2022-09-28 RX ADMIN — INSULIN LISPRO 5 UNITS: 100 INJECTION, SOLUTION INTRAVENOUS; SUBCUTANEOUS at 13:03

## 2022-09-28 RX ADMIN — Medication 10 ML: at 20:22

## 2022-09-28 RX ADMIN — HEPARIN SODIUM 5000 UNITS: 5000 INJECTION INTRAVENOUS; SUBCUTANEOUS at 13:04

## 2022-09-28 RX ADMIN — GENTAMICIN SULFATE: 1 CREAM TOPICAL at 20:12

## 2022-09-28 RX ADMIN — TORSEMIDE 100 MG: 100 TABLET ORAL at 08:32

## 2022-09-28 RX ADMIN — INSULIN GLARGINE 40 UNITS: 100 INJECTION, SOLUTION SUBCUTANEOUS at 20:56

## 2022-09-28 RX ADMIN — INSULIN LISPRO 8 UNITS: 100 INJECTION, SOLUTION INTRAVENOUS; SUBCUTANEOUS at 18:06

## 2022-09-28 RX ADMIN — Medication 10 ML: at 08:33

## 2022-09-28 RX ADMIN — INSULIN LISPRO 5 UNITS: 100 INJECTION, SOLUTION INTRAVENOUS; SUBCUTANEOUS at 18:06

## 2022-09-28 RX ADMIN — INSULIN LISPRO 8 UNITS: 100 INJECTION, SOLUTION INTRAVENOUS; SUBCUTANEOUS at 21:22

## 2022-09-28 RX ADMIN — IPRATROPIUM BROMIDE AND ALBUTEROL SULFATE 1 AMPULE: 2.5; .5 SOLUTION RESPIRATORY (INHALATION) at 12:03

## 2022-09-28 NOTE — CARE COORDINATION
Discharge Planning Note:    SOCO spoke to Juanita at [facility], pre--cert remains pending.     Electronically signed by Jose Angel Deshpande RN on 9/28/2022 at 1:19 PM

## 2022-09-28 NOTE — PLAN OF CARE
Problem: Safety - Adult  Goal: Free from fall injury  9/28/2022 0937 by Ancelmo Rivera RN  Outcome: Progressing  Note: Pt is a fall risk. Fall risk protocol in place. See Maria D Ospina Fall Score. Pt bed is in low position, bed alarm is on, side rails up, fall risk bracelet applied. , non-skid footwear in use. Patient/family educated on fall risk protocol, instructed to call for assistance when needed and belongings are in reach. assistance. Will continue with hourly rounds for po intake, pain needs, toileting and repositioning as needed. Will continue to monitor for needs. Problem: Skin/Tissue Integrity  Goal: Absence of new skin breakdown  Description: 1. Monitor for areas of redness and/or skin breakdown  2. Assess vascular access sites hourly  3. Every 4-6 hours minimum:  Change oxygen saturation probe site  4. Every 4-6 hours:  If on nasal continuous positive airway pressure, respiratory therapy assess nares and determine need for appliance change or resting period. 9/28/2022 0158 by Ancelmo Rivera RN  Outcome: Progressing  Note: Pt at risk for skin breakdown. See Jorge Luis score. Pt remains on bedrest. Unable to reposition self in bed. Heels elevated off bed. Sacral heart mepilex intact to protect, site inspected and wound noted underneath. Will continue to turn and reposition patient every two hours and as needed. Will continue to keep patient clean and dry, applying skin care cream as needed. Pillows used for repositioning q2hs. Will continue to monitor and assess for skin breakdown. Problem: Nutrition Deficit:  Goal: Optimize nutritional status  9/28/2022 0937 by Ancelmo Rivera RN  Outcome: Progressing  Note: Pt tolerating PO intake. Will monitor. Problem: Pain  Goal: Verbalizes/displays adequate comfort level or baseline comfort level  9/28/2022 0937 by Ancelmo Rivera RN  Outcome: Progressing  Note: Pt has not had any c/o pain thus far this shift. Pt resting comfortably. Will monitor.

## 2022-09-28 NOTE — PROGRESS NOTES
Hospitalist Progress Note    Name:  Jacob Olson    /Age/Sex: 1945  (69 y.o. male)  MRN & CSN:  [id number] & [id number]    PCP: Lisa Conroy MD    Date of Admission: 2022  Chief Complaint: Altered mental status    Hospital Course: This 73-year-old man was admitted for altered mental status. Found to be hypoxic and hypercapnic on admission. Placed on BiPAP with improvement. Nephrology following as patient has ESRD on peritoneal dialysis. Pulmonology following. Interval history:  Pt seen and examined today. Overnight events noted, interval ancillary notes and labs reviewed. Afebrile overnight, WC trended down to 11.1 K  Elevated blood glucose this morning; insulin dose adjusted  denied cough, SOB, chest pain, nausea, vomiting or abdominal pain  Medically stable for discharge; awaiting ARU pre-CERT        Assessment and plan    Acute on chronic hypoxic and hypercapnic respiratory failure:-Improved  Most likely due to volume overload. Elevated proBNP at admission; trended down  Initially diuresed with IV Lasix; switched to torsemide  Continue home inhalers and as needed breathing treatment  Supplemental oxygen as needed to keep sats between 90 to 92%  Pulmonology consulted: appreciate recommendations     History of BJ: Unable to tolerate home BiPAP/CPAP. Will need repeat sleep study outpatient. Acute metabolic encephalopathy: Improved  CT head: No acute abnormality. TSH, Ammonia level wnl. B 12 and folate pending. Supportive care     Type 2 diabetes on long-term insulin:  A1c 6.5 on 2022. Continue basal insulin with sliding scale. ESRD on peritoneal dialysis:  Nephrology consulted: Appreciate input. No evidence of PD site infection or peritonitis. PD fluid cultures negative. Antibiotics discontinued. Hypotension: Continue midodrine    Depression: Continue Wellbutrin     Hypothyroid: Continue synthroid. Anemia due to ESRD: Continue EPO.     Overall patient is medically stable to be discharged   ARU pre-CERT will be started for [facility]    DVT ppx: Heparin  GI ppx: Diet/Tube Feeds  Diet: ADULT DIET; Regular; 4 carb choices (60 gm/meal); Low Phosphorus (Less than 1000 mg)  ADULT ORAL NUTRITION SUPPLEMENT; Breakfast, Lunch; Renal Oral Supplement  Code Status: Full Code  Disposition:    Medications:  Reviewed      Intake/Output Summary (Last 24 hours) at 9/28/2022 0932  Last data filed at 9/27/2022 1700  Gross per 24 hour   Intake 340 ml   Output --   Net 340 ml         Physical Exam Performed:    BP (!) 144/52   Pulse 76   Temp 98 °F (36.7 °C) (Temporal)   Resp 20   Ht 5' 10\" (1.778 m)   Wt 201 lb 4.5 oz (91.3 kg) Comment: PD Complete  SpO2 92%   BMI 28.88 kg/m²     General appearance: No apparent distress, appears stated age and cooperative. Somewhat confused, but more conversational today. HEENT: Pupils equal, round, and reactive to light. Conjunctivae/corneas clear. Neck: Supple, with full range of motion. No jugular venous distention. Trachea midline. Respiratory:  Normal respiratory effort. Clear to auscultation, bilaterally without Rales/Wheezes/Rhonchi. Cardiovascular: Regular rate and rhythm with normal S1/S2 without murmurs, rubs or gallops. Trace pitting edema LE bilaterally. Abdomen: Soft, non-tender, non-distended with normal bowel sounds. : No CVA tenderness. Musculoskeletal: No clubbing or cyanosis. Full range of motion without deformity. Skin: Skin color, texture, turgor normal.  No rashes or lesions.   Physical exam remains unchanged from 9/25    Labs:   Recent Labs     09/26/22  0424 09/27/22  1006 09/28/22  0507   WBC 12.6* 13.6* 11.1*   HGB 8.6* 9.5* 8.6*   HCT 26.0* 29.1* 26.2*    254 287       Recent Labs     09/26/22  0424 09/27/22  1006 09/28/22  0507   * 133* 134*   K 4.3 4.4 4.2   CL 95* 95* 95*   CO2 24 22 24   BUN 51* 59* 62*   CREATININE 8.7* 9.3* 9.8*   CALCIUM 8.5 8.8 9.0   PHOS 5.4*  --   --        Recent Labs 09/26/22  0424   AST 29   ALT 33   BILITOT <0.2   ALKPHOS 92       No results for input(s): INR in the last 72 hours. No results for input(s): Pamla Clines in the last 72 hours. Urinalysis:      Lab Results   Component Value Date/Time    NITRU Negative 09/21/2022 03:18 PM    WBCUA 3 09/21/2022 03:18 PM    BACTERIA None Seen 09/21/2022 03:18 PM    RBCUA 5 09/21/2022 03:18 PM    BLOODU SMALL 09/21/2022 03:18 PM    SPECGRAV 1.015 09/21/2022 03:18 PM    GLUCOSEU 250 09/21/2022 03:18 PM    GLUCOSEU >=1000 03/26/2012 10:22 PM       Radiology:  XR CHEST PORTABLE   Final Result   CHF with pulmonary edema. CT HEAD WO CONTRAST   Final Result   No definite evidence of acute intracranial abnormality on a study   significantly limited by motion/streak artifact as described above.                Active Hospital Problems    Diagnosis     Leukocytosis [D72.829]      Priority: Medium    Acute pulmonary edema (HCC) [J81.0]      Priority: Medium    Multifocal pneumonia [J18.9]      Priority: Medium    Hypervolemia [E87.70]      Priority: Medium    Acute on chronic respiratory failure with hypoxia and hypercapnia (HCC) [J96.21, J96.22]      Priority: Medium    Moderate episode of recurrent major depressive disorder (HCC) [F33.1]      Priority: Medium    BJ treated with BiPAP [G47.33]      Priority: Medium    Acute metabolic encephalopathy [Q25.17]      Priority: Medium    Anemia in ESRD (end-stage renal disease) ([facility]) [N18.6, D63.1]     Type 2 diabetes mellitus with hyperglycemia, with long-term current use of insulin ([facility]) [E11.65, Z79.4]     Hypertension associated with stage 5 chronic kidney disease due to type 2 diabetes mellitus ([facility] [E11.22, I12.0, N18.5]     Chronic diastolic heart failure ([facility]) [I50.32]     Acute respiratory failure with hypoxia and hypercapnia (HCC) [J96.01, J96.02]     Arthritis with psoriasis ([facility]) [L40.50]     Psoriatic arthropathy ([facility] [L40.50]     Sleep apnea [G47.30] Erwin Cooks, MD  9/28/2022  9:32 AM      Please note that some part of this chart was generated using Dragon dictation software. Although every effort was made to ensure the accuracy of this automated transcription, some errors in transcription may have occurred inadvertently. If you may need any clarification, please do not hesitate to contact me through [facility].

## 2022-09-28 NOTE — PROGRESS NOTES
Office : [phone]     Fax :[phone]       Nephrology progress  Note      Patient's Name: Tony Villarreal  9:25 AM  9/28/2022    Reason for Consult:  ESRD on peritoneal dialysis. Requesting Physician:  Jarvis Marie MD      Chief Complaint:    Chief Complaint   Patient presents with    Altered Mental Status     Patient arrives via [place] from [facility], altered, last seen normal last night, started peritoneal dialysis last night, O2 low on normal 3L, placed on NRB with some improvement, increased WOB noted           History of Present Ilness:    Tony Villarreal is a 69 y.o. male with prior history of ESRD on peritoneal dialysis , DM 2, HTN who was sent from [facility] with main c/o SOB. In ER he was found to be hypoxic. CXR shows volume overload. On BiPAP now. Still makes urine . Also had elevated WBC. No abdominal pain. No fever   BS Elevated. Interval hx :    Feels much better   Tolerating PD well     UF better around 1600 ml     I/O last 3 completed shifts:   In: 80 [P.O.:580]  Out: -     Past Medical History:   Diagnosis Date    Allergic rhinitis due to other allergen 7/12/2010    Coronary atherosclerosis of unspecified type of vessel, native or graft 7/12/2010    Diabetic eye exam ([address] 04/29/2015    Diabetic eye exam ([address] 5/5/2016    [place] [facility]    Generalized osteoarthrosis, involving multiple sites 7/12/2010    Other psoriasis 7/12/2010    Pneumonia     Prolonged emergence from general anesthesia     Psoriatic arthropathy ([address] 7/12/2010    Type II or unspecified type diabetes mellitus without mention of complication, not stated as uncontrolled 7/12/2010    Unspecified essential hypertension 7/12/2010    Unspecified sleep apnea 7/12/2010       Past Surgical History: Procedure Laterality Date    CARPAL TUNNEL RELEASE      s/p    CATARACT REMOVAL      CORONARY ARTERY BYPASS GRAFT      JOINT REPLACEMENT      LAPAROSCOPY INSERTION PERITONEAL CATHETER N/A 10/26/2020    LAPAROSCOPIC PERITONEAL DIALYSIS CATHETER PLACEMENT; LAPAROSCOPIC OMENTOPEXY performed by Berlin Keen DO at [facility] OR       Family History   Problem Relation Age of Onset    Diabetes Mother     Heart Failure Mother     Coronary Art Dis Father         reports that he quit smoking about 41 years ago. He started smoking about 60 years ago. He has a 18.00 pack-year smoking history. He has never used smokeless tobacco. He reports that he does not drink alcohol and does not use drugs.         Allergies:  Penicillins, Sulfa antibiotics, Tazorac [tazarotene], Clindamycin, and Clindamycin/lincomycin    Current Medications:    dianeal lo-eben 4.25% 2,000 mL, 4x daily  epoetin daphney-epbx (RETACRIT) injection 3,000 Units, Once per day on Mon Wed Fri  torsemide (DEMADEX) tablet 100 mg, Daily  midodrine (PROAMATINE) tablet 5 mg, BID WC  ipratropium-albuterol (DUONEB) nebulizer solution 1 ampule, 4x daily  gentamicin (GARAMYCIN) 0.1 % cream, Daily  sodium chloride flush 0.9 % injection 5-40 mL, 2 times per day  sodium chloride flush 0.9 % injection 5-40 mL, PRN  0.9 % sodium chloride infusion, PRN  ondansetron (ZOFRAN-ODT) disintegrating tablet 4 mg, Q8H PRN   Or  ondansetron (ZOFRAN) injection 4 mg, Q6H PRN  polyethylene glycol (GLYCOLAX) packet 17 g, Daily PRN  acetaminophen (TYLENOL) tablet 650 mg, Q6H PRN   Or  acetaminophen (TYLENOL) suppository 650 mg, Q6H PRN  albuterol (PROVENTIL) nebulizer solution 2.5 mg, Q4H PRN  aspirin chewable tablet 81 mg, Daily  atorvastatin (LIPITOR) tablet 80 mg, Nightly  mometasone-formoterol (DULERA) 200-5 MCG/ACT inhaler 2 puff, BID  buPROPion (WELLBUTRIN) tablet 100 mg, Every Other Day  levothyroxine (SYNTHROID) tablet 50 mcg, Daily  dextrose bolus 10% 125 mL, PRN   Or  dextrose bolus 10% 250 mL, PRN  glucagon (rDNA) injection 1 mg, PRN  dextrose 10 % infusion, Continuous PRN  insulin lispro (HUMALOG) injection vial 0-16 Units, Q4H  insulin glargine (LANTUS) injection vial 40 Units, Nightly  heparin (porcine) injection 5,000 Units, 3 times per day      Physical exam:     Vitals:  BP (!) 144/52   Pulse 76   Temp 98 °F (36.7 °C) (Temporal)   Resp 20   Ht 5' 10\" (1.778 m)   Wt 201 lb 4.5 oz (91.3 kg) Comment: PD Complete  SpO2 92%   BMI 28.88 kg/m²   Constitutional:  OAA X3 NAD  Skin: no rash, turgor wnl  Heent:  eomi, mmm  Neck: no bruits or jvd noted  Cardiovascular:  S1, S2 without m/r/g  Respiratory: b/l crackles   Abdomen:  +bs, soft, nt, nd  Ext: mild  lower extremity edema    Labs:  CBC:   Recent Labs     09/26/22 0424 09/27/22  1006 09/28/22  0507   WBC 12.6* 13.6* 11.1*   HGB 8.6* 9.5* 8.6*    254 287     BMP:    Recent Labs     09/26/22 0424 09/27/22  1006 09/28/22  0507   * 133* 134*   K 4.3 4.4 4.2   CL 95* 95* 95*   CO2 24 22 24   BUN 51* 59* 62*   CREATININE 8.7* 9.3* 9.8*   GLUCOSE 292* 167* 431*     Ca/Mg/Phos:   Recent Labs     09/26/22 0424 09/27/22  1006 09/28/22  0507   CALCIUM 8.5 8.8 9.0   MG 2.60*  --   --    PHOS 5.4*  --   --      Hepatic:   Recent Labs     09/26/22 0424   AST 29   ALT 33   BILITOT <0.2   ALKPHOS 92     Troponin:   No results for input(s): TROPONINI in the last 72 hours. BNP: No results for input(s): BNP in the last 72 hours. Lipids: No results for input(s): CHOL, TRIG, HDL, LDLCALC, LABVLDL in the last 72 hours. ABGs:   No results for input(s): PHART, PO2ART, USO6BPY in the last 72 hours. INR: No results for input(s): INR in the last 72 hours. UA:  No results for input(s): Kate Munch, GLUCOSEU, BILIRUBINUR, KETUA, SPECGRAV, BLOODU, PHUR, PROTEINU, UROBILINOGEN, NITRU, LEUKOCYTESUR, Lazarus Martyr in the last 72 hours.      Urine Microscopic:   No results for input(s): LABCAST, BACTERIA, COMU, HYALCAST, WBCUA, RBCUA, EPIU in the last 72 hours. Urine Culture: No results for input(s): LABURIN in the last 72 hours. Urine Chemistry: No results for input(s): Algernon Fortune, PROTEINUR, NAUR in the last 72 hours. IMAGING:  XR CHEST PORTABLE   Final Result   CHF with pulmonary edema. CT HEAD WO CONTRAST   Final Result   No definite evidence of acute intracranial abnormality on a study   significantly limited by motion/streak artifact as described above. Assessment/Plan :      1. ESRD   Volume overload   Better     Continue  CCPD now with 4.25  % today . PD fluid for cell count and culture -  negative   No growth   No evidence of peritonitis    Stop ABX         2. HTN  BP controlled       3. Anemia in ESRD   On epogen     4. Acid- base disorder. monitor     5. Hypokalemia   Replace potassium       6. Debility. Generalized weakness.  Need PT/OT       Awaiting placement at rehab unit           D/w primary team      Thank you for allowing us to participate in care of David Matute         Electronically signed by: Javed Singh MD, 9/28/2022, 9:25 AM      [facility] [address]  Office : [phone]  Fax :[phone]

## 2022-09-28 NOTE — FLOWSHEET NOTE
Disconnected from CCPD per protocol. Effluent: clear  Total time: 8 hr 33 min   Total UF:  1866 ml. Total Volume:  8000 ml. Dwell time gained:  0 hr 18 min.   Pt Tolerated procedure: fair       09/28/22 0849   Vitals   Temp Source Oral   Peritoneal Dialysis Catheter   Placement Date/Time: 10/26/20 0748   Inserted by: Dr Janet Degroot Size (fr): (c)    Status Deaccessed   Site Condition Clean, dry, intact   Cycler   Verification of Prescription CCPD   Total Volume Programmed 8000 mL   Therapy Time (Hours:Minutes) 8:33   Ultrafiltration (UF) (mL) 1866 mL

## 2022-09-29 LAB
ANION GAP SERPL CALCULATED.3IONS-SCNC: 16 MMOL/L (ref 3–16)
ANION GAP SERPL CALCULATED.3IONS-SCNC: 17 MMOL/L (ref 3–16)
ANISOCYTOSIS: ABNORMAL
BASOPHILS ABSOLUTE: 0.1 K/UL (ref 0–0.2)
BASOPHILS RELATIVE PERCENT: 1 %
BUN BLDV-MCNC: 65 MG/DL (ref 7–20)
BUN BLDV-MCNC: 68 MG/DL (ref 7–20)
CALCIUM SERPL-MCNC: 9.2 MG/DL (ref 8.3–10.6)
CALCIUM SERPL-MCNC: 9.2 MG/DL (ref 8.3–10.6)
CHLORIDE BLD-SCNC: 93 MMOL/L (ref 99–110)
CHLORIDE BLD-SCNC: 93 MMOL/L (ref 99–110)
CO2: 25 MMOL/L (ref 21–32)
CO2: 25 MMOL/L (ref 21–32)
CREAT SERPL-MCNC: 10.1 MG/DL (ref 0.8–1.3)
CREAT SERPL-MCNC: 9.8 MG/DL (ref 0.8–1.3)
EOSINOPHILS ABSOLUTE: 0.8 K/UL (ref 0–0.6)
EOSINOPHILS RELATIVE PERCENT: 6 %
GFR AFRICAN AMERICAN: 6
GFR AFRICAN AMERICAN: 6
GFR NON-AFRICAN AMERICAN: 5
GFR NON-AFRICAN AMERICAN: 5
GLUCOSE BLD-MCNC: 128 MG/DL (ref 70–99)
GLUCOSE BLD-MCNC: 146 MG/DL (ref 70–99)
GLUCOSE BLD-MCNC: 156 MG/DL (ref 70–99)
GLUCOSE BLD-MCNC: 198 MG/DL (ref 70–99)
GLUCOSE BLD-MCNC: 200 MG/DL (ref 70–99)
GLUCOSE BLD-MCNC: 250 MG/DL (ref 70–99)
GLUCOSE BLD-MCNC: 266 MG/DL (ref 70–99)
GLUCOSE BLD-MCNC: 270 MG/DL (ref 70–99)
GLUCOSE BLD-MCNC: 278 MG/DL (ref 70–99)
GLUCOSE BLD-MCNC: 304 MG/DL (ref 70–99)
GLUCOSE BLD-MCNC: 310 MG/DL (ref 70–99)
GLUCOSE BLD-MCNC: 336 MG/DL (ref 70–99)
GLUCOSE BLD-MCNC: 337 MG/DL (ref 70–99)
GLUCOSE BLD-MCNC: 382 MG/DL (ref 70–99)
GLUCOSE BLD-MCNC: 408 MG/DL (ref 70–99)
GLUCOSE BLD-MCNC: 420 MG/DL (ref 70–99)
GLUCOSE BLD-MCNC: 577 MG/DL (ref 70–99)
HCT VFR BLD CALC: 28 % (ref 40.5–52.5)
HEMOGLOBIN: 9.1 G/DL (ref 13.5–17.5)
LYMPHOCYTES ABSOLUTE: 1.2 K/UL (ref 1–5.1)
LYMPHOCYTES RELATIVE PERCENT: 9 %
MACROCYTES: ABNORMAL
MCH RBC QN AUTO: 37.5 PG (ref 26–34)
MCHC RBC AUTO-ENTMCNC: 32.7 G/DL (ref 31–36)
MCV RBC AUTO: 114.8 FL (ref 80–100)
MONOCYTES ABSOLUTE: 1 K/UL (ref 0–1.3)
MONOCYTES RELATIVE PERCENT: 8 %
NEUTROPHILS ABSOLUTE: 9.9 K/UL (ref 1.7–7.7)
NEUTROPHILS RELATIVE PERCENT: 76 %
PDW BLD-RTO: 14.7 % (ref 12.4–15.4)
PERFORMED ON: ABNORMAL
PLATELET # BLD: 307 K/UL (ref 135–450)
PLATELET SLIDE REVIEW: ADEQUATE
PMV BLD AUTO: 7.6 FL (ref 5–10.5)
POLYCHROMASIA: ABNORMAL
POTASSIUM REFLEX MAGNESIUM: 4.1 MMOL/L (ref 3.5–5.1)
POTASSIUM SERPL-SCNC: 4.1 MMOL/L (ref 3.5–5.1)
RBC # BLD: 2.44 M/UL (ref 4.2–5.9)
SLIDE REVIEW: ABNORMAL
SODIUM BLD-SCNC: 134 MMOL/L (ref 136–145)
SODIUM BLD-SCNC: 135 MMOL/L (ref 136–145)
WBC # BLD: 13 K/UL (ref 4–11)

## 2022-09-29 PROCEDURE — 94640 AIRWAY INHALATION TREATMENT: CPT

## 2022-09-29 PROCEDURE — 6370000000 HC RX 637 (ALT 250 FOR IP): Performed by: INTERNAL MEDICINE

## 2022-09-29 PROCEDURE — 2580000003 HC RX 258: Performed by: INTERNAL MEDICINE

## 2022-09-29 PROCEDURE — 2580000003 HC RX 258: Performed by: STUDENT IN AN ORGANIZED HEALTH CARE EDUCATION/TRAINING PROGRAM

## 2022-09-29 PROCEDURE — 2700000000 HC OXYGEN THERAPY PER DAY

## 2022-09-29 PROCEDURE — 6370000000 HC RX 637 (ALT 250 FOR IP): Performed by: STUDENT IN AN ORGANIZED HEALTH CARE EDUCATION/TRAINING PROGRAM

## 2022-09-29 PROCEDURE — 80048 BASIC METABOLIC PNL TOTAL CA: CPT

## 2022-09-29 PROCEDURE — 85025 COMPLETE CBC W/AUTO DIFF WBC: CPT

## 2022-09-29 PROCEDURE — 2000000000 HC ICU R&B

## 2022-09-29 PROCEDURE — 36415 COLL VENOUS BLD VENIPUNCTURE: CPT

## 2022-09-29 PROCEDURE — 94761 N-INVAS EAR/PLS OXIMETRY MLT: CPT

## 2022-09-29 PROCEDURE — 83036 HEMOGLOBIN GLYCOSYLATED A1C: CPT

## 2022-09-29 PROCEDURE — 90945 DIALYSIS ONE EVALUATION: CPT | Performed by: INTERNAL MEDICINE

## 2022-09-29 PROCEDURE — 90945 DIALYSIS ONE EVALUATION: CPT

## 2022-09-29 PROCEDURE — 6360000002 HC RX W HCPCS: Performed by: STUDENT IN AN ORGANIZED HEALTH CARE EDUCATION/TRAINING PROGRAM

## 2022-09-29 RX ORDER — INSULIN LISPRO 100 [IU]/ML
8 INJECTION, SOLUTION INTRAVENOUS; SUBCUTANEOUS
Status: DISCONTINUED | OUTPATIENT
Start: 2022-09-29 | End: 2022-09-30

## 2022-09-29 RX ORDER — TRAMADOL HYDROCHLORIDE 50 MG/1
25 TABLET ORAL EVERY 12 HOURS PRN
Status: DISPENSED | OUTPATIENT
Start: 2022-09-29 | End: 2022-10-01

## 2022-09-29 RX ORDER — DEXTROSE AND SODIUM CHLORIDE 5; .45 G/100ML; G/100ML
INJECTION, SOLUTION INTRAVENOUS CONTINUOUS
Status: DISCONTINUED | OUTPATIENT
Start: 2022-09-29 | End: 2022-09-30

## 2022-09-29 RX ADMIN — Medication 2 PUFF: at 09:07

## 2022-09-29 RX ADMIN — HEPARIN SODIUM 5000 UNITS: 5000 INJECTION INTRAVENOUS; SUBCUTANEOUS at 21:03

## 2022-09-29 RX ADMIN — HEPARIN SODIUM 5000 UNITS: 5000 INJECTION INTRAVENOUS; SUBCUTANEOUS at 14:25

## 2022-09-29 RX ADMIN — GENTAMICIN SULFATE: 1 CREAM TOPICAL at 08:16

## 2022-09-29 RX ADMIN — INSULIN LISPRO 16 UNITS: 100 INJECTION, SOLUTION INTRAVENOUS; SUBCUTANEOUS at 08:16

## 2022-09-29 RX ADMIN — IPRATROPIUM BROMIDE AND ALBUTEROL SULFATE 1 AMPULE: 2.5; .5 SOLUTION RESPIRATORY (INHALATION) at 21:08

## 2022-09-29 RX ADMIN — IPRATROPIUM BROMIDE AND ALBUTEROL SULFATE 1 AMPULE: 2.5; .5 SOLUTION RESPIRATORY (INHALATION) at 09:07

## 2022-09-29 RX ADMIN — Medication 10 ML: at 21:03

## 2022-09-29 RX ADMIN — MIDODRINE HYDROCHLORIDE 5 MG: 5 TABLET ORAL at 17:03

## 2022-09-29 RX ADMIN — ASPIRIN 81 MG 81 MG: 81 TABLET ORAL at 08:16

## 2022-09-29 RX ADMIN — DEXTROSE AND SODIUM CHLORIDE: 5; 450 INJECTION, SOLUTION INTRAVENOUS at 12:19

## 2022-09-29 RX ADMIN — LEVOTHYROXINE SODIUM 50 MCG: 0.03 TABLET ORAL at 08:16

## 2022-09-29 RX ADMIN — TORSEMIDE 100 MG: 100 TABLET ORAL at 08:15

## 2022-09-29 RX ADMIN — Medication 2 PUFF: at 21:08

## 2022-09-29 RX ADMIN — Medication 10 ML: at 08:16

## 2022-09-29 RX ADMIN — SODIUM CHLORIDE 16.62 UNITS/HR: 9 INJECTION, SOLUTION INTRAVENOUS at 21:29

## 2022-09-29 RX ADMIN — ATORVASTATIN CALCIUM 80 MG: 80 TABLET, FILM COATED ORAL at 20:19

## 2022-09-29 RX ADMIN — MIDODRINE HYDROCHLORIDE 5 MG: 5 TABLET ORAL at 08:16

## 2022-09-29 RX ADMIN — IPRATROPIUM BROMIDE AND ALBUTEROL SULFATE 1 AMPULE: 2.5; .5 SOLUTION RESPIRATORY (INHALATION) at 12:22

## 2022-09-29 RX ADMIN — ACETAMINOPHEN 650 MG: 325 TABLET ORAL at 14:24

## 2022-09-29 RX ADMIN — BUPROPION HYDROCHLORIDE 100 MG: 100 TABLET, FILM COATED ORAL at 09:56

## 2022-09-29 RX ADMIN — SODIUM CHLORIDE 9.6 UNITS/HR: 9 INJECTION, SOLUTION INTRAVENOUS at 10:22

## 2022-09-29 RX ADMIN — IPRATROPIUM BROMIDE AND ALBUTEROL SULFATE 1 AMPULE: 2.5; .5 SOLUTION RESPIRATORY (INHALATION) at 15:08

## 2022-09-29 RX ADMIN — INSULIN LISPRO 5 UNITS: 100 INJECTION, SOLUTION INTRAVENOUS; SUBCUTANEOUS at 08:16

## 2022-09-29 ASSESSMENT — PAIN SCALES - GENERAL
PAINLEVEL_OUTOF10: 0
PAINLEVEL_OUTOF10: 3
PAINLEVEL_OUTOF10: 0

## 2022-09-29 ASSESSMENT — PAIN DESCRIPTION - ORIENTATION: ORIENTATION: RIGHT;LEFT

## 2022-09-29 ASSESSMENT — PAIN - FUNCTIONAL ASSESSMENT: PAIN_FUNCTIONAL_ASSESSMENT: PREVENTS OR INTERFERES SOME ACTIVE ACTIVITIES AND ADLS

## 2022-09-29 ASSESSMENT — PAIN DESCRIPTION - LOCATION: LOCATION: BUTTOCKS

## 2022-09-29 ASSESSMENT — PAIN DESCRIPTION - DESCRIPTORS: DESCRIPTORS: ACHING;DISCOMFORT

## 2022-09-29 NOTE — PROGRESS NOTES
Hospitalist Progress Note    Name:  Samara Palomares    /Age/Sex: 1945  (69 y.o. male)  MRN & CSN:  [id number] & [id number]    PCP: Avinash Velasquez MD    Date of Admission: 2022  Chief Complaint: Altered mental status    Hospital Course: This 80-year-old man was admitted for altered mental status. Found to be hypoxic and hypercapnic on admission. Placed on BiPAP with improvement. Nephrology following as patient has ESRD on peritoneal dialysis. Pulmonology following. Interval history:  Pt seen and examined today. Overnight events noted, interval ancillary notes and labs reviewed. Blood glucose elevated > 500 this morning; started on insulin drip  denied cough, SOB, chest pain, nausea, vomiting or abdominal pain      Assessment and plan    Acute on chronic hypoxic and hypercapnic respiratory failure:-Improved  Most likely due to volume overload. Elevated proBNP at admission; trended down  Initially diuresed with IV Lasix; switched to torsemide  Continue home inhalers and as needed breathing treatment  Supplemental oxygen as needed to keep sats between 90 to 92%  Pulmonology consulted: appreciate recommendations     History of BJ: Unable to tolerate home BiPAP/CPAP. Will need repeat sleep study outpatient. Acute metabolic encephalopathy: Improved  CT head: No acute abnormality. TSH, Ammonia level wnl. B 12 and folate pending. Supportive care     Type 2 diabetes on long-term insulin:  A1c 6.5 on 2022. Continue basal insulin with sliding scale. ESRD on peritoneal dialysis:  Nephrology consulted: Appreciate input. No evidence of PD site infection or peritonitis. PD fluid cultures negative. Antibiotics discontinued. Hypotension: Continue midodrine    Depression: Continue Wellbutrin     Hypothyroid: Continue synthroid. Anemia due to ESRD: Continue EPO.     Overall patient is medically stable to be discharged   ARU pre-CERT will be started for [facility]    DVT ppx: Heparin  GI Value Date/Time    NITRU Negative 09/21/2022 03:18 PM    WBCUA 3 09/21/2022 03:18 PM    BACTERIA None Seen 09/21/2022 03:18 PM    RBCUA 5 09/21/2022 03:18 PM    BLOODU SMALL 09/21/2022 03:18 PM    SPECGRAV 1.015 09/21/2022 03:18 PM    GLUCOSEU 250 09/21/2022 03:18 PM    GLUCOSEU >=1000 03/26/2012 10:22 PM       Radiology:  XR CHEST PORTABLE   Final Result   CHF with pulmonary edema. CT HEAD WO CONTRAST   Final Result   No definite evidence of acute intracranial abnormality on a study   significantly limited by motion/streak artifact as described above. Active Hospital Problems    Diagnosis     Leukocytosis [D72.829]      Priority: Medium    Acute pulmonary edema (HCC) [J81.0]      Priority: Medium    Multifocal pneumonia [J18.9]      Priority: Medium    Hypervolemia [E87.70]      Priority: Medium    Acute on chronic respiratory failure with hypoxia and hypercapnia (HCC) [J96.21, J96.22]      Priority: Medium    Moderate episode of recurrent major depressive disorder (HCC) [F33.1]      Priority: Medium    BJ treated with BiPAP [G47.33]      Priority: Medium    Acute metabolic encephalopathy [X74.70]      Priority: Medium    Anemia in ESRD (end-stage renal disease) (HCC) [N18.6, D63.1]     Type 2 diabetes mellitus with hyperglycemia, with long-term current use of insulin (HCC) [E11.65, Z79.4]     Hypertension associated with stage 5 chronic kidney disease due to type 2 diabetes mellitus ([facility] [E11.22, I12.0, N18.5]     Chronic diastolic heart failure (HCC) [I50.32]     Acute respiratory failure with hypoxia and hypercapnia (HCC) [J96.01, J96.02]     Arthritis with psoriasis (HCC) [L40.50]     Psoriatic arthropathy ([facility] [L40.50]     Sleep apnea [G47.30]        Suze Barrera MD  9/29/2022  8:57 AM      Please note that some part of this chart was generated using Dragon dictation software.  Although every effort was made to ensure the accuracy of this automated transcription, some errors in transcription may have occurred inadvertently. If you may need any clarification, please do not hesitate to contact me through [facility].

## 2022-09-29 NOTE — PROGRESS NOTES
CCPD Order   Exchanges: 4   Exchange Volume: 2000 ml   Total Time: 8 hrs   Dextrose: 4.25%   Last Fill: 0 ml   Total Volume: 8000 ml     Orders verified. Supplies taken to pt's room. Report received. Cycler set up, primed and pre tested. [facility] site. Pt connected to cycler. CCPD initiated without problem. Initial effluent clear. If problems should arise please call the [phone] on top of PD cycler machine.

## 2022-09-29 NOTE — PROGRESS NOTES
PM assessment complete and documented. Meds given per MAR. PD started by dialysis and is infusing as ordered. PD dressing dry and secure. No needs or concerns expressed. Will continue to monitor.

## 2022-09-29 NOTE — PROGRESS NOTES
Disconnected from CCPD per protocol. Effluent: Clear  Total time: 8 hr 11 min   Total UF:  1468 ml. Total Volume:  8007 ml. Dwell time gained:  0 hr 3 min. Pt Tolerated procedure:  Well   Report given to: Leonidas Soler RN

## 2022-09-29 NOTE — PLAN OF CARE
Problem: Discharge Planning  Goal: Discharge to home or other facility with appropriate resources  Outcome: Progressing     Problem: Safety - Adult  Goal: Free from fall injury  Outcome: Progressing     Problem: Skin/Tissue Integrity  Goal: Absence of new skin breakdown  Description: 1. Monitor for areas of redness and/or skin breakdown  2. Assess vascular access sites hourly  3. Every 4-6 hours minimum:  Change oxygen saturation probe site  4. Every 4-6 hours:  If on nasal continuous positive airway pressure, respiratory therapy assess nares and determine need for appliance change or resting period.   Outcome: Progressing     Problem: Nutrition Deficit:  Goal: Optimize nutritional status  Outcome: Progressing     Problem: Pain  Goal: Verbalizes/displays adequate comfort level or baseline comfort level  Outcome: Progressing     Problem: ABCDS Injury Assessment  Goal: Absence of physical injury  Outcome: Progressing     Problem: Chronic Conditions and Co-morbidities  Goal: Patient's chronic conditions and co-morbidity symptoms are monitored and maintained or improved  Outcome: Progressing

## 2022-09-29 NOTE — PROGRESS NOTES
Sleeping between care. Bipap replaced after pt removed it twice. PD cycling as ordered. Denies needs. Will continue to monitor.

## 2022-09-29 NOTE — PROGRESS NOTES
CCPD Order   Exchanges: 4   Exchange Volume: 2000 ml   Total Time: 8 hrs   Dextrose: 4.25%   Last Fill: 0 ml   Total Volume: 8000 ml     Orders verified. Supplies taken to pt's room. Report received. Cycler set up, primed and pre tested. [facility]. Pt connected to cycler. CCPD initiated without problem. Initial effluent clear. If problems should arise please call the [phone] on top of PD cycler machine.

## 2022-09-29 NOTE — PROGRESS NOTES
Called Michelleedwinamarta Gil (pt's wife) and updated pn POC with insulin gtt and transfer to ICU. Denies further questions. She says pt was recently switched to humalog at home but had been better managed with novolog tx. Was taking lantus but unaware how much.

## 2022-09-29 NOTE — PROGRESS NOTES
Reassessment documented. No changes noted in care. Placed on bipap by RCP. Pt sleeping between care. Denies needs or concerns at present. Will continue to monitor.

## 2022-09-29 NOTE — CARE COORDINATION
Discharge Planning:     ([facility]) called [facility] ([phone]) and spoke with staff, Magdalene, who reported she has not received anything for patient's pre-cert yet. Pre-cert still pending. Magdalene accepted [facility]'s callback information to update on patient's pre-cert.       Mague DIAZ, ARJUN, [facility] -   [phone]    Electronically signed by JOE Reilly on 9/29/2022 at 8:02 AM

## 2022-09-29 NOTE — PROGRESS NOTES
Patient transferred to ICU via bed, oxygen, and nurse. Report given to oncoming RN. Insulin gtt verified prior to departure. Patient sent with all belongings. No further questions asked.

## 2022-09-29 NOTE — PROGRESS NOTES
Office : [phone]     Fax :[phone]       Nephrology progress  Note      Patient's Name: Carley Russell  10:05 AM  9/29/2022    Reason for Consult:  ESRD on peritoneal dialysis. Requesting Physician:  Nayely Buitrago MD      Chief Complaint:    Chief Complaint   Patient presents with    Altered Mental Status     [facility] [place] [facility], last seen normal last night, started peritoneal dialysis last night, O2 low on normal 3L, placed on NRB with some improvement, increased WOB noted           History of Present Ilness:    Carley Russell is a 69 y.o. male with prior history of ESRD on peritoneal dialysis , DM 2, HTN who was sent from [facility] with main c/o SOB. In ER he was found to be hypoxic. CXR shows volume overload. On BiPAP now. Still makes urine . Also had elevated WBC. No abdominal pain. No fever   BS Elevated. Interval hx :    Feels much better   Tolerating PD well       Effluent: Clear  Total time: 8 hr 11 min   Total UF:  1468 ml. Total Volume:  8007 ml. Dwell time gained:  0 hr 3 min. Pt Tolerated procedure:  Well   Report given to: Radha Ramos RN     I/O last 3 completed shifts:  In: -   Out: 1866     Past Medical History:   Diagnosis Date    Allergic rhinitis due to other allergen 7/12/2010    Coronary atherosclerosis of unspecified type of vessel, native or graft 7/12/2010    Diabetic eye exam ([address] 04/29/2015    Diabetic eye exam ([address] 5/5/2016    Plush eye    Generalized osteoarthrosis, involving multiple sites 7/12/2010    Other psoriasis 7/12/2010    Pneumonia     Prolonged emergence from general anesthesia     Psoriatic arthropathy ([address] 7/12/2010    Type II or unspecified type diabetes mellitus without mention of complication, not stated as uncontrolled 7/12/2010    Unspecified essential hypertension 7/12/2010    Unspecified sleep apnea 7/12/2010       Past Surgical History:   Procedure Laterality Date    CARPAL TUNNEL RELEASE      s/p    CATARACT REMOVAL      CORONARY ARTERY BYPASS GRAFT      JOINT REPLACEMENT      LAPAROSCOPY INSERTION PERITONEAL CATHETER N/A 10/26/2020    LAPAROSCOPIC PERITONEAL DIALYSIS CATHETER PLACEMENT; LAPAROSCOPIC OMENTOPEXY performed by Aleida Loving DO at [address] OR       Family History   Problem Relation Age of Onset    Diabetes Mother     Heart Failure Mother     Coronary Art Dis Father         reports that he quit smoking about 41 years ago. He started smoking about 60 years ago. He has a 18.00 pack-year smoking history. He has never used smokeless tobacco. He reports that he does not drink alcohol and does not use drugs.         Allergies:  Penicillins, Sulfa antibiotics, Tazorac [tazarotene], Clindamycin, and Clindamycin/lincomycin    Current Medications:    insulin lispro (HUMALOG) injection vial 8 Units, TID WC  insulin regular (HUMULIN R;NOVOLIN R) 100 Units in sodium chloride 0.9 % 100 mL infusion, Continuous  insulin lispro (HUMALOG) injection vial 0-16 Units, 4x Daily AC & HS  epoetin daphney-epbx (RETACRIT) injection 3,000 Units, Once per day on Mon Wed Fri  torsemide (DEMADEX) tablet 100 mg, Daily  midodrine (PROAMATINE) tablet 5 mg, BID WC  ipratropium-albuterol (DUONEB) nebulizer solution 1 ampule, 4x daily  gentamicin (GARAMYCIN) 0.1 % cream, Daily  sodium chloride flush 0.9 % injection 5-40 mL, 2 times per day  sodium chloride flush 0.9 % injection 5-40 mL, PRN  0.9 % sodium chloride infusion, PRN  ondansetron (ZOFRAN-ODT) disintegrating tablet 4 mg, Q8H PRN   Or  ondansetron (ZOFRAN) injection 4 mg, Q6H PRN  polyethylene glycol (GLYCOLAX) packet 17 g, Daily PRN  acetaminophen (TYLENOL) tablet 650 mg, Q6H PRN   Or  acetaminophen (TYLENOL) suppository 650 mg, Q6H PRN  albuterol (PROVENTIL) nebulizer solution 2.5 mg, Q4H PRN  aspirin chewable tablet 81 mg, Daily  atorvastatin (LIPITOR) tablet 80 mg, Nightly  mometasone-formoterol (DULERA) 200-5 MCG/ACT inhaler 2 puff, BID  buPROPion (WELLBUTRIN) tablet 100 mg, Every Other Day  levothyroxine (SYNTHROID) tablet 50 mcg, Daily  dextrose bolus 10% 125 mL, PRN   Or  dextrose bolus 10% 250 mL, PRN  glucagon (rDNA) injection 1 mg, PRN  dextrose 10 % infusion, Continuous PRN  insulin glargine (LANTUS) injection vial 40 Units, Nightly  heparin (porcine) injection 5,000 Units, 3 times per day      Physical exam:     Vitals:  /73   Pulse 72   Temp 98 °F (36.7 °C) (Temporal)   Resp 14   Ht 5' 10\" (1.778 m)   Wt 190 lb 14.7 oz (86.6 kg)   SpO2 100%   BMI 27.39 kg/m²   Constitutional:  OAA X3 NAD  Skin: no rash, turgor wnl  Heent:  eomi, mmm  Neck: no bruits or jvd noted  Cardiovascular:  S1, S2 without m/r/g  Respiratory: b/l crackles   Abdomen:  +bs, soft, nt, nd  Ext: mild  lower extremity edema    Labs:  CBC:   Recent Labs     09/27/22  1006 09/28/22  0507 09/29/22  0427   WBC 13.6* 11.1* 13.0*   HGB 9.5* 8.6* 9.1*    287 307     BMP:    Recent Labs     09/27/22  1006 09/28/22  0507 09/29/22  0427   * 134* 134*   K 4.4 4.2 4.1   CL 95* 95* 93*   CO2 22 24 25   BUN 59* 62* 65*   CREATININE 9.3* 9.8* 9.8*   GLUCOSE 167* 431* 577*     Ca/Mg/Phos:   Recent Labs     09/27/22  1006 09/28/22  0507 09/29/22  0427   CALCIUM 8.8 9.0 9.2     Hepatic:   No results for input(s): AST, ALT, ALB, BILITOT, ALKPHOS in the last 72 hours. Troponin:   No results for input(s): TROPONINI in the last 72 hours. BNP: No results for input(s): BNP in the last 72 hours. Lipids: No results for input(s): CHOL, TRIG, HDL, LDLCALC, LABVLDL in the last 72 hours. ABGs:   No results for input(s): PHART, PO2ART, [id number] in the last 72 hours. INR: No results for input(s): INR in the last 72 hours.   UA:  No results for input(s): ADRIAN Rodriguez, [address] [place] [address], Murray Kilts, BLOODU, PHUR, PROTEINU, UROBILINOGEN, NITRU, LEUKOCYTESUR, Carley Coil in the last 72 hours. Urine Microscopic:   No results for input(s): LABCAST, BACTERIA, COMU, HYALCAST, WBCUA, RBCUA, EPIU in the last 72 hours. Urine Culture: No results for input(s): LABURIN in the last 72 hours. Urine Chemistry: No results for input(s): [place] Core, PROTEINUR, NAUR in the last 72 hours. IMAGING:  XR CHEST PORTABLE   Final Result   CHF with pulmonary edema. CT HEAD WO CONTRAST   Final Result   No definite evidence of acute intracranial abnormality on a study   significantly limited by motion/streak artifact as described above. Assessment/Plan :      1. ESRD   Volume overload   Better     Continue  CCPD now with 4.25  % today . PD fluid for cell count and culture -  negative   No growth   No evidence of peritonitis    Stop ABX         2. HTN  BP controlled       3. Anemia in ESRD   On epogen     4. Acid- base disorder. monitor     5. Hypokalemia   Replace potassium       6. Debility. Generalized weakness.  Need PT/OT       Awaiting placement at rehab unit           D/w primary team      Thank you for allowing us to participate in care of Sulema Nash         Electronically signed by: Jonathan Rosenberg MD, 9/29/2022, 10:05 AM      [facility] [address]  Office : [phone]  Fax :[phone]

## 2022-09-30 LAB
ANION GAP SERPL CALCULATED.3IONS-SCNC: 12 MMOL/L (ref 3–16)
BASOPHILS ABSOLUTE: 0.1 K/UL (ref 0–0.2)
BASOPHILS RELATIVE PERCENT: 0.8 %
BUN BLDV-MCNC: 65 MG/DL (ref 7–20)
CALCIUM SERPL-MCNC: 9.1 MG/DL (ref 8.3–10.6)
CHLORIDE BLD-SCNC: 97 MMOL/L (ref 99–110)
CO2: 26 MMOL/L (ref 21–32)
CREAT SERPL-MCNC: 9.7 MG/DL (ref 0.8–1.3)
EOSINOPHILS ABSOLUTE: 0.7 K/UL (ref 0–0.6)
EOSINOPHILS RELATIVE PERCENT: 4.1 %
ESTIMATED AVERAGE GLUCOSE: 142.7 MG/DL
GFR AFRICAN AMERICAN: 6
GFR NON-AFRICAN AMERICAN: 5
GLUCOSE BLD-MCNC: 106 MG/DL (ref 70–99)
GLUCOSE BLD-MCNC: 125 MG/DL (ref 70–99)
GLUCOSE BLD-MCNC: 130 MG/DL (ref 70–99)
GLUCOSE BLD-MCNC: 132 MG/DL (ref 70–99)
GLUCOSE BLD-MCNC: 138 MG/DL (ref 70–99)
GLUCOSE BLD-MCNC: 207 MG/DL (ref 70–99)
GLUCOSE BLD-MCNC: 229 MG/DL (ref 70–99)
GLUCOSE BLD-MCNC: 243 MG/DL (ref 70–99)
GLUCOSE BLD-MCNC: 276 MG/DL (ref 70–99)
GLUCOSE BLD-MCNC: 79 MG/DL (ref 70–99)
GLUCOSE BLD-MCNC: 85 MG/DL (ref 70–99)
GLUCOSE BLD-MCNC: 87 MG/DL (ref 70–99)
GLUCOSE BLD-MCNC: 98 MG/DL (ref 70–99)
HBA1C MFR BLD: 6.6 %
HCT VFR BLD CALC: 27.1 % (ref 40.5–52.5)
HEMATOLOGY PATH CONSULT: NO
HEMOGLOBIN: 8.9 G/DL (ref 13.5–17.5)
LYMPHOCYTES ABSOLUTE: 1.4 K/UL (ref 1–5.1)
LYMPHOCYTES RELATIVE PERCENT: 8.8 %
MAGNESIUM: 2.4 MG/DL (ref 1.8–2.4)
MCH RBC QN AUTO: 36.6 PG (ref 26–34)
MCHC RBC AUTO-ENTMCNC: 32.7 G/DL (ref 31–36)
MCV RBC AUTO: 111.7 FL (ref 80–100)
MONOCYTES ABSOLUTE: 0.9 K/UL (ref 0–1.3)
MONOCYTES RELATIVE PERCENT: 5.8 %
NEUTROPHILS ABSOLUTE: 12.7 K/UL (ref 1.7–7.7)
NEUTROPHILS RELATIVE PERCENT: 80.5 %
PDW BLD-RTO: 14.1 % (ref 12.4–15.4)
PERFORMED ON: ABNORMAL
PERFORMED ON: NORMAL
PLATELET # BLD: 289 K/UL (ref 135–450)
PMV BLD AUTO: 7.3 FL (ref 5–10.5)
POTASSIUM REFLEX MAGNESIUM: 3.4 MMOL/L (ref 3.5–5.1)
RBC # BLD: 2.42 M/UL (ref 4.2–5.9)
SODIUM BLD-SCNC: 135 MMOL/L (ref 136–145)
WBC # BLD: 15.9 K/UL (ref 4–11)

## 2022-09-30 PROCEDURE — 6370000000 HC RX 637 (ALT 250 FOR IP): Performed by: INTERNAL MEDICINE

## 2022-09-30 PROCEDURE — 97530 THERAPEUTIC ACTIVITIES: CPT

## 2022-09-30 PROCEDURE — 90945 DIALYSIS ONE EVALUATION: CPT | Performed by: INTERNAL MEDICINE

## 2022-09-30 PROCEDURE — 2580000003 HC RX 258: Performed by: STUDENT IN AN ORGANIZED HEALTH CARE EDUCATION/TRAINING PROGRAM

## 2022-09-30 PROCEDURE — 94640 AIRWAY INHALATION TREATMENT: CPT

## 2022-09-30 PROCEDURE — 2000000000 HC ICU R&B

## 2022-09-30 PROCEDURE — 2580000003 HC RX 258: Performed by: INTERNAL MEDICINE

## 2022-09-30 PROCEDURE — 6370000000 HC RX 637 (ALT 250 FOR IP): Performed by: STUDENT IN AN ORGANIZED HEALTH CARE EDUCATION/TRAINING PROGRAM

## 2022-09-30 PROCEDURE — 6360000002 HC RX W HCPCS: Performed by: STUDENT IN AN ORGANIZED HEALTH CARE EDUCATION/TRAINING PROGRAM

## 2022-09-30 PROCEDURE — 90945 DIALYSIS ONE EVALUATION: CPT

## 2022-09-30 PROCEDURE — 97535 SELF CARE MNGMENT TRAINING: CPT

## 2022-09-30 PROCEDURE — 80048 BASIC METABOLIC PNL TOTAL CA: CPT

## 2022-09-30 PROCEDURE — 36415 COLL VENOUS BLD VENIPUNCTURE: CPT

## 2022-09-30 PROCEDURE — 2700000000 HC OXYGEN THERAPY PER DAY

## 2022-09-30 PROCEDURE — 6360000002 HC RX W HCPCS: Performed by: INTERNAL MEDICINE

## 2022-09-30 PROCEDURE — 85025 COMPLETE CBC W/AUTO DIFF WBC: CPT

## 2022-09-30 PROCEDURE — 83735 ASSAY OF MAGNESIUM: CPT

## 2022-09-30 PROCEDURE — 94761 N-INVAS EAR/PLS OXIMETRY MLT: CPT

## 2022-09-30 RX ORDER — 0.9 % SODIUM CHLORIDE 0.9 %
500 INTRAVENOUS SOLUTION INTRAVENOUS ONCE
Status: COMPLETED | OUTPATIENT
Start: 2022-09-30 | End: 2022-09-30

## 2022-09-30 RX ORDER — POTASSIUM CHLORIDE 750 MG/1
40 TABLET, FILM COATED, EXTENDED RELEASE ORAL ONCE
Status: COMPLETED | OUTPATIENT
Start: 2022-09-30 | End: 2022-09-30

## 2022-09-30 RX ORDER — INSULIN LISPRO 100 [IU]/ML
30 INJECTION, SOLUTION INTRAVENOUS; SUBCUTANEOUS
Status: DISCONTINUED | OUTPATIENT
Start: 2022-09-30 | End: 2022-10-06

## 2022-09-30 RX ORDER — INSULIN GLARGINE 100 [IU]/ML
40 INJECTION, SOLUTION SUBCUTANEOUS DAILY
Status: DISCONTINUED | OUTPATIENT
Start: 2022-09-30 | End: 2022-10-02

## 2022-09-30 RX ADMIN — HEPARIN SODIUM 5000 UNITS: 5000 INJECTION INTRAVENOUS; SUBCUTANEOUS at 20:53

## 2022-09-30 RX ADMIN — GENTAMICIN SULFATE: 1 CREAM TOPICAL at 11:01

## 2022-09-30 RX ADMIN — IPRATROPIUM BROMIDE AND ALBUTEROL SULFATE 1 AMPULE: 2.5; .5 SOLUTION RESPIRATORY (INHALATION) at 11:58

## 2022-09-30 RX ADMIN — TORSEMIDE 100 MG: 100 TABLET ORAL at 08:48

## 2022-09-30 RX ADMIN — INSULIN LISPRO 30 UNITS: 100 INJECTION, SOLUTION INTRAVENOUS; SUBCUTANEOUS at 13:30

## 2022-09-30 RX ADMIN — SODIUM CHLORIDE 16.17 UNITS/HR: 9 INJECTION, SOLUTION INTRAVENOUS at 02:48

## 2022-09-30 RX ADMIN — LEVOTHYROXINE SODIUM 50 MCG: 0.03 TABLET ORAL at 08:50

## 2022-09-30 RX ADMIN — INSULIN GLARGINE 40 UNITS: 100 INJECTION, SOLUTION SUBCUTANEOUS at 08:59

## 2022-09-30 RX ADMIN — SODIUM CHLORIDE 500 ML: 9 INJECTION, SOLUTION INTRAVENOUS at 14:48

## 2022-09-30 RX ADMIN — IPRATROPIUM BROMIDE AND ALBUTEROL SULFATE 1 AMPULE: 2.5; .5 SOLUTION RESPIRATORY (INHALATION) at 15:15

## 2022-09-30 RX ADMIN — Medication 10 ML: at 08:52

## 2022-09-30 RX ADMIN — Medication 2 PUFF: at 08:42

## 2022-09-30 RX ADMIN — EPOETIN ALFA-EPBX 3000 UNITS: 10000 INJECTION, SOLUTION INTRAVENOUS; SUBCUTANEOUS at 09:24

## 2022-09-30 RX ADMIN — INSULIN LISPRO 4 UNITS: 100 INJECTION, SOLUTION INTRAVENOUS; SUBCUTANEOUS at 20:52

## 2022-09-30 RX ADMIN — ASPIRIN 81 MG 81 MG: 81 TABLET ORAL at 08:49

## 2022-09-30 RX ADMIN — Medication 2 PUFF: at 19:40

## 2022-09-30 RX ADMIN — IPRATROPIUM BROMIDE AND ALBUTEROL SULFATE 1 AMPULE: 2.5; .5 SOLUTION RESPIRATORY (INHALATION) at 19:40

## 2022-09-30 RX ADMIN — INSULIN LISPRO 30 UNITS: 100 INJECTION, SOLUTION INTRAVENOUS; SUBCUTANEOUS at 19:27

## 2022-09-30 RX ADMIN — MIDODRINE HYDROCHLORIDE 5 MG: 5 TABLET ORAL at 17:06

## 2022-09-30 RX ADMIN — MIDODRINE HYDROCHLORIDE 5 MG: 5 TABLET ORAL at 08:49

## 2022-09-30 RX ADMIN — POTASSIUM CHLORIDE 40 MEQ: 750 TABLET, FILM COATED, EXTENDED RELEASE ORAL at 08:56

## 2022-09-30 RX ADMIN — HEPARIN SODIUM 5000 UNITS: 5000 INJECTION INTRAVENOUS; SUBCUTANEOUS at 17:06

## 2022-09-30 RX ADMIN — Medication 10 ML: at 20:58

## 2022-09-30 RX ADMIN — ATORVASTATIN CALCIUM 80 MG: 80 TABLET, FILM COATED ORAL at 20:54

## 2022-09-30 RX ADMIN — HEPARIN SODIUM 5000 UNITS: 5000 INJECTION INTRAVENOUS; SUBCUTANEOUS at 08:19

## 2022-09-30 RX ADMIN — IPRATROPIUM BROMIDE AND ALBUTEROL SULFATE 1 AMPULE: 2.5; .5 SOLUTION RESPIRATORY (INHALATION) at 08:41

## 2022-09-30 ASSESSMENT — PAIN SCALES - GENERAL
PAINLEVEL_OUTOF10: 0

## 2022-09-30 NOTE — CARE COORDINATION
SW received a call from [facility] stating that patient's precert was denied. SW called the  admissions and spoke with Javi [facility]. She stated they cancelled the original precert started earlier this week to their SNF because pt was looking into ARU. Stated she will restart the precert today. Stated it might come back this weekend but could be Monday. SW called pt's dtr and informed this information. She is in agreement with this plan. Discharge Plan:  Back to  SNF. Precert pending. Will need a COVID test 48 hrs before discharge.     Electronically signed by JOE Macdonald LSW on 9/30/2022 at 11:42 AM

## 2022-09-30 NOTE — CARE COORDINATION
[facility] Wound Ostomy Continence Nurse  Consult Note       NAME:  Austin Cazares  MEDICAL RECORD NUMBER:  [id number]  AGE: 69 y.o.    GENDER: male  : 1945  TODAY'S DATE:  2022    Subjective   Reason for WOCN Evaluation and Assessment: wounds to buttocks      Austin Cazares is a 69 y.o. male referred by:   [x] Physician  [] Nursing  [] Other:     Wound Identification:  Wound Type: pressure  Contributing Factors: chronic pressure, decreased mobility, and shear force    Wound History: present on admission, Patient says he has had it for \"quite a while\"  Current Wound Care Treatment:  foam  dressing, triad ointment    Patient Goal of Care:  [x] Wound Healing  [] Odor Control  [] Palliative Care  [] Pain Control   [] Other:         PAST MEDICAL HISTORY        Diagnosis Date    Allergic rhinitis due to other allergen 2010    Coronary atherosclerosis of unspecified type of vessel, native or graft 2010    Diabetic eye exam ([facility] [address] 2015    Diabetic eye exam ([facility] [address] 2016    [place]    Generalized osteoarthrosis, involving multiple sites 2010    Other psoriasis 2010    Pneumonia     Prolonged emergence from general anesthesia     Psoriatic arthropathy ([facility] [address] 2010    Type II or unspecified type diabetes mellitus without mention of complication, not stated as uncontrolled 2010    Unspecified essential hypertension 2010    Unspecified sleep apnea 2010       PAST SURGICAL HISTORY    Past Surgical History:   Procedure Laterality Date    CARPAL TUNNEL RELEASE      s/p    CATARACT REMOVAL      CORONARY ARTERY BYPASS GRAFT      JOINT REPLACEMENT      LAPAROSCOPY INSERTION PERITONEAL CATHETER N/A 10/26/2020    LAPAROSCOPIC PERITONEAL DIALYSIS CATHETER PLACEMENT; LAPAROSCOPIC OMENTOPEXY performed by Aleida Loving DO at [address] OR       FAMILY HISTORY    Family History   Problem Relation Age of Onset    Diabetes Mother     Heart Failure Mother     Coronary Art Dis Father SOCIAL HISTORY    Social History     Tobacco Use    Smoking status: Former     Packs/day: 1.00     Years: 18.00     Pack years: 18.00     Types: Cigarettes     Start date: 1962     Quit date: 1980     Years since quittin.9    Smokeless tobacco: Never   Vaping Use    Vaping Use: Never used   Substance Use Topics    Alcohol use: No    Drug use: No       ALLERGIES    Allergies   Allergen Reactions    Penicillins Other (See Comments)     Syncope \"pass out \" per pt    Sulfa Antibiotics      Patient unsure of reaction     Tazorac [Tazarotene]      Cream - rash    Clindamycin Rash    Clindamycin/Lincomycin Rash       MEDICATIONS    No current facility-administered medications on file prior to encounter.      Current Outpatient Medications on File Prior to Encounter   Medication Sig Dispense Refill    albuterol sulfate (PROAIR RESPICLICK) 307 (90 Base) MCG/ACT aerosol powder inhalation Inhale 2 puffs into the lungs every 4 hours as needed for Wheezing or Shortness of Breath 1 each 5    buPROPion (WELLBUTRIN) 100 MG tablet Take 1 tablet by mouth every other day 60 tablet 3    epoetin daphney-epbx (RETACRIT) 2000 UNIT/ML injection Inject 1 mL into the skin three times a week 12 mL 0    insulin glargine (LANTUS SOLOSTAR) 100 UNIT/ML injection pen Inject 50 Units into the skin nightly (Patient taking differently: Inject 40 Units into the skin nightly) 5 Adjustable Dose Pre-filled Pen Syringe 3    insulin lispro, 1 Unit Dial, 100 UNIT/ML SOPN Inject 0-4 Units into the skin 3 times daily (with meals) 10 mL 0    insulin lispro, 1 Unit Dial, 100 UNIT/ML SOPN Inject 0-4 Units into the skin nightly 10 mL 0    BREO ELLIPTA 200-25 MCG/INH AEPB inhaler Inhale 1 puff into the lungs daily      atorvastatin (LIPITOR) 80 MG tablet TAKE ONE TABLET BY MOUTH ONCE NIGHTLY 90 tablet 3    Easy Touch Lancets 30G/Twist MISC USE THREE TIMES A DAY TO FOUR TIMES A  each 5    levothyroxine (SYNTHROID) 50 MCG tablet TAKE ONE TABLET BY MOUTH DAILY 90 tablet 8    [DISCONTINUED] insulin detemir (LEVEMIR FLEXTOUCH) 100 UNIT/ML injection pen 56-66 units daily 10 pen 3    [DISCONTINUED] insulin aspart (NOVOLOG FLEXPEN) 100 UNIT/ML injection pen 32-42  units AC TID 10 pen 3    B-D ULTRAFINE III SHORT PEN 31G X 8 MM MISC USE WITH INSULIN FOUR TIMES A  each 2    [DISCONTINUED] midodrine (PROAMATINE) 5 MG tablet Take 1 tablet by mouth in the morning and at bedtime 90 tablet 3    Blood Glucose Monitoring Suppl (TRUE METRIX METER) RUMA As needed 1 each 0    blood glucose monitor strips Test up to 4 times daily for blood sugar monitoring 200 strip 3    blood glucose monitor kit and supplies (PLEASE DISPENSE WHAT INS WILL COVER) - Dispense sufficient amount for  TID testing frequency plus additional to accommodate PRN testing needs. Dispense all needed supplies to include: monitor, strips, lancing device, lancets, control solutions, alcohol swabs. 1 kit 0    B Complex-C-Biotin-E-Min-FA (DIALYVITE 3000) 3 MG TABS Take 1 tablet by mouth daily      vitamin D (ERGOCALCIFEROL) 1.25 MG (24424 UT) CAPS capsule Take 50,000 Units by mouth every 3 months      [DISCONTINUED] triamcinolone (KENALOG) 0.1 % ointment Apply topically 2 times daily. 1 Tube 2    Handicap Placard MISC by Does not apply route Duration: 5 years 1 each 0    aspirin 81 MG chewable tablet Take 1 tablet by mouth daily.  30 tablet 5       Objective    BP (!) 137/56   Pulse 77   Temp 97.8 °F (36.6 °C) (Axillary)   Resp 16   Ht 5' 10\" (1.778 m)   Wt 197 lb 1.5 oz (89.4 kg)   SpO2 100%   BMI 28.28 kg/m²     LABS:  WBC:    Lab Results   Component Value Date/Time    WBC 15.9 09/30/2022 04:47 AM     H/H:    Lab Results   Component Value Date/Time    HGB 8.9 09/30/2022 04:47 AM    HCT 27.1 09/30/2022 04:47 AM     PTT:    Lab Results   Component Value Date/Time    APTT 29.5 01/15/2017 07:15 AM   [APTT}  PT/INR:    Lab Results   Component Value Date/Time    PROTIME 11.2 04/15/2022 10:29 AM    INR 0.99 04/15/2022 10:29 AM     HgBA1c:    Lab Results   Component Value Date/Time    LABA1C 6.6 09/29/2022 04:27 AM       Assessment   Jorge Luis Risk Score: Jorge Luis Scale Score: 17    Patient Active Problem List   Diagnosis Code    Essential hypertension I10    Generalized osteoarthrosis, involving multiple sites M15.9    Arthritis with psoriasis ([facility]) L40.50    Sleep apnea G47.30    Psoriatic arthropathy ([facility]) L40.50    Allergic rhinitis due to other allergen J30.89    Coronary atherosclerosis I25.10    Vitamin D deficiency E55.9    Glaucoma H40.9    Nephropathy N28.9    S/P CABG x 4 Z95.1    ILD (interstitial lung disease) ([facility]) J84.9    Acute respiratory failure with hypoxia and hypercapnia ([facility]) J96.01, J96.02    Chronic diastolic heart failure ([facility]) I50.32    Elevated troponin R77.8    Coronary artery disease involving native coronary artery of native heart with unstable angina pectoris ([facility]) I25.110    BJ (obstructive sleep apnea) G47.33    Cough variant asthma J45.991    NSTEMI (non-ST elevated myocardial infarction) ([facility]) I21.4    Diabetes mellitus type 2 in obese ([facility]) E11.69, E66.9    Chronic fatigue R53.82    Syncope and collapse R55    Type 2 diabetes mellitus with hyperglycemia, with long-term current use of insulin ([facility]) E11.65, Z79.4    Hypertension associated with stage 5 chronic kidney disease due to type 2 diabetes mellitus ([facility]) E11.22, I12.0, N18.5    Acquired hypothyroidism E03.9    Unstable angina ([facility]) I20.0    Orthostatic hypotension I95.1    Dyspnea on exertion R06.00    Left lower quadrant abdominal pain R10.32    Anemia in ESRD (end-stage renal disease) ([facility]) N18.6, D63.1    AMS (altered mental status) R41.82    Acute respiratory failure with hypoxia ([facility]) U24.82    Acute metabolic encephalopathy G80.18    Hypercarbia R06.89    Electrolyte imbalance E87.8    Generalized abdominal tenderness R10.817    Chronic hypercapnic respiratory failure ([facility]) J96.12    BJ treated with BiPAP G47.33    Drug toxicity R89.2    Physical deconditioning R53.81    Moderate episode of recurrent major depressive disorder ([facility]) F33.1    Acute on chronic respiratory failure with hypoxia and hypercapnia ([facility]) J96.21, J96.22    Multifocal pneumonia J18.9    Hypervolemia E87.70    Leukocytosis D72.829    Acute pulmonary edema ([facility]) J81.0       Measurements:  Wound 09/21/22 Sacrum Medial;Lower (Active)   Wound Image   09/30/22 1919   Wound Etiology Pressure Stage 2 09/30/22 1919   Dressing Status New dressing applied 09/30/22 1919   Wound Cleansed Soap and water 09/29/22 1318   Dressing/Treatment Triad hydro/zinc oxide-based hydrophilic paste 96/93/34 2026   Wound Assessment Pink/red 09/30/22 1919   Drainage Amount None 09/30/22 1919   Odor None 09/29/22 2200   Tiny-wound Assessment Blanchable erythema 09/30/22 1919   Margins Defined edges; Attached edges 09/30/22 1919   Wound Thickness Description not for Pressure Injury Partial thickness 09/30/22 1919   Number of days: 8     Incision 10/26/20 Abdomen Medial (Active)   Number of days: 421   Patient seen for wound to buttocks. Patient agreeable to visit. Patient turned to left side with help from nurse Andres Dobbs. Wound is pink, oval in shape, not drainage noticed. Patient gets peritoneal dialysis. Recommend Triad ointment and mepilix sacral dressings. Patient is on critical care mattress. Response to treatment:  Well tolerated by patient. Pain Assessment:  Severity:  0 / 10  Quality of pain: N/A  Wound Pain Timing/Severity: none  Premedicated: No    Plan   Plan of Care: Wound 09/21/22 Sacrum Medial;Lower-Dressing/Treatment: Triad hydro/zinc oxide-based hydrophilic paste  Assist patient to reposition every 2 hours while in bed and every hour while in chair. Use green overlay cushion in chair.       Specialty Bed Required : No, patient on critical care mattress  [] Low Air Loss   [] Pressure Redistribution  [] Fluid Immersion  [] Bariatric  [] Total Pressure Relief  [] Other:     Current Diet: ADULT DIET; Regular; 5 carb choices (75 gm/meal);  Low Phosphorus (Less than 1000 mg)  ADULT ORAL NUTRITION SUPPLEMENT; Breakfast, Lunch; Renal Oral Supplement  Dietician consult:  Yes    Discharge Plan:  Placement for patient upon discharge: home with support    Patient appropriate for Outpatient [address] [place] [address] Road: No    Referrals:  []   [] [facility]  [] Supplies  [] Other    Patient/Caregiver Teaching:  Level of patient/caregiver understanding able to:   [x] Indicates understanding       [x] Needs reinforcement  [] Unsuccessful      [] Verbal Understanding  [] Demonstrated understanding       [] No evidence of learning  [] Refused teaching         [] N/A       Electronically signed by  MOSES Lutz, RN, [address] [place] [address]  [phone]  on 9/30/2022 at 7:24 PM

## 2022-09-30 NOTE — PROGRESS NOTES
CCPD Order   Exchanges: 4    Exchange Volume: 2000 ml   Total Time: 8 hrs   Dextrose: 4.25%   Last Fill: NA   Total Volume: 8000 ml     Orders verified. Supplies taken to pt's room. Report received. Cycler set up, primed and pre tested. [facility]. Pt connected to cycler. CCPD initiated without problem. Initial effluent clear. If problems should arise please call the [phone] number on top of PD cycler machine.

## 2022-09-30 NOTE — PROGRESS NOTES
[facility] Department   Phone: [phone]    Physical Therapy    [] Initial Evaluation            [x] Daily Treatment Note         [] Discharge Summary      Patient: Marcos Arzola   : 1945   MRN: [id number]   Date of Service:  2022  Admitting Diagnosis: Acute on chronic respiratory failure with hypoxia and hypercapnia [facility])  Current Admission Summary: 69 y.o. male who presented to [facility] with complaints of altered mental status. Patient is relatively nonverbal at baseline after prior history of illness. Questionable aphasia from stroke versus ongoing dementia. Patient daughter at bedside and was able to provide information. She states the patient was more confused at his nursing facility this morning. Additionally, patient was found to be hypoxic. He was started on oxygen, with little improvement. He was placed on BiPAP with more improvement. Per the daughter, patient is supposed be wearing BiPAP at night, but is noncompliant. There were no other associated symptoms with his confusion. During work-up in the ER, patient was found to have CO2 of 94.8 with pH of 7.095 on admission. Patient does have significant dementia and is aware he is confused, but is relatively nonverbal and unable to answer complex questions. He is able to answer \"yes and no\" questions. Prior tobacco user, quit approximately 4 years ago. No alcohol or drug use.   Past Medical History:  has a past medical history of Allergic rhinitis due to other allergen, Coronary atherosclerosis of unspecified type of vessel, native or graft, Diabetic eye exam ([address] Diabetic eye exam ([address] Generalized osteoarthrosis, involving multiple sites, Other psoriasis, Pneumonia, Prolonged emergence from general anesthesia, Psoriatic arthropathy ([address] Type II or unspecified type diabetes mellitus without mention of complication, not stated as uncontrolled, Unspecified essential hypertension, and Unspecified sleep apnea. Past Surgical History:  has a past surgical history that includes Coronary artery bypass graft; Carpal tunnel release; joint replacement; Cataract removal; and LAPAROSCOPY INSERTION PERITONEAL CATHETER (N/A, 10/26/2020). Discharge Recommendations: Kaye Huynh scored a 7/24 on the AM-PAC short mobility form. Current research shows that an AM-PAC score of 17 or less is typically not associated with a discharge to the patient's home setting. Based on the patient's AM-PAC score and their current functional mobility deficits, it is recommended that the patient have 3-5 sessions per week of Physical Therapy at d/c to increase the patient's independence. Please see assessment section for further patient specific details. If patient discharges prior to next session this note will serve as a discharge summary. Please see below for the latest assessment towards goals. DME Required For Discharge: DME to be determined pending patient progress  Precautions/Restrictions: high fall risk  Weight Bearing Restrictions: no restrictions  [] Right Upper Extremity  [] Left Upper Extremity [] Right Lower Extremity  [] Left Lower Extremity     Required Braces/Orthotics: no braces required   [] Right  [] Left  Positional Restrictions:no positional restrictions    Pre-Admission Information   Lives With: spouse (Spouse just had OHS, per pt report)  Type of Home: condo  Home Layout: one level, able to live on main level  Home Access: level entry  Bathroom Layout: walk in shower, handicap height toilet  Bathroom Equipment: grab bars in shower, shower chair, hand held shower head  Toilet Height: standard height, elevated height (has both)  Home Equipment: rolling walker, single point cane, reacher  Transfer Assistance: requires assistance  Ambulation Assistance:modified independent with use of RW  ADL Assistance: requires assistance with bathing, requires assistance with dressing, . Comment: A with LB ADLs  IADL Assistance: requires assistance with all homemaking tasks, wife & dtr were helping; dtr works  Active :        [] Yes                 [x] No  Hand Dominance: [] Left                 [x] Right  Current Employment: retired. Occupation:  at [employer]: [employer]  Recent Falls: No recent falls  Pt sleeps in \"lounger\"     Pt from SNF after stay at [facility] Only at SNF 1 night. Questionable historian. Per RN, pt's daughter reporting pt needing a lot more assistance recently. Subjective  General: Patient lying supine in bed with HOB elevated upon arrival. Patient is agreeable to PT/OT. Pain: 0/10  Pain Interventions: not applicable       Functional Mobility  Bed Mobility  Supine to Sit: moderate assistance  Sit to Supine: moderate assistance  Rolling Left: minimal assistance  Rolling Right: minimal assistance  Scooting: moderate assistance  Comments: moderate assist to scoot to EOB. Transfers  No transfers completed on this date secondary to pt's BP dropped down 68/42 after sitting EOB x 6 min. Pt reports feeling dizzy so had to lie back down. Bed placed in chair position. Comments:  Ambulation  Ambulation not tested on this date secondary to not being ambulatory for over a year. Stair Mobility  Stair mobility not completed on this date. Comments:  Wheelchair Mobility:  No w/c mobility completed on this date. Comments:  Balance  Static Sitting Balance: fair: maintains balance at CGA without use of UE support  Dynamic Sitting Balance: fair (-): maintains balance at CGA with use of UE support  Comments: Patient sat EOB 6-7 minutes; pt returned to supine due to orthostatic hypotension.  Pt requiring CGA to maintain balance     Other Therapeutic Interventions  Ankle pumps and LAQ x 10 with chair in seated position  Functional Outcomes  AM-PAC Inpatient Mobility Raw Score : 7              Education  Barriers To Learning: cognition  Patient Education: patient educated on goals, PT role and benefits, plan of care, functional mobility training, orientation, injury prevention, transfer training, discharge recommendations  Pt educated on trying to keep bed in chair position as often as possible to minimize effects of OH on his mobility  Learning Assessment:  patient will require reinforcement due to cognitive deficits    Assessment  Activity Tolerance: Fair; limited due to orthostatic hypotension once seated EOB. In seated BP was 68/42, in supine 100/55, then in bed in chair position 98/52  Impairments Requiring Therapeutic Intervention: decreased functional mobility, decreased ROM, decreased strength, decreased cognition, decreased endurance, decreased balance, decreased coordination, decreased posture  Prognosis: fair  Clinical Assessment: Pt demonstrating improvements in cognition as he was talking to therapists and was able to do bed mobility with assist of 1. Pt's BP was an issue that limited his ability to transfer. RN aware and planned to speak MD. Will continue to progress mobility as pt tolerates.   Safety Interventions: patient left in bed, bed alarm in place, call light within reach, gait belt, patient at risk for falls, and nurse notified    Plan  Frequency: 3-5 x/per week  Current Treatment Recommendations: strengthening, balance training, functional mobility training, transfer training, endurance training, patient/caregiver education, and safety education    Goals  Patient Goals: Return home   Short Term Goals:  Time Frame: Upon d/c  Patient will complete bed mobility at Independent   Patient will complete transfers at Independent   Patient will ambulate 20 ft with use of rolling walker at stand by assistance  NO GOALS MET THIS DATE     Therapy Session Time      Individual Group Co-treatment   Time In 1418       Time Out 1443       Minutes 25         Timed Code Treatment Minutes:  25 Minutes  Total Treatment Minutes:  25       Electronically Signed By: Saulo Aranda Oral call, [address] [facility]

## 2022-09-30 NOTE — PROGRESS NOTES
Assessment completed, see head to toe flowsheet. Currently on BIPAP FiO2 28% with saturations 99%. Currently on insulin gtt (see eMAR for titration), D51/2 NS at 50 ml/hr via peripheral IV. Denies pain. Resting comfortably. Patient is anuric.

## 2022-09-30 NOTE — PLAN OF CARE
Problem: Skin/Tissue Integrity  Goal: Absence of new skin breakdown  Description: 1. Monitor for areas of redness and/or skin breakdown  2. Assess vascular access sites hourly  3. Every 4-6 hours minimum:  Change oxygen saturation probe site  4. Every 4-6 hours:  If on nasal continuous positive airway pressure, respiratory therapy assess nares and determine need for appliance change or resting period. 9/30/2022 1931 by Frieda Smith RN  Note: Wound 09/21/22 Sacrum Medial;Lower-Dressing/Treatment: Triad hydro/zinc oxide-based hydrophilic paste   Assist patient to reposition every 2 hours while in bed and every hour while in chair. Use green overlay cushion in chair.

## 2022-09-30 NOTE — PROGRESS NOTES
[facility] [address] Department   Phone: [phone]    Occupational Therapy    [] Initial Evaluation            [x] Daily Treatment Note         [] Discharge Summary      Patient: Ryan Baum   : 1945   MRN: [id number]   Date of Service:  2022    Admitting Diagnosis:  Acute on chronic respiratory failure with hypoxia and hypercapnia ([address]    Current Admission Summary: Per ED note, \"Patient arrives via [facility] From [facility], new patient to them, started peritoneal dialysis for the first time last night, was at his normal A&O x4 mental status last night, this AM, AMS, not verbally responding, found to by hypoxic in the 80's on normal 3L/NC, staff attempted 6L no change, currently on NRB at 95%. \"  Past Medical History:  has a past medical history of Allergic rhinitis due to other allergen, Coronary atherosclerosis of unspecified type of vessel, native or graft, Diabetic eye exam ([address] Diabetic eye exam ([address] Generalized osteoarthrosis, involving multiple sites, Other psoriasis, Pneumonia, Prolonged emergence from general anesthesia, Psoriatic arthropathy ([address] Type II or unspecified type diabetes mellitus without mention of complication, not stated as uncontrolled, Unspecified essential hypertension, and Unspecified sleep apnea. Past Surgical History:  has a past surgical history that includes Coronary artery bypass graft; Carpal tunnel release; joint replacement; Cataract removal; and LAPAROSCOPY INSERTION PERITONEAL CATHETER (N/A, 10/26/2020). Discharge Recommendations: Ryan Baum scored a  on the AM-PAC ADL Inpatient form. Current research shows that an AM-PAC score of 17 or less is typically not associated with a discharge to the patient's home setting.  Based on the patient's AM-PAC score and their current ADL deficits, it is recommended that the patient have 3-5 sessions per week of Occupational Therapy at d/c to increase the patient's independence. Please see assessment section for further patient specific details. If patient discharges prior to next session this note will serve as a discharge summary. Please see below for the latest assessment towards goals. DME Required For Discharge: DME to be determined at next level of care    Precautions/Restrictions: high fall risk  Weight Bearing Restrictions: no restrictions  [] Right Upper Extremity  [] Left Upper Extremity [] Right Lower Extremity  [] Left Lower Extremity     Required Braces/Orthotics: no braces required   [] Right  [] Left  Positional Restrictions:no positional restrictions    Pre-Admission Information   Lives With: spouse   (Spouse just had OHS, per pt report)  Type of Home: condo  Home Layout: one level, able to live on main level  Home Access: level entry  Bathroom Layout: walk in shower, handicap height toilet  Bathroom Equipment: grab bars in shower, shower chair, hand held shower head  Toilet Height: standard height, elevated height (has both)  Home Equipment: rolling walker, single point cane, reacher  Transfer Assistance: requires assistance  Ambulation Assistance:modified independent with use of RW  ADL Assistance: requires assistance with bathing, requires assistance with dressing, . Comment: A with LB ADLs  IADL Assistance: requires assistance with all homemaking tasks, wife & dtr were helping; dtr works  Active :        [] Yes  [x] No  Hand Dominance: [] Left  [x] Right  Current Employment: retired. Occupation:  at [employer]: [employer]  Recent Falls: No recent falls  Pt sleeps in \"lounger\"    Pt from SNF after stay at [facility] Only at SNF 1 night. Questionable historian. Per RN, pt's daughter reporting pt needing a lot more assistance recently. Subjective  General: Pt supine in bed, agreeable to therapy session. Mild delay in answering questions. Pt goes by \"Ilya. \" Patient agreeable to therapy, nurse approved session. Pain: 0/10  Pain Interventions: not applicable        Activities of Daily Living  Basic Activities of Daily Living  Feeding: setup assistance  Grooming: setup assistance  Lower Extremity Dressing: dependent  Dressing Comments: donning socks  General Comments: remainders of ADLs not performed this date, unable to fully tolerate due to low BP, limited activity tolerance       Instrumental Activities of Daily Living  No IADL completed on this date. Functional Mobility  Bed Mobility  Supine to Sit: moderate assistance  Sit to Supine: moderate assistance  Rolling Left: moderate assistance  Rolling Right: moderate assistance  Scooting: moderate assistance  Comments: cues and increased time. Transfers  No transfers completed on this date secondary to patient c/o dizziness sitting EOB after sitting 5-6 minutes. BP 68/42 map 50, laid back down with mod assist, flat in bed 98/52 map 70, then after several minutes placed bed into chair position and BP 98/52 map 64. HR at 80, 2 1/2L with sats in mid 90s. .  Comments: unsafe to transfer this date due to low BP. Functional Mobility:  Sitting Balance: stand by assistance. Sitting Balance Comment: 5-7 min on EOB with increasing fatigue and c/o dizziness.            Functional Outcomes  AM-PAC Inpatient Daily Activity Raw Score: 12    Cognition  Overall Cognitive Status: Impaired  Arousal/Alterness: delayed responses to stimuli  Following Commands: follows one step commands with repetition, follows one step commands with increased time  Attention Span: attends with cues to redirect, difficulty dividing attention  Memory: decreased recall of recent events, decreased short term memory  Problem Solving: assistance required to generate solutions, assistance required to implement solutions, decreased awareness of errors, assistance required to correct errors made  Insights: decreased awareness of deficits  Initiation: requires cues for some  Sequencing: requires cues for some        Orientation:    oriented to person, oriented to place, oriented to time, and disoriented to situation   Command Following:   impaired     Education  Barriers To Learning: cognition  Patient Education: patient educated on OT role and benefits, plan of care, discharge recommendations, bed mobility  Learning Assessment:  patient will require reinforcement due to cognitive deficits    Assessment  Activity Tolerance: Fair activity tolerance. Limited by cognition and fatigue. Impairments Requiring Therapeutic Intervention: decreased functional mobility, decreased ADL status, decreased cognition, decreased endurance, decreased balance  Prognosis: good and fair    Clinical Assessment: Pt is below his baseline level of occupational function, based on the above deficits associated with acute respiratory failure with hypoxia and hyperkapnia. Pt  only able to tolerate sitting due to c/o dizziness, low BP  Pt dependent for nayana hygiene and LB dressing. He would benefit from continued skilled acute OT services to address these deficits and maximize his safety and independence.       Safety Interventions: patient left in bed, bed alarm in place, call light within reach, patient at risk for falls, and nurse notified    Plan  Frequency: 3-5 x/per week  Current Treatment Recommendations: balance training, functional mobility training, transfer training, endurance training, IADL training, and safety education    Goals  Patient Goals: Not stated   Short Term Goals:  Time Frame: Discharge-- continue all goals   Patient will complete upper body ADL at stand by assistance   Patient will complete lower body ADL at moderate assistance   Patient will complete toileting at moderate assistance   Patient will complete grooming at set up assistance, stand by assistance   Patient will complete functional transfers at minimal assistance   Patient will increase functional sitting balance to Supervision for improved ADL completion  Patient will complete bed mobility at stand by assistance     Goals ongoing 9/30    Therapy Session Time     Individual Group Co-treatment   Time In    1418   Time Out    1443   Minutes    25        Timed Code Treatment Minutes:   25  min  Total Treatment Minutes:  25 min       Electronically Signed By: RYDER Blackburn/MOSES [phone]

## 2022-09-30 NOTE — PLAN OF CARE
Problem: Discharge Planning  Goal: Discharge to home or other facility with appropriate resources  Outcome: Progressing     Problem: Safety - Adult  Goal: Free from fall injury  Outcome: Progressing     Problem: Skin/Tissue Integrity  Goal: Absence of new skin breakdown  Description: 1. Monitor for areas of redness and/or skin breakdown  2. Assess vascular access sites hourly  3. Every 4-6 hours minimum:  Change oxygen saturation probe site  4. Every 4-6 hours:  If on nasal continuous positive airway pressure, respiratory therapy assess nares and determine need for appliance change or resting period.   Outcome: Progressing     Problem: Nutrition Deficit:  Goal: Optimize nutritional status  Outcome: Progressing  Flowsheets (Taken 9/30/2022 1003 by Amanda Rivas, MS, RD, LD)  Nutrient intake appropriate for improving, restoring, or maintaining nutritional needs:   Recommend appropriate diets, oral nutritional supplements, and vitamin/mineral supplements   Monitor oral intake, labs, and treatment plans     Problem: Pain  Goal: Verbalizes/displays adequate comfort level or baseline comfort level  Outcome: Progressing  Flowsheets (Taken 9/30/2022 0800)  Verbalizes/displays adequate comfort level or baseline comfort level:   Encourage patient to monitor pain and request assistance   Assess pain using appropriate pain scale   Administer analgesics based on type and severity of pain and evaluate response   Implement non-pharmacological measures as appropriate and evaluate response     Problem: ABCDS Injury Assessment  Goal: Absence of physical injury  Outcome: Progressing     Problem: Chronic Conditions and Co-morbidities  Goal: Patient's chronic conditions and co-morbidity symptoms are monitored and maintained or improved  Outcome: Progressing

## 2022-09-30 NOTE — PROGRESS NOTES
Office : [phone]     Fax :[phone]       Nephrology progress  Note      Patient's Name: Stephen Edmondson  1:33 PM  9/30/2022    Reason for Consult:  ESRD on peritoneal dialysis. Requesting Physician:  Deyanira Alonso MD      Chief Complaint:    Chief Complaint   Patient presents with    Altered Mental Status     Patient arrives via [place] from [facility], altered, last seen normal last night, started peritoneal dialysis last night, O2 low on normal 3L, placed on NRB with some improvement, increased WOB noted           History of Present Ilness:    Stephen Edmondson is a 69 y.o. male with prior history of ESRD on peritoneal dialysis , DM 2, HTN who was sent from [facility] with main c/o SOB. In ER he was found to be hypoxic. CXR shows volume overload. On BiPAP now. Still makes urine . Also had elevated WBC. No abdominal pain. No fever   BS Elevated. Interval hx :    Feels much better   Tolerating PD well       Effluent: Clear  Total time: 8 hr 11 min   Total UF:  1468 ml. Total Volume:  8007 ml. Dwell time gained:  0 hr 3 min. Pt Tolerated procedure: Well   Report given to: Lc Ross RN     I/O last 3 completed shifts:   In: 1005.3 [I.V.:1005.3]  Out: -     Past Medical History:   Diagnosis Date    Allergic rhinitis due to other allergen 7/12/2010    Coronary atherosclerosis of unspecified type of vessel, native or graft 7/12/2010    Diabetic eye exam ([facility] 04/29/2015    Diabetic eye exam ([facility] 5/5/2016    [place] [facility]    Generalized osteoarthrosis, involving multiple sites 7/12/2010    Other psoriasis 7/12/2010    Pneumonia     Prolonged emergence from general anesthesia     Psoriatic arthropathy ([facility] 7/12/2010    Type II or unspecified type diabetes mellitus without mention of complication, not stated as uncontrolled 7/12/2010    Unspecified essential hypertension 7/12/2010    Unspecified sleep apnea 7/12/2010       Past Surgical History:   Procedure Laterality Date    CARPAL TUNNEL RELEASE      s/p    CATARACT REMOVAL      CORONARY ARTERY BYPASS GRAFT      JOINT REPLACEMENT      LAPAROSCOPY INSERTION PERITONEAL CATHETER N/A 10/26/2020    LAPAROSCOPIC PERITONEAL DIALYSIS CATHETER PLACEMENT; LAPAROSCOPIC OMENTOPEXY performed by Kamini Selby DO at [facility] OR       Family History   Problem Relation Age of Onset    Diabetes Mother     Heart Failure Mother     Coronary Art Dis Father         reports that he quit smoking about 41 years ago. He started smoking about 60 years ago. He has a 18.00 pack-year smoking history. He has never used smokeless tobacco. He reports that he does not drink alcohol and does not use drugs.         Allergies:  Penicillins, Sulfa antibiotics, Tazorac [tazarotene], Clindamycin, and Clindamycin/lincomycin    Current Medications:    insulin glargine (LANTUS) injection vial 40 Units, Daily  insulin lispro (HUMALOG) injection vial 30 Units, TID WC  traMADol (ULTRAM) tablet 25 mg, Q12H PRN  insulin lispro (HUMALOG) injection vial 0-16 Units, 4x Daily AC & HS  epoetin daphney-epbx (RETACRIT) injection 3,000 Units, Once per day on Mon Wed Fri  torsemide (DEMADEX) tablet 100 mg, Daily  midodrine (PROAMATINE) tablet 5 mg, BID WC  ipratropium-albuterol (DUONEB) nebulizer solution 1 ampule, 4x daily  gentamicin (GARAMYCIN) 0.1 % cream, Daily  sodium chloride flush 0.9 % injection 5-40 mL, 2 times per day  sodium chloride flush 0.9 % injection 5-40 mL, PRN  0.9 % sodium chloride infusion, PRN  ondansetron (ZOFRAN-ODT) disintegrating tablet 4 mg, Q8H PRN   Or  ondansetron (ZOFRAN) injection 4 mg, Q6H PRN  polyethylene glycol (GLYCOLAX) packet 17 g, Daily PRN  acetaminophen (TYLENOL) tablet 650 mg, Q6H PRN   Or  acetaminophen (TYLENOL) suppository 650 mg, Q6H PRN  albuterol (PROVENTIL) nebulizer solution 2.5 mg, Q4H PRN  aspirin chewable tablet 81 mg, Daily  atorvastatin (LIPITOR) tablet 80 mg, Nightly  mometasone-formoterol (DULERA) 200-5 MCG/ACT inhaler 2 puff, BID  buPROPion (WELLBUTRIN) tablet 100 mg, Every Other Day  levothyroxine (SYNTHROID) tablet 50 mcg, Daily  dextrose bolus 10% 125 mL, PRN   Or  dextrose bolus 10% 250 mL, PRN  glucagon (rDNA) injection 1 mg, PRN  dextrose 10 % infusion, Continuous PRN  heparin (porcine) injection 5,000 Units, 3 times per day      Physical exam:     Vitals:  /63   Pulse 70   Temp 97.7 °F (36.5 °C) (Axillary)   Resp 18   Ht 5' 10\" (1.778 m)   Wt 197 lb 1.5 oz (89.4 kg)   SpO2 100%   BMI 28.28 kg/m²   Constitutional:  OAA X3 NAD  Skin: no rash, turgor wnl  Heent:  eomi, mmm  Neck: no bruits or jvd noted  Cardiovascular:  S1, S2 without m/r/g  Respiratory: b/l crackles   Abdomen:  +bs, soft, nt, nd  Ext: mild  lower extremity edema    Labs:  CBC:   Recent Labs     09/28/22  0507 09/29/22 0427 09/30/22 0447   WBC 11.1* 13.0* 15.9*   HGB 8.6* 9.1* 8.9*    307 289     BMP:    Recent Labs     09/29/22 0427 09/29/22 1844 09/30/22  0447   * 135* 135*   K 4.1 4.1 3.4*   CL 93* 93* 97*   CO2 25 25 26   BUN 65* 68* 65*   CREATININE 9.8* 10.1* 9.7*   GLUCOSE 577* 266* 98     Ca/Mg/Phos:   Recent Labs     09/29/22  0427 09/29/22 1844 09/30/22  0447   CALCIUM 9.2 9.2 9.1   MG  --   --  2.40     Hepatic:   No results for input(s): AST, ALT, ALB, BILITOT, ALKPHOS in the last 72 hours. Troponin:   No results for input(s): TROPONINI in the last 72 hours. BNP: No results for input(s): BNP in the last 72 hours. Lipids: No results for input(s): CHOL, TRIG, HDL, LDLCALC, LABVLDL in the last 72 hours. ABGs:   No results for input(s): PHART, PO2ART, [id number] in the last 72 hours. INR: No results for input(s): INR in the last 72 hours.   UA:  No results for input(s): Jose Medicus, [address] [facility], [address] [place] [address], [address] [facility], Tiny Bare, NITRU, LEUKOCYTESUR, Lyndel Mariela in the last 72 hours. Urine Microscopic:   No results for input(s): LABCAST, BACTERIA, COMU, HYALCAST, WBCUA, RBCUA, EPIU in the last 72 hours. Urine Culture: No results for input(s): LABURIN in the last 72 hours. Urine Chemistry: No results for input(s): Laretta Coco, PROTEINUR, NAUR in the last 72 hours. IMAGING:  XR CHEST PORTABLE   Final Result   CHF with pulmonary edema. CT HEAD WO CONTRAST   Final Result   No definite evidence of acute intracranial abnormality on a study   significantly limited by motion/streak artifact as described above. Assessment/Plan :      1. ESRD   Volume overload   Better     Continue  CCPD now with 4.25  % for UF    PD fluid for cell count and culture -  negative   No growth   No evidence of peritonitis    Stop ABX         2. HTN  BP controlled       3. Anemia in ESRD   On epogen     4. Acid- base disorder. monitor     5. Hypokalemia   Replace potassium       6. Debility. Generalized weakness.  Need PT/OT       Awaiting placement at rehab unit           D/w primary team      Thank you for allowing us to participate in care of Stephen Edmondson         Electronically signed by: Mague Burks MD, 9/30/2022, 1:33 PM      [facility] [address]  Office : [phone]  Fax :[phone]

## 2022-09-30 NOTE — PROGRESS NOTES
Shift assessment completed. VSS. . Lantus given. Will stop insulin gtt in two hours. Potassium replaced per orders. Patient denies any further needs. Call light within reach. Will continue to monitor.

## 2022-09-30 NOTE — PROGRESS NOTES
Disconnected from [facility] per protocol. Effluent: clear light yellow   Total time: 8 hr 7 min   Total UF:  1343 ml. Total Volume:  8006 ml. Dwell time gained:  0 hr 7 min.   Pt Tolerated procedure: tolerated well

## 2022-09-30 NOTE — PROGRESS NOTES
Hospitalist Progress Note    Name:  Yady Mann    /Age/Sex: 1945  (69 y.o. male)  MRN & CSN:  [id number] & [id number]    PCP: Eleonora Monroe MD    Date of Admission: 2022  Chief Complaint: Altered mental status    Hospital Course: This 78-year-old man was admitted for altered mental status. Found to be hypoxic and hypercapnic on admission. Placed on BiPAP with improvement. Nephrology following as patient has ESRD on peritoneal dialysis. Pulmonology following. Interval history:  Pt seen and examined today. Overnight events noted, interval ancillary notes and labs reviewed. Blood glucose controlled; sodium discontinued and started back on Lantus and SSI  denied cough, SOB, chest pain, nausea, vomiting or abdominal pain  Awaiting for pre-CERT      Assessment and plan    Acute on chronic hypoxic and hypercapnic respiratory failure:-Improved  Most likely due to volume overload. Elevated proBNP at admission; trended down  Initially diuresed with IV Lasix; switched to torsemide  Continue home inhalers and as needed breathing treatment  Supplemental oxygen as needed to keep sats between 90 to 92%  Pulmonology consulted: appreciate recommendations     History of BJ: Unable to tolerate home BiPAP/CPAP. Will need repeat sleep study outpatient. Acute metabolic encephalopathy: Improved  CT head: No acute abnormality. TSH, Ammonia level wnl. B 12 and folate pending. Supportive care     Type 2 diabetes on long-term insulin:  A1c 6.5 on 2022. Continue basal insulin with sliding scale. ESRD on peritoneal dialysis:  Nephrology consulted: Appreciate input. No evidence of PD site infection or peritonitis. PD fluid cultures negative. Antibiotics discontinued. Hypotension: Continue midodrine    Depression: Continue Wellbutrin     Hypothyroid: Continue synthroid. Anemia due to ESRD: Continue EPO.     Overall patient is medically stable to be discharged   ARU pre-CERT will be started for [facility]    DVT ppx: Heparin  GI ppx: Diet/Tube Feeds  Diet: ADULT DIET; Regular; 4 carb choices (60 gm/meal); Low Phosphorus (Less than 1000 mg)  ADULT ORAL NUTRITION SUPPLEMENT; Breakfast, Lunch; Renal Oral Supplement  Code Status: Full Code  Disposition:    Medications:  Reviewed      Intake/Output Summary (Last 24 hours) at 9/30/2022 0961  Last data filed at 9/30/2022 0453  Gross per 24 hour   Intake 1005.28 ml   Output --   Net 1005.28 ml         Physical Exam Performed:    BP (!) 100/43   Pulse 66   Temp 97.6 °F (36.4 °C) (Axillary)   Resp 15   Ht 5' 10\" (1.778 m)   Wt 197 lb 1.5 oz (89.4 kg)   SpO2 95%   BMI 28.28 kg/m²     General appearance: No apparent distress, appears stated age and cooperative. Somewhat confused, but more conversational today. HEENT: Pupils equal, round, and reactive to light. Conjunctivae/corneas clear. Neck: Supple, with full range of motion. No jugular venous distention. Trachea midline. Respiratory:  Normal respiratory effort. Clear to auscultation, bilaterally without Rales/Wheezes/Rhonchi. Cardiovascular: Regular rate and rhythm with normal S1/S2 without murmurs, rubs or gallops. Trace pitting edema LE bilaterally. Abdomen: Soft, non-tender, non-distended with normal bowel sounds. : No CVA tenderness. Musculoskeletal: No clubbing or cyanosis. Full range of motion without deformity. Skin: Skin color, texture, turgor normal.  No rashes or lesions. Physical exam remains unchanged from 9/25    Labs:   Recent Labs     09/28/22  0507 09/29/22 0427 09/30/22 0447   WBC 11.1* 13.0* 15.9*   HGB 8.6* 9.1* 8.9*   HCT 26.2* 28.0* 27.1*    307 289       Recent Labs     09/29/22  0427 09/29/22  1844 09/30/22 0447   * 135* 135*   K 4.1 4.1 3.4*   CL 93* 93* 97*   CO2 25 25 26   BUN 65* 68* 65*   CREATININE 9.8* 10.1* 9.7*   CALCIUM 9.2 9.2 9.1       No results for input(s): AST, ALT, BILIDIR, BILITOT, ALKPHOS in the last 72 hours.     No results for input(s): INR in the last 72 hours. No results for input(s): Elle Barry in the last 72 hours. Urinalysis:      Lab Results   Component Value Date/Time    NITRU Negative 09/21/2022 03:18 PM    WBCUA 3 09/21/2022 03:18 PM    BACTERIA None Seen 09/21/2022 03:18 PM    RBCUA 5 09/21/2022 03:18 PM    BLOODU SMALL 09/21/2022 03:18 PM    SPECGRAV 1.015 09/21/2022 03:18 PM    GLUCOSEU 250 09/21/2022 03:18 PM    GLUCOSEU >=1000 03/26/2012 10:22 PM       Radiology:  XR CHEST PORTABLE   Final Result   CHF with pulmonary edema. CT HEAD WO CONTRAST   Final Result   No definite evidence of acute intracranial abnormality on a study   significantly limited by motion/streak artifact as described above.                Active Hospital Problems    Diagnosis     Leukocytosis [D72.829]      Priority: Medium    Acute pulmonary edema (HCC) [J81.0]      Priority: Medium    Multifocal pneumonia [J18.9]      Priority: Medium    Hypervolemia [E87.70]      Priority: Medium    Acute on chronic respiratory failure with hypoxia and hypercapnia (HCC) [J96.21, J96.22]      Priority: Medium    Moderate episode of recurrent major depressive disorder (HCC) [F33.1]      Priority: Medium    BJ treated with BiPAP [G47.33]      Priority: Medium    Acute metabolic encephalopathy [B51.14]      Priority: Medium    Anemia in ESRD (end-stage renal disease) (HCC) [N18.6, D63.1]     Type 2 diabetes mellitus with hyperglycemia, with long-term current use of insulin (HCC) [E11.65, Z79.4]     Hypertension associated with stage 5 chronic kidney disease due to type 2 diabetes mellitus ([facility] [E11.22, I12.0, N18.5]     Chronic diastolic heart failure (HCC) [I50.32]     Acute respiratory failure with hypoxia and hypercapnia (HCC) [J96.01, J96.02]     Arthritis with psoriasis (HCC) [L40.50]     Psoriatic arthropathy ([facility] [L40.50]     Sleep apnea [G47.30]        Ion Land MD  9/30/2022  9:25 AM      Please note that some part of this chart was generated using Dragon dictation software. Although every effort was made to ensure the accuracy of this automated transcription, some errors in transcription may have occurred inadvertently. If you may need any clarification, please do not hesitate to contact me through [facility].

## 2022-09-30 NOTE — PROGRESS NOTES
Nutrition Note    RECOMMENDATIONS  PO Diet: Resume diet per MD. Recommend 5 carb choice, low phosphorus modifiers  ONS: Please order Nepro (vanilla) BID once diet is resumed. NUTRITION ASSESSMENT   Pt triggered for follow-up. NPO since yesterday d/t elevated blood glucose, stabilzing today. Minimal documented intakes since last RD visit 9/26 ranging from 1-50%. Upon visiting, pt reported poor appetite both now and for awhile. Was receivng Nepro, says it is okay, willing to keep receiving and denied trying any other ONS. RD will reorder ONS once diet is resumed to offer additional nutrition and continue to monitor for improved po intake. Nutrition Related Findings: D5/.45NS and insulin gtt now off. -3.8L. Na 134, K+ 3.4, Cl 97. Glucose  today. LBM 9/22, BS+. Oriented x3. Non-pitting generalized; BUE, BLE trace edema. Wounds: Pressure Injury, Stage II  Nutrition Education:  Education not indicated   Nutrition Goals: PO intake 50% or greater, by next RD assessment     MALNUTRITION ASSESSMENT   Acute Illness  Malnutrition Status: At risk for malnutrition (Comment)    NUTRITION DIAGNOSIS   Inadequate oral intake related to inadequate protein-energy intake as evidenced by intake 0-25%, intake 26-50%, intake 51-75%, other (comment) (pt report of poor appetite)    CURRENT NUTRITION THERAPIES  Diet NPO     PO Intake: Unable to assess (no documented intakes since 9/27)   PO Supplement Intake:Unable to assess (but accepting)    ANTHROPOMETRICS  Current Height: 5' 10\" (177.8 cm)  Current Weight: 197 lb 1.5 oz (89.4 kg)    Admission weight: 205 lb (93 kg) (bed wt)  Ideal Body Weight (IBW): 166 lbs  (75 kg)        BMI: 28.3    COMPARATIVE STANDARDS  Energy (kcal):  6582-7711     Protein (g):         Fluid (mL/day):  1820    The patient will be monitored per nutrition standards of care. Consult dietitian if additional nutrition interventions are needed prior to RD reassessment.      Yesenia Alejo, MS, RD, LD Contact: [phone]

## 2022-10-01 ENCOUNTER — APPOINTMENT (OUTPATIENT)
Dept: GENERAL RADIOLOGY | Age: 77
DRG: 640 | End: 2022-10-01
Payer: COMMERCIAL

## 2022-10-01 PROBLEM — I49.9 ARRHYTHMIA: Status: ACTIVE | Noted: 2022-10-01

## 2022-10-01 LAB
ANION GAP SERPL CALCULATED.3IONS-SCNC: 15 MMOL/L (ref 3–16)
BASOPHILS ABSOLUTE: 0.2 K/UL (ref 0–0.2)
BASOPHILS RELATIVE PERCENT: 1.1 %
BUN BLDV-MCNC: 63 MG/DL (ref 7–20)
CALCIUM SERPL-MCNC: 8.9 MG/DL (ref 8.3–10.6)
CHLORIDE BLD-SCNC: 97 MMOL/L (ref 99–110)
CO2: 23 MMOL/L (ref 21–32)
CREAT SERPL-MCNC: 9.4 MG/DL (ref 0.8–1.3)
EOSINOPHILS ABSOLUTE: 0.6 K/UL (ref 0–0.6)
EOSINOPHILS RELATIVE PERCENT: 4.1 %
GFR AFRICAN AMERICAN: 7
GFR NON-AFRICAN AMERICAN: 5
GLUCOSE BLD-MCNC: 172 MG/DL (ref 70–99)
GLUCOSE BLD-MCNC: 198 MG/DL (ref 70–99)
GLUCOSE BLD-MCNC: 204 MG/DL (ref 70–99)
GLUCOSE BLD-MCNC: 256 MG/DL (ref 70–99)
GLUCOSE BLD-MCNC: 310 MG/DL (ref 70–99)
GLUCOSE BLD-MCNC: 354 MG/DL (ref 70–99)
HCT VFR BLD CALC: 26.5 % (ref 40.5–52.5)
HEMATOLOGY PATH CONSULT: NO
HEMOGLOBIN: 8.7 G/DL (ref 13.5–17.5)
LYMPHOCYTES ABSOLUTE: 1.3 K/UL (ref 1–5.1)
LYMPHOCYTES RELATIVE PERCENT: 9.3 %
MCH RBC QN AUTO: 36.9 PG (ref 26–34)
MCHC RBC AUTO-ENTMCNC: 32.8 G/DL (ref 31–36)
MCV RBC AUTO: 112.2 FL (ref 80–100)
MONOCYTES ABSOLUTE: 0.8 K/UL (ref 0–1.3)
MONOCYTES RELATIVE PERCENT: 5.3 %
NEUTROPHILS ABSOLUTE: 11.6 K/UL (ref 1.7–7.7)
NEUTROPHILS RELATIVE PERCENT: 80.2 %
PDW BLD-RTO: 14.3 % (ref 12.4–15.4)
PERFORMED ON: ABNORMAL
PLATELET # BLD: 317 K/UL (ref 135–450)
PMV BLD AUTO: 7.4 FL (ref 5–10.5)
POTASSIUM REFLEX MAGNESIUM: 4.4 MMOL/L (ref 3.5–5.1)
PROCALCITONIN: 1.53 NG/ML (ref 0–0.15)
RBC # BLD: 2.36 M/UL (ref 4.2–5.9)
SODIUM BLD-SCNC: 135 MMOL/L (ref 136–145)
WBC # BLD: 14.4 K/UL (ref 4–11)

## 2022-10-01 PROCEDURE — 6360000002 HC RX W HCPCS: Performed by: STUDENT IN AN ORGANIZED HEALTH CARE EDUCATION/TRAINING PROGRAM

## 2022-10-01 PROCEDURE — 92526 ORAL FUNCTION THERAPY: CPT

## 2022-10-01 PROCEDURE — 6370000000 HC RX 637 (ALT 250 FOR IP): Performed by: INTERNAL MEDICINE

## 2022-10-01 PROCEDURE — 84145 PROCALCITONIN (PCT): CPT

## 2022-10-01 PROCEDURE — 85025 COMPLETE CBC W/AUTO DIFF WBC: CPT

## 2022-10-01 PROCEDURE — 2580000003 HC RX 258: Performed by: STUDENT IN AN ORGANIZED HEALTH CARE EDUCATION/TRAINING PROGRAM

## 2022-10-01 PROCEDURE — 2700000000 HC OXYGEN THERAPY PER DAY

## 2022-10-01 PROCEDURE — 90945 DIALYSIS ONE EVALUATION: CPT | Performed by: INTERNAL MEDICINE

## 2022-10-01 PROCEDURE — 94640 AIRWAY INHALATION TREATMENT: CPT

## 2022-10-01 PROCEDURE — 94761 N-INVAS EAR/PLS OXIMETRY MLT: CPT

## 2022-10-01 PROCEDURE — 93005 ELECTROCARDIOGRAM TRACING: CPT | Performed by: INTERNAL MEDICINE

## 2022-10-01 PROCEDURE — 99222 1ST HOSP IP/OBS MODERATE 55: CPT | Performed by: INTERNAL MEDICINE

## 2022-10-01 PROCEDURE — 2000000000 HC ICU R&B

## 2022-10-01 PROCEDURE — 71045 X-RAY EXAM CHEST 1 VIEW: CPT

## 2022-10-01 PROCEDURE — 92610 EVALUATE SWALLOWING FUNCTION: CPT

## 2022-10-01 PROCEDURE — 36415 COLL VENOUS BLD VENIPUNCTURE: CPT

## 2022-10-01 PROCEDURE — 89051 BODY FLUID CELL COUNT: CPT

## 2022-10-01 PROCEDURE — 80048 BASIC METABOLIC PNL TOTAL CA: CPT

## 2022-10-01 PROCEDURE — 6370000000 HC RX 637 (ALT 250 FOR IP): Performed by: STUDENT IN AN ORGANIZED HEALTH CARE EDUCATION/TRAINING PROGRAM

## 2022-10-01 RX ADMIN — MIDODRINE HYDROCHLORIDE 5 MG: 5 TABLET ORAL at 17:27

## 2022-10-01 RX ADMIN — Medication 10 ML: at 08:14

## 2022-10-01 RX ADMIN — GENTAMICIN SULFATE: 1 CREAM TOPICAL at 08:14

## 2022-10-01 RX ADMIN — HEPARIN SODIUM 5000 UNITS: 5000 INJECTION INTRAVENOUS; SUBCUTANEOUS at 15:27

## 2022-10-01 RX ADMIN — Medication 2 PUFF: at 08:10

## 2022-10-01 RX ADMIN — INSULIN LISPRO 12 UNITS: 100 INJECTION, SOLUTION INTRAVENOUS; SUBCUTANEOUS at 06:02

## 2022-10-01 RX ADMIN — IPRATROPIUM BROMIDE AND ALBUTEROL SULFATE 1 AMPULE: 2.5; .5 SOLUTION RESPIRATORY (INHALATION) at 08:09

## 2022-10-01 RX ADMIN — ASPIRIN 81 MG 81 MG: 81 TABLET ORAL at 08:14

## 2022-10-01 RX ADMIN — INSULIN LISPRO 30 UNITS: 100 INJECTION, SOLUTION INTRAVENOUS; SUBCUTANEOUS at 08:15

## 2022-10-01 RX ADMIN — LEVOTHYROXINE SODIUM 50 MCG: 0.03 TABLET ORAL at 08:13

## 2022-10-01 RX ADMIN — HEPARIN SODIUM 5000 UNITS: 5000 INJECTION INTRAVENOUS; SUBCUTANEOUS at 06:02

## 2022-10-01 RX ADMIN — INSULIN LISPRO 30 UNITS: 100 INJECTION, SOLUTION INTRAVENOUS; SUBCUTANEOUS at 13:10

## 2022-10-01 RX ADMIN — INSULIN LISPRO 8 UNITS: 100 INJECTION, SOLUTION INTRAVENOUS; SUBCUTANEOUS at 21:08

## 2022-10-01 RX ADMIN — HEPARIN SODIUM 5000 UNITS: 5000 INJECTION INTRAVENOUS; SUBCUTANEOUS at 21:08

## 2022-10-01 RX ADMIN — INSULIN LISPRO 30 UNITS: 100 INJECTION, SOLUTION INTRAVENOUS; SUBCUTANEOUS at 17:48

## 2022-10-01 RX ADMIN — Medication 2 PUFF: at 19:37

## 2022-10-01 RX ADMIN — IPRATROPIUM BROMIDE AND ALBUTEROL SULFATE 1 AMPULE: 2.5; .5 SOLUTION RESPIRATORY (INHALATION) at 19:37

## 2022-10-01 RX ADMIN — INSULIN GLARGINE 40 UNITS: 100 INJECTION, SOLUTION SUBCUTANEOUS at 08:15

## 2022-10-01 RX ADMIN — MIDODRINE HYDROCHLORIDE 5 MG: 5 TABLET ORAL at 08:14

## 2022-10-01 RX ADMIN — Medication 10 ML: at 21:10

## 2022-10-01 RX ADMIN — BUPROPION HYDROCHLORIDE 100 MG: 100 TABLET, FILM COATED ORAL at 08:14

## 2022-10-01 RX ADMIN — ATORVASTATIN CALCIUM 80 MG: 80 TABLET, FILM COATED ORAL at 21:08

## 2022-10-01 RX ADMIN — TORSEMIDE 100 MG: 100 TABLET ORAL at 08:14

## 2022-10-01 ASSESSMENT — PAIN SCALES - GENERAL: PAINLEVEL_OUTOF10: 0

## 2022-10-01 NOTE — CARE COORDINATION
Discharge Planning:     (CM) called [facility] admissions staff, [facility] ([phone]), and left a voicemail requesting a callback with pre-cert update. CM provided callback information.       Roxy DIAZ, ARJUN, [facility] -   [phone]    Electronically signed by JOE Alfaro on 10/1/2022 at 11:11 AM

## 2022-10-01 NOTE — CONSULTS
[address] [facility]  [phone]        Reason for Consultation/Chief Complaint: \" Cardiac arrhythmia. \"    History of Present Illness:  Zacarias Louie is a 69 y.o. patient who presented to the hospital with complain shortness of breath and altered mental status. Cardiology was consulted for cardiac arrhythmia. Patient denies chest discomfort or significant shortness of breath. He has history of untreated CPAP. Apparently he does not tolerate wearing his BiPAP. Has had some lightheadedness in the past but sounds infrequent and no recurrent episodes of passing out. Apparently he does have dementia. Past Medical History:   has a past medical history of Allergic rhinitis due to other allergen, Coronary atherosclerosis of unspecified type of vessel, native or graft, Diabetic eye exam ([facility] [address] Diabetic eye exam ([facility] [address] Generalized osteoarthrosis, involving multiple sites, Other psoriasis, Pneumonia, Prolonged emergence from general anesthesia, Psoriatic arthropathy ([address] Type II or unspecified type diabetes mellitus without mention of complication, not stated as uncontrolled, Unspecified essential hypertension, and Unspecified sleep apnea. Surgical History:   has a past surgical history that includes Coronary artery bypass graft; Carpal tunnel release; joint replacement; Cataract removal; and LAPAROSCOPY INSERTION PERITONEAL CATHETER (N/A, 10/26/2020). Social History:   reports that he quit smoking about 41 years ago. He started smoking about 60 years ago. He has a 18.00 pack-year smoking history. He has never used smokeless tobacco. He reports that he does not drink alcohol and does not use drugs. Family History:  family history includes Coronary Art Dis in his father; Diabetes in his mother; Heart Failure in his mother. Home Medications:  Were reviewed and are listed in nursing record.  and/or listed below  Prior to Admission medications    Medication Sig Start Date End Date Taking?  Authorizing Provider   albuterol sulfate (PROAIR RESPICLICK) 196 (90 Base) MCG/ACT aerosol powder inhalation Inhale 2 puffs into the lungs every 4 hours as needed for Wheezing or Shortness of Breath 9/19/22   Tello Stovall MD   buPROPion (WELLBUTRIN) 100 MG tablet Take 1 tablet by mouth every other day 9/21/22   Tello Stovall MD   epoetin daphney-epbx (RETACRIT) 2000 UNIT/ML injection Inject 1 mL into the skin three times a week 9/21/22 10/21/22  Tello Stovall MD   insulin glargine (LANTUS SOLOSTAR) 100 UNIT/ML injection pen Inject 50 Units into the skin nightly  Patient taking differently: Inject 40 Units into the skin nightly 9/19/22   Tello Stovall MD   insulin lispro, 1 Unit Dial, 100 UNIT/ML SOPN Inject 0-4 Units into the skin 3 times daily (with meals) 9/19/22   Tello Stovall MD   insulin lispro, 1 Unit Dial, 100 UNIT/ML SOPN Inject 0-4 Units into the skin nightly 9/19/22   Ogonna Chisolum MD Sia   BREO ELLIPTA 200-25 MCG/INH AEPB inhaler Inhale 1 puff into the lungs daily    Historical Provider, MD   atorvastatin (LIPITOR) 80 MG tablet TAKE ONE TABLET BY MOUTH ONCE NIGHTLY 9/2/22   Clementine Mckeon MD   Easy Touch Lancets 30G/Twist MISC USE THREE TIMES A DAY TO FOUR TIMES A DAY 7/21/22   Clementine Mckeon MD   levothyroxine (SYNTHROID) 50 MCG tablet TAKE ONE TABLET BY MOUTH DAILY 6/30/22   Clementine Mckeon MD   insulin detemir (LEVEMIR FLEXTOUCH) 100 UNIT/ML injection pen 56-66 units daily 6/21/22 9/19/22  Clementine Mckeon MD   insulin aspart (NOVOLOG FLEXPEN) 100 UNIT/ML injection pen 32-42  units AC TID 6/21/22 9/19/22  Clementine Mckeon MD   B-D ULTRAFINE III SHORT PEN 31G X 8 MM MISC USE WITH INSULIN FOUR TIMES A DAY 5/17/22   Belle Ruiz MD   midodrine (PROAMATINE) 5 MG tablet Take 1 tablet by mouth in the morning and at bedtime 3/25/22 9/19/22  Rome Sheldon MD   Blood Glucose Monitoring Suppl (TRUE METRIX METER) Bin Gip As needed 3/11/22   Jennifer Breaux Aylin Kellye MD   blood glucose monitor strips Test up to 4 times daily for blood sugar monitoring 2/15/22   Quique Tapia MD   blood glucose monitor kit and supplies (PLEASE DISPENSE WHAT INS WILL COVER) - Dispense sufficient amount for  TID testing frequency plus additional to accommodate PRN testing needs. Dispense all needed supplies to include: monitor, strips, lancing device, lancets, control solutions, alcohol swabs. 2/8/22   Quique Tapia MD   B Complex-C-Biotin-E-Min-FA (DIALYVITE 3000) 3 MG TABS Take 1 tablet by mouth daily    Historical Provider, MD   vitamin D (ERGOCALCIFEROL) 1.25 MG (79048 UT) CAPS capsule Take 50,000 Units by mouth every 3 months 7/14/21   Historical Provider, MD   triamcinolone (KENALOG) 0.1 % ointment Apply topically 2 times daily. 5/25/21 9/19/22  Leeanna Malhotra MD   Handicap Placard MISC by Does not apply route Duration: 5 years 4/18/18   Teresita Augustine MD   aspirin 81 MG chewable tablet Take 1 tablet by mouth daily. 3/28/12   Katherine Matson MD        Allergies:  Penicillins, Sulfa antibiotics, Tazorac [tazarotene], Clindamycin, and Clindamycin/lincomycin     Review of Systems:   A complete review of systems has been reviewed and updated today and is negative except as noted in the history of present illness.       Physical Examination:    Vitals:    10/01/22 1248   BP:    Pulse:    Resp:    Temp: 97.1 °F (36.2 °C)   SpO2:     Weight: 192 lb 7.4 oz (87.3 kg)         General Appearance:  Alert, cooperative, no distress, appears stated age   Head:  Normocephalic, without obvious abnormality, atraumatic   Eyes:  EOMI, conjunctiva/corneas clear       Nose: Nares normal   Throat: Lips normal   Neck: Supple, symmetrical, trachea midline,  no carotid bruit or JVD       Lungs:   Clear to auscultation bilaterally, respirations unlabored   Chest Wall:  No tenderness or deformity   Heart:  Regular rate and rhythm, S1, S2 normal, no significant murmur, rub or gallop   Abdomen:   Soft, non-tender, bowel sounds active all four quadrants,  no masses, no organomegaly           Extremities: Extremities normal, atraumatic, no cyanosis or edema   Pulses: 2+ and symmetric   Skin: Skin color, texture, turgor normal, no rashes or lesions   Pysch: Normal mood and affect   Neurologic: Normal gross motor and sensory exam.         EKG:  I have reviewed EKG with the following interpretation:  Impression:    01-OCT-2022 12:43:55 [facility] ROUTINE RECORD  Sinus rhythm with 1st degree A-V block  Low voltage QRS  Borderline ECG  When compared with ECG of 21-SEP-2022 09:46,  QRS duration has decreased  Minimal criteria for Anterior infarct are no longer Present    TELEMETRY (Personally reviewed by me): Sinus rhythm with marked sinus arrhythmia, PACs    Lab Data:  CBC:   Recent Labs     09/29/22  0427 09/30/22  0447 10/01/22  0428   WBC 13.0* 15.9* 14.4*   HGB 9.1* 8.9* 8.7*   HCT 28.0* 27.1* 26.5*   .8* 111.7* 112.2*    289 317     BMP:   Recent Labs     09/29/22  1844 09/30/22  0447 10/01/22  0428   * 135* 135*   K 4.1 3.4* 4.4   CL 93* 97* 97*   CO2 25 26 23   BUN 68* 65* 63*   CREATININE 10.1* 9.7* 9.4*     LIVER PROFILE: No results for input(s): AST, ALT, LIPASE, BILIDIR, BILITOT, ALKPHOS in the last 72 hours. Invalid input(s): AMYLASE,  ALB  PT/INR: No results for input(s): PROTIME, INR in the last 72 hours. APTT: No results for input(s): APTT in the last 72 hours. BNP:  No results for input(s): BNP in the last 72 hours. Imaging/Procedures:   Echo 3/29/2022:   Summary   Left ventricular cavity size is normal. There is mild concentric left   ventricular hypertrophy. Overall left ventricular systolic function appears   low normal with an ejection fraction of 50-55%. No regional wall motion   abnormalities are noted. Normal diastolic function. The aortic valve   leaflets are slightly thickened /calcified but the valve opens adequately. Mild tricuspid regurgitation.  Estimated pulmonary artery systolic pressure is at 36 mmHg assuming a right atrial pressure of 8 mmHg. Normal right ventricular size and function. TAPSE measures 1.31 cm and the RVS velocity measures 12.1 cm/s. Myoview stress test 8/26/2020:     Summary    Overall findings represent a high risk scan. Normal LV size and systolic function. There is a medium to large size reversible defect involving the apex, apical    anterior, mid anterior and apical lateral cardiac segments. Finding are concerning for ischemia. ECG: Non-diagnostic EKG response due to failure to reach target heart rate. Assessment/Plan:  Principal Problem:    Acute on chronic respiratory failure with hypoxia and hypercapnia (HCC)  Plan: Per primary service. Active Problems:    Acute metabolic encephalopathy  Plan: Much improved. Cardiac arrhythmia  Plan: No clinically significant prolonged pauses. Most suggestive of sinus rhythm with marked sinus arrhythmia. He does have PACs at times and cannot completely exclude sinus exit block. No indication for pacemaker therapy at this time. Thank you for allowing us to participate in the care of Alba Richards. Further evaluation will be based upon the patient's clinical course and testing results. All questions and concerns were addressed to the patient/family. Alternatives to my treatment were discussed. The note was completed using EMR. Every effort was made to ensure accuracy; however, inadvertent computerized transcription errors may be present.     Zonia Obrien M.D.

## 2022-10-01 NOTE — PROGRESS NOTES
Pt assessed. VSS. Aox4. Crackles auscultated in right upper and middle lung lobes, diminished left lung sounds. Active bowel sounds. Moderate radial and weak pedal pulses bilaterally. Medications administered per MD orders, see eMAR. Warm blanket provided and pt repositioned. Call light within reach.

## 2022-10-01 NOTE — PROGRESS NOTES
Hospitalist Progress Note    Name:  Burke Falcon    /Age/Sex: 1945  (69 y.o. male)  MRN & CSN:  [id number] & [id number]    PCP: Loulou Collazo MD    Date of Admission: 2022  Chief Complaint: Altered mental status    Hospital Course: This 67-year-old man was admitted for altered mental status. Found to be hypoxic and hypercapnic on admission. Placed on BiPAP with improvement. Nephrology following as patient has ESRD on peritoneal dialysis. Pulmonology following. Interval history:  Pt seen and examined today. Overnight events noted, interval ancillary notes and labs reviewed. On 2 L nasal cannula satting around 99%; wean as tolerated keep sats above 92%  Afebrile overnight, WBCs up to 14. 4. Procalcitonin, CXR, UA ordered, PD fluid cell count and culture ordered  Per RN, sinus pauses on telemetry; patient completely asymptomatic. Twelve-lead EKG ordered  denied cough, SOB, chest pain, nausea, vomiting or abdominal pain  Awaiting for pre-CERT      Assessment and plan    Acute on chronic hypoxic and hypercapnic respiratory failure:-Improved  Most likely due to volume overload. Elevated proBNP at admission; trended down  Initially diuresed with IV Lasix; switched to torsemide  Continue home inhalers and as needed breathing treatment  Supplemental oxygen as needed to keep sats between 90 to 92%  Pulmonology consulted: appreciate recommendations     History of BJ: Unable to tolerate home BiPAP/CPAP. Will need repeat sleep study outpatient. Acute metabolic encephalopathy: Improved  CT head: No acute abnormality. TSH, Ammonia level wnl. B 12 and folate pending. Supportive care     Type 2 diabetes on long-term insulin:  A1c 6.5 on 2022. Continue basal insulin with sliding scale. ESRD on peritoneal dialysis:  Nephrology consulted: Appreciate input. No evidence of PD site infection or peritonitis. PD fluid cultures negative. Antibiotics discontinued.     Hypotension: Continue midodrine    Depression: Continue Wellbutrin     Hypothyroid: Continue synthroid. Anemia due to ESRD: Continue EPO. Overall patient is medically stable to be discharged   ARU pre-CERT will be started for [facility]    DVT ppx: Heparin  GI ppx: Diet/Tube Feeds  Diet: ADULT DIET; Regular; 4 carb choices (60 gm/meal); Low Phosphorus (Less than 1000 mg)  ADULT ORAL NUTRITION SUPPLEMENT; Breakfast, Lunch; Renal Oral Supplement  Code Status: Full Code  Disposition:    Medications:  Reviewed      Intake/Output Summary (Last 24 hours) at 10/1/2022 1018  Last data filed at 10/1/2022 7449  Gross per 24 hour   Intake 300 ml   Output 1852 ml   Net -1552 ml         Physical Exam Performed:    BP (!) 129/59   Pulse 68   Temp 97.2 °F (36.2 °C) (Temporal)   Resp 16   Ht 5' 10\" (1.778 m)   Wt 192 lb 7.4 oz (87.3 kg)   SpO2 99%   BMI 27.62 kg/m²     General appearance: No apparent distress, appears stated age and cooperative. Somewhat confused, but more conversational today. HEENT: Pupils equal, round, and reactive to light. Conjunctivae/corneas clear. Neck: Supple, with full range of motion. No jugular venous distention. Trachea midline. Respiratory:  Normal respiratory effort. Clear to auscultation, bilaterally without Rales/Wheezes/Rhonchi. Cardiovascular: Regular rate and rhythm with normal S1/S2 without murmurs, rubs or gallops. Trace pitting edema LE bilaterally. Abdomen: Soft, non-tender, non-distended with normal bowel sounds. : No CVA tenderness. Musculoskeletal: No clubbing or cyanosis. Full range of motion without deformity.   Skin: Skin color, texture, turgor normal.  Sacral wound; dressed  Physical exam remains unchanged from 9/25    Labs:   Recent Labs     09/29/22  0427 09/30/22  0447 10/01/22  0428   WBC 13.0* 15.9* 14.4*   HGB 9.1* 8.9* 8.7*   HCT 28.0* 27.1* 26.5*    289 317       Recent Labs     09/29/22  1844 09/30/22  0447 10/01/22  0428   * 135* 135*   K 4.1 3.4* 4.4   CL 93* 97* 97*   CO2 25 26 23   BUN 68* 65* 63*   CREATININE 10.1* 9.7* 9.4*   CALCIUM 9.2 9.1 8.9       No results for input(s): AST, ALT, BILIDIR, BILITOT, ALKPHOS in the last 72 hours. No results for input(s): INR in the last 72 hours. No results for input(s): Bessy Bigness in the last 72 hours. Urinalysis:      Lab Results   Component Value Date/Time    NITRU Negative 09/21/2022 03:18 PM    WBCUA 3 09/21/2022 03:18 PM    BACTERIA None Seen 09/21/2022 03:18 PM    RBCUA 5 09/21/2022 03:18 PM    BLOODU SMALL 09/21/2022 03:18 PM    SPECGRAV 1.015 09/21/2022 03:18 PM    GLUCOSEU 250 09/21/2022 03:18 PM    GLUCOSEU >=1000 03/26/2012 10:22 PM       Radiology:  XR CHEST PORTABLE   Final Result   CHF with pulmonary edema. CT HEAD WO CONTRAST   Final Result   No definite evidence of acute intracranial abnormality on a study   significantly limited by motion/streak artifact as described above.                Active Hospital Problems    Diagnosis     Leukocytosis [D72.829]      Priority: Medium    Acute pulmonary edema (HCC) [J81.0]      Priority: Medium    Multifocal pneumonia [J18.9]      Priority: Medium    Hypervolemia [E87.70]      Priority: Medium    Acute on chronic respiratory failure with hypoxia and hypercapnia (HCC) [J96.21, J96.22]      Priority: Medium    Moderate episode of recurrent major depressive disorder (HCC) [F33.1]      Priority: Medium    BJ treated with BiPAP [G47.33]      Priority: Medium    Acute metabolic encephalopathy [N16.50]      Priority: Medium    Anemia in ESRD (end-stage renal disease) ([facility]) [N18.6, D63.1]     Type 2 diabetes mellitus with hyperglycemia, with long-term current use of insulin ([facility]) [E11.65, Z79.4]     Hypertension associated with stage 5 chronic kidney disease due to type 2 diabetes mellitus ([facility] [address] [E11.22, I12.0, N18.5]     Chronic diastolic heart failure (HCC) [I50.32]     Acute respiratory failure with hypoxia and hypercapnia (HCC) [J96.01, J96.02] Arthritis with psoriasis ([facility] [address] [L40.50]     Psoriatic arthropathy ([facility] [address] [L40.50]     Sleep apnea [G47.30]        Nicolas Germain MD  10/1/2022  10:18 AM      Please note that some part of this chart was generated using Dragon dictation software. Although every effort was made to ensure the accuracy of this automated transcription, some errors in transcription may have occurred inadvertently. If you may need any clarification, please do not hesitate to contact me through [facility].

## 2022-10-01 NOTE — PROGRESS NOTES
Office : [phone]     Fax :[phone]       Nephrology progress  Note      Patient's Name: Dominga Casas  10:48 AM  10/1/2022    Reason for Consult:  ESRD on peritoneal dialysis. Requesting Physician:  Ted Giraldo MD      Chief Complaint:    Chief Complaint   Patient presents with    Altered Mental Status     Patient arrives via [place] from [facility], altered, last seen normal last night, started peritoneal dialysis last night, O2 low on normal 3L, placed on NRB with some improvement, increased WOB noted           History of Present Ilness:    Dominga Casas is a 69 y.o. male with prior history of ESRD on peritoneal dialysis , DM 2, HTN who was sent from [facility] with main c/o SOB. In ER he was found to be hypoxic. CXR shows volume overload. On BiPAP now. Still makes urine . Also had elevated WBC. No abdominal pain. No fever   BS Elevated. Interval hx :    Feels much better   Tolerating PD well       WBC elevated     No abdominal pain   No fever  No discharge from PD site     I/O last 3 completed shifts: In: 1305.3 [P.O.:300;  I.V.:1005.3]  Out: 1852     Past Medical History:   Diagnosis Date    Allergic rhinitis due to other allergen 7/12/2010    Coronary atherosclerosis of unspecified type of vessel, native or graft 7/12/2010    Diabetic eye exam ([address] 04/29/2015    Diabetic eye exam ([address] 5/5/2016    [place] [facility]    Generalized osteoarthrosis, involving multiple sites 7/12/2010    Other psoriasis 7/12/2010    Pneumonia     Prolonged emergence from general anesthesia     Psoriatic arthropathy ([address] 7/12/2010    Type II or unspecified type diabetes mellitus without mention of complication, not stated as uncontrolled 7/12/2010    Unspecified essential hypertension 7/12/2010 Unspecified sleep apnea 7/12/2010       Past Surgical History:   Procedure Laterality Date    CARPAL TUNNEL RELEASE      s/p    CATARACT REMOVAL      CORONARY ARTERY BYPASS GRAFT      JOINT REPLACEMENT      LAPAROSCOPY INSERTION PERITONEAL CATHETER N/A 10/26/2020    LAPAROSCOPIC PERITONEAL DIALYSIS CATHETER PLACEMENT; LAPAROSCOPIC OMENTOPEXY performed by Thom Duffy DO at [facility] OR       Family History   Problem Relation Age of Onset    Diabetes Mother     Heart Failure Mother     Coronary Art Dis Father         reports that he quit smoking about 41 years ago. He started smoking about 60 years ago. He has a 18.00 pack-year smoking history. He has never used smokeless tobacco. He reports that he does not drink alcohol and does not use drugs.         Allergies:  Penicillins, Sulfa antibiotics, Tazorac [tazarotene], Clindamycin, and Clindamycin/lincomycin    Current Medications:    insulin glargine (LANTUS) injection vial 40 Units, Daily  insulin lispro (HUMALOG) injection vial 30 Units, TID WC  traMADol (ULTRAM) tablet 25 mg, Q12H PRN  insulin lispro (HUMALOG) injection vial 0-16 Units, 4x Daily AC & HS  epoetin daphney-epbx (RETACRIT) injection 3,000 Units, Once per day on Mon Wed Fri  torsemide (DEMADEX) tablet 100 mg, Daily  midodrine (PROAMATINE) tablet 5 mg, BID WC  ipratropium-albuterol (DUONEB) nebulizer solution 1 ampule, 4x daily  gentamicin (GARAMYCIN) 0.1 % cream, Daily  sodium chloride flush 0.9 % injection 5-40 mL, 2 times per day  sodium chloride flush 0.9 % injection 5-40 mL, PRN  0.9 % sodium chloride infusion, PRN  ondansetron (ZOFRAN-ODT) disintegrating tablet 4 mg, Q8H PRN   Or  ondansetron (ZOFRAN) injection 4 mg, Q6H PRN  polyethylene glycol (GLYCOLAX) packet 17 g, Daily PRN  acetaminophen (TYLENOL) tablet 650 mg, Q6H PRN   Or  acetaminophen (TYLENOL) suppository 650 mg, Q6H PRN  albuterol (PROVENTIL) nebulizer solution 2.5 mg, Q4H PRN  aspirin chewable tablet 81 mg, Daily  atorvastatin (LIPITOR) tablet 80 mg, Nightly  mometasone-formoterol (DULERA) 200-5 MCG/ACT inhaler 2 puff, BID  buPROPion (WELLBUTRIN) tablet 100 mg, Every Other Day  levothyroxine (SYNTHROID) tablet 50 mcg, Daily  dextrose bolus 10% 125 mL, PRN   Or  dextrose bolus 10% 250 mL, PRN  glucagon (rDNA) injection 1 mg, PRN  dextrose 10 % infusion, Continuous PRN  heparin (porcine) injection 5,000 Units, 3 times per day      Physical exam:     Vitals:  BP (!) 129/59   Pulse 68   Temp 97.2 °F (36.2 °C) (Temporal)   Resp 16   Ht 5' 10\" (1.778 m)   Wt 192 lb 7.4 oz (87.3 kg)   SpO2 99%   BMI 27.62 kg/m²   Constitutional:  OAA X3 NAD  Skin: no rash, turgor wnl  Heent:  eomi, mmm  Neck: no bruits or jvd noted  Cardiovascular:  S1, S2 without m/r/g  Respiratory: b/l crackles   Abdomen:  +bs, soft, nt, nd  Ext: mild  lower extremity edema    Labs:  CBC:   Recent Labs     09/29/22  0427 09/30/22  0447 10/01/22  0428   WBC 13.0* 15.9* 14.4*   HGB 9.1* 8.9* 8.7*    289 317     BMP:    Recent Labs     09/29/22  1844 09/30/22  0447 10/01/22  0428   * 135* 135*   K 4.1 3.4* 4.4   CL 93* 97* 97*   CO2 25 26 23   BUN 68* 65* 63*   CREATININE 10.1* 9.7* 9.4*   GLUCOSE 266* 98 354*     Ca/Mg/Phos:   Recent Labs     09/29/22  1844 09/30/22  0447 10/01/22  0428   CALCIUM 9.2 9.1 8.9   MG  --  2.40  --      Hepatic:   No results for input(s): AST, ALT, ALB, BILITOT, ALKPHOS in the last 72 hours. Troponin:   No results for input(s): TROPONINI in the last 72 hours. BNP: No results for input(s): BNP in the last 72 hours. Lipids: No results for input(s): CHOL, TRIG, HDL, LDLCALC, LABVLDL in the last 72 hours. ABGs:   No results for input(s): PHART, PO2ART, DOO0BGP in the last 72 hours. INR: No results for input(s): INR in the last 72 hours. UA:  No results for input(s): [place], GLUCOSEU, BILIRUBINUR, KETUA, SPECGRAV, BLOODU, PHUR, PROTEINU, UROBILINOGEN, NITRU, LEUKOCYTESUR, Lena Fleet in the last 72 hours.      Urine Microscopic:   No results for input(s): LABCAST, BACTERIA, COMU, HYALCAST, WBCUA, RBCUA, EPIU in the last 72 hours. Urine Culture: No results for input(s): LABURIN in the last 72 hours. Urine Chemistry: No results for input(s): Sunil Kida, PROTEINUR, NAUR in the last 72 hours. IMAGING:  XR CHEST PORTABLE   Final Result   CHF with pulmonary edema. CT HEAD WO CONTRAST   Final Result   No definite evidence of acute intracranial abnormality on a study   significantly limited by motion/streak artifact as described above. XR CHEST PORTABLE    (Results Pending)   XR CHEST PORTABLE    (Results Pending)       Assessment/Plan :      1. ESRD   Volume overload   Better     Continue  CCPD now with 2.5  %     PD fluid for cell count and culture -  negative   No growth   No evidence of peritonitis    Stop ABX     WBC elevated - will rpt PD fluid cell count and culture       2. HTN  BP controlled       3. Anemia in ESRD   On epogen     4. Acid- base disorder. monitor     5. Hypokalemia   Replace potassium       6. Debility. Generalized weakness.  Need PT/OT       Awaiting placement at rehab unit           D/w primary team      Thank you for allowing us to participate in care of Kaye Huynh         Electronically signed by: Sesar Klein MD, 10/1/2022, 10:48 AM      [facility] [address]  Office : [phone]  Fax :[phone]

## 2022-10-01 NOTE — PROGRESS NOTES
Facility/Department: [facility] ICU  Initial Assessment  DYSPHAGIA BEDSIDE SWALLOW EVALUATION     Patient: Jacob Olson   : 1945   MRN: [id number]      Evaluation Date: 10/1/2022   Admitting Diagnosis: Acute respiratory failure with hypoxia and hypercapnia ([facility] [address] [J96.01, J96.02]  Pain: Denies                                                       H&P:    69 y.o. male who presented to [facility] with complaints of altered mental status. Patient is relatively nonverbal at baseline after prior history of illness. Questionable aphasia from stroke versus ongoing dementia. Patient daughter at bedside and was able to provide information. She states the patient was more confused at his nursing facility this morning. Additionally, patient was found to be hypoxic. He was started on oxygen, with little improvement. He was placed on BiPAP with more improvement. Per the daughter, patient is supposed be wearing BiPAP at night, but is noncompliant. There were no other associated symptoms with his confusion. During work-up in the ER, patient was found to have CO2 of 94.8 with pH of 7.095 on admission. Patient does have significant dementia and is aware he is confused, but is relatively nonverbal and unable to answer complex questions. He is able to answer \"yes and no\" questions. Prior tobacco user, quit approximately 4 years ago. No alcohol or drug use. Imaging:  Chest X-ray:  2022  CHF with pulmonary edema. There   are patchy bilateral perihilar and lower lobe airspace opacities along with   small bilateral pleural effusions       Head CT:  2022    Impression   No definite evidence of acute intracranial abnormality on a study   significantly limited by motion/streak artifact as described above.            History/Prior Level of Function:   Reason for referral: SLP evaluation orders received due to coughing with intake     Dysphagia Impressions/Diagnosis: Oropharyngeal Dysphagia Pt alert to person and place this date sitting upright in bed on room air. Pt's oral mechanism exam completed; pt presenting with adequate natural dentition, mild white lingual reside cleared after oral care completed, adequate lingual strength/ROM, mild decreased labial ROM/strength, adequate laryngeal elevation during volitional swallow with palpation, adequate; strong volitional cough. Pt's swallow of puree, dysphagia III and regular textures assessed along with ice chips and thin liquids, pt demonstrated no overt s/s of aspiration with all textures and liquids assessed, adequate bolus formation with timely swallow, minimal oral residue post swallow cleared independently with multiple swallow or alternation of liquids and solids. Recommend pt continue regular texture diet with thin liquids; pills whole with water. Pt benefits from continued swallow assessment to ensure he is receiving safest level of oral intake as well as to continue instruction in swallow compensatory strategies to reduce risk of aspiration. Recommended Diet and Intervention 10/1/2022:  Diet Solids Recommendation:  Regular texture diet  Liquid Consistency Recommendation: Thin liquids  Recommended form of Meds: Meds whole with water     Compensatory Swallowing Strategies: Alternate solids/liquids , Upright as possible with all PO intake , Small bites/sips , Remain upright 30-45 min     SHORT TERM DYSPHAGIA GOALS/PLAN OF CARE: Speech therapy for dysphagia tx 1-2 times to ensure diet tolerance.   Pt will functionally tolerate recommended diet with no overt clinical s/s of aspiration   Pt will functionally tolerate ongoing assessment of swallow function with diet to be determined as indicated   Pt will demonstrate understanding of aspiration risk and precautions via education/demonstration with occasional prompting    Dysphagia Therapeutic Intervention:  Oral Care, Diet Tolerance Monitoring , Patient/Family Education     Discharge Recommendations: Discharge recommendations to be determined pending ongoing follow-up during acute care stay    Patient Positioning: Upright in bed     Current Diet Level (prior to evaluation): Regular texture diet  Thin liquids      Respiratory Status:   [x]Room Air   []O2 via nasal cannula   []Other:    Dentition:  [x]Adequate  []Dentures   []Missing Many Teeth  []Edentulous  []Other:    Baseline Vocal Quality:  [x]Normal  []Dysphonic   []Aphonic   []Hoarse  []Wet  []Weak  []Other:    Volitional Cough:  Elicited: Strong     Volitional Swallow:   []Absent   []Delayed     [x]Adequate     []Required use of drink     Oral Mechanism Exam:  []WFL [x]Mild   [] Moderate  []Severe  []To be assessed  Impaired:   []Left side      []Right side    [x]Labial ROM/Coordination    []Labial Symmetry   []Lingual ROM/Coordination   []Lingual Symmetry  []Gag  []Other:     Oral Phase: [x]WFL [x]Mild   [] Moderate  []Severe  []To be assessed   [x]Impaired/Prolonged Mastication: mild 3 sec per bolus   []Oral Holding:   []Spillage Left:   []Spillage Right:  []Pocketing Left:   []Pocketing Right:   []Decreased Anterior to Posterior Transit:   []Suspected Premature Bolus Loss:   []Lingual/Palatal Residue:   []Other:     Pharyngeal Phase: [x]WFL []Mild   [] Moderate  []Severe  []To be assessed   []Delayed Swallow:   []Suspected Pharyngeal Pooling:   []Decreased Laryngeal Elevation:   []Absent Swallow:  []Wet Vocal Quality:   []Throat Clearing-Immediate:   []Throat Clearing-Delayed:   []Cough-Immediate:   []Cough-Delayed:  []Change in Vital Signs:  []Suspected Delayed Pharyngeal Clearing:  []Other:     Eating Assistance:  [x]Independent  []Setup or clean-up assistance   [] Supervision or touching assistance   [] Partial or moderate assistance   [] Substantial or maximal assistance  [] Dependent     EDUCATION:   Provided education regarding role of SLP, results of assessment, recommendations and general speech pathology plan of care.    [x] Pt verbalized understanding and agreement   [] Pt requires ongoing learning   [] No evidence of comprehension     If patient discharges prior to next visit, this note will serve as discharge.      Treatment time:  Timed Code Treatment Minutes: 0 min  Total Treatment time: 20 minutes     Electronically signed by:    Jesus Peralta M.A., [address]  Speech-Language Pathologist

## 2022-10-01 NOTE — FLOWSHEET NOTE
Disconnected from [facility] per protocol. Effluent: Clear  Total time: 08 hr 17 min   Total UF:  1852 ml. Total Volume:  8007 ml. Dwell time gained:  00 hr 02 min. Pt Tolerated procedure: Pt resting quietly.        10/01/22 0638   Vitals   BP (!) 111/52   Temp 97.3 °F (36.3 °C)   Heart Rate 62   Resp 17   SpO2 100 %   Weight 192 lb 7.4 oz (87.3 kg)   Cycler   Ultrafiltration (UF) (mL) 1852 mL   Average Dwell Time (Hours:Minutes) 90   Lost Dwell Time (Hours:Minutes) 0002

## 2022-10-01 NOTE — PLAN OF CARE
Problem: Discharge Planning  Goal: Discharge to home or other facility with appropriate resources  9/30/2022 2216 by Kaitlynn Yen RN  Outcome: Progressing  Flowsheets (Taken 9/30/2022 2000)  Discharge to home or other facility with appropriate resources:   Identify barriers to discharge with patient and caregiver   Arrange for needed discharge resources and transportation as appropriate   Identify discharge learning needs (meds, wound care, etc)   Refer to discharge planning if patient needs post-hospital services based on physician order or complex needs related to functional status, cognitive ability or social support system  9/30/2022 1142 by Albin Cruz RN  Outcome: Progressing     Problem: Safety - Adult  Goal: Free from fall injury  9/30/2022 2216 by Kaitlynn Yen RN  Outcome: Progressing  Flowsheets (Taken 9/30/2022 2000)  Free From Fall Injury:   Instruct family/caregiver on patient safety   Based on caregiver fall risk screen, instruct family/caregiver to ask for assistance with transferring infant if caregiver noted to have fall risk factors  9/30/2022 1142 by Albin Cruz RN  Outcome: Progressing  Flowsheets (Taken 9/30/2022 1100)  Free From Fall Injury: Instruct family/caregiver on patient safety     Problem: Skin/Tissue Integrity  Goal: Absence of new skin breakdown  Description: 1. Monitor for areas of redness and/or skin breakdown  2. Assess vascular access sites hourly  3. Every 4-6 hours minimum:  Change oxygen saturation probe site  4. Every 4-6 hours:  If on nasal continuous positive airway pressure, respiratory therapy assess nares and determine need for appliance change or resting period.   9/30/2022 2216 by Kaitlynn Yen RN  Outcome: Progressing  9/30/2022 1932 by Ishmael Reyes RN  Note: Plan of Care: Wound 09/21/22 Sacrum Medial;Lower-Dressing/Treatment: Triad hydro/zinc oxide-based hydrophilic paste  Assist patient to reposition every 2 hours while in bed and every hour while in chair. Use green overlay cushion in chair.   9/30/2022 1931 by Gareth Oshea RN  Note: Wound 09/21/22 Sacrum Medial;Lower-Dressing/Treatment: Triad hydro/zinc oxide-based hydrophilic paste  2/49/2305 1142 by Freddy Crisostomo RN  Outcome: Progressing     Problem: Nutrition Deficit:  Goal: Optimize nutritional status  9/30/2022 2216 by Aron Hassan RN  Outcome: Progressing  9/30/2022 1142 by Freddy Crisostomo RN  Outcome: Progressing  Flowsheets (Taken 9/30/2022 1003 by Bridgette Doshi, MS, RD, LD)  Nutrient intake appropriate for improving, restoring, or maintaining nutritional needs:   Recommend appropriate diets, oral nutritional supplements, and vitamin/mineral supplements   Monitor oral intake, labs, and treatment plans     Problem: Pain  Goal: Verbalizes/displays adequate comfort level or baseline comfort level  9/30/2022 2216 by Aron Hassan RN  Outcome: Progressing  Flowsheets (Taken 9/30/2022 2000)  Verbalizes/displays adequate comfort level or baseline comfort level:   Encourage patient to monitor pain and request assistance   Assess pain using appropriate pain scale   Administer analgesics based on type and severity of pain and evaluate response   Implement non-pharmacological measures as appropriate and evaluate response   Consider cultural and social influences on pain and pain management  9/30/2022 1142 by Freddy Crisostomo RN  Outcome: Progressing  Flowsheets (Taken 9/30/2022 0800)  Verbalizes/displays adequate comfort level or baseline comfort level:   Encourage patient to monitor pain and request assistance   Assess pain using appropriate pain scale   Administer analgesics based on type and severity of pain and evaluate response   Implement non-pharmacological measures as appropriate and evaluate response     Problem: ABCDS Injury Assessment  Goal: Absence of physical injury  9/30/2022 2216 by Aron Hassan RN  Outcome: Progressing  Flowsheets (Taken 9/30/2022 2000)  Absence of Physical Injury: Implement safety measures based on patient assessment  9/30/2022 1142 by Tanya Oneil RN  Outcome: Progressing  Flowsheets (Taken 9/30/2022 1100)  Absence of Physical Injury: Implement safety measures based on patient assessment     Problem: Chronic Conditions and Co-morbidities  Goal: Patient's chronic conditions and co-morbidity symptoms are monitored and maintained or improved  9/30/2022 2216 by Hari Singh RN  Outcome: Progressing  Flowsheets (Taken 9/30/2022 2000)  Care Plan - Patient's Chronic Conditions and Co-Morbidity Symptoms are Monitored and Maintained or Improved:   Monitor and assess patient's chronic conditions and comorbid symptoms for stability, deterioration, or improvement   Collaborate with multidisciplinary team to address chronic and comorbid conditions and prevent exacerbation or deterioration   Update acute care plan with appropriate goals if chronic or comorbid symptoms are exacerbated and prevent overall improvement and discharge  9/30/2022 1142 by Tanya Oneil, RN  Outcome: Progressing

## 2022-10-02 LAB
APPEARANCE FLUID: CLEAR
CELL COUNT FLUID TYPE: NORMAL
CLOT EVALUATION: NORMAL
COLOR FLUID: COLORLESS
EKG ATRIAL RATE: 79 BPM
EKG DIAGNOSIS: NORMAL
EKG P AXIS: 21 DEGREES
EKG P-R INTERVAL: 226 MS
EKG Q-T INTERVAL: 380 MS
EKG QRS DURATION: 74 MS
EKG QTC CALCULATION (BAZETT): 435 MS
EKG R AXIS: 23 DEGREES
EKG T AXIS: 69 DEGREES
EKG VENTRICULAR RATE: 79 BPM
GLUCOSE BLD-MCNC: 228 MG/DL (ref 70–99)
GLUCOSE BLD-MCNC: 252 MG/DL (ref 70–99)
GLUCOSE BLD-MCNC: 261 MG/DL (ref 70–99)
GLUCOSE BLD-MCNC: 272 MG/DL (ref 70–99)
GLUCOSE BLD-MCNC: 342 MG/DL (ref 70–99)
LYMPHOCYTES, BODY FLUID: 21 %
MACROPHAGE FLUID: 23 %
MESOTHELIAL FLUID: 6 %
NEUTROPHIL, FLUID: 50 %
NUCLEATED CELLS FLUID: 22 /CUMM
NUMBER OF CELLS COUNTED FLUID: 100
PERFORMED ON: ABNORMAL
RBC FLUID: <1000 /CUMM
VOLUME: 10 ML

## 2022-10-02 PROCEDURE — 6370000000 HC RX 637 (ALT 250 FOR IP): Performed by: INTERNAL MEDICINE

## 2022-10-02 PROCEDURE — 90945 DIALYSIS ONE EVALUATION: CPT | Performed by: INTERNAL MEDICINE

## 2022-10-02 PROCEDURE — 6360000002 HC RX W HCPCS: Performed by: STUDENT IN AN ORGANIZED HEALTH CARE EDUCATION/TRAINING PROGRAM

## 2022-10-02 PROCEDURE — 93010 ELECTROCARDIOGRAM REPORT: CPT | Performed by: INTERNAL MEDICINE

## 2022-10-02 PROCEDURE — 90945 DIALYSIS ONE EVALUATION: CPT

## 2022-10-02 PROCEDURE — 6370000000 HC RX 637 (ALT 250 FOR IP): Performed by: STUDENT IN AN ORGANIZED HEALTH CARE EDUCATION/TRAINING PROGRAM

## 2022-10-02 PROCEDURE — 94761 N-INVAS EAR/PLS OXIMETRY MLT: CPT

## 2022-10-02 PROCEDURE — 2000000000 HC ICU R&B

## 2022-10-02 PROCEDURE — 87015 SPECIMEN INFECT AGNT CONCNTJ: CPT

## 2022-10-02 PROCEDURE — 2580000003 HC RX 258: Performed by: STUDENT IN AN ORGANIZED HEALTH CARE EDUCATION/TRAINING PROGRAM

## 2022-10-02 PROCEDURE — 94640 AIRWAY INHALATION TREATMENT: CPT

## 2022-10-02 PROCEDURE — 87205 SMEAR GRAM STAIN: CPT

## 2022-10-02 PROCEDURE — 99233 SBSQ HOSP IP/OBS HIGH 50: CPT | Performed by: INTERNAL MEDICINE

## 2022-10-02 PROCEDURE — 87070 CULTURE OTHR SPECIMN AEROBIC: CPT

## 2022-10-02 PROCEDURE — 94660 CPAP INITIATION&MGMT: CPT

## 2022-10-02 RX ORDER — INSULIN GLARGINE 100 [IU]/ML
45 INJECTION, SOLUTION SUBCUTANEOUS DAILY
Status: DISCONTINUED | OUTPATIENT
Start: 2022-10-03 | End: 2022-10-03

## 2022-10-02 RX ADMIN — Medication 10 ML: at 21:31

## 2022-10-02 RX ADMIN — LEVOTHYROXINE SODIUM 50 MCG: 0.03 TABLET ORAL at 05:58

## 2022-10-02 RX ADMIN — GENTAMICIN SULFATE: 1 CREAM TOPICAL at 15:54

## 2022-10-02 RX ADMIN — INSULIN GLARGINE 40 UNITS: 100 INJECTION, SOLUTION SUBCUTANEOUS at 08:36

## 2022-10-02 RX ADMIN — INSULIN LISPRO 12 UNITS: 100 INJECTION, SOLUTION INTRAVENOUS; SUBCUTANEOUS at 21:00

## 2022-10-02 RX ADMIN — INSULIN LISPRO 30 UNITS: 100 INJECTION, SOLUTION INTRAVENOUS; SUBCUTANEOUS at 08:37

## 2022-10-02 RX ADMIN — Medication 2 PUFF: at 19:38

## 2022-10-02 RX ADMIN — ATORVASTATIN CALCIUM 80 MG: 80 TABLET, FILM COATED ORAL at 21:00

## 2022-10-02 RX ADMIN — INSULIN LISPRO 4 UNITS: 100 INJECTION, SOLUTION INTRAVENOUS; SUBCUTANEOUS at 16:51

## 2022-10-02 RX ADMIN — INSULIN LISPRO 30 UNITS: 100 INJECTION, SOLUTION INTRAVENOUS; SUBCUTANEOUS at 16:51

## 2022-10-02 RX ADMIN — IPRATROPIUM BROMIDE AND ALBUTEROL SULFATE 1 AMPULE: 2.5; .5 SOLUTION RESPIRATORY (INHALATION) at 17:30

## 2022-10-02 RX ADMIN — INSULIN LISPRO 8 UNITS: 100 INJECTION, SOLUTION INTRAVENOUS; SUBCUTANEOUS at 08:37

## 2022-10-02 RX ADMIN — IPRATROPIUM BROMIDE AND ALBUTEROL SULFATE 1 AMPULE: 2.5; .5 SOLUTION RESPIRATORY (INHALATION) at 08:12

## 2022-10-02 RX ADMIN — MIDODRINE HYDROCHLORIDE 5 MG: 5 TABLET ORAL at 16:53

## 2022-10-02 RX ADMIN — MIDODRINE HYDROCHLORIDE 5 MG: 5 TABLET ORAL at 08:35

## 2022-10-02 RX ADMIN — IPRATROPIUM BROMIDE AND ALBUTEROL SULFATE 1 AMPULE: 2.5; .5 SOLUTION RESPIRATORY (INHALATION) at 19:38

## 2022-10-02 RX ADMIN — HEPARIN SODIUM 5000 UNITS: 5000 INJECTION INTRAVENOUS; SUBCUTANEOUS at 05:58

## 2022-10-02 RX ADMIN — HEPARIN SODIUM 5000 UNITS: 5000 INJECTION INTRAVENOUS; SUBCUTANEOUS at 21:00

## 2022-10-02 RX ADMIN — ASPIRIN 81 MG 81 MG: 81 TABLET ORAL at 08:35

## 2022-10-02 RX ADMIN — Medication 10 ML: at 08:36

## 2022-10-02 RX ADMIN — GENTAMICIN SULFATE: 1 CREAM TOPICAL at 08:39

## 2022-10-02 RX ADMIN — INSULIN LISPRO 30 UNITS: 100 INJECTION, SOLUTION INTRAVENOUS; SUBCUTANEOUS at 12:43

## 2022-10-02 RX ADMIN — TORSEMIDE 100 MG: 100 TABLET ORAL at 08:44

## 2022-10-02 RX ADMIN — IPRATROPIUM BROMIDE AND ALBUTEROL SULFATE 1 AMPULE: 2.5; .5 SOLUTION RESPIRATORY (INHALATION) at 11:41

## 2022-10-02 RX ADMIN — INSULIN LISPRO 8 UNITS: 100 INJECTION, SOLUTION INTRAVENOUS; SUBCUTANEOUS at 12:44

## 2022-10-02 RX ADMIN — Medication 2 PUFF: at 08:12

## 2022-10-02 RX ADMIN — HEPARIN SODIUM 5000 UNITS: 5000 INJECTION INTRAVENOUS; SUBCUTANEOUS at 14:56

## 2022-10-02 RX ADMIN — ACETAMINOPHEN 650 MG: 325 TABLET ORAL at 14:56

## 2022-10-02 ASSESSMENT — PAIN DESCRIPTION - DESCRIPTORS: DESCRIPTORS: SORE

## 2022-10-02 ASSESSMENT — PAIN DESCRIPTION - LOCATION: LOCATION: COCCYX

## 2022-10-02 ASSESSMENT — PAIN SCALES - GENERAL: PAINLEVEL_OUTOF10: 3

## 2022-10-02 ASSESSMENT — PAIN DESCRIPTION - ORIENTATION: ORIENTATION: MID

## 2022-10-02 NOTE — PROGRESS NOTES
Office : [phone]     Fax :[phone]       Nephrology progress  Note      Patient's Name: Selina Sharma  11:46 AM  10/2/2022    Reason for Consult:  ESRD on peritoneal dialysis. Requesting Physician:  Maida Dominguez MD      Chief Complaint:    Chief Complaint   Patient presents with    Altered Mental Status     Patient arrives via [place] from [facility], altered, last seen normal last night, started peritoneal dialysis last night, O2 low on normal 3L, placed on NRB with some improvement, increased WOB noted           History of Present Ilness:    Selina Sharma is a 69 y.o. male with prior history of ESRD on peritoneal dialysis , DM 2, HTN who was sent from [facility] with main c/o SOB. In ER he was found to be hypoxic. CXR shows volume overload. On BiPAP now. Still makes urine . Also had elevated WBC. No abdominal pain. No fever   BS Elevated. Interval hx :    Feels much better   Tolerating PD well       WBC elevated     No abdominal pain   No fever  No discharge from PD site     I/O last 3 completed shifts:   In: 56 [P.O.:310]  Out: 8022     Past Medical History:   Diagnosis Date    Allergic rhinitis due to other allergen 7/12/2010    Coronary atherosclerosis of unspecified type of vessel, native or graft 7/12/2010    Diabetic eye exam ([address] 04/29/2015    Diabetic eye exam ([address] 5/5/2016    [place] [facility]    Generalized osteoarthrosis, involving multiple sites 7/12/2010    Other psoriasis 7/12/2010    Pneumonia     Prolonged emergence from general anesthesia     Psoriatic arthropathy ([address] 7/12/2010    Type II or unspecified type diabetes mellitus without mention of complication, not stated as uncontrolled 7/12/2010    Unspecified essential hypertension 7/12/2010    Unspecified sleep apnea 7/12/2010       Past Surgical History:   Procedure Laterality Date    CARPAL TUNNEL RELEASE      s/p    CATARACT REMOVAL      CORONARY ARTERY BYPASS GRAFT      JOINT REPLACEMENT      LAPAROSCOPY INSERTION PERITONEAL CATHETER N/A 10/26/2020    LAPAROSCOPIC PERITONEAL DIALYSIS CATHETER PLACEMENT; LAPAROSCOPIC OMENTOPEXY performed by Celia Granados DO at [facility] OR       Family History   Problem Relation Age of Onset    Diabetes Mother     Heart Failure Mother     Coronary Art Dis Father         reports that he quit smoking about 41 years ago. He started smoking about 60 years ago. He has a 18.00 pack-year smoking history. He has never used smokeless tobacco. He reports that he does not drink alcohol and does not use drugs.         Allergies:  Penicillins, Sulfa antibiotics, Tazorac [tazarotene], Clindamycin, and Clindamycin/lincomycin    Current Medications:    [START ON 10/3/2022] insulin glargine (LANTUS) injection vial 45 Units, Daily  insulin lispro (HUMALOG) injection vial 30 Units, TID WC  insulin lispro (HUMALOG) injection vial 0-16 Units, 4x Daily AC & HS  epoetin daphney-epbx (RETACRIT) injection 3,000 Units, Once per day on Mon Wed Fri  torsemide (DEMADEX) tablet 100 mg, Daily  midodrine (PROAMATINE) tablet 5 mg, BID WC  ipratropium-albuterol (DUONEB) nebulizer solution 1 ampule, 4x daily  gentamicin (GARAMYCIN) 0.1 % cream, Daily  sodium chloride flush 0.9 % injection 5-40 mL, 2 times per day  sodium chloride flush 0.9 % injection 5-40 mL, PRN  0.9 % sodium chloride infusion, PRN  ondansetron (ZOFRAN-ODT) disintegrating tablet 4 mg, Q8H PRN   Or  ondansetron (ZOFRAN) injection 4 mg, Q6H PRN  polyethylene glycol (GLYCOLAX) packet 17 g, Daily PRN  acetaminophen (TYLENOL) tablet 650 mg, Q6H PRN   Or  acetaminophen (TYLENOL) suppository 650 mg, Q6H PRN  albuterol (PROVENTIL) nebulizer solution 2.5 mg, Q4H PRN  aspirin chewable tablet 81 mg, Daily  atorvastatin (LIPITOR) tablet 80 mg, Nightly  mometasone-formoterol (DULERA) 200-5 MCG/ACT inhaler 2 puff, BID  buPROPion (WELLBUTRIN) tablet 100 mg, Every Other Day  levothyroxine (SYNTHROID) tablet 50 mcg, Daily  dextrose bolus 10% 125 mL, PRN   Or  dextrose bolus 10% 250 mL, PRN  glucagon (rDNA) injection 1 mg, PRN  dextrose 10 % infusion, Continuous PRN  heparin (porcine) injection 5,000 Units, 3 times per day      Physical exam:     Vitals:  BP (!) 123/52   Pulse 81   Temp 98.8 °F (37.1 °C) (Temporal)   Resp 16   Ht 5' 10\" (1.778 m)   Wt 200 lb 6.4 oz (90.9 kg)   SpO2 94%   BMI 28.75 kg/m²   Constitutional:  OAA X3 NAD  Skin: no rash, turgor wnl  Heent:  eomi, mmm  Neck: no bruits or jvd noted  Cardiovascular:  S1, S2 without m/r/g  Respiratory: b/l crackles   Abdomen:  +bs, soft, nt, nd  Ext: mild  lower extremity edema    Labs:  CBC:   Recent Labs     09/30/22  0447 10/01/22  0428   WBC 15.9* 14.4*   HGB 8.9* 8.7*    317     BMP:    Recent Labs     09/29/22  1844 09/30/22  0447 10/01/22  0428   * 135* 135*   K 4.1 3.4* 4.4   CL 93* 97* 97*   CO2 25 26 23   BUN 68* 65* 63*   CREATININE 10.1* 9.7* 9.4*   GLUCOSE 266* 98 354*     Ca/Mg/Phos:   Recent Labs     09/29/22  1844 09/30/22  0447 10/01/22  0428   CALCIUM 9.2 9.1 8.9   MG  --  2.40  --      Hepatic:   No results for input(s): AST, ALT, ALB, BILITOT, ALKPHOS in the last 72 hours. Troponin:   No results for input(s): TROPONINI in the last 72 hours. BNP: No results for input(s): BNP in the last 72 hours. Lipids: No results for input(s): CHOL, TRIG, HDL, LDLCALC, LABVLDL in the last 72 hours. ABGs:   No results for input(s): PHART, PO2ART, MOC1BRW in the last 72 hours. INR: No results for input(s): INR in the last 72 hours. UA:  No results for input(s): Ileene Sham, GLUCOSEU, BILIRUBINUR, KETUA, SPECGRAV, BLOODU, PHUR, PROTEINU, UROBILINOGEN, NITRU, LEUKOCYTESUR, Oris [place] in the last 72 hours.      Urine Microscopic:   No results for input(s): LABCAST, BACTERIA, COMU, HYALCAST, LUDWIG Pineda in the last 72 hours. Urine Culture: No results for input(s): LABURIN in the last 72 hours. Urine Chemistry: No results for input(s): Lunda Flair, PROTEINUR, NAUR in the last 72 hours. IMAGING:  XR CHEST PORTABLE   Final Result   Low volume study with trace bilateral pleural effusions and persistent   bibasilar atelectasis or airspace disease. XR CHEST PORTABLE   Final Result   CHF with pulmonary edema. CT HEAD WO CONTRAST   Final Result   No definite evidence of acute intracranial abnormality on a study   significantly limited by motion/streak artifact as described above. Assessment/Plan :      1. ESRD   Volume overload   Better     Continue  CCPD now with 2.5  %     PD fluid for cell count and culture -  negative   No growth   No evidence of peritonitis    Stop ABX     WBC elevated -  rpt PD fluid cell count - normal    Latest Reference Range & Units 10/2/22 08:49   Source + CELL CT/DIFF-BF  Peritoneal dial   Appearance, Fluid  Clear   Color, Fluid  Colorless   RBC, Fluid /cumm <1,000   Lymphocytes, Body Fluid % 21   Neutrophil Count, Fluid % 50   Nucl Cell, Fluid /cumm 22   Mesothelial, Fluid % 6   Clot Eval.  see below   Number of Cells Counted Fluid  100       2. HTN  BP controlled       3. Anemia in ESRD   On epogen     4. Acid- base disorder. monitor     5. Hypokalemia   Replace potassium       6. Debility. Generalized weakness.  Need PT/OT       Awaiting placement at rehab unit           D/w primary team      Thank you for allowing us to participate in care of Regan Nunez         Electronically signed by: Peggy Oliveira MD, 10/2/2022, 11:46 AM      [facility] [address]  Office : [phone]  Fax :[phone]

## 2022-10-02 NOTE — FLOWSHEET NOTE
CCPD order:   Use 2.5  % solution              Continuous Cycling Peritoneal Dialysis (CCPD): Patient Communication     Not Released  Not seen     Order Questions    Question Answer   Total Volume (L) 8   Therapy Time Duration (Hours) 8   Exchange Volume (L) 2   Number of Exchanges 4   Final Fill No       UF: 630 ML  Total Time: 8 hours and 20 minutes  Alarm: 4 times over night  CCPD removed fluid: color Clear, light yellow ; without fibrin noticed  Pt tolerated well over night with CCPD  Plan to put pt on CCPD this evening if pt's still at hospital  Placed CCPD flow sheets in the chart for EMR scan  ** Lab collected : peritoneal effluent cell count and culture.     10/02/22 0800   Vital Signs   BP (!) 123/52   Temp 98.8 °F (37.1 °C)   Heart Rate 74   Resp 16   SpO2 94 %   Weight 200 lb 6.4 oz (90.9 kg)   Weight Method Bed scale   Percent Weight Change -0.11   Pain Assessment   Pain Assessment None - Denies Pain   Patient's Stated Pain Goal 0 - No pain   Observations & Evaluations   Level of Consciousness 0   O2 Device None (Room air)

## 2022-10-02 NOTE — PROGRESS NOTES
Hospitalist Progress Note    Name:  Demetria Roper    /Age/Sex: 1945  (69 y.o. male)  MRN & CSN:  [id number] & [id number]    PCP: Pat Capps MD    Date of Admission: 2022  Chief Complaint: Altered mental status    Hospital Course: This 69-year-old man was admitted for altered mental status. Found to be hypoxic and hypercapnic on admission. Placed on BiPAP with improvement. Nephrology following as patient has ESRD on peritoneal dialysis. Pulmonology following. Interval history:  Pt seen and examined today. Overnight events noted, interval ancillary notes and labs reviewed. On room air satting well, afebrile overnight, WBCs trended down to 14.4K. Cultures NGTD  denied cough, SOB, chest pain, nausea, vomiting or abdominal pain  Awaiting for pre-CERT      Assessment and plan    Acute on chronic hypoxic and hypercapnic respiratory failure:-Improved  Most likely due to volume overload. Elevated proBNP at admission; trended down  Initially diuresed with IV Lasix; switched to torsemide  Continue home inhalers and as needed breathing treatment  Supplemental oxygen as needed to keep sats between 90 to 92%  Pulmonology consulted: appreciate recommendations     Cardiac arrhythmias; no clinically second prolonged pauses  Cardiology consult; no indication for pacemaker at this time  Monitor on telemetry    History of BJ: Unable to tolerate home BiPAP/CPAP. Will need repeat sleep study outpatient. Acute metabolic encephalopathy: Improved  CT head: No acute abnormality. TSH, Ammonia level wnl. B 12 and folate pending. Supportive care     Type 2 diabetes on long-term insulin:  A1c 6.5 on 2022. Continue basal insulin with sliding scale. ESRD on peritoneal dialysis:  Nephrology consulted: Appreciate input. No evidence of PD site infection or peritonitis. PD fluid cultures negative. Antibiotics discontinued.     Hypotension: Continue midodrine    Depression: Continue Wellbutrin Hypothyroid: Continue synthroid. Anemia due to ESRD: Continue EPO. Overall patient is medically stable to be discharged   ARU pre-CERT will be started for [employer]    DVT ppx: Heparin  GI ppx: Diet/Tube Feeds  Diet: ADULT DIET; Regular; 4 carb choices (60 gm/meal); Low Phosphorus (Less than 1000 mg)  ADULT ORAL NUTRITION SUPPLEMENT; Breakfast, Lunch; Renal Oral Supplement  Code Status: Full Code  Disposition:    Medications:  Reviewed      Intake/Output Summary (Last 24 hours) at 10/2/2022 1017  Last data filed at 10/2/2022 0400  Gross per 24 hour   Intake 250 ml   Output 0 ml   Net 250 ml         Physical Exam Performed:    BP (!) 123/52   Pulse 80   Temp 98.8 °F (37.1 °C) (Temporal)   Resp 16   Ht 5' 10\" (1.778 m)   Wt 200 lb 9.9 oz (91 kg)   SpO2 94%   BMI 28.79 kg/m²     General appearance: No apparent distress, appears stated age and cooperative. Somewhat confused, but more conversational today. HEENT: Pupils equal, round, and reactive to light. Conjunctivae/corneas clear. Neck: Supple, with full range of motion. No jugular venous distention. Trachea midline. Respiratory:  Normal respiratory effort. Clear to auscultation, bilaterally without Rales/Wheezes/Rhonchi. Cardiovascular: Regular rate and rhythm with normal S1/S2 without murmurs, rubs or gallops. Trace pitting edema LE bilaterally. Abdomen: Soft, non-tender, non-distended with normal bowel sounds. : No CVA tenderness. Musculoskeletal: No clubbing or cyanosis. Full range of motion without deformity.   Skin: Skin color, texture, turgor normal.  Sacral wound; dressed  Physical exam remains unchanged from 9/25    Labs:   Recent Labs     09/30/22  0447 10/01/22  0428   WBC 15.9* 14.4*   HGB 8.9* 8.7*   HCT 27.1* 26.5*    317       Recent Labs     09/29/22  1844 09/30/22  0447 10/01/22  0428   * 135* 135*   K 4.1 3.4* 4.4   CL 93* 97* 97*   CO2 25 26 23   BUN 68* 65* 63*   CREATININE 10.1* 9.7* 9.4*   CALCIUM 9.2 9.1 8.9 No results for input(s): AST, ALT, BILIDIR, BILITOT, ALKPHOS in the last 72 hours. No results for input(s): INR in the last 72 hours. No results for input(s): [place] in the last 72 hours. Urinalysis:      Lab Results   Component Value Date/Time    NITRU Negative 09/21/2022 03:18 PM    WBCUA 3 09/21/2022 03:18 PM    BACTERIA None Seen 09/21/2022 03:18 PM    RBCUA 5 09/21/2022 03:18 PM    BLOODU SMALL 09/21/2022 03:18 PM    SPECGRAV 1.015 09/21/2022 03:18 PM    GLUCOSEU 250 09/21/2022 03:18 PM    GLUCOSEU >=1000 03/26/2012 10:22 PM       Radiology:  XR CHEST PORTABLE   Final Result   Low volume study with trace bilateral pleural effusions and persistent   bibasilar atelectasis or airspace disease. XR CHEST PORTABLE   Final Result   CHF with pulmonary edema. CT HEAD WO CONTRAST   Final Result   No definite evidence of acute intracranial abnormality on a study   significantly limited by motion/streak artifact as described above.                Active Hospital Problems    Diagnosis     Arrhythmia [I49.9]      Priority: Medium    Leukocytosis [D72.829]      Priority: Medium    Acute pulmonary edema (HCC) [J81.0]      Priority: Medium    Multifocal pneumonia [J18.9]      Priority: Medium    Hypervolemia [E87.70]      Priority: Medium    Acute on chronic respiratory failure with hypoxia and hypercapnia (HCC) [J96.21, J96.22]      Priority: Medium    Moderate episode of recurrent major depressive disorder (HCC) [F33.1]      Priority: Medium    BJ treated with BiPAP [G47.33]      Priority: Medium    Acute metabolic encephalopathy [L93.48]      Priority: Medium    Anemia in ESRD (end-stage renal disease) ([facility]) [N18.6, D63.1]     Type 2 diabetes mellitus with hyperglycemia, with long-term current use of insulin ([facility]) [E11.65, Z79.4]     Hypertension associated with stage 5 chronic kidney disease due to type 2 diabetes mellitus (HCC) [E11.22, I12.0, N18.5]     Chronic diastolic heart failure ([address] [I50.32]     Acute respiratory failure with hypoxia and hypercapnia ([address] [J96.01, J96.02]     Arthritis with psoriasis ([address] [L40.50]     Psoriatic arthropathy ([address] [L40.50]     Sleep apnea [G47.30]        Shira Dixon MD  10/2/2022  10:17 AM      Please note that some part of this chart was generated using Dragon dictation software. Although every effort was made to ensure the accuracy of this automated transcription, some errors in transcription may have occurred inadvertently. If you may need any clarification, please do not hesitate to contact me through [facility].

## 2022-10-02 NOTE — FLOWSHEET NOTE
CCPD Order   Exchanges: 4   Exchange Volume: 2000 ml   Total Time: 8 hrs   Dextrose: 2.5%   Last Fill: 0 ml   Total Volume: 8000 ml     Orders verified. Supplies taken to pt's room. Report received. Cycler set up, primed and pre tested. Dressing changed on [facility] Cath site. Pt connected to cycler. CCPD initiated without problem. Initial effluent clear. If problems should arise please call the [phone] number on top of PD cycler machine.        10/02/22 1530   Vital Signs   /65   Temp 97.3 °F (36.3 °C)   Heart Rate 93   Resp 21   Weight 201 lb 8 oz (91.4 kg)   Weight Method Bed scale   Percent Weight Change 0.55

## 2022-10-02 NOTE — CARE COORDINATION
Discharge Planning:      (SOCO) called [facility] staff, [facility] ([phone]), and left a voicemail requesting a callback with pre-cert update. CM provided callback information. ARJUN Sarkar, [facility] -   [phone]    Electronically signed by JOE Godfrey on 10/2/2022 at 9:14 AM        Update at [phone]:  CM received a callback from [facility] at [place] [address] who reported that pre-cert is still pending for the patient. [facility] reported that she hopes to have the pre-cert back by tomorrow.       ARJUN Sarkar, [facility] -   [phone]    Electronically signed by JOE Godfrey on 10/2/2022 at 10:26 AM

## 2022-10-02 NOTE — PROGRESS NOTES
[facility] Daily Progress Note      Admit Date:  9/21/2022    Chief Complaint: Cardiac arrhythmia    Subjective:  Mr. Zahira Al Has no new complaints.     Objective:   BP (!) 123/52   Pulse 80   Temp 98.8 °F (37.1 °C) (Temporal)   Resp 16   Ht 5' 10\" (1.778 m)   Wt 200 lb 9.9 oz (91 kg)   SpO2 94%   BMI 28.79 kg/m²     Intake/Output Summary (Last 24 hours) at 10/2/2022 0959  Last data filed at 10/2/2022 0400  Gross per 24 hour   Intake 250 ml   Output 0 ml   Net 250 ml       Physical Exam:  General:  Awake, alert, oriented x 3, NAD  Skin:  Warm and dry  Neck:  JVD normal  Chest:  decreased air exchange bilaterally  Cardiovascular:  RRR S1S2, no S3, no significant murmur  Abdomen:  Soft, ND, NT, No HSM  Extremities:  No edema    Medications:    insulin glargine  40 Units SubCUTAneous Daily    insulin lispro  30 Units SubCUTAneous TID WC    insulin lispro  0-16 Units SubCUTAneous 4x Daily AC & HS    epoetin daphney-epbx  3,000 Units SubCUTAneous Once per day on Mon Wed Fri    torsemide  100 mg Oral Daily    midodrine  5 mg Oral BID WC    ipratropium-albuterol  1 ampule Inhalation 4x daily    gentamicin   Topical Daily    sodium chloride flush  5-40 mL IntraVENous 2 times per day    aspirin  81 mg Oral Daily    atorvastatin  80 mg Oral Nightly    mometasone-formoterol  2 puff Inhalation BID    buPROPion  100 mg Oral Every Other Day    levothyroxine  50 mcg Oral Daily    heparin (porcine)  5,000 Units SubCUTAneous 3 times per day      sodium chloride Stopped (09/23/22 4319)    dextrose       sodium chloride flush, sodium chloride, ondansetron **OR** ondansetron, polyethylene glycol, acetaminophen **OR** acetaminophen, albuterol, dextrose bolus **OR** dextrose bolus, glucagon (rDNA), dextrose    TELEMETRY (Personally reviewed by me): Sinus and sinus arrhythmia, PACs    Lab Data:  CBC:   Recent Labs     09/30/22  0447 10/01/22  0428   WBC 15.9* 14.4*   HGB 8.9* 8.7*   HCT 27.1* 26.5*   .7* 112.2*  317     BMP:   Recent Labs     09/29/22  1844 09/30/22  0447 10/01/22  0428   * 135* 135*   K 4.1 3.4* 4.4   CL 93* 97* 97*   CO2 25 26 23   BUN 68* 65* 63*   CREATININE 10.1* 9.7* 9.4*     LIVER PROFILE: No results for input(s): AST, ALT, LIPASE, BILIDIR, BILITOT, ALKPHOS in the last 72 hours. Invalid input(s): AMYLASE,  ALB  PT/INR: No results for input(s): PROTIME, INR in the last 72 hours. APTT: No results for input(s): APTT in the last 72 hours. BNP:  No results for input(s): BNP in the last 72 hours. Imaging/Procedures:       Echo 3/29/2022:   Summary   Left ventricular cavity size is normal. There is mild concentric left   ventricular hypertrophy. Overall left ventricular systolic function appears   low normal with an ejection fraction of 50-55%. No regional wall motion   abnormalities are noted. Normal diastolic function. The aortic valve   leaflets are slightly thickened /calcified but the valve opens adequately. Mild tricuspid regurgitation. Estimated pulmonary artery systolic pressure is at 36 mmHg assuming a right atrial pressure of 8 mmHg. Normal right ventricular size and function. TAPSE measures 1.31 cm and the RVS velocity measures 12.1 cm/s. Myoview stress test 8/26/2020:      Summary    Overall findings represent a high risk scan. Normal LV size and systolic function. There is a medium to large size reversible defect involving the apex, apical    anterior, mid anterior and apical lateral cardiac segments. Finding are concerning for ischemia. ECG: Non-diagnostic EKG response due to failure to reach target heart rate. Assessment/Plan:  Principal Problem:    Acute on chronic respiratory failure with hypoxia and hypercapnia (HCC)  Plan: Per primary service. Active Problems:    Acute metabolic encephalopathy  Plan: Much improved. Cardiac arrhythmia  Plan: No clinically significant prolonged pauses.   Most suggestive of sinus rhythm with marked sinus arrhythmia. He does have PACs at times and cannot completely exclude sinus exit block. No indication for pacemaker therapy at this time. Anemia in ESRD (end-stage renal disease) ([facility]     Plan: Per nephrology. Continue current medical regimen from a cardiac standpoint. We will have EP service either see or review strips.         Ya Bowen MD, MD 10/2/2022 9:59 AM

## 2022-10-02 NOTE — PROGRESS NOTES
CCPD Order   Exchanges: 4   Exchange Volume: 8000 ml   Total Time: 8 hrs   Last Fill: n/a ml   Total Volume: 8000 ml     Orders verified. Supplies taken to pt's room. Report received. Cycler set up, primed and pre tested. [facility]. Pt connected to cycler. CCPD initiated without problem. Initial effluent clear. If problems should arise please call the [phone] number on top of PD cycler machine.        If problems persist please call [phone]

## 2022-10-03 PROBLEM — Z86.16 HISTORY OF 2019 NOVEL CORONAVIRUS DISEASE (COVID-19): Status: ACTIVE | Noted: 2022-10-03

## 2022-10-03 PROBLEM — E66.3 OVERWEIGHT (BMI 25.0-29.9): Status: ACTIVE | Noted: 2022-10-03

## 2022-10-03 PROBLEM — R79.89 ELEVATED PROCALCITONIN: Status: ACTIVE | Noted: 2022-10-03

## 2022-10-03 PROBLEM — R53.1 GENERAL WEAKNESS: Status: ACTIVE | Noted: 2018-01-23

## 2022-10-03 LAB
ANION GAP SERPL CALCULATED.3IONS-SCNC: 18 MMOL/L (ref 3–16)
ANISOCYTOSIS: ABNORMAL
BASOPHILS ABSOLUTE: 0.1 K/UL (ref 0–0.2)
BASOPHILS RELATIVE PERCENT: 1 %
BUN BLDV-MCNC: 70 MG/DL (ref 7–20)
CALCIUM SERPL-MCNC: 9.2 MG/DL (ref 8.3–10.6)
CHLORIDE BLD-SCNC: 94 MMOL/L (ref 99–110)
CO2: 23 MMOL/L (ref 21–32)
CREAT SERPL-MCNC: 10.6 MG/DL (ref 0.8–1.3)
EOSINOPHILS ABSOLUTE: 0.3 K/UL (ref 0–0.6)
EOSINOPHILS RELATIVE PERCENT: 2 %
GFR AFRICAN AMERICAN: 6
GFR NON-AFRICAN AMERICAN: 5
GLUCOSE BLD-MCNC: 155 MG/DL (ref 70–99)
GLUCOSE BLD-MCNC: 189 MG/DL (ref 70–99)
GLUCOSE BLD-MCNC: 229 MG/DL (ref 70–99)
GLUCOSE BLD-MCNC: 261 MG/DL (ref 70–99)
GLUCOSE BLD-MCNC: 265 MG/DL (ref 70–99)
GLUCOSE BLD-MCNC: 82 MG/DL (ref 70–99)
HCT VFR BLD CALC: 26.5 % (ref 40.5–52.5)
HEMATOLOGY PATH CONSULT: NO
HEMOGLOBIN: 8.8 G/DL (ref 13.5–17.5)
LYMPHOCYTES ABSOLUTE: 2.4 K/UL (ref 1–5.1)
LYMPHOCYTES RELATIVE PERCENT: 18 %
MACROCYTES: ABNORMAL
MCH RBC QN AUTO: 37.1 PG (ref 26–34)
MCHC RBC AUTO-ENTMCNC: 33.2 G/DL (ref 31–36)
MCV RBC AUTO: 111.6 FL (ref 80–100)
MONOCYTES ABSOLUTE: 0.5 K/UL (ref 0–1.3)
MONOCYTES RELATIVE PERCENT: 4 %
NEUTROPHILS ABSOLUTE: 10 K/UL (ref 1.7–7.7)
NEUTROPHILS RELATIVE PERCENT: 75 %
PDW BLD-RTO: 14.3 % (ref 12.4–15.4)
PERFORMED ON: ABNORMAL
PERFORMED ON: NORMAL
PLATELET # BLD: 300 K/UL (ref 135–450)
PLATELET SLIDE REVIEW: ADEQUATE
PMV BLD AUTO: 7.6 FL (ref 5–10.5)
POLYCHROMASIA: ABNORMAL
POTASSIUM REFLEX MAGNESIUM: 4.2 MMOL/L (ref 3.5–5.1)
RBC # BLD: 2.38 M/UL (ref 4.2–5.9)
SLIDE REVIEW: ABNORMAL
SODIUM BLD-SCNC: 135 MMOL/L (ref 136–145)
WBC # BLD: 13.3 K/UL (ref 4–11)

## 2022-10-03 PROCEDURE — 99232 SBSQ HOSP IP/OBS MODERATE 35: CPT | Performed by: INTERNAL MEDICINE

## 2022-10-03 PROCEDURE — 2580000003 HC RX 258: Performed by: STUDENT IN AN ORGANIZED HEALTH CARE EDUCATION/TRAINING PROGRAM

## 2022-10-03 PROCEDURE — 6370000000 HC RX 637 (ALT 250 FOR IP): Performed by: INTERNAL MEDICINE

## 2022-10-03 PROCEDURE — 90945 DIALYSIS ONE EVALUATION: CPT

## 2022-10-03 PROCEDURE — 6370000000 HC RX 637 (ALT 250 FOR IP): Performed by: STUDENT IN AN ORGANIZED HEALTH CARE EDUCATION/TRAINING PROGRAM

## 2022-10-03 PROCEDURE — 87205 SMEAR GRAM STAIN: CPT

## 2022-10-03 PROCEDURE — 94761 N-INVAS EAR/PLS OXIMETRY MLT: CPT

## 2022-10-03 PROCEDURE — 36415 COLL VENOUS BLD VENIPUNCTURE: CPT

## 2022-10-03 PROCEDURE — 6360000002 HC RX W HCPCS: Performed by: STUDENT IN AN ORGANIZED HEALTH CARE EDUCATION/TRAINING PROGRAM

## 2022-10-03 PROCEDURE — 80048 BASIC METABOLIC PNL TOTAL CA: CPT

## 2022-10-03 PROCEDURE — 94640 AIRWAY INHALATION TREATMENT: CPT

## 2022-10-03 PROCEDURE — 87040 BLOOD CULTURE FOR BACTERIA: CPT

## 2022-10-03 PROCEDURE — 87077 CULTURE AEROBIC IDENTIFY: CPT

## 2022-10-03 PROCEDURE — 2000000000 HC ICU R&B

## 2022-10-03 PROCEDURE — 87070 CULTURE OTHR SPECIMN AEROBIC: CPT

## 2022-10-03 PROCEDURE — 99223 1ST HOSP IP/OBS HIGH 75: CPT | Performed by: INTERNAL MEDICINE

## 2022-10-03 PROCEDURE — 6360000002 HC RX W HCPCS: Performed by: INTERNAL MEDICINE

## 2022-10-03 PROCEDURE — 85025 COMPLETE CBC W/AUTO DIFF WBC: CPT

## 2022-10-03 RX ORDER — LINEZOLID 600 MG/1
600 TABLET, FILM COATED ORAL EVERY 12 HOURS SCHEDULED
Status: DISCONTINUED | OUTPATIENT
Start: 2022-10-03 | End: 2022-10-07

## 2022-10-03 RX ORDER — LACTOBACILLUS RHAMNOSUS GG 10B CELL
1 CAPSULE ORAL 2 TIMES DAILY WITH MEALS
Status: DISCONTINUED | OUTPATIENT
Start: 2022-10-04 | End: 2022-10-10 | Stop reason: HOSPADM

## 2022-10-03 RX ORDER — INSULIN GLARGINE 100 [IU]/ML
50 INJECTION, SOLUTION SUBCUTANEOUS DAILY
Status: DISCONTINUED | OUTPATIENT
Start: 2022-10-03 | End: 2022-10-10 | Stop reason: HOSPADM

## 2022-10-03 RX ORDER — CIPROFLOXACIN 500 MG/1
500 TABLET, FILM COATED ORAL
Status: DISCONTINUED | OUTPATIENT
Start: 2022-10-04 | End: 2022-10-05

## 2022-10-03 RX ADMIN — INSULIN LISPRO 30 UNITS: 100 INJECTION, SOLUTION INTRAVENOUS; SUBCUTANEOUS at 12:32

## 2022-10-03 RX ADMIN — HEPARIN SODIUM 5000 UNITS: 5000 INJECTION INTRAVENOUS; SUBCUTANEOUS at 15:17

## 2022-10-03 RX ADMIN — HEPARIN SODIUM 5000 UNITS: 5000 INJECTION INTRAVENOUS; SUBCUTANEOUS at 05:19

## 2022-10-03 RX ADMIN — TORSEMIDE 100 MG: 100 TABLET ORAL at 09:04

## 2022-10-03 RX ADMIN — Medication 10 ML: at 20:42

## 2022-10-03 RX ADMIN — HEPARIN SODIUM 5000 UNITS: 5000 INJECTION INTRAVENOUS; SUBCUTANEOUS at 20:42

## 2022-10-03 RX ADMIN — GENTAMICIN SULFATE: 1 CREAM TOPICAL at 09:09

## 2022-10-03 RX ADMIN — IPRATROPIUM BROMIDE AND ALBUTEROL SULFATE 1 AMPULE: 2.5; .5 SOLUTION RESPIRATORY (INHALATION) at 20:20

## 2022-10-03 RX ADMIN — EPOETIN ALFA-EPBX 3000 UNITS: 10000 INJECTION, SOLUTION INTRAVENOUS; SUBCUTANEOUS at 09:05

## 2022-10-03 RX ADMIN — Medication 2 PUFF: at 20:20

## 2022-10-03 RX ADMIN — INSULIN LISPRO 30 UNITS: 100 INJECTION, SOLUTION INTRAVENOUS; SUBCUTANEOUS at 17:17

## 2022-10-03 RX ADMIN — MIDODRINE HYDROCHLORIDE 5 MG: 5 TABLET ORAL at 17:17

## 2022-10-03 RX ADMIN — LEVOTHYROXINE SODIUM 50 MCG: 0.03 TABLET ORAL at 05:19

## 2022-10-03 RX ADMIN — LINEZOLID 600 MG: 600 TABLET, FILM COATED ORAL at 22:20

## 2022-10-03 RX ADMIN — INSULIN LISPRO 4 UNITS: 100 INJECTION, SOLUTION INTRAVENOUS; SUBCUTANEOUS at 08:46

## 2022-10-03 RX ADMIN — Medication 10 ML: at 09:05

## 2022-10-03 RX ADMIN — Medication 2 PUFF: at 08:26

## 2022-10-03 RX ADMIN — ASPIRIN 81 MG 81 MG: 81 TABLET ORAL at 08:53

## 2022-10-03 RX ADMIN — ATORVASTATIN CALCIUM 80 MG: 80 TABLET, FILM COATED ORAL at 20:42

## 2022-10-03 RX ADMIN — INSULIN GLARGINE 50 UNITS: 100 INJECTION, SOLUTION SUBCUTANEOUS at 09:02

## 2022-10-03 RX ADMIN — IPRATROPIUM BROMIDE AND ALBUTEROL SULFATE 1 AMPULE: 2.5; .5 SOLUTION RESPIRATORY (INHALATION) at 16:55

## 2022-10-03 RX ADMIN — BUPROPION HYDROCHLORIDE 100 MG: 100 TABLET, FILM COATED ORAL at 09:04

## 2022-10-03 RX ADMIN — INSULIN LISPRO 30 UNITS: 100 INJECTION, SOLUTION INTRAVENOUS; SUBCUTANEOUS at 08:47

## 2022-10-03 RX ADMIN — IPRATROPIUM BROMIDE AND ALBUTEROL SULFATE 1 AMPULE: 2.5; .5 SOLUTION RESPIRATORY (INHALATION) at 08:26

## 2022-10-03 ASSESSMENT — ENCOUNTER SYMPTOMS
EYE DISCHARGE: 0
SORE THROAT: 0
DIARRHEA: 0
NAUSEA: 0
SINUS PAIN: 0
CONSTIPATION: 0
WHEEZING: 0
BACK PAIN: 0
RHINORRHEA: 0
COUGH: 0
SINUS PRESSURE: 0
ABDOMINAL PAIN: 0
EYE REDNESS: 0
SHORTNESS OF BREATH: 0

## 2022-10-03 ASSESSMENT — PAIN SCALES - GENERAL: PAINLEVEL_OUTOF10: 3

## 2022-10-03 ASSESSMENT — PAIN SCALES - WONG BAKER
WONGBAKER_NUMERICALRESPONSE: 0
WONGBAKER_NUMERICALRESPONSE: 0

## 2022-10-03 NOTE — PROGRESS NOTES
Office : [phone]     Fax :[phone]       Nephrology progress  Note      Patient's Name: Cipriano Reed  8:48 AM  10/3/2022    Reason for Consult:  ESRD on peritoneal dialysis. Requesting Physician:  Mojgan White MD      Chief Complaint:    Chief Complaint   Patient presents with    Altered Mental Status     Patient arrives via [place] from [place], altered, last seen normal last night, started peritoneal dialysis last night, O2 low on normal 3L, placed on NRB with some improvement, increased WOB noted           History of Present Ilness:    Cipriano Reed is a 69 y.o. male with prior history of ESRD on peritoneal dialysis , DM 2, HTN who was sent from [facility] with main c/o SOB. In ER he was found to be hypoxic. CXR shows volume overload. On BiPAP now. Still makes urine . Also had elevated WBC. No abdominal pain. No fever   BS Elevated. Interval hx :    Feels much better   Tolerating PD well       WBC decreasing     No abdominal pain   No fever  No discharge from PD site     I/O last 3 completed shifts:   In: 965 [P.O.:965]  Out: 0     Past Medical History:   Diagnosis Date    Allergic rhinitis due to other allergen 7/12/2010    Coronary atherosclerosis of unspecified type of vessel, native or graft 7/12/2010    Diabetic eye exam ([address] 04/29/2015    Diabetic eye exam ([address] 5/5/2016    [facility]    Generalized osteoarthrosis, involving multiple sites 7/12/2010    Other psoriasis 7/12/2010    Pneumonia     Prolonged emergence from general anesthesia     Psoriatic arthropathy ([address] 7/12/2010    Type II or unspecified type diabetes mellitus without mention of complication, not stated as uncontrolled 7/12/2010    Unspecified essential hypertension 7/12/2010    Unspecified sleep apnea 7/12/2010       Past Surgical History:   Procedure Laterality Date    CARPAL TUNNEL RELEASE      s/p    CATARACT REMOVAL      CORONARY ARTERY BYPASS GRAFT      JOINT REPLACEMENT      LAPAROSCOPY INSERTION PERITONEAL CATHETER N/A 10/26/2020    LAPAROSCOPIC PERITONEAL DIALYSIS CATHETER PLACEMENT; LAPAROSCOPIC OMENTOPEXY performed by Alina Martinez DO at [facility] OR       Family History   Problem Relation Age of Onset    Diabetes Mother     Heart Failure Mother     Coronary Art Dis Father         reports that he quit smoking about 41 years ago. He started smoking about 60 years ago. He has a 18.00 pack-year smoking history. He has never used smokeless tobacco. He reports that he does not drink alcohol and does not use drugs.         Allergies:  Penicillins, Sulfa antibiotics, Tazorac [tazarotene], Clindamycin, and Clindamycin/lincomycin    Current Medications:    insulin glargine (LANTUS) injection vial 50 Units, Daily  insulin lispro (HUMALOG) injection vial 30 Units, TID WC  insulin lispro (HUMALOG) injection vial 0-16 Units, 4x Daily AC & HS  epoetin daphney-epbx (RETACRIT) injection 3,000 Units, Once per day on Mon Wed Fri  torsemide (DEMADEX) tablet 100 mg, Daily  midodrine (PROAMATINE) tablet 5 mg, BID WC  ipratropium-albuterol (DUONEB) nebulizer solution 1 ampule, 4x daily  gentamicin (GARAMYCIN) 0.1 % cream, Daily  sodium chloride flush 0.9 % injection 5-40 mL, 2 times per day  sodium chloride flush 0.9 % injection 5-40 mL, PRN  0.9 % sodium chloride infusion, PRN  ondansetron (ZOFRAN-ODT) disintegrating tablet 4 mg, Q8H PRN   Or  ondansetron (ZOFRAN) injection 4 mg, Q6H PRN  polyethylene glycol (GLYCOLAX) packet 17 g, Daily PRN  acetaminophen (TYLENOL) tablet 650 mg, Q6H PRN   Or  acetaminophen (TYLENOL) suppository 650 mg, Q6H PRN  albuterol (PROVENTIL) nebulizer solution 2.5 mg, Q4H PRN  aspirin chewable tablet 81 mg, Daily  atorvastatin (LIPITOR) tablet 80 mg, Nightly  mometasone-formoterol (DULERA) 200-5 MCG/ACT inhaler 2 puff, BID  buPROPion (WELLBUTRIN) tablet 100 mg, Every Other Day  levothyroxine (SYNTHROID) tablet 50 mcg, Daily  dextrose bolus 10% 125 mL, PRN   Or  dextrose bolus 10% 250 mL, PRN  glucagon (rDNA) injection 1 mg, PRN  dextrose 10 % infusion, Continuous PRN  heparin (porcine) injection 5,000 Units, 3 times per day      Physical exam:     Vitals:  /74   Pulse 81   Temp 96.9 °F (36.1 °C)   Resp 18   Ht 5' 10\" (1.778 m)   Wt 203 lb 14.8 oz (92.5 kg)   SpO2 100%   BMI 29.26 kg/m²   Constitutional:  OAA X3 NAD  Skin: no rash, turgor wnl  Heent:  eomi, mmm  Neck: no bruits or jvd noted  Cardiovascular:  S1, S2 without m/r/g  Respiratory: b/l crackles   Abdomen:  +bs, soft, nt, nd  Ext: mild  lower extremity edema    Labs:  CBC:   Recent Labs     10/01/22  0428 10/03/22  0357   WBC 14.4* 13.3*   HGB 8.7* 8.8*    300     BMP:    Recent Labs     10/01/22  0428 10/03/22  0357   * 135*   K 4.4 4.2   CL 97* 94*   CO2 23 23   BUN 63* 70*   CREATININE 9.4* 10.6*   GLUCOSE 354* 265*     Ca/Mg/Phos:   Recent Labs     10/01/22  0428 10/03/22  0357   CALCIUM 8.9 9.2     Hepatic:   No results for input(s): AST, ALT, ALB, BILITOT, ALKPHOS in the last 72 hours. Troponin:   No results for input(s): TROPONINI in the last 72 hours. BNP: No results for input(s): BNP in the last 72 hours. Lipids: No results for input(s): CHOL, TRIG, HDL, LDLCALC, LABVLDL in the last 72 hours. ABGs:   No results for input(s): PHART, PO2ART, HCH9JCR in the last 72 hours. INR: No results for input(s): INR in the last 72 hours. UA:  No results for input(s): Sydell Southerly, GLUCOSEU, BILIRUBINUR, KETUA, SPECGRAV, BLOODU, PHUR, PROTEINU, UROBILINOGEN, NITRU, LEUKOCYTESUR, Rhesa Sauger in the last 72 hours. Urine Microscopic:   No results for input(s): LABCAST, BACTERIA, COMU, HYALCAST, WBCUA, RBCUA, EPIU in the last 72 hours.     Urine Culture: No results for input(s): LABURIN in the last 72 hours.  Urine Chemistry: No results for input(s): CLUR, LABCREA, PROTEINUR, NAUR in the last 72 hours. IMAGING:  XR CHEST PORTABLE   Final Result   Low volume study with trace bilateral pleural effusions and persistent   bibasilar atelectasis or airspace disease. XR CHEST PORTABLE   Final Result   CHF with pulmonary edema. CT HEAD WO CONTRAST   Final Result   No definite evidence of acute intracranial abnormality on a study   significantly limited by motion/streak artifact as described above. Assessment/Plan :      1. ESRD   Volume overload   Better     Continue  CCPD  with 2.5  %     PD fluid for cell count and culture -  negative   No growth   No evidence of peritonitis    Stop ABX     WBC elevated - will rpt PD fluid cell count and culture       2. HTN  BP controlled       3. Anemia in ESRD   On epogen     4. Acid- base disorder. monitor     5. Hypokalemia   Replace potassium       6. Debility. Generalized weakness.  Need PT/OT       Awaiting placement at rehab unit           D/w primary team      Thank you for allowing us to participate in care of Jacob Olson         Electronically signed by: Tai Leung MD, 10/3/2022, 8:48 AM      [facility] [address]  Office : [phone]  Fax :[phone]

## 2022-10-03 NOTE — PROGRESS NOTES
Hospitalist Progress Note    Name:  Aron Fallon    /Age/Sex: 1945  (69 y.o. male)  MRN & CSN:  [id number] & [id number]    PCP: Tracy Souza MD    Date of Admission: 2022  Chief Complaint: Altered mental status    Hospital Course: This 73-year-old man was admitted for altered mental status. Found to be hypoxic and hypercapnic on admission. Placed on BiPAP with improvement. Nephrology following as patient has ESRD on peritoneal dialysis. Pulmonology following. Interval history:  Pt seen and examined today. Overnight events noted, interval ancillary notes and labs reviewed. Afebrile overnight, WBCs trended down to 13.3  Elevated glucose level this morning; Lantus dose adjusted  Resting in bed; denied cough, SOB, chest pain, nausea, vomiting or abdominal pain  Plan for the patient to be discharged to SNF tomorrow if medically stable      Assessment and plan    Acute on chronic hypoxic and hypercapnic respiratory failure:-Improved  Most likely due to volume overload. Elevated proBNP at admission; trended down  Initially diuresed with IV Lasix; switched to torsemide  Continue home inhalers and as needed breathing treatment  Supplemental oxygen as needed to keep sats between 90 to 92%  Pulmonology consulted: appreciate recommendations     Cardiac arrhythmias; no clinically second prolonged pauses  Cardiology consult; no indication for pacemaker at this time  Monitor on telemetry    History of BJ: Unable to tolerate home BiPAP/CPAP. Will need repeat sleep study outpatient. Acute metabolic encephalopathy: Improved  CT head: No acute abnormality. TSH, Ammonia level wnl. B 12 and folate pending. Supportive care     Type 2 diabetes on long-term insulin:  A1c 6.5 on 2022. Continue basal insulin with sliding scale. ESRD on peritoneal dialysis:  Nephrology consulted: Appreciate input. No evidence of PD site infection or peritonitis. PD fluid cultures negative.   Antibiotics discontinued. Hypotension: Continue midodrine    Depression: Continue Wellbutrin     Hypothyroid: Continue synthroid. Anemia due to ESRD: Continue EPO. Overall patient is medically stable to be discharged   ARU pre-CERT will be started for [facility]    DVT ppx: Heparin  GI ppx: Diet/Tube Feeds  Diet: ADULT DIET; Regular; 4 carb choices (60 gm/meal); Low Phosphorus (Less than 1000 mg)  ADULT ORAL NUTRITION SUPPLEMENT; Breakfast, Lunch; Renal Oral Supplement  Code Status: Full Code  Disposition:    Medications:  Reviewed      Intake/Output Summary (Last 24 hours) at 10/3/2022 0832  Last data filed at 10/3/2022 0500  Gross per 24 hour   Intake 715 ml   Output --   Net 715 ml         Physical Exam Performed:    /74   Pulse 81   Temp 96.9 °F (36.1 °C)   Resp 18   Ht 5' 10\" (1.778 m)   Wt 203 lb 14.8 oz (92.5 kg)   SpO2 100%   BMI 29.26 kg/m²     General appearance: No apparent distress, appears stated age and cooperative. Somewhat confused, but more conversational today. HEENT: Pupils equal, round, and reactive to light. Conjunctivae/corneas clear. Neck: Supple, with full range of motion. No jugular venous distention. Trachea midline. Respiratory:  Normal respiratory effort. Clear to auscultation, bilaterally without Rales/Wheezes/Rhonchi. Cardiovascular: Regular rate and rhythm with normal S1/S2 without murmurs, rubs or gallops. Trace pitting edema LE bilaterally. Abdomen: Soft, non-tender, non-distended with normal bowel sounds. : No CVA tenderness. Musculoskeletal: No clubbing or cyanosis. Full range of motion without deformity.   Skin: Skin color, texture, turgor normal.  Sacral wound; dressed  Physical exam remains unchanged from 9/25    Labs:   Recent Labs     10/01/22  0428 10/03/22  0357   WBC 14.4* 13.3*   HGB 8.7* 8.8*   HCT 26.5* 26.5*    300       Recent Labs     10/01/22  0428 10/03/22  0357   * 135*   K 4.4 4.2   CL 97* 94*   CO2 23 23   BUN 63* 70*   CREATININE 9.4* 10.6*   CALCIUM 8.9 9.2       No results for input(s): AST, ALT, BILIDIR, BILITOT, ALKPHOS in the last 72 hours. No results for input(s): INR in the last 72 hours. No results for input(s): Victorina Acostaels in the last 72 hours. Urinalysis:      Lab Results   Component Value Date/Time    NITRU Negative 09/21/2022 03:18 PM    WBCUA 3 09/21/2022 03:18 PM    BACTERIA None Seen 09/21/2022 03:18 PM    RBCUA 5 09/21/2022 03:18 PM    BLOODU SMALL 09/21/2022 03:18 PM    SPECGRAV 1.015 09/21/2022 03:18 PM    GLUCOSEU 250 09/21/2022 03:18 PM    GLUCOSEU >=1000 03/26/2012 10:22 PM       Radiology:  XR CHEST PORTABLE   Final Result   Low volume study with trace bilateral pleural effusions and persistent   bibasilar atelectasis or airspace disease. XR CHEST PORTABLE   Final Result   CHF with pulmonary edema. CT HEAD WO CONTRAST   Final Result   No definite evidence of acute intracranial abnormality on a study   significantly limited by motion/streak artifact as described above.                Active Hospital Problems    Diagnosis     Arrhythmia [I49.9]      Priority: Medium    Leukocytosis [D72.829]      Priority: Medium    Acute pulmonary edema (HCC) [J81.0]      Priority: Medium    Multifocal pneumonia [J18.9]      Priority: Medium    Hypervolemia [E87.70]      Priority: Medium    Acute on chronic respiratory failure with hypoxia and hypercapnia (HCC) [J96.21, J96.22]      Priority: Medium    Moderate episode of recurrent major depressive disorder (HCC) [F33.1]      Priority: Medium    BJ treated with BiPAP [G47.33]      Priority: Medium    Acute metabolic encephalopathy [C57.02]      Priority: Medium    Anemia in ESRD (end-stage renal disease) ([facility]) [N18.6, D63.1]     Type 2 diabetes mellitus with hyperglycemia, with long-term current use of insulin ([facility]) [E11.65, Z79.4]     Hypertension associated with stage 5 chronic kidney disease due to type 2 diabetes mellitus ([facility] [E11.22, I12.0, N18.5] Chronic diastolic heart failure (HCC) [I50.32]     Acute respiratory failure with hypoxia and hypercapnia ([facility] [J96.01, J96.02]     Arthritis with psoriasis ([facility] [L40.50]     Psoriatic arthropathy ([facility] [L40.50]     Sleep apnea [G47.30]        Terence Lopez MD  10/3/2022  8:32 AM      Please note that some part of this chart was generated using Dragon dictation software. Although every effort was made to ensure the accuracy of this automated transcription, some errors in transcription may have occurred inadvertently. If you may need any clarification, please do not hesitate to contact me through [facility].

## 2022-10-03 NOTE — CARE COORDINATION
Discharge Planning Note:    CM received call from Augustus Zelaya at [facility] reporting that pre-cert has been approved. CM spoke with Wilver regarding pt elevation in creatinine and poor output with PD this AM.  Pt is not ready for DC today. CM discussed with Dr. Marilu Mason via PS. Will re-evaluate tomorrow. CM updated Augustus Zelaya at [facility] who reports that pre-cert may only be good for 24 hours. CM to call Augustus Zelaya with status update tomorrow.      Electronically signed by Obi Mai RN on 10/3/2022 at 2:54 PM

## 2022-10-03 NOTE — PROGRESS NOTES
CCPD Order   Exchanges: 4   Exchange Volume: 2000 ml   Total Time: 8 hrs   Dextrose: 2.5%   Last Fill: 0 ml   Total Volume: 8000 ml     Orders verified. Supplies taken to pt's room. Report received. Cycler set up, primed and pre tested. [facility]. Pt connected to cycler. CCPD initiated without problem. Initial effluent clear. If problems should arise please call the [phone] number on top of PD cycler machine.

## 2022-10-03 NOTE — PROGRESS NOTES
[facility]   Cardiology Progress Note     Date: 10/3/2022  Admit Date: 9/21/2022     Reason for consultation: Cardiac Arrhythmias    Chief Complaint:   Chief Complaint   Patient presents with    Altered Mental Status     Patient arrives via [place] from [facility], altered, last seen normal last night, started peritoneal dialysis last night, O2 low on normal 3L, placed on NRB with some improvement, increased WOB noted       History of Present Illness: History obtained from patient and medical record. Abhinav He is a 69 y.o. male with a past medical history of ESRD on hemodialysis, CAD,DM, HTN, and BJ. Pt presented to hospital with altered mental status and hypoxia. Cardiology consulted for arrhythmia on telemetry. Interval Hx: Today, he is being seen for follow up. He is resting in bed. He denies any cardiac complaints. He does become bradycardic into the 30s at night. He states he has known sleep apnea, but he does not wear CPAP compliantly. Patient seen and examined. Clinical notes reviewed. Telemetry reviewed. No new complaints today. No major events overnight. Denies having chest pain, palpitations, shortness of breath, orthopnea/PND, cough, or dizziness at the time of this visit. Allergies: Allergies   Allergen Reactions    Penicillins Other (See Comments)     Syncope \"pass out \" per pt    Sulfa Antibiotics      Patient unsure of reaction     Tazorac [Tazarotene]      Cream - rash    Clindamycin Rash    Clindamycin/Lincomycin Rash     Home Meds:  Prior to Visit Medications    Medication Sig Taking?  Authorizing Provider   albuterol sulfate (PROAIR RESPICLICK) 680 (90 Base) MCG/ACT aerosol powder inhalation Inhale 2 puffs into the lungs every 4 hours as needed for Wheezing or Shortness of Breath  Carina Stovall MD   buPROPion (WELLBUTRIN) 100 MG tablet Take 1 tablet by mouth every other day  Rebekah Stovall MD   epoetin daphney-epbx (RETACRIT) 2000 UNIT/ML injection Inject 1 mL into the skin three times a week  Alexa Stovall MD   insulin glargine (LANTUS SOLOSTAR) 100 UNIT/ML injection pen Inject 50 Units into the skin nightly  Patient taking differently: Inject 40 Units into the skin nightly  Alexa Stovall MD   insulin lispro, 1 Unit Dial, 100 UNIT/ML SOPN Inject 0-4 Units into the skin 3 times daily (with meals)  Alexa Stovall MD   insulin lispro, 1 Unit Dial, 100 UNIT/ML SOPN Inject 0-4 Units into the skin nightly  Eladioodiloncarrie Pereirasopineda Stovall MD   BREO ELLIPTA 200-25 MCG/INH AEPB inhaler Inhale 1 puff into the lungs daily  Historical Provider, MD   atorvastatin (LIPITOR) 80 MG tablet TAKE ONE TABLET BY MOUTH ONCE NIGHTLY  Aniket De La Garza MD   Easy Touch Lancets 30G/Twist MISC USE THREE TIMES A DAY TO FOUR TIMES A DAY  Aniket De La Garza MD   levothyroxine (SYNTHROID) 50 MCG tablet TAKE ONE TABLET BY MOUTH DAILY  Aniket De La Garza MD   insulin detemir (LEVEMIR FLEXTOUCH) 100 UNIT/ML injection pen 56-66 units daily  Aniket De La Garza MD   insulin aspart (NOVOLOG FLEXPEN) 100 UNIT/ML injection pen 32-42  units AC TID  Aniket De La Garza MD   B-D ULTRAFINE III SHORT PEN 31G X 8 MM MISC USE WITH INSULIN FOUR TIMES A DAY  Delmy Lowe MD   midodrine (PROAMATINE) 5 MG tablet Take 1 tablet by mouth in the morning and at bedtime  Deangelo Arroyo MD   Blood Glucose Monitoring Suppl (TRUE METRIX METER) RUMA As needed  Aniket De La Garza MD   blood glucose monitor strips Test up to 4 times daily for blood sugar monitoring  Avinash Velasquez MD   blood glucose monitor kit and supplies (PLEASE DISPENSE WHAT INS WILL COVER) - Dispense sufficient amount for  TID testing frequency plus additional to accommodate PRN testing needs. Dispense all needed supplies to include: monitor, strips, lancing device, lancets, control solutions, alcohol swabs.   MD OMAR Mitchell Complex-C-Biotin-E-Min-FA (DIALYVITE 3000) 3 MG TABS Take 1 tablet by mouth daily  Historical Provider, MD vitamin D (ERGOCALCIFEROL) 1.25 MG (24311 UT) CAPS capsule Take 50,000 Units by mouth every 3 months  Historical Provider, MD   triamcinolone (KENALOG) 0.1 % ointment Apply topically 2 times daily. Irvin Cortez MD   Handpipep Placard MISC by Does not apply route Duration: 5 years  Danielle Vu MD   aspirin 81 MG chewable tablet Take 1 tablet by mouth daily.   Evangelina Skelton MD      Scheduled Meds:   insulin glargine  50 Units SubCUTAneous Daily    insulin lispro  30 Units SubCUTAneous TID WC    insulin lispro  0-16 Units SubCUTAneous 4x Daily AC & HS    epoetin daphney-epbx  3,000 Units SubCUTAneous Once per day on Mon Wed Fri    torsemide  100 mg Oral Daily    midodrine  5 mg Oral BID WC    ipratropium-albuterol  1 ampule Inhalation 4x daily    gentamicin   Topical Daily    sodium chloride flush  5-40 mL IntraVENous 2 times per day    aspirin  81 mg Oral Daily    atorvastatin  80 mg Oral Nightly    mometasone-formoterol  2 puff Inhalation BID    buPROPion  100 mg Oral Every Other Day    levothyroxine  50 mcg Oral Daily    heparin (porcine)  5,000 Units SubCUTAneous 3 times per day     Continuous Infusions:   sodium chloride Stopped (09/23/22 1649)    dextrose       PRN Meds:sodium chloride flush, sodium chloride, ondansetron **OR** ondansetron, polyethylene glycol, acetaminophen **OR** acetaminophen, albuterol, dextrose bolus **OR** dextrose bolus, glucagon (rDNA), dextrose     Past Medical History:  Past Medical History:   Diagnosis Date    Allergic rhinitis due to other allergen 7/12/2010    Coronary atherosclerosis of unspecified type of vessel, native or graft 7/12/2010    Diabetic eye exam ([facility] [address] 04/29/2015    Diabetic eye exam ([facility] [address] 5/5/2016    [place] [facility]    Generalized osteoarthrosis, involving multiple sites 7/12/2010    Other psoriasis 7/12/2010    Pneumonia     Prolonged emergence from general anesthesia     Psoriatic arthropathy ([facility] [address] 7/12/2010    Type II or unspecified type diabetes mellitus without mention of complication, not stated as uncontrolled 7/12/2010    Unspecified essential hypertension 7/12/2010    Unspecified sleep apnea 7/12/2010      Past Surgical History:    has a past surgical history that includes Coronary artery bypass graft; Carpal tunnel release; joint replacement; Cataract removal; and LAPAROSCOPY INSERTION PERITONEAL CATHETER (N/A, 10/26/2020). Social History:  Reviewed. reports that he quit smoking about 41 years ago. He started smoking about 60 years ago. He has a 18.00 pack-year smoking history. He has never used smokeless tobacco. He reports that he does not drink alcohol and does not use drugs. Family History:  Reviewed. family history includes Coronary Art Dis in his father; Diabetes in his mother; Heart Failure in his mother. Review of Systems:  Constitutional: Negative for fever, night sweats, chills, weight changes, or weakness  Skin: Negative for rash, dry skin, pruritus, bruising, bleeding, blood clots, or changes in skin pigment  HEENT: Negative for vision changes, ringing in the ears, sore throat, dysphagia, or swollen lymph nodes  Respiratory: Reviewed in HPI  Cardiovascular: Reviewed in HPI  Gastrointestinal: Negative for abdominal pain, N/V/D, constipation, or black/tarry stools  Genito-Urinary: Negative for dysuria, incontinence, urgency, or hematuria  Musculoskeletal: Negative for joint swelling, muscle pain, or injuries  Neurological/Psych: Negative for confusion, seizures, headaches, balance issues or TIA-like symptoms.  No anxiety, depression, or insomnia    Physical Examination:  Vitals:    10/03/22 0829   BP:    Pulse:    Resp:    Temp:    SpO2: 100%      In: 715 [P.O.:715]  Out: -    Wt Readings from Last 3 Encounters:   10/03/22 203 lb 14.8 oz (92.5 kg)   09/19/22 222 lb 10.6 oz (101 kg)   08/15/22 201 lb (91.2 kg)       Intake/Output Summary (Last 24 hours) at 10/3/2022 1102  Last data filed at 10/3/2022 0500  Gross per 24 hour   Intake 435 ml Output --   Net 435 ml       Telemetry: Personally Reviewed  - Sinus rhythm/Sinus Arrhythmia. Blocked PACs. Brief bradycardic episodes at night  Constitutional: Cooperative and in no apparent distress, and appears stated age  Skin: Warm and pink; no pallor, cyanosis, bruising, or clubbing  HEENT: Symmetric and normocephalic. PERRL, EOM intact. Conjunctiva pink with clear sclera. Mucus membranes pink and moist. Teeth intact. Thyroid smooth without nodules or goiter. Cardiovascular: Regular rate and rhythm. S1/S2 present without murmurs, rubs, or gallops. Peripheral pulses 2+, capillary refill < 3 seconds. No elevation of JVP. No peripheral edema  Respiratory: Respirations symmetric and unlabored. Lungs clear to auscultation bilaterally, no wheezing, crackles, or rhonchi  Gastrointestinal: Abdomen soft and round. Bowel sounds normoactive in all quadrants without tenderness or masses. Musculoskeletal: Bilateral upper and lower extremity strength 5/5 with full ROM  Neurologic/Psych: Awake and orientated to person, place and time. Calm affect, appropriate mood    Pertinent labs, diagnostic, device, and imaging results reviewed as a part of this visit    Labs:    BMP:   Recent Labs     10/01/22  0428 10/03/22  0357   * 135*   K 4.4 4.2   CL 97* 94*   CO2 23 23   BUN 63* 70*   CREATININE 9.4* 10.6*     Estimated Creatinine Clearance: 7 mL/min (A) (based on SCr of 10.6 mg/dL [facility])).    CBC:   Recent Labs     10/01/22  0428 10/03/22  0357   WBC 14.4* 13.3*   HGB 8.7* 8.8*   HCT 26.5* 26.5*   .2* 111.6*    300     Thyroid:   Lab Results   Component Value Date/Time    TSH 5.20 01/30/2018 08:02 AM     Lipids:   Lab Results   Component Value Date/Time    CHOL 120 04/15/2022 10:29 AM    HDL 44 04/15/2022 10:29 AM    HDL 41 03/28/2012 07:07 AM    TRIG 166 04/15/2022 10:29 AM     LFTS:   Lab Results   Component Value Date/Time    ALT 33 09/26/2022 04:24 AM    AST 29 09/26/2022 04:24 AM    ALKPHOS 92 09/26/2022 04:24 AM    PROT 5.9 09/26/2022 04:24 AM    PROT 7.0 08/15/2012 08:52 AM    AGRATIO 0.9 09/26/2022 04:24 AM    BILITOT <0.2 09/26/2022 04:24 AM     Cardiac Enzymes:   Lab Results   Component Value Date/Time    CKTOTAL 196 04/29/2015 08:40 AM    TROPONINI 0.06 09/21/2022 08:57 AM    TROPONINI 0.08 09/06/2022 12:37 PM    TROPONINI 0.04 04/02/2022 01:35 PM     Coags:   Lab Results   Component Value Date/Time    PROTIME 11.2 04/15/2022 10:29 AM    INR 0.99 04/15/2022 10:29 AM       ECG: 10/1/22 (Personally Interpreted)   - NSR with 1st degree AVB    ECHO: 3/22   Left ventricular cavity size is normal. There is mild concentric left   ventricular hypertrophy. Overall left ventricular systolic function appears   low normal with an ejection fraction of 50-55%. No regional wall motion   abnormalities are noted. Normal diastolic function. The aortic valve   leaflets are slightly thickened /calcified but the valve opens adequately. Mild tricuspid regurgitation. Estimated pulmonary artery systolic pressure   is at 36 mmHg assuming a right atrial pressure of 8 mmHg. Normal right   ventricular size and function. TAPSE measures 1.31 cm and the RVS velocity   measures 12.1 cm/s. Stress Test: 8/20   Overall findings represent a high risk scan. Normal LV size and systolic function. There is a medium to large size reversible defect involving the apex, apical    anterior, mid anterior and apical lateral cardiac segments. Finding are concerning for ischemia. ECG: Non-diagnostic EKG response due to failure to reach target heart rate.      Cath: 4/22  No intervention    Problem List:   Patient Active Problem List    Diagnosis Date Noted    NSTEMI (non-ST elevated myocardial infarction) ([facility] [address]     Overweight (BMI 25.0-29.9) 10/03/2022    History of 2019 novel coronavirus disease (COVID-19) 10/03/2022    Elevated procalcitonin 10/03/2022    Arrhythmia 10/01/2022    Leukocytosis 09/23/2022    Acute pulmonary edema ([facility] [address] 09/23/2022    Multifocal pneumonia 09/22/2022    Hypervolemia 09/22/2022    Acute on chronic respiratory failure with hypoxia and hypercapnia (HCC) 09/21/2022    Moderate episode of recurrent major depressive disorder ([address] 09/17/2022    Physical deconditioning 09/15/2022    Drug toxicity 09/12/2022    Chronic hypercapnic respiratory failure ([address] 09/10/2022    BJ treated with BiPAP 09/10/2022    Hypercarbia 09/07/2022    Electrolyte imbalance 09/07/2022    Generalized abdominal tenderness 09/07/2022    AMS (altered mental status) 09/06/2022    Acute respiratory failure with hypoxia ([address] 03/20/3935    Acute metabolic encephalopathy 11/85/2619    Left lower quadrant abdominal pain     Anemia in ESRD (end-stage renal disease) ([address]     Dyspnea on exertion 04/01/2022    Orthostatic hypotension     Type 2 diabetes mellitus with hyperglycemia, with long-term current use of insulin ([address]     Hypertension associated with stage 5 chronic kidney disease due to type 2 diabetes mellitus ([address]     Acquired hypothyroidism     Unstable angina ([address]     Syncope and collapse 05/02/2018    General weakness 01/23/2018    Diabetes mellitus type 2 in obese ([address] 05/01/2017    BJ (obstructive sleep apnea)     Cough variant asthma     Acute respiratory failure with hypoxia and hypercapnia (HCC) 01/15/2017    Chronic diastolic heart failure ([address] 01/15/2017    Elevated troponin     Coronary artery disease involving native coronary artery of native heart with unstable angina pectoris ([address]     ILD (interstitial lung disease) ([address] 05/25/2016    S/P CABG x 4 12/09/2015    Nephropathy 11/04/2015    Glaucoma 02/28/2014    Vitamin D deficiency 07/16/2010    Essential hypertension 07/12/2010    Generalized osteoarthrosis, involving multiple sites 07/12/2010    Arthritis with psoriasis ([address] 07/12/2010    Sleep apnea 07/12/2010    Psoriatic arthropathy ([address] 07/12/2010    Allergic rhinitis due to other allergen 07/12/2010    Coronary atherosclerosis 07/12/2010        Assessment and Plan: 1. Bradycardia, Blocked PACs   - Episodes of blocked PACs and bradycardia into 30s noted at night; secondary to untreated BJ   - No further treatment or pacemaker needed at this time   - Consider outpatient holter monitoring in future    2. CAD  - Stable  - No complaints of angina  - Continue ASA and statin   ~ No BB secondary to hypotension ? 3. HTN  - Controlled: Goal <130/80    4. Acute Respiratory Failure   - Improved, on room air   - Monitor fluid status closely    5. ESRD   - On peritoneal dialysis    6. BJ  - Non-compliant with CPAP therapy at home  - Encourage to use CPAP to prevent long term effects of untreated BJ    No further cardiology recommendations. EP will sign off. Follow up with cardiology at [facility] in next few months. Please call if there are any questions. Thank you for consult. All pertinent information and plan of care discussed with the EP physician. All questions and concerns were addressed to the patient. Alternatives to my treatment were discussed. I have discussed the above stated plan with patient and the nurse. The patient verbalized understanding and agreed with the plan. Thank you for allowing to us to participate in the care of Samara Palomares    The patient was seen for >35 minutes. I reviewed interval history, physical exam, review of data including labs, imaging, development and implementation of treatment plan and coordination of complex care.  More than 50% of the time was devoted to counseling the patient on their diagnoses/treatments, as well as coordination of care with the other care teams    TRAVON Delcid-CNP  [address]   Office: [phone]

## 2022-10-03 NOTE — PROGRESS NOTES
Disconnected from [employer] per protocol. Effluent: Clear/ pale yellow   Total time: 8 hr 48 min   Total UF:  -175 ml. Total Volume:  8003 ml. Dwell time gained:  0 hr 0 min. Pt Tolerated procedure:  Well   Report given to: Marcelo Colón RN

## 2022-10-03 NOTE — CONSULTS
Palliative Care:        Patient arrives via [place] from [facility], altered, last seen normal last night, started peritoneal dialysis last night, O2 low on normal 3L, placed on NRB with some improvement, increased WOB noted  H&P: 69 y.o. male who presented to [facility] with complaints of altered mental status. Patient is relatively nonverbal at baseline after prior history of illness. Questionable aphasia from stroke versus ongoing dementia. Patient daughter at bedside and was able to provide information. She states the patient was more confused at his nursing facility this morning. Additionally, patient was found to be hypoxic. He was started on oxygen, with little improvement. He was placed on BiPAP with more improvement. Per the daughter, patient is supposed be wearing BiPAP at night, but is noncompliant. There were no other associated symptoms with his confusion. During work-up in the ER, patient was found to have CO2 of 94.8 with pH of 7.095 on admission. Patient does have significant dementia and is aware he is confused, but is relatively nonverbal and unable to answer complex questions. He is able to answer \"yes and no\" questions  Admit 9/21, consult 10/1    Past Medical History:   has a past medical history of Allergic rhinitis due to other allergen, Coronary atherosclerosis of unspecified type of vessel, native or graft, Diabetic eye exam ([address] Diabetic eye exam ([address] Generalized osteoarthrosis, involving multiple sites, Other psoriasis, Pneumonia, Prolonged emergence from general anesthesia, Psoriatic arthropathy ([address] Type II or unspecified type diabetes mellitus without mention of complication, not stated as uncontrolled, Unspecified essential hypertension, and Unspecified sleep apnea. Past Surgical History:   has a past surgical history that includes Coronary artery bypass graft;  Carpal tunnel release; joint replacement; Cataract removal; and LAPAROSCOPY INSERTION PERITONEAL CATHETER (N/A, 10/26/2020). Advance Directives:        Full code; ACP docs on file indicate pt did not have docs during previous hospital encounters    Primary Decision Maker: [address] [place], [place] [address]y - [phone]  Extended Emergency Contact Information  Primary Emergency Contact: Estrellita Torres of [address] [place] [address] Phone: [phone]  Relation: Spouse  Secondary Emergency Contact: Enrike Stuart Phone: [phone]  Relation: Child  Preferred language: English   needed? No    Problem Severity: Pain/Other Symptoms:        Weight 203#  Body mass index is 29.26 kg/m². Creat 9/15 was 5.0; trending up to 10.6 today  WBC was 12.4 on admit, 13.3 today (highest 15.9 on 9/30; 8.3 on 9/19 when discharged from [facility])      Bed Mobility/Toileting/Transfer:        PT/OT 7/12 on 9/30    Performance Status:        Palliative Performance Scale:  100% []Full Normal activity & work No evidence of disease  90%   [] Full Normal activity & work Some evidence of disease  80%   [] Full Normal activity with Effort Some evidence of disease  70%   [] Reduced Unable Normal Job/Work Significant disease Full Normal or reduced  60%   [] Ambulation reduced; Significant disease; Can't do hobbies/housework; intake normal   or reduced; occasional assist; LOC full/confusion  50%   [] Mainly sit/lie; Extensive disease; Can't do any work; Considerable assist; intake normal  Or reduced; LOC full/confusion  40%   [x] Mainly in bed; Extensive disease; Mainly assist; intake normal or reduced; occasional assist; LOC full/confusion  30%   [] Bed Bound; Extensive disease; Total care; intake reduced; LOC full/confusion  20%   [] Bed Bound; Extensive disease; Total care; intake minimal; Drowsy/coma  10%   [] Bed Bound; Extensive disease;  Total care; Mouth care only; Drowsy/coma    PPS 40%    Symptom Assessment: Appetite/Nausea/Bowels/Fatigue:        No SLP restrictions; 5 carb choices, low Phos    Intake/Output Summary (Last 24 hours) at 10/3/2022 0815  Last data filed at 10/3/2022 0500  Gross per 24 hour   Intake 715 ml   Output --   Net 715 ml       Social History:   reports that he quit smoking about 41 years ago. He started smoking about 60 years ago. He has a 18.00 pack-year smoking history. He has never used smokeless tobacco. He reports that he does not drink alcohol and does not use drugs. Family History:  family history includes Coronary Art Dis in his father; Diabetes in his mother; Heart Failure in his mother. Psychological/Spiritual:             Family Discussion:        All MD/RN notes and personal interaction indicate that pt is capable of independent decision making. Spoke with pt at bedside regarding his current status, POC, and prognosis. Discussed the pt's perception of PD - asked him if Nephro believed it as successful as pt does. Asked pt if he would consider HD if PD becomes insufficient - pt was uncertain. Pt was encouraged to discuss that possibility with his family as PD efficacy often declines over time. Pt indicated he would discuss. Briefly discussed pt's code status - pt quickly shut down that vein of conversation by indicating to [facility] well enough alone\". No updates to POC or d/c plans. Will follow up and support patient/family as time allows or circumstances dictate. Please reach out via [phone], [employer], or email [email or web address] if pt condition changes significantly or if family requests support. Thank you.     Electronically signed by Belle Davis RN, BSN on 10/4/2022 at 1:08 PM

## 2022-10-03 NOTE — PLAN OF CARE
Problem: Discharge Planning  Goal: Discharge to home or other facility with appropriate resources  Outcome: Progressing  Flowsheets (Taken 10/3/2022 0800)  Discharge to home or other facility with appropriate resources:   Identify barriers to discharge with patient and caregiver   Arrange for needed discharge resources and transportation as appropriate   Identify discharge learning needs (meds, wound care, etc)   Arrange for interpreters to assist at discharge as needed   Refer to discharge planning if patient needs post-hospital services based on physician order or complex needs related to functional status, cognitive ability or social support system     Problem: Safety - Adult  Goal: Free from fall injury  Outcome: Progressing  Flowsheets (Taken 10/3/2022 0900)  Free From Fall Injury: Instruct family/caregiver on patient safety     Problem: Skin/Tissue Integrity  Goal: Absence of new skin breakdown  Description: 1. Monitor for areas of redness and/or skin breakdown  2. Assess vascular access sites hourly  3. Every 4-6 hours minimum:  Change oxygen saturation probe site  4. Every 4-6 hours:  If on nasal continuous positive airway pressure, respiratory therapy assess nares and determine need for appliance change or resting period.   Outcome: Progressing     Problem: Nutrition Deficit:  Goal: Optimize nutritional status  Outcome: Progressing     Problem: Pain  Goal: Verbalizes/displays adequate comfort level or baseline comfort level  Outcome: Progressing     Problem: ABCDS Injury Assessment  Goal: Absence of physical injury  Outcome: Progressing  Flowsheets (Taken 10/3/2022 0900)  Absence of Physical Injury: Implement safety measures based on patient assessment     Problem: Chronic Conditions and Co-morbidities  Goal: Patient's chronic conditions and co-morbidity symptoms are monitored and maintained or improved  Outcome: Progressing  Flowsheets (Taken 10/3/2022 0800)  Care Plan - Patient's Chronic Conditions and Co-Morbidity Symptoms are Monitored and Maintained or Improved:   Monitor and assess patient's chronic conditions and comorbid symptoms for stability, deterioration, or improvement   Update acute care plan with appropriate goals if chronic or comorbid symptoms are exacerbated and prevent overall improvement and discharge   Collaborate with multidisciplinary team to address chronic and comorbid conditions and prevent exacerbation or deterioration

## 2022-10-03 NOTE — CONSULTS
Infectious Diseases   Consult Note        Admission Date: 9/21/2022  Hospital Day: Hospital Day: 13   Attending: Oralia Fernandez MD  Date of service: 10/3/22     Reason for admission: Acute respiratory failure with hypoxia and hypercapnia ([facility] [address] [J96.01, J96.02]    Chief complaint/ Reason for consult: Acute on chronic respiratory failure with hypoxia, persistent leukocytosis    Microbiology:        I have reviewed allavailable micro lab data and cultures    Blood culture (2/2) - collected on 9/21/2022: Negative      Antibiotics and immunizations:       Current antibiotics: All antibiotics and their doses were reviewed by me    Recent Abx Admin        No antibiotic orders with administrations found. Immunization History: All immunization history was reviewed by me today. Immunization History   Administered Date(s) Administered    COVID-19, PFIZER PURPLE top, DILUTE for use, (age 15 y+), 30mcg/0.3mL 04/21/2021, 05/12/2021, 11/30/2021    Influenza Vaccine, unspecified formulation 10/16/2014, 10/19/2015, 10/21/2016    Influenza Virus Vaccine 10/16/2014, 10/19/2015, 10/21/2016    Influenza, FLUAD, (age 72 y+), Adjuvanted, 0.5mL 09/08/2020    Influenza, High Dose (Fluzone 65 yrs and older) 10/16/2014, 10/19/2015, 10/21/2016, 09/01/2017, 11/16/2018    Influenza, Triv, inactivated, subunit, adjuvanted, IM (Fluad 65 yrs and older) 11/20/2019    PPD Test 02/02/2016    Pneumococcal Conjugate 13-valent ([id number]) 10/19/2015    Pneumococcal Polysaccharide ([id number]) 10/16/2014    Tdap (Boostrix, Adacel) 08/12/2016       Known drug allergies:      All allergies were reviewed and updated    Allergies   Allergen Reactions    Penicillins Other (See Comments)     Syncope \"pass out \" per pt    Sulfa Antibiotics      Patient unsure of reaction     Tazorac [Tazarotene]      Cream - rash    Clindamycin Rash    Clindamycin/Lincomycin Rash       Social history:     Social History:  All social andepidemiologic history was reviewed and updated by me today as needed. Tobacco use:   reports that he quit smoking about 41 years ago. He started smoking about 60 years ago. He has a 18.00 pack-year smoking history. He has never used smokeless tobacco.  Alcohol use:   reports no history of alcohol use. Currently lives in: [address] [place] [address]   reports no history of drug use. COVID VACCINATION AND LAB RESULT RECORDS:     Internal Administration   First Dose COVID-19, PFIZER PURPLE top, DILUTE for use, (age 15 y+), 30mcg/0.3mL  04/21/2021   Second Dose COVID-19, PFIZER PURPLE top, DILUTE for use, (age 15 y+), 30mcg/0.3mL   05/12/2021       Last COVID Lab SARS-CoV-2, PCR (no units)   Date Value   10/20/2020 Not Detected     SARS-CoV-2, GORDON (no units)   Date Value   12/28/2020 DETECTED (A)            Assessment:     The patient is a 69 y.o. old male who  has a past medical history of Allergic rhinitis due to other allergen (7/12/2010), Coronary atherosclerosis of unspecified type of vessel, native or graft (7/12/2010), Diabetic eye exam ([facility] [address] (04/29/2015), Diabetic eye exam ([address] (5/5/2016), Generalized osteoarthrosis, involving multiple sites (7/12/2010), Other psoriasis (7/12/2010), Pneumonia, Prolonged emergence from general anesthesia, Psoriatic arthropathy ([address] (7/12/2010), Type II or unspecified type diabetes mellitus without mention of complication, not stated as uncontrolled (7/12/2010), Unspecified essential hypertension (7/12/2010), and Unspecified sleep apnea (7/12/2010). with following problems:    Persistent leukocytosis  Acute on chronic respiratory failure with hypoxia on admission  Acute metabolic encephalopathy on admission  ESRD was on peritoneal dialysis at home  Elevated procalcitonin of 1.02 on admission  Essential hypertension  Coronary artery disease  History of COVID-19  Type 2 diabetes mellitus  Obstructive sleep apnea  Generalized weakness  Overweight due to excess calorie intake :  Body mass index is 29.26 kg/m². Discussion:      The patient was hospitalized with acute on chronic respiratory failure with hypoxia and generalized weakness and leukocytosis and metabolic encephalopathy and has been in the hospital for past 13 days. He had elevated procalcitonin of 1.02 on admission and it was 1.53 on 10/1/2022. He is otherwise afebrile. Last portable chest x-ray was on 10/1/2022. Chest x-ray image reviewed. It showed bibasilar atelectasis. His admission blood cultures from 9/21/2022 were negative. Influenza a and B and RSV PCR were done on admission and they were negative. No COVID 19 test done during this admission. Patient has history of COVID-19 in December 2020    The patient has recommended ulcer on the right buttock area    Plan:     Diagnostic Workup:    Repeat two sets of blood cultures  Will order right buttock wound culture  Continue to follow fever curve, WBC count and blood cultures  Follow up on liverand renal functions closely    Antimicrobials: Will order p.o. linezolid 600 mg every 12 hours. Hi platelet count is 971,171  Will order PO cipro 500 mg daily  We will follow up on the culture results and clinical progress and will make further recommendations accordingly. Continue close vitals monitoring. Maintain good glycemic control. Fall precautions. Aspiration precautions. Continue to watch for new fever or diarrhea. DVT prophylaxis. Discussed all above with patient and RN. Drug Monitoring:    Continue serial monitoring for antibiotic toxicity as follows: CBC, CMP  Continue to watch for following: new or worsening fever, hypotension, hives, lip swelling and redness or purulence at vascular access sites. I/v access Management:    Continue to monitor i.v access sites for erythema, induration, discharge or tenderness.    As always, continue efforts to minimizetubes/lines/drains as clinically appropriate to reduce chances of line associated infections. Current isolation precautions: There are no current isolations documented for this patient. Level of complexity of visit and medical decision making: High     Risk of Complications/Morbidity: High     Illness(es)/ Infection present that pose threat to life/bodily function. There is potential for severe exacerbation of infection/side effects of treatment. Therapy requires intensive monitoring for antimicrobial agent toxicity. Thank you for involving me in the care of your patient. I will continue to follow. If you have any additional questions, please do not hesitate to contact me. Subjective:     Presenting complaint in ER:     Chief Complaint   Patient presents with    Altered Mental Status     Patient arrives via [facility] from [facility], altered, last seen normal last night, started peritoneal dialysis last night, O2 low on normal 3L, placed on NRB with some improvement, increased WOB noted        HPI: Cipriano Reed is a 69 y.o. male patient, who was seen at the request of Dr. Barbie Cameron MD.    History was obtained from chart review and the patient. The patient was admitted on 9/21/2022. I have been consulted to see the patient for above mentioned reason(s). The patient has multiple medical comorbidities, and presented to the ER for generalized weakness. The patient was sleeping most of the day for past several days before presentation to the ER and was not eating or drinking well. He is an ESRD patient on home peritoneal dialysis since last year. The patient has been in the hospital for more than 13  days. He had multiple blood cultures and PD fluid cultures done. He received IV vancomycin for 1 day on the day of admission and 2 doses of IV cefepime  1 g every 48 hours at the time of admission. The patient has had persistent leukocytosis. I have been asked for my opinion for management for this patient.                    Past Medical History: All past medical history reviewed today. Past Medical History:   Diagnosis Date    Allergic rhinitis due to other allergen 7/12/2010    Coronary atherosclerosis of unspecified type of vessel, native or graft 7/12/2010    Diabetic eye exam ([facility] [address] 04/29/2015    Diabetic eye exam ([facility] [address] 5/5/2016    [place] [facility]    Generalized osteoarthrosis, involving multiple sites 7/12/2010    Other psoriasis 7/12/2010    Pneumonia     Prolonged emergence from general anesthesia     Psoriatic arthropathy ([facility] [address] 7/12/2010    Type II or unspecified type diabetes mellitus without mention of complication, not stated as uncontrolled 7/12/2010    Unspecified essential hypertension 7/12/2010    Unspecified sleep apnea 7/12/2010         Past Surgical History: All pastsurgical history was reviewed today. Past Surgical History:   Procedure Laterality Date    CARPAL TUNNEL RELEASE      s/p    CATARACT REMOVAL      CORONARY ARTERY BYPASS GRAFT      JOINT REPLACEMENT      LAPAROSCOPY INSERTION PERITONEAL CATHETER N/A 10/26/2020    LAPAROSCOPIC PERITONEAL DIALYSIS CATHETER PLACEMENT; LAPAROSCOPIC OMENTOPEXY performed by Barbie Chahal DO at [facility] OR         Family History: All family history was reviewed today. Problem Relation Age of Onset    Diabetes Mother     Heart Failure Mother     Coronary Art Dis Father          Medications: All current and past medications were reviewed.     Medications Prior to Admission: albuterol sulfate (PROAIR RESPICLICK) 807 (90 Base) MCG/ACT aerosol powder inhalation, Inhale 2 puffs into the lungs every 4 hours as needed for Wheezing or Shortness of Breath  buPROPion (WELLBUTRIN) 100 MG tablet, Take 1 tablet by mouth every other day  epoetin daphney-epbx (RETACRIT) 2000 UNIT/ML injection, Inject 1 mL into the skin three times a week  insulin glargine (LANTUS SOLOSTAR) 100 UNIT/ML injection pen, Inject 50 Units into the skin nightly (Patient taking differently: Inject 40 Units into the skin nightly)  insulin lispro, 1 Unit Dial, 100 UNIT/ML SOPN, Inject 0-4 Units into the skin 3 times daily (with meals)  insulin lispro, 1 Unit Dial, 100 UNIT/ML SOPN, Inject 0-4 Units into the skin nightly  BREO ELLIPTA 200-25 MCG/INH AEPB inhaler, Inhale 1 puff into the lungs daily  atorvastatin (LIPITOR) 80 MG tablet, TAKE ONE TABLET BY MOUTH ONCE NIGHTLY  Easy Touch Lancets 30G/Twist MISC, USE THREE TIMES A DAY TO FOUR TIMES A DAY  levothyroxine (SYNTHROID) 50 MCG tablet, TAKE ONE TABLET BY MOUTH DAILY  B-D ULTRAFINE III SHORT PEN 31G X 8 MM MISC, USE WITH INSULIN FOUR TIMES A DAY  Blood Glucose Monitoring Suppl (TRUE METRIX METER) RUMA, As needed  blood glucose monitor strips, Test up to 4 times daily for blood sugar monitoring  blood glucose monitor kit and supplies, (PLEASE DISPENSE WHAT INS WILL COVER) - Dispense sufficient amount for  TID testing frequency plus additional to accommodate PRN testing needs. Dispense all needed supplies to include: monitor, strips, lancing device, lancets, control solutions, alcohol swabs. B Complex-C-Biotin-E-Min-FA (DIALYVITE 3000) 3 MG TABS, Take 1 tablet by mouth daily  vitamin D (ERGOCALCIFEROL) 1.25 MG (50202 UT) CAPS capsule, Take 50,000 Units by mouth every 3 months  Handicap Placard MISC, by Does not apply route Duration: 5 years  aspirin 81 MG chewable tablet, Take 1 tablet by mouth daily.      insulin glargine  50 Units SubCUTAneous Daily    insulin lispro  30 Units SubCUTAneous TID WC    insulin lispro  0-16 Units SubCUTAneous 4x Daily AC & HS    epoetin daphney-epbx  3,000 Units SubCUTAneous Once per day on Mon Wed Fri    torsemide  100 mg Oral Daily    midodrine  5 mg Oral BID WC    ipratropium-albuterol  1 ampule Inhalation 4x daily    gentamicin   Topical Daily    sodium chloride flush  5-40 mL IntraVENous 2 times per day    aspirin  81 mg Oral Daily    atorvastatin  80 mg Oral Nightly    mometasone-formoterol  2 puff Inhalation BID    buPROPion  100 mg Oral Every Other Day    levothyroxine  50 mcg Oral Daily    heparin (porcine)  5,000 Units SubCUTAneous 3 times per day          REVIEW OF SYSTEMS:       Review of Systems   Constitutional:  Negative for chills, diaphoresis and fever. HENT:  Negative for ear discharge, ear pain, postnasal drip, rhinorrhea, sinus pressure, sinus pain and sore throat. Eyes:  Negative for discharge and redness. Respiratory:  Negative for cough, shortness of breath and wheezing. Cardiovascular:  Negative for chest pain and leg swelling. Gastrointestinal:  Negative for abdominal pain, constipation, diarrhea and nausea. Endocrine: Negative for cold intolerance, heat intolerance and polydipsia. Genitourinary:  Negative for dysuria, flank pain, frequency, hematuria and urgency. Musculoskeletal:  Negative for back pain and myalgias. Skin:  Negative for rash. Right buttock wound   Allergic/Immunologic: Negative for immunocompromised state. Neurological:  Negative for dizziness, seizures and headaches. Hematological:  Does not bruise/bleed easily. Psychiatric/Behavioral:  Negative for agitation, hallucinations and suicidal ideas. The patient is not nervous/anxious. All other systems reviewed and are negative. Objective:       PHYSICAL EXAM:      Vitals:   Vitals:    10/03/22 0600 10/03/22 0800 10/03/22 0828 10/03/22 0829   BP: 134/74 (!) 144/66     Pulse: 73 75 81    Resp: 16 19 18    Temp:  97.3 °F (36.3 °C)     TempSrc:  Temporal     SpO2: 93% 97% 94% 100%   Weight:       Height:           Physical Exam  Vitals and nursing note reviewed. Constitutional:       Appearance: He is well-developed. He is not diaphoretic. Comments: The patient was seen earlier today. HENT:      Head: Normocephalic and atraumatic. Right Ear: External ear normal. There is no impacted cerumen. Left Ear: External ear normal. There is no impacted cerumen.       Nose: Nose normal.      Mouth/Throat:      Mouth: Mucous membranes are moist.      Pharynx: Oropharynx is clear. No oropharyngeal exudate. Eyes:      General: No scleral icterus. Right eye: No discharge. Left eye: No discharge. Conjunctiva/sclera: Conjunctivae normal.      Pupils: Pupils are equal, round, and reactive to light. Neck:      Thyroid: No thyromegaly. Cardiovascular:      Rate and Rhythm: Normal rate and regular rhythm. Heart sounds: Normal heart sounds. No murmur heard. No friction rub. Pulmonary:      Effort: No respiratory distress. Breath sounds: No stridor. No wheezing or rales. Abdominal:      General: Bowel sounds are normal.      Palpations: Abdomen is soft. Tenderness: There is no abdominal tenderness. There is no guarding or rebound. Musculoskeletal:         General: No swelling, tenderness or deformity. Normal range of motion. Cervical back: Normal range of motion and neck supple. Right lower leg: No edema. Left lower leg: No edema. Lymphadenopathy:      Cervical: No cervical adenopathy. Skin:     General: Skin is warm and dry. Coloration: Skin is not jaundiced. Findings: No bruising, erythema or rash. Comments: Right buttock wound   Neurological:      General: No focal deficit present. Mental Status: He is alert and oriented to person, place, and time. Mental status is at baseline. Motor: No abnormal muscle tone. Psychiatric:         Mood and Affect: Mood normal.         Behavior: Behavior normal.         Lines and drains: All vascular access sites are healthy with no local erythema, discharge or tenderness. Intake and output:     I/O last 3 completed shifts: In: 965 [P.O.:965]  Out: 0     Lab Data:   All available labs were reviewed by me today.      CBC:   Recent Labs     10/01/22  0428 10/03/22  0357   WBC 14.4* 13.3*   RBC 2.36* 2.38*   HGB 8.7* 8.8*   HCT 26.5* 26.5*    300   .2* 111.6*   MCH 36.9* 37.1*   MCHC 32.8 33.2   RDW 14.3 14.3        BMP:  Recent Labs 10/01/22  0428 10/03/22  0357   * 135*   K 4.4 4.2   CL 97* 94*   CO2 23 23   BUN 63* 70*   CREATININE 9.4* 10.6*   CALCIUM 8.9 9.2   GLUCOSE 354* 265*        Hepatic FunctionPanel:   Lab Results   Component Value Date/Time    ALKPHOS 92 09/26/2022 04:24 AM    ALT 33 09/26/2022 04:24 AM    AST 29 09/26/2022 04:24 AM    PROT 5.9 09/26/2022 04:24 AM    PROT 7.0 08/15/2012 08:52 AM    BILITOT <0.2 09/26/2022 04:24 AM    BILIDIR <0.2 01/15/2017 05:30 AM    IBILI see below 01/15/2017 05:30 AM    LABALBU 2.8 09/26/2022 04:24 AM       CPK:   Lab Results   Component Value Date    CKTOTAL 196 04/29/2015     ESR: No results found for: SEDRATE  CRP: No results found for: CRP      Imaging: All pertinent images and reports for the current visit were reviewed by me during this visit. I reviewed the chest x-ray/CT scan/MRI images and independently interpreted the findings and results today. XR CHEST PORTABLE   Final Result   Low volume study with trace bilateral pleural effusions and persistent   bibasilar atelectasis or airspace disease. XR CHEST PORTABLE   Final Result   CHF with pulmonary edema. CT HEAD WO CONTRAST   Final Result   No definite evidence of acute intracranial abnormality on a study   significantly limited by motion/streak artifact as described above. Outside records:    Labs, Microbiology, Radiology and pertinent results from Care everywhere, if available, were reviewed as a part ofthe consultation.       Problem list:       Patient Active Problem List   Diagnosis Code    Essential hypertension I10    Generalized osteoarthrosis, involving multiple sites M15.9    Arthritis with psoriasis ([address] L40.50    Sleep apnea G47.30    Psoriatic arthropathy (HCC) L40.50    Allergic rhinitis due to other allergen J30.89    Coronary atherosclerosis I25.10    Vitamin D deficiency E55.9    Glaucoma H40.9    Nephropathy N28.9    S/P CABG x 4 Z95.1    ILD (interstitial lung disease) ([address] J84.9    Acute respiratory failure with hypoxia and hypercapnia ([facility]) J96.01, J96.02    Chronic diastolic heart failure ([facility]) I50.32    Elevated troponin R77.8    Coronary artery disease involving native coronary artery of native heart with unstable angina pectoris ([facility]) I25.110    BJ (obstructive sleep apnea) G47.33    Cough variant asthma J45.991    NSTEMI (non-ST elevated myocardial infarction) ([facility]) I21.4    Diabetes mellitus type 2 in obese ([facility]) E11.69, E66.9    General weakness R53.1    Syncope and collapse R55    Type 2 diabetes mellitus with hyperglycemia, with long-term current use of insulin ([facility]) E11.65, Z79.4    Hypertension associated with stage 5 chronic kidney disease due to type 2 diabetes mellitus ([facility]) E11.22, I12.0, N18.5    Acquired hypothyroidism E03.9    Unstable angina ([facility]) I20.0    Orthostatic hypotension I95.1    Dyspnea on exertion R06.09    Left lower quadrant abdominal pain R10.32    Anemia in ESRD (end-stage renal disease) ([facility]) N18.6, D63.1    AMS (altered mental status) R41.82    Acute respiratory failure with hypoxia ([facility]) A18.14    Acute metabolic encephalopathy J05.01    Hypercarbia R06.89    Electrolyte imbalance E87.8    Generalized abdominal tenderness R10.817    Chronic hypercapnic respiratory failure ([facility]) J96.12    BJ treated with BiPAP G47.33    Drug toxicity R89.2    Physical deconditioning R53.81    Moderate episode of recurrent major depressive disorder ([facility]) F33.1    Acute on chronic respiratory failure with hypoxia and hypercapnia ([facility]) J96.21, J96.22    Multifocal pneumonia J18.9    Hypervolemia E87.70    Leukocytosis D72.829    Acute pulmonary edema ([facility]) J81.0    Arrhythmia I49.9    Overweight (BMI 25.0-29. 9) E66.3    History of 2019 novel coronavirus disease (COVID-19) Z86.16    Elevated procalcitonin R79.89         Please note that this chart was generated using Dragon dictation software.  Although every effort was made to ensure the accuracy of this automated transcription, some errors in transcription may have occurred inadvertently. If you may need any clarification, please do not hesitate to contact me through EPIC or at the phone number provided below with my electronic signature. Any pictures or media included in this note were obtained after taking informed verbal consent from the patient and with their approval to include those in the patient's medical record. Jonathan Rodriguez MD, MPH, [facility]  10/3/2022, 10:44 AM  [facility] Infectious Disease   [facility] [address] [facility] [place] [address]  Office: [phone]  Fax: [phone]  In-person Clinic days:  Tuesday & Thursday a.m. Virtual clinic days: Monday, Wednesday & Friday a.m.

## 2022-10-04 ENCOUNTER — APPOINTMENT (OUTPATIENT)
Dept: CT IMAGING | Age: 77
DRG: 640 | End: 2022-10-04
Payer: COMMERCIAL

## 2022-10-04 LAB
ANION GAP SERPL CALCULATED.3IONS-SCNC: 16 MMOL/L (ref 3–16)
BASOPHILS ABSOLUTE: 0.1 K/UL (ref 0–0.2)
BASOPHILS RELATIVE PERCENT: 0.6 %
BUN BLDV-MCNC: 73 MG/DL (ref 7–20)
CALCIUM SERPL-MCNC: 8 MG/DL (ref 8.3–10.6)
CHLORIDE BLD-SCNC: 96 MMOL/L (ref 99–110)
CO2: 25 MMOL/L (ref 21–32)
CREAT SERPL-MCNC: 10.6 MG/DL (ref 0.8–1.3)
EOSINOPHILS ABSOLUTE: 0.6 K/UL (ref 0–0.6)
EOSINOPHILS RELATIVE PERCENT: 3.8 %
GFR AFRICAN AMERICAN: 6
GFR NON-AFRICAN AMERICAN: 5
GLUCOSE BLD-MCNC: 116 MG/DL (ref 70–99)
GLUCOSE BLD-MCNC: 122 MG/DL (ref 70–99)
GLUCOSE BLD-MCNC: 158 MG/DL (ref 70–99)
GLUCOSE BLD-MCNC: 183 MG/DL (ref 70–99)
GLUCOSE BLD-MCNC: 194 MG/DL (ref 70–99)
GLUCOSE BLD-MCNC: 218 MG/DL (ref 70–99)
GLUCOSE BLD-MCNC: 246 MG/DL (ref 70–99)
HCT VFR BLD CALC: 26.7 % (ref 40.5–52.5)
HEMATOLOGY PATH CONSULT: NO
HEMOGLOBIN: 8.8 G/DL (ref 13.5–17.5)
LYMPHOCYTES ABSOLUTE: 1.8 K/UL (ref 1–5.1)
LYMPHOCYTES RELATIVE PERCENT: 11.9 %
MCH RBC QN AUTO: 36.8 PG (ref 26–34)
MCHC RBC AUTO-ENTMCNC: 32.9 G/DL (ref 31–36)
MCV RBC AUTO: 112 FL (ref 80–100)
MONOCYTES ABSOLUTE: 0.6 K/UL (ref 0–1.3)
MONOCYTES RELATIVE PERCENT: 4.2 %
NEUTROPHILS ABSOLUTE: 12 K/UL (ref 1.7–7.7)
NEUTROPHILS RELATIVE PERCENT: 79.5 %
PDW BLD-RTO: 14.2 % (ref 12.4–15.4)
PERFORMED ON: ABNORMAL
PLATELET # BLD: 306 K/UL (ref 135–450)
PMV BLD AUTO: 7.3 FL (ref 5–10.5)
POTASSIUM REFLEX MAGNESIUM: 4.5 MMOL/L (ref 3.5–5.1)
RBC # BLD: 2.38 M/UL (ref 4.2–5.9)
SODIUM BLD-SCNC: 137 MMOL/L (ref 136–145)
WBC # BLD: 15.1 K/UL (ref 4–11)

## 2022-10-04 PROCEDURE — 36415 COLL VENOUS BLD VENIPUNCTURE: CPT

## 2022-10-04 PROCEDURE — 6370000000 HC RX 637 (ALT 250 FOR IP): Performed by: STUDENT IN AN ORGANIZED HEALTH CARE EDUCATION/TRAINING PROGRAM

## 2022-10-04 PROCEDURE — 6370000000 HC RX 637 (ALT 250 FOR IP): Performed by: INTERNAL MEDICINE

## 2022-10-04 PROCEDURE — 94761 N-INVAS EAR/PLS OXIMETRY MLT: CPT

## 2022-10-04 PROCEDURE — 90945 DIALYSIS ONE EVALUATION: CPT

## 2022-10-04 PROCEDURE — 2580000003 HC RX 258: Performed by: STUDENT IN AN ORGANIZED HEALTH CARE EDUCATION/TRAINING PROGRAM

## 2022-10-04 PROCEDURE — 85025 COMPLETE CBC W/AUTO DIFF WBC: CPT

## 2022-10-04 PROCEDURE — 74176 CT ABD & PELVIS W/O CONTRAST: CPT

## 2022-10-04 PROCEDURE — 94640 AIRWAY INHALATION TREATMENT: CPT

## 2022-10-04 PROCEDURE — 6360000002 HC RX W HCPCS: Performed by: STUDENT IN AN ORGANIZED HEALTH CARE EDUCATION/TRAINING PROGRAM

## 2022-10-04 PROCEDURE — 90945 DIALYSIS ONE EVALUATION: CPT | Performed by: INTERNAL MEDICINE

## 2022-10-04 PROCEDURE — 80048 BASIC METABOLIC PNL TOTAL CA: CPT

## 2022-10-04 PROCEDURE — 94660 CPAP INITIATION&MGMT: CPT

## 2022-10-04 PROCEDURE — 2000000000 HC ICU R&B

## 2022-10-04 PROCEDURE — 99233 SBSQ HOSP IP/OBS HIGH 50: CPT | Performed by: INTERNAL MEDICINE

## 2022-10-04 RX ORDER — METRONIDAZOLE 250 MG/1
500 TABLET ORAL EVERY 8 HOURS SCHEDULED
Status: DISCONTINUED | OUTPATIENT
Start: 2022-10-04 | End: 2022-10-05

## 2022-10-04 RX ADMIN — GENTAMICIN SULFATE: 1 CREAM TOPICAL at 09:00

## 2022-10-04 RX ADMIN — HEPARIN SODIUM 5000 UNITS: 5000 INJECTION INTRAVENOUS; SUBCUTANEOUS at 14:37

## 2022-10-04 RX ADMIN — LEVOTHYROXINE SODIUM 50 MCG: 0.03 TABLET ORAL at 05:47

## 2022-10-04 RX ADMIN — METRONIDAZOLE 500 MG: 250 TABLET ORAL at 14:38

## 2022-10-04 RX ADMIN — Medication 1 CAPSULE: at 17:42

## 2022-10-04 RX ADMIN — LINEZOLID 600 MG: 600 TABLET, FILM COATED ORAL at 09:30

## 2022-10-04 RX ADMIN — ONDANSETRON 4 MG: 2 INJECTION INTRAMUSCULAR; INTRAVENOUS at 17:51

## 2022-10-04 RX ADMIN — IPRATROPIUM BROMIDE AND ALBUTEROL SULFATE 1 AMPULE: 2.5; .5 SOLUTION RESPIRATORY (INHALATION) at 12:20

## 2022-10-04 RX ADMIN — IPRATROPIUM BROMIDE AND ALBUTEROL SULFATE 1 AMPULE: 2.5; .5 SOLUTION RESPIRATORY (INHALATION) at 08:49

## 2022-10-04 RX ADMIN — Medication 2 PUFF: at 20:28

## 2022-10-04 RX ADMIN — HEPARIN SODIUM 5000 UNITS: 5000 INJECTION INTRAVENOUS; SUBCUTANEOUS at 20:49

## 2022-10-04 RX ADMIN — INSULIN LISPRO 30 UNITS: 100 INJECTION, SOLUTION INTRAVENOUS; SUBCUTANEOUS at 17:51

## 2022-10-04 RX ADMIN — Medication 10 ML: at 20:48

## 2022-10-04 RX ADMIN — TORSEMIDE 100 MG: 100 TABLET ORAL at 11:35

## 2022-10-04 RX ADMIN — INSULIN GLARGINE 50 UNITS: 100 INJECTION, SOLUTION SUBCUTANEOUS at 11:27

## 2022-10-04 RX ADMIN — HEPARIN SODIUM 5000 UNITS: 5000 INJECTION INTRAVENOUS; SUBCUTANEOUS at 05:47

## 2022-10-04 RX ADMIN — INSULIN LISPRO 4 UNITS: 100 INJECTION, SOLUTION INTRAVENOUS; SUBCUTANEOUS at 05:46

## 2022-10-04 RX ADMIN — INSULIN LISPRO 30 UNITS: 100 INJECTION, SOLUTION INTRAVENOUS; SUBCUTANEOUS at 11:27

## 2022-10-04 RX ADMIN — ATORVASTATIN CALCIUM 80 MG: 80 TABLET, FILM COATED ORAL at 20:48

## 2022-10-04 RX ADMIN — CIPROFLOXACIN 500 MG: 500 TABLET, FILM COATED ORAL at 09:30

## 2022-10-04 RX ADMIN — Medication 2 PUFF: at 08:49

## 2022-10-04 RX ADMIN — Medication 10 ML: at 09:00

## 2022-10-04 RX ADMIN — MIDODRINE HYDROCHLORIDE 5 MG: 5 TABLET ORAL at 09:30

## 2022-10-04 RX ADMIN — METRONIDAZOLE 500 MG: 250 TABLET ORAL at 20:48

## 2022-10-04 RX ADMIN — IPRATROPIUM BROMIDE AND ALBUTEROL SULFATE 1 AMPULE: 2.5; .5 SOLUTION RESPIRATORY (INHALATION) at 20:28

## 2022-10-04 RX ADMIN — LINEZOLID 600 MG: 600 TABLET, FILM COATED ORAL at 20:49

## 2022-10-04 RX ADMIN — MIDODRINE HYDROCHLORIDE 5 MG: 5 TABLET ORAL at 17:42

## 2022-10-04 RX ADMIN — ASPIRIN 81 MG 81 MG: 81 TABLET ORAL at 09:30

## 2022-10-04 RX ADMIN — Medication 1 CAPSULE: at 09:30

## 2022-10-04 ASSESSMENT — ENCOUNTER SYMPTOMS
WHEEZING: 0
COUGH: 0
RHINORRHEA: 0
EYE REDNESS: 0
BACK PAIN: 0
EYE DISCHARGE: 0
ABDOMINAL PAIN: 0
SHORTNESS OF BREATH: 0
SINUS PRESSURE: 0
DIARRHEA: 0
SORE THROAT: 0
NAUSEA: 0
CONSTIPATION: 0
SINUS PAIN: 0

## 2022-10-04 ASSESSMENT — PAIN SCALES - WONG BAKER
WONGBAKER_NUMERICALRESPONSE: 0

## 2022-10-04 ASSESSMENT — PAIN SCALES - GENERAL
PAINLEVEL_OUTOF10: 0
PAINLEVEL_OUTOF10: 0

## 2022-10-04 NOTE — PROGRESS NOTES
Disconnected from [facility] per protocol. Effluent: 521  Total time: 8 hr 16 min   Total UF:  521 ml. Total Volume:  8007 ml. Dwell time gained:  0 hr 4 min.   Pt Tolerated procedure: good  Report given to: Marco Steve RN

## 2022-10-04 NOTE — PROGRESS NOTES
Hospitalist Progress Note    Name:  Burke Falcon    /Age/Sex: 1945  (69 y.o. male)  MRN & CSN:  [id number] & [id number]    PCP: Loulou Collazo MD    Date of Admission: 2022  Chief Complaint: Altered mental status    Hospital Course: This 20-year-old man was admitted for altered mental status. Found to be hypoxic and hypercapnic on admission. Placed on BiPAP with improvement. Nephrology following as patient has ESRD on peritoneal dialysis. Pulmonology following. Interval history:  Pt seen and examined today. Overnight events noted, interval ancillary notes and labs reviewed. Afebrile overnight, WBC trended up to 15.1. Repeat blood cultures and wound culture ordered  denied cough, SOB, chest pain, nausea, vomiting or abdominal pain      Assessment and plan    Persistent leukocytosis;  ID consulted; repeat blood cultures and wound culture ordered and started on empiric Zyvox and Cipro  CT chest abdomen and pelvis ordered to rule out any source of infection. Acute on chronic hypoxic and hypercapnic respiratory failure:-Improved  Most likely due to volume overload. Elevated proBNP at admission; trended down  Initially diuresed with IV Lasix; switched to torsemide  Continue home inhalers and as needed breathing treatment  Supplemental oxygen as needed to keep sats between 90 to 92%  Pulmonology consulted: appreciate recommendations    Cardiac arrhythmias; no clinically second prolonged pauses  Cardiology consult; no indication for pacemaker at this time  Monitor on telemetry    History of BJ: Unable to tolerate home BiPAP/CPAP. Will need repeat sleep study outpatient. Acute metabolic encephalopathy: Improved  CT head: No acute abnormality. TSH, Ammonia level wnl. B 12 and folate pending. Supportive care     Type 2 diabetes on long-term insulin:  A1c 6.5 on 2022. Continue basal insulin with sliding scale.      ESRD on peritoneal dialysis:  Nephrology consulted: Appreciate input. No evidence of PD site infection or peritonitis. PD fluid cultures negative. Hypotension: Continue midodrine    Depression: Continue Wellbutrin     Hypothyroid: Continue synthroid. Anemia due to ESRD: Continue EPO. DVT ppx: Heparin  GI ppx: Diet/Tube Feeds  Diet: ADULT DIET; Regular; 4 carb choices (60 gm/meal); Low Phosphorus (Less than 1000 mg)  ADULT ORAL NUTRITION SUPPLEMENT; Breakfast, Lunch; Renal Oral Supplement  Code Status: Full Code  Disposition:    Medications:  Reviewed      Intake/Output Summary (Last 24 hours) at 10/4/2022 0958  Last data filed at 10/3/2022 1800  Gross per 24 hour   Intake --   Output 0 ml   Net 0 ml         Physical Exam Performed:    BP (!) 159/62   Pulse 78   Temp 98.4 °F (36.9 °C) (Temporal)   Resp 16   Ht 5' 10\" (1.778 m)   Wt 196 lb 13.9 oz (89.3 kg)   SpO2 94%   BMI 28.25 kg/m²     General appearance: No apparent distress, appears stated age and cooperative. Somewhat confused, but more conversational today. HEENT: Pupils equal, round, and reactive to light. Conjunctivae/corneas clear. Neck: Supple, with full range of motion. No jugular venous distention. Trachea midline. Respiratory:  Normal respiratory effort. Clear to auscultation, bilaterally without Rales/Wheezes/Rhonchi. Cardiovascular: Regular rate and rhythm with normal S1/S2 without murmurs, rubs or gallops. Trace pitting edema LE bilaterally. Abdomen: Soft, non-tender, non-distended with normal bowel sounds. : No CVA tenderness. Musculoskeletal: No clubbing or cyanosis. Full range of motion without deformity.   Skin: Skin color, texture, turgor normal.  Sacral wound; dressed  Physical exam remains unchanged from 9/25    Labs:   Recent Labs     10/03/22  0357 10/04/22  0421   WBC 13.3* 15.1*   HGB 8.8* 8.8*   HCT 26.5* 26.7*    306       Recent Labs     10/03/22  0357 10/04/22  0421   * 137   K 4.2 4.5   CL 94* 96*   CO2 23 25   BUN 70* 73*   CREATININE 10.6* 10.6* CALCIUM 9.2 8.0*       No results for input(s): AST, ALT, BILIDIR, BILITOT, ALKPHOS in the last 72 hours. No results for input(s): INR in the last 72 hours. No results for input(s): Jovana Yelitza in the last 72 hours. Urinalysis:      Lab Results   Component Value Date/Time    NITRU Negative 09/21/2022 03:18 PM    WBCUA 3 09/21/2022 03:18 PM    BACTERIA None Seen 09/21/2022 03:18 PM    RBCUA 5 09/21/2022 03:18 PM    BLOODU SMALL 09/21/2022 03:18 PM    SPECGRAV 1.015 09/21/2022 03:18 PM    GLUCOSEU 250 09/21/2022 03:18 PM    GLUCOSEU >=1000 03/26/2012 10:22 PM       Radiology:  XR CHEST PORTABLE   Final Result   Low volume study with trace bilateral pleural effusions and persistent   bibasilar atelectasis or airspace disease. XR CHEST PORTABLE   Final Result   CHF with pulmonary edema. CT HEAD WO CONTRAST   Final Result   No definite evidence of acute intracranial abnormality on a study   significantly limited by motion/streak artifact as described above. Active Hospital Problems    Diagnosis     Overweight (BMI 25.0-29. 9) [E66.3]      Priority: Medium    History of 2019 novel coronavirus disease (COVID-19) [Z86.16]      Priority: Medium    Elevated procalcitonin [R79.89]      Priority: Medium    Arrhythmia [I49.9]      Priority: Medium    Leukocytosis [D72.829]      Priority: Medium    Acute pulmonary edema (HCC) [J81.0]      Priority: Medium    Multifocal pneumonia [J18.9]      Priority: Medium    Hypervolemia [E87.70]      Priority: Medium    Acute on chronic respiratory failure with hypoxia and hypercapnia (HCC) [N16.07, J96.22]      Priority: Medium    Moderate episode of recurrent major depressive disorder (HCC) [F33.1]      Priority: Medium    BJ treated with BiPAP [G47.33]      Priority: Medium    Acute metabolic encephalopathy [H50.00]      Priority: Medium    Anemia in ESRD (end-stage renal disease) (HCC) [N18.6, D63.1]     Type 2 diabetes mellitus with hyperglycemia, with long-term current use of insulin (HCC) [E11.65, Z79.4]     Hypertension associated with stage 5 chronic kidney disease due to type 2 diabetes mellitus ([address] [E11.22, I12.0, N18.5]     General weakness [R53.1]     Chronic diastolic heart failure ([address] [I50.32]     Acute respiratory failure with hypoxia and hypercapnia ([address] [J96.01, J96.02]     Arthritis with psoriasis ([address] [L40.50]     Psoriatic arthropathy ([address] [L40.50]     Sleep apnea [G47.30]        Parrish Lucero MD  10/4/2022  9:58 AM      Please note that some part of this chart was generated using Dragon dictation software. Although every effort was made to ensure the accuracy of this automated transcription, some errors in transcription may have occurred inadvertently. If you may need any clarification, please do not hesitate to contact me through [facility].

## 2022-10-04 NOTE — PLAN OF CARE
chronic conditions and comorbid symptoms for stability, deterioration, or improvement   Collaborate with multidisciplinary team to address chronic and comorbid conditions and prevent exacerbation or deterioration   Update acute care plan with appropriate goals if chronic or comorbid symptoms are exacerbated and prevent overall improvement and discharge

## 2022-10-04 NOTE — PROGRESS NOTES
Office : [phone]     Fax :[phone]       Nephrology progress  Note      Patient's Name: Selina Sharma  10:50 AM  10/4/2022    Reason for Consult:  ESRD on peritoneal dialysis. Requesting Physician:  Maida Dominguez MD      Chief Complaint:    Chief Complaint   Patient presents with    Altered Mental Status     Patient arrives via [place] from [place], altered, last seen normal last night, started peritoneal dialysis last night, O2 low on normal 3L, placed on NRB with some improvement, increased WOB noted           History of Present Ilness:    Selina Sharma is a 69 y.o. male with prior history of ESRD on peritoneal dialysis , DM 2, HTN who was sent from [facility] with main c/o SOB. In ER he was found to be hypoxic. CXR shows volume overload. On BiPAP now. Still makes urine . Also had elevated WBC. No abdominal pain. No fever   BS Elevated. Interval hx :    Feels much better   Tolerating PD well       WBC decreasing     No abdominal pain   No fever  No discharge from PD site     I/O last 3 completed shifts:   In: 80 [P.O.:175]  Out: 0     Past Medical History:   Diagnosis Date    Allergic rhinitis due to other allergen 7/12/2010    Coronary atherosclerosis of unspecified type of vessel, native or graft 7/12/2010    Diabetic eye exam ([address] 04/29/2015    Diabetic eye exam ([address] 5/5/2016    [place] [facility]    Generalized osteoarthrosis, involving multiple sites 7/12/2010    Other psoriasis 7/12/2010    Pneumonia     Prolonged emergence from general anesthesia     Psoriatic arthropathy ([address] 7/12/2010    Type II or unspecified type diabetes mellitus without mention of complication, not stated as uncontrolled 7/12/2010    Unspecified essential hypertension 7/12/2010    Unspecified sleep apnea 7/12/2010       Past Surgical History:   Procedure Laterality Date    CARPAL TUNNEL RELEASE      s/p    CATARACT REMOVAL      CORONARY ARTERY BYPASS GRAFT      JOINT REPLACEMENT      LAPAROSCOPY INSERTION PERITONEAL CATHETER N/A 10/26/2020    LAPAROSCOPIC PERITONEAL DIALYSIS CATHETER PLACEMENT; LAPAROSCOPIC OMENTOPEXY performed by Jacob Luciano DO at [address] OR       Family History   Problem Relation Age of Onset    Diabetes Mother     Heart Failure Mother     Coronary Art Dis Father         reports that he quit smoking about 41 years ago. He started smoking about 60 years ago. He has a 18.00 pack-year smoking history. He has never used smokeless tobacco. He reports that he does not drink alcohol and does not use drugs.         Allergies:  Penicillins, Sulfa antibiotics, Tazorac [tazarotene], Clindamycin, and Clindamycin/lincomycin    Current Medications:    insulin glargine (LANTUS) injection vial 50 Units, Daily  linezolid (ZYVOX) tablet 600 mg, 2 times per day  ciprofloxacin (CIPRO) tablet 500 mg, Daily  lactobacillus (CULTURELLE) capsule 1 capsule, BID WC  insulin lispro (HUMALOG) injection vial 30 Units, TID WC  insulin lispro (HUMALOG) injection vial 0-16 Units, 4x Daily AC & HS  epoetin daphney-epbx (RETACRIT) injection 3,000 Units, Once per day on Mon Wed Fri  torsemide (DEMADEX) tablet 100 mg, Daily  midodrine (PROAMATINE) tablet 5 mg, BID WC  ipratropium-albuterol (DUONEB) nebulizer solution 1 ampule, 4x daily  gentamicin (GARAMYCIN) 0.1 % cream, Daily  sodium chloride flush 0.9 % injection 5-40 mL, 2 times per day  sodium chloride flush 0.9 % injection 5-40 mL, PRN  0.9 % sodium chloride infusion, PRN  ondansetron (ZOFRAN-ODT) disintegrating tablet 4 mg, Q8H PRN   Or  ondansetron (ZOFRAN) injection 4 mg, Q6H PRN  polyethylene glycol (GLYCOLAX) packet 17 g, Daily PRN  acetaminophen (TYLENOL) tablet 650 mg, Q6H PRN   Or  acetaminophen (TYLENOL) suppository 650 mg, Q6H PRN  albuterol (PROVENTIL) nebulizer solution 2.5 mg, Q4H PRN  aspirin chewable tablet 81 mg, Daily  atorvastatin (LIPITOR) tablet 80 mg, Nightly  mometasone-formoterol (DULERA) 200-5 MCG/ACT inhaler 2 puff, BID  buPROPion (WELLBUTRIN) tablet 100 mg, Every Other Day  levothyroxine (SYNTHROID) tablet 50 mcg, Daily  dextrose bolus 10% 125 mL, PRN   Or  dextrose bolus 10% 250 mL, PRN  glucagon (rDNA) injection 1 mg, PRN  dextrose 10 % infusion, Continuous PRN  heparin (porcine) injection 5,000 Units, 3 times per day      Physical exam:     Vitals:  BP (!) 150/61   Pulse 78   Temp 97.1 °F (36.2 °C)   Resp 16   Ht 5' 10\" (1.778 m)   Wt 196 lb 13.9 oz (89.3 kg)   SpO2 94%   BMI 28.25 kg/m²   Constitutional:  OAA X3 NAD  Skin: no rash, turgor wnl  Heent:  eomi, mmm  Neck: no bruits or jvd noted  Cardiovascular:  S1, S2 without m/r/g  Respiratory: b/l crackles   Abdomen:  +bs, soft, nt, nd  Ext: mild  lower extremity edema    Labs:  CBC:   Recent Labs     10/03/22  0357 10/04/22  0421   WBC 13.3* 15.1*   HGB 8.8* 8.8*    306     BMP:    Recent Labs     10/03/22  0357 10/04/22  0421   * 137   K 4.2 4.5   CL 94* 96*   CO2 23 25   BUN 70* 73*   CREATININE 10.6* 10.6*   GLUCOSE 265* 194*     Ca/Mg/Phos:   Recent Labs     10/03/22  0357 10/04/22  0421   CALCIUM 9.2 8.0*     Hepatic:   No results for input(s): AST, ALT, ALB, BILITOT, ALKPHOS in the last 72 hours. Troponin:   No results for input(s): TROPONINI in the last 72 hours. BNP: No results for input(s): BNP in the last 72 hours. Lipids: No results for input(s): CHOL, TRIG, HDL, LDLCALC, LABVLDL in the last 72 hours. ABGs:   No results for input(s): PHART, PO2ART, ZZQ6OQU in the last 72 hours. INR: No results for input(s): INR in the last 72 hours. UA:  No results for input(s): Jose Martin Drop, GLUCOSEU, BILIRUBINUR, KETUA, SPECGRAV, BLOODU, PHUR, PROTEINU, UROBILINOGEN, NITRU, LEUKOCYTESUR, Riddhi Dyke in the last 72 hours.      Urine Microscopic:   No results for input(s): LABCAST, BACTERIA, COMU, HYALCAST, WBCUA, RBCUA, EPIU in the last 72 hours. Urine Culture: No results for input(s): LABURIN in the last 72 hours. Urine Chemistry: No results for input(s): Laretta Coco, PROTEINUR, NAUR in the last 72 hours. IMAGING:  XR CHEST PORTABLE   Final Result   Low volume study with trace bilateral pleural effusions and persistent   bibasilar atelectasis or airspace disease. XR CHEST PORTABLE   Final Result   CHF with pulmonary edema. CT HEAD WO CONTRAST   Final Result   No definite evidence of acute intracranial abnormality on a study   significantly limited by motion/streak artifact as described above. Assessment/Plan :      1. ESRD   Volume overload   Better     Continue  CCPD  with 2.5  %     PD fluid for cell count and culture -  negative   No growth   No evidence of peritonitis    Stop ABX     WBC elevated - will rpt PD fluid cell count and culture       2. HTN  BP controlled       3. Anemia in ESRD   On epogen     4. Acid- base disorder. monitor     5. Hypokalemia   Replace potassium       6. Debility. Generalized weakness.  Need PT/OT       Awaiting placement at rehab unit           D/w primary team      Thank you for allowing us to participate in care of Stephen Edmondson         Electronically signed by: Mague Burks MD, 10/4/2022, 10:50 AM      [facility] [address]  Office : [phone]  Fax :[phone]

## 2022-10-04 NOTE — PROGRESS NOTES
Pt alert and oriented VSS on bipap/room air. PT blood glucose WDL. Pt anuric through the night thus far. Culture of buttock wound collected and sent to lab. Pt resting through the night without acute incident.

## 2022-10-04 NOTE — PROGRESS NOTES
Infectious Diseases   Progress Note      Admission Date: 9/21/2022  Hospital Day: Hospital Day: 14   Attending: Russell Menezes MD  Date of service: 10/4/2022     Chief complaint/ Reason for consult:     Persistent leukocytosis  Acute on chronic respiratory failure with hypoxia on admission  Acute metabolic encephalopathy on admission  ESRD was on peritoneal dialysis at home  Elevated procalcitonin of 1.02 on admission  Essential hypertension  Coronary artery disease    Microbiology:        I have reviewed allavailable micro lab data and cultures    Blood culture (2/2) - collected on 9/21/2022 and 10-2-22: Negative        Antibiotics and immunizations:       Current antibiotics: All antibiotics and their doses were reviewed by me    Recent Abx Admin                     ciprofloxacin (CIPRO) tablet 500 mg (mg) 500 mg Given 10/04/22 0930    linezolid (ZYVOX) tablet 600 mg (mg) 600 mg Given 10/04/22 0930     600 mg Given 10/03/22 2220                      Immunization History: All immunization history was reviewed by me today. Immunization History   Administered Date(s) Administered    COVID-19, PFIZER PURPLE top, DILUTE for use, (age 15 y+), 30mcg/0.3mL 04/21/2021, 05/12/2021, 11/30/2021    Influenza Vaccine, unspecified formulation 10/16/2014, 10/19/2015, 10/21/2016    Influenza Virus Vaccine 10/16/2014, 10/19/2015, 10/21/2016    Influenza, FLUAD, (age 72 y+), Adjuvanted, 0.5mL 09/08/2020    Influenza, High Dose (Fluzone 65 yrs and older) 10/16/2014, 10/19/2015, 10/21/2016, 09/01/2017, 11/16/2018    Influenza, Triv, inactivated, subunit, adjuvanted, IM (Fluad 65 yrs and older) 11/20/2019    PPD Test 02/02/2016    Pneumococcal Conjugate 13-valent ([id number]) 10/19/2015    Pneumococcal Polysaccharide ([id number]) 10/16/2014    Tdap (Boostrix, Adacel) 08/12/2016       Known drug allergies:      All allergies were reviewed and updated    Allergies   Allergen Reactions    Penicillins Other (See Comments) Syncope \"pass out \" per pt    Sulfa Antibiotics      Patient unsure of reaction     Tazorac [Tazarotene]      Cream - rash    Clindamycin Rash    Clindamycin/Lincomycin Rash       Social history:     Social History:  All social andepidemiologic history was reviewed and updated by me today as needed. Tobacco use:   reports that he quit smoking about 41 years ago. He started smoking about 60 years ago. He has a 18.00 pack-year smoking history. He has never used smokeless tobacco.  Alcohol use:   reports no history of alcohol use. Currently lives in: [address] [place] [address]   reports no history of drug use. COVID VACCINATION AND LAB RESULT RECORDS:     Internal Administration   First Dose COVID-19, PFIZER PURPLE top, DILUTE for use, (age 15 y+), 30mcg/0.3mL  04/21/2021   Second Dose COVID-19, PFIZER PURPLE top, DILUTE for use, (age 15 y+), 30mcg/0.3mL   05/12/2021       Last COVID Lab SARS-CoV-2, PCR (no units)   Date Value   10/20/2020 Not Detected     SARS-CoV-2, GORDON (no units)   Date Value   12/28/2020 DETECTED (A)            Assessment:     The patient is a 69 y.o. old male who  has a past medical history of Allergic rhinitis due to other allergen (7/12/2010), Coronary atherosclerosis of unspecified type of vessel, native or graft (7/12/2010), Diabetic eye exam ([facility] (04/29/2015), Diabetic eye exam ([facility] (5/5/2016), Generalized osteoarthrosis, involving multiple sites (7/12/2010), Other psoriasis (7/12/2010), Pneumonia, Prolonged emergence from general anesthesia, Psoriatic arthropathy ([address] (7/12/2010), Type II or unspecified type diabetes mellitus without mention of complication, not stated as uncontrolled (7/12/2010), Unspecified essential hypertension (7/12/2010), and Unspecified sleep apnea (7/12/2010).  with following problems:    Persistent leukocytosis-White cell count has gone up and is 15,100 today  Acute on chronic respiratory failure with hypoxia on admission  Acute metabolic encephalopathy on admission  ESRD was on peritoneal dialysis at home  Elevated procalcitonin of 1.02 on admission  Essential hypertension-blood pressure okay  Coronary artery disease  History of COVID-19  Type 2 diabetes mellitus-maintain good glycemic control  Obstructive sleep apnea  Generalized weakness  Overweight due to excess calorie intake : Body mass index is 29.26 kg/m². Discussion:      The patient is afebrile. I had started him on empiric linezolid and ciprofloxacin for persistent leukocytosis. His white cell count has gone up and is 15,100. The patient has a stage I ulcer at the right buttock area but no other obvious source for his leukocytosis      Plan:     Diagnostic Workup:    Follow-up on blood cultures and right buttock area wound culture from yesterday  Recommend a CT scan of chest abdomen and pelvis with IV contrast to look for source of leukocytosis  Continue to follow  fever curve, WBC count and blood cultures. Continue to monitor blood counts, liver and renal function. Antimicrobials: Will continue oral linezolid 600 mg every 12 hours  Continue p.o. Cipro 500 mg every 24 hours  Will order p.o. Flagyl 500 mg every 8 hour for empiric anaerobic coverage  Right buttock wound care  Pressure ulcer care and prevention precautions  We will follow up on the culture results and clinical progress and will make further recommendations accordingly. Continue close vitals monitoring. Maintain good glycemic control. Fall precautions. Aspiration precautions. Continue to watch for new fever or diarrhea. DVT prophylaxis. Discussed all above with patient and RN. Discussed with Dr. Kathy Mccabe      Drug Monitoring:    Continue monitoring for antibiotic toxicity as follows: CBC, CMP, QTc interval  Continue to watch for following: new or worsening fever, new hypotension, hives, lip swelling and redness or purulence at vascular access sites.      I/v access Management:    Continue to monitor i.v access sites for erythema, induration, discharge or tenderness. As always, continue efforts to minimize tubes/lines/drains as clinically appropriate to reduce chances of line associated infections. Patient education and counseling: The patient was educated in detail about the side-effects of various antibiotics and things to watch for like new rashes, lip swelling, severe reaction, worsening diarrhea, break through fever etc.  Discussed patient's condition and what to expect. All of the patient's questions were addressed in a satisfactory manner and patient verbalized understanding all instructions. Level of complexity of visit and medical decision making: High       TIME SPENT TODAY:     - Spent over  36 minutes on visit (including interval history, physical exam, review of data including labs, cultures, imaging, development and implementation of treatment plan and coordination of complex care). More than 50 percent of this includes face-to-face time spent with the patient for counseling and coordination of care. Thank you for involving me in the care of your patient. I will continue to follow. If you have anyadditional questions, please do not hesitate to contact me. Subjective: Interval history: Interval history was obtained from chart review and patient/ RN. The patient is afebrile. He is tolerating antibiotics okay. No diarrhea     REVIEW OF SYSTEMS:      Review of Systems   Constitutional:  Positive for fatigue. Negative for chills, diaphoresis and fever. HENT:  Negative for ear discharge, ear pain, postnasal drip, rhinorrhea, sinus pressure, sinus pain and sore throat. Eyes:  Negative for discharge and redness. Respiratory:  Negative for cough, shortness of breath and wheezing. Cardiovascular:  Negative for chest pain and leg swelling. Gastrointestinal:  Negative for abdominal pain, constipation, diarrhea and nausea.    Endocrine: Negative for cold intolerance, heat intolerance and 10/04/22 0930 10/04/22 1200   BP: (!) 153/64  (!) 150/61 120/60   Pulse: 74 78  87   Resp: 19 16  19   Temp: 97.5 °F (36.4 °C)  97.1 °F (36.2 °C)    TempSrc: Temporal      SpO2: 100% 94%     Weight:       Height:           Physical Exam  Vitals and nursing note reviewed. Constitutional:       Appearance: He is well-developed. He is not diaphoretic. Comments: The patient was seen earlier today. HENT:      Head: Normocephalic and atraumatic. Right Ear: External ear normal. There is no impacted cerumen. Left Ear: External ear normal. There is no impacted cerumen. Nose: Nose normal.      Mouth/Throat:      Mouth: Mucous membranes are moist.      Pharynx: Oropharynx is clear. No oropharyngeal exudate. Eyes:      General: No scleral icterus. Right eye: No discharge. Left eye: No discharge. Conjunctiva/sclera: Conjunctivae normal.      Pupils: Pupils are equal, round, and reactive to light. Neck:      Thyroid: No thyromegaly. Cardiovascular:      Rate and Rhythm: Normal rate and regular rhythm. Heart sounds: Normal heart sounds. No murmur heard. No friction rub. Pulmonary:      Effort: No respiratory distress. Breath sounds: No stridor. No wheezing or rales. Abdominal:      General: Bowel sounds are normal.      Palpations: Abdomen is soft. Tenderness: There is no abdominal tenderness. There is no guarding or rebound. Musculoskeletal:         General: No swelling, tenderness or deformity. Normal range of motion. Cervical back: Normal range of motion and neck supple. Right lower leg: No edema. Left lower leg: No edema. Lymphadenopathy:      Cervical: No cervical adenopathy. Skin:     General: Skin is warm and dry. Coloration: Skin is not jaundiced. Findings: No bruising, erythema or rash. Comments: Right buttock Wound noted   Neurological:      General: No focal deficit present.       Mental Status: He is alert and oriented to person, place, and time. Mental status is at baseline. Motor: No abnormal muscle tone. Psychiatric:         Mood and Affect: Mood normal.         Behavior: Behavior normal.          Lines and drains: All vascular access sites are healthy with no local erythema, discharge or tenderness. Intake and output:    I/O last 3 completed shifts: In: 175 [P.O.:175]  Out: 0     Lab Data:   All available labs and old records have been reviewed by me. CBC:  Recent Labs     10/03/22  0357 10/04/22  0421   WBC 13.3* 15.1*   RBC 2.38* 2.38*   HGB 8.8* 8.8*   HCT 26.5* 26.7*    306   .6* 112.0*   MCH 37.1* 36.8*   MCHC 33.2 32.9   RDW 14.3 14.2        BMP:  Recent Labs     10/03/22  0357 10/04/22  0421   * 137   K 4.2 4.5   CL 94* 96*   CO2 23 25   BUN 70* 73*   CREATININE 10.6* 10.6*   CALCIUM 9.2 8.0*   GLUCOSE 265* 194*        Hepatic Function Panel:   Lab Results   Component Value Date/Time    ALKPHOS 92 09/26/2022 04:24 AM    ALT 33 09/26/2022 04:24 AM    AST 29 09/26/2022 04:24 AM    PROT 5.9 09/26/2022 04:24 AM    PROT 7.0 08/15/2012 08:52 AM    BILITOT <0.2 09/26/2022 04:24 AM    BILIDIR <0.2 01/15/2017 05:30 AM    IBILI see below 01/15/2017 05:30 AM    LABALBU 2.8 09/26/2022 04:24 AM       CPK:   Lab Results   Component Value Date    CKTOTAL 196 04/29/2015     ESR: No results found for: SEDRATE  CRP: No results found for: CRP        Imaging: All pertinent images and reports for the current visit were reviewed by me during this visit. I reviewed the chest x-ray/CT scan/MRI images and independently interpreted the findings and results today. XR CHEST PORTABLE   Final Result   Low volume study with trace bilateral pleural effusions and persistent   bibasilar atelectasis or airspace disease. XR CHEST PORTABLE   Final Result   CHF with pulmonary edema.          CT HEAD WO CONTRAST   Final Result   No definite evidence of acute intracranial abnormality on a study significantly limited by motion/streak artifact as described above. Medications: All current and past medications were reviewed.      metroNIDAZOLE  500 mg Oral 3 times per day    insulin glargine  50 Units SubCUTAneous Daily    linezolid  600 mg Oral 2 times per day    ciprofloxacin  500 mg Oral Daily    lactobacillus  1 capsule Oral BID WC    insulin lispro  30 Units SubCUTAneous TID WC    insulin lispro  0-16 Units SubCUTAneous 4x Daily AC & HS    epoetin daphney-epbx  3,000 Units SubCUTAneous Once per day on Mon Wed Fri    torsemide  100 mg Oral Daily    midodrine  5 mg Oral BID WC    ipratropium-albuterol  1 ampule Inhalation 4x daily    gentamicin   Topical Daily    sodium chloride flush  5-40 mL IntraVENous 2 times per day    aspirin  81 mg Oral Daily    atorvastatin  80 mg Oral Nightly    mometasone-formoterol  2 puff Inhalation BID    buPROPion  100 mg Oral Every Other Day    levothyroxine  50 mcg Oral Daily    heparin (porcine)  5,000 Units SubCUTAneous 3 times per day        sodium chloride Stopped (09/23/22 1649)    dextrose         sodium chloride flush, sodium chloride, ondansetron **OR** ondansetron, polyethylene glycol, acetaminophen **OR** acetaminophen, albuterol, dextrose bolus **OR** dextrose bolus, glucagon (rDNA), dextrose      Problem list:       Patient Active Problem List   Diagnosis Code    Essential hypertension I10    Generalized osteoarthrosis, involving multiple sites M15.9    Arthritis with psoriasis ([facility] L40.50    Sleep apnea G47.30    Psoriatic arthropathy (HCC) L40.50    Allergic rhinitis due to other allergen J30.89    Coronary atherosclerosis I25.10    Vitamin D deficiency E55.9    Glaucoma H40.9    Nephropathy N28.9    S/P CABG x 4 Z95.1    ILD (interstitial lung disease) ([facility] J84.9    Acute respiratory failure with hypoxia and hypercapnia (HCC) J96.01, J96.02    Chronic diastolic heart failure (HCC) I50.32    Elevated troponin R77.8    Coronary artery disease involving native coronary artery of native heart with unstable angina pectoris ([facility]) I25.110    BJ (obstructive sleep apnea) G47.33    Cough variant asthma J45.991    NSTEMI (non-ST elevated myocardial infarction) ([facility]) I21.4    Diabetes mellitus type 2 in obese ([facility]) E11.69, E66.9    General weakness R53.1    Syncope and collapse R55    Type 2 diabetes mellitus with hyperglycemia, with long-term current use of insulin ([facility]) E11.65, Z79.4    Hypertension associated with stage 5 chronic kidney disease due to type 2 diabetes mellitus ([facility]) E11.22, I12.0, N18.5    Acquired hypothyroidism E03.9    Unstable angina ([facility]) I20.0    Orthostatic hypotension I95.1    Dyspnea on exertion R06.09    Left lower quadrant abdominal pain R10.32    Anemia in ESRD (end-stage renal disease) ([facility]) N18.6, D63.1    AMS (altered mental status) R41.82    Acute respiratory failure with hypoxia ([facility]) X23.93    Acute metabolic encephalopathy X41.72    Hypercarbia R06.89    Electrolyte imbalance E87.8    Generalized abdominal tenderness R10.817    Chronic hypercapnic respiratory failure ([facility]) J96.12    BJ treated with BiPAP G47.33    Drug toxicity R89.2    Physical deconditioning R53.81    Moderate episode of recurrent major depressive disorder ([facility]) F33.1    Acute on chronic respiratory failure with hypoxia and hypercapnia ([facility]) J96.21, J96.22    Multifocal pneumonia J18.9    Hypervolemia E87.70    Leukocytosis D72.829    Acute pulmonary edema ([facility]) J81.0    Arrhythmia I49.9    Overweight (BMI 25.0-29. 9) E66.3    History of 2019 novel coronavirus disease (COVID-19) Z86.16    Elevated procalcitonin R79.89       Please note that this chart was generated using Dragon dictation software. Although every effort was made to ensure the accuracy of this automated transcription, some errors in transcription may have occurred inadvertently.  If you may need any clarification, please do not hesitate to contact me through [facility] or at the phone number provided below with my electronic signature. Any pictures or media included in this note were obtained after taking informed verbal consent from the patient and with their approval to include those in the patient's medical record. Wyvonna Dubin, MD, MPH, [address] [place] [address]  10/4/2022, 1:39 PM  [facility] Infectious Disease   [address] [facility], [address] [place] [address]  Office: [phone]  Fax: [phone]  In-person Clinic days:  Tuesday & Thursday a.m. Virtual clinic days: Monday, Wednesday & Friday a.m.

## 2022-10-04 NOTE — PROGRESS NOTES
Physical/Occupational Therapy  Michael Graham    Attempted to see pt for PT/OT treatment. Patient leaving floor with transport. Will hold therapy at this time and will follow up with pt as schedule permits.  Thanks, Ric Cain, PT, DPT [id number], Jadiel Verma, [address] [place] [phone]

## 2022-10-04 NOTE — PLAN OF CARE
Problem: Discharge Planning  Goal: Discharge to home or other facility with appropriate resources  10/4/2022 0140 by Perfecto Brewer RN  Outcome: Progressing  10/3/2022 1530 by John Wick RN  Outcome: Progressing  Flowsheets (Taken 10/3/2022 0800)  Discharge to home or other facility with appropriate resources:   Identify barriers to discharge with patient and caregiver   Arrange for needed discharge resources and transportation as appropriate   Identify discharge learning needs (meds, wound care, etc)   Arrange for interpreters to assist at discharge as needed   Refer to discharge planning if patient needs post-hospital services based on physician order or complex needs related to functional status, cognitive ability or social support system     Problem: Safety - Adult  Goal: Free from fall injury  10/4/2022 0140 by Perfecto Brewer RN  Outcome: Progressing  Flowsheets (Taken 10/3/2022 2310)  Free From Fall Injury: Instruct family/caregiver on patient safety  10/3/2022 1530 by John Wick RN  Outcome: Progressing  Flowsheets (Taken 10/3/2022 0900)  Free From Fall Injury: Instruct family/caregiver on patient safety     Problem: Skin/Tissue Integrity  Goal: Absence of new skin breakdown  Description: 1. Monitor for areas of redness and/or skin breakdown  2. Assess vascular access sites hourly  3. Every 4-6 hours minimum:  Change oxygen saturation probe site  4. Every 4-6 hours:  If on nasal continuous positive airway pressure, respiratory therapy assess nares and determine need for appliance change or resting period.   10/4/2022 0140 by Perfecto Brewer RN  Outcome: Progressing  10/3/2022 1530 by John Wick RN  Outcome: Progressing     Problem: Nutrition Deficit:  Goal: Optimize nutritional status  10/4/2022 0140 by Perfecto Brewer RN  Outcome: Progressing  10/3/2022 1530 by John Wick RN  Outcome: Progressing     Problem: Pain  Goal: Verbalizes/displays adequate comfort level or baseline comfort level  10/4/2022 0140 by Marcial Villalobos RN  Outcome: Progressing  Flowsheets (Taken 10/3/2022 2000)  Verbalizes/displays adequate comfort level or baseline comfort level: Encourage patient to monitor pain and request assistance  10/3/2022 1530 by Maryjo Frank RN  Outcome: Progressing     Problem: ABCDS Injury Assessment  Goal: Absence of physical injury  10/4/2022 0140 by Marcial Villalobos RN  Outcome: Progressing  Flowsheets (Taken 10/3/2022 2310)  Absence of Physical Injury: Implement safety measures based on patient assessment  10/3/2022 1530 by Maryjo Frank RN  Outcome: Progressing  Flowsheets (Taken 10/3/2022 0900)  Absence of Physical Injury: Implement safety measures based on patient assessment     Problem: Chronic Conditions and Co-morbidities  Goal: Patient's chronic conditions and co-morbidity symptoms are monitored and maintained or improved  10/4/2022 0140 by Marcial Villalobos RN  Outcome: Progressing  10/3/2022 1530 by Maryjo Frank RN  Outcome: Progressing  Flowsheets (Taken 10/3/2022 0800)  Care Plan - Patient's Chronic Conditions and Co-Morbidity Symptoms are Monitored and Maintained or Improved:   Monitor and assess patient's chronic conditions and comorbid symptoms for stability, deterioration, or improvement   Update acute care plan with appropriate goals if chronic or comorbid symptoms are exacerbated and prevent overall improvement and discharge   Collaborate with multidisciplinary team to address chronic and comorbid conditions and prevent exacerbation or deterioration

## 2022-10-05 PROBLEM — I50.9 ACUTE CONGESTIVE HEART FAILURE (HCC): Status: ACTIVE | Noted: 2022-10-05

## 2022-10-05 PROBLEM — Z99.2 PERITONEAL DIALYSIS CATHETER IN PLACE (HCC): Status: ACTIVE | Noted: 2022-10-05

## 2022-10-05 LAB
ANION GAP SERPL CALCULATED.3IONS-SCNC: 17 MMOL/L (ref 3–16)
BASOPHILS ABSOLUTE: 0.1 K/UL (ref 0–0.2)
BASOPHILS RELATIVE PERCENT: 0.5 %
BUN BLDV-MCNC: 79 MG/DL (ref 7–20)
CALCIUM SERPL-MCNC: 9 MG/DL (ref 8.3–10.6)
CHLORIDE BLD-SCNC: 94 MMOL/L (ref 99–110)
CO2: 24 MMOL/L (ref 21–32)
CREAT SERPL-MCNC: 10.6 MG/DL (ref 0.8–1.3)
EOSINOPHILS ABSOLUTE: 0.6 K/UL (ref 0–0.6)
EOSINOPHILS RELATIVE PERCENT: 4 %
GFR AFRICAN AMERICAN: 6
GFR NON-AFRICAN AMERICAN: 5
GLUCOSE BLD-MCNC: 118 MG/DL (ref 70–99)
GLUCOSE BLD-MCNC: 140 MG/DL (ref 70–99)
GLUCOSE BLD-MCNC: 149 MG/DL (ref 70–99)
GLUCOSE BLD-MCNC: 159 MG/DL (ref 70–99)
GLUCOSE BLD-MCNC: 197 MG/DL (ref 70–99)
GLUCOSE BLD-MCNC: 226 MG/DL (ref 70–99)
GLUCOSE BLD-MCNC: 61 MG/DL (ref 70–99)
GLUCOSE BLD-MCNC: 61 MG/DL (ref 70–99)
GLUCOSE BLD-MCNC: 67 MG/DL (ref 70–99)
HCT VFR BLD CALC: 26.2 % (ref 40.5–52.5)
HEMATOLOGY PATH CONSULT: NO
HEMOGLOBIN: 8.7 G/DL (ref 13.5–17.5)
LYMPHOCYTES ABSOLUTE: 1.1 K/UL (ref 1–5.1)
LYMPHOCYTES RELATIVE PERCENT: 7.5 %
MCH RBC QN AUTO: 36.8 PG (ref 26–34)
MCHC RBC AUTO-ENTMCNC: 33.2 G/DL (ref 31–36)
MCV RBC AUTO: 110.8 FL (ref 80–100)
MONOCYTES ABSOLUTE: 0.6 K/UL (ref 0–1.3)
MONOCYTES RELATIVE PERCENT: 3.8 %
NEUTROPHILS ABSOLUTE: 12.8 K/UL (ref 1.7–7.7)
NEUTROPHILS RELATIVE PERCENT: 84.2 %
PDW BLD-RTO: 14.3 % (ref 12.4–15.4)
PERFORMED ON: ABNORMAL
PLATELET # BLD: 321 K/UL (ref 135–450)
PMV BLD AUTO: 7.3 FL (ref 5–10.5)
POTASSIUM REFLEX MAGNESIUM: 4.4 MMOL/L (ref 3.5–5.1)
RBC # BLD: 2.37 M/UL (ref 4.2–5.9)
SODIUM BLD-SCNC: 135 MMOL/L (ref 136–145)
WBC # BLD: 15.3 K/UL (ref 4–11)

## 2022-10-05 PROCEDURE — 6360000002 HC RX W HCPCS: Performed by: INTERNAL MEDICINE

## 2022-10-05 PROCEDURE — 94761 N-INVAS EAR/PLS OXIMETRY MLT: CPT

## 2022-10-05 PROCEDURE — 94640 AIRWAY INHALATION TREATMENT: CPT

## 2022-10-05 PROCEDURE — 6360000002 HC RX W HCPCS: Performed by: STUDENT IN AN ORGANIZED HEALTH CARE EDUCATION/TRAINING PROGRAM

## 2022-10-05 PROCEDURE — 2580000003 HC RX 258: Performed by: STUDENT IN AN ORGANIZED HEALTH CARE EDUCATION/TRAINING PROGRAM

## 2022-10-05 PROCEDURE — 6370000000 HC RX 637 (ALT 250 FOR IP): Performed by: INTERNAL MEDICINE

## 2022-10-05 PROCEDURE — 1200000000 HC SEMI PRIVATE

## 2022-10-05 PROCEDURE — 36415 COLL VENOUS BLD VENIPUNCTURE: CPT

## 2022-10-05 PROCEDURE — 85025 COMPLETE CBC W/AUTO DIFF WBC: CPT

## 2022-10-05 PROCEDURE — 6370000000 HC RX 637 (ALT 250 FOR IP): Performed by: STUDENT IN AN ORGANIZED HEALTH CARE EDUCATION/TRAINING PROGRAM

## 2022-10-05 PROCEDURE — 99233 SBSQ HOSP IP/OBS HIGH 50: CPT | Performed by: INTERNAL MEDICINE

## 2022-10-05 PROCEDURE — APPNB30 APP NON BILLABLE TIME 0-30 MINS: Performed by: NURSE PRACTITIONER

## 2022-10-05 PROCEDURE — 94660 CPAP INITIATION&MGMT: CPT

## 2022-10-05 PROCEDURE — 2580000003 HC RX 258: Performed by: INTERNAL MEDICINE

## 2022-10-05 PROCEDURE — 80048 BASIC METABOLIC PNL TOTAL CA: CPT

## 2022-10-05 PROCEDURE — 99222 1ST HOSP IP/OBS MODERATE 55: CPT | Performed by: SURGERY

## 2022-10-05 RX ORDER — PROMETHAZINE HYDROCHLORIDE 25 MG/ML
6.25 INJECTION, SOLUTION INTRAMUSCULAR; INTRAVENOUS ONCE
Status: COMPLETED | OUTPATIENT
Start: 2022-10-05 | End: 2022-10-05

## 2022-10-05 RX ADMIN — Medication 2 PUFF: at 08:04

## 2022-10-05 RX ADMIN — GENTAMICIN SULFATE: 1 CREAM TOPICAL at 12:02

## 2022-10-05 RX ADMIN — DEXTROSE MONOHYDRATE 125 ML: 100 INJECTION, SOLUTION INTRAVENOUS at 17:35

## 2022-10-05 RX ADMIN — IPRATROPIUM BROMIDE AND ALBUTEROL SULFATE 1 AMPULE: 2.5; .5 SOLUTION RESPIRATORY (INHALATION) at 08:03

## 2022-10-05 RX ADMIN — ONDANSETRON 4 MG: 2 INJECTION INTRAMUSCULAR; INTRAVENOUS at 11:55

## 2022-10-05 RX ADMIN — MIDODRINE HYDROCHLORIDE 5 MG: 5 TABLET ORAL at 08:52

## 2022-10-05 RX ADMIN — HEPARIN SODIUM 5000 UNITS: 5000 INJECTION INTRAVENOUS; SUBCUTANEOUS at 20:00

## 2022-10-05 RX ADMIN — ATORVASTATIN CALCIUM 80 MG: 80 TABLET, FILM COATED ORAL at 20:00

## 2022-10-05 RX ADMIN — EPOETIN ALFA-EPBX 3000 UNITS: 10000 INJECTION, SOLUTION INTRAVENOUS; SUBCUTANEOUS at 11:57

## 2022-10-05 RX ADMIN — BUPROPION HYDROCHLORIDE 100 MG: 100 TABLET, FILM COATED ORAL at 12:00

## 2022-10-05 RX ADMIN — INSULIN LISPRO 30 UNITS: 100 INJECTION, SOLUTION INTRAVENOUS; SUBCUTANEOUS at 08:54

## 2022-10-05 RX ADMIN — CIPROFLOXACIN 500 MG: 500 TABLET, FILM COATED ORAL at 08:52

## 2022-10-05 RX ADMIN — SODIUM CHLORIDE 10 ML/HR: 9 INJECTION, SOLUTION INTRAVENOUS at 15:26

## 2022-10-05 RX ADMIN — LINEZOLID 600 MG: 600 TABLET, FILM COATED ORAL at 20:00

## 2022-10-05 RX ADMIN — HEPARIN SODIUM 5000 UNITS: 5000 INJECTION INTRAVENOUS; SUBCUTANEOUS at 05:14

## 2022-10-05 RX ADMIN — Medication 10 ML: at 08:52

## 2022-10-05 RX ADMIN — INSULIN GLARGINE 50 UNITS: 100 INJECTION, SOLUTION SUBCUTANEOUS at 08:54

## 2022-10-05 RX ADMIN — PROMETHAZINE HYDROCHLORIDE 6.25 MG: 25 INJECTION INTRAMUSCULAR; INTRAVENOUS at 16:45

## 2022-10-05 RX ADMIN — ASPIRIN 81 MG 81 MG: 81 TABLET ORAL at 08:52

## 2022-10-05 RX ADMIN — Medication 10 ML: at 20:01

## 2022-10-05 RX ADMIN — SODIUM CHLORIDE 10 ML/HR: 9 INJECTION, SOLUTION INTRAVENOUS at 15:24

## 2022-10-05 RX ADMIN — MIDODRINE HYDROCHLORIDE 5 MG: 5 TABLET ORAL at 16:43

## 2022-10-05 RX ADMIN — Medication 1 CAPSULE: at 08:52

## 2022-10-05 RX ADMIN — LINEZOLID 600 MG: 600 TABLET, FILM COATED ORAL at 08:52

## 2022-10-05 RX ADMIN — METRONIDAZOLE 500 MG: 250 TABLET ORAL at 05:15

## 2022-10-05 RX ADMIN — HEPARIN SODIUM 5000 UNITS: 5000 INJECTION INTRAVENOUS; SUBCUTANEOUS at 15:24

## 2022-10-05 RX ADMIN — TORSEMIDE 100 MG: 100 TABLET ORAL at 12:00

## 2022-10-05 RX ADMIN — Medication 1 CAPSULE: at 16:43

## 2022-10-05 RX ADMIN — INSULIN LISPRO 30 UNITS: 100 INJECTION, SOLUTION INTRAVENOUS; SUBCUTANEOUS at 12:23

## 2022-10-05 RX ADMIN — LEVOTHYROXINE SODIUM 50 MCG: 0.03 TABLET ORAL at 05:15

## 2022-10-05 RX ADMIN — MEROPENEM 500 MG: 500 INJECTION, POWDER, FOR SOLUTION INTRAVENOUS at 15:28

## 2022-10-05 ASSESSMENT — ENCOUNTER SYMPTOMS
SINUS PAIN: 0
DIARRHEA: 0
EYE REDNESS: 0
NAUSEA: 0
RHINORRHEA: 0
ABDOMINAL PAIN: 0
CONSTIPATION: 0
SHORTNESS OF BREATH: 0
BACK PAIN: 0
COUGH: 0
SINUS PRESSURE: 0
EYE DISCHARGE: 0
SORE THROAT: 0
WHEEZING: 0

## 2022-10-05 ASSESSMENT — PAIN SCALES - WONG BAKER

## 2022-10-05 ASSESSMENT — PAIN SCALES - GENERAL
PAINLEVEL_OUTOF10: 0

## 2022-10-05 NOTE — PROGRESS NOTES
Nutrition Note    RECOMMENDATIONS  PO Diet: continue current diet, rec addition of DONATO   ONS: Nepro BID       NUTRITION ASSESSMENT   Pt remains nutritionally compromised, po intake 50% or less. Observed breakfast tray, pt only ate 100% of eggs. Opened Nepro at bedside, pt has consumed ~50% thus far. He is agreeable to continue receiving BID. Encouraged PO intake. Nutrition Related Findings: I/O's: -3.9 L since admission; 10/5 LBM; CCPD; Na+ 135, Creat 10.6, BUN 70;edema: trace BUE, BLE; I/O's: -3.9 since admission  Wounds: Pressure Injury, Stage II  Nutrition Education:  No recommendation at this time   Nutrition Goals: PO intake 50% or greater, by next RD assessment     MALNUTRITION ASSESSMENT   Acute Illness  Malnutrition Status: At risk for malnutrition       NUTRITION DIAGNOSIS   Inadequate oral intake related to inadequate protein-energy intake as evidenced by intake 0-25%, intake 26-50%      CURRENT NUTRITION THERAPIES  ADULT DIET; Regular; 5 carb choices (75 gm/meal); Low Phosphorus (Less than 1000 mg)  ADULT ORAL NUTRITION SUPPLEMENT; Breakfast, Lunch; Renal Oral Supplement     PO Intake: 1-25%, 26-50%   PO Supplement Intake:26-50%, 51-75%      ANTHROPOMETRICS  Current Height: 5' 10\" (177.8 cm)  Current Weight: 210 lb 8.6 oz (95.5 kg)    Admission weight: 205 lb (93 kg)  Ideal Body Weight (IBW): 166 lbs  (75 kg)          BMI: 30.1      COMPARATIVE STANDARDS  Energy (kcal):  1433 - 1719     Protein (g):  98 - 150       Fluid (mL/day):  1433 - 1719    The patient will be monitored per nutrition standards of care. Consult dietitian if additional nutrition interventions are needed prior to RD reassessment.      Tiago Morse RD, LD    Contact: [phone]

## 2022-10-05 NOTE — CONSULTS
[facility] GENERAL AND LAPAROSCOPIC SURGERY                       PATIENT NAME: Demetria Roper     ADMISSION DATE: 9/21/2022  8:38 AM      TODAY'S DATE: 10/5/2022    Reason for Consult:  Abd infection    Requesting Physician:  Dr. Sonido Sanchez. aRdha Potter:              The patient is a 69 y.o. male who presents with ESRD. Has PD cath in place. Uses catheter regularly, and currently as inpt. Cath placed by Dr. Dax Covarrubias in 2020. Pt denies pain at the site, no drainage around the area. Has had no diffuse abd pain. Denies seeing purulent fluid in the catheter. Pt had a CT done for elevated WBC and fluid noted intra abdominally around the catheter site.     Past Medical History:        Diagnosis Date    Allergic rhinitis due to other allergen 7/12/2010    Coronary atherosclerosis of unspecified type of vessel, native or graft 7/12/2010    Diabetic eye exam ([facility] [address] 04/29/2015    Diabetic eye exam ([address] 5/5/2016    [place] [facility]    Generalized osteoarthrosis, involving multiple sites 7/12/2010    Other psoriasis 7/12/2010    Pneumonia     Prolonged emergence from general anesthesia     Psoriatic arthropathy ([address] 7/12/2010    Type II or unspecified type diabetes mellitus without mention of complication, not stated as uncontrolled 7/12/2010    Unspecified essential hypertension 7/12/2010    Unspecified sleep apnea 7/12/2010       Past Surgical History:        Procedure Laterality Date    CARPAL TUNNEL RELEASE      s/p    CATARACT REMOVAL      CORONARY ARTERY BYPASS GRAFT      JOINT REPLACEMENT      LAPAROSCOPY INSERTION PERITONEAL CATHETER N/A 10/26/2020    LAPAROSCOPIC PERITONEAL DIALYSIS CATHETER PLACEMENT; LAPAROSCOPIC OMENTOPEXY performed by Mague Julio DO at [facility] OR       Current Medications:   Current Facility-Administered Medications: meropenem (MERREM) 500 mg IVPB (mini-bag), 500 mg, IntraVENous, Q24H  insulin glargine (LANTUS) injection vial 50 Units, 50 Units, SubCUTAneous, Daily  linezolid (Javier Odilia) tablet 600 mg, 600 mg, Oral, 2 times per day  lactobacillus (CULTURELLE) capsule 1 capsule, 1 capsule, Oral, BID WC  insulin lispro (HUMALOG) injection vial 30 Units, 30 Units, SubCUTAneous, TID WC  insulin lispro (HUMALOG) injection vial 0-16 Units, 0-16 Units, SubCUTAneous, 4x Daily AC & HS  epoetin daphney-epbx (RETACRIT) injection 3,000 Units, 3,000 Units, SubCUTAneous, Once per day on Mon Wed Fri  torsemide (DEMADEX) tablet 100 mg, 100 mg, Oral, Daily  midodrine (PROAMATINE) tablet 5 mg, 5 mg, Oral, BID WC  ipratropium-albuterol (DUONEB) nebulizer solution 1 ampule, 1 ampule, Inhalation, 4x daily  gentamicin (GARAMYCIN) 0.1 % cream, , Topical, Daily  sodium chloride flush 0.9 % injection 5-40 mL, 5-40 mL, IntraVENous, 2 times per day  sodium chloride flush 0.9 % injection 5-40 mL, 5-40 mL, IntraVENous, PRN  0.9 % sodium chloride infusion, , IntraVENous, PRN  ondansetron (ZOFRAN-ODT) disintegrating tablet 4 mg, 4 mg, Oral, Q8H PRN **OR** ondansetron (ZOFRAN) injection 4 mg, 4 mg, IntraVENous, Q6H PRN  polyethylene glycol (GLYCOLAX) packet 17 g, 17 g, Oral, Daily PRN  acetaminophen (TYLENOL) tablet 650 mg, 650 mg, Oral, Q6H PRN **OR** acetaminophen (TYLENOL) suppository 650 mg, 650 mg, Rectal, Q6H PRN  albuterol (PROVENTIL) nebulizer solution 2.5 mg, 2.5 mg, Nebulization, Q4H PRN  aspirin chewable tablet 81 mg, 81 mg, Oral, Daily  atorvastatin (LIPITOR) tablet 80 mg, 80 mg, Oral, Nightly  mometasone-formoterol (DULERA) 200-5 MCG/ACT inhaler 2 puff, 2 puff, Inhalation, BID  buPROPion (WELLBUTRIN) tablet 100 mg, 100 mg, Oral, Every Other Day  levothyroxine (SYNTHROID) tablet 50 mcg, 50 mcg, Oral, Daily  dextrose bolus 10% 125 mL, 125 mL, IntraVENous, PRN **OR** dextrose bolus 10% 250 mL, 250 mL, IntraVENous, PRN  glucagon (rDNA) injection 1 mg, 1 mg, SubCUTAneous, PRN  dextrose 10 % infusion, , IntraVENous, Continuous PRN  heparin (porcine) injection 5,000 Units, 5,000 Units, SubCUTAneous, 3 times per day  Prior to Admission medications    Medication Sig Start Date End Date Taking?  Authorizing Provider   albuterol sulfate (PROAIR RESPICLICK) 038 (90 Base) MCG/ACT aerosol powder inhalation Inhale 2 puffs into the lungs every 4 hours as needed for Wheezing or Shortness of Breath 9/19/22   Lupe Stovall MD   buPROPion (WELLBUTRIN) 100 MG tablet Take 1 tablet by mouth every other day 9/21/22   Lupe Stovall MD   epoetin daphney-epbx (RETACRIT) 2000 UNIT/ML injection Inject 1 mL into the skin three times a week 9/21/22 10/21/22  Lupe Stovall MD   insulin glargine (LANTUS SOLOSTAR) 100 UNIT/ML injection pen Inject 50 Units into the skin nightly  Patient taking differently: Inject 40 Units into the skin nightly 9/19/22   Lupe Stovall MD   insulin lispro, 1 Unit Dial, 100 UNIT/ML SOPN Inject 0-4 Units into the skin 3 times daily (with meals) 9/19/22   Lupe Stovall MD   insulin lispro, 1 Unit Dial, 100 UNIT/ML SOPN Inject 0-4 Units into the skin nightly 9/19/22   Ogarben Chisopineda Stovall MD   BREO ELLIPTA 200-25 MCG/INH AEPB inhaler Inhale 1 puff into the lungs daily    Historical Provider, MD   atorvastatin (LIPITOR) 80 MG tablet TAKE ONE TABLET BY MOUTH ONCE NIGHTLY 9/2/22   Milton Caputo MD   Easy Touch Lancets 30G/Twist MISC USE THREE TIMES A DAY TO FOUR TIMES A DAY 7/21/22   Milton Caputo MD   levothyroxine (SYNTHROID) 50 MCG tablet TAKE ONE TABLET BY MOUTH DAILY 6/30/22   Milton Caputo MD   insulin detemir (LEVEMIR FLEXTOUCH) 100 UNIT/ML injection pen 56-66 units daily 6/21/22 9/19/22  Milton Caputo MD   insulin aspart (NOVOLOG FLEXPEN) 100 UNIT/ML injection pen 32-42  units AC TID 6/21/22 9/19/22  Milton Caputo MD   B-D ULTRAFINE III SHORT PEN 31G X 8 MM MISC USE WITH INSULIN FOUR TIMES A DAY 5/17/22   Vinay Mac MD   midodrine (PROAMATINE) 5 MG tablet Take 1 tablet by mouth in the morning and at bedtime 3/25/22 9/19/22  Marisa Holland MD   Blood Glucose Monitoring Suppl (TRUE METRIX METER) RUMA As needed 3/11/22   Sukhjinder Hendricks MD   blood glucose monitor strips Test up to 4 times daily for blood sugar monitoring 2/15/22   Mojgan White MD   blood glucose monitor kit and supplies (PLEASE DISPENSE WHAT INS WILL COVER) - Dispense sufficient amount for  TID testing frequency plus additional to accommodate PRN testing needs. Dispense all needed supplies to include: monitor, strips, lancing device, lancets, control solutions, alcohol swabs. 2/8/22   Mojgan White MD   B Complex-C-Biotin-E-Min-FA (DIALYVITE 3000) 3 MG TABS Take 1 tablet by mouth daily    Historical Provider, MD   vitamin D (ERGOCALCIFEROL) 1.25 MG (15040 UT) CAPS capsule Take 50,000 Units by mouth every 3 months 7/14/21   Historical Provider, MD   triamcinolone (KENALOG) 0.1 % ointment Apply topically 2 times daily. 5/25/21 9/19/22  Kallie Duffy MD   Handicap Placard MISC by Does not apply route Duration: 5 years 4/18/18   Annette Dia MD   aspirin 81 MG chewable tablet Take 1 tablet by mouth daily. 3/28/12   Altagracia Lopez MD        Allergies:  Penicillins, Sulfa antibiotics, Tazorac [tazarotene], Clindamycin, and Clindamycin/lincomycin    Social History:    reports that he quit smoking about 41 years ago. He started smoking about 60 years ago. He has a 18.00 pack-year smoking history. He has never used smokeless tobacco. He reports that he does not drink alcohol and does not use drugs.     Family History:        Problem Relation Age of Onset    Diabetes Mother     Heart Failure Mother     Coronary Art Dis Father        REVIEW OF SYSTEMS:  CONSTITUTIONAL:  positive for  fatigue  HEENT:  negative  RESPIRATORY:  negative  CARDIOVASCULAR:  negative  GASTROINTESTINAL:  negative  GENITOURINARY:  negative  HEMATOLOGIC/LYMPHATIC:  negative  NEUROLOGICAL:  negative  SKIN: negative    PHYSICAL EXAM:  VITALS:  BP (!) 120/55   Pulse 75   Temp 97.7 °F (36.5 °C) (Temporal)   Resp 15   Ht 5' 10\" (1.778 m) Wt 210 lb 8.6 oz (95.5 kg)   SpO2 94%   BMI 30.21 kg/m²   24HR INTAKE/OUTPUT:    Intake/Output Summary (Last 24 hours) at 10/5/2022 1529  Last data filed at 10/5/2022 4899  Gross per 24 hour   Intake 370 ml   Output 317 ml   Net 53 ml     DRAIN/TUBE OUTPUT:     CONSTITUTIONAL:  alert, no apparent distress and moderately obese  EYES:  sclera clear  ENT:  normocepalic, without obvious abnormality  NECK:  supple, symmetrical, trachea midline and no carotid bruits  LUNGS:  clear to auscultation  CARDIOVASCULAR:  regular rate and rhythm  ABDOMEN:  scars noted are healed, normal bowel sounds, soft, obese,non-distended, non-tender, voluntary guarding absent, no masses palpated, no hepatosplenomegally and hernia absent, left abd PD cath tubing with clear effluent  MUSCULOSKELETAL:  0+ pitting edema lower extremities  NEUROLOGIC:  Mental Status Exam:  Level of Alertness:   awake  Orientation:   person, place, time  SKIN:  no bruising or bleeding and normal skin color, texture, turgor, no cellulitis at tube exit site    DATA:    CBC:   Recent Labs     10/03/22  0357 10/04/22  0421 10/05/22  0419   WBC 13.3* 15.1* 15.3*   HGB 8.8* 8.8* 8.7*   HCT 26.5* 26.7* 26.2*    306 321     BMP:    Recent Labs     10/03/22  0357 10/04/22  0421 10/05/22  0419   * 137 135*   K 4.2 4.5 4.4   CL 94* 96* 94*   CO2 23 25 24   BUN 70* 73* 79*   CREATININE 10.6* 10.6* 10.6*   GLUCOSE 265* 194* 226*     Hepatic: No results for input(s): AST, ALT, ALB, BILITOT, ALKPHOS in the last 72 hours. Mag:    No results for input(s): MG in the last 72 hours. Phos:   No results for input(s): PHOS in the last 72 hours. INR: No results for input(s): INR in the last 72 hours. LIPASE: No results for input(s): LIPASE in the last 72 hours. AMYLASE: No results for input(s): AMYLASE in the last 72 hours.      Radiology Review:      CT CHEST ABDOMEN PELVIS WO CONTRAST    Addendum Date: 10/5/2022    ADDENDUM: Addendum is being made for clarification about the catheter in the peritoneal cavity. The catheter within the peritoneal cavity is a dialysis catheter. Result Date: 10/5/2022  EXAMINATION: CT OF THE CHEST, ABDOMEN, AND PELVIS WITHOUT CONTRAST 10/4/2022 5:26 pm TECHNIQUE: CT of the chest, abdomen and pelvis was performed without the administration of intravenous contrast. Multiplanar reformatted images are provided for review. Automated exposure control, iterative reconstruction, and/or weight based adjustment of the mA/kV was utilized to reduce the radiation dose to as low as reasonably achievable. COMPARISON: None HISTORY: ORDERING SYSTEM PROVIDED HISTORY: worsening leucocytosis TECHNOLOGIST PROVIDED HISTORY: Reason for exam:->worsening leucocytosis Additional Contrast?->Radiologist Recommendation Reason for Exam: worsening leucocytosis FINDINGS: CT chest: Lines and tubes:  None. Lungs and Airways and Pleura:  Central airways are patent. Focal linear consolidative changes of bilateral lower lobes, likely suggestive of atelectasis. .  Minimal right pleural effusion. No pneumothorax. Lymph nodes: No pathologically enlarged mediastinal, hilar, lower cervical, or chest wall lymph nodes. Cardiovascular and Mediastinum: No acute aortic pathology. Coronary arterial calcification. Cardiac chamber sizes appear to measure within normal limits on this non ECG gated study. No pericardial effusion. The thyroid gland is unremarkable. The esophagus is unremarkable. Bones/Soft tissues: No fracture. No definite suspicious lytic or blastic foci. CT abdomen and pelvis: Organs: Cholelithiasis with no signs of cholecystitis. Extensive calcification of renal arteries. Underlying 2-4 mm nonobstructing kidney stones cannot be excluded. .  The liver, spleen, adrenal glands, gallbladder, pancreas, kidneys and ureters and pelvic organs including the urinary bladder appear unremarkable.  Peritoneum / Retroperitoneum: A drainage catheter tip is within the the right lower quadrant area with a small amount of residual fluid noted surrounding the drainage catheter measuring 2.7 x 1.7 cm. No free air or free fluid is noted. No pathologically enlarged lymphadenopathy. The vasculature do not demonstrate acute abnormality. GI Tract:  No distention or wall thickening. Extensive stool overload within the rectum with a stool ball formation. Bones and Soft Tissues: No fracture. No concerning lytic or sclerotic lesions are noted. Focal linear consolidative changes of bilateral lower lobes, likely suggestive of atelectasis. .  Minimal right pleural effusion. Cholelithiasis with no signs of cholecystitis. Extensive calcification of renal arteries. Underlying 2-4 mm nonobstructing kidney stones cannot be excluded. . A drainage catheter tip is within the the right lower quadrant area with a small amount of residual fluid noted surrounding the drainage catheter measuring 2.7 x 1.7 cm.        IMPRESSION/RECOMMENDATIONS:    PD cath in place  No catheter or intraabdominal sepsis  Exam normal, cultures negative  Pt using catheter so it is normal to have some fluid in the abdomen  No surgery needed, we will see prn, thank you      Korin Alcazar MD

## 2022-10-05 NOTE — PROGRESS NOTES
[facility] Order   Exchanges: 4   Exchange Volume: 2000 ml   Total Time: 8 hrs   Dextrose: 2.5%   Last Fill: 0 ml   Total Volume: 8000 ml     Orders verified. Supplies taken to pt's room. Report received. Cycler set up, primed and pre tested. [facility]. Pt connected to cycler. CCPD initiated without problem. Initial effluent clear. If problems should arise please call the [phone] on top of PD cycler machine.

## 2022-10-05 NOTE — PLAN OF CARE
Problem: Discharge Planning  Goal: Discharge to home or other facility with appropriate resources  10/4/2022 2333 by Sandrita Clifford RN  Outcome: Progressing  Flowsheets (Taken 10/4/2022 2000)  Discharge to home or other facility with appropriate resources: Identify barriers to discharge with patient and caregiver  10/4/2022 1741 by Amanuel Bartlett RN  Outcome: Progressing  Flowsheets (Taken 10/4/2022 0800)  Discharge to home or other facility with appropriate resources:   Identify barriers to discharge with patient and caregiver   Arrange for needed discharge resources and transportation as appropriate   Identify discharge learning needs (meds, wound care, etc)   Arrange for interpreters to assist at discharge as needed   Refer to discharge planning if patient needs post-hospital services based on physician order or complex needs related to functional status, cognitive ability or social support system     Problem: Safety - Adult  Goal: Free from fall injury  10/4/2022 2333 by Sandrita Clifford RN  Outcome: Progressing  Flowsheets  Taken 10/4/2022 2311  Free From Fall Injury: Instruct family/caregiver on patient safety  Taken 10/4/2022 2140  Free From Fall Injury: Instruct family/caregiver on patient safety  10/4/2022 1741 by Amanuel Bartlett RN  Outcome: Progressing     Problem: Skin/Tissue Integrity  Goal: Absence of new skin breakdown  Description: 1. Monitor for areas of redness and/or skin breakdown  2. Assess vascular access sites hourly  3. Every 4-6 hours minimum:  Change oxygen saturation probe site  4. Every 4-6 hours:  If on nasal continuous positive airway pressure, respiratory therapy assess nares and determine need for appliance change or resting period.   10/4/2022 2333 by Sandrita Clifford RN  Outcome: Progressing  10/4/2022 1741 by Amanuel Bartlett RN  Outcome: Progressing     Problem: Nutrition Deficit:  Goal: Optimize nutritional status  10/4/2022 2333 by Sandrita Clifford RN  Outcome: Progressing  10/4/2022 1741 by Haley Ruiz RN  Outcome: Progressing     Problem: Pain  Goal: Verbalizes/displays adequate comfort level or baseline comfort level  10/4/2022 2333 by Emerald Aquino RN  Outcome: Progressing  10/4/2022 1741 by Haley Ruiz RN  Outcome: Progressing     Problem: ABCDS Injury Assessment  Goal: Absence of physical injury  10/4/2022 2333 by Emerald Aquino RN  Outcome: Progressing  Flowsheets  Taken 10/4/2022 2311  Absence of Physical Injury: Implement safety measures based on patient assessment  Taken 10/4/2022 2140  Absence of Physical Injury: Implement safety measures based on patient assessment  10/4/2022 1741 by Haley Ruiz RN  Outcome: Progressing  Flowsheets (Taken 10/4/2022 0900)  Absence of Physical Injury: Implement safety measures based on patient assessment     Problem: Chronic Conditions and Co-morbidities  Goal: Patient's chronic conditions and co-morbidity symptoms are monitored and maintained or improved  10/4/2022 2333 by Emerald Aquino RN  Outcome: Progressing  Flowsheets (Taken 10/4/2022 2000)  Care Plan - Patient's Chronic Conditions and Co-Morbidity Symptoms are Monitored and Maintained or Improved: Monitor and assess patient's chronic conditions and comorbid symptoms for stability, deterioration, or improvement  10/4/2022 1741 by Haley Ruiz RN  Outcome: Progressing  Flowsheets (Taken 10/4/2022 0800)  Care Plan - Patient's Chronic Conditions and Co-Morbidity Symptoms are Monitored and Maintained or Improved:   Monitor and assess patient's chronic conditions and comorbid symptoms for stability, deterioration, or improvement   Collaborate with multidisciplinary team to address chronic and comorbid conditions and prevent exacerbation or deterioration   Update acute care plan with appropriate goals if chronic or comorbid symptoms are exacerbated and prevent overall improvement and discharge

## 2022-10-05 NOTE — PROGRESS NOTES
Infectious Diseases   Progress Note      Admission Date: 9/21/2022  Hospital Day: Hospital Day: 15   Attending: José Antonio Ramos MD  Date of service: 10/5/2022     Chief complaint/ Reason for consult:     Persistent leukocytosis  Acute on chronic respiratory failure with hypoxia on admission  Acute metabolic encephalopathy on admission  ESRD was on peritoneal dialysis at home  Elevated procalcitonin of 1.02 on admission  Essential hypertension  Coronary artery disease    Microbiology:        I have reviewed allavailable micro lab data and cultures    Blood culture (2/2) - collected on 9/21/2022 and 10-2-22: Negative        Antibiotics and immunizations:       Current antibiotics: All antibiotics and their doses were reviewed by me    Recent Abx Admin                     ciprofloxacin (CIPRO) tablet 500 mg (mg) 500 mg Given 10/05/22 0852    linezolid (ZYVOX) tablet 600 mg (mg) 600 mg Given 10/05/22 0852     600 mg Given 10/04/22 2049    metroNIDAZOLE (FLAGYL) tablet 500 mg (mg) 500 mg Given 10/05/22 0515     500 mg Given 10/04/22 2048     500 mg Given  1438                      Immunization History: All immunization history was reviewed by me today.     Immunization History   Administered Date(s) Administered    COVID-19, PFIZER PURPLE top, DILUTE for use, (age 15 y+), 30mcg/0.3mL 04/21/2021, 05/12/2021, 11/30/2021    Influenza Vaccine, unspecified formulation 10/16/2014, 10/19/2015, 10/21/2016    Influenza Virus Vaccine 10/16/2014, 10/19/2015, 10/21/2016    Influenza, FLUAD, (age 72 y+), Adjuvanted, 0.5mL 09/08/2020    Influenza, High Dose (Fluzone 65 yrs and older) 10/16/2014, 10/19/2015, 10/21/2016, 09/01/2017, 11/16/2018    Influenza, Triv, inactivated, subunit, adjuvanted, IM (Fluad 65 yrs and older) 11/20/2019    PPD Test 02/02/2016    Pneumococcal Conjugate 13-valent ([id number]) 10/19/2015    Pneumococcal Polysaccharide ([id number]) 10/16/2014    Tdap (Boostrix, Adacel) 08/12/2016       Known drug allergies: All allergies were reviewed and updated    Allergies   Allergen Reactions    Penicillins Other (See Comments)     Syncope \"pass out \" per pt    Sulfa Antibiotics      Patient unsure of reaction     Tazorac [Tazarotene]      Cream - rash    Clindamycin Rash    Clindamycin/Lincomycin Rash       Social history:     Social History:  All social andepidemiologic history was reviewed and updated by me today as needed. Tobacco use:   reports that he quit smoking about 41 years ago. He started smoking about 60 years ago. He has a 18.00 pack-year smoking history. He has never used smokeless tobacco.  Alcohol use:   reports no history of alcohol use. Currently lives in: [address] [place] [address]   reports no history of drug use. COVID VACCINATION AND LAB RESULT RECORDS:     Internal Administration   First Dose COVID-19, PFIZER PURPLE top, DILUTE for use, (age 15 y+), 30mcg/0.3mL  04/21/2021   Second Dose COVID-19, PFIZER PURPLE top, DILUTE for use, (age 15 y+), 30mcg/0.3mL   05/12/2021       Last COVID Lab SARS-CoV-2, PCR (no units)   Date Value   10/20/2020 Not Detected     SARS-CoV-2, GORDON (no units)   Date Value   12/28/2020 DETECTED (A)            Assessment:     The patient is a 69 y.o. old male who  has a past medical history of Allergic rhinitis due to other allergen (7/12/2010), Coronary atherosclerosis of unspecified type of vessel, native or graft (7/12/2010), Diabetic eye exam ([facility] (04/29/2015), Diabetic eye exam ([facility] (5/5/2016), Generalized osteoarthrosis, involving multiple sites (7/12/2010), Other psoriasis (7/12/2010), Pneumonia, Prolonged emergence from general anesthesia, Psoriatic arthropathy ([address] (7/12/2010), Type II or unspecified type diabetes mellitus without mention of complication, not stated as uncontrolled (7/12/2010), Unspecified essential hypertension (7/12/2010), and Unspecified sleep apnea (7/12/2010).  with following problems:    Persistent leukocytosis-White cell count has gone up and is 15,100 today  Acute on chronic respiratory failure with hypoxia on admission  Acute metabolic encephalopathy on admission  ESRD was on peritoneal dialysis at home  Elevated procalcitonin of 1.02 on admission  Essential hypertension-blood pressure okay  Coronary artery disease  History of COVID-19  Type 2 diabetes mellitus-maintain good glycemic control  Obstructive sleep apnea  Generalized weakness  Overweight due to excess calorie intake : Body mass index is 29.26 kg/m². Discussion:      The patient is afebrile. He is on oral linezolid and ciprofloxacin and Flagyl. He is tolerating antibiotics okay. White cell count is 15,300 with hemoglobin of 8.7 and platelet count of 589,453    . White cell count has gone up. I had ordered a CT scan of chest abdomen and pelvis. CT images reviewed and independently interpreted. It shows bilateral lower lobe atelectasis versus pneumonia. Cholelithiasis changes were noted. Patient has a PD catheter in place    PD fluid cell count analysis done on 10/2/2022 had shown around 10% PMNs. Plan:     Diagnostic Workup:    Follow-up on PD fluid culture and blood cultures from yesterday  Continue to follow  fever curve, WBC count and blood cultures. Continue to monitor blood counts, liver and renal function. Antimicrobials: The patient has had ongoing leukocytosi,s which is worsening despite linezolid, Cipro and Flagyl. I will leave the patient on empiric linezolid  Will switch Cipro and Flagyl to IV meropenem 500 mg every 24 hour  Will see how he does on above antibiotic change  We will follow up on the culture results and clinical progress and will make further recommendations accordingly. Continue close vitals monitoring. Maintain good glycemic control. Fall precautions. Aspiration precautions. Continue to watch for new fever or diarrhea. DVT prophylaxis. Discussed all above with patient and RN.       Drug Monitoring:    Continue monitoring for antibiotic toxicity as follows: CBC, CMP   Continue to watch for following: new or worsening fever, new hypotension, hives, lip swelling and redness or purulence at vascular access sites. I/v access Management:    Continue to monitor i.v access sites for erythema, induration, discharge or tenderness. As always, continue efforts to minimize tubes/lines/drains as clinically appropriate to reduce chances of line associated infections. Patient education and counseling: The patient was educated in detail about the side-effects of various antibiotics and things to watch for like new rashes, lip swelling, severe reaction, worsening diarrhea, break through fever etc.  Discussed patient's condition and what to expect. All of the patient's questions were addressed in a satisfactory manner and patient verbalized understanding all instructions. Level of complexity of visit and medical decision making: High     TIME SPENT TODAY:     - Spent over  36 minutes on visit (including interval history, physical exam, review of data including labs, cultures, imaging, development and implementation of treatment plan and coordination of complex care). More than 50 percent of this includes face-to-face time spent with the patient for counseling and coordination of care. Thank you for involving me in the care of your patient. I will continue to follow. If you have anyadditional questions, please do not hesitate to contact me. Subjective: Interval history: Interval history was obtained from chart review and patient/ RN. He is afebrile. He is tolerating antibiotics okay. No diarrhea     REVIEW OF SYSTEMS:      Review of Systems   Constitutional:  Positive for fatigue. Negative for chills, diaphoresis and fever. HENT:  Negative for ear discharge, ear pain, postnasal drip, rhinorrhea, sinus pressure, sinus pain and sore throat. Eyes:  Negative for discharge and redness.    Respiratory: Negative for cough, shortness of breath and wheezing. Cardiovascular:  Negative for chest pain and leg swelling. Gastrointestinal:  Negative for abdominal pain, constipation, diarrhea and nausea. Endocrine: Negative for cold intolerance, heat intolerance and polydipsia. Genitourinary:  Negative for dysuria, flank pain, frequency, hematuria and urgency. Musculoskeletal:  Negative for back pain and myalgias. Skin:  Negative for rash. Allergic/Immunologic: Negative for immunocompromised state. Neurological:  Negative for dizziness, seizures and headaches. Hematological:  Does not bruise/bleed easily. Psychiatric/Behavioral:  Negative for agitation, hallucinations and suicidal ideas. The patient is not nervous/anxious. All other systems reviewed and are negative. Past Medical History: All past medical history reviewed today. Past Medical History:   Diagnosis Date    Allergic rhinitis due to other allergen 7/12/2010    Coronary atherosclerosis of unspecified type of vessel, native or graft 7/12/2010    Diabetic eye exam ([facility] 04/29/2015    Diabetic eye exam ([facility] 5/5/2016    [facility]    Generalized osteoarthrosis, involving multiple sites 7/12/2010    Other psoriasis 7/12/2010    Pneumonia     Prolonged emergence from general anesthesia     Psoriatic arthropathy ([facility] 7/12/2010    Type II or unspecified type diabetes mellitus without mention of complication, not stated as uncontrolled 7/12/2010    Unspecified essential hypertension 7/12/2010    Unspecified sleep apnea 7/12/2010       Past Surgical History: All past surgical history was reviewed today.     Past Surgical History:   Procedure Laterality Date    CARPAL TUNNEL RELEASE      s/p    CATARACT REMOVAL      CORONARY ARTERY BYPASS GRAFT      JOINT REPLACEMENT      LAPAROSCOPY INSERTION PERITONEAL CATHETER N/A 10/26/2020    LAPAROSCOPIC PERITONEAL DIALYSIS CATHETER PLACEMENT; LAPAROSCOPIC OMENTOPEXY performed by Tenzin Moreno DO at [facility] OR       Family History: All family history was reviewed today. Problem Relation Age of Onset    Diabetes Mother     Heart Failure Mother     Coronary Art Dis Father        Objective:       PHYSICAL EXAM:      Vitals:   Vitals:    10/05/22 0845 10/05/22 0950 10/05/22 1000 10/05/22 1200   BP:   (!) 102/48 (!) 120/55   Pulse: 74  78 75   Resp: 18  19 15   Temp:    97.7 °F (36.5 °C)   TempSrc:    Temporal   SpO2: 95%   94%   Weight:       Height:  5' 10\" (1.778 m)         Physical Exam  Vitals and nursing note reviewed. Constitutional:       Appearance: He is well-developed. He is not diaphoretic. Comments: The patient was seen earlier today. HENT:      Head: Normocephalic and atraumatic. Right Ear: External ear normal. There is no impacted cerumen. Left Ear: External ear normal. There is no impacted cerumen. Nose: Nose normal.      Mouth/Throat:      Mouth: Mucous membranes are moist.      Pharynx: Oropharynx is clear. No oropharyngeal exudate. Eyes:      General: No scleral icterus. Right eye: No discharge. Left eye: No discharge. Conjunctiva/sclera: Conjunctivae normal.      Pupils: Pupils are equal, round, and reactive to light. Neck:      Thyroid: No thyromegaly. Cardiovascular:      Rate and Rhythm: Normal rate and regular rhythm. Heart sounds: Normal heart sounds. No murmur heard. No friction rub. Pulmonary:      Effort: No respiratory distress. Breath sounds: No stridor. No wheezing or rales. Abdominal:      General: Bowel sounds are normal.      Palpations: Abdomen is soft. Tenderness: There is no abdominal tenderness. There is no guarding or rebound. Musculoskeletal:         General: No swelling, tenderness or deformity. Normal range of motion. Cervical back: Normal range of motion and neck supple. Right lower leg: No edema. Left lower leg: No edema. Lymphadenopathy:      Cervical: No cervical adenopathy. Skin:     General: Skin is warm and dry. Coloration: Skin is not jaundiced. Findings: No bruising, erythema or rash. Neurological:      General: No focal deficit present. Mental Status: He is alert and oriented to person, place, and time. Mental status is at baseline. Motor: No abnormal muscle tone. Psychiatric:         Mood and Affect: Mood normal.         Behavior: Behavior normal.     *    Lines and drains: All vascular access sites are healthy with no local erythema, discharge or tenderness. Intake and output:    I/O last 3 completed shifts: In: 1080 [P.O.:1080]  Out: 26     Lab Data:   All available labs and old records have been reviewed by me. CBC:  Recent Labs     10/03/22  0357 10/04/22  0421 10/05/22  0419   WBC 13.3* 15.1* 15.3*   RBC 2.38* 2.38* 2.37*   HGB 8.8* 8.8* 8.7*   HCT 26.5* 26.7* 26.2*    306 321   .6* 112.0* 110.8*   MCH 37.1* 36.8* 36.8*   MCHC 33.2 32.9 33.2   RDW 14.3 14.2 14.3          BMP:  Recent Labs     10/03/22  0357 10/04/22  0421 10/05/22  0419   * 137 135*   K 4.2 4.5 4.4   CL 94* 96* 94*   CO2 23 25 24   BUN 70* 73* 79*   CREATININE 10.6* 10.6* 10.6*   CALCIUM 9.2 8.0* 9.0   GLUCOSE 265* 194* 226*          Hepatic Function Panel:   Lab Results   Component Value Date/Time    ALKPHOS 92 09/26/2022 04:24 AM    ALT 33 09/26/2022 04:24 AM    AST 29 09/26/2022 04:24 AM    PROT 5.9 09/26/2022 04:24 AM    PROT 7.0 08/15/2012 08:52 AM    BILITOT <0.2 09/26/2022 04:24 AM    BILIDIR <0.2 01/15/2017 05:30 AM    IBILI see below 01/15/2017 05:30 AM    LABALBU 2.8 09/26/2022 04:24 AM       CPK:   Lab Results   Component Value Date    CKTOTAL 196 04/29/2015     ESR: No results found for: SEDRATE  CRP: No results found for: CRP        Imaging: All pertinent images and reports for the current visit were reviewed by me during this visit.   I reviewed the chest x-ray/CT scan/MRI images and independently interpreted the findings and results today.    CT CHEST ABDOMEN PELVIS WO CONTRAST   Final Result   Focal linear consolidative changes of bilateral lower lobes, likely   suggestive of atelectasis. .  Minimal right pleural effusion. Cholelithiasis with no signs of cholecystitis. Extensive calcification of renal arteries. Underlying 2-4 mm nonobstructing   kidney stones cannot be excluded. .      A drainage catheter tip is within the the right lower quadrant area with a   small amount of residual fluid noted surrounding the drainage catheter   measuring 2.7 x 1.7 cm. XR CHEST PORTABLE   Final Result   Low volume study with trace bilateral pleural effusions and persistent   bibasilar atelectasis or airspace disease. XR CHEST PORTABLE   Final Result   CHF with pulmonary edema. CT HEAD WO CONTRAST   Final Result   No definite evidence of acute intracranial abnormality on a study   significantly limited by motion/streak artifact as described above. Medications: All current and past medications were reviewed.      metroNIDAZOLE  500 mg Oral 3 times per day    insulin glargine  50 Units SubCUTAneous Daily    linezolid  600 mg Oral 2 times per day    ciprofloxacin  500 mg Oral Daily    lactobacillus  1 capsule Oral BID WC    insulin lispro  30 Units SubCUTAneous TID     insulin lispro  0-16 Units SubCUTAneous 4x Daily AC & HS    epoetin daphney-epbx  3,000 Units SubCUTAneous Once per day on Mon Wed Fri    torsemide  100 mg Oral Daily    midodrine  5 mg Oral BID WC    ipratropium-albuterol  1 ampule Inhalation 4x daily    gentamicin   Topical Daily    sodium chloride flush  5-40 mL IntraVENous 2 times per day    aspirin  81 mg Oral Daily    atorvastatin  80 mg Oral Nightly    mometasone-formoterol  2 puff Inhalation BID    buPROPion  100 mg Oral Every Other Day    levothyroxine  50 mcg Oral Daily    heparin (porcine)  5,000 Units SubCUTAneous 3 times per day        sodium chloride Stopped (09/23/22 2470)    dextrose sodium chloride flush, sodium chloride, ondansetron **OR** ondansetron, polyethylene glycol, acetaminophen **OR** acetaminophen, albuterol, dextrose bolus **OR** dextrose bolus, glucagon (rDNA), dextrose      Problem list:       Patient Active Problem List   Diagnosis Code    Essential hypertension I10    Generalized osteoarthrosis, involving multiple sites M15.9    Arthritis with psoriasis ([facility] L40.50    Sleep apnea G47.30    Psoriatic arthropathy ([facility]) L40.50    Allergic rhinitis due to other allergen J30.89    Coronary atherosclerosis I25.10    Vitamin D deficiency E55.9    Glaucoma H40.9    Nephropathy N28.9    S/P CABG x 4 Z95.1    ILD (interstitial lung disease) ([facility] J84.9    Acute respiratory failure with hypoxia and hypercapnia ([facility]) J96.01, J96.02    Chronic diastolic heart failure ([facility]) I50.32    Elevated troponin R77.8    Coronary artery disease involving native coronary artery of native heart with unstable angina pectoris ([facility]) I25.110    BJ (obstructive sleep apnea) G47.33    Cough variant asthma J45.991    NSTEMI (non-ST elevated myocardial infarction) ([facility] I21.4    Diabetes mellitus type 2 in obese ([facility]) E11.69, E66.9    General weakness R53.1    Syncope and collapse R55    Type 2 diabetes mellitus with hyperglycemia, with long-term current use of insulin ([facility]) E11.65, Z79.4    Hypertension associated with stage 5 chronic kidney disease due to type 2 diabetes mellitus ([facility]) E11.22, I12.0, N18.5    Acquired hypothyroidism E03.9    Unstable angina ([facility]) I20.0    Orthostatic hypotension I95.1    Dyspnea on exertion R06.09    Left lower quadrant abdominal pain R10.32    ESRD on peritoneal dialysis ([facility]) N18.6, Z99.2    AMS (altered mental status) R41.82    Acute respiratory failure with hypoxia ([facility]) L13.67    Acute metabolic encephalopathy L71.92    Hypercarbia R06.89    Electrolyte imbalance E87.8    Generalized abdominal tenderness R10.817    Chronic hypercapnic respiratory failure ([facility]) J96.12    BJ treated with BiPAP G47.33    Drug toxicity R89.2    Physical deconditioning R53.81    Moderate episode of recurrent major depressive disorder (HCC) F33.1    Acute on chronic respiratory failure with hypoxia and hypercapnia (HCC) J96.21, J96.22    Multifocal pneumonia J18.9    Hypervolemia E87.70    Leukocytosis D72.829    Acute pulmonary edema (HCC) J81.0    Arrhythmia I49.9    Overweight (BMI 25.0-29. 9) E66.3    History of 2019 novel coronavirus disease (COVID-19) Z86.16    Elevated procalcitonin R79.89       Please note that this chart was generated using Dragon dictation software. Although every effort was made to ensure the accuracy of this automated transcription, some errors in transcription may have occurred inadvertently. If you may need any clarification, please do not hesitate to contact me through EPIC or at the phone number provided below with my electronic signature. Any pictures or media included in this note were obtained after taking informed verbal consent from the patient and with their approval to include those in the patient's medical record. Altagracia Kilgore MD, MPH, [address] [place] [address]  10/5/2022, 12:44 PM  [facility]   [address] [place] [address], [address] [place] [address]  Office: [phone]  Fax: [phone]  In-person Clinic days:  Tuesday & Thursday a.m. Virtual clinic days: Monday, Wednesday & Friday a.m.

## 2022-10-05 NOTE — PROGRESS NOTES
Speech Language Pathology  Attempt Note    Jacob Wesley  1945    SLP attempted to see pt this date for dysphagia intervention. Chart reviewed, spoke with RN. Pt alert upon SLP but reporting nausea, declined PO intake at this time. RN made aware. Will hold and attempt again as pt is more appropriate to participate.     Cherrie Ewing, [id number] [facility]  Speech-Language Pathologist  [place] [phone] [phone]

## 2022-10-05 NOTE — PROGRESS NOTES
Pt alert and oriented on room air. Pt placed on bipap when preparing for sleep. VSS. Pt complaining of pain to IV site. IV site changed. Pt assessed and bathed per protocol . Pt resting through he shift without acute incident.

## 2022-10-05 NOTE — PROGRESS NOTES
IV dextrose given per [facility] after juice did not increase glucose over 70. After receiving dextrose bolus and eating peaches glucose is 140. Evening insulin held. Patient encouraged to eat dinner. On call MD notified of hypoglycemia.

## 2022-10-05 NOTE — PROGRESS NOTES
CCPD Order   Exchanges: 4   Exchange Volume: 2000 ml   Total Time: 8 hrs   Dextrose: 2.5%   Last Fill: 0 ml   Total Volume: 8000 ml     Orders verified. Supplies taken to pt's room. Report received. Cycler set up, primed and pre tested. [facility]. Pt connected to cycler. CCPD initiated without problem. Initial effluent clear. If problems should arise please call the [phone] on top of PD cycler machine.

## 2022-10-05 NOTE — PROGRESS NOTES
Hospitalist Progress Note    Name:  Tushar Barlow    /Age/Sex: 1945  (69 y.o. male)  MRN & CSN:  [id number] & [id number]    PCP: Luiz Martines MD    Date of Admission: 2022  Chief Complaint: Altered mental status    Hospital Course: This 71-year-old man was admitted for altered mental status. Found to be hypoxic and hypercapnic on admission. Placed on BiPAP with improvement. Nephrology following as patient has ESRD on peritoneal dialysis. Pulmonology following. Interval history:  Pt seen and examined today. Overnight events noted, interval ancillary notes and labs reviewed. Hemodynamically stable. Afebrile, WBC remain elevated around 15.3. Cultures negative to date  CT chest, abdomen and showed bilateral LL atelectasis/pneumonia. Cholelithiasis noted  denied cough, SOB, chest pain, nausea, vomiting or abdominal pain      Assessment and plan    Persistent leukocytosis;  ID consulted; repeat blood cultures and wound culture ordered and started on empiric Zyvox, Flagyl and Cipro  Cipro and Flagyl discontinued. Started on IV meropenem. Continue Zyvox    Acute on chronic hypoxic and hypercapnic respiratory failure:-Improved  Most likely due to volume overload. Elevated proBNP at admission; trended down  Initially diuresed with IV Lasix; switched to torsemide  Continue home inhalers and as needed breathing treatment  Supplemental oxygen as needed to keep sats between 90 to 92%  Pulmonology consulted: appreciate recommendations    Cardiac arrhythmias; no clinically second prolonged pauses  Cardiology consult; no indication for pacemaker at this time  Monitor on telemetry    History of BJ: Unable to tolerate home BiPAP/CPAP. Will need repeat sleep study outpatient. Acute metabolic encephalopathy: Improved  CT head: No acute abnormality. TSH, Ammonia level wnl. B 12 and folate pending. Supportive care     Type 2 diabetes on long-term insulin:  A1c 6.5 on 2022.   Continue basal insulin with sliding scale. ESRD on peritoneal dialysis:  Nephrology consulted: Appreciate input. No evidence of PD site infection or peritonitis. PD fluid cultures negative. Hypotension: Continue midodrine    Depression: Continue Wellbutrin     Hypothyroid: Continue synthroid. Anemia due to ESRD: Continue EPO. DVT ppx: Heparin  GI ppx: Diet/Tube Feeds  Diet: ADULT DIET; Regular; 4 carb choices (60 gm/meal); Low Phosphorus (Less than 1000 mg)  ADULT ORAL NUTRITION SUPPLEMENT; Breakfast, Lunch; Renal Oral Supplement  Code Status: Full Code  Disposition:    Medications:  Reviewed      Intake/Output Summary (Last 24 hours) at 10/5/2022 0851  Last data filed at 10/4/2022 1923  Gross per 24 hour   Intake 1080 ml   Output 548 ml   Net 532 ml         Physical Exam Performed:    /60   Pulse 76   Temp 97.1 °F (36.2 °C) (Temporal)   Resp 16   Ht 5' 10\" (1.778 m)   Wt 210 lb 8.6 oz (95.5 kg)   SpO2 95%   BMI 30.21 kg/m²     General appearance: No apparent distress, appears stated age and cooperative. Somewhat confused, but more conversational today. HEENT: Pupils equal, round, and reactive to light. Conjunctivae/corneas clear. Neck: Supple, with full range of motion. No jugular venous distention. Trachea midline. Respiratory:  Normal respiratory effort. Clear to auscultation, bilaterally without Rales/Wheezes/Rhonchi. Cardiovascular: Regular rate and rhythm with normal S1/S2 without murmurs, rubs or gallops. Trace pitting edema LE bilaterally. Abdomen: Soft, non-tender, non-distended with normal bowel sounds. : No CVA tenderness. Musculoskeletal: No clubbing or cyanosis. Full range of motion without deformity.   Skin: Skin color, texture, turgor normal.  Sacral wound; dressed  Physical exam remains unchanged from 9/25    Labs:   Recent Labs     10/03/22  0357 10/04/22  0421 10/05/22  0419   WBC 13.3* 15.1* 15.3*   HGB 8.8* 8.8* 8.7*   HCT 26.5* 26.7* 26.2*    306 321       Recent Labs     10/03/22  0357 10/04/22  0421 10/05/22  0419   * 137 135*   K 4.2 4.5 4.4   CL 94* 96* 94*   CO2 23 25 24   BUN 70* 73* 79*   CREATININE 10.6* 10.6* 10.6*   CALCIUM 9.2 8.0* 9.0       No results for input(s): AST, ALT, BILIDIR, BILITOT, ALKPHOS in the last 72 hours. No results for input(s): INR in the last 72 hours. No results for input(s): Marvie Forget in the last 72 hours. Urinalysis:      Lab Results   Component Value Date/Time    NITRU Negative 09/21/2022 03:18 PM    WBCUA 3 09/21/2022 03:18 PM    BACTERIA None Seen 09/21/2022 03:18 PM    RBCUA 5 09/21/2022 03:18 PM    BLOODU SMALL 09/21/2022 03:18 PM    SPECGRAV 1.015 09/21/2022 03:18 PM    GLUCOSEU 250 09/21/2022 03:18 PM    GLUCOSEU >=1000 03/26/2012 10:22 PM       Radiology:  CT CHEST ABDOMEN PELVIS WO CONTRAST   Final Result   Focal linear consolidative changes of bilateral lower lobes, likely   suggestive of atelectasis. .  Minimal right pleural effusion. Cholelithiasis with no signs of cholecystitis. Extensive calcification of renal arteries. Underlying 2-4 mm nonobstructing   kidney stones cannot be excluded. .      A drainage catheter tip is within the the right lower quadrant area with a   small amount of residual fluid noted surrounding the drainage catheter   measuring 2.7 x 1.7 cm. XR CHEST PORTABLE   Final Result   Low volume study with trace bilateral pleural effusions and persistent   bibasilar atelectasis or airspace disease. XR CHEST PORTABLE   Final Result   CHF with pulmonary edema. CT HEAD WO CONTRAST   Final Result   No definite evidence of acute intracranial abnormality on a study   significantly limited by motion/streak artifact as described above. Active Hospital Problems    Diagnosis     Overweight (BMI 25.0-29. 9) [E66.3]      Priority: Medium    History of 2019 novel coronavirus disease (COVID-19) [Z86.16]      Priority: Medium    Elevated procalcitonin [R79.89]      Priority: Medium    Arrhythmia [I49.9]      Priority: Medium    Leukocytosis [D72.829]      Priority: Medium    Acute pulmonary edema (HCC) [J81.0]      Priority: Medium    Multifocal pneumonia [J18.9]      Priority: Medium    Hypervolemia [E87.70]      Priority: Medium    Acute on chronic respiratory failure with hypoxia and hypercapnia (HCC) [G28.58, J96.22]      Priority: Medium    Moderate episode of recurrent major depressive disorder (HCC) [F33.1]      Priority: Medium    BJ treated with BiPAP [G47.33]      Priority: Medium    Acute metabolic encephalopathy [X68.84]      Priority: Medium    ESRD on peritoneal dialysis ([address] [N18.6, Z99.2]     Type 2 diabetes mellitus with hyperglycemia, with long-term current use of insulin (HCC) [E11.65, Z79.4]     Hypertension associated with stage 5 chronic kidney disease due to type 2 diabetes mellitus ([address] [E11.22, I12.0, N18.5]     General weakness [R53.1]     Chronic diastolic heart failure (HCC) [I50.32]     Acute respiratory failure with hypoxia and hypercapnia ([address] [J96.01, J96.02]     Arthritis with psoriasis (HCC) [L40.50]     Psoriatic arthropathy ([address] [L40.50]     Sleep apnea [G47.30]        Erwin Cooks, MD  10/5/2022  8:51 AM      Please note that some part of this chart was generated using Dragon dictation software. Although every effort was made to ensure the accuracy of this automated transcription, some errors in transcription may have occurred inadvertently. If you may need any clarification, please do not hesitate to contact me through [facility].

## 2022-10-05 NOTE — PROGRESS NOTES
Office : [phone]     Fax :[phone]       Nephrology progress  Note      Patient's Name: Anders Soriano  10:40 AM  10/5/2022    Reason for Consult:  ESRD on peritoneal dialysis. Requesting Physician:  Jewell Valdez MD      Chief Complaint:    Chief Complaint   Patient presents with    Altered Mental Status     Patient arrives via [place] from [facility], altered, last seen normal last night, started peritoneal dialysis last night, O2 low on normal 3L, placed on NRB with some improvement, increased WOB noted           History of Present Ilness:    Anders Soriano is a 69 y.o. male with prior history of ESRD on peritoneal dialysis , DM 2, HTN who was sent from [facility] with main c/o SOB. In ER he was found to be hypoxic. CXR shows volume overload. On BiPAP now. Still makes urine . Also had elevated WBC. No abdominal pain. No fever   BS Elevated. Interval hx :    Feels much better   Tolerating PD well       WBC decreasing     No abdominal pain   No fever  No discharge from PD site     I/O last 3 completed shifts:   In: 1080 [P.O.:1080]  Out: 26     Past Medical History:   Diagnosis Date    Allergic rhinitis due to other allergen 7/12/2010    Coronary atherosclerosis of unspecified type of vessel, native or graft 7/12/2010    Diabetic eye exam ([address] 04/29/2015    Diabetic eye exam ([address] 5/5/2016    [place] [facility]    Generalized osteoarthrosis, involving multiple sites 7/12/2010    Other psoriasis 7/12/2010    Pneumonia     Prolonged emergence from general anesthesia     Psoriatic arthropathy ([address] 7/12/2010    Type II or unspecified type diabetes mellitus without mention of complication, not stated as uncontrolled 7/12/2010    Unspecified essential hypertension 7/12/2010    Unspecified sleep apnea 7/12/2010       Past Surgical History:   Procedure Laterality Date    CARPAL TUNNEL RELEASE      s/p    CATARACT REMOVAL      CORONARY ARTERY BYPASS GRAFT      JOINT REPLACEMENT      LAPAROSCOPY INSERTION PERITONEAL CATHETER N/A 10/26/2020    LAPAROSCOPIC PERITONEAL DIALYSIS CATHETER PLACEMENT; LAPAROSCOPIC OMENTOPEXY performed by Stevie Fernandez DO at [facility] OR       Family History   Problem Relation Age of Onset    Diabetes Mother     Heart Failure Mother     Coronary Art Dis Father         reports that he quit smoking about 41 years ago. He started smoking about 60 years ago. He has a 18.00 pack-year smoking history. He has never used smokeless tobacco. He reports that he does not drink alcohol and does not use drugs.         Allergies:  Penicillins, Sulfa antibiotics, Tazorac [tazarotene], Clindamycin, and Clindamycin/lincomycin    Current Medications:    metroNIDAZOLE (FLAGYL) tablet 500 mg, 3 times per day  insulin glargine (LANTUS) injection vial 50 Units, Daily  linezolid (ZYVOX) tablet 600 mg, 2 times per day  ciprofloxacin (CIPRO) tablet 500 mg, Daily  lactobacillus (CULTURELLE) capsule 1 capsule, BID WC  insulin lispro (HUMALOG) injection vial 30 Units, TID WC  insulin lispro (HUMALOG) injection vial 0-16 Units, 4x Daily AC & HS  epoetin daphney-epbx (RETACRIT) injection 3,000 Units, Once per day on Mon Wed Fri  torsemide (DEMADEX) tablet 100 mg, Daily  midodrine (PROAMATINE) tablet 5 mg, BID WC  ipratropium-albuterol (DUONEB) nebulizer solution 1 ampule, 4x daily  gentamicin (GARAMYCIN) 0.1 % cream, Daily  sodium chloride flush 0.9 % injection 5-40 mL, 2 times per day  sodium chloride flush 0.9 % injection 5-40 mL, PRN  0.9 % sodium chloride infusion, PRN  ondansetron (ZOFRAN-ODT) disintegrating tablet 4 mg, Q8H PRN   Or  ondansetron (ZOFRAN) injection 4 mg, Q6H PRN  polyethylene glycol (GLYCOLAX) packet 17 g, Daily PRN  acetaminophen (TYLENOL) tablet 650 mg, Q6H PRN   Or  acetaminophen (TYLENOL) suppository 650 mg, Q6H PRN  albuterol (PROVENTIL) nebulizer solution 2.5 mg, Q4H PRN  aspirin chewable tablet 81 mg, Daily  atorvastatin (LIPITOR) tablet 80 mg, Nightly  mometasone-formoterol (DULERA) 200-5 MCG/ACT inhaler 2 puff, BID  buPROPion (WELLBUTRIN) tablet 100 mg, Every Other Day  levothyroxine (SYNTHROID) tablet 50 mcg, Daily  dextrose bolus 10% 125 mL, PRN   Or  dextrose bolus 10% 250 mL, PRN  glucagon (rDNA) injection 1 mg, PRN  dextrose 10 % infusion, Continuous PRN  heparin (porcine) injection 5,000 Units, 3 times per day      Physical exam:     Vitals:  /60   Pulse 76   Temp 97.1 °F (36.2 °C) (Temporal)   Resp 16   Ht 5' 10\" (1.778 m)   Wt 210 lb 8.6 oz (95.5 kg)   SpO2 95%   BMI 30.21 kg/m²   Constitutional:  OAA X3 NAD  Skin: no rash, turgor wnl  Heent:  eomi, mmm  Neck: no bruits or jvd noted  Cardiovascular:  S1, S2 without m/r/g  Respiratory: b/l crackles   Abdomen:  +bs, soft, nt, nd  Ext: mild  lower extremity edema    Labs:  CBC:   Recent Labs     10/03/22  0357 10/04/22  0421 10/05/22  0419   WBC 13.3* 15.1* 15.3*   HGB 8.8* 8.8* 8.7*    306 321     BMP:    Recent Labs     10/03/22  0357 10/04/22  0421 10/05/22  0419   * 137 135*   K 4.2 4.5 4.4   CL 94* 96* 94*   CO2 23 25 24   BUN 70* 73* 79*   CREATININE 10.6* 10.6* 10.6*   GLUCOSE 265* 194* 226*     Ca/Mg/Phos:   Recent Labs     10/03/22  0357 10/04/22  0421 10/05/22  0419   CALCIUM 9.2 8.0* 9.0     Hepatic:   No results for input(s): AST, ALT, ALB, BILITOT, ALKPHOS in the last 72 hours. Troponin:   No results for input(s): TROPONINI in the last 72 hours. BNP: No results for input(s): BNP in the last 72 hours. Lipids: No results for input(s): CHOL, TRIG, HDL, LDLCALC, LABVLDL in the last 72 hours. ABGs:   No results for input(s): PHART, PO2ART, QXE2NRX in the last 72 hours. INR: No results for input(s): INR in the last 72 hours.   UA:  No results for input(s): [place] [facility], [facility] [place] [address] [place] [address], Solitario Spencer, BLOODU, PHUR, PROTEINU, UROBILINOGEN, NITRU, LEUKOCYTESUR, Sosa Saver in the last 72 hours. Urine Microscopic:   No results for input(s): LABCAST, BACTERIA, COMU, HYALCAST, WBCUA, RBCUA, EPIU in the last 72 hours. Urine Culture: No results for input(s): LABURIN in the last 72 hours. Urine Chemistry: No results for input(s): Sunil Kida, PROTEINUR, NAUR in the last 72 hours. IMAGING:  CT CHEST ABDOMEN PELVIS WO CONTRAST   Final Result   Focal linear consolidative changes of bilateral lower lobes, likely   suggestive of atelectasis. .  Minimal right pleural effusion. Cholelithiasis with no signs of cholecystitis. Extensive calcification of renal arteries. Underlying 2-4 mm nonobstructing   kidney stones cannot be excluded. .      A drainage catheter tip is within the the right lower quadrant area with a   small amount of residual fluid noted surrounding the drainage catheter   measuring 2.7 x 1.7 cm. XR CHEST PORTABLE   Final Result   Low volume study with trace bilateral pleural effusions and persistent   bibasilar atelectasis or airspace disease. XR CHEST PORTABLE   Final Result   CHF with pulmonary edema. CT HEAD WO CONTRAST   Final Result   No definite evidence of acute intracranial abnormality on a study   significantly limited by motion/streak artifact as described above. Assessment/Plan :      1. ESRD   Volume overload   Better. Continue  CCPD  with 2.5  %     PD fluid for cell count and culture -  negative   No growth   No evidence of peritonitis    WBC elevated - rpt PD fluid cell count and culture negative     CT abdomen normal.   D/w radiology . The fluid collection is at the tip of PD catheter is dialysate fluid . 2. HTN  BP controlled       3. Anemia in ESRD   On epogen     4. Acid- base disorder. monitor     5. Hypokalemia   Replace potassium       6. Debility. Generalized weakness.  Need PT/OT Awaiting placement at rehab unit           D/w primary team      Thank you for allowing us to participate in care of Austin Cazares         Electronically signed by: Jennifer Villarreal MD, 10/5/2022, 10:40 AM      [facility] [address]  Office : [phone]  Fax :[phone]

## 2022-10-06 PROBLEM — Z71.89 DIABETES EDUCATION, ENCOUNTER FOR: Status: ACTIVE | Noted: 2022-10-06

## 2022-10-06 LAB
GLUCOSE BLD-MCNC: 138 MG/DL (ref 70–99)
GLUCOSE BLD-MCNC: 140 MG/DL (ref 70–99)
GLUCOSE BLD-MCNC: 180 MG/DL (ref 70–99)
GLUCOSE BLD-MCNC: 184 MG/DL (ref 70–99)
GLUCOSE BLD-MCNC: 213 MG/DL (ref 70–99)
GLUCOSE BLD-MCNC: 220 MG/DL (ref 70–99)
GRAM STAIN RESULT: ABNORMAL
HCT VFR BLD CALC: 28.3 % (ref 40.5–52.5)
HEMOGLOBIN: 9.2 G/DL (ref 13.5–17.5)
MCH RBC QN AUTO: 36.3 PG (ref 26–34)
MCHC RBC AUTO-ENTMCNC: 32.6 G/DL (ref 31–36)
MCV RBC AUTO: 111.4 FL (ref 80–100)
ORGANISM: ABNORMAL
PDW BLD-RTO: 14.6 % (ref 12.4–15.4)
PERFORMED ON: ABNORMAL
PLATELET # BLD: 334 K/UL (ref 135–450)
PMV BLD AUTO: 7 FL (ref 5–10.5)
RBC # BLD: 2.54 M/UL (ref 4.2–5.9)
WBC # BLD: 16.8 K/UL (ref 4–11)
WOUND/ABSCESS: ABNORMAL

## 2022-10-06 PROCEDURE — 6370000000 HC RX 637 (ALT 250 FOR IP): Performed by: INTERNAL MEDICINE

## 2022-10-06 PROCEDURE — 2580000003 HC RX 258: Performed by: INTERNAL MEDICINE

## 2022-10-06 PROCEDURE — 94640 AIRWAY INHALATION TREATMENT: CPT

## 2022-10-06 PROCEDURE — 2700000000 HC OXYGEN THERAPY PER DAY

## 2022-10-06 PROCEDURE — 90945 DIALYSIS ONE EVALUATION: CPT

## 2022-10-06 PROCEDURE — 85027 COMPLETE CBC AUTOMATED: CPT

## 2022-10-06 PROCEDURE — 94660 CPAP INITIATION&MGMT: CPT

## 2022-10-06 PROCEDURE — 90945 DIALYSIS ONE EVALUATION: CPT | Performed by: INTERNAL MEDICINE

## 2022-10-06 PROCEDURE — 6370000000 HC RX 637 (ALT 250 FOR IP): Performed by: STUDENT IN AN ORGANIZED HEALTH CARE EDUCATION/TRAINING PROGRAM

## 2022-10-06 PROCEDURE — 97530 THERAPEUTIC ACTIVITIES: CPT

## 2022-10-06 PROCEDURE — 6360000002 HC RX W HCPCS: Performed by: STUDENT IN AN ORGANIZED HEALTH CARE EDUCATION/TRAINING PROGRAM

## 2022-10-06 PROCEDURE — 36415 COLL VENOUS BLD VENIPUNCTURE: CPT

## 2022-10-06 PROCEDURE — 6360000002 HC RX W HCPCS: Performed by: INTERNAL MEDICINE

## 2022-10-06 PROCEDURE — 97110 THERAPEUTIC EXERCISES: CPT

## 2022-10-06 PROCEDURE — 2580000003 HC RX 258: Performed by: STUDENT IN AN ORGANIZED HEALTH CARE EDUCATION/TRAINING PROGRAM

## 2022-10-06 PROCEDURE — 1200000000 HC SEMI PRIVATE

## 2022-10-06 PROCEDURE — 94761 N-INVAS EAR/PLS OXIMETRY MLT: CPT

## 2022-10-06 PROCEDURE — 99232 SBSQ HOSP IP/OBS MODERATE 35: CPT | Performed by: INTERNAL MEDICINE

## 2022-10-06 RX ORDER — INSULIN LISPRO 100 [IU]/ML
15 INJECTION, SOLUTION INTRAVENOUS; SUBCUTANEOUS
Status: DISCONTINUED | OUTPATIENT
Start: 2022-10-06 | End: 2022-10-08

## 2022-10-06 RX ORDER — FLUCONAZOLE 100 MG/1
200 TABLET ORAL DAILY
Status: DISCONTINUED | OUTPATIENT
Start: 2022-10-06 | End: 2022-10-10 | Stop reason: HOSPADM

## 2022-10-06 RX ADMIN — INSULIN LISPRO 4 UNITS: 100 INJECTION, SOLUTION INTRAVENOUS; SUBCUTANEOUS at 20:31

## 2022-10-06 RX ADMIN — LINEZOLID 600 MG: 600 TABLET, FILM COATED ORAL at 09:13

## 2022-10-06 RX ADMIN — SODIUM CHLORIDE: 9 INJECTION, SOLUTION INTRAVENOUS at 15:06

## 2022-10-06 RX ADMIN — ONDANSETRON 4 MG: 2 INJECTION INTRAMUSCULAR; INTRAVENOUS at 19:18

## 2022-10-06 RX ADMIN — TORSEMIDE 100 MG: 100 TABLET ORAL at 09:23

## 2022-10-06 RX ADMIN — HEPARIN SODIUM 5000 UNITS: 5000 INJECTION INTRAVENOUS; SUBCUTANEOUS at 20:31

## 2022-10-06 RX ADMIN — MIDODRINE HYDROCHLORIDE 5 MG: 5 TABLET ORAL at 09:14

## 2022-10-06 RX ADMIN — LINEZOLID 600 MG: 600 TABLET, FILM COATED ORAL at 20:31

## 2022-10-06 RX ADMIN — GENTAMICIN SULFATE: 1 CREAM TOPICAL at 09:15

## 2022-10-06 RX ADMIN — Medication 1 CAPSULE: at 17:15

## 2022-10-06 RX ADMIN — LEVOTHYROXINE SODIUM 50 MCG: 0.03 TABLET ORAL at 05:06

## 2022-10-06 RX ADMIN — MIDODRINE HYDROCHLORIDE 5 MG: 5 TABLET ORAL at 17:15

## 2022-10-06 RX ADMIN — IPRATROPIUM BROMIDE AND ALBUTEROL SULFATE 1 AMPULE: 2.5; .5 SOLUTION RESPIRATORY (INHALATION) at 11:13

## 2022-10-06 RX ADMIN — Medication 2 PUFF: at 20:23

## 2022-10-06 RX ADMIN — IPRATROPIUM BROMIDE AND ALBUTEROL SULFATE 1 AMPULE: 2.5; .5 SOLUTION RESPIRATORY (INHALATION) at 20:23

## 2022-10-06 RX ADMIN — Medication 10 ML: at 09:15

## 2022-10-06 RX ADMIN — ASPIRIN 81 MG 81 MG: 81 TABLET ORAL at 09:14

## 2022-10-06 RX ADMIN — IPRATROPIUM BROMIDE AND ALBUTEROL SULFATE 1 AMPULE: 2.5; .5 SOLUTION RESPIRATORY (INHALATION) at 15:44

## 2022-10-06 RX ADMIN — HEPARIN SODIUM 5000 UNITS: 5000 INJECTION INTRAVENOUS; SUBCUTANEOUS at 05:07

## 2022-10-06 RX ADMIN — IPRATROPIUM BROMIDE AND ALBUTEROL SULFATE 1 AMPULE: 2.5; .5 SOLUTION RESPIRATORY (INHALATION) at 07:55

## 2022-10-06 RX ADMIN — MEROPENEM 500 MG: 500 INJECTION, POWDER, FOR SOLUTION INTRAVENOUS at 15:08

## 2022-10-06 RX ADMIN — ATORVASTATIN CALCIUM 80 MG: 80 TABLET, FILM COATED ORAL at 20:31

## 2022-10-06 RX ADMIN — Medication 1 CAPSULE: at 09:13

## 2022-10-06 RX ADMIN — FLUCONAZOLE 200 MG: 100 TABLET ORAL at 17:15

## 2022-10-06 RX ADMIN — HEPARIN SODIUM 5000 UNITS: 5000 INJECTION INTRAVENOUS; SUBCUTANEOUS at 15:01

## 2022-10-06 RX ADMIN — INSULIN GLARGINE 50 UNITS: 100 INJECTION, SOLUTION SUBCUTANEOUS at 09:23

## 2022-10-06 RX ADMIN — Medication 2 PUFF: at 07:55

## 2022-10-06 RX ADMIN — Medication 10 ML: at 19:18

## 2022-10-06 ASSESSMENT — PAIN SCALES - WONG BAKER
WONGBAKER_NUMERICALRESPONSE: 0

## 2022-10-06 ASSESSMENT — ENCOUNTER SYMPTOMS
SINUS PRESSURE: 0
SORE THROAT: 0
EYE DISCHARGE: 0
ABDOMINAL PAIN: 0
RHINORRHEA: 0
BACK PAIN: 0
EYE REDNESS: 0
SHORTNESS OF BREATH: 0
WHEEZING: 0
NAUSEA: 0
COUGH: 0
CONSTIPATION: 0
SINUS PAIN: 0
DIARRHEA: 0

## 2022-10-06 ASSESSMENT — PAIN SCALES - GENERAL
PAINLEVEL_OUTOF10: 0
PAINLEVEL_OUTOF10: 0

## 2022-10-06 NOTE — PROGRESS NOTES
[facility] [address] Department   Phone: [phone]    Physical Therapy    [] Initial Evaluation            [x] Daily Treatment Note         [] Discharge Summary      Patient: Tonie Campos   : 1945   MRN: [id number]   Date of Service:  10/6/2022  Admitting Diagnosis: Acute on chronic respiratory failure with hypoxia and hypercapnia [facility])  Current Admission Summary: 69 y.o. male who presented to [facility] with complaints of altered mental status. Patient is relatively nonverbal at baseline after prior history of illness. Questionable aphasia from stroke versus ongoing dementia. Patient daughter at bedside and was able to provide information. She states the patient was more confused at his nursing facility this morning. Additionally, patient was found to be hypoxic. He was started on oxygen, with little improvement. He was placed on BiPAP with more improvement. Per the daughter, patient is supposed be wearing BiPAP at night, but is noncompliant. There were no other associated symptoms with his confusion. During work-up in the ER, patient was found to have CO2 of 94.8 with pH of 7.095 on admission. Patient does have significant dementia and is aware he is confused, but is relatively nonverbal and unable to answer complex questions. He is able to answer \"yes and no\" questions. Prior tobacco user, quit approximately 4 years ago. No alcohol or drug use.   Past Medical History:  has a past medical history of Allergic rhinitis due to other allergen, Coronary atherosclerosis of unspecified type of vessel, native or graft, Diabetic eye exam ([address] Diabetic eye exam ([address] Generalized osteoarthrosis, involving multiple sites, Other psoriasis, Pneumonia, Prolonged emergence from general anesthesia, Psoriatic arthropathy ([address] Type II or unspecified type diabetes mellitus without mention of complication, not stated as uncontrolled, Unspecified essential hypertension, and Unspecified sleep apnea. Past Surgical History:  has a past surgical history that includes Coronary artery bypass graft; Carpal tunnel release; joint replacement; Cataract removal; and LAPAROSCOPY INSERTION PERITONEAL CATHETER (N/A, 10/26/2020). Discharge Recommendations: Demetria Roper scored a 6/24 on the AM-PAC short mobility form. Current research shows that an AM-PAC score of 17 or less is typically not associated with a discharge to the patient's home setting. Based on the patient's AM-PAC score and their current functional mobility deficits, it is recommended that the patient have 3-5 sessions per week of Physical Therapy at d/c to increase the patient's independence. Please see assessment section for further patient specific details. If patient discharges prior to next session this note will serve as a discharge summary. Please see below for the latest assessment towards goals. DME Required For Discharge: DME to be determined pending patient progress    Precautions/Restrictions: high fall risk  Weight Bearing Restrictions: no restrictions  [] Right Upper Extremity  [] Left Upper Extremity [] Right Lower Extremity  [] Left Lower Extremity     Required Braces/Orthotics: no braces required   [] Right  [] Left  Positional Restrictions:no positional restrictions    Pre-Admission Information   Lives With: spouse (Spouse just had OHS, per pt report)  Type of Home: condo  Home Layout: one level, able to live on main level  Home Access: level entry  Bathroom Layout: walk in shower, handicap height toilet  Bathroom Equipment: grab bars in shower, shower chair, hand held shower head  Toilet Height: standard height, elevated height (has both)  Home Equipment: rolling walker, single point cane, reacher  Transfer Assistance: requires assistance  Ambulation Assistance:modified independent with use of RW  ADL Assistance: requires assistance with bathing, requires assistance with dressing, . Comment: A with LB ADLs  IADL Assistance: requires assistance with all homemaking tasks, wife & dtr were helping; dtr works  Active :        [] Yes                 [x] No  Hand Dominance: [] Left                 [x] Right  Current Employment: retired. Occupation:  at [employer]: [employer]  Recent Falls: No recent falls  Pt sleeps in \"lounger\"     Pt from SNF after stay at [facility] Only at SNF 1 night. Questionable historian. Per RN, pt's daughter reporting pt needing a lot more assistance recently. Subjective  General: Patient lying supine in bed with HOB elevated upon arrival. Patient is agreeable to PT/OT. Pain: 0/10 - pt did report neck pain at the end of sitting EOB, does not rate  Pain Interventions: not applicable - RN updated on request for pain medication       Functional Mobility  Bed Mobility  Supine to Sit: 2 person assistance with Mod + Min   Sit to Supine: moderate assistance  Rolling Left: minimal assistance  Rolling Right: minimal assistance  Scootin person assistance with Max A (pt with core activation to attempt scooting however doesn't move forward)   Comments:   Transfers  No transfers completed on this date secondary to pt's BP dropped after sitting EOB x 10 min. Pt reports feeling dizzy so had to lie back down. Comments:  Ambulation  Ambulation not tested on this date secondary to low BP with sitting EOB and pt feeling poorly. Stair Mobility  Stair mobility not completed on this date. Comments:  Wheelchair Mobility:  No w/c mobility completed on this date. Comments:  Balance  Static Sitting Balance: fair: maintains balance at CGA without use of UE support  Dynamic Sitting Balance: fair (-): maintains balance at CGA with use of UE support  Comments: Patient sat EOB 10 minutes; pt returned to supine due to orthostatic hypotension. Pt requiring SBA-CGA to maintain balance during seated exercises.      Other Therapeutic Interventions  Sitting exercises:   - Ankle pumps x20 reps (B)   - Isometric hamstring curls in 90/90 5 sec x5 reps (B)   - Hip adduction set 5 sec x5 reps  Supine exercises:   - Ankle pumps x20 reps (B)   - bridges x20 reps    Functional Outcomes  AM-PAC Inpatient Mobility Raw Score : 6              Education  Barriers To Learning: cognition  Patient Education: patient educated on goals, PT role and benefits, plan of care, functional mobility training, orientation, injury prevention, transfer training, discharge recommendations  Pt encouraged to transfer to chair using mechanical lift in order to promote tolerance to activity and limit orthostatic hypotension. Learning Assessment:  patient will require reinforcement due to cognitive deficits    Assessment  Activity Tolerance: Pt continues to be orthostatic at EOB however was able to tolerate sitting EOB a bit longer before reaching a BP that required return to supine. BP held more stable with use of isometric exercises to help stabilize, but minimal carry over with time. Supine /57. Sitting EOB initially /87. After isometric exercises (~2 minutes) /77. After 10 minutes EOB BP 90/68. Impairments Requiring Therapeutic Intervention: decreased functional mobility, decreased ROM, decreased strength, decreased cognition, decreased endurance, decreased balance, decreased coordination, decreased posture  Prognosis: fair  Clinical Assessment: Pt continues to demonstrate improved cognition, A&O x4 today and is able to hold a conversation. The patient continues to have low BP with sitting EOB which limits further mobility. He required varying assist levels for bed mobility due to rapid onset of fatigue. Recommend continued skilled PT to safely progress tolerance to activity and independent with functional mobility.  It would be beneficial to confirm pt's PLOF as he presents with LE atrophy, concerning for lack of mobility/ambulation prior to admission, and pt was a poor historian at time of eval.   Safety Interventions: patient left in bed, bed alarm in place, call light within reach, patient at risk for falls, and nurse notified    Plan  Frequency: 3-5 x/per week  Current Treatment Recommendations: strengthening, balance training, functional mobility training, transfer training, endurance training, patient/caregiver education, home exercise program, safety education, equipment evaluation/education, and positioning    Goals  Patient Goals: Return home   Short Term Goals:  Time Frame: Upon d/c (Goals updated 10/6 due to pt's lack of progress)  Patient will complete bed mobility at minimal assistance   Patient will complete sit<>stand transfers at moderate assistance   Patient will complete bed<>chair transfers at moderate assistance with LRAD  Patient to maintain standing at moderate assistance for 2 minutes with (B) UE support on RW  Patient will ambulate 10 ft with use of rolling walker at maximum assistance  NO GOALS MET THIS DATE     Therapy Session Time      Individual Group Co-treatment   Time In     1212   Time Out     1239   Minutes     27     Timed Code Treatment Minutes:  27 Minutes  Total Treatment Minutes:  27 minutes       Electronically Signed By: You Barker PT       Noble Yeboah PT, DPT #[id number]

## 2022-10-06 NOTE — PROGRESS NOTES
Infectious Diseases   Progress Note      Admission Date: 9/21/2022  Hospital Day: Hospital Day: 16   Attending: Modesto Ruiz MD  Date of service: 10/6/2022     Chief complaint/ Reason for consult:     Persistent leukocytosis  Acute on chronic respiratory failure with hypoxia on admission  Acute metabolic encephalopathy on admission  ESRD was on peritoneal dialysis at home  Elevated procalcitonin of 1.02 on admission  Essential hypertension  Coronary artery disease    Microbiology:        I have reviewed allavailable micro lab data and cultures    Blood culture (2/2) - collected on 9/21/2022 and 10-2-22: Negative        Antibiotics and immunizations:       Current antibiotics: All antibiotics and their doses were reviewed by me    Recent Abx Admin                     linezolid (ZYVOX) tablet 600 mg (mg) 600 mg Given 10/06/22 0913     600 mg Given 10/05/22 2000    meropenem (MERREM) 500 mg IVPB (mini-bag) (mg) 500 mg New Bag 10/05/22 1528                      Immunization History: All immunization history was reviewed by me today. Immunization History   Administered Date(s) Administered    COVID-19, PFIZER PURPLE top, DILUTE for use, (age 15 y+), 30mcg/0.3mL 04/21/2021, 05/12/2021, 11/30/2021    Influenza Vaccine, unspecified formulation 10/16/2014, 10/19/2015, 10/21/2016    Influenza Virus Vaccine 10/16/2014, 10/19/2015, 10/21/2016    Influenza, FLUAD, (age 72 y+), Adjuvanted, 0.5mL 09/08/2020    Influenza, High Dose (Fluzone 65 yrs and older) 10/16/2014, 10/19/2015, 10/21/2016, 09/01/2017, 11/16/2018    Influenza, Triv, inactivated, subunit, adjuvanted, IM (Fluad 65 yrs and older) 11/20/2019    PPD Test 02/02/2016    Pneumococcal Conjugate 13-valent ([id number]) 10/19/2015    Pneumococcal Polysaccharide ([id number]) 10/16/2014    Tdap (Boostrix, Adacel) 08/12/2016       Known drug allergies:      All allergies were reviewed and updated    Allergies   Allergen Reactions    Penicillins Other (See Comments) Syncope \"pass out \" per pt, tolerates cefepime    Sulfa Antibiotics      Patient unsure of reaction     Tazorac [Tazarotene]      Cream - rash    Clindamycin Rash    Clindamycin/Lincomycin Rash       Social history:     Social History:  All social andepidemiologic history was reviewed and updated by me today as needed. Tobacco use:   reports that he quit smoking about 41 years ago. He started smoking about 60 years ago. He has a 18.00 pack-year smoking history. He has never used smokeless tobacco.  Alcohol use:   reports no history of alcohol use. Currently lives in: [address] [place] [address]   reports no history of drug use. COVID VACCINATION AND LAB RESULT RECORDS:     Internal Administration   First Dose COVID-19, PFIZER PURPLE top, DILUTE for use, (age 15 y+), 30mcg/0.3mL  04/21/2021   Second Dose COVID-19, PFIZER PURPLE top, DILUTE for use, (age 15 y+), 30mcg/0.3mL   05/12/2021       Last COVID Lab SARS-CoV-2, PCR (no units)   Date Value   10/20/2020 Not Detected     SARS-CoV-2, GORDON (no units)   Date Value   12/28/2020 DETECTED (A)            Assessment:     The patient is a 69 y.o. old male who  has a past medical history of Allergic rhinitis due to other allergen (7/12/2010), Coronary atherosclerosis of unspecified type of vessel, native or graft (7/12/2010), Diabetic eye exam ([facility] [address] (04/29/2015), Diabetic eye exam ([address] (5/5/2016), Generalized osteoarthrosis, involving multiple sites (7/12/2010), Other psoriasis (7/12/2010), Pneumonia, Prolonged emergence from general anesthesia, Psoriatic arthropathy ([address] (7/12/2010), Type II or unspecified type diabetes mellitus without mention of complication, not stated as uncontrolled (7/12/2010), Unspecified essential hypertension (7/12/2010), and Unspecified sleep apnea (7/12/2010).  with following problems:    Persistent leukocytosis-no CBC available today  Acute on chronic respiratory failure with hypoxia on admission  Acute metabolic encephalopathy on admission-improving  ESRD was on peritoneal dialysis at home  Elevated procalcitonin of 1.02 on admission  Essential hypertension-BP controlled  Coronary artery disease  History of COVID-19  Type 2 diabetes mellitus-counseling done  Obstructive sleep apnea  Generalized weakness  Overweight due to excess calorie intake : Body mass index is 29.26 kg/m². Discussion:      The patient is afebrile. PD fluid culture from 10-22 is staying negative. Right buttock wound culture from 10/3/2022 has grown Candida parapsilosis. I had ordered empiric meropenem yesterday. No CBC available today. Plan:     Diagnostic Workup:    Follow-up on PD fluid cultures  Continue to follow  fever curve, WBC count and blood cultures. Continue to monitor blood counts, liver and renal function. Get a CBC done tomorrow    Antimicrobials: Will continue linezolid 600 mg every 12 hour  Continue empiric IV meropenem 500 mg every 24 hour  Start empiric oral Diflucan 200 mg daily to cover for Candida isolated from the right buttock wound  We will follow up on the culture results and clinical progress and will make further recommendations accordingly. Continue close vitals monitoring. Maintain good glycemic control. Fall precautions. Aspiration precautions. Continue to watch for new fever or diarrhea. DVT prophylaxis. Discussed all above with patient and RN. Drug Monitoring:    Continue monitoring for antibiotic toxicity as follows: CBC, CMP   Continue to watch for following: new or worsening fever, new hypotension, hives, lip swelling and redness or purulence at vascular access sites. I/v access Management:    Continue to monitor i.v access sites for erythema, induration, discharge or tenderness. As always, continue efforts to minimize tubes/lines/drains as clinically appropriate to reduce chances of line associated infections. Patient education and counseling:         The patient was educated in detail about the side-effects of various antibiotics and things to watch for like new rashes, lip swelling, severe reaction, worsening diarrhea, break through fever etc.  Discussed patient's condition and what to expect. All of the patient's questions were addressed in a satisfactory manner and patient verbalized understanding all instructions. Diabetes mellitus education and counseling:    Patient was educated in detail about the importance of keeping diabetes under control to improve the cure rate of infection and prevent future infections. Patient was advised to check blood glucose level regularly and to stay compliant with the diabetes medications. Patient was advised to the trim the toe nails carefully, wear shoes or slippers at all times and check both feet everyday before going to bed to look for any cuts, blisters, swelling or redness. Importance of washing the feet everyday with soap and water and keeping them dry, and seeking prompt medical attention in case of a non-healing wound or ulcer on the feet was also highlighted. Thank you for involving me in the care of your patient. I will continue to follow. If you have anyadditional questions, please do not hesitate to contact me. Subjective: Interval history: Interval history was obtained from chart review and patient/ RN. He is afebrile. He is tolerating antibiotics okay. No diarrhea     REVIEW OF SYSTEMS:      Review of Systems   Constitutional:  Negative for chills, diaphoresis and fever. HENT:  Negative for ear discharge, ear pain, postnasal drip, rhinorrhea, sinus pressure, sinus pain and sore throat. Eyes:  Negative for discharge and redness. Respiratory:  Negative for cough, shortness of breath and wheezing. Cardiovascular:  Negative for chest pain and leg swelling. Gastrointestinal:  Negative for abdominal pain, constipation, diarrhea and nausea.    Endocrine: Negative for cold intolerance, heat intolerance and polydipsia. Genitourinary:  Negative for dysuria, flank pain, frequency, hematuria and urgency. Musculoskeletal:  Negative for back pain and myalgias. Skin:  Negative for rash. Allergic/Immunologic: Negative for immunocompromised state. Neurological:  Negative for dizziness, seizures and headaches. Hematological:  Does not bruise/bleed easily. Psychiatric/Behavioral:  Negative for agitation, hallucinations and suicidal ideas. The patient is not nervous/anxious. All other systems reviewed and are negative. Past Medical History: All past medical history reviewed today. Past Medical History:   Diagnosis Date    Allergic rhinitis due to other allergen 7/12/2010    Coronary atherosclerosis of unspecified type of vessel, native or graft 7/12/2010    Diabetic eye exam ([facility] [address] 04/29/2015    Diabetic eye exam ([facility] [address] 5/5/2016    [place] [facility]    Generalized osteoarthrosis, involving multiple sites 7/12/2010    Other psoriasis 7/12/2010    Pneumonia     Prolonged emergence from general anesthesia     Psoriatic arthropathy ([facility] [address] 7/12/2010    Type II or unspecified type diabetes mellitus without mention of complication, not stated as uncontrolled 7/12/2010    Unspecified essential hypertension 7/12/2010    Unspecified sleep apnea 7/12/2010       Past Surgical History: All past surgical history was reviewed today. Past Surgical History:   Procedure Laterality Date    CARPAL TUNNEL RELEASE      s/p    CATARACT REMOVAL      CORONARY ARTERY BYPASS GRAFT      JOINT REPLACEMENT      LAPAROSCOPY INSERTION PERITONEAL CATHETER N/A 10/26/2020    LAPAROSCOPIC PERITONEAL DIALYSIS CATHETER PLACEMENT; LAPAROSCOPIC OMENTOPEXY performed by Alina Martinez DO at [facility] OR       Family History: All family history was reviewed today.         Problem Relation Age of Onset    Diabetes Mother     Heart Failure Mother     Coronary Art Dis Father        Objective:       PHYSICAL EXAM:      Vitals:   Vitals:    10/06/22 1000 10/06/22 1145 10/06/22 1200 10/06/22 1400   BP: (!) 134/58  (!) 145/57 (!) 150/77   Pulse: 86 85 86 89   Resp: 18 15 17 21   Temp:       TempSrc:       SpO2:       Weight:       Height:           Physical Exam  Vitals and nursing note reviewed. Constitutional:       Appearance: He is well-developed. He is not diaphoretic. Comments: The patient was seen earlier today. HENT:      Head: Normocephalic and atraumatic. Right Ear: External ear normal. There is no impacted cerumen. Left Ear: External ear normal. There is no impacted cerumen. Nose: Nose normal.      Mouth/Throat:      Mouth: Mucous membranes are moist.      Pharynx: Oropharynx is clear. No oropharyngeal exudate. Eyes:      General: No scleral icterus. Right eye: No discharge. Left eye: No discharge. Conjunctiva/sclera: Conjunctivae normal.      Pupils: Pupils are equal, round, and reactive to light. Neck:      Thyroid: No thyromegaly. Cardiovascular:      Rate and Rhythm: Normal rate and regular rhythm. Heart sounds: Normal heart sounds. No murmur heard. No friction rub. Pulmonary:      Effort: No respiratory distress. Breath sounds: No stridor. No wheezing or rales. Abdominal:      General: Bowel sounds are normal.      Palpations: Abdomen is soft. Tenderness: There is no abdominal tenderness. There is no guarding or rebound. Comments: PD catheter in place   Musculoskeletal:         General: No swelling, tenderness or deformity. Normal range of motion. Cervical back: Normal range of motion and neck supple. Right lower leg: No edema. Left lower leg: No edema. Lymphadenopathy:      Cervical: No cervical adenopathy. Skin:     General: Skin is warm and dry. Coloration: Skin is not jaundiced. Findings: No bruising, erythema or rash. Neurological:      General: No focal deficit present. Mental Status: He is alert and oriented to person, place, and time.  Mental status is at baseline. Motor: No abnormal muscle tone. Psychiatric:         Mood and Affect: Mood normal.         Behavior: Behavior normal.     *    Lines and drains: All vascular access sites are healthy with no local erythema, discharge or tenderness. Intake and output:    I/O last 3 completed shifts: In: 378.5 [P.O.:120; I.V.:158.5; IV Piggyback:100]  Out: 365 [Other:290]    Lab Data:   All available labs and old records have been reviewed by me. CBC:  Recent Labs     10/04/22  0421 10/05/22  0419   WBC 15.1* 15.3*   RBC 2.38* 2.37*   HGB 8.8* 8.7*   HCT 26.7* 26.2*    321   .0* 110.8*   MCH 36.8* 36.8*   MCHC 32.9 33.2   RDW 14.2 14.3          BMP:  Recent Labs     10/04/22  0421 10/05/22  0419    135*   K 4.5 4.4   CL 96* 94*   CO2 25 24   BUN 73* 79*   CREATININE 10.6* 10.6*   CALCIUM 8.0* 9.0   GLUCOSE 194* 226*          Hepatic Function Panel:   Lab Results   Component Value Date/Time    ALKPHOS 92 09/26/2022 04:24 AM    ALT 33 09/26/2022 04:24 AM    AST 29 09/26/2022 04:24 AM    PROT 5.9 09/26/2022 04:24 AM    PROT 7.0 08/15/2012 08:52 AM    BILITOT <0.2 09/26/2022 04:24 AM    BILIDIR <0.2 01/15/2017 05:30 AM    IBILI see below 01/15/2017 05:30 AM    LABALBU 2.8 09/26/2022 04:24 AM       CPK:   Lab Results   Component Value Date    CKTOTAL 196 04/29/2015     ESR: No results found for: SEDRATE  CRP: No results found for: CRP        Imaging: All pertinent images and reports for the current visit were reviewed by me during this visit. I reviewed the chest x-ray/CT scan/MRI images and independently interpreted the findings and results today. CT CHEST ABDOMEN PELVIS WO CONTRAST   Final Result   Addendum (preliminary) 1 of 1   ADDENDUM:   Addendum is being made for clarification about the catheter in the    peritoneal   cavity. The catheter within the peritoneal cavity is a dialysis catheter.          Final   Focal linear consolidative changes of bilateral lower lobes, likely   suggestive of atelectasis. .  Minimal right pleural effusion. Cholelithiasis with no signs of cholecystitis. Extensive calcification of renal arteries. Underlying 2-4 mm nonobstructing   kidney stones cannot be excluded. .      A drainage catheter tip is within the the right lower quadrant area with a   small amount of residual fluid noted surrounding the drainage catheter   measuring 2.7 x 1.7 cm. XR CHEST PORTABLE   Final Result   Low volume study with trace bilateral pleural effusions and persistent   bibasilar atelectasis or airspace disease. XR CHEST PORTABLE   Final Result   CHF with pulmonary edema. CT HEAD WO CONTRAST   Final Result   No definite evidence of acute intracranial abnormality on a study   significantly limited by motion/streak artifact as described above. Medications: All current and past medications were reviewed.      [Held by provider] insulin lispro  15 Units SubCUTAneous TID WC    meropenem  500 mg IntraVENous Q24H    insulin glargine  50 Units SubCUTAneous Daily    linezolid  600 mg Oral 2 times per day    lactobacillus  1 capsule Oral BID WC    insulin lispro  0-16 Units SubCUTAneous 4x Daily AC & HS    epoetin daphney-epbx  3,000 Units SubCUTAneous Once per day on Mon Wed Fri    torsemide  100 mg Oral Daily    midodrine  5 mg Oral BID WC    ipratropium-albuterol  1 ampule Inhalation 4x daily    gentamicin   Topical Daily    sodium chloride flush  5-40 mL IntraVENous 2 times per day    aspirin  81 mg Oral Daily    atorvastatin  80 mg Oral Nightly    mometasone-formoterol  2 puff Inhalation BID    buPROPion  100 mg Oral Every Other Day    levothyroxine  50 mcg Oral Daily    heparin (porcine)  5,000 Units SubCUTAneous 3 times per day        sodium chloride Stopped (10/05/22 6108)    dextrose         sodium chloride flush, sodium chloride, ondansetron **OR** ondansetron, polyethylene glycol, acetaminophen **OR** acetaminophen, albuterol, dextrose bolus **OR** dextrose bolus, glucagon (rDNA), dextrose      Problem list:       Patient Active Problem List   Diagnosis Code    Essential hypertension I10    Generalized osteoarthrosis, involving multiple sites M15.9    Arthritis with psoriasis ([facility] L40.50    Sleep apnea G47.30    Psoriatic arthropathy ([facility]) L40.50    Allergic rhinitis due to other allergen J30.89    Coronary atherosclerosis I25.10    Vitamin D deficiency E55.9    Glaucoma H40.9    Nephropathy N28.9    S/P CABG x 4 Z95.1    ILD (interstitial lung disease) ([facility] J84.9    Acute respiratory failure with hypoxia and hypercapnia ([facility]) J96.01, J96.02    Chronic diastolic heart failure ([facility]) I50.32    Elevated troponin R77.8    Coronary artery disease involving native coronary artery of native heart with unstable angina pectoris ([facility]) I25.110    BJ (obstructive sleep apnea) G47.33    Cough variant asthma J45.991    NSTEMI (non-ST elevated myocardial infarction) ([facility] I21.4    Diabetes mellitus type 2 in obese ([facility]) E11.69, E66.9    General weakness R53.1    Syncope and collapse R55    Type 2 diabetes mellitus with hyperglycemia, with long-term current use of insulin ([facility]) E11.65, Z79.4    Hypertension associated with stage 5 chronic kidney disease due to type 2 diabetes mellitus ([facility]) E11.22, I12.0, N18.5    Acquired hypothyroidism E03.9    Unstable angina ([facility]) I20.0    Orthostatic hypotension I95.1    Dyspnea on exertion R06.09    Left lower quadrant abdominal pain R10.32    ESRD on peritoneal dialysis ([facility] N18.6, Z99.2    AMS (altered mental status) R41.82    Acute respiratory failure with hypoxia ([facility]) Q99.26    Acute metabolic encephalopathy Q53.56    Hypercarbia R06.89    Electrolyte imbalance E87.8    Generalized abdominal tenderness R10.817    Chronic hypercapnic respiratory failure ([facility]) J96.12    BJ treated with BiPAP G47.33    Drug toxicity R89.2    Physical deconditioning R53.81    Moderate episode of recurrent major depressive disorder ([facility] [address] F33.1    Acute on chronic respiratory failure with hypoxia and hypercapnia ([facility]) J96.21, J96.22    Multifocal pneumonia J18.9    Hypervolemia E87.70    Leukocytosis D72.829    Acute pulmonary edema ([facility]) J81.0    Arrhythmia I49.9    Overweight (BMI 25.0-29. 9) E66.3    History of 2019 novel coronavirus disease (COVID-19) Z86.16    Elevated procalcitonin R79.89    Peritoneal dialysis catheter in place [facility]) Z99.2    Acute congestive heart failure ([facility] [address] I50.9       Please note that this chart was generated using Dragon dictation software. Although every effort was made to ensure the accuracy of this automated transcription, some errors in transcription may have occurred inadvertently. If you may need any clarification, please do not hesitate to contact me through EPIC or at the phone number provided below with my electronic signature. Any pictures or media included in this note were obtained after taking informed verbal consent from the patient and with their approval to include those in the patient's medical record. Ted Guillaume MD, MPH, [address]  10/6/2022, 2:58 PM  [facility] Infectious Disease   [address] [place] [address], [address] [place] [address]  Office: [phone]  Fax: [phone]  In-person Clinic days:  Tuesday & Thursday a.m. Virtual clinic days: Monday, Wednesday & Friday a.m.

## 2022-10-06 NOTE — PROGRESS NOTES
Office : [phone]     Fax :[phone]       Nephrology progress  Note      Patient's Name: Darwin Evans  8:07 AM  10/6/2022    Reason for Consult:  ESRD on peritoneal dialysis. Requesting Physician:  Sadi Gordon MD      Chief Complaint:    Chief Complaint   Patient presents with    Altered Mental Status     Patient arrives via [place] from [place], altered, last seen normal last night, started peritoneal dialysis last night, O2 low on normal 3L, placed on NRB with some improvement, increased WOB noted           History of Present Ilness:    Darwin Evans is a 69 y.o. male with prior history of ESRD on peritoneal dialysis , DM 2, HTN who was sent from [facility] with main c/o SOB. In ER he was found to be hypoxic. CXR shows volume overload. On BiPAP now. Still makes urine . Also had elevated WBC. No abdominal pain. No fever   BS Elevated. Interval hx :    Feels much better   Tolerating PD well       WBC decreasing     No abdominal pain   No fever  No discharge from PD site     I/O last 3 completed shifts: In: 378.5 [P.O.:120;  I.V.:158.5; IV Piggyback:100]  Out: 365 [Other:290]    Past Medical History:   Diagnosis Date    Allergic rhinitis due to other allergen 7/12/2010    Coronary atherosclerosis of unspecified type of vessel, native or graft 7/12/2010    Diabetic eye exam ([address] 04/29/2015    Diabetic eye exam ([address] 5/5/2016    [facility]    Generalized osteoarthrosis, involving multiple sites 7/12/2010    Other psoriasis 7/12/2010    Pneumonia     Prolonged emergence from general anesthesia     Psoriatic arthropathy ([address] 7/12/2010    Type II or unspecified type diabetes mellitus without mention of complication, not stated as uncontrolled 7/12/2010    Unspecified essential hypertension 7/12/2010    Unspecified sleep apnea 7/12/2010       Past Surgical History:   Procedure Laterality Date    CARPAL TUNNEL RELEASE      s/p    CATARACT REMOVAL      CORONARY ARTERY BYPASS GRAFT      JOINT REPLACEMENT      LAPAROSCOPY INSERTION PERITONEAL CATHETER N/A 10/26/2020    LAPAROSCOPIC PERITONEAL DIALYSIS CATHETER PLACEMENT; LAPAROSCOPIC OMENTOPEXY performed by Miguel Ángel Camejo DO at [facility] OR       Family History   Problem Relation Age of Onset    Diabetes Mother     Heart Failure Mother     Coronary Art Dis Father         reports that he quit smoking about 41 years ago. He started smoking about 60 years ago. He has a 18.00 pack-year smoking history. He has never used smokeless tobacco. He reports that he does not drink alcohol and does not use drugs.         Allergies:  Penicillins, Sulfa antibiotics, Tazorac [tazarotene], Clindamycin, and Clindamycin/lincomycin    Current Medications:    meropenem (MERREM) 500 mg IVPB (mini-bag), Q24H  insulin glargine (LANTUS) injection vial 50 Units, Daily  linezolid (ZYVOX) tablet 600 mg, 2 times per day  lactobacillus (CULTURELLE) capsule 1 capsule, BID WC  insulin lispro (HUMALOG) injection vial 30 Units, TID WC  insulin lispro (HUMALOG) injection vial 0-16 Units, 4x Daily AC & HS  epoetin daphney-epbx (RETACRIT) injection 3,000 Units, Once per day on Mon Wed Fri  torsemide (DEMADEX) tablet 100 mg, Daily  midodrine (PROAMATINE) tablet 5 mg, BID WC  ipratropium-albuterol (DUONEB) nebulizer solution 1 ampule, 4x daily  gentamicin (GARAMYCIN) 0.1 % cream, Daily  sodium chloride flush 0.9 % injection 5-40 mL, 2 times per day  sodium chloride flush 0.9 % injection 5-40 mL, PRN  0.9 % sodium chloride infusion, PRN  ondansetron (ZOFRAN-ODT) disintegrating tablet 4 mg, Q8H PRN   Or  ondansetron (ZOFRAN) injection 4 mg, Q6H PRN  polyethylene glycol (GLYCOLAX) packet 17 g, Daily PRN  acetaminophen (TYLENOL) tablet 650 mg, Q6H PRN   Or  acetaminophen (TYLENOL) suppository 650 mg, Q6H PRN  albuterol (PROVENTIL) nebulizer solution 2.5 mg, Q4H PRN  aspirin chewable tablet 81 mg, Daily  atorvastatin (LIPITOR) tablet 80 mg, Nightly  mometasone-formoterol (DULERA) 200-5 MCG/ACT inhaler 2 puff, BID  buPROPion (WELLBUTRIN) tablet 100 mg, Every Other Day  levothyroxine (SYNTHROID) tablet 50 mcg, Daily  dextrose bolus 10% 125 mL, PRN   Or  dextrose bolus 10% 250 mL, PRN  glucagon (rDNA) injection 1 mg, PRN  dextrose 10 % infusion, Continuous PRN  heparin (porcine) injection 5,000 Units, 3 times per day      Physical exam:     Vitals:  BP (!) 136/59   Pulse 63   Temp 97.2 °F (36.2 °C) (Temporal)   Resp 27   Ht 5' 10\" (1.778 m)   Wt 199 lb 15.3 oz (90.7 kg)   SpO2 99%   BMI 28.69 kg/m²   Constitutional:  OAA X3 NAD  Skin: no rash, turgor wnl  Heent:  eomi, mmm  Neck: no bruits or jvd noted  Cardiovascular:  S1, S2 without m/r/g  Respiratory: b/l crackles   Abdomen:  +bs, soft, nt, nd  Ext: mild  lower extremity edema    Labs:  CBC:   Recent Labs     10/04/22  0421 10/05/22  0419   WBC 15.1* 15.3*   HGB 8.8* 8.7*    321     BMP:    Recent Labs     10/04/22  0421 10/05/22  0419    135*   K 4.5 4.4   CL 96* 94*   CO2 25 24   BUN 73* 79*   CREATININE 10.6* 10.6*   GLUCOSE 194* 226*     Ca/Mg/Phos:   Recent Labs     10/04/22  0421 10/05/22  0419   CALCIUM 8.0* 9.0     Hepatic:   No results for input(s): AST, ALT, ALB, BILITOT, ALKPHOS in the last 72 hours. Troponin:   No results for input(s): TROPONINI in the last 72 hours. BNP: No results for input(s): BNP in the last 72 hours. Lipids: No results for input(s): CHOL, TRIG, HDL, LDLCALC, LABVLDL in the last 72 hours. ABGs:   No results for input(s): PHART, PO2ART, ZGL8ULS in the last 72 hours. INR: No results for input(s): INR in the last 72 hours.   UA:  No results for input(s): Paul Aragon, ADRIAN, Pal Baum, Jeevan Mcfadden, Margarito Arnold in the last 72 hours. Urine Microscopic:   No results for input(s): LABCAST, BACTERIA, COMU, HYALCAST, WBCUA, RBCUA, EPIU in the last 72 hours. Urine Culture: No results for input(s): LABURIN in the last 72 hours. Urine Chemistry: No results for input(s): Etheleen Clock, PROTEINUR, NAUR in the last 72 hours. IMAGING:  CT CHEST ABDOMEN PELVIS WO CONTRAST   Final Result   Addendum (preliminary) 1 of 1   ADDENDUM:   Addendum is being made for clarification about the catheter in the    peritoneal   cavity. The catheter within the peritoneal cavity is a dialysis catheter. Final   Focal linear consolidative changes of bilateral lower lobes, likely   suggestive of atelectasis. .  Minimal right pleural effusion. Cholelithiasis with no signs of cholecystitis. Extensive calcification of renal arteries. Underlying 2-4 mm nonobstructing   kidney stones cannot be excluded. .      A drainage catheter tip is within the the right lower quadrant area with a   small amount of residual fluid noted surrounding the drainage catheter   measuring 2.7 x 1.7 cm. XR CHEST PORTABLE   Final Result   Low volume study with trace bilateral pleural effusions and persistent   bibasilar atelectasis or airspace disease. XR CHEST PORTABLE   Final Result   CHF with pulmonary edema. CT HEAD WO CONTRAST   Final Result   No definite evidence of acute intracranial abnormality on a study   significantly limited by motion/streak artifact as described above. Assessment/Plan :      1. ESRD   Volume overload   Better. Continue  CCPD  with 2.5  %     PD fluid for cell count and culture -  negative   No growth   No evidence of peritonitis    WBC elevated - rpt PD fluid cell count and culture negative     CT abdomen normal.   D/w radiology . The fluid collection is at the tip of PD catheter is dialysate fluid . Effluent: Clear/ pale yellow   Total time: 8 hr 17 min   Total UF:  779 ml.   Total Volume:  8002 ml. Dwell time gained:  0 hr 6 min. Pt Tolerated procedure: Well   Report given to: Aaron Dale RN     2. HTN  BP controlled       3. Anemia in ESRD   On epogen     4. Acid- base disorder. monitor     5. Hypokalemia   Replace potassium       6. Debility. Generalized weakness. Need PT/OT       Awaiting placement at rehab unit     Continue PD.        D/w primary team      Thank you for allowing us to participate in care of [facility]         Electronically signed by: Yong Salinas MD, 10/6/2022, 8:07 AM      [facility] [address]  Office : [phone]  Fax :[phone]

## 2022-10-06 NOTE — PROGRESS NOTES
CCPD Order   Exchanges: 4   Exchange Volume: 2000 ml   Total Time: 8 hrs   Dextrose: 1.5% & 2.5%   Last Fill: 0 ml   Total Volume: 8000 ml     Orders verified. Supplies taken to pt's room. Report received. Cycler set up, primed and pre tested. Dressing changed on [facility] Cath site. Pt connected to cycler. CCPD initiated without problem. Initial effluent clear. If problems should arise please call the [phone] number on top of PD cycler machine.

## 2022-10-06 NOTE — PROGRESS NOTES
[facility] Department   Phone: [phone]    Occupational Therapy    [] Initial Evaluation            [x] Daily Treatment Note         [] Discharge Summary      Patient: Sirena Harris   : 1945   MRN: [id number]   Date of Service:  10/6/2022    Admitting Diagnosis:  Acute on chronic respiratory failure with hypoxia and hypercapnia ([address]    Current Admission Summary: Per ED note, \"Patient arrives via [facility], new patient to them, started peritoneal dialysis for the first time last night, was at his normal A&O x4 mental status last night, this AM, AMS, not verbally responding, found to by hypoxic in the 80's on normal 3L/NC, staff attempted 6L no change, currently on NRB at 95%. \"  Past Medical History:  has a past medical history of Allergic rhinitis due to other allergen, Coronary atherosclerosis of unspecified type of vessel, native or graft, Diabetic eye exam ([address] Diabetic eye exam ([address] Generalized osteoarthrosis, involving multiple sites, Other psoriasis, Pneumonia, Prolonged emergence from general anesthesia, Psoriatic arthropathy ([address] Type II or unspecified type diabetes mellitus without mention of complication, not stated as uncontrolled, Unspecified essential hypertension, and Unspecified sleep apnea. Past Surgical History:  has a past surgical history that includes Coronary artery bypass graft; Carpal tunnel release; joint replacement; Cataract removal; and LAPAROSCOPY INSERTION PERITONEAL CATHETER (N/A, 10/26/2020). Discharge Recommendations: Sirena Harris scored a 12/24 on the AM-PAC ADL Inpatient form. Current research shows that an AM-PAC score of 17 or less is typically not associated with a discharge to the patient's home setting.  Based on the patient's AM-PAC score and their current ADL deficits, it is recommended that the patient have 3-5 sessions per week of Occupational Therapy at d/c to increase the patient's independence. Please see assessment section for further patient specific details. If patient discharges prior to next session this note will serve as a discharge summary. Please see below for the latest assessment towards goals. DME Required For Discharge: DME to be determined at next level of care    Precautions/Restrictions: high fall risk  Weight Bearing Restrictions: no restrictions  [] Right Upper Extremity  [] Left Upper Extremity [] Right Lower Extremity  [] Left Lower Extremity     Required Braces/Orthotics: no braces required   [] Right  [] Left  Positional Restrictions:no positional restrictions    Pre-Admission Information   Lives With: spouse   (Spouse just had OHS, per pt report)  Type of Home: condo  Home Layout: one level, able to live on main level  Home Access: level entry  Bathroom Layout: walk in shower, handicap height toilet  Bathroom Equipment: grab bars in shower, shower chair, hand held shower head  Toilet Height: standard height, elevated height (has both)  Home Equipment: rolling walker, single point cane, reacher  Transfer Assistance: requires assistance  Ambulation Assistance:modified independent with use of RW  ADL Assistance: requires assistance with bathing, requires assistance with dressing, . Comment: A with LB ADLs  IADL Assistance: requires assistance with all homemaking tasks, wife & dtr were helping; dtr works  Active :        [] Yes  [x] No  Hand Dominance: [] Left  [x] Right  Current Employment: retired. Occupation:  at [employer]  Recent Falls: No recent falls  Pt sleeps in \"lounger\"    Pt from SNF after stay at [facility] Only at SNF 1 night. Questionable historian. Per RN, pt's daughter reporting pt needing a lot more assistance recently. Subjective  General: Patient supine in bed upon arrival, agreeable to therapy co-treatment session.    Pain: 0/10  Pain Interventions: not applicable        Activities of Daily Living  Basic Activities of Daily Living  General Comments: Patient declined any ADLs stating he was cleaned up prior to arrival.      Instrumental Activities of Daily Living  No IADL completed on this date. Functional Mobility  Bed Mobility  Supine to Sit: 2 person assistance with Garry of 1 / modA of 1   Sit to Supine: moderate assistance  Rolling Left: minimal assistance  Rolling Right: minimal assistance  Scootin person assistance with maxA of 2   Comments: Patient with increased time needed due to slowed movements, fatigue, and dizziness. Patient with use of side rails. Patient completed supine leg exercises to prepare for bed mobility and sitting EOB. Transfers  No transfers completed on this date secondary to patient c/o dizziness sitting EOB after sitting ~10 minutes. BP supine was 145/57, first seated BP was 109/87, patient reported increased nausea and dizziness BP was 111/77 and final BP seated was 90/68, and upon return to supine BP was 137/58. Patient laid back down with mod assist, flat in bed. .  Comments: Patient with nausea and reported he may get sick, emesis bag given, patient did not get sick. Functional Mobility:  Sitting Balance: stand by assistance. Sitting Balance Comment: Patient sat EOB ~10 minutes, patient completed sitting EOB exercises and BP taken while seated EOB. Patient with increased dizziness and nausea while seated EOB.      Functional Outcomes  -PAC Inpatient Daily Activity Raw Score: 12    Cognition  Overall Cognitive Status: Impaired  Arousal/Alterness: delayed responses to stimuli  Following Commands: follows one step commands with repetition, follows one step commands with increased time  Attention Span: attends with cues to redirect, difficulty dividing attention  Memory: decreased recall of recent events, decreased short term memory  Problem Solving: assistance required to generate solutions, assistance required to implement solutions, decreased awareness of errors, assistance required to correct errors made  Insights: decreased awareness of deficits  Initiation: requires cues for some  Sequencing: requires cues for some  Comments: Patient with flat affect at times    Orientation:    alert and oriented x 4   Command Following:   impaired     Education  Barriers To Learning: cognition  Patient Education: patient educated on goals, OT role and benefits, plan of care, energy conservation, disease specific education, pressure relief, discharge recommendations, bed mobility  Learning Assessment:  patient will require reinforcement due to cognitive deficits    Assessment  Activity Tolerance: Fair activity tolerance. Limited by nausea and fatigue. Impairments Requiring Therapeutic Intervention: decreased functional mobility, decreased ADL status, decreased safety awareness, decreased cognition, decreased endurance, decreased balance, decreased coordination, increased pain  Prognosis: good and fair    Clinical Assessment: Pt is below his baseline level of occupational function, based on the above deficits associated with acute respiratory failure with hypoxia and hyperkapnia. Pt  only able to tolerate sitting EOB ~10 minutes due to c/o dizziness, low BP, and nausea. He would benefit from continued skilled acute OT services to address these deficits and maximize his safety and independence.       Safety Interventions: patient left in bed, bed alarm in place, call light within reach, patient at risk for falls, and nurse notified    Plan  Frequency: 3-5 x/per week  Current Treatment Recommendations: balance training, functional mobility training, transfer training, endurance training, IADL training, and safety education    Goals  Patient Goals: Not stated   Short Term Goals:  Time Frame: Discharge-- no goals met this date 10/6/22   Patient will complete upper body ADL at stand by assistance   Patient will complete lower body ADL at moderate assistance   Patient will complete toileting at moderate assistance   Patient will complete grooming at set up assistance, stand by assistance   Patient will complete functional transfers at minimal assistance   Patient will increase functional sitting balance to Supervision for improved ADL completion  Patient will complete bed mobility at stand by assistance       Therapy Session Time     Individual Group Co-treatment   Time In    1210   Time Out    1239   Minutes    29        Timed Code Treatment Minutes:   29 minutes  Total Treatment Minutes:  29 minutes       Electronically Signed By: RYDER Allred/MOSES [id number]

## 2022-10-06 NOTE — PROGRESS NOTES
Hospitalist Progress Note    Name:  Lexii Nation    /Age/Sex: 1945  (69 y.o. male)  MRN & CSN:  [id number] & [id number]    PCP: Abhilash Rdz MD    Date of Admission: 2022  Chief Complaint: Altered mental status    Hospital Course: This 61-year-old man was admitted for altered mental status. Found to be hypoxic and hypercapnic on admission. Placed on BiPAP with improvement. Nephrology following as patient has ESRD on peritoneal dialysis. Pulmonology following. Interval history:  Pt seen and examined today. Overnight events noted, interval ancillary notes and labs reviewed. Hemodynamically stable. Afebrile, WBC remain elevated around 15.3. Assessment and plan    Persistent Leukocytosis:  ID consulted: repeat blood cultures and wound culture ordered and started on empiric Zyvox, Flagyl and Cipro  Leukocytosis remained stable. Continue meropenem and Zyvox. Acute on chronic hypoxic and hypercapnic respiratory failure:-Improved  Most likely due to volume overload. 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 Will need repeat sleep study outpatient. Acute metabolic encephalopathy: Resolved. CT head: No acute abnormality. TSH, Ammonia level wnl. B 12 and folate wnl. Supportive care     Type 2 diabetes on long-term insulin:  A1c 6.5 on 2022. Continue basal insulin with sliding scale. ESRD on peritoneal dialysis:  Nephrology consulted: Appreciate input. No evidence of PD site infection or peritonitis.   PD fluid cultures negative. Hypotension: Continue midodrine    Depression: Continue Wellbutrin     Hypothyroid: Continue synthroid. Anemia due to ESRD: Continue EPO. DVT ppx: Heparin  GI ppx: Diet/Tube Feeds  Diet: ADULT DIET; Regular; 4 carb choices (60 gm/meal); Low Phosphorus (Less than 1000 mg)  ADULT ORAL NUTRITION SUPPLEMENT; Breakfast, Lunch; Renal Oral Supplement  Code Status: Full Code  Disposition: ECF    Medications:  Reviewed      Intake/Output Summary (Last 24 hours) at 10/6/2022 1433  Last data filed at 10/5/2022 1807  Gross per 24 hour   Intake 248.51 ml   Output 48 ml   Net 200.51 ml       Physical Exam Performed:    BP (!) 150/77   Pulse 89   Temp 97.1 °F (36.2 °C) (Temporal)   Resp 21   Ht 5' 10\" (1.778 m)   Wt 199 lb 15.3 oz (90.7 kg)   SpO2 93%   BMI 28.69 kg/m²     General appearance: No apparent distress, appears stated age and cooperative. Somewhat confused, but more conversational today. HEENT: Pupils equal, round, and reactive to light. Conjunctivae/corneas clear. Neck: Supple, with full range of motion. No jugular venous distention. Trachea midline. Respiratory:  Normal respiratory effort. Clear to auscultation, bilaterally without Rales/Wheezes/Rhonchi. Cardiovascular: Regular rate and rhythm with normal S1/S2 without murmurs, rubs or gallops. Trace pitting edema LE bilaterally. Abdomen: Soft, non-tender, non-distended with normal bowel sounds. : No CVA tenderness. Musculoskeletal: No clubbing or cyanosis. Full range of motion without deformity. Skin: Skin color, texture, turgor normal.  Sacral wound; dressed  Physical exam remains unchanged from 9/25    PD catheter in place.     Labs:   Recent Labs     10/04/22  0421 10/05/22  0419   WBC 15.1* 15.3*   HGB 8.8* 8.7*   HCT 26.7* 26.2*    321     Recent Labs     10/04/22  0421 10/05/22  0419    135*   K 4.5 4.4   CL 96* 94*   CO2 25 24   BUN 73* 79*   CREATININE 10.6* 10.6*   CALCIUM 8.0* 9.0     No results for input(s): AST, ALT, BILIDIR, BILITOT, ALKPHOS in the last 72 hours. No results for input(s): INR in the last 72 hours. No results for input(s): Cleatrice Lepe in the last 72 hours. Urinalysis:      Lab Results   Component Value Date/Time    NITRU Negative 09/21/2022 03:18 PM    WBCUA 3 09/21/2022 03:18 PM    BACTERIA None Seen 09/21/2022 03:18 PM    RBCUA 5 09/21/2022 03:18 PM    BLOODU SMALL 09/21/2022 03:18 PM    SPECGRAV 1.015 09/21/2022 03:18 PM    GLUCOSEU 250 09/21/2022 03:18 PM    GLUCOSEU >=1000 03/26/2012 10:22 PM       Radiology:  CT CHEST ABDOMEN PELVIS WO CONTRAST   Final Result   Addendum (preliminary) 1 of 1   ADDENDUM:   Addendum is being made for clarification about the catheter in the    peritoneal   cavity. The catheter within the peritoneal cavity is a dialysis catheter. Final   Focal linear consolidative changes of bilateral lower lobes, likely   suggestive of atelectasis. .  Minimal right pleural effusion. Cholelithiasis with no signs of cholecystitis. Extensive calcification of renal arteries. Underlying 2-4 mm nonobstructing   kidney stones cannot be excluded. .      A drainage catheter tip is within the the right lower quadrant area with a   small amount of residual fluid noted surrounding the drainage catheter   measuring 2.7 x 1.7 cm. XR CHEST PORTABLE   Final Result   Low volume study with trace bilateral pleural effusions and persistent   bibasilar atelectasis or airspace disease. XR CHEST PORTABLE   Final Result   CHF with pulmonary edema. CT HEAD WO CONTRAST   Final Result   No definite evidence of acute intracranial abnormality on a study   significantly limited by motion/streak artifact as described above.                Active Hospital Problems    Diagnosis     Peritoneal dialysis catheter in place [facility]) [Z99.2]      Priority: Medium    Acute congestive heart failure ([address] [I50.9]      Priority: Medium    Overweight (BMI 25.0-29. 9) [E66.3]      Priority: Medium    History of 2019 novel coronavirus disease (COVID-19) [Z86.16]      Priority: Medium    Elevated procalcitonin [R79.89]      Priority: Medium    Arrhythmia [I49.9]      Priority: Medium    Leukocytosis [D72.829]      Priority: Medium    Acute pulmonary edema (HCC) [J81.0]      Priority: Medium    Multifocal pneumonia [J18.9]      Priority: Medium    Hypervolemia [E87.70]      Priority: Medium    Acute on chronic respiratory failure with hypoxia and hypercapnia (HCC) [H12.11, J96.22]      Priority: Medium    Moderate episode of recurrent major depressive disorder (HCC) [F33.1]      Priority: Medium    BJ treated with BiPAP [G47.33]      Priority: Medium    Acute metabolic encephalopathy [U81.95]      Priority: Medium    ESRD on peritoneal dialysis ([address] [N18.6, Z99.2]     Type 2 diabetes mellitus with hyperglycemia, with long-term current use of insulin (HCC) [E11.65, Z79.4]     Hypertension associated with stage 5 chronic kidney disease due to type 2 diabetes mellitus ([address] [E11.22, I12.0, N18.5]     General weakness [R53.1]     Chronic diastolic heart failure (HCC) [I50.32]     Acute respiratory failure with hypoxia and hypercapnia ([address] [J96.01, J96.02]     Arthritis with psoriasis (HCC) [L40.50]     Psoriatic arthropathy ([address] [L40.50]     Sleep apnea [G47.30]        Baltazar Leventhal, MD  10/6/2022  2:33 PM      Please note that some part of this chart was generated using Dragon dictation software. Although every effort was made to ensure the accuracy of this automated transcription, some errors in transcription may have occurred inadvertently. If you may need any clarification, please do not hesitate to contact me through [facility].

## 2022-10-06 NOTE — PROGRESS NOTES
Pt alert and oriented on room air. VSS. Pt incontinent of stool. Pt bathed and assessed per protocol. No complaints of pain. Pt placed on bipap and now resting without incident.

## 2022-10-06 NOTE — PLAN OF CARE
Problem: Discharge Planning  Goal: Discharge to home or other facility with appropriate resources  Outcome: Progressing  Flowsheets (Taken 10/5/2022 2000)  Discharge to home or other facility with appropriate resources: Identify barriers to discharge with patient and caregiver     Problem: Safety - Adult  Goal: Free from fall injury  Outcome: Progressing  Flowsheets (Taken 10/5/2022 2127)  Free From Fall Injury: Instruct family/caregiver on patient safety     Problem: Skin/Tissue Integrity  Goal: Absence of new skin breakdown  Description: 1. Monitor for areas of redness and/or skin breakdown  2. Assess vascular access sites hourly  3. Every 4-6 hours minimum:  Change oxygen saturation probe site  4. Every 4-6 hours:  If on nasal continuous positive airway pressure, respiratory therapy assess nares and determine need for appliance change or resting period.   Outcome: Progressing     Problem: Nutrition Deficit:  Goal: Optimize nutritional status  Outcome: Progressing  Flowsheets (Taken 10/5/2022 0950 by Shannon Curiel, RD, LD)  Nutrient intake appropriate for improving, restoring, or maintaining nutritional needs: Monitor oral intake, labs, and treatment plans     Problem: Pain  Goal: Verbalizes/displays adequate comfort level or baseline comfort level  Outcome: Progressing     Problem: ABCDS Injury Assessment  Goal: Absence of physical injury  Outcome: Progressing  Flowsheets (Taken 10/5/2022 2127)  Absence of Physical Injury: Implement safety measures based on patient assessment     Problem: Chronic Conditions and Co-morbidities  Goal: Patient's chronic conditions and co-morbidity symptoms are monitored and maintained or improved  Outcome: Progressing  Flowsheets (Taken 10/5/2022 2000)  Care Plan - Patient's Chronic Conditions and Co-Morbidity Symptoms are Monitored and Maintained or Improved: Monitor and assess patient's chronic conditions and comorbid symptoms for stability, deterioration, or improvement

## 2022-10-07 LAB
BASOPHILS ABSOLUTE: 0.1 K/UL (ref 0–0.2)
BASOPHILS RELATIVE PERCENT: 0.7 %
BLOOD CULTURE, ROUTINE: NORMAL
BODY FLUID CULTURE, STERILE: NORMAL
CULTURE, BLOOD 2: NORMAL
EOSINOPHILS ABSOLUTE: 0.6 K/UL (ref 0–0.6)
EOSINOPHILS RELATIVE PERCENT: 4.2 %
GLUCOSE BLD-MCNC: 141 MG/DL (ref 70–99)
GLUCOSE BLD-MCNC: 186 MG/DL (ref 70–99)
GLUCOSE BLD-MCNC: 233 MG/DL (ref 70–99)
GLUCOSE BLD-MCNC: 322 MG/DL (ref 70–99)
GRAM STAIN RESULT: NORMAL
HCT VFR BLD CALC: 27.9 % (ref 40.5–52.5)
HEMATOLOGY PATH CONSULT: NO
HEMOGLOBIN: 9.2 G/DL (ref 13.5–17.5)
LYMPHOCYTES ABSOLUTE: 0.8 K/UL (ref 1–5.1)
LYMPHOCYTES RELATIVE PERCENT: 5.5 %
MCH RBC QN AUTO: 36.9 PG (ref 26–34)
MCHC RBC AUTO-ENTMCNC: 32.9 G/DL (ref 31–36)
MCV RBC AUTO: 112.3 FL (ref 80–100)
MONOCYTES ABSOLUTE: 0.6 K/UL (ref 0–1.3)
MONOCYTES RELATIVE PERCENT: 3.6 %
NEUTROPHILS ABSOLUTE: 13.1 K/UL (ref 1.7–7.7)
NEUTROPHILS RELATIVE PERCENT: 86 %
PDW BLD-RTO: 14.1 % (ref 12.4–15.4)
PERFORMED ON: ABNORMAL
PLATELET # BLD: 332 K/UL (ref 135–450)
PMV BLD AUTO: 7.3 FL (ref 5–10.5)
RBC # BLD: 2.48 M/UL (ref 4.2–5.9)
WBC # BLD: 15.2 K/UL (ref 4–11)

## 2022-10-07 PROCEDURE — 6370000000 HC RX 637 (ALT 250 FOR IP): Performed by: INTERNAL MEDICINE

## 2022-10-07 PROCEDURE — 2580000003 HC RX 258: Performed by: STUDENT IN AN ORGANIZED HEALTH CARE EDUCATION/TRAINING PROGRAM

## 2022-10-07 PROCEDURE — 6360000002 HC RX W HCPCS: Performed by: STUDENT IN AN ORGANIZED HEALTH CARE EDUCATION/TRAINING PROGRAM

## 2022-10-07 PROCEDURE — 94660 CPAP INITIATION&MGMT: CPT

## 2022-10-07 PROCEDURE — 97530 THERAPEUTIC ACTIVITIES: CPT

## 2022-10-07 PROCEDURE — 92526 ORAL FUNCTION THERAPY: CPT

## 2022-10-07 PROCEDURE — 99232 SBSQ HOSP IP/OBS MODERATE 35: CPT | Performed by: INTERNAL MEDICINE

## 2022-10-07 PROCEDURE — 90945 DIALYSIS ONE EVALUATION: CPT | Performed by: INTERNAL MEDICINE

## 2022-10-07 PROCEDURE — 94761 N-INVAS EAR/PLS OXIMETRY MLT: CPT

## 2022-10-07 PROCEDURE — 97535 SELF CARE MNGMENT TRAINING: CPT

## 2022-10-07 PROCEDURE — 36415 COLL VENOUS BLD VENIPUNCTURE: CPT

## 2022-10-07 PROCEDURE — 85025 COMPLETE CBC W/AUTO DIFF WBC: CPT

## 2022-10-07 PROCEDURE — 94640 AIRWAY INHALATION TREATMENT: CPT

## 2022-10-07 PROCEDURE — 6370000000 HC RX 637 (ALT 250 FOR IP): Performed by: STUDENT IN AN ORGANIZED HEALTH CARE EDUCATION/TRAINING PROGRAM

## 2022-10-07 PROCEDURE — 6360000002 HC RX W HCPCS: Performed by: INTERNAL MEDICINE

## 2022-10-07 PROCEDURE — 2060000000 HC ICU INTERMEDIATE R&B

## 2022-10-07 PROCEDURE — 90945 DIALYSIS ONE EVALUATION: CPT

## 2022-10-07 RX ADMIN — MIDODRINE HYDROCHLORIDE 5 MG: 5 TABLET ORAL at 17:03

## 2022-10-07 RX ADMIN — LINEZOLID 600 MG: 600 TABLET, FILM COATED ORAL at 08:20

## 2022-10-07 RX ADMIN — EPOETIN ALFA-EPBX 3000 UNITS: 10000 INJECTION, SOLUTION INTRAVENOUS; SUBCUTANEOUS at 08:46

## 2022-10-07 RX ADMIN — INSULIN LISPRO 12 UNITS: 100 INJECTION, SOLUTION INTRAVENOUS; SUBCUTANEOUS at 21:01

## 2022-10-07 RX ADMIN — ATORVASTATIN CALCIUM 80 MG: 80 TABLET, FILM COATED ORAL at 21:01

## 2022-10-07 RX ADMIN — MIDODRINE HYDROCHLORIDE 5 MG: 5 TABLET ORAL at 08:20

## 2022-10-07 RX ADMIN — TORSEMIDE 100 MG: 100 TABLET ORAL at 08:52

## 2022-10-07 RX ADMIN — HEPARIN SODIUM 5000 UNITS: 5000 INJECTION INTRAVENOUS; SUBCUTANEOUS at 21:01

## 2022-10-07 RX ADMIN — GENTAMICIN SULFATE: 1 CREAM TOPICAL at 08:21

## 2022-10-07 RX ADMIN — INSULIN LISPRO 4 UNITS: 100 INJECTION, SOLUTION INTRAVENOUS; SUBCUTANEOUS at 11:54

## 2022-10-07 RX ADMIN — Medication 1 CAPSULE: at 17:03

## 2022-10-07 RX ADMIN — IPRATROPIUM BROMIDE AND ALBUTEROL SULFATE 1 AMPULE: 2.5; .5 SOLUTION RESPIRATORY (INHALATION) at 08:36

## 2022-10-07 RX ADMIN — Medication 10 ML: at 21:01

## 2022-10-07 RX ADMIN — HEPARIN SODIUM 5000 UNITS: 5000 INJECTION INTRAVENOUS; SUBCUTANEOUS at 05:48

## 2022-10-07 RX ADMIN — LEVOTHYROXINE SODIUM 50 MCG: 0.03 TABLET ORAL at 05:48

## 2022-10-07 RX ADMIN — HEPARIN SODIUM 5000 UNITS: 5000 INJECTION INTRAVENOUS; SUBCUTANEOUS at 14:04

## 2022-10-07 RX ADMIN — Medication 1 CAPSULE: at 08:20

## 2022-10-07 RX ADMIN — FLUCONAZOLE 200 MG: 100 TABLET ORAL at 08:19

## 2022-10-07 RX ADMIN — BUPROPION HYDROCHLORIDE 100 MG: 100 TABLET, FILM COATED ORAL at 08:35

## 2022-10-07 RX ADMIN — ASPIRIN 81 MG 81 MG: 81 TABLET ORAL at 08:19

## 2022-10-07 RX ADMIN — IPRATROPIUM BROMIDE AND ALBUTEROL SULFATE 1 AMPULE: 2.5; .5 SOLUTION RESPIRATORY (INHALATION) at 20:20

## 2022-10-07 RX ADMIN — Medication 10 ML: at 08:19

## 2022-10-07 RX ADMIN — INSULIN GLARGINE 50 UNITS: 100 INJECTION, SOLUTION SUBCUTANEOUS at 08:28

## 2022-10-07 RX ADMIN — Medication 2 PUFF: at 20:20

## 2022-10-07 RX ADMIN — Medication 2 PUFF: at 08:36

## 2022-10-07 ASSESSMENT — ENCOUNTER SYMPTOMS
BACK PAIN: 0
ABDOMINAL PAIN: 0
SORE THROAT: 0
SINUS PRESSURE: 0
WHEEZING: 0
COUGH: 0
SHORTNESS OF BREATH: 0
DIARRHEA: 0
EYE REDNESS: 0
EYE DISCHARGE: 0
SINUS PAIN: 0
NAUSEA: 0
CONSTIPATION: 0
RHINORRHEA: 0

## 2022-10-07 ASSESSMENT — PAIN SCALES - GENERAL
PAINLEVEL_OUTOF10: 0
PAINLEVEL_OUTOF10: 0

## 2022-10-07 NOTE — PROGRESS NOTES
[facility] Department   Phone: [phone]    Physical Therapy    [] Initial Evaluation            [x] Daily Treatment Note         [] Discharge Summary      Patient: Regan Nunez   : 1945   MRN: [id number]   Date of Service:  10/7/2022  Admitting Diagnosis: Acute on chronic respiratory failure with hypoxia and hypercapnia [facility])  Current Admission Summary: 69 y.o. male who presented to [facility] with complaints of altered mental status. Patient is relatively nonverbal at baseline after prior history of illness. Questionable aphasia from stroke versus ongoing dementia. Patient daughter at bedside and was able to provide information. She states the patient was more confused at his nursing facility this morning. Additionally, patient was found to be hypoxic. He was started on oxygen, with little improvement. He was placed on BiPAP with more improvement. Per the daughter, patient is supposed be wearing BiPAP at night, but is noncompliant. There were no other associated symptoms with his confusion. During work-up in the ER, patient was found to have CO2 of 94.8 with pH of 7.095 on admission. Patient does have significant dementia and is aware he is confused, but is relatively nonverbal and unable to answer complex questions. He is able to answer \"yes and no\" questions. Prior tobacco user, quit approximately 4 years ago. No alcohol or drug use.   Past Medical History:  has a past medical history of Allergic rhinitis due to other allergen, Coronary atherosclerosis of unspecified type of vessel, native or graft, Diabetic eye exam ([address] Diabetic eye exam ([address] Generalized osteoarthrosis, involving multiple sites, Other psoriasis, Pneumonia, Prolonged emergence from general anesthesia, Psoriatic arthropathy ([address] Type II or unspecified type diabetes mellitus without mention of complication, not stated as uncontrolled, Unspecified essential hypertension, and Unspecified sleep apnea. Past Surgical History:  has a past surgical history that includes Coronary artery bypass graft; Carpal tunnel release; joint replacement; Cataract removal; and LAPAROSCOPY INSERTION PERITONEAL CATHETER (N/A, 10/26/2020). Discharge Recommendations: Denisha Salgado scored a 8/24 on the AM-PAC short mobility form. Current research shows that an AM-PAC score of 17 or less is typically not associated with a discharge to the patient's home setting. Based on the patient's AM-PAC score and their current functional mobility deficits, it is recommended that the patient have 3-5 sessions per week of Physical Therapy at d/c to increase the patient's independence. Please see assessment section for further patient specific details. If patient discharges prior to next session this note will serve as a discharge summary. Please see below for the latest assessment towards goals. DME Required For Discharge: DME to be determined pending patient progress    Precautions/Restrictions: high fall risk  Weight Bearing Restrictions: no restrictions  [] Right Upper Extremity  [] Left Upper Extremity [] Right Lower Extremity  [] Left Lower Extremity     Required Braces/Orthotics: no braces required   [] Right  [] Left  Positional Restrictions:no positional restrictions    Pre-Admission Information   Lives With: spouse (Spouse just had OHS, per pt report)  Type of Home: condo  Home Layout: one level, able to live on main level  Home Access: level entry  Bathroom Layout: walk in shower, handicap height toilet  Bathroom Equipment: grab bars in shower, shower chair, hand held shower head  Toilet Height: standard height, elevated height (has both)  Home Equipment: rolling walker, single point cane, reacher  Transfer Assistance: requires assistance  Ambulation Assistance:modified independent with use of RW  ADL Assistance: requires assistance with bathing, requires assistance with dressing, . Comment: A with LB ADLs  IADL Assistance: requires assistance with all homemaking tasks, wife & dtr were helping; dtr works  Active :        [] Yes                 [x] No  Hand Dominance: [] Left                 [x] Right  Current Employment: retired. Occupation:  at [employer]: [employer]  Recent Falls: No recent falls  Pt sleeps in \"lounger\"     Pt from SNF after stay at [facility] Only at SNF 1 night. Questionable historian. Per RN, pt's daughter reporting pt needing a lot more assistance recently. Subjective  General: Patient lying supine in bed with HOB elevated upon arrival. Patient is agreeable to PT/OT. Patient had an episode of emesis prior to PT arrival and reported feeling nauseous today. Pain: 0/10 - Patient did not report any pain today. Pain Interventions: not applicable - RN updated on request for pain medication       Functional Mobility  Bed Mobility  Supine to Sit: moderate assistance x 2  Sit to Supine: minimal assistance x 2  Rolling Left: minimal assistance  Rolling Right: minimal assistance  Scooting: Not performed this visit  Comments: pt rolled several times for pericare and depends change  Transfers  No transfers completed on this date secondary to pt's BP and feelings of nausea. Pt reports feeling dizzy after 2 minutes of sitting EOB so had to lie back down. Comments:  Ambulation  Ambulation not tested on this date secondary to low BP with sitting EOB and pt feeling poorly. Stair Mobility  Stair mobility not completed on this date. Comments:  Wheelchair Mobility:  No w/c mobility completed on this date. Comments:  Balance  Static Sitting Balance: fair: maintains balance at [facility] with use of UE support  Dynamic Sitting Balance: Not assessed this visit. Comments: Patient sat EOB 2 minutes; pt returned to supine due to feelings of dizziness and fatigue.     Other Therapeutic Interventions    Functional Outcomes  -PAC Inpatient Mobility Raw Score : 8 Education  Barriers To Learning: cognition  Patient Education: patient educated on goals, PT role and benefits, plan of care, functional mobility training, orientation, injury prevention, transfer training, discharge recommendations  Pt encouraged to transfer to chair using mechanical lift in order to promote tolerance to activity and limit orthostatic hypotension. Learning Assessment:  patient will require reinforcement due to cognitive deficits    Assessment  Activity Tolerance: Pt continues to be orthostatic at EOB and only able to sit EOB for 2 minutes due to dizziness and nausea. Supine /73. Supine after 2 minutes EOB /71. Impairments Requiring Therapeutic Intervention: decreased functional mobility, decreased ROM, decreased strength, decreased cognition, decreased endurance, decreased balance, decreased coordination, decreased posture  Prognosis: fair  Clinical Assessment: Pt continues to demonstrate improved cognition, A&O x4 today and is able to hold a conversation. The patient continues to have low BP with sitting EOB which limits further mobility. He required varying assist levels for bed mobility due to rapid onset of fatigue. Recommend continued skilled PT to safely progress tolerance to activity and independent with functional mobility. It would be beneficial to confirm pt's PLOF as he presents with LE atrophy, concerning for lack of mobility/ambulation prior to admission, and pt was a poor historian at time of eval. Patient was limited by feelings of nausea and dizziness this visit and unable to sit EOB for more than 2 minutes.    Safety Interventions: patient left in bed, bed alarm in place, call light within reach, patient at risk for falls, and nurse notified    Plan  Frequency: 3-5 x/per week  Current Treatment Recommendations: strengthening, balance training, functional mobility training, transfer training, endurance training, patient/caregiver education, home exercise program, safety education, equipment evaluation/education, and positioning    Goals  Patient Goals: Return home   Short Term Goals:  Time Frame: Upon d/c (Goals updated 10/6 due to pt's lack of progress)  Patient will complete bed mobility at minimal assistance   Patient will complete sit<>stand transfers at moderate assistance   Patient will complete bed<>chair transfers at moderate assistance with LRAD  Patient to maintain standing at moderate assistance for 2 minutes with (B) UE support on RW  Patient will ambulate 10 ft with use of rolling walker at maximum assistance  NO GOALS MET THIS DATE     Therapy Session Time      Individual Group Co-treatment   Time In     0130   Time Out     0202   Minutes     32     Timed Code Treatment Minutes:  32 Minutes  Total Treatment Minutes:  32 minutes     JUAN FRANCISCO Britt   Electronically Signed By: Yunior Crenshaw PT    I agree with the above note. PT directly observed the SPT with the patient.   Raquel Encinas [place] DPT [id number]

## 2022-10-07 NOTE — PROGRESS NOTES
Office : [phone]     Fax :[phone]       Nephrology progress  Note      Patient's Name: Carley Russell  12:24 PM  10/7/2022    Reason for Consult:  ESRD on peritoneal dialysis. Requesting Physician:  Nayely Buitrago MD      Chief Complaint:    Chief Complaint   Patient presents with    Altered Mental Status     Patient arrives via [place] from [place], altered, last seen normal last night, started peritoneal dialysis last night, O2 low on normal 3L, placed on NRB with some improvement, increased WOB noted           History of Present Ilness:    Carley Russell is a 69 y.o. male with prior history of ESRD on peritoneal dialysis , DM 2, HTN who was sent from [facility] with main c/o SOB. In ER he was found to be hypoxic. CXR shows volume overload. On BiPAP now. Still makes urine . Also had elevated WBC. No abdominal pain. No fever   BS Elevated. Interval hx :    Feels much better   Tolerating PD well       WBC decreasing     No abdominal pain   No fever  No discharge from PD site     I/O last 3 completed shifts:   In: 108.8 [I.V.:8.8; IV Piggyback:100]  Out: -     Past Medical History:   Diagnosis Date    Allergic rhinitis due to other allergen 7/12/2010    Coronary atherosclerosis of unspecified type of vessel, native or graft 7/12/2010    Diabetic eye exam ([address] 04/29/2015    Diabetic eye exam ([address] 5/5/2016    Balm eye    Generalized osteoarthrosis, involving multiple sites 7/12/2010    Other psoriasis 7/12/2010    Pneumonia     Prolonged emergence from general anesthesia     Psoriatic arthropathy ([address] 7/12/2010    Type II or unspecified type diabetes mellitus without mention of complication, not stated as uncontrolled 7/12/2010    Unspecified essential hypertension 7/12/2010 Unspecified sleep apnea 7/12/2010       Past Surgical History:   Procedure Laterality Date    CARPAL TUNNEL RELEASE      s/p    CATARACT REMOVAL      CORONARY ARTERY BYPASS GRAFT      JOINT REPLACEMENT      LAPAROSCOPY INSERTION PERITONEAL CATHETER N/A 10/26/2020    LAPAROSCOPIC PERITONEAL DIALYSIS CATHETER PLACEMENT; LAPAROSCOPIC OMENTOPEXY performed by Aleida Loving DO at [address] OR       Family History   Problem Relation Age of Onset    Diabetes Mother     Heart Failure Mother     Coronary Art Dis Father         reports that he quit smoking about 41 years ago. He started smoking about 60 years ago. He has a 18.00 pack-year smoking history. He has never used smokeless tobacco. He reports that he does not drink alcohol and does not use drugs.         Allergies:  Penicillins, Sulfa antibiotics, Tazorac [tazarotene], Clindamycin, and Clindamycin/lincomycin    Current Medications:    [Held by provider] insulin lispro (HUMALOG) injection vial 15 Units, TID WC  fluconazole (DIFLUCAN) tablet 200 mg, Daily  insulin glargine (LANTUS) injection vial 50 Units, Daily  lactobacillus (CULTURELLE) capsule 1 capsule, BID WC  insulin lispro (HUMALOG) injection vial 0-16 Units, 4x Daily AC & HS  epoetin daphney-epbx (RETACRIT) injection 3,000 Units, Once per day on Mon Wed Fri  torsemide (DEMADEX) tablet 100 mg, Daily  midodrine (PROAMATINE) tablet 5 mg, BID WC  ipratropium-albuterol (DUONEB) nebulizer solution 1 ampule, 4x daily  gentamicin (GARAMYCIN) 0.1 % cream, Daily  sodium chloride flush 0.9 % injection 5-40 mL, 2 times per day  sodium chloride flush 0.9 % injection 5-40 mL, PRN  0.9 % sodium chloride infusion, PRN  ondansetron (ZOFRAN-ODT) disintegrating tablet 4 mg, Q8H PRN   Or  ondansetron (ZOFRAN) injection 4 mg, Q6H PRN  polyethylene glycol (GLYCOLAX) packet 17 g, Daily PRN  acetaminophen (TYLENOL) tablet 650 mg, Q6H PRN   Or  acetaminophen (TYLENOL) suppository 650 mg, Q6H PRN  albuterol (PROVENTIL) nebulizer solution 2.5 mg, Q4H PRN  aspirin chewable tablet 81 mg, Daily  atorvastatin (LIPITOR) tablet 80 mg, Nightly  mometasone-formoterol (DULERA) 200-5 MCG/ACT inhaler 2 puff, BID  buPROPion (WELLBUTRIN) tablet 100 mg, Every Other Day  levothyroxine (SYNTHROID) tablet 50 mcg, Daily  dextrose bolus 10% 125 mL, PRN   Or  dextrose bolus 10% 250 mL, PRN  glucagon (rDNA) injection 1 mg, PRN  dextrose 10 % infusion, Continuous PRN  heparin (porcine) injection 5,000 Units, 3 times per day      Physical exam:     Vitals:  BP (!) 148/71   Pulse 85   Temp 97.6 °F (36.4 °C) (Temporal)   Resp 21   Ht 5' 10\" (1.778 m)   Wt 201 lb 11.5 oz (91.5 kg)   SpO2 91%   BMI 28.94 kg/m²   Constitutional:  OAA X3 NAD  Skin: no rash, turgor wnl  Heent:  eomi, mmm  Neck: no bruits or jvd noted  Cardiovascular:  S1, S2 without m/r/g  Respiratory: b/l crackles   Abdomen:  +bs, soft, nt, nd  Ext: mild  lower extremity edema    Labs:  CBC:   Recent Labs     10/05/22  0419 10/06/22  1509 10/07/22  0422   WBC 15.3* 16.8* 15.2*   HGB 8.7* 9.2* 9.2*    334 332     BMP:    Recent Labs     10/05/22  0419   *   K 4.4   CL 94*   CO2 24   BUN 79*   CREATININE 10.6*   GLUCOSE 226*     Ca/Mg/Phos:   Recent Labs     10/05/22  0419   CALCIUM 9.0     Hepatic:   No results for input(s): AST, ALT, ALB, BILITOT, ALKPHOS in the last 72 hours. Troponin:   No results for input(s): TROPONINI in the last 72 hours. BNP: No results for input(s): BNP in the last 72 hours. Lipids: No results for input(s): CHOL, TRIG, HDL, LDLCALC, LABVLDL in the last 72 hours. ABGs:   No results for input(s): PHART, PO2ART, CGC5FJN in the last 72 hours. INR: No results for input(s): INR in the last 72 hours. UA:  No results for input(s): Jessy Savin, GLUCOSEU, BILIRUBINUR, KETUA, SPECGRAV, BLOODU, PHUR, PROTEINU, UROBILINOGEN, NITRU, LEUKOCYTESUR, Eppie Pilar in the last 72 hours.      Urine Microscopic:   No results for input(s): LABCAST, BACTERIA, COMU, HYALCAST, WBCUA, RBCUA, EPIU in the last 72 hours. Urine Culture: No results for input(s): LABURIN in the last 72 hours. Urine Chemistry: No results for input(s): Margette Sniff, PROTEINUR, NAUR in the last 72 hours. IMAGING:  CT CHEST ABDOMEN PELVIS WO CONTRAST   Final Result   Addendum (preliminary) 1 of 1   ADDENDUM:   Addendum is being made for clarification about the catheter in the    peritoneal   cavity. The catheter within the peritoneal cavity is a dialysis catheter. Final   Focal linear consolidative changes of bilateral lower lobes, likely   suggestive of atelectasis. .  Minimal right pleural effusion. Cholelithiasis with no signs of cholecystitis. Extensive calcification of renal arteries. Underlying 2-4 mm nonobstructing   kidney stones cannot be excluded. .      A drainage catheter tip is within the the right lower quadrant area with a   small amount of residual fluid noted surrounding the drainage catheter   measuring 2.7 x 1.7 cm. XR CHEST PORTABLE   Final Result   Low volume study with trace bilateral pleural effusions and persistent   bibasilar atelectasis or airspace disease. XR CHEST PORTABLE   Final Result   CHF with pulmonary edema. CT HEAD WO CONTRAST   Final Result   No definite evidence of acute intracranial abnormality on a study   significantly limited by motion/streak artifact as described above. Assessment/Plan :      1. ESRD   Volume overload   Better. Continue  CCPD  with . Will use 4.25 % solution tonight for better UF     PD fluid for cell count and culture -  negative   No growth   No evidence of peritonitis    WBC elevated - rpt PD fluid cell count and culture negative     CT abdomen normal.   D/w radiology . The fluid collection is at the tip of PD catheter is dialysate fluid . Effluent: Clear/ pale yellow   Total time: 8 hr 17 min   Total UF:  779 ml. Total Volume:  8002 ml.   Dwell time gained:  0 hr 6 min.  Pt Tolerated procedure: Well   Report given to: Pau Quinones RN     2. HTN  BP controlled       3. Anemia in ESRD   On epogen     4. Acid- base disorder. monitor     5. Hypokalemia   Replace potassium       6. Debility. Generalized weakness. Need PT/OT       Awaiting placement at rehab unit     Continue PD.        D/w primary team      Thank you for allowing us to participate in care of Demetria Roper         Electronically signed by: Ja Owen MD, 10/7/2022, 12:24 PM      [facility] [address] [place] [address]  Office : [phone]  Fax :[phone]

## 2022-10-07 NOTE — PROGRESS NOTES
Hospitalist Progress Note    Name:  Zacarias Louie    /Age/Sex: 1945  (69 y.o. male)  MRN & CSN:  [id number] & [id number]    PCP: Hong Womack MD    Date of Admission: 2022  Chief Complaint: Altered mental status    Hospital Course: This 76-year-old man was admitted for altered mental status. Found to be hypoxic and hypercapnic on admission. Placed on BiPAP with improvement. Nephrology following as patient has ESRD on peritoneal dialysis. Pulmonology following. Interval history:  Pt seen and examined today. Overnight events noted, interval ancillary notes and labs reviewed. Hemodynamically stable. Afebrile, WBC remain elevated ~ 15K. Awaiting discharge to SNF. Pre-CERT pending. Assessment and plan    Persistent Leukocytosis:  ID consulted: repeat blood cultures and wound culture ordered and started on empiric Zyvox, Flagyl and Cipro  Leukocytosis remained stable. Continue meropenem and Zyvox. Acute on chronic hypoxic and hypercapnic respiratory failure:-Improved  Most likely due to volume overload. 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 Will need repeat sleep study outpatient. Acute metabolic encephalopathy: Resolved. CT head: No acute abnormality. TSH, Ammonia level wnl. B 12 and folate wnl. Supportive care     Type 2 diabetes on long-term insulin:  A1c 6.5 on 2022. Continue basal insulin with sliding scale. ESRD on peritoneal dialysis:  Nephrology consulted: Appreciate input.   No evidence of PD site infection or peritonitis. PD fluid cultures negative. Hypotension: Continue midodrine    Depression: Continue Wellbutrin     Hypothyroid: Continue synthroid. Anemia due to ESRD: Continue EPO. DVT ppx: Heparin  GI ppx: Diet/Tube Feeds  Diet: ADULT DIET; Regular; 4 carb choices (60 gm/meal); Low Phosphorus (Less than 1000 mg)  ADULT ORAL NUTRITION SUPPLEMENT; Breakfast, Lunch; Renal Oral Supplement  Code Status: Full Code  Disposition: ECF    Medications:  Reviewed    No intake or output data in the 24 hours ending 10/07/22 1948      Physical Exam Performed:    BP (!) 172/71   Pulse 85   Temp 98.1 °F (36.7 °C) (Oral)   Resp 18   Ht 5' 10\" (1.778 m)   Wt 201 lb 11.5 oz (91.5 kg)   SpO2 90%   BMI 28.94 kg/m²     General appearance: No apparent distress, appears stated age and cooperative. Somewhat confused, but more conversational today. HEENT: Pupils equal, round, and reactive to light. Conjunctivae/corneas clear. Neck: Supple, with full range of motion. No jugular venous distention. Trachea midline. Respiratory:  Normal respiratory effort. Clear to auscultation, bilaterally without Rales/Wheezes/Rhonchi. Cardiovascular: Regular rate and rhythm with normal S1/S2 without murmurs, rubs or gallops. Trace pitting edema LE bilaterally. Abdomen: Soft, non-tender, non-distended with normal bowel sounds. : No CVA tenderness. Musculoskeletal: No clubbing or cyanosis. Full range of motion without deformity. Skin: Skin color, texture, turgor normal.  Sacral wound; dressed  Physical exam remains unchanged from 9/25    PD catheter in place. Labs:   Recent Labs     10/05/22  0419 10/06/22  1509 10/07/22  0422   WBC 15.3* 16.8* 15.2*   HGB 8.7* 9.2* 9.2*   HCT 26.2* 28.3* 27.9*    334 332     Recent Labs     10/05/22  0419   *   K 4.4   CL 94*   CO2 24   BUN 79*   CREATININE 10.6*   CALCIUM 9.0     No results for input(s): AST, ALT, BILIDIR, BILITOT, ALKPHOS in the last 72 hours.     No results for input(s): INR in the last 72 hours. No results for input(s): Donzella Rands in the last 72 hours. Urinalysis:      Lab Results   Component Value Date/Time    NITRU Negative 09/21/2022 03:18 PM    WBCUA 3 09/21/2022 03:18 PM    BACTERIA None Seen 09/21/2022 03:18 PM    RBCUA 5 09/21/2022 03:18 PM    BLOODU SMALL 09/21/2022 03:18 PM    SPECGRAV 1.015 09/21/2022 03:18 PM    GLUCOSEU 250 09/21/2022 03:18 PM    GLUCOSEU >=1000 03/26/2012 10:22 PM       Radiology:  CT CHEST ABDOMEN PELVIS WO CONTRAST   Final Result   Addendum (preliminary) 1 of 1   ADDENDUM:   Addendum is being made for clarification about the catheter in the    peritoneal   cavity. The catheter within the peritoneal cavity is a dialysis catheter. Final   Focal linear consolidative changes of bilateral lower lobes, likely   suggestive of atelectasis. .  Minimal right pleural effusion. Cholelithiasis with no signs of cholecystitis. Extensive calcification of renal arteries. Underlying 2-4 mm nonobstructing   kidney stones cannot be excluded. .      A drainage catheter tip is within the the right lower quadrant area with a   small amount of residual fluid noted surrounding the drainage catheter   measuring 2.7 x 1.7 cm. XR CHEST PORTABLE   Final Result   Low volume study with trace bilateral pleural effusions and persistent   bibasilar atelectasis or airspace disease. XR CHEST PORTABLE   Final Result   CHF with pulmonary edema. CT HEAD WO CONTRAST   Final Result   No definite evidence of acute intracranial abnormality on a study   significantly limited by motion/streak artifact as described above. Active Hospital Problems    Diagnosis     Diabetes education, encounter for [Z71.89]      Priority: Medium    Peritoneal dialysis catheter in place [facility]) [Z99.2]      Priority: Medium    Acute congestive heart failure ([address] [I50.9]      Priority: Medium    Overweight (BMI 25.0-29. 9) [E66.3] Priority: Medium    History of 2019 novel coronavirus disease (COVID-19) [Z86.16]      Priority: Medium    Elevated procalcitonin [R79.89]      Priority: Medium    Arrhythmia [I49.9]      Priority: Medium    Leukocytosis [D72.829]      Priority: Medium    Acute pulmonary edema (HCC) [J81.0]      Priority: Medium    Multifocal pneumonia [J18.9]      Priority: Medium    Hypervolemia [E87.70]      Priority: Medium    Acute on chronic respiratory failure with hypoxia and hypercapnia (HCC) [H25.80, J96.22]      Priority: Medium    Moderate episode of recurrent major depressive disorder (HCC) [F33.1]      Priority: Medium    BJ treated with BiPAP [G47.33]      Priority: Medium    Acute metabolic encephalopathy [L87.32]      Priority: Medium    Anemia in ESRD (end-stage renal disease) (HCC) [N18.6, D63.1]     Type 2 diabetes mellitus with hyperglycemia, with long-term current use of insulin (HCC) [E11.65, Z79.4]     Hypertension associated with stage 5 chronic kidney disease due to type 2 diabetes mellitus ([facility] [E11.22, I12.0, N18.5]     General weakness [R53.1]     Chronic diastolic heart failure (HCC) [I50.32]     Acute respiratory failure with hypoxia and hypercapnia (HCC) [J96.01, J96.02]     Arthritis with psoriasis (HCC) [L40.50]     Psoriatic arthropathy ([facility] [L40.50]     Sleep apnea [G47.30]        Mehran Licea MD  10/7/2022  7:48 PM      Please note that some part of this chart was generated using Dragon dictation software. Although every effort was made to ensure the accuracy of this automated transcription, some errors in transcription may have occurred inadvertently. If you may need any clarification, please do not hesitate to contact me through [facility].

## 2022-10-07 NOTE — PROGRESS NOTES
Infectious Diseases   Progress Note      Admission Date: 9/21/2022  Hospital Day: Hospital Day: 17   Attending: Scar Arreguin MD  Date of service: 10/7/2022     Chief complaint/ Reason for consult:     Persistent leukocytosis  Acute on chronic respiratory failure with hypoxia on admission  Acute metabolic encephalopathy on admission  ESRD was on peritoneal dialysis at home  Elevated procalcitonin of 1.02 on admission  Essential hypertension  Coronary artery disease    Microbiology:        I have reviewed allavailable micro lab data and cultures    Blood culture (2/2) - collected on 9/21/2022 and 10-2-22: Negative        Antibiotics and immunizations:       Current antibiotics: All antibiotics and their doses were reviewed by me    Recent Abx Admin                     linezolid (ZYVOX) tablet 600 mg (mg) 600 mg Given 10/07/22 0820     600 mg Given 10/06/22 2031    fluconazole (DIFLUCAN) tablet 200 mg (mg) 200 mg Given 10/07/22 0819     200 mg Given 10/06/22 1715    meropenem (MERREM) 500 mg IVPB (mini-bag) (mg) 500 mg New Bag 10/06/22 1508                      Immunization History: All immunization history was reviewed by me today. Immunization History   Administered Date(s) Administered    COVID-19, PFIZER PURPLE top, DILUTE for use, (age 15 y+), 30mcg/0.3mL 04/21/2021, 05/12/2021, 11/30/2021    Influenza Vaccine, unspecified formulation 10/16/2014, 10/19/2015, 10/21/2016    Influenza Virus Vaccine 10/16/2014, 10/19/2015, 10/21/2016    Influenza, FLUAD, (age 72 y+), Adjuvanted, 0.5mL 09/08/2020    Influenza, High Dose (Fluzone 65 yrs and older) 10/16/2014, 10/19/2015, 10/21/2016, 09/01/2017, 11/16/2018    Influenza, Triv, inactivated, subunit, adjuvanted, IM (Fluad 65 yrs and older) 11/20/2019    PPD Test 02/02/2016    Pneumococcal Conjugate 13-valent ([id number]) 10/19/2015    Pneumococcal Polysaccharide ([id number]) 10/16/2014    Tdap (Boostrix, Adacel) 08/12/2016       Known drug allergies:      All allergies were reviewed and updated    Allergies   Allergen Reactions    Penicillins Other (See Comments)     Syncope \"pass out \" per pt, tolerates cefepime    Sulfa Antibiotics      Patient unsure of reaction     Tazorac [Tazarotene]      Cream - rash    Clindamycin Rash    Clindamycin/Lincomycin Rash       Social history:     Social History:  All social andepidemiologic history was reviewed and updated by me today as needed. Tobacco use:   reports that he quit smoking about 41 years ago. He started smoking about 60 years ago. He has a 18.00 pack-year smoking history. He has never used smokeless tobacco.  Alcohol use:   reports no history of alcohol use. Currently lives in: [address] [place] [address]   reports no history of drug use. COVID VACCINATION AND LAB RESULT RECORDS:     Internal Administration   First Dose COVID-19, PFIZER PURPLE top, DILUTE for use, (age 15 y+), 30mcg/0.3mL  04/21/2021   Second Dose COVID-19, PFIZER PURPLE top, DILUTE for use, (age 15 y+), 30mcg/0.3mL   05/12/2021       Last COVID Lab SARS-CoV-2, PCR (no units)   Date Value   10/20/2020 Not Detected     SARS-CoV-2, GORDON (no units)   Date Value   12/28/2020 DETECTED (A)            Assessment:     The patient is a 69 y.o. old male who  has a past medical history of Allergic rhinitis due to other allergen (7/12/2010), Coronary atherosclerosis of unspecified type of vessel, native or graft (7/12/2010), Diabetic eye exam ([facility] [address] (04/29/2015), Diabetic eye exam ([address] (5/5/2016), Generalized osteoarthrosis, involving multiple sites (7/12/2010), Other psoriasis (7/12/2010), Pneumonia, Prolonged emergence from general anesthesia, Psoriatic arthropathy ([address] (7/12/2010), Type II or unspecified type diabetes mellitus without mention of complication, not stated as uncontrolled (7/12/2010), Unspecified essential hypertension (7/12/2010), and Unspecified sleep apnea (7/12/2010).  with following problems:    Persistent leukocytosis- WBC count unchanged  Acute on chronic respiratory failure with hypoxia on admission  Acute metabolic encephalopathy on admission-  resolved  ESRD was on peritoneal dialysis at home  Elevated procalcitonin of 1.02 on admission  Essential hypertension- BP ok  Coronary artery disease  History of COVID-19  Type 2 diabetes mellitus-counseling done  Obstructive sleep apnea  Generalized weakness  Overweight due to excess calorie intake : Body mass index is 29.26 kg/m². Discussion:      He is afebrile. His white cell count has shown improvement and is 15,200 today. PD fluid culture from 10-2-22 has remained negative. Right buttock area wound culture reviewed again and was positive only for Candida parapsilosis      Plan:     Diagnostic Workup:      Continue to follow  fever curve, WBC count and blood cultures. Continue to monitor blood counts, liver and renal function. Antimicrobials:    I do not think he is antibiotics are helping much. He is white cell count remains essentially unchanged  I will go ahead and stop linezolid and meropenem  Continue oral Diflucan 200 mg daily for 5 more days to complete a 1 week course  Okay to continue for discharge planning  If leukocytosis continues, consider outpatient follow-up with hematology  Pressure ulcer care and prevention precautions  Continue close vitals monitoring. Maintain good glycemic control. Fall precautions. Aspiration precautions. Continue to watch for new fever or diarrhea. DVT prophylaxis. Discussed all above with patient and RN. Drug Monitoring:    Continue monitoring for antibiotic toxicity as follows: CBC, CMP   Continue to watch for following: new or worsening fever, new hypotension, hives, lip swelling and redness or purulence at vascular access sites. I/v access Management:    Continue to monitor i.v access sites for erythema, induration, discharge or tenderness.    As always, continue efforts to minimize tubes/lines/drains as clinically appropriate to reduce chances of line associated infections. Patient education and counseling: The patient was educated in detail about the side-effects of various antibiotics and things to watch for like new rashes, lip swelling, severe reaction, worsening diarrhea, break through fever etc.  Discussed patient's condition and what to expect. All of the patient's questions were addressed in a satisfactory manner and patient verbalized understanding all instructions. Thank you for involving me in the care of your patient. I will continue to follow. If you have anyadditional questions, please do not hesitate to contact me. Subjective: Interval history: Interval history was obtained from chart review and patient/ RN. The patient is afebrile. He is tolerating antibiotics okay. No diarrhea. REVIEW OF SYSTEMS:      Review of Systems   Constitutional:  Negative for chills, diaphoresis and fever. HENT:  Negative for ear discharge, ear pain, postnasal drip, rhinorrhea, sinus pressure, sinus pain and sore throat. Eyes:  Negative for discharge and redness. Respiratory:  Negative for cough, shortness of breath and wheezing. Cardiovascular:  Negative for chest pain and leg swelling. Gastrointestinal:  Negative for abdominal pain, constipation, diarrhea and nausea. Endocrine: Negative for cold intolerance, heat intolerance and polydipsia. Genitourinary:  Negative for dysuria, flank pain, frequency, hematuria and urgency. Musculoskeletal:  Negative for back pain and myalgias. Skin:  Negative for rash. Allergic/Immunologic: Negative for immunocompromised state. Neurological:  Negative for dizziness, seizures and headaches. Hematological:  Does not bruise/bleed easily. Psychiatric/Behavioral:  Negative for agitation, hallucinations and suicidal ideas. The patient is not nervous/anxious. All other systems reviewed and are negative.       Past Medical History: All past medical history reviewed today. Past Medical History:   Diagnosis Date    Allergic rhinitis due to other allergen 7/12/2010    Coronary atherosclerosis of unspecified type of vessel, native or graft 7/12/2010    Diabetic eye exam ([facility] [address] 04/29/2015    Diabetic eye exam ([facility] [address] 5/5/2016    [place]    Generalized osteoarthrosis, involving multiple sites 7/12/2010    Other psoriasis 7/12/2010    Pneumonia     Prolonged emergence from general anesthesia     Psoriatic arthropathy ([facility] [address] 7/12/2010    Type II or unspecified type diabetes mellitus without mention of complication, not stated as uncontrolled 7/12/2010    Unspecified essential hypertension 7/12/2010    Unspecified sleep apnea 7/12/2010       Past Surgical History: All past surgical history was reviewed today. Past Surgical History:   Procedure Laterality Date    CARPAL TUNNEL RELEASE      s/p    CATARACT REMOVAL      CORONARY ARTERY BYPASS GRAFT      JOINT REPLACEMENT      LAPAROSCOPY INSERTION PERITONEAL CATHETER N/A 10/26/2020    LAPAROSCOPIC PERITONEAL DIALYSIS CATHETER PLACEMENT; LAPAROSCOPIC OMENTOPEXY performed by Naty Self DO at [facility] OR       Family History: All family history was reviewed today. Problem Relation Age of Onset    Diabetes Mother     Heart Failure Mother     Coronary Art Dis Father        Objective:       PHYSICAL EXAM:      Vitals:   Vitals:    10/07/22 0835 10/07/22 1043 10/07/22 1044 10/07/22 1045   BP: (!) 148/71      Pulse: 68 86 85 85   Resp: 19 19 22 21   Temp: 97.6 °F (36.4 °C)      TempSrc: Temporal      SpO2:       Weight:       Height:           Physical Exam  Vitals and nursing note reviewed. Constitutional:       Appearance: He is well-developed. He is not diaphoretic. Comments: The patient was seen earlier today. HENT:      Head: Normocephalic and atraumatic. Right Ear: External ear normal. There is no impacted cerumen. Left Ear: External ear normal. There is no impacted cerumen.       Nose: Nose normal.      Mouth/Throat:      Mouth: Mucous membranes are moist.      Pharynx: Oropharynx is clear. No oropharyngeal exudate. Eyes:      General: No scleral icterus. Right eye: No discharge. Left eye: No discharge. Conjunctiva/sclera: Conjunctivae normal.      Pupils: Pupils are equal, round, and reactive to light. Neck:      Thyroid: No thyromegaly. Cardiovascular:      Rate and Rhythm: Normal rate and regular rhythm. Heart sounds: Normal heart sounds. No murmur heard. No friction rub. Pulmonary:      Effort: No respiratory distress. Breath sounds: No stridor. No wheezing or rales. Abdominal:      General: Bowel sounds are normal.      Palpations: Abdomen is soft. Tenderness: There is no abdominal tenderness. There is no guarding or rebound. Musculoskeletal:         General: No swelling, tenderness or deformity. Normal range of motion. Cervical back: Normal range of motion and neck supple. Right lower leg: No edema. Left lower leg: No edema. Lymphadenopathy:      Cervical: No cervical adenopathy. Skin:     General: Skin is warm and dry. Coloration: Skin is not jaundiced. Findings: No bruising, erythema or rash. Neurological:      General: No focal deficit present. Mental Status: He is alert and oriented to person, place, and time. Mental status is at baseline. Motor: No abnormal muscle tone. Psychiatric:         Mood and Affect: Mood normal.         Behavior: Behavior normal.   *    Lines and drains: All vascular access sites are healthy with no local erythema, discharge or tenderness. Intake and output:    I/O last 3 completed shifts: In: 108.8 [I.V.:8.8; IV Piggyback:100]  Out: -     Lab Data:   All available labs and old records have been reviewed by me.     CBC:  Recent Labs     10/05/22  0419 10/06/22  1509 10/07/22  0422   WBC 15.3* 16.8* 15.2*   RBC 2.37* 2.54* 2.48*   HGB 8.7* 9.2* 9.2*   HCT 26.2* 28.3* 27.9*    334 332   .8* 111.4* 112.3*   MCH 36.8* 36.3* 36.9*   MCHC 33.2 32.6 32.9   RDW 14.3 14.6 14.1          BMP:  Recent Labs     10/05/22  0419   *   K 4.4   CL 94*   CO2 24   BUN 79*   CREATININE 10.6*   CALCIUM 9.0   GLUCOSE 226*          Hepatic Function Panel:   Lab Results   Component Value Date/Time    ALKPHOS 92 09/26/2022 04:24 AM    ALT 33 09/26/2022 04:24 AM    AST 29 09/26/2022 04:24 AM    PROT 5.9 09/26/2022 04:24 AM    PROT 7.0 08/15/2012 08:52 AM    BILITOT <0.2 09/26/2022 04:24 AM    BILIDIR <0.2 01/15/2017 05:30 AM    IBILI see below 01/15/2017 05:30 AM    LABALBU 2.8 09/26/2022 04:24 AM       CPK:   Lab Results   Component Value Date    CKTOTAL 196 04/29/2015     ESR: No results found for: SEDRATE  CRP: No results found for: CRP        Imaging: All pertinent images and reports for the current visit were reviewed by me during this visit. I reviewed the chest x-ray/CT scan/MRI images and independently interpreted the findings and results today. CT CHEST ABDOMEN PELVIS WO CONTRAST   Final Result   Addendum (preliminary) 1 of 1   ADDENDUM:   Addendum is being made for clarification about the catheter in the    peritoneal   cavity. The catheter within the peritoneal cavity is a dialysis catheter. Final   Focal linear consolidative changes of bilateral lower lobes, likely   suggestive of atelectasis. .  Minimal right pleural effusion. Cholelithiasis with no signs of cholecystitis. Extensive calcification of renal arteries. Underlying 2-4 mm nonobstructing   kidney stones cannot be excluded. .      A drainage catheter tip is within the the right lower quadrant area with a   small amount of residual fluid noted surrounding the drainage catheter   measuring 2.7 x 1.7 cm. XR CHEST PORTABLE   Final Result   Low volume study with trace bilateral pleural effusions and persistent   bibasilar atelectasis or airspace disease.          XR CHEST PORTABLE Final Result   CHF with pulmonary edema. CT HEAD WO CONTRAST   Final Result   No definite evidence of acute intracranial abnormality on a study   significantly limited by motion/streak artifact as described above. Medications: All current and past medications were reviewed.      [Held by provider] insulin lispro  15 Units SubCUTAneous TID     fluconazole  200 mg Oral Daily    meropenem  500 mg IntraVENous Q24H    insulin glargine  50 Units SubCUTAneous Daily    linezolid  600 mg Oral 2 times per day    lactobacillus  1 capsule Oral BID     insulin lispro  0-16 Units SubCUTAneous 4x Daily AC & HS    epoetin daphney-epbx  3,000 Units SubCUTAneous Once per day on Mon Wed Fri    torsemide  100 mg Oral Daily    midodrine  5 mg Oral BID WC    ipratropium-albuterol  1 ampule Inhalation 4x daily    gentamicin   Topical Daily    sodium chloride flush  5-40 mL IntraVENous 2 times per day    aspirin  81 mg Oral Daily    atorvastatin  80 mg Oral Nightly    mometasone-formoterol  2 puff Inhalation BID    buPROPion  100 mg Oral Every Other Day    levothyroxine  50 mcg Oral Daily    heparin (porcine)  5,000 Units SubCUTAneous 3 times per day        sodium chloride 5 mL/hr at 10/06/22 1723    dextrose         sodium chloride flush, sodium chloride, ondansetron **OR** ondansetron, polyethylene glycol, acetaminophen **OR** acetaminophen, albuterol, dextrose bolus **OR** dextrose bolus, glucagon (rDNA), dextrose      Problem list:       Patient Active Problem List   Diagnosis Code    Essential hypertension I10    Generalized osteoarthrosis, involving multiple sites M15.9    Arthritis with psoriasis ([facility] [address] L40.50    Sleep apnea G47.30    Psoriatic arthropathy (HCC) L40.50    Allergic rhinitis due to other allergen J30.89    Coronary atherosclerosis I25.10    Vitamin D deficiency E55.9    Glaucoma H40.9    Nephropathy N28.9    S/P CABG x 4 Z95.1    ILD (interstitial lung disease) ([facility] [address] J84.9    Acute respiratory failure with hypoxia and hypercapnia ([facility]) J96.01, J96.02    Chronic diastolic heart failure (HCC) I50.32    Coronary artery disease involving native coronary artery of native heart with unstable angina pectoris ([facility]) I25.110    BJ (obstructive sleep apnea) G47.33    Cough variant asthma J45.991    NSTEMI (non-ST elevated myocardial infarction) ([facility]) I21.4    Diabetes mellitus type 2 in obese ([facility]) E11.69, E66.9    General weakness R53.1    Syncope and collapse R55    Type 2 diabetes mellitus with hyperglycemia, with long-term current use of insulin ([facility]) E11.65, Z79.4    Hypertension associated with stage 5 chronic kidney disease due to type 2 diabetes mellitus ([facility]) E11.22, I12.0, N18.5    Acquired hypothyroidism E03.9    Unstable angina ([facility]) I20.0    Orthostatic hypotension I95.1    Dyspnea on exertion R06.09    Left lower quadrant abdominal pain R10.32    ESRD on peritoneal dialysis ([facility]) N18.6, Z99.2    AMS (altered mental status) R41.82    Acute respiratory failure with hypoxia ([facility]) P82.85    Acute metabolic encephalopathy S97.07    Hypercarbia R06.89    Electrolyte imbalance E87.8    Generalized abdominal tenderness R10.817    Chronic hypercapnic respiratory failure ([facility]) J96.12    BJ treated with BiPAP G47.33    Drug toxicity R89.2    Physical deconditioning R53.81    Moderate episode of recurrent major depressive disorder ([facility]) F33.1    Acute on chronic respiratory failure with hypoxia and hypercapnia ([facility]) J96.21, J96.22    Multifocal pneumonia J18.9    Hypervolemia E87.70    Leukocytosis D72.829    Acute pulmonary edema ([facility]) J81.0    Arrhythmia I49.9    Overweight (BMI 25.0-29. 9) E66.3    History of 2019 novel coronavirus disease (COVID-19) Z86.16    Elevated procalcitonin R79.89    Peritoneal dialysis catheter in place [facility]) Z99.2    Acute congestive heart failure ([facility] [address] I50.9    Diabetes education, encounter for Z71.89       Please note that this chart was generated using Dragon dictation software. Although every effort was made to ensure the accuracy of this automated transcription, some errors in transcription may have occurred inadvertently. If you may need any clarification, please do not hesitate to contact me through EPIC or at the phone number provided below with my electronic signature. Any pictures or media included in this note were obtained after taking informed verbal consent from the patient and with their approval to include those in the patient's medical record. Malka Alpers, MD, MPH, [facility] [place]  10/7/2022, 11:10 AM  [facility] Infectious Disease   [address] [place] [address]  Office: [phone]  Fax: [phone]  In-person Clinic days:  Tuesday & Thursday a.m. Virtual clinic days: Monday, Wednesday & Friday a.m.

## 2022-10-07 NOTE — PROGRESS NOTES
[facility] Department   Phone: [phone]    Occupational Therapy    [] Initial Evaluation            [x] Daily Treatment Note         [] Discharge Summary      Patient: Sameera Barr   : 1945   MRN: [id number]   Date of Service:  10/7/2022    Admitting Diagnosis:  Acute on chronic respiratory failure with hypoxia and hypercapnia ([address]    Current Admission Summary: Per ED note, \"Patient arrives via [facility] From [facility], new patient to them, started peritoneal dialysis for the first time last night, was at his normal A&O x4 mental status last night, this AM, AMS, not verbally responding, found to by hypoxic in the 80's on normal 3L/NC, staff attempted 6L no change, currently on NRB at 95%. \"  Past Medical History:  has a past medical history of Allergic rhinitis due to other allergen, Coronary atherosclerosis of unspecified type of vessel, native or graft, Diabetic eye exam ([address] Diabetic eye exam ([address] Generalized osteoarthrosis, involving multiple sites, Other psoriasis, Pneumonia, Prolonged emergence from general anesthesia, Psoriatic arthropathy ([address] Type II or unspecified type diabetes mellitus without mention of complication, not stated as uncontrolled, Unspecified essential hypertension, and Unspecified sleep apnea. Past Surgical History:  has a past surgical history that includes Coronary artery bypass graft; Carpal tunnel release; joint replacement; Cataract removal; and LAPAROSCOPY INSERTION PERITONEAL CATHETER (N/A, 10/26/2020). Discharge Recommendations: Sameera Barr scored a 14/24 on the AM-PAC ADL Inpatient form. Current research shows that an AM-PAC score of 17 or less is typically not associated with a discharge to the patient's home setting.  Based on the patient's AM-PAC score and their current ADL deficits, it is recommended that the patient have 3-5 sessions per week of Occupational Therapy at d/c to increase the patient's independence. Please see assessment section for further patient specific details. If patient discharges prior to next session this note will serve as a discharge summary. Please see below for the latest assessment towards goals. DME Required For Discharge: DME to be determined at next level of care    Precautions/Restrictions: high fall risk  Weight Bearing Restrictions: no restrictions  [] Right Upper Extremity  [] Left Upper Extremity [] Right Lower Extremity  [] Left Lower Extremity     Required Braces/Orthotics: no braces required   [] Right  [] Left  Positional Restrictions:no positional restrictions    Pre-Admission Information   Lives With: spouse   (Spouse just had OHS, per pt report)  Type of Home: condo  Home Layout: one level, able to live on main level  Home Access: level entry  Bathroom Layout: walk in shower, handicap height toilet  Bathroom Equipment: grab bars in shower, shower chair, hand held shower head  Toilet Height: standard height, elevated height (has both)  Home Equipment: rolling walker, single point cane, reacher  Transfer Assistance: requires assistance  Ambulation Assistance:modified independent with use of RW  ADL Assistance: requires assistance with bathing, requires assistance with dressing, . Comment: A with LB ADLs  IADL Assistance: requires assistance with all homemaking tasks, wife & dtr were helping; dtr works  Active :        [] Yes  [x] No  Hand Dominance: [] Left  [x] Right  Current Employment: retired. Occupation:  at [employer]  Recent Falls: No recent falls  Pt sleeps in \"lounger\"    Pt from SNF after stay at [facility] Only at SNF 1 night. Questionable historian. Per RN, pt's daughter reporting pt needing a lot more assistance recently. Subjective  General: Patient supine in bed upon arrival and agreeable to OT/PT evaluation.  Patient complained of nausea and had an episode of emesis prior to arrival. Patient attempted to sit EOB but complained of nausea/dizziness and laid back down into bed. Pain: 0/10  Pain Interventions: not applicable  Activities of Daily Living  Basic Activities of Daily Living  Upper Extremity Dressing: setup assistance Comment: new gown placed on patient  Lower Extremity Dressing: dependent Comment: patient dependent with donning/doffing brief in bed  Toileting: dependent Comment: patient had an incontinent bowel movement and was dependent with pericare. Instrumental Activities of Daily Living  No IADL completed on this date. Functional Mobility  Bed Mobility  Supine to Sit: 2 person assistance with moderate assistance   Sit to Supine: 2 person assistance with moderate assistance   Rolling Left: minimal assistance  Rolling Right: minimal assistance  Scootin person assistance with moderate assistance   Comments: increased time and effort required due to patient's pacing, fatigue, and feelings of nausea/dizziness; HOB elevated; verbal cues required for initiation  Transfers  No transfers completed on this date secondary to patient complaints of nausea/dizziness when sitting EOB. Comments: Patient with nausea and reported he may get sick, emesis bag given, patient did not get sick. Functional Mobility:  Sitting Balance: minimal assistance, patient tolerated sitting EOB for ~2 minutes before wanting to lay back down. No functional mobility completed on this date secondary to patient experiencing episodes of nausea and dizziness.   Functional Outcomes  AM-PAC Inpatient Daily Activity Raw Score: 14    Cognition  Overall Cognitive Status: Impaired  Arousal/Alterness: delayed responses to stimuli  Following Commands: follows one step commands with repetition, follows one step commands with increased time  Attention Span: attends with cues to redirect, difficulty dividing attention  Memory: decreased recall of recent events, decreased short term memory  Problem Solving: assistance required to generate solutions, assistance required to implement solutions, decreased awareness of errors, assistance required to correct errors made  Insights: decreased awareness of deficits  Initiation: requires cues for some  Sequencing: requires cues for some  Comments: Patient with flat affect at times  Orientation:    alert and oriented x 4   Command Following:   impaired  Education  Barriers To Learning: cognition  Patient Education: patient educated on goals, OT role and benefits, plan of care, energy conservation, disease specific education, pressure relief, discharge recommendations  Learning Assessment:  patient will require reinforcement due to cognitive deficits  Assessment  Activity Tolerance: Patient's activity tolerance fair on this date due to being impacted by episodes of nausea/dizziness and fatigue with minimal physical exertion. Impairments Requiring Therapeutic Intervention: decreased functional mobility, decreased ADL status, decreased safety awareness, decreased cognition, decreased endurance, decreased balance, decreased coordination, increased pain  Prognosis: good and fair  Clinical Assessment: Pt is below his baseline level of occupational function, based on the above deficits associated with acute respiratory failure with hypoxia and hyperkapnia. Pt  only able to tolerate sitting EOB ~2 minutes due to c/o dizziness and nausea. He would benefit from continued skilled acute OT services to address these deficits and maximize his safety and independence.   Safety Interventions: patient left in bed, bed alarm in place, call light within reach, and nurse notified    Plan  Frequency: 3-5 x/per week  Current Treatment Recommendations: balance training, functional mobility training, transfer training, endurance training, IADL training, and safety education    Goals  Patient Goals: Not stated   Short Term Goals:  Time Frame: Discharge  Patient will complete upper body ADL at stand by assistance - patient declined goal not addressed 10/7  Patient will complete lower body ADL at moderate assistance - dependent with brief change goal not met 10/7  Patient will complete toileting at moderate assistance - dependent with toileting goal not met 10/7  Patient will complete grooming at set up assistance, stand by assistance - patient declined goal not addressed 10/7  Patient will complete functional transfers at minimal assistance - transfers not attempted due to patient c/o nausea/dizziness 10/7  Patient will increase functional sitting balance to Supervision for improved ADL completion - minimal assistance for sitting balance goal not met 10/7  Patient will complete bed mobility at stand by assistance - moderate assistance x 2 for bed mobility goal not met 10/7      Therapy Session Time     Individual Group Co-treatment   Time In    1330   Time Out    1402   Minutes    32        Timed Code Treatment Minutes:   32 minutes  Total Treatment Minutes:  32 minutes    SHARONDA Anderson/JM     Electronically Signed By: RYDER Ann/L [id number]    OT provided direct supervision to student, facilitated in making skilled judgements throughout duration of session.

## 2022-10-07 NOTE — PROGRESS NOTES
Facility/Department: [address]  Speech Language Pathology   Dysphagia Treatment Note/Discharge Summary     Patient: Nadja Ervin   : 1945   MRN: [id number]      Evaluation Date: 10/7/2022      Admitting Dx: Acute respiratory failure with hypoxia and hypercapnia ([facility] [J96.01, J96.02]  Treatment Diagnosis: Oropharyngeal Dysphagia   Pain: Denies                                              Diet and Treatment Recommendations 10/7/2022:  Diet Solids Recommendation:  Regular texture diet  Liquid Consistency Recommendation: Thin liquids  Recommended form of Meds: Meds whole with water        Compensatory strategies:   Upright as possible with all PO intake     Assessment of Texture Tolerance:  Diet level prior to treatment: Regular texture diet , Thin liquids   Tolerance of Current Diet Level:Per chart, no noted difficulty with current diet level     Impressions: Pt was positioned Upright in chair, awake and alert. Currently on room air. Trials of thin liquids and regular solids  were provided to assess swallow function. Pt overall tolerating of thin liquids via cup with one episode of a slight throat clear, suspect delayed throat clearing vs s/s of aspiration. Functional mastication and good oral clearance with use of a liquid wash noted with regular solids. Swallow mechanism appears functional at this time. Based on today's assessment recommend Regular texture diet  with Thin liquids , Meds whole with water. No further dysphagia tx follow-up indicated.      Dysphagia Goals:   Pt will functionally tolerate recommended diet with no overt clinical s/s of aspiration (Goal met 10/07/22)  Pt will functionally tolerate ongoing assessment of swallow function with diet to be determined as indicated (Goal met 10/07/22)  Pt will demonstrate understanding of aspiration risk and precautions via education/demonstration with occasional prompting (Goal met 10/07/22)    Plan:   No further follow-up indicated Patient/Family Education:  Provided education regarding role of SLP, recommendations and general speech pathology plan of care. [x] Pt verbalized understanding and agreement   [] Pt requires ongoing learning   [] No evidence of comprehension     Discharge Recommendations:    No further follow-up appears indicated at this time. Treatment time:  Timed Code Treatment Minutes: 0 min  Total Treatment time: 9 min    If patient discharges prior to next session this note will serve as a discharge summary.      Signature:   Eliceo Choe M.A., [address] [place] [address]  Speech-Language Pathologist

## 2022-10-07 NOTE — PROGRESS NOTES
Disconnected from CCPD per protocol. Effluent: Clear yellow   Total time: 8 hr 35 min   Total UF:  49 ml. Total Volume:  8004 ml. Dwell time gained:  0 hr 1 min.   Pt Tolerated procedure: tolerated well  Report given to: Donna Bassett RN

## 2022-10-08 LAB
ALBUMIN SERPL-MCNC: 2.9 G/DL (ref 3.4–5)
ANION GAP SERPL CALCULATED.3IONS-SCNC: 21 MMOL/L (ref 3–16)
BUN BLDV-MCNC: 78 MG/DL (ref 7–20)
CALCIUM SERPL-MCNC: 9.1 MG/DL (ref 8.3–10.6)
CHLORIDE BLD-SCNC: 93 MMOL/L (ref 99–110)
CO2: 21 MMOL/L (ref 21–32)
CREAT SERPL-MCNC: 11 MG/DL (ref 0.8–1.3)
GFR AFRICAN AMERICAN: 6
GFR NON-AFRICAN AMERICAN: 5
GLUCOSE BLD-MCNC: 193 MG/DL (ref 70–99)
GLUCOSE BLD-MCNC: 223 MG/DL (ref 70–99)
GLUCOSE BLD-MCNC: 225 MG/DL (ref 70–99)
GLUCOSE BLD-MCNC: 229 MG/DL (ref 70–99)
GLUCOSE BLD-MCNC: 386 MG/DL (ref 70–99)
PERFORMED ON: ABNORMAL
PHOSPHORUS: 9.5 MG/DL (ref 2.5–4.9)
POTASSIUM SERPL-SCNC: 4.2 MMOL/L (ref 3.5–5.1)
SODIUM BLD-SCNC: 135 MMOL/L (ref 136–145)

## 2022-10-08 PROCEDURE — 6370000000 HC RX 637 (ALT 250 FOR IP): Performed by: STUDENT IN AN ORGANIZED HEALTH CARE EDUCATION/TRAINING PROGRAM

## 2022-10-08 PROCEDURE — 6370000000 HC RX 637 (ALT 250 FOR IP): Performed by: INTERNAL MEDICINE

## 2022-10-08 PROCEDURE — 94660 CPAP INITIATION&MGMT: CPT

## 2022-10-08 PROCEDURE — 2580000003 HC RX 258: Performed by: STUDENT IN AN ORGANIZED HEALTH CARE EDUCATION/TRAINING PROGRAM

## 2022-10-08 PROCEDURE — 2700000000 HC OXYGEN THERAPY PER DAY

## 2022-10-08 PROCEDURE — 6360000002 HC RX W HCPCS: Performed by: STUDENT IN AN ORGANIZED HEALTH CARE EDUCATION/TRAINING PROGRAM

## 2022-10-08 PROCEDURE — 90945 DIALYSIS ONE EVALUATION: CPT | Performed by: INTERNAL MEDICINE

## 2022-10-08 PROCEDURE — 85025 COMPLETE CBC W/AUTO DIFF WBC: CPT

## 2022-10-08 PROCEDURE — 94760 N-INVAS EAR/PLS OXIMETRY 1: CPT

## 2022-10-08 PROCEDURE — 94761 N-INVAS EAR/PLS OXIMETRY MLT: CPT

## 2022-10-08 PROCEDURE — 90945 DIALYSIS ONE EVALUATION: CPT

## 2022-10-08 PROCEDURE — 36415 COLL VENOUS BLD VENIPUNCTURE: CPT

## 2022-10-08 PROCEDURE — 94640 AIRWAY INHALATION TREATMENT: CPT

## 2022-10-08 PROCEDURE — 2060000000 HC ICU INTERMEDIATE R&B

## 2022-10-08 PROCEDURE — 80069 RENAL FUNCTION PANEL: CPT

## 2022-10-08 RX ORDER — INSULIN LISPRO 100 [IU]/ML
10 INJECTION, SOLUTION INTRAVENOUS; SUBCUTANEOUS
Status: DISCONTINUED | OUTPATIENT
Start: 2022-10-08 | End: 2022-10-09

## 2022-10-08 RX ORDER — INSULIN LISPRO 100 [IU]/ML
0-4 INJECTION, SOLUTION INTRAVENOUS; SUBCUTANEOUS NIGHTLY
Status: DISCONTINUED | OUTPATIENT
Start: 2022-10-08 | End: 2022-10-10 | Stop reason: HOSPADM

## 2022-10-08 RX ORDER — MIDODRINE HYDROCHLORIDE 5 MG/1
5 TABLET ORAL 3 TIMES DAILY PRN
Status: DISCONTINUED | OUTPATIENT
Start: 2022-10-08 | End: 2022-10-10 | Stop reason: HOSPADM

## 2022-10-08 RX ORDER — INSULIN LISPRO 100 [IU]/ML
0-8 INJECTION, SOLUTION INTRAVENOUS; SUBCUTANEOUS
Status: DISCONTINUED | OUTPATIENT
Start: 2022-10-08 | End: 2022-10-10 | Stop reason: HOSPADM

## 2022-10-08 RX ADMIN — Medication 1 CAPSULE: at 08:22

## 2022-10-08 RX ADMIN — MIDODRINE HYDROCHLORIDE 5 MG: 5 TABLET ORAL at 16:32

## 2022-10-08 RX ADMIN — FLUCONAZOLE 200 MG: 100 TABLET ORAL at 08:21

## 2022-10-08 RX ADMIN — MIDODRINE HYDROCHLORIDE 5 MG: 5 TABLET ORAL at 08:22

## 2022-10-08 RX ADMIN — Medication 2 PUFF: at 20:38

## 2022-10-08 RX ADMIN — INSULIN LISPRO 4 UNITS: 100 INJECTION, SOLUTION INTRAVENOUS; SUBCUTANEOUS at 11:37

## 2022-10-08 RX ADMIN — ATORVASTATIN CALCIUM 80 MG: 80 TABLET, FILM COATED ORAL at 20:44

## 2022-10-08 RX ADMIN — INSULIN LISPRO 4 UNITS: 100 INJECTION, SOLUTION INTRAVENOUS; SUBCUTANEOUS at 08:23

## 2022-10-08 RX ADMIN — Medication 10 ML: at 20:52

## 2022-10-08 RX ADMIN — Medication 10 ML: at 08:22

## 2022-10-08 RX ADMIN — IPRATROPIUM BROMIDE AND ALBUTEROL SULFATE 1 AMPULE: 2.5; .5 SOLUTION RESPIRATORY (INHALATION) at 20:38

## 2022-10-08 RX ADMIN — HEPARIN SODIUM 5000 UNITS: 5000 INJECTION INTRAVENOUS; SUBCUTANEOUS at 06:08

## 2022-10-08 RX ADMIN — ASPIRIN 81 MG 81 MG: 81 TABLET ORAL at 08:22

## 2022-10-08 RX ADMIN — GENTAMICIN SULFATE: 1 CREAM TOPICAL at 08:45

## 2022-10-08 RX ADMIN — INSULIN LISPRO 4 UNITS: 100 INJECTION, SOLUTION INTRAVENOUS; SUBCUTANEOUS at 20:44

## 2022-10-08 RX ADMIN — LEVOTHYROXINE SODIUM 50 MCG: 0.03 TABLET ORAL at 06:08

## 2022-10-08 RX ADMIN — HEPARIN SODIUM 5000 UNITS: 5000 INJECTION INTRAVENOUS; SUBCUTANEOUS at 14:37

## 2022-10-08 RX ADMIN — INSULIN LISPRO 10 UNITS: 100 INJECTION, SOLUTION INTRAVENOUS; SUBCUTANEOUS at 16:37

## 2022-10-08 RX ADMIN — INSULIN GLARGINE 50 UNITS: 100 INJECTION, SOLUTION SUBCUTANEOUS at 08:22

## 2022-10-08 RX ADMIN — Medication 1 CAPSULE: at 16:32

## 2022-10-08 RX ADMIN — TORSEMIDE 100 MG: 100 TABLET ORAL at 08:21

## 2022-10-08 RX ADMIN — HEPARIN SODIUM 5000 UNITS: 5000 INJECTION INTRAVENOUS; SUBCUTANEOUS at 20:44

## 2022-10-08 ASSESSMENT — PAIN SCALES - GENERAL: PAINLEVEL_OUTOF10: 0

## 2022-10-08 NOTE — PROGRESS NOTES
Office : [phone]     Fax :[phone]       Nephrology progress  Note      Patient's Name: Denisha Salgado  11:31 AM  10/8/2022    Reason for Consult:  ESRD on peritoneal dialysis. Requesting Physician:  Alma Delia Gonzalez MD      Chief Complaint:    Chief Complaint   Patient presents with    Altered Mental Status     Patient arrives via [place] from [facility], altered, last seen normal last night, started peritoneal dialysis last night, O2 low on normal 3L, placed on NRB with some improvement, increased WOB noted           History of Present Ilness:    Denisha Salgado is a 69 y.o. male with prior history of ESRD on peritoneal dialysis , DM 2, HTN who was sent from [facility] with main c/o SOB. In ER he was found to be hypoxic. CXR shows volume overload. On BiPAP now. Still makes urine . Also had elevated WBC. No abdominal pain. No fever   BS Elevated. Interval hx :    Feels much better   Tolerating PD well     UF: 1622 ML  Total Time: 5 hours and 01 minutes  Alarm: 0 times over night  CCPD removed fluid: color light yellow, clear ; without fibrin noticed  Pt tolerated well over night with CCPD  Plan to put pt on CCPD this evening if pt's still at hospital  Placed CCPD flow sheets in the chart for EMR scan  WBC decreasing     No abdominal pain   No fever  No discharge from PD site     No intake/output data recorded.     Past Medical History:   Diagnosis Date    Allergic rhinitis due to other allergen 7/12/2010    Coronary atherosclerosis of unspecified type of vessel, native or graft 7/12/2010    Diabetic eye exam ([facility] 04/29/2015    Diabetic eye exam ([address] 5/5/2016    [place] [facility]    Generalized osteoarthrosis, involving multiple sites 7/12/2010    Other psoriasis 7/12/2010    Pneumonia Prolonged emergence from general anesthesia     Psoriatic arthropathy ([facility] 7/12/2010    Type II or unspecified type diabetes mellitus without mention of complication, not stated as uncontrolled 7/12/2010    Unspecified essential hypertension 7/12/2010    Unspecified sleep apnea 7/12/2010       Past Surgical History:   Procedure Laterality Date    CARPAL TUNNEL RELEASE      s/p    CATARACT REMOVAL      CORONARY ARTERY BYPASS GRAFT      JOINT REPLACEMENT      LAPAROSCOPY INSERTION PERITONEAL CATHETER N/A 10/26/2020    LAPAROSCOPIC PERITONEAL DIALYSIS CATHETER PLACEMENT; LAPAROSCOPIC OMENTOPEXY performed by Rubina Kumar DO at [facility] OR       Family History   Problem Relation Age of Onset    Diabetes Mother     Heart Failure Mother     Coronary Art Dis Father         reports that he quit smoking about 41 years ago. He started smoking about 60 years ago. He has a 18.00 pack-year smoking history. He has never used smokeless tobacco. He reports that he does not drink alcohol and does not use drugs.         Allergies:  Penicillins, Sulfa antibiotics, Tazorac [tazarotene], Clindamycin, and Clindamycin/lincomycin    Current Medications:    [Held by provider] insulin lispro (HUMALOG) injection vial 15 Units, TID WC  fluconazole (DIFLUCAN) tablet 200 mg, Daily  insulin glargine (LANTUS) injection vial 50 Units, Daily  lactobacillus (CULTURELLE) capsule 1 capsule, BID WC  insulin lispro (HUMALOG) injection vial 0-16 Units, 4x Daily AC & HS  epoetin daphney-epbx (RETACRIT) injection 3,000 Units, Once per day on Mon Wed Fri  torsemide (DEMADEX) tablet 100 mg, Daily  midodrine (PROAMATINE) tablet 5 mg, BID WC  ipratropium-albuterol (DUONEB) nebulizer solution 1 ampule, 4x daily  gentamicin (GARAMYCIN) 0.1 % cream, Daily  sodium chloride flush 0.9 % injection 5-40 mL, 2 times per day  sodium chloride flush 0.9 % injection 5-40 mL, PRN  0.9 % sodium chloride infusion, PRN  ondansetron (ZOFRAN-ODT) disintegrating tablet 4 mg, Q8H PRN for input(s): Gayle Christie, GLUCOSEU, BILIRUBINUR, Lethea Edith, BLOODU, PHUR, PROTEINU, UROBILINOGEN, NITRU, LEUKOCYTESUR, Wyvonnia Maggi in the last 72 hours. Urine Microscopic:   No results for input(s): LABCAST, BACTERIA, COMU, HYALCAST, WBCUA, RBCUA, EPIU in the last 72 hours. Urine Culture: No results for input(s): LABURIN in the last 72 hours. Urine Chemistry: No results for input(s): [place], PROTEINUR, NAUR in the last 72 hours. IMAGING:  CT CHEST ABDOMEN PELVIS WO CONTRAST   Final Result   Addendum (preliminary) 1 of 1   ADDENDUM:   Addendum is being made for clarification about the catheter in the    peritoneal   cavity. The catheter within the peritoneal cavity is a dialysis catheter. Final   Focal linear consolidative changes of bilateral lower lobes, likely   suggestive of atelectasis. .  Minimal right pleural effusion. Cholelithiasis with no signs of cholecystitis. Extensive calcification of renal arteries. Underlying 2-4 mm nonobstructing   kidney stones cannot be excluded. .      A drainage catheter tip is within the the right lower quadrant area with a   small amount of residual fluid noted surrounding the drainage catheter   measuring 2.7 x 1.7 cm. XR CHEST PORTABLE   Final Result   Low volume study with trace bilateral pleural effusions and persistent   bibasilar atelectasis or airspace disease. XR CHEST PORTABLE   Final Result   CHF with pulmonary edema. CT HEAD WO CONTRAST   Final Result   No definite evidence of acute intracranial abnormality on a study   significantly limited by motion/streak artifact as described above. Assessment/Plan :      1. ESRD   Volume overload   Better. Continue  CCPD  with .  Continue 4.25 % today    PD fluid for cell count and culture -  negative   No growth   No evidence of peritonitis    WBC elevated - rpt PD fluid cell count and culture negative     CT abdomen normal. D/w radiology . The fluid collection is at the tip of PD catheter is dialysate fluid . Effluent: Clear/ pale yellow   Total time: 8 hr 17 min   Total UF:  779 ml. Total Volume:  8002 ml. Dwell time gained:  0 hr 6 min. Pt Tolerated procedure: Well   Report given to: Vishnu Day RN     2. HTN  BP controlled       3. Anemia in ESRD   On epogen     4. Acid- base disorder. monitor     5. Hypokalemia   Replace potassium       6. Debility. Generalized weakness. Need PT/OT       Awaiting placement at rehab unit     Continue PD.        D/w primary team      Thank you for allowing us to participate in care of Marcos Arzola         Electronically signed by: Alba Peoples MD, 10/8/2022, 11:31 AM      [facility] [address]  Office : [phone]  Fax :[phone]

## 2022-10-08 NOTE — PROGRESS NOTES
CCPD Order   Exchanges: 4    Exchange Volume: 2000 ml   Total Time: 8 hrs   Dextrose: 4.25%   Last Fill: 0 ml   Total Volume: 8000 ml     Orders verified. Supplies taken to pt's room. Report received. Cycler set up, primed and pre tested. [facility]. Pt connected to cycler. CCPD initiated without problem. Initial effluent clear. If problems should arise please call the [phone] on top of PD cycler machine.

## 2022-10-08 NOTE — PROGRESS NOTES
Hospitalist Progress Note    Name:  Nany Moss    /Age/Sex: 1945  (69 y.o. male)  MRN & CSN:  [id number] & [id number]    PCP: Belle Ruiz MD    Date of Admission: 2022  Chief Complaint: Altered mental status    Hospital Course: This 71-year-old man was admitted for altered mental status. Found to be hypoxic and hypercapnic on admission. Placed on BiPAP with improvement. Nephrology following as patient has ESRD on peritoneal dialysis. Pulmonology following. Interval history:  Pt seen and examined today. Overnight events noted, interval ancillary notes and labs reviewed. Hemodynamically stable. Afebrile blood WBC now trending up ~ 19K. Previously awaiting discharge to SNF. Pre-CERT pending. Assessment and plan    Worsening leukocytosis:  Repeat blood cultures 10/3 remain negative. Wound cultures positive for Candida. PD catheter fluid culture negative. No evidence of PD site infection or peritonitis. CT chest abdomen and pelvis 10/4 unremarkable except fluid collection at the tip of PD catheter which was thought to be dialysate fluid. Repeat PD fluid cell count and cultures negative. ID consulted: Recommended fluconazole, meropenem and Zyvox. Monitor CBC. Acute on chronic hypoxic and hypercapnic respiratory failure:-Improved  Most likely due to volume overload. Elevated proBNP at admission; trended down  Initially diuresed with IV Lasix; switched to torsemide  Continue home inhalers and as needed breathing treatment  Supplemental oxygen as needed to keep sats between 90 to 92%  Pulmonology consulted: appreciate recommendations    Cardiac arrhythmias:  No clinically significant pauses. 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 Will need repeat sleep study outpatient. Acute metabolic encephalopathy: Resolved. CT head: No acute abnormality.   TSH, Ammonia level wnl. B 12 and folate wnl. Supportive care     Type 2 diabetes on long-term insulin:  A1c 6.5 on 9/21/2022. Continue basal insulin with sliding scale. ESRD on peritoneal dialysis:  Nephrology consulted: Appreciate input. Hypotension: Continue midodrine    Depression: Continue Wellbutrin     Hypothyroid: Continue synthroid. Anemia due to ESRD: Continue EPO. DVT ppx: Heparin  GI ppx: Diet/Tube Feeds  Diet: ADULT DIET; Regular; 4 carb choices (60 gm/meal); Low Phosphorus (Less than 1000 mg)  ADULT ORAL NUTRITION SUPPLEMENT; Breakfast, Lunch; Renal Oral Supplement  Code Status: Full Code  Disposition: ECF medically stable. Pre-CERT pending. Medications:  Reviewed      Intake/Output Summary (Last 24 hours) at 10/8/2022 1636  Last data filed at 10/8/2022 1248  Gross per 24 hour   Intake 370 ml   Output --   Net 370 ml         Physical Exam Performed:    BP (!) 162/70   Pulse 78   Temp 98.6 °F (37 °C) (Oral)   Resp 16   Ht 5' 10\" (1.778 m)   Wt 201 lb 15.1 oz (91.6 kg)   SpO2 93%   BMI 28.98 kg/m²     General appearance: No apparent distress, appears stated age and cooperative. Somewhat confused, but more conversational today. HEENT: Pupils equal, round, and reactive to light. Conjunctivae/corneas clear. Neck: Supple, with full range of motion. No jugular venous distention. Trachea midline. Respiratory:  Normal respiratory effort. Clear to auscultation, bilaterally without Rales/Wheezes/Rhonchi. Cardiovascular: Regular rate and rhythm with normal S1/S2 without murmurs, rubs or gallops. Trace pitting edema LE bilaterally. Abdomen: Soft, non-tender, non-distended with normal bowel sounds. : No CVA tenderness. Musculoskeletal: No clubbing or cyanosis. Full range of motion without deformity. Skin: Skin color, texture, turgor normal.  Sacral wound +      PD catheter in place.     Labs:   Recent Labs     10/06/22  1509 10/07/22  0422 10/08/22  0844   WBC 16.8* 15.2* 19.0*   HGB 9.2* 9. 2* 9.5*   HCT 28.3* 27.9* 29.2*    332 311     Recent Labs     10/08/22  0844   *   K 4.2   CL 93*   CO2 21   BUN 78*   CREATININE 11.0*   CALCIUM 9.1   PHOS 9.5*     No results for input(s): AST, ALT, BILIDIR, BILITOT, ALKPHOS in the last 72 hours. No results for input(s): INR in the last 72 hours. No results for input(s): Cleatrice Lepe in the last 72 hours. Urinalysis:      Lab Results   Component Value Date/Time    NITRU Negative 09/21/2022 03:18 PM    WBCUA 3 09/21/2022 03:18 PM    BACTERIA None Seen 09/21/2022 03:18 PM    RBCUA 5 09/21/2022 03:18 PM    BLOODU SMALL 09/21/2022 03:18 PM    SPECGRAV 1.015 09/21/2022 03:18 PM    GLUCOSEU 250 09/21/2022 03:18 PM    GLUCOSEU >=1000 03/26/2012 10:22 PM       Radiology:  CT CHEST ABDOMEN PELVIS WO CONTRAST   Final Result   Addendum (preliminary) 1 of 1   ADDENDUM:   Addendum is being made for clarification about the catheter in the    peritoneal   cavity. The catheter within the peritoneal cavity is a dialysis catheter. Final   Focal linear consolidative changes of bilateral lower lobes, likely   suggestive of atelectasis. .  Minimal right pleural effusion. Cholelithiasis with no signs of cholecystitis. Extensive calcification of renal arteries. Underlying 2-4 mm nonobstructing   kidney stones cannot be excluded. .      A drainage catheter tip is within the the right lower quadrant area with a   small amount of residual fluid noted surrounding the drainage catheter   measuring 2.7 x 1.7 cm. XR CHEST PORTABLE   Final Result   Low volume study with trace bilateral pleural effusions and persistent   bibasilar atelectasis or airspace disease. XR CHEST PORTABLE   Final Result   CHF with pulmonary edema. CT HEAD WO CONTRAST   Final Result   No definite evidence of acute intracranial abnormality on a study   significantly limited by motion/streak artifact as described above.                C/Jaky Koch 4533 Problems    Diagnosis     Diabetes education, encounter for [Z71.89]      Priority: Medium    Peritoneal dialysis catheter in place [facility]) [Z99.2]      Priority: Medium    Ac[facility] congest[facility] failure ([facility] [I50.9]      Priority: Medium    Overweight (BMI 25.0-29. 9) [E66.3]      Priority: Medium    History of 2019 novel coronavirus disease (COVID-19) [Z86.16]      Priority: Medium    Elevated procalcitonin [R79.89]      Priority: Medium    Arrhythmia [I49.9]      Priority: Medium    Leukocytosis [D72.829]      Priority: Medium    Acute pulmonary edema (HCC) [J81.0]      Priority: Medium    Multifocal pneumonia [J18.9]      Priority: Medium    Hypervolemia [E87.70]      Priority: Medium    Acute on chronic respiratory failure with hypoxia and hypercapnia (HCC) [N25.44, J96.22]      Priority: Medium    Moderate episode of recurrent major depressive disorder (HCC) [F33.1]      Priority: Medium    BJ treated with BiPAP [G47.33]      Priority: Medium    Acute metabolic encephalopathy [N43.49]      Priority: Medium    Anemia in ESRD (end-stage renal disease) (HCC) [N18.6, D63.1]     Type 2 diabetes mellitus with hyperglycemia, with long-term current use of insulin (HCC) [E11.65, Z79.4]     Hypertension associated with stage 5 chronic kidney disease due to type 2 diabetes mellitus ([facility] [E11.22, I12.0, N18.5]     General weakness [R53.1]     Chronic diastolic heart failure (HCC) [I50.32]     Acute respiratory failure with hypoxia and hypercapnia (HCC) [J96.01, J96.02]     Arthritis with psoriasis (HCC) [L40.50]     Psoriatic arthropathy ([facility] [L40.50]     Sleep apnea [G47.30]        Augustine Cleaning MD  10/8/2022  4:36 PM      Please note that some part of this chart was generated using Dragon dictation software. Although every effort was made to ensure the accuracy of this automated transcription, some errors in transcription may have occurred inadvertently.  If you may need any clarification, please do not hesitate to contact me through EPIC.

## 2022-10-08 NOTE — FLOWSHEET NOTE
CCPD order:   Use 4.25 % solution              Continuous Cycling Peritoneal Dialysis (CCPD): Patient Communication     Not Released  Not seen     Order Questions    Question Answer   Total Volume (L) 8   Therapy Time Duration (Hours) 8   Exchange Volume (L) 2   Number of Exchanges 4   Final Fill No       UF: 1622 ML  Total Time: 5 hours and 01 minutes  Alarm: 0 times over night  CCPD removed fluid: color light yellow, clear ; without fibrin noticed  Pt tolerated well over night with CCPD  Plan to put pt on CCPD this evening if pt's still at hospital  Placed CCPD flow sheets in the chart for EMR scan

## 2022-10-08 NOTE — PROGRESS NOTES
CCPD Order   Exchanges: 4    Exchange Volume: 2000 ml   Total Time: 8 hrs   Dextrose: 4.25%   Last Fill: 0 ml   Total Volume: 8000 ml     Orders verified. Supplies taken to pt's room. Report received. Cycler set up, primed and pre tested. [facility]. Pt connected to cycler. CCPD initiated without problem. Initial effluent clear. If problems should arise please call the [phone] on top of PD cycler machine.

## 2022-10-09 LAB
BASOPHILS ABSOLUTE: 0.1 K/UL (ref 0–0.2)
BASOPHILS ABSOLUTE: 0.2 K/UL (ref 0–0.2)
BASOPHILS RELATIVE PERCENT: 0.7 %
BASOPHILS RELATIVE PERCENT: 0.8 %
EOSINOPHILS ABSOLUTE: 1.1 K/UL (ref 0–0.6)
EOSINOPHILS ABSOLUTE: 1.3 K/UL (ref 0–0.6)
EOSINOPHILS RELATIVE PERCENT: 5.6 %
EOSINOPHILS RELATIVE PERCENT: 6.6 %
GLUCOSE BLD-MCNC: 167 MG/DL (ref 70–99)
GLUCOSE BLD-MCNC: 178 MG/DL (ref 70–99)
GLUCOSE BLD-MCNC: 194 MG/DL (ref 70–99)
GLUCOSE BLD-MCNC: 371 MG/DL (ref 70–99)
GLUCOSE BLD-MCNC: 395 MG/DL (ref 70–99)
HCT VFR BLD CALC: 29.2 % (ref 40.5–52.5)
HCT VFR BLD CALC: 30 % (ref 40.5–52.5)
HEMATOLOGY PATH CONSULT: NO
HEMATOLOGY PATH CONSULT: NO
HEMOGLOBIN: 9.5 G/DL (ref 13.5–17.5)
HEMOGLOBIN: 9.8 G/DL (ref 13.5–17.5)
LYMPHOCYTES ABSOLUTE: 1.1 K/UL (ref 1–5.1)
LYMPHOCYTES ABSOLUTE: 1.2 K/UL (ref 1–5.1)
LYMPHOCYTES RELATIVE PERCENT: 5.8 %
LYMPHOCYTES RELATIVE PERCENT: 5.9 %
MCH RBC QN AUTO: 36.6 PG (ref 26–34)
MCH RBC QN AUTO: 36.7 PG (ref 26–34)
MCHC RBC AUTO-ENTMCNC: 32.6 G/DL (ref 31–36)
MCHC RBC AUTO-ENTMCNC: 32.8 G/DL (ref 31–36)
MCV RBC AUTO: 111.6 FL (ref 80–100)
MCV RBC AUTO: 112.5 FL (ref 80–100)
MONOCYTES ABSOLUTE: 0.7 K/UL (ref 0–1.3)
MONOCYTES ABSOLUTE: 0.7 K/UL (ref 0–1.3)
MONOCYTES RELATIVE PERCENT: 3.4 %
MONOCYTES RELATIVE PERCENT: 3.7 %
NEUTROPHILS ABSOLUTE: 15.7 K/UL (ref 1.7–7.7)
NEUTROPHILS ABSOLUTE: 16.9 K/UL (ref 1.7–7.7)
NEUTROPHILS RELATIVE PERCENT: 83.1 %
NEUTROPHILS RELATIVE PERCENT: 84.4 %
PDW BLD-RTO: 14.5 % (ref 12.4–15.4)
PDW BLD-RTO: 14.6 % (ref 12.4–15.4)
PERFORMED ON: ABNORMAL
PLATELET # BLD: 310 K/UL (ref 135–450)
PLATELET # BLD: 311 K/UL (ref 135–450)
PMV BLD AUTO: 7.1 FL (ref 5–10.5)
PMV BLD AUTO: 7.2 FL (ref 5–10.5)
RBC # BLD: 2.6 M/UL (ref 4.2–5.9)
RBC # BLD: 2.69 M/UL (ref 4.2–5.9)
WBC # BLD: 19 K/UL (ref 4–11)
WBC # BLD: 20 K/UL (ref 4–11)

## 2022-10-09 PROCEDURE — 6360000002 HC RX W HCPCS: Performed by: STUDENT IN AN ORGANIZED HEALTH CARE EDUCATION/TRAINING PROGRAM

## 2022-10-09 PROCEDURE — 6370000000 HC RX 637 (ALT 250 FOR IP): Performed by: INTERNAL MEDICINE

## 2022-10-09 PROCEDURE — 6370000000 HC RX 637 (ALT 250 FOR IP): Performed by: PHYSICIAN ASSISTANT

## 2022-10-09 PROCEDURE — 85025 COMPLETE CBC W/AUTO DIFF WBC: CPT

## 2022-10-09 PROCEDURE — 6370000000 HC RX 637 (ALT 250 FOR IP): Performed by: STUDENT IN AN ORGANIZED HEALTH CARE EDUCATION/TRAINING PROGRAM

## 2022-10-09 PROCEDURE — 90945 DIALYSIS ONE EVALUATION: CPT | Performed by: INTERNAL MEDICINE

## 2022-10-09 PROCEDURE — 2580000003 HC RX 258: Performed by: STUDENT IN AN ORGANIZED HEALTH CARE EDUCATION/TRAINING PROGRAM

## 2022-10-09 PROCEDURE — 36415 COLL VENOUS BLD VENIPUNCTURE: CPT

## 2022-10-09 PROCEDURE — 2060000000 HC ICU INTERMEDIATE R&B

## 2022-10-09 PROCEDURE — 90945 DIALYSIS ONE EVALUATION: CPT

## 2022-10-09 RX ORDER — INSULIN LISPRO 100 [IU]/ML
15 INJECTION, SOLUTION INTRAVENOUS; SUBCUTANEOUS
Status: DISCONTINUED | OUTPATIENT
Start: 2022-10-10 | End: 2022-10-10 | Stop reason: HOSPADM

## 2022-10-09 RX ORDER — INSULIN LISPRO 100 [IU]/ML
4 INJECTION, SOLUTION INTRAVENOUS; SUBCUTANEOUS ONCE
Status: COMPLETED | OUTPATIENT
Start: 2022-10-09 | End: 2022-10-09

## 2022-10-09 RX ADMIN — INSULIN LISPRO 10 UNITS: 100 INJECTION, SOLUTION INTRAVENOUS; SUBCUTANEOUS at 17:08

## 2022-10-09 RX ADMIN — FLUCONAZOLE 200 MG: 100 TABLET ORAL at 08:31

## 2022-10-09 RX ADMIN — INSULIN LISPRO 4 UNITS: 100 INJECTION, SOLUTION INTRAVENOUS; SUBCUTANEOUS at 03:24

## 2022-10-09 RX ADMIN — ASPIRIN 81 MG 81 MG: 81 TABLET ORAL at 08:31

## 2022-10-09 RX ADMIN — Medication 1 CAPSULE: at 16:16

## 2022-10-09 RX ADMIN — INSULIN LISPRO 4 UNITS: 100 INJECTION, SOLUTION INTRAVENOUS; SUBCUTANEOUS at 21:02

## 2022-10-09 RX ADMIN — Medication 10 ML: at 21:02

## 2022-10-09 RX ADMIN — Medication 10 ML: at 08:32

## 2022-10-09 RX ADMIN — Medication 1 CAPSULE: at 08:31

## 2022-10-09 RX ADMIN — GENTAMICIN SULFATE: 1 CREAM TOPICAL at 08:40

## 2022-10-09 RX ADMIN — INSULIN LISPRO 10 UNITS: 100 INJECTION, SOLUTION INTRAVENOUS; SUBCUTANEOUS at 11:59

## 2022-10-09 RX ADMIN — BUPROPION HYDROCHLORIDE 100 MG: 100 TABLET, FILM COATED ORAL at 08:35

## 2022-10-09 RX ADMIN — HEPARIN SODIUM 5000 UNITS: 5000 INJECTION INTRAVENOUS; SUBCUTANEOUS at 14:00

## 2022-10-09 RX ADMIN — HEPARIN SODIUM 5000 UNITS: 5000 INJECTION INTRAVENOUS; SUBCUTANEOUS at 21:02

## 2022-10-09 RX ADMIN — ATORVASTATIN CALCIUM 80 MG: 80 TABLET, FILM COATED ORAL at 21:02

## 2022-10-09 RX ADMIN — INSULIN GLARGINE 50 UNITS: 100 INJECTION, SOLUTION SUBCUTANEOUS at 08:34

## 2022-10-09 RX ADMIN — HEPARIN SODIUM 5000 UNITS: 5000 INJECTION INTRAVENOUS; SUBCUTANEOUS at 06:48

## 2022-10-09 RX ADMIN — TORSEMIDE 100 MG: 100 TABLET ORAL at 08:31

## 2022-10-09 RX ADMIN — LEVOTHYROXINE SODIUM 50 MCG: 0.03 TABLET ORAL at 06:48

## 2022-10-09 RX ADMIN — INSULIN LISPRO 10 UNITS: 100 INJECTION, SOLUTION INTRAVENOUS; SUBCUTANEOUS at 08:34

## 2022-10-09 ASSESSMENT — PAIN SCALES - GENERAL
PAINLEVEL_OUTOF10: 2
PAINLEVEL_OUTOF10: 3
PAINLEVEL_OUTOF10: 2

## 2022-10-09 ASSESSMENT — PAIN DESCRIPTION - LOCATION
LOCATION: NECK

## 2022-10-09 NOTE — PROGRESS NOTES
Office : [phone]     Fax :[phone]       Nephrology progress  Note      Patient's Name: Zacarias Louie  9:23 AM  10/9/2022    Reason for Consult:  ESRD on peritoneal dialysis. Requesting Physician:  Hong Womack MD      Chief Complaint:    Chief Complaint   Patient presents with    Altered Mental Status     Patient arrives via [place] from [place], altered, last seen normal last night, started peritoneal dialysis last night, O2 low on normal 3L, placed on NRB with some improvement, increased WOB noted           History of Present Ilness:    Zacarias Louie is a 69 y.o. male with prior history of ESRD on peritoneal dialysis , DM 2, HTN who was sent from [facility] with main c/o SOB. In ER he was found to be hypoxic. CXR shows volume overload. On BiPAP now. Still makes urine . Also had elevated WBC. No abdominal pain. No fever   BS Elevated. Interval hx :    Feels much better   Seen on PD   Tolerating PD well       No abdominal pain   No fever  No discharge from PD site     I/O last 3 completed shifts:   In: 56 [P.O.:360; I.V.:10]  Out: -     Past Medical History:   Diagnosis Date    Allergic rhinitis due to other allergen 7/12/2010    Coronary atherosclerosis of unspecified type of vessel, native or graft 7/12/2010    Diabetic eye exam ([address] 04/29/2015    Diabetic eye exam ([address] 5/5/2016    [place] [facility]    Generalized osteoarthrosis, involving multiple sites 7/12/2010    Other psoriasis 7/12/2010    Pneumonia     Prolonged emergence from general anesthesia     Psoriatic arthropathy ([address] 7/12/2010    Type II or unspecified type diabetes mellitus without mention of complication, not stated as uncontrolled 7/12/2010    Unspecified essential hypertension 7/12/2010    Unspecified sleep apnea 7/12/2010       Past Surgical History:   Procedure Laterality Date    CARPAL TUNNEL RELEASE      s/p    CATARACT REMOVAL      CORONARY ARTERY BYPASS GRAFT      JOINT REPLACEMENT      LAPAROSCOPY INSERTION PERITONEAL CATHETER N/A 10/26/2020    LAPAROSCOPIC PERITONEAL DIALYSIS CATHETER PLACEMENT; LAPAROSCOPIC OMENTOPEXY performed by Adam Cobb DO at [address] OR       Family History   Problem Relation Age of Onset    Diabetes Mother     Heart Failure Mother     Coronary Art Dis Father         reports that he quit smoking about 41 years ago. He started smoking about 60 years ago. He has a 18.00 pack-year smoking history. He has never used smokeless tobacco. He reports that he does not drink alcohol and does not use drugs.         Allergies:  Penicillins, Sulfa antibiotics, Tazorac [tazarotene], Clindamycin, and Clindamycin/lincomycin    Current Medications:    insulin lispro (HUMALOG) injection vial 10 Units, TID WC  insulin lispro (HUMALOG) injection vial 0-8 Units, TID WC  insulin lispro (HUMALOG) injection vial 0-4 Units, Nightly  midodrine (PROAMATINE) tablet 5 mg, TID PRN  fluconazole (DIFLUCAN) tablet 200 mg, Daily  insulin glargine (LANTUS) injection vial 50 Units, Daily  lactobacillus (CULTURELLE) capsule 1 capsule, BID WC  epoetin daphney-epbx (RETACRIT) injection 3,000 Units, Once per day on Mon Wed Fri  torsemide (DEMADEX) tablet 100 mg, Daily  ipratropium-albuterol (DUONEB) nebulizer solution 1 ampule, 4x daily  gentamicin (GARAMYCIN) 0.1 % cream, Daily  sodium chloride flush 0.9 % injection 5-40 mL, 2 times per day  sodium chloride flush 0.9 % injection 5-40 mL, PRN  0.9 % sodium chloride infusion, PRN  ondansetron (ZOFRAN-ODT) disintegrating tablet 4 mg, Q8H PRN   Or  ondansetron (ZOFRAN) injection 4 mg, Q6H PRN  polyethylene glycol (GLYCOLAX) packet 17 g, Daily PRN  acetaminophen (TYLENOL) tablet 650 mg, Q6H PRN   Or  acetaminophen (TYLENOL) suppository 650 mg, Q6H PRN  albuterol (PROVENTIL) nebulizer solution 2.5 mg, Q4H PRN  aspirin chewable tablet 81 mg, Daily  atorvastatin (LIPITOR) tablet 80 mg, Nightly  mometasone-formoterol (DULERA) 200-5 MCG/ACT inhaler 2 puff, BID  buPROPion (WELLBUTRIN) tablet 100 mg, Every Other Day  levothyroxine (SYNTHROID) tablet 50 mcg, Daily  dextrose bolus 10% 125 mL, PRN   Or  dextrose bolus 10% 250 mL, PRN  glucagon (rDNA) injection 1 mg, PRN  dextrose 10 % infusion, Continuous PRN  heparin (porcine) injection 5,000 Units, 3 times per day      Physical exam:     Vitals:  /73   Pulse 73   Temp 97.9 °F (36.6 °C) (Oral)   Resp 18   Ht 5' 10\" (1.778 m)   Wt 206 lb 12.7 oz (93.8 kg)   SpO2 98%   BMI 29.67 kg/m²   Constitutional:  OAA X3 NAD  Skin: no rash, turgor wnl  Heent:  eomi, mmm  Neck: no bruits or jvd noted  Cardiovascular:  S1, S2 without m/r/g  Respiratory: b/l crackles   Abdomen:  +bs, soft, nt, nd  Ext: mild  lower extremity edema    Labs:  CBC:   Recent Labs     10/07/22  0422 10/08/22  0844 10/09/22  0505   WBC 15.2* 19.0* 20.0*   HGB 9.2* 9.5* 9.8*    311 310     BMP:    Recent Labs     10/08/22  0844   *   K 4.2   CL 93*   CO2 21   BUN 78*   CREATININE 11.0*   GLUCOSE 223*     Ca/Mg/Phos:   Recent Labs     10/08/22  0844   CALCIUM 9.1   PHOS 9.5*     Hepatic:   No results for input(s): AST, ALT, ALB, BILITOT, ALKPHOS in the last 72 hours. Troponin:   No results for input(s): TROPONINI in the last 72 hours. BNP: No results for input(s): BNP in the last 72 hours. Lipids: No results for input(s): CHOL, TRIG, HDL, LDLCALC, LABVLDL in the last 72 hours. ABGs:   No results for input(s): PHART, PO2ART, UGH9CGC in the last 72 hours. INR: No results for input(s): INR in the last 72 hours. UA:  No results for input(s): Arturo Pears, GLUCOSEU, BILIRUBINUR, KETUA, SPECGRAV, BLOODU, PHUR, PROTEINU, UROBILINOGEN, NITRU, LEUKOCYTESUR, Gisella Mar in the last 72 hours.      Urine Microscopic:   No results for input(s): LABCAST, BACTERIA, COMU, HYALCAST, WBCUA, RBCUA, EPIU in the last 72 hours. Urine Culture: No results for input(s): LABURIN in the last 72 hours. Urine Chemistry: No results for input(s): Etheleen Clock, PROTEINUR, NAUR in the last 72 hours. IMAGING:  CT CHEST ABDOMEN PELVIS WO CONTRAST   Final Result   Addendum (preliminary) 1 of 1   ADDENDUM:   Addendum is being made for clarification about the catheter in the    peritoneal   cavity. The catheter within the peritoneal cavity is a dialysis catheter. Final   Focal linear consolidative changes of bilateral lower lobes, likely   suggestive of atelectasis. .  Minimal right pleural effusion. Cholelithiasis with no signs of cholecystitis. Extensive calcification of renal arteries. Underlying 2-4 mm nonobstructing   kidney stones cannot be excluded. .      A drainage catheter tip is within the the right lower quadrant area with a   small amount of residual fluid noted surrounding the drainage catheter   measuring 2.7 x 1.7 cm. XR CHEST PORTABLE   Final Result   Low volume study with trace bilateral pleural effusions and persistent   bibasilar atelectasis or airspace disease. XR CHEST PORTABLE   Final Result   CHF with pulmonary edema. CT HEAD WO CONTRAST   Final Result   No definite evidence of acute intracranial abnormality on a study   significantly limited by motion/streak artifact as described above. Assessment/Plan :      1. ESRD   Volume overload   Better. Continue  CCPD  with . Continue 4.25 % today    PD fluid for cell count and culture -  negative   No growth   No evidence of peritonitis    WBC elevated - rpt PD fluid cell count and culture negative     CT abdomen normal.   D/w radiology . The fluid collection is at the tip of PD catheter is dialysate fluid . Effluent: Clear/ pale yellow   Total time: 8 hr 17 min   Total UF:  779 ml. Total Volume:  8002 ml.   Dwell time gained:  0 hr 6 min.  Pt Tolerated procedure: Well   Report given to: Pastor Hinton RN     2. HTN  BP controlled       3. Anemia in ESRD   On epogen     4. Acid- base disorder. monitor     5. Hypokalemia   Replace potassium       6. Debility. Generalized weakness. Need PT/OT       Awaiting placement at rehab unit     Continue PD.        D/w primary team      Thank you for allowing us to participate in care of Jacob Olson         Electronically signed by: Tai Leung MD, 10/9/2022, 9:23 AM      [facility] [address]  Office : [phone]  Fax :[phone]

## 2022-10-09 NOTE — CARE COORDINATION
KATRIN called Christian Rodriguez in admissions at the  SNF to check on pt's precert. There was no answer. KATRIN left a message asking for a return call and update on the precert status. Addendum:  Received a call back from Christian Rodriguez at the  stating that they have not received precert back as of yet. Most likely it won't come back until tomorrow.     Electronically signed by Jackye Shone, MSW, BERE on 10/9/2022 at 10:45 AM

## 2022-10-09 NOTE — PROGRESS NOTES
CCPD Order              Exchanges: 4                Exchange Volume: 2000 ml              Total Time: 8 hrs              Dextrose: 4.25%              Last Fill: 0 ml              Total Volume: 8000 ml     Orders verified. Supplies taken to pt's room. Report received. Cycler set up, primed and pre tested. Dressing changed on [facility] Cath site. Pt connected to cycler. CCPD initiated without problem. Initial effluent clear.

## 2022-10-09 NOTE — PROGRESS NOTES
Disconnected from CCPD per protocol. Effluent: clear/ pale yellow. No fibrin noted   Total time: 8 hr 20 min   Total UF:  1305 ml. Total Volume:  8011 ml. Dwell time gained:  0 hr 4 min. Pt Tolerated procedure: tolerated well.  No c/o     Last BM: 10/9/22 per pt   Comments    Use 4.25 % solution             Order Questions    Question Answer   Total Volume (L) 8   Therapy Time Duration (Hours) 8   Exchange Volume (L) 2   Number of Exchanges 4   Final Fill No

## 2022-10-09 NOTE — PROGRESS NOTES
Hospitalist Progress Note      PCP: Abhilash Rdz MD    Date of Admission: 9/21/2022    Chief Complaint: encephalopathy    Hospital Course: 73-year-old man was admitted for altered mental status. Found to be hypoxic and hypercapnic on admission. Placed on BiPAP with improvement. Nephrology following as patient has ESRD on peritoneal dialysis. Pulmonology following. Subjective:  doing ok. No new complaints.      Medications:  Reviewed    Infusion Medications    sodium chloride 5 mL/hr at 10/06/22 1723    dextrose       Scheduled Medications    insulin lispro  10 Units SubCUTAneous TID WC    insulin lispro  0-8 Units SubCUTAneous TID WC    insulin lispro  0-4 Units SubCUTAneous Nightly    fluconazole  200 mg Oral Daily    insulin glargine  50 Units SubCUTAneous Daily    lactobacillus  1 capsule Oral BID WC    epoetin daphney-epbx  3,000 Units SubCUTAneous Once per day on Mon Wed Fri    torsemide  100 mg Oral Daily    ipratropium-albuterol  1 ampule Inhalation 4x daily    gentamicin   Topical Daily    sodium chloride flush  5-40 mL IntraVENous 2 times per day    aspirin  81 mg Oral Daily    atorvastatin  80 mg Oral Nightly    mometasone-formoterol  2 puff Inhalation BID    buPROPion  100 mg Oral Every Other Day    levothyroxine  50 mcg Oral Daily    heparin (porcine)  5,000 Units SubCUTAneous 3 times per day     PRN Meds: midodrine, sodium chloride flush, sodium chloride, ondansetron **OR** ondansetron, polyethylene glycol, acetaminophen **OR** acetaminophen, albuterol, dextrose bolus **OR** dextrose bolus, glucagon (rDNA), dextrose      Intake/Output Summary (Last 24 hours) at 10/9/2022 1136  Last data filed at 10/9/2022 4634  Gross per 24 hour   Intake 130 ml   Output --   Net 130 ml       Physical Exam Performed:    /68   Pulse 78   Temp 98.2 °F (36.8 °C) (Oral)   Resp 17   Ht 5' 10\" (1.778 m)   Wt 206 lb 12.7 oz (93.8 kg)   SpO2 96%   BMI 29.67 kg/m²     General appearance: No apparent clarification about the catheter in the    peritoneal   cavity. The catheter within the peritoneal cavity is a dialysis catheter. Final   Focal linear consolidative changes of bilateral lower lobes, likely   suggestive of atelectasis. .  Minimal right pleural effusion. Cholelithiasis with no signs of cholecystitis. Extensive calcification of renal arteries. Underlying 2-4 mm nonobstructing   kidney stones cannot be excluded. .      A drainage catheter tip is within the the right lower quadrant area with a   small amount of residual fluid noted surrounding the drainage catheter   measuring 2.7 x 1.7 cm. XR CHEST PORTABLE   Final Result   Low volume study with trace bilateral pleural effusions and persistent   bibasilar atelectasis or airspace disease. XR CHEST PORTABLE   Final Result   CHF with pulmonary edema. CT HEAD WO CONTRAST   Final Result   No definite evidence of acute intracranial abnormality on a study   significantly limited by motion/streak artifact as described above. Assessment/Plan:    Active Hospital Problems    Diagnosis     Diabetes education, encounter for [Z71.89]      Priority: Medium    Peritoneal dialysis catheter in place [facility]) [Z99.2]      Priority: Medium    Acute congestive heart failure ([address] [I50.9]      Priority: Medium    Overweight (BMI 25.0-29. 9) [E66.3]      Priority: Medium    History of 2019 novel coronavirus disease (COVID-19) [Z86.16]      Priority: Medium    Elevated procalcitonin [R79.89]      Priority: Medium    Arrhythmia [I49.9]      Priority: Medium    Leukocytosis [D72.829]      Priority: Medium    Acute pulmonary edema (HCC) [J81.0]      Priority: Medium    Multifocal pneumonia [J18.9]      Priority: Medium    Hypervolemia [E87.70]      Priority: Medium    Acute on chronic respiratory failure with hypoxia and hypercapnia (HCC) [E61.82, J96.22]      Priority: Medium    Moderate episode of recurrent major depressive disorder ([place] [address].) [F33.1]      Priority: Medium    BJ treated with BiPAP [G47.33]      Priority: Medium    Acute metabolic encephalopathy [W52.70]      Priority: Medium    Anemia in ESRD (end-stage renal disease) ([facility] [N18.6, D63.1]     Type 2 diabetes mellitus with hyperglycemia, with long-term current use of insulin ([facility]) [E11.65, Z79.4]     Hypertension associated with stage 5 chronic kidney disease due to type 2 diabetes mellitus ([facility] [E11.22, I12.0, N18.5]     General weakness [R53.1]     Chronic diastolic heart failure ([facility]) [I50.32]     Acute respiratory failure with hypoxia and hypercapnia ([facility] [J96.01, J96.02]     Arthritis with psoriasis ([facility]) [L40.50]     Psoriatic arthropathy ([facility] [L40.50]     Sleep apnea [G47.30]          leukocytosis:blood cx neg. Sacral wound cx candida. PD fluid cx neg. CT chest abdomen and pelvis 10/4 unremarkable except fluid collection at the tip of PD catheter which was thought to be dialysate fluid. ID consulted: Recommended fluconazole, meropenem and Zyvox. wbc worsening. Acute on chronic hypoxic and hypercapnic respiratory failure: Most likely due to volume overload. Initially diuresed with IV Lasix; switched to torsemide. Now resolved. Continue home inhalers and as needed breathing treatment        Cardiac arrhythmias:     Cardiology consulted; pacer not recommended at this time. History of BJ: Unable to tolerate home BiPAP/CPAP. Will need repeat sleep study outpatient. Acute metabolic encephalopathy: Resolved. CT head: Neg. TSH, Ammonia level wnl. B 12 and folate wnl. Type 2 diabetes: on long-term insulin:  A1c 6.5 on 9/21/2022. Increase prandial dose. Continue same dose of Lantus. Continue sliding scale insulin. Les Pimple ESRD on peritoneal dialysis:  Nephrology on board        Hypotension:  midodrine    Depression: Wellbutrin     Hypothyroid:  synthroid. Anemia due to ESRD: Continue EPO.     DVT Prophylaxis: Heparin 3 times daily  Diet: ADULT ORAL NUTRITION SUPPLEMENT; Breakfast, Lunch; Renal Oral Supplement  ADULT DIET; Regular; 5 carb choices (75 gm/meal); No Added Salt (3-4 gm); Low Phosphorus (Less than 1000 mg)  Code Status: Full Code  PT/OT Eval Status: SNF    Disposition: Medically stable for discharge.   Await ECF placement      Mariela Hammonds MD

## 2022-10-10 ENCOUNTER — APPOINTMENT (OUTPATIENT)
Dept: GENERAL RADIOLOGY | Age: 77
DRG: 640 | End: 2022-10-10
Payer: COMMERCIAL

## 2022-10-10 VITALS
TEMPERATURE: 98.5 F | SYSTOLIC BLOOD PRESSURE: 133 MMHG | HEIGHT: 70 IN | OXYGEN SATURATION: 92 % | BODY MASS INDEX: 28.66 KG/M2 | WEIGHT: 200.18 LBS | RESPIRATION RATE: 16 BRPM | DIASTOLIC BLOOD PRESSURE: 71 MMHG | HEART RATE: 79 BPM

## 2022-10-10 LAB
BASOPHILS ABSOLUTE: 0.1 K/UL (ref 0–0.2)
BASOPHILS RELATIVE PERCENT: 0.7 %
EOSINOPHILS ABSOLUTE: 0.7 K/UL (ref 0–0.6)
EOSINOPHILS RELATIVE PERCENT: 4.5 %
GLUCOSE BLD-MCNC: 181 MG/DL (ref 70–99)
GLUCOSE BLD-MCNC: 225 MG/DL (ref 70–99)
HCT VFR BLD CALC: 28.7 % (ref 40.5–52.5)
HEMATOLOGY PATH CONSULT: NO
HEMOGLOBIN: 9.4 G/DL (ref 13.5–17.5)
LYMPHOCYTES ABSOLUTE: 1.2 K/UL (ref 1–5.1)
LYMPHOCYTES RELATIVE PERCENT: 7.4 %
MCH RBC QN AUTO: 37.1 PG (ref 26–34)
MCHC RBC AUTO-ENTMCNC: 32.9 G/DL (ref 31–36)
MCV RBC AUTO: 112.8 FL (ref 80–100)
MONOCYTES ABSOLUTE: 0.6 K/UL (ref 0–1.3)
MONOCYTES RELATIVE PERCENT: 3.5 %
NEUTROPHILS ABSOLUTE: 13.8 K/UL (ref 1.7–7.7)
NEUTROPHILS RELATIVE PERCENT: 83.9 %
PDW BLD-RTO: 14.8 % (ref 12.4–15.4)
PERFORMED ON: ABNORMAL
PERFORMED ON: ABNORMAL
PLATELET # BLD: 291 K/UL (ref 135–450)
PMV BLD AUTO: 7.3 FL (ref 5–10.5)
RBC # BLD: 2.54 M/UL (ref 4.2–5.9)
WBC # BLD: 16.5 K/UL (ref 4–11)

## 2022-10-10 PROCEDURE — 6370000000 HC RX 637 (ALT 250 FOR IP): Performed by: INTERNAL MEDICINE

## 2022-10-10 PROCEDURE — 2580000003 HC RX 258: Performed by: STUDENT IN AN ORGANIZED HEALTH CARE EDUCATION/TRAINING PROGRAM

## 2022-10-10 PROCEDURE — 36415 COLL VENOUS BLD VENIPUNCTURE: CPT

## 2022-10-10 PROCEDURE — 72040 X-RAY EXAM NECK SPINE 2-3 VW: CPT

## 2022-10-10 PROCEDURE — 6360000002 HC RX W HCPCS: Performed by: INTERNAL MEDICINE

## 2022-10-10 PROCEDURE — 99232 SBSQ HOSP IP/OBS MODERATE 35: CPT | Performed by: INTERNAL MEDICINE

## 2022-10-10 PROCEDURE — 6360000002 HC RX W HCPCS: Performed by: STUDENT IN AN ORGANIZED HEALTH CARE EDUCATION/TRAINING PROGRAM

## 2022-10-10 PROCEDURE — 6370000000 HC RX 637 (ALT 250 FOR IP): Performed by: STUDENT IN AN ORGANIZED HEALTH CARE EDUCATION/TRAINING PROGRAM

## 2022-10-10 PROCEDURE — 90945 DIALYSIS ONE EVALUATION: CPT | Performed by: INTERNAL MEDICINE

## 2022-10-10 PROCEDURE — 85025 COMPLETE CBC W/AUTO DIFF WBC: CPT

## 2022-10-10 RX ORDER — HYDROCODONE BITARTRATE AND ACETAMINOPHEN 5; 325 MG/1; MG/1
1 TABLET ORAL EVERY 6 HOURS PRN
Status: DISCONTINUED | OUTPATIENT
Start: 2022-10-10 | End: 2022-10-10 | Stop reason: HOSPADM

## 2022-10-10 RX ORDER — LACTOBACILLUS RHAMNOSUS GG 10B CELL
1 CAPSULE ORAL 2 TIMES DAILY WITH MEALS
Qty: 10 CAPSULE | Refills: 0 | Status: SHIPPED | OUTPATIENT
Start: 2022-10-10

## 2022-10-10 RX ORDER — MIDODRINE HYDROCHLORIDE 5 MG/1
5 TABLET ORAL 3 TIMES DAILY PRN
Qty: 90 TABLET | Refills: 0 | Status: SHIPPED | OUTPATIENT
Start: 2022-10-10

## 2022-10-10 RX ORDER — BACLOFEN 10 MG/1
5 TABLET ORAL 3 TIMES DAILY PRN
Status: DISCONTINUED | OUTPATIENT
Start: 2022-10-10 | End: 2022-10-10 | Stop reason: HOSPADM

## 2022-10-10 RX ORDER — FLUCONAZOLE 200 MG/1
200 TABLET ORAL DAILY
Qty: 2 TABLET | Refills: 0 | Status: SHIPPED | OUTPATIENT
Start: 2022-10-11 | End: 2022-10-13

## 2022-10-10 RX ORDER — TORSEMIDE 100 MG/1
100 TABLET ORAL DAILY
Qty: 30 TABLET | Refills: 0 | Status: SHIPPED | OUTPATIENT
Start: 2022-10-11

## 2022-10-10 RX ORDER — GENTAMICIN SULFATE 1 MG/G
CREAM TOPICAL
Qty: 15 G | Refills: 0 | Status: SHIPPED | OUTPATIENT
Start: 2022-10-11

## 2022-10-10 RX ADMIN — TORSEMIDE 100 MG: 100 TABLET ORAL at 08:27

## 2022-10-10 RX ADMIN — HYDROCODONE BITARTRATE AND ACETAMINOPHEN 1 TABLET: 5; 325 TABLET ORAL at 11:39

## 2022-10-10 RX ADMIN — GENTAMICIN SULFATE: 1 CREAM TOPICAL at 08:28

## 2022-10-10 RX ADMIN — ASPIRIN 81 MG 81 MG: 81 TABLET ORAL at 08:28

## 2022-10-10 RX ADMIN — HEPARIN SODIUM 5000 UNITS: 5000 INJECTION INTRAVENOUS; SUBCUTANEOUS at 07:13

## 2022-10-10 RX ADMIN — INSULIN LISPRO 15 UNITS: 100 INJECTION, SOLUTION INTRAVENOUS; SUBCUTANEOUS at 11:39

## 2022-10-10 RX ADMIN — INSULIN GLARGINE 50 UNITS: 100 INJECTION, SOLUTION SUBCUTANEOUS at 08:29

## 2022-10-10 RX ADMIN — EPOETIN ALFA-EPBX 3000 UNITS: 10000 INJECTION, SOLUTION INTRAVENOUS; SUBCUTANEOUS at 11:41

## 2022-10-10 RX ADMIN — BACLOFEN 5 MG: 10 TABLET ORAL at 11:39

## 2022-10-10 RX ADMIN — INSULIN LISPRO 15 UNITS: 100 INJECTION, SOLUTION INTRAVENOUS; SUBCUTANEOUS at 08:29

## 2022-10-10 RX ADMIN — INSULIN LISPRO 2 UNITS: 100 INJECTION, SOLUTION INTRAVENOUS; SUBCUTANEOUS at 08:29

## 2022-10-10 RX ADMIN — Medication 10 ML: at 08:28

## 2022-10-10 RX ADMIN — Medication 1 CAPSULE: at 08:28

## 2022-10-10 RX ADMIN — FLUCONAZOLE 200 MG: 100 TABLET ORAL at 08:27

## 2022-10-10 RX ADMIN — LEVOTHYROXINE SODIUM 50 MCG: 0.03 TABLET ORAL at 07:13

## 2022-10-10 ASSESSMENT — ENCOUNTER SYMPTOMS
COUGH: 0
SHORTNESS OF BREATH: 0
EYE DISCHARGE: 0
SINUS PRESSURE: 0
DIARRHEA: 0
RHINORRHEA: 0
EYE REDNESS: 0
CONSTIPATION: 0
SINUS PAIN: 0
WHEEZING: 0
SORE THROAT: 0
ABDOMINAL PAIN: 0
NAUSEA: 0
BACK PAIN: 0

## 2022-10-10 ASSESSMENT — PAIN DESCRIPTION - LOCATION: LOCATION: NECK

## 2022-10-10 ASSESSMENT — PAIN SCALES - GENERAL: PAINLEVEL_OUTOF10: 0

## 2022-10-10 NOTE — PROGRESS NOTES
Shift summary:     No acute changes overnight. Pt BG at  got 4 units of Humalog. Wore PAP machine overnight for 5hrs and tolerated well.    Still has neck pain this is positional.   CK [facility], MAGO Taltz Counseling: I discussed with the patient the risks of ixekizumab including but not limited to immunosuppression, serious infections, worsening of inflammatory bowel disease and drug reactions.  The patient understands that monitoring is required including a PPD at baseline and must alert us or the primary physician if symptoms of infection or other concerning signs are noted.

## 2022-10-10 NOTE — PLAN OF CARE
Problem: Discharge Planning  Goal: Discharge to home or other facility with appropriate resources  10/10/2022 1410 by Eleuterio Au RN  Outcome: Completed     Problem: Safety - Adult  Goal: Free from fall injury  10/10/2022 1410 by Eleuterio Au RN  Outcome: Completed     Problem: Skin/Tissue Integrity  Goal: Absence of new skin breakdown  Description: 1. Monitor for areas of redness and/or skin breakdown  2. Assess vascular access sites hourly  3. Every 4-6 hours minimum:  Change oxygen saturation probe site  4. Every 4-6 hours:  If on nasal continuous positive airway pressure, respiratory therapy assess nares and determine need for appliance change or resting period.   Outcome: Completed     Problem: Nutrition Deficit:  Goal: Optimize nutritional status  Outcome: Completed     Problem: Pain  Goal: Verbalizes/displays adequate comfort level or baseline comfort level  Outcome: Completed     Problem: ABCDS Injury Assessment  Goal: Absence of physical injury  Outcome: Completed     Problem: Chronic Conditions and Co-morbidities  Goal: Patient's chronic conditions and co-morbidity symptoms are monitored and maintained or improved  Outcome: Completed

## 2022-10-10 NOTE — PROGRESS NOTES
PROCEDURE:  Patient seen on peritoneal dialysis  at 7:30 AM    PHYSICIAN:  Olena Munguia MD    INDICATION:  End-stage renal disease    Wt Readings from Last 3 Encounters:   10/09/22 206 lb 12.7 oz (93.8 kg)   09/19/22 222 lb 10.6 oz (101 kg)   08/15/22 201 lb (91.2 kg)     Temp Readings from Last 3 Encounters:   10/09/22 97.7 °F (36.5 °C)   09/19/22 97.6 °F (36.4 °C) (Axillary)   05/12/22 96.5 °F (35.8 °C) (Infrared)     BP Readings from Last 3 Encounters:   10/10/22 (!) 110/56   09/19/22 137/65   08/15/22 118/60     Pulse Readings from Last 3 Encounters:   10/10/22 76   09/19/22 81   08/15/22 91      In: 130 [P.O.:120; I.V.:10]  Out: -      CBC:   Recent Labs     10/08/22  0844 10/09/22  0505 10/10/22  0439   WBC 19.0* 20.0* 16.5*   HGB 9.5* 9.8* 9.4*    310 291     BMP:    Recent Labs     10/08/22  0844   *   K 4.2   CL 93*   CO2 21   BUN 78*   CREATININE 11.0*   GLUCOSE 223*     BMD:   Lab Results   Component Value Date    .7 (H) 04/02/2022    CALCIUM 9.1 10/08/2022    CAION 1.03 (L) 01/14/2017    PHOS 9.5 (H) 10/08/2022       Iron:     Lab Results   Component Value Date    IRON 69 09/25/2022    TIBC 185 (L) 09/25/2022    FERRITIN 855.7 (H) 09/25/2022            RX:           Exchanges: 4                Exchange Volume: 2000 ml              Total Time: 8 hrs              Dextrose: 4.25%              Last Fill: 0 ml              Total Volume: 8000 ml     COMMENTS:      Tolerating well   UF better with 4.25 %   Drained fluid clear   No abdominal pain       Olena Munguia MD  Nephrology     [facility] Office : [address], suite [facility] , [address]  [place] Office: [facility] [address] Gabi Parsons, [place], [facility] Office: [facility], [address] [facility] [address].   [place] Office: [address] [place] [address], [address] [address]  Office : [phone]  Fax :[phone]

## 2022-10-10 NOTE — CARE COORDINATION
CM called Vladislav Garcia at the [facility] and left vm inquiring if insurance pre cert has been obtained and that pt is ready for discharge.       Glenn Sawant RN, BSN  [phone]

## 2022-10-10 NOTE — DISCHARGE SUMMARY
Hospital Medicine Discharge Summary    Patient ID: Ausitn Cazares      Patient's PCP: Jada James MD    Admit Date: 9/21/2022     Discharge Date:   10/10/2022    Admitting Provider: Glenn Baez DO     Discharge Provider: Jenny Regalado MD     Discharge Diagnoses: Active Hospital Problems    Diagnosis     Diabetes education, encounter for [Z71.89]      Priority: Medium    Peritoneal dialysis catheter in place [facility]) [Z99.2]      Priority: Medium    Acute congestive heart failure ([facility] [I50.9]      Priority: Medium    Overweight (BMI 25.0-29. 9) [E66.3]      Priority: Medium    History of 2019 novel coronavirus disease (COVID-19) [Z86.16]      Priority: Medium    Elevated procalcitonin [R79.89]      Priority: Medium    Arrhythmia [I49.9]      Priority: Medium    Leukocytosis [D72.829]      Priority: Medium    Acute pulmonary edema (HCC) [J81.0]      Priority: Medium    Multifocal pneumonia [J18.9]      Priority: Medium    Hypervolemia [E87.70]      Priority: Medium    Acute on chronic respiratory failure with hypoxia and hypercapnia (HCC) [K65.15, J96.22]      Priority: Medium    Moderate episode of recurrent major depressive disorder (HCC) [F33.1]      Priority: Medium    BJ treated with BiPAP [G47.33]      Priority: Medium    Acute metabolic encephalopathy [T97.01]      Priority: Medium    ESRD on peritoneal dialysis ([facility] [N18.6, Z99.2]     Type 2 diabetes mellitus with hyperglycemia, with long-term current use of insulin (HCC) [E11.65, Z79.4]     Hypertension associated with stage 5 chronic kidney disease due to type 2 diabetes mellitus ([facility] [E11.22, I12.0, N18.5]     General weakness [R53.1]     Chronic diastolic heart failure (HCC) [I50.32]     Acute respiratory failure with hypoxia and hypercapnia (HCC) [J96.01, J96.02]     Arthritis with psoriasis (HCC) [L40.50]     Psoriatic arthropathy ([facility] [L40.50]     Sleep apnea [G47.30]        The patient was seen and examined on day of discharge and this discharge summary is in conjunction with any daily progress note from day of discharge. Hospital Course:     56-year-old man with history of end-stage renal disease PD  dependent, hypothyroidism, depression, anemia of CKD, diabetes mellitus type 2, obstructive sleep apnea-unable to tolerate home BiPAP or CPAP, brought in due to encephalopathy. Found to be hypoxic and hypercarbic on admission. Needed BiPAP on admission. Acute on chronic hypoxic and hypercapnic respiratory failure: Most likely due to volume overload. Resolved with diuresis    ESRD- PD continued in the hospital       Volume overload: Is with IV Lasix initially. Later switched to torsemide       leukocytosis:blood cx neg. Sacral wound cx candida. PD fluid cx neg. CT chest abdomen and pelvis 10/4 unremarkable except fluid collection at the tip of PD catheter which was thought to be dialysate fluid. ID consulted. Treated with fluconazole, meropenem and Zyvox. wbc did not change much. Discharged with few more days of diflucan. Acute metabolic encephalopathy: Resolved. CT head: Neg. TSH, Ammonia level wnl. B 12 and folate wnl. Cardiac arrhythmias: Cardiology consulted; pacer not recommended at this time. History of BJ: Unable to tolerate home BiPAP/CPAP. Will need repeat sleep study outpatient. - discharged to SNF in stable condition. Physical Exam Performed:     /71   Pulse 77   Temp 98.5 °F (36.9 °C) (Oral)   Resp 18   Ht 5' 10\" (1.778 m)   Wt 200 lb 2.8 oz (90.8 kg)   SpO2 92%   BMI 28.72 kg/m²       General appearance:  No apparent distress, appears stated age and cooperative. HEENT:  Normal cephalic, atraumatic without obvious deformity. Pupils equal, round, and reactive to light. Extra ocular muscles intact. Conjunctivae/corneas clear. Neck: Supple, with full range of motion. No jugular venous distention. Trachea midline. Respiratory:  Normal respiratory effort.  Clear to auscultation, bilaterally without Rales/Wheezes/Rhonchi. Cardiovascular:  Regular rate and rhythm with normal S1/S2 without murmurs, rubs or gallops. Abdomen: Soft, non-tender, non-distended with normal bowel sounds. Musculoskeletal:  No clubbing, cyanosis or edema bilaterally. Full range of motion without deformity. Skin: Skin color, texture, turgor normal.  No rashes or lesions. Neurologic:  Neurovascularly intact without any focal sensory/motor deficits. Cranial nerves: II-XII intact, grossly non-focal.  Psychiatric:  Alert and oriented, thought content appropriate, normal insight  Capillary Refill: Brisk,< 3 seconds   Peripheral Pulses: +2 palpable, equal bilaterally       Labs: For convenience and continuity at follow-up the following most recent labs are provided:      CBC:    Lab Results   Component Value Date/Time    WBC 16.5 10/10/2022 04:39 AM    HGB 9.4 10/10/2022 04:39 AM    HCT 28.7 10/10/2022 04:39 AM     10/10/2022 04:39 AM       Renal:    Lab Results   Component Value Date/Time     10/08/2022 08:44 AM    K 4.2 10/08/2022 08:44 AM    K 4.4 10/05/2022 04:19 AM    CL 93 10/08/2022 08:44 AM    CO2 21 10/08/2022 08:44 AM    BUN 78 10/08/2022 08:44 AM    CREATININE 11.0 10/08/2022 08:44 AM    CALCIUM 9.1 10/08/2022 08:44 AM    PHOS 9.5 10/08/2022 08:44 AM         Significant Diagnostic Studies    Radiology:   CT CHEST ABDOMEN PELVIS WO CONTRAST   Final Result   Addendum (preliminary) 1 of 1   ADDENDUM:   Addendum is being made for clarification about the catheter in the    peritoneal   cavity. The catheter within the peritoneal cavity is a dialysis catheter. Final   Focal linear consolidative changes of bilateral lower lobes, likely   suggestive of atelectasis. .  Minimal right pleural effusion. Cholelithiasis with no signs of cholecystitis. Extensive calcification of renal arteries. Underlying 2-4 mm nonobstructing   kidney stones cannot be excluded. .      A drainage catheter tip is within the the right lower quadrant area with a   small amount of residual fluid noted surrounding the drainage catheter   measuring 2.7 x 1.7 cm. XR CHEST PORTABLE   Final Result   Low volume study with trace bilateral pleural effusions and persistent   bibasilar atelectasis or airspace disease. XR CHEST PORTABLE   Final Result   CHF with pulmonary edema. CT HEAD WO CONTRAST   Final Result   No definite evidence of acute intracranial abnormality on a study   significantly limited by motion/streak artifact as described above. XR CERVICAL SPINE (4-5 VIEWS)    (Results Pending)          Consults:     IP CONSULT TO NEPHROLOGY  IP CONSULT TO PALLIATIVE CARE  IP CONSULT TO PALLIATIVE CARE  IP CONSULT TO INFECTIOUS DISEASES  IP CONSULT TO CARDIOLOGY  IP CONSULT TO DIABETES EDUCATOR  IP CONSULT TO HEART FAILURE NURSE/COORDINATOR    Disposition:  ECF      Condition at Discharge: Stable    Discharge Instructions/Follow-up:   PD per schedule     Code Status:  Full Code     Activity: activity as tolerated    Diet: renal diet      Discharge Medications:     Current Discharge Medication List             Details   fluconazole (DIFLUCAN) 200 MG tablet Take 1 tablet by mouth daily for 2 doses  Qty: 2 tablet, Refills: 0      gentamicin (GARAMYCIN) 0.1 % cream Apply topically 3 times daily.   Qty: 15 g, Refills: 0      lactobacillus (CULTURELLE) capsule Take 1 capsule by mouth 2 times daily (with meals)  Qty: 10 capsule, Refills: 0      torsemide (DEMADEX) 100 MG tablet Take 1 tablet by mouth daily  Qty: 30 tablet, Refills: 0                Details   midodrine (PROAMATINE) 5 MG tablet Take 1 tablet by mouth 3 times daily as needed (SBP < 100)  Qty: 90 tablet, Refills: 0                Details   albuterol sulfate (PROAIR RESPICLICK) 551 (90 Base) MCG/ACT aerosol powder inhalation Inhale 2 puffs into the lungs every 4 hours as needed for Wheezing or Shortness of Breath  Qty: 1 each, Refills: 5      buPROPion (WELLBUTRIN) 100 MG tablet Take 1 tablet by mouth every other day  Qty: 60 tablet, Refills: 3      epoetin daphney-epbx (RETACRIT) 2000 UNIT/ML injection Inject 1 mL into the skin three times a week  Qty: 12 mL, Refills: 0      insulin glargine (LANTUS SOLOSTAR) 100 UNIT/ML injection pen Inject 50 Units into the skin nightly  Qty: 5 Adjustable Dose Pre-filled Pen Syringe, Refills: 3      !! insulin lispro, 1 Unit Dial, 100 UNIT/ML SOPN Inject 0-4 Units into the skin 3 times daily (with meals)  Qty: 10 mL, Refills: 0      !! insulin lispro, 1 Unit Dial, 100 UNIT/ML SOPN Inject 0-4 Units into the skin nightly  Qty: 10 mL, Refills: 0      BREO ELLIPTA 200-25 MCG/INH AEPB inhaler Inhale 1 puff into the lungs daily      atorvastatin (LIPITOR) 80 MG tablet TAKE ONE TABLET BY MOUTH ONCE NIGHTLY  Qty: 90 tablet, Refills: 3      Easy Touch Lancets 30G/Twist MISC USE THREE TIMES A DAY TO FOUR TIMES A DAY  Qty: 150 each, Refills: 5      levothyroxine (SYNTHROID) 50 MCG tablet TAKE ONE TABLET BY MOUTH DAILY  Qty: 90 tablet, Refills: 8      B-D ULTRAFINE III SHORT PEN 31G X 8 MM MISC USE WITH INSULIN FOUR TIMES A DAY  Qty: 200 each, Refills: 2      !! Blood Glucose Monitoring Suppl (TRUE METRIX METER) RUMA As needed  Qty: 1 each, Refills: 0      blood glucose monitor strips Test up to 4 times daily for blood sugar monitoring  Qty: 200 strip, Refills: 3    Comments: Brand per patient preference. May round up to next available package size. !! blood glucose monitor kit and supplies (PLEASE DISPENSE WHAT INS WILL COVER) - Dispense sufficient amount for  TID testing frequency plus additional to accommodate PRN testing needs. Dispense all needed supplies to include: monitor, strips, lancing device, lancets, control solutions, alcohol swabs. Qty: 1 kit, Refills: 0    Comments: Brand per patient preference. May round up to next available package size.       B Complex-C-Biotin-E-Min-FA (DIALYVITE 3000) 3 MG TABS Take 1 tablet by mouth daily      vitamin D (ERGOCALCIFEROL) 1.25 MG (45215 UT) CAPS capsule Take 50,000 Units by mouth every 3 months      Handicap Placard MISC by Does not apply route Duration: 5 years  Qty: 1 each, Refills: 0      aspirin 81 MG chewable tablet Take 1 tablet by mouth daily. Qty: 30 tablet, Refills: 5       !! - Potential duplicate medications found. Please discuss with provider. Time Spent on discharge is more than 30 minutes in the examination, evaluation, counseling and review of medications and discharge plan. Signed:    Maegan Campo MD   10/10/2022      Thank you Clark Gary MD for the opportunity to be involved in this patient's care. If you have any questions or concerns, please feel free to contact me at [phone].

## 2022-10-10 NOTE — CARE COORDINATION
Pre cert was obtained and transport has been set for 5:15. CM called and informed Macrina Hyman at [employer] [phone]. CM updated RN. CM called spouse and informed her.     Bisi Chow RN, BSN  [phone]

## 2022-10-10 NOTE — FLOWSHEET NOTE
CCPD order:   Use 4.25 % solution              Continuous [facility] Peritoneal Dialysis (CCPD): Patient Communication     Not Released  Not seen     Order Questions    Question Answer   Total Volume (L) 8   Therapy Time Duration (Hours) 8   Exchange Volume (L) 2   Number of Exchanges 4   Final Fill No         UF: 1068 ML  Total Time:8 hours and 22 minutes  Alarm: 0 times over night  CCPD removed fluid: color Light yellow, clear ; without fibrin noticed  Pt tolerated well over night with CCPD  Plan to put pt on CCPD this evening if pt's still at hospital  Placed CCPD flow sheets in the chart for EMR scan   10/10/22 1019   Vital Signs   /62   Temp 97.8 °F (36.6 °C)   Heart Rate 80   Resp 18   SpO2 95 %   Weight 200 lb 2.8 oz (90.8 kg)   Weight Method Bed scale   Percent Weight Change -3.2

## 2022-10-10 NOTE — CARE COORDINATION
Transport changed to 4:14 with [address]  Torsten Analisatesha at the [facility]. RN updated and updating family member in room. Discharge Plan:     Patient discharged to: [facility]   SW/FELIPE Planner faxed, [address] and AVS to:[phone]  Narcotic Prescriptions faxed were:n/a   RN: Alpa Justice will call report to:     [phone]  Medical Transport with: [address] transport [phone]   time:4:14 pm   Family advised of discharge?:yes   HENS Submitted?:  yes   All discharge needs met per case management.     Blaise Gruber RN, BSN  [phone]

## 2022-10-10 NOTE — PROGRESS NOTES
Infectious Diseases   Progress Note      Admission Date: 9/21/2022  Hospital Day: Hospital Day: 20   Attending: Maegan Londono MD  Date of service: 10/10/2022     Chief complaint/ Reason for consult:     Persistent leukocytosis  Acute on chronic respiratory failure with hypoxia on admission  Acute metabolic encephalopathy on admission  ESRD was on peritoneal dialysis at home  Elevated procalcitonin of 1.02 on admission  Essential hypertension  Coronary artery disease    Microbiology:        I have reviewed allavailable micro lab data and cultures    Blood culture (2/2) - collected on 9/21/2022 and 10-2-22: Negative        Antibiotics and immunizations:       Current antibiotics: All antibiotics and their doses were reviewed by me    Recent Abx Admin                     fluconazole (DIFLUCAN) tablet 200 mg (mg) 200 mg Given 10/10/22 0827                      Immunization History: All immunization history was reviewed by me today. Immunization History   Administered Date(s) Administered    COVID-19, PFIZER PURPLE top, DILUTE for use, (age 15 y+), 30mcg/0.3mL 04/21/2021, 05/12/2021, 11/30/2021    Influenza Vaccine, unspecified formulation 10/16/2014, 10/19/2015, 10/21/2016    Influenza Virus Vaccine 10/16/2014, 10/19/2015, 10/21/2016    Influenza, FLUAD, (age 72 y+), Adjuvanted, 0.5mL 09/08/2020    Influenza, High Dose (Fluzone 65 yrs and older) 10/16/2014, 10/19/2015, 10/21/2016, 09/01/2017, 11/16/2018    Influenza, Triv, inactivated, subunit, adjuvanted, IM (Fluad 65 yrs and older) 11/20/2019    PPD Test 02/02/2016    Pneumococcal Conjugate 13-valent ([id number]) 10/19/2015    Pneumococcal Polysaccharide ([id number]) 10/16/2014    Tdap (Boostrix, Adacel) 08/12/2016       Known drug allergies:      All allergies were reviewed and updated    Allergies   Allergen Reactions    Penicillins Other (See Comments)     Syncope \"pass out \" per pt, tolerates cefepime    Sulfa Antibiotics      Patient unsure of reaction     Tazorac [Tazarotene]      Cream - rash    Clindamycin Rash    Clindamycin/Lincomycin Rash       Social history:     Social History:  All social andepidemiologic history was reviewed and updated by me today as needed. Tobacco use:   reports that he quit smoking about 41 years ago. He started smoking about 60 years ago. He has a 18.00 pack-year smoking history. He has never used smokeless tobacco.  Alcohol use:   reports no history of alcohol use. Currently lives in: [address] [place] [address]   reports no history of drug use. COVID VACCINATION AND LAB RESULT RECORDS:     Internal Administration   First Dose COVID-19, PFIZER PURPLE top, DILUTE for use, (age 15 y+), 30mcg/0.3mL  04/21/2021   Second Dose COVID-19, PFIZER PURPLE top, DILUTE for use, (age 15 y+), 30mcg/0.3mL   05/12/2021       Last COVID Lab SARS-CoV-2, PCR (no units)   Date Value   10/20/2020 Not Detected     SARS-CoV-2, GORDON (no units)   Date Value   12/28/2020 DETECTED (A)            Assessment:     The patient is a 69 y.o. old male who  has a past medical history of Allergic rhinitis due to other allergen (7/12/2010), Coronary atherosclerosis of unspecified type of vessel, native or graft (7/12/2010), Diabetic eye exam ([facility] (04/29/2015), Diabetic eye exam ([facility] (5/5/2016), Generalized osteoarthrosis, involving multiple sites (7/12/2010), Other psoriasis (7/12/2010), Pneumonia, Prolonged emergence from general anesthesia, Psoriatic arthropathy ([facility] (7/12/2010), Type II or unspecified type diabetes mellitus without mention of complication, not stated as uncontrolled (7/12/2010), Unspecified essential hypertension (7/12/2010), and Unspecified sleep apnea (7/12/2010).  with following problems:    Persistent leukocytosis-improving  Acute on chronic respiratory failure with hypoxia on admission  Acute metabolic encephalopathy on admission-  resolved  ESRD was on peritoneal dialysis at home  Elevated procalcitonin of 1.02 on admission  Essential hypertension-BP controlled  Coronary artery disease  History of COVID-19  Type 2 diabetes mellitus-maintaining good glycemic control  Obstructive sleep apnea  Generalized weakness  Overweight due to excess calorie intake : Body mass index is 29.26 kg/m². Discussion:      The patient is afebrile. White cell count is improving and is 16,500 today. Patient remains on oral Diflucan. Blood cultures from 10/3/2022 have finalized as negative. PD fluid culture from 10-22 was also negative. Plan:     Diagnostic Workup:      Continue to follow  fever curve, WBC count and blood cultures. Continue to monitor blood counts, liver and renal function. Antimicrobials:    Improvement in the white cell count is reassuring  Continue oral Diflucan 200 mg daily  Is a patient continues to tolerate Diflucan okay, okay to continue it until October 14, Friday  Pressure ulcer care and prevention precautions  Continue close vitals monitoring. Maintain good glycemic control. Fall precautions. Aspiration precautions. Continue to watch for new fever or diarrhea. DVT prophylaxis. Discussed all above with patient and RN. Drug Monitoring:    Continue monitoring for antibiotic toxicity as follows: CBC, CMP   Continue to watch for following: new or worsening fever, new hypotension, hives, lip swelling and redness or purulence at vascular access sites. I/v access Management:    Continue to monitor i.v access sites for erythema, induration, discharge or tenderness. As always, continue efforts to minimize tubes/lines/drains as clinically appropriate to reduce chances of line associated infections. Patient education and counseling: The patient was educated in detail about the side-effects of various antibiotics and things to watch for like new rashes, lip swelling, severe reaction, worsening diarrhea, break through fever etc.  Discussed patient's condition and what to expect.  All of the patient's questions were addressed in a satisfactory manner and patient verbalized understanding all instructions. Thank you for involving me in the care of your patient. I will continue to follow. If you have anyadditional questions, please do not hesitate to contact me. Subjective: Interval history: Interval history was obtained from chart review and patient/ RN. He is afebrile. He is tolerating at fungal okay. No diarrhea. REVIEW OF SYSTEMS:      Review of Systems   Constitutional:  Negative for chills, diaphoresis and fever. HENT:  Negative for ear discharge, ear pain, postnasal drip, rhinorrhea, sinus pressure, sinus pain and sore throat. Eyes:  Negative for discharge and redness. Respiratory:  Negative for cough, shortness of breath and wheezing. Cardiovascular:  Negative for chest pain and leg swelling. Gastrointestinal:  Negative for abdominal pain, constipation, diarrhea and nausea. Endocrine: Negative for cold intolerance, heat intolerance and polydipsia. Genitourinary:  Negative for dysuria, flank pain, frequency, hematuria and urgency. Musculoskeletal:  Negative for back pain and myalgias. Skin:  Negative for rash. Allergic/Immunologic: Negative for immunocompromised state. Neurological:  Negative for dizziness, seizures and headaches. Hematological:  Does not bruise/bleed easily. Psychiatric/Behavioral:  Negative for agitation, hallucinations and suicidal ideas. The patient is not nervous/anxious. All other systems reviewed and are negative. Past Medical History: All past medical history reviewed today.     Past Medical History:   Diagnosis Date    Allergic rhinitis due to other allergen 7/12/2010    Coronary atherosclerosis of unspecified type of vessel, native or graft 7/12/2010    Diabetic eye exam ([facility] 04/29/2015    Diabetic eye exam ([facility] 5/5/2016    [facility] eye    Generalized osteoarthrosis, involving multiple sites 7/12/2010    Other psoriasis 7/12/2010    Pneumonia     Prolonged emergence from general anesthesia     Psoriatic arthropathy ([facility] 7/12/2010    Type II or unspecified type diabetes mellitus without mention of complication, not stated as uncontrolled 7/12/2010    Unspecified essential hypertension 7/12/2010    Unspecified sleep apnea 7/12/2010       Past Surgical History: All past surgical history was reviewed today. Past Surgical History:   Procedure Laterality Date    CARPAL TUNNEL RELEASE      s/p    CATARACT REMOVAL      CORONARY ARTERY BYPASS GRAFT      JOINT REPLACEMENT      LAPAROSCOPY INSERTION PERITONEAL CATHETER N/A 10/26/2020    LAPAROSCOPIC PERITONEAL DIALYSIS CATHETER PLACEMENT; LAPAROSCOPIC OMENTOPEXY performed by Magali oDyle DO at [address] OR       Family History: All family history was reviewed today. Problem Relation Age of Onset    Diabetes Mother     Heart Failure Mother     Coronary Art Dis Father        Objective:       PHYSICAL EXAM:      Vitals:   Vitals:    10/10/22 1019 10/10/22 1041 10/10/22 1142 10/10/22 1153   BP: 107/62   133/71   Pulse: 80 82 77 78   Resp: 18      Temp: 97.8 °F (36.6 °C)   98.5 °F (36.9 °C)   TempSrc:    Oral   SpO2: 95%   92%   Weight: 200 lb 2.8 oz (90.8 kg)      Height:           Physical Exam  Vitals and nursing note reviewed. Constitutional:       Appearance: He is well-developed. He is not diaphoretic. Comments: The patient was seen earlier today. HENT:      Head: Normocephalic and atraumatic. Right Ear: External ear normal. There is no impacted cerumen. Left Ear: External ear normal. There is no impacted cerumen. Nose: Nose normal.      Mouth/Throat:      Mouth: Mucous membranes are moist.      Pharynx: Oropharynx is clear. No oropharyngeal exudate. Eyes:      General: No scleral icterus. Right eye: No discharge. Left eye: No discharge.       Conjunctiva/sclera: Conjunctivae normal.      Pupils: Pupils are equal, round, and reactive to light.   Neck:      Thyroid: No thyromegaly. Cardiovascular:      Rate and Rhythm: Normal rate and regular rhythm. Heart sounds: Normal heart sounds. No murmur heard. No friction rub. Pulmonary:      Effort: No respiratory distress. Breath sounds: No stridor. No wheezing or rales. Abdominal:      General: Bowel sounds are normal.      Palpations: Abdomen is soft. Tenderness: There is no abdominal tenderness. There is no guarding or rebound. Musculoskeletal:         General: No swelling, tenderness or deformity. Normal range of motion. Cervical back: Normal range of motion and neck supple. Right lower leg: No edema. Left lower leg: No edema. Lymphadenopathy:      Cervical: No cervical adenopathy. Skin:     General: Skin is warm and dry. Coloration: Skin is not jaundiced. Findings: No bruising, erythema or rash. Neurological:      General: No focal deficit present. Mental Status: He is alert and oriented to person, place, and time. Mental status is at baseline. Motor: No abnormal muscle tone. Psychiatric:         Mood and Affect: Mood normal.         Behavior: Behavior normal.   *    Lines and drains: All vascular access sites are healthy with no local erythema, discharge or tenderness. Intake and output:    I/O last 3 completed shifts: In: 130 [P.O.:120; I.V.:10]  Out: -     Lab Data:   All available labs and old records have been reviewed by me.     CBC:  Recent Labs     10/08/22  0844 10/09/22  0505 10/10/22  0439   WBC 19.0* 20.0* 16.5*   RBC 2.60* 2.69* 2.54*   HGB 9.5* 9.8* 9.4*   HCT 29.2* 30.0* 28.7*    310 291   .5* 111.6* 112.8*   MCH 36.7* 36.6* 37.1*   MCHC 32.6 32.8 32.9   RDW 14.6 14.5 14.8          BMP:  Recent Labs     10/08/22  0844   *   K 4.2   CL 93*   CO2 21   BUN 78*   CREATININE 11.0*   CALCIUM 9.1   GLUCOSE 223*          Hepatic Function Panel:   Lab Results   Component Value Date/Time    ALKPHOS 92 09/26/2022 04:24 AM    ALT 33 09/26/2022 04:24 AM    AST 29 09/26/2022 04:24 AM    PROT 5.9 09/26/2022 04:24 AM    PROT 7.0 08/15/2012 08:52 AM    BILITOT <0.2 09/26/2022 04:24 AM    BILIDIR <0.2 01/15/2017 05:30 AM    IBILI see below 01/15/2017 05:30 AM    LABALBU 2.9 10/08/2022 08:44 AM       CPK:   Lab Results   Component Value Date    CKTOTAL 196 04/29/2015     ESR: No results found for: SEDRATE  CRP: No results found for: CRP        Imaging: All pertinent images and reports for the current visit were reviewed by me during this visit. I reviewed the chest x-ray/CT scan/MRI images and independently interpreted the findings and results today. CT CHEST ABDOMEN PELVIS WO CONTRAST   Final Result   Addendum (preliminary) 1 of 1   ADDENDUM:   Addendum is being made for clarification about the catheter in the    peritoneal   cavity. The catheter within the peritoneal cavity is a dialysis catheter. Final   Focal linear consolidative changes of bilateral lower lobes, likely   suggestive of atelectasis. .  Minimal right pleural effusion. Cholelithiasis with no signs of cholecystitis. Extensive calcification of renal arteries. Underlying 2-4 mm nonobstructing   kidney stones cannot be excluded. .      A drainage catheter tip is within the the right lower quadrant area with a   small amount of residual fluid noted surrounding the drainage catheter   measuring 2.7 x 1.7 cm. XR CHEST PORTABLE   Final Result   Low volume study with trace bilateral pleural effusions and persistent   bibasilar atelectasis or airspace disease. XR CHEST PORTABLE   Final Result   CHF with pulmonary edema. CT HEAD WO CONTRAST   Final Result   No definite evidence of acute intracranial abnormality on a study   significantly limited by motion/streak artifact as described above. XR CERVICAL SPINE (4-5 VIEWS)    (Results Pending)       Medications:  All current and past medications were reviewed.      insulin lispro  15 Units SubCUTAneous TID     insulin lispro  0-8 Units SubCUTAneous TID     insulin lispro  0-4 Units SubCUTAneous Nightly    fluconazole  200 mg Oral Daily    insulin glargine  50 Units SubCUTAneous Daily    lactobacillus  1 capsule Oral BID     epoetin daphney-epbx  3,000 Units SubCUTAneous Once per day on Mon Wed Fri    torsemide  100 mg Oral Daily    ipratropium-albuterol  1 ampule Inhalation 4x daily    gentamicin   Topical Daily    sodium chloride flush  5-40 mL IntraVENous 2 times per day    aspirin  81 mg Oral Daily    atorvastatin  80 mg Oral Nightly    mometasone-formoterol  2 puff Inhalation BID    buPROPion  100 mg Oral Every Other Day    levothyroxine  50 mcg Oral Daily    heparin (porcine)  5,000 Units SubCUTAneous 3 times per day        sodium chloride 5 mL/hr at 10/06/22 1723    dextrose         baclofen, HYDROcodone 5 mg - acetaminophen, midodrine, sodium chloride flush, sodium chloride, ondansetron **OR** ondansetron, polyethylene glycol, acetaminophen **OR** acetaminophen, albuterol, dextrose bolus **OR** dextrose bolus, glucagon (rDNA), dextrose      Problem list:       Patient Active Problem List   Diagnosis Code    Essential hypertension I10    Generalized osteoarthrosis, involving multiple sites M15.9    Arthritis with psoriasis ([facility] L40.50    Sleep apnea G47.30    Psoriatic arthropathy ([facility]) L40.50    Allergic rhinitis due to other allergen J30.89    Coronary atherosclerosis I25.10    Vitamin D deficiency E55.9    Glaucoma H40.9    Nephropathy N28.9    S/P CABG x 4 Z95.1    ILD (interstitial lung disease) ([facility] J84.9    Acute respiratory failure with hypoxia and hypercapnia ([facility]) J96.01, J96.02    Chronic diastolic heart failure ([facility]) I50.32    Coronary artery disease involving native coronary artery of native heart with unstable angina pectoris ([facility]) I25.110    BJ (obstructive sleep apnea) G47.33    Cough variant asthma J45.991    NSTEMI (non-ST elevated myocardial infarction) ([facility] I21.4    Diabetes mellitus type 2 in obese ([facility]) E11.69, E66.9    General weakness R53.1    Syncope and collapse R55    Type 2 diabetes mellitus with hyperglycemia, with long-term current use of insulin ([facility]) E11.65, Z79.4    Hypertension associated with stage 5 chronic kidney disease due to type 2 diabetes mellitus ([facility]) E11.22, I12.0, N18.5    Acquired hypothyroidism E03.9    Unstable angina ([facility]) I20.0    Orthostatic hypotension I95.1    Dyspnea on exertion R06.09    Left lower quadrant abdominal pain R10.32    Anemia in ESRD (end-stage renal disease) ([facility]) N18.6, D63.1    AMS (altered mental status) R41.82    Acute respiratory failure with hypoxia ([facility]) W80.94    Acute metabolic encephalopathy Z15.37    Hypercarbia R06.89    Electrolyte imbalance E87.8    Generalized abdominal tenderness R10.817    Chronic hypercapnic respiratory failure ([facility]) J96.12    BJ treated with BiPAP G47.33    Drug toxicity R89.2    Physical deconditioning R53.81    Moderate episode of recurrent major depressive disorder ([facility]) F33.1    Acute on chronic respiratory failure with hypoxia and hypercapnia ([facility]) J96.21, J96.22    Multifocal pneumonia J18.9    Hypervolemia E87.70    Leukocytosis D72.829    Acute pulmonary edema ([facility]) J81.0    Arrhythmia I49.9    Overweight (BMI 25.0-29. 9) E66.3    History of 2019 novel coronavirus disease (COVID-19) Z86.16    Elevated procalcitonin R79.89    Peritoneal dialysis catheter in place [facility]) Z99.2    Acute congestive heart failure ([facility] I50.9    Diabetes education, encounter for Z71.89       Please note that this chart was generated using Dragon dictation software. Although every effort was made to ensure the accuracy of this automated transcription, some errors in transcription may have occurred inadvertently. If you may need any clarification, please do not hesitate to contact me through EPIC or at the phone number provided below with my electronic signature.   Any pictures or media included in this note were obtained after taking informed verbal consent from the patient and with their approval to include those in the patient's medical record. José Luis Mojica MD, MPH, [address] [place] [address]  10/10/2022, 12:28 PM  [facility] Infectious Disease   [address] [place] [address], [address] [place] [address]  Office: [phone]  Fax: [phone]  In-person Clinic days:  Tuesday & Thursday a.m. Virtual clinic days: Monday, Wednesday & Friday a.m.

## 2022-10-12 ENCOUNTER — TELEPHONE (OUTPATIENT)
Dept: OTHER | Age: 77
End: 2022-10-12

## 2022-10-12 DIAGNOSIS — Z99.2 PERITONEAL DIALYSIS CATHETER IN PLACE (HCC): ICD-10-CM

## 2022-10-12 DIAGNOSIS — N18.6 ESRD (END STAGE RENAL DISEASE) (HCC): Primary | ICD-10-CM

## 2022-10-12 DIAGNOSIS — T85.611A PD CATHETER DYSFUNCTION, INITIAL ENCOUNTER (HCC): ICD-10-CM

## 2022-10-12 NOTE — TELEPHONE ENCOUNTER
Attempted to reach nursing at [facility]. Transferred to nursing station x 2. Left message for call back.  Patient has follow up with Dr. Fabian Rdz on 10/24

## 2022-10-27 ENCOUNTER — FOLLOWUP TELEPHONE ENCOUNTER (OUTPATIENT)
Dept: NEPHROLOGY | Age: 77
End: 2022-10-27

## 2022-10-27 NOTE — TELEPHONE ENCOUNTER
Televisit with pt     Jamie PD well     Dry wt adjusted     Midodrine 5 mg BID     Binders with Meals     Low Phos diet     Kt/v low - prescription changed and waiting for new kt/v

## 2022-11-07 ENCOUNTER — APPOINTMENT (OUTPATIENT)
Dept: GENERAL RADIOLOGY | Age: 77
DRG: 189 | End: 2022-11-07
Payer: COMMERCIAL

## 2022-11-07 ENCOUNTER — HOSPITAL ENCOUNTER (INPATIENT)
Age: 77
LOS: 9 days | Discharge: HOME | DRG: 189 | End: 2022-11-16
Attending: EMERGENCY MEDICINE | Admitting: INTERNAL MEDICINE
Payer: COMMERCIAL

## 2022-11-07 DIAGNOSIS — R41.82 ALTERED MENTAL STATUS, UNSPECIFIED ALTERED MENTAL STATUS TYPE: ICD-10-CM

## 2022-11-07 DIAGNOSIS — N30.00 ACUTE CYSTITIS WITHOUT HEMATURIA: ICD-10-CM

## 2022-11-07 DIAGNOSIS — J96.22 ACUTE ON CHRONIC RESPIRATORY FAILURE WITH HYPOXIA AND HYPERCAPNIA (HCC): Primary | ICD-10-CM

## 2022-11-07 DIAGNOSIS — J96.21 ACUTE ON CHRONIC RESPIRATORY FAILURE WITH HYPOXIA AND HYPERCAPNIA (HCC): Primary | ICD-10-CM

## 2022-11-07 PROBLEM — G93.41 ACUTE METABOLIC ENCEPHALOPATHY: Status: RESOLVED | Noted: 2022-09-06 | Resolved: 2022-11-07

## 2022-11-07 PROBLEM — J44.1 COPD WITH ACUTE EXACERBATION (HCC): Status: ACTIVE | Noted: 2022-11-07

## 2022-11-07 LAB
A/G RATIO: 0.8 (ref 1.1–2.2)
ALBUMIN SERPL-MCNC: 2.7 G/DL (ref 3.4–5)
ALP BLD-CCNC: 156 U/L (ref 40–129)
ALT SERPL-CCNC: 87 U/L (ref 10–40)
ANION GAP SERPL CALCULATED.3IONS-SCNC: 9 MMOL/L (ref 3–16)
AST SERPL-CCNC: 113 U/L (ref 15–37)
BASE EXCESS VENOUS: 0.5 MMOL/L (ref -3–3)
BASOPHILS ABSOLUTE: 0.1 K/UL (ref 0–0.2)
BASOPHILS RELATIVE PERCENT: 0.8 %
BILIRUB SERPL-MCNC: 0.3 MG/DL (ref 0–1)
BILIRUBIN URINE: NEGATIVE
BLOOD, URINE: ABNORMAL
BUN BLDV-MCNC: 50 MG/DL (ref 7–20)
CALCIUM SERPL-MCNC: 8.9 MG/DL (ref 8.3–10.6)
CARBOXYHEMOGLOBIN: 3.5 % (ref 0–1.5)
CHLORIDE BLD-SCNC: 90 MMOL/L (ref 99–110)
CLARITY: ABNORMAL
CO2: 33 MMOL/L (ref 21–32)
COLOR: YELLOW
CREAT SERPL-MCNC: 7 MG/DL (ref 0.8–1.3)
EKG ATRIAL RATE: 82 BPM
EKG DIAGNOSIS: NORMAL
EKG P AXIS: 89 DEGREES
EKG P-R INTERVAL: 224 MS
EKG Q-T INTERVAL: 372 MS
EKG QRS DURATION: 80 MS
EKG QTC CALCULATION (BAZETT): 434 MS
EKG R AXIS: 41 DEGREES
EKG T AXIS: 78 DEGREES
EKG VENTRICULAR RATE: 82 BPM
EOSINOPHILS ABSOLUTE: 0.2 K/UL (ref 0–0.6)
EOSINOPHILS RELATIVE PERCENT: 1.9 %
ETHANOL: NORMAL MG/DL (ref 0–0.08)
GFR SERPL CREATININE-BSD FRML MDRD: 8 ML/MIN/{1.73_M2}
GLUCOSE BLD-MCNC: 198 MG/DL (ref 70–99)
GLUCOSE BLD-MCNC: 96 MG/DL (ref 70–99)
GLUCOSE URINE: 500 MG/DL
HCO3 VENOUS: 29 MMOL/L (ref 23–29)
HCT VFR BLD CALC: 31.3 % (ref 40.5–52.5)
HEMOGLOBIN: 10.2 G/DL (ref 13.5–17.5)
KETONES, URINE: NEGATIVE MG/DL
LEUKOCYTE ESTERASE, URINE: ABNORMAL
LYMPHOCYTES ABSOLUTE: 1 K/UL (ref 1–5.1)
LYMPHOCYTES RELATIVE PERCENT: 8.6 %
MCH RBC QN AUTO: 34.5 PG (ref 26–34)
MCHC RBC AUTO-ENTMCNC: 32.6 G/DL (ref 31–36)
MCV RBC AUTO: 106 FL (ref 80–100)
METHEMOGLOBIN VENOUS: 0.3 %
MICROSCOPIC EXAMINATION: YES
MONOCYTES ABSOLUTE: 0.7 K/UL (ref 0–1.3)
MONOCYTES RELATIVE PERCENT: 5.5 %
NEUTROPHILS ABSOLUTE: 9.9 K/UL (ref 1.7–7.7)
NEUTROPHILS RELATIVE PERCENT: 83.2 %
NITRITE, URINE: NEGATIVE
O2 CONTENT, VEN: 12 VOL %
O2 SAT, VEN: 99 %
O2 THERAPY: ABNORMAL
PCO2, VEN: 70.2 MMHG (ref 40–50)
PDW BLD-RTO: 15.5 % (ref 12.4–15.4)
PERFORMED ON: NORMAL
PH UA: 7 (ref 5–8)
PH VENOUS: 7.22 (ref 7.35–7.45)
PLATELET # BLD: 354 K/UL (ref 135–450)
PMV BLD AUTO: 7 FL (ref 5–10.5)
PO2, VEN: 116 MMHG (ref 25–40)
POTASSIUM REFLEX MAGNESIUM: 3.8 MMOL/L (ref 3.5–5.1)
PROTEIN UA: >=300 MG/DL
RBC # BLD: 2.95 M/UL (ref 4.2–5.9)
RBC UA: ABNORMAL /HPF (ref 0–4)
SODIUM BLD-SCNC: 132 MMOL/L (ref 136–145)
SPECIFIC GRAVITY UA: 1.02 (ref 1–1.03)
TCO2 CALC VENOUS: 70 MMOL/L
TOTAL PROTEIN: 6.1 G/DL (ref 6.4–8.2)
TROPONIN: 0.1 NG/ML
URINE REFLEX TO CULTURE: YES
URINE TYPE: ABNORMAL
UROBILINOGEN, URINE: 0.2 E.U./DL
WBC # BLD: 11.9 K/UL (ref 4–11)
WBC UA: >100 /HPF (ref 0–5)

## 2022-11-07 PROCEDURE — 93010 ELECTROCARDIOGRAM REPORT: CPT | Performed by: INTERNAL MEDICINE

## 2022-11-07 PROCEDURE — 80053 COMPREHEN METABOLIC PANEL: CPT

## 2022-11-07 PROCEDURE — 6370000000 HC RX 637 (ALT 250 FOR IP): Performed by: INTERNAL MEDICINE

## 2022-11-07 PROCEDURE — 71045 X-RAY EXAM CHEST 1 VIEW: CPT

## 2022-11-07 PROCEDURE — 96365 THER/PROPH/DIAG IV INF INIT: CPT

## 2022-11-07 PROCEDURE — 93005 ELECTROCARDIOGRAM TRACING: CPT | Performed by: EMERGENCY MEDICINE

## 2022-11-07 PROCEDURE — 87086 URINE CULTURE/COLONY COUNT: CPT

## 2022-11-07 PROCEDURE — 2580000003 HC RX 258: Performed by: INTERNAL MEDICINE

## 2022-11-07 PROCEDURE — 2700000000 HC OXYGEN THERAPY PER DAY

## 2022-11-07 PROCEDURE — 6360000002 HC RX W HCPCS: Performed by: EMERGENCY MEDICINE

## 2022-11-07 PROCEDURE — 84484 ASSAY OF TROPONIN QUANT: CPT

## 2022-11-07 PROCEDURE — 94660 CPAP INITIATION&MGMT: CPT

## 2022-11-07 PROCEDURE — 1200000000 HC SEMI PRIVATE

## 2022-11-07 PROCEDURE — 99285 EMERGENCY DEPT VISIT HI MDM: CPT

## 2022-11-07 PROCEDURE — 6360000002 HC RX W HCPCS: Performed by: INTERNAL MEDICINE

## 2022-11-07 PROCEDURE — 82077 ASSAY SPEC XCP UR&BREATH IA: CPT

## 2022-11-07 PROCEDURE — 94640 AIRWAY INHALATION TREATMENT: CPT

## 2022-11-07 PROCEDURE — 94761 N-INVAS EAR/PLS OXIMETRY MLT: CPT

## 2022-11-07 PROCEDURE — 36415 COLL VENOUS BLD VENIPUNCTURE: CPT

## 2022-11-07 PROCEDURE — 81001 URINALYSIS AUTO W/SCOPE: CPT

## 2022-11-07 PROCEDURE — 85025 COMPLETE CBC W/AUTO DIFF WBC: CPT

## 2022-11-07 PROCEDURE — 82803 BLOOD GASES ANY COMBINATION: CPT

## 2022-11-07 RX ORDER — INSULIN LISPRO 100 [IU]/ML
0-4 INJECTION, SOLUTION INTRAVENOUS; SUBCUTANEOUS
Status: DISCONTINUED | OUTPATIENT
Start: 2022-11-08 | End: 2022-11-09 | Stop reason: DRUGHIGH

## 2022-11-07 RX ORDER — BUPROPION HYDROCHLORIDE 100 MG/1
100 TABLET ORAL EVERY OTHER DAY
Status: DISCONTINUED | OUTPATIENT
Start: 2022-11-09 | End: 2022-11-16 | Stop reason: HOSPADM

## 2022-11-07 RX ORDER — MIDODRINE HYDROCHLORIDE 5 MG/1
5 TABLET ORAL 3 TIMES DAILY PRN
Status: DISCONTINUED | OUTPATIENT
Start: 2022-11-07 | End: 2022-11-16 | Stop reason: HOSPADM

## 2022-11-07 RX ORDER — INSULIN GLARGINE 100 [IU]/ML
50 INJECTION, SOLUTION SUBCUTANEOUS NIGHTLY
Status: DISCONTINUED | OUTPATIENT
Start: 2022-11-07 | End: 2022-11-16 | Stop reason: HOSPADM

## 2022-11-07 RX ORDER — ALBUTEROL SULFATE 2.5 MG/3ML
2.5 SOLUTION RESPIRATORY (INHALATION) EVERY 4 HOURS PRN
Status: DISCONTINUED | OUTPATIENT
Start: 2022-11-07 | End: 2022-11-16 | Stop reason: HOSPADM

## 2022-11-07 RX ORDER — DEXTROSE MONOHYDRATE 100 MG/ML
INJECTION, SOLUTION INTRAVENOUS CONTINUOUS PRN
Status: DISCONTINUED | OUTPATIENT
Start: 2022-11-07 | End: 2022-11-16 | Stop reason: HOSPADM

## 2022-11-07 RX ORDER — INSULIN LISPRO 100 [IU]/ML
0-4 INJECTION, SOLUTION INTRAVENOUS; SUBCUTANEOUS
Status: DISCONTINUED | OUTPATIENT
Start: 2022-11-08 | End: 2022-11-07 | Stop reason: ALTCHOICE

## 2022-11-07 RX ORDER — ATORVASTATIN CALCIUM 80 MG/1
80 TABLET, FILM COATED ORAL DAILY
Status: DISCONTINUED | OUTPATIENT
Start: 2022-11-08 | End: 2022-11-16 | Stop reason: HOSPADM

## 2022-11-07 RX ORDER — ERGOCALCIFEROL 1.25 MG/1
50000 CAPSULE ORAL
Status: DISCONTINUED | OUTPATIENT
Start: 2022-12-01 | End: 2022-11-16 | Stop reason: HOSPADM

## 2022-11-07 RX ORDER — LACTOBACILLUS RHAMNOSUS GG 10B CELL
1 CAPSULE ORAL 2 TIMES DAILY WITH MEALS
Status: DISCONTINUED | OUTPATIENT
Start: 2022-11-08 | End: 2022-11-16 | Stop reason: HOSPADM

## 2022-11-07 RX ORDER — INSULIN LISPRO 100 [IU]/ML
0-4 INJECTION, SOLUTION INTRAVENOUS; SUBCUTANEOUS NIGHTLY
Status: DISCONTINUED | OUTPATIENT
Start: 2022-11-07 | End: 2022-11-09 | Stop reason: DRUGHIGH

## 2022-11-07 RX ORDER — GENTAMICIN SULFATE 1 MG/G
CREAM TOPICAL 3 TIMES DAILY
Status: DISCONTINUED | OUTPATIENT
Start: 2022-11-07 | End: 2022-11-09 | Stop reason: DRUGHIGH

## 2022-11-07 RX ORDER — ASPIRIN 81 MG/1
81 TABLET, CHEWABLE ORAL DAILY
Status: DISCONTINUED | OUTPATIENT
Start: 2022-11-08 | End: 2022-11-16 | Stop reason: HOSPADM

## 2022-11-07 RX ORDER — ENOXAPARIN SODIUM 100 MG/ML
30 INJECTION SUBCUTANEOUS DAILY
Status: DISCONTINUED | OUTPATIENT
Start: 2022-11-08 | End: 2022-11-07

## 2022-11-07 RX ORDER — ACETAZOLAMIDE 250 MG/1
250 TABLET ORAL DAILY
Status: DISCONTINUED | OUTPATIENT
Start: 2022-11-08 | End: 2022-11-08

## 2022-11-07 RX ORDER — ASCORBIC ACID, TOCOPHERYL ACID SUCCINATE, THIAMINE, RIBOFLAVIN, NIACINAMIDE, PYRIDOXINE, FOLIC ACID, COBALAMIN, BIOTIN, PANTOTHENIC ACID, ZINC, SELENIUM 100; 1.5; 1.7; 20; 25; 3; 1; 300; 10; 15; 30; 7 MG/1; MG/1; MG/1; MG/1; MG/1; MG/1; MG/1; UG/1; MG/1; MG/1; [IU]/1; UG/1
1 TABLET, COATED ORAL DAILY
Status: DISCONTINUED | OUTPATIENT
Start: 2022-11-08 | End: 2022-11-07 | Stop reason: RX

## 2022-11-07 RX ORDER — CIPROFLOXACIN 2 MG/ML
400 INJECTION, SOLUTION INTRAVENOUS ONCE
Status: COMPLETED | OUTPATIENT
Start: 2022-11-07 | End: 2022-11-07

## 2022-11-07 RX ORDER — TORSEMIDE 100 MG/1
100 TABLET ORAL DAILY
Status: DISCONTINUED | OUTPATIENT
Start: 2022-11-08 | End: 2022-11-16 | Stop reason: HOSPADM

## 2022-11-07 RX ORDER — HEPARIN SODIUM 5000 [USP'U]/ML
5000 INJECTION, SOLUTION INTRAVENOUS; SUBCUTANEOUS EVERY 8 HOURS SCHEDULED
Status: DISCONTINUED | OUTPATIENT
Start: 2022-11-08 | End: 2022-11-16 | Stop reason: HOSPADM

## 2022-11-07 RX ORDER — LEVOTHYROXINE SODIUM 0.03 MG/1
50 TABLET ORAL DAILY
Status: DISCONTINUED | OUTPATIENT
Start: 2022-11-08 | End: 2022-11-16 | Stop reason: HOSPADM

## 2022-11-07 RX ADMIN — Medication 2 PUFF: at 21:55

## 2022-11-07 RX ADMIN — INSULIN GLARGINE 50 UNITS: 100 INJECTION, SOLUTION SUBCUTANEOUS at 23:02

## 2022-11-07 RX ADMIN — CEFTRIAXONE SODIUM 1000 MG: 1 INJECTION, POWDER, FOR SOLUTION INTRAMUSCULAR; INTRAVENOUS at 18:17

## 2022-11-07 RX ADMIN — CIPROFLOXACIN 400 MG: 2 INJECTION, SOLUTION INTRAVENOUS at 16:31

## 2022-11-07 ASSESSMENT — LIFESTYLE VARIABLES
HOW OFTEN DO YOU HAVE A DRINK CONTAINING ALCOHOL: NEVER
HOW OFTEN DO YOU HAVE A DRINK CONTAINING ALCOHOL: NEVER
HOW MANY STANDARD DRINKS CONTAINING ALCOHOL DO YOU HAVE ON A TYPICAL DAY: PATIENT DOES NOT DRINK

## 2022-11-07 ASSESSMENT — PAIN DESCRIPTION - LOCATION: LOCATION: BUTTOCKS

## 2022-11-07 ASSESSMENT — PAIN - FUNCTIONAL ASSESSMENT: PAIN_FUNCTIONAL_ASSESSMENT: 0-10

## 2022-11-07 ASSESSMENT — PAIN SCALES - GENERAL: PAINLEVEL_OUTOF10: 10

## 2022-11-07 NOTE — ED PROVIDER NOTES
[facility]      Pt Name: Demetria Roper  MRN: [id number]  [place] 1945  Date of evaluation: 11/7/2022  Provider: Akshat Linn MD    CHIEF COMPLAINT       Chief Complaint   Patient presents with    Altered Mental Status     Pt transported from [facility] by [facility] EMT. EMT reports Pt has new onset of confusion that started this morning according to facility staff. Pt had PD last night and has not been acting the same since. Vitals WNL, on 3l of O2, , per EMT. Pt alertx4. Slow to respond. HISTORY OF PRESENT ILLNESS   (Location/Symptom, Timing/Onset, Context/Setting, Quality, Duration, Modifying Factors, Severity)  Note limiting factors. Demetria Roper is a 69 y.o. male with past medical history of hypertension, diabetes, coronary artery disease and CHF as well as end-stage renal disease on peritoneal dialysis and obstructive sleep apnea here today for altered mental status. The patient was transferred from his care facility for reports of increased lethargy, weakness, and possible altered mental status. The patient himself currently states he feels fine. Denies headache, cough or shortness of breath. Denies chest pain. No abdominal pain, vomiting or diarrhea. States he feels tired and sleepy but is otherwise in no discomfort. Discharged from the hospital on 10/10/2022 after presenting altered thought secondary to acute on chronic hypoxic and hypercapnic respiratory failure with volume overload    [facility]    Nursing Notes were reviewed. REVIEW OF SYSTEMS    (2-9 systems for level 4, 10 or more for level 5)     Review of Systems    Please see HPI for pertinent positive and negative review of system findings. A full 10 system ROS was performed and otherwise negative.         PAST MEDICAL HISTORY     Past Medical History:   Diagnosis Date    Allergic rhinitis due to other allergen 7/12/2010    Coronary atherosclerosis of unspecified type of vessel, native or graft 7/12/2010    Diabetic eye exam ([facility] [address] 04/29/2015    Diabetic eye exam ([facility] [address] 5/5/2016    Carbon eye    Generalized osteoarthrosis, involving multiple sites 7/12/2010    Other psoriasis 7/12/2010    Pneumonia     Prolonged emergence from general anesthesia     Psoriatic arthropathy ([facility] [address] 7/12/2010    Type II or unspecified type diabetes mellitus without mention of complication, not stated as uncontrolled 7/12/2010    Unspecified essential hypertension 7/12/2010    Unspecified sleep apnea 7/12/2010         SURGICAL HISTORY       Past Surgical History:   Procedure Laterality Date    CARPAL TUNNEL RELEASE      s/p    CATARACT REMOVAL      CORONARY ARTERY BYPASS GRAFT      JOINT REPLACEMENT      LAPAROSCOPY INSERTION PERITONEAL CATHETER N/A 10/26/2020    LAPAROSCOPIC PERITONEAL DIALYSIS CATHETER PLACEMENT; LAPAROSCOPIC OMENTOPEXY performed by Kamini Selby DO at [address]       Previous Medications    ALBUTEROL SULFATE (PROAIR RESPICLICK) 887 (90 BASE) MCG/ACT AEROSOL POWDER INHALATION    Inhale 2 puffs into the lungs every 4 hours as needed for Wheezing or Shortness of Breath    ASPIRIN 81 MG CHEWABLE TABLET    Take 1 tablet by mouth daily. ATORVASTATIN (LIPITOR) 80 MG TABLET    TAKE ONE TABLET BY MOUTH ONCE NIGHTLY    B COMPLEX-C-BIOTIN-E-MIN-FA (DIALYVITE 3000) 3 MG TABS    Take 1 tablet by mouth daily    B-D ULTRAFINE III SHORT PEN 31G X 8 MM MISC    USE WITH INSULIN FOUR TIMES A DAY    BLOOD GLUCOSE MONITOR KIT AND SUPPLIES    (PLEASE DISPENSE WHAT INS WILL COVER) - Dispense sufficient amount for  TID testing frequency plus additional to accommodate PRN testing needs. Dispense all needed supplies to include: monitor, strips, lancing device, lancets, control solutions, alcohol swabs.     BLOOD GLUCOSE MONITOR STRIPS    Test up to 4 times daily for blood sugar monitoring    BLOOD GLUCOSE MONITORING SUPPL (TRUE METRIX METER) RUMA    As needed    BREO ELLIPTA 200-25 MCG/INH AEPB INHALER    Inhale 1 puff into the lungs daily    BUPROPION (WELLBUTRIN) 100 MG TABLET    Take 1 tablet by mouth every other day    EASY TOUCH LANCETS 30G/TWIST MISC    USE THREE TIMES A DAY TO FOUR TIMES A DAY    GENTAMICIN (GARAMYCIN) 0.1 % CREAM    Apply topically 3 times daily. HANDICAP PLACARD MISC    by Does not apply route Duration: 5 years    INSULIN GLARGINE (LANTUS SOLOSTAR) 100 UNIT/ML INJECTION PEN    Inject 50 Units into the skin nightly    INSULIN LISPRO, 1 UNIT DIAL, 100 UNIT/ML SOPN    Inject 0-4 Units into the skin 3 times daily (with meals)    INSULIN LISPRO, 1 UNIT DIAL, 100 UNIT/ML SOPN    Inject 0-4 Units into the skin nightly    LACTOBACILLUS (CULTURELLE) CAPSULE    Take 1 capsule by mouth 2 times daily (with meals)    LEVOTHYROXINE (SYNTHROID) 50 MCG TABLET    TAKE ONE TABLET BY MOUTH DAILY    MIDODRINE (PROAMATINE) 5 MG TABLET    Take 1 tablet by mouth 3 times daily as needed (SBP < 100)    TORSEMIDE (DEMADEX) 100 MG TABLET    Take 1 tablet by mouth daily    VITAMIN D (ERGOCALCIFEROL) 1.25 MG (47089 UT) CAPS CAPSULE    Take 50,000 Units by mouth every 3 months       ALLERGIES     Penicillins, Sulfa antibiotics, Tazorac [tazarotene], Clindamycin, and Clindamycin/lincomycin    FAMILY HISTORY       Family History   Problem Relation Age of Onset    Diabetes Mother     Heart Failure Mother     Coronary Art Dis Father           SOCIAL HISTORY       Social History     Socioeconomic History    Marital status:      Spouse name: None    Number of children: None    Years of education: None    Highest education level: None   Tobacco Use    Smoking status: Former     Packs/day: 1.00     Years: 18.00     Pack years: 18.00     Types: Cigarettes     Start date: 1962     Quit date: 1980     Years since quittin.0    Smokeless tobacco: Never   Vaping Use    Vaping Use: Never used   Substance and Sexual Activity    Alcohol use: No    Drug use:  No Social Determinants of Health     Financial Resource Strain: Low Risk     Difficulty of Paying Living Expenses: Not hard at all   Food Insecurity: No Food Insecurity    Worried About [address] in the Last Year: Never true    [facility] in the Last Year: Never true       SCREENINGS    [place] Coma Scale  Eye Opening: To speech  Best Verbal Response: Oriented  Best Motor Response: Obeys commands  Dre Coma Scale Score: 14          PHYSICAL EXAM    (up to 7 for level 4, 8 or more for level 5)     ED Triage Vitals [11/07/22 1353]   BP Temp Temp Source Heart Rate Resp SpO2 Height Weight   (!) 131/57 98.4 °F (36.9 °C) Oral 82 22 100 % -- --       Physical Exam    General appearance:  Cooperative. Sleepy but easily arousable and in no acute distress. Skin:  Warm. Dry. Stage I breakdown over the sacrum without surrounding erythema  Eye:  Extraocular movements intact. Ears, nose, mouth and throat:  Oral mucosa moist,  Neck:  Trachea midline. Heart:  Regular rate and rhythm  Perfusion:  intact  Respiratory:  Respirations nonlabored. Lungs clear to auscultation bilaterally. Abdominal:   Non distended. Nontender  Neurological: Sleepy but able to be awakened. Oriented to person, place and time. Moves all extremities spontaneously. Intact sensation of the bilateral upper and lower extremities. No drift in upper or lower extremities. Gait not tested.   Musculoskeletal:   Normal ROM, no deformities          Psychiatric:  Normal mood      DIAGNOSTIC RESULTS       Labs Reviewed   CBC WITH AUTO DIFFERENTIAL - Abnormal; Notable for the following components:       Result Value    WBC 11.9 (*)     RBC 2.95 (*)     Hemoglobin 10.2 (*)     Hematocrit 31.3 (*)     .0 (*)     MCH 34.5 (*)     RDW 15.5 (*)     Neutrophils Absolute 9.9 (*)     All other components within normal limits   COMPREHENSIVE METABOLIC PANEL W/ REFLEX TO MG FOR LOW K - Abnormal; Notable for the following components: Sodium 132 (*)     Chloride 90 (*)     CO2 33 (*)     Glucose 198 (*)     BUN 50 (*)     Creatinine 7.0 (*)     Est, Glom Filt Rate 8 (*)     Total Protein 6.1 (*)     Albumin 2.7 (*)     Albumin/Globulin Ratio 0.8 (*)     Alkaline Phosphatase 156 (*)     ALT 87 (*)      (*)     All other components within normal limits    Narrative:     CALL  Amador  [facility] tel. [phone],  Previous panic on this admission - call not needed per SOP, 11/07/2022 15:05,  [facility]   BLOOD GAS, VENOUS - Abnormal; Notable for the following components:    pH, Cj 7.225 (*)     pCO2, Cj 70.2 (*)     pO2, Cj 116.0 (*)     Carboxyhemoglobin 3.5 (*)     All other components within normal limits   TROPONIN - Abnormal; Notable for the following components:    Troponin 0.10 (*)     All other components within normal limits    Narrative:     CALL  Amador  [facility] tel. [phone],  Previous panic on this admission - call not needed per SOP, 11/07/2022 15:05,  by [facility]ANA [facility]   ETHANOL    Narrative:     Renee Hamilton  [facility] tel. [phone],  Previous panic on this admission - call not needed per SOP, 11/07/2022 15:05,  by [address] [place] [address] TO CULTURE       Interpretation per the Radiologist below, if obtained/available at the time of this note:    XR CHEST PORTABLE   Final Result   Low lung volumes, which somewhat limit evaluation. Small left pleural effusion and/or basilar atelectasis. Stable cardiomegaly. All other labs/imaging were within normal range or not returned as of this dictation. EMERGENCY DEPARTMENT COURSE and DIFFERENTIAL DIAGNOSIS/MDM:   Vitals:    Vitals:    11/07/22 1353   BP: (!) 131/57   Pulse: 82   Resp: 22   Temp: 98.4 °F (36.9 °C)   TempSrc: Oral   SpO2: 100%       EKG: Sinus rhythm first-degree AV block rate of 82 bpm.  No ST elevation. Q wave inferiorly. Similar to prior from 10/1/2022    Patient presented to the emergency department today for reported altered mental status.   Admitted just approximately 1 month ago for similar symptoms at which time he was found to have acute on chronic hypoxic and hypercapnic respiratory failure. Concern for the same today. No focal neurologic exam.  Quite sleepy but easily arousable. No new hypoxia here. Once awake, completely oriented. Low suspicion for acute intracranial pathology. More concerning for likely hypercapnia which ultimately was the case. Patient acidotic with pH 7.2 and PCO2 of 70. Otherwise maintaining his airway. Did not require intubation. Will be placed on BiPAP. Plan to admit to the hospital for further treatment    Should be noted the patient did have an elevated troponin of 0.1. He is having no chest pain. Denies shortness of breath. No new ischemic findings on EKG. May be trended at this time    I, Norma Asif M.D., am the primary clinician of record. MDM      CONSULTS     IP CONSULT TO HOSPITALIST    Critical Care:     CRITICAL CARE TIME    I personally saw the patient and independently provided 30 minutes of non-concurrent critical care out of the total shared critical care time provided, excluding separately reportable procedures. There was a high probability of clinically significant/life threatening deterioration in the patient's condition which required my urgent intervention. This time was spent reviewing the patient chart, interpreting diagnostic/laboratory data, management of BiPAP settings for acute on chronic hypercapnic respiratory failure      REASSESSMENT          PROCEDURE     Unless otherwise noted below, none     Procedures      FINAL IMPRESSION      1. Acute on chronic respiratory failure with hypoxia and hypercapnia (HCC)    2. Altered mental status, unspecified altered mental status type            DISPOSITION/PLAN   DISPOSITION Decision To Admit 11/07/2022 03:20:41 PM        PATIENT REFERRED TO:  No follow-up provider specified.     DISCHARGE MEDICATIONS:  New Prescriptions    No medications on file Controlled Substances Monitoring:     No flowsheet data found.     (Please note that portions of this note were completed with a voice recognition program.  Efforts were made to edit the dictations but occasionally words are mis-transcribed.)    Melissa Mota MD (electronically signed)  Attending Emergency Physician            Estrella Garcia MD  11/07/22 [phone]

## 2022-11-07 NOTE — ED NOTES
Pt unable to void, 14fr straight cath completed per physician order. 75cc of cloudy white/pale yellow urine collected. Urine sent down to lab.      Jamaica De La Torre RN  11/07/22 4428

## 2022-11-07 NOTE — ED NOTES
Pt stated he does not want ABG done. Has had this done on prior admission and refuses.       Nabil Helms RN  11/07/22 2074

## 2022-11-08 PROBLEM — R53.81 PHYSICAL DECONDITIONING: Status: RESOLVED | Noted: 2022-09-15 | Resolved: 2022-11-08

## 2022-11-08 PROBLEM — Z71.89 DIABETES EDUCATION, ENCOUNTER FOR: Status: RESOLVED | Noted: 2022-10-06 | Resolved: 2022-11-08

## 2022-11-08 PROBLEM — J96.21 ACUTE ON CHRONIC RESPIRATORY FAILURE WITH HYPOXIA AND HYPERCAPNIA (HCC): Status: RESOLVED | Noted: 2022-09-21 | Resolved: 2022-11-08

## 2022-11-08 PROBLEM — E11.69 DIABETES MELLITUS TYPE 2 IN OBESE (HCC): Status: RESOLVED | Noted: 2017-05-01 | Resolved: 2022-11-08

## 2022-11-08 PROBLEM — D63.1 ANEMIA IN ESRD (END-STAGE RENAL DISEASE) (HCC): Status: ACTIVE | Noted: 2022-11-08

## 2022-11-08 PROBLEM — E66.9 DIABETES MELLITUS TYPE 2 IN OBESE (HCC): Status: RESOLVED | Noted: 2017-05-01 | Resolved: 2022-11-08

## 2022-11-08 PROBLEM — R53.1 GENERAL WEAKNESS: Status: RESOLVED | Noted: 2018-01-23 | Resolved: 2022-11-08

## 2022-11-08 PROBLEM — J96.21 ACUTE ON CHRONIC RESPIRATORY FAILURE WITH HYPOXIA AND HYPERCAPNIA (HCC): Status: ACTIVE | Noted: 2022-11-08

## 2022-11-08 PROBLEM — I50.9 ACUTE CONGESTIVE HEART FAILURE (HCC): Status: RESOLVED | Noted: 2022-10-05 | Resolved: 2022-11-08

## 2022-11-08 PROBLEM — J81.0 ACUTE PULMONARY EDEMA (HCC): Status: RESOLVED | Noted: 2022-09-23 | Resolved: 2022-11-08

## 2022-11-08 PROBLEM — J96.01 ACUTE RESPIRATORY FAILURE WITH HYPOXIA AND HYPERCAPNIA (HCC): Status: RESOLVED | Noted: 2017-01-15 | Resolved: 2022-11-08

## 2022-11-08 PROBLEM — J96.02 ACUTE RESPIRATORY FAILURE WITH HYPOXIA AND HYPERCAPNIA (HCC): Status: RESOLVED | Noted: 2017-01-15 | Resolved: 2022-11-08

## 2022-11-08 PROBLEM — R79.89 ELEVATED PROCALCITONIN: Status: RESOLVED | Noted: 2022-10-03 | Resolved: 2022-11-08

## 2022-11-08 PROBLEM — N18.6 ESRD (END STAGE RENAL DISEASE) (HCC): Status: ACTIVE | Noted: 2022-11-08

## 2022-11-08 PROBLEM — N18.6 ANEMIA IN ESRD (END-STAGE RENAL DISEASE) (HCC): Status: ACTIVE | Noted: 2022-11-08

## 2022-11-08 PROBLEM — R06.09 DYSPNEA ON EXERTION: Status: RESOLVED | Noted: 2022-04-01 | Resolved: 2022-11-08

## 2022-11-08 PROBLEM — E87.70 HYPERVOLEMIA: Status: RESOLVED | Noted: 2022-09-22 | Resolved: 2022-11-08

## 2022-11-08 PROBLEM — R10.817 GENERALIZED ABDOMINAL TENDERNESS: Status: RESOLVED | Noted: 2022-09-07 | Resolved: 2022-11-08

## 2022-11-08 PROBLEM — I49.9 ARRHYTHMIA: Status: RESOLVED | Noted: 2022-10-01 | Resolved: 2022-11-08

## 2022-11-08 PROBLEM — D72.829 LEUKOCYTOSIS: Status: RESOLVED | Noted: 2022-09-23 | Resolved: 2022-11-08

## 2022-11-08 PROBLEM — J18.9 MULTIFOCAL PNEUMONIA: Status: RESOLVED | Noted: 2022-09-22 | Resolved: 2022-11-08

## 2022-11-08 PROBLEM — R55 SYNCOPE AND COLLAPSE: Status: RESOLVED | Noted: 2018-05-02 | Resolved: 2022-11-08

## 2022-11-08 PROBLEM — J96.22 ACUTE ON CHRONIC RESPIRATORY FAILURE WITH HYPOXIA AND HYPERCAPNIA (HCC): Status: ACTIVE | Noted: 2022-11-08

## 2022-11-08 PROBLEM — J96.22 ACUTE ON CHRONIC RESPIRATORY FAILURE WITH HYPOXIA AND HYPERCAPNIA (HCC): Status: RESOLVED | Noted: 2022-09-21 | Resolved: 2022-11-08

## 2022-11-08 PROBLEM — R89.2 DRUG TOXICITY: Status: RESOLVED | Noted: 2022-09-12 | Resolved: 2022-11-08

## 2022-11-08 LAB
ANION GAP SERPL CALCULATED.3IONS-SCNC: 12 MMOL/L (ref 3–16)
BASE EXCESS ARTERIAL: 2.9 MMOL/L (ref -3–3)
BUN BLDV-MCNC: 58 MG/DL (ref 7–20)
CALCIUM SERPL-MCNC: 8.8 MG/DL (ref 8.3–10.6)
CARBOXYHEMOGLOBIN ARTERIAL: 1.4 % (ref 0–1.5)
CHLORIDE BLD-SCNC: 92 MMOL/L (ref 99–110)
CO2: 25 MMOL/L (ref 21–32)
CREAT SERPL-MCNC: 8.3 MG/DL (ref 0.8–1.3)
GFR SERPL CREATININE-BSD FRML MDRD: 6 ML/MIN/{1.73_M2}
GLUCOSE BLD-MCNC: 118 MG/DL (ref 70–99)
GLUCOSE BLD-MCNC: 244 MG/DL (ref 70–99)
GLUCOSE BLD-MCNC: 370 MG/DL (ref 70–99)
GLUCOSE BLD-MCNC: 64 MG/DL (ref 70–99)
GLUCOSE BLD-MCNC: 71 MG/DL (ref 70–99)
HCO3 ARTERIAL: 31.5 MMOL/L (ref 21–29)
HCT VFR BLD CALC: 29 % (ref 40.5–52.5)
HEMOGLOBIN, ART, EXTENDED: 10 G/DL (ref 13.5–17.5)
HEMOGLOBIN: 9.6 G/DL (ref 13.5–17.5)
MCH RBC QN AUTO: 36 PG (ref 26–34)
MCHC RBC AUTO-ENTMCNC: 33.1 G/DL (ref 31–36)
MCV RBC AUTO: 108.7 FL (ref 80–100)
METHEMOGLOBIN ARTERIAL: 0.5 %
O2 SAT, ARTERIAL: 37.3 %
O2 THERAPY: ABNORMAL
PCO2 ARTERIAL: 72.1 MMHG (ref 35–45)
PDW BLD-RTO: 15.9 % (ref 12.4–15.4)
PERFORMED ON: ABNORMAL
PERFORMED ON: NORMAL
PH ARTERIAL: 7.25 (ref 7.35–7.45)
PLATELET # BLD: 342 K/UL (ref 135–450)
PMV BLD AUTO: 7 FL (ref 5–10.5)
PO2 ARTERIAL: <30 MMHG (ref 75–108)
POTASSIUM SERPL-SCNC: 3.1 MMOL/L (ref 3.5–5.1)
RBC # BLD: 2.67 M/UL (ref 4.2–5.9)
SODIUM BLD-SCNC: 129 MMOL/L (ref 136–145)
TCO2 ARTERIAL: 75.5 MMOL/L
URINE CULTURE, ROUTINE: NORMAL
WBC # BLD: 13.6 K/UL (ref 4–11)

## 2022-11-08 PROCEDURE — 86706 HEP B SURFACE ANTIBODY: CPT

## 2022-11-08 PROCEDURE — 6370000000 HC RX 637 (ALT 250 FOR IP): Performed by: INTERNAL MEDICINE

## 2022-11-08 PROCEDURE — 82803 BLOOD GASES ANY COMBINATION: CPT

## 2022-11-08 PROCEDURE — 90945 DIALYSIS ONE EVALUATION: CPT

## 2022-11-08 PROCEDURE — 6360000002 HC RX W HCPCS: Performed by: INTERNAL MEDICINE

## 2022-11-08 PROCEDURE — 2700000000 HC OXYGEN THERAPY PER DAY

## 2022-11-08 PROCEDURE — 94640 AIRWAY INHALATION TREATMENT: CPT

## 2022-11-08 PROCEDURE — 99223 1ST HOSP IP/OBS HIGH 75: CPT | Performed by: INTERNAL MEDICINE

## 2022-11-08 PROCEDURE — 36600 WITHDRAWAL OF ARTERIAL BLOOD: CPT

## 2022-11-08 PROCEDURE — 94660 CPAP INITIATION&MGMT: CPT

## 2022-11-08 PROCEDURE — 87340 HEPATITIS B SURFACE AG IA: CPT

## 2022-11-08 PROCEDURE — 80048 BASIC METABOLIC PNL TOTAL CA: CPT

## 2022-11-08 PROCEDURE — 36415 COLL VENOUS BLD VENIPUNCTURE: CPT

## 2022-11-08 PROCEDURE — 85027 COMPLETE CBC AUTOMATED: CPT

## 2022-11-08 PROCEDURE — 1200000000 HC SEMI PRIVATE

## 2022-11-08 PROCEDURE — 2580000003 HC RX 258: Performed by: INTERNAL MEDICINE

## 2022-11-08 PROCEDURE — 94761 N-INVAS EAR/PLS OXIMETRY MLT: CPT

## 2022-11-08 RX ORDER — ACETAMINOPHEN 325 MG/1
650 TABLET ORAL EVERY 8 HOURS PRN
Status: DISCONTINUED | OUTPATIENT
Start: 2022-11-08 | End: 2022-11-08

## 2022-11-08 RX ORDER — POTASSIUM CHLORIDE 20 MEQ/1
20 TABLET, EXTENDED RELEASE ORAL ONCE
Status: COMPLETED | OUTPATIENT
Start: 2022-11-08 | End: 2022-11-08

## 2022-11-08 RX ORDER — ACETAMINOPHEN 325 MG/1
650 TABLET ORAL EVERY 4 HOURS PRN
Status: DISCONTINUED | OUTPATIENT
Start: 2022-11-08 | End: 2022-11-16 | Stop reason: HOSPADM

## 2022-11-08 RX ADMIN — Medication 2 PUFF: at 08:32

## 2022-11-08 RX ADMIN — ATORVASTATIN CALCIUM 80 MG: 80 TABLET, FILM COATED ORAL at 08:39

## 2022-11-08 RX ADMIN — HEPARIN SODIUM 5000 UNITS: 5000 INJECTION INTRAVENOUS; SUBCUTANEOUS at 05:39

## 2022-11-08 RX ADMIN — HEPARIN SODIUM 5000 UNITS: 5000 INJECTION INTRAVENOUS; SUBCUTANEOUS at 13:35

## 2022-11-08 RX ADMIN — Medication 1 CAPSULE: at 08:39

## 2022-11-08 RX ADMIN — Medication 1 CAPSULE: at 17:15

## 2022-11-08 RX ADMIN — GENTAMICIN SULFATE: 1 CREAM TOPICAL at 13:06

## 2022-11-08 RX ADMIN — HEPARIN SODIUM 5000 UNITS: 5000 INJECTION INTRAVENOUS; SUBCUTANEOUS at 21:02

## 2022-11-08 RX ADMIN — POTASSIUM CHLORIDE 20 MEQ: 1500 TABLET, EXTENDED RELEASE ORAL at 21:02

## 2022-11-08 RX ADMIN — CEFTRIAXONE SODIUM 1000 MG: 1 INJECTION, POWDER, FOR SOLUTION INTRAMUSCULAR; INTRAVENOUS at 17:19

## 2022-11-08 RX ADMIN — ASPIRIN 81 MG 81 MG: 81 TABLET ORAL at 08:39

## 2022-11-08 RX ADMIN — TORSEMIDE 100 MG: 100 TABLET ORAL at 08:39

## 2022-11-08 RX ADMIN — GENTAMICIN SULFATE: 1 CREAM TOPICAL at 20:09

## 2022-11-08 RX ADMIN — LEVOTHYROXINE SODIUM 50 MCG: 0.03 TABLET ORAL at 05:39

## 2022-11-08 RX ADMIN — GENTAMICIN SULFATE: 1 CREAM TOPICAL at 08:45

## 2022-11-08 RX ADMIN — ACETAZOLAMIDE 250 MG: 250 TABLET ORAL at 08:45

## 2022-11-08 RX ADMIN — INSULIN LISPRO 1 UNITS: 100 INJECTION, SOLUTION INTRAVENOUS; SUBCUTANEOUS at 17:17

## 2022-11-08 RX ADMIN — INSULIN LISPRO 4 UNITS: 100 INJECTION, SOLUTION INTRAVENOUS; SUBCUTANEOUS at 21:03

## 2022-11-08 RX ADMIN — ACETAMINOPHEN 650 MG: 325 TABLET ORAL at 17:15

## 2022-11-08 RX ADMIN — Medication 2 PUFF: at 20:02

## 2022-11-08 RX ADMIN — GENTAMICIN SULFATE: 1 CREAM TOPICAL at 15:39

## 2022-11-08 RX ADMIN — INSULIN GLARGINE 50 UNITS: 100 INJECTION, SOLUTION SUBCUTANEOUS at 21:04

## 2022-11-08 ASSESSMENT — PAIN SCALES - WONG BAKER
WONGBAKER_NUMERICALRESPONSE: 0

## 2022-11-08 ASSESSMENT — PAIN SCALES - GENERAL
PAINLEVEL_OUTOF10: 0
PAINLEVEL_OUTOF10: 6

## 2022-11-08 ASSESSMENT — PAIN DESCRIPTION - LOCATION: LOCATION: BUTTOCKS

## 2022-11-08 NOTE — PROGRESS NOTES
[facility] with [facility] response to obtain information on pt care at facility. Resp aware of situation and has pt on bipap. Nephro, while I do not see them ordered, is aware of pt PD and Dialysis has pt cycling currently. Pt VSS. Aox4. WCTM.

## 2022-11-08 NOTE — CARE COORDINATION
Discharge Planning Assessment    SW discharge planner met with patient/ (and family member) to discuss reason for admission, current living situation, and potential needs at the time of discharge    Demographics/Insurance verified Yes/No: [place] [facility] [employer] [place]    Current type of dwelling: N/A    Patient from [facility]/ confirmed with: Patient from [address] [place] [address] Dr aracelis: Previously lived at home with spouse. Level of function/Support: Patient needs assistance with ADLs and IADLs. PCP: Lewis Soni MD    Active with any community resources/agencies/skilled home care: Patient receives PDialysis. This service is provided by [employer]. Tentative discharge plan: Patient's family does not want patient to return to the . [facility] reports that [facility] does not follow thru with patient's PD as scheduled. Family requesting that patient be referred to [facility]. KATRIN spoke with Admission CoordinatorArmand and faxed referral information to [phone]. Family also interested in a LTC placement. [facility] aware. Discussed and provided facilities of choice if transition to a skilled nursing facility is required at the time of discharge    Discussed with patient and/or family that on the day of discharge home tentative time of discharge will be between 10 AM and noon. Transportation at the time of discharge: Patient will need a medical transport @ discharge.     Electronically signed by JOE Julien on 11/8/2022 at 12:33 PM

## 2022-11-08 NOTE — H&P
[facility]                     [facility], [address]                              HISTORY AND PHYSICAL    PATIENT NAME: Sonal Hunter                    :        1945  MED REC NO:   [id number]                          ROOM:       [id number]  ACCOUNT NO:   [de-identified]                           ADMIT DATE: 2022  PROVIDER:     Dipesh Mejia MD    HISTORY OF PRESENT ILLNESS:  A 52-year-old white gentleman came to [facility] with increased lethargy, increased respiratory  distress, transferred from [facility] by _____, new onset of  confusion, somnolence, _____ who does follow his peritoneal dialysis  schedule at home. He has _____. No convulsions. He is very _____. Blood sugars were 204. Slow to respond. He states he feels tired and  sleepy. Discharged from the hospital on 10/10/2022. PAST MEDICAL HISTORY:  Pertinent for hypertension, COPD, obstructive  sleep apnea, hypertension, type 2 diabetes mellitus, prosthetic  arthropathy, pneumonia, psoriasis, diabetic eye examination, coronary  atherosclerosis. PAST SURGICAL HISTORY:  Pertinent for laparoscopy, coronary artery  bypass, carpal tunnel release, joint replacement, cataract removal.    FAMILY HISTORY:  Both the parents are . Father had coronary  artery disease. Mother had congestive heart failure and diabetes. SOCIAL HISTORY:   man, three children. Quit smoking in the  . Quit drinking in the  also. He used to work as an aircraft  . MEDICATIONS:  Aspirin, Lipitor, Wellbutrin, gentamicin cream, heparin,  glargine, Humalog, Culturelle, levothyroxine, Dulera, Demadex, and  ergocalciferol. REVIEW OF SYSTEMS:  Negative for loss of consciousness. No convulsion. Does have increased confusion. Increased respiratory distress. No  chest pain. No abdominal pain. No hematemesis. No melena.   No  genitourinary complaints. PHYSICAL EXAMINATION:  GENERAL:  Alert, awake and oriented x1, moderately distressed  78-year-old white man, looking in moderate distress. VITAL SIGNS:  His temperature is 98.4, blood pressure 131/57,  respirations 22, heart rate 84, O2 sat 95% on room air. HEENT:  Oral mucosa is dry. SKIN:  Warm and dry. NECK:  Supple. Mild jugular venous distention. No carotid bruits. No  lymphadenopathy. No thyromegaly. LUNGS:  Diminished breath sounds bilaterally. Expiratory wheezes. HEART:  Regular rate and rhythm, S1 and S2. No gallop rhythm. ABDOMEN:  Soft. EXTREMITIES:  Bilateral 1+ pitting edema. NEUROLOGIC:  Grossly intact. Moves all the four extremities against  gravity. LABORATORY DATA:  Sodium 132, potassium 3.8, BUN 50, creatinine 7.0,  calcium 8.9. Troponin 0.10. Alkaline phosphatase 156, AST and ALT are  130 and 87, albumin 2.7. Blood glucose 198. Ethanol level not  detected. White blood cell count 11.9, hemoglobin and hematocrit 10.2  and 31.3, platelet count 771. Urinalysis, more than 100 wbc's, large  amount of leukocyte esterase. ABG shows pH of 7.25, pCO2 of 70, pO2 of  116, bicarbonate 29. DIAGNOSTIC DATA:  EKG, sinus rhythm. ASSESSMENT:  Altered mental status.   Patient Active Problem List   Diagnosis    Essential hypertension    Sleep apnea    Psoriatic arthropathy (HCC)    Coronary atherosclerosis    Vitamin D deficiency    Glaucoma    S/P CABG x 4    ILD (interstitial lung disease) (HCC)    Chronic diastolic heart failure (HCC)    NSTEMI (non-ST elevated myocardial infarction) ([facility] [address]    Type 2 diabetes mellitus with hyperglycemia, with long-term current use of insulin ([address]    Hypertension associated with stage 5 chronic kidney disease due to type 2 diabetes mellitus ([address]    Acquired hypothyroidism    ESRD on peritoneal dialysis ([address]    AMS (altered mental status)    Acute respiratory failure with hypoxia (HCC)    Hypercarbia    Electrolyte imbalance    Chronic hypercapnic respiratory failure (HCC)    Obstructive sleep apnea    Moderate episode of recurrent major depressive disorder (HCC)    Overweight (BMI 25.0-29. 9)    History of 2019 novel coronavirus disease (COVID-19)    Peritoneal dialysis catheter in place [facility])    Acute on chronic respiratory failure with hypercapnia (HCC)    COPD with acute exacerbation (HCC)         PLAN:  noninvasive mechanical vent , pulmonary consult he is a full code DVT prophylaxis , diabetes management with Basal and fast acting insulin, Dulera - LABA and ICS combo , ct PD , Nephrology consult comanagement Dr Martinez Estrada , Pulmonology consult   Medardo Burnham MD    D: 11/07/2022 22:52:14       T: 11/08/2022 0:26:43     [email or web address]  Job#: [id number]     Doc#: [id number]    CC:

## 2022-11-08 NOTE — FLOWSHEET NOTE
11/08/22 1531   Vital Signs   BP (!) 149/67   Temp 97.8 °F (36.6 °C)   Heart Rate 83   Resp 20   Weight 195 lb 5.2 oz (88.6 kg)   Weight Method Actual;Bed scale     Connected to Cycler per protocol. CCPD order: (2) 2.5 % 5 L Delflex                       Total Volume: 10 L                       Therapy Time Duration (Hours): 08:00                        Exchange Volume: 2.5 L                        Number of Exchanges: 4                        No Final Fill    Dwell Time: 01:20  Fill Time: 00:14  Drain Time: 00:26    Initial drain 68 ml    Effluent clear without fibrin. Exit site care done per protocol. Site without redness/drainage, clean/dry. First cuff exposed. Dr. Pablo Arora notified. Garamycin . 1% cream to site. Refused to have dressing applied.     Report given to Camilo Summers RN

## 2022-11-08 NOTE — PROGRESS NOTES
Placed patient on BiPAP based on ABG results. There are no skin integrity issues at this time. Patient is tolerating well.

## 2022-11-08 NOTE — PROGRESS NOTES
ABG drawn x  1 attempt(s) from Right Radial. Patient had positive modified Cruz's Test with good collateral circulation. Patient was on 3 liters/min via nasal cannula at time of puncture. Pressure held for 5 minutes. No bleeding or bruising noted at puncture site.   Patient tolerated procedure well

## 2022-11-08 NOTE — CONSULTS
PULMONARY AND CRITICAL CARE CONSULTATION NOTE    CONSULTING PHYSICIAN:      REASON FOR CONSULT:   Chief Complaint   Patient presents with    Altered Mental Status     Pt transported from [employer] by [facility]. EMT reports Pt has new onset of confusion that started this morning according to facility staff. Pt had PD last night and has not been acting the same since. Vitals WNL, on 3l of O2, , per EMT. Pt alertx4. Slow to respond. DATE OF CONSULT: 11/8/2022    HISTORY OF PRESENT ILLNESS: 69y.o. year old male nursing home resident with past medical history of COPD, BJ noncompliant with BiPAP, chronic hypercapnic respiratory failure, end-stage renal disease on peritoneal dialysis, hypertension, diabetes mellitus type 2, CAD who presented with confusion, lethargy and altered mental status and respiratory distress. Patient was noted to have acute on chronic hypercapnic respiratory failure with hypoxia. He was initiated on BiPAP with which mental status has improved. He was also noted to have UTI. Patient tells me that he is wearing BiPAP every night at the nursing facility but he does not think that is working for him. However, there is documentation of patient's been noncompliant with BiPAP. He now denies any shortness of breath or cough or wheezing or chest pain or hemoptysis. No lower extremity edema. REVIEW OF SYSTEMS:   CONSTITUTIONAL SYMPTOMS: The patient denies fever, fatigue, night sweats, weight loss or weight gain. HEENT: No vision changes. No tinnitus, Denies sinus pain. No hoarseness, or dysphagia. NECK: Patient denies swelling in the neck. CARDIOVASCULAR: Denies chest pain, palpitation, syncope. RESPIRATORY: See above. GASTROINTESTINAL: Denies nausea, abdominal pain or change in bowel function. GENITOURINARY: Denies obstructive symptoms. No history of incontinence. BREASTS: No masses or lumps in the breasts. SKIN: No rashes or itching.    MUSCULOSKELETAL: Denies weakness or bone pain. NEUROLOGICAL: No headaches or seizures. PSYCHIATRIC: Denies mood swings or depression. ENDOCRINE: Denies heat or cold intolerance or excessive thirst.  HEMATOLOGIC/LYMPHATIC: Denies easy bruising or lymph node swelling. ALLERGIC/IMMUNOLOGIC: No environmental allergies.     PAST MEDICAL HISTORY:   Past Medical History:   Diagnosis Date    Allergic rhinitis due to other allergen 2010    Coronary atherosclerosis of unspecified type of vessel, native or graft 2010    Diabetic eye exam ([facility] [address] 2015    Diabetic eye exam ([address] 2016    [facility] [facility]    Generalized osteoarthrosis, involving multiple sites 2010    Other psoriasis 2010    Pneumonia     Prolonged emergence from general anesthesia     Psoriatic arthropathy ([address] 2010    Type II or unspecified type diabetes mellitus without mention of complication, not stated as uncontrolled 2010    Unspecified essential hypertension 2010    Unspecified sleep apnea 2010       PAST SURGICAL HISTORY:   Past Surgical History:   Procedure Laterality Date    CARPAL TUNNEL RELEASE      s/p    CATARACT REMOVAL      CORONARY ARTERY BYPASS GRAFT      JOINT REPLACEMENT      LAPAROSCOPY INSERTION PERITONEAL CATHETER N/A 10/26/2020    LAPAROSCOPIC PERITONEAL DIALYSIS CATHETER PLACEMENT; LAPAROSCOPIC OMENTOPEXY performed by Galileo Quintana DO at [address]:   Social History     Tobacco Use    Smoking status: Former     Packs/day: 1.00     Years: 18.00     Pack years: 18.00     Types: Cigarettes     Start date: 1962     Quit date: 1980     Years since quittin.0    Smokeless tobacco: Never   Vaping Use    Vaping Use: Never used   Substance Use Topics    Alcohol use: No    Drug use: No       FAMILY HISTORY:   Family History   Problem Relation Age of Onset    Diabetes Mother     Heart Failure Mother     Coronary Art Dis Father        MEDICATIONS:     No current facility-administered medications on file prior to encounter. Current Outpatient Medications on File Prior to Encounter   Medication Sig Dispense Refill    gentamicin (GARAMYCIN) 0.1 % cream Apply topically 3 times daily.  15 g 0    lactobacillus (CULTURELLE) capsule Take 1 capsule by mouth 2 times daily (with meals) 10 capsule 0    torsemide (DEMADEX) 100 MG tablet Take 1 tablet by mouth daily 30 tablet 0    midodrine (PROAMATINE) 5 MG tablet Take 1 tablet by mouth 3 times daily as needed (SBP < 100) 90 tablet 0    albuterol sulfate (PROAIR RESPICLICK) 677 (90 Base) MCG/ACT aerosol powder inhalation Inhale 2 puffs into the lungs every 4 hours as needed for Wheezing or Shortness of Breath 1 each 5    buPROPion (WELLBUTRIN) 100 MG tablet Take 1 tablet by mouth every other day 60 tablet 3    insulin glargine (LANTUS SOLOSTAR) 100 UNIT/ML injection pen Inject 50 Units into the skin nightly 5 Adjustable Dose Pre-filled Pen Syringe 3    insulin lispro, 1 Unit Dial, 100 UNIT/ML SOPN Inject 0-4 Units into the skin 3 times daily (with meals) 10 mL 0    insulin lispro, 1 Unit Dial, 100 UNIT/ML SOPN Inject 0-4 Units into the skin nightly 10 mL 0    BREO ELLIPTA 200-25 MCG/INH AEPB inhaler Inhale 1 puff into the lungs daily      atorvastatin (LIPITOR) 80 MG tablet TAKE ONE TABLET BY MOUTH ONCE NIGHTLY 90 tablet 3    Easy Touch Lancets 30G/Twist MISC USE THREE TIMES A DAY TO FOUR TIMES A  each 5    levothyroxine (SYNTHROID) 50 MCG tablet TAKE ONE TABLET BY MOUTH DAILY 90 tablet 8    [DISCONTINUED] insulin detemir (LEVEMIR FLEXTOUCH) 100 UNIT/ML injection pen 56-66 units daily 10 pen 3    [DISCONTINUED] insulin aspart (NOVOLOG FLEXPEN) 100 UNIT/ML injection pen 32-42  units AC TID 10 pen 3    B-D ULTRAFINE III SHORT PEN 31G X 8 MM MISC USE WITH INSULIN FOUR TIMES A  each 2    Blood Glucose Monitoring Suppl (TRUE METRIX METER) RUMA As needed 1 each 0    blood glucose monitor strips Test up to 4 times daily for blood sugar monitoring 200 strip 3    blood glucose monitor kit and supplies (PLEASE DISPENSE WHAT INS WILL COVER) - Dispense sufficient amount for  TID testing frequency plus additional to accommodate PRN testing needs. Dispense all needed supplies to include: monitor, strips, lancing device, lancets, control solutions, alcohol swabs. 1 kit 0    B Complex-C-Biotin-E-Min-FA (DIALYVITE 3000) 3 MG TABS Take 1 tablet by mouth daily      vitamin D (ERGOCALCIFEROL) 1.25 MG (15527 UT) CAPS capsule Take 50,000 Units by mouth every 3 months      [DISCONTINUED] triamcinolone (KENALOG) 0.1 % ointment Apply topically 2 times daily. 1 Tube 2    Handicap Placard MISC by Does not apply route Duration: 5 years 1 each 0    aspirin 81 MG chewable tablet Take 1 tablet by mouth daily. 30 tablet 5        aspirin  81 mg Oral Daily    atorvastatin  80 mg Oral Daily    mometasone-formoterol  2 puff Inhalation BID    [START ON 11/9/2022] buPROPion  100 mg Oral Every Other Day    gentamicin   Topical TID    insulin glargine  50 Units SubCUTAneous Nightly    lactobacillus  1 capsule Oral BID WC    levothyroxine  50 mcg Oral Daily    torsemide  100 mg Oral Daily    [START ON 12/1/2022] vitamin D  50,000 Units Oral Q3 Months    cefTRIAXone (ROCEPHIN) IV  1,000 mg IntraVENous Q24H    heparin (porcine)  5,000 Units SubCUTAneous 3 times per day    insulin lispro  0-4 Units SubCUTAneous TID WC    insulin lispro  0-4 Units SubCUTAneous Nightly      dextrose       albuterol, midodrine, dextrose bolus **OR** dextrose bolus, glucagon (rDNA), dextrose    ALLERGIES:   Allergies as of 11/07/2022 - Fully Reviewed 11/07/2022   Allergen Reaction Noted    Penicillins Other (See Comments) 07/12/2010    Sulfa antibiotics  07/12/2010    Tazorac [tazarotene]  07/12/2010    Clindamycin Rash 01/27/2010    Clindamycin/lincomycin Rash 07/12/2010      OBJECTIVE:   height is 5' 10\" (1.778 m) and weight is 196 lb 10.4 oz (89.2 kg). His temporal temperature is 97.2 °F (36.2 °C).  His blood pressure is 161/69 (abnormal) and his pulse is 81. His respiration is 18 and oxygen saturation is 100%. No intake/output data recorded. PHYSICAL EXAM:  CONSTITUTIONAL: He is a 69y.o.-year-old who appears his stated age. He is in no acute distress. HEENT: PERRL No scleral icterus. No thrush, atraumatic, normocephalic. NECK: Supple, without cervical or supraclavicular lymphadenopathy:  CARDIOVASCULAR: S1 S2 RRR. Without murmer  RESPIRATORY & CHEST: Lungs are clear to auscultation and percussion. No wheezing, no crackles. Good air movement  GASTROINTESTINAL & ABDOMEN: Soft, nontender, positive bowel sounds in all quadrants, non-distended, without hepatosplenomegaly. GENITOURINARY: Deferred. MUSCULOSKELETAL: No tenderness to palpation of the axial skeleton. There is no clubbing. No cyanosis. No edema of the lower extremities. SKIN OF BODY: No rash or jaundice. PSYCHIATRIC EVALUATION: Normal affect. Patient answers questions appropriately. HEMATOLOGIC/LYMPHATIC/ IMMUNOLOGIC: No palpable lymphadenopathy. NEUROLOGIC: Awake and answering questions. Groslly non-focal. Motor strength is 5+/5 in all muscle groups. The patient has a normal sensorium globally.       LABS:  Lab Results   Component Value Date    WBC 13.6 (H) 11/08/2022    HGB 9.6 (L) 11/08/2022    HCT 29.0 (L) 11/08/2022     11/08/2022    CHOL 120 04/15/2022    TRIG 166 (H) 04/15/2022    HDL 44 04/15/2022    LDLDIRECT 89 10/21/2016    ALT 87 (H) 11/07/2022     (H) 11/07/2022     (L) 11/07/2022    K 3.8 11/07/2022    CL 90 (L) 11/07/2022    CREATININE 7.0 (HH) 11/07/2022    BUN 50 (H) 11/07/2022    CO2 33 (H) 11/07/2022    TSH 5.20 (H) 01/30/2018    PSA 1.45 09/10/2013    INR 0.99 04/15/2022       Lab Results   Component Value Date    GLUCOSE 198 (H) 11/07/2022    CALCIUM 8.9 11/07/2022     (L) 11/07/2022    K 3.8 11/07/2022    CO2 33 (H) 11/07/2022    CL 90 (L) 11/07/2022    BUN 50 (H) 11/07/2022    CREATININE 7.0 (HH) 11/07/2022         IMAGING:  I reviewed the chest x-ray read by interpretation is as follows. Increased pulmonary vascular congestion    IMPRESSION:   Acute on chronic hypercapnic respiratory failure  COPD  BJ, noncompliant with BiPAP  UTI  End-stage renal disease on PD  Heart failure with preserved ejection fraction        RECOMMENDATION:   Patient presents with altered mental status which I believe is secondary to acute on chronic hypercapnic respiratory failure with hypoxia as well as UTI. Previous ABGs are consistent with chronic hypercapnic respiratory failure. Patient has BiPAP at the nursing home but I am not sure whether he is compliant with BiPAP or not. I would recommend to call nursing home to find out whether patient is compliant with BiPAP and also to know what settings his BiPAP is set at. Continue BiPAP intermittently during the day and continuous at night while he is in the hospital.  Titrate FiO2 as tolerated for saturation of 90 to 92%. On Rocephin for UTI, urine culture pending. Peritoneal dialysis per nephrology. Kinsey Skelton MD  Pulmonary Critical Care and Sleep Medicine  11/8/2022, 10:16 AM    This note was completed using dragon medical speech recognition software. Grammatical errors, random word insertions, pronoun errors and incomplete sentences are occasional consequences of this technology due to software limitations. If there are questions or concerns about the content of this note of information contained within the body of this dictation they should be addressed with the provider for clarification.

## 2022-11-08 NOTE — PROGRESS NOTES
To room 5918 via cart. Oriented to room and call light system. Assessment complete. Call light in reach will continue to monitor.

## 2022-11-08 NOTE — CARE COORDINATION
Re-Admission Assessment completed with patient's daughter.     Electronically signed by JOE Hawk on 11/8/2022 at 5:11 PM

## 2022-11-08 NOTE — RT PROTOCOL NOTE
RT Inhaler-Nebulizer Bronchodilator Protocol Note    There is a bronchodilator order in the chart from a provider indicating to follow the RT Bronchodilator Protocol and there is an Initiate RT Inhaler-Nebulizer Bronchodilator Protocol order as well (see protocol at bottom of note). CXR Findings:  XR CHEST PORTABLE    Result Date: 11/7/2022  Low lung volumes, which somewhat limit evaluation. Small left pleural effusion and/or basilar atelectasis. Stable cardiomegaly. The findings from the last RT Protocol Assessment were as follows:   History Pulmonary Disease: Smoker 15 pack years or more  Respiratory Pattern: Regular pattern and RR 12-20 bpm  Breath Sounds: Slightly diminished and/or crackles  Cough: Strong, spontaneous, non-productive  Indication for Bronchodilator Therapy:    Bronchodilator Assessment Score: 3    Aerosolized bronchodilator medication orders have been revised according to the RT Inhaler-Nebulizer Bronchodilator Protocol below. Respiratory Therapist to perform RT Therapy Protocol Assessment initially then follow the protocol. Repeat RT Therapy Protocol Assessment PRN for score 0-3 or on second treatment, BID, and PRN for scores above 3. No Indications - adjust the frequency to every 6 hours PRN wheezing or bronchospasm, if no treatments needed after 48 hours then discontinue using Per Protocol order mode. If indication present, adjust the RT bronchodilator orders based on the Bronchodilator Assessment Score as indicated below. Use Inhaler orders unless patient has one or more of the following: on home nebulizer, not able to hold breath for 10 seconds, is not alert and oriented, cannot activate and use MDI correctly, or respiratory rate 25 breaths per minute or more, then use the equivalent nebulizer order(s) with same Frequency and PRN reasons based on the score. If a patient is on this medication at home then do not decrease Frequency below that used at home.     0-3 - enter or revise RT bronchodilator order(s) to equivalent RT Bronchodilator order with Frequency of every 4 hours PRN for wheezing or increased work of breathing using Per Protocol order mode. 4-6 - enter or revise RT Bronchodilator order(s) to two equivalent RT bronchodilator orders with one order with BID Frequency and one order with Frequency of every 4 hours PRN wheezing or increased work of breathing using Per Protocol order mode. 7-10 - enter or revise RT Bronchodilator order(s) to two equivalent RT bronchodilator orders with one order with TID Frequency and one order with Frequency of every 4 hours PRN wheezing or increased work of breathing using Per Protocol order mode. 11-13 - enter or revise RT Bronchodilator order(s) to one equivalent RT bronchodilator order with QID Frequency and an Albuterol order with Frequency of every 4 hours PRN wheezing or increased work of breathing using Per Protocol order mode. Greater than 13 - enter or revise RT Bronchodilator order(s) to one equivalent RT bronchodilator order with every 4 hours Frequency and an Albuterol order with Frequency of every 2 hours PRN wheezing or increased work of breathing using Per Protocol order mode. RT to enter RT Home Evaluation for COPD & MDI Assessment order using Per Protocol order mode.     Electronically signed by Michaela Barboza RCP on 11/8/2022 at 2:35 AM

## 2022-11-08 NOTE — PROGRESS NOTES
Patient Active Problem List   Diagnosis    Essential hypertension    Generalized osteoarthrosis, involving multiple sites    Arthritis with psoriasis ([address]    Sleep apnea    Psoriatic arthropathy (HCC)    Allergic rhinitis due to other allergen    Coronary atherosclerosis    Vitamin D deficiency    Glaucoma    Nephropathy    S/P CABG x 4    ILD (interstitial lung disease) ([address]    Acute respiratory failure with hypoxia and hypercapnia (HCC)    Chronic diastolic heart failure (HCC)    Coronary artery disease involving native coronary artery of native heart with unstable angina pectoris (HCC)    BJ (obstructive sleep apnea)    Cough variant asthma    NSTEMI (non-ST elevated myocardial infarction) ([address]    Diabetes mellitus type 2 in obese (HCC)    General weakness    Syncope and collapse    Type 2 diabetes mellitus with hyperglycemia, with long-term current use of insulin ([address]    Hypertension associated with stage 5 chronic kidney disease due to type 2 diabetes mellitus (HCC)    Acquired hypothyroidism    Unstable angina (HCC)    Orthostatic hypotension    Dyspnea on exertion    Left lower quadrant abdominal pain    ESRD on peritoneal dialysis ([address]    AMS (altered mental status)    Acute respiratory failure with hypoxia (HCC)    Hypercarbia    Electrolyte imbalance    Generalized abdominal tenderness    Chronic hypercapnic respiratory failure (HCC)    Obstructive sleep apnea    Drug toxicity    Physical deconditioning    Moderate episode of recurrent major depressive disorder (HCC)    Acute on chronic respiratory failure with hypoxia and hypercapnia (HCC)    Multifocal pneumonia    Hypervolemia    Leukocytosis    Acute pulmonary edema (HCC)    Arrhythmia    Overweight (BMI 25.0-29. 9)    History of 2019 novel coronavirus disease (COVID-19)    Elevated procalcitonin    Peritoneal dialysis catheter in place [facility])    Acute congestive heart failure ([address]    Diabetes education, encounter for    Acute on chronic respiratory failure with hypercapnia (HCC)    COPD with acute exacerbation (HCC)     H&P dictated

## 2022-11-08 NOTE — PROGRESS NOTES
Attempted to call the [facility] transitional care center to discuss settings of bipap, compliance and PD pt is on at facility. Nurse at facility is unavailable at current time but left call back number. Will follow up.

## 2022-11-08 NOTE — CARE COORDINATION
11/08/22 1706   Readmission Assessment   Previous Disposition SNF   Who is being Interviewed Caregiver   What was the patient's/caregiver's perception as to why they think they needed to return back to the hospital? Other (Comment)  (Issues with BiPapp)   Did you visit your Primary Care Physician after you left the hospital, before you returned this time? No   Why weren't you able to visit your PCP? Other (Comment)  (In a SNF)   Did you see a specialist, such as Cardiac, Pulmonary, Orthopedic Physician, etc. after you left the hospital? No   Who advised the patient to return to the hospital? Other (Comment)  (Staff @ SNF)   Does the patient report anything that got in the way of taking their medications? No   In our efforts to provide the best possible care to you and others like you, can you think of anything that we could have done to help you after you left the hospital the first time, so that you might not have needed to return so soon?  Other (Comment)  (Nothing)

## 2022-11-08 NOTE — ED NOTES
Pt report given to [facility].       Latasha Pérez RN  11/07/22 2038

## 2022-11-09 PROBLEM — N30.00 ACUTE CYSTITIS WITHOUT HEMATURIA: Status: ACTIVE | Noted: 2022-11-09

## 2022-11-09 LAB
ANION GAP SERPL CALCULATED.3IONS-SCNC: 13 MMOL/L (ref 3–16)
BUN BLDV-MCNC: 60 MG/DL (ref 7–20)
CALCIUM SERPL-MCNC: 8.5 MG/DL (ref 8.3–10.6)
CHLORIDE BLD-SCNC: 90 MMOL/L (ref 99–110)
CO2: 27 MMOL/L (ref 21–32)
CREAT SERPL-MCNC: 7.7 MG/DL (ref 0.8–1.3)
GFR SERPL CREATININE-BSD FRML MDRD: 7 ML/MIN/{1.73_M2}
GLUCOSE BLD-MCNC: 178 MG/DL (ref 70–99)
GLUCOSE BLD-MCNC: 231 MG/DL (ref 70–99)
GLUCOSE BLD-MCNC: 243 MG/DL (ref 70–99)
GLUCOSE BLD-MCNC: 257 MG/DL (ref 70–99)
GLUCOSE BLD-MCNC: 303 MG/DL (ref 70–99)
HBV SURFACE AB TITR SER: <3.5 MIU/ML
HCT VFR BLD CALC: 28.1 % (ref 40.5–52.5)
HEMOGLOBIN: 9.2 G/DL (ref 13.5–17.5)
HEPATITIS B SURFACE ANTIGEN INTERPRETATION: NORMAL
MCH RBC QN AUTO: 34.9 PG (ref 26–34)
MCHC RBC AUTO-ENTMCNC: 32.6 G/DL (ref 31–36)
MCV RBC AUTO: 107.1 FL (ref 80–100)
PDW BLD-RTO: 15.9 % (ref 12.4–15.4)
PERFORMED ON: ABNORMAL
PLATELET # BLD: 343 K/UL (ref 135–450)
PMV BLD AUTO: 6.8 FL (ref 5–10.5)
POTASSIUM SERPL-SCNC: 3.7 MMOL/L (ref 3.5–5.1)
RBC # BLD: 2.63 M/UL (ref 4.2–5.9)
SODIUM BLD-SCNC: 130 MMOL/L (ref 136–145)
WBC # BLD: 12.5 K/UL (ref 4–11)

## 2022-11-09 PROCEDURE — 97535 SELF CARE MNGMENT TRAINING: CPT

## 2022-11-09 PROCEDURE — 97165 OT EVAL LOW COMPLEX 30 MIN: CPT

## 2022-11-09 PROCEDURE — 94640 AIRWAY INHALATION TREATMENT: CPT

## 2022-11-09 PROCEDURE — 90945 DIALYSIS ONE EVALUATION: CPT

## 2022-11-09 PROCEDURE — 6370000000 HC RX 637 (ALT 250 FOR IP): Performed by: INTERNAL MEDICINE

## 2022-11-09 PROCEDURE — 6360000002 HC RX W HCPCS: Performed by: INTERNAL MEDICINE

## 2022-11-09 PROCEDURE — 1200000000 HC SEMI PRIVATE

## 2022-11-09 PROCEDURE — 2700000000 HC OXYGEN THERAPY PER DAY

## 2022-11-09 PROCEDURE — 97530 THERAPEUTIC ACTIVITIES: CPT

## 2022-11-09 PROCEDURE — 90945 DIALYSIS ONE EVALUATION: CPT | Performed by: INTERNAL MEDICINE

## 2022-11-09 PROCEDURE — 94660 CPAP INITIATION&MGMT: CPT

## 2022-11-09 PROCEDURE — 3E1M39Z IRRIGATION OF PERITONEAL CAVITY USING DIALYSATE, PERCUTANEOUS APPROACH: ICD-10-PCS | Performed by: INTERNAL MEDICINE

## 2022-11-09 PROCEDURE — 80048 BASIC METABOLIC PNL TOTAL CA: CPT

## 2022-11-09 PROCEDURE — 97162 PT EVAL MOD COMPLEX 30 MIN: CPT

## 2022-11-09 PROCEDURE — 36415 COLL VENOUS BLD VENIPUNCTURE: CPT

## 2022-11-09 PROCEDURE — 94761 N-INVAS EAR/PLS OXIMETRY MLT: CPT

## 2022-11-09 PROCEDURE — 99233 SBSQ HOSP IP/OBS HIGH 50: CPT | Performed by: INTERNAL MEDICINE

## 2022-11-09 PROCEDURE — 85027 COMPLETE CBC AUTOMATED: CPT

## 2022-11-09 RX ORDER — CEFUROXIME AXETIL 250 MG/1
250 TABLET ORAL DAILY
Status: DISCONTINUED | OUTPATIENT
Start: 2022-11-09 | End: 2022-11-16 | Stop reason: HOSPADM

## 2022-11-09 RX ORDER — GENTAMICIN SULFATE 1 MG/G
CREAM TOPICAL DAILY
Status: DISCONTINUED | OUTPATIENT
Start: 2022-11-09 | End: 2022-11-12 | Stop reason: SDUPTHER

## 2022-11-09 RX ORDER — INSULIN LISPRO 100 [IU]/ML
0-8 INJECTION, SOLUTION INTRAVENOUS; SUBCUTANEOUS
Status: DISCONTINUED | OUTPATIENT
Start: 2022-11-09 | End: 2022-11-16 | Stop reason: HOSPADM

## 2022-11-09 RX ORDER — INSULIN LISPRO 100 [IU]/ML
0-4 INJECTION, SOLUTION INTRAVENOUS; SUBCUTANEOUS NIGHTLY
Status: DISCONTINUED | OUTPATIENT
Start: 2022-11-09 | End: 2022-11-16 | Stop reason: HOSPADM

## 2022-11-09 RX ADMIN — BUPROPION HYDROCHLORIDE 100 MG: 100 TABLET, FILM COATED ORAL at 09:28

## 2022-11-09 RX ADMIN — INSULIN LISPRO 6 UNITS: 100 INJECTION, SOLUTION INTRAVENOUS; SUBCUTANEOUS at 12:42

## 2022-11-09 RX ADMIN — Medication 1 CAPSULE: at 16:44

## 2022-11-09 RX ADMIN — Medication 2 PUFF: at 20:08

## 2022-11-09 RX ADMIN — INSULIN LISPRO 2 UNITS: 100 INJECTION, SOLUTION INTRAVENOUS; SUBCUTANEOUS at 16:44

## 2022-11-09 RX ADMIN — INSULIN LISPRO 1 UNITS: 100 INJECTION, SOLUTION INTRAVENOUS; SUBCUTANEOUS at 07:49

## 2022-11-09 RX ADMIN — ATORVASTATIN CALCIUM 80 MG: 80 TABLET, FILM COATED ORAL at 09:28

## 2022-11-09 RX ADMIN — GENTAMICIN SULFATE: 1 CREAM TOPICAL at 09:29

## 2022-11-09 RX ADMIN — TORSEMIDE 100 MG: 100 TABLET ORAL at 09:28

## 2022-11-09 RX ADMIN — Medication 2 PUFF: at 08:30

## 2022-11-09 RX ADMIN — GENTAMICIN SULFATE: 1 CREAM TOPICAL at 12:41

## 2022-11-09 RX ADMIN — INSULIN GLARGINE 50 UNITS: 100 INJECTION, SOLUTION SUBCUTANEOUS at 20:46

## 2022-11-09 RX ADMIN — ACETAMINOPHEN 650 MG: 325 TABLET ORAL at 09:28

## 2022-11-09 RX ADMIN — CEFUROXIME AXETIL 250 MG: 250 TABLET ORAL at 12:42

## 2022-11-09 RX ADMIN — HEPARIN SODIUM 5000 UNITS: 5000 INJECTION INTRAVENOUS; SUBCUTANEOUS at 06:20

## 2022-11-09 RX ADMIN — ASPIRIN 81 MG 81 MG: 81 TABLET ORAL at 09:28

## 2022-11-09 RX ADMIN — HEPARIN SODIUM 5000 UNITS: 5000 INJECTION INTRAVENOUS; SUBCUTANEOUS at 14:26

## 2022-11-09 RX ADMIN — LEVOTHYROXINE SODIUM 50 MCG: 0.03 TABLET ORAL at 06:19

## 2022-11-09 RX ADMIN — HEPARIN SODIUM 5000 UNITS: 5000 INJECTION INTRAVENOUS; SUBCUTANEOUS at 20:42

## 2022-11-09 ASSESSMENT — PAIN DESCRIPTION - ORIENTATION: ORIENTATION: MID

## 2022-11-09 ASSESSMENT — PAIN DESCRIPTION - LOCATION: LOCATION: BUTTOCKS

## 2022-11-09 ASSESSMENT — PAIN SCALES - GENERAL: PAINLEVEL_OUTOF10: 4

## 2022-11-09 NOTE — PROGRESS NOTES
PROCEDURE:  Patient seen on peritoneal dialysis  at 7:47 AM    PHYSICIAN:  Nam West MD    INDICATION:  End-stage renal disease    Wt Readings from Last 3 Encounters:   11/09/22 195 lb 5.2 oz (88.6 kg)   10/10/22 200 lb 2.8 oz (90.8 kg)   09/19/22 222 lb 10.6 oz (101 kg)     Temp Readings from Last 3 Encounters:   11/09/22 97.5 °F (36.4 °C)   10/10/22 98.5 °F (36.9 °C) (Oral)   09/19/22 97.6 °F (36.4 °C) (Axillary)     BP Readings from Last 3 Encounters:   11/09/22 (!) 146/56   10/10/22 133/71   09/19/22 137/65     Pulse Readings from Last 3 Encounters:   11/09/22 79   10/10/22 79   09/19/22 81      In: 480 [P.O.:480]  Out: -      CBC:   Recent Labs     11/07/22  1417 11/08/22  0518 11/09/22  0546   WBC 11.9* 13.6* 12.5*   HGB 10.2* 9.6* 9.2*    342 343     BMP:    Recent Labs     11/07/22  1417 11/08/22  0518 11/09/22  0546   * 129* 130*   K 3.8 3.1* 3.7   CL 90* 92* 90*   CO2 33* 25 27   BUN 50* 58* 60*   CREATININE 7.0* 8.3* 7.7*   GLUCOSE 198* 64* 257*     BMD:   Lab Results   Component Value Date    .7 (H) 04/02/2022    CALCIUM 8.5 11/09/2022    CAION 1.03 (L) 01/14/2017    PHOS 9.5 (H) 10/08/2022       Iron:     Lab Results   Component Value Date    IRON 69 09/25/2022    TIBC 185 (L) 09/25/2022    FERRITIN 855.7 (H) 09/25/2022            RX:  See dialysis flowsheet for specifics on access, blood flow rate, dialysate baths, duration of dialysis, anticoagulation and other technical information. COMMENTS:      Tolerating PD well   No SOB today     PD catheter cuff is exposed. No signs of infection   Continue gentamicin cream once a day             Nam West MD  Nephrology     [facility] Office : [address], [address] , [address]  [facility] [place] Office: [place] [address], [facility] [place] [address] Office: [address] [address].   [facility] Office: [address] [place] [address] [address] [facility], [address] [place] [id number]  Office : [phone]  Fax :[phone]

## 2022-11-09 NOTE — PLAN OF CARE
Problem: Pain  Goal: Verbalizes/displays adequate comfort level or baseline comfort level  11/9/2022 0231 by Grant Espino RN  Outcome: Progressing     Problem: Skin/Tissue Integrity  Goal: Absence of new skin breakdown  Description: 1. Monitor for areas of redness and/or skin breakdown  2. Assess vascular access sites hourly  3. Every 4-6 hours minimum:  Change oxygen saturation probe site  4. Every 4-6 hours:  If on nasal continuous positive airway pressure, respiratory therapy assess nares and determine need for appliance change or resting period. 11/9/2022 1058 by Izaiah Prado RN  Outcome: Progressing  11/9/2022 0231 by Grant Espino RN  Outcome: Progressing   Moving as able. Up with ot/pt. Encouraged to allow staff to turn.  New mepilex sacral boarder applied

## 2022-11-09 NOTE — PROGRESS NOTES
[facility] [address]   Phone: [phone]    Occupational Therapy    [x] Initial Evaluation            [] Daily Treatment Note         [] Discharge Summary      Patient: Cipriano Reed   : 1945   MRN: [id number]   Date of Service:  2022    Admitting Diagnosis:  Acute on chronic respiratory failure with hypercapnia [facility])  Current Admission Summary: Cipriano Reed is a 69 y.o. male with past medical history of hypertension, diabetes, coronary artery disease and CHF as well as end-stage renal disease on peritoneal dialysis and obstructive sleep apnea here today for altered mental status. The patient was transferred from his care facility for reports of increased lethargy, weakness, and possible altered mental status. The patient himself currently states he feels fine. Denies headache, cough or shortness of breath. Denies chest pain. No abdominal pain, vomiting or diarrhea. States he feels tired and sleepy but is otherwise in no discomfort. Discharged from the hospital on 10/10/2022 after presenting altered thought secondary to acute on chronic hypoxic and hypercapnic respiratory failure with volume overload. Per X-Ray on 22, pt has small left pleural effusion and or/ atelectasis. Past Medical History:  has a past medical history of Allergic rhinitis due to other allergen, Coronary atherosclerosis of unspecified type of vessel, native or graft, Diabetic eye exam ([address] Diabetic eye exam ([address] Generalized osteoarthrosis, involving multiple sites, Other psoriasis, Pneumonia, Prolonged emergence from general anesthesia, Psoriatic arthropathy ([address] Type II or unspecified type diabetes mellitus without mention of complication, not stated as uncontrolled, Unspecified essential hypertension, and Unspecified sleep apnea. Past Surgical History:  has a past surgical history that includes Coronary artery bypass graft;  Carpal tunnel release; joint replacement; Cataract removal; and LAPAROSCOPY INSERTION PERITONEAL CATHETER (N/A, 10/26/2020). Discharge Recommendations: Zacarias Louie scored a 10/24 on the AM-PAC ADL Inpatient form. Current research shows that an AM-PAC score of 17 or less is typically not associated with a discharge to the patient's home setting. Based on the patient's AM-PAC score and their current ADL deficits, it is recommended that the patient have 3-5 sessions per week of Occupational Therapy at d/c to increase the patient's independence. Please see assessment section for further patient specific details. If patient discharges prior to next session this note will serve as a discharge summary. Please see below for the latest assessment towards goals. DME Required For Discharge: DME to be determined at next level of care    Precautions/Restrictions: high fall risk, up as tolerated    Pre-Admission Information   Lives With:  Pt currently at SNF. Transfer Assistance: requires assistance, Pt reports only transferring 2x in last 2 months for sit to stand. Denies having been up to chair. Ambulation Assistance: Pt has not ambulated since September. ADL Assistance:  assist for all  IADL Assistance:  lives at [facility]  Active :        [] Yes                 [x] No  Hand Dominance: [] Left                 [x] Right  Current Employment: retired. Occupation: air [employer]   Hobbies: watch tv  Recent Falls: one fall at home  Comments: Per  note on 11/8/22:  Pt from [facility]. Patient needs assistance with ADLs and IADLs. Family also interested in a LTC placement.     Examination   Vision:   Vision Corrective Device: wears glasses for reading  Hearing:   CAROLANNAngiodroid [place]  Observation:   General Observation:  pressure sore on buttocks, redness on nayana-area  Posture:   Poor, Forward head, rounded shoulders   Sensation:   reports numbness and tingling in (B) LE  ROM:   (L) Shoulder: 0-90     (R) Shoulder: 0-90  (B) Elbow AROM WFL  (B) Wrist AROM WFL  Strength:   (B) UE strength grossly -2  Decision Making: low complexity  Clinical Presentation: stable    Subjective  General: Pt supine in bed with HOB elevated, pt's RN present and administering medication. Pt grimacing and c/o pain and needing tylenol. Pain: Patient does not rate upon questioning, pain in buttocks d/t pressure sore  Pain Interventions: RN notified of patient request for pain medication   Vitals  Heart Rate: 87  Heart Rate Source: Monitor  BP: (!) 110/49  BP Location: Left upper arm  BP Method: Automatic  Patient Position: Semi fowlers  MAP (Calculated): 69.33  Comment: pt with hypotensive symptoms,reports not feeling right. Activities of Daily Living  Basic Activities of Daily Living  Feeding: setup assistance  Feeding Comments: beverage management  Grooming: maximum assistance requires verbal cueing  Grooming Comments: grooming completed while supine in bed, set up for oral care, pt able to wash face with set-up assistance, pt dep for shaving face  Upper Extremity Bathing: maximum assistance Comment: pt lifting arms throughout  Lower Extremity Bathing: dependent Comment: max A to roll for buttocks, pt able to clean nayana-are, assistance for lower legs   Bathing Comments: sponge bathing, UB while seated on EOB, LB while supine in bed  Upper Extremity Dressing: maximum assistance  Dressing Comments: gown change while seated on EOB  Toileting: dependent. Toileting Comments: incontinent of bowel, max A for rolling while assist of another to clean buttocks, meplix applied to buttocks, RN present and aware  Instrumental Activities of Daily Living  No IADL completed on this date.   Functional Mobility  Bed Mobility  Supine to Sit: 2 person assistance with max A   Sit to Supine: 2 person assistance with max A   Rolling Left: maximum assistance  Rolling Right: maximum assistance  Scooting: maximum assistance  Bridging: stand by assistance, partial bridge while in bed to remove chux pad from under buttocks  Comments:HOB partially elevated  Transfers  No transfers completed on this date secondary to unsafe to do so. Functional Mobility:  Sitting Balance: minimal assistance, moderate assistance.     Sitting Balance Comment: min A for static sitting balance, mod A for dynamic sitting balance, only able to tolerate sitting on EOB ~8 minutes with  max encouragement as pt requesting and attempting to return supine to bed ~3x  Functional Outcomes  AM-PAC Inpatient Daily Activity Raw Score: 10    Cognition  Overall Cognitive Status: Impaired  Following Commands: follows one step commands with repetition, follows one step commands with increased time  Attention Span: attends with cues to redirect, difficulty dividing attention  Memory: decreased recall of recent events, decreased short term memory  Problem Solving: assistance required to generate solutions, assistance required to implement solutions, decreased awareness of errors, assistance required to correct errors made  Insights: decreased awareness of deficits  Initiation: requires cues for some  Sequencing: requires cues for some  Comments: Patient with flat affect   Orientation:    alert and oriented x 4  Command Following:   accurately follows one step commands  Education  Barriers To Learning: cognition and physical  Patient Education: patient educated on plan of care, precautions, ADL adaptive strategies, discharge recommendations  Learning Assessment:  patient will require reinforcement due to cognitive deficits  Assessment  Activity Tolerance: Poor d/t pain and hypotension ( 82/45 seated on EOB, 99/56 seated on EOB)  Impairments Requiring Therapeutic Intervention: decreased functional mobility, decreased ADL status, decreased ROM, decreased strength, decreased safety awareness, decreased cognition, decreased endurance, decreased sensation, decreased balance, decreased IADL, decreased fine motor control, increased pain, decreased posture  Prognosis: fair  Clinical Assessment: Pt presents with above deficits which are slightly below baseline. Pt would benefit from skilled OT to increased independence in ADLs to reduce caregiver burden.    Safety Interventions: patient left in bed, bed alarm in place, call light within reach, patient at risk for falls, and nurse notified    Plan  Frequency: 3-5 x/per week  Current Treatment Recommendations: strengthening, ROM, balance training, endurance training, patient/caregiver education, ADL/self-care training, home exercise program, and positioning    Goals  Patient Goals: none stated    Short Term Goals:  Time Frame: upon d/c  Patient will complete upper body ADL at minimal assistance   Patient will complete grooming at minimal assistance, while seated on EOB   Patient will increase functional sitting balance to SUP ~10 minutes for improved ADL completion  Patient will increase [facility] ADL score = to or > than 13/24    Therapy Session Time     Individual Group Co-treatment   Time In    0927   Time Out    1013   Minutes    46        Timed Code Treatment Minutes:  Timed Code Treatment Minutes: 31 Minutes    Total Treatment Minutes:  46       Electronically Signed By: Luis Carlos Mandel, [id number] [address], OTR/L, [id number]

## 2022-11-09 NOTE — CONSULTS
Office : [phone]     Fax :[phone]       Nephrology Consult Note      Patient's Name: Burke Falcon  7:18 PM  11/8/2022    Reason for Consult:  ESRD management        Requesting Physician:  Loulou Collazo MD      Chief Complaint:    Chief Complaint   Patient presents with    Altered Mental Status     Pt transported from [facility] by [facility]. EMT reports Pt has new onset of confusion that started this morning according to facility staff. Pt had PD last night and has not been acting the same since. Vitals WNL, on 3l of O2, , per EMT. Pt alertx4. Slow to respond. History of Present Ilness:    Burke Falcon is a 69 y.o. male with prior history of ESRD, respiratory insufficieny with CO2 retention, HTN , Dm 2 was sent for AMS. Admitted to ICU. Has CO2 retention o ABG . placed on BiPAP. Has h/o CO2 retention previous admission. I/O last 3 completed shifts:   In: 48 [IV Piggyback:50]  Out: -     Past Medical History:   Diagnosis Date    Allergic rhinitis due to other allergen 7/12/2010    Coronary atherosclerosis of unspecified type of vessel, native or graft 7/12/2010    Diabetic eye exam ([address] 04/29/2015    Diabetic eye exam ([address] 5/5/2016    [place] [facility]    Generalized osteoarthrosis, involving multiple sites 7/12/2010    Other psoriasis 7/12/2010    Pneumonia     Prolonged emergence from general anesthesia     Psoriatic arthropathy ([address] 7/12/2010    Type II or unspecified type diabetes mellitus without mention of complication, not stated as uncontrolled 7/12/2010    Unspecified essential hypertension 7/12/2010    Unspecified sleep apnea 7/12/2010       Past Surgical History:   Procedure Laterality Date    CARPAL TUNNEL RELEASE      s/p    CATARACT REMOVAL CORONARY ARTERY BYPASS GRAFT      JOINT REPLACEMENT      LAPAROSCOPY INSERTION PERITONEAL CATHETER N/A 10/26/2020    LAPAROSCOPIC PERITONEAL DIALYSIS CATHETER PLACEMENT; LAPAROSCOPIC OMENTOPEXY performed by Tenzin Moreno DO at [facility] OR       Family History   Problem Relation Age of Onset    Diabetes Mother     Heart Failure Mother     Coronary Art Dis Father         reports that he quit smoking about 42 years ago. He started smoking about 60 years ago. He has a 18.00 pack-year smoking history. He has never used smokeless tobacco. He reports that he does not drink alcohol and does not use drugs.         Allergies:  Penicillins, Sulfa antibiotics, Tazorac [tazarotene], Clindamycin, and Clindamycin/lincomycin    Current Medications:    acetaminophen (TYLENOL) tablet 650 mg, Q4H PRN  albuterol (PROVENTIL) nebulizer solution 2.5 mg, Q4H PRN  aspirin chewable tablet 81 mg, Daily  atorvastatin (LIPITOR) tablet 80 mg, Daily  mometasone-formoterol (DULERA) 200-5 MCG/ACT inhaler 2 puff, BID  [START ON 11/9/2022] buPROPion (WELLBUTRIN) tablet 100 mg, Every Other Day  gentamicin (GARAMYCIN) 0.1 % cream, TID  insulin glargine (LANTUS) injection vial 50 Units, Nightly  lactobacillus (CULTURELLE) capsule 1 capsule, BID WC  levothyroxine (SYNTHROID) tablet 50 mcg, Daily  midodrine (PROAMATINE) tablet 5 mg, TID PRN  torsemide (DEMADEX) tablet 100 mg, Daily  [START ON 12/1/2022] vitamin D (ERGOCALCIFEROL) capsule 50,000 Units, Q3 Months  cefTRIAXone (ROCEPHIN) 1,000 mg in dextrose 5 % 50 mL IVPB mini-bag, Q24H  heparin (porcine) injection 5,000 Units, 3 times per day  insulin lispro (HUMALOG) injection vial 0-4 Units, TID WC  insulin lispro (HUMALOG) injection vial 0-4 Units, Nightly  dextrose bolus 10% 125 mL, PRN   Or  dextrose bolus 10% 250 mL, PRN  glucagon (rDNA) injection 1 mg, PRN  dextrose 10 % infusion, Continuous PRN        Review of Systems:   14 point ROS obtained but were negative except mentioned in HPI      Physical exam:     Vitals:  BP (!) 159/64   Pulse 84   Temp 98.4 °F (36.9 °C) (Temporal)   Resp 22   Ht 5' 10\" (1.778 m)   Wt 195 lb 5.2 oz (88.6 kg)   SpO2 100%   BMI 28.03 kg/m²   Constitutional:  OAA X3 NAD  Skin: no rash, turgor wnl  Heent:  eomi, mmm  Neck: no bruits or jvd noted  Cardiovascular:  S1, S2 without m/r/g  Respiratory: CTA B without w/r/r  Abdomen:  +bs, soft, nt, nd  Ext: no  lower extremity edema  Psychiatric: mood and affect appropriate  Musculoskeletal:  Rom, muscular strength intact    Labs:  CBC:   Recent Labs     11/07/22  1417 11/08/22  0518   WBC 11.9* 13.6*   HGB 10.2* 9.6*    342     BMP:    Recent Labs     11/07/22  1417 11/08/22  0518   * 129*   K 3.8 3.1*   CL 90* 92*   CO2 33* 25   BUN 50* 58*   CREATININE 7.0* 8.3*   GLUCOSE 198* 64*     Ca/Mg/Phos:   Recent Labs     11/07/22  1417 11/08/22  0518   CALCIUM 8.9 8.8     Hepatic:   Recent Labs     11/07/22  1417   *   ALT 87*   BILITOT 0.3   ALKPHOS 156*     Troponin:   Recent Labs     11/07/22  1417   TROPONINI 0.10*     BNP: No results for input(s): BNP in the last 72 hours. Lipids: No results for input(s): CHOL, TRIG, HDL, LDLCALC, LABVLDL in the last 72 hours. ABGs:   Recent Labs     11/08/22  0920   PHART 7.249*   PO2ART <30.0*   IPT4CJR 72.1*     INR: No results for input(s): INR in the last 72 hours. UA:  Recent Labs     11/07/22  1500   COLORU Yellow   CLARITYU TURBID*   GLUCOSEU 500*   BILIRUBINUR Negative   KETUA Negative   SPECGRAV 1.020   BLOODU LARGE*   PHUR 7.0   PROTEINU >=300*   UROBILINOGEN 0.2   NITRU Negative   LEUKOCYTESUR LARGE*   LABMICR YES   URINETYPE NotGiven      Urine Microscopic:   Recent Labs     11/07/22  1500   WBCUA >100*   RBCUA see below*     Urine Culture:   Recent Labs     11/07/22  1500   LABURIN No growth at 18 to 36 hours     Urine Chemistry: No results for input(s): CLUR, LABCREA, PROTEINUR, NAUR in the last 72 hours.              IMAGING:  XR CHEST PORTABLE   Final Result Low lung volumes, which somewhat limit evaluation. Small left pleural effusion and/or basilar atelectasis. Stable cardiomegaly. Assessment/Plan :      1. ESRD . On PD  Will start PD. Use 2.5 %   2500 ml   4 exchanges     2. HTN. Continue BP meds     3. Anemia in ESRD   Give erythropoetin      4. Acid- base disorder. Correct with HD     5. Hypokalemia. Replace potassium. 6. Hypercarbic respiratory failure. On BiPAP.            D/w primary team      Thank you for allowing us to participate in care of Tony Villarreal         Electronically signed by: Dony Roblero MD, 11/8/2022, 7:18 PM      [facility] [address]  Office : [phone]  Fax :[phone]

## 2022-11-09 NOTE — CARE COORDINATION
KATRIN spoke with Admission Coordinator-Catherine ([phone]) @ [facility]. Facility will accept patient into a long term-care bed when patient is medically stable. Facility will require a PASS-R. Facility also requiring contact information for patient's dialysis provider, Malik. SW contacted patient's daughter and daughter will provide information on Gan. KATRIN will follow.     Electronically signed by JOE Long on 11/9/2022 at 2:41 PM

## 2022-11-09 NOTE — PROGRESS NOTES
[facility] Department   Phone: [phone]    Physical Therapy    [x] Initial Evaluation            [] Daily Treatment Note         [] Discharge Summary      Patient: Selina Sharma   : 1945   MRN: [id number]   Date of Service:  2022  Admitting Diagnosis: Acute on chronic respiratory failure with hypercapnia [facility])  Current Admission Summary: Selina Sharma is a 69 y.o. male with past medical history of hypertension, diabetes, coronary artery disease and CHF as well as end-stage renal disease on peritoneal dialysis and obstructive sleep apnea here today for altered mental status. The patient was transferred from his care facility for reports of increased lethargy, weakness, and possible altered mental status. The patient himself currently states he feels fine. Denies headache, cough or shortness of breath. Denies chest pain. No abdominal pain, vomiting or diarrhea. States he feels tired and sleepy but is otherwise in no discomfort. Discharged from the hospital on 10/10/2022 after presenting altered thought secondary to acute on chronic hypoxic and hypercapnic respiratory failure with volume overload. Per X-Ray on 22, pt has small left pleural effusion and or/ atelectasis. Past Medical History:  has a past medical history of Allergic rhinitis due to other allergen, Coronary atherosclerosis of unspecified type of vessel, native or graft, Diabetic eye exam ([address] Diabetic eye exam ([address] Generalized osteoarthrosis, involving multiple sites, Other psoriasis, Pneumonia, Prolonged emergence from general anesthesia, Psoriatic arthropathy ([address] Type II or unspecified type diabetes mellitus without mention of complication, not stated as uncontrolled, Unspecified essential hypertension, and Unspecified sleep apnea. Past Surgical History:  has a past surgical history that includes Coronary artery bypass graft;  Carpal tunnel release; joint replacement; Cataract removal; and LAPAROSCOPY INSERTION PERITONEAL CATHETER (N/A, 10/26/2020). Discharge Recommendations: Nadja Ervin scored a 6/24 on the AM-PAC short mobility form. Current research shows that an AM-PAC score of 17 or less is typically not associated with a discharge to the patient's home setting. Based on the patient's AM-PAC score and their current functional mobility deficits, it is recommended that the patient have 3-5 sessions per week of Physical Therapy at d/c to increase the patient's independence. Please see assessment section for further patient specific details. If patient discharges prior to next session this note will serve as a discharge summary. Please see below for the latest assessment towards goals. DME Required For Discharge: DME to be determined at next level of care  Precautions/Restrictions: high fall risk, up as tolerated  Weight Bearing Restrictions: no restrictions  [] Right Upper Extremity  [] Left Upper Extremity [] Right Lower Extremity  [] Left Lower Extremity     Required Braces/Orthotics: no braces required   [] Right  [] Left  Positional Restrictions:no positional restrictions    Pre-Admission Information   Lives With:  Pt currently at SNF. Transfer Assistance: requires assistance, Pt reports only transferring 2x in last 2 months for sit to stand. Denies having been up to chair. Ambulation Assistance: Pt has not ambulated since September. 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 Posture: Forward head, downward gaze, rounded shoulders in seated.  Dec lumb lordosis in seated  Sensation: accurately detects gross touch to all extremities reports occasional numbness and tingling  Proprioception:    WFL  Tone:   Normotonic  Coordination Testing:   Unable to formally test secondary to pt pain     ROM:   (B) LE AROM WFL  Strength:   (B) LE strength grossly +2  Decision Making: medium complexity  Clinical Presentation: stable      Subjective  General: Pt agreeable to eval with encouragement. Pain: Patient does not rate upon questioning  Pain Interventions: RN notified of patient request for pain medication was giving meds upon arrival  Vitals  Heart Rate: 87  Heart Rate Source: Monitor  BP: (!) 110/49  BP Location: Left upper arm  BP Method: Automatic  Patient Position: Semi fowlers  MAP (Calculated): 69.33  Comment: pt with hypotensive symptoms,reports not feeling right. Functional Mobility  Bed Mobility  Supine to Sit: 2 person assistance with max x2 one for Les, one for trunk   Sit to Supine: 2 person assistance with max assist of 2, one for trunk one for LE   Rolling Left: maximum assistance  Rolling Right: maximum assistance  Scooting: dependent assistance  Bridging: minimal assistance  Comments:Pt able to roll with max assist of 1, max for lateral scoot along bed first trial, dep for second, dependent for supine<>sit  Transfers  No transfers completed on this date secondary to pt with orthostatic hypotension. Comments:  Ambulation  Ambulation not tested on this date secondary to pt has been non ambulatory for 2 months.     Stair Mobility    Comments:  Wheelchair Mobility:    Comments:  Balance  Static Sitting Balance: poor (+): requires min (A) to maintain balance  Dynamic Sitting Balance: poor: requires mod (A) to maintain balance  Comments: Pt sat EOB x 8 min fluctuating from in to mod assist.     Other Therapeutic Interventions  Therapist assisted pt with grooming and ADL tasks both in seated and in supine  Functional Outcomes  AM-PAC Inpatient Mobility Raw Score : 6 Cognition  Memory: decreased recall of biographical information, decreased recall of recent events, decreased short term memory  Orientation:    alert and oriented x 4  Command Following:   [facility]    Education  Barriers To Learning: cognition  Patient Education: patient educated on goals, PT role and benefits, plan of care, general safety, discharge recommendations  Learning Assessment:  patient verbalizes understanding, would benefit from continued reinforcement    Assessment  Activity Tolerance: Poor due to [place]  Impairments Requiring Therapeutic Intervention: decreased functional mobility, decreased ADL status, decreased strength, decreased safety awareness, decreased cognition, decreased endurance, decreased sensation, decreased balance, increased pain, decreased posture  Prognosis: fair  Clinical Assessment: Pt presents with significant deficits in his mobility from prolonged bed rest. Pt with pretty significant drop in BP while seated, limiting how long pt could tolerate sitting. Pt is unable to return to home at current level will need further inpatient therapy and 24/7 assist at d/c.    Safety Interventions: patient left in bed, bed alarm in place, call light within reach, patient at risk for falls, and nurse notified    Plan  Frequency: 3-5 x/per week  Current Treatment Recommendations: strengthening, ROM, balance training, functional mobility training, transfer training, gait training, endurance training, patient/caregiver education, ADL/self-care training, pain management, home exercise program, safety education, equipment evaluation/education, and positioning    Goals  Patient Goals: pt did not verbalize   Short Term Goals:  Time Frame: upon d/c  Patient will complete bed mobility at minimal assistance   Patient will complete transfers at maximum assistance  for sit<>stand  Pt will sit EOB x 15 min with min assist to progress endurance and allow for seated functional activities    Therapy Session Time Individual Group Co-treatment   Time In     2717   Time Out     1013   Minutes     46     Timed Code Treatment Minutes:  31 Minutes  Total Treatment Minutes:         Electronically Signed By: Kayla Lacy PT

## 2022-11-09 NOTE — PROGRESS NOTES
Pt's HS BS was 370. 4 units Humalog/50 units Lantus admin per orders. Dr. Andriy Hanna notified per protocol. No new orders.

## 2022-11-09 NOTE — PROGRESS NOTES
PULMONARY AND CRITICAL CARE MEDICINE PROGRESS NOTE      SUBJECTIVE: Patient wore BiPAP last night. He is saturating 100% on 2 L nasal cannula. Denies any complaints. REVIEW OF SYSTEMS:   CONSTITUTIONAL SYMPTOMS: The patient denies fever, fatigue, night sweats, weight loss or weight gain. HEENT: No vision changes. No tinnitus, Denies sinus pain. No hoarseness, or dysphagia. CARDIOVASCULAR: Denies chest pain, palpitation, syncope. RESPIRATORY: See above. GASTROINTESTINAL: Denies nausea, abdominal pain or change in bowel function. SKIN: No rashes or itching. MUSCULOSKELETAL: Denies weakness or bone pain. NEUROLOGICAL: No headaches or seizures. MEDICATIONS:      gentamicin   Topical Daily    insulin lispro  0-8 Units SubCUTAneous TID WC    insulin lispro  0-4 Units SubCUTAneous Nightly    cefUROXime  250 mg Oral Daily    aspirin  81 mg Oral Daily    atorvastatin  80 mg Oral Daily    mometasone-formoterol  2 puff Inhalation BID    buPROPion  100 mg Oral Every Other Day    insulin glargine  50 Units SubCUTAneous Nightly    lactobacillus  1 capsule Oral BID WC    levothyroxine  50 mcg Oral Daily    torsemide  100 mg Oral Daily    [START ON 12/1/2022] vitamin D  50,000 Units Oral Q3 Months    heparin (porcine)  5,000 Units SubCUTAneous 3 times per day      dextrose       acetaminophen, albuterol, midodrine, dextrose bolus **OR** dextrose bolus, glucagon (rDNA), dextrose     ALLERGIES:   Allergies as of 11/07/2022 - Fully Reviewed 11/07/2022   Allergen Reaction Noted    Penicillins Other (See Comments) 07/12/2010    Sulfa antibiotics  07/12/2010    Tazorac [tazarotene]  07/12/2010    Clindamycin Rash 01/27/2010    Clindamycin/lincomycin Rash 07/12/2010        OBJECTIVE:   height is 5' 10\" (1.778 m) and weight is 195 lb 5.2 oz (88.6 kg). His temperature is 97.5 °F (36.4 °C). His blood pressure is 110/49 (abnormal) and his pulse is 87. His respiration is 18 and oxygen saturation is 100%.    I/O this Recent Labs     11/08/22  0920   PHART 7.249*   [id number] 72.1*   PO2ART <30.0*   [id number] 31.5*   [id number] 37.3*   BEART 2.9        IMAGING:  I reviewed the chest x-ray read by interpretation is as follows. Increased pulmonary vascular congestion       IMPRESSION:   Acute on chronic hypercapnic respiratory failure  COPD  BJ, noncompliant with BiPAP  UTI  End-stage renal disease on PD  Heart failure with preserved ejection fraction        RECOMMENDATION:   Patient presents with altered mental status which I believe is secondary to acute on chronic hypercapnic respiratory failure with hypoxia. Previous ABGs are consistent with chronic hypercapnic respiratory failure. Patient has BiPAP at the nursing home but I am not sure whether he is compliant with BiPAP or not. His BiPAP settings are 11/7 at nursing home. Continue BiPAP intermittently during the day and continuous at night while he is in the hospital.  Titrate FiO2 as tolerated for saturation of 90 to 92%. Peritoneal dialysis per nephrology. Morales Machuca MD  Pulmonary Critical Care and Sleep Medicine  11/9/2022, 12:27 PM    This note was completed using dragon medical speech recognition software. Grammatical errors, random word insertions, pronoun errors and incomplete sentences are occasional consequences of this technology due to software limitations. If there are questions or concerns about the content of this note of information contained within the body of this dictation they should be addressed with the provider for clarification.

## 2022-11-09 NOTE — FLOWSHEET NOTE
11/09/22 0549   Vital Signs   Temp 97.5 °F (36.4 °C)   Heart Rate 79   Resp 24   Weight 195 lb 5.2 oz (88.6 kg)   Weight Method Actual;Bed scale     Disconnected from [facility] per protocol. Effluent: clear dk straw colored without fibrin    Total time:  08:23  Total UF:  957 ml. Total Volume:  9996 ml. Dwell time gained:  00:09    Pt Tolerated procedure:  without difficulty.     Report given to:  Ankit Garcia RN  Last BM: 11-9-22

## 2022-11-09 NOTE — PROGRESS NOTES
Department of Internal Medicine  General Internal Medicine   Progress Note      SUBJECTIVE: moderate dyspnea , mental status much clearer     History obtained from chart review, the patient, and nursing staff   General ROS: positive for  - fatigue, malaise, and weight gain  negative for - chills, fever, or night sweats  Psychological ROS: positive for - anxiety and disorientation  negative for - depression, hallucinations, hostility, or memory difficulties  Ophthalmic ROS: negative  Respiratory ROS: positive for - cough, shortness of breath, and tachypnea  negative for - hemoptysis, stridor, or wheezing  Cardiovascular ROS: positive for - dyspnea on exertion and shortness of breath  negative for - edema, loss of consciousness, orthopnea, or palpitations  Gastrointestinal ROS: no abdominal pain, change in bowel habits, or black or bloody stools  Genito-Urinary ROS: no dysuria, trouble voiding, or hematuria  Musculoskeletal ROS: chronic pain   Neurological ROS: no TIA or stroke symptoms  negative  Dermatological ROS: negative    OBJECTIVE      Medications      Current Facility-Administered Medications: gentamicin (GARAMYCIN) 0.1 % cream, , Topical, Daily  insulin lispro (HUMALOG) injection vial 0-8 Units, 0-8 Units, SubCUTAneous, TID WC  insulin lispro (HUMALOG) injection vial 0-4 Units, 0-4 Units, SubCUTAneous, Nightly  acetaminophen (TYLENOL) tablet 650 mg, 650 mg, Oral, Q4H PRN  albuterol (PROVENTIL) nebulizer solution 2.5 mg, 2.5 mg, Nebulization, Q4H PRN  aspirin chewable tablet 81 mg, 81 mg, Oral, Daily  atorvastatin (LIPITOR) tablet 80 mg, 80 mg, Oral, Daily  mometasone-formoterol (DULERA) 200-5 MCG/ACT inhaler 2 puff, 2 puff, Inhalation, BID  buPROPion (WELLBUTRIN) tablet 100 mg, 100 mg, Oral, Every Other Day  insulin glargine (LANTUS) injection vial 50 Units, 50 Units, SubCUTAneous, Nightly  lactobacillus (CULTURELLE) capsule 1 capsule, 1 capsule, Oral, BID WC  levothyroxine (SYNTHROID) tablet 50 mcg, 50 mcg, Oral, Daily  midodrine (PROAMATINE) tablet 5 mg, 5 mg, Oral, TID PRN  torsemide (DEMADEX) tablet 100 mg, 100 mg, Oral, Daily  [START ON 2022] vitamin D (ERGOCALCIFEROL) capsule 50,000 Units, 50,000 Units, Oral, Q3 Months  cefTRIAXone (ROCEPHIN) 1,000 mg in dextrose 5 % 50 mL IVPB mini-bag, 1,000 mg, IntraVENous, Q24H  heparin (porcine) injection 5,000 Units, 5,000 Units, SubCUTAneous, 3 times per day  dextrose bolus 10% 125 mL, 125 mL, IntraVENous, PRN **OR** dextrose bolus 10% 250 mL, 250 mL, IntraVENous, PRN  glucagon (rDNA) injection 1 mg, 1 mg, SubCUTAneous, PRN  dextrose 10 % infusion, , IntraVENous, Continuous PRN    Physical      Vitals: BP (!) 110/49   Pulse 87   Temp 97.5 °F (36.4 °C)   Resp 18   Ht 5' 10\" (1.778 m)   Wt 195 lb 5.2 oz (88.6 kg)   SpO2 100%   BMI 28.03 kg/m²   Temp: Temp: 97.5 °F (36.4 °C)  Max: Temp  Av.3 °F (36.8 °C)  Min: 97.5 °F (36.4 °C)  Max: 98.9 °F (37.2 °C)  Respiration range:  Resp  Av.2  Min: 18  Max: 24  Pulse Range:  Pulse  Av.3  Min: 79  Max: 91  Blood pressure range:  Systolic (22XWE), LMA:186 , Min:82 , AGW:422   , Diastolic (70JEI), FQY:11, Min:45, Max:83    SpO2  Av.3 %  Min: 98 %  Max: 100 %    Intake/Output Summary (Last 24 hours) at 2022 1119  Last data filed at 2022 0915  Gross per 24 hour   Intake 960 ml   Output --   Net 960 ml       Vent settings:  Pulse  Av.8  Min: 55  Max: 95  Resp  Av.4  Min: 6  Max: 37  SpO2  Av.1 %  Min: 85 %  Max: 100 %    CONSTITUTIONAL:  awake  EYES:  Lids and lashes normal, pupils equal, round and reactive to light, extra ocular muscles intact, sclera clear, conjunctiva normal  NECK:  mild JVD   BACK:  Symmetric, no curvature, spinous processes are non-tender on palpation, paraspinous muscles are non-tender on palpation, no costal vertebral tenderness  LUNGS:  tachypneic, Moderate respiratory distress, moderate air exchange, no retractions, and crackles diffuse, wheeze diffuse  CARDIOVASCULAR:  Normal apical impulse, regular rate and rhythm, normal S1 and S2, no S3 or S4, and no murmur noted  ABDOMEN:  soft BS + non tender   MUSCULOSKELETAL:  trace edema   NEUROLOGIC:  grossly intact   SKIN:  warm dry and pale  and no bruising or bleeding    Data      Recent Results (from the past 96 hour(s))   CBC with Auto Differential    Collection Time: 11/07/22  2:17 PM   Result Value Ref Range    WBC 11.9 (H) 4.0 - 11.0 K/uL    RBC 2.95 (L) 4.20 - 5.90 M/uL    Hemoglobin 10.2 (L) 13.5 - 17.5 g/dL    Hematocrit 31.3 (L) 40.5 - 52.5 %    .0 (H) 80.0 - 100.0 fL    MCH 34.5 (H) 26.0 - 34.0 pg    MCHC 32.6 31.0 - 36.0 g/dL    RDW 15.5 (H) 12.4 - 15.4 %    Platelets 320 469 - 370 K/uL    MPV 7.0 5.0 - 10.5 fL    Neutrophils % 83.2 %    Lymphocytes % 8.6 %    Monocytes % 5.5 %    Eosinophils % 1.9 %    Basophils % 0.8 %    Neutrophils Absolute 9.9 (H) 1.7 - 7.7 K/uL    Lymphocytes Absolute 1.0 1.0 - 5.1 K/uL    Monocytes Absolute 0.7 0.0 - 1.3 K/uL    Eosinophils Absolute 0.2 0.0 - 0.6 K/uL    Basophils Absolute 0.1 0.0 - 0.2 K/uL   CMP w/ Reflex to MG    Collection Time: 11/07/22  2:17 PM   Result Value Ref Range    Sodium 132 (L) 136 - 145 mmol/L    Potassium reflex Magnesium 3.8 3.5 - 5.1 mmol/L    Chloride 90 (L) 99 - 110 mmol/L    CO2 33 (H) 21 - 32 mmol/L    Anion Gap 9 3 - 16    Glucose 198 (H) 70 - 99 mg/dL    BUN 50 (H) 7 - 20 mg/dL    Creatinine 7.0 (HH) 0.8 - 1.3 mg/dL    Est, Glom Filt Rate 8 (A) >60    Calcium 8.9 8.3 - 10.6 mg/dL    Total Protein 6.1 (L) 6.4 - 8.2 g/dL    Albumin 2.7 (L) 3.4 - 5.0 g/dL    Albumin/Globulin Ratio 0.8 (L) 1.1 - 2.2    Total Bilirubin 0.3 0.0 - 1.0 mg/dL    Alkaline Phosphatase 156 (H) 40 - 129 U/L    ALT 87 (H) 10 - 40 U/L     (H) 15 - 37 U/L   Blood gas, venous    Collection Time: 11/07/22  2:17 PM   Result Value Ref Range    pH, Cj 7.225 (L) 7.350 - 7.450    pCO2, Cj 70.2 (H) 40.0 - 50.0 mmHg    pO2, Cj 116.0 (H) 25.0 - 40.0 mmHg HCO3, Venous 29.0 23.0 - 29.0 mmol/L    Base Excess, Cj 0.5 -3.0 - 3.0 mmol/L    O2 Sat, Cj 99 Not Established %    Carboxyhemoglobin 3.5 (H) 0.0 - 1.5 %    MetHgb, Cj 0.3 <1.5 %    TC02 (Calc), Cj 70 Not Established mmol/L    O2 Content, Cj 12 Not Established VOL %    O2 Therapy Unknown    ETOH    Collection Time: 11/07/22  2:17 PM   Result Value Ref Range    Ethanol Lvl None Detected mg/dL   Troponin    Collection Time: 11/07/22  2:17 PM   Result Value Ref Range    Troponin 0.10 (H) <0.01 ng/mL   EKG 12 Lead    Collection Time: 11/07/22  2:27 PM   Result Value Ref Range    Ventricular Rate 82 BPM    Atrial Rate 82 BPM    P-R Interval 224 ms    QRS Duration 80 ms    Q-T Interval 372 ms    QTc Calculation (Bazett) 434 ms    P Axis 89 degrees    R Axis 41 degrees    T Axis 78 degrees    Diagnosis       Sinus rhythm with 1st degree A-V blockOtherwise normal ECGConfirmed by Saintclair Banas MD, Ksenia Coe ([id number]) on 11/7/2022 3:58:30 PM   Urinalysis with Reflex to Culture    Collection Time: 11/07/22  3:00 PM    Specimen: Urine   Result Value Ref Range    Color, UA Yellow Straw/Yellow    Clarity, UA TURBID (A) Clear    Glucose, Ur 500 (A) Negative mg/dL    Bilirubin Urine Negative Negative    Ketones, Urine Negative Negative mg/dL    Specific Gravity, UA 1.020 1.005 - 1.030    Blood, Urine LARGE (A) Negative    pH, UA 7.0 5.0 - 8.0    Protein, UA >=300 (A) Negative mg/dL    Urobilinogen, Urine 0.2 <2.0 E.U./dL    Nitrite, Urine Negative Negative    Leukocyte Esterase, Urine LARGE (A) Negative    Microscopic Examination YES     Urine Type NotGiven     Urine Reflex to Culture Yes    Microscopic Urinalysis    Collection Time: 11/07/22  3:00 PM   Result Value Ref Range    WBC, UA >100 (A) 0 - 5 /HPF    RBC, UA see below (A) 0 - 4 /HPF   Culture, Urine    Collection Time: 11/07/22  3:00 PM    Specimen: Urine, clean catch   Result Value Ref Range    Urine Culture, Routine No growth at 18 to 36 hours    POCT Glucose    Collection Time: 11/07/22 10:58 PM   Result Value Ref Range    POC Glucose 96 70 - 99 mg/dl    Performed on ACCU-CHEK    CBC    Collection Time: 11/08/22  5:18 AM   Result Value Ref Range    WBC 13.6 (H) 4.0 - 11.0 K/uL    RBC 2.67 (L) 4.20 - 5.90 M/uL    Hemoglobin 9.6 (L) 13.5 - 17.5 g/dL    Hematocrit 29.0 (L) 40.5 - 52.5 %    .7 (H) 80.0 - 100.0 fL    MCH 36.0 (H) 26.0 - 34.0 pg    MCHC 33.1 31.0 - 36.0 g/dL    RDW 15.9 (H) 12.4 - 15.4 %    Platelets 084 321 - 749 K/uL    MPV 7.0 5.0 - 10.5 fL   Basic Metabolic Panel    Collection Time: 11/08/22  5:18 AM   Result Value Ref Range    Sodium 129 (L) 136 - 145 mmol/L    Potassium 3.1 (L) 3.5 - 5.1 mmol/L    Chloride 92 (L) 99 - 110 mmol/L    CO2 25 21 - 32 mmol/L    Anion Gap 12 3 - 16    Glucose 64 (L) 70 - 99 mg/dL    BUN 58 (H) 7 - 20 mg/dL    Creatinine 8.3 (HH) 0.8 - 1.3 mg/dL    Est, Glom Filt Rate 6 (A) >60    Calcium 8.8 8.3 - 10.6 mg/dL   Hepatitis B Surface Antigen    Collection Time: 11/08/22  5:18 AM   Result Value Ref Range    Hep B S Ag Interp Non-reactive Non-reactive   Hepatitis B Surface Antibody    Collection Time: 11/08/22  5:18 AM   Result Value Ref Range    Hep B S Ab <3.50 mIU/mL   POCT Glucose    Collection Time: 11/08/22  8:37 AM   Result Value Ref Range    POC Glucose 71 70 - 99 mg/dl    Performed on ACCU-CHEK    Blood gas, arterial    Collection Time: 11/08/22  9:20 AM   Result Value Ref Range    pH, Arterial 7.249 (L) 7.350 - 7.450    pCO2, Arterial 72.1 (HH) 35.0 - 45.0 mmHg    pO2, Arterial <30.0 (LL) 75.0 - 108.0 mmHg    HCO3, Arterial 31.5 (H) 21.0 - 29.0 mmol/L    Base Excess, Arterial 2.9 -3.0 - 3.0 mmol/L    Hemoglobin, Art, Extended 10.0 (L) 13.5 - 17.5 g/dL    O2 Sat, Arterial 37.3 (L) >92 %    Carboxyhgb, Arterial 1.4 0.0 - 1.5 %    Methemoglobin, Arterial 0.5 <1.5 %    TCO2, Arterial 75.5 Not Established mmol/L    O2 Therapy Unknown    POCT Glucose    Collection Time: 11/08/22 11:21 AM   Result Value Ref Range    POC Glucose 118 (H) 70 - 99 mg/dl    Performed on ACCU-CHEK    POCT Glucose    Collection Time: 11/08/22  5:12 PM   Result Value Ref Range    POC Glucose 244 (H) 70 - 99 mg/dl    Performed on ACCU-CHEK    POCT Glucose    Collection Time: 11/08/22  8:55 PM   Result Value Ref Range    POC Glucose 370 (H) 70 - 99 mg/dl    Performed on ACCU-CHEK    CBC    Collection Time: 11/09/22  5:46 AM   Result Value Ref Range    WBC 12.5 (H) 4.0 - 11.0 K/uL    RBC 2.63 (L) 4.20 - 5.90 M/uL    Hemoglobin 9.2 (L) 13.5 - 17.5 g/dL    Hematocrit 28.1 (L) 40.5 - 52.5 %    .1 (H) 80.0 - 100.0 fL    MCH 34.9 (H) 26.0 - 34.0 pg    MCHC 32.6 31.0 - 36.0 g/dL    RDW 15.9 (H) 12.4 - 15.4 %    Platelets 833 865 - 257 K/uL    MPV 6.8 5.0 - 10.5 fL   Basic Metabolic Panel    Collection Time: 11/09/22  5:46 AM   Result Value Ref Range    Sodium 130 (L) 136 - 145 mmol/L    Potassium 3.7 3.5 - 5.1 mmol/L    Chloride 90 (L) 99 - 110 mmol/L    CO2 27 21 - 32 mmol/L    Anion Gap 13 3 - 16    Glucose 257 (H) 70 - 99 mg/dL    BUN 60 (H) 7 - 20 mg/dL    Creatinine 7.7 (HH) 0.8 - 1.3 mg/dL    Est, Glom Filt Rate 7 (A) >60    Calcium 8.5 8.3 - 10.6 mg/dL   POCT Glucose    Collection Time: 11/09/22  7:24 AM   Result Value Ref Range    POC Glucose 231 (H) 70 - 99 mg/dl    Performed on [address] [place] [address] Problems             Last Modified POA    * (Principal) Acute on chronic respiratory failure with hypercapnia (HCC) 11/7/2022 Yes    NSTEMI (non-ST elevated myocardial infarction) ([facility] 11/7/2022 Yes    AMS (altered mental status) 11/7/2022 Yes    Acute respiratory failure with hypoxia ([facility] 11/7/2022 Yes    Hypercarbia 11/7/2022 Yes    Electrolyte imbalance 11/7/2022 Yes    Chronic hypercapnic respiratory failure ([facility] 11/7/2022 Yes    Obstructive sleep apnea 11/7/2022 Yes    Moderate episode of recurrent major depressive disorder ([facility] 11/8/2022 Yes    Overweight (BMI 25.0-29.9) 11/8/2022 Yes    History of 2019 novel coronavirus disease [phone]) 11/8/2022 Yes    Peritoneal dialysis catheter in place [facility]) 11/8/2022 Yes    COPD with acute exacerbation ([address] 11/7/2022 Yes    Anemia in ESRD (end-stage renal disease) ([address] 11/8/2022 Yes    ESRD (end stage renal disease) ([address] 11/8/2022 Yes    Acute on chronic respiratory failure with hypoxia and hypercapnia ([address] 11/8/2022 Yes    Essential hypertension 11/8/2022 Yes    Sleep apnea 11/8/2022 Yes    Psoriatic arthropathy ([address] 11/8/2022 Yes    Coronary atherosclerosis 11/8/2022 Yes    Vitamin D deficiency 11/8/2022 Yes    Glaucoma 11/8/2022 Yes    S/P CABG x 4 11/8/2022 Yes    Overview Signed 12/9/2015 11:54 AM by Wyatt Martins MA     2006         ILD (interstitial lung disease) ([address] 11/8/2022 Yes    Chronic diastolic heart failure ([address] 11/8/2022 Yes    Type 2 diabetes mellitus with hyperglycemia, with long-term current use of insulin ([address] 11/8/2022 Yes    Hypertension associated with stage 5 chronic kidney disease due to type 2 diabetes mellitus ([address] 11/8/2022 Yes    Acquired hypothyroidism 11/8/2022 Yes    ESRD on peritoneal dialysis ([address] 11/8/2022 Yes     Ct Bipap  Pulmonology consult noted   Get dr Consuelo Matthews on board for Peritoneal dialysis co-management

## 2022-11-10 LAB
ANION GAP SERPL CALCULATED.3IONS-SCNC: 16 MMOL/L (ref 3–16)
BUN BLDV-MCNC: 58 MG/DL (ref 7–20)
CALCIUM SERPL-MCNC: 8.6 MG/DL (ref 8.3–10.6)
CHLORIDE BLD-SCNC: 92 MMOL/L (ref 99–110)
CO2: 22 MMOL/L (ref 21–32)
CREAT SERPL-MCNC: 7.6 MG/DL (ref 0.8–1.3)
GFR SERPL CREATININE-BSD FRML MDRD: 7 ML/MIN/{1.73_M2}
GLUCOSE BLD-MCNC: 267 MG/DL (ref 70–99)
GLUCOSE BLD-MCNC: 277 MG/DL (ref 70–99)
GLUCOSE BLD-MCNC: 345 MG/DL (ref 70–99)
GLUCOSE BLD-MCNC: 346 MG/DL (ref 70–99)
GLUCOSE BLD-MCNC: 385 MG/DL (ref 70–99)
HCT VFR BLD CALC: 28.7 % (ref 40.5–52.5)
HEMOGLOBIN: 9.6 G/DL (ref 13.5–17.5)
MCH RBC QN AUTO: 35.2 PG (ref 26–34)
MCHC RBC AUTO-ENTMCNC: 33.4 G/DL (ref 31–36)
MCV RBC AUTO: 105.6 FL (ref 80–100)
PDW BLD-RTO: 15.9 % (ref 12.4–15.4)
PERFORMED ON: ABNORMAL
PLATELET # BLD: 347 K/UL (ref 135–450)
PMV BLD AUTO: 7.5 FL (ref 5–10.5)
POTASSIUM SERPL-SCNC: 4 MMOL/L (ref 3.5–5.1)
RBC # BLD: 2.72 M/UL (ref 4.2–5.9)
SODIUM BLD-SCNC: 130 MMOL/L (ref 136–145)
WBC # BLD: 14 K/UL (ref 4–11)

## 2022-11-10 PROCEDURE — 6360000002 HC RX W HCPCS: Performed by: INTERNAL MEDICINE

## 2022-11-10 PROCEDURE — 97530 THERAPEUTIC ACTIVITIES: CPT

## 2022-11-10 PROCEDURE — 6370000000 HC RX 637 (ALT 250 FOR IP): Performed by: INTERNAL MEDICINE

## 2022-11-10 PROCEDURE — 90945 DIALYSIS ONE EVALUATION: CPT

## 2022-11-10 PROCEDURE — 1200000000 HC SEMI PRIVATE

## 2022-11-10 PROCEDURE — 97535 SELF CARE MNGMENT TRAINING: CPT

## 2022-11-10 PROCEDURE — 94660 CPAP INITIATION&MGMT: CPT

## 2022-11-10 PROCEDURE — 94640 AIRWAY INHALATION TREATMENT: CPT

## 2022-11-10 PROCEDURE — 90945 DIALYSIS ONE EVALUATION: CPT | Performed by: INTERNAL MEDICINE

## 2022-11-10 PROCEDURE — 80048 BASIC METABOLIC PNL TOTAL CA: CPT

## 2022-11-10 PROCEDURE — 85027 COMPLETE CBC AUTOMATED: CPT

## 2022-11-10 PROCEDURE — 36415 COLL VENOUS BLD VENIPUNCTURE: CPT

## 2022-11-10 PROCEDURE — 99233 SBSQ HOSP IP/OBS HIGH 50: CPT | Performed by: INTERNAL MEDICINE

## 2022-11-10 PROCEDURE — 94761 N-INVAS EAR/PLS OXIMETRY MLT: CPT

## 2022-11-10 RX ORDER — INSULIN LISPRO 100 [IU]/ML
0-4 INJECTION, SOLUTION INTRAVENOUS; SUBCUTANEOUS NIGHTLY
Qty: 1 EACH | Refills: 0 | Status: SHIPPED | OUTPATIENT
Start: 2022-11-10

## 2022-11-10 RX ORDER — INSULIN LISPRO 100 [IU]/ML
0-8 INJECTION, SOLUTION INTRAVENOUS; SUBCUTANEOUS
Qty: 1 EACH | Refills: 0 | Status: SHIPPED | OUTPATIENT
Start: 2022-11-10

## 2022-11-10 RX ORDER — CEFUROXIME AXETIL 250 MG/1
250 TABLET ORAL DAILY
Qty: 10 TABLET | Refills: 0 | Status: SHIPPED | OUTPATIENT
Start: 2022-11-11 | End: 2022-11-21

## 2022-11-10 RX ADMIN — INSULIN LISPRO 4 UNITS: 100 INJECTION, SOLUTION INTRAVENOUS; SUBCUTANEOUS at 17:55

## 2022-11-10 RX ADMIN — Medication 2 PUFF: at 20:15

## 2022-11-10 RX ADMIN — HEPARIN SODIUM 5000 UNITS: 5000 INJECTION INTRAVENOUS; SUBCUTANEOUS at 05:27

## 2022-11-10 RX ADMIN — INSULIN LISPRO 4 UNITS: 100 INJECTION, SOLUTION INTRAVENOUS; SUBCUTANEOUS at 20:43

## 2022-11-10 RX ADMIN — INSULIN GLARGINE 50 UNITS: 100 INJECTION, SOLUTION SUBCUTANEOUS at 20:44

## 2022-11-10 RX ADMIN — INSULIN LISPRO 6 UNITS: 100 INJECTION, SOLUTION INTRAVENOUS; SUBCUTANEOUS at 10:24

## 2022-11-10 RX ADMIN — ASPIRIN 81 MG 81 MG: 81 TABLET ORAL at 10:23

## 2022-11-10 RX ADMIN — HEPARIN SODIUM 5000 UNITS: 5000 INJECTION INTRAVENOUS; SUBCUTANEOUS at 17:54

## 2022-11-10 RX ADMIN — Medication 2 PUFF: at 12:04

## 2022-11-10 RX ADMIN — Medication 1 CAPSULE: at 10:30

## 2022-11-10 RX ADMIN — TORSEMIDE 100 MG: 100 TABLET ORAL at 10:23

## 2022-11-10 RX ADMIN — CEFUROXIME AXETIL 250 MG: 250 TABLET ORAL at 10:30

## 2022-11-10 RX ADMIN — ACETAMINOPHEN 650 MG: 325 TABLET ORAL at 10:23

## 2022-11-10 RX ADMIN — ATORVASTATIN CALCIUM 80 MG: 80 TABLET, FILM COATED ORAL at 10:23

## 2022-11-10 RX ADMIN — LEVOTHYROXINE SODIUM 50 MCG: 0.03 TABLET ORAL at 05:27

## 2022-11-10 RX ADMIN — GENTAMICIN SULFATE: 1 CREAM TOPICAL at 17:08

## 2022-11-10 RX ADMIN — Medication 1 CAPSULE: at 17:55

## 2022-11-10 ASSESSMENT — PAIN SCALES - GENERAL
PAINLEVEL_OUTOF10: 6
PAINLEVEL_OUTOF10: 8

## 2022-11-10 ASSESSMENT — PAIN DESCRIPTION - LOCATION: LOCATION: ARM

## 2022-11-10 ASSESSMENT — PAIN DESCRIPTION - DESCRIPTORS: DESCRIPTORS: ACHING

## 2022-11-10 ASSESSMENT — PAIN DESCRIPTION - ORIENTATION: ORIENTATION: LEFT

## 2022-11-10 ASSESSMENT — PAIN DESCRIPTION - PAIN TYPE: TYPE: ACUTE PAIN

## 2022-11-10 NOTE — DIALYSIS
Treatment initiated without complications or complaints from patient. Patient resting comfortably with VSS upon dialysis RN exit from room. Bailey Wilkinson RN notified of start of treatment and hotline number located on top of machine for any questions about troubleshooting during after hours.

## 2022-11-10 NOTE — PROGRESS NOTES
Disconnected from CCPD per protocol. Effluent: clear yellow, no fibrin noted. Total time: 9 hr 45 min   Total UF:  407 ml. Total Volume:  9996 ml. Pt Tolerated procedure without difficulty. Pt disconnected from cycler using aseptic technique.

## 2022-11-10 NOTE — DISCHARGE INSTR - COC
Continuity of Care Form    Patient Name: Austin Cazares   :  1945  MRN:  [id number]    Admit date:  2022  Discharge date:  11/10/2022    Code Status Order: Full Code   Advance Directives:     Admitting Physician:  Wilner Plascencia MD  PCP: Jada James MD    Discharging Nurse: Chelsey Murrell RN  [address] Unit/Room#: U2Q-5628/0719-22  Discharging Unit Phone Number: [phone]    Emergency Contact:   Extended Emergency Contact Information  Primary Emergency Contact: Rafaela Dumont   [address] Phone: [phone]  Relation: Spouse  Secondary Emergency Contact: Enrike Stuart Phone: [phone]  Relation: Child  Preferred language: English   needed?  No    Past Surgical History:  Past Surgical History:   Procedure Laterality Date    CARPAL TUNNEL RELEASE      s/p    CATARACT REMOVAL      CORONARY ARTERY BYPASS GRAFT      JOINT REPLACEMENT      LAPAROSCOPY INSERTION PERITONEAL CATHETER N/A 10/26/2020    LAPAROSCOPIC PERITONEAL DIALYSIS CATHETER PLACEMENT; LAPAROSCOPIC OMENTOPEXY performed by Aleida Loving DO at [address] OR       Immunization History:   Immunization History   Administered Date(s) Administered    COVID-19, PFIZER PURPLE top, DILUTE for use, (age 15 y+), 30mcg/0.3mL 2021, 2021, 2021    Influenza Vaccine, unspecified formulation 10/16/2014, 10/19/2015, 10/21/2016    Influenza Virus Vaccine 10/16/2014, 10/19/2015, 10/21/2016    Influenza, FLUAD, (age 72 y+), Adjuvanted, 0.5mL 2020    Influenza, High Dose (Fluzone 65 yrs and older) 10/16/2014, 10/19/2015, 10/21/2016, 2017, 2018    Influenza, Triv, inactivated, subunit, adjuvanted, IM (Fluad 65 yrs and older) 2019    PPD Test 2016    Pneumococcal Conjugate 13-valent ([id number]) 10/19/2015    Pneumococcal Polysaccharide ([id number]) 10/16/2014    Tdap (Boostrix, Adacel) 2016       Active Problems:  Patient Active Problem List   Diagnosis Code    Essential hypertension I10    Sleep apnea G47.30    Psoriatic arthropathy ([facility]) L40.50    Coronary atherosclerosis I25.10    Vitamin D deficiency E55.9    Glaucoma H40.9    S/P CABG x 4 Z95.1    ILD (interstitial lung disease) ([facility]) J84.9    Chronic diastolic heart failure ([facility]) I50.32    NSTEMI (non-ST elevated myocardial infarction) ([facility]) I21.4    Type 2 diabetes mellitus with hyperglycemia, with long-term current use of insulin ([facility]) E11.65, Z79.4    Hypertension associated with stage 5 chronic kidney disease due to type 2 diabetes mellitus ([facility]) E11.22, I12.0, N18.5    Acquired hypothyroidism E03.9    ESRD on peritoneal dialysis ([facility] N18.6, Z99.2    AMS (altered mental status) R41.82    Acute respiratory failure with hypoxia ([facility]) J96.01    Hypercarbia R06.89    Electrolyte imbalance E87.8    Chronic hypercapnic respiratory failure ([facility]) J96.12    BJ treated with BiPAP G47.33    Moderate episode of recurrent major depressive disorder ([facility] F33.1    Overweight (BMI 25.0-29. 9) E66.3    History of 2019 novel coronavirus disease (COVID-19) Z86.16    Peritoneal dialysis catheter in place [facility]) Z99.2    Acute on chronic respiratory failure with hypercapnia ([facility]) J96.22    COPD with acute exacerbation ([facility]) J44.1    Anemia in ESRD (end-stage renal disease) ([facility]) N18.6, D63.1    ESRD (end stage renal disease) ([facility] N18.6    Acute on chronic respiratory failure with hypoxia and hypercapnia ([facility]) J96.21, J96.22    Acute cystitis without hematuria N30.00       Isolation/Infection:   Isolation            No Isolation          Patient Infection Status       Infection Onset Added Last Indicated Last Indicated By Review Planned Expiration Resolved Resolved By    None active    Resolved    COVID-19 20 COVID-19   21             Nurse Assessment:  Last Vital Signs: BP (!) 156/70   Pulse 97   Temp 97.2 °F (36.2 °C) (Tympanic)   Resp 16   Ht 5' 10\" (1.778 m)   Wt 194 lb 7.1 oz (88.2 kg)   SpO2 91% BMI 27.90 kg/m²     Last documented pain score (0-10 scale): Pain Level: 8  Last Weight:   Wt Readings from Last 1 Encounters:   11/10/22 194 lb 7.1 oz (88.2 kg)     Mental Status:  {IP PT MENTAL STATUS:20030}    IV Access:  { AYAH IV ACCESS:160292081}    Nursing Mobility/ADLs:  Walking   {CHP DME QCQU:450041971}  Transfer  {CHP DME GHFK:376143139}  Bathing  {CHP DME UIUB:060346399}  Dressing  {CHP DME ENYR:980216759}  Toileting  {CHP DME HZCS:415285046}  Feeding  {CHP DME USZY:964584350}  Med Admin  {P DME XFSM:712382285}  Med Delivery   { AYAH MED Delivery:651818952}    Wound Care Documentation and Therapy:  Wound 09/21/22 Sacrum Medial;Lower (Active)   Wound Etiology Pressure Stage 2 11/09/22 2017   Dressing Status New dressing applied 11/09/22 1600   Wound Cleansed Wound cleanser 11/09/22 2017   Dressing/Treatment Triad hydro/zinc oxide-based hydrophilic paste 99/81/07 9614   Dressing Change Due 11/10/22 11/09/22 2017   Wound Assessment Pink/red 11/09/22 2017   Drainage Amount None 11/09/22 2017   Odor None 11/09/22 2017   Tiny-wound Assessment Blanchable erythema 11/09/22 2017   Margins Defined edges 11/09/22 2017   Wound Thickness Description not for Pressure Injury Partial thickness 11/09/22 2017   Number of days: 49       Incision 10/26/20 Abdomen Medial (Active)   Number of days: 745        Elimination:  Continence: Bowel: {YES / HO:21690}  Bladder: {YES / HI:15433}  Urinary Catheter: {Urinary Catheter:679014086}   Colostomy/Ileostomy/Ileal Conduit: {YES / YE:00705}       Date of Last BM: 11/15/2022    Intake/Output Summary (Last 24 hours) at 11/10/2022 1305  Last data filed at 11/10/2022 0833  Gross per 24 hour   Intake 900 ml   Output 407 ml   Net 493 ml     I/O last 3 completed shifts:   In: 18 [P.O.:1740]  Out: -     Safety Concerns:     [address] AYAH Safety Concerns:185077739}    Impairments/Disabilities:      [address] POON Impairments/Disabilities:848019918}    Nutrition Therapy:  Current Nutrition Therapy:   [address] AYAH Diet List:948388495}    Routes of Feeding: {CHP DME Other Feedings:373251454}  Liquids: {Slp liquid thickness:17575}  Daily Fluid Restriction: {CHP DME Yes amt example:691363119}  Last Modified Barium Swallow with Video (Video Swallowing Test): {Done Not Done SGWB:496074508}    Treatments at the Time of Hospital Discharge:   Respiratory Treatments: ***  Oxygen Therapy:  {Therapy; copd oxygen:60857} 2-3L  Ventilator:    { CC Vent EDNZ:493064001}     Rehab Therapies: {THERAPEUTIC INTERVENTION:5154307188}  Weight Bearing Status/Restrictions: [address] GARCIA Weight Bearin}  Other Medical Equipment (for information only, NOT a DME order):  {EQUIPMENT:113104877}  Other Treatments: ***    Patient's personal belongings (please select all that are sent with patient):  {CHP DME Belongings:836657010}    RN SIGNATURE:  Electronically signed by Steffen Roque on 22 at 11:28 AM EST    CASE MANAGEMENT/SOCIAL WORK SECTION    Inpatient Status Date: 2022    Readmission Risk Assessment Score:  Readmission Risk              Risk of Unplanned Readmission:  32           Discharging to Facility/ [address] [place] [address]  Phone: [phone]  Fax: [phone]     / signature: Electronically signed by JOE Joy on 22 at 8:31 AM EST    PHYSICIAN SECTION    Prognosis: Poor    Condition at Discharge: Stable    Rehab Potential (if transferring to Rehab): Fair    Recommended Labs or Other Treatments After Discharge: Bipap  intermittently at home , O2 ,  Nebulizer prn     Physician Certification: I certify the above information and transfer of Sulema Nash  is necessary for the continuing treatment of the diagnosis listed and that he requires Intermediate Nursing Care for greater 30 days.      Update Admission H&P: No change in H&P    PHYSICIAN SIGNATURE:  Electronically signed by Ida Gilbert MD on 11/10/22 at 2:00 PM EST

## 2022-11-10 NOTE — PROGRESS NOTES
CCPD Order   Exchanges: 4   Exchange Volume: 2500 ml   Total Time: 8 hrs   Dextrose: 2.5%   Last Fill: 0 ml   Total Volume: 1000 ml     Orders verified. Supplies taken to pt's room. Report received. Cycler set up, primed and pre tested. [facility] [place] [facility]"" Cath site. Pt connected to cycler. CCPD initiated without problem. Initial effluent clear. If problems should arise please call the [phone] on top of PD cycler machine.

## 2022-11-10 NOTE — PROGRESS NOTES
Nutrition Note    RECOMMENDATIONS  PO Diet: Continue current diet  ONS: Nepro BID     NUTRITION ASSESSMENT   Pt triggered positive nutrition screen for wounds- stage 2 pressure ulcer to sacrum and decreased po intake. Hx of COPD, DM2, CAD, HTN, ESRD on PD. Current diet is carb controlled. Documented po intakes from 26-75% since admission. Per EMR wt hx reflects wt fluctuations over the las year. Unable to assess recent wt changes d/t fluids from PD. RD will order Nepro BID to offer additional nutrition and promote wound healing. Will continue to monitor for adequate po intake. Nutrition Related Findings: I/Os: +1.6L since admit. Na 130, BUN 58, Cr 7.6, Gluc 346. +1 non-pitting BLE, +1 non-pitting generalized edema; +BM 11/8  Wounds: Pressure Injury, Stage II  Nutrition Education:  Education not indicated   Nutrition Goals: PO intake 50% or greater, by next RD assessment     MALNUTRITION ASSESSMENT   Acute Illness  Malnutrition Status: At risk for malnutrition (Comment)    NUTRITION DIAGNOSIS   Increased nutrient needs related to increase demand for energy/nutrients as evidenced by wounds      CURRENT NUTRITION THERAPIES  ADULT DIET; Regular; 3 carb choices (45 gm/meal)     PO Intake: %   PO Supplement Intake:None Ordered    ANTHROPOMETRICS  Current Height: 5' 10\" (177.8 cm)  Current Weight: 194 lb 7.1 oz (88.2 kg)    Ideal Body Weight (IBW): 166 lbs  (75 kg)    BMI: 27.8      COMPARATIVE STANDARDS  Energy (kcal):  7882-6227 (20-25 kcals/kg CBW 88kg)     Protein (g):   (1.2-2 gm/kg IBW 75kg)       Fluid (mL/day):  9855-0320    The patient will be monitored per nutrition standards of care. Consult dietitian if additional nutrition interventions are needed prior to RD reassessment.      Jeffrey Li, [address], [place]    Contact: [phone]

## 2022-11-10 NOTE — PROGRESS NOTES
[facility] Department   Phone: [phone]    Occupational Therapy    [] Initial Evaluation            [x] Daily Treatment Note         [] Discharge Summary      Patient: Regan Nunez   : 1945   MRN: [id number]   Date of Service:  11/10/2022    Admitting Diagnosis:  Acute on chronic respiratory failure with hypercapnia [facility])  Current Admission Summary: Regan Nunez is a 69 y.o. male with past medical history of hypertension, diabetes, coronary artery disease and CHF as well as end-stage renal disease on peritoneal dialysis and obstructive sleep apnea here today for altered mental status. The patient was transferred from his care facility for reports of increased lethargy, weakness, and possible altered mental status. The patient himself currently states he feels fine. Denies headache, cough or shortness of breath. Denies chest pain. No abdominal pain, vomiting or diarrhea. States he feels tired and sleepy but is otherwise in no discomfort. Discharged from the hospital on 10/10/2022 after presenting altered thought secondary to acute on chronic hypoxic and hypercapnic respiratory failure with volume overload. Per X-Ray on 22, pt has small left pleural effusion and or/ atelectasis. Past Medical History:  has a past medical history of Allergic rhinitis due to other allergen, Coronary atherosclerosis of unspecified type of vessel, native or graft, Diabetic eye exam ([address] Diabetic eye exam ([address] Generalized osteoarthrosis, involving multiple sites, Other psoriasis, Pneumonia, Prolonged emergence from general anesthesia, Psoriatic arthropathy ([address] Type II or unspecified type diabetes mellitus without mention of complication, not stated as uncontrolled, Unspecified essential hypertension, and Unspecified sleep apnea. Past Surgical History:  has a past surgical history that includes Coronary artery bypass graft;  Carpal tunnel release; joint replacement; Cataract removal; and LAPAROSCOPY INSERTION PERITONEAL CATHETER (N/A, 10/26/2020). Discharge Recommendations: Aron Fallon scored a 12/24 on the AM-PAC ADL Inpatient form. Current research shows that an AM-PAC score of 17 or less is typically not associated with a discharge to the patient's home setting. Based on the patient's AM-PAC score and their current ADL deficits, it is recommended that the patient have 3-5 sessions per week of Occupational Therapy at d/c to increase the patient's independence. Please see assessment section for further patient specific details. If patient discharges prior to next session this note will serve as a discharge summary. Please see below for the latest assessment towards goals. DME Required For Discharge: DME to be determined at next level of care    Precautions/Restrictions: high fall risk, up as tolerated    Pre-Admission Information   Lives With:  Pt currently at [facility]. Transfer Assistance: requires assistance, Pt reports only transferring 2x in last 2 months for sit to stand. Denies having been up to chair. Ambulation Assistance: Pt has not ambulated since September. ADL Assistance:  assist for all  IADL Assistance:  lives at [facility]  Active :        [] Yes                 [x] No  Hand Dominance: [] Left                 [x] Right  Current Employment: retired. Occupation: air craft   Hobbies: watch tv  Recent Falls: one fall at home  Comments: Per  note on 11/8/22:  Pt from [facility]. Patient needs assistance with ADLs and IADLs. Family also interested in a LTC placement. Subjective  General: Patient supine in bed upon arrival, agreeable to therapy co-treatment session with encouragement, patient on room air at 87% SPO2 patient given 2L O2  Pain: 7/10.   Location: buttocks  Pain Interventions: RN notified, RN notified of patient request for pain medication, and repositioned      Activities of Daily Living  Basic Activities of Daily Living  Grooming: stand by assistance Increased time to complete task  Grooming Comments: SBA for grooming tasks, seated in bed brushed teeth increased time needed for thoroughness  Upper Extremity Bathing: contact guard assistance requires verbal cueing Increased time to complete task  Bathing Equipment: none  Bathing Comments: CGA for washing face and arms with warm wipes  General Comments: Patient declined any further ADLs, patient with minimal verbal cueing needed for initiation, sequencing, and task completion  Instrumental Activities of Daily Living  No IADL completed on this date. Functional Mobility  Bed Mobility  Supine to Sit: 2 person assistance with max A of 1 / modA of 1   Sit to Supine: 2 person assistance with max A of 2   Rolling Left: maximum assistance  Rolling Right: maximum assistance  Scooting: maximum assistance  Comments:HOB partially elevated, patient with increased time needed for bed mobility completion,patient with minimal verbal and physical cueing needed for initiation, hand placement, and sequencing. BP supine in bed was 151/56 and BP seated EOB was 110/51. Patient with increased time needed for positioning wedge and cushion under bottom to relieve buttocks pressure. Transfers  No transfers completed on this date secondary to patient with orthostatic hypotension . Functional Mobility:  Sitting Balance: maximum assistance. Sitting Balance Comment: maxA with for sitting balance, patient only tolerated sitting EOB ~1 minute patient reported dizziness and lightheadedness and need to return to bed.     Functional Outcomes  AM-PAC Inpatient Daily Activity Raw Score: 12    Cognition  Overall Cognitive Status: Impaired  Following Commands: follows one step commands with repetition, follows one step commands with increased time  Attention Span: attends with cues to redirect, difficulty dividing attention  Memory: decreased recall of recent events, decreased short term memory  Problem Solving: assistance required to generate solutions, assistance required to implement solutions, decreased awareness of errors, assistance required to correct errors made  Insights: decreased awareness of deficits  Initiation: requires cues for some  Sequencing: requires cues for some  Comments: Patient with flat affect   Orientation:    alert and oriented x 4  Command Following:   accurately follows one step commands  Education  Barriers To Learning: cognition and physical  Patient Education: patient educated on goals, OT role and benefits, plan of care, precautions, ADL adaptive strategies, energy conservation, pressure relief, discharge recommendations  Learning Assessment:  patient verbalizes understanding, would benefit from continued reinforcement  Assessment  Activity Tolerance: Patient tolerated treatment, however limited by pain and orthostatic hypotension BP supine was 151/56 and in seated BP was 110/51. Impairments Requiring Therapeutic Intervention: decreased functional mobility, decreased ADL status, decreased ROM, decreased strength, decreased safety awareness, decreased cognition, decreased endurance, decreased sensation, decreased balance, decreased IADL, decreased fine motor control, increased pain, decreased posture  Prognosis: fair  Clinical Assessment: Pt presents with above deficits which are slightly below baseline. Pt would benefit from skilled OT to increased independence in ADLs to reduce caregiver burden. Patient limited this date due to episodes of hypotension BP dropped from 151/56 in supine to 110/51 seated EOB. Patient with maxA of 1 / Patsy Muff of 1 for supine to sit and maxA for rolling. Patient with increased pain on buttocks.     Safety Interventions: patient left in bed, bed alarm in place, call light within reach, patient at risk for falls, and nurse notified    Plan  Frequency: 3-5 x/per week  Current Treatment Recommendations: strengthening, ROM, balance training, functional mobility training, transfer training, endurance training, patient/caregiver education, ADL/self-care training, pain management, home exercise program, safety education, and positioning    Goals  Patient Goals: none stated    Short Term Goals:  Time Frame: upon d/c - no goals met this date  Patient will complete upper body ADL at minimal assistance -CGA for bathing/SBA for grooming this date continue to assess 11/10  Patient will complete grooming at minimal assistance, while seated on EOB   Patient will increase functional sitting balance to SUP ~10 minutes for improved ADL completion  Patient will increase [facility] ADL score = to or > than 13/24    Therapy Session Time     Individual Group Co-treatment   Time In    0951   Time Out    1037   Minutes    46        Timed Code Treatment Minutes:  46 minutes     Total Treatment Minutes:  46 minutes        Electronically Signed By: RYDER Cabral/MOSES [id number]

## 2022-11-10 NOTE — PROGRESS NOTES
[facility] Department   Phone: [phone]    Physical Therapy    [] Initial Evaluation            [x] Daily Treatment Note         [] Discharge Summary      Patient: Catina Jackson   : 1945   MRN: [id number]   Date of Service:  11/10/2022  Admitting Diagnosis: Acute on chronic respiratory failure with hypercapnia [facility])  Current Admission Summary: Catina Jackson is a 69 y.o. male with past medical history of hypertension, diabetes, coronary artery disease and CHF as well as end-stage renal disease on peritoneal dialysis and obstructive sleep apnea here today for altered mental status. The patient was transferred from his care facility for reports of increased lethargy, weakness, and possible altered mental status. The patient himself currently states he feels fine. Denies headache, cough or shortness of breath. Denies chest pain. No abdominal pain, vomiting or diarrhea. States he feels tired and sleepy but is otherwise in no discomfort. Discharged from the hospital on 10/10/2022 after presenting altered thought secondary to acute on chronic hypoxic and hypercapnic respiratory failure with volume overload. Per X-Ray on 22, pt has small left pleural effusion and or/ atelectasis. Past Medical History:  has a past medical history of Allergic rhinitis due to other allergen, Coronary atherosclerosis of unspecified type of vessel, native or graft, Diabetic eye exam ([address] Diabetic eye exam ([address] Generalized osteoarthrosis, involving multiple sites, Other psoriasis, Pneumonia, Prolonged emergence from general anesthesia, Psoriatic arthropathy ([address] Type II or unspecified type diabetes mellitus without mention of complication, not stated as uncontrolled, Unspecified essential hypertension, and Unspecified sleep apnea. Past Surgical History:  has a past surgical history that includes Coronary artery bypass graft;  Carpal tunnel release; joint replacement; Cataract removal; and LAPAROSCOPY INSERTION PERITONEAL CATHETER (N/A, 10/26/2020). Discharge Recommendations: Denisha Salgado scored a 6/24 on the AM-PAC short mobility form. Current research shows that an AM-PAC score of 17 or less is typically not associated with a discharge to the patient's home setting. Based on the patient's AM-PAC score and their current functional mobility deficits, it is recommended that the patient have 3-5 sessions per week of Physical Therapy at d/c to increase the patient's independence. Please see assessment section for further patient specific details. If patient discharges prior to next session this note will serve as a discharge summary. Please see below for the latest assessment towards goals. DME Required For Discharge: DME to be determined at next level of care  Precautions/Restrictions: high fall risk, up as tolerated  Weight Bearing Restrictions: no restrictions  [] Right Upper Extremity  [] Left Upper Extremity [] Right Lower Extremity  [] Left Lower Extremity     Required Braces/Orthotics: no braces required   [] Right  [] Left  Positional Restrictions:no positional restrictions    Pre-Admission Information   Lives With:  Pt currently at SNF. Transfer Assistance: requires assistance, Pt reports only transferring 2x in last 2 months for sit to stand. Denies having been up to chair. Ambulation Assistance: Pt has not ambulated since September. ADL Assistance:  assist for all  IADL Assistance:  lives at [facility]  Active :        [] Yes  [x] No  Hand Dominance: [] Left  [x] Right  Current Employment: retired. Occupation: air craft   Hobbies: watch tv  Recent Falls: one fall at home    Examination   Vision:   Vision Corrective Device: wears glasses for reading  Hearing:   [email or web address] [facility]  Observation:   General Observation:  Pt on 2 L O2, O2 monitoring, not on tele. Posture: Forward head, downward gaze, rounded shoulders in seated.  Dec lumb lordosis in seated  Sensation: accurately detects gross touch to all extremities reports occasional numbness and tingling  Proprioception:    WFL  Tone:   Normotonic  Coordination Testing:   Unable to formally test secondary to pt pain     ROM:   (B) LE AROM WFL  Strength:   (B) LE strength grossly +2  Decision Making: medium complexity  Clinical Presentation: stable      Subjective  General: Pt agreeable to PT, no O2 at start of session   Pain: 7/10. Location: rear end- pressure wound   Pain Interventions: RN notified of patient request for pain medication was giving meds during session        Functional Mobility  Bed Mobility  Supine to Sit: 2 person assistance with modA+maxA   Sit to Supine: 2 person assistance with max assist of 2, one for trunk one for LE   Rolling Left: maximum assistance  Rolling Right: maximum assistance  Scooting: dependent assistance  Comments: Pt initiates LE movement during supine to sit, unable to achieve EOB position without modA for LE support and requiring maxA for trunk support   Transfers  No transfers completed on this date secondary to pt with orthostatic hypotension. Comments:  Ambulation  Ambulation not tested on this date secondary to pt has been non ambulatory for 2 months.     Stair Mobility    Comments:  Wheelchair Mobility:    Comments:  Balance  Static Sitting Balance: poor (-): requires max (A) to maintain balance  Comments: Pt sat EOB less than 1 minute; symptomatic for orthostatic hypotension     Other Therapeutic Interventions  Therapist assisted pt with grooming and ADL tasks in supine with HOB elevated to seated position; 5x heel slides BLE, 5x SLR BLE   Functional Outcomes  AM-PAC Inpatient Mobility Raw Score : 6              Cognition  Memory: decreased recall of biographical information, decreased recall of recent events, decreased short term memory  Orientation:    alert and oriented x 4  Command Following:   [facility]    Education  Barriers To Learning: cognition  Patient Education: patient educated on goals, PT role and benefits, plan of care, general safety, discharge recommendations  Learning Assessment:  patient verbalizes understanding, would benefit from continued reinforcement    Assessment  Activity Tolerance: Poor due to [place]- 151/56mmHg at start of session in supine; dropping to 110/51mmHg and symptomatic with inability to tolerate seated position greater than 1 minute due to symptoms   O2 dropping to 86% on room air with supine exercise, 2L of O2 applied and O2 saturation increased to mid 90's; O2 dropping to 90% when sitting EOB, returning to 98% at supine, O2 removed and SPO2 remaining upper 90's at end of session. Impairments Requiring Therapeutic Intervention: decreased functional mobility, decreased ADL status, decreased strength, decreased safety awareness, decreased cognition, decreased endurance, decreased sensation, decreased balance, increased pain, decreased posture  Prognosis: fair  Clinical Assessment: Pt continues to be limited orthostatic hypotension on this date. Pt with increased pain in buttocks region due to pressure sore, focus of session was repositioning pt and educating on importance of repositioning to prevent further deterioration. Pt unable to tolerate seated position on this date so pt session was limited to supine exercise and activity. Pt would continue to benefit from skilled PT services to assist with mobility to reduce pain and deconditioning from prolonged hospitalization.    Safety Interventions: patient left in bed, bed alarm in place, call light within reach, patient at risk for falls, and nurse notified    Plan  Frequency: 3-5 x/per week  Current Treatment Recommendations: strengthening, ROM, balance training, functional mobility training, transfer training, gait training, endurance training, patient/caregiver education, ADL/self-care training, pain management, home exercise program, safety education, equipment evaluation/education, and positioning    Goals  Patient Goals: pt did not verbalize   Short Term Goals:  Time Frame: upon d/c  Patient will complete bed mobility at minimal assistance   Patient will complete transfers at maximum assistance  for sit<>stand  Pt will sit EOB x 15 min with min assist to progress endurance and allow for seated functional activities  No goals met 11/10     Therapy Session Time      Individual Group Co-treatment   Time In     0951   Time Out     1037   Minutes     46     Timed Code Treatment Minutes:   46 minutes   Total Treatment Minutes:  46 minutes        Electronically Signed By: Seble Pink, [address], [facility], DPT [id number]

## 2022-11-10 NOTE — PROGRESS NOTES
PULMONARY AND CRITICAL CARE MEDICINE PROGRESS NOTE      SUBJECTIVE: No acute events overnight. Patient wore BiPAP overnight. He is maintaining saturation on room air. Denies any complaints. REVIEW OF SYSTEMS:   CONSTITUTIONAL SYMPTOMS: The patient denies fever, fatigue, night sweats, weight loss or weight gain. HEENT: No vision changes. No tinnitus, Denies sinus pain. No hoarseness, or dysphagia. CARDIOVASCULAR: Denies chest pain, palpitation, syncope. RESPIRATORY: Denies shortness of breath or cough. GASTROINTESTINAL: Denies nausea, abdominal pain or change in bowel function. SKIN: No rashes or itching. MUSCULOSKELETAL: Denies weakness or bone pain. NEUROLOGICAL: No headaches or seizures. MEDICATIONS:      gentamicin   Topical Daily    insulin lispro  0-8 Units SubCUTAneous TID WC    insulin lispro  0-4 Units SubCUTAneous Nightly    cefUROXime  250 mg Oral Daily    aspirin  81 mg Oral Daily    atorvastatin  80 mg Oral Daily    mometasone-formoterol  2 puff Inhalation BID    buPROPion  100 mg Oral Every Other Day    insulin glargine  50 Units SubCUTAneous Nightly    lactobacillus  1 capsule Oral BID WC    levothyroxine  50 mcg Oral Daily    torsemide  100 mg Oral Daily    [START ON 12/1/2022] vitamin D  50,000 Units Oral Q3 Months    heparin (porcine)  5,000 Units SubCUTAneous 3 times per day      dextrose       acetaminophen, albuterol, midodrine, dextrose bolus **OR** dextrose bolus, glucagon (rDNA), dextrose     ALLERGIES:   Allergies as of 11/07/2022 - Fully Reviewed 11/07/2022   Allergen Reaction Noted    Penicillins Other (See Comments) 07/12/2010    Sulfa antibiotics  07/12/2010    Tazorac [tazarotene]  07/12/2010    Clindamycin Rash 01/27/2010    Clindamycin/lincomycin Rash 07/12/2010        OBJECTIVE:   height is 5' 10\" (1.778 m) and weight is 194 lb 7.1 oz (88.2 kg). His oral temperature is 97.8 °F (36.6 °C). His blood pressure is 145/54 (abnormal) and his pulse is 84.  His respiration is 18 and oxygen saturation is 100%. I/O this shift:  In: 240 [P.O.:240]  Out: 407      PHYSICAL EXAM:  CONSTITUTIONAL: He is a 69y.o.-year-old who appears his stated age. He is alert and oriented x 3 and in no acute distress. CARDIOVASCULAR: S1 S2 RRR. Without murmer  RESPIRATORY & CHEST: Lungs are clear to auscultation and percussion. No wheezing, no crackles. Good air movement  GASTROINTESTINAL & ABDOMEN: Soft, nontender, positive bowel sounds in all quadrants, non-distended, without hepatosplenomegaly. GENITOURINARY: Deferred. MUSCULOSKELETAL: No tenderness to palpation of the axial skeleton. There is no clubbing. No cyanosis. No edema of the lower extremities. SKIN OF BODY: No rash or jaundice. PSYCHIATRIC EVALUATION: Normal affect. Patient answers questions appropriately. HEMATOLOGIC/LYMPHATIC/ IMMUNOLOGIC: No palpable lymphadenopathy. NEUROLOGIC: Alert and oriented x 3. Groslly non-focal. Motor strength is 5+/5 in all muscle groups. The patient has a normal sensorium globally.       LABS:   Lab Results   Component Value Date    WBC 14.0 (H) 11/10/2022    HGB 9.6 (L) 11/10/2022    HCT 28.7 (L) 11/10/2022     11/10/2022    CHOL 120 04/15/2022    TRIG 166 (H) 04/15/2022    HDL 44 04/15/2022    LDLDIRECT 89 10/21/2016    ALT 87 (H) 11/07/2022     (H) 11/07/2022     (L) 11/10/2022    K 4.0 11/10/2022    CL 92 (L) 11/10/2022    CREATININE 7.6 (HH) 11/10/2022    BUN 58 (H) 11/10/2022    CO2 22 11/10/2022    TSH 5.20 (H) 01/30/2018    PSA 1.45 09/10/2013    INR 0.99 04/15/2022       Lab Results   Component Value Date    GLUCOSE 346 (H) 11/10/2022    CALCIUM 8.6 11/10/2022     (L) 11/10/2022    K 4.0 11/10/2022    CO2 22 11/10/2022    CL 92 (L) 11/10/2022    BUN 58 (H) 11/10/2022    CREATININE 7.6 (HH) 11/10/2022       Lab Results   Component Value Date    GLUCOSE 346 (H) 11/10/2022    CALCIUM 8.6 11/10/2022     (L) 11/10/2022    K 4.0 11/10/2022    CO2 22 11/10/2022    CL 92 (L) 11/10/2022    BUN 58 (H) 11/10/2022    CREATININE 7.6 (HH) 11/10/2022     Recent Labs     11/08/22  0920   PHART 7.249*   [id number] 72.1*   PO2ART <30.0*   [id number] 31.5*   [id number] 37.3*   BEART 2.9          IMAGING:  I reviewed the chest x-ray read by interpretation is as follows. Increased pulmonary vascular congestion        IMPRESSION:   Acute on chronic hypercapnic respiratory failure  COPD  BJ, noncompliant with BiPAP  UTI  End-stage renal disease on PD  Heart failure with preserved ejection fraction        RECOMMENDATION:   Patient presents with altered mental status which I believe is secondary to acute on chronic hypercapnic respiratory failure with hypoxia. Previous ABGs are consistent with chronic hypercapnic respiratory failure. Patient has BiPAP at the nursing home but I am not sure whether he is compliant with BiPAP or not. His BiPAP settings are 11/7 at nursing home. Continue BiPAP intermittently during the day and continuous at night while he is in the hospital.  Patient is maintaining saturation on room air. Peritoneal dialysis per nephrology. Patient is stable from pulmonary perspective. Pulmonary will sign off. Nasra Ferguson MD  Pulmonary Critical Care and Sleep Medicine  11/10/2022, 10:04 AM    This note was completed using dragon medical speech recognition software. Grammatical errors, random word insertions, pronoun errors and incomplete sentences are occasional consequences of this technology due to software limitations. If there are questions or concerns about the content of this note of information contained within the body of this dictation they should be addressed with the provider for clarification.

## 2022-11-10 NOTE — PROGRESS NOTES
PROCEDURE:  Patient seen on peritoneal dialysis  at 11:36 AM    PHYSICIAN:  Olga Tenorio MD    INDICATION:  End-stage renal disease    Wt Readings from Last 3 Encounters:   11/10/22 194 lb 7.1 oz (88.2 kg)   10/10/22 200 lb 2.8 oz (90.8 kg)   09/19/22 222 lb 10.6 oz (101 kg)     Temp Readings from Last 3 Encounters:   11/10/22 97.8 °F (36.6 °C) (Oral)   10/10/22 98.5 °F (36.9 °C) (Oral)   09/19/22 97.6 °F (36.4 °C) (Axillary)     BP Readings from Last 3 Encounters:   11/10/22 (!) 145/54   10/10/22 133/71   09/19/22 137/65     Pulse Readings from Last 3 Encounters:   11/10/22 84   10/10/22 79   09/19/22 81      In: 1500 [P.O.:1500]  Out: 407      CBC:   Recent Labs     11/08/22  0518 11/09/22  0546 11/10/22  0729   WBC 13.6* 12.5* 14.0*   HGB 9.6* 9.2* 9.6*    343 347     BMP:    Recent Labs     11/08/22  0518 11/09/22  0546 11/10/22  0729   * 130* 130*   K 3.1* 3.7 4.0   CL 92* 90* 92*   CO2 25 27 22   BUN 58* 60* 58*   CREATININE 8.3* 7.7* 7.6*   GLUCOSE 64* 257* 346*     BMD:   Lab Results   Component Value Date    .7 (H) 04/02/2022    CALCIUM 8.6 11/10/2022    CAION 1.03 (L) 01/14/2017    PHOS 9.5 (H) 10/08/2022       Iron:     Lab Results   Component Value Date    IRON 69 09/25/2022    TIBC 185 (L) 09/25/2022    FERRITIN 855.7 (H) 09/25/2022            RX:  See dialysis flowsheet for specifics on access, blood flow rate, dialysate baths, duration of dialysis, anticoagulation and other technical information. COMMENTS:      Tolerating PD well   No SOB today     PD catheter cuff is exposed. No signs of infection   Continue gentamicin cream once a day     Check BMP in am         Olga Tenorio MD  Nephrology     [address] [facility] : [address] , [address] [facility]  Aletha Graham Office: [facility]  [address], [facility] Office: [facility] [address].   [place] Office: [address] [place] [id number]  Office : [phone]  Fax :[phone]

## 2022-11-10 NOTE — PROGRESS NOTES
Department of Internal Medicine  General Internal Medicine   Progress Note      SUBJECTIVE: mental status much clearer no distress    History obtained from chart review, the patient, and nursing staff   General ROS: positive for  - fatigue, malaise, and weight gain  negative for - chills, fever, or night sweats  Psychological ROS: positive for - anxiety and disorientation  negative for - depression, hallucinations, hostility, or memory difficulties  Ophthalmic ROS: negative  Respiratory ROS: positive for - cough, shortness of breath, and tachypnea  negative for - hemoptysis, stridor, or wheezing  Cardiovascular ROS: positive for - dyspnea on exertion and shortness of breath  negative for - edema, loss of consciousness, orthopnea, or palpitations  Gastrointestinal ROS: no abdominal pain, change in bowel habits, or black or bloody stools  Genito-Urinary ROS: no dysuria, trouble voiding, or hematuria  Musculoskeletal ROS: chronic pain   Neurological ROS: no TIA or stroke symptoms  negative  Dermatological ROS: negative    OBJECTIVE      Medications      Current Facility-Administered Medications: gentamicin (GARAMYCIN) 0.1 % cream, , Topical, Daily  insulin lispro (HUMALOG) injection vial 0-8 Units, 0-8 Units, SubCUTAneous, TID WC  insulin lispro (HUMALOG) injection vial 0-4 Units, 0-4 Units, SubCUTAneous, Nightly  cefUROXime (CEFTIN) tablet 250 mg, 250 mg, Oral, Daily  acetaminophen (TYLENOL) tablet 650 mg, 650 mg, Oral, Q4H PRN  albuterol (PROVENTIL) nebulizer solution 2.5 mg, 2.5 mg, Nebulization, Q4H PRN  aspirin chewable tablet 81 mg, 81 mg, Oral, Daily  atorvastatin (LIPITOR) tablet 80 mg, 80 mg, Oral, Daily  mometasone-formoterol (DULERA) 200-5 MCG/ACT inhaler 2 puff, 2 puff, Inhalation, BID  buPROPion (WELLBUTRIN) tablet 100 mg, 100 mg, Oral, Every Other Day  insulin glargine (LANTUS) injection vial 50 Units, 50 Units, SubCUTAneous, Nightly  lactobacillus (CULTURELLE) capsule 1 capsule, 1 capsule, Oral, BID WC  levothyroxine (SYNTHROID) tablet 50 mcg, 50 mcg, Oral, Daily  midodrine (PROAMATINE) tablet 5 mg, 5 mg, Oral, TID PRN  torsemide (DEMADEX) tablet 100 mg, 100 mg, Oral, Daily  [START ON 2022] vitamin D (ERGOCALCIFEROL) capsule 50,000 Units, 50,000 Units, Oral, Q3 Months  heparin (porcine) injection 5,000 Units, 5,000 Units, SubCUTAneous, 3 times per day  dextrose bolus 10% 125 mL, 125 mL, IntraVENous, PRN **OR** dextrose bolus 10% 250 mL, 250 mL, IntraVENous, PRN  glucagon (rDNA) injection 1 mg, 1 mg, SubCUTAneous, PRN  dextrose 10 % infusion, , IntraVENous, Continuous PRN    Physical      Vitals: BP (!) 156/70   Pulse 97   Temp 97.2 °F (36.2 °C) (Tympanic)   Resp 16   Ht 5' 10\" (1.778 m)   Wt 194 lb 7.1 oz (88.2 kg)   SpO2 91%   BMI 27.90 kg/m²   Temp: Temp: 97.2 °F (36.2 °C)  Max: Temp  Av.8 °F (36.6 °C)  Min: 97.2 °F (36.2 °C)  Max: 98.3 °F (36.8 °C)  Respiration range:  Resp  Av.6  Min: 15  Max: 19  Pulse Range:  Pulse  Av.6  Min: 71  Max: 97  Blood pressure range:  Systolic (04LCH), OZW:424 , Min:145 , YKP:397   , Diastolic (55DIB), GNE:23, Min:46, Max:89    SpO2  Av %  Min: 86 %  Max: 100 %    Intake/Output Summary (Last 24 hours) at 11/10/2022 1353  Last data filed at 11/10/2022 0833  Gross per 24 hour   Intake 900 ml   Output 407 ml   Net 493 ml       Vent settings:  Pulse  Av  Min: 55  Max: 97  Resp  Av.3  Min: 6  Max: 37  SpO2  Av.1 %  Min: 85 %  Max: 100 %    CONSTITUTIONAL:  awake  EYES:  Lids and lashes normal, pupils equal, round and reactive to light, extra ocular muscles intact, sclera clear, conjunctiva normal  NECK:  mild JVD   BACK:  Symmetric, no curvature, spinous processes are non-tender on palpation, paraspinous muscles are non-tender on palpation, no costal vertebral tenderness  LUNGS:  tachypneic, Moderate respiratory distress, moderate air exchange, no retractions, and crackles diffuse, wheeze diffuse  CARDIOVASCULAR:  Normal apical impulse, regular rate and rhythm, normal S1 and S2, no S3 or S4, and no murmur noted  ABDOMEN:  soft BS + non tender   MUSCULOSKELETAL:  trace edema   NEUROLOGIC:  grossly intact   SKIN:  warm dry and pale  and no bruising or bleeding    Data      Recent Results (from the past 96 hour(s))   CBC with Auto Differential    Collection Time: 11/07/22  2:17 PM   Result Value Ref Range    WBC 11.9 (H) 4.0 - 11.0 K/uL    RBC 2.95 (L) 4.20 - 5.90 M/uL    Hemoglobin 10.2 (L) 13.5 - 17.5 g/dL    Hematocrit 31.3 (L) 40.5 - 52.5 %    .0 (H) 80.0 - 100.0 fL    MCH 34.5 (H) 26.0 - 34.0 pg    MCHC 32.6 31.0 - 36.0 g/dL    RDW 15.5 (H) 12.4 - 15.4 %    Platelets 338 641 - 529 K/uL    MPV 7.0 5.0 - 10.5 fL    Neutrophils % 83.2 %    Lymphocytes % 8.6 %    Monocytes % 5.5 %    Eosinophils % 1.9 %    Basophils % 0.8 %    Neutrophils Absolute 9.9 (H) 1.7 - 7.7 K/uL    Lymphocytes Absolute 1.0 1.0 - 5.1 K/uL    Monocytes Absolute 0.7 0.0 - 1.3 K/uL    Eosinophils Absolute 0.2 0.0 - 0.6 K/uL    Basophils Absolute 0.1 0.0 - 0.2 K/uL   CMP w/ Reflex to MG    Collection Time: 11/07/22  2:17 PM   Result Value Ref Range    Sodium 132 (L) 136 - 145 mmol/L    Potassium reflex Magnesium 3.8 3.5 - 5.1 mmol/L    Chloride 90 (L) 99 - 110 mmol/L    CO2 33 (H) 21 - 32 mmol/L    Anion Gap 9 3 - 16    Glucose 198 (H) 70 - 99 mg/dL    BUN 50 (H) 7 - 20 mg/dL    Creatinine 7.0 (HH) 0.8 - 1.3 mg/dL    Est, Glom Filt Rate 8 (A) >60    Calcium 8.9 8.3 - 10.6 mg/dL    Total Protein 6.1 (L) 6.4 - 8.2 g/dL    Albumin 2.7 (L) 3.4 - 5.0 g/dL    Albumin/Globulin Ratio 0.8 (L) 1.1 - 2.2    Total Bilirubin 0.3 0.0 - 1.0 mg/dL    Alkaline Phosphatase 156 (H) 40 - 129 U/L    ALT 87 (H) 10 - 40 U/L     (H) 15 - 37 U/L   Blood gas, venous    Collection Time: 11/07/22  2:17 PM   Result Value Ref Range    pH, Cj 7.225 (L) 7.350 - 7.450    pCO2, Cj 70.2 (H) 40.0 - 50.0 mmHg    pO2, Cj 116.0 (H) 25.0 - 40.0 mmHg    HCO3, Venous 29.0 23.0 - 29.0 mmol/L    Base Excess, Cj 0.5 -3.0 - 3.0 mmol/L    O2 Sat, Cj 99 Not Established %    Carboxyhemoglobin 3.5 (H) 0.0 - 1.5 %    MetHgb, Cj 0.3 <1.5 %    TC02 (Calc), Cj 70 Not Established mmol/L    O2 Content, Cj 12 Not Established VOL %    O2 Therapy Unknown    ETOH    Collection Time: 11/07/22  2:17 PM   Result Value Ref Range    Ethanol Lvl None Detected mg/dL   Troponin    Collection Time: 11/07/22  2:17 PM   Result Value Ref Range    Troponin 0.10 (H) <0.01 ng/mL   EKG 12 Lead    Collection Time: 11/07/22  2:27 PM   Result Value Ref Range    Ventricular Rate 82 BPM    Atrial Rate 82 BPM    P-R Interval 224 ms    QRS Duration 80 ms    Q-T Interval 372 ms    QTc Calculation (Bazett) 434 ms    P Axis 89 degrees    R Axis 41 degrees    T Axis 78 degrees    Diagnosis       Sinus rhythm with 1st degree A-V blockOtherwise normal ECGConfirmed by Eugenia Yeboah MD, Korin Mata ([id number]) on 11/7/2022 3:58:30 PM   Urinalysis with Reflex to Culture    Collection Time: 11/07/22  3:00 PM    Specimen: Urine   Result Value Ref Range    Color, UA Yellow Straw/Yellow    Clarity, UA TURBID (A) Clear    Glucose, Ur 500 (A) Negative mg/dL    Bilirubin Urine Negative Negative    Ketones, Urine Negative Negative mg/dL    Specific Gravity, UA 1.020 1.005 - 1.030    Blood, Urine LARGE (A) Negative    pH, UA 7.0 5.0 - 8.0    Protein, UA >=300 (A) Negative mg/dL    Urobilinogen, Urine 0.2 <2.0 E.U./dL    Nitrite, Urine Negative Negative    Leukocyte Esterase, Urine LARGE (A) Negative    Microscopic Examination YES     Urine Type NotGiven     Urine Reflex to Culture Yes    Microscopic Urinalysis    Collection Time: 11/07/22  3:00 PM   Result Value Ref Range    WBC, UA >100 (A) 0 - 5 /HPF    RBC, UA see below (A) 0 - 4 /HPF   Culture, Urine    Collection Time: 11/07/22  3:00 PM    Specimen: Urine, clean catch   Result Value Ref Range    Urine Culture, Routine No growth at 18 to 36 hours    POCT Glucose    Collection Time: 11/07/22 10:58 PM   Result Value Ref Range POC Glucose 96 70 - 99 mg/dl    Performed on ACCU-CHEK    CBC    Collection Time: 11/08/22  5:18 AM   Result Value Ref Range    WBC 13.6 (H) 4.0 - 11.0 K/uL    RBC 2.67 (L) 4.20 - 5.90 M/uL    Hemoglobin 9.6 (L) 13.5 - 17.5 g/dL    Hematocrit 29.0 (L) 40.5 - 52.5 %    .7 (H) 80.0 - 100.0 fL    MCH 36.0 (H) 26.0 - 34.0 pg    MCHC 33.1 31.0 - 36.0 g/dL    RDW 15.9 (H) 12.4 - 15.4 %    Platelets 335 094 - 186 K/uL    MPV 7.0 5.0 - 10.5 fL   Basic Metabolic Panel    Collection Time: 11/08/22  5:18 AM   Result Value Ref Range    Sodium 129 (L) 136 - 145 mmol/L    Potassium 3.1 (L) 3.5 - 5.1 mmol/L    Chloride 92 (L) 99 - 110 mmol/L    CO2 25 21 - 32 mmol/L    Anion Gap 12 3 - 16    Glucose 64 (L) 70 - 99 mg/dL    BUN 58 (H) 7 - 20 mg/dL    Creatinine 8.3 (HH) 0.8 - 1.3 mg/dL    Est, Glom Filt Rate 6 (A) >60    Calcium 8.8 8.3 - 10.6 mg/dL   Hepatitis B Surface Antigen    Collection Time: 11/08/22  5:18 AM   Result Value Ref Range    Hep B S Ag Interp Non-reactive Non-reactive   Hepatitis B Surface Antibody    Collection Time: 11/08/22  5:18 AM   Result Value Ref Range    Hep B S Ab <3.50 mIU/mL   POCT Glucose    Collection Time: 11/08/22  8:37 AM   Result Value Ref Range    POC Glucose 71 70 - 99 mg/dl    Performed on ACCU-CHEK    Blood gas, arterial    Collection Time: 11/08/22  9:20 AM   Result Value Ref Range    pH, Arterial 7.249 (L) 7.350 - 7.450    pCO2, Arterial 72.1 (HH) 35.0 - 45.0 mmHg    pO2, Arterial <30.0 (LL) 75.0 - 108.0 mmHg    HCO3, Arterial 31.5 (H) 21.0 - 29.0 mmol/L    Base Excess, Arterial 2.9 -3.0 - 3.0 mmol/L    Hemoglobin, Art, Extended 10.0 (L) 13.5 - 17.5 g/dL    O2 Sat, Arterial 37.3 (L) >92 %    Carboxyhgb, Arterial 1.4 0.0 - 1.5 %    Methemoglobin, Arterial 0.5 <1.5 %    TCO2, Arterial 75.5 Not Established mmol/L    O2 Therapy Unknown    POCT Glucose    Collection Time: 11/08/22 11:21 AM   Result Value Ref Range    POC Glucose 118 (H) 70 - 99 mg/dl    Performed on ACCU-CHEK    POCT Glucose    Collection Time: 11/08/22  5:12 PM   Result Value Ref Range    POC Glucose 244 (H) 70 - 99 mg/dl    Performed on ACCU-CHEK    POCT Glucose    Collection Time: 11/08/22  8:55 PM   Result Value Ref Range    POC Glucose 370 (H) 70 - 99 mg/dl    Performed on ACCU-CHEK    CBC    Collection Time: 11/09/22  5:46 AM   Result Value Ref Range    WBC 12.5 (H) 4.0 - 11.0 K/uL    RBC 2.63 (L) 4.20 - 5.90 M/uL    Hemoglobin 9.2 (L) 13.5 - 17.5 g/dL    Hematocrit 28.1 (L) 40.5 - 52.5 %    .1 (H) 80.0 - 100.0 fL    MCH 34.9 (H) 26.0 - 34.0 pg    MCHC 32.6 31.0 - 36.0 g/dL    RDW 15.9 (H) 12.4 - 15.4 %    Platelets 701 172 - 268 K/uL    MPV 6.8 5.0 - 10.5 fL   Basic Metabolic Panel    Collection Time: 11/09/22  5:46 AM   Result Value Ref Range    Sodium 130 (L) 136 - 145 mmol/L    Potassium 3.7 3.5 - 5.1 mmol/L    Chloride 90 (L) 99 - 110 mmol/L    CO2 27 21 - 32 mmol/L    Anion Gap 13 3 - 16    Glucose 257 (H) 70 - 99 mg/dL    BUN 60 (H) 7 - 20 mg/dL    Creatinine 7.7 (HH) 0.8 - 1.3 mg/dL    Est, Glom Filt Rate 7 (A) >60    Calcium 8.5 8.3 - 10.6 mg/dL   POCT Glucose    Collection Time: 11/09/22  7:24 AM   Result Value Ref Range    POC Glucose 231 (H) 70 - 99 mg/dl    Performed on ACCU-CHEK    POCT Glucose    Collection Time: 11/09/22 12:21 PM   Result Value Ref Range    POC Glucose 303 (H) 70 - 99 mg/dl    Performed on ACCU-CHEK    POCT Glucose    Collection Time: 11/09/22  4:34 PM   Result Value Ref Range    POC Glucose 243 (H) 70 - 99 mg/dl    Performed on ACCU-CHEK    POCT Glucose    Collection Time: 11/09/22  8:41 PM   Result Value Ref Range    POC Glucose 178 (H) 70 - 99 mg/dl    Performed on ACCU-CHEK    CBC    Collection Time: 11/10/22  7:29 AM   Result Value Ref Range    WBC 14.0 (H) 4.0 - 11.0 K/uL    RBC 2.72 (L) 4.20 - 5.90 M/uL    Hemoglobin 9.6 (L) 13.5 - 17.5 g/dL    Hematocrit 28.7 (L) 40.5 - 52.5 %    .6 (H) 80.0 - 100.0 fL    MCH 35.2 (H) 26.0 - 34.0 pg    MCHC 33.4 31.0 - 36.0 g/dL    RDW 15.9 (H) 12.4 - 15.4 %    Platelets 649 477 - 804 K/uL    MPV 7.5 5.0 - 10.5 fL   Basic Metabolic Panel    Collection Time: 11/10/22  7:29 AM   Result Value Ref Range    Sodium 130 (L) 136 - 145 mmol/L    Potassium 4.0 3.5 - 5.1 mmol/L    Chloride 92 (L) 99 - 110 mmol/L    CO2 22 21 - 32 mmol/L    Anion Gap 16 3 - 16    Glucose 346 (H) 70 - 99 mg/dL    BUN 58 (H) 7 - 20 mg/dL    Creatinine 7.6 (HH) 0.8 - 1.3 mg/dL    Est, Glom Filt Rate 7 (A) >60    Calcium 8.6 8.3 - 10.6 mg/dL   POCT Glucose    Collection Time: 11/10/22  8:06 AM   Result Value Ref Range    POC Glucose 345 (H) 70 - 99 mg/dl    Performed on ACCU-CHEK    POCT Glucose    Collection Time: 11/10/22 11:43 AM   Result Value Ref Range    POC Glucose 267 (H) 70 - 99 mg/dl    Performed on [address] [place] [address] Problems             Last Modified POA    * (Principal) Acute on chronic respiratory failure with hypercapnia (HCC) 11/7/2022 Yes    NSTEMI (non-ST elevated myocardial infarction) ([address] 11/7/2022 Yes    AMS (altered mental status) 11/7/2022 Yes    Acute respiratory failure with hypoxia ([address] 11/7/2022 Yes    Hypercarbia 11/7/2022 Yes    Electrolyte imbalance 11/7/2022 Yes    Chronic hypercapnic respiratory failure ([address] 11/7/2022 Yes    BJ treated with BiPAP 11/9/2022 Yes    Moderate episode of recurrent major depressive disorder ([address] 11/8/2022 Yes    Overweight (BMI 25.0-29.9) 11/8/2022 Yes    History of 2019 novel coronavirus disease (COVID-19) 11/8/2022 Yes    Peritoneal dialysis catheter in place [facility]) 11/8/2022 Yes    COPD with acute exacerbation ([address] 11/7/2022 Yes    Anemia in ESRD (end-stage renal disease) ([address] 11/8/2022 Yes    ESRD (end stage renal disease) ([facility] 11/8/2022 Yes    Acute on chronic respiratory failure with hypoxia and hypercapnia ([facility] 11/8/2022 Yes    Acute cystitis without hematuria 11/9/2022 Yes    Essential hypertension 11/8/2022 Yes    Sleep apnea 11/8/2022 Yes    Psoriatic arthropathy ([facility] 11/8/2022 Yes    Coronary atherosclerosis 11/8/2022 Yes    Vitamin D deficiency 11/8/2022 Yes    Glaucoma 11/8/2022 Yes    S/P CABG x 4 11/8/2022 Yes    Overview Signed 12/9/2015 11:54 AM by Arcadio Maher MA     2006         ILD (interstitial lung disease) ([facility] 11/8/2022 Yes    Chronic diastolic heart failure ([facility] 11/8/2022 Yes    Type 2 diabetes mellitus with hyperglycemia, with long-term current use of insulin ([facility] 11/8/2022 Yes    Hypertension associated with stage 5 chronic kidney disease due to type 2 diabetes mellitus ([address] 11/8/2022 Yes    Acquired hypothyroidism 11/8/2022 Yes    ESRD on peritoneal dialysis ([facility] 11/8/2022 Yes     Ct Bipap    Ct nebulizer rx    Ct Peritoneal dialysis per Dr Andrews Bank    Should be dismissal ready by AM

## 2022-11-11 LAB
ALBUMIN SERPL-MCNC: 1.6 G/DL (ref 3.4–5)
ANION GAP SERPL CALCULATED.3IONS-SCNC: 17 MMOL/L (ref 3–16)
BUN BLDV-MCNC: 60 MG/DL (ref 7–20)
CALCIUM SERPL-MCNC: 8.2 MG/DL (ref 8.3–10.6)
CHLORIDE BLD-SCNC: 91 MMOL/L (ref 99–110)
CO2: 24 MMOL/L (ref 21–32)
CREAT SERPL-MCNC: 8.1 MG/DL (ref 0.8–1.3)
GFR SERPL CREATININE-BSD FRML MDRD: 6 ML/MIN/{1.73_M2}
GLUCOSE BLD-MCNC: 194 MG/DL (ref 70–99)
GLUCOSE BLD-MCNC: 203 MG/DL (ref 70–99)
GLUCOSE BLD-MCNC: 230 MG/DL (ref 70–99)
GLUCOSE BLD-MCNC: 230 MG/DL (ref 70–99)
GLUCOSE BLD-MCNC: 279 MG/DL (ref 70–99)
PERFORMED ON: ABNORMAL
PHOSPHORUS: 7.2 MG/DL (ref 2.5–4.9)
POTASSIUM SERPL-SCNC: 3.8 MMOL/L (ref 3.5–5.1)
SODIUM BLD-SCNC: 132 MMOL/L (ref 136–145)

## 2022-11-11 PROCEDURE — 80069 RENAL FUNCTION PANEL: CPT

## 2022-11-11 PROCEDURE — 6370000000 HC RX 637 (ALT 250 FOR IP): Performed by: INTERNAL MEDICINE

## 2022-11-11 PROCEDURE — 6360000002 HC RX W HCPCS: Performed by: INTERNAL MEDICINE

## 2022-11-11 PROCEDURE — 97535 SELF CARE MNGMENT TRAINING: CPT

## 2022-11-11 PROCEDURE — 90945 DIALYSIS ONE EVALUATION: CPT

## 2022-11-11 PROCEDURE — 97530 THERAPEUTIC ACTIVITIES: CPT

## 2022-11-11 PROCEDURE — 2700000000 HC OXYGEN THERAPY PER DAY

## 2022-11-11 PROCEDURE — 97110 THERAPEUTIC EXERCISES: CPT

## 2022-11-11 PROCEDURE — 94761 N-INVAS EAR/PLS OXIMETRY MLT: CPT

## 2022-11-11 PROCEDURE — 36415 COLL VENOUS BLD VENIPUNCTURE: CPT

## 2022-11-11 PROCEDURE — 1200000000 HC SEMI PRIVATE

## 2022-11-11 PROCEDURE — 90945 DIALYSIS ONE EVALUATION: CPT | Performed by: INTERNAL MEDICINE

## 2022-11-11 PROCEDURE — 94660 CPAP INITIATION&MGMT: CPT

## 2022-11-11 PROCEDURE — 94640 AIRWAY INHALATION TREATMENT: CPT

## 2022-11-11 RX ORDER — HYDROCODONE BITARTRATE AND ACETAMINOPHEN 5; 325 MG/1; MG/1
1 TABLET ORAL EVERY 4 HOURS PRN
Status: DISCONTINUED | OUTPATIENT
Start: 2022-11-11 | End: 2022-11-16 | Stop reason: HOSPADM

## 2022-11-11 RX ADMIN — TORSEMIDE 100 MG: 100 TABLET ORAL at 08:59

## 2022-11-11 RX ADMIN — LEVOTHYROXINE SODIUM 50 MCG: 0.03 TABLET ORAL at 07:06

## 2022-11-11 RX ADMIN — ACETAMINOPHEN 650 MG: 325 TABLET ORAL at 08:58

## 2022-11-11 RX ADMIN — ATORVASTATIN CALCIUM 80 MG: 80 TABLET, FILM COATED ORAL at 08:59

## 2022-11-11 RX ADMIN — ASPIRIN 81 MG 81 MG: 81 TABLET ORAL at 09:00

## 2022-11-11 RX ADMIN — Medication 2 PUFF: at 08:53

## 2022-11-11 RX ADMIN — INSULIN LISPRO 2 UNITS: 100 INJECTION, SOLUTION INTRAVENOUS; SUBCUTANEOUS at 12:09

## 2022-11-11 RX ADMIN — CEFUROXIME AXETIL 250 MG: 250 TABLET ORAL at 08:59

## 2022-11-11 RX ADMIN — HEPARIN SODIUM 5000 UNITS: 5000 INJECTION INTRAVENOUS; SUBCUTANEOUS at 10:14

## 2022-11-11 RX ADMIN — INSULIN GLARGINE 50 UNITS: 100 INJECTION, SOLUTION SUBCUTANEOUS at 21:05

## 2022-11-11 RX ADMIN — Medication 1 CAPSULE: at 17:31

## 2022-11-11 RX ADMIN — HEPARIN SODIUM 5000 UNITS: 5000 INJECTION INTRAVENOUS; SUBCUTANEOUS at 17:31

## 2022-11-11 RX ADMIN — BUPROPION HYDROCHLORIDE 100 MG: 100 TABLET, FILM COATED ORAL at 09:00

## 2022-11-11 RX ADMIN — Medication 2 PUFF: at 20:26

## 2022-11-11 RX ADMIN — Medication 1 CAPSULE: at 08:59

## 2022-11-11 RX ADMIN — INSULIN LISPRO 4 UNITS: 100 INJECTION, SOLUTION INTRAVENOUS; SUBCUTANEOUS at 08:52

## 2022-11-11 RX ADMIN — HEPARIN SODIUM 5000 UNITS: 5000 INJECTION INTRAVENOUS; SUBCUTANEOUS at 03:31

## 2022-11-11 RX ADMIN — HYDROCODONE BITARTRATE AND ACETAMINOPHEN 1 TABLET: 5; 325 TABLET ORAL at 17:31

## 2022-11-11 ASSESSMENT — PAIN SCALES - GENERAL
PAINLEVEL_OUTOF10: 0
PAINLEVEL_OUTOF10: 3
PAINLEVEL_OUTOF10: 0
PAINLEVEL_OUTOF10: 8
PAINLEVEL_OUTOF10: 8
PAINLEVEL_OUTOF10: 0
PAINLEVEL_OUTOF10: 0
PAINLEVEL_OUTOF10: 5
PAINLEVEL_OUTOF10: 0
PAINLEVEL_OUTOF10: 0

## 2022-11-11 ASSESSMENT — PAIN DESCRIPTION - LOCATION
LOCATION: HIP

## 2022-11-11 ASSESSMENT — PAIN DESCRIPTION - ORIENTATION: ORIENTATION: RIGHT

## 2022-11-11 ASSESSMENT — PAIN DESCRIPTION - DESCRIPTORS
DESCRIPTORS: ACHING;SORE
DESCRIPTORS: SORE

## 2022-11-11 NOTE — PROGRESS NOTES
Patients head to toe assessment completed. Vital signs WNL. Bed alarm engaged, call light within reach. Scheduled medications given per MAR. Patient resting in bed. Will continue to monitor.

## 2022-11-11 NOTE — PROGRESS NOTES
[facility] Department   Phone: [phone]    Physical Therapy    [] Initial Evaluation            [x] Daily Treatment Note         [] Discharge Summary      Patient: Cipriano Reed   : 1945   MRN: [id number]   Date of Service:  2022  Admitting Diagnosis: Acute on chronic respiratory failure with hypercapnia [facility])  Current Admission Summary: Cipriano Reed is a 69 y.o. male with past medical history of hypertension, diabetes, coronary artery disease and CHF as well as end-stage renal disease on peritoneal dialysis and obstructive sleep apnea here today for altered mental status. The patient was transferred from his care facility for reports of increased lethargy, weakness, and possible altered mental status. The patient himself currently states he feels fine. Denies headache, cough or shortness of breath. Denies chest pain. No abdominal pain, vomiting or diarrhea. States he feels tired and sleepy but is otherwise in no discomfort. Discharged from the hospital on 10/10/2022 after presenting altered thought secondary to acute on chronic hypoxic and hypercapnic respiratory failure with volume overload. Per X-Ray on 22, pt has small left pleural effusion and or/ atelectasis. Past Medical History:  has a past medical history of Allergic rhinitis due to other allergen, Coronary atherosclerosis of unspecified type of vessel, native or graft, Diabetic eye exam ([address] Diabetic eye exam ([address] Generalized osteoarthrosis, involving multiple sites, Other psoriasis, Pneumonia, Prolonged emergence from general anesthesia, Psoriatic arthropathy ([address] Type II or unspecified type diabetes mellitus without mention of complication, not stated as uncontrolled, Unspecified essential hypertension, and Unspecified sleep apnea. Past Surgical History:  has a past surgical history that includes Coronary artery bypass graft;  Carpal tunnel release; joint replacement; Cataract removal; and LAPAROSCOPY INSERTION PERITONEAL CATHETER (N/A, 10/26/2020). Discharge Recommendations: Marcos Arzola scored a 6/24 on the AM-PAC short mobility form. Current research shows that an AM-PAC score of 17 or less is typically not associated with a discharge to the patient's home setting. Based on the patient's AM-PAC score and their current functional mobility deficits, it is recommended that the patient have 3-5 sessions per week of Physical Therapy at d/c to increase the patient's independence. Please see assessment section for further patient specific details. If patient discharges prior to next session this note will serve as a discharge summary. Please see below for the latest assessment towards goals. DME Required For Discharge: DME to be determined at next level of care  Precautions/Restrictions: high fall risk, up as tolerated  Weight Bearing Restrictions: no restrictions  [] Right Upper Extremity  [] Left Upper Extremity [] Right Lower Extremity  [] Left Lower Extremity     Required Braces/Orthotics: no braces required   [] Right  [] Left  Positional Restrictions:no positional restrictions    Pre-Admission Information   Lives With:  Pt currently at SNF. Transfer Assistance: requires assistance, Pt reports only transferring 2x in last 2 months for sit to stand. Denies having been up to chair. Ambulation Assistance: Pt has not ambulated since September. ADL Assistance:  assist for all  IADL Assistance:  lives at [facility]  Active :        [] Yes  [x] No  Hand Dominance: [] Left  [x] Right  Current Employment: retired. Occupation: air craft   Hobbies: watch tv  Recent Falls: one fall at home        Subjective  General: Patient lying supine in bed with HOB elevated upon arrival. Patient is agreeable to PT/OT with encouragement.    Pain: Patient does not rate upon questioning  Pain Interventions: RN notified of patient request for pain medication was giving meds upon arrival Functional Mobility  Bed Mobility  Supine to Sit: 2 person assistance with mod Ax2   Sit to Supine: 2 person assistance with mod Ax2   Rolling Left: maximum assistance  Rolling Right: maximum assistance  Scooting: dependent assistance  Comments: Patient performed bed mobility with HOB elevated and use of R handrail during supine to sit and sit to supine. Patient utilized B handrails during rolling, required max VC for hand placement and encouragement to initiate movement  Transfers  No transfers completed on this date secondary to pain. Comments:  Ambulation  Ambulation not tested on this date secondary to patient has been non ambulatory for 2 months. Stair Mobility  Comments:  Wheelchair Mobility:  Comments:  Balance  Static Sitting Balance: fair: maintains balance at CGA without use of UE support  Dynamic Sitting Balance: poor: requires mod (A) to maintain balance  Comments: Patient sat EOB for ~12 mins while performing ADLs, demonstrates R lateral and posterior lean intermittently requiring assistance for correction    Other Therapeutic Interventions  See OT note for ADLs  Bed Exercises:    Ankle pumps 20x   SAQ 20x   Hooklying marching 20x   UE punching 20x   UE overhead 20x  Functional Outcomes  AM-PAC Inpatient Mobility Raw Score : 6              Cognition  Memory: decreased recall of biographical information, decreased recall of recent events, decreased short term memory  Orientation:    alert and oriented x 4  Command Following:   [facility]    Education  Barriers To Learning: cognition  Patient Education: patient educated on goals, PT role and benefits, plan of care, general safety, discharge recommendations  Learning Assessment:  patient verbalizes understanding, would benefit from continued reinforcement    Assessment  Activity Tolerance: Fair -; limited by pain  Impairments Requiring Therapeutic Intervention: decreased functional mobility, decreased ADL status, decreased strength, decreased safety awareness, decreased cognition, decreased endurance, decreased sensation, decreased balance, increased pain, decreased posture  Prognosis: fair  Clinical Assessment: Patient limited by pain this visit, however patient increased seated tolerance to 12 minutes this visit while performing ADLs and seated exercises. Patient is unable to return to home at current level will need further inpatient therapy and 24/7 assist at d/c.    Safety Interventions: patient left in bed, bed alarm in place, call light within reach, patient at risk for falls, and nurse notified    Plan  Frequency: 3-5 x/per week  Current Treatment Recommendations: strengthening, ROM, balance training, functional mobility training, transfer training, gait training, endurance training, patient/caregiver education, ADL/self-care training, pain management, home exercise program, safety education, equipment evaluation/education, and positioning    Goals  Patient Goals: pt did not verbalize   Short Term Goals:  Time Frame: upon d/c  Patient will complete bed mobility at minimal assistance   Patient will complete transfers at maximum assistance  for sit<>stand  Pt will sit EOB x 15 min with min assist to progress endurance and allow for seated functional activities    Therapy Session Time      Individual Group Co-treatment   Time In     1033   Time Out     1143   Minutes     70     Timed Code Treatment Minutes:  70 Minutes  Total Treatment Minutes:  70 Minutes       Electronically Signed By: Franchesca Dahl, PT  Franchesca Dahl PT, DPT, [id number]

## 2022-11-11 NOTE — PROGRESS NOTES
Department of Internal Medicine  General Internal Medicine   Progress Note      SUBJECTIVE: no respiratory distress , mental status back to baseline     History obtained from chart review, the patient, and nursing staff   General ROS: positive for  - fatigue, malaise, and weight gain  negative for - chills, fever, or night sweats  Psychological ROS: positive for - anxiety and disorientation  negative for - depression, hallucinations, hostility, or memory difficulties  Ophthalmic ROS: negative  Respiratory ROS: positive for - cough, shortness of breath, and tachypnea  negative for - hemoptysis, stridor, or wheezing  Cardiovascular ROS: positive for - dyspnea on exertion and shortness of breath  negative for - edema, loss of consciousness, orthopnea, or palpitations  Gastrointestinal ROS: no abdominal pain, change in bowel habits, or black or bloody stools  Genito-Urinary ROS: no dysuria, trouble voiding, or hematuria  Musculoskeletal ROS: chronic pain   Neurological ROS: no TIA or stroke symptoms  negative  Dermatological ROS: negative    OBJECTIVE      Medications      Current Facility-Administered Medications: gentamicin (GARAMYCIN) 0.1 % cream, , Topical, Daily  insulin lispro (HUMALOG) injection vial 0-8 Units, 0-8 Units, SubCUTAneous, TID WC  insulin lispro (HUMALOG) injection vial 0-4 Units, 0-4 Units, SubCUTAneous, Nightly  cefUROXime (CEFTIN) tablet 250 mg, 250 mg, Oral, Daily  acetaminophen (TYLENOL) tablet 650 mg, 650 mg, Oral, Q4H PRN  albuterol (PROVENTIL) nebulizer solution 2.5 mg, 2.5 mg, Nebulization, Q4H PRN  aspirin chewable tablet 81 mg, 81 mg, Oral, Daily  atorvastatin (LIPITOR) tablet 80 mg, 80 mg, Oral, Daily  mometasone-formoterol (DULERA) 200-5 MCG/ACT inhaler 2 puff, 2 puff, Inhalation, BID  buPROPion (WELLBUTRIN) tablet 100 mg, 100 mg, Oral, Every Other Day  insulin glargine (LANTUS) injection vial 50 Units, 50 Units, SubCUTAneous, Nightly  lactobacillus (CULTURELLE) capsule 1 capsule, 1 capsule, Oral, BID WC  levothyroxine (SYNTHROID) tablet 50 mcg, 50 mcg, Oral, Daily  midodrine (PROAMATINE) tablet 5 mg, 5 mg, Oral, TID PRN  torsemide (DEMADEX) tablet 100 mg, 100 mg, Oral, Daily  [START ON 2022] vitamin D (ERGOCALCIFEROL) capsule 50,000 Units, 50,000 Units, Oral, Q3 Months  heparin (porcine) injection 5,000 Units, 5,000 Units, SubCUTAneous, 3 times per day  dextrose bolus 10% 125 mL, 125 mL, IntraVENous, PRN **OR** dextrose bolus 10% 250 mL, 250 mL, IntraVENous, PRN  glucagon (rDNA) injection 1 mg, 1 mg, SubCUTAneous, PRN  dextrose 10 % infusion, , IntraVENous, Continuous PRN    Physical      Vitals: BP (!) 163/82   Pulse 86   Temp 97.2 °F (36.2 °C) (Oral)   Resp 18   Ht 5' 10\" (1.778 m)   Wt 194 lb 7.1 oz (88.2 kg)   SpO2 100%   BMI 27.90 kg/m²   Temp: Temp: 97.2 °F (36.2 °C)  Max: Temp  Av.2 °F (36.2 °C)  Min: 96.9 °F (36.1 °C)  Max: 97.5 °F (36.4 °C)  Respiration range:  Resp  Av.5  Min: 16  Max: 22  Pulse Range:  Pulse  Av.9  Min: 78  Max: 102  Blood pressure range:  Systolic (10YHG), RKL:183 , Min:150 , ITY:845   , Diastolic (14SYZ), XOH:04, Min:53, Max:82    SpO2  Av %  Min: 87 %  Max: 100 %    Intake/Output Summary (Last 24 hours) at 2022 1042  Last data filed at 2022 0933  Gross per 24 hour   Intake 300 ml   Output 46 ml   Net 254 ml       Vent settings:  Pulse  Av.1  Min: 55  Max: 102  Resp  Av.3  Min: 6  Max: 37  SpO2  Av.1 %  Min: 85 %  Max: 100 %    CONSTITUTIONAL:  awake  EYES:  Lids and lashes normal, pupils equal, round and reactive to light, extra ocular muscles intact, sclera clear, conjunctiva normal  NECK:  mild JVD   BACK:  Symmetric, no curvature, spinous processes are non-tender on palpation, paraspinous muscles are non-tender on palpation, no costal vertebral tenderness  LUNGS:  tachypneic, Moderate respiratory distress, moderate air exchange, no retractions, and crackles diffuse, wheeze diffuse  CARDIOVASCULAR: Normal apical impulse, regular rate and rhythm, normal S1 and S2, no S3 or S4, and no murmur noted  ABDOMEN:  soft BS + non tender   MUSCULOSKELETAL:  trace edema   NEUROLOGIC:  grossly intact   SKIN:  warm dry and pale  and no bruising or bleeding    Data      Recent Results (from the past 96 hour(s))   CBC with Auto Differential    Collection Time: 11/07/22  2:17 PM   Result Value Ref Range    WBC 11.9 (H) 4.0 - 11.0 K/uL    RBC 2.95 (L) 4.20 - 5.90 M/uL    Hemoglobin 10.2 (L) 13.5 - 17.5 g/dL    Hematocrit 31.3 (L) 40.5 - 52.5 %    .0 (H) 80.0 - 100.0 fL    MCH 34.5 (H) 26.0 - 34.0 pg    MCHC 32.6 31.0 - 36.0 g/dL    RDW 15.5 (H) 12.4 - 15.4 %    Platelets 579 316 - 055 K/uL    MPV 7.0 5.0 - 10.5 fL    Neutrophils % 83.2 %    Lymphocytes % 8.6 %    Monocytes % 5.5 %    Eosinophils % 1.9 %    Basophils % 0.8 %    Neutrophils Absolute 9.9 (H) 1.7 - 7.7 K/uL    Lymphocytes Absolute 1.0 1.0 - 5.1 K/uL    Monocytes Absolute 0.7 0.0 - 1.3 K/uL    Eosinophils Absolute 0.2 0.0 - 0.6 K/uL    Basophils Absolute 0.1 0.0 - 0.2 K/uL   CMP w/ Reflex to MG    Collection Time: 11/07/22  2:17 PM   Result Value Ref Range    Sodium 132 (L) 136 - 145 mmol/L    Potassium reflex Magnesium 3.8 3.5 - 5.1 mmol/L    Chloride 90 (L) 99 - 110 mmol/L    CO2 33 (H) 21 - 32 mmol/L    Anion Gap 9 3 - 16    Glucose 198 (H) 70 - 99 mg/dL    BUN 50 (H) 7 - 20 mg/dL    Creatinine 7.0 (HH) 0.8 - 1.3 mg/dL    Est, Glom Filt Rate 8 (A) >60    Calcium 8.9 8.3 - 10.6 mg/dL    Total Protein 6.1 (L) 6.4 - 8.2 g/dL    Albumin 2.7 (L) 3.4 - 5.0 g/dL    Albumin/Globulin Ratio 0.8 (L) 1.1 - 2.2    Total Bilirubin 0.3 0.0 - 1.0 mg/dL    Alkaline Phosphatase 156 (H) 40 - 129 U/L    ALT 87 (H) 10 - 40 U/L     (H) 15 - 37 U/L   Blood gas, venous    Collection Time: 11/07/22  2:17 PM   Result Value Ref Range    pH, Cj 7.225 (L) 7.350 - 7.450    pCO2, Cj 70.2 (H) 40.0 - 50.0 mmHg    pO2, Cj 116.0 (H) 25.0 - 40.0 mmHg    HCO3, Venous 29.0 23.0 - 29.0 mmol/L    Base Excess, Cj 0.5 -3.0 - 3.0 mmol/L    O2 Sat, Cj 99 Not Established %    Carboxyhemoglobin 3.5 (H) 0.0 - 1.5 %    MetHgb, Cj 0.3 <1.5 %    TC02 (Calc), Cj 70 Not Established mmol/L    O2 Content, Cj 12 Not Established VOL %    O2 Therapy Unknown    ETOH    Collection Time: 11/07/22  2:17 PM   Result Value Ref Range    Ethanol Lvl None Detected mg/dL   Troponin    Collection Time: 11/07/22  2:17 PM   Result Value Ref Range    Troponin 0.10 (H) <0.01 ng/mL   EKG 12 Lead    Collection Time: 11/07/22  2:27 PM   Result Value Ref Range    Ventricular Rate 82 BPM    Atrial Rate 82 BPM    P-R Interval 224 ms    QRS Duration 80 ms    Q-T Interval 372 ms    QTc Calculation (Bazett) 434 ms    P Axis 89 degrees    R Axis 41 degrees    T Axis 78 degrees    Diagnosis       Sinus rhythm with 1st degree A-V blockOtherwise normal ECGConfirmed by Adelaida Wren MD, Seamus Stewart ([id number]) on 11/7/2022 3:58:30 PM   Urinalysis with Reflex to Culture    Collection Time: 11/07/22  3:00 PM    Specimen: Urine   Result Value Ref Range    Color, UA Yellow Straw/Yellow    Clarity, UA TURBID (A) Clear    Glucose, Ur 500 (A) Negative mg/dL    Bilirubin Urine Negative Negative    Ketones, Urine Negative Negative mg/dL    Specific Gravity, UA 1.020 1.005 - 1.030    Blood, Urine LARGE (A) Negative    pH, UA 7.0 5.0 - 8.0    Protein, UA >=300 (A) Negative mg/dL    Urobilinogen, Urine 0.2 <2.0 E.U./dL    Nitrite, Urine Negative Negative    Leukocyte Esterase, Urine LARGE (A) Negative    Microscopic Examination YES     Urine Type NotGiven     Urine Reflex to Culture Yes    Microscopic Urinalysis    Collection Time: 11/07/22  3:00 PM   Result Value Ref Range    WBC, UA >100 (A) 0 - 5 /HPF    RBC, UA see below (A) 0 - 4 /HPF   Culture, Urine    Collection Time: 11/07/22  3:00 PM    Specimen: Urine, clean catch   Result Value Ref Range    Urine Culture, Routine No growth at 18 to 36 hours    POCT Glucose    Collection Time: 11/07/22 10:58 PM Result Value Ref Range    POC Glucose 96 70 - 99 mg/dl    Performed on ACCU-CHEK    CBC    Collection Time: 11/08/22  5:18 AM   Result Value Ref Range    WBC 13.6 (H) 4.0 - 11.0 K/uL    RBC 2.67 (L) 4.20 - 5.90 M/uL    Hemoglobin 9.6 (L) 13.5 - 17.5 g/dL    Hematocrit 29.0 (L) 40.5 - 52.5 %    .7 (H) 80.0 - 100.0 fL    MCH 36.0 (H) 26.0 - 34.0 pg    MCHC 33.1 31.0 - 36.0 g/dL    RDW 15.9 (H) 12.4 - 15.4 %    Platelets 009 980 - 048 K/uL    MPV 7.0 5.0 - 10.5 fL   Basic Metabolic Panel    Collection Time: 11/08/22  5:18 AM   Result Value Ref Range    Sodium 129 (L) 136 - 145 mmol/L    Potassium 3.1 (L) 3.5 - 5.1 mmol/L    Chloride 92 (L) 99 - 110 mmol/L    CO2 25 21 - 32 mmol/L    Anion Gap 12 3 - 16    Glucose 64 (L) 70 - 99 mg/dL    BUN 58 (H) 7 - 20 mg/dL    Creatinine 8.3 (HH) 0.8 - 1.3 mg/dL    Est, Glom Filt Rate 6 (A) >60    Calcium 8.8 8.3 - 10.6 mg/dL   Hepatitis B Surface Antigen    Collection Time: 11/08/22  5:18 AM   Result Value Ref Range    Hep B S Ag Interp Non-reactive Non-reactive   Hepatitis B Surface Antibody    Collection Time: 11/08/22  5:18 AM   Result Value Ref Range    Hep B S Ab <3.50 mIU/mL   POCT Glucose    Collection Time: 11/08/22  8:37 AM   Result Value Ref Range    POC Glucose 71 70 - 99 mg/dl    Performed on ACCU-CHEK    Blood gas, arterial    Collection Time: 11/08/22  9:20 AM   Result Value Ref Range    pH, Arterial 7.249 (L) 7.350 - 7.450    pCO2, Arterial 72.1 (HH) 35.0 - 45.0 mmHg    pO2, Arterial <30.0 (LL) 75.0 - 108.0 mmHg    HCO3, Arterial 31.5 (H) 21.0 - 29.0 mmol/L    Base Excess, Arterial 2.9 -3.0 - 3.0 mmol/L    Hemoglobin, Art, Extended 10.0 (L) 13.5 - 17.5 g/dL    O2 Sat, Arterial 37.3 (L) >92 %    Carboxyhgb, Arterial 1.4 0.0 - 1.5 %    Methemoglobin, Arterial 0.5 <1.5 %    TCO2, Arterial 75.5 Not Established mmol/L    O2 Therapy Unknown    POCT Glucose    Collection Time: 11/08/22 11:21 AM   Result Value Ref Range    POC Glucose 118 (H) 70 - 99 mg/dl Performed on ACCU-CHEK    POCT Glucose    Collection Time: 11/08/22  5:12 PM   Result Value Ref Range    POC Glucose 244 (H) 70 - 99 mg/dl    Performed on ACCU-CHEK    POCT Glucose    Collection Time: 11/08/22  8:55 PM   Result Value Ref Range    POC Glucose 370 (H) 70 - 99 mg/dl    Performed on ACCU-CHEK    CBC    Collection Time: 11/09/22  5:46 AM   Result Value Ref Range    WBC 12.5 (H) 4.0 - 11.0 K/uL    RBC 2.63 (L) 4.20 - 5.90 M/uL    Hemoglobin 9.2 (L) 13.5 - 17.5 g/dL    Hematocrit 28.1 (L) 40.5 - 52.5 %    .1 (H) 80.0 - 100.0 fL    MCH 34.9 (H) 26.0 - 34.0 pg    MCHC 32.6 31.0 - 36.0 g/dL    RDW 15.9 (H) 12.4 - 15.4 %    Platelets 150 314 - 931 K/uL    MPV 6.8 5.0 - 10.5 fL   Basic Metabolic Panel    Collection Time: 11/09/22  5:46 AM   Result Value Ref Range    Sodium 130 (L) 136 - 145 mmol/L    Potassium 3.7 3.5 - 5.1 mmol/L    Chloride 90 (L) 99 - 110 mmol/L    CO2 27 21 - 32 mmol/L    Anion Gap 13 3 - 16    Glucose 257 (H) 70 - 99 mg/dL    BUN 60 (H) 7 - 20 mg/dL    Creatinine 7.7 (HH) 0.8 - 1.3 mg/dL    Est, Glom Filt Rate 7 (A) >60    Calcium 8.5 8.3 - 10.6 mg/dL   POCT Glucose    Collection Time: 11/09/22  7:24 AM   Result Value Ref Range    POC Glucose 231 (H) 70 - 99 mg/dl    Performed on ACCU-CHEK    POCT Glucose    Collection Time: 11/09/22 12:21 PM   Result Value Ref Range    POC Glucose 303 (H) 70 - 99 mg/dl    Performed on ACCU-CHEK    POCT Glucose    Collection Time: 11/09/22  4:34 PM   Result Value Ref Range    POC Glucose 243 (H) 70 - 99 mg/dl    Performed on ACCU-CHEK    POCT Glucose    Collection Time: 11/09/22  8:41 PM   Result Value Ref Range    POC Glucose 178 (H) 70 - 99 mg/dl    Performed on ACCU-CHEK    CBC    Collection Time: 11/10/22  7:29 AM   Result Value Ref Range    WBC 14.0 (H) 4.0 - 11.0 K/uL    RBC 2.72 (L) 4.20 - 5.90 M/uL    Hemoglobin 9.6 (L) 13.5 - 17.5 g/dL    Hematocrit 28.7 (L) 40.5 - 52.5 %    .6 (H) 80.0 - 100.0 fL    MCH 35.2 (H) 26.0 - 34.0 pg MCHC 33.4 31.0 - 36.0 g/dL    RDW 15.9 (H) 12.4 - 15.4 %    Platelets 755 046 - 647 K/uL    MPV 7.5 5.0 - 10.5 fL   Basic Metabolic Panel    Collection Time: 11/10/22  7:29 AM   Result Value Ref Range    Sodium 130 (L) 136 - 145 mmol/L    Potassium 4.0 3.5 - 5.1 mmol/L    Chloride 92 (L) 99 - 110 mmol/L    CO2 22 21 - 32 mmol/L    Anion Gap 16 3 - 16    Glucose 346 (H) 70 - 99 mg/dL    BUN 58 (H) 7 - 20 mg/dL    Creatinine 7.6 (HH) 0.8 - 1.3 mg/dL    Est, Glom Filt Rate 7 (A) >60    Calcium 8.6 8.3 - 10.6 mg/dL   POCT Glucose    Collection Time: 11/10/22  8:06 AM   Result Value Ref Range    POC Glucose 345 (H) 70 - 99 mg/dl    Performed on ACCU-CHEK    POCT Glucose    Collection Time: 11/10/22 11:43 AM   Result Value Ref Range    POC Glucose 267 (H) 70 - 99 mg/dl    Performed on ACCU-CHEK    POCT Glucose    Collection Time: 11/10/22  5:48 PM   Result Value Ref Range    POC Glucose 277 (H) 70 - 99 mg/dl    Performed on ACCU-CHEK    POCT Glucose    Collection Time: 11/10/22  8:36 PM   Result Value Ref Range    POC Glucose 385 (H) 70 - 99 mg/dl    Performed on ACCU-CHEK    POCT Glucose    Collection Time: 11/11/22  8:03 AM   Result Value Ref Range    POC Glucose 279 (H) 70 - 99 mg/dl    Performed on [address] [place] [address] Problems             Last Modified POA    * (Principal) Acute on chronic respiratory failure with hypercapnia (HCC) 11/7/2022 Yes    NSTEMI (non-ST elevated myocardial infarction) ([facility] 11/7/2022 Yes    AMS (altered mental status) 11/7/2022 Yes    Acute respiratory failure with hypoxia ([facility] 11/7/2022 Yes    Hypercarbia 11/7/2022 Yes    Electrolyte imbalance 11/7/2022 Yes    Chronic hypercapnic respiratory failure ([facility] 11/7/2022 Yes    BJ treated with BiPAP 11/9/2022 Yes    Moderate episode of recurrent major depressive disorder ([facility] 11/8/2022 Yes    Overweight (BMI 25.0-29.9) 11/8/2022 Yes    History of 2019 novel coronavirus disease (COVID-19) 11/8/2022 Yes Peritoneal dialysis catheter in place [facility]) 11/8/2022 Yes    COPD with acute exacerbation ([address] 11/7/2022 Yes    Anemia in ESRD (end-stage renal disease) ([address] 11/8/2022 Yes    ESRD (end stage renal disease) ([address] 11/8/2022 Yes    Acute on chronic respiratory failure with hypoxia and hypercapnia ([address] 11/8/2022 Yes    Acute cystitis without hematuria 11/9/2022 Yes    Essential hypertension 11/8/2022 Yes    Sleep apnea 11/8/2022 Yes    Psoriatic arthropathy ([address] 11/8/2022 Yes    Coronary atherosclerosis 11/8/2022 Yes    Vitamin D deficiency 11/8/2022 Yes    Glaucoma 11/8/2022 Yes    S/P CABG x 4 11/8/2022 Yes    Overview Signed 12/9/2015 11:54 AM by Symone Castle MA     2006         ILD (interstitial lung disease) ([address] 11/8/2022 Yes    Chronic diastolic heart failure ([address] 11/8/2022 Yes    Type 2 diabetes mellitus with hyperglycemia, with long-term current use of insulin ([address] 11/8/2022 Yes    Hypertension associated with stage 5 chronic kidney disease due to type 2 diabetes mellitus ([address] 11/8/2022 Yes    Acquired hypothyroidism 11/8/2022 Yes    ESRD on peritoneal dialysis ([address] 11/8/2022 Yes      Ct bipap at baseline setting    Pulmonary has signed off    Ct PD at the [place]    Discharge order in place

## 2022-11-11 NOTE — PROGRESS NOTES
PROCEDURE:  Patient seen on peritoneal dialysis  at 3:34 PM    PHYSICIAN:  Daniel Collazo MD    INDICATION:  End-stage renal disease    Wt Readings from Last 3 Encounters:   11/11/22 194 lb 7.1 oz (88.2 kg)   10/10/22 200 lb 2.8 oz (90.8 kg)   09/19/22 222 lb 10.6 oz (101 kg)     Temp Readings from Last 3 Encounters:   11/11/22 97.8 °F (36.6 °C) (Oral)   10/10/22 98.5 °F (36.9 °C) (Oral)   09/19/22 97.6 °F (36.4 °C) (Axillary)     BP Readings from Last 3 Encounters:   11/11/22 137/63   10/10/22 133/71   09/19/22 137/65     Pulse Readings from Last 3 Encounters:   11/11/22 83   10/10/22 79   09/19/22 81      In: 590 [P.O.:590]  Out: 453      CBC:   Recent Labs     11/09/22  0546 11/10/22  0729   WBC 12.5* 14.0*   HGB 9.2* 9.6*    347     BMP:    Recent Labs     11/09/22  0546 11/10/22  0729   * 130*   K 3.7 4.0   CL 90* 92*   CO2 27 22   BUN 60* 58*   CREATININE 7.7* 7.6*   GLUCOSE 257* 346*     BMD:   Lab Results   Component Value Date    .7 (H) 04/02/2022    CALCIUM 8.6 11/10/2022    CAION 1.03 (L) 01/14/2017    PHOS 9.5 (H) 10/08/2022       Iron:     Lab Results   Component Value Date    IRON 69 09/25/2022    TIBC 185 (L) 09/25/2022    FERRITIN 855.7 (H) 09/25/2022            RX:  See dialysis flowsheet for specifics on access, blood flow rate, dialysate baths, duration of dialysis, anticoagulation and other technical information. COMMENTS:      Tolerating PD well   No SOB today     PD catheter cuff is exposed. No signs of infection   Continue gentamicin cream once a day     Awaiting placement         Daniel Collazo MD  Nephrology     [facility] Office : [facility] [address] , [address]  [place] Office: [facility]  [address] [facility] Office: [place] [id number] [facility] [address].   [facility] Office: [address] [place] [address], [address] [facility] [address]  Office : [phone]  Fax :[phone]

## 2022-11-11 NOTE — PLAN OF CARE
Problem: Skin/Tissue Integrity  Goal: Absence of new skin breakdown  Description: 1. Monitor for areas of redness and/or skin breakdown  2. Assess vascular access sites hourly  3. Every 4-6 hours minimum:  Change oxygen saturation probe site  4. Every 4-6 hours:  If on nasal continuous positive airway pressure, respiratory therapy assess nares and determine need for appliance change or resting period.   Outcome: Progressing     Problem: Discharge Planning  Goal: Discharge to home or other facility with appropriate resources  Outcome: Progressing     Problem: Pain  Goal: Verbalizes/displays adequate comfort level or baseline comfort level  Outcome: Progressing     Problem: Safety - Adult  Goal: Free from fall injury  Outcome: Progressing     Problem: ABCDS Injury Assessment  Goal: Absence of physical injury  Outcome: Progressing     Problem: Nutrition Deficit:  Goal: Optimize nutritional status  Outcome: Progressing

## 2022-11-11 NOTE — PROGRESS NOTES
Lunch tray presented and patient very lethargic. Concerned about his alertness, because he was not opening his eyes and had delayed verbal responses. However, he was oriented x4. Sat patient up high in bed and carried conversation until he awoke more. Weakness in upper extremities and he asked me to feed him. Ate 50% of meal and was done.

## 2022-11-11 NOTE — CARE COORDINATION
KATRIN spoke with patient's family and got contact information for PD provider. Provider is [facility] ([phone]). KATRIN contacted [employer], RN @ [facility]. KATRIN gave Nasra Valverde (Admission Coordinator @ [facility]) contact information. [employer] will set up PD training with [employer]. After training is complete patient can discharge to [facility]. KATRIN will follow. Electronically signed by JOE Gomez on 11/11/2022 at 11:47 AM     ADDENDUM:  PD Training set-up for Monday 11/14/2022 @ 3:00 PM.  Patient will need to be @ [facility] prior to 3:00 PM.     KATRIN will follow. Electronically signed by JOE Gomez on 11/11/2022 at 12:15 PM     ADDENDUM:    Patient scheduled to transport to [facility] on 11/14/2022 @ 12:30 PM  via [facility] ([phone]).     Electronically signed by JOE Gomez on 11/11/2022 at 12:31 PM

## 2022-11-11 NOTE — CARE COORDINATION
KATRIN spoke with Admission Director-[place] @ [address]. Mary Grace Gaitan stating that facility has contacted [facility] [address] for patient's dialysis information. Mary Grace Gaitan stating that [facility] must be trained in [facility] before patient can discharge to [facility]. KATRIN will follow.     Electronically signed by JOE Dasilva on 11/11/2022 at 8:55 AM

## 2022-11-11 NOTE — PROGRESS NOTES
[facility]   Phone: [phone]    Occupational Therapy    [] Initial Evaluation            [x] Daily Treatment Note         [] Discharge Summary      Patient: Tonie Campos   : 1945   MRN: [id number]   Date of Service:  2022    Admitting Diagnosis:  Acute on chronic respiratory failure with hypercapnia [facility])  Current Admission Summary: Tonie Campos is a 69 y.o. male with past medical history of hypertension, diabetes, coronary artery disease and CHF as well as end-stage renal disease on peritoneal dialysis and obstructive sleep apnea here today for altered mental status. The patient was transferred from his care facility for reports of increased lethargy, weakness, and possible altered mental status. The patient himself currently states he feels fine. Denies headache, cough or shortness of breath. Denies chest pain. No abdominal pain, vomiting or diarrhea. States he feels tired and sleepy but is otherwise in no discomfort. Discharged from the hospital on 10/10/2022 after presenting altered thought secondary to acute on chronic hypoxic and hypercapnic respiratory failure with volume overload. Per X-Ray on 22, pt has small left pleural effusion and or/ atelectasis. Past Medical History:  has a past medical history of Allergic rhinitis due to other allergen, Coronary atherosclerosis of unspecified type of vessel, native or graft, Diabetic eye exam ([address] Diabetic eye exam ([address] Generalized osteoarthrosis, involving multiple sites, Other psoriasis, Pneumonia, Prolonged emergence from general anesthesia, Psoriatic arthropathy ([address] Type II or unspecified type diabetes mellitus without mention of complication, not stated as uncontrolled, Unspecified essential hypertension, and Unspecified sleep apnea. Past Surgical History:  has a past surgical history that includes Coronary artery bypass graft;  Carpal tunnel release; joint replacement; Cataract removal; and LAPAROSCOPY INSERTION PERITONEAL CATHETER (N/A, 10/26/2020). Discharge Recommendations: David Matute scored a 12/24 on the AM-PAC ADL Inpatient form. Current research shows that an AM-PAC score of 17 or less is typically not associated with a discharge to the patient's home setting. Based on the patient's AM-PAC score and their current ADL deficits, it is recommended that the patient have 3-5 sessions per week of Occupational Therapy at d/c to increase the patient's independence. Please see assessment section for further patient specific details. If patient discharges prior to next session this note will serve as a discharge summary. Please see below for the latest assessment towards goals. DME Required For Discharge: DME to be determined at next level of care    Precautions/Restrictions: high fall risk, up as tolerated    Pre-Admission Information   Lives With:  Pt currently at SNF. Transfer Assistance: requires assistance, Pt reports only transferring 2x in last 2 months for sit to stand. Denies having been up to chair. Ambulation Assistance: Pt has not ambulated since September. ADL Assistance:  assist for all  IADL Assistance:  lives at [facility]  Active :        [] Yes                 [x] No  Hand Dominance: [] Left                 [x] Right  Current Employment: retired. Occupation: air craft   Hobbies: watch tv  Recent Falls: one fall at home  Comments: Per  note on 11/8/22:  Pt from [facility]. Patient needs assistance with ADLs and IADLs. Family also interested in a LTC placement.     Subjective  General: Patient supine in bed upon arrival, agreeable to therapy co-treatment session with encouragement  Pain: c/o in buttocks  Pain Interventions: pain medication in place prior to arrival     Activities of Daily Living  Basic Activities of Daily Living  Grooming: stand by assistance Increased time to complete task  Grooming Comments:  supine in bed with HOB elevated in bed for oral care and shaving  Upper Extremity Bathing: minimal assistance requires verbal cueing Increased time to complete task Comment: seated on EOB  Lower Extremity Bathing: maximum assistance   Bathing Comments: seated on EOB  Upper Extremity Dressing: moderate assistance Comment: gown change 2x  Lower Extremity Dressing: dependent Comment: donned/doffed socks, doffed briefs  Dressing Comments: seated on EOB for first UB/LB dressing, supine in bed for 2nd gown change  Toileting: dependent. Toileting Comments: incontinent of urine and stool while supine in bed, pt provided with external catheter, mepliplex and barrier cream applied  General Comments: max encouragement to remain on EOB  Instrumental Activities of Daily Living  No IADL completed on this date. Functional Mobility  Bed Mobility  Supine to Sit: 2 person assistance with mod Ax2   Sit to Supine: maximum assistance  Rolling Left: maximum assistance  Rolling Right: maximum assistance  Scooting: dependent assistance  Comments:HOB partially elevated, patient with increased time needed for bed mobility completion,patient with max verbal and physical cueing needed for initiation,  Patient with increased time needed for positioning wedge and cushion under bottom to relieve buttocks pressure. Transfers  No transfers completed on this date secondary to refusal to stand or remain seated on EOB. Functional Mobility:  Sitting Balance: contact guard assistance, moderate assistance. Sitting Balance Comment: fluctuating assistance levels, ~11 minutes during a portion of above ADL, pt educated on importance to remain upright to improve activity tolerance for ADLs of choice    Other therapeutic activity  While supine in bed, pt completed 1 set of 10x on BUE: shoulder flexion/extension and chest press. Pt required increased time and rest breaks intermittently.  Pt educated on the purpose of the therapeutic activity, verbalized understanding, and was receptive to OT education. Functional Outcomes  -PAC Inpatient Daily Activity Raw Score: 10    Cognition  Overall Cognitive Status: Impaired  Following Commands: follows one step commands with repetition, follows one step commands with increased time  Attention Span: attends with cues to redirect, difficulty dividing attention  Memory: decreased recall of recent events, decreased short term memory  Problem Solving: assistance required to generate solutions, assistance required to implement solutions, decreased awareness of errors, assistance required to correct errors made  Insights: decreased awareness of deficits  Initiation: requires cues for some  Sequencing: requires cues for some  Comments: Patient with flat affect   Orientation:    alert and oriented x 4  Command Following:   accurately follows one step commands  Education  Barriers To Learning: cognition and physical  Patient Education: patient educated on goals, OT role and benefits, plan of care, precautions, ADL adaptive strategies, energy conservation, pressure relief, discharge recommendations  Learning Assessment:  patient verbalizes understanding, would benefit from continued reinforcement  Assessment  Activity Tolerance: Patient tolerated treatment fair but is limited by pain  Impairments Requiring Therapeutic Intervention: decreased functional mobility, decreased ADL status, decreased ROM, decreased strength, decreased safety awareness, decreased cognition, decreased endurance, decreased sensation, decreased balance, decreased IADL, decreased fine motor control, increased pain, decreased posture  Prognosis: fair  Clinical Assessment: Pt presents with above deficits which are slightly below baseline. Pt would benefit from skilled OT to increased independence in ADLs to reduce caregiver burden. Pt continues to requires significant assistance for bed mobility and sitting balance.   Patient with increased pain on buttocks. Continue POC.   Safety Interventions: patient left in bed, bed alarm in place, call light within reach, patient at risk for falls, and nurse notified    Plan  Frequency: 3-5 x/per week  Current Treatment Recommendations: strengthening, ROM, balance training, functional mobility training, transfer training, endurance training, patient/caregiver education, ADL/self-care training, pain management, home exercise program, safety education, and positioning    Goals  Patient Goals: none stated    Short Term Goals:  Time Frame: upon d/c - no goals met this date  Patient will complete upper body ADL at minimal assistance -CGA for bathing/SBA for grooming this date continue to assess 11/10  Patient will complete grooming at minimal assistance, while seated on EOB   Patient will increase functional sitting balance to SUP ~10 minutes for improved ADL completion  Patient will increase [facility] ADL score = to or > than 13/24    Therapy Session Time     Individual Group Co-treatment   Time In    1033   Time Out    1143   Minutes    70        Timed Code Treatment Minutes:  70 minutes     Total Treatment Minutes:  70 minutes        Electronically Signed By: Haseeb Jacob, [address] [address], OTR/L, [id number]

## 2022-11-11 NOTE — FLOWSHEET NOTE
Disconnected from [facility] per protocol. Effluent: clear  Total time: 8 hr 16 min   Total UF:  38 ml. Total Volume:  9996 ml. Dwell time gained:  0 hr 22 min.   Pt Tolerated procedure: well       11/11/22 0830   Vitals   BP (!) 163/82   Temp 97.2 °F (36.2 °C)   Temp Source Oral   Resp 17   Weight 194 lb 7.1 oz (88.2 kg)   Peritoneal Dialysis Catheter   Placement Date/Time: 10/26/20 0748   Inserted by: Dr Samantha Peña Size (fr): (c)    Status Deaccessed   Site Condition Clean, dry, intact   Cycler   Verification of Prescription CCPD   Ultrafiltration (UF) (mL) 38 mL

## 2022-11-11 NOTE — PLAN OF CARE
Problem: Skin/Tissue Integrity  Goal: Absence of new skin breakdown  Description: 1. Monitor for areas of redness and/or skin breakdown  2. Assess vascular access sites hourly  3. Every 4-6 hours minimum:  Change oxygen saturation probe site  4. Every 4-6 hours:  If on nasal continuous positive airway pressure, respiratory therapy assess nares and determine need for appliance change or resting period. 11/11/2022 0947 by Meghan Millan RN  Outcome: Progressing     Problem: Pain  Goal: Verbalizes/displays adequate comfort level or baseline comfort level  11/11/2022 0947 by Meghan Millan RN  Outcome: Progressing  PRN pain medications given.    Problem: Safety - Adult  Goal: Free from fall injury  11/11/2022 0947 by Meghan Millan RN  Outcome: Progressing  Flowsheets (Taken 11/11/2022 0945)  Free From Fall Injury:   Instruct family/caregiver on patient safety   Based on caregiver fall risk screen, instruct family/caregiver to ask for assistance with transferring infant if caregiver noted to have fall risk factors     Problem: ABCDS Injury Assessment  Goal: Absence of physical injury  11/11/2022 0947 by Meghan Millan RN  Outcome: Progressing  Flowsheets (Taken 11/11/2022 0945)  Absence of Physical Injury: Implement safety measures based on patient assessment     Problem: Nutrition Deficit:  Goal: Optimize nutritional status  11/11/2022 0947 by Meghan Millan RN  Outcome: Progressing     Problem: Discharge Planning  Goal: Discharge to home or other facility with appropriate resources  11/11/2022 0947 by Meghan Millan RN  Outcome: Progressing  Flowsheets (Taken 11/11/2022 0800)  Discharge to home or other facility with appropriate resources:   Identify barriers to discharge with patient and caregiver   Arrange for needed discharge resources and transportation as appropriate   Identify discharge learning needs (meds, wound care, etc)   Arrange for interpreters to assist at discharge as needed   Refer to discharge planning if patient needs post-hospital services based on physician order or complex needs related to functional status, cognitive ability or social support system

## 2022-11-11 NOTE — PROGRESS NOTES
Assessment complete. VS obtained. BP remains elevated with SBP upto 163 mmHg. Pt is A&O x 4. Complains of pain & soreness in right side of hip. PRN Tylenol administered along with morning medications, see MAR for more details. Pt currently on 3L of oxygen via NC (Bipap at night). Respirations normal/unlabored w dyspnea at exertion. Lung sounds clear & diminished. NSR. Abdomen soft w active bowel sounds. Peripheral pulses palpable. Pt denies any needs at this time. Standard safety precautions in place. Call light within reach. Will continue to monitor.

## 2022-11-12 LAB
ALBUMIN SERPL-MCNC: 2.2 G/DL (ref 3.4–5)
ANION GAP SERPL CALCULATED.3IONS-SCNC: 14 MMOL/L (ref 3–16)
BUN BLDV-MCNC: 53 MG/DL (ref 7–20)
CALCIUM SERPL-MCNC: 8.3 MG/DL (ref 8.3–10.6)
CHLORIDE BLD-SCNC: 91 MMOL/L (ref 99–110)
CO2: 26 MMOL/L (ref 21–32)
CREAT SERPL-MCNC: 7.8 MG/DL (ref 0.8–1.3)
GFR SERPL CREATININE-BSD FRML MDRD: 7 ML/MIN/{1.73_M2}
GLUCOSE BLD-MCNC: 150 MG/DL (ref 70–99)
GLUCOSE BLD-MCNC: 160 MG/DL (ref 70–99)
GLUCOSE BLD-MCNC: 253 MG/DL (ref 70–99)
GLUCOSE BLD-MCNC: 312 MG/DL (ref 70–99)
GLUCOSE BLD-MCNC: 329 MG/DL (ref 70–99)
HCT VFR BLD CALC: 26.8 % (ref 40.5–52.5)
HEMOGLOBIN: 9.1 G/DL (ref 13.5–17.5)
MCH RBC QN AUTO: 35.8 PG (ref 26–34)
MCHC RBC AUTO-ENTMCNC: 34.1 G/DL (ref 31–36)
MCV RBC AUTO: 105.2 FL (ref 80–100)
PDW BLD-RTO: 15.8 % (ref 12.4–15.4)
PERFORMED ON: ABNORMAL
PHOSPHORUS: 6.3 MG/DL (ref 2.5–4.9)
PLATELET # BLD: 440 K/UL (ref 135–450)
PMV BLD AUTO: 7 FL (ref 5–10.5)
POTASSIUM SERPL-SCNC: 3.5 MMOL/L (ref 3.5–5.1)
RBC # BLD: 2.54 M/UL (ref 4.2–5.9)
SODIUM BLD-SCNC: 131 MMOL/L (ref 136–145)
WBC # BLD: 14 K/UL (ref 4–11)

## 2022-11-12 PROCEDURE — 2700000000 HC OXYGEN THERAPY PER DAY

## 2022-11-12 PROCEDURE — 36415 COLL VENOUS BLD VENIPUNCTURE: CPT

## 2022-11-12 PROCEDURE — 94640 AIRWAY INHALATION TREATMENT: CPT

## 2022-11-12 PROCEDURE — 94660 CPAP INITIATION&MGMT: CPT

## 2022-11-12 PROCEDURE — 80069 RENAL FUNCTION PANEL: CPT

## 2022-11-12 PROCEDURE — 1200000000 HC SEMI PRIVATE

## 2022-11-12 PROCEDURE — 94761 N-INVAS EAR/PLS OXIMETRY MLT: CPT

## 2022-11-12 PROCEDURE — 6360000002 HC RX W HCPCS: Performed by: INTERNAL MEDICINE

## 2022-11-12 PROCEDURE — 99233 SBSQ HOSP IP/OBS HIGH 50: CPT | Performed by: INTERNAL MEDICINE

## 2022-11-12 PROCEDURE — 85027 COMPLETE CBC AUTOMATED: CPT

## 2022-11-12 PROCEDURE — 6370000000 HC RX 637 (ALT 250 FOR IP): Performed by: INTERNAL MEDICINE

## 2022-11-12 PROCEDURE — 90945 DIALYSIS ONE EVALUATION: CPT

## 2022-11-12 PROCEDURE — 2500000003 HC RX 250 WO HCPCS: Performed by: INTERNAL MEDICINE

## 2022-11-12 RX ORDER — GENTAMICIN SULFATE 1 MG/G
CREAM TOPICAL DAILY
Status: DISCONTINUED | OUTPATIENT
Start: 2022-11-12 | End: 2022-11-16 | Stop reason: HOSPADM

## 2022-11-12 RX ORDER — CALCIUM ACETATE 667 MG/1
1 CAPSULE ORAL
Status: DISCONTINUED | OUTPATIENT
Start: 2022-11-12 | End: 2022-11-16 | Stop reason: HOSPADM

## 2022-11-12 RX ADMIN — HYDROCODONE BITARTRATE AND ACETAMINOPHEN 1 TABLET: 5; 325 TABLET ORAL at 12:19

## 2022-11-12 RX ADMIN — CALCIUM ACETATE 667 MG: 667 CAPSULE ORAL at 07:49

## 2022-11-12 RX ADMIN — LEVOTHYROXINE SODIUM 50 MCG: 0.03 TABLET ORAL at 06:54

## 2022-11-12 RX ADMIN — Medication 1 CAPSULE: at 17:35

## 2022-11-12 RX ADMIN — CALCIUM ACETATE 667 MG: 667 CAPSULE ORAL at 12:19

## 2022-11-12 RX ADMIN — TORSEMIDE 100 MG: 100 TABLET ORAL at 07:49

## 2022-11-12 RX ADMIN — HEPARIN SODIUM 5000 UNITS: 5000 INJECTION INTRAVENOUS; SUBCUTANEOUS at 09:37

## 2022-11-12 RX ADMIN — HYDROCODONE BITARTRATE AND ACETAMINOPHEN 1 TABLET: 5; 325 TABLET ORAL at 07:55

## 2022-11-12 RX ADMIN — INSULIN LISPRO 4 UNITS: 100 INJECTION, SOLUTION INTRAVENOUS; SUBCUTANEOUS at 08:00

## 2022-11-12 RX ADMIN — HEPARIN SODIUM 5000 UNITS: 5000 INJECTION INTRAVENOUS; SUBCUTANEOUS at 17:36

## 2022-11-12 RX ADMIN — Medication 2 PUFF: at 20:08

## 2022-11-12 RX ADMIN — ATORVASTATIN CALCIUM 80 MG: 80 TABLET, FILM COATED ORAL at 07:46

## 2022-11-12 RX ADMIN — INSULIN LISPRO 4 UNITS: 100 INJECTION, SOLUTION INTRAVENOUS; SUBCUTANEOUS at 21:33

## 2022-11-12 RX ADMIN — INSULIN GLARGINE 50 UNITS: 100 INJECTION, SOLUTION SUBCUTANEOUS at 21:33

## 2022-11-12 RX ADMIN — Medication 2 PUFF: at 09:13

## 2022-11-12 RX ADMIN — GENTAMICIN SULFATE: 1 CREAM TOPICAL at 16:58

## 2022-11-12 RX ADMIN — Medication 1 CAPSULE: at 07:49

## 2022-11-12 RX ADMIN — HEPARIN SODIUM 5000 UNITS: 5000 INJECTION INTRAVENOUS; SUBCUTANEOUS at 02:24

## 2022-11-12 RX ADMIN — ASPIRIN 81 MG 81 MG: 81 TABLET ORAL at 07:49

## 2022-11-12 RX ADMIN — CEFUROXIME AXETIL 250 MG: 250 TABLET ORAL at 09:00

## 2022-11-12 RX ADMIN — CALCIUM ACETATE 667 MG: 667 CAPSULE ORAL at 17:35

## 2022-11-12 ASSESSMENT — PAIN SCALES - GENERAL
PAINLEVEL_OUTOF10: 4
PAINLEVEL_OUTOF10: 10
PAINLEVEL_OUTOF10: 0
PAINLEVEL_OUTOF10: 9
PAINLEVEL_OUTOF10: 0
PAINLEVEL_OUTOF10: 4

## 2022-11-12 ASSESSMENT — PAIN DESCRIPTION - FREQUENCY: FREQUENCY: CONTINUOUS

## 2022-11-12 ASSESSMENT — PAIN DESCRIPTION - DESCRIPTORS
DESCRIPTORS: ACHING
DESCRIPTORS: ACHING

## 2022-11-12 ASSESSMENT — PAIN DESCRIPTION - PAIN TYPE: TYPE: ACUTE PAIN

## 2022-11-12 ASSESSMENT — PAIN DESCRIPTION - LOCATION
LOCATION: BUTTOCKS
LOCATION: BACK;BUTTOCKS

## 2022-11-12 ASSESSMENT — PAIN DESCRIPTION - ONSET: ONSET: ON-GOING

## 2022-11-12 NOTE — PROGRESS NOTES
Office : [phone]     Fax :[phone]       Nephrology  progress Note      Patient's Name: Ryan Baum  12:19 PM  11/12/2022    Reason for Consult:  ESRD management        Requesting Physician:  Silvia Frances MD      Chief Complaint:    Chief Complaint   Patient presents with    Altered Mental Status     Pt transported from [facility] by [facility]. EMT reports Pt has new onset of confusion that started this morning according to facility staff. Pt had PD last night and has not been acting the same since. Vitals WNL, on 3l of O2, , per EMT. Pt alertx4. Slow to respond. History of Present Ilness:    Ryan Baum is a 69 y.o. male with prior history of ESRD, respiratory insufficieny with CO2 retention, HTN , Dm 2 was sent for AMS. Admitted to ICU. Has CO2 retention o ABG . placed on BiPAP. Has h/o CO2 retention previous admission. Interval hx    Tolerating PD well   No SOB   No edema       I/O last 3 completed shifts:   In: 360 [P.O.:360]  Out: 1 [Urine:380]    Past Medical History:   Diagnosis Date    Allergic rhinitis due to other allergen 7/12/2010    Coronary atherosclerosis of unspecified type of vessel, native or graft 7/12/2010    Diabetic eye exam ([address] 04/29/2015    Diabetic eye exam ([address] 5/5/2016    Supply eye    Generalized osteoarthrosis, involving multiple sites 7/12/2010    Other psoriasis 7/12/2010    Pneumonia     Prolonged emergence from general anesthesia     Psoriatic arthropathy ([address] 7/12/2010    Type II or unspecified type diabetes mellitus without mention of complication, not stated as uncontrolled 7/12/2010    Unspecified essential hypertension 7/12/2010    Unspecified sleep apnea 7/12/2010       Past Surgical History:   Procedure Laterality Date    CARPAL TUNNEL RELEASE      s/p    CATARACT REMOVAL      CORONARY ARTERY BYPASS GRAFT      JOINT REPLACEMENT      LAPAROSCOPY INSERTION PERITONEAL CATHETER N/A 10/26/2020    LAPAROSCOPIC PERITONEAL DIALYSIS CATHETER PLACEMENT; LAPAROSCOPIC OMENTOPEXY performed by Barbie Chahal DO at [address] OR       Family History   Problem Relation Age of Onset    Diabetes Mother     Heart Failure Mother     Coronary Art Dis Father         reports that he quit smoking about 42 years ago. He started smoking about 60 years ago. He has a 18.00 pack-year smoking history. He has never used smokeless tobacco. He reports that he does not drink alcohol and does not use drugs.         Allergies:  Penicillins, Sulfa antibiotics, Tazorac [tazarotene], and Clindamycin/lincomycin    Current Medications:    calcium acetate (PHOSLO) capsule 667 mg, TID WC  HYDROcodone-acetaminophen (NORCO) 5-325 MG per tablet 1 tablet, Q4H PRN  gentamicin (GARAMYCIN) 0.1 % cream, Daily  insulin lispro (HUMALOG) injection vial 0-8 Units, TID WC  insulin lispro (HUMALOG) injection vial 0-4 Units, Nightly  cefUROXime (CEFTIN) tablet 250 mg, Daily  acetaminophen (TYLENOL) tablet 650 mg, Q4H PRN  albuterol (PROVENTIL) nebulizer solution 2.5 mg, Q4H PRN  aspirin chewable tablet 81 mg, Daily  atorvastatin (LIPITOR) tablet 80 mg, Daily  mometasone-formoterol (DULERA) 200-5 MCG/ACT inhaler 2 puff, BID  buPROPion (WELLBUTRIN) tablet 100 mg, Every Other Day  insulin glargine (LANTUS) injection vial 50 Units, Nightly  lactobacillus (CULTURELLE) capsule 1 capsule, BID WC  levothyroxine (SYNTHROID) tablet 50 mcg, Daily  midodrine (PROAMATINE) tablet 5 mg, TID PRN  torsemide (DEMADEX) tablet 100 mg, Daily  [START ON 12/1/2022] vitamin D (ERGOCALCIFEROL) capsule 50,000 Units, Q3 Months  heparin (porcine) injection 5,000 Units, 3 times per day  dextrose bolus 10% 125 mL, PRN   Or  dextrose bolus 10% 250 mL, PRN  glucagon (rDNA) injection 1 mg, PRN  dextrose 10 % infusion, Continuous PRN      Review of Systems:   14 point ROS obtained but were negative except mentioned in HPI      Physical exam:     Vitals:  BP (!) 145/62   Pulse 85   Temp 96.8 °F (36 °C) (Temporal)   Resp 20   Ht 5' 10\" (1.778 m)   Wt 196 lb 6.9 oz (89.1 kg)   SpO2 100%   BMI 28.18 kg/m²   Constitutional:  OAA X3 NAD  Skin: no rash, turgor wnl  Heent:  eomi, mmm  Neck: no bruits or jvd noted  Cardiovascular:  S1, S2 without m/r/g  Respiratory: CTA B without w/r/r  Abdomen:  +bs, soft, nt, nd  Ext: no  lower extremity edema  Psychiatric: mood and affect appropriate  Musculoskeletal:  Rom, muscular strength intact    Labs:  CBC:   Recent Labs     11/10/22  0729   WBC 14.0*   HGB 9.6*        BMP:    Recent Labs     11/10/22  0729 11/11/22  2116 11/12/22  0527   * 132* 131*   K 4.0 3.8 3.5   CL 92* 91* 91*   CO2 22 24 26   BUN 58* 60* 53*   CREATININE 7.6* 8.1* 7.8*   GLUCOSE 346* 230* 312*     Ca/Mg/Phos:   Recent Labs     11/10/22  0729 11/11/22  2116 11/12/22  0527   CALCIUM 8.6 8.2* 8.3   PHOS  --  7.2* 6.3*     Hepatic:   No results for input(s): AST, ALT, ALB, BILITOT, ALKPHOS in the last 72 hours. Troponin:   No results for input(s): TROPONINI in the last 72 hours. BNP: No results for input(s): BNP in the last 72 hours. Lipids: No results for input(s): CHOL, TRIG, HDL, LDLCALC, LABVLDL in the last 72 hours. ABGs:   No results for input(s): PHART, PO2ART, MXS2UEZ in the last 72 hours. INR: No results for input(s): INR in the last 72 hours. UA:  No results for input(s): [place], GLUCOSEU, BILIRUBINUR, KETUA, SPECGRAV, BLOODU, PHUR, PROTEINU, UROBILINOGEN, NITRU, LEUKOCYTESUR, [place] in the last 72 hours. Urine Microscopic:   No results for input(s): LABCAST, BACTERIA, COMU, HYALCAST, WBCUA, RBCUA, EPIU in the last 72 hours. Urine Culture:   No results for input(s): LABURIN in the last 72 hours.     Urine Chemistry: No results for input(s): CLUR, GRICEL Amaya in the last 72 hours. IMAGING:  XR CHEST PORTABLE   Final Result   Low lung volumes, which somewhat limit evaluation. Small left pleural effusion and/or basilar atelectasis. Stable cardiomegaly. Assessment/Plan :      1. ESRD . On PD  Will start PD. Use 2.5 %   2500 ml   4 exchanges     2. HTN. Continue BP meds     3. Anemia in ESRD   Give erythropoetin      4. Acid- base disorder. Correct with HD     5. Hypokalemia.  Replace potassium as needed       Continue same PD prescription today   Awaiting placement       D/w primary team      Thank you for allowing us to participate in care of Denisha Salgado         Electronically signed by: Shon Ann MD, 11/12/2022, 12:19 PM      [facility] [address]  Office : [phone]  Fax :[phone]

## 2022-11-12 NOTE — FLOWSHEET NOTE
11/12/22 1651   Vital Signs   BP (!) 159/68   Temp 98.5 °F (36.9 °C)   Heart Rate 88   Resp 20     Connected to Cycler per protocol. CCPD order: (2) 2.5% Delflex 5 L bags                       Total Volume: 10 L                       Therapy Time Duration (Hours): 08:00                        Exchange Volume: 2.5 L                        Number of Exchanges: 4                        No Final Fill    Dwell Time: 01:20  Fill Time: 00:15  Drain Time: 00:25    Initial drain bypassed,12 ml    Effluent clear without fibrin. Exit site care done per protocol. Site with redness, crusty drainage. Garamycin . 1% cream to site. DSD/occlusive to site.      Report given to Lu Vines RN

## 2022-11-12 NOTE — FLOWSHEET NOTE
11/12/22 0548   Vital Signs   BP (!) 166/66   Temp 97.2 °F (36.2 °C)   Heart Rate 80   Resp 18   Weight 196 lb 6.9 oz (89.1 kg)   Weight Method Actual;Bed scale     Disconnected from CCPD per protocol. Effluent: clear yellow without fibrin    Total time: 08:13  Total UF:  472 ml. Total Volume:  9930 ml. Dwell time gained: 00:22    Pt Tolerated procedure: Without difficulty.     Report given to: Jo Brown RN  Last BM: 11-

## 2022-11-12 NOTE — PROGRESS NOTES
Department of Internal Medicine  General Internal Medicine   Progress Note      SUBJECTIVE: breathing easier , no somnolence or confusion     History obtained from chart review, the patient, and nursing staff   General ROS: positive for  - fatigue, malaise, and weight gain  negative for - chills, fever, or night sweats  Psychological ROS: positive for - anxiety and disorientation  negative for - depression, hallucinations, hostility, or memory difficulties  Ophthalmic ROS: negative  Respiratory ROS: positive for - cough, shortness of breath, and tachypnea  negative for - hemoptysis, stridor, or wheezing  Cardiovascular ROS: positive for - dyspnea on exertion and shortness of breath  negative for - edema, loss of consciousness, orthopnea, or palpitations  Gastrointestinal ROS: no abdominal pain, change in bowel habits, or black or bloody stools  Genito-Urinary ROS: no dysuria, trouble voiding, or hematuria  Musculoskeletal ROS: chronic pain   Neurological ROS: no TIA or stroke symptoms  negative  Dermatological ROS: negative    OBJECTIVE      Medications      Current Facility-Administered Medications: calcium acetate (PHOSLO) capsule 667 mg, 1 capsule, Oral, TID WC  HYDROcodone-acetaminophen (NORCO) 5-325 MG per tablet 1 tablet, 1 tablet, Oral, Q4H PRN  gentamicin (GARAMYCIN) 0.1 % cream, , Topical, Daily  insulin lispro (HUMALOG) injection vial 0-8 Units, 0-8 Units, SubCUTAneous, TID WC  insulin lispro (HUMALOG) injection vial 0-4 Units, 0-4 Units, SubCUTAneous, Nightly  cefUROXime (CEFTIN) tablet 250 mg, 250 mg, Oral, Daily  acetaminophen (TYLENOL) tablet 650 mg, 650 mg, Oral, Q4H PRN  albuterol (PROVENTIL) nebulizer solution 2.5 mg, 2.5 mg, Nebulization, Q4H PRN  aspirin chewable tablet 81 mg, 81 mg, Oral, Daily  atorvastatin (LIPITOR) tablet 80 mg, 80 mg, Oral, Daily  mometasone-formoterol (DULERA) 200-5 MCG/ACT inhaler 2 puff, 2 puff, Inhalation, BID  buPROPion (WELLBUTRIN) tablet 100 mg, 100 mg, Oral, Every Other Day  insulin glargine (LANTUS) injection vial 50 Units, 50 Units, SubCUTAneous, Nightly  lactobacillus (CULTURELLE) capsule 1 capsule, 1 capsule, Oral, BID WC  levothyroxine (SYNTHROID) tablet 50 mcg, 50 mcg, Oral, Daily  midodrine (PROAMATINE) tablet 5 mg, 5 mg, Oral, TID PRN  torsemide (DEMADEX) tablet 100 mg, 100 mg, Oral, Daily  [START ON 2022] vitamin D (ERGOCALCIFEROL) capsule 50,000 Units, 50,000 Units, Oral, Q3 Months  heparin (porcine) injection 5,000 Units, 5,000 Units, SubCUTAneous, 3 times per day  dextrose bolus 10% 125 mL, 125 mL, IntraVENous, PRN **OR** dextrose bolus 10% 250 mL, 250 mL, IntraVENous, PRN  glucagon (rDNA) injection 1 mg, 1 mg, SubCUTAneous, PRN  dextrose 10 % infusion, , IntraVENous, Continuous PRN    Physical      Vitals: BP (!) 145/62   Pulse 85   Temp 96.8 °F (36 °C) (Temporal)   Resp 20   Ht 5' 10\" (1.778 m)   Wt 196 lb 6.9 oz (89.1 kg)   SpO2 100%   BMI 28.18 kg/m²   Temp: Temp: 96.8 °F (36 °C)  Max: Temp  Av.5 °F (36.4 °C)  Min: 96.6 °F (35.9 °C)  Max: 98.7 °F (37.1 °C)  Respiration range:  Resp  Av.1  Min: 16  Max: 20  Pulse Range:  Pulse  Av.2  Min: 74  Max: 96  Blood pressure range:  Systolic (19TBP), NFK:349 , Min:124 , OPO:448   , Diastolic (66OPF), PMK:66, Min:53, Max:73    SpO2  Av.5 %  Min: 98 %  Max: 100 %    Intake/Output Summary (Last 24 hours) at 2022 1613  Last data filed at 2022 0931  Gross per 24 hour   Intake 490 ml   Output 380 ml   Net 110 ml       Vent settings:  Pulse  Av.3  Min: 55  Max: 102  Resp  Av.3  Min: 6  Max: 37  SpO2  Av.2 %  Min: 85 %  Max: 100 %    CONSTITUTIONAL:  awake  EYES:  Lids and lashes normal, pupils equal, round and reactive to light, extra ocular muscles intact, sclera clear, conjunctiva normal  NECK:  mild JVD   BACK:  Symmetric, no curvature, spinous processes are non-tender on palpation, paraspinous muscles are non-tender on palpation, no costal vertebral tenderness  LUNGS:  tachypneic, Moderate respiratory distress, moderate air exchange, no retractions, and crackles diffuse, wheeze diffuse  CARDIOVASCULAR:  Normal apical impulse, regular rate and rhythm, normal S1 and S2, no S3 or S4, and no murmur noted  ABDOMEN:  soft BS + non tender   MUSCULOSKELETAL:  trace edema   NEUROLOGIC:  grossly intact   SKIN:  warm dry and pale  and no bruising or bleeding    Data      Recent Results (from the past 96 hour(s))   POCT Glucose    Collection Time: 11/08/22  5:12 PM   Result Value Ref Range    POC Glucose 244 (H) 70 - 99 mg/dl    Performed on ACCU-CHEK    POCT Glucose    Collection Time: 11/08/22  8:55 PM   Result Value Ref Range    POC Glucose 370 (H) 70 - 99 mg/dl    Performed on ACCU-CHEK    CBC    Collection Time: 11/09/22  5:46 AM   Result Value Ref Range    WBC 12.5 (H) 4.0 - 11.0 K/uL    RBC 2.63 (L) 4.20 - 5.90 M/uL    Hemoglobin 9.2 (L) 13.5 - 17.5 g/dL    Hematocrit 28.1 (L) 40.5 - 52.5 %    .1 (H) 80.0 - 100.0 fL    MCH 34.9 (H) 26.0 - 34.0 pg    MCHC 32.6 31.0 - 36.0 g/dL    RDW 15.9 (H) 12.4 - 15.4 %    Platelets 365 093 - 409 K/uL    MPV 6.8 5.0 - 10.5 fL   Basic Metabolic Panel    Collection Time: 11/09/22  5:46 AM   Result Value Ref Range    Sodium 130 (L) 136 - 145 mmol/L    Potassium 3.7 3.5 - 5.1 mmol/L    Chloride 90 (L) 99 - 110 mmol/L    CO2 27 21 - 32 mmol/L    Anion Gap 13 3 - 16    Glucose 257 (H) 70 - 99 mg/dL    BUN 60 (H) 7 - 20 mg/dL    Creatinine 7.7 (HH) 0.8 - 1.3 mg/dL    Est, Glom Filt Rate 7 (A) >60    Calcium 8.5 8.3 - 10.6 mg/dL   POCT Glucose    Collection Time: 11/09/22  7:24 AM   Result Value Ref Range    POC Glucose 231 (H) 70 - 99 mg/dl    Performed on ACCU-CHEK    POCT Glucose    Collection Time: 11/09/22 12:21 PM   Result Value Ref Range    POC Glucose 303 (H) 70 - 99 mg/dl    Performed on ACCU-CHEK    POCT Glucose    Collection Time: 11/09/22  4:34 PM   Result Value Ref Range    POC Glucose 243 (H) 70 - 99 mg/dl Performed on ACCU-CHEK    POCT Glucose    Collection Time: 11/09/22  8:41 PM   Result Value Ref Range    POC Glucose 178 (H) 70 - 99 mg/dl    Performed on ACCU-CHEK    CBC    Collection Time: 11/10/22  7:29 AM   Result Value Ref Range    WBC 14.0 (H) 4.0 - 11.0 K/uL    RBC 2.72 (L) 4.20 - 5.90 M/uL    Hemoglobin 9.6 (L) 13.5 - 17.5 g/dL    Hematocrit 28.7 (L) 40.5 - 52.5 %    .6 (H) 80.0 - 100.0 fL    MCH 35.2 (H) 26.0 - 34.0 pg    MCHC 33.4 31.0 - 36.0 g/dL    RDW 15.9 (H) 12.4 - 15.4 %    Platelets 850 148 - 689 K/uL    MPV 7.5 5.0 - 10.5 fL   Basic Metabolic Panel    Collection Time: 11/10/22  7:29 AM   Result Value Ref Range    Sodium 130 (L) 136 - 145 mmol/L    Potassium 4.0 3.5 - 5.1 mmol/L    Chloride 92 (L) 99 - 110 mmol/L    CO2 22 21 - 32 mmol/L    Anion Gap 16 3 - 16    Glucose 346 (H) 70 - 99 mg/dL    BUN 58 (H) 7 - 20 mg/dL    Creatinine 7.6 (HH) 0.8 - 1.3 mg/dL    Est, Glom Filt Rate 7 (A) >60    Calcium 8.6 8.3 - 10.6 mg/dL   POCT Glucose    Collection Time: 11/10/22  8:06 AM   Result Value Ref Range    POC Glucose 345 (H) 70 - 99 mg/dl    Performed on ACCU-CHEK    POCT Glucose    Collection Time: 11/10/22 11:43 AM   Result Value Ref Range    POC Glucose 267 (H) 70 - 99 mg/dl    Performed on ACCU-CHEK    POCT Glucose    Collection Time: 11/10/22  5:48 PM   Result Value Ref Range    POC Glucose 277 (H) 70 - 99 mg/dl    Performed on ACCU-CHEK    POCT Glucose    Collection Time: 11/10/22  8:36 PM   Result Value Ref Range    POC Glucose 385 (H) 70 - 99 mg/dl    Performed on ACCU-CHEK    POCT Glucose    Collection Time: 11/11/22  8:03 AM   Result Value Ref Range    POC Glucose 279 (H) 70 - 99 mg/dl    Performed on ACCU-CHEK    POCT Glucose    Collection Time: 11/11/22 11:40 AM   Result Value Ref Range    POC Glucose 230 (H) 70 - 99 mg/dl    Performed on ACCU-CHEK    POCT Glucose    Collection Time: 11/11/22  4:38 PM   Result Value Ref Range    POC Glucose 194 (H) 70 - 99 mg/dl    Performed on ACCU-CHEK    POCT Glucose    Collection Time: 11/11/22  7:08 PM   Result Value Ref Range    POC Glucose 203 (H) 70 - 99 mg/dl    Performed on ACCU-CHEK    Renal Function Panel    Collection Time: 11/11/22  9:16 PM   Result Value Ref Range    Sodium 132 (L) 136 - 145 mmol/L    Potassium 3.8 3.5 - 5.1 mmol/L    Chloride 91 (L) 99 - 110 mmol/L    CO2 24 21 - 32 mmol/L    Anion Gap 17 (H) 3 - 16    Glucose 230 (H) 70 - 99 mg/dL    BUN 60 (H) 7 - 20 mg/dL    Creatinine 8.1 (HH) 0.8 - 1.3 mg/dL    Est, Glom Filt Rate 6 (A) >60    Calcium 8.2 (L) 8.3 - 10.6 mg/dL    Phosphorus 7.2 (H) 2.5 - 4.9 mg/dL    Albumin 1.6 (L) 3.4 - 5.0 g/dL   Renal Function Panel    Collection Time: 11/12/22  5:27 AM   Result Value Ref Range    Sodium 131 (L) 136 - 145 mmol/L    Potassium 3.5 3.5 - 5.1 mmol/L    Chloride 91 (L) 99 - 110 mmol/L    CO2 26 21 - 32 mmol/L    Anion Gap 14 3 - 16    Glucose 312 (H) 70 - 99 mg/dL    BUN 53 (H) 7 - 20 mg/dL    Creatinine 7.8 (HH) 0.8 - 1.3 mg/dL    Est, Glom Filt Rate 7 (A) >60    Calcium 8.3 8.3 - 10.6 mg/dL    Phosphorus 6.3 (H) 2.5 - 4.9 mg/dL    Albumin 2.2 (L) 3.4 - 5.0 g/dL   POCT Glucose    Collection Time: 11/12/22  7:42 AM   Result Value Ref Range    POC Glucose 253 (H) 70 - 99 mg/dl    Performed on ACCU-CHEK    POCT Glucose    Collection Time: 11/12/22 11:42 AM   Result Value Ref Range    POC Glucose 150 (H) 70 - 99 mg/dl    Performed on ACCU-CHEK    CBC    Collection Time: 11/12/22 12:19 PM   Result Value Ref Range    WBC 14.0 (H) 4.0 - 11.0 K/uL    RBC 2.54 (L) 4.20 - 5.90 M/uL    Hemoglobin 9.1 (L) 13.5 - 17.5 g/dL    Hematocrit 26.8 (L) 40.5 - 52.5 %    .2 (H) 80.0 - 100.0 fL    MCH 35.8 (H) 26.0 - 34.0 pg    MCHC 34.1 31.0 - 36.0 g/dL    RDW 15.8 (H) 12.4 - 15.4 %    Platelets 776 324 - 403 K/uL    MPV 7.0 5.0 - 10.5 fL       ASSESSMENT AND PLAN     Hospital Problems             Last Modified POA    * (Principal) Acute on chronic respiratory failure with hypercapnia (HCC) 11/7/2022 Yes    NSTEMI (non-ST elevated myocardial infarction) ([address] 11/7/2022 Yes    AMS (altered mental status) 11/7/2022 Yes    Acute respiratory failure with hypoxia ([address] 11/7/2022 Yes    Hypercarbia 11/7/2022 Yes    Electrolyte imbalance 11/7/2022 Yes    Chronic hypercapnic respiratory failure ([address] 11/7/2022 Yes    BJ treated with BiPAP 11/9/2022 Yes    Moderate episode of recurrent major depressive disorder ([address] 11/8/2022 Yes    Overweight (BMI 25.0-29.9) 11/8/2022 Yes    History of 2019 novel coronavirus disease (COVID-19) 11/8/2022 Yes    Peritoneal dialysis catheter in place [facility]) 11/8/2022 Yes    COPD with acute exacerbation ([address] 11/7/2022 Yes    Anemia in ESRD (end-stage renal disease) ([address] 11/8/2022 Yes    ESRD (end stage renal disease) ([address] 11/8/2022 Yes    Acute on chronic respiratory failure with hypoxia and hypercapnia ([address] 11/8/2022 Yes    Acute cystitis without hematuria 11/9/2022 Yes    Essential hypertension 11/8/2022 Yes    Sleep apnea 11/8/2022 Yes    Psoriatic arthropathy ([address] 11/8/2022 Yes    Coronary atherosclerosis 11/8/2022 Yes    Vitamin D deficiency 11/8/2022 Yes    Glaucoma 11/8/2022 Yes    S/P CABG x 4 11/8/2022 Yes    Overview Signed 12/9/2015 11:54 AM by Pamela Sinclair MA     2006         ILD (interstitial lung disease) ([address] 11/8/2022 Yes    Chronic diastolic heart failure ([address] 11/8/2022 Yes    Type 2 diabetes mellitus with hyperglycemia, with long-term current use of insulin ([address] 11/8/2022 Yes    Hypertension associated with stage 5 chronic kidney disease due to type 2 diabetes mellitus ([address] 11/8/2022 Yes    Acquired hypothyroidism 11/8/2022 Yes    ESRD on peritoneal dialysis ([address] 11/8/2022 Yes      Ct present medical management of multiple medical comorbidities .  He is awaiting SNF arrangements that is compatible with his Peritoneal dialysis

## 2022-11-12 NOTE — RT PROTOCOL NOTE
RT Inhaler-Nebulizer Bronchodilator Protocol Note    There is a bronchodilator order in the chart from a provider indicating to follow the RT Bronchodilator Protocol and there is an Initiate RT Inhaler-Nebulizer Bronchodilator Protocol order as well (see protocol at bottom of note). CXR Findings:  No results found. The findings from the last RT Protocol Assessment were as follows:   History Pulmonary Disease: Chronic pulmonary disease  Respiratory Pattern: Regular pattern and RR 12-20 bpm  Breath Sounds: Clear breath sounds  Cough: Strong, spontaneous, non-productive  Indication for Bronchodilator Therapy: On home bronchodilators, Decreased or absent breath sounds  Bronchodilator Assessment Score: 2    Aerosolized bronchodilator medication orders have been revised according to the RT Inhaler-Nebulizer Bronchodilator Protocol below. Respiratory Therapist to perform RT Therapy Protocol Assessment initially then follow the protocol. Repeat RT Therapy Protocol Assessment PRN for score 0-3 or on second treatment, BID, and PRN for scores above 3. No Indications - adjust the frequency to every 6 hours PRN wheezing or bronchospasm, if no treatments needed after 48 hours then discontinue using Per Protocol order mode. If indication present, adjust the RT bronchodilator orders based on the Bronchodilator Assessment Score as indicated below. Use Inhaler orders unless patient has one or more of the following: on home nebulizer, not able to hold breath for 10 seconds, is not alert and oriented, cannot activate and use MDI correctly, or respiratory rate 25 breaths per minute or more, then use the equivalent nebulizer order(s) with same Frequency and PRN reasons based on the score. If a patient is on this medication at home then do not decrease Frequency below that used at home.     0-3 - enter or revise RT bronchodilator order(s) to equivalent RT Bronchodilator order with Frequency of every 4 hours PRN for wheezing or increased work of breathing using Per Protocol order mode. 4-6 - enter or revise RT Bronchodilator order(s) to two equivalent RT bronchodilator orders with one order with BID Frequency and one order with Frequency of every 4 hours PRN wheezing or increased work of breathing using Per Protocol order mode. 7-10 - enter or revise RT Bronchodilator order(s) to two equivalent RT bronchodilator orders with one order with TID Frequency and one order with Frequency of every 4 hours PRN wheezing or increased work of breathing using Per Protocol order mode. 11-13 - enter or revise RT Bronchodilator order(s) to one equivalent RT bronchodilator order with QID Frequency and an Albuterol order with Frequency of every 4 hours PRN wheezing or increased work of breathing using Per Protocol order mode. Greater than 13 - enter or revise RT Bronchodilator order(s) to one equivalent RT bronchodilator order with every 4 hours Frequency and an Albuterol order with Frequency of every 2 hours PRN wheezing or increased work of breathing using Per Protocol order mode. RT to enter RT Home Evaluation for COPD & MDI Assessment order using Per Protocol order mode.     Electronically signed by Irvin Flores RCP on 11/11/2022 at 8:24 PM

## 2022-11-12 NOTE — PROGRESS NOTES
Pt laying in bed awake. A&Ox4. VSS. Resp even&unlabored. SpO2 100% on 2L. Tiny care preformed. Pt repositioned for comfort. Denies pain at this time. Heels elevated for comfort. HS meds admin. Assessment charted. Call light&belongings c in reach. Fall precautions in place.

## 2022-11-12 NOTE — PROGRESS NOTES
CCPD Order   Exchanges: 4   Exchange Volume: 2500 ml   Total Time: 8 hrs   Dextrose: 2.5%   Last Fill: 0 ml   Total Volume: 22182 ml     Orders verified. Supplies taken to pt's room. Report received. Cycler set up, primed and pre tested. [facility]. Pt connected to cycler. CCPD initiated without problem. Initial effluent clear. If problems should arise please call the [phone] on top of PD cycler machine.

## 2022-11-13 LAB
ALBUMIN SERPL-MCNC: 2.1 G/DL (ref 3.4–5)
ANION GAP SERPL CALCULATED.3IONS-SCNC: 14 MMOL/L (ref 3–16)
BUN BLDV-MCNC: 57 MG/DL (ref 7–20)
CALCIUM SERPL-MCNC: 8.8 MG/DL (ref 8.3–10.6)
CHLORIDE BLD-SCNC: 90 MMOL/L (ref 99–110)
CO2: 28 MMOL/L (ref 21–32)
CREAT SERPL-MCNC: 7.8 MG/DL (ref 0.8–1.3)
GFR SERPL CREATININE-BSD FRML MDRD: 7 ML/MIN/{1.73_M2}
GLUCOSE BLD-MCNC: 158 MG/DL (ref 70–99)
GLUCOSE BLD-MCNC: 188 MG/DL (ref 70–99)
GLUCOSE BLD-MCNC: 197 MG/DL (ref 70–99)
GLUCOSE BLD-MCNC: 230 MG/DL (ref 70–99)
GLUCOSE BLD-MCNC: 248 MG/DL (ref 70–99)
PERFORMED ON: ABNORMAL
PHOSPHORUS: 7.3 MG/DL (ref 2.5–4.9)
POTASSIUM SERPL-SCNC: 3.5 MMOL/L (ref 3.5–5.1)
SODIUM BLD-SCNC: 132 MMOL/L (ref 136–145)

## 2022-11-13 PROCEDURE — 80069 RENAL FUNCTION PANEL: CPT

## 2022-11-13 PROCEDURE — 6360000002 HC RX W HCPCS: Performed by: INTERNAL MEDICINE

## 2022-11-13 PROCEDURE — 94640 AIRWAY INHALATION TREATMENT: CPT

## 2022-11-13 PROCEDURE — 2500000003 HC RX 250 WO HCPCS: Performed by: INTERNAL MEDICINE

## 2022-11-13 PROCEDURE — 99233 SBSQ HOSP IP/OBS HIGH 50: CPT | Performed by: INTERNAL MEDICINE

## 2022-11-13 PROCEDURE — 94660 CPAP INITIATION&MGMT: CPT

## 2022-11-13 PROCEDURE — 6370000000 HC RX 637 (ALT 250 FOR IP): Performed by: INTERNAL MEDICINE

## 2022-11-13 PROCEDURE — 1200000000 HC SEMI PRIVATE

## 2022-11-13 PROCEDURE — 36415 COLL VENOUS BLD VENIPUNCTURE: CPT

## 2022-11-13 PROCEDURE — 90945 DIALYSIS ONE EVALUATION: CPT

## 2022-11-13 RX ADMIN — HEPARIN SODIUM 5000 UNITS: 5000 INJECTION INTRAVENOUS; SUBCUTANEOUS at 02:14

## 2022-11-13 RX ADMIN — HEPARIN SODIUM 5000 UNITS: 5000 INJECTION INTRAVENOUS; SUBCUTANEOUS at 09:18

## 2022-11-13 RX ADMIN — CALCIUM ACETATE 667 MG: 667 CAPSULE ORAL at 12:49

## 2022-11-13 RX ADMIN — CALCIUM ACETATE 667 MG: 667 CAPSULE ORAL at 08:05

## 2022-11-13 RX ADMIN — HYDROCODONE BITARTRATE AND ACETAMINOPHEN 1 TABLET: 5; 325 TABLET ORAL at 05:09

## 2022-11-13 RX ADMIN — ATORVASTATIN CALCIUM 80 MG: 80 TABLET, FILM COATED ORAL at 08:05

## 2022-11-13 RX ADMIN — CALCIUM ACETATE 667 MG: 667 CAPSULE ORAL at 17:18

## 2022-11-13 RX ADMIN — CEFUROXIME AXETIL 250 MG: 250 TABLET ORAL at 09:18

## 2022-11-13 RX ADMIN — LEVOTHYROXINE SODIUM 50 MCG: 0.03 TABLET ORAL at 05:09

## 2022-11-13 RX ADMIN — HEPARIN SODIUM 5000 UNITS: 5000 INJECTION INTRAVENOUS; SUBCUTANEOUS at 17:18

## 2022-11-13 RX ADMIN — BUPROPION HYDROCHLORIDE 100 MG: 100 TABLET, FILM COATED ORAL at 13:30

## 2022-11-13 RX ADMIN — TORSEMIDE 100 MG: 100 TABLET ORAL at 08:05

## 2022-11-13 RX ADMIN — Medication 1 CAPSULE: at 08:05

## 2022-11-13 RX ADMIN — Medication 2 PUFF: at 12:27

## 2022-11-13 RX ADMIN — Medication 1 CAPSULE: at 17:18

## 2022-11-13 RX ADMIN — ASPIRIN 81 MG 81 MG: 81 TABLET ORAL at 08:05

## 2022-11-13 RX ADMIN — HYDROCODONE BITARTRATE AND ACETAMINOPHEN 1 TABLET: 5; 325 TABLET ORAL at 12:49

## 2022-11-13 RX ADMIN — INSULIN LISPRO 2 UNITS: 100 INJECTION, SOLUTION INTRAVENOUS; SUBCUTANEOUS at 09:18

## 2022-11-13 RX ADMIN — INSULIN GLARGINE 50 UNITS: 100 INJECTION, SOLUTION SUBCUTANEOUS at 20:49

## 2022-11-13 RX ADMIN — Medication 2 PUFF: at 19:32

## 2022-11-13 ASSESSMENT — PAIN SCALES - GENERAL
PAINLEVEL_OUTOF10: 5
PAINLEVEL_OUTOF10: 0
PAINLEVEL_OUTOF10: 0
PAINLEVEL_OUTOF10: 8
PAINLEVEL_OUTOF10: 4

## 2022-11-13 ASSESSMENT — PAIN DESCRIPTION - LOCATION
LOCATION: COCCYX
LOCATION: BUTTOCKS

## 2022-11-13 ASSESSMENT — PAIN - FUNCTIONAL ASSESSMENT: PAIN_FUNCTIONAL_ASSESSMENT: PREVENTS OR INTERFERES SOME ACTIVE ACTIVITIES AND ADLS

## 2022-11-13 ASSESSMENT — PAIN DESCRIPTION - DESCRIPTORS: DESCRIPTORS: ACHING

## 2022-11-13 NOTE — DIALYSIS
Treatment time: 8 Hours 52 minutes  Net UF: -1152 ml  Dwell Time: 24 minutes gained    Treatment completed without complications or complaints from patient. Effluent clear/yellow and lines taped to patient per protocol. Patient resting comfortably with VSS upon exiting room. Copy of dialysis treatment record placed in chart, to be scanned into EMR and report given to Andrez Washburn RN.

## 2022-11-13 NOTE — PLAN OF CARE
Problem: Skin/Tissue Integrity  Goal: Absence of new skin breakdown  Description: 1. Monitor for areas of redness and/or skin breakdown  2. Assess vascular access sites hourly  3. Every 4-6 hours minimum:  Change oxygen saturation probe site  4. Every 4-6 hours:  If on nasal continuous positive airway pressure, respiratory therapy assess nares and determine need for appliance change or resting period.   Outcome: Progressing     Problem: Discharge Planning  Goal: Discharge to home or other facility with appropriate resources  Outcome: Progressing  Flowsheets  Taken 11/12/2022 2113 by Ezekiel Lui RN  Discharge to home or other facility with appropriate resources: Identify discharge learning needs (meds, wound care, etc)  Problem: Pain  Goal: Verbalizes/displays adequate comfort level or baseline comfort level  Outcome: Progressing  Flowsheets (Taken 11/12/2022 2113)  Verbalizes/displays adequate comfort level or baseline comfort level:   Encourage patient to monitor pain and request assistance   Assess pain using appropriate pain scale     Problem: Safety - Adult  Goal: Free from fall injury  Outcome: Progressing     Problem: ABCDS Injury Assessment  Goal: Absence of physical injury  Outcome: Progressing  Flowsheets (Taken 11/12/2022 2113)  Absence of Physical Injury: Implement safety measures based on patient assessment     Problem: Nutrition Deficit:  Goal: Optimize nutritional status  Outcome: Progressing  Flowsheets (Taken 11/12/2022 2113)  Nutrient intake appropriate for improving, restoring, or maintaining nutritional needs: Monitor oral intake, labs, and treatment plans     Problem: Neurosensory - Adult  Goal: Achieves stable or improved neurological status  Outcome: Progressing  Flowsheets (Taken 11/12/2022 2113)  Achieves stable or improved neurological status: Assess for and report changes in neurological status     Problem: Respiratory - Adult  Goal: Achieves optimal ventilation and oxygenation  Outcome: Progressing  Flowsheets (Taken 11/12/2022 2113)  Achieves optimal ventilation and oxygenation: Assess for changes in respiratory status     Problem: Cardiovascular - Adult  Goal: Maintains optimal cardiac output and hemodynamic stability  Outcome: Progressing  Flowsheets (Taken 11/12/2022 2113)  Maintains optimal cardiac output and hemodynamic stability: Monitor blood pressure and heart rate     Problem: Skin/Tissue Integrity - Adult  Goal: Incisions, wounds, or drain sites healing without S/S of infection  Outcome: Progressing  Flowsheets (Taken 11/12/2022 2113)  Incisions, Wounds, or Drain Sites Healing Without Sign and Symptoms of Infection: TWICE DAILY: Assess and document skin integrity     Problem: Musculoskeletal - Adult  Goal: Return mobility to safest level of function  Outcome: Progressing  Flowsheets (Taken 11/12/2022 2113)  Return Mobility to Safest Level of Function: Assess patient stability and activity tolerance for standing, transferring and ambulating with or without assistive devices     Problem: Gastrointestinal - Adult  Goal: Maintains adequate nutritional intake  Outcome: Progressing  Flowsheets (Taken 11/12/2022 2113)  Maintains adequate nutritional intake: Monitor percentage of each meal consumed     Problem: Genitourinary - Adult  Goal: Absence of urinary retention  Outcome: Progressing  Flowsheets (Taken 11/12/2022 2113)  Absence of urinary retention: Monitor intake/output and perform bladder scan as needed     Problem: Metabolic/Fluid and Electrolytes - Adult  Goal: Glucose maintained within prescribed range  Outcome: Progressing  Flowsheets (Taken 11/12/2022 2113)  Glucose maintained within prescribed range: Monitor blood glucose as ordered

## 2022-11-13 NOTE — PROGRESS NOTES
Office : [phone]     Fax :[phone]       Nephrology  progress Note      Patient's Name: Yady Mann  2:33 PM  11/13/2022    Reason for Consult:  ESRD management        Requesting Physician:  Eleonora Monroe MD      Chief Complaint:    Chief Complaint   Patient presents with    Altered Mental Status     Pt transported from [facility] by [facility] EMT. EMT reports Pt has new onset of confusion that started this morning according to facility staff. Pt had PD last night and has not been acting the same since. Vitals WNL, on 3l of O2, , per EMT. Pt alertx4. Slow to respond. History of Present Ilness:    Yady Mann is a 69 y.o. male with prior history of ESRD, respiratory insufficieny with CO2 retention, HTN , Dm 2 was sent for AMS. Admitted to ICU. Has CO2 retention o ABG . placed on BiPAP. Has h/o CO2 retention previous admission. Interval hx    Tolerating PD well   No SOB   No edema       I/O last 3 completed shifts:   In: 56 [P.O.:490]  Out: 200 [Urine:380]    Past Medical History:   Diagnosis Date    Allergic rhinitis due to other allergen 7/12/2010    Coronary atherosclerosis of unspecified type of vessel, native or graft 7/12/2010    Diabetic eye exam ([address] 04/29/2015    Diabetic eye exam ([address] 5/5/2016    [facility]    Generalized osteoarthrosis, involving multiple sites 7/12/2010    Other psoriasis 7/12/2010    Pneumonia     Prolonged emergence from general anesthesia     Psoriatic arthropathy ([address] 7/12/2010    Type II or unspecified type diabetes mellitus without mention of complication, not stated as uncontrolled 7/12/2010    Unspecified essential hypertension 7/12/2010    Unspecified sleep apnea 7/12/2010       Past Surgical History:   Procedure Laterality Date    CARPAL TUNNEL RELEASE      s/p    CATARACT REMOVAL      CORONARY ARTERY BYPASS GRAFT      JOINT REPLACEMENT      LAPAROSCOPY INSERTION PERITONEAL CATHETER N/A 10/26/2020    LAPAROSCOPIC PERITONEAL DIALYSIS CATHETER PLACEMENT; LAPAROSCOPIC OMENTOPEXY performed by Jackie Alves DO at [facility] OR       Family History   Problem Relation Age of Onset    Diabetes Mother     Heart Failure Mother     Coronary Art Dis Father         reports that he quit smoking about 42 years ago. He started smoking about 60 years ago. He has a 18.00 pack-year smoking history. He has never used smokeless tobacco. He reports that he does not drink alcohol and does not use drugs.         Allergies:  Penicillins, Sulfa antibiotics, Tazorac [tazarotene], and Clindamycin/lincomycin    Current Medications:    calcium acetate (PHOSLO) capsule 667 mg, TID WC  gentamicin (GARAMYCIN) 0.1 % cream, Daily  HYDROcodone-acetaminophen (NORCO) 5-325 MG per tablet 1 tablet, Q4H PRN  insulin lispro (HUMALOG) injection vial 0-8 Units, TID WC  insulin lispro (HUMALOG) injection vial 0-4 Units, Nightly  cefUROXime (CEFTIN) tablet 250 mg, Daily  acetaminophen (TYLENOL) tablet 650 mg, Q4H PRN  albuterol (PROVENTIL) nebulizer solution 2.5 mg, Q4H PRN  aspirin chewable tablet 81 mg, Daily  atorvastatin (LIPITOR) tablet 80 mg, Daily  mometasone-formoterol (DULERA) 200-5 MCG/ACT inhaler 2 puff, BID  buPROPion (WELLBUTRIN) tablet 100 mg, Every Other Day  insulin glargine (LANTUS) injection vial 50 Units, Nightly  lactobacillus (CULTURELLE) capsule 1 capsule, BID WC  levothyroxine (SYNTHROID) tablet 50 mcg, Daily  midodrine (PROAMATINE) tablet 5 mg, TID PRN  torsemide (DEMADEX) tablet 100 mg, Daily  [START ON 12/1/2022] vitamin D (ERGOCALCIFEROL) capsule 50,000 Units, Q3 Months  heparin (porcine) injection 5,000 Units, 3 times per day  dextrose bolus 10% 125 mL, PRN   Or  dextrose bolus 10% 250 mL, PRN  glucagon (rDNA) injection 1 mg, PRN  dextrose 10 % GRICEL Ugarte in the last 72 hours. IMAGING:  XR CHEST PORTABLE   Final Result   Low lung volumes, which somewhat limit evaluation. Small left pleural effusion and/or basilar atelectasis. Stable cardiomegaly. Assessment/Plan :      1. ESRD . On PD  Will start PD. Use 2.5 %   2500 ml   4 exchanges     2. HTN. Continue BP meds     3. Anemia in ESRD   Give erythropoetin      4. Acid- base disorder. Correct with HD     5. Hypokalemia.  Replace potassium as needed       Continue same PD prescription today   Awaiting placement       D/w primary team      Thank you for allowing us to participate in care of Reggie Adams         Electronically signed by: Samantha English MD, 11/13/2022, 2:33 PM      [facility] [address]  Office : [phone]  Fax :[phone]

## 2022-11-14 ENCOUNTER — APPOINTMENT (OUTPATIENT)
Dept: GENERAL RADIOLOGY | Age: 77
DRG: 189 | End: 2022-11-14
Payer: COMMERCIAL

## 2022-11-14 LAB
ALBUMIN SERPL-MCNC: 2.2 G/DL (ref 3.4–5)
ANION GAP SERPL CALCULATED.3IONS-SCNC: 13 MMOL/L (ref 3–16)
BASE EXCESS ARTERIAL: 0.6 MMOL/L (ref -3–3)
BASE EXCESS ARTERIAL: 0.7 MMOL/L (ref -3–3)
BUN BLDV-MCNC: 62 MG/DL (ref 7–20)
CALCIUM SERPL-MCNC: 8.6 MG/DL (ref 8.3–10.6)
CARBOXYHEMOGLOBIN ARTERIAL: 1.4 % (ref 0–1.5)
CARBOXYHEMOGLOBIN ARTERIAL: 1.6 % (ref 0–1.5)
CHLORIDE BLD-SCNC: 86 MMOL/L (ref 99–110)
CO2: 29 MMOL/L (ref 21–32)
CREAT SERPL-MCNC: 7.7 MG/DL (ref 0.8–1.3)
GFR SERPL CREATININE-BSD FRML MDRD: 7 ML/MIN/{1.73_M2}
GLUCOSE BLD-MCNC: 151 MG/DL (ref 70–99)
GLUCOSE BLD-MCNC: 210 MG/DL (ref 70–99)
GLUCOSE BLD-MCNC: 320 MG/DL (ref 70–99)
GLUCOSE BLD-MCNC: 372 MG/DL (ref 70–99)
GLUCOSE BLD-MCNC: 376 MG/DL (ref 70–99)
HCO3 ARTERIAL: 27.1 MMOL/L (ref 21–29)
HCO3 ARTERIAL: 29.1 MMOL/L (ref 21–29)
HEMOGLOBIN, ART, EXTENDED: 8.9 G/DL (ref 13.5–17.5)
HEMOGLOBIN, ART, EXTENDED: 9.8 G/DL (ref 13.5–17.5)
METHEMOGLOBIN ARTERIAL: 0 %
METHEMOGLOBIN ARTERIAL: 0.4 %
O2 SAT, ARTERIAL: 90.2 %
O2 SAT, ARTERIAL: 98.9 %
O2 THERAPY: ABNORMAL
O2 THERAPY: ABNORMAL
PCO2 ARTERIAL: 52.9 MMHG (ref 35–45)
PCO2 ARTERIAL: 68.8 MMHG (ref 35–45)
PERFORMED ON: ABNORMAL
PH ARTERIAL: 7.24 (ref 7.35–7.45)
PH ARTERIAL: 7.32 (ref 7.35–7.45)
PHOSPHORUS: 6.5 MG/DL (ref 2.5–4.9)
PO2 ARTERIAL: 122 MMHG (ref 75–108)
PO2 ARTERIAL: 66.3 MMHG (ref 75–108)
POTASSIUM SERPL-SCNC: 3.2 MMOL/L (ref 3.5–5.1)
SODIUM BLD-SCNC: 128 MMOL/L (ref 136–145)
TCO2 ARTERIAL: 64.4 MMOL/L
TCO2 ARTERIAL: 70 MMOL/L

## 2022-11-14 PROCEDURE — 36415 COLL VENOUS BLD VENIPUNCTURE: CPT

## 2022-11-14 PROCEDURE — 2500000003 HC RX 250 WO HCPCS

## 2022-11-14 PROCEDURE — 6370000000 HC RX 637 (ALT 250 FOR IP): Performed by: INTERNAL MEDICINE

## 2022-11-14 PROCEDURE — 2500000003 HC RX 250 WO HCPCS: Performed by: INTERNAL MEDICINE

## 2022-11-14 PROCEDURE — 6360000002 HC RX W HCPCS: Performed by: INTERNAL MEDICINE

## 2022-11-14 PROCEDURE — 99233 SBSQ HOSP IP/OBS HIGH 50: CPT | Performed by: INTERNAL MEDICINE

## 2022-11-14 PROCEDURE — 94660 CPAP INITIATION&MGMT: CPT

## 2022-11-14 PROCEDURE — 82803 BLOOD GASES ANY COMBINATION: CPT

## 2022-11-14 PROCEDURE — 2700000000 HC OXYGEN THERAPY PER DAY

## 2022-11-14 PROCEDURE — 90945 DIALYSIS ONE EVALUATION: CPT

## 2022-11-14 PROCEDURE — 94640 AIRWAY INHALATION TREATMENT: CPT

## 2022-11-14 PROCEDURE — 36600 WITHDRAWAL OF ARTERIAL BLOOD: CPT

## 2022-11-14 PROCEDURE — 1200000000 HC SEMI PRIVATE

## 2022-11-14 PROCEDURE — 80069 RENAL FUNCTION PANEL: CPT

## 2022-11-14 PROCEDURE — 99291 CRITICAL CARE FIRST HOUR: CPT | Performed by: INTERNAL MEDICINE

## 2022-11-14 PROCEDURE — 71045 X-RAY EXAM CHEST 1 VIEW: CPT

## 2022-11-14 RX ORDER — POTASSIUM CHLORIDE 20 MEQ/1
20 TABLET, EXTENDED RELEASE ORAL ONCE
Status: COMPLETED | OUTPATIENT
Start: 2022-11-14 | End: 2022-11-14

## 2022-11-14 RX ORDER — LIDOCAINE HYDROCHLORIDE 10 MG/ML
INJECTION, SOLUTION EPIDURAL; INFILTRATION; INTRACAUDAL; PERINEURAL
Status: COMPLETED
Start: 2022-11-14 | End: 2022-11-14

## 2022-11-14 RX ADMIN — HEPARIN SODIUM 5000 UNITS: 5000 INJECTION INTRAVENOUS; SUBCUTANEOUS at 18:00

## 2022-11-14 RX ADMIN — ATORVASTATIN CALCIUM 80 MG: 80 TABLET, FILM COATED ORAL at 10:33

## 2022-11-14 RX ADMIN — Medication 2 PUFF: at 10:10

## 2022-11-14 RX ADMIN — HEPARIN SODIUM 5000 UNITS: 5000 INJECTION INTRAVENOUS; SUBCUTANEOUS at 10:39

## 2022-11-14 RX ADMIN — HYDROCODONE BITARTRATE AND ACETAMINOPHEN 1 TABLET: 5; 325 TABLET ORAL at 23:51

## 2022-11-14 RX ADMIN — INSULIN LISPRO 8 UNITS: 100 INJECTION, SOLUTION INTRAVENOUS; SUBCUTANEOUS at 10:40

## 2022-11-14 RX ADMIN — INSULIN GLARGINE 50 UNITS: 100 INJECTION, SOLUTION SUBCUTANEOUS at 21:16

## 2022-11-14 RX ADMIN — Medication 2 PUFF: at 21:03

## 2022-11-14 RX ADMIN — ASPIRIN 81 MG 81 MG: 81 TABLET ORAL at 10:33

## 2022-11-14 RX ADMIN — HEPARIN SODIUM 5000 UNITS: 5000 INJECTION INTRAVENOUS; SUBCUTANEOUS at 04:26

## 2022-11-14 RX ADMIN — LIDOCAINE HYDROCHLORIDE 5 ML: 10 INJECTION, SOLUTION EPIDURAL; INFILTRATION; INTRACAUDAL; PERINEURAL at 17:16

## 2022-11-14 RX ADMIN — CALCIUM ACETATE 667 MG: 667 CAPSULE ORAL at 10:33

## 2022-11-14 RX ADMIN — CEFUROXIME AXETIL 250 MG: 250 TABLET ORAL at 10:39

## 2022-11-14 RX ADMIN — Medication 1 CAPSULE: at 18:00

## 2022-11-14 RX ADMIN — INSULIN LISPRO 6 UNITS: 100 INJECTION, SOLUTION INTRAVENOUS; SUBCUTANEOUS at 11:47

## 2022-11-14 RX ADMIN — POTASSIUM CHLORIDE 20 MEQ: 1500 TABLET, EXTENDED RELEASE ORAL at 10:32

## 2022-11-14 RX ADMIN — CALCIUM ACETATE 667 MG: 667 CAPSULE ORAL at 18:00

## 2022-11-14 RX ADMIN — Medication 1 CAPSULE: at 10:33

## 2022-11-14 RX ADMIN — LEVOTHYROXINE SODIUM 50 MCG: 0.03 TABLET ORAL at 06:11

## 2022-11-14 RX ADMIN — TORSEMIDE 100 MG: 100 TABLET ORAL at 10:33

## 2022-11-14 ASSESSMENT — PAIN SCALES - GENERAL
PAINLEVEL_OUTOF10: 0
PAINLEVEL_OUTOF10: 7

## 2022-11-14 NOTE — CARE COORDINATION
Discharge note:      CM/SW has been notified of discharge. Patient noted to have the following needs at discharge. CM/SW has coordinated the following services: skilled nursing facility       Discharge Destination:   [address]  Phone: [phone]  Fax: [phone]     Transportation: [facility]  ([phone])   Discharge Time: 12:30pm  AVS faxed and agency notified:  Nurse to call report to facility: Phone: [phone]  Family advised of discharge and in agreement. PASSAR completed. All CM/SW needs met, will sign off.        Electronically signed by JOE Andino on 11/14/2022 at 9:59 AM

## 2022-11-14 NOTE — CARE COORDINATION
Discharge Planning. The patient's Nurse Iris informed the SW that the patient's condition has changed and will not be able to discharge today. The SW called [facility] and canceled transport set for today at 12:30pm    The SW will reschedule transport once the patient is medically stable.     Electronically signed by JOE Maria on 11/14/2022 at 10:50 AM

## 2022-11-14 NOTE — FLOWSHEET NOTE
Disconnected from [facility] per protocol. Effluent: Clear  Total time: 6 hr 19 min   Total UF:  -644 ml. Total Volume:  5903 ml. Dwell time gained:  00 hr 24 min.   Pt Tolerated procedure: Pt offers no c/o.       11/14/22 0500   Vitals   BP (!) 165/66   Temp 97.6 °F (36.4 °C)   Heart Rate 93   Resp 20   Weight 205 lb 0.4 oz (93 kg)   Cycler   Ultrafiltration (UF) (mL) -644 mL   Average Dwell Time (Hours:Minutes) 140   Lost Dwell Time (Hours:Minutes) 00:24

## 2022-11-14 NOTE — PROGRESS NOTES
PULMONARY AND CRITICAL CARE MEDICINE PROGRESS NOTE      SUBJECTIVE: Pulmonary was reconsulted this morning. Seem to be more somnolent this morning. ABG showed acute on chronic hypercapnic respiratory failure. Placed back on BiPAP therapy. On BiPAP therapy does not appear to be in any distress. Responds to verbal stimuli. Still lethargic. REVIEW OF SYSTEMS:   Unable to obtain review of system as patient is on continuous BiPAP therapy. MEDICATIONS:      calcium acetate  1 capsule Oral TID WC    gentamicin   Topical Daily    insulin lispro  0-8 Units SubCUTAneous TID WC    insulin lispro  0-4 Units SubCUTAneous Nightly    cefUROXime  250 mg Oral Daily    aspirin  81 mg Oral Daily    atorvastatin  80 mg Oral Daily    mometasone-formoterol  2 puff Inhalation BID    buPROPion  100 mg Oral Every Other Day    insulin glargine  50 Units SubCUTAneous Nightly    lactobacillus  1 capsule Oral BID WC    levothyroxine  50 mcg Oral Daily    torsemide  100 mg Oral Daily    [START ON 12/1/2022] vitamin D  50,000 Units Oral Q3 Months    heparin (porcine)  5,000 Units SubCUTAneous 3 times per day      dextrose       HYDROcodone 5 mg - acetaminophen, acetaminophen, albuterol, midodrine, dextrose bolus **OR** dextrose bolus, glucagon (rDNA), dextrose     ALLERGIES:   Allergies as of 11/07/2022 - Fully Reviewed 11/07/2022   Allergen Reaction Noted    Penicillins Other (See Comments) 07/12/2010    Sulfa antibiotics  07/12/2010    Tazorac [tazarotene]  07/12/2010    Clindamycin Rash 01/27/2010    Clindamycin/lincomycin Rash 07/12/2010        OBJECTIVE:   height is 5' 10\" (1.778 m) and weight is 205 lb 0.4 oz (93 kg). His temperature is 97.6 °F (36.4 °C). His blood pressure is 165/66 (abnormal) and his pulse is 66. His respiration is 22 and oxygen saturation is 91%. No intake/output data recorded. PHYSICAL EXAM:  CONSTITUTIONAL: He is a 69y.o.-year-old who appears his stated age.  He is alert and oriented x 3 and in no acute distress. CARDIOVASCULAR: S1 S2 RRR. Without murmer  RESPIRATORY & CHEST: Lungs are clear to auscultation and percussion. No wheezing, no crackles. Good air movement  GASTROINTESTINAL & ABDOMEN: Soft, nontender, positive bowel sounds in all quadrants, non-distended, without hepatosplenomegaly. GENITOURINARY: Deferred. MUSCULOSKELETAL: No tenderness to palpation of the axial skeleton. There is no clubbing. No cyanosis. No edema of the lower extremities. SKIN OF BODY: No rash or jaundice. PSYCHIATRIC EVALUATION: Normal affect. Patient answers questions appropriately. HEMATOLOGIC/LYMPHATIC/ IMMUNOLOGIC: No palpable lymphadenopathy. NEUROLOGIC: Alert and oriented x 3. Groslly non-focal. Motor strength is 5+/5 in all muscle groups. The patient has a normal sensorium globally.       LABS:   LABS:  Recent Labs     11/12/22 0527 11/12/22 1219 11/13/22  0953 11/14/22  0409   WBC  --  14.0*  --   --    HGB  --  9.1*  --   --    HCT  --  26.8*  --   --    PLT  --  440  --   --    *  --  132* 128*   K 3.5  --  3.5 3.2*   CL 91*  --  90* 86*   CREATININE 7.8*  --  7.8* 7.7*   BUN 53*  --  57* 62*   CO2 26  --  28 29       Recent Labs     11/12/22  0527 11/13/22  0953 11/14/22  0409   GLUCOSE 312* 197* 372*   CALCIUM 8.3 8.8 8.6   * 132* 128*   K 3.5 3.5 3.2*   CO2 26 28 29   CL 91* 90* 86*   BUN 53* 57* 62*   CREATININE 7.8* 7.8* 7.7*       Recent Labs     11/14/22  0953   PHART 7.236*   ORG5QRH 68.8*   PO2ART 66.3*   MAY6KRD 29.1*   D3CNNXSS 90.2*   BEART 0.7       Lab Results   Component Value Date    INR 0.99 04/15/2022    INR 0.89 01/23/2017    INR 0.91 01/21/2017    PROTIME 11.2 04/15/2022    PROTIME 10.1 01/23/2017    PROTIME 10.4 01/21/2017     No results found for: AMYLASE   Lab Results   Component Value Date    LABA1C 6.6 09/29/2022     Lab Results   Component Value Date    .7 09/29/2022     Lab Results   Component Value Date    TSH 5.20 (H) 01/30/2018     Lab Results   Component Value Date    CKTOTAL 196 04/29/2015    TROPONINI 0.10 (H) 11/07/2022      No results found for: CRP   Lab Results   Component Value Date    .0 (H) 10/26/2013      No results found for: [facility]   Lab Results   Component Value Date    FERRITIN 855.7 (H) 09/25/2022      Lab Results   Component Value Date    LACTA 1.5 09/06/2022          IMAGING:  Narrative   EXAMINATION:   ONE XRAY VIEW OF THE CHEST       11/14/2022 11:19 am           FINDINGS:   Low lung volumes which limits evaluation. Small left pleural effusion. Left   basilar airspace opacities. Stable cardiomediastinal silhouette           Impression   Low lung volumes which limits evaluation. There is a small left pleural   effusion with left basilar airspace opacities which could reflect atelectasis   versus pneumonia. IMPRESSION:   Acute on chronic hypercapnic respiratory failure  COPD  BJ, noncompliant with BiPAP  UTI  End-stage renal disease on PD  Heart failure with preserved ejection fraction        RECOMMENDATION:   Patient's respiratory status continues to fluctuate. He had initially presented with altered mental status which I believe is secondary to acute on chronic hypercapnic respiratory failure with hypoxia. Previous ABGs are consistent with chronic hypercapnic respiratory failure. Patient has BiPAP at the nursing home, but it was found out that he may not have been getting the BiPAP therapy regularly every night. Currently on BiPAP therapy with 12/5 cm H2O pulling tidal volumes in high 300s to low 400s. I have increased the IPAP to 14 cmH2O with which patient was getting a tidal volume of around 500 cc. Minute ventilation is around 9 to 10 L/min. Repeat chest x-ray done this morning was personally reviewed by me. Continues to show bilateral lower lung volumes. No acute pathology seen. Repeat ABG in 2 hours.   Patient should get intermittent BiPAP therapy throughout the day and then continuous BiPAP support at nighttime. Management of peritoneal dialysis as per nephrology recommendations. Continue with Dulera inhaler twice a day. Albuterol inhaler as needed. No indications for oral steroids. Cancel discharge today. Total critical care time caring for this patient with life threatening illness, including direct patient contact, management of life support systems, review of data including imaging and labs, discussions with other team members and physicians is at least 32 minutes so far today, excluding procedures. Danitza Guerrier MD   Pulmonary Critical Care and Sleep Medicine  [facility]   [address] [address], [place], [address]  11/7/2022, 2:45 PM              This note was completed using dragon medical speech recognition software. Grammatical errors, random word insertions, pronoun errors and incomplete sentences are occasional consequences of this technology due to software limitations. If there are questions or concerns about the content of this note of information contained within the body of this dictation they should be addressed with the provider for clarification.

## 2022-11-14 NOTE — PROGRESS NOTES
Department of Internal Medicine  General Internal Medicine   Progress Note      SUBJECTIVE: breathing easier , no wheezing uses Bipap when he can     History obtained from chart review, the patient, and nursing staff   General ROS: positive for  - fatigue, malaise, and weight gain  negative for - chills, fever, or night sweats  Psychological ROS: positive for - anxiety and disorientation  negative for - depression, hallucinations, hostility, or memory difficulties  Ophthalmic ROS: negative  Respiratory ROS: positive for - cough, shortness of breath, and tachypnea  negative for - hemoptysis, stridor, or wheezing  Cardiovascular ROS: positive for - dyspnea on exertion and shortness of breath  negative for - edema, loss of consciousness, orthopnea, or palpitations  Gastrointestinal ROS: no abdominal pain, change in bowel habits, or black or bloody stools  Genito-Urinary ROS: no dysuria, trouble voiding, or hematuria  Musculoskeletal ROS: chronic pain   Neurological ROS: no TIA or stroke symptoms  negative  Dermatological ROS: negative    OBJECTIVE      Medications      Current Facility-Administered Medications: calcium acetate (PHOSLO) capsule 667 mg, 1 capsule, Oral, TID WC  gentamicin (GARAMYCIN) 0.1 % cream, , Topical, Daily  HYDROcodone-acetaminophen (NORCO) 5-325 MG per tablet 1 tablet, 1 tablet, Oral, Q4H PRN  insulin lispro (HUMALOG) injection vial 0-8 Units, 0-8 Units, SubCUTAneous, TID WC  insulin lispro (HUMALOG) injection vial 0-4 Units, 0-4 Units, SubCUTAneous, Nightly  cefUROXime (CEFTIN) tablet 250 mg, 250 mg, Oral, Daily  acetaminophen (TYLENOL) tablet 650 mg, 650 mg, Oral, Q4H PRN  albuterol (PROVENTIL) nebulizer solution 2.5 mg, 2.5 mg, Nebulization, Q4H PRN  aspirin chewable tablet 81 mg, 81 mg, Oral, Daily  atorvastatin (LIPITOR) tablet 80 mg, 80 mg, Oral, Daily  mometasone-formoterol (DULERA) 200-5 MCG/ACT inhaler 2 puff, 2 puff, Inhalation, BID  buPROPion (WELLBUTRIN) tablet 100 mg, 100 mg, Oral, Every Other Day  insulin glargine (LANTUS) injection vial 50 Units, 50 Units, SubCUTAneous, Nightly  lactobacillus (CULTURELLE) capsule 1 capsule, 1 capsule, Oral, BID WC  levothyroxine (SYNTHROID) tablet 50 mcg, 50 mcg, Oral, Daily  midodrine (PROAMATINE) tablet 5 mg, 5 mg, Oral, TID PRN  torsemide (DEMADEX) tablet 100 mg, 100 mg, Oral, Daily  [START ON 2022] vitamin D (ERGOCALCIFEROL) capsule 50,000 Units, 50,000 Units, Oral, Q3 Months  heparin (porcine) injection 5,000 Units, 5,000 Units, SubCUTAneous, 3 times per day  dextrose bolus 10% 125 mL, 125 mL, IntraVENous, PRN **OR** dextrose bolus 10% 250 mL, 250 mL, IntraVENous, PRN  glucagon (rDNA) injection 1 mg, 1 mg, SubCUTAneous, PRN  dextrose 10 % infusion, , IntraVENous, Continuous PRN    Physical      Vitals: BP (!) 165/66   Pulse 66   Temp 97.6 °F (36.4 °C)   Resp 22   Ht 5' 10\" (1.778 m)   Wt 205 lb 0.4 oz (93 kg)   SpO2 91%   BMI 29.42 kg/m²   Temp: Temp: 97.6 °F (36.4 °C)  Max: Temp  Av.4 °F (36.3 °C)  Min: 97 °F (36.1 °C)  Max: 97.8 °F (36.6 °C)  Respiration range:  Resp  Av.6  Min: 18  Max: 22  Pulse Range:  Pulse  Av  Min: 66  Max: 96  Blood pressure range:  Systolic (77PPO), KNR:791 , Min:128 , LWL:329   , Diastolic (90FSO), EOI:97, Min:51, Max:75    SpO2  Av.5 %  Min: 91 %  Max: 100 %    Intake/Output Summary (Last 24 hours) at 2022 1614  Last data filed at 2022 0500  Gross per 24 hour   Intake --   Output -644 ml   Net 644 ml       Vent settings:  Pulse  Av.5  Min: 55  Max: 102  Resp  Av.3  Min: 6  Max: 37  SpO2  Av.2 %  Min: 85 %  Max: 100 %    CONSTITUTIONAL:  awake  EYES:  Lids and lashes normal, pupils equal, round and reactive to light, extra ocular muscles intact, sclera clear, conjunctiva normal  NECK:  mild JVD   BACK:  Symmetric, no curvature, spinous processes are non-tender on palpation, paraspinous muscles are non-tender on palpation, no costal vertebral tenderness  LUNGS: tachypneic, Moderate respiratory distress, moderate air exchange, no retractions, and crackles diffuse, wheeze diffuse  CARDIOVASCULAR:  Normal apical impulse, regular rate and rhythm, normal S1 and S2, no S3 or S4, and no murmur noted  ABDOMEN:  soft BS + non tender   MUSCULOSKELETAL:  trace edema   NEUROLOGIC:  grossly intact   SKIN:  warm dry and pale  and no bruising or bleeding    Data      Recent Results (from the past 96 hour(s))   POCT Glucose    Collection Time: 11/10/22  5:48 PM   Result Value Ref Range    POC Glucose 277 (H) 70 - 99 mg/dl    Performed on ACCU-CHEK    POCT Glucose    Collection Time: 11/10/22  8:36 PM   Result Value Ref Range    POC Glucose 385 (H) 70 - 99 mg/dl    Performed on ACCU-CHEK    POCT Glucose    Collection Time: 11/11/22  8:03 AM   Result Value Ref Range    POC Glucose 279 (H) 70 - 99 mg/dl    Performed on ACCU-CHEK    POCT Glucose    Collection Time: 11/11/22 11:40 AM   Result Value Ref Range    POC Glucose 230 (H) 70 - 99 mg/dl    Performed on ACCU-CHEK    POCT Glucose    Collection Time: 11/11/22  4:38 PM   Result Value Ref Range    POC Glucose 194 (H) 70 - 99 mg/dl    Performed on ACCU-CHEK    POCT Glucose    Collection Time: 11/11/22  7:08 PM   Result Value Ref Range    POC Glucose 203 (H) 70 - 99 mg/dl    Performed on ACCU-CHEK    Renal Function Panel    Collection Time: 11/11/22  9:16 PM   Result Value Ref Range    Sodium 132 (L) 136 - 145 mmol/L    Potassium 3.8 3.5 - 5.1 mmol/L    Chloride 91 (L) 99 - 110 mmol/L    CO2 24 21 - 32 mmol/L    Anion Gap 17 (H) 3 - 16    Glucose 230 (H) 70 - 99 mg/dL    BUN 60 (H) 7 - 20 mg/dL    Creatinine 8.1 (HH) 0.8 - 1.3 mg/dL    Est, Glom Filt Rate 6 (A) >60    Calcium 8.2 (L) 8.3 - 10.6 mg/dL    Phosphorus 7.2 (H) 2.5 - 4.9 mg/dL    Albumin 1.6 (L) 3.4 - 5.0 g/dL   Renal Function Panel    Collection Time: 11/12/22  5:27 AM   Result Value Ref Range    Sodium 131 (L) 136 - 145 mmol/L    Potassium 3.5 3.5 - 5.1 mmol/L    Chloride 91 (L) 99 - 110 mmol/L    CO2 26 21 - 32 mmol/L    Anion Gap 14 3 - 16    Glucose 312 (H) 70 - 99 mg/dL    BUN 53 (H) 7 - 20 mg/dL    Creatinine 7.8 (HH) 0.8 - 1.3 mg/dL    Est, Glom Filt Rate 7 (A) >60    Calcium 8.3 8.3 - 10.6 mg/dL    Phosphorus 6.3 (H) 2.5 - 4.9 mg/dL    Albumin 2.2 (L) 3.4 - 5.0 g/dL   POCT Glucose    Collection Time: 11/12/22  7:42 AM   Result Value Ref Range    POC Glucose 253 (H) 70 - 99 mg/dl    Performed on ACCU-CHEK    POCT Glucose    Collection Time: 11/12/22 11:42 AM   Result Value Ref Range    POC Glucose 150 (H) 70 - 99 mg/dl    Performed on ACCU-CHEK    CBC    Collection Time: 11/12/22 12:19 PM   Result Value Ref Range    WBC 14.0 (H) 4.0 - 11.0 K/uL    RBC 2.54 (L) 4.20 - 5.90 M/uL    Hemoglobin 9.1 (L) 13.5 - 17.5 g/dL    Hematocrit 26.8 (L) 40.5 - 52.5 %    .2 (H) 80.0 - 100.0 fL    MCH 35.8 (H) 26.0 - 34.0 pg    MCHC 34.1 31.0 - 36.0 g/dL    RDW 15.8 (H) 12.4 - 15.4 %    Platelets 801 324 - 658 K/uL    MPV 7.0 5.0 - 10.5 fL   POCT Glucose    Collection Time: 11/12/22  4:45 PM   Result Value Ref Range    POC Glucose 160 (H) 70 - 99 mg/dl    Performed on ACCU-CHEK    POCT Glucose    Collection Time: 11/12/22  9:04 PM   Result Value Ref Range    POC Glucose 329 (H) 70 - 99 mg/dl    Performed on ACCU-CHEK    POCT Glucose    Collection Time: 11/13/22  8:00 AM   Result Value Ref Range    POC Glucose 230 (H) 70 - 99 mg/dl    Performed on ACCU-CHEK    Renal Function Panel    Collection Time: 11/13/22  9:53 AM   Result Value Ref Range    Sodium 132 (L) 136 - 145 mmol/L    Potassium 3.5 3.5 - 5.1 mmol/L    Chloride 90 (L) 99 - 110 mmol/L    CO2 28 21 - 32 mmol/L    Anion Gap 14 3 - 16    Glucose 197 (H) 70 - 99 mg/dL    BUN 57 (H) 7 - 20 mg/dL    Creatinine 7.8 (HH) 0.8 - 1.3 mg/dL    Est, Glom Filt Rate 7 (A) >60    Calcium 8.8 8.3 - 10.6 mg/dL    Phosphorus 7.3 (H) 2.5 - 4.9 mg/dL    Albumin 2.1 (L) 3.4 - 5.0 g/dL   POCT Glucose    Collection Time: 11/13/22 11:44 AM   Result Value Ref Range    POC Glucose 158 (H) 70 - 99 mg/dl    Performed on ACCU-CHEK    POCT Glucose    Collection Time: 11/13/22  4:41 PM   Result Value Ref Range    POC Glucose 188 (H) 70 - 99 mg/dl    Performed on ACCU-CHEK    POCT Glucose    Collection Time: 11/13/22  8:48 PM   Result Value Ref Range    POC Glucose 248 (H) 70 - 99 mg/dl    Performed on ACCU-CHEK    Renal Function Panel    Collection Time: 11/14/22  4:09 AM   Result Value Ref Range    Sodium 128 (L) 136 - 145 mmol/L    Potassium 3.2 (L) 3.5 - 5.1 mmol/L    Chloride 86 (L) 99 - 110 mmol/L    CO2 29 21 - 32 mmol/L    Anion Gap 13 3 - 16    Glucose 372 (H) 70 - 99 mg/dL    BUN 62 (H) 7 - 20 mg/dL    Creatinine 7.7 (HH) 0.8 - 1.3 mg/dL    Est, Glom Filt Rate 7 (A) >60    Calcium 8.6 8.3 - 10.6 mg/dL    Phosphorus 6.5 (H) 2.5 - 4.9 mg/dL    Albumin 2.2 (L) 3.4 - 5.0 g/dL   POCT Glucose    Collection Time: 11/14/22  8:06 AM   Result Value Ref Range    POC Glucose 376 (H) 70 - 99 mg/dl    Performed on ACCU-CHEK    Blood gas, arterial    Collection Time: 11/14/22  9:53 AM   Result Value Ref Range    pH, Arterial 7.236 (L) 7.350 - 7.450    pCO2, Arterial 68.8 (H) 35.0 - 45.0 mmHg    pO2, Arterial 66.3 (L) 75.0 - 108.0 mmHg    HCO3, Arterial 29.1 (H) 21.0 - 29.0 mmol/L    Base Excess, Arterial 0.7 -3.0 - 3.0 mmol/L    Hemoglobin, Art, Extended 9.8 (L) 13.5 - 17.5 g/dL    O2 Sat, Arterial 90.2 (L) >92 %    Carboxyhgb, Arterial 1.6 (H) 0.0 - 1.5 %    Methemoglobin, Arterial 0.0 <1.5 %    TCO2, Arterial 70.0 Not Established mmol/L    O2 Therapy Unknown    POCT Glucose    Collection Time: 11/14/22 11:42 AM   Result Value Ref Range    POC Glucose 320 (H) 70 - 99 mg/dl    Performed on [address] Problems             Last Modified POA    * (Principal) Acute on chronic respiratory failure with hypercapnia (HCC) 11/7/2022 Yes    NSTEMI (non-ST elevated myocardial infarction) ([facility] [address] 11/7/2022 Yes    AMS (altered mental status) 11/7/2022 Yes Acute respiratory failure with hypoxia ([address] 11/7/2022 Yes    Hypercarbia 11/7/2022 Yes    Electrolyte imbalance 11/7/2022 Yes    Chronic hypercapnic respiratory failure ([address] 11/7/2022 Yes    BJ treated with BiPAP 11/9/2022 Yes    Moderate episode of recurrent major depressive disorder ([address] 11/8/2022 Yes    Overweight (BMI 25.0-29.9) 11/8/2022 Yes    History of 2019 novel coronavirus disease (COVID-19) 11/8/2022 Yes    Peritoneal dialysis catheter in place [facility]) 11/8/2022 Yes    COPD with acute exacerbation ([address] 11/7/2022 Yes    Anemia in ESRD (end-stage renal disease) ([address] 11/8/2022 Yes    ESRD (end stage renal disease) ([address] 11/8/2022 Yes    Acute on chronic respiratory failure with hypoxia and hypercapnia ([address] 11/8/2022 Yes    Acute cystitis without hematuria 11/9/2022 Yes    Essential hypertension 11/8/2022 Yes    Sleep apnea 11/8/2022 Yes    Psoriatic arthropathy ([address] 11/8/2022 Yes    Coronary atherosclerosis 11/8/2022 Yes    Vitamin D deficiency 11/8/2022 Yes    Glaucoma 11/8/2022 Yes    S/P CABG x 4 11/8/2022 Yes    Overview Signed 12/9/2015 11:54 AM by Rupali Alba MA     2006         ILD (interstitial lung disease) ([address] 11/8/2022 Yes    Chronic diastolic heart failure ([address] 11/8/2022 Yes    Type 2 diabetes mellitus with hyperglycemia, with long-term current use of insulin ([address] 11/8/2022 Yes    Hypertension associated with stage 5 chronic kidney disease due to type 2 diabetes mellitus ([address] 11/8/2022 Yes    Acquired hypothyroidism 11/8/2022 Yes    ESRD on peritoneal dialysis ([address] 11/8/2022 Yes      C Ct Bipap    Ct CAPD  Awaiting dismissal arrangements to a NH

## 2022-11-14 NOTE — PROGRESS NOTES
Office : [phone]     Fax :[phone]       Nephrology  progress Note      Patient's Name: Tonie Campos  8:30 AM  11/14/2022    Reason for Consult:  ESRD management        Requesting Physician:  Clark Gary MD      Chief Complaint:    Chief Complaint   Patient presents with    Altered Mental Status     Pt transported from [facility] by [facility] EMT. EMT reports Pt has new onset of confusion that started this morning according to facility staff. Pt had PD last night and has not been acting the same since. Vitals WNL, on 3l of O2, , per EMT. Pt alertx4. Slow to respond. History of Present iIlness:    Tonie Campos is a 69 y.o. male with prior history of ESRD, respiratory insufficieny with CO2 retention, HTN , Dm 2 was sent for AMS. Admitted to ICU. Has CO2 retention o ABG . placed on BiPAP. Has h/o CO2 retention previous admission. Interval hx :    Confused today   Not eating   No respiratory distress.       I/O last 3 completed shifts:  In: -   Out: -396 [Urine:50]    Past Medical History:   Diagnosis Date    Allergic rhinitis due to other allergen 7/12/2010    Coronary atherosclerosis of unspecified type of vessel, native or graft 7/12/2010    Diabetic eye exam ([address] 04/29/2015    Diabetic eye exam ([address] 5/5/2016    [place] [facility]    Generalized osteoarthrosis, involving multiple sites 7/12/2010    Other psoriasis 7/12/2010    Pneumonia     Prolonged emergence from general anesthesia     Psoriatic arthropathy ([address] 7/12/2010    Type II or unspecified type diabetes mellitus without mention of complication, not stated as uncontrolled 7/12/2010    Unspecified essential hypertension 7/12/2010    Unspecified sleep apnea 7/12/2010       Past Surgical History: Procedure Laterality Date    CARPAL TUNNEL RELEASE      s/p    CATARACT REMOVAL      CORONARY ARTERY BYPASS GRAFT      JOINT REPLACEMENT      LAPAROSCOPY INSERTION PERITONEAL CATHETER N/A 10/26/2020    LAPAROSCOPIC PERITONEAL DIALYSIS CATHETER PLACEMENT; LAPAROSCOPIC OMENTOPEXY performed by Faraz Barkley DO at [facility] OR       Family History   Problem Relation Age of Onset    Diabetes Mother     Heart Failure Mother     Coronary Art Dis Father         reports that he quit smoking about 42 years ago. He started smoking about 60 years ago. He has a 18.00 pack-year smoking history. He has never used smokeless tobacco. He reports that he does not drink alcohol and does not use drugs.         Allergies:  Penicillins, Sulfa antibiotics, Tazorac [tazarotene], and Clindamycin/lincomycin    Current Medications:    calcium acetate (PHOSLO) capsule 667 mg, TID WC  gentamicin (GARAMYCIN) 0.1 % cream, Daily  HYDROcodone-acetaminophen (NORCO) 5-325 MG per tablet 1 tablet, Q4H PRN  insulin lispro (HUMALOG) injection vial 0-8 Units, TID WC  insulin lispro (HUMALOG) injection vial 0-4 Units, Nightly  cefUROXime (CEFTIN) tablet 250 mg, Daily  acetaminophen (TYLENOL) tablet 650 mg, Q4H PRN  albuterol (PROVENTIL) nebulizer solution 2.5 mg, Q4H PRN  aspirin chewable tablet 81 mg, Daily  atorvastatin (LIPITOR) tablet 80 mg, Daily  mometasone-formoterol (DULERA) 200-5 MCG/ACT inhaler 2 puff, BID  buPROPion (WELLBUTRIN) tablet 100 mg, Every Other Day  insulin glargine (LANTUS) injection vial 50 Units, Nightly  lactobacillus (CULTURELLE) capsule 1 capsule, BID WC  levothyroxine (SYNTHROID) tablet 50 mcg, Daily  midodrine (PROAMATINE) tablet 5 mg, TID PRN  torsemide (DEMADEX) tablet 100 mg, Daily  [START ON 12/1/2022] vitamin D (ERGOCALCIFEROL) capsule 50,000 Units, Q3 Months  heparin (porcine) injection 5,000 Units, 3 times per day  dextrose bolus 10% 125 mL, PRN   Or  dextrose bolus 10% 250 mL, PRN  glucagon (rDNA) injection 1 mg, PRN  dextrose 10 % infusion, Continuous PRN          Physical exam:     Vitals:  BP (!) 165/66   Pulse 93   Temp 97.6 °F (36.4 °C)   Resp 20   Ht 5' 10\" (1.778 m)   Wt 205 lb 0.4 oz (93 kg)   SpO2 94%   BMI 29.42 kg/m²   Constitutional:  AWAKE BUT CONFUSED   Skin: no rash, turgor wnl  Heent:  eomi, mmm  Neck: no bruits or jvd noted  Cardiovascular:  S1, S2 without m/r/g  Respiratory: CTA B without w/r/r  Abdomen:  +bs, soft, nt, nd  Ext: no  lower extremity edema      Labs:  CBC:   Recent Labs     11/12/22  1219   WBC 14.0*   HGB 9.1*        BMP:    Recent Labs     11/12/22  0527 11/13/22  0953 11/14/22  0409   * 132* 128*   K 3.5 3.5 3.2*   CL 91* 90* 86*   CO2 26 28 29   BUN 53* 57* 62*   CREATININE 7.8* 7.8* 7.7*   GLUCOSE 312* 197* 372*     Ca/Mg/Phos:   Recent Labs     11/12/22  0527 11/13/22  0953 11/14/22  0409   CALCIUM 8.3 8.8 8.6   PHOS 6.3* 7.3* 6.5*     Hepatic:   No results for input(s): AST, ALT, ALB, BILITOT, ALKPHOS in the last 72 hours. Troponin:   No results for input(s): TROPONINI in the last 72 hours. BNP: No results for input(s): BNP in the last 72 hours. Lipids: No results for input(s): CHOL, TRIG, HDL, LDLCALC, LABVLDL in the last 72 hours. ABGs:   No results for input(s): PHART, PO2ART, GPO4FQZ in the last 72 hours. INR: No results for input(s): INR in the last 72 hours. UA:  No results for input(s): Oimd La Veta, GLUCOSEU, BILIRUBINUR, KETUA, SPECGRAV, BLOODU, PHUR, PROTEINU, UROBILINOGEN, NITRU, LEUKOCYTESUR, Abbie Hensen in the last 72 hours. Urine Microscopic:   No results for input(s): LABCAST, BACTERIA, COMU, HYALCAST, WBCUA, RBCUA, EPIU in the last 72 hours. Urine Culture:   No results for input(s): LABURIN in the last 72 hours. Urine Chemistry: No results for input(s): David Brink, PROTEINUR, NAUR in the last 72 hours. IMAGING:  XR CHEST PORTABLE   Final Result   Low lung volumes, which somewhat limit evaluation. Small left pleural effusion and/or basilar atelectasis. Stable cardiomegaly. Assessment/Plan :      1. ESRD . On PD  On PD . Using  2.5 %   2500 ml   4 exchanges       2. AMS. 2/2 CO2 retention   Had respiratory insufficiency   Consult pulmonology   Place on BiPAP      2. HTN. Continue BP meds     3. Anemia in ESRD   Give erythropoetin      4. Acid- base disorder. Correct with HD     5. Hypokalemia.  Replace potassium PO             D/w primary team      Thank you for allowing us to participate in care of David Matute         Electronically signed by: Javed Singh MD, 11/14/2022, 8:30 AM      [facility] [address]  Office : [phone]  Fax :[phone]

## 2022-11-14 NOTE — PROGRESS NOTES
ASTHMA ACTION PLAN for Manual Nine     : 1931     Date: 2017  Provider:  Jd Germain MD  Phone for doctor or clinic: [facility], [address] [phone] Patient lethargic and disoriented. ABG ordered per Nephrology. Placed on bipap and pulmonology contacted.    Latest Reference Range & Units 11/14/22 09:53   pH, Arterial 7.350 - 7.450  7.236 (L)   pCO2, Arterial 35.0 - 45.0 mmHg 68.8 (H)   pO2, Arterial 75.0 - 108.0 mmHg 66.3 (L)   HCO3, Arterial 21.0 - 29.0 mmol/L 29.1 (H)

## 2022-11-14 NOTE — PROGRESS NOTES
Department of Internal Medicine  General Internal Medicine   Progress Note      SUBJECTIVE: no respiratory distress , mental status back to baseline     History obtained from chart review, the patient, and nursing staff   General ROS: positive for  - fatigue, malaise, and weight gain  negative for - chills, fever, or night sweats  Psychological ROS: positive for - anxiety and disorientation  negative for - depression, hallucinations, hostility, or memory difficulties  Ophthalmic ROS: negative  Respiratory ROS: positive for - cough, shortness of breath, and tachypnea  negative for - hemoptysis, stridor, or wheezing  Cardiovascular ROS: positive for - dyspnea on exertion and shortness of breath  negative for - edema, loss of consciousness, orthopnea, or palpitations  Gastrointestinal ROS: no abdominal pain, change in bowel habits, or black or bloody stools  Genito-Urinary ROS: no dysuria, trouble voiding, or hematuria  Musculoskeletal ROS: chronic pain   Neurological ROS: no TIA or stroke symptoms  negative  Dermatological ROS: negative    OBJECTIVE      Medications      Current Facility-Administered Medications: calcium acetate (PHOSLO) capsule 667 mg, 1 capsule, Oral, TID WC  gentamicin (GARAMYCIN) 0.1 % cream, , Topical, Daily  HYDROcodone-acetaminophen (NORCO) 5-325 MG per tablet 1 tablet, 1 tablet, Oral, Q4H PRN  insulin lispro (HUMALOG) injection vial 0-8 Units, 0-8 Units, SubCUTAneous, TID WC  insulin lispro (HUMALOG) injection vial 0-4 Units, 0-4 Units, SubCUTAneous, Nightly  cefUROXime (CEFTIN) tablet 250 mg, 250 mg, Oral, Daily  acetaminophen (TYLENOL) tablet 650 mg, 650 mg, Oral, Q4H PRN  albuterol (PROVENTIL) nebulizer solution 2.5 mg, 2.5 mg, Nebulization, Q4H PRN  aspirin chewable tablet 81 mg, 81 mg, Oral, Daily  atorvastatin (LIPITOR) tablet 80 mg, 80 mg, Oral, Daily  mometasone-formoterol (DULERA) 200-5 MCG/ACT inhaler 2 puff, 2 puff, Inhalation, BID  buPROPion (WELLBUTRIN) tablet 100 mg, 100 mg, Oral, Every Other Day  insulin glargine (LANTUS) injection vial 50 Units, 50 Units, SubCUTAneous, Nightly  lactobacillus (CULTURELLE) capsule 1 capsule, 1 capsule, Oral, BID WC  levothyroxine (SYNTHROID) tablet 50 mcg, 50 mcg, Oral, Daily  midodrine (PROAMATINE) tablet 5 mg, 5 mg, Oral, TID PRN  torsemide (DEMADEX) tablet 100 mg, 100 mg, Oral, Daily  [START ON 2022] vitamin D (ERGOCALCIFEROL) capsule 50,000 Units, 50,000 Units, Oral, Q3 Months  heparin (porcine) injection 5,000 Units, 5,000 Units, SubCUTAneous, 3 times per day  dextrose bolus 10% 125 mL, 125 mL, IntraVENous, PRN **OR** dextrose bolus 10% 250 mL, 250 mL, IntraVENous, PRN  glucagon (rDNA) injection 1 mg, 1 mg, SubCUTAneous, PRN  dextrose 10 % infusion, , IntraVENous, Continuous PRN    Physical      Vitals: BP (!) 165/66   Pulse 66   Temp 97.6 °F (36.4 °C)   Resp 22   Ht 5' 10\" (1.778 m)   Wt 205 lb 0.4 oz (93 kg)   SpO2 91%   BMI 29.42 kg/m²   Temp: Temp: 97.6 °F (36.4 °C)  Max: Temp  Av.4 °F (36.3 °C)  Min: 97 °F (36.1 °C)  Max: 97.8 °F (36.6 °C)  Respiration range:  Resp  Av.6  Min: 18  Max: 22  Pulse Range:  Pulse  Av  Min: 66  Max: 96  Blood pressure range:  Systolic (18WQR), YCS:459 , Min:128 , GJZ:455   , Diastolic (84AIX), HCH:99, Min:51, Max:75    SpO2  Av.5 %  Min: 91 %  Max: 100 %    Intake/Output Summary (Last 24 hours) at 2022 1616  Last data filed at 2022 0500  Gross per 24 hour   Intake --   Output -644 ml   Net 644 ml       Vent settings:  Pulse  Av.5  Min: 55  Max: 102  Resp  Av.3  Min: 6  Max: 37  SpO2  Av.2 %  Min: 85 %  Max: 100 %    CONSTITUTIONAL:  awake  EYES:  Lids and lashes normal, pupils equal, round and reactive to light, extra ocular muscles intact, sclera clear, conjunctiva normal  NECK:  mild JVD   BACK:  Symmetric, no curvature, spinous processes are non-tender on palpation, paraspinous muscles are non-tender on palpation, no costal vertebral tenderness  LUNGS: tachypneic, Moderate respiratory distress, moderate air exchange, no retractions, and crackles diffuse, wheeze diffuse  CARDIOVASCULAR:  Normal apical impulse, regular rate and rhythm, normal S1 and S2, no S3 or S4, and no murmur noted  ABDOMEN:  soft BS + non tender   MUSCULOSKELETAL:  trace edema   NEUROLOGIC:  grossly intact   SKIN:  warm dry and pale  and no bruising or bleeding    Data      Recent Results (from the past 96 hour(s))   POCT Glucose    Collection Time: 11/10/22  5:48 PM   Result Value Ref Range    POC Glucose 277 (H) 70 - 99 mg/dl    Performed on ACCU-CHEK    POCT Glucose    Collection Time: 11/10/22  8:36 PM   Result Value Ref Range    POC Glucose 385 (H) 70 - 99 mg/dl    Performed on ACCU-CHEK    POCT Glucose    Collection Time: 11/11/22  8:03 AM   Result Value Ref Range    POC Glucose 279 (H) 70 - 99 mg/dl    Performed on ACCU-CHEK    POCT Glucose    Collection Time: 11/11/22 11:40 AM   Result Value Ref Range    POC Glucose 230 (H) 70 - 99 mg/dl    Performed on ACCU-CHEK    POCT Glucose    Collection Time: 11/11/22  4:38 PM   Result Value Ref Range    POC Glucose 194 (H) 70 - 99 mg/dl    Performed on ACCU-CHEK    POCT Glucose    Collection Time: 11/11/22  7:08 PM   Result Value Ref Range    POC Glucose 203 (H) 70 - 99 mg/dl    Performed on ACCU-CHEK    Renal Function Panel    Collection Time: 11/11/22  9:16 PM   Result Value Ref Range    Sodium 132 (L) 136 - 145 mmol/L    Potassium 3.8 3.5 - 5.1 mmol/L    Chloride 91 (L) 99 - 110 mmol/L    CO2 24 21 - 32 mmol/L    Anion Gap 17 (H) 3 - 16    Glucose 230 (H) 70 - 99 mg/dL    BUN 60 (H) 7 - 20 mg/dL    Creatinine 8.1 (HH) 0.8 - 1.3 mg/dL    Est, Glom Filt Rate 6 (A) >60    Calcium 8.2 (L) 8.3 - 10.6 mg/dL    Phosphorus 7.2 (H) 2.5 - 4.9 mg/dL    Albumin 1.6 (L) 3.4 - 5.0 g/dL   Renal Function Panel    Collection Time: 11/12/22  5:27 AM   Result Value Ref Range    Sodium 131 (L) 136 - 145 mmol/L    Potassium 3.5 3.5 - 5.1 mmol/L    Chloride 91 (L) 99 - 110 mmol/L    CO2 26 21 - 32 mmol/L    Anion Gap 14 3 - 16    Glucose 312 (H) 70 - 99 mg/dL    BUN 53 (H) 7 - 20 mg/dL    Creatinine 7.8 (HH) 0.8 - 1.3 mg/dL    Est, Glom Filt Rate 7 (A) >60    Calcium 8.3 8.3 - 10.6 mg/dL    Phosphorus 6.3 (H) 2.5 - 4.9 mg/dL    Albumin 2.2 (L) 3.4 - 5.0 g/dL   POCT Glucose    Collection Time: 11/12/22  7:42 AM   Result Value Ref Range    POC Glucose 253 (H) 70 - 99 mg/dl    Performed on ACCU-CHEK    POCT Glucose    Collection Time: 11/12/22 11:42 AM   Result Value Ref Range    POC Glucose 150 (H) 70 - 99 mg/dl    Performed on ACCU-CHEK    CBC    Collection Time: 11/12/22 12:19 PM   Result Value Ref Range    WBC 14.0 (H) 4.0 - 11.0 K/uL    RBC 2.54 (L) 4.20 - 5.90 M/uL    Hemoglobin 9.1 (L) 13.5 - 17.5 g/dL    Hematocrit 26.8 (L) 40.5 - 52.5 %    .2 (H) 80.0 - 100.0 fL    MCH 35.8 (H) 26.0 - 34.0 pg    MCHC 34.1 31.0 - 36.0 g/dL    RDW 15.8 (H) 12.4 - 15.4 %    Platelets 861 669 - 653 K/uL    MPV 7.0 5.0 - 10.5 fL   POCT Glucose    Collection Time: 11/12/22  4:45 PM   Result Value Ref Range    POC Glucose 160 (H) 70 - 99 mg/dl    Performed on ACCU-CHEK    POCT Glucose    Collection Time: 11/12/22  9:04 PM   Result Value Ref Range    POC Glucose 329 (H) 70 - 99 mg/dl    Performed on ACCU-CHEK    POCT Glucose    Collection Time: 11/13/22  8:00 AM   Result Value Ref Range    POC Glucose 230 (H) 70 - 99 mg/dl    Performed on ACCU-CHEK    Renal Function Panel    Collection Time: 11/13/22  9:53 AM   Result Value Ref Range    Sodium 132 (L) 136 - 145 mmol/L    Potassium 3.5 3.5 - 5.1 mmol/L    Chloride 90 (L) 99 - 110 mmol/L    CO2 28 21 - 32 mmol/L    Anion Gap 14 3 - 16    Glucose 197 (H) 70 - 99 mg/dL    BUN 57 (H) 7 - 20 mg/dL    Creatinine 7.8 (HH) 0.8 - 1.3 mg/dL    Est, Glom Filt Rate 7 (A) >60    Calcium 8.8 8.3 - 10.6 mg/dL    Phosphorus 7.3 (H) 2.5 - 4.9 mg/dL    Albumin 2.1 (L) 3.4 - 5.0 g/dL   POCT Glucose    Collection Time: 11/13/22 11:44 AM   Result Value Ref Range    POC Glucose 158 (H) 70 - 99 mg/dl    Performed on ACCU-CHEK    POCT Glucose    Collection Time: 11/13/22  4:41 PM   Result Value Ref Range    POC Glucose 188 (H) 70 - 99 mg/dl    Performed on ACCU-CHEK    POCT Glucose    Collection Time: 11/13/22  8:48 PM   Result Value Ref Range    POC Glucose 248 (H) 70 - 99 mg/dl    Performed on ACCU-CHEK    Renal Function Panel    Collection Time: 11/14/22  4:09 AM   Result Value Ref Range    Sodium 128 (L) 136 - 145 mmol/L    Potassium 3.2 (L) 3.5 - 5.1 mmol/L    Chloride 86 (L) 99 - 110 mmol/L    CO2 29 21 - 32 mmol/L    Anion Gap 13 3 - 16    Glucose 372 (H) 70 - 99 mg/dL    BUN 62 (H) 7 - 20 mg/dL    Creatinine 7.7 (HH) 0.8 - 1.3 mg/dL    Est, Glom Filt Rate 7 (A) >60    Calcium 8.6 8.3 - 10.6 mg/dL    Phosphorus 6.5 (H) 2.5 - 4.9 mg/dL    Albumin 2.2 (L) 3.4 - 5.0 g/dL   POCT Glucose    Collection Time: 11/14/22  8:06 AM   Result Value Ref Range    POC Glucose 376 (H) 70 - 99 mg/dl    Performed on ACCU-CHEK    Blood gas, arterial    Collection Time: 11/14/22  9:53 AM   Result Value Ref Range    pH, Arterial 7.236 (L) 7.350 - 7.450    pCO2, Arterial 68.8 (H) 35.0 - 45.0 mmHg    pO2, Arterial 66.3 (L) 75.0 - 108.0 mmHg    HCO3, Arterial 29.1 (H) 21.0 - 29.0 mmol/L    Base Excess, Arterial 0.7 -3.0 - 3.0 mmol/L    Hemoglobin, Art, Extended 9.8 (L) 13.5 - 17.5 g/dL    O2 Sat, Arterial 90.2 (L) >92 %    Carboxyhgb, Arterial 1.6 (H) 0.0 - 1.5 %    Methemoglobin, Arterial 0.0 <1.5 %    TCO2, Arterial 70.0 Not Established mmol/L    O2 Therapy Unknown    POCT Glucose    Collection Time: 11/14/22 11:42 AM   Result Value Ref Range    POC Glucose 320 (H) 70 - 99 mg/dl    Performed on [address] Problems             Last Modified POA    * (Principal) Acute on chronic respiratory failure with hypercapnia (HCC) 11/7/2022 Yes    NSTEMI (non-ST elevated myocardial infarction) ([facility] [address] 11/7/2022 Yes    AMS (altered mental status) 11/7/2022 Yes Acute respiratory failure with hypoxia ([address] 11/7/2022 Yes    Hypercarbia 11/7/2022 Yes    Electrolyte imbalance 11/7/2022 Yes    Chronic hypercapnic respiratory failure ([address] 11/7/2022 Yes    BJ treated with BiPAP 11/9/2022 Yes    Moderate episode of recurrent major depressive disorder ([address] 11/8/2022 Yes    Overweight (BMI 25.0-29.9) 11/8/2022 Yes    History of 2019 novel coronavirus disease (COVID-19) 11/8/2022 Yes    Peritoneal dialysis catheter in place [facility]) 11/8/2022 Yes    COPD with acute exacerbation ([address] 11/7/2022 Yes    Anemia in ESRD (end-stage renal disease) ([address] 11/8/2022 Yes    ESRD (end stage renal disease) ([address] 11/8/2022 Yes    Acute on chronic respiratory failure with hypoxia and hypercapnia ([address] 11/8/2022 Yes    Acute cystitis without hematuria 11/9/2022 Yes    Essential hypertension 11/8/2022 Yes    Sleep apnea 11/8/2022 Yes    Psoriatic arthropathy ([address] 11/8/2022 Yes    Coronary atherosclerosis 11/8/2022 Yes    Vitamin D deficiency 11/8/2022 Yes    Glaucoma 11/8/2022 Yes    S/P CABG x 4 11/8/2022 Yes    Overview Signed 12/9/2015 11:54 AM by Violet Parra MA     2006         ILD (interstitial lung disease) ([address] 11/8/2022 Yes    Chronic diastolic heart failure ([address] 11/8/2022 Yes    Type 2 diabetes mellitus with hyperglycemia, with long-term current use of insulin ([address] 11/8/2022 Yes    Hypertension associated with stage 5 chronic kidney disease due to type 2 diabetes mellitus ([address] 11/8/2022 Yes    Acquired hypothyroidism 11/8/2022 Yes    ESRD on peritoneal dialysis ([address] 11/8/2022 Yes      Ct bipap at baseline setting    Pulmonary has signed off    Ct PD at the [facility] [facility]    Discharge order in place

## 2022-11-14 NOTE — PLAN OF CARE
Problem: Skin/Tissue Integrity  Goal: Absence of new skin breakdown  Description: 1. Monitor for areas of redness and/or skin breakdown  2. Assess vascular access sites hourly  3. Every 4-6 hours minimum:  Change oxygen saturation probe site  4. Every 4-6 hours:  If on nasal continuous positive airway pressure, respiratory therapy assess nares and determine need for appliance change or resting period.   Outcome: Progressing     Problem: Discharge Planning  Goal: Discharge to home or other facility with appropriate resources  Outcome: Progressing     Problem: ABCDS Injury Assessment  Goal: Absence of physical injury  Outcome: Progressing     Problem: ABCDS Injury Assessment  Goal: Absence of physical injury  Outcome: Progressing     Problem: Pain  Goal: Verbalizes/displays adequate comfort level or baseline comfort level  Outcome: Progressing

## 2022-11-14 NOTE — FLOWSHEET NOTE
Treatment initiated without complications or complaints from patient. Patient resting comfortably with VSS upon dialysis RN exit from room. Linda Weiner, RN notified of start of treatment and hotline number located on top of machine for any questions about troubleshooting during after hours.        11/13/22 2130   Vitals   /66   Temp 97.3 °F (36.3 °C)   Temp Source Temporal   Heart Rate 96   Resp 20   SpO2 100 %   Height 5' 10\" (1.778 m)   Weight 202 lb 13.2 oz (92 kg)   Cycler   Verification of Prescription CCPD   Total Volume Programmed 8000 mL   Therapy Time (Hours:Minutes) 08:00   Fill Volume 2000 mL   Number of Cycles 4   Bag Volume 5000 mL   Number of Bags Used 2   [employer] Other (Comment)

## 2022-11-14 NOTE — PROGRESS NOTES
Assessment completed. Alert and oriented. Bipap overnight for appox 3 hrs, patient removed and refused to put back on. No acute events over night. POC discussed.  Call light within reach

## 2022-11-14 NOTE — CARE COORDINATION
Discharge Planning. The SW  contacted MAGO Shelton @ [facility] to inform them that the patient will not be discharging today. Cj stated the next available date to come out to the [facility] will be on wednesday 11/16/2022 @3pm for PD training. The SW will follow up tomorrow with the company with the status of discharge.      Electronically signed by JOE Dyson on 11/14/2022 at 11:51 AM

## 2022-11-15 LAB
ALBUMIN SERPL-MCNC: 2 G/DL (ref 3.4–5)
ANION GAP SERPL CALCULATED.3IONS-SCNC: 14 MMOL/L (ref 3–16)
BUN BLDV-MCNC: 56 MG/DL (ref 7–20)
CALCIUM SERPL-MCNC: 8.7 MG/DL (ref 8.3–10.6)
CHLORIDE BLD-SCNC: 91 MMOL/L (ref 99–110)
CO2: 26 MMOL/L (ref 21–32)
CREAT SERPL-MCNC: 7.2 MG/DL (ref 0.8–1.3)
GFR SERPL CREATININE-BSD FRML MDRD: 7 ML/MIN/{1.73_M2}
GLUCOSE BLD-MCNC: 133 MG/DL (ref 70–99)
GLUCOSE BLD-MCNC: 166 MG/DL (ref 70–99)
GLUCOSE BLD-MCNC: 225 MG/DL (ref 70–99)
GLUCOSE BLD-MCNC: 246 MG/DL (ref 70–99)
GLUCOSE BLD-MCNC: 349 MG/DL (ref 70–99)
HCT VFR BLD CALC: 28.6 % (ref 40.5–52.5)
HEMOGLOBIN: 9.5 G/DL (ref 13.5–17.5)
MCH RBC QN AUTO: 34.8 PG (ref 26–34)
MCHC RBC AUTO-ENTMCNC: 33.4 G/DL (ref 31–36)
MCV RBC AUTO: 104.2 FL (ref 80–100)
PDW BLD-RTO: 15.5 % (ref 12.4–15.4)
PERFORMED ON: ABNORMAL
PHOSPHORUS: 4.4 MG/DL (ref 2.5–4.9)
PLATELET # BLD: 416 K/UL (ref 135–450)
PMV BLD AUTO: 6.9 FL (ref 5–10.5)
POTASSIUM SERPL-SCNC: 3.3 MMOL/L (ref 3.5–5.1)
RBC # BLD: 2.74 M/UL (ref 4.2–5.9)
SODIUM BLD-SCNC: 131 MMOL/L (ref 136–145)
WBC # BLD: 13.2 K/UL (ref 4–11)

## 2022-11-15 PROCEDURE — 99291 CRITICAL CARE FIRST HOUR: CPT | Performed by: INTERNAL MEDICINE

## 2022-11-15 PROCEDURE — 97535 SELF CARE MNGMENT TRAINING: CPT

## 2022-11-15 PROCEDURE — 94640 AIRWAY INHALATION TREATMENT: CPT

## 2022-11-15 PROCEDURE — 36415 COLL VENOUS BLD VENIPUNCTURE: CPT

## 2022-11-15 PROCEDURE — 2500000003 HC RX 250 WO HCPCS: Performed by: INTERNAL MEDICINE

## 2022-11-15 PROCEDURE — 6370000000 HC RX 637 (ALT 250 FOR IP): Performed by: INTERNAL MEDICINE

## 2022-11-15 PROCEDURE — 85027 COMPLETE CBC AUTOMATED: CPT

## 2022-11-15 PROCEDURE — 2700000000 HC OXYGEN THERAPY PER DAY

## 2022-11-15 PROCEDURE — 90945 DIALYSIS ONE EVALUATION: CPT

## 2022-11-15 PROCEDURE — 99233 SBSQ HOSP IP/OBS HIGH 50: CPT | Performed by: INTERNAL MEDICINE

## 2022-11-15 PROCEDURE — 97530 THERAPEUTIC ACTIVITIES: CPT

## 2022-11-15 PROCEDURE — 1200000000 HC SEMI PRIVATE

## 2022-11-15 PROCEDURE — 6360000002 HC RX W HCPCS: Performed by: INTERNAL MEDICINE

## 2022-11-15 PROCEDURE — 94760 N-INVAS EAR/PLS OXIMETRY 1: CPT

## 2022-11-15 PROCEDURE — 80069 RENAL FUNCTION PANEL: CPT

## 2022-11-15 RX ORDER — POTASSIUM CHLORIDE 20 MEQ/1
20 TABLET, EXTENDED RELEASE ORAL
Status: DISCONTINUED | OUTPATIENT
Start: 2022-11-15 | End: 2022-11-16 | Stop reason: HOSPADM

## 2022-11-15 RX ADMIN — BUPROPION HYDROCHLORIDE 100 MG: 100 TABLET, FILM COATED ORAL at 12:53

## 2022-11-15 RX ADMIN — ATORVASTATIN CALCIUM 80 MG: 80 TABLET, FILM COATED ORAL at 09:30

## 2022-11-15 RX ADMIN — HYDROCODONE BITARTRATE AND ACETAMINOPHEN 1 TABLET: 5; 325 TABLET ORAL at 12:53

## 2022-11-15 RX ADMIN — HEPARIN SODIUM 5000 UNITS: 5000 INJECTION INTRAVENOUS; SUBCUTANEOUS at 16:58

## 2022-11-15 RX ADMIN — HEPARIN SODIUM 5000 UNITS: 5000 INJECTION INTRAVENOUS; SUBCUTANEOUS at 02:17

## 2022-11-15 RX ADMIN — GENTAMICIN SULFATE: 1 CREAM TOPICAL at 15:29

## 2022-11-15 RX ADMIN — LEVOTHYROXINE SODIUM 50 MCG: 0.03 TABLET ORAL at 09:30

## 2022-11-15 RX ADMIN — CALCIUM ACETATE 667 MG: 667 CAPSULE ORAL at 16:58

## 2022-11-15 RX ADMIN — Medication 1 CAPSULE: at 16:59

## 2022-11-15 RX ADMIN — CALCIUM ACETATE 667 MG: 667 CAPSULE ORAL at 12:54

## 2022-11-15 RX ADMIN — POTASSIUM CHLORIDE 20 MEQ: 1500 TABLET, EXTENDED RELEASE ORAL at 12:54

## 2022-11-15 RX ADMIN — INSULIN GLARGINE 50 UNITS: 100 INJECTION, SOLUTION SUBCUTANEOUS at 21:07

## 2022-11-15 RX ADMIN — INSULIN LISPRO 4 UNITS: 100 INJECTION, SOLUTION INTRAVENOUS; SUBCUTANEOUS at 21:07

## 2022-11-15 RX ADMIN — INSULIN LISPRO 2 UNITS: 100 INJECTION, SOLUTION INTRAVENOUS; SUBCUTANEOUS at 17:06

## 2022-11-15 RX ADMIN — CEFUROXIME AXETIL 250 MG: 250 TABLET ORAL at 09:35

## 2022-11-15 RX ADMIN — Medication 2 PUFF: at 20:22

## 2022-11-15 RX ADMIN — ASPIRIN 81 MG 81 MG: 81 TABLET ORAL at 09:30

## 2022-11-15 RX ADMIN — Medication 1 CAPSULE: at 09:30

## 2022-11-15 RX ADMIN — HEPARIN SODIUM 5000 UNITS: 5000 INJECTION INTRAVENOUS; SUBCUTANEOUS at 09:35

## 2022-11-15 RX ADMIN — TORSEMIDE 100 MG: 100 TABLET ORAL at 09:30

## 2022-11-15 RX ADMIN — CALCIUM ACETATE 667 MG: 667 CAPSULE ORAL at 09:30

## 2022-11-15 ASSESSMENT — PAIN SCALES - GENERAL
PAINLEVEL_OUTOF10: 3
PAINLEVEL_OUTOF10: 0
PAINLEVEL_OUTOF10: 0

## 2022-11-15 ASSESSMENT — PAIN SCALES - WONG BAKER
WONGBAKER_NUMERICALRESPONSE: 0
WONGBAKER_NUMERICALRESPONSE: 0

## 2022-11-15 ASSESSMENT — PAIN DESCRIPTION - DESCRIPTORS: DESCRIPTORS: ACHING

## 2022-11-15 ASSESSMENT — PAIN DESCRIPTION - LOCATION: LOCATION: SACRUM

## 2022-11-15 NOTE — PROGRESS NOTES
[facility] Department   Phone: [phone]    Occupational Therapy    [] Initial Evaluation            [x] Daily Treatment Note         [] Discharge Summary      Patient: David Matute   : 1945   MRN: [id number]   Date of Service:  11/15/2022    Admitting Diagnosis:  Acute on chronic respiratory failure with hypercapnia [facility])  Current Admission Summary: David Matute is a 69 y.o. male with past medical history of hypertension, diabetes, coronary artery disease and CHF as well as end-stage renal disease on peritoneal dialysis and obstructive sleep apnea here today for altered mental status. The patient was transferred from his care facility for reports of increased lethargy, weakness, and possible altered mental status. The patient himself currently states he feels fine. Denies headache, cough or shortness of breath. Denies chest pain. No abdominal pain, vomiting or diarrhea. States he feels tired and sleepy but is otherwise in no discomfort. Discharged from the hospital on 10/10/2022 after presenting altered thought secondary to acute on chronic hypoxic and hypercapnic respiratory failure with volume overload. Per X-Ray on 22, pt has small left pleural effusion and or/ atelectasis. Past Medical History:  has a past medical history of Allergic rhinitis due to other allergen, Coronary atherosclerosis of unspecified type of vessel, native or graft, Diabetic eye exam ([address] Diabetic eye exam ([address] Generalized osteoarthrosis, involving multiple sites, Other psoriasis, Pneumonia, Prolonged emergence from general anesthesia, Psoriatic arthropathy ([address] Type II or unspecified type diabetes mellitus without mention of complication, not stated as uncontrolled, Unspecified essential hypertension, and Unspecified sleep apnea. Past Surgical History:  has a past surgical history that includes Coronary artery bypass graft;  Carpal tunnel release; joint replacement; Cataract removal; and LAPAROSCOPY INSERTION PERITONEAL CATHETER (N/A, 10/26/2020). Discharge Recommendations: Madyson Stanford scored a 12/24 on the AM-PAC ADL Inpatient form. Current research shows that an AM-PAC score of 17 or less is typically not associated with a discharge to the patient's home setting. Based on the patient's AM-PAC score and their current ADL deficits, it is recommended that the patient have 3-5 sessions per week of Occupational Therapy at d/c to increase the patient's independence. Please see assessment section for further patient specific details. If patient discharges prior to next session this note will serve as a discharge summary. Please see below for the latest assessment towards goals. DME Required For Discharge: DME to be determined at next level of care    Precautions/Restrictions: high fall risk, up as tolerated    Pre-Admission Information   Lives With:  Pt currently at [facility]. Transfer Assistance: requires assistance, Pt reports only transferring 2x in last 2 months for sit to stand. Denies having been up to chair. Ambulation Assistance: Pt has not ambulated since September. ADL Assistance:  assist for all  IADL Assistance:  lives at [facility]  Active :        [] Yes                 [x] No  Hand Dominance: [] Left                 [x] Right  Current Employment: retired. Occupation: air craft   Hobbies: watch tv  Recent Falls: one fall at home  Comments: Per  note on 11/8/22:  Pt from [facility]. Patient needs assistance with ADLs and IADLs. Family also interested in a LTC placement.     Subjective  General: Patient supine in bed upon arrival, agreeable to therapy co-treatment session with encouragement  Pain: c/o in buttocks,did not rate  Pain Interventions: RN notified and repositioned      Activities of Daily Living  Basic Activities of Daily Living  Grooming: stand by assistance Increased time to complete task  Grooming Comments: Patient seated EOB, washed face with maximal cues, delayed planning  Upper Extremity Bathing: moderate assistance requires verbal cueing Increased time to complete task Comment: seated on EOB  Lower Extremity Bathing: maximum assistance   Bathing Comments: seated on EOB  Upper Extremity Dressing: moderate assistance Comment: gown change 2x  Lower Extremity Dressing: dependent Comment: donned/doffed socks, doffed/donned briefs in supine  Dressing Comments: seated on EOB for first UB/LB dressing  Toileting: dependent. Toileting Comments: incontinent of urine while supine in bed  General Comments: max encouragement to remain on EOB  Instrumental Activities of Daily Living  No IADL completed on this date. Functional Mobility  Bed Mobility  Supine to Sit: 2 person assistance with mod Ax2   Sit to Supine: 2 person assistance with Garry + modA   Rolling Left: moderate assistance  Rolling Right: moderate assistance  Scooting: moderate assistance  Comments:HOB partially elevated, patient with increased time needed for bed mobility completion,patient with max verbal and physical cueing needed for initiation   Transfers  No transfers completed on this date secondary to refusal to stand or remain seated on EOB. Patient spontaneously began returning to bed, stating \"I'm not going to make it\" Denied dizziness upon questioning. Functional Mobility:  Sitting Balance: stand by assistance, contact guard assistance.     Sitting Balance Comment: fluctuating assistance levels, ~15 minutes during a portion of above ADL, pt educated on importance to remain upright to improve activity tolerance for ADLs of choice    Other therapeutic activity      Functional Outcomes  AM-PAC Inpatient Daily Activity Raw Score: 12    Cognition  Overall Cognitive Status: Impaired  Following Commands: follows one step commands with repetition, follows one step commands with increased time  Attention Span: attends with cues to redirect, difficulty dividing attention  Memory: decreased recall of recent events, decreased short term memory  Problem Solving: assistance required to generate solutions, assistance required to implement solutions, decreased awareness of errors, assistance required to correct errors made  Insights: decreased awareness of deficits  Initiation: requires cues for some  Sequencing: requires cues for some  Comments: Patient with flat affect   Orientation:    alert and oriented x 4  Command Following:   accurately follows one step commands  Education  Barriers To Learning: cognition and physical  Patient Education: patient educated on goals, OT role and benefits, plan of care, precautions, ADL adaptive strategies, energy conservation, pressure relief, discharge recommendations  Learning Assessment:  patient verbalizes understanding, would benefit from continued reinforcement  Assessment  Activity Tolerance: Patient tolerated treatment fair but is limited by pain  Impairments Requiring Therapeutic Intervention: decreased functional mobility, decreased ADL status, decreased ROM, decreased strength, decreased safety awareness, decreased cognition, decreased endurance, decreased sensation, decreased balance, decreased IADL, decreased fine motor control, increased pain, decreased posture  Prognosis: fair  Clinical Assessment: Pt presents with above deficits which are slightly below baseline. Pt would benefit from skilled OT to increased independence in ADLs to reduce caregiver burden. Pt continues to requires significant assistance for bed mobility and sitting balance. Patient with increased pain on buttocks. Continue POC.   Safety Interventions: patient left in bed, bed alarm in place, call light within reach, patient at risk for falls, and nurse notified    Plan  Frequency: 3-5 x/per week  Current Treatment Recommendations: strengthening, ROM, balance training, functional mobility training, transfer training, endurance training, patient/caregiver education, ADL/self-care training, pain management, home exercise program, safety education, and positioning    Goals  Patient Goals: none stated    Short Term Goals:  Time Frame: upon d/c - no goals met this date 11/15  Patient will complete upper body ADL at minimal assistance -CGA for bathing/SBA for grooming this date continue to assess 11/10  Patient will complete grooming at minimal assistance, while seated on EOB   Patient will increase functional sitting balance to SUP ~10 minutes for improved ADL completion  Patient will increase [facility] ADL score = to or > than 13/24        Therapy Session Time     Individual Group Co-treatment   Time In    1149   Time Out    1219   Minutes    30        Timed Code Treatment Minutes:  30 minutes     Total Treatment Minutes:  30 minutes        Electronically Signed By: RYDER Krishnan/L [id number]

## 2022-11-15 NOTE — PROGRESS NOTES
PULMONARY AND CRITICAL CARE MEDICINE PROGRESS NOTE      SUBJECTIVE: Patient's breathing is slowly stabilizing. Overnight was on BiPAP therapy continuously. Repeat ABG done late yesterday had shown improvement in his hypercapnia. Chest imaging does not show any focal consolidation. Patient more awake and alert today. Underwent peritoneal dialysis last night. REVIEW OF SYSTEMS:   Unable to obtain review of system as patient is on continuous BiPAP therapy. MEDICATIONS:      calcium acetate  1 capsule Oral TID WC    gentamicin   Topical Daily    insulin lispro  0-8 Units SubCUTAneous TID WC    insulin lispro  0-4 Units SubCUTAneous Nightly    cefUROXime  250 mg Oral Daily    aspirin  81 mg Oral Daily    atorvastatin  80 mg Oral Daily    mometasone-formoterol  2 puff Inhalation BID    buPROPion  100 mg Oral Every Other Day    insulin glargine  50 Units SubCUTAneous Nightly    lactobacillus  1 capsule Oral BID WC    levothyroxine  50 mcg Oral Daily    torsemide  100 mg Oral Daily    [START ON 12/1/2022] vitamin D  50,000 Units Oral Q3 Months    heparin (porcine)  5,000 Units SubCUTAneous 3 times per day      dextrose       HYDROcodone 5 mg - acetaminophen, acetaminophen, albuterol, midodrine, dextrose bolus **OR** dextrose bolus, glucagon (rDNA), dextrose     ALLERGIES:   Allergies as of 11/07/2022 - Fully Reviewed 11/07/2022   Allergen Reaction Noted    Penicillins Other (See Comments) 07/12/2010    Sulfa antibiotics  07/12/2010    Tazorac [tazarotene]  07/12/2010    Clindamycin Rash 01/27/2010    Clindamycin/lincomycin Rash 07/12/2010        OBJECTIVE:   height is 5' 10\" (1.778 m) and weight is 201 lb 11.5 oz (91.5 kg). His temporal temperature is 96.7 °F (35.9 °C) (abnormal). His blood pressure is 159/79 (abnormal) and his pulse is 83. His respiration is 20 and oxygen saturation is 100%. No intake/output data recorded.      PHYSICAL EXAM:  CONSTITUTIONAL: He is a 69y.o.-year-old who appears his stated age. He is alert and oriented x 3 and in no acute distress. CARDIOVASCULAR: S1 S2 RRR. Without murmer  RESPIRATORY & CHEST: Lungs are clear to auscultation and percussion. No wheezing, no crackles. Good air movement  GASTROINTESTINAL & ABDOMEN: Soft, nontender, positive bowel sounds in all quadrants, non-distended, without hepatosplenomegaly. GENITOURINARY: Deferred. MUSCULOSKELETAL: No tenderness to palpation of the axial skeleton. There is no clubbing. No cyanosis. No edema of the lower extremities. SKIN OF BODY: No rash or jaundice. PSYCHIATRIC EVALUATION: Normal affect. Patient answers questions appropriately. HEMATOLOGIC/LYMPHATIC/ IMMUNOLOGIC: No palpable lymphadenopathy. NEUROLOGIC: Alert and oriented x 3. Groslly non-focal. Motor strength is 5+/5 in all muscle groups. The patient has a normal sensorium globally.       LABS:   LABS:  Recent Labs     11/12/22  1219 11/13/22  0953 11/14/22  0409 11/15/22  0603   WBC 14.0*  --   --  13.2*   HGB 9.1*  --   --  9.5*   HCT 26.8*  --   --  28.6*     --   --  416   NA  --  132* 128* 131*   K  --  3.5 3.2* 3.3*   CL  --  90* 86* 91*   CREATININE  --  7.8* 7.7* 7.2*   BUN  --  57* 62* 56*   CO2  --  28 29 26       Recent Labs     11/13/22  0953 11/14/22  0409 11/15/22  0603   GLUCOSE 197* 372* 225*   CALCIUM 8.8 8.6 8.7   * 128* 131*   K 3.5 3.2* 3.3*   CO2 28 29 26   CL 90* 86* 91*   BUN 57* 62* 56*   CREATININE 7.8* 7.7* 7.2*       Recent Labs     11/14/22  0953 11/14/22  1638   PHART 7.236* 7.318*   JDE2HVZ 68.8* 52.9*   PO2ART 66.3* 122.0*   JDK8LXC 29.1* 27.1   X0ETMEBA 90.2* 98.9   BEART 0.7 0.6       Lab Results   Component Value Date    INR 0.99 04/15/2022    INR 0.89 01/23/2017    INR 0.91 01/21/2017    PROTIME 11.2 04/15/2022    PROTIME 10.1 01/23/2017    PROTIME 10.4 01/21/2017     No results found for: AMYLASE   Lab Results   Component Value Date    LABA1C 6.6 09/29/2022     Lab Results   Component Value Date    .7 09/29/2022     Lab Results   Component Value Date    TSH 5.20 (H) 01/30/2018     Lab Results   Component Value Date    CKTOTAL 196 04/29/2015    TROPONINI 0.10 (H) 11/07/2022      No results found for: CRP   Lab Results   Component Value Date    .0 (H) 10/26/2013      No results found for: [facility]   Lab Results   Component Value Date    FERRITIN 855.7 (H) 09/25/2022      Lab Results   Component Value Date    LACTA 1.5 09/06/2022          IMAGING:  Narrative   EXAMINATION:   ONE XRAY VIEW OF THE CHEST       11/14/2022 11:19 am           FINDINGS:   Low lung volumes which limits evaluation. Small left pleural effusion. Left   basilar airspace opacities. Stable cardiomediastinal silhouette           Impression   Low lung volumes which limits evaluation. There is a small left pleural   effusion with left basilar airspace opacities which could reflect atelectasis   versus pneumonia. IMPRESSION:   Acute on chronic hypercapnic respiratory failure, slowly stabilizing. COPD  BJ, noncompliant with BiPAP  UTI  End-stage renal disease on PD  Heart failure with preserved ejection fraction        RECOMMENDATION:   Patient's respiratory status worsened yesterday. He was put on continuous BiPAP support overnight. Repeat ABG done late yesterday had shown improvement in his hypercapnia. Patient is a hard stick and repeat ABG is difficult. We will have to monitor him clinically. I will recommend to give him a break from the BiPAP and put him on nasal cannula. During the day he can be given intermittent BiPAP support whenever he is sleeping or napping. At nighttime he should get continuous BiPAP support. Chest imaging does not show any focal consolidation or pleural effusion. Bilateral lower lung volumes seen which is more than likely due to hypoventilation. Management of peritoneal dialysis as per nephrology recommendations  On cefuroxime for UTI. Continue with Dulera inhaler twice a day.   Albuterol inhaler as needed. No indications for oral steroids. If patient is able to tolerate intermittent BiPAP support today, he may be able to get discharged by tomorrow. Total critical care time caring for this patient with life threatening illness, including direct patient contact, management of life support systems, review of data including imaging and labs, discussions with other team members and physicians is at least 32 minutes so far today, excluding procedures. Arvind Rodriguez MD   Pulmonary Critical Care and Sleep Medicine  [facility]   [address] [address], [address] ,[address], [address]  11/7/2022, 10:25 AM              This note was completed using dragon medical speech recognition software. Grammatical errors, random word insertions, pronoun errors and incomplete sentences are occasional consequences of this technology due to software limitations. If there are questions or concerns about the content of this note of information contained within the body of this dictation they should be addressed with the provider for clarification.

## 2022-11-15 NOTE — PLAN OF CARE
Problem: Discharge Planning  Goal: Discharge to home or other facility with appropriate resources  Outcome: Progressing  Flowsheets (Taken 11/15/2022 0800)  Discharge to home or other facility with appropriate resources: Identify barriers to discharge with patient and caregiver     Problem: Pain  Goal: Verbalizes/displays adequate comfort level or baseline comfort level  Outcome: Progressing     Problem: Safety - Adult  Goal: Free from fall injury  Outcome: Progressing     Problem: Respiratory - Adult  Goal: Achieves optimal ventilation and oxygenation  Recent Flowsheet Documentation  Taken 11/15/2022 0800 by Violette Gaston RN  Achieves optimal ventilation and oxygenation:   Assess for changes in respiratory status   Assess for changes in mentation and behavior   Position to facilitate oxygenation and minimize respiratory effort   Oxygen supplementation based on oxygen saturation or arterial blood gases

## 2022-11-15 NOTE — CARE COORDINATION
Discharge Planning. Transport has been scheduled for  11/16/2022 @12:30pm to [facility] via [employer] [facility]. [address] [place] [address] [facility] ([phone]) will meet the patient @ [facility] @ 3pm on 11/16/200 to train their staff on PD. The SW will call on 11/16/2022 to confirm all services has been confirmed before the patient is discharge.     Electronically signed by JOE Acosta on 11/15/2022 at 3:23 PM

## 2022-11-15 NOTE — PROGRESS NOTES
Office : [phone]     Fax :[phone]       Nephrology  progress Note      Patient's Name: Jacob Olson  10:36 AM  11/15/2022    Reason for Consult:  ESRD management        Requesting Physician:  Lisa Conroy MD      Chief Complaint:    Chief Complaint   Patient presents with    Altered Mental Status     Pt transported from [facility] by [facility]. EMT reports Pt has new onset of confusion that started this morning according to facility staff. Pt had PD last night and has not been acting the same since. Vitals WNL, on 3l of O2, , per EMT. Pt alertx4. Slow to respond. History of Present iIlness:    Jacob Olson is a 69 y.o. male with prior history of ESRD, respiratory insufficieny with CO2 retention, HTN , Dm 2 was sent for AMS. Admitted to ICU. Has CO2 retention o ABG . placed on BiPAP. Has h/o CO2 retention previous admission. Interval hx :    Awake and alert   PD TOLERATED WELL   No abdominal pain       I/O last 3 completed shifts:   In: 79 [P.O.:70]  Out: -36     Past Medical History:   Diagnosis Date    Allergic rhinitis due to other allergen 7/12/2010    Coronary atherosclerosis of unspecified type of vessel, native or graft 7/12/2010    Diabetic eye exam ([address] 04/29/2015    Diabetic eye exam ([address] 5/5/2016    [place] [facility]    Generalized osteoarthrosis, involving multiple sites 7/12/2010    Other psoriasis 7/12/2010    Pneumonia     Prolonged emergence from general anesthesia     Psoriatic arthropathy ([address] 7/12/2010    Type II or unspecified type diabetes mellitus without mention of complication, not stated as uncontrolled 7/12/2010    Unspecified essential hypertension 7/12/2010    Unspecified sleep apnea 7/12/2010       Past Surgical History: Procedure Laterality Date    CARPAL TUNNEL RELEASE      s/p    CATARACT REMOVAL      CORONARY ARTERY BYPASS GRAFT      JOINT REPLACEMENT      LAPAROSCOPY INSERTION PERITONEAL CATHETER N/A 10/26/2020    LAPAROSCOPIC PERITONEAL DIALYSIS CATHETER PLACEMENT; LAPAROSCOPIC OMENTOPEXY performed by Thom Duffy DO at [facility] OR       Family History   Problem Relation Age of Onset    Diabetes Mother     Heart Failure Mother     Coronary Art Dis Father         reports that he quit smoking about 42 years ago. He started smoking about 60 years ago. He has a 18.00 pack-year smoking history. He has never used smokeless tobacco. He reports that he does not drink alcohol and does not use drugs.         Allergies:  Penicillins, Sulfa antibiotics, Tazorac [tazarotene], and Clindamycin/lincomycin    Current Medications:    calcium acetate (PHOSLO) capsule 667 mg, TID WC  gentamicin (GARAMYCIN) 0.1 % cream, Daily  HYDROcodone-acetaminophen (NORCO) 5-325 MG per tablet 1 tablet, Q4H PRN  insulin lispro (HUMALOG) injection vial 0-8 Units, TID WC  insulin lispro (HUMALOG) injection vial 0-4 Units, Nightly  cefUROXime (CEFTIN) tablet 250 mg, Daily  acetaminophen (TYLENOL) tablet 650 mg, Q4H PRN  albuterol (PROVENTIL) nebulizer solution 2.5 mg, Q4H PRN  aspirin chewable tablet 81 mg, Daily  atorvastatin (LIPITOR) tablet 80 mg, Daily  mometasone-formoterol (DULERA) 200-5 MCG/ACT inhaler 2 puff, BID  buPROPion (WELLBUTRIN) tablet 100 mg, Every Other Day  insulin glargine (LANTUS) injection vial 50 Units, Nightly  lactobacillus (CULTURELLE) capsule 1 capsule, BID WC  levothyroxine (SYNTHROID) tablet 50 mcg, Daily  midodrine (PROAMATINE) tablet 5 mg, TID PRN  torsemide (DEMADEX) tablet 100 mg, Daily  [START ON 12/1/2022] vitamin D (ERGOCALCIFEROL) capsule 50,000 Units, Q3 Months  heparin (porcine) injection 5,000 Units, 3 times per day  dextrose bolus 10% 125 mL, PRN   Or  dextrose bolus 10% 250 mL, PRN  glucagon (rDNA) injection 1 mg, PRN  dextrose 10 % infusion, Continuous PRN          Physical exam:     Vitals:  BP (!) 159/79   Pulse 83   Temp (!) 96.7 °F (35.9 °C) (Temporal)   Resp 20   Ht 5' 10\" (1.778 m)   Wt 201 lb 11.5 oz (91.5 kg)   SpO2 100%   BMI 28.94 kg/m²   Constitutional:  AWAKE BUT CONFUSED   Skin: no rash, turgor wnl  Heent:  eomi, mmm  Neck: no bruits or jvd noted  Cardiovascular:  S1, S2 without m/r/g  Respiratory: CTA B without w/r/r  Abdomen:  +bs, soft, nt, nd  Ext: no  lower extremity edema      Labs:  CBC:   Recent Labs     11/12/22  1219 11/15/22  0603   WBC 14.0* 13.2*   HGB 9.1* 9.5*    416     BMP:    Recent Labs     11/13/22  0953 11/14/22  0409 11/15/22  0603   * 128* 131*   K 3.5 3.2* 3.3*   CL 90* 86* 91*   CO2 28 29 26   BUN 57* 62* 56*   CREATININE 7.8* 7.7* 7.2*   GLUCOSE 197* 372* 225*     Ca/Mg/Phos:   Recent Labs     11/13/22  0953 11/14/22  0409 11/15/22  0603   CALCIUM 8.8 8.6 8.7   PHOS 7.3* 6.5* 4.4     Hepatic:   No results for input(s): AST, ALT, ALB, BILITOT, ALKPHOS in the last 72 hours. Troponin:   No results for input(s): TROPONINI in the last 72 hours. BNP: No results for input(s): BNP in the last 72 hours. Lipids: No results for input(s): CHOL, TRIG, HDL, LDLCALC, LABVLDL in the last 72 hours. ABGs:   Recent Labs     11/14/22  1638   PHART 7.318*   PO2ART 122.0*   XKR2QNY 52.9*       INR: No results for input(s): INR in the last 72 hours. UA:  No results for input(s): Brittanie Lane, GLUCOSEU, BILIRUBINUR, KETUA, SPECGRAV, BLOODU, PHUR, PROTEINU, UROBILINOGEN, NITRU, LEUKOCYTESUR, Galen Foster in the last 72 hours. Urine Microscopic:   No results for input(s): LABCAST, BACTERIA, COMU, HYALCAST, WBCUA, RBCUA, EPIU in the last 72 hours. Urine Culture:   No results for input(s): LABURIN in the last 72 hours. Urine Chemistry: No results for input(s): Juan Tabatha, PROTEINUR, NAUR in the last 72 hours.              IMAGING:  XR CHEST PORTABLE   Final Result Low lung volumes which limits evaluation. There is a small left pleural   effusion with left basilar airspace opacities which could reflect atelectasis   versus pneumonia. XR CHEST PORTABLE   Final Result   Low lung volumes, which somewhat limit evaluation. Small left pleural effusion and/or basilar atelectasis. Stable cardiomegaly. Assessment/Plan :      1. ESRD . On PD  On PD . Using  2.5 %   2500 ml   4 exchanges   Tolerating well     2. AMS. Resolved   Was on BIpap yesterday         2. HTN. Continue BP meds     3. Anemia in ESRD   Give erythropoetin      4. Acid- base disorder. Correct with HD     5. Hypokalemia.  Replace potassium PO       Stable from nephrology standpoint       D/w primary team      Thank you for allowing us to participate in care of Denisha Salgado         Electronically signed by: Shon Ann MD, 11/15/2022, 10:36 AM      [facility] [address]  Office : [phone]  Fax :[phone]

## 2022-11-15 NOTE — PROGRESS NOTES
[employer] TX INITIATED USING ASEPTIC TECHNIQUE. PT TOLERATING TX WELL. REPORT GIVEN TO Tasha Olmos RN. PT STABLE AT THIS TIME.

## 2022-11-15 NOTE — PROGRESS NOTES
Oral, Every Other Day  insulin glargine (LANTUS) injection vial 50 Units, 50 Units, SubCUTAneous, Nightly  lactobacillus (CULTURELLE) capsule 1 capsule, 1 capsule, Oral, BID WC  levothyroxine (SYNTHROID) tablet 50 mcg, 50 mcg, Oral, Daily  midodrine (PROAMATINE) tablet 5 mg, 5 mg, Oral, TID PRN  torsemide (DEMADEX) tablet 100 mg, 100 mg, Oral, Daily  [START ON 2022] vitamin D (ERGOCALCIFEROL) capsule 50,000 Units, 50,000 Units, Oral, Q3 Months  heparin (porcine) injection 5,000 Units, 5,000 Units, SubCUTAneous, 3 times per day  dextrose bolus 10% 125 mL, 125 mL, IntraVENous, PRN **OR** dextrose bolus 10% 250 mL, 250 mL, IntraVENous, PRN  glucagon (rDNA) injection 1 mg, 1 mg, SubCUTAneous, PRN  dextrose 10 % infusion, , IntraVENous, Continuous PRN    Physical      Vitals: BP (!) 124/108   Pulse 87   Temp 97.6 °F (36.4 °C) (Temporal)   Resp 20   Ht 5' 10\" (1.778 m)   Wt 205 lb 0.4 oz (93 kg)   SpO2 100%   BMI 29.42 kg/m²   Temp: Temp: 97.6 °F (36.4 °C)  Max: Temp  Av.6 °F (36.4 °C)  Min: 97.1 °F (36.2 °C)  Max: 97.9 °F (36.6 °C)  Respiration range:  Resp  Av.3  Min: 18  Max: 24  Pulse Range:  Pulse  Av.6  Min: 66  Max: 96  Blood pressure range:  Systolic (17QMA), HU , Min:124 , THEO:057   , Diastolic (25IGW), CWJ:95, Min:61, Max:108    SpO2  Av.5 %  Min: 91 %  Max: 100 %    Intake/Output Summary (Last 24 hours) at 20222  Last data filed at 2022 1800  Gross per 24 hour   Intake 70 ml   Output -644 ml   Net 714 ml       Vent settings:  Pulse  Av.5  Min: 55  Max: 102  Resp  Av.3  Min: 6  Max: 37  SpO2  Av.2 %  Min: 85 %  Max: 100 %    CONSTITUTIONAL:  awake  EYES:  Lids and lashes normal, pupils equal, round and reactive to light, extra ocular muscles intact, sclera clear, conjunctiva normal  NECK:  mild JVD   BACK:  Symmetric, no curvature, spinous processes are non-tender on palpation, paraspinous muscles are non-tender on palpation, no costal vertebral tenderness  LUNGS:  tachypneic, Moderate respiratory distress, moderate air exchange, no retractions, and crackles diffuse, wheeze diffuse  CARDIOVASCULAR:  Normal apical impulse, regular rate and rhythm, normal S1 and S2, no S3 or S4, and no murmur noted  ABDOMEN:  soft BS + non tender   MUSCULOSKELETAL:  trace edema   NEUROLOGIC:  grossly intact   SKIN:  warm dry and pale  and no bruising or bleeding    Data      Recent Results (from the past 96 hour(s))   POCT Glucose    Collection Time: 11/11/22  8:03 AM   Result Value Ref Range    POC Glucose 279 (H) 70 - 99 mg/dl    Performed on ACCU-CHEK    POCT Glucose    Collection Time: 11/11/22 11:40 AM   Result Value Ref Range    POC Glucose 230 (H) 70 - 99 mg/dl    Performed on ACCU-CHEK    POCT Glucose    Collection Time: 11/11/22  4:38 PM   Result Value Ref Range    POC Glucose 194 (H) 70 - 99 mg/dl    Performed on ACCU-CHEK    POCT Glucose    Collection Time: 11/11/22  7:08 PM   Result Value Ref Range    POC Glucose 203 (H) 70 - 99 mg/dl    Performed on ACCU-CHEK    Renal Function Panel    Collection Time: 11/11/22  9:16 PM   Result Value Ref Range    Sodium 132 (L) 136 - 145 mmol/L    Potassium 3.8 3.5 - 5.1 mmol/L    Chloride 91 (L) 99 - 110 mmol/L    CO2 24 21 - 32 mmol/L    Anion Gap 17 (H) 3 - 16    Glucose 230 (H) 70 - 99 mg/dL    BUN 60 (H) 7 - 20 mg/dL    Creatinine 8.1 (HH) 0.8 - 1.3 mg/dL    Est, Glom Filt Rate 6 (A) >60    Calcium 8.2 (L) 8.3 - 10.6 mg/dL    Phosphorus 7.2 (H) 2.5 - 4.9 mg/dL    Albumin 1.6 (L) 3.4 - 5.0 g/dL   Renal Function Panel    Collection Time: 11/12/22  5:27 AM   Result Value Ref Range    Sodium 131 (L) 136 - 145 mmol/L    Potassium 3.5 3.5 - 5.1 mmol/L    Chloride 91 (L) 99 - 110 mmol/L    CO2 26 21 - 32 mmol/L    Anion Gap 14 3 - 16    Glucose 312 (H) 70 - 99 mg/dL    BUN 53 (H) 7 - 20 mg/dL    Creatinine 7.8 (HH) 0.8 - 1.3 mg/dL    Est, Glom Filt Rate 7 (A) >60    Calcium 8.3 8.3 - 10.6 mg/dL    Phosphorus 6.3 (H) 2.5 - 4.9 mg/dL    Albumin 2.2 (L) 3.4 - 5.0 g/dL   POCT Glucose    Collection Time: 11/12/22  7:42 AM   Result Value Ref Range    POC Glucose 253 (H) 70 - 99 mg/dl    Performed on ACCU-CHEK    POCT Glucose    Collection Time: 11/12/22 11:42 AM   Result Value Ref Range    POC Glucose 150 (H) 70 - 99 mg/dl    Performed on ACCU-CHEK    CBC    Collection Time: 11/12/22 12:19 PM   Result Value Ref Range    WBC 14.0 (H) 4.0 - 11.0 K/uL    RBC 2.54 (L) 4.20 - 5.90 M/uL    Hemoglobin 9.1 (L) 13.5 - 17.5 g/dL    Hematocrit 26.8 (L) 40.5 - 52.5 %    .2 (H) 80.0 - 100.0 fL    MCH 35.8 (H) 26.0 - 34.0 pg    MCHC 34.1 31.0 - 36.0 g/dL    RDW 15.8 (H) 12.4 - 15.4 %    Platelets 857 428 - 374 K/uL    MPV 7.0 5.0 - 10.5 fL   POCT Glucose    Collection Time: 11/12/22  4:45 PM   Result Value Ref Range    POC Glucose 160 (H) 70 - 99 mg/dl    Performed on ACCU-CHEK    POCT Glucose    Collection Time: 11/12/22  9:04 PM   Result Value Ref Range    POC Glucose 329 (H) 70 - 99 mg/dl    Performed on ACCU-CHEK    POCT Glucose    Collection Time: 11/13/22  8:00 AM   Result Value Ref Range    POC Glucose 230 (H) 70 - 99 mg/dl    Performed on ACCU-CHEK    Renal Function Panel    Collection Time: 11/13/22  9:53 AM   Result Value Ref Range    Sodium 132 (L) 136 - 145 mmol/L    Potassium 3.5 3.5 - 5.1 mmol/L    Chloride 90 (L) 99 - 110 mmol/L    CO2 28 21 - 32 mmol/L    Anion Gap 14 3 - 16    Glucose 197 (H) 70 - 99 mg/dL    BUN 57 (H) 7 - 20 mg/dL    Creatinine 7.8 (HH) 0.8 - 1.3 mg/dL    Est, Glom Filt Rate 7 (A) >60    Calcium 8.8 8.3 - 10.6 mg/dL    Phosphorus 7.3 (H) 2.5 - 4.9 mg/dL    Albumin 2.1 (L) 3.4 - 5.0 g/dL   POCT Glucose    Collection Time: 11/13/22 11:44 AM   Result Value Ref Range    POC Glucose 158 (H) 70 - 99 mg/dl    Performed on ACCU-CHEK    POCT Glucose    Collection Time: 11/13/22  4:41 PM   Result Value Ref Range    POC Glucose 188 (H) 70 - 99 mg/dl    Performed on ACCU-CHEK    POCT Glucose    Collection Time: 11/13/22  8:48 PM   Result Value Ref Range    POC Glucose 248 (H) 70 - 99 mg/dl    Performed on ACCU-CHEK    Renal Function Panel    Collection Time: 11/14/22  4:09 AM   Result Value Ref Range    Sodium 128 (L) 136 - 145 mmol/L    Potassium 3.2 (L) 3.5 - 5.1 mmol/L    Chloride 86 (L) 99 - 110 mmol/L    CO2 29 21 - 32 mmol/L    Anion Gap 13 3 - 16    Glucose 372 (H) 70 - 99 mg/dL    BUN 62 (H) 7 - 20 mg/dL    Creatinine 7.7 (HH) 0.8 - 1.3 mg/dL    Est, Glom Filt Rate 7 (A) >60    Calcium 8.6 8.3 - 10.6 mg/dL    Phosphorus 6.5 (H) 2.5 - 4.9 mg/dL    Albumin 2.2 (L) 3.4 - 5.0 g/dL   POCT Glucose    Collection Time: 11/14/22  8:06 AM   Result Value Ref Range    POC Glucose 376 (H) 70 - 99 mg/dl    Performed on ACCU-CHEK    Blood gas, arterial    Collection Time: 11/14/22  9:53 AM   Result Value Ref Range    pH, Arterial 7.236 (L) 7.350 - 7.450    pCO2, Arterial 68.8 (H) 35.0 - 45.0 mmHg    pO2, Arterial 66.3 (L) 75.0 - 108.0 mmHg    HCO3, Arterial 29.1 (H) 21.0 - 29.0 mmol/L    Base Excess, Arterial 0.7 -3.0 - 3.0 mmol/L    Hemoglobin, Art, Extended 9.8 (L) 13.5 - 17.5 g/dL    O2 Sat, Arterial 90.2 (L) >92 %    Carboxyhgb, Arterial 1.6 (H) 0.0 - 1.5 %    Methemoglobin, Arterial 0.0 <1.5 %    TCO2, Arterial 70.0 Not Established mmol/L    O2 Therapy Unknown    POCT Glucose    Collection Time: 11/14/22 11:42 AM   Result Value Ref Range    POC Glucose 320 (H) 70 - 99 mg/dl    Performed on ACCU-CHEK    Blood gas, arterial    Collection Time: 11/14/22  4:38 PM   Result Value Ref Range    pH, Arterial 7.318 (L) 7.350 - 7.450    pCO2, Arterial 52.9 (H) 35.0 - 45.0 mmHg    pO2, Arterial 122.0 (H) 75.0 - 108.0 mmHg    HCO3, Arterial 27.1 21.0 - 29.0 mmol/L    Base Excess, Arterial 0.6 -3.0 - 3.0 mmol/L    Hemoglobin, Art, Extended 8.9 (L) 13.5 - 17.5 g/dL    O2 Sat, Arterial 98.9 >92 %    Carboxyhgb, Arterial 1.4 0.0 - 1.5 %    Methemoglobin, Arterial 0.4 <1.5 %    TCO2, Arterial 64.4 Not Established mmol/L    O2 Therapy Unknown    POCT Glucose Collection Time: 11/14/22  5:14 PM   Result Value Ref Range    POC Glucose 151 (H) 70 - 99 mg/dl    Performed on ACCU-CHEK    POCT Glucose    Collection Time: 11/14/22  8:58 PM   Result Value Ref Range    POC Glucose 210 (H) 70 - 99 mg/dl    Performed on [address] Problems             Last Modified POA    * (Principal) Acute on chronic respiratory failure with hypercapnia ([address] 11/7/2022 Yes    NSTEMI (non-ST elevated myocardial infarction) ([address] 11/7/2022 Yes    AMS (altered mental status) 11/7/2022 Yes    Acute respiratory failure with hypoxia ([address] 11/7/2022 Yes    Hypercarbia 11/7/2022 Yes    Electrolyte imbalance 11/7/2022 Yes    Chronic hypercapnic respiratory failure ([address] 11/7/2022 Yes    BJ treated with BiPAP 11/9/2022 Yes    Moderate episode of recurrent major depressive disorder ([address] 11/8/2022 Yes    Overweight (BMI 25.0-29.9) 11/8/2022 Yes    History of 2019 novel coronavirus disease (COVID-19) 11/8/2022 Yes    Peritoneal dialysis catheter in place [facility]) 11/8/2022 Yes    COPD with acute exacerbation ([address] 11/7/2022 Yes    Anemia in ESRD (end-stage renal disease) ([address] 11/8/2022 Yes    ESRD (end stage renal disease) ([address] 11/8/2022 Yes    Acute on chronic respiratory failure with hypoxia and hypercapnia ([address] 11/8/2022 Yes    Acute cystitis without hematuria 11/9/2022 Yes    Essential hypertension 11/8/2022 Yes    Sleep apnea 11/8/2022 Yes    Psoriatic arthropathy ([address] 11/8/2022 Yes    Coronary atherosclerosis 11/8/2022 Yes    Vitamin D deficiency 11/8/2022 Yes    Glaucoma 11/8/2022 Yes    S/P CABG x 4 11/8/2022 Yes    Overview Signed 12/9/2015 11:54 AM by Сергей Knutson MA     2006         ILD (interstitial lung disease) ([address] 11/8/2022 Yes    Chronic diastolic heart failure ([facility] 11/8/2022 Yes    Type 2 diabetes mellitus with hyperglycemia, with long-term current use of insulin ([facility] 11/8/2022 Yes    Hypertension associated with stage 5 chronic kidney disease due to type 2 diabetes mellitus ([facility] 11/8/2022 Yes    Acquired hypothyroidism 11/8/2022 Yes    ESRD on peritoneal dialysis ([facility] 11/8/2022 Yes      C Ct Bipap   Increase presuure during inspiration   Ct CAPD per nephrology  Not a good day for dismissal    Still Awaiting dismissal arrangements to a NH  Pulmonary critical care is following again

## 2022-11-15 NOTE — PROGRESS NOTES
Head to toe assessment completed. VSS. A&Ox4. Norco given for pain. Patient placed on peritioneal dialysis at 1430. Bed in lowest position and call light in place. All patient needs are met. Care ongoing.

## 2022-11-15 NOTE — PROGRESS NOTES
CCPD Order   Exchanges: 4   Exchange Volume: 2500 ml   Total Time: 8 hrs   Dextrose: 2.5%   Last Fill: 0 ml   Total Volume: 10252 ml     Orders verified. Supplies taken to pt's room. Report received. Cycler set up, primed and pre tested. [facility]. Pt connected to cycler. CCPD initiated without problem. Initial effluent clear. If problems should arise please call the [phone] on top of PD cycler machine.

## 2022-11-15 NOTE — PROGRESS NOTES
[facility] Department   Phone: [phone]    Physical Therapy    [] Initial Evaluation            [x] Daily Treatment Note         [] Discharge Summary      Patient: Jacob Olson   : 1945   MRN: [id number]   Date of Service:  11/15/2022  Admitting Diagnosis: Acute on chronic respiratory failure with hypercapnia [facility])  Current Admission Summary: Jacob Olson is a 69 y.o. male with past medical history of hypertension, diabetes, coronary artery disease and CHF as well as end-stage renal disease on peritoneal dialysis and obstructive sleep apnea here today for altered mental status. The patient was transferred from his care facility for reports of increased lethargy, weakness, and possible altered mental status. The patient himself currently states he feels fine. Denies headache, cough or shortness of breath. Denies chest pain. No abdominal pain, vomiting or diarrhea. States he feels tired and sleepy but is otherwise in no discomfort. Discharged from the hospital on 10/10/2022 after presenting altered thought secondary to acute on chronic hypoxic and hypercapnic respiratory failure with volume overload. Per X-Ray on 22, pt has small left pleural effusion and or/ atelectasis. Past Medical History:  has a past medical history of Allergic rhinitis due to other allergen, Coronary atherosclerosis of unspecified type of vessel, native or graft, Diabetic eye exam ([address] Diabetic eye exam ([address] Generalized osteoarthrosis, involving multiple sites, Other psoriasis, Pneumonia, Prolonged emergence from general anesthesia, Psoriatic arthropathy ([address] Type II or unspecified type diabetes mellitus without mention of complication, not stated as uncontrolled, Unspecified essential hypertension, and Unspecified sleep apnea. Past Surgical History:  has a past surgical history that includes Coronary artery bypass graft;  Carpal tunnel release; joint replacement; Cataract removal; and LAPAROSCOPY INSERTION PERITONEAL CATHETER (N/A, 10/26/2020). Discharge Recommendations: Nany Moss scored a 7/24 on the AM-PAC short mobility form. Current research shows that an AM-PAC score of 17 or less is typically not associated with a discharge to the patient's home setting. Based on the patient's AM-PAC score and their current functional mobility deficits, it is recommended that the patient have 3-5 sessions per week of Physical Therapy at d/c to increase the patient's independence. Please see assessment section for further patient specific details. If patient discharges prior to next session this note will serve as a discharge summary. Please see below for the latest assessment towards goals. DME Required For Discharge: DME to be determined at next level of care  Precautions/Restrictions: high fall risk, up as tolerated  Weight Bearing Restrictions: no restrictions  [] Right Upper Extremity  [] Left Upper Extremity [] Right Lower Extremity  [] Left Lower Extremity     Required Braces/Orthotics: no braces required   [] Right  [] Left  Positional Restrictions:no positional restrictions    Pre-Admission Information   Lives With:  Pt currently at SNF. Transfer Assistance: requires assistance, Pt reports only transferring 2x in last 2 months for sit to stand. Denies having been up to chair. Ambulation Assistance: Pt has not ambulated since September. ADL Assistance:  assist for all  IADL Assistance:  lives at [facility]  Active :        [] Yes  [x] No  Hand Dominance: [] Left  [x] Right  Current Employment: retired. Occupation: air craft   Hobbies: watch tv  Recent Falls: one fall at home        Subjective  General: Patient lying supine in bed with HOB elevated upon arrival. Patient is agreeable to PT/OT with encouragement. SpO2 above 95% on 2L throughout session.    Pain: Patient does not rate upon questioning (pt grimacing and moaning intermittently throughout session however not providing pain information despite max prompting)  Pain Interventions: RN notified      Supine BP at beginning of session: 143/75  Supine BP once pt returned to supine in bed towards end of session: 145/90    Functional Mobility  Bed Mobility  Supine to Sit: 2 person assistance with mod Ax2   Sit to Supine: 2 person assistance with mod+min   Rolling Left: moderate assistance  Rolling Right: moderate assistance  Scooting: Dx2  Comments: Rolling performed at end of session for dependent pericare and brief change. Transfers  No transfers completed on this date secondary to pt spontaneously returning to supine in bed/pt refusal.  Comments:  Ambulation  Ambulation not tested on this date secondary to pt refusal.    Stair Mobility   Stair mobility note attempted this date. Comments:  Wheelchair Mobility:  Comments:  Balance  Static Sitting Balance: fair (+): maintains balance at SBA/supervision without use of UE support  Dynamic Sitting Balance: fair (+): maintains balance at SBA/supervision without use of UE support  Comments: Patient sat EOB for ~15 mins while performing ADLs (see OT note for details)    Other Therapeutic Interventions  Pt stating \"I'm not going to make it\" while seated at EOB performing ADLs and began leaning towards bed to return to supine. Denied dizziness upon questioning. BP taken and noted above. Pt performed rolling as documented above for dependent pericare/brief change.      Functional Outcomes  AM-PAC Inpatient Mobility Raw Score : 7              Cognition  Overall Cognitive Status: Impaired  Arousal/Alterness: delayed responses to stimuli  Following Commands: follows one step commands with repetition, follows one step commands with increased time  Attention Span: attends with cues to redirect, difficulty dividing attention  Memory: decreased recall of recent events, decreased short term memory  Problem Solving: assistance required to generate solutions, assistance required to implement solutions, decreased awareness of errors, assistance required to identify errors made, assistance required to correct errors made  Insights: decreased awareness of deficits  Initiation: requires cues for some  Sequencing: requires cues for some  Orientation:    alert and oriented x 4  Command Following:   accurately follows one step commands    Education  Barriers To Learning: cognition and physical  Patient Education: patient educated on goals, PT role and benefits, plan of care, general safety, discharge recommendations  Learning Assessment:  patient verbalizes understanding, would benefit from continued reinforcement    Assessment  Activity Tolerance: Fair, limited by pain  Impairments Requiring Therapeutic Intervention: decreased functional mobility, decreased ADL status, decreased strength, decreased safety awareness, decreased cognition, decreased endurance, decreased sensation, decreased balance, increased pain, decreased posture  Prognosis: fair  Clinical Assessment: Pt able to sit EOB for increased time and with less assist this date. Pt continues to require 2 person assist for bed mobility and continues to present with decreased activity tolerance. Pt is unable to return to home at current level will need further inpatient therapy and 24/7 assist at d/c.    Safety Interventions: patient left in bed, bed alarm in place, call light within reach, patient at risk for falls, and nurse notified    Plan  Frequency: 3-5 x/per week  Current Treatment Recommendations: strengthening, ROM, balance training, functional mobility training, transfer training, gait training, endurance training, patient/caregiver education, ADL/self-care training, pain management, home exercise program, safety education, equipment evaluation/education, and positioning    Goals  Patient Goals: pt did not verbalize   Short Term Goals:  Time Frame: upon d/c  Patient will complete bed mobility at minimal assistance   Patient will complete transfers at maximum assistance  for sit<>stand  Pt will sit EOB x 15 min with min assist to progress endurance and allow for seated functional activities--MET 11/15/22  1 GOAL MET THIS DATE     Therapy Session Time      Individual Group Co-treatment   Time In     1149   Time Out     1219   Minutes     30     Timed Code Treatment Minutes:   30  Total Treatment Minutes:  30       Electronically Signed By: Elizabeth Merino, [address] [place] [address],[address], DPT [id number]

## 2022-11-15 NOTE — FLOWSHEET NOTE
11/15/22 0655   Vital Signs   BP (!) 160/96   Temp (!) 96 °F (35.6 °C)   Heart Rate 100   Resp 19   Weight 201 lb 11.5 oz (91.5 kg)   Weight Method Actual;Bed scale     Disconnected from CCPD per protocol. Effluent: Clear yellow    Total time: 08:41  Total UF:  1114 ml. Total Volume:  1000 ml.   Dwell time gained: 00:30    Pt Tolerated procedure: No problems  Report given to: Allyn Nicholas RN    Last BM: 11-15-22

## 2022-11-15 NOTE — PROGRESS NOTES
Physician Progress Note      PATIENT:               Lux Lewis  CSN #:                  [id number]  :                       1945  ADMIT DATE:       2022 1:38 PM  DISCH DATE:  RESPONDING  PROVIDER #:        Ja Sutton MD          QUERY TEXT:    Pt admitted with Acute on chronic respiratory failure. Pt noted to have UTI   and abnormal lab values. If possible, please document in the progress notes   and discharge summary if you are evaluating and /or treating any of the   following: The medical record reflects the following:  Risk Factors: UTI  Clinical Indicators: Patient noted to have UTI, : Temp 96.6, WBC 14.0, RR   20  Treatment: IV antibiotics, Urine culture  Options provided:  -- Sepsis, developed following admission  -- UTI without Sepsis  -- Other - I will add my own diagnosis  -- Disagree - Not applicable / Not valid  -- Disagree - Clinically unable to determine / Unknown  -- Refer to Clinical Documentation Reviewer    PROVIDER RESPONSE TEXT:    Provider disagreed with this query. noconvincing evidence of septcemia    Query created by: Yesica Aiken on 2022 8:58 PM      Electronically signed by:   Ja Sutton MD 2022 10:45 PM

## 2022-11-16 VITALS
OXYGEN SATURATION: 100 % | HEART RATE: 83 BPM | DIASTOLIC BLOOD PRESSURE: 66 MMHG | BODY MASS INDEX: 27.21 KG/M2 | WEIGHT: 190.04 LBS | TEMPERATURE: 97 F | RESPIRATION RATE: 20 BRPM | SYSTOLIC BLOOD PRESSURE: 134 MMHG | HEIGHT: 70 IN

## 2022-11-16 DIAGNOSIS — R00.1 BRADYCARDIA: Primary | ICD-10-CM

## 2022-11-16 LAB
ALBUMIN SERPL-MCNC: 2.3 G/DL (ref 3.4–5)
ANION GAP SERPL CALCULATED.3IONS-SCNC: 11 MMOL/L (ref 3–16)
BUN BLDV-MCNC: 56 MG/DL (ref 7–20)
CALCIUM SERPL-MCNC: 8.8 MG/DL (ref 8.3–10.6)
CHLORIDE BLD-SCNC: 91 MMOL/L (ref 99–110)
CO2: 31 MMOL/L (ref 21–32)
CREAT SERPL-MCNC: 7.1 MG/DL (ref 0.8–1.3)
GFR SERPL CREATININE-BSD FRML MDRD: 7 ML/MIN/{1.73_M2}
GLUCOSE BLD-MCNC: 104 MG/DL (ref 70–99)
GLUCOSE BLD-MCNC: 230 MG/DL (ref 70–99)
GLUCOSE BLD-MCNC: 351 MG/DL (ref 70–99)
GLUCOSE BLD-MCNC: 95 MG/DL (ref 70–99)
PERFORMED ON: ABNORMAL
PERFORMED ON: ABNORMAL
PERFORMED ON: NORMAL
PHOSPHORUS: 5.1 MG/DL (ref 2.5–4.9)
POTASSIUM SERPL-SCNC: 3.8 MMOL/L (ref 3.5–5.1)
SODIUM BLD-SCNC: 133 MMOL/L (ref 136–145)

## 2022-11-16 PROCEDURE — 6360000002 HC RX W HCPCS: Performed by: INTERNAL MEDICINE

## 2022-11-16 PROCEDURE — 6370000000 HC RX 637 (ALT 250 FOR IP): Performed by: INTERNAL MEDICINE

## 2022-11-16 PROCEDURE — 6370000000 HC RX 637 (ALT 250 FOR IP): Performed by: PEDIATRICS

## 2022-11-16 PROCEDURE — 99232 SBSQ HOSP IP/OBS MODERATE 35: CPT | Performed by: INTERNAL MEDICINE

## 2022-11-16 PROCEDURE — 2700000000 HC OXYGEN THERAPY PER DAY

## 2022-11-16 PROCEDURE — 94761 N-INVAS EAR/PLS OXIMETRY MLT: CPT

## 2022-11-16 PROCEDURE — 80069 RENAL FUNCTION PANEL: CPT

## 2022-11-16 PROCEDURE — 99221 1ST HOSP IP/OBS SF/LOW 40: CPT | Performed by: INTERNAL MEDICINE

## 2022-11-16 PROCEDURE — 94640 AIRWAY INHALATION TREATMENT: CPT

## 2022-11-16 PROCEDURE — 94660 CPAP INITIATION&MGMT: CPT

## 2022-11-16 PROCEDURE — 93005 ELECTROCARDIOGRAM TRACING: CPT | Performed by: PEDIATRICS

## 2022-11-16 PROCEDURE — 2500000003 HC RX 250 WO HCPCS: Performed by: INTERNAL MEDICINE

## 2022-11-16 PROCEDURE — 36415 COLL VENOUS BLD VENIPUNCTURE: CPT

## 2022-11-16 PROCEDURE — 99233 SBSQ HOSP IP/OBS HIGH 50: CPT | Performed by: INTERNAL MEDICINE

## 2022-11-16 RX ORDER — INSULIN LISPRO 100 [IU]/ML
4 INJECTION, SOLUTION INTRAVENOUS; SUBCUTANEOUS ONCE
Status: COMPLETED | OUTPATIENT
Start: 2022-11-16 | End: 2022-11-16

## 2022-11-16 RX ADMIN — LEVOTHYROXINE SODIUM 50 MCG: 0.03 TABLET ORAL at 05:25

## 2022-11-16 RX ADMIN — POTASSIUM CHLORIDE 20 MEQ: 1500 TABLET, EXTENDED RELEASE ORAL at 10:55

## 2022-11-16 RX ADMIN — ATORVASTATIN CALCIUM 80 MG: 80 TABLET, FILM COATED ORAL at 10:56

## 2022-11-16 RX ADMIN — ASPIRIN 81 MG 81 MG: 81 TABLET ORAL at 10:55

## 2022-11-16 RX ADMIN — Medication 1 CAPSULE: at 10:55

## 2022-11-16 RX ADMIN — CALCIUM ACETATE 667 MG: 667 CAPSULE ORAL at 10:55

## 2022-11-16 RX ADMIN — INSULIN LISPRO 4 UNITS: 100 INJECTION, SOLUTION INTRAVENOUS; SUBCUTANEOUS at 01:16

## 2022-11-16 RX ADMIN — TORSEMIDE 100 MG: 100 TABLET ORAL at 10:56

## 2022-11-16 RX ADMIN — HEPARIN SODIUM 5000 UNITS: 5000 INJECTION INTRAVENOUS; SUBCUTANEOUS at 10:55

## 2022-11-16 RX ADMIN — CEFUROXIME AXETIL 250 MG: 250 TABLET ORAL at 10:56

## 2022-11-16 RX ADMIN — HEPARIN SODIUM 5000 UNITS: 5000 INJECTION INTRAVENOUS; SUBCUTANEOUS at 01:27

## 2022-11-16 ASSESSMENT — PAIN SCALES - GENERAL
PAINLEVEL_OUTOF10: 0

## 2022-11-16 ASSESSMENT — PAIN SCALES - WONG BAKER
WONGBAKER_NUMERICALRESPONSE: 0

## 2022-11-16 NOTE — PROGRESS NOTES
PM assessment completed. Please see complex vital flowsheet for complete assessment. Pt eyes closed, in bed. Pt calm and able to follow commands. VSS. 3L NC. Denies pain. Pt bundled and lights dimmed for comfort.

## 2022-11-16 NOTE — PROGRESS NOTES
PULMONARY AND CRITICAL CARE MEDICINE PROGRESS NOTE      SUBJECTIVE: Patient lying in bed on BiPAP therapy. In no apparent respiratory distress. Reports breathing is improved. Did undergo peritoneal dialysis last night and tolerated it well. Hemodynamically stable. REVIEW OF SYSTEMS:   Unable to obtain review of system as patient is on continuous BiPAP therapy. MEDICATIONS:      potassium chloride  20 mEq Oral Daily with breakfast    calcium acetate  1 capsule Oral TID WC    gentamicin   Topical Daily    insulin lispro  0-8 Units SubCUTAneous TID WC    insulin lispro  0-4 Units SubCUTAneous Nightly    cefUROXime  250 mg Oral Daily    aspirin  81 mg Oral Daily    atorvastatin  80 mg Oral Daily    mometasone-formoterol  2 puff Inhalation BID    buPROPion  100 mg Oral Every Other Day    insulin glargine  50 Units SubCUTAneous Nightly    lactobacillus  1 capsule Oral BID WC    levothyroxine  50 mcg Oral Daily    torsemide  100 mg Oral Daily    [START ON 12/1/2022] vitamin D  50,000 Units Oral Q3 Months    heparin (porcine)  5,000 Units SubCUTAneous 3 times per day      dextrose       HYDROcodone 5 mg - acetaminophen, acetaminophen, albuterol, midodrine, dextrose bolus **OR** dextrose bolus, glucagon (rDNA), dextrose     ALLERGIES:   Allergies as of 11/07/2022 - Fully Reviewed 11/07/2022   Allergen Reaction Noted    Penicillins Other (See Comments) 07/12/2010    Sulfa antibiotics  07/12/2010    Tazorac [tazarotene]  07/12/2010    Clindamycin Rash 01/27/2010    Clindamycin/lincomycin Rash 07/12/2010        OBJECTIVE:   height is 5' 10\" (1.778 m) and weight is 190 lb 0.6 oz (86.2 kg). His axillary temperature is 96.5 °F (35.8 °C) (abnormal). His blood pressure is 144/71 (abnormal) and his pulse is 74. His respiration is 16 and oxygen saturation is 100%. No intake/output data recorded. PHYSICAL EXAM:  CONSTITUTIONAL: He is a 69y.o.-year-old who appears his stated age.  He is alert and oriented x 3 and in no acute distress. CARDIOVASCULAR: S1 S2 RRR. Without murmer  RESPIRATORY & CHEST: Lungs are clear to auscultation and percussion. No wheezing, no crackles. Good air movement  GASTROINTESTINAL & ABDOMEN: Soft, nontender, positive bowel sounds in all quadrants, non-distended, without hepatosplenomegaly. GENITOURINARY: Deferred. MUSCULOSKELETAL: No tenderness to palpation of the axial skeleton. There is no clubbing. No cyanosis. No edema of the lower extremities. SKIN OF BODY: No rash or jaundice. PSYCHIATRIC EVALUATION: Normal affect. Patient answers questions appropriately. HEMATOLOGIC/LYMPHATIC/ IMMUNOLOGIC: No palpable lymphadenopathy. NEUROLOGIC: Alert and oriented x 3. Groslly non-focal. Motor strength is 5+/5 in all muscle groups. The patient has a normal sensorium globally.       LABS:   LABS:  Recent Labs     11/14/22  0409 11/15/22  0603 11/16/22 0314   WBC  --  13.2*  --    HGB  --  9.5*  --    HCT  --  28.6*  --    PLT  --  416  --    * 131* 133*   K 3.2* 3.3* 3.8   CL 86* 91* 91*   CREATININE 7.7* 7.2* 7.1*   BUN 62* 56* 56*   CO2 29 26 31       Recent Labs     11/14/22  0409 11/15/22  0603 11/16/22  0314   GLUCOSE 372* 225* 230*   CALCIUM 8.6 8.7 8.8   * 131* 133*   K 3.2* 3.3* 3.8   CO2 29 26 31   CL 86* 91* 91*   BUN 62* 56* 56*   CREATININE 7.7* 7.2* 7.1*       Recent Labs     11/14/22  0953 11/14/22  1638   PHART 7.236* 7.318*   WUX0BAA 68.8* 52.9*   PO2ART 66.3* 122.0*   ZIZ0SUY 29.1* 27.1   Y6FPHISE 90.2* 98.9   BEART 0.7 0.6       Lab Results   Component Value Date    INR 0.99 04/15/2022    INR 0.89 01/23/2017    INR 0.91 01/21/2017    PROTIME 11.2 04/15/2022    PROTIME 10.1 01/23/2017    PROTIME 10.4 01/21/2017     No results found for: AMYLASE   Lab Results   Component Value Date    LABA1C 6.6 09/29/2022     Lab Results   Component Value Date    .7 09/29/2022     Lab Results   Component Value Date    TSH 5.20 (H) 01/30/2018     Lab Results   Component Value Date CKTOTAL 196 04/29/2015    TROPONINI 0.10 (H) 11/07/2022      No results found for: CRP   Lab Results   Component Value Date    .0 (H) 10/26/2013      No results found for: [facility]   Lab Results   Component Value Date    FERRITIN 855.7 (H) 09/25/2022      Lab Results   Component Value Date    LACTA 1.5 09/06/2022          IMAGING:  Narrative   EXAMINATION:   ONE XRAY VIEW OF THE CHEST       11/14/2022 11:19 am           FINDINGS:   Low lung volumes which limits evaluation. Small left pleural effusion. Left   basilar airspace opacities. Stable cardiomediastinal silhouette           Impression   Low lung volumes which limits evaluation. There is a small left pleural   effusion with left basilar airspace opacities which could reflect atelectasis   versus pneumonia. IMPRESSION:   Acute on chronic hypercapnic respiratory failure, slowly stabilizing. COPD  BJ, noncompliant with BiPAP  UTI  End-stage renal disease on PD  Heart failure with preserved ejection fraction        RECOMMENDATION:   Patient's respiratory status has stabilized. He has been using BiPAP therapy regularly every night. He can continue to use this therapy at the nursing home. Patient should use IPAP of 14, EPAP of 7 with FiO2 of 30%. Management of peritoneal dialysis as per nephrology recommendations  On cefuroxime for UTI. Continue with Dulera inhaler twice a day. Albuterol inhaler as needed. No indications for oral steroids. He can continue these inhalers at the nursing home. From pulmonary standpoint he can be discharged today. Nothing further to add. We will sign off. Michelle Colvin MD   Pulmonary Critical Care and Sleep Medicine  [address] [facility],[address]   [address], [address] [place] [address] Drive  11/7/2022, 10:37 AM              This note was completed using dragon medical speech recognition software.  Grammatical errors, random word insertions, pronoun errors and incomplete sentences are occasional consequences of this technology due to software limitations. If there are questions or concerns about the content of this note of information contained within the body of this dictation they should be addressed with the provider for clarification.

## 2022-11-16 NOTE — FLOWSHEET NOTE
Disconnected from [facility] per protocol. Effluent: Clear   Total time: 08 hr 41 min   Total UF:  -500 ml. Total Volume:  9978 ml. Dwell time gained:  00 hr 38 min.   Pt Tolerated procedure: Pt offers no c/o.       11/16/22 0603   Vitals   BP (!) 144/71   Temp (!) 96.5 °F (35.8 °C)   Temp Source Axillary   Heart Rate 74   Resp 16   Weight 190 lb 0.6 oz (86.2 kg)   Cycler   Ultrafiltration (UF) (mL) -500 mL   Average Dwell Time (Hours:Minutes) 84   Lost Dwell Time (Hours:Minutes) 00:38

## 2022-11-16 NOTE — SIGNIFICANT EVENT
Carley Russell is a 69 y.o. male     RN witnessed appear HR drop to 29 on pulse ox monitor overnight. Was concerned for possible heart block / profound bradycardia. EKG obtained, rate irregular, ? P wave consistency - concerning for possible atrial fibrillation. Repeat EKG in AM   Cardiology consulted.      Manuel Marroquin MD

## 2022-11-16 NOTE — CARE COORDINATION
Discharge note:      CM/SW has been notified of discharge. Patient noted to have the following needs at discharge. CM/SW has coordinated the following services:  Skilled nursing at [facility], staff to be trained on PD Tx      Discharge Destination:     [address]  Phone: [phone]  Fax: [phone]    Transportation: [address] [facility]  ([phone])  Discharge Time: 12:30pm  AVS faxed and agency notified: YES  The following prescriptions sent with patient: None  Nurse to call report to facility  Family advised of discharge and in agreement. Yes  HENS completed. -Yes          Comment:Staff tp be trained on PF Tx administration at 3pm at SNF      All CM/SW needs met, will sign off.      Electronically signed by Giuliana Alvarado RN on 11/16/2022 at 8:57 AM

## 2022-11-16 NOTE — PLAN OF CARE
Problem: Skin/Tissue Integrity  Goal: Absence of new skin breakdown  Description: 1. Monitor for areas of redness and/or skin breakdown  2. Assess vascular access sites hourly  3. Every 4-6 hours minimum:  Change oxygen saturation probe site  4. Every 4-6 hours:  If on nasal continuous positive airway pressure, respiratory therapy assess nares and determine need for appliance change or resting period.   Outcome: Progressing     Problem: Discharge Planning  Goal: Discharge to home or other facility with appropriate resources  11/15/2022 2349 by Fleming Collet, RN  Outcome: Progressing  11/15/2022 1809 by Genaro Covarrubias RN  Outcome: Progressing  Flowsheets (Taken 11/15/2022 0800)  Discharge to home or other facility with appropriate resources: Identify barriers to discharge with patient and caregiver     Problem: Pain  Goal: Verbalizes/displays adequate comfort level or baseline comfort level  11/15/2022 2349 by Fleming Collet, RN  Outcome: Progressing  11/15/2022 1809 by Genaro Covarrubias RN  Outcome: Progressing     Problem: Safety - Adult  Goal: Free from fall injury  11/15/2022 2349 by Fleming Collet, RN  Outcome: Progressing  11/15/2022 1809 by Genaro Covarrubisa RN  Outcome: Progressing     Problem: ABCDS Injury Assessment  Goal: Absence of physical injury  Outcome: Progressing     Problem: Nutrition Deficit:  Goal: Optimize nutritional status  Outcome: Progressing     Problem: Neurosensory - Adult  Goal: Achieves stable or improved neurological status  Outcome: Progressing     Problem: Respiratory - Adult  Goal: Achieves optimal ventilation and oxygenation  Outcome: Progressing     Problem: Cardiovascular - Adult  Goal: Maintains optimal cardiac output and hemodynamic stability  Outcome: Progressing     Problem: Skin/Tissue Integrity - Adult  Goal: Incisions, wounds, or drain sites healing without S/S of infection  Outcome: Progressing     Problem: Musculoskeletal - Adult  Goal: Return mobility to safest level of function  Outcome: Progressing     Problem: Gastrointestinal - Adult  Goal: Maintains adequate nutritional intake  Outcome: Progressing     Problem: Genitourinary - Adult  Goal: Absence of urinary retention  Outcome: Progressing     Problem: Metabolic/Fluid and Electrolytes - Adult  Goal: Glucose maintained within prescribed range  Outcome: Progressing

## 2022-11-16 NOTE — PROGRESS NOTES
Brief EP note    Interviewed and examined patient. Reviewed EKG and telemetry. Consult for loss of pulse ox waveform that may have suggested bradycardia or heart block (see 11/16 3:06 note). There was loss of waveform that could signify bradycardia vs artifact. Has hx of sleep apnea and if this is bradycardia likely related to that. Was placed on telemetry after event, no documented bradycardia/pauses. He does have both atrial and ventricular ectopy. Discussed continued monitoring with patient and encouraged to use CPAP mask. Recommend 7 day holter monitor, patient is in agreement.  Full note to follow    Assessment/Plan:    Loss of pulse ox waveform  - bradycardia/pause vs artifact  - if bradyarrhythmia then likely related to underlying sleep apnea  - no bradycardia or pauses once placed on telemetry   - patient should follow up with sleep for mask testing/refitting to encourage use  - 7 day holter monitor on discharge   - follow up with electrophysiology if results of monitor are abnormal    Ezra Donato MD  [facility]   Office: [phone]  Fax: [phone]

## 2022-11-16 NOTE — CARE COORDINATION
11/16/22 1104   IMM Letter   IMM Letter given to Patient/Family/Significant other/Guardian/POA/by: Reviewed with patient, all questions answered  aa   IMM Letter date given: 11/16/22   IMM Letter time given: 1030

## 2022-11-16 NOTE — CONSULTS
[facility]   Electrophysiology Consultation   Date: 11/16/2022  Reason for Consultation: Possible heart block or bradycardia   Consult Requesting Physician: No att. providers found     CC: Difficulty breathing   HPI: Tushar Barlow is a 69 y.o. male, nursing home resident, history of ESRD on peritoneal dialysis, chronic hyper cardiac respiratory failure, hypertension, diabetes, CAD was initially admitted for confusion and lethargy with mixed respiratory failure. Improved on BiPAP. Consult for abnormal pulse ox reading overnight. Patient was not on telemetry but overnight pulse ox noted loss of typical waveform, concern for area of bradycardia or pause. Placed on telemetry after the event with no bradycardia arrhythmia noted. Patient is poor historian, in and out of hospital and nursing home. He denies palpitations, racing heart. Is minimally active, denies chest pain with activity. Review of System:  Complete 10 point ROS performed and negative unless noted in above HPI or below  No fevers or chills  Denies falls  Denies abd pain, N/V, diarrhea  Occasional dizziness with standing, prior history of syncope    Prior to Admission medications    Medication Sig Start Date End Date Taking? Authorizing Provider   insulin lispro (HUMALOG) 100 UNIT/ML SOLN injection vial Inject 0-8 Units into the skin 3 times daily (with meals) 11/10/22  Yes Landa Meigs, MD   insulin lispro (HUMALOG) 100 UNIT/ML SOLN injection vial Inject 0-4 Units into the skin nightly 11/10/22  Yes Landa Meigs, MD   cefUROXime (CEFTIN) 250 MG tablet Take 1 tablet by mouth daily for 10 days 11/11/22 11/21/22 Yes Landa Meigs, MD   gentamicin (GARAMYCIN) 0.1 % cream Apply topically 3 times daily.  10/11/22   Papa Edwards MD   lactobacillus (CULTURELLE) capsule Take 1 capsule by mouth 2 times daily (with meals) 10/10/22   Papa Edwards MD   torsemide (DEMADEX) 100 MG tablet Take 1 tablet by mouth daily 10/11/22 Hilda Belle MD   midodrine (PROAMATINE) 5 MG tablet Take 1 tablet by mouth 3 times daily as needed (SBP < 100) 10/10/22   Hilda Belle MD   albuterol sulfate (PROAIR RESPICLICK) 632 (90 Base) MCG/ACT aerosol powder inhalation Inhale 2 puffs into the lungs every 4 hours as needed for Wheezing or Shortness of Breath 9/19/22   Pcao Stovall MD   buPROPion (WELLBUTRIN) 100 MG tablet Take 1 tablet by mouth every other day 9/21/22   Paco Stovall MD   insulin glargine (LANTUS SOLOSTAR) 100 UNIT/ML injection pen Inject 50 Units into the skin nightly 9/19/22   Carina Stovall MD   BREO ELLIPTA 200-25 MCG/INH AEPB inhaler Inhale 1 puff into the lungs daily    Bharath Mckinnon MD   atorvastatin (LIPITOR) 80 MG tablet TAKE ONE TABLET BY MOUTH ONCE NIGHTLY 9/2/22   Praveen Luis MD   Easy Touch Lancets 30G/Twist MISC USE THREE TIMES A DAY TO FOUR TIMES A DAY 7/21/22   Praveen Luis MD   levothyroxine (SYNTHROID) 50 MCG tablet TAKE ONE TABLET BY MOUTH DAILY 6/30/22   Praveen Luis MD   insulin detemir (LEVEMIR FLEXTOUCH) 100 UNIT/ML injection pen 56-66 units daily 6/21/22 9/19/22  Praveen Luis MD   insulin aspart (NOVOLOG FLEXPEN) 100 UNIT/ML injection pen 32-42  units AC TID 6/21/22 9/19/22  Praveen Luis MD   B-D ULTRAFINE III SHORT PEN 31G X 8 MM MISC USE WITH INSULIN FOUR TIMES A DAY 5/17/22   Abhilash Rdz MD   Blood Glucose Monitoring Suppl (TRUE METRIX METER) Michelle Vila As needed 3/11/22   Praveen Luis MD   blood glucose monitor strips Test up to 4 times daily for blood sugar monitoring 2/15/22   Abhilash Rdz MD   blood glucose monitor kit and supplies (PLEASE DISPENSE WHAT INS WILL COVER) - Dispense sufficient amount for  TID testing frequency plus additional to accommodate PRN testing needs. Dispense all needed supplies to include: monitor, strips, lancing device, lancets, control solutions, alcohol swabs.  2/8/22   Abhilash Rdz MD   B Complex-C-Biotin-E-Min-FA (DIALYVITE 3000) 3 MG TABS Take 1 tablet by mouth daily    Historical Provider, MD   vitamin D (ERGOCALCIFEROL) 1.25 MG (64482 UT) CAPS capsule Take 50,000 Units by mouth every 3 months 7/14/21   Historical Provider, MD   triamcinolone (KENALOG) 0.1 % ointment Apply topically 2 times daily. 5/25/21 9/19/22  Jarett Bobo MD   Handicap Placard MISC by Does not apply route Duration: 5 years 4/18/18   Miroslava Pennington MD   aspirin 81 MG chewable tablet Take 1 tablet by mouth daily. 3/28/12   Elizabeth Oseguera MD       Past Medical History:   Diagnosis Date    Allergic rhinitis due to other allergen 7/12/2010    Coronary atherosclerosis of unspecified type of vessel, native or graft 7/12/2010    Diabetic eye exam ([facility] [address] 04/29/2015    Diabetic eye exam ([facility] [address] 5/5/2016    [place] [facility]    Generalized osteoarthrosis, involving multiple sites 7/12/2010    Other psoriasis 7/12/2010    Pneumonia     Prolonged emergence from general anesthesia     Psoriatic arthropathy ([facility] [address] 7/12/2010    Type II or unspecified type diabetes mellitus without mention of complication, not stated as uncontrolled 7/12/2010    Unspecified essential hypertension 7/12/2010    Unspecified sleep apnea 7/12/2010        Past Surgical History:   Procedure Laterality Date    CARPAL TUNNEL RELEASE      s/p    CATARACT REMOVAL      CORONARY ARTERY BYPASS GRAFT      JOINT REPLACEMENT      LAPAROSCOPY INSERTION PERITONEAL CATHETER N/A 10/26/2020    LAPAROSCOPIC PERITONEAL DIALYSIS CATHETER PLACEMENT; LAPAROSCOPIC OMENTOPEXY performed by Alina Martinez DO at [address]   Allergen Reactions    Penicillins Other (See Comments)     Syncope \"pass out \" per pt, tolerates cefepime    Sulfa Antibiotics      Patient unsure of reaction     Tazorac [Tazarotene]      Cream - rash    Clindamycin/Lincomycin Rash       Social History:  Reviewed. reports that he quit smoking about 42 years ago. He started smoking about 60 years ago.  He has a 18.00 pack-year smoking history. He has never used smokeless tobacco. He reports that he does not drink alcohol and does not use drugs. Family History:  Reviewed. No family history of SCD. Physical Examination:  /66   Pulse 83   Temp 97 °F (36.1 °C)   Resp 20   Ht 5' 10\" (1.778 m)   Wt 190 lb 0.6 oz (86.2 kg)   SpO2 100%   BMI 27.27 kg/m²   Temp  Av.3 °F (36.3 °C)  Min: 96.5 °F (35.8 °C)  Max: 97.8 °F (36.6 °C)  Pulse  Av  Min: 67  Max: 94  BP  Min: 117/64  Max: 144/71  SpO2  Av.5 %  Min: 97 %  Max: 100 %  FiO2   Av %  Min: 30 %  Max: 30 %    Intake/Output Summary (Last 24 hours) at 2022 1619  Last data filed at 2022 0603  Gross per 24 hour   Intake --   Output -500 ml   Net 500 ml     No acute distress  Moist mucosa, nonicteric conjunctiva  Nonlabored, diminished in lower lobes bilaterally, no end-expiratory wheeze  Heart is regular rate, regular rhythm, no murmurs, normal S1 and S2, no lower extremity swelling, no dependent edema through hips or thighs, extremities are warm and well-perfused  Abdomen is soft nontender  Strength is grossly preserved, normal tone  No focal neurologic deficits  Appropriate mood and affect, contextual speech, poor recall  No rashes or ecchymoses    Sodium 133  Creatinine 7.1  Glucose 230  Troponin 0.1  Albumin 2.3  WBC 13  Hemoglobin 9.5  Platelets 016  Ferritin 855    EKG 22  sinus rhythm     Telemetry reviewed with mobitz type I, PACs and PVCs    ECHO 3/29/22  Summary   Left ventricular cavity size is normal. There is mild concentric left   ventricular hypertrophy. Overall left ventricular systolic function appears   low normal with an ejection fraction of 50-55%. No regional wall motion   abnormalities are noted. Normal diastolic function. The aortic valve   leaflets are slightly thickened /calcified but the valve opens adequately. Mild tricuspid regurgitation.  Estimated pulmonary artery systolic pressure   is at 36 mmHg assuming a right atrial pressure of 8 mmHg. Normal right   ventricular size and function. TAPSE measures 1.31 cm and the RVS velocity   measures 12.1 cm/s. Assessment:   Patient is a 76-year-old male history of CAD, ESRD, chronic hypercarbic respiratory failure, BJ not routinely on CPAP. Overnight with loss in pulse ox waveform, bradyarrhythmia vs artifact. Telemetry with sinus and atrial/ventricular ectopy, no bradycardia/pauses. If this is true bradycardia or pause then given time likely related to underlying BJ. Discussed monitoring with patient and encouraged CPAP mask use. Plan:     Loss of pulse ox waveform  - bradycardia/pause vs artifact  - if bradyarrhythmia then likely related to underlying sleep apnea  - no bradycardia or pauses once placed on telemetry   - patient should follow up with sleep for mask testing/refitting to encourage use  - 7 day holter monitor on discharge   - follow up with electrophysiology if results of monitor are abnormal    NOTE: This report was transcribed using voice recognition software. Every effort was made to ensure accuracy, however, inadvertent computerized transcription errors may be present.      Cheri Plummer MD  Electrophysiologist  [facility]   Office: [phone]  Fax: [phone]

## 2022-11-16 NOTE — PROGRESS NOTES
Office : [phone]     Fax :[phone]       Nephrology  progress Note      Patient's Name: Sajan Malik  11:04 AM  11/16/2022    Reason for Consult:  ESRD management        Requesting Physician:  Carlos Quinones MD      Chief Complaint:    Chief Complaint   Patient presents with    Altered Mental Status     Pt transported from [facility] by [facility] EMT. EMT reports Pt has new onset of confusion that started this morning according to facility staff. Pt had PD last night and has not been acting the same since. Vitals WNL, on 3l of O2, , per EMT. Pt alertx4. Slow to respond. History of Present iIlness:    Sajan Malik is a 69 y.o. male with prior history of ESRD, respiratory insufficieny with CO2 retention, HTN , Dm 2 was sent for AMS. Admitted to ICU. Has CO2 retention o ABG . placed on BiPAP. Has h/o CO2 retention previous admission.      Interval hx :    Awake and alert   PD TOLERATED WELL   No abdominal pain       I/O last 3 completed shifts:  In: -   Out: -500     Past Medical History:   Diagnosis Date    Allergic rhinitis due to other allergen 7/12/2010    Coronary atherosclerosis of unspecified type of vessel, native or graft 7/12/2010    Diabetic eye exam ([address] 04/29/2015    Diabetic eye exam ([address] 5/5/2016    [facility]    Generalized osteoarthrosis, involving multiple sites 7/12/2010    Other psoriasis 7/12/2010    Pneumonia     Prolonged emergence from general anesthesia     Psoriatic arthropathy ([address] 7/12/2010    Type II or unspecified type diabetes mellitus without mention of complication, not stated as uncontrolled 7/12/2010    Unspecified essential hypertension 7/12/2010    Unspecified sleep apnea 7/12/2010       Past Surgical History:   Procedure Laterality Date    CARPAL TUNNEL RELEASE      s/p    CATARACT REMOVAL      CORONARY ARTERY BYPASS GRAFT      JOINT REPLACEMENT      LAPAROSCOPY INSERTION PERITONEAL CATHETER N/A 10/26/2020    LAPAROSCOPIC PERITONEAL DIALYSIS CATHETER PLACEMENT; LAPAROSCOPIC OMENTOPEXY performed by Naty Self DO at [facility] OR       Family History   Problem Relation Age of Onset    Diabetes Mother     Heart Failure Mother     Coronary Art Dis Father         reports that he quit smoking about 42 years ago. He started smoking about 60 years ago. He has a 18.00 pack-year smoking history. He has never used smokeless tobacco. He reports that he does not drink alcohol and does not use drugs.         Allergies:  Penicillins, Sulfa antibiotics, Tazorac [tazarotene], and Clindamycin/lincomycin    Current Medications:    potassium chloride (KLOR-CON M) extended release tablet 20 mEq, Daily with breakfast  calcium acetate (PHOSLO) capsule 667 mg, TID WC  gentamicin (GARAMYCIN) 0.1 % cream, Daily  HYDROcodone-acetaminophen (NORCO) 5-325 MG per tablet 1 tablet, Q4H PRN  insulin lispro (HUMALOG) injection vial 0-8 Units, TID WC  insulin lispro (HUMALOG) injection vial 0-4 Units, Nightly  cefUROXime (CEFTIN) tablet 250 mg, Daily  acetaminophen (TYLENOL) tablet 650 mg, Q4H PRN  albuterol (PROVENTIL) nebulizer solution 2.5 mg, Q4H PRN  aspirin chewable tablet 81 mg, Daily  atorvastatin (LIPITOR) tablet 80 mg, Daily  mometasone-formoterol (DULERA) 200-5 MCG/ACT inhaler 2 puff, BID  buPROPion (WELLBUTRIN) tablet 100 mg, Every Other Day  insulin glargine (LANTUS) injection vial 50 Units, Nightly  lactobacillus (CULTURELLE) capsule 1 capsule, BID WC  levothyroxine (SYNTHROID) tablet 50 mcg, Daily  midodrine (PROAMATINE) tablet 5 mg, TID PRN  torsemide (DEMADEX) tablet 100 mg, Daily  [START ON 12/1/2022] vitamin D (ERGOCALCIFEROL) capsule 50,000 Units, Q3 Months  heparin (porcine) injection 5,000 Units, 3 times per day  dextrose bolus 10% 125 mL, PRN Or  dextrose bolus 10% 250 mL, PRN  glucagon (rDNA) injection 1 mg, PRN  dextrose 10 % infusion, Continuous PRN          Physical exam:     Vitals:  BP (!) 144/71   Pulse 74   Temp (!) 96.5 °F (35.8 °C) (Axillary)   Resp 16   Ht 5' 10\" (1.778 m)   Wt 190 lb 0.6 oz (86.2 kg)   SpO2 100%   BMI 27.27 kg/m²   Constitutional:  AWAKE BUT CONFUSED   Skin: no rash, turgor wnl  Heent:  eomi, mmm  Neck: no bruits or jvd noted  Cardiovascular:  S1, S2 without m/r/g  Respiratory: CTA B without w/r/r  Abdomen:  +bs, soft, nt, nd  Ext: no  lower extremity edema      Labs:  CBC:   Recent Labs     11/15/22  0603   WBC 13.2*   HGB 9.5*        BMP:    Recent Labs     11/14/22  0409 11/15/22  0603 11/16/22  0314   * 131* 133*   K 3.2* 3.3* 3.8   CL 86* 91* 91*   CO2 29 26 31   BUN 62* 56* 56*   CREATININE 7.7* 7.2* 7.1*   GLUCOSE 372* 225* 230*     Ca/Mg/Phos:   Recent Labs     11/14/22  0409 11/15/22  0603 11/16/22  0314   CALCIUM 8.6 8.7 8.8   PHOS 6.5* 4.4 5.1*     Hepatic:   No results for input(s): AST, ALT, ALB, BILITOT, ALKPHOS in the last 72 hours. Troponin:   No results for input(s): TROPONINI in the last 72 hours. BNP: No results for input(s): BNP in the last 72 hours. Lipids: No results for input(s): CHOL, TRIG, HDL, LDLCALC, LABVLDL in the last 72 hours. ABGs:   Recent Labs     11/14/22  1638   PHART 7.318*   PO2ART 122.0*   DKA1ZRT 52.9*       INR: No results for input(s): INR in the last 72 hours. UA:  No results for input(s): Tennille Jacqueline, GLUCOSEU, BILIRUBINUR, KETUA, SPECGRAV, BLOODU, PHUR, PROTEINU, UROBILINOGEN, NITRU, LEUKOCYTESUR, Tilman Prime in the last 72 hours. Urine Microscopic:   No results for input(s): LABCAST, BACTERIA, COMU, HYALCAST, WBCUA, RBCUA, EPIU in the last 72 hours. Urine Culture:   No results for input(s): LABURIN in the last 72 hours. Urine Chemistry: No results for input(s): Fermin Casiano, PROTEINUR, NAUR in the last 72 hours. IMAGING:  XR CHEST PORTABLE   Final Result   Low lung volumes which limits evaluation. There is a small left pleural   effusion with left basilar airspace opacities which could reflect atelectasis   versus pneumonia. XR CHEST PORTABLE   Final Result   Low lung volumes, which somewhat limit evaluation. Small left pleural effusion and/or basilar atelectasis. Stable cardiomegaly. Assessment/Plan :      1. ESRD . On PD  On PD . Using  2.5 %   2500 ml   4 exchanges   Tolerating well     2. AMS. Resolved   Was on BIpap yesterday         2. HTN. Continue BP meds     3. Anemia in ESRD   Give erythropoetin      4. Acid- base disorder. Correct with HD     5. Hypokalemia.  Replace potassium PO       Stable from nephrology standpoint       D/w primary team      Thank you for allowing us to participate in care of Sajan Malik         Electronically signed by: Scooter Dupree MD, 11/16/2022, 11:04 AM      [facility] [address]  Office : [phone]  Fax :[phone]

## 2022-11-17 LAB
EKG ATRIAL RATE: 75 BPM
EKG ATRIAL RATE: 77 BPM
EKG DIAGNOSIS: NORMAL
EKG DIAGNOSIS: NORMAL
EKG P AXIS: 33 DEGREES
EKG P-R INTERVAL: 200 MS
EKG Q-T INTERVAL: 404 MS
EKG Q-T INTERVAL: 406 MS
EKG QRS DURATION: 78 MS
EKG QRS DURATION: 78 MS
EKG QTC CALCULATION (BAZETT): 438 MS
EKG QTC CALCULATION (BAZETT): 451 MS
EKG R AXIS: 16 DEGREES
EKG R AXIS: 18 DEGREES
EKG T AXIS: 63 DEGREES
EKG T AXIS: 79 DEGREES
EKG VENTRICULAR RATE: 70 BPM
EKG VENTRICULAR RATE: 75 BPM

## 2022-11-17 PROCEDURE — 93010 ELECTROCARDIOGRAM REPORT: CPT | Performed by: INTERNAL MEDICINE

## 2022-11-18 ENCOUNTER — TELEPHONE (OUTPATIENT)
Dept: INTERNAL MEDICINE CLINIC | Age: 77
End: 2022-11-18

## 2022-11-18 NOTE — PROGRESS NOTES
Physician Progress Note      PATIENT:               Dawson Bueno  CSN #:                  [id number]  :                       1945  ADMIT DATE:       2022 1:38 PM  [address] [place] DATE:        2022 12:57 PM  RESPONDING  PROVIDER #:        Dipesh Mejia MD          QUERY TEXT:    Patient admitted with Acute and chronic respiratory failure. Noted   documentation of NSTEMI in  Internal Medicine active problem list dated   22. In order to support the diagnosis of NSTEMI, please include   additional clinical indicators in your documentation. Or please document if   the diagnosis of NSTEMI has been ruled out after further study. The medical record reflects the following:  Risk Factors: elevated troponin  Clinical Indicators: Noted documentation of NSTEMI in  Internal Medicine   active problem list dated 22. Single elevated troponin of 0.10. No chest   pain reported during inpatient admission. Treatment: Troponin  Options provided:  -- NSTEMI present as evidenced by, Please document evidence. -- NSTEMI was ruled out  -- Other - I will add my own diagnosis  -- Disagree - Not applicable / Not valid  -- Disagree - Clinically unable to determine / Unknown  -- Refer to Clinical Documentation Reviewer    PROVIDER RESPONSE TEXT:    NSTEMI was ruled out after study. Query created by: Jonelle Soria on 2022 1:23 PM      Electronically signed by:   Dipesh Mejia MD 2022 5:30 PM

## 2022-11-18 NOTE — TELEPHONE ENCOUNTER
Care Transitions Initial Follow Up Call    Outreach made within 2 business days of discharge: Yes    Patient: Marcos Arzola Patient : 1945   MRN: [id number]  Reason for Admission: There are no discharge diagnoses documented for the most recent discharge. Discharge Date: 22       Spoke with: Radha (spouse), Mr. Kota Garcia is currently in [facility]. He's unable to walk on his own. He went there on 22. Follow Up  No future appointments.     Fadi Grimes MA

## 2022-11-21 ENCOUNTER — TELEPHONE (OUTPATIENT)
Dept: ADMINISTRATIVE | Age: 77
End: 2022-11-21

## 2022-12-05 NOTE — PRE-PROCEDURE INSTRUCTIONS
Spoke to patient's wife and nurse at [facility] about procedure. Told to be here at 1300 for procedure at 1430. NPO after midnight, but can take morning medication with sips of water, patient stated they are not on blood thinners. To have a responsible adult be with patient take them home and stay with them afterwards, if they do not get admitted to [address] [place] [address]. And if available bring current list of medications. No other questions or concerns.

## 2022-12-06 ENCOUNTER — HOSPITAL ENCOUNTER (OUTPATIENT)
Dept: INTERVENTIONAL RADIOLOGY/VASCULAR | Age: 77
Discharge: HOME | End: 2022-12-06
Payer: COMMERCIAL

## 2022-12-06 DIAGNOSIS — N18.6 END STAGE RENAL DISEASE (HCC): ICD-10-CM

## 2022-12-06 PROCEDURE — 2500000003 HC RX 250 WO HCPCS

## 2022-12-06 PROCEDURE — 77001 FLUOROGUIDE FOR VEIN DEVICE: CPT

## 2022-12-06 PROCEDURE — C1881 DIALYSIS ACCESS SYSTEM: HCPCS

## 2022-12-06 PROCEDURE — 36558 INSERT TUNNELED CV CATH: CPT

## 2022-12-06 PROCEDURE — 6360000002 HC RX W HCPCS

## 2022-12-06 PROCEDURE — 76937 US GUIDE VASCULAR ACCESS: CPT

## 2022-12-06 PROCEDURE — C1894 INTRO/SHEATH, NON-LASER: HCPCS

## 2022-12-06 PROCEDURE — 85610 PROTHROMBIN TIME: CPT

## 2022-12-06 PROCEDURE — 2580000003 HC RX 258

## 2022-12-06 NOTE — PROGRESS NOTES
Report called to patient's nurse Rosette Norman at [facility]. RN updated on HD cath placement, as well as numerous pressure ulcers noted by cath lab team. All questions answered. Patient's wife at bedside, updated on plan of care.  Patient to be picked up by transport team and transported back to [facility] this afternoon/evening     Electronically signed by Libia Quintero RN on 12/6/2022 at 3:54 PM

## 2022-12-06 NOTE — PROCEDURES
IR Brief Postoperative Note    Anders Soriano  YOB: 1945  [id number]    Pre-operative Diagnosis: esrd    Post-operative Diagnosis: Same    Procedure: right IJ tunneled dialysis cath, ok for use    Anesthesia: local    Surgeons/Assistants: nikki    Estimated Blood Loss: Minimal    Complications: none    Specimens: were not obtained    See full procedure dictation to follow      Jyotsna Gary MD MD  12/6/2022

## 2022-12-06 NOTE — H&P
Patient:  Sameera Barr   :   1945      Relevant patient history reviewed and discussed. The procedure including risks and benefits was discussed at length with the patient (or designated family member) and all questions were answered. Informed consent to proceed with the procedure was given. Condition : stable    [facility] nurses notes reviewed and agreed. Medications reviewed. Allergies:    Allergies   Allergen Reactions    Penicillins Other (See Comments)     Syncope \"pass out \" per pt, tolerates cefepime    Sulfa Antibiotics      Patient unsure of reaction     Tazorac [Tazarotene]      Cream - rash    Clindamycin/Lincomycin Rash

## 2022-12-07 LAB — INR BLD: 1.8 (ref 0.88–1.12)

## 2022-12-07 NOTE — DISCHARGE INSTRUCTIONS
[facility] [employer]  Cardiovascular Special Procedures  CENTRAL LINE PLACEMENT     Patient Information:     Your line may be placed in the chest or peripherally in the arm. The following instructions are for both. Maintain dressing after the procedure. If the dressing becomes moist, it should be changed. If there is any leakage at the incision site, notify your doctor. Keep site clean and dry and observe for any signs of infection. Dressing should be changed with a sterile process. Please notify your doctor if dressing needs to be changed. Bruising and mild discomfort are expected. You may apply an ice bag to the incision area to minimize bruising, discomfort, and swelling. Contact your physician who placed the line for any bleeding or extreme pain. Other symptoms to report are as follows:       Fever. Skin warm to touch. Numbness or weakness. Swelling of neck or arm. Redness or Tenderness along the portal site and/or the catheter tract. Drainage from the portal site, or if dressing is damp or saturated. Strenuous activities and exercise should be approached gradually. Refrain from heavy lifting for greater than 10 pounds for 10-14 days. You may shower as long as you keep the incision dry while line is in place. The incision should not be immersed in water. You may contact [address] [place] [address] [facility]. for any questions or problems that may occur at [phone] during the hours of 9am-4pm Monday-Friday, or the hospital  after hours at [phone], to have the interventional radiologist on call paged. [facility] [employer]  Cardiovascular Special Procedures  General Discharge Instructions      ____ You may be drowsy or lightheaded after receiving sedation.  DO NOT operate a vehicle (automobile, bicycle, motorcycle, machinery, or power tools), make any important decisions or sign any important/legal documents, or drink alcoholic beverages for the next 24 hours  ____ We strongly suggest that a responsible adult be with you for the next 24 hours for your protection and safety  ____ If the intravenous catheter site is painful, apply warm wet compresses on the site until the soreness is relieved and elevate the arm above the heart. Call your physician if no improvement in 2 to 3 days    DIETARY INSTRUCTIONS:    ____ Drink extra fluids over the next 24 hours (If not contraindicated by illness or by physician order)  ____ Start with clear liquids and progress to normal diet as you feel like eating. If you experience nausea or repeated episodes of vomiting, which persist beyond 12-24 hours, notify your doctor        ____ Resume your previous diet    ACTIVITY INSTRUCTIONS:    ____ See other instructions  ____ No special instructions  ____ Rest for 24 hours    ____ Up as tolerated  ____ Increase activity as tolerated    Wound/Dressing Instructions:  ____ See other instructions  ____ May shower, tomorrow  ____ Remove bandage within 24 hours    MEDICATION INSTRUCTIONS:    ____ See Medication Reconciliation Sheet      FOLLOW-UP APPOINTMENT    Follow up with MD in as directed. Belongings returned to patient and/or family: Yes. The Discharge Instructions have been explained to me. I understand and can verbalize these instructions.

## 2022-12-29 NOTE — PROGRESS NOTES
Patient seen , discharge dictated scripts given , arrangements made , AYAH completed .  Discussed with nursing staff  And   If applicable ,  Discussed with  Patient's family , all questions answered and concerns addressed  When applicable

## 2022-12-30 NOTE — DISCHARGE SUMMARY
[facility]                     [facility], [address]                               DISCHARGE SUMMARY    PATIENT NAME: Sherrill Burr                    :        1945  MED REC NO:   [id number]                          ROOM:       8592  ACCOUNT NO:   [de-identified]                           ADMIT DATE: 2022  PROVIDER:     Wilner Plascencia MD                  DISCHARGE DATE:  2022    FINAL DIAGNOSES:  1. COPD with acute exacerbation. 2.  Acute-on-chronic respiratory failure with hypercapnia. 3.  Peritoneal dialysis catheter in place. 4.  Recurrent major depression. 5.  Hypoxia. 6.  Acquired hypothyroidism. 7.  Type 2 diabetes mellitus. 8.  Chronic diastolic heart failure. 9. Interstitial lung disease. 10.  Atherosclerotic heart disease status post aortopulmonary bypass  graft x4. DISCHARGE MEDICATIONS:  1. Levemir 55 to 66 units daily. 2.  Insulin lispro sliding scale in prandial coverage. 3.  Cefuroxime 250 mg twice a day for 10 days. 4.  Gentamicin cream apply topically twice a day. 5.  Culturelle capsule, one capsule daily. 6.  Demadex 100 mg daily. 7.  ProAmatine 5 mg p.o. t.i.d.  8.  ProAir two puffs into lungs every four hours p.r.n.  9.  Wellbutrin 100 mg every day. 10.  Insulin glargine 50 units nightly. 11.  Breo Ellipta one puff into lung daily. 12.  Lipitor 80 mg once a day. 13.  Levothyroxine 50 mcg once a day. 14.  B complex with Biotin once a day. 15.  Aspirin 81 mg once a day. HOSPITAL COURSE:  This 43-year-old white man with increased lethargy,  increased respiratory distress was [facility], also  had a new onset of confusion, and somnolence. No seizure. Temperature  was 98.4, blood pressure 131/57, respiration 22, heart rate 84, O2 sat  95% on room air. Lungs showed bilateral expiratory wheezes, scattered  crackles.   The patient was treated with nebulized aerosol, noninvasive  mechanical ventilation. His sodium was 132, potassium 3.8, white blood  cell count 11.9. ABG shows pH of 7.25, pCO2 of 70, pO2 of 116,  bicarbonate 29. The patient was treated with nebulized aerosol,  long-acting beta-2 agonist and inhaled corticosteroid combination. Continuation of peritoneal dialysis. Dr. Ruth Light saw the patient  for nephrology consultation comanagement. The patient had prolonged  course, but finally settled down. General condition improved. EKG  shows normal sinus rhythm.  _____ saw the patient for a pulmonary  consultation and comanagement. The patient was also seen by Dr. Carolyn Monzon for electrophysiology, for possible heart block or  bradycardia. The patient was finally stabilized and discharged in  stable condition. Ongoing care provider will be the doctor at the  nursing home, Dr. Hawk Ricketts. The patient was discharged in stable  condition.         Gutierrez Dotson MD    D: 12/29/2022 4:12:42       T: 12/29/2022 22:26:39     [email or web address]  Job#: [id number]     Doc#: [id number]    CC:

## 2024-08-01 NOTE — PROGRESS NOTES
Nephrology  Note                                                                                                                                                                                                                                                                                                                                                               Office : [phone]     Fax :[phone]              Patient's Name: Vipin Lema  1:15 PM  9/10/2022    Reason for Consult:  ESRD   Requesting Physician:  Breanne Lyles MD      Jamie PD well   Fluid clear   No signs of PD peritonitis             Past Medical History:   Diagnosis Date    Allergic rhinitis due to other allergen 7/12/2010    Coronary atherosclerosis of unspecified type of vessel, native or graft 7/12/2010    Diabetic eye exam ([facility] [address] 04/29/2015    Diabetic eye exam ([address] 5/5/2016    [place] [facility]    Generalized osteoarthrosis, involving multiple sites 7/12/2010    Other psoriasis 7/12/2010    Pneumonia     Prolonged emergence from general anesthesia     Psoriatic arthropathy ([facility] [address] 7/12/2010    Type II or unspecified type diabetes mellitus without mention of complication, not stated as uncontrolled 7/12/2010    Unspecified essential hypertension 7/12/2010    Unspecified sleep apnea 7/12/2010       Past Surgical History:   Procedure Laterality Date    CARPAL TUNNEL RELEASE      s/p    CATARACT REMOVAL      CORONARY ARTERY BYPASS GRAFT      JOINT REPLACEMENT      LAPAROSCOPY INSERTION PERITONEAL CATHETER N/A 10/26/2020    LAPAROSCOPIC PERITONEAL DIALYSIS CATHETER PLACEMENT; LAPAROSCOPIC OMENTOPEXY performed by Johnathon Torre DO at [facility] OR       Family History   Problem Relation Age of Onset    Diabetes Mother     Heart Failure Mother     Coronary Art Dis Father         reports that he quit smoking about 41 years ago. He started smoking about 60 years ago. He has a 18.00 pack-year smoking history.  He has never used smokeless tobacco. He reports that Pre-Operative:  1.  Patient/Caregiver identifies - states name and date of birth.  2.  The patient is free from signs and symptoms of injury.  3.  The patient receives appropriate medication(s), safely administered during the Perioperative period.  4.  The patients's fluid, electrolyte, and acid-base balances are established preoperatively.  5.  The patient's pulmonary function is established preoperatively.  6.  The patient's cardiovascular status is established preoperatively.  7.  The patient / caregiver demonstrates knowledge of nutritional management related to the operative or other invasive procedure.  8.  The patient/caregiver demonstrates knowledge of medication management.  9.  The patient/caregiver demonstrates knowledge of pain management.  10.  The patient/caregiver participates in decisions affection his or her Perioperative plan of care.  11.  The patient's care is consistent with the individualized Perioperative plan of care.  12.  The patient's right to privacy is maintained.  13.  The patient is the recipient of competent and ethical care within legal standards of practice.  14.  The patient's value system, lifestyle, ethnicity, and culture are considered, respected, and incorporated in the Perioperative plan of care and understands special services available.  15.  The patient demonstrates and/or reports adequate pain control throughout the the Perioperative period.  16.  The patient's neurological status is established preoperatively.  17.  The patient/caregiver demonstrates knowledge of the expected responses to the endoscopy procedure.  18.  Patient/Caregiver has reduced anxiety.  Interventions- Familiarize with environment and equipment.  19. Patient/Caregiver verbalizes understanding of Phase II and/or Phase I process.  20.  Patient pain level is established preoperatively using age appropriate pain scale.  21.  The patient will move to fall risk upon sedation- during and through the recovery  he does not drink alcohol and does not use drugs.     Allergies:  Penicillins, Sulfa antibiotics, Tazorac [tazarotene], Clindamycin, and Clindamycin/lincomycin    Current Medications:    insulin glargine (LANTUS;BASAGLAR) injection pen 70 Units, Nightly  insulin lispro (1 Unit Dial) 25 Units, TID WC  levoFLOXacin (LEVAQUIN) 250 MG/50ML infusion 250 mg, Q24H  insulin lispro (1 Unit Dial) 0-16 Units, TID WC  insulin lispro (1 Unit Dial) 0-4 Units, Nightly  famotidine (PEPCID) tablet 20 mg, Daily  polyethylene glycol (GLYCOLAX) packet 17 g, BID  vancomycin (VANCOCIN) intermittent dosing (placeholder), See Admin Instructions  epoetin daphney-epbx (RETACRIT) injection 2,000 Units, Once per day on Mon Wed Fri  atorvastatin (LIPITOR) tablet 80 mg, Nightly  levothyroxine (SYNTHROID) tablet 50 mcg, Daily  aspirin chewable tablet 81 mg, Daily  sodium chloride flush 0.9 % injection 5-40 mL, 2 times per day  sodium chloride flush 0.9 % injection 5-40 mL, PRN  0.9 % sodium chloride infusion, PRN  ondansetron (ZOFRAN-ODT) disintegrating tablet 4 mg, Q8H PRN   Or  ondansetron (ZOFRAN) injection 4 mg, Q6H PRN  polyethylene glycol (GLYCOLAX) packet 17 g, Daily PRN  acetaminophen (TYLENOL) tablet 650 mg, Q6H PRN   Or  acetaminophen (TYLENOL) suppository 650 mg, Q6H PRN  heparin (porcine) injection 5,000 Units, 3 times per day  glucose chewable tablet 16 g, PRN  dextrose bolus 10% 125 mL, PRN   Or  dextrose bolus 10% 250 mL, PRN  glucagon (rDNA) injection 1 mg, PRN  dextrose 10 % infusion, Continuous PRN            Physical exam:     Vitals:  /70   Pulse 85   Temp 97.6 °F (36.4 °C) (Axillary)   Resp 18   Ht 5' 10\" (1.778 m)   Wt 218 lb 11.1 oz (99.2 kg)   SpO2 96%   BMI 31.38 kg/m²   Constitutional:  OAA X3 NAD  Skin: no rash, turgor wnl  Heent:  eomi, mmm  Neck: no bruits or jvd noted  Cardiovascular:  S1, S2 without m/r/g  Respiratory: CTA B without w/r/r  Abdomen:  +bs, soft, TTP   Ext: + lower extremity edema  Psychiatric: mood and affect appropriate  Musculoskeletal:  Rom, muscular strength intact    Data:   Labs:  CBC:   Recent Labs     09/08/22  0419 09/09/22  0422 09/10/22  0915   WBC 10.4 11.8* 10.1   HGB 9.3* 9.3* 9.5*    236 239       BMP:    Recent Labs     09/08/22  0419 09/09/22  0422 09/10/22  0915   * 130* 132*   K 4.2 3.7 3.1*   CL 91* 87* 88*   CO2 29 28 28   BUN 49* 47* 48*   CREATININE 8.1* 7.2* 7.3*   GLUCOSE 406* 303* 192*       Ca/Mg/Phos:   Recent Labs     09/08/22  0419 09/09/22  0422 09/10/22  0915   CALCIUM 8.1* 8.2* 8.6   MG 2.00 2.10 1.90       Hepatic:   No results for input(s): AST, ALT, ALB, BILITOT, ALKPHOS in the last 72 hours. Troponin:   No results for input(s): TROPONINI in the last 72 hours. BNP: No results for input(s): BNP in the last 72 hours. Lipids: No results for input(s): CHOL, TRIG, HDL, LDLCALC, LABVLDL in the last 72 hours. ABGs:   Recent Labs     09/09/22  2030   PHART 7.282*   PO2ART 35.6*   ZYR6PVS 61.1*       INR: No results for input(s): INR in the last 72 hours. UA:No results for input(s): Jose Martin Drop, GLUCOSEU, BILIRUBINUR, Lesile Cram, BLOODU, PHUR, PROTEINU, UROBILINOGEN, NITRU, LEUKOCYTESUR, LABMICR, URINETYPE in the last 72 hours. Urine Microscopic: No results for input(s): LABCAST, BACTERIA, COMU, HYALCAST, WBCUA, RBCUA, EPIU in the last 72 hours. Urine Culture: No results for input(s): LABURIN in the last 72 hours. Urine Chemistry: No results for input(s): Denis Mungo, PROTEINUR, NAUR in the last 72 hours. IMAGING:  XR CHEST (2 VW)   Final Result      Free air beneath the right hemidiaphragm. Bibasilar atelectasis. Critical result discussed with Dr. Dalila Philip at 1:00 PM 9/6/2022          Assessment/Plan   ESRD     2. HTN    3. Anemia    4. Acid- base/ Electrolyte imbalance     5.  Abd tenderness - r/o PD peritonitis     Plan   - IV abx - likely PNA  - change cefepime to levaquin as pt is lethargic   - Cefepime toxicity is possible   - ABG   - PD fluid cell count - nl - no PD peritonitis   - Cx   - CCPD today   - monitor Labs  - EPO or anemia    - Constipation management   - PT eval                   Thank you for allowing us to participate in care of Rod Kline MD  Feel free to contact me   [facility] [address]  Office : [phone]  Fax :[phone]

## 2024-11-18 NOTE — FLOWSHEET NOTE
[facility]  [address], [address]  Phone: [phone]   Fax: [phone]    Physical Therapy Treatment Note/ Progress Report:     Date:  7/15/2021    Patient Name:  Elizabeth Burt  \"Ilya\"  :  1945  MRN: [id number]    Medical/Treatment Diagnosis Information:  · Diagnosis: R53.81 (ICD-10-CM) - Other malaise, Debility and Deconditioning R53.81  · Treatment Diagnosis: balance problems gait distrubance  Insurance/Certification information:  PT Insurance Information: [facility] Medicare  Physician Information:  Referring Practitioner: Nola Jacques MD  Plan of care signed:    [x] Yes  [] No    Date of Patient follow up with Physician:       Progress Report: [x]  Yes  []  No     Date Range for reporting period:  Beginning:  3/24/2021  Ending:    Progress report due (10 Rx/or 30 days whichever is less):  44    Recertification due (POC duration/ or 90 days whichever is less):    Visit # Insurance Allowable Auth Needed    []Yes   [x]No     RESTRICTIONS/PRECAUTIONS:   Latex Allergy:  [x]NO      []YES  Preferred Language for Healthcare:   [x]English       []other:    Pain level:  0/10     SUBJECTIVE:   Pt reports he felt ok after last session. Feeling mildly tired this morning. OBJECTIVE:     Observation: Pt arrives with RW. He reports that he does not use an AD at home, only when he is in the community. Pt requiring occasional extended seated rest breaks after repeated sit to/from stand and standing therex this date.     Test measurements:     TUG (without walker) 21:    13 seconds attempt 1    11 seconds attempt 2     Functional Scale:     LEFS:   (70% impaired)      Date assessed:  3/24/2021  LEFS:   (74% impaired)      Date assessed:  2021  Ki Bhandari 41/56   Date assessed 21  TUG 12.5 sec Date assessed 21    /75 SpO2 97% HR 84 bpm in sitting 6/10/21    6/24/21  BP: 110/75 SpO2 95% in sitting after walking back to gym   /70 SpO2 96% after sitting for 5 minutes  /70 in standing     Exercises/Interventions: Exercises in bold performed in department today. Items not bolded are carried forward from prior visits for continuity of the record. Exercise/Equipment Resistance/Repetitions HEP Other comments   NuStep 4 min x 3  Resist 6, Seat 13, Arms 11  [] Rest 1 minute in between, rest 4 minutes before getting up   Seated Glute Squeeze 20 x  [] Verbal cues for upright posture, pt fatigues into slumped posture quickly   Seated Hip Add Squeeze (ball between knees) 20 x  [] Verbal cues for upright posture, pt fatigues into slumped posture quickly   LAQ 20 x each leg, 2# ankle weight [x]    Sit-to-Stand 10x 2   [x] UE support on thighs   Hamstring Stretch (seated off side of table, leg outstretched) 3 x 30\" hold [x]    Piriformis Stretch (seated, figure 4 position) 3 x 30\" hold []      []    Standing Hip Abd 20 x bilat [] Modified to sitting today   Heel-toe Raises 20 x  [] In sitting with ankle weights   Mini-squats 20 x  [] Cues for upright posture and to slow speed for improved contraction   High Marches 20 x [x] Cues for upright posture, resumed 2.5# ankle weights,    Balance: tandem without UE support, SLS with fingertip support 3 x 30 sec ea [] SPV needed throughout. LLE more challenging     []      []      []      []    Therapeutic activities: Agnes REGALADO, TUG 30 minutes []      Home Exercise Program:     Access Code: [email or web address]: [email or web address]  Date: 03/24/2021  Prepared by: Awilda Pelaez  Exercises   Sitting Knee Extension with Resistance - 1 x daily - 3-5 x weekly - 1-3 sets - 10 reps   Seated Knee Extension Stretch with Chair - 1 x daily - 7 x weekly - 1 sets - 30 hold   Seated Hamstring Curls with Resistance - 1 x daily - 3-5 x weekly - 1-3 sets - 10 reps   Seated Hip Abduction - 1 x daily - 3-5 x weekly - 1-3 sets - 10 reps   Seated March - 1 x daily - 3-5 x weekly - 1-3 sets - 10 reps   Sit to Stand with Counter Support - 1 x daily - 7 x weekly - 1-2 sets - 10 reps. Access Code: [email or web address]: [email or web address]/  Date: 04/12/2021  Prepared by: Diana Hernandes  Exercises   Sitting Knee Extension with Resistance - 1 x daily - 3 x weekly - 2-3 sets - 10-20 reps   Seated Hamstring Curls with Resistance - 1 x daily - 3 x weekly - 2-3 sets - 10-20 reps   Seated March with Resistance - 1 x daily - 3 x weekly - 2-3 sets - 10-20 reps   Seated Hip Abduction with Resistance - 1 x daily - 3 x weekly - 2-3 sets - 10-20 reps       Therapeutic Exercise and NMR:  [x] (05705) Provided verbal/tactile cueing for activities related to strengthening, flexibility, endurance, ROM for improvements in LE, proximal hip, and core control with self-care, mobility, lifting, ambulation.  [] (63883) Provided verbal/tactile cueing for activities related to improving balance, coordination, kinesthetic sense, posture, motor skill, proprioception to assist with LE, proximal hip, and core control in self-care, mobility, lifting, ambulation and eccentric single leg control.      Gait Training and Therapeutic Activities:    [] (29479) Provided verbal/tactile cueing for activities related to improving balance, coordination, kinesthetic sense, posture, motor skill, proprioception and motor activation to allow for proper function of core, proximal hip and LE with self-care and ADLs and functional mobility.   [] (01433) Provided training and instruction to the patient for proper LE, core and proximal hip recruitment and positioning and eccentric body weight control with ambulation re-education including up and down stairs     Home Exercise Program:    [] (74701) Reviewed/Progressed HEP activities related to strengthening, flexibility, endurance, ROM of core, proximal hip and LE for functional self-care, mobility, lifting and ambulation/stair navigation   [] (12328) Reviewed/Progressed HEP activities related to improving balance, coordination, kinesthetic sense, posture, motor skill, proprioception of core, proximal hip and LE for self-care, mobility, lifting, and ambulation/stair navigation      Manual Treatments:  PROM / STM / Oscillations-Mobs:  G-I, II, III, IV (Tomy, Inf., Post.)  [] (26260) Provided manual therapy to mobilize LE, proximal hip and/or LS spine soft tissue/joints for the purpose of modulating pain, promoting relaxation,  increasing ROM, reducing/eliminating soft tissue swelling/inflammation/restriction, improving soft tissue extensibility and allowing for proper ROM for normal function with self-care, mobility, lifting and ambulation.      Modalities:    [] Electric Stimulation:   [] Ultrasound:   [] Other:   Charges:  Timed Code Treatment Minutes: 3 TE 45   Total Treatment Minutes: 45      [] RE-EVAL     [x] ZS(15537) x 3    [] NMR (05925) x       [] Manual (17923) x       [] TA (89420) x 2      [] Gait Training (94865) x       [] ES(attended) (19875)  [] ES (un) ([id number])   [] DRY NEEDLE 1 OR 2 MUSCLES  [] DRY NEEDLE 3+ MUSCLES  [] Mech Traction (08061)  [] Ultrasound (16548)  [] Other:    GOALS:  Short term goals  Time Frame for Short term goals: 4 weeks  Short term goal 1: Pt to reduce fall risk and impairment per Tinetti by inc score from 8 to >/=16  - MET  Short term goal 2: Pt to improve TUG to reduce fall risk and improve gait speed from 17s to less than 12 seconds-MET  Short term goal 3: Pt to illustrate strength gains by bia 45-60 min PT treatment sessions and ability to perform sit to stand with min use of UE safely 75% or greater of the time-MET  Short term goal 4: Pt to improve hamstring lengh exhibited with forward reach to less than 3 inches from toes for improving flexiblity in LEs- MET  Short term goal 5: indep to supervision with progressive HEP to max rehab potential- MET  Long term goals  Time Frame for Long term goals : 8 weeks  Long term goal 1: Pt to reduce impairment and fall risk by inc score per Tinetti to >/=22 -met 4/29  Long term goal 2: Pt to ambulate household distances and situations modified indep to indep -progressing  Long term goal 3: Pt to reduce impairment and fall risk by inc score per [place] to 45/56 to demonstrate less risk of falls. Not met       Assessment: He wanted to try more time on the nustep to work on endurance. He required a longer rest break before getting up from bike but otherwise was well tolerated. He still needs to work on endurance and functional balance. He would benefit from continued skilled PT to improve upon these impairments. Treatment/Activity Tolerance:  [x] Patient tolerated treatment well [] Patient limited by fatique  [] Patient limited by pain  [] Patient limited by other medical complications  [] Other:     Overall Progression Towards Functional goals/ Treatment Progress Update:  [] Patient is progressing as expected towards functional goals listed. [x] Progression is slowed due to complexities/Impairments listed. [] Progression has been slowed due to co-morbidities.   [] Plan just implemented, too soon to assess goals progression <30days   [] Goals require adjustment due to lack of progress  [] Patient is not progressing as expected and requires additional follow up with physician  [] Other    Prognosis for POC: [] Good [x] Fair  [] Poor    Patient requires continued skilled intervention: [x] Yes  [] No        PLAN:  Current Treatment Recommendations: Strengthening, Balance Training, Transfer Training, IADL Training, Endurance Training, Gait Training, Neuromuscular Re-education, Home Exercise Program, Patient/Caregiver Education & Training, Modalities, Positioning, Equipment Evaluation, Education, & procurement, Safety Education & Training, Functional Mobility Training, ADL/Self-care Training, ROM   [x] Continue per plan of care [] Alter current plan (see comments)  [] Plan of care initiated [] Hold pending MD visit [] Discharge    Electronically signed by:      Sasha Forman, PT, DPT    Note: If patient does not return for scheduled/recommended follow up visits, this note will serve as a discharge from care along with the most recent update on progress. Left arm;
